# Patient Record
Sex: MALE | Race: WHITE | Employment: OTHER | URBAN - NONMETROPOLITAN AREA
[De-identification: names, ages, dates, MRNs, and addresses within clinical notes are randomized per-mention and may not be internally consistent; named-entity substitution may affect disease eponyms.]

---

## 2018-08-13 ENCOUNTER — HOSPITAL ENCOUNTER (EMERGENCY)
Age: 63
Discharge: HOME | End: 2018-08-13
Payer: COMMERCIAL

## 2018-08-13 ENCOUNTER — APPOINTMENT (OUTPATIENT)
Dept: GENERAL RADIOLOGY | Age: 63
End: 2018-08-13
Payer: COMMERCIAL

## 2018-08-13 VITALS
HEART RATE: 93 BPM | DIASTOLIC BLOOD PRESSURE: 101 MMHG | BODY MASS INDEX: 24.33 KG/M2 | HEIGHT: 67 IN | SYSTOLIC BLOOD PRESSURE: 156 MMHG | TEMPERATURE: 97.7 F | OXYGEN SATURATION: 96 % | WEIGHT: 155 LBS | RESPIRATION RATE: 18 BRPM
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
## 2018-08-13 DIAGNOSIS — S46.912A STRAIN OF LEFT SHOULDER, INITIAL ENCOUNTER: Primary | ICD-10-CM

## 2018-08-13 PROCEDURE — 6360000002 HC RX W HCPCS: Performed by: PHYSICIAN ASSISTANT

## 2018-08-13 PROCEDURE — 73030 X-RAY EXAM OF SHOULDER: CPT

## 2018-08-13 PROCEDURE — 99283 EMERGENCY DEPT VISIT LOW MDM: CPT

## 2018-08-13 PROCEDURE — 96372 THER/PROPH/DIAG INJ SC/IM: CPT

## 2018-08-13 PROCEDURE — 99283 EMERGENCY DEPT VISIT LOW MDM: CPT | Performed by: PHYSICIAN ASSISTANT

## 2018-08-13 RX ORDER — KETOROLAC TROMETHAMINE 30 MG/ML
30 INJECTION, SOLUTION INTRAMUSCULAR; INTRAVENOUS ONCE
Status: COMPLETED | OUTPATIENT
Start: 2018-08-13 | End: 2018-08-13

## 2018-08-13 RX ORDER — NAPROXEN 500 MG/1
500 TABLET ORAL 2 TIMES DAILY
Qty: 20 TABLET | Refills: 0 | Status: SHIPPED | OUTPATIENT
Start: 2018-08-13 | End: 2020-03-03 | Stop reason: ALTCHOICE

## 2018-08-13 RX ORDER — CYCLOBENZAPRINE HCL 10 MG
10 TABLET ORAL 3 TIMES DAILY PRN
Qty: 15 TABLET | Refills: 0 | Status: SHIPPED | OUTPATIENT
Start: 2018-08-13 | End: 2018-08-23

## 2018-08-13 RX ADMIN — KETOROLAC TROMETHAMINE 30 MG: 30 INJECTION, SOLUTION INTRAMUSCULAR; INTRAVENOUS at 11:01

## 2018-08-13 ASSESSMENT — ENCOUNTER SYMPTOMS
COUGH: 0
STRIDOR: 0
CONSTIPATION: 0
SHORTNESS OF BREATH: 0
BACK PAIN: 0
ABDOMINAL DISTENTION: 0
VOMITING: 0
COLOR CHANGE: 0
SORE THROAT: 0
ABDOMINAL PAIN: 0
CHEST TIGHTNESS: 0
RHINORRHEA: 0
WHEEZING: 0
NAUSEA: 0

## 2018-08-13 ASSESSMENT — PAIN SCALES - GENERAL: PAINLEVEL_OUTOF10: 5

## 2018-08-13 NOTE — ED PROVIDER NOTES
CURRENT MEDICATIONS       Discharge Medication List as of 8/13/2018 10:40 AM      CONTINUE these medications which have NOT CHANGED    Details   Sertraline HCl (ZOLOFT PO) Take by mouth daily      Zolpidem Tartrate (AMBIEN PO) Take by mouth nightly             ALLERGIES     Patient has no known allergies. FAMILY HISTORY     History reviewed. No pertinent family history. SOCIAL HISTORY       Social History     Social History    Marital status:      Spouse name: N/A    Number of children: N/A    Years of education: N/A     Social History Main Topics    Smoking status: Never Smoker    Smokeless tobacco: None    Alcohol use No    Drug use: No    Sexual activity: Not Asked     Other Topics Concern    None     Social History Narrative    None       SCREENINGS           PHYSICAL EXAM    (up to 7 for level 4, 8 or more for level 5)     ED Triage Vitals [08/13/18 1009]   BP Temp Temp Source Pulse Resp SpO2 Height Weight   (!) 156/101 97.7 °F (36.5 °C) Oral 93 18 96 % 5' 7\" (1.702 m) 155 lb (70.3 kg)       Physical Exam   Constitutional: He is oriented to person, place, and time. He appears well-developed and well-nourished. No distress. HENT:   Head: Normocephalic and atraumatic. Neck: Normal range of motion. Cardiovascular: Normal rate, regular rhythm and normal heart sounds. Exam reveals no gallop and no friction rub. No murmur heard. Pulmonary/Chest: Effort normal and breath sounds normal. No respiratory distress. He has no wheezes. He has no rales. He exhibits no tenderness. Abdominal: Soft. Bowel sounds are normal. He exhibits no distension. There is no tenderness. There is no rebound and no guarding. Musculoskeletal:        Arms:  Lymphadenopathy:     He has no cervical adenopathy. Neurological: He is alert and oriented to person, place, and time. Skin: Skin is warm and dry. He is not diaphoretic. Psychiatric: He has a normal mood and affect.    Nursing note and START taking these medications    Details   naproxen (NAPROSYN) 500 MG tablet Take 1 tablet by mouth 2 times daily, Disp-20 tablet, R-0Print      cyclobenzaprine (FLEXERIL) 10 MG tablet Take 1 tablet by mouth 3 times daily as needed for Muscle spasms, Disp-15 tablet, R-0Print             (Please note that portions of this note were completed with a voice recognition program.  Efforts were made to edit the dictations but occasionally words are mis-transcribed.)    CRYS [place]  08/13/18 1538

## 2020-03-03 RX ORDER — PRAZOSIN HYDROCHLORIDE 1 MG/1
1 CAPSULE ORAL NIGHTLY PRN
Status: ON HOLD | COMMUNITY
End: 2023-01-01 | Stop reason: HOSPADM

## 2020-03-03 RX ORDER — TOPIRAMATE 100 MG/1
100 TABLET, FILM COATED ORAL DAILY
Status: ON HOLD | COMMUNITY
End: 2022-02-22

## 2020-03-03 RX ORDER — BUPROPION HYDROCHLORIDE 100 MG/1
100 TABLET ORAL EVERY MORNING
Status: ON HOLD | COMMUNITY
End: 2022-02-22

## 2020-03-04 ENCOUNTER — OFFICE VISIT (OUTPATIENT)
Dept: GASTROENTEROLOGY | Age: 65
End: 2020-03-04
Payer: OTHER GOVERNMENT

## 2020-03-04 VITALS
WEIGHT: 153 LBS | HEIGHT: 67 IN | OXYGEN SATURATION: 98 % | BODY MASS INDEX: 24.01 KG/M2 | SYSTOLIC BLOOD PRESSURE: 117 MMHG | HEART RATE: 86 BPM | DIASTOLIC BLOOD PRESSURE: 60 MMHG

## 2020-03-04 PROCEDURE — 99204 OFFICE O/P NEW MOD 45 MIN: CPT | Performed by: NURSE PRACTITIONER

## 2020-03-04 RX ORDER — FERROUS SULFATE 325(65) MG
325 TABLET ORAL
Status: ON HOLD | COMMUNITY
End: 2020-04-04 | Stop reason: HOSPADM

## 2020-03-04 RX ORDER — ACETAMINOPHEN 160 MG
TABLET,DISINTEGRATING ORAL DAILY
Status: ON HOLD | COMMUNITY
End: 2022-03-11 | Stop reason: HOSPADM

## 2020-03-04 RX ORDER — FOLIC ACID 1 MG/1
1 TABLET ORAL DAILY
Status: ON HOLD | COMMUNITY
End: 2022-02-22

## 2020-03-04 RX ORDER — ONDANSETRON 4 MG/1
TABLET, FILM COATED ORAL
Qty: 15 TABLET | Refills: 0 | Status: SHIPPED | OUTPATIENT
Start: 2020-03-04 | End: 2020-08-11 | Stop reason: ALTCHOICE

## 2020-03-04 RX ORDER — OMEPRAZOLE 20 MG/1
20 CAPSULE, DELAYED RELEASE ORAL DAILY
COMMUNITY

## 2020-03-04 ASSESSMENT — ENCOUNTER SYMPTOMS
DIARRHEA: 1
ABDOMINAL PAIN: 1
CHEST TIGHTNESS: 0
VOMITING: 0
CONSTIPATION: 0
RECTAL PAIN: 0
SORE THROAT: 0
BLOOD IN STOOL: 1
COUGH: 0
SHORTNESS OF BREATH: 0
BACK PAIN: 0
VOICE CHANGE: 0
NAUSEA: 1
ABDOMINAL DISTENTION: 0

## 2020-03-04 NOTE — PATIENT INSTRUCTIONS
Schedule colonoscopy and endoscopy. Do not eat or drink after midnight the day of the procedure. Allowed medications can be taken with a small sip of water. Please review your prep instructions for allowed medications. You will not be able to drive for 24 hours after the procedure due to sedation. Bring a  with you the day of the procedure. If you are on blood thinners, clearance from the prescribing physician will be obtained before your procedure is scheduled. If it is determined it is not safe to hold these medications for a short time an alternative procedure for evaluation may be recommended. No aspirin, ibuprofen, naproxen, fish oil or vitamin E for 5 days before procedure. Risks of colonoscopy and endoscopy include, but are not limited to, perforation, bleeding, and infection, Risk of perforation and bleeding are increased if there is a polyp removed or dilation completed. Anesthesia risks will be reviewed with you before the procedure by a member of the anesthesia department. Your physician may also schedule a follow up appointment with the nurse practitioner to discuss pathology, symptoms or to check if you have had any problems related to your procedure. If you prefer not to return to the office after your procedure please discuss this with your physician on the day of your colonoscopy. The physician will talk with you and/or your family after the procedure is completed. Final recommendations are based on the pathologist report if biopsies or specimens are taken. For Colonoscopy: You will be given specific directions regarding restrictions to diet and bowel prep instructions including laxatives. Please read these instructions one week prior to your scheduled procedure to ensure that you are prepared.  If you have any questions regarding these instructions please call our office Mon through Fri from 8:00 am to 4:00 pm.     Follow prep instructions provided for bowel prep. Take all of the bowel prep as directed. If you are having problems with nausea, stop your prep for 30-45 min to allow the nausea to subside before resuming your prep. It is important to drink plenty of fluids throughout the day before taking your laxatives. This will help to protect your kidneys, prevent dehydration and maximize the effect of the bowel prep. If polyps are removed during the procedure they will be sent to a pathologist for analysis. Unless you have a follow up appointment scheduled, you will be notified by mail of the pathology results within 4 weeks. If you have not received results after 4 weeks you may call the office to obtain this information. Your diet before a colonoscopy bowel preparation is very important to ensure a successful colon exam. It is recommended to consider certain changes to your diet three to four days prior to the procedure. Remember that your bowels need to be empty for the exam.    What foods are good to eat? Cut down on heavy solid foods three to four days before the procedure and start introducing lighter meals to your diet. The following food suggestions are a good part of your diet before a colonoscopy bowel preparation. Light meat that is easily digestible such as chicken (without the skin)   Potatoes without skin   Cheese   Eggs   A light meal of steamed white fish   Light clear soups    Foods and drinks to avoid  Avoid foods that contain too much fiber. Stay clear of dark colored beverages. They can stick to the walls of the digestive tract and make it difficult to differentiate from blood. Some of these foods are:  Red meat, rice, nuts and vegetables   Milk, other milk based fluids and cream   Most fruit and puddings   Whole grain pasta   Cereals, bran and seeds   Colored beverages, especially those that are red or purple in color   Red colored Jell-O   On the day before the colonoscopy, continue to drink plenty of clear fluids.  It is important to keep yourself hydrated before the exam.     Please follow all instructions as provided for cleansing the bowel. Failure to have an adequately prepped colon may cause you to have incomplete exam with further testing required.      [email or web address]

## 2020-03-04 NOTE — PROGRESS NOTES
Subjective:      Patient ID: Cristal Jacob is a 63 y.o. male  MD Ky Wilson, FNP    HPI  Chief Complaint   Patient presents with    New Patient    Anemia       New patient referred for anemia. Labs from referring provider reviewed. Wbc 6.3, hgb 7.5, hct 26.8, plt 579  Folate 6.0  Vit D 11.5  Iron 12, tibc 464  Other labs including vit B12, tsh, cmp unremarkable. Patient says he has not had any fatigue, sob, weakness. Has noted a little harder to run    Patient has had some rectal bleeding. Occurs with diarrhea stool. Darker red or pinkish noted mixed in stool. Cannot tell occurrence or frequency due color blindness. He has chronic diarrhea. Intermittent occurrence in past but is more persistent, daily now. Has bm 3x per day with loose to watery stools. This is a noted change in bowel habit. He states he started having some diarrhea when he returned from [place] but not as much as he has had recently. He has both lower and upper abdominal pain. Both are intermittent. Upper pain has resolved after starting daily PPI. Pain is mild to moderate. Located across upper abdomen or LLQ. He has had chronic intermittent heartburn for years. \"chew a lot of tums\". Prescribed omeprazole he thinks this improved the pain and     He denies melena, vomiting, dysphagia. Colonoscopy and endoscopy done years ago. Mother had colon cancer twice. Assessment:      1. Anemia, unspecified type    2. Rectal bleeding    3. Change in bowel habit    4. Diarrhea, unspecified type    5. Lower abdominal pain    6. Chronic heartburn    7. Upper abdominal pain    8. Nausea    9. Family history of colon cancer            Plan:      Schedule colonoscopy and endoscopy     zofran prep for nausea r/t bowel prep provided. Orders Placed This Encounter   Medications    ondansetron (ZOFRAN) 4 MG tablet     Sig: Take one tablet 1 hour prior to starting bowel prep.  If nausea occurs take a second tablet. Take 1-2 tablets every 6 hours for nausea. Dispense:  15 tablet     Refill:  0     Pt advised to call if any problems r/t medication. Discussed medication including administration and side effects       Instruct on bowel prep. Nothing to eat or drink after midnight the day of the exam.  Unable to drive for 24 hours after the procedure. No aspirin or nonsteroidal anti-inflammatories for 5 days before procedure. I have discussed the benefits, alternatives, and risks (including bleeding, perforation and death)  for pursuing Endoscopy (EGD/Colonscopy/EUS/ERCP) with the patient and they are willing to continue. We also discussed the need for anesthesia, IV access, proper dietary changes, medication changes if necessary, and need for bowel prep (if ordered) prior to their Endoscopic procedure. They are aware they must have someone accompany them to their scheduled procedure to drive them home - they agree to the above and are willing to continue. Plan for anesthesia: MAC  no reported complications                  Family History   Problem Relation Age of Onset    Colon Cancer Mother     Liver Cancer Mother     Colon Polyps Neg Hx        Past Medical History:   Diagnosis Date    PTSD (post-traumatic stress disorder)        Past Surgical History:   Procedure Laterality Date    APPENDECTOMY      CHOLECYSTECTOMY      COLONOSCOPY      when pt was in the 20's       Current Outpatient Medications   Medication Sig Dispense Refill    omeprazole (PRILOSEC) 20 MG delayed release capsule Take 20 mg by mouth daily      Cholecalciferol (VITAMIN D3) 50 MCG (2000 UT) CAPS Take by mouth daily      folic acid (FOLVITE) 1 MG tablet Take 1 mg by mouth daily      ferrous sulfate 325 (65 Fe) MG tablet Take 325 mg by mouth 3 times daily (with meals)      ondansetron (ZOFRAN) 4 MG tablet Take one tablet 1 hour prior to starting bowel prep. If nausea occurs take a second tablet.  Take 1-2 tablets every 6

## 2020-03-11 ENCOUNTER — HOSPITAL ENCOUNTER (OUTPATIENT)
Age: 65
Setting detail: OUTPATIENT SURGERY
Discharge: HOME | End: 2020-03-11
Attending: INTERNAL MEDICINE | Admitting: INTERNAL MEDICINE
Payer: OTHER GOVERNMENT

## 2020-03-11 ENCOUNTER — ANESTHESIA (OUTPATIENT)
Dept: OPERATING ROOM | Age: 65
End: 2020-03-11

## 2020-03-11 ENCOUNTER — TELEPHONE (OUTPATIENT)
Dept: GASTROENTEROLOGY | Age: 65
End: 2020-03-11

## 2020-03-11 ENCOUNTER — APPOINTMENT (OUTPATIENT)
Dept: OPERATING ROOM | Age: 65
End: 2020-03-11

## 2020-03-11 ENCOUNTER — HOSPITAL ENCOUNTER (OUTPATIENT)
Age: 65
Setting detail: SPECIMEN
Discharge: HOME | End: 2020-03-11
Payer: OTHER GOVERNMENT

## 2020-03-11 ENCOUNTER — ANESTHESIA EVENT (OUTPATIENT)
Dept: OPERATING ROOM | Age: 65
End: 2020-03-11

## 2020-03-11 VITALS — OXYGEN SATURATION: 98 % | SYSTOLIC BLOOD PRESSURE: 113 MMHG | DIASTOLIC BLOOD PRESSURE: 70 MMHG

## 2020-03-11 VITALS
OXYGEN SATURATION: 100 % | TEMPERATURE: 97.4 F | DIASTOLIC BLOOD PRESSURE: 73 MMHG | SYSTOLIC BLOOD PRESSURE: 120 MMHG | BODY MASS INDEX: 24.48 KG/M2 | HEIGHT: 67 IN | HEART RATE: 51 BPM | RESPIRATION RATE: 18 BRPM | WEIGHT: 156 LBS

## 2020-03-11 DIAGNOSIS — K63.89 MASS OF CECUM: ICD-10-CM

## 2020-03-11 DIAGNOSIS — D64.9 ANEMIA, UNSPECIFIED TYPE: ICD-10-CM

## 2020-03-11 LAB
ALBUMIN SERPL-MCNC: 3.9 G/DL (ref 3.5–5.2)
ALP BLD-CCNC: 103 U/L (ref 40–130)
ALT SERPL-CCNC: 13 U/L (ref 5–41)
ANION GAP SERPL CALCULATED.3IONS-SCNC: 10 MMOL/L (ref 7–19)
ANISOCYTOSIS: ABNORMAL
AST SERPL-CCNC: 20 U/L (ref 5–40)
BASOPHILS ABSOLUTE: 0.1 K/UL (ref 0–0.2)
BASOPHILS RELATIVE PERCENT: 0.8 % (ref 0–1)
BILIRUB SERPL-MCNC: <0.2 MG/DL (ref 0.2–1.2)
BUN BLDV-MCNC: 10 MG/DL (ref 8–23)
CALCIUM SERPL-MCNC: 8.9 MG/DL (ref 8.8–10.2)
CEA: 5.5 NG/ML (ref 0–4.7)
CHLORIDE BLD-SCNC: 108 MMOL/L (ref 98–111)
CO2: 21 MMOL/L (ref 22–29)
CREAT SERPL-MCNC: 1.3 MG/DL (ref 0.5–1.2)
EOSINOPHILS ABSOLUTE: 0.3 K/UL (ref 0–0.6)
EOSINOPHILS RELATIVE PERCENT: 4.2 % (ref 0–5)
GFR NON-AFRICAN AMERICAN: 56
GLUCOSE BLD-MCNC: 100 MG/DL (ref 74–109)
HCT VFR BLD CALC: 27.3 % (ref 42–52)
HEMOGLOBIN: 7.4 G/DL (ref 14–18)
HYPOCHROMIA: ABNORMAL
IMMATURE GRANULOCYTES #: 0 K/UL
LYMPHOCYTES ABSOLUTE: 0.6 K/UL (ref 1.1–4.5)
LYMPHOCYTES RELATIVE PERCENT: 7.6 % (ref 20–40)
MCH RBC QN AUTO: 20.4 PG (ref 27–31)
MCHC RBC AUTO-ENTMCNC: 27.1 G/DL (ref 33–37)
MCV RBC AUTO: 75.2 FL (ref 80–94)
MICROCYTES: ABNORMAL
MONOCYTES ABSOLUTE: 0.7 K/UL (ref 0–0.9)
MONOCYTES RELATIVE PERCENT: 10.3 % (ref 0–10)
NEUTROPHILS ABSOLUTE: 5.5 K/UL (ref 1.5–7.5)
NEUTROPHILS RELATIVE PERCENT: 76.8 % (ref 50–65)
OVALOCYTES: ABNORMAL
PDW BLD-RTO: 23.1 % (ref 11.5–14.5)
PLATELET # BLD: 478 K/UL (ref 130–400)
PLATELET SLIDE REVIEW: ADEQUATE
PMV BLD AUTO: 11.2 FL (ref 9.4–12.4)
POIKILOCYTES: ABNORMAL
POTASSIUM SERPL-SCNC: 5 MMOL/L (ref 3.5–5)
RBC # BLD: 3.63 M/UL (ref 4.7–6.1)
SODIUM BLD-SCNC: 139 MMOL/L (ref 136–145)
TARGET CELLS: ABNORMAL
TOTAL PROTEIN: 6.9 G/DL (ref 6.6–8.7)
WBC # BLD: 7.2 K/UL (ref 4.8–10.8)

## 2020-03-11 PROCEDURE — 43239 EGD BIOPSY SINGLE/MULTIPLE: CPT | Performed by: INTERNAL MEDICINE

## 2020-03-11 PROCEDURE — 81479 UNLISTED MOLECULAR PATHOLOGY: CPT

## 2020-03-11 PROCEDURE — 43239 EGD BIOPSY SINGLE/MULTIPLE: CPT

## 2020-03-11 PROCEDURE — 45380 COLONOSCOPY AND BIOPSY: CPT | Performed by: INTERNAL MEDICINE

## 2020-03-11 PROCEDURE — 85025 COMPLETE CBC W/AUTO DIFF WBC: CPT

## 2020-03-11 PROCEDURE — 45385 COLONOSCOPY W/LESION REMOVAL: CPT | Performed by: INTERNAL MEDICINE

## 2020-03-11 PROCEDURE — 36415 COLL VENOUS BLD VENIPUNCTURE: CPT

## 2020-03-11 PROCEDURE — 45385 COLONOSCOPY W/LESION REMOVAL: CPT

## 2020-03-11 PROCEDURE — 80053 COMPREHEN METABOLIC PANEL: CPT

## 2020-03-11 PROCEDURE — 88342 IMHCHEM/IMCYTCHM 1ST ANTB: CPT

## 2020-03-11 PROCEDURE — 82378 CARCINOEMBRYONIC ANTIGEN: CPT

## 2020-03-11 PROCEDURE — 45382 COLONOSCOPY W/CONTROL BLEED: CPT

## 2020-03-11 PROCEDURE — 88305 TISSUE EXAM BY PATHOLOGIST: CPT

## 2020-03-11 PROCEDURE — 45380 COLONOSCOPY AND BIOPSY: CPT

## 2020-03-11 RX ORDER — ONDANSETRON 2 MG/ML
4 INJECTION INTRAMUSCULAR; INTRAVENOUS
Status: DISCONTINUED | OUTPATIENT
Start: 2020-03-11 | End: 2020-03-11 | Stop reason: HOSPADM

## 2020-03-11 RX ORDER — FENTANYL CITRATE 50 UG/ML
INJECTION, SOLUTION INTRAMUSCULAR; INTRAVENOUS PRN
Status: DISCONTINUED | OUTPATIENT
Start: 2020-03-11 | End: 2020-03-11 | Stop reason: SDUPTHER

## 2020-03-11 RX ORDER — PROPOFOL 10 MG/ML
INJECTION, EMULSION INTRAVENOUS PRN
Status: DISCONTINUED | OUTPATIENT
Start: 2020-03-11 | End: 2020-03-11 | Stop reason: SDUPTHER

## 2020-03-11 RX ORDER — DIPHENHYDRAMINE HYDROCHLORIDE 50 MG/ML
12.5 INJECTION INTRAMUSCULAR; INTRAVENOUS
Status: DISCONTINUED | OUTPATIENT
Start: 2020-03-11 | End: 2020-03-11 | Stop reason: HOSPADM

## 2020-03-11 RX ORDER — PROMETHAZINE HYDROCHLORIDE 25 MG/ML
6.25 INJECTION, SOLUTION INTRAMUSCULAR; INTRAVENOUS
Status: DISCONTINUED | OUTPATIENT
Start: 2020-03-11 | End: 2020-03-11 | Stop reason: HOSPADM

## 2020-03-11 RX ORDER — SODIUM CHLORIDE 9 MG/ML
INJECTION, SOLUTION INTRAVENOUS CONTINUOUS
Status: DISCONTINUED | OUTPATIENT
Start: 2020-03-11 | End: 2020-03-11 | Stop reason: HOSPADM

## 2020-03-11 RX ORDER — LIDOCAINE HYDROCHLORIDE 10 MG/ML
INJECTION, SOLUTION INFILTRATION; PERINEURAL PRN
Status: DISCONTINUED | OUTPATIENT
Start: 2020-03-11 | End: 2020-03-11 | Stop reason: SDUPTHER

## 2020-03-11 RX ADMIN — FENTANYL CITRATE 100 MCG: 50 INJECTION, SOLUTION INTRAMUSCULAR; INTRAVENOUS at 09:53

## 2020-03-11 RX ADMIN — LIDOCAINE HYDROCHLORIDE 50 MG: 10 INJECTION, SOLUTION INFILTRATION; PERINEURAL at 10:00

## 2020-03-11 RX ADMIN — PROPOFOL 1300 MG: 10 INJECTION, EMULSION INTRAVENOUS at 10:00

## 2020-03-11 RX ADMIN — SODIUM CHLORIDE: 9 INJECTION, SOLUTION INTRAVENOUS at 09:50

## 2020-03-11 RX ADMIN — SODIUM CHLORIDE: 9 INJECTION, SOLUTION INTRAVENOUS at 11:08

## 2020-03-11 NOTE — H&P
Patient Name: Collette Primer  : 1955  MRN: [id number]  DATE: 20    Allergies: No Known Allergies     ENDOSCOPY  History and Physical    Procedure:    [x] Diagnostic Colonoscopy       [] Screening Colonoscopy  [x] EGD      [] ERCP      [] EUS       [] Other    [x] Previous office notes/History and Physical reviewed from the patients chart. Please see EMR for further details of HPI. I have examined the patient's status immediately prior to the procedure and:      Indications/HPI:     1. Anemia, unspecified type    2. Rectal bleeding    3. Change in bowel habit    4. Diarrhea, unspecified type    5. Lower abdominal pain    6. Chronic heartburn    7. Upper abdominal pain    8. Nausea    9. Family history of colon cancer        []Abdominal Pain   []Cancer- GI/Lung     []Fhx of colon CA/polyps  []History of Polyps  []Barretts            []Melena  []Abnormal Imaging              []Dysphagia              []Persistent Pneumonia   []Anemia                            []Food Impaction        []History of Polyps  [] GI Bleed             []Pulmonary nodule/Mass   []Change in bowel habits []Heartburn/Reflux  []Rectal Bleed (BRBPR)  []Chest Pain - Non Cardiac []Heme (+) Stool []Ulcers  []Constipation  []Hemoptysis  []Varices  []Diarrhea  []Hypoxemia    []Nausea/Vomiting   []Screening   []Crohns/Colitis  []Other:     Anesthesia:   [x] MAC [] Moderate Sedation   [] General   [] None     ROS: 12 pt Review of Symptoms was negative unless mentioned above    Medications:   Prior to Admission medications    Medication Sig Start Date End Date Taking?  Authorizing Provider   omeprazole (PRILOSEC) 20 MG delayed release capsule Take 20 mg by mouth daily   Yes Historical Provider, MD   Cholecalciferol (VITAMIN D3) 50 MCG (2000) CAPS Take by mouth daily   Yes Historical Provider, MD   folic acid (FOLVITE) 1 MG tablet Take 1 mg by mouth daily   Yes Historical Provider, MD   ferrous sulfate 325 (65 Fe) MG tablet Take 325 mg by mouth 3 times daily (with meals)   Yes Historical Provider, MD   ondansetron (ZOFRAN) 4 MG tablet Take one tablet 1 hour prior to starting bowel prep. If nausea occurs take a second tablet. Take 1-2 tablets every 6 hours for nausea. 3/4/20  Yes KALPESH Resendiz   prazosin (MINIPRESS) 1 MG capsule Take 1 mg by mouth nightly For nightmares   Yes Historical Provider, MD   buPROPion (WELLBUTRIN) 100 MG tablet Take 100 mg by mouth every morning For mood   Yes Historical Provider, MD   topiramate (TOPAMAX) 100 MG tablet Take 100 mg by mouth daily For Seizures or Migraines   Yes Historical Provider, MD   Sertraline HCl (ZOLOFT PO) Take by mouth daily   Yes Historical Provider, MD   Zolpidem Tartrate (AMBIEN PO) Take by mouth nightly   Yes Historical Provider, MD       Past Medical History:  Past Medical History:   Diagnosis Date    Acid reflux     PTSD (post-traumatic stress disorder)        Past Surgical History:  Past Surgical History:   Procedure Laterality Date    APPENDECTOMY      CHOLECYSTECTOMY      COLONOSCOPY      when pt was in the 20's       Social History:  Social History     Tobacco Use    Smoking status: Never Smoker    Smokeless tobacco: Never Used   Substance Use Topics    Alcohol use: Not Currently     Comment: stopped 2/5/2020    Drug use: No       Vital Signs:   Vitals:    03/11/20 0933   BP: 125/76   Pulse: 65   Resp: 18   Temp: 97.4 °F (36.3 °C)   SpO2: 100%        Physical Exam:  Cardiac:  [x]WNL  []Comments:  Pulmonary:  [x]WNL   []Comments:  Neuro/Mental Status:  [x]WNL  []Comments:  Abdominal:  [x]WNL    []Comments:  Other:   []WNL  []Comments:    Informed Consent:  The risks and benefits of the procedure have been discussed with either the patient or if they cannot consent, their representative. Assessment:  Patient examined and appropriate for planned sedation and procedure. Plan:  Proceed with planned sedation and procedure as above.          Kiesha Gamble MD

## 2020-03-11 NOTE — TELEPHONE ENCOUNTER
This [facility] called and spoke to the patients wife. He has been scheduled with Oncology on 03/23/2020 and General Surgery on 03/24/2020. His CT appointment is on 03/18/2020 and his follow up with GI is in August with Labs 1 week before. Patients wife stated understanding.
check for metastatic lesions and lymphadenopathy  - Resume previous meds and diet  - GI clinic f/u in 6 months; monitor CBC and iron studies periodically. - Keep scheduled f/u appts with other MDs      - NO ASA/NSAIDs x 2 weeks     Findings and recommendations were discussed w/ the patient and his family.  A copy of the images was provided.     Homar Her MD  3/11/2020  9:55 AM

## 2020-03-11 NOTE — OP NOTE
Patient: Laron Leigh : 1955  Med Rec#: [id number] Acc#: [id number]   Primary Care Provider Santa Alvarado    Date of Procedure:  3/11/2020    Endoscopist: Bucky Snider MD    Referring Provider: Santa Alvarado,     Operation Performed: Colonoscopy up to the Cecum with     (1) Cold biopsies of a large, irregular, fungating and friable malignant appearing mass lesion in the Cecum  (2) Piecemeal resection of a large sessile, multilobulated polypoid lesion in the hepatic flexure and (3) application of metallic clips x 2 to control post-polypectomy bleeding at the site    (3) piecemeal resection of a large sessile, multilobulaed polypoid lesion in the proximal descending colon and (4) application of two metallic clips at the polypectomy site to prevent bleeding and minimize risk fo perforation and     (5) hot snare resection of a small rectal polyp    (6) retrieval of multiple resected polyp fragments using a [employer] retrieval net    (7) technically challenging and prolonged procedure lasting >60 mins     Indications: for both EGD and colonoscopy exams done today:  1. Anemia, unspecified type    2. Rectal bleeding    3. Change in bowel habit    4. Diarrhea, unspecified type    5. Lower abdominal pain    6. Chronic heartburn    7. Upper abdominal pain    8. Nausea    9. Family history of colon cancer      Anesthesia:  Sedation was administered by anesthesia who monitored the patient during the procedure. I met with Laron Leigh prior to procedure. We discussed the procedure itself, and I have discussed the risks of endoscopy (including-- but not limited to-- pain, discomfort, bleeding potentially requiring second endoscopic procedure and/or blood transfusion, organ perforation requiring operative repair, damage to organs near the colon, infection, aspiration, cardiopulmonary/allergic reaction), benefits, indications to endoscopy. Additionally, we discussed options other than colonoscopy.  The

## 2020-03-11 NOTE — ANESTHESIA PRE PROCEDURE
Department of Anesthesiology  Preprocedure Note       Name:  Siria Storm   Age:  63 y.o.  :  1955                                          MRN:  [id number]         Date:  3/11/2020      Surgeon: Tamika Gallo):  Lennox Show, MD    Procedure: EGD BIOPSY (N/A Esophagus)  COLONOSCOPY DIAGNOSTIC (N/A Abdomen)    Medications prior to admission:   Prior to Admission medications    Medication Sig Start Date End Date Taking? Authorizing Provider   omeprazole (PRILOSEC) 20 MG delayed release capsule Take 20 mg by mouth daily    Historical Provider, MD   Cholecalciferol (VITAMIN D3) 50 MCG (2000) CAPS Take by mouth daily    Historical Provider, MD   folic acid (FOLVITE) 1 MG tablet Take 1 mg by mouth daily    Historical Provider, MD   ferrous sulfate 325 (65 Fe) MG tablet Take 325 mg by mouth 3 times daily (with meals)    Historical Provider, MD   ondansetron (ZOFRAN) 4 MG tablet Take one tablet 1 hour prior to starting bowel prep. If nausea occurs take a second tablet. Take 1-2 tablets every 6 hours for nausea. 3/4/20   KALPESH Meléndez   prazosin (MINIPRESS) 1 MG capsule Take 1 mg by mouth nightly For nightmares    Historical Provider, MD   buPROPion (WELLBUTRIN) 100 MG tablet Take 100 mg by mouth every morning For mood    Historical Provider, MD   topiramate (TOPAMAX) 100 MG tablet Take 100 mg by mouth daily For Seizures or Migraines    Historical Provider, MD   Sertraline HCl (ZOLOFT PO) Take by mouth daily    Historical Provider, MD   Zolpidem Tartrate (AMBIEN PO) Take by mouth nightly    Historical Provider, MD       Current medications:    Current Facility-Administered Medications   Medication Dose Route Frequency Provider Last Rate Last Dose    0.9 % sodium chloride infusion   Intravenous Continuous Lennox Show, MD           Allergies:  No Known Allergies    Problem List:  There is no problem list on file for this patient.       Past Medical History:        Diagnosis Date    Acid reflux     PTSD (post-traumatic stress disorder)        Past Surgical History:        Procedure Laterality Date    APPENDECTOMY      CHOLECYSTECTOMY      COLONOSCOPY      when pt was in the 20's       Social History:    Social History     Tobacco Use    Smoking status: Never Smoker    Smokeless tobacco: Never Used   Substance Use Topics    Alcohol use: Not Currently     Comment: stopped 2/5/2020                                Counseling given: Not Answered      Vital Signs (Current): There were no vitals filed for this visit. BP Readings from Last 3 Encounters:   03/04/20 117/60   08/13/18 (!) 156/101   09/23/16 145/89       NPO Status:                                                                                 BMI:   Wt Readings from Last 3 Encounters:   03/04/20 153 lb (69.4 kg)   08/13/18 155 lb (70.3 kg)   09/23/16 157 lb (71.2 kg)     There is no height or weight on file to calculate BMI.    CBC: No results found for: WBC, RBC, HGB, HCT, MCV, RDW, PLT    CMP: No results found for: NA, K, CL, CO2, BUN, CREATININE, GFRAA, AGRATIO, LABGLOM, GLUCOSE, PROT, CALCIUM, BILITOT, ALKPHOS, AST, ALT    POC Tests: No results for input(s): POCGLU, POCNA, POCK, POCCL, POCBUN, POCHEMO, POCHCT in the last 72 hours.     Coags: No results found for: PROTIME, INR, APTT    HCG (If Applicable): No results found for: PREGTESTUR, PREGSERUM, HCG, HCGQUANT     ABGs: No results found for: PHART, PO2ART, GRO8VEY, CXT8OMS, BEART, [id number]     Type & Screen (If Applicable):  No results found for: LABABO, [address]    Anesthesia Evaluation  Patient summary reviewed no history of anesthetic complications:   Airway: Mallampati: I  TM distance: >3 FB   Neck ROM: full  Mouth opening: > = 3 FB Dental: normal exam         Pulmonary:Negative Pulmonary ROS breath sounds clear to auscultation                             Cardiovascular:Negative CV ROS                      Neuro/Psych:   Negative Neuro/Psych ROS  (+) psychiatric history:            GI/Hepatic/Renal:   (+) GERD:, bowel prep,           Endo/Other: Negative Endo/Other ROS                    Abdominal:           Vascular: negative vascular ROS. Anesthesia Plan      general     ASA 2       Induction: intravenous. Anesthetic plan and risks discussed with patient.                       KALPESH Rossi - CRNA   3/11/2020

## 2020-03-11 NOTE — ANESTHESIA POSTPROCEDURE EVALUATION
Department of Anesthesiology  Postprocedure Note    Patient: Sabas Cook  MRN: [id number]  YOB: 1955  Date of evaluation: 3/11/2020  Time:  11:10 AM     Procedure Summary     Date:  03/11/20 Room / Location:  [facility] ENDO 02 / [address] [place] [address]    Anesthesia Start:  5018 Anesthesia Stop:  1110    Procedures:       EGD BIOPSY (N/A Esophagus)      COLONOSCOPY POLYPECTOMY SNARE/COLD BIOPSY (N/A Abdomen) Diagnosis:  (ANEMIA-CHG BH - DIARRHEA - RB - LOW AND UP ABD PAIN - NAUSEA)    Surgeon:  Peña Kelley MD Responsible Provider:  KALPESH Menchaca CRNA    Anesthesia Type:  general ASA Status:  2          Anesthesia Type: general    Kris Phase I:      Kris Phase II:      Last vitals: Reviewed and per EMR flowsheets. Anesthesia Post Evaluation    Patient location during evaluation: PACU  Patient participation: complete - patient participated  Level of consciousness: awake and alert  Pain score: 0  Airway patency: patent  Nausea & Vomiting: no nausea and no vomiting  Complications: no  Cardiovascular status: hemodynamically stable and blood pressure returned to baseline  Respiratory status: acceptable  Hydration status: stable  Comments: Vital Signs Stable. Exchanging well. PACU RN received care. Skin Substitute Units (Will Override Primary Defect Units If Greater Than 0): 0

## 2020-03-11 NOTE — OP NOTE
Endoscopic Procedure Note    Patient: Jesus Ga : 1955  Med Rec#: [id number] Acc#: [id number]     Primary Care Provider Aravind Concepcion    Endoscopist: Cathie Sharif MD    Date of Procedure:  3/11/2020    Procedure:   1. EGD with cold biopsies    Indications:   for both EGD and colonoscopy exams done today:  1. Anemia, unspecified type    2. Rectal bleeding    3. Change in bowel habit    4. Diarrhea, unspecified type    5. Lower abdominal pain    6. Chronic heartburn    7. Upper abdominal pain    8. Nausea    9. Family history of colon cancer      Anesthesia:  Sedation was administered by anesthesia who monitored the patient during the procedure. Estimated Blood Loss: minimal    Procedure:   After reviewing the patient's chart and obtaining informed consent, the patient was placed in the left lateral decubitus position. A forward-viewing Olympus endoscope was lubricated and inserted through the mouth into the oropharynx. Under direct visualization, the upper esophagus was intubated. The scope was advanced to the level of the third portion of duodenum. Scope was slowly withdrawn with careful inspection of the mucosal surfaces. The scope was retroflexed for inspection of the gastric fundus and incisura. Findings and maneuvers are listed in impression below. The patient tolerated the procedure well. The scope was removed. There were no immediate complications. Findings/IMPRESSION:  Esophagus: Normal upper two thirds; short segment Adam's Esophagus like changes with small erosions in the lower third with 1-1.5 cm segments of tongue like projections of gastric appearing mucosa into the lower third of the esophagus away from the EG junction at 40 cm. Cold biopsies were taken to check for intestinal metaplasia. NO ulcers or nodules or strictures or webs or rings or mass lesions or extrinsic compression or diverticula noted. There is a small 2-3 cm hiatal hernia present. Stomach:  abnormal:  mucosal changes with patchy erythema dn small erosions in the antrum suggestive of mild antral gastritis noted -  Gastric biopsies were taken from the antrum and body to rule out Helicobacter pylori infection. Otherwise, NO ulcers or masses or gastric outlet obstruction or retained food or fluid. Rugae were normal and lumen distended well with insufflation. Retroflexed views otherwise revealed a normal GE junction, fundus and cardia as well. Duodenum: abnormal: the mucosa appeared somewhat atrophic with mild scalloping of folds in the second portion. Random biopsies were taken to check for Celiac disease. RECOMMENDATIONS:    1. Await path results, the patient will be contacted in 7-10 days with biopsy results. If biopsies confirm SSBE, next EGD with biopsies for surveillance of Adam's should be in 3 years; if biopsies reveal findings suggestive of Celiac disease, will need Celiac disease antibody panel checked; if gastric biopsies reveal H.pylori, he will need treatment with a course of PrevPac or Helidac x 2 weeks. 2.  Avoid NSAIDs  3. Continue PPI/H2RA PO once or twice daily with anti-GERD measures. The results were discussed with the patient and family. A copy of the images obtained were given to the patient.      Geo Quiroz MD  3/11/2020  9:56 AM

## 2020-03-12 ENCOUNTER — TELEPHONE (OUTPATIENT)
Dept: GASTROENTEROLOGY | Age: 65
End: 2020-03-12

## 2020-03-12 NOTE — TELEPHONE ENCOUNTER
----- Message from Bucky Snider MD sent at 3/12/2020 11:40 AM CDT -----  Patient still has severe microcytic (likely due to iron deficiency due to chronic GI blood loss with his Cecal mass) anemia; need to continue his Iron supplementation    CEA is elevated consistent with the Cecal mass lesion which is likely malignant

## 2020-03-18 ENCOUNTER — HOSPITAL ENCOUNTER (OUTPATIENT)
Dept: CT IMAGING | Age: 65
Discharge: HOME | End: 2020-03-18
Payer: OTHER GOVERNMENT

## 2020-03-18 ENCOUNTER — HOSPITAL ENCOUNTER (OUTPATIENT)
Dept: GENERAL RADIOLOGY | Age: 65
Discharge: HOME | End: 2020-03-18
Payer: OTHER GOVERNMENT

## 2020-03-18 PROCEDURE — 6360000004 HC RX CONTRAST MEDICATION: Performed by: INTERNAL MEDICINE

## 2020-03-18 PROCEDURE — 71046 X-RAY EXAM CHEST 2 VIEWS: CPT

## 2020-03-18 PROCEDURE — 74178 CT ABD&PLV WO CNTR FLWD CNTR: CPT

## 2020-03-18 RX ADMIN — IOPAMIDOL 75 ML: 755 INJECTION, SOLUTION INTRAVENOUS at 10:28

## 2020-03-20 NOTE — PROGRESS NOTES
MEDICAL ONCOLOGY CONSULTATION    Pt Name: Mary Ellen Elizondo  MRN: [id number]  [place]: 1955  Date of evaluation: 3/23/2020    REASON FOR CONSULTATION: Colonic adenocarcinoma  REQUESTING PHYSICIAN: Dr Jeison Garcia    History Obtained From:  patient and old medical records    HISTORY OF PRESENT ILLNESS:      Diagnosis  · Colonic adenocarcinoma  · [id number](liver), stage ROXANA  · IHC MMR- not proficient  · K-maria luisa mutated  · N-MARIA LUISA/BRAF wild-type      Treatment summary  · Favored to proceed with surgery for primary lesion  · Anticipated neoadjuvant versus palliative chemotherapy    Mr. Matt Corcoran was first seen by me on 3/23/2020. He was referred for a new diagnosis of colonic adenocarcinoma involving the cecum. The patient reports that he had a wellbeing consult with his provider at the [place]. He was found to have anemia and then recommended a colonoscopy. Of note, the patient has a family history of colon cancer. His mother is a patient of Dr. Cárdenas Cava he has been diagnosed with colon cancer in 2010. · 3/11/2020- colonoscopy revealed a large malignant appearing fungating mass lesion in the cecum. In addition, several other polyps. Biopsy of the mass consistent with moderate differentiated colonic adenocarcinoma. Polyps consistent with tubulovillous adenoma with no high-grade dysplasia. IHC MMR not proficient. · 3/11/2020-CEA 5.5 (H)  · 3/18/2020-CT abdomen pelvis with contrast  Invasive cecal mass adhering to the right lateral abdominal wall muscles with adjacent lymphadenopathy. Mild partial obstruction of the terminal ileum. 2. Suspicious lesions in the right and left hepatic lobes measure up to 1.3 cm and likely represent metastatic disease. · 3/18/2020-Xr Chest Standard  No radiographic evidence of acute cardiopulmonary process. · 3/23/2020-he was first seen by me. Recommended completion of staging with CT chest.  Also recommended liver MRI for further clarification of liver lesion.   S.  Recommend to proceed with a general surgery consultation tomorrow with Dr. Peña Lenz. Patient was informed that I favor surgical resection if feasible of the primary malignancy.     Past Medical History:    Past Medical History:   Diagnosis Date    Acid reflux     Cancer ([address]     PTSD (post-traumatic stress disorder)        Past Surgical History:    Past Surgical History:   Procedure Laterality Date    APPENDECTOMY      CHOLECYSTECTOMY      COLONOSCOPY      when pt was in the 19's    COLONOSCOPY N/A 3/11/2020    Dr Keon Burdick, w/metallic clip placement x 2-Suboptimal prep, diverticulosis, internal hemorrhoids-Grade 1-Invasive adenocarcinoma, moderately differentiated-cecal, TV x 1, HP x 1, AP x 1, 6-12 month recall    UPPER GASTROINTESTINAL ENDOSCOPY N/A 3/11/2020    Dr Andrew Don without SSBE, non-H.pylori gastritis and mild duodenitis without Celiac disease       Social History:    Social History     Socioeconomic History    Marital status:      Spouse name: Not on file    Number of children: Not on file    Years of education: Not on file    Highest education level: Not on file   Occupational History    Not on file   Social Needs    Financial resource strain: Not on file    Food insecurity     Worry: Not on file     Inability: Not on file    Transportation needs     Medical: Not on file     Non-medical: Not on file   Tobacco Use    Smoking status: Never Smoker    Smokeless tobacco: Never Used   Substance and Sexual Activity    Alcohol use: Not Currently     Comment: stopped 2/5/2020    Drug use: No    Sexual activity: Yes     Partners: Female   Lifestyle    Physical activity     Days per week: Not on file     Minutes per session: Not on file    Stress: Not on file   Relationships    Social connections     Talks on phone: Not on file     Gets together: Not on file     Attends Ca[employer] service: Not on file     Active member of club or organization: Not on file Attends meetings of clubs or organizations: Not on file     Relationship status: Not on file    Intimate partner violence     Fear of current or ex partner: Not on file     Emotionally abused: Not on file     Physically abused: Not on file     Forced sexual activity: Not on file   Other Topics Concern    Not on file   Social History Narrative    Not on file       Family History:   Family History   Problem Relation Age of Onset    Colon Cancer Mother     Liver Cancer Mother     High Blood Pressure Mother     Cancer Father         Lung Cancer    Breast Cancer Sister     Cancer Maternal Grandfather         Lung Cancer    Cancer Paternal Grandfather         Stomach Cancer    Colon Polyps Neg Hx        Current Hospital Medications:    No current facility-administered medications for this visit. Allergies:    Allergies   Allergen Reactions    Oxycodone-Acetaminophen Nausea Only         Subjective   REVIEW OF SYSTEMS:   CONSTITUTIONAL: no fever, no night sweats, no fatigue; mild weight loss  HEENT: no blurring of vision, no double vision, no hearing difficulty, no tinnitus, no ulceration, no dysplasia, no epistaxis;  LUNGS: no cough, no hemoptysis, no wheeze,  no shortness of breath;  CARDIOVASCULAR: no palpitation, no chest pain, no shortness of breath;  GI: no abdominal pain, no nausea, no vomiting, no diarrhea, no constipation;  ANNIE: no dysuria, no hematuria, no frequency or urgency, no nephrolithiasis;  MUSCULOSKELETAL: no joint pain, no swelling, no stiffness;  ENDOCRINE: no polyuria, no polydipsia, no cold or heat intolerance;  HEMATOLOGY: no easy bruising or bleeding, no history of clotting disorder;  DERMATOLOGY: no skin rash, no eczema, no pruritus;  PSYCHIATRY: no depression, no anxiety, no panic attacks, no suicidal ideation, no homicidal ideation;  NEUROLOGY: no syncope, no seizures, no numbness or tingling of hands, no numbness or tingling of feet, no paresis;    Objective   /82   Pulse 94   Ht 5' 7\" (1.702 m)   Wt 155 lb 6.4 oz (70.5 kg)   SpO2 98%   BMI 24.34 kg/m²     PHYSICAL EXAM:  CONSTITUTIONAL: Alert, appropriate, no acute distress  EYES: Non icteric, EOM intact, pupils equal round   ENT: Mucus membranes moist, no oral pharyngeal lesions, external inspection of ears and nose are normal  NECK: Supple, no masses. No palpable thyroid mass  CHEST/LUNGS: CTA bilaterally, normal respiratory effort   CARDIOVASCULAR: RRR, no murmurs. No lower extremity edema  ABDOMEN: soft, tender right lower quadrant, active bowel sounds, no HSM. No palpable masses  EXTREMITIES: warm, full ROM in all 4 extremities, no focal weakness. SKIN: warm, dry with no rashes or lesions  LYMPH: No cervical, clavicular, axillary, or inguinal lymphadenopathy  NEUROLOGIC: follows commands, non focal   PSYCH: mood and affect appropriate. Alert and oriented to time, place, person    LABORATORY RESULTS REVIEWED BY ME:  CBC:3/23/2020  WBC-6.69  HGB-10.1  PLT-467,000  Neut-4.33    3/11/2020:  CEA-5.5    RADIOLOGY STUDIES REVIEWED BY ME:  Ct Abdomen Pelvis W Wo Contrast     Result Date: 3/18/2020  1. Invasive cecal mass adhering to the right lateral abdominal wall muscles with adjacent lymphadenopathy. Mild partial obstruction of the terminal ileum. 2. Suspicious lesions in the right and left hepatic lobes measure up to 1.3 cm and likely represent metastatic disease. Signed by Dr Olive Talbot on 3/18/2020 10:58 AM    Xr Chest Standard (2 Vw)    Result Date: 3/18/2020  1. No radiographic evidence of acute cardiopulmonary process. Signed by Dr Olive Talbot on 3/18/2020 10:11 AM      My interpretation: Cecal mass and liver metastasis    ASSESSMENT:  #Cecal mass- colonic adenocarcinoma well-differentiated, G2 (liver metastasis) IHC MMR not proficient  The patient was counseled today about diagnosis, staging, prognosis, diagnostic tests, medications, side effects and disease management.  The method of counseling included verbal explanation. The patient verbalized understanding. Essentially, local advanced cecal mass with mild partial obstruction of the terminal ileum. In addition, suspicious liver metastasis. I recommend the patient to proceed with surgical intervention if feasible to his primary malignancy. He is at high risk for progression and obstruction. Also recommended further staging with liver MRI and CT chest.  Depending on the extension of his liver metastasis we  will discuss this with the hepatobiliary service at [facility] regarding possibility of resection/ metastasectomy. Iron deficiency anemia-  hemoglobin 10.5/MCV78. Iron profile today. Continue iron sulfate 375 mg p.o. 3 times daily. Ferritin 12.9, iron saturation 15, TIBC 458, iron 72    PLAN:  MRI abdomen-liver protocol  CT chest  Iron profile today  RTC 1 week  IHC MMR request  Mutation profile    I have seen, examined and reviewed this patient medication list, appropriate labs and imaging studies. I reviewed relevant medical records and others physicians notes. I discussed the plans of care with the patient. I answered all the questions to the patients satisfaction. I have also reviewed the chief complaint (CC) and part of the history (History of Present Illness (HPI), Past Family Social History [facility]), or Review of Systems (ROS) and made changes when appropriated. (Please note that portions of this note were completed with a voice recognition program. Efforts were made to edit the dictations but occasionally words are mis-transcribed.)  I, Dr Felipa Mott, personally performed the services described in this documentation as scribed by Alverto Floyd MA in my presence and is both accurate and complete. Over 50% of the total visit time of 60 minutes in face to face encounter with the patient, out of which more than 50% of the time was spent in counseling patient or family and coordination of care.  Counseling included but was not limited to

## 2020-03-23 ENCOUNTER — OFFICE VISIT (OUTPATIENT)
Dept: HEMATOLOGY | Age: 65
End: 2020-03-23
Payer: OTHER GOVERNMENT

## 2020-03-23 ENCOUNTER — HOSPITAL ENCOUNTER (OUTPATIENT)
Dept: INFUSION THERAPY | Age: 65
Discharge: HOME | End: 2020-03-23
Payer: OTHER GOVERNMENT

## 2020-03-23 VITALS
DIASTOLIC BLOOD PRESSURE: 82 MMHG | HEIGHT: 67 IN | WEIGHT: 155.4 LBS | SYSTOLIC BLOOD PRESSURE: 136 MMHG | OXYGEN SATURATION: 98 % | BODY MASS INDEX: 24.39 KG/M2 | HEART RATE: 94 BPM

## 2020-03-23 DIAGNOSIS — C78.7 LIVER METASTASES (HCC): ICD-10-CM

## 2020-03-23 DIAGNOSIS — D64.9 ANEMIA, UNSPECIFIED TYPE: ICD-10-CM

## 2020-03-23 DIAGNOSIS — C18.9 MALIGNANT NEOPLASM OF COLON, UNSPECIFIED PART OF COLON (HCC): ICD-10-CM

## 2020-03-23 PROCEDURE — 36415 COLL VENOUS BLD VENIPUNCTURE: CPT

## 2020-03-23 PROCEDURE — 85025 COMPLETE CBC W/AUTO DIFF WBC: CPT

## 2020-03-23 PROCEDURE — 82728 ASSAY OF FERRITIN: CPT

## 2020-03-23 PROCEDURE — 99205 OFFICE O/P NEW HI 60 MIN: CPT | Performed by: INTERNAL MEDICINE

## 2020-03-23 PROCEDURE — 99202 OFFICE O/P NEW SF 15 MIN: CPT

## 2020-03-23 PROCEDURE — 83540 ASSAY OF IRON: CPT

## 2020-03-23 PROCEDURE — 83550 IRON BINDING TEST: CPT

## 2020-03-24 ENCOUNTER — OFFICE VISIT (OUTPATIENT)
Dept: SURGERY | Age: 65
End: 2020-03-24
Payer: OTHER GOVERNMENT

## 2020-03-24 ENCOUNTER — HOSPITAL ENCOUNTER (OUTPATIENT)
Dept: PREADMISSION TESTING | Age: 65
Discharge: HOME | End: 2020-03-28
Payer: OTHER GOVERNMENT

## 2020-03-24 VITALS
BODY MASS INDEX: 24.48 KG/M2 | WEIGHT: 156 LBS | TEMPERATURE: 97.4 F | DIASTOLIC BLOOD PRESSURE: 68 MMHG | HEIGHT: 67 IN | SYSTOLIC BLOOD PRESSURE: 114 MMHG

## 2020-03-24 VITALS — WEIGHT: 156 LBS | HEIGHT: 67 IN | BODY MASS INDEX: 24.48 KG/M2 | TEMPERATURE: 98.3 F

## 2020-03-24 LAB
ABO/RH: NORMAL
ANTIBODY SCREEN: NORMAL
EKG P AXIS: 51 DEGREES
EKG P-R INTERVAL: 202 MS
EKG Q-T INTERVAL: 412 MS
EKG QRS DURATION: 78 MS
EKG QTC CALCULATION (BAZETT): 419 MS
EKG T AXIS: 44 DEGREES

## 2020-03-24 PROCEDURE — 93005 ELECTROCARDIOGRAM TRACING: CPT | Performed by: ANESTHESIOLOGY

## 2020-03-24 PROCEDURE — 99203 OFFICE O/P NEW LOW 30 MIN: CPT | Performed by: SURGERY

## 2020-03-24 PROCEDURE — 86901 BLOOD TYPING SEROLOGIC RH(D): CPT

## 2020-03-24 PROCEDURE — 93010 ELECTROCARDIOGRAM REPORT: CPT | Performed by: INTERNAL MEDICINE

## 2020-03-24 PROCEDURE — 86850 RBC ANTIBODY SCREEN: CPT

## 2020-03-24 PROCEDURE — 86900 BLOOD TYPING SEROLOGIC ABO: CPT

## 2020-03-24 NOTE — LETTER
Scheduling Instructions:     Patient: Yan Bello  : 1955    Hospital: [facility]    Admitting Physician:  Dr. Socorro Matta    Diagnosis: Carcinoma of the cecum    Procedure: Laparoscopic-assisted right hemicolectomy    Time: 2 hours    Anesthesia: General    Admission: Inpatient     Date: Monday, 3/30/2020    Post op visit: 2 weeks postop      Electronically signed by Caitlin Maxwell MD   On 3/24/2020 @ 10:14 AM

## 2020-03-25 ENCOUNTER — PREP FOR PROCEDURE (OUTPATIENT)
Dept: SURGERY | Age: 65
End: 2020-03-25

## 2020-03-25 RX ORDER — SODIUM CHLORIDE 0.9 % (FLUSH) 0.9 %
10 SYRINGE (ML) INJECTION PRN
Status: CANCELLED | OUTPATIENT
Start: 2020-03-25

## 2020-03-25 RX ORDER — SODIUM CHLORIDE, SODIUM LACTATE, POTASSIUM CHLORIDE, CALCIUM CHLORIDE 600; 310; 30; 20 MG/100ML; MG/100ML; MG/100ML; MG/100ML
INJECTION, SOLUTION INTRAVENOUS CONTINUOUS
Status: CANCELLED | OUTPATIENT
Start: 2020-03-25

## 2020-03-25 RX ORDER — SODIUM CHLORIDE 0.9 % (FLUSH) 0.9 %
10 SYRINGE (ML) INJECTION EVERY 12 HOURS SCHEDULED
Status: CANCELLED | OUTPATIENT
Start: 2020-03-25

## 2020-03-25 ASSESSMENT — ENCOUNTER SYMPTOMS
BLOOD IN STOOL: 1
TROUBLE SWALLOWING: 0
CONSTIPATION: 0
BACK PAIN: 0
DIARRHEA: 0
VOMITING: 0
WHEEZING: 0
COLOR CHANGE: 0
ABDOMINAL DISTENTION: 0
CHEST TIGHTNESS: 0
NAUSEA: 0
ABDOMINAL PAIN: 1
SHORTNESS OF BREATH: 0
SINUS PRESSURE: 0
COUGH: 0
SORE THROAT: 0

## 2020-03-25 NOTE — H&P
Surgical History and Physical    Date 3/24/2020    Patient ID: Vonnie Angel is a 63 y.o. male.     HPI   Mr. Dorothy Clemons is a very pleasant 31-year-old gentleman referred for evaluation of a newly identified carcinoma of the cecum. Mr. Dorothy Clemons recently started passing blood with his bowel movements. He had a previously scheduled appointment for routine care with his primary care provider at the [facility]. Laboratories were obtained at that time and confirmed that he was anemic. He was thus referred to Dr. Lindy Arriola and underwent colonoscopy. That study showed a mass in the cecum which was worrisome for a primary colon cancer. Biopsies were obtained and confirm that diagnosis. In addition a large villous adenoma of the hepatic flexure was removed as well as several additional benign polyps.     Mr. Dorothy Clemons has undergone abdominal CT scan. This confirms a mass in the cecum but also shows 2 worrisome lesions in the liver. Follow-up MRI scan is pending later this week.     In general he reports that he feels well. He denies recent change in weight, fatigue, change in appetite or activity. He notes minimal abdominal pain in the right lower quadrant and no other symptoms. Of note he has a strong family history of colon cancer.   His mom underwent colon resection on 2 occasions.     Past Medical History        Past Medical History:   Diagnosis Date    Acid reflux      Cancer ([address]      PTSD (post-traumatic stress disorder)           Past Surgical History         Past Surgical History:   Procedure Laterality Date    APPENDECTOMY   2003    CHOLECYSTECTOMY   2013    COLONOSCOPY         when pt was in the 19's    COLONOSCOPY N/A 3/11/2020     COLONOSCOPY POLYPECTOMY SNARE/COLD BIOPSY performed by Lewis Lawrence MD at [address] [place] [address] N/A 3/11/2020     EGD BIOPSY performed by Lewis Lawrence MD at [facility]                Current Outpatient Medications on File

## 2020-03-25 NOTE — PROGRESS NOTES
Subjective:      Patient ID: Wm Riggs is a 63 y.o. male. HPI   Mr. Patrick Sahni is a very pleasant 79-year-old gentleman referred for evaluation of a newly identified carcinoma of the cecum. Mr. Patrick Sahni recently started passing blood with his bowel movements. He had a previously scheduled appointment for routine care with his primary care provider at the [facility]. Laboratories were obtained at that time and confirmed that he was anemic. He was thus referred to Dr. Radha Madrigal and underwent colonoscopy. That study showed a mass in the cecum which was worrisome for a primary colon cancer. Biopsies were obtained and confirm that diagnosis. In addition a large villous adenoma of the hepatic flexure was removed as well as several additional benign polyps. Mr. Patrick Sahni has undergone abdominal CT scan. This confirms a mass in the cecum but also shows 2 worrisome lesions in the liver. Follow-up MRI scan is pending later this week. In general he reports that he feels well. He denies recent change in weight, fatigue, change in appetite or activity. He notes minimal abdominal pain in the right lower quadrant and no other symptoms. Of note he has a strong family history of colon cancer. His mom underwent colon resection on 2 occasions.     Past Medical History:   Diagnosis Date    Acid reflux     [address] [address]     PTSD (post-traumatic stress disorder)      Past Surgical History:   Procedure Laterality Date    APPENDECTOMY  2003    CHOLECYSTECTOMY  2013    COLONOSCOPY      when pt was in the 19's    COLONOSCOPY N/A 3/11/2020    COLONOSCOPY POLYPECTOMY SNARE/COLD BIOPSY performed by Du Lazo MD at [address] [place] [address] N/A 3/11/2020    EGD BIOPSY performed by Du Lazo MD at [facility]     Current Outpatient Medications on File Prior to Visit   Medication Sig Dispense Refill    omeprazole (PRILOSEC) 20 MG delayed release capsule Take 20 mg by mouth daily      Cholecalciferol (VITAMIN D3) 50 MCG (2000 UT) CAPS Take by mouth daily      folic acid (FOLVITE) 1 MG tablet Take 1 mg by mouth daily      ferrous sulfate 325 (65 Fe) MG tablet Take 325 mg by mouth 3 times daily (with meals)      ondansetron (ZOFRAN) 4 MG tablet Take one tablet 1 hour prior to starting bowel prep. If nausea occurs take a second tablet. Take 1-2 tablets every 6 hours for nausea. 15 tablet 0    prazosin (MINIPRESS) 1 MG capsule Take 1 mg by mouth nightly For nightmares      buPROPion (WELLBUTRIN) 100 MG tablet Take 100 mg by mouth every morning For mood      topiramate (TOPAMAX) 100 MG tablet Take 100 mg by mouth daily For Seizures or Migraines      Sertraline HCl (ZOLOFT PO) Take by mouth daily      Zolpidem Tartrate (AMBIEN PO) Take by mouth nightly       No current facility-administered medications on file prior to visit. Allergies: Oxycodone-acetaminophen    Family History   Problem Relation Age of Onset    Liver Cancer Mother     High Blood Pressure Mother     Colon Cancer Mother         x2    Cancer Father         Lung Cancer    Breast Cancer Sister     Cancer Maternal Grandfather         Lung Cancer    Cancer Paternal Grandfather         Stomach Cancer    Colon Polyps Neg Hx        Social History     Tobacco Use    Smoking status: Never Smoker    Smokeless tobacco: Never Used   Substance Use Topics    Alcohol use: Not Currently     Comment: stopped 2/5/2020         Review of Systems   Constitutional: Negative for activity change, appetite change, chills, fatigue, fever and unexpected weight change. HENT: Negative for congestion, sinus pressure, sore throat and trouble swallowing. Respiratory: Negative for cough, chest tightness, shortness of breath and wheezing. Cardiovascular: Negative for chest pain, palpitations and leg swelling. Gastrointestinal: Positive for abdominal pain and blood in stool.  Negative for abdominal distention, constipation, diarrhea, nausea and vomiting. Genitourinary: Negative for difficulty urinating, dysuria, frequency and urgency. Musculoskeletal: Negative for arthralgias, back pain and myalgias. Skin: Negative for color change. Neurological: Negative for dizziness, seizures, syncope, light-headedness and headaches. Hematological: Negative for adenopathy. Psychiatric/Behavioral: Negative for dysphoric mood and sleep disturbance. The patient is not nervous/anxious. Objective:   Physical Exam  Vitals signs reviewed. Constitutional:       General: He is not in acute distress. Appearance: He is well-developed. HENT:      Head: Normocephalic and atraumatic. Eyes:      General: No scleral icterus. Conjunctiva/sclera: Conjunctivae normal.      Pupils: Pupils are equal, round, and reactive to light. Neck:      Musculoskeletal: Normal range of motion and neck supple. Thyroid: No thyromegaly. Trachea: No tracheal deviation. Cardiovascular:      Rate and Rhythm: Normal rate and regular rhythm. Heart sounds: Normal heart sounds. No murmur. Pulmonary:      Effort: Pulmonary effort is normal.      Breath sounds: Normal breath sounds. No wheezing or rales. Abdominal:      General: Bowel sounds are normal. There is no distension. Palpations: Abdomen is soft. There is no mass. Tenderness: There is no abdominal tenderness. Musculoskeletal: Normal range of motion. Skin:     General: Skin is warm and dry. Neurological:      Mental Status: He is alert and oriented to person, place, and time. Psychiatric:         Behavior: Behavior normal.         Thought Content: Thought content normal.         Judgment: Judgment normal.         Assessment:      1. Carcinoma of the cecum with probable local spread to regional nodes as well as metastatic disease to the liver based on CT. The radiologist reports that the tumor seems tethered to the abdominal sidewall.   I suspect that he has tethering from a previous appendectomy rather than the tumor itself. 2.  Otherwise good health      Plan:      1. Proceed with laparoscopic assisted right hemicolectomy. The rationale for the procedure was explained. Risks, benefits, alternatives and expected recovery were reviewed. Occasional need to convert to an open procedure was also discussed. Questions were encouraged and answered to the patient's satisfaction. Following this he wishes to proceed to surgery and will work with the office to schedule accordingly.           Echo Murray MD

## 2020-03-25 NOTE — H&P (VIEW-ONLY)
Surgical History and Physical    Date 3/24/2020    Patient ID: Cindy Leigh is a 63 y.o. male.     HPI   Mr. Kathy Kenny is a very pleasant 62-year-old gentleman referred for evaluation of a newly identified carcinoma of the cecum. Mr. Kathy Kenny recently started passing blood with his bowel movements. He had a previously scheduled appointment for routine care with his primary care provider at the [facility]. Laboratories were obtained at that time and confirmed that he was anemic. He was thus referred to Dr. Domingo Szymanski and underwent colonoscopy. That study showed a mass in the cecum which was worrisome for a primary colon cancer. Biopsies were obtained and confirm that diagnosis. In addition a large villous adenoma of the hepatic flexure was removed as well as several additional benign polyps.     Mr. Kathy Kenny has undergone abdominal CT scan. This confirms a mass in the cecum but also shows 2 worrisome lesions in the liver. Follow-up MRI scan is pending later this week.     In general he reports that he feels well. He denies recent change in weight, fatigue, change in appetite or activity. He notes minimal abdominal pain in the right lower quadrant and no other symptoms. Of note he has a strong family history of colon cancer.   His mom underwent colon resection on 2 occasions.     Past Medical History        Past Medical History:   Diagnosis Date    Acid reflux      Cancer ([address]      PTSD (post-traumatic stress disorder)           Past Surgical History         Past Surgical History:   Procedure Laterality Date    APPENDECTOMY   2003    CHOLECYSTECTOMY   2013    COLONOSCOPY         when pt was in the 19's    COLONOSCOPY N/A 3/11/2020     COLONOSCOPY POLYPECTOMY SNARE/COLD BIOPSY performed by Nithin Mcgregor MD at [address] [place] [address] N/A 3/11/2020     EGD BIOPSY performed by Nithin Mcgregor MD at [facility]                Current Outpatient Medications on File

## 2020-03-28 ENCOUNTER — ANESTHESIA EVENT (OUTPATIENT)
Dept: OPERATING ROOM | Age: 65
DRG: 330 | End: 2020-03-28
Payer: OTHER GOVERNMENT

## 2020-03-30 ENCOUNTER — HOSPITAL ENCOUNTER (INPATIENT)
Age: 65
LOS: 5 days | Discharge: HOME HEALTH | DRG: 330 | End: 2020-04-04
Attending: SURGERY | Admitting: SURGERY
Payer: OTHER GOVERNMENT

## 2020-03-30 ENCOUNTER — ANESTHESIA (OUTPATIENT)
Dept: OPERATING ROOM | Age: 65
DRG: 330 | End: 2020-03-30
Payer: OTHER GOVERNMENT

## 2020-03-30 VITALS
DIASTOLIC BLOOD PRESSURE: 70 MMHG | TEMPERATURE: 97.3 F | RESPIRATION RATE: 1 BRPM | SYSTOLIC BLOOD PRESSURE: 114 MMHG | OXYGEN SATURATION: 96 %

## 2020-03-30 PROBLEM — C18.2 MALIGNANT NEOPLASM OF ASCENDING COLON (HCC): Status: ACTIVE | Noted: 2020-03-30

## 2020-03-30 LAB
ABO/RH: NORMAL
ANTIBODY SCREEN: NORMAL

## 2020-03-30 PROCEDURE — 3700000000 HC ANESTHESIA ATTENDED CARE: Performed by: SURGERY

## 2020-03-30 PROCEDURE — 2580000003 HC RX 258: Performed by: SURGERY

## 2020-03-30 PROCEDURE — 6370000000 HC RX 637 (ALT 250 FOR IP): Performed by: SURGERY

## 2020-03-30 PROCEDURE — 6360000002 HC RX W HCPCS: Performed by: NURSE ANESTHETIST, CERTIFIED REGISTERED

## 2020-03-30 PROCEDURE — 86900 BLOOD TYPING SEROLOGIC ABO: CPT

## 2020-03-30 PROCEDURE — 2500000003 HC RX 250 WO HCPCS: Performed by: SURGERY

## 2020-03-30 PROCEDURE — 44205 LAP COLECTOMY PART W/ILEUM: CPT | Performed by: PHYSICIAN ASSISTANT

## 2020-03-30 PROCEDURE — 44205 LAP COLECTOMY PART W/ILEUM: CPT | Performed by: SURGERY

## 2020-03-30 PROCEDURE — 7100000000 HC PACU RECOVERY - FIRST 15 MIN: Performed by: SURGERY

## 2020-03-30 PROCEDURE — 6360000002 HC RX W HCPCS: Performed by: SURGERY

## 2020-03-30 PROCEDURE — 3700000001 HC ADD 15 MINUTES (ANESTHESIA): Performed by: SURGERY

## 2020-03-30 PROCEDURE — 2709999900 HC NON-CHARGEABLE SUPPLY: Performed by: SURGERY

## 2020-03-30 PROCEDURE — 6370000000 HC RX 637 (ALT 250 FOR IP): Performed by: ANESTHESIOLOGY

## 2020-03-30 PROCEDURE — 0DTF0ZZ RESECTION OF RIGHT LARGE INTESTINE, OPEN APPROACH: ICD-10-PCS | Performed by: SURGERY

## 2020-03-30 PROCEDURE — 36415 COLL VENOUS BLD VENIPUNCTURE: CPT

## 2020-03-30 PROCEDURE — C9113 INJ PANTOPRAZOLE SODIUM, VIA: HCPCS | Performed by: SURGERY

## 2020-03-30 PROCEDURE — 1210000000 HC MED SURG R&B

## 2020-03-30 PROCEDURE — 81311 NRAS GENE VARIANTS EXON 2&3: CPT

## 2020-03-30 PROCEDURE — 3600000004 HC SURGERY LEVEL 4 BASE: Performed by: SURGERY

## 2020-03-30 PROCEDURE — 6360000002 HC RX W HCPCS: Performed by: ANESTHESIOLOGY

## 2020-03-30 PROCEDURE — 86901 BLOOD TYPING SEROLOGIC RH(D): CPT

## 2020-03-30 PROCEDURE — 86850 RBC ANTIBODY SCREEN: CPT

## 2020-03-30 PROCEDURE — 7100000001 HC PACU RECOVERY - ADDTL 15 MIN: Performed by: SURGERY

## 2020-03-30 PROCEDURE — 3600000014 HC SURGERY LEVEL 4 ADDTL 15MIN: Performed by: SURGERY

## 2020-03-30 PROCEDURE — 81210 BRAF GENE: CPT

## 2020-03-30 PROCEDURE — 2500000003 HC RX 250 WO HCPCS: Performed by: NURSE ANESTHETIST, CERTIFIED REGISTERED

## 2020-03-30 PROCEDURE — 88309 TISSUE EXAM BY PATHOLOGIST: CPT

## 2020-03-30 RX ORDER — SODIUM CHLORIDE, SODIUM LACTATE, POTASSIUM CHLORIDE, CALCIUM CHLORIDE 600; 310; 30; 20 MG/100ML; MG/100ML; MG/100ML; MG/100ML
INJECTION, SOLUTION INTRAVENOUS CONTINUOUS
Status: DISCONTINUED | OUTPATIENT
Start: 2020-03-30 | End: 2020-03-30

## 2020-03-30 RX ORDER — LIDOCAINE HYDROCHLORIDE 10 MG/ML
1 INJECTION, SOLUTION EPIDURAL; INFILTRATION; INTRACAUDAL; PERINEURAL
Status: DISCONTINUED | OUTPATIENT
Start: 2020-03-30 | End: 2020-03-30 | Stop reason: HOSPADM

## 2020-03-30 RX ORDER — MEPERIDINE HYDROCHLORIDE 50 MG/ML
12.5 INJECTION INTRAMUSCULAR; INTRAVENOUS; SUBCUTANEOUS EVERY 5 MIN PRN
Status: DISCONTINUED | OUTPATIENT
Start: 2020-03-30 | End: 2020-03-30 | Stop reason: HOSPADM

## 2020-03-30 RX ORDER — PRAZOSIN HYDROCHLORIDE 1 MG/1
1 CAPSULE ORAL NIGHTLY
Status: DISCONTINUED | OUTPATIENT
Start: 2020-03-30 | End: 2020-04-04 | Stop reason: HOSPADM

## 2020-03-30 RX ORDER — FENTANYL CITRATE 50 UG/ML
25 INJECTION, SOLUTION INTRAMUSCULAR; INTRAVENOUS
Status: DISCONTINUED | OUTPATIENT
Start: 2020-03-30 | End: 2020-03-30 | Stop reason: HOSPADM

## 2020-03-30 RX ORDER — PROMETHAZINE HYDROCHLORIDE 25 MG/ML
6.25 INJECTION, SOLUTION INTRAMUSCULAR; INTRAVENOUS
Status: DISCONTINUED | OUTPATIENT
Start: 2020-03-30 | End: 2020-03-30 | Stop reason: HOSPADM

## 2020-03-30 RX ORDER — SODIUM CHLORIDE 9 MG/ML
INJECTION, SOLUTION INTRAVENOUS CONTINUOUS
Status: DISCONTINUED | OUTPATIENT
Start: 2020-03-30 | End: 2020-03-30

## 2020-03-30 RX ORDER — ROCURONIUM BROMIDE 10 MG/ML
INJECTION, SOLUTION INTRAVENOUS PRN
Status: DISCONTINUED | OUTPATIENT
Start: 2020-03-30 | End: 2020-03-30 | Stop reason: SDUPTHER

## 2020-03-30 RX ORDER — ONDANSETRON 2 MG/ML
INJECTION INTRAMUSCULAR; INTRAVENOUS PRN
Status: DISCONTINUED | OUTPATIENT
Start: 2020-03-30 | End: 2020-03-30 | Stop reason: SDUPTHER

## 2020-03-30 RX ORDER — SODIUM CHLORIDE, SODIUM LACTATE, POTASSIUM CHLORIDE, CALCIUM CHLORIDE 600; 310; 30; 20 MG/100ML; MG/100ML; MG/100ML; MG/100ML
INJECTION, SOLUTION INTRAVENOUS CONTINUOUS
Status: DISCONTINUED | OUTPATIENT
Start: 2020-03-30 | End: 2020-04-04 | Stop reason: HOSPADM

## 2020-03-30 RX ORDER — HYDRALAZINE HYDROCHLORIDE 20 MG/ML
5 INJECTION INTRAMUSCULAR; INTRAVENOUS EVERY 10 MIN PRN
Status: DISCONTINUED | OUTPATIENT
Start: 2020-03-30 | End: 2020-03-30 | Stop reason: HOSPADM

## 2020-03-30 RX ORDER — APREPITANT 40 MG/1
40 CAPSULE ORAL ONCE
Status: COMPLETED | OUTPATIENT
Start: 2020-03-30 | End: 2020-03-30

## 2020-03-30 RX ORDER — SCOLOPAMINE TRANSDERMAL SYSTEM 1 MG/1
1 PATCH, EXTENDED RELEASE TRANSDERMAL
Status: DISCONTINUED | OUTPATIENT
Start: 2020-03-30 | End: 2020-04-02

## 2020-03-30 RX ORDER — PANTOPRAZOLE SODIUM 40 MG/10ML
40 INJECTION, POWDER, LYOPHILIZED, FOR SOLUTION INTRAVENOUS DAILY
Status: DISCONTINUED | OUTPATIENT
Start: 2020-03-30 | End: 2020-04-04 | Stop reason: HOSPADM

## 2020-03-30 RX ORDER — DEXAMETHASONE SODIUM PHOSPHATE 10 MG/ML
INJECTION, SOLUTION INTRAMUSCULAR; INTRAVENOUS PRN
Status: DISCONTINUED | OUTPATIENT
Start: 2020-03-30 | End: 2020-03-30 | Stop reason: SDUPTHER

## 2020-03-30 RX ORDER — MIDAZOLAM HYDROCHLORIDE 1 MG/ML
2 INJECTION INTRAMUSCULAR; INTRAVENOUS
Status: DISCONTINUED | OUTPATIENT
Start: 2020-03-30 | End: 2020-03-30 | Stop reason: HOSPADM

## 2020-03-30 RX ORDER — LABETALOL 20 MG/4 ML (5 MG/ML) INTRAVENOUS SYRINGE
5 EVERY 10 MIN PRN
Status: DISCONTINUED | OUTPATIENT
Start: 2020-03-30 | End: 2020-03-30 | Stop reason: HOSPADM

## 2020-03-30 RX ORDER — SODIUM CHLORIDE 0.9 % (FLUSH) 0.9 %
10 SYRINGE (ML) INJECTION EVERY 12 HOURS SCHEDULED
Status: DISCONTINUED | OUTPATIENT
Start: 2020-03-30 | End: 2020-03-30 | Stop reason: HOSPADM

## 2020-03-30 RX ORDER — SODIUM CHLORIDE 0.9 % (FLUSH) 0.9 %
10 SYRINGE (ML) INJECTION PRN
Status: DISCONTINUED | OUTPATIENT
Start: 2020-03-30 | End: 2020-03-30 | Stop reason: HOSPADM

## 2020-03-30 RX ORDER — ACETAMINOPHEN 325 MG/1
325 TABLET ORAL EVERY 4 HOURS PRN
Status: DISCONTINUED | OUTPATIENT
Start: 2020-03-30 | End: 2020-04-04 | Stop reason: HOSPADM

## 2020-03-30 RX ORDER — PROPOFOL 10 MG/ML
INJECTION, EMULSION INTRAVENOUS PRN
Status: DISCONTINUED | OUTPATIENT
Start: 2020-03-30 | End: 2020-03-30 | Stop reason: SDUPTHER

## 2020-03-30 RX ORDER — MORPHINE SULFATE/0.9% NACL/PF 1 MG/ML
SYRINGE (ML) INJECTION CONTINUOUS
Status: DISCONTINUED | OUTPATIENT
Start: 2020-03-30 | End: 2020-04-02

## 2020-03-30 RX ORDER — FENTANYL CITRATE 50 UG/ML
50 INJECTION, SOLUTION INTRAMUSCULAR; INTRAVENOUS
Status: DISCONTINUED | OUTPATIENT
Start: 2020-03-30 | End: 2020-03-30 | Stop reason: HOSPADM

## 2020-03-30 RX ORDER — SODIUM CHLORIDE 0.9 % (FLUSH) 0.9 %
10 SYRINGE (ML) INJECTION EVERY 12 HOURS SCHEDULED
Status: DISCONTINUED | OUTPATIENT
Start: 2020-03-30 | End: 2020-04-04 | Stop reason: HOSPADM

## 2020-03-30 RX ORDER — NALOXONE HYDROCHLORIDE 0.4 MG/ML
0.4 INJECTION, SOLUTION INTRAMUSCULAR; INTRAVENOUS; SUBCUTANEOUS PRN
Status: DISCONTINUED | OUTPATIENT
Start: 2020-03-30 | End: 2020-04-02

## 2020-03-30 RX ORDER — MORPHINE SULFATE 4 MG/ML
4 INJECTION, SOLUTION INTRAMUSCULAR; INTRAVENOUS EVERY 5 MIN PRN
Status: DISCONTINUED | OUTPATIENT
Start: 2020-03-30 | End: 2020-03-30 | Stop reason: HOSPADM

## 2020-03-30 RX ORDER — DIPHENHYDRAMINE HYDROCHLORIDE 50 MG/ML
12.5 INJECTION INTRAMUSCULAR; INTRAVENOUS
Status: DISCONTINUED | OUTPATIENT
Start: 2020-03-30 | End: 2020-03-30 | Stop reason: HOSPADM

## 2020-03-30 RX ORDER — SUCCINYLCHOLINE CHLORIDE 20 MG/ML
INJECTION INTRAMUSCULAR; INTRAVENOUS PRN
Status: DISCONTINUED | OUTPATIENT
Start: 2020-03-30 | End: 2020-03-30 | Stop reason: SDUPTHER

## 2020-03-30 RX ORDER — BUPROPION HYDROCHLORIDE 100 MG/1
100 TABLET ORAL EVERY MORNING
Status: DISCONTINUED | OUTPATIENT
Start: 2020-03-30 | End: 2020-04-04 | Stop reason: HOSPADM

## 2020-03-30 RX ORDER — MORPHINE SULFATE 4 MG/ML
2 INJECTION, SOLUTION INTRAMUSCULAR; INTRAVENOUS EVERY 30 MIN PRN
Status: DISCONTINUED | OUTPATIENT
Start: 2020-03-30 | End: 2020-04-04 | Stop reason: HOSPADM

## 2020-03-30 RX ORDER — SODIUM CHLORIDE 9 MG/ML
10 INJECTION INTRAVENOUS DAILY
Status: DISCONTINUED | OUTPATIENT
Start: 2020-03-30 | End: 2020-04-04 | Stop reason: HOSPADM

## 2020-03-30 RX ORDER — ONDANSETRON 2 MG/ML
4 INJECTION INTRAMUSCULAR; INTRAVENOUS EVERY 6 HOURS PRN
Status: DISCONTINUED | OUTPATIENT
Start: 2020-03-30 | End: 2020-04-04 | Stop reason: HOSPADM

## 2020-03-30 RX ORDER — SODIUM CHLORIDE 0.9 % (FLUSH) 0.9 %
10 SYRINGE (ML) INJECTION PRN
Status: DISCONTINUED | OUTPATIENT
Start: 2020-03-30 | End: 2020-04-04 | Stop reason: HOSPADM

## 2020-03-30 RX ORDER — FENTANYL CITRATE 50 UG/ML
INJECTION, SOLUTION INTRAMUSCULAR; INTRAVENOUS PRN
Status: DISCONTINUED | OUTPATIENT
Start: 2020-03-30 | End: 2020-03-30 | Stop reason: SDUPTHER

## 2020-03-30 RX ORDER — ENALAPRILAT 2.5 MG/2ML
1.25 INJECTION INTRAVENOUS
Status: DISCONTINUED | OUTPATIENT
Start: 2020-03-30 | End: 2020-03-30 | Stop reason: HOSPADM

## 2020-03-30 RX ORDER — EPHEDRINE SULFATE 50 MG/ML
INJECTION, SOLUTION INTRAVENOUS PRN
Status: DISCONTINUED | OUTPATIENT
Start: 2020-03-30 | End: 2020-03-30 | Stop reason: SDUPTHER

## 2020-03-30 RX ORDER — SERTRALINE HYDROCHLORIDE 25 MG/1
25 TABLET, FILM COATED ORAL DAILY
Status: DISCONTINUED | OUTPATIENT
Start: 2020-03-30 | End: 2020-04-04 | Stop reason: HOSPADM

## 2020-03-30 RX ORDER — METOCLOPRAMIDE HYDROCHLORIDE 5 MG/ML
10 INJECTION INTRAMUSCULAR; INTRAVENOUS
Status: DISCONTINUED | OUTPATIENT
Start: 2020-03-30 | End: 2020-03-30 | Stop reason: HOSPADM

## 2020-03-30 RX ORDER — ONDANSETRON 4 MG/1
4 TABLET, ORALLY DISINTEGRATING ORAL EVERY 8 HOURS PRN
Status: DISCONTINUED | OUTPATIENT
Start: 2020-03-30 | End: 2020-04-04 | Stop reason: HOSPADM

## 2020-03-30 RX ORDER — BUPIVACAINE HYDROCHLORIDE 2.5 MG/ML
INJECTION, SOLUTION INFILTRATION; PERINEURAL PRN
Status: DISCONTINUED | OUTPATIENT
Start: 2020-03-30 | End: 2020-03-30 | Stop reason: HOSPADM

## 2020-03-30 RX ORDER — MIDAZOLAM HYDROCHLORIDE 1 MG/ML
INJECTION INTRAMUSCULAR; INTRAVENOUS PRN
Status: DISCONTINUED | OUTPATIENT
Start: 2020-03-30 | End: 2020-03-30 | Stop reason: SDUPTHER

## 2020-03-30 RX ORDER — LIDOCAINE HYDROCHLORIDE 10 MG/ML
INJECTION, SOLUTION INFILTRATION; PERINEURAL PRN
Status: DISCONTINUED | OUTPATIENT
Start: 2020-03-30 | End: 2020-03-30 | Stop reason: SDUPTHER

## 2020-03-30 RX ORDER — MORPHINE SULFATE 4 MG/ML
2 INJECTION, SOLUTION INTRAMUSCULAR; INTRAVENOUS EVERY 5 MIN PRN
Status: DISCONTINUED | OUTPATIENT
Start: 2020-03-30 | End: 2020-03-30 | Stop reason: HOSPADM

## 2020-03-30 RX ORDER — TOPIRAMATE 100 MG/1
100 TABLET, FILM COATED ORAL DAILY
Status: DISCONTINUED | OUTPATIENT
Start: 2020-03-30 | End: 2020-04-04 | Stop reason: HOSPADM

## 2020-03-30 RX ADMIN — SUGAMMADEX 140 MG: 100 INJECTION, SOLUTION INTRAVENOUS at 11:23

## 2020-03-30 RX ADMIN — SUCCINYLCHOLINE CHLORIDE 120 MG: 20 INJECTION, SOLUTION INTRAMUSCULAR; INTRAVENOUS at 09:06

## 2020-03-30 RX ADMIN — FENTANYL CITRATE 50 MCG: 50 INJECTION INTRAMUSCULAR; INTRAVENOUS at 09:04

## 2020-03-30 RX ADMIN — FENTANYL CITRATE 25 MCG: 50 INJECTION INTRAMUSCULAR; INTRAVENOUS at 10:21

## 2020-03-30 RX ADMIN — APREPITANT 40 MG: 40 CAPSULE ORAL at 08:38

## 2020-03-30 RX ADMIN — HYDROMORPHONE HYDROCHLORIDE 0.25 MG: 1 INJECTION, SOLUTION INTRAMUSCULAR; INTRAVENOUS; SUBCUTANEOUS at 11:46

## 2020-03-30 RX ADMIN — MORPHINE SULFATE 30 MG: 1 INJECTION INTRAVENOUS at 12:41

## 2020-03-30 RX ADMIN — LIDOCAINE HYDROCHLORIDE 50 ML: 10 INJECTION, SOLUTION INFILTRATION; PERINEURAL at 09:06

## 2020-03-30 RX ADMIN — BUPROPION HYDROCHLORIDE 100 MG: 100 TABLET, FILM COATED ORAL at 13:36

## 2020-03-30 RX ADMIN — ONDANSETRON HYDROCHLORIDE 4 MG: 2 INJECTION, SOLUTION INTRAMUSCULAR; INTRAVENOUS at 10:56

## 2020-03-30 RX ADMIN — FENTANYL CITRATE 25 MCG: 50 INJECTION INTRAMUSCULAR; INTRAVENOUS at 09:45

## 2020-03-30 RX ADMIN — SODIUM CHLORIDE, PRESERVATIVE FREE 10 ML: 5 INJECTION INTRAVENOUS at 13:37

## 2020-03-30 RX ADMIN — SODIUM CHLORIDE, PRESERVATIVE FREE 10 ML: 5 INJECTION INTRAVENOUS at 21:28

## 2020-03-30 RX ADMIN — EPHEDRINE SULFATE 10 MG: 50 INJECTION INTRAMUSCULAR; INTRAVENOUS; SUBCUTANEOUS at 09:26

## 2020-03-30 RX ADMIN — MORPHINE SULFATE 30 MG: 1 INJECTION INTRAVENOUS at 21:26

## 2020-03-30 RX ADMIN — DEXAMETHASONE SODIUM PHOSPHATE 8 MG: 10 INJECTION, SOLUTION INTRAMUSCULAR; INTRAVENOUS at 09:17

## 2020-03-30 RX ADMIN — SERTRALINE HYDROCHLORIDE 25 MG: 25 TABLET ORAL at 13:36

## 2020-03-30 RX ADMIN — HYDROMORPHONE HYDROCHLORIDE 0.5 MG: 1 INJECTION, SOLUTION INTRAMUSCULAR; INTRAVENOUS; SUBCUTANEOUS at 11:15

## 2020-03-30 RX ADMIN — EPHEDRINE SULFATE 10 MG: 50 INJECTION INTRAMUSCULAR; INTRAVENOUS; SUBCUTANEOUS at 10:03

## 2020-03-30 RX ADMIN — CEFOXITIN SODIUM 2 G: 2 POWDER, FOR SOLUTION INTRAVENOUS at 15:43

## 2020-03-30 RX ADMIN — HYDROMORPHONE HYDROCHLORIDE 0.25 MG: 1 INJECTION, SOLUTION INTRAMUSCULAR; INTRAVENOUS; SUBCUTANEOUS at 11:34

## 2020-03-30 RX ADMIN — MIDAZOLAM 2 MG: 1 INJECTION INTRAMUSCULAR; INTRAVENOUS at 08:58

## 2020-03-30 RX ADMIN — ROCURONIUM BROMIDE 50 MG: 10 SOLUTION INTRAVENOUS at 09:20

## 2020-03-30 RX ADMIN — TOPIRAMATE 100 MG: 100 TABLET, FILM COATED ORAL at 13:36

## 2020-03-30 RX ADMIN — HYDROMORPHONE HYDROCHLORIDE 0.25 MG: 1 INJECTION, SOLUTION INTRAMUSCULAR; INTRAVENOUS; SUBCUTANEOUS at 11:56

## 2020-03-30 RX ADMIN — HYDROMORPHONE HYDROCHLORIDE 0.25 MG: 1 INJECTION, SOLUTION INTRAMUSCULAR; INTRAVENOUS; SUBCUTANEOUS at 11:19

## 2020-03-30 RX ADMIN — FENTANYL CITRATE 25 MCG: 50 INJECTION INTRAMUSCULAR; INTRAVENOUS at 09:37

## 2020-03-30 RX ADMIN — PHENYTOIN 1 MG: 125 SUSPENSION ORAL at 21:28

## 2020-03-30 RX ADMIN — SODIUM CHLORIDE, SODIUM LACTATE, POTASSIUM CHLORIDE, AND CALCIUM CHLORIDE: 600; 310; 30; 20 INJECTION, SOLUTION INTRAVENOUS at 09:40

## 2020-03-30 RX ADMIN — SODIUM CHLORIDE, SODIUM LACTATE, POTASSIUM CHLORIDE, AND CALCIUM CHLORIDE: 600; 310; 30; 20 INJECTION, SOLUTION INTRAVENOUS at 08:02

## 2020-03-30 RX ADMIN — FENTANYL CITRATE 25 MCG: 50 INJECTION INTRAMUSCULAR; INTRAVENOUS at 09:52

## 2020-03-30 RX ADMIN — PROPOFOL 180 MG: 10 INJECTION, EMULSION INTRAVENOUS at 09:06

## 2020-03-30 RX ADMIN — ROCURONIUM BROMIDE 20 MG: 10 SOLUTION INTRAVENOUS at 10:08

## 2020-03-30 RX ADMIN — FENTANYL CITRATE 25 MCG: 50 INJECTION INTRAMUSCULAR; INTRAVENOUS at 10:54

## 2020-03-30 RX ADMIN — CEFOXITIN 2 G: 2 INJECTION, POWDER, FOR SOLUTION INTRAVENOUS at 09:12

## 2020-03-30 RX ADMIN — ROCURONIUM BROMIDE 10 MG: 10 SOLUTION INTRAVENOUS at 11:00

## 2020-03-30 RX ADMIN — CEFOXITIN SODIUM 2 G: 2 POWDER, FOR SOLUTION INTRAVENOUS at 21:28

## 2020-03-30 RX ADMIN — PANTOPRAZOLE SODIUM 40 MG: 40 INJECTION, POWDER, FOR SOLUTION INTRAVENOUS at 13:36

## 2020-03-30 ASSESSMENT — PAIN SCALES - GENERAL
PAINLEVEL_OUTOF10: 0
PAINLEVEL_OUTOF10: 7
PAINLEVEL_OUTOF10: 7
PAINLEVEL_OUTOF10: 6
PAINLEVEL_OUTOF10: 7
PAINLEVEL_OUTOF10: 5
PAINLEVEL_OUTOF10: 5
PAINLEVEL_OUTOF10: 0

## 2020-03-30 ASSESSMENT — PAIN SCALES - WONG BAKER: WONGBAKER_NUMERICALRESPONSE: 0

## 2020-03-30 ASSESSMENT — LIFESTYLE VARIABLES: SMOKING_STATUS: 0

## 2020-03-30 ASSESSMENT — PAIN - FUNCTIONAL ASSESSMENT: PAIN_FUNCTIONAL_ASSESSMENT: 0-10

## 2020-03-30 ASSESSMENT — ENCOUNTER SYMPTOMS: SHORTNESS OF BREATH: 0

## 2020-03-30 NOTE — OP NOTE
SURGICAL DEPARTMENT REPORT    SURGEON:   Una Schreiber MD    DATE OF SERVICE: 3/30/2020    PREOPERATIVE DIAGNOSIS  Malignant neoplasm of the ascending colon    POSTOPERATIVE DIAGNOSIS  Malignant neoplasm of the ascending colon with direct extension to the right lateral abdominal sidewall    PROCEDURE  Laparoscopic-assisted right hemicolectomy. ASSISTANT  Anabel Carmen PA-C    Mr. Leim Goodpasture was present for the entirety of the surgery. His assistance was required   for this difficult and complex laparoscopic procedure including release of the tumor   from the lateral abdominal sidewall followed by resection and anastomosis to restore   bowel continuity. INDICATION  Mr. Se Rojas is a very pleasant 63 y.o. male, who recently underwent colonoscopy and   was found to have a near obstructing cancer in the mid ascending colon. Following   review of the options for his care he presents to the operating room for elective resection. DESCRIPTION OF PROCEDURE   Mr. Se Rojas was taken to the main operating room and placed on the operating  table supine. After the induction of adequate general endotracheal  anesthesia, the abdomen was prepped and draped to a sterile field. A timeout  was undertaken. A small incision was made just inferior to the umbilicus and through this a  Veress needle was inserted and a pneumoperitoneum created. The Veress  needle was removed and replaced with a 10 mm port. The laparoscope was  advanced and inspection undertaken. No evidence of trauma from the initial  needle or trocar insertion. The liver was normal in appearance. Two additional   ports were placed, a 5 mm on the upper midline and a second 5 mm in the   right upper quadrant. The camera was then moved to the right upper quadrant port and working  instruments were advanced. I grabbed the cecum and attempted to rotate it  medially.    I found that the tumor, located just superior to the cecum in the   mid ascending colon, had grown into the lateral abdominal sidewall and the   colon could thus not be easily rotated. I then reoriented and began at the midpoint of the transverse colon. I released   the peritoneal attachments of the proximal transverse colon and came around   the hepatic flexure from transverse onto ascending colon without problem. Care   was taken to identify and avoid the duodenum. I then attempted to begin   releasing the tumor from the abdominal sidewall working inferiorly along the   ascending colon. I still could not rotate the tumor medially enough to see what   I was doing and thus abandoned further attempts. I withdrew the laparoscopic instruments and ports. I then made an incision  the width of my hand, incorporating the 10 mm port adjacent to the umbilicus   and the 5 mm port on the upper midline. This was carried through the subcutaneous   tissue to the fascia. The fascia and peritoneum were incised and the abdominal   cavity entered. A ring retractor system was placed and I was able to pull the small   bowel to the patient's left exposing the mid ascending colon. Using cautery I was   able to release the tumor from the abdominal sidewall taking with it some of the   normal muscle and fascia. Once the tumor was released from the sidewall I was   able to elevate it and divided additional attachments along the mesentery to allow   the terminal ileum and the entire right colon to come up through the midline   without problem. I marked the location of the abdominal wall ingrowth with hemoclips   to provide an aiming point for radiation therapy should that become necessary in   the future. I selected a point to transect the terminal ileum several cm proximal to the  ileocecal valve. An opening was made in the mesentery and a OWEN stapler with  a blue load was placed and fired.  In similar fashion, I identified a point  to transect the proximal transverse colon and again made an opening in the  mesentery, placed a OWEN stapler with a blue load, and divided this, as well. The isolated segment was then removed by dividing the mesentery using a  combination of Enseal and clamp and tie technique, securing the larger  vessels with 3-0 Vicryl ties. Once released, the specimen was placed in  formalin and sent to pathology for exam.    Bowel continuity was then restored using a functional end-to-end, but  anatomic side-to-side anastomosis, created with the OWEN stapler. I first  trimmed off the antimesenteric tip of each of the two previous staple lines. I then placed one-fork of the OWEN into each lumen. I brought the bowel  together antimesenteric border to antimesenteric tinea and then closed the  stapler. I fired it to create the anastomosis. I then removed the stapler. Hemostasis was checked along the staple line. The opening through which the  stapler had been placed was then closed with a TA-60 blue load with good  result. I reinforced the OWEN staple line with 3-0 silk seromuscular Lembert  sutures. I elected to leave the mesenteric defect open, as it was fairly  wide. The operative site was again checked and hemostasis assured. We irrigated  with warm sterile saline. The midline fascia was then reapproximated with   running number 1 PDS suture. The subcutaneous tissue was irrigated. The   skin was closed with running 4-0 PDS suture, followed by Dermabond dressing. Sponge, needle and instrument counts were correct on 2 occasions. Estimated intraoperative blood loss was 300 mL. Mr. Rachele Hamman tolerated his surgery well, and he was taken to PACU in satisfactory   condition.       ________________________________  Ayesha Cole MD

## 2020-03-30 NOTE — ANESTHESIA PRE PROCEDURE
Intravenous 2 times per day Oskar Gamboa MD        sodium chloride flush 0.9 % injection 10 mL  10 mL Intravenous PRN Oskar Gamboa MD           Allergies:     Allergies   Allergen Reactions    Oxycodone-Acetaminophen Nausea Only       Problem List:    Patient Active Problem List   Diagnosis Code    Adenocarcinoma of colon ([facility] [address] C18.9       Past Medical History:        Diagnosis Date    Acid reflux     Cancer ([facility] [address]     adenocarcinoma of colon    GERD (gastroesophageal reflux disease)     PTSD (post-traumatic stress disorder)        Past Surgical History:        Procedure Laterality Date    APPENDECTOMY  2003    CHOLECYSTECTOMY  2013    COLONOSCOPY      when pt was in the 19's    COLONOSCOPY N/A 3/11/2020    COLONOSCOPY POLYPECTOMY SNARE/COLD BIOPSY performed by Kiesha Gamble MD at [address] [place] [address] COLONOSCOPY      UPPER GASTROINTESTINAL ENDOSCOPY N/A 3/11/2020    EGD BIOPSY performed by Kiesha Gamble MD at [facility]       Social History:    Social History     Tobacco Use    Smoking status: Never Smoker    Smokeless tobacco: Never Used   Substance Use Topics    Alcohol use: Not Currently     Comment: stopped 2/5/2020                                Counseling given: Not Answered      Vital Signs (Current):   Vitals:    03/30/20 0751   BP: (!) 149/90   Pulse: 94   Resp: 19   Temp: 97.4 °F (36.3 °C)   TempSrc: Temporal   SpO2: 98%   Weight: 155 lb (70.3 kg)   Height: 5' 7\" (1.702 m)                                              BP Readings from Last 3 Encounters:   03/30/20 (!) 149/90   03/24/20 114/68   03/23/20 136/82       NPO Status: Time of last liquid consumption: 2100                        Time of last solid consumption: 1830                        Date of last liquid consumption: 03/29/20                        Date of last solid food consumption: 03/29/20    BMI:   Wt Readings from Last 3 Encounters:   03/30/20 155 lb (70.3 kg)   03/24/20 156 lb (70.8 kg)   03/24/20 156 prep,      (-) liver disease and no renal disease      ROS comment: Lower gi bleed. Endo/Other:    (+) malignancy/cancer (colon cancer). (-) diabetes mellitus, blood dyscrasia        Pt had PAT visit. ROS comment: CT abdomen pelvis:  Impression  1. Invasive cecal mass adhering to the right lateral abdominal wall  muscles with adjacent lymphadenopathy. Mild partial obstruction of the  terminal ileum. Show more    2. Suspicious lesions in the right and left hepatic lobes measure up  to 1.3 cm and likely represent metastatic disease. Abdominal:           Vascular:     - DVT and PE. Anesthesia Plan      general     ASA 2     (Scop and emend in preop.)  Induction: intravenous. BIS  MIPS: Postoperative opioids intended and Prophylactic antiemetics administered. Anesthetic plan and risks discussed with patient.                       Donaldo Lozada,    3/30/2020

## 2020-03-30 NOTE — ANESTHESIA POSTPROCEDURE EVALUATION
Department of Anesthesiology  Postprocedure Note    Patient: Laron Leigh  MRN: [id number]  YOB: 1955  Date of evaluation: 3/30/2020  Time:  11:34 AM     Procedure Summary     Date:  03/30/20 Room / Location:  [address]    Anesthesia Start:  2630 Anesthesia Stop:  6756    Procedure:  LAPAROSCOPIC-ASSISTED RIGHT HEMICOLECTOMY (Right ) Diagnosis:  (CARCINOMA OF THE CECUM)    Surgeon:  Celeste Mak MD Responsible Provider:  KALPESH Wood CRNA    Anesthesia Type:  general ASA Status:  2          Anesthesia Type: general    Kris Phase I: Kris Score: 10    Kris Phase II:      Last vitals: Reviewed and per EMR flowsheets.        Anesthesia Post Evaluation    Patient location during evaluation: PACU  Patient participation: waiting for patient participation  Level of consciousness: awake  Pain score: 0  Airway patency: patent  Nausea & Vomiting: no nausea and no vomiting  Complications: no  Cardiovascular status: blood pressure returned to baseline  Respiratory status: acceptable  Hydration status: euvolemic  Comments: Report to RN

## 2020-03-31 LAB
ANION GAP SERPL CALCULATED.3IONS-SCNC: 10 MMOL/L (ref 7–19)
BUN BLDV-MCNC: 13 MG/DL (ref 8–23)
CALCIUM SERPL-MCNC: 9 MG/DL (ref 8.8–10.2)
CHLORIDE BLD-SCNC: 103 MMOL/L (ref 98–111)
CO2: 22 MMOL/L (ref 22–29)
CREAT SERPL-MCNC: 1.2 MG/DL (ref 0.5–1.2)
GFR NON-AFRICAN AMERICAN: >60
GLUCOSE BLD-MCNC: 119 MG/DL (ref 74–109)
HCT VFR BLD CALC: 32.4 % (ref 42–52)
HEMOGLOBIN: 9.4 G/DL (ref 14–18)
MCH RBC QN AUTO: 23.4 PG (ref 27–31)
MCHC RBC AUTO-ENTMCNC: 29 G/DL (ref 33–37)
MCV RBC AUTO: 80.8 FL (ref 80–94)
PLATELET # BLD: 377 K/UL (ref 130–400)
PMV BLD AUTO: 9.9 FL (ref 9.4–12.4)
POTASSIUM REFLEX MAGNESIUM: 4.4 MMOL/L (ref 3.5–5)
RBC # BLD: 4.01 M/UL (ref 4.7–6.1)
SODIUM BLD-SCNC: 135 MMOL/L (ref 136–145)
WBC # BLD: 7.7 K/UL (ref 4.8–10.8)

## 2020-03-31 PROCEDURE — 80048 BASIC METABOLIC PNL TOTAL CA: CPT

## 2020-03-31 PROCEDURE — 85027 COMPLETE CBC AUTOMATED: CPT

## 2020-03-31 PROCEDURE — 36415 COLL VENOUS BLD VENIPUNCTURE: CPT

## 2020-03-31 PROCEDURE — 6370000000 HC RX 637 (ALT 250 FOR IP): Performed by: SURGERY

## 2020-03-31 PROCEDURE — 99024 POSTOP FOLLOW-UP VISIT: CPT | Performed by: SURGERY

## 2020-03-31 PROCEDURE — 1210000000 HC MED SURG R&B

## 2020-03-31 PROCEDURE — 6360000002 HC RX W HCPCS: Performed by: SURGERY

## 2020-03-31 PROCEDURE — 2580000003 HC RX 258: Performed by: SURGERY

## 2020-03-31 PROCEDURE — C9113 INJ PANTOPRAZOLE SODIUM, VIA: HCPCS | Performed by: SURGERY

## 2020-03-31 RX ADMIN — SERTRALINE HYDROCHLORIDE 25 MG: 25 TABLET ORAL at 08:22

## 2020-03-31 RX ADMIN — ENOXAPARIN SODIUM 40 MG: 40 INJECTION SUBCUTANEOUS at 08:22

## 2020-03-31 RX ADMIN — SODIUM CHLORIDE, SODIUM LACTATE, POTASSIUM CHLORIDE, AND CALCIUM CHLORIDE: 600; 310; 30; 20 INJECTION, SOLUTION INTRAVENOUS at 05:05

## 2020-03-31 RX ADMIN — CEFOXITIN SODIUM 2 G: 2 POWDER, FOR SOLUTION INTRAVENOUS at 03:30

## 2020-03-31 RX ADMIN — SODIUM CHLORIDE, PRESERVATIVE FREE 10 ML: 5 INJECTION INTRAVENOUS at 08:27

## 2020-03-31 RX ADMIN — MORPHINE SULFATE 30 MG: 1 INJECTION INTRAVENOUS at 13:44

## 2020-03-31 RX ADMIN — BUPROPION HYDROCHLORIDE 100 MG: 100 TABLET, FILM COATED ORAL at 08:26

## 2020-03-31 RX ADMIN — PANTOPRAZOLE SODIUM 40 MG: 40 INJECTION, POWDER, FOR SOLUTION INTRAVENOUS at 08:26

## 2020-03-31 RX ADMIN — TOPIRAMATE 100 MG: 100 TABLET, FILM COATED ORAL at 08:22

## 2020-03-31 ASSESSMENT — PAIN SCALES - GENERAL
PAINLEVEL_OUTOF10: 0
PAINLEVEL_OUTOF10: 6

## 2020-03-31 NOTE — PLAN OF CARE
minimized  Description: Postoperative complications will be avoided or minimized  3/31/2020 1046 by Ethan Gomez RN  Outcome: Ongoing  3/31/2020 0417 by Marlene Medina RN  Outcome: Ongoing     Problem: Respiratory:  Goal: Ability to achieve and maintain a regular respiratory rate will improve  Description: Ability to achieve and maintain a regular respiratory rate will improve  3/31/2020 1046 by Ethan Gomez RN  Outcome: Ongoing  3/31/2020 0417 by Marlene Medina RN  Outcome: Ongoing     Problem: Safety:  Goal: Ability to remain free from injury will improve  Description: Ability to remain free from injury will improve  3/31/2020 1046 by Ethan Gomez RN  Outcome: Ongoing  3/31/2020 0417 by Marlene Medina RN  Outcome: Ongoing     Problem: Sensory:  Goal: General experience of comfort will improve  Description: General experience of comfort will improve  3/31/2020 1046 by Ethan Gomez RN  Outcome: Ongoing  3/31/2020 0417 by Marlene Medina RN  Outcome: Ongoing     Problem: Skin Integrity:  Goal: Demonstration of wound healing without infection will improve  Description: Demonstration of wound healing without infection will improve  3/31/2020 1046 by Ethan Gomez RN  Outcome: Ongoing  3/31/2020 0417 by Marlene Medina RN  Outcome: Ongoing

## 2020-03-31 NOTE — PROGRESS NOTES
S: Pt. looks great with no complaints. Has already ambulated and reports of no issues. Asking questions about the surgery. O: /67   Pulse 79   Temp 99.3 °F (37.4 °C) (Temporal)   Resp 20   Ht 5' 7\" (1.702 m)   Wt 155 lb (70.3 kg)   SpO2 91%   BMI 24.28 kg/m²    Lungs: CTA, Heart: RRR, ABd: has occ. BS, soft with wounds stable. Lab Results   Component Value Date    WBC 7.7 03/31/2020    HGB 9.4 (L) 03/31/2020    HCT 32.4 (L) 03/31/2020    MCV 80.8 03/31/2020     03/31/2020     Lab Results   Component Value Date     03/31/2020    K 4.4 03/31/2020     03/31/2020    CO2 22 03/31/2020    BUN 13 03/31/2020    CREATININE 1.2 03/31/2020    GLUCOSE 119 03/31/2020    CALCIUM 9.0 03/31/2020        A: Doing well POD # 1 Lap. assisted right colectomy for ascending colon CA    P: ? start clears. CPT. Electronically signed by Kanika Schmid PA-C on 3/31/20 at 11:28 AM CDT    I have seen Mr. Norah Ceja and examined him. I concur with Mr Hernández's note as recorded above. Doing well at the time of my visit this afternoon. Has been up walking without problem. Reports pain well controlled with PCA pump. Casey catheter remains in place. I reminded nursing to remove it. Laboratory studies reviewed and appropriate. Satisfactory initial recovery. Continue present care. Likely start clear liquids tomorrow.     Rachel Campbell MD

## 2020-04-01 PROCEDURE — 6360000002 HC RX W HCPCS: Performed by: SURGERY

## 2020-04-01 PROCEDURE — 1210000000 HC MED SURG R&B

## 2020-04-01 PROCEDURE — 51701 INSERT BLADDER CATHETER: CPT

## 2020-04-01 PROCEDURE — 99024 POSTOP FOLLOW-UP VISIT: CPT | Performed by: SURGERY

## 2020-04-01 PROCEDURE — C9113 INJ PANTOPRAZOLE SODIUM, VIA: HCPCS | Performed by: SURGERY

## 2020-04-01 PROCEDURE — 2700000000 HC OXYGEN THERAPY PER DAY

## 2020-04-01 PROCEDURE — 6370000000 HC RX 637 (ALT 250 FOR IP): Performed by: SURGERY

## 2020-04-01 PROCEDURE — 51798 US URINE CAPACITY MEASURE: CPT

## 2020-04-01 PROCEDURE — 2580000003 HC RX 258: Performed by: SURGERY

## 2020-04-01 RX ADMIN — SODIUM CHLORIDE, PRESERVATIVE FREE 10 ML: 5 INJECTION INTRAVENOUS at 08:13

## 2020-04-01 RX ADMIN — SODIUM CHLORIDE, PRESERVATIVE FREE 10 ML: 5 INJECTION INTRAVENOUS at 19:17

## 2020-04-01 RX ADMIN — PHENYTOIN 1 MG: 125 SUSPENSION ORAL at 21:52

## 2020-04-01 RX ADMIN — ONDANSETRON 4 MG: 2 INJECTION INTRAMUSCULAR; INTRAVENOUS at 19:16

## 2020-04-01 RX ADMIN — BUPROPION HYDROCHLORIDE 100 MG: 100 TABLET, FILM COATED ORAL at 08:13

## 2020-04-01 RX ADMIN — TOPIRAMATE 100 MG: 100 TABLET, FILM COATED ORAL at 08:12

## 2020-04-01 RX ADMIN — SERTRALINE HYDROCHLORIDE 25 MG: 25 TABLET ORAL at 08:13

## 2020-04-01 RX ADMIN — ENOXAPARIN SODIUM 40 MG: 40 INJECTION SUBCUTANEOUS at 08:13

## 2020-04-01 RX ADMIN — SODIUM CHLORIDE, SODIUM LACTATE, POTASSIUM CHLORIDE, AND CALCIUM CHLORIDE: 600; 310; 30; 20 INJECTION, SOLUTION INTRAVENOUS at 11:14

## 2020-04-01 RX ADMIN — PANTOPRAZOLE SODIUM 40 MG: 40 INJECTION, POWDER, FOR SOLUTION INTRAVENOUS at 08:12

## 2020-04-01 RX ADMIN — MORPHINE SULFATE 30 MG: 1 INJECTION INTRAVENOUS at 08:10

## 2020-04-01 ASSESSMENT — PAIN SCALES - WONG BAKER: WONGBAKER_NUMERICALRESPONSE: 0

## 2020-04-01 ASSESSMENT — PAIN SCALES - GENERAL
PAINLEVEL_OUTOF10: 5
PAINLEVEL_OUTOF10: 0

## 2020-04-01 NOTE — PROGRESS NOTES
[address] General Surgery    Progress Note    POD # 2    S: Reports that he feels well. Incisional pain as expected. Well controlled with the PCA pump. Denies nausea. Has passed some flatus but no bowel movement. Tolerating ice chips and popsicles without problem. Continues to sit in his bedside chair and walk in the hallway. O:   Vitals:    03/31/20 1913 03/31/20 2235 04/01/20 0311 04/01/20 0639   BP: 108/64 111/65 106/65 116/70   Pulse: 84 91 80 77   Resp: 18 18 18 16   Temp: 100.1 °F (37.8 °C) 99.5 °F (37.5 °C) 98.9 °F (37.2 °C) 97.3 °F (36.3 °C)   TempSrc: Oral Temporal Temporal Temporal   SpO2: 91% 92% 95% 99%   Weight:       Height:          I/O last 3 completed shifts: In: 2407 [I.V.:2407]  Out: 355 [Urine:355]      Abdomen is soft. Incision is clean and dry. Minimal tenderness as expected. Bowel sounds present but still slightly hypoactive. AM LABS:   CBC:   Recent Labs     03/31/20  0316   WBC 7.7   RBC 4.01*   HGB 9.4*   HCT 32.4*         BMP:   Recent Labs     03/31/20  0316   *   K 4.4      CO2 22   ANIONGAP 10   GLUCOSE 119*   CREATININE 1.2   LABGLOM >60   CALCIUM 9.0     Pathology report:  Colon, right hemicolectomy:   1.  Invasive moderately differentiated colonic adenocarcinoma.  Osiel Jonas carcinoma measures 7.2 cm in greatest dimension.   3.  Carcinoma directly invades the adjacent abdominal wall tissue.   4.  Focal lymphovascular space invasion identified.   5.  Focal perineural invasion is identified.   6.  Surgical excision margins are negative for evidence of malignancy  and adenomatous change.   7.  6 out of 14 lymph nodes positive for metastatic adenocarcinoma. AJCC STAGE: pT4b, pN2a, pMx    A: 1. Continued satisfactory recovery post laparoscopic assisted right hemicolectomy. 2.  Stage III carcinoma of the ascending colon. CT suggests liver lesions but confirmatory MRI scan and/or biopsy still pending. P: 1.   Begin clear

## 2020-04-02 ENCOUNTER — TELEPHONE (OUTPATIENT)
Dept: SURGERY | Age: 65
End: 2020-04-02

## 2020-04-02 PROCEDURE — 99024 POSTOP FOLLOW-UP VISIT: CPT | Performed by: SURGERY

## 2020-04-02 PROCEDURE — 2700000000 HC OXYGEN THERAPY PER DAY

## 2020-04-02 PROCEDURE — 6370000000 HC RX 637 (ALT 250 FOR IP): Performed by: SURGERY

## 2020-04-02 PROCEDURE — 2580000003 HC RX 258: Performed by: SURGERY

## 2020-04-02 PROCEDURE — C9113 INJ PANTOPRAZOLE SODIUM, VIA: HCPCS | Performed by: SURGERY

## 2020-04-02 PROCEDURE — 1210000000 HC MED SURG R&B

## 2020-04-02 PROCEDURE — 6360000002 HC RX W HCPCS: Performed by: SURGERY

## 2020-04-02 RX ORDER — PROMETHAZINE HYDROCHLORIDE 25 MG/ML
12.5 INJECTION, SOLUTION INTRAMUSCULAR; INTRAVENOUS EVERY 6 HOURS PRN
Status: DISCONTINUED | OUTPATIENT
Start: 2020-04-02 | End: 2020-04-04 | Stop reason: HOSPADM

## 2020-04-02 RX ORDER — BACLOFEN 10 MG/1
10 TABLET ORAL EVERY 8 HOURS PRN
Status: DISCONTINUED | OUTPATIENT
Start: 2020-04-02 | End: 2020-04-04 | Stop reason: HOSPADM

## 2020-04-02 RX ORDER — LORAZEPAM 2 MG/ML
0.5 INJECTION INTRAMUSCULAR EVERY 6 HOURS PRN
Status: DISCONTINUED | OUTPATIENT
Start: 2020-04-02 | End: 2020-04-04 | Stop reason: HOSPADM

## 2020-04-02 RX ORDER — HYDROMORPHONE HYDROCHLORIDE 2 MG/1
2 TABLET ORAL EVERY 4 HOURS PRN
Status: DISCONTINUED | OUTPATIENT
Start: 2020-04-02 | End: 2020-04-04 | Stop reason: HOSPADM

## 2020-04-02 RX ADMIN — TOPIRAMATE 100 MG: 100 TABLET, FILM COATED ORAL at 08:25

## 2020-04-02 RX ADMIN — SERTRALINE HYDROCHLORIDE 25 MG: 25 TABLET ORAL at 08:26

## 2020-04-02 RX ADMIN — ONDANSETRON 4 MG: 2 INJECTION INTRAMUSCULAR; INTRAVENOUS at 13:24

## 2020-04-02 RX ADMIN — ONDANSETRON 4 MG: 4 TABLET, ORALLY DISINTEGRATING ORAL at 23:19

## 2020-04-02 RX ADMIN — ONDANSETRON 4 MG: 4 TABLET, ORALLY DISINTEGRATING ORAL at 08:25

## 2020-04-02 RX ADMIN — PROMETHAZINE HYDROCHLORIDE 12.5 MG: 25 INJECTION INTRAMUSCULAR; INTRAVENOUS at 16:18

## 2020-04-02 RX ADMIN — ENOXAPARIN SODIUM 40 MG: 40 INJECTION SUBCUTANEOUS at 08:25

## 2020-04-02 RX ADMIN — BUPROPION HYDROCHLORIDE 100 MG: 100 TABLET, FILM COATED ORAL at 08:25

## 2020-04-02 RX ADMIN — PANTOPRAZOLE SODIUM 40 MG: 40 INJECTION, POWDER, FOR SOLUTION INTRAVENOUS at 08:25

## 2020-04-02 RX ADMIN — SODIUM CHLORIDE, SODIUM LACTATE, POTASSIUM CHLORIDE, AND CALCIUM CHLORIDE: 600; 310; 30; 20 INJECTION, SOLUTION INTRAVENOUS at 00:31

## 2020-04-02 NOTE — PROGRESS NOTES
Patient continues to be paranoid with visual hallucinations at this time. Patient reports seeing people in his room. Patient states that he is not crazy and that I should not question him as if he is. I was informed that MD is aware of patients behaviors at shift report and that we are just to continue to monitor. Patient has not used any of his morphine for pain control.  Electronically signed by Florecita Burt RN on 4/2/2020 at 12:45 AM

## 2020-04-02 NOTE — PROGRESS NOTES
S: Pt. reports of BM and large amount of flatus last night. Feels better with less abd. pain. Reports of hallucinations with IV narcs. last night. O: BP (!) 153/91   Pulse 87   Temp 99 °F (37.2 °C) (Temporal)   Resp 20   Ht 5' 7\" (1.702 m)   Wt 155 lb (70.3 kg)   SpO2 92%   BMI 24.28 kg/m²    Lungs: CTA, Heart: RRR, ABd: has active BS, soft with wounds stable. A: Resolving postop. ileus     P: Advance to FL and try oral pain meds. due to the hallucinations. Electronically signed by Shiva Stauffer PA-C on 4/2/20 at 2:13 PM CDT      I have seen Mr. Sai Moreno and examined him. I concur with Mr Hernández's note as recorded above. Resting comfortably in bed at the time of my visit. Alert and oriented. Readily acknowledges the hallucinations that he had overnight and asked that we remove the PCA pump. Abdomen is soft. Incision clean and dry. Bowel sounds present. Continues with steady recovery post laparoscopic-assisted right hemicolectomy  Stage III carcinoma of the ascending colon. Advance to full liquid diet. DC the PCA pump and continue with oral pain medication. Possible discharge home tomorrow if he continues to do well.     Elham Patel MD

## 2020-04-02 NOTE — TELEPHONE ENCOUNTER
Patient wife called in and stated that she is returning voicemail to  about some test results for her  the patient.   Please return her call  Ph.[phone]  Thank you

## 2020-04-03 PROCEDURE — 2580000003 HC RX 258: Performed by: SURGERY

## 2020-04-03 PROCEDURE — C9113 INJ PANTOPRAZOLE SODIUM, VIA: HCPCS | Performed by: SURGERY

## 2020-04-03 PROCEDURE — 6370000000 HC RX 637 (ALT 250 FOR IP): Performed by: SURGERY

## 2020-04-03 PROCEDURE — 6370000000 HC RX 637 (ALT 250 FOR IP): Performed by: PHYSICIAN ASSISTANT

## 2020-04-03 PROCEDURE — 99024 POSTOP FOLLOW-UP VISIT: CPT | Performed by: SURGERY

## 2020-04-03 PROCEDURE — 6360000002 HC RX W HCPCS: Performed by: SURGERY

## 2020-04-03 PROCEDURE — 1210000000 HC MED SURG R&B

## 2020-04-03 RX ADMIN — BACLOFEN 10 MG: 10 TABLET ORAL at 20:35

## 2020-04-03 RX ADMIN — ENOXAPARIN SODIUM 40 MG: 40 INJECTION SUBCUTANEOUS at 09:10

## 2020-04-03 RX ADMIN — SERTRALINE HYDROCHLORIDE 25 MG: 25 TABLET ORAL at 09:10

## 2020-04-03 RX ADMIN — PANTOPRAZOLE SODIUM 40 MG: 40 INJECTION, POWDER, FOR SOLUTION INTRAVENOUS at 09:10

## 2020-04-03 RX ADMIN — BUPROPION HYDROCHLORIDE 100 MG: 100 TABLET, FILM COATED ORAL at 09:10

## 2020-04-03 RX ADMIN — SODIUM CHLORIDE, PRESERVATIVE FREE 10 ML: 5 INJECTION INTRAVENOUS at 09:11

## 2020-04-03 RX ADMIN — SODIUM CHLORIDE, PRESERVATIVE FREE 10 ML: 5 INJECTION INTRAVENOUS at 22:16

## 2020-04-03 RX ADMIN — ONDANSETRON 4 MG: 4 TABLET, ORALLY DISINTEGRATING ORAL at 20:35

## 2020-04-03 RX ADMIN — PHENYTOIN 1 MG: 125 SUSPENSION ORAL at 20:35

## 2020-04-03 RX ADMIN — TOPIRAMATE 100 MG: 100 TABLET, FILM COATED ORAL at 09:10

## 2020-04-03 RX ADMIN — PROMETHAZINE HYDROCHLORIDE 12.5 MG: 25 INJECTION INTRAMUSCULAR; INTRAVENOUS at 04:03

## 2020-04-03 NOTE — PROGRESS NOTES
[address] [place] [address] General Surgery    Progress Note    POD # 4    S: Had confusion and hallucinations again overnight. His wife was contacted and came to the hospital and with this he is settled down nicely. He was sleeping soundly at the time of my visit and I did not awaken him. Wife reports that he is had no further nausea and appears comfortable. O:   Vitals:    04/02/20 1817 04/02/20 2335 04/03/20 0654 04/03/20 0954   BP: 136/73 136/72 104/62 116/67   Pulse: 86 83 88 87   Resp: 16 16 20 18   Temp: 99.3 °F (37.4 °C) 97.1 °F (36.2 °C) 99.3 °F (37.4 °C) 99.8 °F (37.7 °C)   TempSrc: Temporal Temporal  Temporal   SpO2: 94% 93% 95% 95%   Weight:       Height:          I/O last 3 completed shifts: In: 1266 [P.O.:600; I.V.:666]  Out: 700 [Urine:700]      Not examined. AM LABS: No new labs    A: 1. Stable post laparoscopic-assisted right hemicolectomy. 2.  Stage III carcinoma of the ascending colon. 3.  Transient hallucinations over the past 3 days. Medications known to trigger these have been held. No past history per family. P: 1. Continue with IV fluids and diet as tolerated. 2.  Possible discharge home tomorrow if he continues to do well.

## 2020-04-04 VITALS
BODY MASS INDEX: 24.33 KG/M2 | HEART RATE: 69 BPM | DIASTOLIC BLOOD PRESSURE: 66 MMHG | WEIGHT: 155 LBS | TEMPERATURE: 98.1 F | SYSTOLIC BLOOD PRESSURE: 114 MMHG | RESPIRATION RATE: 16 BRPM | OXYGEN SATURATION: 97 % | HEIGHT: 67 IN

## 2020-04-04 PROCEDURE — C9113 INJ PANTOPRAZOLE SODIUM, VIA: HCPCS | Performed by: SURGERY

## 2020-04-04 PROCEDURE — 6360000002 HC RX W HCPCS: Performed by: SURGERY

## 2020-04-04 PROCEDURE — 2580000003 HC RX 258: Performed by: SURGERY

## 2020-04-04 PROCEDURE — 6370000000 HC RX 637 (ALT 250 FOR IP): Performed by: SURGERY

## 2020-04-04 PROCEDURE — 6370000000 HC RX 637 (ALT 250 FOR IP): Performed by: PHYSICIAN ASSISTANT

## 2020-04-04 PROCEDURE — 99024 POSTOP FOLLOW-UP VISIT: CPT | Performed by: SURGERY

## 2020-04-04 RX ORDER — HYDROCODONE BITARTRATE AND ACETAMINOPHEN 5; 325 MG/1; MG/1
1 TABLET ORAL EVERY 4 HOURS PRN
Qty: 15 TABLET | Refills: 0 | Status: SHIPPED | OUTPATIENT
Start: 2020-04-04 | End: 2020-04-11

## 2020-04-04 RX ORDER — PROMETHAZINE HYDROCHLORIDE 12.5 MG/1
12.5 TABLET ORAL EVERY 6 HOURS PRN
Qty: 20 TABLET | Refills: 0 | Status: SHIPPED | OUTPATIENT
Start: 2020-04-04 | End: 2020-05-21

## 2020-04-04 RX ADMIN — TOPIRAMATE 100 MG: 100 TABLET, FILM COATED ORAL at 09:24

## 2020-04-04 RX ADMIN — PANTOPRAZOLE SODIUM 40 MG: 40 INJECTION, POWDER, FOR SOLUTION INTRAVENOUS at 09:24

## 2020-04-04 RX ADMIN — SODIUM CHLORIDE, PRESERVATIVE FREE 10 ML: 5 INJECTION INTRAVENOUS at 09:25

## 2020-04-04 RX ADMIN — PROMETHAZINE HYDROCHLORIDE 12.5 MG: 25 INJECTION INTRAMUSCULAR; INTRAVENOUS at 09:30

## 2020-04-04 RX ADMIN — SERTRALINE HYDROCHLORIDE 25 MG: 25 TABLET ORAL at 09:24

## 2020-04-04 RX ADMIN — ENOXAPARIN SODIUM 40 MG: 40 INJECTION SUBCUTANEOUS at 09:25

## 2020-04-04 RX ADMIN — ONDANSETRON 4 MG: 4 TABLET, ORALLY DISINTEGRATING ORAL at 06:37

## 2020-04-04 RX ADMIN — BUPROPION HYDROCHLORIDE 100 MG: 100 TABLET, FILM COATED ORAL at 09:24

## 2020-04-04 RX ADMIN — BACLOFEN 10 MG: 10 TABLET ORAL at 06:37

## 2020-04-07 ENCOUNTER — TELEPHONE (OUTPATIENT)
Dept: GASTROENTEROLOGY | Age: 65
End: 2020-04-07

## 2020-04-07 ENCOUNTER — TELEPHONE (OUTPATIENT)
Dept: SURGERY | Age: 65
End: 2020-04-07

## 2020-04-16 ENCOUNTER — TELEMEDICINE (OUTPATIENT)
Dept: SURGERY | Age: 65
End: 2020-04-16

## 2020-04-16 PROCEDURE — 99024 POSTOP FOLLOW-UP VISIT: CPT | Performed by: PHYSICIAN ASSISTANT

## 2020-04-20 ENCOUNTER — HOSPITAL ENCOUNTER (OUTPATIENT)
Dept: CT IMAGING | Age: 65
Discharge: HOME | End: 2020-04-20
Payer: OTHER GOVERNMENT

## 2020-04-20 ENCOUNTER — HOSPITAL ENCOUNTER (OUTPATIENT)
Dept: MRI IMAGING | Age: 65
Discharge: HOME | End: 2020-04-20
Payer: OTHER GOVERNMENT

## 2020-04-20 PROCEDURE — 6360000004 HC RX CONTRAST MEDICATION: Performed by: INTERNAL MEDICINE

## 2020-04-20 PROCEDURE — 74183 MRI ABD W/O CNTR FLWD CNTR: CPT

## 2020-04-20 PROCEDURE — A9577 INJ MULTIHANCE: HCPCS | Performed by: INTERNAL MEDICINE

## 2020-04-20 PROCEDURE — 71260 CT THORAX DX C+: CPT

## 2020-04-20 RX ADMIN — GADOBENATE DIMEGLUMINE 14 ML: 529 INJECTION, SOLUTION INTRAVENOUS at 08:52

## 2020-04-20 RX ADMIN — IOPAMIDOL 90 ML: 755 INJECTION, SOLUTION INTRAVENOUS at 09:26

## 2020-04-21 NOTE — PROGRESS NOTES
MEDICAL ONCOLOGY PROGRESS NOTE    Pt Name: Sepideh Rider  MRN: [id number]  YOB: 1955  Date of evaluation: 4/22/2020    HISTORY OF PRESENT ILLNESS:      Diagnosis  · Colonic adenocarcinoma, [id number]  · [id number](liver), stage ROXANA  · IHC MMR- proficient  · K-maria luisa mutated  · N-MARIA LUISA/BRAF wild-type    Treatment summary  · 3/30/2020-right hemicolectomy at [facility]  · Anticipated consultation at [address] [place] [address]  · Anticipated neoadjuvant/adjuvant versus palliative chemotherapy    The patient is a very pleasant 63years old male who was found to have a right cecal mass consistent with colonic adenocarcinoma. He underwent a right hemicolectomy consistent with a pT4N2A lesion. Unfortunately, he was found to have suspicious liver lesions concerning for metastatic disease. He is healing well from surgery. His laparotomy surgical scar is healing well. He has been active. He denies any nausea vomiting diarrhea. He is anxious to proceed with his next phase of treatment. Cancer history  Mr. Sulema Soni was first seen by me on 3/23/2020. He was referred for a new diagnosis of colonic adenocarcinoma involving the cecum. The patient reports that he had a wellbeing consult with his provider at the [place]. He was found to have anemia and then recommended a colonoscopy. Of note, the patient has a family history of colon cancer. His mother is a patient of Dr. Haro Don he has been diagnosed with colon cancer in 2010. · 3/11/2020- colonoscopy revealed a large malignant appearing fungating mass lesion in the cecum. In addition, several other polyps. Biopsy of the mass consistent with moderate differentiated colonic adenocarcinoma. Polyps consistent with tubulovillous adenoma with no high-grade dysplasia. IHC MMR not proficient. K-maria luisa mutated, BRAF and NRAS wild type.   MSI proficient  · 3/11/2020-CEA 5.5 (H)  · 3/18/2020-CT abdomen pelvis with contrast  Invasive cecal mass adhering to the right lateral abdominal He will review imaging studies and give further recommendations regarding eligibility for resection of liver lesions.     Past Medical History:    Past Medical History:   Diagnosis Date    Acid reflux     Cancer ([address]     adenocarcinoma of colon    GERD (gastroesophageal reflux disease)     PTSD (post-traumatic stress disorder)        Past Surgical History:    Past Surgical History:   Procedure Laterality Date    APPENDECTOMY  2003    CHOLECYSTECTOMY  2013    COLONOSCOPY      when pt was in the 19's    COLONOSCOPY N/A 3/11/2020    COLONOSCOPY POLYPECTOMY SNARE/COLD BIOPSY performed by Sharen Denver, MD at [address] [place] [address] Right 3/30/2020    LAPAROSCOPIC-ASSISTED RIGHT HEMICOLECTOMY performed by Rachel Campbell MD at [facility] ENDOSCOPY N/A 3/11/2020    EGD BIOPSY performed by Sharen Denver, MD at [facility]       Social History:    Social History     Socioeconomic History    Marital status:      Spouse name: Not on file    Number of children: Not on file    Years of education: Not on file    Highest education level: Not on file   Occupational History    Not on file   Social Needs    Financial resource strain: Not on file    Food insecurity     Worry: Not on file     Inability: Not on file    Transportation needs     Medical: Not on file     Non-medical: Not on file   Tobacco Use    Smoking status: Never Smoker    Smokeless tobacco: Never Used   Substance and Sexual Activity    Alcohol use: Not Currently     Comment: stopped 2/5/2020    Drug use: No    Sexual activity: Yes     Partners: Female   Lifestyle    Physical activity     Days per week: Not on file     Minutes per session: Not on file    Stress: Not on file   Relationships    Social connections     Talks on phone: Not on file     Gets together: Not on file     Attends Rastafarian service: Not on file     Active member of club or organization: Not on file Temp 97.4 °F (36.3 °C)   Ht 5' 7\" (1.702 m)   Wt 148 lb 8 oz (67.4 kg)   SpO2 98%   BMI 23.26 kg/m²     PHYSICAL EXAM:  CONSTITUTIONAL: Alert, appropriate, no acute distress  EYES: Non icteric, EOM intact, pupils equal round   ENT: Mucus membranes moist, no oral pharyngeal lesions, external inspection of ears and nose are normal  NECK: Supple, no masses. No palpable thyroid mass  CHEST/LUNGS: CTA bilaterally, normal respiratory effort   CARDIOVASCULAR: RRR, no murmurs. No lower extremity edema  ABDOMEN: Healing surgical scar,  active bowel sounds, no HSM. No palpable masses  EXTREMITIES: warm, full ROM in all 4 extremities, no focal weakness. SKIN: warm, dry with no rashes or lesions  LYMPH: No cervical, clavicular, axillary, or inguinal lymphadenopathy  NEUROLOGIC: follows commands, non focal   PSYCH: mood and affect appropriate. Alert and oriented to time, place, person    LABORATORY RESULTS REVIEWED BY ME:  XSL:6/34/1938  WBC-7.97  HGB-11.9  PLT-420,000  Neut-4.80      RADIOLOGY STUDIES REVIEWED BY ME:  Ct Chest W Contrast    Result Date: 4/20/2020  1. No convincing intrathoracic metastasis. Nonspecific 4 mm nodule of the inferior lingula and a 2 mm right upper lobe nodule can be followed on subsequent imaging in 6-12 months. 2. Moderate coronary calcifications. 3. Hypodense metastatic liver lesions. 4. Small hiatal hernia. Signed by Dr Zachery Bella on 4/20/2020 9:40 AM    Mri Abdomen W Wo Contrast    Result Date: 4/20/2020  1. There are about 5 liver lesions. The 2 largest appears similar compared to 3/18/2020, the others are too small to further characterize. Appearance is most concerning for metastatic disease. 2. Enhancement of the right lateral peritoneum. This is favored to be postoperative as there is no nodularity, evidence of omental disease or lymphadenopathy. Recommend attention on follow-up. 3. Cholecystectomy.  Signed by Dr Praveen Perez on 4/20/2020 9:20 AM      ASSESSMENT:  #Colonic adenocarcinoma-  [id number] (liver) K-maria luisa mutated, IHC MMR not proficient  The patient was counseled today about diagnosis, staging, prognosis, diagnostic tests, medications, side effects and disease management. The method of counseling included verbal explanation. The patient verbalized understanding. Essentially, status post right hemicolectomy. Pathology consistent with locally advanced disease. Unfortunately, stage Puneet due to suspicious liver metastasis. Discussed with hepatobiliary service (Dr Frantz Johnson) at [facility] regarding possibility of resection/ metastasectomy. They will review and proceed with consultation. #Liver metastasis- await second opinion from [facility]. If not resectable we will proceed with chemotherapy. Iron deficiency anemia-  hemoglobin 11.9/MCV78. Iron profile today. Continue iron sulfate 375 mg p.o. 3 times daily. Ferritin 12.9, iron saturation 15, TIBC 458, iron 72. PLAN:  Continue with oral iron replacement  Consultation at [facility] for second opinion regarding surgical elegibility for liver resection. RTC 4 weeks with me    I have seen, examined and reviewed this patient medication list, appropriate labs and imaging studies. I reviewed relevant medical records and others physicians notes. I discussed the plans of care with the patient. I answered all the questions to the patients satisfaction. I have also reviewed the chief complaint (CC) and part of the history (History of Present Illness (HPI), Past Family Social History [facility]), or Review of Systems (ROS) and made changes when appropriated. (Please note that portions of this note were completed with a voice recognition program. Efforts were made to edit the dictations but occasionally words are mis-transcribed.)  I, Dr Terrance Dewey, personally performed the services described in this documentation as scribed by Enrique Gregg MA in my presence and is both accurate and complete.     Over 50% of the

## 2020-04-22 ENCOUNTER — HOSPITAL ENCOUNTER (OUTPATIENT)
Dept: INFUSION THERAPY | Age: 65
Discharge: HOME | End: 2020-04-22
Payer: OTHER GOVERNMENT

## 2020-04-22 ENCOUNTER — OFFICE VISIT (OUTPATIENT)
Dept: HEMATOLOGY | Age: 65
End: 2020-04-22
Payer: OTHER GOVERNMENT

## 2020-04-22 ENCOUNTER — TELEPHONE (OUTPATIENT)
Dept: SURGERY | Age: 65
End: 2020-04-22

## 2020-04-22 VITALS
HEIGHT: 67 IN | SYSTOLIC BLOOD PRESSURE: 130 MMHG | HEART RATE: 100 BPM | TEMPERATURE: 97.4 F | DIASTOLIC BLOOD PRESSURE: 80 MMHG | BODY MASS INDEX: 23.31 KG/M2 | WEIGHT: 148.5 LBS | OXYGEN SATURATION: 98 %

## 2020-04-22 DIAGNOSIS — C18.9 MALIGNANT NEOPLASM OF COLON, UNSPECIFIED PART OF COLON (HCC): ICD-10-CM

## 2020-04-22 PROCEDURE — 99214 OFFICE O/P EST MOD 30 MIN: CPT | Performed by: INTERNAL MEDICINE

## 2020-04-22 PROCEDURE — 99213 OFFICE O/P EST LOW 20 MIN: CPT

## 2020-04-22 PROCEDURE — 36415 COLL VENOUS BLD VENIPUNCTURE: CPT

## 2020-04-22 PROCEDURE — 85025 COMPLETE CBC W/AUTO DIFF WBC: CPT

## 2020-04-22 PROCEDURE — 82378 CARCINOEMBRYONIC ANTIGEN: CPT

## 2020-04-29 ENCOUNTER — TELEMEDICINE (OUTPATIENT)
Dept: SURGERY | Age: 65
End: 2020-04-29

## 2020-04-29 VITALS — WEIGHT: 148 LBS | BODY MASS INDEX: 22.43 KG/M2 | HEIGHT: 68 IN

## 2020-04-29 PROCEDURE — 99024 POSTOP FOLLOW-UP VISIT: CPT | Performed by: PHYSICIAN ASSISTANT

## 2020-04-29 NOTE — PROGRESS NOTES
persons participating in the telehealth services - Harrison Arita MA for pre-visit screening  Approx. 10 mins was utilized for the visit and consent was obtained by the pt. for the VV.        Electronically signed by Alex Castillo PA-C on 4/29/20 at 2:28 PM [email or web address]

## 2020-05-06 ENCOUNTER — TELEPHONE (OUTPATIENT)
Dept: HEMATOLOGY | Age: 65
End: 2020-05-06

## 2020-05-13 ENCOUNTER — TELEMEDICINE (OUTPATIENT)
Dept: SURGERY | Age: 65
End: 2020-05-13

## 2020-05-13 VITALS — BODY MASS INDEX: 23.54 KG/M2 | WEIGHT: 150 LBS | HEIGHT: 67 IN

## 2020-05-13 PROCEDURE — 99024 POSTOP FOLLOW-UP VISIT: CPT | Performed by: PHYSICIAN ASSISTANT

## 2020-05-20 NOTE — PROGRESS NOTES
with no high-grade dysplasia. IHC MMR not proficient. K-maria luisa mutated, BRAF and NRAS wild type. MSI proficient  · 3/11/2020-CEA 5.5 (H)  · 3/18/2020-CT abdomen pelvis with contrast  Invasive cecal mass adhering to the right lateral abdominal wall muscles with adjacent lymphadenopathy. Mild partial obstruction of the terminal ileum. 2. Suspicious lesions in the right and left hepatic lobes measure up to 1.3 cm and likely represent metastatic disease. · 3/18/2020-Xr Chest Standard  No radiographic evidence of acute cardiopulmonary process. · 3/23/2020-he was first seen by me. Recommended completion of staging with CT chest.  Also recommended liver MRI for further clarification of liver lesion. S.  Recommend to proceed with a general surgery consultation tomorrow with Dr. Mynor Ware. Patient was informed that I favor surgical resection if feasible of the primary malignancy. · 3/30/2020- right hemicolectomy by Dr. Mynor Ware at [facility] consistent with invasive moderately differentiated colonic adenocarcinoma measuring 7.2 cm. Carcinoma directly invading the adjacent abdominal wall tissue. Focal lymphovascular space invasion identified. Focal perineural invasion identified. Surgical margins negative for evidence of malignancy. 6 out of 14 lymph nodes positive for metastatic adenocarcinoma. Final pathology staging [id number](liver, stage ROXANA)  · 4/20/2020-CT chest with contrast showed No convincing intrathoracic metastasis. Nonspecific 4 mm nodule of the inferior lingula and a 2 mm right upper lobe nodule can be followed on subsequent imaging in 6-12 months. Moderate coronary calcifications. Hypodense metastatic liver lesions. Small hiatal hernia. · 4/20/2020-Mri Abdomen W Wo Contrast There are about 5 liver lesions. The 2 largest appears similar compared to 3/18/2020, the others are too small to further characterize. Appearance is most concerning for metastatic disease.  Enhancement of the right

## 2020-05-21 ENCOUNTER — CLINICAL DOCUMENTATION (OUTPATIENT)
Dept: HEMATOLOGY | Age: 65
End: 2020-05-21

## 2020-05-21 ENCOUNTER — OFFICE VISIT (OUTPATIENT)
Dept: HEMATOLOGY | Age: 65
End: 2020-05-21
Payer: OTHER GOVERNMENT

## 2020-05-21 ENCOUNTER — HOSPITAL ENCOUNTER (OUTPATIENT)
Dept: GENERAL RADIOLOGY | Age: 65
Discharge: HOME | End: 2020-05-21
Payer: OTHER GOVERNMENT

## 2020-05-21 ENCOUNTER — HOSPITAL ENCOUNTER (OUTPATIENT)
Dept: INFUSION THERAPY | Age: 65
Discharge: HOME | End: 2020-05-21
Payer: OTHER GOVERNMENT

## 2020-05-21 VITALS
SYSTOLIC BLOOD PRESSURE: 120 MMHG | OXYGEN SATURATION: 98 % | WEIGHT: 155.4 LBS | BODY MASS INDEX: 24.39 KG/M2 | DIASTOLIC BLOOD PRESSURE: 78 MMHG | HEART RATE: 78 BPM | HEIGHT: 67 IN | TEMPERATURE: 98 F

## 2020-05-21 DIAGNOSIS — C18.9 ADENOCARCINOMA OF COLON (HCC): ICD-10-CM

## 2020-05-21 LAB
BASOPHILS ABSOLUTE: 0.05 K/UL (ref 0.01–0.08)
BASOPHILS RELATIVE PERCENT: 0.8 % (ref 0.1–1.2)
EOSINOPHILS ABSOLUTE: 0.61 K/UL (ref 0.04–0.54)
EOSINOPHILS RELATIVE PERCENT: 10 % (ref 0.7–7)
HCT VFR BLD CALC: 35.8 % (ref 40.1–51)
HEMOGLOBIN: 11 G/DL (ref 13.7–17.5)
LYMPHOCYTES ABSOLUTE: 0.88 K/UL (ref 1.18–3.74)
LYMPHOCYTES RELATIVE PERCENT: 14.5 % (ref 19.3–53.1)
MCH RBC QN AUTO: 23.4 PG (ref 25.7–32.2)
MCHC RBC AUTO-ENTMCNC: 30.7 G/DL (ref 32.3–36.5)
MCV RBC AUTO: 76 FL (ref 79–92.2)
MONOCYTES ABSOLUTE: 0.81 K/UL (ref 0.24–0.82)
MONOCYTES RELATIVE PERCENT: 13.3 % (ref 4.7–12.5)
NEUTROPHILS ABSOLUTE: 3.73 K/UL (ref 1.56–6.13)
NEUTROPHILS RELATIVE PERCENT: 61.4 % (ref 34–71.1)
PDW BLD-RTO: 22.1 % (ref 11.6–14.4)
PLATELET # BLD: 366 K/UL (ref 163–337)
PMV BLD AUTO: 9.1 FL (ref 7.4–10.4)
RBC # BLD: 4.71 M/UL (ref 4.63–6.08)
WBC # BLD: 6.08 K/UL (ref 4.23–9.07)

## 2020-05-21 PROCEDURE — 99214 OFFICE O/P EST MOD 30 MIN: CPT | Performed by: INTERNAL MEDICINE

## 2020-05-21 PROCEDURE — 99212 OFFICE O/P EST SF 10 MIN: CPT

## 2020-05-21 PROCEDURE — 73060 X-RAY EXAM OF HUMERUS: CPT

## 2020-05-21 PROCEDURE — 85025 COMPLETE CBC W/AUTO DIFF WBC: CPT

## 2020-05-21 RX ORDER — PROMETHAZINE HYDROCHLORIDE 12.5 MG/1
12.5 TABLET ORAL EVERY 6 HOURS PRN
Qty: 60 TABLET | Refills: 2 | Status: SHIPPED | OUTPATIENT
Start: 2020-05-21 | End: 2020-09-23 | Stop reason: SDUPTHER

## 2020-05-28 ENCOUNTER — OFFICE VISIT (OUTPATIENT)
Dept: SURGERY | Age: 65
End: 2020-05-28

## 2020-05-28 VITALS
HEIGHT: 67 IN | TEMPERATURE: 97.2 F | WEIGHT: 156 LBS | BODY MASS INDEX: 24.48 KG/M2 | SYSTOLIC BLOOD PRESSURE: 120 MMHG | DIASTOLIC BLOOD PRESSURE: 72 MMHG

## 2020-05-28 PROCEDURE — 99999 PR OFFICE/OUTPT VISIT,PROCEDURE ONLY: CPT | Performed by: SURGERY

## 2020-05-28 ASSESSMENT — ENCOUNTER SYMPTOMS
COLOR CHANGE: 0
BACK PAIN: 0
NAUSEA: 0
SHORTNESS OF BREATH: 0
VOMITING: 0
ABDOMINAL DISTENTION: 0
COUGH: 0
DIARRHEA: 0
CONSTIPATION: 0
BLOOD IN STOOL: 0
ABDOMINAL PAIN: 0
WHEEZING: 0
SINUS PRESSURE: 0
TROUBLE SWALLOWING: 0
SORE THROAT: 0
CHEST TIGHTNESS: 0

## 2020-05-28 NOTE — PROGRESS NOTES
buPROPion (WELLBUTRIN) 100 MG tablet Take 100 mg by mouth every morning For mood      topiramate (TOPAMAX) 100 MG tablet Take 100 mg by mouth daily For Seizures or Migraines      Sertraline HCl (ZOLOFT PO) Take by mouth daily      Zolpidem Tartrate (AMBIEN PO) Take by mouth nightly       No current facility-administered medications on file prior to visit. Allergies: Oxycodone-acetaminophen and Morphine    Family History   Problem Relation Age of Onset    Liver Cancer Mother     High Blood Pressure Mother     Colon Cancer Mother         x2    Cancer Father         Lung Cancer    Breast Cancer Sister     Cancer Maternal Grandfather         Lung Cancer    Cancer Paternal Grandfather         Stomach Cancer    Colon Polyps Neg Hx        Social History     Tobacco Use    Smoking status: Never Smoker    Smokeless tobacco: Never Used   Substance Use Topics    Alcohol use: Not Currently     Comment: stopped 2/5/2020         Review of Systems   Constitutional: Negative for activity change, appetite change, chills, fatigue, fever and unexpected weight change. HENT: Negative for congestion, sinus pressure, sore throat and trouble swallowing. Respiratory: Negative for cough, chest tightness, shortness of breath and wheezing. Cardiovascular: Negative for chest pain, palpitations and leg swelling. Gastrointestinal: Negative for abdominal distention, abdominal pain, blood in stool, constipation, diarrhea, nausea and vomiting. Genitourinary: Negative for difficulty urinating, dysuria, frequency and urgency. Musculoskeletal: Negative for arthralgias, back pain and myalgias. Skin: Negative for color change. Neurological: Negative for dizziness, seizures, syncope, light-headedness and headaches. Hematological: Negative for adenopathy. Psychiatric/Behavioral: Negative for dysphoric mood and sleep disturbance. The patient is not nervous/anxious.         Objective:   Physical Exam  Vitals signs reviewed. Constitutional:       General: He is not in acute distress. Appearance: He is well-developed. HENT:      Head: Normocephalic and atraumatic. Eyes:      General: No scleral icterus. Conjunctiva/sclera: Conjunctivae normal.      Pupils: Pupils are equal, round, and reactive to light. Neck:      Musculoskeletal: Normal range of motion and neck supple. Thyroid: No thyromegaly. Trachea: No tracheal deviation. Cardiovascular:      Rate and Rhythm: Normal rate and regular rhythm. Heart sounds: Normal heart sounds. No murmur. Pulmonary:      Effort: Pulmonary effort is normal.      Breath sounds: Normal breath sounds. No wheezing or rales. Abdominal:      General: Bowel sounds are normal. There is no distension. Palpations: Abdomen is soft. There is no mass. Tenderness: There is no abdominal tenderness. Comments: His midline incision from his colon resection is well-healed and without evidence of a hernia. Musculoskeletal: Normal range of motion. Skin:     General: Skin is warm and dry. Neurological:      Mental Status: He is alert and oriented to person, place, and time. Psychiatric:         Behavior: Behavior normal.         Thought Content: Thought content normal.         Judgment: Judgment normal.         Assessment:      1. Stage IV carcinoma of the colon with need for chemotherapy access. 2.  GERD  3. PTSD      Plan:      1) Proceed with insertion of a venous port. The rationale for the procedure was explained. Risks, benefits, alternatives and expected recovery were reviewed. Questions were encouraged and answered to the patient's satisfaction. Following this he wishes to proceed to surgery and will work with the office to schedule accordingly. 2.  Mr. Devan Day was counseled about the risks of gabby Covid-19 during his perioperative period and during recovery from his procedure.   He was made aware that gabby Covid-19 may worsen his

## 2020-05-28 NOTE — LETTER
Scheduling Instructions:     Patient: Juan Reveles  : 1955    Hospital: [facility]    Admitting Physician:  Dr. Trish Mahajan    Diagnosis: Colon cancer with need for chemotherapy access    Procedure: Insertion of venous port    Time: 1 hour    Anesthesia: MAC    Admission: Outpatient     Date:  Wednesday 6/3/2020    Post op visit: 2 weeks postop      Electronically signed by Maulik Medrano MD   On 2020 @ 11:50 AM

## 2020-05-29 ENCOUNTER — PREP FOR PROCEDURE (OUTPATIENT)
Dept: SURGERY | Age: 65
End: 2020-05-29

## 2020-05-29 ENCOUNTER — ANESTHESIA EVENT (OUTPATIENT)
Dept: OPERATING ROOM | Age: 65
End: 2020-05-29

## 2020-05-29 RX ORDER — SODIUM CHLORIDE 0.9 % (FLUSH) 0.9 %
10 SYRINGE (ML) INJECTION PRN
Status: CANCELLED | OUTPATIENT
Start: 2020-05-29

## 2020-05-29 RX ORDER — SODIUM CHLORIDE, SODIUM LACTATE, POTASSIUM CHLORIDE, CALCIUM CHLORIDE 600; 310; 30; 20 MG/100ML; MG/100ML; MG/100ML; MG/100ML
INJECTION, SOLUTION INTRAVENOUS CONTINUOUS
Status: CANCELLED | OUTPATIENT
Start: 2020-05-29

## 2020-05-29 RX ORDER — SODIUM CHLORIDE 0.9 % (FLUSH) 0.9 %
10 SYRINGE (ML) INJECTION EVERY 12 HOURS SCHEDULED
Status: CANCELLED | OUTPATIENT
Start: 2020-05-29

## 2020-05-29 NOTE — H&P
Surgical History and Physical    Date 5/28/2020    Patient ID: Salima Man is a 63 y.o. male.     HPI   Mr. Devan Day returns for preoperative history and physical exam prior to undergoing insertion of a venous port for use in treating his stage III colon cancer. Since his last office visit he reports doing well. No fever or chills noted. His appetite is strong and he reports normal bowel habits. He denies problem with his incision. He has resumed his usual activities without problem. Plans are in place to begin chemotherapy on June 8.     Past Medical History        Past Medical History:   Diagnosis Date    Acid reflux      Cancer ([facility] [address]       adenocarcinoma of colon    GERD (gastroesophageal reflux disease)      PTSD (post-traumatic stress disorder)           Past Surgical History         Past Surgical History:   Procedure Laterality Date    APPENDECTOMY   2003    CHOLECYSTECTOMY   2013    COLONOSCOPY         when pt was in the 19's    COLONOSCOPY N/A 3/11/2020     COLONOSCOPY POLYPECTOMY SNARE/COLD BIOPSY performed by Ovi Gordon MD at [address] 3/30/2020     LAPAROSCOPIC-ASSISTED RIGHT HEMICOLECTOMY performed by Parul Quezada MD at [address] [place] [address] N/A 3/11/2020     EGD BIOPSY performed by Ovi Gordon MD at [facility]                Current Outpatient Medications on File Prior to Visit   Medication Sig Dispense Refill    promethazine (PHENERGAN) 12.5 MG tablet Take 1 tablet by mouth every 6 hours as needed for Nausea 60 tablet 2    omeprazole (PRILOSEC) 20 MG delayed release capsule Take 20 mg by mouth daily        Cholecalciferol (VITAMIN D3) 50 MCG (2000 UT) CAPS Take by mouth daily        folic acid (FOLVITE) 1 MG tablet Take 1 mg by mouth daily        ondansetron (ZOFRAN) 4 MG tablet Take one tablet 1 hour prior to starting bowel prep. If nausea occurs take a second tablet.  Take 1-2

## 2020-05-29 NOTE — H&P (VIEW-ONLY)
Surgical History and Physical    Date 5/28/2020    Patient ID: Harden Homans is a 63 y.o. male.     HPI   Mr. Franchesca Butler returns for preoperative history and physical exam prior to undergoing insertion of a venous port for use in treating his stage III colon cancer. Since his last office visit he reports doing well. No fever or chills noted. His appetite is strong and he reports normal bowel habits. He denies problem with his incision. He has resumed his usual activities without problem. Plans are in place to begin chemotherapy on June 8.     Past Medical History        Past Medical History:   Diagnosis Date    Acid reflux      Cancer [address]       adenocarcinoma of colon    GERD (gastroesophageal reflux disease)      PTSD (post-traumatic stress disorder)           Past Surgical History         Past Surgical History:   Procedure Laterality Date    APPENDECTOMY   2003    CHOLECYSTECTOMY   2013    COLONOSCOPY         when pt was in the 19's    COLONOSCOPY N/A 3/11/2020     COLONOSCOPY POLYPECTOMY SNARE/COLD BIOPSY performed by Jose Hamilton MD at [address]  [facility] 3/30/2020     LAPAROSCOPIC-ASSISTED RIGHT HEMICOLECTOMY performed by Reinier Cortes MD at [facility] N/A 3/11/2020     EGD BIOPSY performed by Jose Hamilton MD at [facility]                Current Outpatient Medications on File Prior to Visit   Medication Sig Dispense Refill    promethazine (PHENERGAN) 12.5 MG tablet Take 1 tablet by mouth every 6 hours as needed for Nausea 60 tablet 2    omeprazole (PRILOSEC) 20 MG delayed release capsule Take 20 mg by mouth daily        Cholecalciferol (VITAMIN D3) 50 MCG (2000 UT) CAPS Take by mouth daily        folic acid (FOLVITE) 1 MG tablet Take 1 mg by mouth daily        ondansetron (ZOFRAN) 4 MG tablet Take one tablet 1 hour prior to starting bowel prep. If nausea occurs take a second tablet.  Take 1-2 tablets every 6 hours for nausea. 15 tablet 0    prazosin (MINIPRESS) 1 MG capsule Take 1 mg by mouth nightly For nightmares        buPROPion (WELLBUTRIN) 100 MG tablet Take 100 mg by mouth every morning For mood        topiramate (TOPAMAX) 100 MG tablet Take 100 mg by mouth daily For Seizures or Migraines        Sertraline HCl (ZOLOFT PO) Take by mouth daily        Zolpidem Tartrate (AMBIEN PO) Take by mouth nightly          No current facility-administered medications on file prior to visit.       Allergies: Oxycodone-acetaminophen and Morphine     Family History         Family History   Problem Relation Age of Onset    Liver Cancer Mother      High Blood Pressure Mother      Colon Cancer Mother           x2    Cancer Father           Lung Cancer    Breast Cancer Sister      Cancer Maternal Grandfather           Lung Cancer    Cancer Paternal Grandfather           Stomach Cancer    Colon Polyps Neg Hx              Social History            Tobacco Use    Smoking status: Never Smoker    Smokeless tobacco: Never Used   Substance Use Topics    Alcohol use: Not Currently       Comment: stopped 2/5/2020            Review of Systems   Constitutional: Negative for activity change, appetite change, chills, fatigue, fever and unexpected weight change. HENT: Negative for congestion, sinus pressure, sore throat and trouble swallowing. Respiratory: Negative for cough, chest tightness, shortness of breath and wheezing. Cardiovascular: Negative for chest pain, palpitations and leg swelling. Gastrointestinal: Negative for abdominal distention, abdominal pain, blood in stool, constipation, diarrhea, nausea and vomiting. Genitourinary: Negative for difficulty urinating, dysuria, frequency and urgency. Musculoskeletal: Negative for arthralgias, back pain and myalgias. Skin: Negative for color change. Neurological: Negative for dizziness, seizures, syncope, light-headedness and headaches. Hematological: Negative for adenopathy. Psychiatric/Behavioral: Negative for dysphoric mood and sleep disturbance. The patient is not nervous/anxious.          Objective:   Physical Exam  Vitals signs reviewed. Constitutional:       General: He is not in acute distress. Appearance: He is well-developed. HENT:      Head: Normocephalic and atraumatic. Eyes:      General: No scleral icterus. Conjunctiva/sclera: Conjunctivae normal.      Pupils: Pupils are equal, round, and reactive to light. Neck:      Musculoskeletal: Normal range of motion and neck supple. Thyroid: No thyromegaly. Trachea: No tracheal deviation. Cardiovascular:      Rate and Rhythm: Normal rate and regular rhythm. Heart sounds: Normal heart sounds. No murmur. Pulmonary:      Effort: Pulmonary effort is normal.      Breath sounds: Normal breath sounds. No wheezing or rales. Abdominal:      General: Bowel sounds are normal. There is no distension. Palpations: Abdomen is soft. There is no mass. Tenderness: There is no abdominal tenderness. Comments: His midline incision from his colon resection is well-healed and without evidence of a hernia. Musculoskeletal: Normal range of motion. Skin:     General: Skin is warm and dry. Neurological:      Mental Status: He is alert and oriented to person, place, and time. Psychiatric:         Behavior: Behavior normal.         Thought Content: Thought content normal.         Judgment: Judgment normal.            Assessment:   1. Stage IV carcinoma of the colon with need for chemotherapy access. 2.  GERD  3. PTSD                Plan:   1) Proceed with insertion of a venous port. The rationale for the procedure was explained. Risks, benefits, alternatives and expected recovery were reviewed. Questions were encouraged and answered to the patient's satisfaction.  Following this he wishes to proceed to surgery and will work with the office to schedule

## 2020-05-30 ENCOUNTER — OFFICE VISIT (OUTPATIENT)
Age: 65
End: 2020-05-30

## 2020-06-01 LAB
REPORT: NORMAL
SARS-COV-2: NOT DETECTED
THIS TEST SENT TO: NORMAL

## 2020-06-03 ENCOUNTER — HOSPITAL ENCOUNTER (OUTPATIENT)
Dept: GENERAL RADIOLOGY | Age: 65
Discharge: HOME | End: 2020-06-03
Payer: COMMERCIAL

## 2020-06-03 ENCOUNTER — ANESTHESIA (OUTPATIENT)
Dept: OPERATING ROOM | Age: 65
End: 2020-06-03

## 2020-06-03 ENCOUNTER — HOSPITAL ENCOUNTER (OUTPATIENT)
Age: 65
Setting detail: OUTPATIENT SURGERY
Discharge: HOME | End: 2020-06-03
Attending: SURGERY | Admitting: SURGERY
Payer: OTHER GOVERNMENT

## 2020-06-03 VITALS
HEART RATE: 55 BPM | HEIGHT: 67 IN | WEIGHT: 155 LBS | TEMPERATURE: 97.7 F | RESPIRATION RATE: 18 BRPM | DIASTOLIC BLOOD PRESSURE: 81 MMHG | BODY MASS INDEX: 24.33 KG/M2 | OXYGEN SATURATION: 99 % | SYSTOLIC BLOOD PRESSURE: 126 MMHG

## 2020-06-03 VITALS — DIASTOLIC BLOOD PRESSURE: 77 MMHG | OXYGEN SATURATION: 90 % | SYSTOLIC BLOOD PRESSURE: 117 MMHG

## 2020-06-03 PROCEDURE — G8916 PT W IV AB GIVEN ON TIME: HCPCS

## 2020-06-03 PROCEDURE — 36561 INSERT TUNNELED CV CATH: CPT | Performed by: SURGERY

## 2020-06-03 PROCEDURE — 3209999900 FLUORO FOR SURGICAL PROCEDURES

## 2020-06-03 PROCEDURE — 36561 INSERT TUNNELED CV CATH: CPT

## 2020-06-03 PROCEDURE — C1788 PORT, INDWELLING, IMP: HCPCS | Performed by: SURGERY

## 2020-06-03 PROCEDURE — G8907 PT DOC NO EVENTS ON DISCHARG: HCPCS

## 2020-06-03 DEVICE — PORT INFUS PLAS SGL LUMN W/ 9.6FR SIL CATH AIRGUARD VLV: Type: IMPLANTABLE DEVICE | Site: CHEST | Status: FUNCTIONAL

## 2020-06-03 RX ORDER — HYDROCODONE BITARTRATE AND ACETAMINOPHEN 5; 325 MG/1; MG/1
2 TABLET ORAL PRN
Status: COMPLETED | OUTPATIENT
Start: 2020-06-03 | End: 2020-06-03

## 2020-06-03 RX ORDER — ONDANSETRON 2 MG/ML
4 INJECTION INTRAMUSCULAR; INTRAVENOUS
Status: DISCONTINUED | OUTPATIENT
Start: 2020-06-03 | End: 2020-06-03 | Stop reason: HOSPADM

## 2020-06-03 RX ORDER — FENTANYL CITRATE 50 UG/ML
INJECTION, SOLUTION INTRAMUSCULAR; INTRAVENOUS PRN
Status: DISCONTINUED | OUTPATIENT
Start: 2020-06-03 | End: 2020-06-03 | Stop reason: SDUPTHER

## 2020-06-03 RX ORDER — HYDROCODONE BITARTRATE AND ACETAMINOPHEN 5; 325 MG/1; MG/1
1 TABLET ORAL PRN
Status: DISCONTINUED | OUTPATIENT
Start: 2020-06-03 | End: 2020-06-03 | Stop reason: HOSPADM

## 2020-06-03 RX ORDER — LIDOCAINE HYDROCHLORIDE 10 MG/ML
INJECTION, SOLUTION INFILTRATION; PERINEURAL PRN
Status: DISCONTINUED | OUTPATIENT
Start: 2020-06-03 | End: 2020-06-03 | Stop reason: ALTCHOICE

## 2020-06-03 RX ORDER — SODIUM CHLORIDE 0.9 % (FLUSH) 0.9 %
10 SYRINGE (ML) INJECTION EVERY 12 HOURS SCHEDULED
Status: DISCONTINUED | OUTPATIENT
Start: 2020-06-03 | End: 2020-06-03 | Stop reason: HOSPADM

## 2020-06-03 RX ORDER — HYDRALAZINE HYDROCHLORIDE 20 MG/ML
5 INJECTION INTRAMUSCULAR; INTRAVENOUS EVERY 10 MIN PRN
Status: DISCONTINUED | OUTPATIENT
Start: 2020-06-03 | End: 2020-06-03 | Stop reason: HOSPADM

## 2020-06-03 RX ORDER — HYDROCODONE BITARTRATE AND ACETAMINOPHEN 5; 325 MG/1; MG/1
1 TABLET ORAL EVERY 4 HOURS PRN
Qty: 15 TABLET | Refills: 0 | Status: SHIPPED | OUTPATIENT
Start: 2020-06-03 | End: 2020-06-06

## 2020-06-03 RX ORDER — HYDROCODONE BITARTRATE AND ACETAMINOPHEN 5; 325 MG/1; MG/1
2 TABLET ORAL PRN
Status: DISCONTINUED | OUTPATIENT
Start: 2020-06-03 | End: 2020-06-03 | Stop reason: HOSPADM

## 2020-06-03 RX ORDER — DIPHENHYDRAMINE HYDROCHLORIDE 50 MG/ML
12.5 INJECTION INTRAMUSCULAR; INTRAVENOUS
Status: DISCONTINUED | OUTPATIENT
Start: 2020-06-03 | End: 2020-06-03 | Stop reason: HOSPADM

## 2020-06-03 RX ORDER — PROMETHAZINE HYDROCHLORIDE 25 MG/ML
12.5 INJECTION, SOLUTION INTRAMUSCULAR; INTRAVENOUS
Status: DISCONTINUED | OUTPATIENT
Start: 2020-06-03 | End: 2020-06-03 | Stop reason: HOSPADM

## 2020-06-03 RX ORDER — PROPOFOL 10 MG/ML
INJECTION, EMULSION INTRAVENOUS PRN
Status: DISCONTINUED | OUTPATIENT
Start: 2020-06-03 | End: 2020-06-03 | Stop reason: SDUPTHER

## 2020-06-03 RX ORDER — SODIUM CHLORIDE, SODIUM LACTATE, POTASSIUM CHLORIDE, CALCIUM CHLORIDE 600; 310; 30; 20 MG/100ML; MG/100ML; MG/100ML; MG/100ML
INJECTION, SOLUTION INTRAVENOUS CONTINUOUS
Status: DISCONTINUED | OUTPATIENT
Start: 2020-06-03 | End: 2020-06-03 | Stop reason: HOSPADM

## 2020-06-03 RX ORDER — SODIUM CHLORIDE 0.9 % (FLUSH) 0.9 %
10 SYRINGE (ML) INJECTION PRN
Status: DISCONTINUED | OUTPATIENT
Start: 2020-06-03 | End: 2020-06-03 | Stop reason: HOSPADM

## 2020-06-03 RX ORDER — LIDOCAINE HYDROCHLORIDE 10 MG/ML
INJECTION, SOLUTION INFILTRATION; PERINEURAL PRN
Status: DISCONTINUED | OUTPATIENT
Start: 2020-06-03 | End: 2020-06-03 | Stop reason: SDUPTHER

## 2020-06-03 RX ORDER — HYDROMORPHONE HCL 110MG/55ML
0.25 PATIENT CONTROLLED ANALGESIA SYRINGE INTRAVENOUS EVERY 5 MIN PRN
Status: DISCONTINUED | OUTPATIENT
Start: 2020-06-03 | End: 2020-06-03 | Stop reason: HOSPADM

## 2020-06-03 RX ORDER — MEPERIDINE HYDROCHLORIDE 25 MG/ML
12.5 INJECTION INTRAMUSCULAR; INTRAVENOUS; SUBCUTANEOUS EVERY 5 MIN PRN
Status: DISCONTINUED | OUTPATIENT
Start: 2020-06-03 | End: 2020-06-03 | Stop reason: HOSPADM

## 2020-06-03 RX ORDER — HYDROCODONE BITARTRATE AND ACETAMINOPHEN 5; 325 MG/1; MG/1
1 TABLET ORAL PRN
Status: COMPLETED | OUTPATIENT
Start: 2020-06-03 | End: 2020-06-03

## 2020-06-03 RX ORDER — FENTANYL CITRATE 50 UG/ML
50 INJECTION, SOLUTION INTRAMUSCULAR; INTRAVENOUS EVERY 5 MIN PRN
Status: DISCONTINUED | OUTPATIENT
Start: 2020-06-03 | End: 2020-06-03 | Stop reason: HOSPADM

## 2020-06-03 RX ORDER — HEPARIN SODIUM (PORCINE) LOCK FLUSH IV SOLN 100 UNIT/ML 100 UNIT/ML
SOLUTION INTRAVENOUS PRN
Status: DISCONTINUED | OUTPATIENT
Start: 2020-06-03 | End: 2020-06-03 | Stop reason: ALTCHOICE

## 2020-06-03 RX ORDER — LABETALOL HYDROCHLORIDE 5 MG/ML
5 INJECTION, SOLUTION INTRAVENOUS EVERY 10 MIN PRN
Status: DISCONTINUED | OUTPATIENT
Start: 2020-06-03 | End: 2020-06-03 | Stop reason: HOSPADM

## 2020-06-03 RX ORDER — MIDAZOLAM HYDROCHLORIDE 1 MG/ML
INJECTION INTRAMUSCULAR; INTRAVENOUS PRN
Status: DISCONTINUED | OUTPATIENT
Start: 2020-06-03 | End: 2020-06-03 | Stop reason: SDUPTHER

## 2020-06-03 RX ADMIN — FENTANYL CITRATE 50 MCG: 50 INJECTION, SOLUTION INTRAMUSCULAR; INTRAVENOUS at 09:25

## 2020-06-03 RX ADMIN — LIDOCAINE HYDROCHLORIDE 30 MG: 10 INJECTION, SOLUTION INFILTRATION; PERINEURAL at 09:29

## 2020-06-03 RX ADMIN — MIDAZOLAM HYDROCHLORIDE 1 MG: 1 INJECTION INTRAMUSCULAR; INTRAVENOUS at 09:25

## 2020-06-03 RX ADMIN — PROPOFOL 250 MG: 10 INJECTION, EMULSION INTRAVENOUS at 09:29

## 2020-06-03 RX ADMIN — HYDROCODONE BITARTRATE AND ACETAMINOPHEN 1 TABLET: 5; 325 TABLET ORAL at 10:32

## 2020-06-03 RX ADMIN — SODIUM CHLORIDE, SODIUM LACTATE, POTASSIUM CHLORIDE, CALCIUM CHLORIDE: 600; 310; 30; 20 INJECTION, SOLUTION INTRAVENOUS at 08:03

## 2020-06-03 ASSESSMENT — LIFESTYLE VARIABLES: SMOKING_STATUS: 0

## 2020-06-03 ASSESSMENT — PAIN SCALES - GENERAL: PAINLEVEL_OUTOF10: 5

## 2020-06-03 NOTE — OP NOTE
Appropriate positioning was confirmed by fluoroscopy. There was good blood  return and easy flush of heparin saline through the port. The small incision at the cannulation site was closed with a single 4-0 Monocryl   subcuticular suture followed by Dermabond dressing. The main incision was closed   with interrupted 3-0 Vicryl subcuticular suture followed by Dermabond as well. Sponge, needle and instrument count correct on 2 occasions. Estimated intraoperative blood loss minimal.    Mr. Sylwia June tolerated his surgery well and he was taken to PACU in   satisfactory condition.       ________________________________  Mynor Ware MD

## 2020-06-03 NOTE — ANESTHESIA PRE PROCEDURE
Date of last liquid consumption: 06/02/20                        Date of last solid food consumption: 06/02/20    BMI:   Wt Readings from Last 3 Encounters:   06/03/20 155 lb (70.3 kg)   05/28/20 156 lb (70.8 kg)   05/21/20 155 lb 6.4 oz (70.5 kg)     Body mass index is 24.28 kg/m². CBC:   Lab Results   Component Value Date    WBC 6.08 05/21/2020    RBC 4.71 05/21/2020    HGB 11.0 05/21/2020    HCT 35.8 05/21/2020    MCV 76.0 05/21/2020    RDW 22.1 05/21/2020     05/21/2020       CMP:   Lab Results   Component Value Date     03/31/2020    K 4.4 03/31/2020     03/31/2020    CO2 22 03/31/2020    BUN 13 03/31/2020    CREATININE 1.2 03/31/2020    LABGLOM >60 03/31/2020    GLUCOSE 119 03/31/2020    PROT 6.9 03/11/2020    CALCIUM 9.0 03/31/2020    BILITOT <0.2 03/11/2020    ALKPHOS 103 03/11/2020    AST 20 03/11/2020    ALT 13 03/11/2020       POC Tests: No results for input(s): POCGLU, POCNA, POCK, POCCL, POCBUN, POCHEMO, POCHCT in the last 72 hours.     Coags: No results found for: PROTIME, INR, APTT    HCG (If Applicable): No results found for: PREGTESTUR, PREGSERUM, HCG, HCGQUANT     ABGs: No results found for: PHART, PO2ART, AVD5GHX, QDQ2HXD, BEART, W3TENOLS     Type & Screen (If Applicable):  No results found for: LABABO, LABRH    Drug/Infectious Status (If Applicable):  No results found for: HIV, HEPCAB    COVID-19 Screening (If Applicable):   Lab Results   Component Value Date    COVID19 Not Detected 05/30/2020         Anesthesia Evaluation  Patient summary reviewed and Nursing notes reviewed no history of anesthetic complications:   Airway: Mallampati: II  TM distance: >3 FB   Neck ROM: full  Mouth opening: < 3 FB Dental: normal exam         Pulmonary:normal exam        (-) not a current smoker                           Cardiovascular:             Beta Blocker:  Not on Beta Blocker         Neuro/Psych:   (+) psychiatric history (PTSD well controlled on meds per pt): GI/Hepatic/Renal:   (+) GERD: well controlled, liver disease (liver mets ):,          ROS comment: S/p colon resection 3/20  . Endo/Other:    (+) malignancy/cancer (colon, liver cancer). Abdominal:           Vascular: negative vascular ROS. Anesthesia Plan      general     ASA 3       Induction: intravenous. Anesthetic plan and risks discussed with patient.                       Gerrianne Fleischer, APRN - CRNA   6/3/2020

## 2020-06-10 ENCOUNTER — HOSPITAL ENCOUNTER (OUTPATIENT)
Dept: INFUSION THERAPY | Age: 65
Discharge: HOME | End: 2020-06-10
Payer: OTHER GOVERNMENT

## 2020-06-10 VITALS
TEMPERATURE: 97.4 F | BODY MASS INDEX: 24.8 KG/M2 | DIASTOLIC BLOOD PRESSURE: 80 MMHG | HEART RATE: 70 BPM | OXYGEN SATURATION: 98 % | HEIGHT: 67 IN | SYSTOLIC BLOOD PRESSURE: 142 MMHG | WEIGHT: 158 LBS

## 2020-06-10 DIAGNOSIS — C18.9 ADENOCARCINOMA OF COLON (HCC): Primary | ICD-10-CM

## 2020-06-10 LAB
ALBUMIN SERPL-MCNC: 4.5 G/DL (ref 3.5–5.2)
ALP BLD-CCNC: 131 U/L (ref 40–130)
ALT SERPL-CCNC: 50 U/L (ref 21–72)
ANION GAP SERPL CALCULATED.3IONS-SCNC: 11 MMOL/L (ref 7–19)
AST SERPL-CCNC: 82 U/L (ref 17–59)
BASOPHILS ABSOLUTE: 0.04 K/UL (ref 0.01–0.08)
BASOPHILS RELATIVE PERCENT: 0.5 % (ref 0.1–1.2)
BILIRUB SERPL-MCNC: 0.4 MG/DL (ref 0.2–1.3)
BUN BLDV-MCNC: 17 MG/DL (ref 9–20)
CALCIUM SERPL-MCNC: 9.6 MG/DL (ref 8.4–10.2)
CHLORIDE BLD-SCNC: 104 MMOL/L (ref 98–111)
CO2: 24 MMOL/L (ref 22–29)
CREAT SERPL-MCNC: 1.2 MG/DL (ref 0.6–1.2)
EOSINOPHILS ABSOLUTE: 0.46 K/UL (ref 0.04–0.54)
EOSINOPHILS RELATIVE PERCENT: 5.7 % (ref 0.7–7)
GFR NON-AFRICAN AMERICAN: >60
GLOBULIN: 3 G/DL
GLUCOSE BLD-MCNC: 96 MG/DL (ref 74–106)
HCT VFR BLD CALC: 34.7 % (ref 40.1–51)
HEMOGLOBIN: 11.1 G/DL (ref 13.7–17.5)
LYMPHOCYTES ABSOLUTE: 0.9 K/UL (ref 1.18–3.74)
LYMPHOCYTES RELATIVE PERCENT: 11.1 % (ref 19.3–53.1)
MCH RBC QN AUTO: 22.7 PG (ref 25.7–32.2)
MCHC RBC AUTO-ENTMCNC: 32 G/DL (ref 32.3–36.5)
MCV RBC AUTO: 71.1 FL (ref 79–92.2)
MONOCYTES ABSOLUTE: 0.96 K/UL (ref 0.24–0.82)
MONOCYTES RELATIVE PERCENT: 11.8 % (ref 4.7–12.5)
NEUTROPHILS ABSOLUTE: 5.75 K/UL (ref 1.56–6.13)
NEUTROPHILS RELATIVE PERCENT: 70.9 % (ref 34–71.1)
PDW BLD-RTO: 19.3 % (ref 11.6–14.4)
PLATELET # BLD: 366 K/UL (ref 163–337)
PMV BLD AUTO: 9.4 FL (ref 7.4–10.4)
POTASSIUM SERPL-SCNC: 4.5 MMOL/L (ref 3.5–5.1)
RBC # BLD: 4.88 M/UL (ref 4.63–6.08)
SODIUM BLD-SCNC: 139 MMOL/L (ref 137–145)
TOTAL PROTEIN: 7.5 G/DL (ref 6.3–8.2)
WBC # BLD: 8.11 K/UL (ref 4.23–9.07)

## 2020-06-10 PROCEDURE — 6360000002 HC RX W HCPCS: Performed by: INTERNAL MEDICINE

## 2020-06-10 PROCEDURE — 96368 THER/DIAG CONCURRENT INF: CPT

## 2020-06-10 PROCEDURE — 96375 TX/PRO/DX INJ NEW DRUG ADDON: CPT

## 2020-06-10 PROCEDURE — G0498 CHEMO EXTEND IV INFUS W/PUMP: HCPCS

## 2020-06-10 PROCEDURE — 96413 CHEMO IV INFUSION 1 HR: CPT

## 2020-06-10 PROCEDURE — 80053 COMPREHEN METABOLIC PANEL: CPT

## 2020-06-10 PROCEDURE — 96411 CHEMO IV PUSH ADDL DRUG: CPT

## 2020-06-10 PROCEDURE — 2580000003 HC RX 258: Performed by: INTERNAL MEDICINE

## 2020-06-10 PROCEDURE — 85025 COMPLETE CBC W/AUTO DIFF WBC: CPT

## 2020-06-10 PROCEDURE — 96415 CHEMO IV INFUSION ADDL HR: CPT

## 2020-06-10 PROCEDURE — 96367 TX/PROPH/DG ADDL SEQ IV INF: CPT

## 2020-06-10 RX ORDER — EPINEPHRINE 1 MG/ML
0.3 INJECTION, SOLUTION, CONCENTRATE INTRAVENOUS PRN
Status: CANCELLED | OUTPATIENT
Start: 2020-06-10

## 2020-06-10 RX ORDER — METHYLPREDNISOLONE SODIUM SUCCINATE 125 MG/2ML
125 INJECTION, POWDER, LYOPHILIZED, FOR SOLUTION INTRAMUSCULAR; INTRAVENOUS ONCE
Status: CANCELLED | OUTPATIENT
Start: 2020-06-10

## 2020-06-10 RX ORDER — PALONOSETRON 0.05 MG/ML
0.25 INJECTION, SOLUTION INTRAVENOUS ONCE
Status: COMPLETED | OUTPATIENT
Start: 2020-06-10 | End: 2020-06-10

## 2020-06-10 RX ORDER — SODIUM CHLORIDE 9 MG/ML
INJECTION, SOLUTION INTRAVENOUS ONCE
Status: CANCELLED | OUTPATIENT
Start: 2020-06-10

## 2020-06-10 RX ORDER — DEXAMETHASONE SODIUM PHOSPHATE 10 MG/ML
10 INJECTION, SOLUTION INTRAMUSCULAR; INTRAVENOUS ONCE
Status: COMPLETED | OUTPATIENT
Start: 2020-06-10 | End: 2020-06-10

## 2020-06-10 RX ORDER — FLUOROURACIL 50 MG/ML
400 INJECTION, SOLUTION INTRAVENOUS ONCE
Status: COMPLETED | OUTPATIENT
Start: 2020-06-10 | End: 2020-06-10

## 2020-06-10 RX ORDER — SODIUM CHLORIDE 9 MG/ML
INJECTION, SOLUTION INTRAVENOUS CONTINUOUS
Status: CANCELLED | OUTPATIENT
Start: 2020-06-10

## 2020-06-10 RX ORDER — FLUOROURACIL 50 MG/ML
400 INJECTION, SOLUTION INTRAVENOUS ONCE
Status: CANCELLED | OUTPATIENT
Start: 2020-06-10

## 2020-06-10 RX ORDER — SODIUM CHLORIDE 0.9 % (FLUSH) 0.9 %
5 SYRINGE (ML) INJECTION PRN
Status: CANCELLED | OUTPATIENT
Start: 2020-06-10

## 2020-06-10 RX ORDER — DIPHENHYDRAMINE HYDROCHLORIDE 50 MG/ML
50 INJECTION INTRAMUSCULAR; INTRAVENOUS ONCE
Status: CANCELLED | OUTPATIENT
Start: 2020-06-10

## 2020-06-10 RX ORDER — DEXTROSE MONOHYDRATE 50 MG/ML
INJECTION, SOLUTION INTRAVENOUS ONCE
Status: CANCELLED | OUTPATIENT
Start: 2020-06-10

## 2020-06-10 RX ORDER — PALONOSETRON 0.05 MG/ML
0.25 INJECTION, SOLUTION INTRAVENOUS ONCE
Status: CANCELLED | OUTPATIENT
Start: 2020-06-10

## 2020-06-10 RX ORDER — SODIUM CHLORIDE 0.9 % (FLUSH) 0.9 %
10 SYRINGE (ML) INJECTION PRN
Status: CANCELLED | OUTPATIENT
Start: 2020-06-10

## 2020-06-10 RX ORDER — HEPARIN SODIUM (PORCINE) LOCK FLUSH IV SOLN 100 UNIT/ML 100 UNIT/ML
500 SOLUTION INTRAVENOUS PRN
Status: CANCELLED | OUTPATIENT
Start: 2020-06-10

## 2020-06-10 RX ADMIN — FLUOROURACIL 730 MG: 50 INJECTION, SOLUTION INTRAVENOUS at 13:15

## 2020-06-10 RX ADMIN — LEUCOVORIN CALCIUM 366 MG: 350 INJECTION, POWDER, LYOPHILIZED, FOR SUSPENSION INTRAMUSCULAR; INTRAVENOUS at 10:56

## 2020-06-10 RX ADMIN — FLUOROURACIL 4390 MG: 50 INJECTION, SOLUTION INTRAVENOUS at 13:15

## 2020-06-10 RX ADMIN — PALONOSETRON HYDROCHLORIDE 0.25 MG: 0.25 INJECTION, SOLUTION INTRAVENOUS at 09:51

## 2020-06-10 RX ADMIN — OXALIPLATIN 150 MG: 5 INJECTION, SOLUTION INTRAVENOUS at 10:21

## 2020-06-10 RX ADMIN — DEXAMETHASONE SODIUM PHOSPHATE 10 MG: 10 INJECTION, SOLUTION INTRAMUSCULAR; INTRAVENOUS at 09:51

## 2020-06-10 ASSESSMENT — PAIN SCALES - GENERAL: PAINLEVEL_OUTOF10: 0

## 2020-06-12 ENCOUNTER — HOSPITAL ENCOUNTER (OUTPATIENT)
Dept: INFUSION THERAPY | Age: 65
Discharge: HOME | End: 2020-06-12
Payer: OTHER GOVERNMENT

## 2020-06-12 DIAGNOSIS — C18.2 MALIGNANT NEOPLASM OF ASCENDING COLON (HCC): Primary | ICD-10-CM

## 2020-06-12 PROCEDURE — 96523 IRRIG DRUG DELIVERY DEVICE: CPT

## 2020-06-12 PROCEDURE — 6360000002 HC RX W HCPCS: Performed by: INTERNAL MEDICINE

## 2020-06-12 RX ORDER — SODIUM CHLORIDE 0.9 % (FLUSH) 0.9 %
20 SYRINGE (ML) INJECTION PRN
Status: CANCELLED | OUTPATIENT
Start: 2020-06-12

## 2020-06-12 RX ORDER — SODIUM CHLORIDE 0.9 % (FLUSH) 0.9 %
10 SYRINGE (ML) INJECTION PRN
Status: DISCONTINUED | OUTPATIENT
Start: 2020-06-12 | End: 2020-06-13 | Stop reason: HOSPADM

## 2020-06-12 RX ORDER — SODIUM CHLORIDE 0.9 % (FLUSH) 0.9 %
10 SYRINGE (ML) INJECTION PRN
Status: CANCELLED | OUTPATIENT
Start: 2020-06-12

## 2020-06-12 RX ORDER — HEPARIN SODIUM (PORCINE) LOCK FLUSH IV SOLN 100 UNIT/ML 100 UNIT/ML
500 SOLUTION INTRAVENOUS PRN
Status: CANCELLED | OUTPATIENT
Start: 2020-06-12

## 2020-06-12 RX ORDER — HEPARIN SODIUM (PORCINE) LOCK FLUSH IV SOLN 100 UNIT/ML 100 UNIT/ML
500 SOLUTION INTRAVENOUS PRN
Status: DISCONTINUED | OUTPATIENT
Start: 2020-06-12 | End: 2020-06-13 | Stop reason: HOSPADM

## 2020-06-12 RX ADMIN — HEPARIN 500 UNITS: 100 SYRINGE at 10:55

## 2020-06-19 ENCOUNTER — OFFICE VISIT (OUTPATIENT)
Dept: SURGERY | Age: 65
End: 2020-06-19

## 2020-06-19 VITALS
BODY MASS INDEX: 24.48 KG/M2 | OXYGEN SATURATION: 99 % | TEMPERATURE: 98 F | WEIGHT: 156 LBS | HEART RATE: 86 BPM | HEIGHT: 67 IN

## 2020-06-19 PROCEDURE — 99024 POSTOP FOLLOW-UP VISIT: CPT | Performed by: SURGERY

## 2020-06-23 NOTE — PROGRESS NOTES
MEDICAL ONCOLOGY PROGRESS NOTE    Pt Name: Marcia Biggs  MRN: [id number]  YOB: 1955  Date of evaluation: 6/24/2020    HISTORY OF PRESENT ILLNESS:      Diagnosis  · Colonic adenocarcinoma, [id number]  · [id number](liver), stage ROXANA  · IHC MMR- proficient  · K-maria luisa mutated  · N-MARIA LUISA/BRAF wild-type    Treatment summary  · 3/30/2020-right hemicolectomy at [facility]  · Anticipated Liver ablation to be followed by neoadjuvant/adjuvant versus palliative chemotherapy  · 06/10/2020-FOLFOX +/-Avastin    The patient is a pleasant 63years old male was found to have a right cecal mass consistent with colonic adenocarcinoma. Unfortunately he had local advanced disease with several lymph nodes involved. In addition the patient was also found to have suspicious liver lesions concerning for metastatic disease. He was seen by [facility] and offered a multimodality approach with liver ablation followed by neoadjuvant chemotherapy and tentatively hepatectomy. He underwent microwave ablation of the left lobe liver lesion on 6/8/2020. He is currently receiving palliative chemotherapy FOLFOX. Will add Avastin 7/20/2020. He tolerated cycle #1 well with complaints of mild sensory neuropathy that was transient. He denies any nausea vomiting diarrhea. Cancer history  Mr. Gomez Beckham was first seen by me on 3/23/2020. He was referred for a new diagnosis of colonic adenocarcinoma involving the cecum. The patient reports that he had a wellbeing consult with his provider at the [address] [place] [address]. He was found to have anemia and then recommended a colonoscopy. Of note, the patient has a family history of colon cancer. His mother is a patient of Dr. Steffen Xiong he has been diagnosed with colon cancer in 2010. · 3/11/2020- colonoscopy revealed a large malignant appearing fungating mass lesion in the cecum. In addition, several other polyps. Biopsy of the mass consistent with moderate differentiated colonic adenocarcinoma.   Polyps on file     Gets together: Not on file     Attends Islam service: Not on file     Active member of club or organization: Not on file     Attends meetings of clubs or organizations: Not on file     Relationship status: Not on file    Intimate partner violence     Fear of current or ex partner: Not on file     Emotionally abused: Not on file     Physically abused: Not on file     Forced sexual activity: Not on file   Other Topics Concern    Not on file   Social History Narrative    Not on file       Family History:   Family History   Problem Relation Age of Onset    Liver Cancer Mother     High Blood Pressure Mother     Colon Cancer Mother         x2    Cancer Father         Lung Cancer    Breast Cancer Sister     Cancer Maternal Grandfather         Lung Cancer    Cancer Paternal Grandfather         Stomach Cancer    Colon Polyps Neg Hx        Current Hospital Medications:    No current facility-administered medications for this visit. Allergies:    Allergies   Allergen Reactions    Oxycodone-Acetaminophen Nausea Only    Morphine Nausea And Vomiting     Hallucinations/Vomiting         Subjective   REVIEW OF SYSTEMS:   CONSTITUTIONAL: no fever, no night sweats, no fatigue; mild weight loss  HEENT: no blurring of vision, no double vision, no hearing difficulty, no tinnitus, no ulceration, no dysplasia, no epistaxis;  LUNGS: no cough, no hemoptysis, no wheeze,  no shortness of breath;  CARDIOVASCULAR: no palpitation, no chest pain, no shortness of breath;  GI: no abdominal pain, no nausea, no vomiting,  no constipation;  ANNIE: no dysuria, no hematuria, no frequency or urgency, no nephrolithiasis;  MUSCULOSKELETAL: no joint pain, no swelling, no stiffness;  ENDOCRINE: no polyuria, no polydipsia, no cold or heat intolerance;  HEMATOLOGY: no easy bruising or bleeding, no history of clotting disorder;  DERMATOLOGY: no skin rash, no eczema, no pruritus;  PSYCHIATRY: no depression, no anxiety, no panic attacks, no suicidal ideation, no homicidal ideation;  NEUROLOGY: no syncope, no seizures, no numbness or tingling of hands, no numbness or tingling of feet, no paresis;    Objective   /76   Pulse 94   Temp 97.8 °F (36.6 °C)   Resp 18   Wt 155 lb 8 oz (70.5 kg)   SpO2 97%   BMI 24.35 kg/m²     PHYSICAL EXAM:  CONSTITUTIONAL: Alert, appropriate, no acute distress  EYES: Non icteric, EOM intact, pupils equal round   ENT: Mucus membranes moist, no oral pharyngeal lesions, external inspection of ears and nose are normal  NECK: Supple, no masses. No palpable thyroid mass  CHEST/LUNGS: CTA bilaterally, normal respiratory effort   CARDIOVASCULAR: RRR, no murmurs. No lower extremity edema  ABDOMEN: Healing surgical scar,  active bowel sounds, no HSM. No palpable masses  EXTREMITIES: warm, full ROM in all 4 extremities, no focal weakness. SKIN: warm, dry with no rashes or lesions  LYMPH: No cervical, clavicular, axillary, or inguinal lymphadenopathy  NEUROLOGIC: follows commands, non focal   PSYCH: mood and affect appropriate. Alert and oriented to time, place, person    LABORATORY RESULTS REVIEWED BY ME:  OMR:4/28/8566  WBC-5.53  HGB-11.2  PLT-367,000  Neut-3.34    6/10/2020  BUN-17  Creatinine-1.2  Alk Phos-131  ALT-50  AST-82    DPD- Negative    RADIOLOGY STUDIES REVIEWED BY ME:          ASSESSMENT:  #Colonic adenocarcinoma-  [id number] (liver) K-maria luisa mutated, IHC MMR not proficient  The patient was counseled today about diagnosis, staging, prognosis, diagnostic tests, medications, side effects and disease management. The method of counseling included verbal explanation. The patient verbalized understanding. Essentially, status post right hemicolectomy. Pathology consistent with locally advanced disease. Unfortunately, stage Puneet due to suspicious liver metastasis. Discussed with hepatobiliary service (Dr Jose L Copeland) at [address] [place] [address] regarding possibility of resection/ metastasectomy.    The patient was

## 2020-06-24 ENCOUNTER — OFFICE VISIT (OUTPATIENT)
Dept: HEMATOLOGY | Age: 65
End: 2020-06-24
Payer: OTHER GOVERNMENT

## 2020-06-24 ENCOUNTER — HOSPITAL ENCOUNTER (OUTPATIENT)
Dept: INFUSION THERAPY | Age: 65
Discharge: HOME | End: 2020-06-24
Payer: OTHER GOVERNMENT

## 2020-06-24 VITALS
BODY MASS INDEX: 24.35 KG/M2 | DIASTOLIC BLOOD PRESSURE: 76 MMHG | OXYGEN SATURATION: 97 % | RESPIRATION RATE: 18 BRPM | SYSTOLIC BLOOD PRESSURE: 118 MMHG | WEIGHT: 155.5 LBS | HEART RATE: 94 BPM | TEMPERATURE: 97.8 F

## 2020-06-24 DIAGNOSIS — C18.9 ADENOCARCINOMA OF COLON (HCC): Primary | ICD-10-CM

## 2020-06-24 LAB
ALBUMIN SERPL-MCNC: 4.5 G/DL (ref 3.5–5.2)
ALP BLD-CCNC: 122 U/L (ref 40–130)
ALT SERPL-CCNC: 16 U/L (ref 21–72)
ANION GAP SERPL CALCULATED.3IONS-SCNC: 10 MMOL/L (ref 7–19)
AST SERPL-CCNC: 26 U/L (ref 17–59)
BASOPHILS ABSOLUTE: 0.1 K/UL (ref 0.01–0.08)
BASOPHILS RELATIVE PERCENT: 1.8 % (ref 0.1–1.2)
BILIRUB SERPL-MCNC: 0.4 MG/DL (ref 0.2–1.3)
BUN BLDV-MCNC: 17 MG/DL (ref 9–20)
CALCIUM SERPL-MCNC: 9.2 MG/DL (ref 8.4–10.2)
CHLORIDE BLD-SCNC: 107 MMOL/L (ref 98–111)
CO2: 24 MMOL/L (ref 22–29)
CREAT SERPL-MCNC: 1.3 MG/DL (ref 0.6–1.2)
EOSINOPHILS ABSOLUTE: 0.37 K/UL (ref 0.04–0.54)
EOSINOPHILS RELATIVE PERCENT: 6.7 % (ref 0.7–7)
GFR NON-AFRICAN AMERICAN: 56
GLOBULIN: 3.6 G/DL
GLUCOSE BLD-MCNC: 114 MG/DL (ref 74–106)
HCT VFR BLD CALC: 34.8 % (ref 40.1–51)
HEMOGLOBIN: 11.2 G/DL (ref 13.7–17.5)
LYMPHOCYTES ABSOLUTE: 0.98 K/UL (ref 1.18–3.74)
LYMPHOCYTES RELATIVE PERCENT: 17.7 % (ref 19.3–53.1)
MCH RBC QN AUTO: 23 PG (ref 25.7–32.2)
MCHC RBC AUTO-ENTMCNC: 32.2 G/DL (ref 32.3–36.5)
MCV RBC AUTO: 71.3 FL (ref 79–92.2)
MONOCYTES ABSOLUTE: 0.74 K/UL (ref 0.24–0.82)
MONOCYTES RELATIVE PERCENT: 13.4 % (ref 4.7–12.5)
NEUTROPHILS ABSOLUTE: 3.34 K/UL (ref 1.56–6.13)
NEUTROPHILS RELATIVE PERCENT: 60.4 % (ref 34–71.1)
PDW BLD-RTO: 19.5 % (ref 11.6–14.4)
PLATELET # BLD: 367 K/UL (ref 163–337)
PMV BLD AUTO: 9.1 FL (ref 7.4–10.4)
POTASSIUM SERPL-SCNC: 4.3 MMOL/L (ref 3.5–5.1)
RBC # BLD: 4.88 M/UL (ref 4.63–6.08)
SODIUM BLD-SCNC: 141 MMOL/L (ref 137–145)
TOTAL PROTEIN: 8.1 G/DL (ref 6.3–8.2)
WBC # BLD: 5.53 K/UL (ref 4.23–9.07)

## 2020-06-24 PROCEDURE — 80053 COMPREHEN METABOLIC PANEL: CPT

## 2020-06-24 PROCEDURE — G0498 CHEMO EXTEND IV INFUS W/PUMP: HCPCS

## 2020-06-24 PROCEDURE — 96375 TX/PRO/DX INJ NEW DRUG ADDON: CPT

## 2020-06-24 PROCEDURE — 96411 CHEMO IV PUSH ADDL DRUG: CPT

## 2020-06-24 PROCEDURE — 99214 OFFICE O/P EST MOD 30 MIN: CPT | Performed by: INTERNAL MEDICINE

## 2020-06-24 PROCEDURE — 96415 CHEMO IV INFUSION ADDL HR: CPT

## 2020-06-24 PROCEDURE — 96374 THER/PROPH/DIAG INJ IV PUSH: CPT

## 2020-06-24 PROCEDURE — 96409 CHEMO IV PUSH SNGL DRUG: CPT

## 2020-06-24 PROCEDURE — 96367 TX/PROPH/DG ADDL SEQ IV INF: CPT

## 2020-06-24 PROCEDURE — 96413 CHEMO IV INFUSION 1 HR: CPT

## 2020-06-24 PROCEDURE — 6360000002 HC RX W HCPCS: Performed by: INTERNAL MEDICINE

## 2020-06-24 PROCEDURE — 2580000003 HC RX 258: Performed by: INTERNAL MEDICINE

## 2020-06-24 PROCEDURE — 85025 COMPLETE CBC W/AUTO DIFF WBC: CPT

## 2020-06-24 PROCEDURE — 96368 THER/DIAG CONCURRENT INF: CPT

## 2020-06-24 RX ORDER — PALONOSETRON 0.05 MG/ML
0.25 INJECTION, SOLUTION INTRAVENOUS ONCE
Status: CANCELLED | OUTPATIENT
Start: 2020-06-24

## 2020-06-24 RX ORDER — SODIUM CHLORIDE 0.9 % (FLUSH) 0.9 %
10 SYRINGE (ML) INJECTION PRN
Status: CANCELLED | OUTPATIENT
Start: 2020-06-24

## 2020-06-24 RX ORDER — DEXAMETHASONE SODIUM PHOSPHATE 10 MG/ML
10 INJECTION, SOLUTION INTRAMUSCULAR; INTRAVENOUS ONCE
Status: COMPLETED | OUTPATIENT
Start: 2020-06-24 | End: 2020-06-24

## 2020-06-24 RX ORDER — PALONOSETRON 0.05 MG/ML
0.25 INJECTION, SOLUTION INTRAVENOUS ONCE
Status: COMPLETED | OUTPATIENT
Start: 2020-06-24 | End: 2020-06-24

## 2020-06-24 RX ORDER — HEPARIN SODIUM (PORCINE) LOCK FLUSH IV SOLN 100 UNIT/ML 100 UNIT/ML
500 SOLUTION INTRAVENOUS PRN
Status: CANCELLED | OUTPATIENT
Start: 2020-06-24

## 2020-06-24 RX ORDER — METHYLPREDNISOLONE SODIUM SUCCINATE 125 MG/2ML
125 INJECTION, POWDER, LYOPHILIZED, FOR SOLUTION INTRAMUSCULAR; INTRAVENOUS ONCE
Status: CANCELLED | OUTPATIENT
Start: 2020-06-24

## 2020-06-24 RX ORDER — FLUOROURACIL 50 MG/ML
400 INJECTION, SOLUTION INTRAVENOUS ONCE
Status: COMPLETED | OUTPATIENT
Start: 2020-06-24 | End: 2020-06-24

## 2020-06-24 RX ORDER — DEXTROSE MONOHYDRATE 50 MG/ML
INJECTION, SOLUTION INTRAVENOUS ONCE
Status: CANCELLED | OUTPATIENT
Start: 2020-06-24

## 2020-06-24 RX ORDER — FLUOROURACIL 50 MG/ML
400 INJECTION, SOLUTION INTRAVENOUS ONCE
Status: CANCELLED | OUTPATIENT
Start: 2020-06-24

## 2020-06-24 RX ORDER — EPINEPHRINE 1 MG/ML
0.3 INJECTION, SOLUTION, CONCENTRATE INTRAVENOUS PRN
Status: CANCELLED | OUTPATIENT
Start: 2020-06-24

## 2020-06-24 RX ORDER — SODIUM CHLORIDE 9 MG/ML
INJECTION, SOLUTION INTRAVENOUS CONTINUOUS
Status: CANCELLED | OUTPATIENT
Start: 2020-06-24

## 2020-06-24 RX ORDER — SODIUM CHLORIDE 9 MG/ML
INJECTION, SOLUTION INTRAVENOUS ONCE
Status: COMPLETED | OUTPATIENT
Start: 2020-06-24 | End: 2020-06-25

## 2020-06-24 RX ORDER — DIPHENHYDRAMINE HYDROCHLORIDE 50 MG/ML
50 INJECTION INTRAMUSCULAR; INTRAVENOUS ONCE
Status: CANCELLED | OUTPATIENT
Start: 2020-06-24

## 2020-06-24 RX ORDER — SODIUM CHLORIDE 9 MG/ML
INJECTION, SOLUTION INTRAVENOUS ONCE
Status: CANCELLED | OUTPATIENT
Start: 2020-06-24

## 2020-06-24 RX ORDER — SODIUM CHLORIDE 0.9 % (FLUSH) 0.9 %
5 SYRINGE (ML) INJECTION PRN
Status: CANCELLED | OUTPATIENT
Start: 2020-06-24

## 2020-06-24 RX ADMIN — FLUOROURACIL 4390 MG: 50 INJECTION, SOLUTION INTRAVENOUS at 12:51

## 2020-06-24 RX ADMIN — DEXAMETHASONE SODIUM PHOSPHATE 10 MG: 10 INJECTION, SOLUTION INTRAMUSCULAR; INTRAVENOUS at 09:54

## 2020-06-24 RX ADMIN — OXALIPLATIN 150 MG: 5 INJECTION, SOLUTION INTRAVENOUS at 10:39

## 2020-06-24 RX ADMIN — FLUOROURACIL 730 MG: 50 INJECTION, SOLUTION INTRAVENOUS at 12:53

## 2020-06-24 RX ADMIN — PALONOSETRON 0.25 MG: 0.05 INJECTION, SOLUTION INTRAVENOUS at 09:54

## 2020-06-24 RX ADMIN — LEUCOVORIN CALCIUM 366 MG: 350 INJECTION, POWDER, LYOPHILIZED, FOR SUSPENSION INTRAMUSCULAR; INTRAVENOUS at 10:39

## 2020-06-24 RX ADMIN — SODIUM CHLORIDE: 9 INJECTION, SOLUTION INTRAVENOUS at 09:54

## 2020-06-26 ENCOUNTER — HOSPITAL ENCOUNTER (OUTPATIENT)
Dept: INFUSION THERAPY | Age: 65
Discharge: HOME | End: 2020-06-26
Payer: OTHER GOVERNMENT

## 2020-06-26 DIAGNOSIS — C18.2 MALIGNANT NEOPLASM OF ASCENDING COLON (HCC): Primary | ICD-10-CM

## 2020-06-26 PROCEDURE — 96523 IRRIG DRUG DELIVERY DEVICE: CPT

## 2020-06-26 RX ORDER — HEPARIN SODIUM (PORCINE) LOCK FLUSH IV SOLN 100 UNIT/ML 100 UNIT/ML
500 SOLUTION INTRAVENOUS PRN
Status: CANCELLED | OUTPATIENT
Start: 2020-06-26

## 2020-06-26 RX ORDER — HEPARIN SODIUM (PORCINE) LOCK FLUSH IV SOLN 100 UNIT/ML 100 UNIT/ML
500 SOLUTION INTRAVENOUS PRN
Status: DISCONTINUED | OUTPATIENT
Start: 2020-06-26 | End: 2020-06-27 | Stop reason: HOSPADM

## 2020-06-26 RX ORDER — SODIUM CHLORIDE 0.9 % (FLUSH) 0.9 %
20 SYRINGE (ML) INJECTION PRN
Status: CANCELLED | OUTPATIENT
Start: 2020-06-26

## 2020-06-26 RX ORDER — SODIUM CHLORIDE 0.9 % (FLUSH) 0.9 %
10 SYRINGE (ML) INJECTION PRN
Status: CANCELLED | OUTPATIENT
Start: 2020-06-26

## 2020-06-26 RX ORDER — SODIUM CHLORIDE 0.9 % (FLUSH) 0.9 %
10 SYRINGE (ML) INJECTION PRN
Status: DISCONTINUED | OUTPATIENT
Start: 2020-06-26 | End: 2020-06-27 | Stop reason: HOSPADM

## 2020-07-08 ENCOUNTER — HOSPITAL ENCOUNTER (OUTPATIENT)
Dept: INFUSION THERAPY | Age: 65
Discharge: HOME | End: 2020-07-08
Payer: OTHER GOVERNMENT

## 2020-07-08 VITALS
BODY MASS INDEX: 24.68 KG/M2 | HEART RATE: 75 BPM | OXYGEN SATURATION: 98 % | TEMPERATURE: 98 F | DIASTOLIC BLOOD PRESSURE: 70 MMHG | WEIGHT: 157.6 LBS | SYSTOLIC BLOOD PRESSURE: 130 MMHG | RESPIRATION RATE: 16 BRPM

## 2020-07-08 DIAGNOSIS — C18.9 ADENOCARCINOMA OF COLON (HCC): Primary | ICD-10-CM

## 2020-07-08 LAB
ALBUMIN SERPL-MCNC: 4.4 G/DL (ref 3.5–5.2)
ALP BLD-CCNC: 125 U/L (ref 40–130)
ALT SERPL-CCNC: 23 U/L (ref 21–72)
ANION GAP SERPL CALCULATED.3IONS-SCNC: 8 MMOL/L (ref 7–19)
AST SERPL-CCNC: 43 U/L (ref 17–59)
BASOPHILS ABSOLUTE: 0.06 K/UL (ref 0.01–0.08)
BASOPHILS RELATIVE PERCENT: 1.1 % (ref 0.1–1.2)
BILIRUB SERPL-MCNC: 0.4 MG/DL (ref 0.2–1.3)
BUN BLDV-MCNC: 18 MG/DL (ref 9–20)
CALCIUM SERPL-MCNC: 9.7 MG/DL (ref 8.4–10.2)
CHLORIDE BLD-SCNC: 108 MMOL/L (ref 98–111)
CO2: 23 MMOL/L (ref 22–29)
CREAT SERPL-MCNC: 1.4 MG/DL (ref 0.6–1.2)
EOSINOPHILS ABSOLUTE: 0.35 K/UL (ref 0.04–0.54)
EOSINOPHILS RELATIVE PERCENT: 6.6 % (ref 0.7–7)
GFR NON-AFRICAN AMERICAN: 51
GLOBULIN: 3.4 G/DL
GLUCOSE BLD-MCNC: 96 MG/DL (ref 74–106)
HCT VFR BLD CALC: 34.8 % (ref 40.1–51)
HEMOGLOBIN: 11.2 G/DL (ref 13.7–17.5)
LYMPHOCYTES ABSOLUTE: 1.1 K/UL (ref 1.18–3.74)
LYMPHOCYTES RELATIVE PERCENT: 20.8 % (ref 19.3–53.1)
MCH RBC QN AUTO: 22.9 PG (ref 25.7–32.2)
MCHC RBC AUTO-ENTMCNC: 32.2 G/DL (ref 32.3–36.5)
MCV RBC AUTO: 71 FL (ref 79–92.2)
MONOCYTES ABSOLUTE: 0.88 K/UL (ref 0.24–0.82)
MONOCYTES RELATIVE PERCENT: 16.6 % (ref 4.7–12.5)
NEUTROPHILS ABSOLUTE: 2.91 K/UL (ref 1.56–6.13)
NEUTROPHILS RELATIVE PERCENT: 54.9 % (ref 34–71.1)
PDW BLD-RTO: 20.5 % (ref 11.6–14.4)
PLATELET # BLD: 192 K/UL (ref 163–337)
PMV BLD AUTO: 9.3 FL (ref 7.4–10.4)
POTASSIUM SERPL-SCNC: 4.4 MMOL/L (ref 3.5–5.1)
RBC # BLD: 4.9 M/UL (ref 4.63–6.08)
SODIUM BLD-SCNC: 139 MMOL/L (ref 137–145)
TOTAL PROTEIN: 7.7 G/DL (ref 6.3–8.2)
WBC # BLD: 5.3 K/UL (ref 4.23–9.07)

## 2020-07-08 PROCEDURE — 96368 THER/DIAG CONCURRENT INF: CPT

## 2020-07-08 PROCEDURE — 36415 COLL VENOUS BLD VENIPUNCTURE: CPT

## 2020-07-08 PROCEDURE — 96415 CHEMO IV INFUSION ADDL HR: CPT

## 2020-07-08 PROCEDURE — 2580000003 HC RX 258: Performed by: INTERNAL MEDICINE

## 2020-07-08 PROCEDURE — 96417 CHEMO IV INFUS EACH ADDL SEQ: CPT

## 2020-07-08 PROCEDURE — G0498 CHEMO EXTEND IV INFUS W/PUMP: HCPCS

## 2020-07-08 PROCEDURE — 96413 CHEMO IV INFUSION 1 HR: CPT

## 2020-07-08 PROCEDURE — 96411 CHEMO IV PUSH ADDL DRUG: CPT

## 2020-07-08 PROCEDURE — 6360000002 HC RX W HCPCS: Performed by: INTERNAL MEDICINE

## 2020-07-08 PROCEDURE — 85025 COMPLETE CBC W/AUTO DIFF WBC: CPT

## 2020-07-08 PROCEDURE — 80053 COMPREHEN METABOLIC PANEL: CPT

## 2020-07-08 PROCEDURE — 96375 TX/PRO/DX INJ NEW DRUG ADDON: CPT

## 2020-07-08 PROCEDURE — 96367 TX/PROPH/DG ADDL SEQ IV INF: CPT

## 2020-07-08 RX ORDER — HEPARIN SODIUM (PORCINE) LOCK FLUSH IV SOLN 100 UNIT/ML 100 UNIT/ML
500 SOLUTION INTRAVENOUS PRN
Status: DISCONTINUED | OUTPATIENT
Start: 2020-07-08 | End: 2020-07-09 | Stop reason: HOSPADM

## 2020-07-08 RX ORDER — SODIUM CHLORIDE 9 MG/ML
INJECTION, SOLUTION INTRAVENOUS ONCE
Status: CANCELLED | OUTPATIENT
Start: 2020-07-08

## 2020-07-08 RX ORDER — METHYLPREDNISOLONE SODIUM SUCCINATE 125 MG/2ML
125 INJECTION, POWDER, LYOPHILIZED, FOR SOLUTION INTRAMUSCULAR; INTRAVENOUS ONCE
Status: CANCELLED | OUTPATIENT
Start: 2020-07-08

## 2020-07-08 RX ORDER — SODIUM CHLORIDE 0.9 % (FLUSH) 0.9 %
10 SYRINGE (ML) INJECTION PRN
Status: DISCONTINUED | OUTPATIENT
Start: 2020-07-08 | End: 2020-07-09 | Stop reason: HOSPADM

## 2020-07-08 RX ORDER — SODIUM CHLORIDE 9 MG/ML
INJECTION, SOLUTION INTRAVENOUS CONTINUOUS
Status: CANCELLED | OUTPATIENT
Start: 2020-07-08

## 2020-07-08 RX ORDER — DEXAMETHASONE SODIUM PHOSPHATE 10 MG/ML
10 INJECTION, SOLUTION INTRAMUSCULAR; INTRAVENOUS ONCE
Status: COMPLETED | OUTPATIENT
Start: 2020-07-08 | End: 2020-07-08

## 2020-07-08 RX ORDER — PALONOSETRON 0.05 MG/ML
0.25 INJECTION, SOLUTION INTRAVENOUS ONCE
Status: COMPLETED | OUTPATIENT
Start: 2020-07-08 | End: 2020-07-08

## 2020-07-08 RX ORDER — DEXAMETHASONE SODIUM PHOSPHATE 10 MG/ML
10 INJECTION, SOLUTION INTRAMUSCULAR; INTRAVENOUS ONCE
Status: CANCELLED | OUTPATIENT
Start: 2020-07-08

## 2020-07-08 RX ORDER — EPINEPHRINE 1 MG/ML
0.3 INJECTION, SOLUTION, CONCENTRATE INTRAVENOUS PRN
Status: CANCELLED | OUTPATIENT
Start: 2020-07-08

## 2020-07-08 RX ORDER — FLUOROURACIL 50 MG/ML
400 INJECTION, SOLUTION INTRAVENOUS ONCE
Status: CANCELLED | OUTPATIENT
Start: 2020-07-08

## 2020-07-08 RX ORDER — PALONOSETRON 0.05 MG/ML
0.25 INJECTION, SOLUTION INTRAVENOUS ONCE
Status: CANCELLED | OUTPATIENT
Start: 2020-07-08

## 2020-07-08 RX ORDER — SODIUM CHLORIDE 9 MG/ML
INJECTION, SOLUTION INTRAVENOUS ONCE
Status: COMPLETED | OUTPATIENT
Start: 2020-07-08 | End: 2020-07-09

## 2020-07-08 RX ORDER — DIPHENHYDRAMINE HYDROCHLORIDE 50 MG/ML
50 INJECTION INTRAMUSCULAR; INTRAVENOUS ONCE
Status: CANCELLED | OUTPATIENT
Start: 2020-07-08

## 2020-07-08 RX ORDER — DEXTROSE MONOHYDRATE 50 MG/ML
INJECTION, SOLUTION INTRAVENOUS ONCE
Status: CANCELLED | OUTPATIENT
Start: 2020-07-08

## 2020-07-08 RX ORDER — HEPARIN SODIUM (PORCINE) LOCK FLUSH IV SOLN 100 UNIT/ML 100 UNIT/ML
500 SOLUTION INTRAVENOUS PRN
Status: CANCELLED | OUTPATIENT
Start: 2020-07-08

## 2020-07-08 RX ORDER — SODIUM CHLORIDE 0.9 % (FLUSH) 0.9 %
10 SYRINGE (ML) INJECTION PRN
Status: CANCELLED | OUTPATIENT
Start: 2020-07-08

## 2020-07-08 RX ORDER — SODIUM CHLORIDE 0.9 % (FLUSH) 0.9 %
5 SYRINGE (ML) INJECTION PRN
Status: CANCELLED | OUTPATIENT
Start: 2020-07-08

## 2020-07-08 RX ORDER — FLUOROURACIL 50 MG/ML
400 INJECTION, SOLUTION INTRAVENOUS ONCE
Status: COMPLETED | OUTPATIENT
Start: 2020-07-08 | End: 2020-07-08

## 2020-07-08 RX ADMIN — FLUOROURACIL 4400 MG: 50 INJECTION, SOLUTION INTRAVENOUS at 11:41

## 2020-07-08 RX ADMIN — LEUCOVORIN CALCIUM 366 MG: 350 INJECTION, POWDER, LYOPHILIZED, FOR SOLUTION INTRAMUSCULAR; INTRAVENOUS at 09:34

## 2020-07-08 RX ADMIN — DEXAMETHASONE SODIUM PHOSPHATE 10 MG: 10 INJECTION, SOLUTION INTRAMUSCULAR; INTRAVENOUS at 09:07

## 2020-07-08 RX ADMIN — FLUOROURACIL 725 MG: 50 INJECTION, SOLUTION INTRAVENOUS at 11:41

## 2020-07-08 RX ADMIN — PALONOSETRON 0.25 MG: 0.05 INJECTION, SOLUTION INTRAVENOUS at 09:07

## 2020-07-08 RX ADMIN — SODIUM CHLORIDE: 9 INJECTION, SOLUTION INTRAVENOUS at 09:07

## 2020-07-08 RX ADMIN — OXALIPLATIN 150 MG: 5 INJECTION, SOLUTION INTRAVENOUS at 09:34

## 2020-07-08 ASSESSMENT — PAIN SCALES - GENERAL: PAINLEVEL_OUTOF10: 0

## 2020-07-10 ENCOUNTER — HOSPITAL ENCOUNTER (OUTPATIENT)
Dept: INFUSION THERAPY | Age: 65
Discharge: HOME | End: 2020-07-10
Payer: OTHER GOVERNMENT

## 2020-07-10 DIAGNOSIS — C18.2 MALIGNANT NEOPLASM OF ASCENDING COLON (HCC): Primary | ICD-10-CM

## 2020-07-10 PROCEDURE — 2580000003 HC RX 258: Performed by: INTERNAL MEDICINE

## 2020-07-10 PROCEDURE — 6360000002 HC RX W HCPCS: Performed by: INTERNAL MEDICINE

## 2020-07-10 PROCEDURE — 96523 IRRIG DRUG DELIVERY DEVICE: CPT

## 2020-07-10 RX ORDER — HEPARIN SODIUM (PORCINE) LOCK FLUSH IV SOLN 100 UNIT/ML 100 UNIT/ML
500 SOLUTION INTRAVENOUS PRN
Status: CANCELLED | OUTPATIENT
Start: 2020-07-10

## 2020-07-10 RX ORDER — SODIUM CHLORIDE 0.9 % (FLUSH) 0.9 %
10 SYRINGE (ML) INJECTION PRN
Status: CANCELLED | OUTPATIENT
Start: 2020-07-10

## 2020-07-10 RX ORDER — SODIUM CHLORIDE 0.9 % (FLUSH) 0.9 %
20 SYRINGE (ML) INJECTION PRN
Status: CANCELLED | OUTPATIENT
Start: 2020-07-10

## 2020-07-10 RX ORDER — HEPARIN SODIUM (PORCINE) LOCK FLUSH IV SOLN 100 UNIT/ML 100 UNIT/ML
500 SOLUTION INTRAVENOUS PRN
Status: DISCONTINUED | OUTPATIENT
Start: 2020-07-10 | End: 2020-07-11 | Stop reason: HOSPADM

## 2020-07-10 RX ORDER — SODIUM CHLORIDE 0.9 % (FLUSH) 0.9 %
20 SYRINGE (ML) INJECTION PRN
Status: DISCONTINUED | OUTPATIENT
Start: 2020-07-10 | End: 2020-07-11 | Stop reason: HOSPADM

## 2020-07-10 RX ADMIN — HEPARIN 500 UNITS: 100 SYRINGE at 11:46

## 2020-07-10 RX ADMIN — SODIUM CHLORIDE, PRESERVATIVE FREE 20 ML: 5 INJECTION INTRAVENOUS at 11:46

## 2020-07-20 NOTE — PROGRESS NOTES
MEDICAL ONCOLOGY PROGRESS NOTE    Pt Name: Juanita Painting  MRN: [id number]  YOB: 1955  Date of evaluation: 7/22/2020    HISTORY OF PRESENT ILLNESS:      Diagnosis  · Colonic adenocarcinoma, [id number]  · [id number](liver), stage ROXANA  · IHC MMR- proficient  · K-maria luisa mutated  · N-MARIA LUISA/BRAF wild-type    Treatment summary  · 3/30/2020-right hemicolectomy at [facility]  · Anticipated Liver ablation to be followed by neoadjuvant/adjuvant versus palliative chemotherapy  · 06/10/2020-FOLFOX +/-Avastin    The patient is a pleasant 63years old male was found to have a right cecal mass consistent with colonic adenocarcinoma. Unfortunately he had local advanced disease with several lymph nodes involved. In addition, the patient was also found to have suspicious liver lesions concerning for metastatic disease. He was seen by [facility] and offered a multimodality approach with liver ablation followed by neoadjuvant chemotherapy and tentatively hepatectomy. He underwent microwave ablation of the left lobe liver lesion on 6/8/2020. He is currently receiving palliative chemotherapy FOLFOX. Will add Avastin 7/20/2020. He tolerated cycle #3 well with complaints of mild sensory neuropathy that was transient. He denies any nausea vomiting diarrhea. Cancer history  Mr. Adwoa Zurita was first seen by me on 3/23/2020. He was referred for a new diagnosis of colonic adenocarcinoma involving the cecum. The patient reports that he had a wellbeing consult with his provider at the [place]. He was found to have anemia and then recommended a colonoscopy. Of note, the patient has a family history of colon cancer. His mother is a patient of Dr. Philomena Ceja he has been diagnosed with colon cancer in 2010. · 3/11/2020- colonoscopy revealed a large malignant appearing fungating mass lesion in the cecum. In addition, several other polyps. Biopsy of the mass consistent with moderate differentiated colonic adenocarcinoma.   Polyps consistent with tubulovillous adenoma with no high-grade dysplasia. IHC MMR not proficient. K-maria luisa mutated, BRAF and NRAS wild type. MSI proficient  · 3/11/2020-CEA 5.5 (H)  · 3/18/2020-CT abdomen pelvis with contrast  Invasive cecal mass adhering to the right lateral abdominal wall muscles with adjacent lymphadenopathy. Mild partial obstruction of the terminal ileum. 2. Suspicious lesions in the right and left hepatic lobes measure up to 1.3 cm and likely represent metastatic disease. · 3/18/2020-Xr Chest Standard  No radiographic evidence of acute cardiopulmonary process. · 3/23/2020-he was first seen by me. Recommended completion of staging with CT chest.  Also recommended liver MRI for further clarification of liver lesion. S.  Recommend to proceed with a general surgery consultation tomorrow with Dr. Jose D Milligan. Patient was informed that I favor surgical resection if feasible of the primary malignancy. · 3/30/2020- right hemicolectomy by Dr. Jose D Milligan at [facility] consistent with invasive moderately differentiated colonic adenocarcinoma measuring 7.2 cm. Carcinoma directly invading the adjacent abdominal wall tissue. Focal lymphovascular space invasion identified. Focal perineural invasion identified. Surgical margins negative for evidence of malignancy. 6 out of 14 lymph nodes positive for metastatic adenocarcinoma. Final pathology staging [id number](liver, stage ROXANA)  · 4/20/2020-CT chest with contrast showed No convincing intrathoracic metastasis. Nonspecific 4 mm nodule of the inferior lingula and a 2 mm right upper lobe nodule can be followed on subsequent imaging in 6-12 months. Moderate coronary calcifications. Hypodense metastatic liver lesions. Small hiatal hernia. · 4/20/2020-Mri Abdomen W Wo Contrast There are about 5 liver lesions. The 2 largest appears similar compared to 3/18/2020, the others are too small to further characterize.  Appearance is most concerning for metastatic on file     Gets together: Not on file     Attends Alevism service: Not on file     Active member of club or organization: Not on file     Attends meetings of clubs or organizations: Not on file     Relationship status: Not on file    Intimate partner violence     Fear of current or ex partner: Not on file     Emotionally abused: Not on file     Physically abused: Not on file     Forced sexual activity: Not on file   Other Topics Concern    Not on file   Social History Narrative    Not on file       Family History:   Family History   Problem Relation Age of Onset    Liver Cancer Mother     High Blood Pressure Mother     Colon Cancer Mother         x2    Cancer Father         Lung Cancer    Breast Cancer Sister     Cancer Maternal Grandfather         Lung Cancer    Cancer Paternal Grandfather         Stomach Cancer    Colon Polyps Neg Hx        Current Hospital Medications:    No current facility-administered medications for this visit. Facility-Administered Medications Ordered in Other Visits: leucovorin calcium (WELLCOVORIN) 366 mg in dextrose 5 % 250 mL IVPB, 366 mg, Intravenous, Once  fluorouracil (ADRUCIL) 1 GM/20ML chemo injection 730 mg, 400 mg/m2 (Treatment Plan Recorded), Intravenous, Once  fluorouracil (ADRUCIL) 4,390 mg in sodium chloride 0.9 % 250 mL chemo infusion, 2,400 mg/m2 (Treatment Plan Recorded), Intravenous, Over 46 hours  sodium chloride flush 0.9 % injection 5 mL, 5 mL, Intravenous, PRN  sodium chloride flush 0.9 % injection 10 mL, 10 mL, Intravenous, PRN  heparin flush 100 UNIT/ML injection 500 Units, 500 Units, Intracatheter, PRN  oxaliplatin (ELOXATIN) 150 mg in dextrose 5 % 250 mL chemo IVPB, 150 mg, Intravenous, Once    Allergies:    Allergies   Allergen Reactions    Oxycodone-Acetaminophen Nausea Only    Morphine Nausea And Vomiting     Hallucinations/Vomiting         Subjective   REVIEW OF SYSTEMS:   CONSTITUTIONAL: no fever, no night sweats, no fatigue; mild weight loss  HEENT: no blurring of vision, no double vision, no hearing difficulty, no tinnitus, no ulceration, no dysplasia, no epistaxis;  LUNGS: no cough, no hemoptysis, no wheeze,  no shortness of breath;  CARDIOVASCULAR: no palpitation, no chest pain, no shortness of breath;  GI: no abdominal pain, no nausea, no vomiting,  no constipation;  ANNIE: no dysuria, no hematuria, no frequency or urgency, no nephrolithiasis;  MUSCULOSKELETAL: no joint pain, no swelling, no stiffness;  ENDOCRINE: no polyuria, no polydipsia, no cold or heat intolerance;  HEMATOLOGY: no easy bruising or bleeding, no history of clotting disorder;  DERMATOLOGY: no skin rash, no eczema, no pruritus;  PSYCHIATRY: no depression, no anxiety, no panic attacks, no suicidal ideation, no homicidal ideation;  NEUROLOGY: no syncope, no seizures, no numbness or tingling of hands, no numbness or tingling of feet, no paresis;    Objective   /76   Pulse 66   Temp 98.1 °F (36.7 °C)   Resp 16   Wt 158 lb 6.4 oz (71.8 kg)   SpO2 96%   BMI 24.81 kg/m²     PHYSICAL EXAM:  CONSTITUTIONAL: Alert, appropriate, no acute distress  EYES: Non icteric, EOM intact, pupils equal round   ENT: Mucus membranes moist, no oral pharyngeal lesions, external inspection of ears and nose are normal  NECK: Supple, no masses. No palpable thyroid mass  CHEST/LUNGS: CTA bilaterally, normal respiratory effort   CARDIOVASCULAR: RRR, no murmurs. No lower extremity edema  ABDOMEN: Healing surgical scar,  active bowel sounds, no HSM. No palpable masses  EXTREMITIES: warm, full ROM in all 4 extremities, no focal weakness. SKIN: warm, dry with no rashes or lesions  LYMPH: No cervical, clavicular, axillary, or inguinal lymphadenopathy  NEUROLOGIC: follows commands, non focal   PSYCH: mood and affect appropriate.   Alert and oriented to time, place, person    LABORATORY RESULTS REVIEWED BY ME:  CBC: 7/22/2020  WBC- 5.50  HGB- 11.4  PLT- 142,000  Neut- 3.06    7/8/2020  Creatinine-1. 4(H)  GFR-51      RADIOLOGY STUDIES REVIEWED BY ME:  No results found. ASSESSMENT:  #Colonic adenocarcinoma-  [id number] (liver) K-maria luisa mutated, IHC MMR not proficient  The patient was counseled today about diagnosis, staging, prognosis, diagnostic tests, medications, side effects and disease management. The method of counseling included verbal explanation. The patient verbalized understanding. Essentially, status post right hemicolectomy. Pathology consistent with locally advanced disease. Unfortunately, stage Puneet due to suspicious liver metastasis. Discussed with hepatobiliary service (Dr Kayla Chang) at [facility] regarding possibility of resection/ metastasectomy. The patient was seen 5/19/2020 with plans for ablation of the left liver lesion followed by neoadjuvant chemotherapy and possibly right hepatectomy. Recommended neoadjuvant chemotherapy. Discussed about the side effects risks and benefits. Neoadjuvant regimen regimen started 6/10/2020:  Oxaliplatin 85 mg/m2  Leucovorin 200 mg/m2   5-FU 2400 mgm2 over 46 hours  X12 biweekly cycles. Will add bevacizumab after 6 weeks post ablation around 7/22/2020. Will stop bevacizumab 6 weeks prior to major surgery. #Liver metastasis- plan as above. Status post ablation left liver lobe lesion at [facility] on 6/8/2020    Iron deficiency anemia-  hemoglobin 11.9/MCV78. Iron profile today. Continue iron sulfate 375 mg p.o. 3 times daily. Ferritin 12.9, iron saturation 15, TIBC 458, iron 72. PLAN:    · Proceed cycle #4/12 FOLFOX  · Add bevacizumab 7/22/2020  · Ordered CMP and CEA today  · RTC 4 weeks with MD    I have seen, examined and reviewed this patient medication list, appropriate labs and imaging studies. I reviewed relevant medical records and others physicians notes. I discussed the plans of care with the patient. I answered all the questions to the patients satisfaction.  I have also reviewed the chief complaint (CC) and part of the history (History of Present Illness (HPI), Past Family Social History [facility]), or Review of Systems (ROS) and made changes when appropriated. (Please note that portions of this note were completed with a voice recognition program. Efforts were made to edit the dictations but occasionally words are mis-transcribed.)  I, Dr Britney Coleman, personally performed the services described in this documentation as scribed by Columba Balderas MA in my presence and is both accurate and complete. Over 50% of the total visit time of 25 minutes in face to face encounter with the patient, out of which more than 50% of the time was spent in counseling patient or family and coordination of care. Counseling included but was not limited to time spent reviewing labs, imaging studies/ treatment plan and answering questions.        Michelle Muse MD    07/22/20  10:55 AM

## 2020-07-22 ENCOUNTER — OFFICE VISIT (OUTPATIENT)
Dept: HEMATOLOGY | Age: 65
End: 2020-07-22
Payer: OTHER GOVERNMENT

## 2020-07-22 ENCOUNTER — HOSPITAL ENCOUNTER (OUTPATIENT)
Dept: INFUSION THERAPY | Age: 65
Discharge: HOME | End: 2020-07-22
Payer: OTHER GOVERNMENT

## 2020-07-22 VITALS
BODY MASS INDEX: 24.81 KG/M2 | HEART RATE: 66 BPM | TEMPERATURE: 98.1 F | WEIGHT: 158.4 LBS | DIASTOLIC BLOOD PRESSURE: 76 MMHG | RESPIRATION RATE: 16 BRPM | SYSTOLIC BLOOD PRESSURE: 120 MMHG | OXYGEN SATURATION: 96 %

## 2020-07-22 DIAGNOSIS — C18.9 ADENOCARCINOMA OF COLON (HCC): Primary | ICD-10-CM

## 2020-07-22 LAB
ALBUMIN SERPL-MCNC: 4.2 G/DL (ref 3.5–5.2)
ALP BLD-CCNC: 124 U/L (ref 40–130)
ALT SERPL-CCNC: 17 U/L (ref 21–72)
ANION GAP SERPL CALCULATED.3IONS-SCNC: 7 MMOL/L (ref 7–19)
AST SERPL-CCNC: 31 U/L (ref 17–59)
BASOPHILS ABSOLUTE: 0.04 K/UL (ref 0.01–0.08)
BASOPHILS RELATIVE PERCENT: 0.7 % (ref 0.1–1.2)
BILIRUB SERPL-MCNC: 0.5 MG/DL (ref 0.2–1.3)
BUN BLDV-MCNC: 16 MG/DL (ref 9–20)
CALCIUM SERPL-MCNC: 9.8 MG/DL (ref 8.4–10.2)
CHLORIDE BLD-SCNC: 108 MMOL/L (ref 98–111)
CO2: 24 MMOL/L (ref 22–29)
CREAT SERPL-MCNC: 1.4 MG/DL (ref 0.6–1.2)
EOSINOPHILS ABSOLUTE: 0.47 K/UL (ref 0.04–0.54)
EOSINOPHILS RELATIVE PERCENT: 8.5 % (ref 0.7–7)
GFR NON-AFRICAN AMERICAN: 51
GLOBULIN: 3.3 G/DL
GLUCOSE BLD-MCNC: 100 MG/DL (ref 74–106)
HCT VFR BLD CALC: 35.1 % (ref 40.1–51)
HEMOGLOBIN: 11.4 G/DL (ref 13.7–17.5)
LYMPHOCYTES ABSOLUTE: 1.01 K/UL (ref 1.18–3.74)
LYMPHOCYTES RELATIVE PERCENT: 18.4 % (ref 19.3–53.1)
MCH RBC QN AUTO: 23.4 PG (ref 25.7–32.2)
MCHC RBC AUTO-ENTMCNC: 32.5 G/DL (ref 32.3–36.5)
MCV RBC AUTO: 71.9 FL (ref 79–92.2)
MONOCYTES ABSOLUTE: 0.92 K/UL (ref 0.24–0.82)
MONOCYTES RELATIVE PERCENT: 16.7 % (ref 4.7–12.5)
NEUTROPHILS ABSOLUTE: 3.06 K/UL (ref 1.56–6.13)
NEUTROPHILS RELATIVE PERCENT: 55.7 % (ref 34–71.1)
PDW BLD-RTO: 22.6 % (ref 11.6–14.4)
PLATELET # BLD: 142 K/UL (ref 163–337)
PMV BLD AUTO: ABNORMAL FL (ref 7.4–10.4)
POTASSIUM SERPL-SCNC: 4.3 MMOL/L (ref 3.5–5.1)
RBC # BLD: 4.88 M/UL (ref 4.63–6.08)
SODIUM BLD-SCNC: 139 MMOL/L (ref 137–145)
TOTAL PROTEIN: 7.5 G/DL (ref 6.3–8.2)
WBC # BLD: 5.5 K/UL (ref 4.23–9.07)

## 2020-07-22 PROCEDURE — 96411 CHEMO IV PUSH ADDL DRUG: CPT

## 2020-07-22 PROCEDURE — 80053 COMPREHEN METABOLIC PANEL: CPT

## 2020-07-22 PROCEDURE — 6360000002 HC RX W HCPCS: Performed by: INTERNAL MEDICINE

## 2020-07-22 PROCEDURE — G0498 CHEMO EXTEND IV INFUS W/PUMP: HCPCS

## 2020-07-22 PROCEDURE — 96368 THER/DIAG CONCURRENT INF: CPT

## 2020-07-22 PROCEDURE — 96417 CHEMO IV INFUS EACH ADDL SEQ: CPT

## 2020-07-22 PROCEDURE — 81002 URINALYSIS NONAUTO W/O SCOPE: CPT | Performed by: INTERNAL MEDICINE

## 2020-07-22 PROCEDURE — 96367 TX/PROPH/DG ADDL SEQ IV INF: CPT

## 2020-07-22 PROCEDURE — 2580000003 HC RX 258: Performed by: INTERNAL MEDICINE

## 2020-07-22 PROCEDURE — 96413 CHEMO IV INFUSION 1 HR: CPT

## 2020-07-22 PROCEDURE — 85025 COMPLETE CBC W/AUTO DIFF WBC: CPT

## 2020-07-22 PROCEDURE — 96375 TX/PRO/DX INJ NEW DRUG ADDON: CPT

## 2020-07-22 PROCEDURE — 96415 CHEMO IV INFUSION ADDL HR: CPT

## 2020-07-22 PROCEDURE — 99214 OFFICE O/P EST MOD 30 MIN: CPT | Performed by: INTERNAL MEDICINE

## 2020-07-22 RX ORDER — METHYLPREDNISOLONE SODIUM SUCCINATE 125 MG/2ML
125 INJECTION, POWDER, LYOPHILIZED, FOR SOLUTION INTRAMUSCULAR; INTRAVENOUS ONCE
Status: CANCELLED | OUTPATIENT
Start: 2020-07-22

## 2020-07-22 RX ORDER — HEPARIN SODIUM (PORCINE) LOCK FLUSH IV SOLN 100 UNIT/ML 100 UNIT/ML
500 SOLUTION INTRAVENOUS PRN
Status: DISCONTINUED | OUTPATIENT
Start: 2020-07-22 | End: 2020-07-23 | Stop reason: HOSPADM

## 2020-07-22 RX ORDER — PALONOSETRON 0.05 MG/ML
0.25 INJECTION, SOLUTION INTRAVENOUS ONCE
Status: COMPLETED | OUTPATIENT
Start: 2020-07-22 | End: 2020-07-22

## 2020-07-22 RX ORDER — SODIUM CHLORIDE 0.9 % (FLUSH) 0.9 %
10 SYRINGE (ML) INJECTION PRN
Status: CANCELLED | OUTPATIENT
Start: 2020-07-22

## 2020-07-22 RX ORDER — SODIUM CHLORIDE 9 MG/ML
INJECTION, SOLUTION INTRAVENOUS ONCE
Status: CANCELLED | OUTPATIENT
Start: 2020-07-22

## 2020-07-22 RX ORDER — SODIUM CHLORIDE 9 MG/ML
INJECTION, SOLUTION INTRAVENOUS CONTINUOUS
Status: CANCELLED | OUTPATIENT
Start: 2020-07-22

## 2020-07-22 RX ORDER — SODIUM CHLORIDE 0.9 % (FLUSH) 0.9 %
5 SYRINGE (ML) INJECTION PRN
Status: DISCONTINUED | OUTPATIENT
Start: 2020-07-22 | End: 2020-07-23 | Stop reason: HOSPADM

## 2020-07-22 RX ORDER — FLUOROURACIL 50 MG/ML
400 INJECTION, SOLUTION INTRAVENOUS ONCE
Status: CANCELLED | OUTPATIENT
Start: 2020-07-22

## 2020-07-22 RX ORDER — SODIUM CHLORIDE 0.9 % (FLUSH) 0.9 %
10 SYRINGE (ML) INJECTION PRN
Status: DISCONTINUED | OUTPATIENT
Start: 2020-07-22 | End: 2020-07-23 | Stop reason: HOSPADM

## 2020-07-22 RX ORDER — DIPHENHYDRAMINE HYDROCHLORIDE 50 MG/ML
50 INJECTION INTRAMUSCULAR; INTRAVENOUS ONCE
Status: CANCELLED | OUTPATIENT
Start: 2020-07-22

## 2020-07-22 RX ORDER — SODIUM CHLORIDE 0.9 % (FLUSH) 0.9 %
5 SYRINGE (ML) INJECTION PRN
Status: CANCELLED | OUTPATIENT
Start: 2020-07-22

## 2020-07-22 RX ORDER — DEXAMETHASONE SODIUM PHOSPHATE 10 MG/ML
10 INJECTION, SOLUTION INTRAMUSCULAR; INTRAVENOUS ONCE
Status: COMPLETED | OUTPATIENT
Start: 2020-07-22 | End: 2020-07-22

## 2020-07-22 RX ORDER — HEPARIN SODIUM (PORCINE) LOCK FLUSH IV SOLN 100 UNIT/ML 100 UNIT/ML
500 SOLUTION INTRAVENOUS PRN
Status: CANCELLED | OUTPATIENT
Start: 2020-07-22

## 2020-07-22 RX ORDER — DEXTROSE MONOHYDRATE 50 MG/ML
INJECTION, SOLUTION INTRAVENOUS ONCE
Status: CANCELLED | OUTPATIENT
Start: 2020-07-22

## 2020-07-22 RX ORDER — PALONOSETRON 0.05 MG/ML
0.25 INJECTION, SOLUTION INTRAVENOUS ONCE
Status: CANCELLED | OUTPATIENT
Start: 2020-07-22

## 2020-07-22 RX ORDER — FLUOROURACIL 50 MG/ML
400 INJECTION, SOLUTION INTRAVENOUS ONCE
Status: COMPLETED | OUTPATIENT
Start: 2020-07-22 | End: 2020-07-22

## 2020-07-22 RX ORDER — EPINEPHRINE 1 MG/ML
0.3 INJECTION, SOLUTION, CONCENTRATE INTRAVENOUS PRN
Status: CANCELLED | OUTPATIENT
Start: 2020-07-22

## 2020-07-22 RX ADMIN — DEXAMETHASONE SODIUM PHOSPHATE 10 MG: 10 INJECTION, SOLUTION INTRAMUSCULAR; INTRAVENOUS at 09:33

## 2020-07-22 RX ADMIN — BEVACIZUMAB 352.5 MG: 400 INJECTION, SOLUTION INTRAVENOUS at 12:23

## 2020-07-22 RX ADMIN — FLUOROURACIL 730 MG: 50 INJECTION, SOLUTION INTRAVENOUS at 13:49

## 2020-07-22 RX ADMIN — OXALIPLATIN 150 MG: 5 INJECTION, SOLUTION INTRAVENOUS at 10:13

## 2020-07-22 RX ADMIN — FLUOROURACIL 4390 MG: 50 INJECTION, SOLUTION INTRAVENOUS at 13:49

## 2020-07-22 RX ADMIN — LEUCOVORIN CALCIUM 366 MG: 100 INJECTION, POWDER, LYOPHILIZED, FOR SUSPENSION INTRAMUSCULAR; INTRAVENOUS at 10:13

## 2020-07-22 RX ADMIN — PALONOSETRON 0.25 MG: 0.05 INJECTION, SOLUTION INTRAVENOUS at 09:33

## 2020-07-22 ASSESSMENT — PAIN SCALES - GENERAL: PAINLEVEL_OUTOF10: 0

## 2020-07-22 NOTE — PROGRESS NOTES
Patient presents to clinic for C4 Folfox/Avastin as scheduled. He is without complaint. Denies having to treat for any side effects from tx. Reports one day of \"feeling bad\" and stayed in bed most of the day (day 4 after tx) but no other \"bad days\". Patient will see Dr Richard Sheriff in treatment room today.

## 2020-07-24 ENCOUNTER — HOSPITAL ENCOUNTER (OUTPATIENT)
Dept: INFUSION THERAPY | Age: 65
Discharge: HOME | End: 2020-07-24
Payer: OTHER GOVERNMENT

## 2020-07-24 DIAGNOSIS — C18.2 MALIGNANT NEOPLASM OF ASCENDING COLON (HCC): Primary | ICD-10-CM

## 2020-07-24 PROCEDURE — 6360000002 HC RX W HCPCS: Performed by: INTERNAL MEDICINE

## 2020-07-24 PROCEDURE — 2580000003 HC RX 258: Performed by: INTERNAL MEDICINE

## 2020-07-24 PROCEDURE — 96523 IRRIG DRUG DELIVERY DEVICE: CPT

## 2020-07-24 RX ORDER — SODIUM CHLORIDE 0.9 % (FLUSH) 0.9 %
20 SYRINGE (ML) INJECTION PRN
Status: DISCONTINUED | OUTPATIENT
Start: 2020-07-24 | End: 2020-07-25 | Stop reason: HOSPADM

## 2020-07-24 RX ORDER — HEPARIN SODIUM (PORCINE) LOCK FLUSH IV SOLN 100 UNIT/ML 100 UNIT/ML
500 SOLUTION INTRAVENOUS PRN
Status: DISCONTINUED | OUTPATIENT
Start: 2020-07-24 | End: 2020-07-25 | Stop reason: HOSPADM

## 2020-07-24 RX ORDER — SODIUM CHLORIDE 0.9 % (FLUSH) 0.9 %
20 SYRINGE (ML) INJECTION PRN
Status: CANCELLED | OUTPATIENT
Start: 2020-07-24

## 2020-07-24 RX ORDER — SODIUM CHLORIDE 0.9 % (FLUSH) 0.9 %
10 SYRINGE (ML) INJECTION PRN
Status: CANCELLED | OUTPATIENT
Start: 2020-07-24

## 2020-07-24 RX ORDER — HEPARIN SODIUM (PORCINE) LOCK FLUSH IV SOLN 100 UNIT/ML 100 UNIT/ML
500 SOLUTION INTRAVENOUS PRN
Status: CANCELLED | OUTPATIENT
Start: 2020-07-24

## 2020-07-24 RX ADMIN — HEPARIN 500 UNITS: 100 SYRINGE at 10:16

## 2020-07-24 RX ADMIN — SODIUM CHLORIDE, PRESERVATIVE FREE 20 ML: 5 INJECTION INTRAVENOUS at 10:16

## 2020-08-05 ENCOUNTER — HOSPITAL ENCOUNTER (OUTPATIENT)
Dept: INFUSION THERAPY | Age: 65
Discharge: HOME | End: 2020-08-05
Payer: OTHER GOVERNMENT

## 2020-08-05 VITALS
OXYGEN SATURATION: 98 % | DIASTOLIC BLOOD PRESSURE: 82 MMHG | WEIGHT: 159 LBS | BODY MASS INDEX: 24.96 KG/M2 | TEMPERATURE: 97.1 F | HEART RATE: 61 BPM | HEIGHT: 67 IN | SYSTOLIC BLOOD PRESSURE: 124 MMHG

## 2020-08-05 DIAGNOSIS — C18.9 ADENOCARCINOMA OF COLON (HCC): Primary | ICD-10-CM

## 2020-08-05 LAB
ALBUMIN SERPL-MCNC: 4.4 G/DL (ref 3.5–5.2)
ALP BLD-CCNC: 142 U/L (ref 40–130)
ALT SERPL-CCNC: 19 U/L (ref 21–72)
ANION GAP SERPL CALCULATED.3IONS-SCNC: 7 MMOL/L (ref 7–19)
AST SERPL-CCNC: 32 U/L (ref 17–59)
BASOPHILS ABSOLUTE: 0.05 K/UL (ref 0.01–0.08)
BASOPHILS RELATIVE PERCENT: 1.1 % (ref 0.1–1.2)
BILIRUB SERPL-MCNC: 0.4 MG/DL (ref 0.2–1.3)
BUN BLDV-MCNC: 16 MG/DL (ref 9–20)
CALCIUM SERPL-MCNC: 9.1 MG/DL (ref 8.4–10.2)
CHLORIDE BLD-SCNC: 107 MMOL/L (ref 98–111)
CO2: 23 MMOL/L (ref 22–29)
CREAT SERPL-MCNC: 1.3 MG/DL (ref 0.6–1.2)
EOSINOPHILS ABSOLUTE: 0.31 K/UL (ref 0.04–0.54)
EOSINOPHILS RELATIVE PERCENT: 6.7 % (ref 0.7–7)
GFR NON-AFRICAN AMERICAN: 56
GLOBULIN: 3.4 G/DL
GLUCOSE BLD-MCNC: 100 MG/DL (ref 74–106)
HCT VFR BLD CALC: 34.5 % (ref 40.1–51)
HEMOGLOBIN: 11.1 G/DL (ref 13.7–17.5)
LYMPHOCYTES ABSOLUTE: 0.83 K/UL (ref 1.18–3.74)
LYMPHOCYTES RELATIVE PERCENT: 18 % (ref 19.3–53.1)
MCH RBC QN AUTO: 23.5 PG (ref 25.7–32.2)
MCHC RBC AUTO-ENTMCNC: 32.2 G/DL (ref 32.3–36.5)
MCV RBC AUTO: 73.1 FL (ref 79–92.2)
MONOCYTES ABSOLUTE: 0.69 K/UL (ref 0.24–0.82)
MONOCYTES RELATIVE PERCENT: 15 % (ref 4.7–12.5)
NEUTROPHILS ABSOLUTE: 2.72 K/UL (ref 1.56–6.13)
NEUTROPHILS RELATIVE PERCENT: 59.2 % (ref 34–71.1)
PDW BLD-RTO: 25.3 % (ref 11.6–14.4)
PLATELET # BLD: 182 K/UL (ref 163–337)
PMV BLD AUTO: 9 FL (ref 7.4–10.4)
POTASSIUM SERPL-SCNC: 4.2 MMOL/L (ref 3.5–5.1)
PROTEIN UA: NEGATIVE
RBC # BLD: 4.72 M/UL (ref 4.63–6.08)
SODIUM BLD-SCNC: 137 MMOL/L (ref 137–145)
TOTAL PROTEIN: 7.7 G/DL (ref 6.3–8.2)
WBC # BLD: 4.6 K/UL (ref 4.23–9.07)

## 2020-08-05 PROCEDURE — 96411 CHEMO IV PUSH ADDL DRUG: CPT

## 2020-08-05 PROCEDURE — 81002 URINALYSIS NONAUTO W/O SCOPE: CPT | Performed by: INTERNAL MEDICINE

## 2020-08-05 PROCEDURE — 96417 CHEMO IV INFUS EACH ADDL SEQ: CPT

## 2020-08-05 PROCEDURE — 96415 CHEMO IV INFUSION ADDL HR: CPT

## 2020-08-05 PROCEDURE — G0498 CHEMO EXTEND IV INFUS W/PUMP: HCPCS

## 2020-08-05 PROCEDURE — 96375 TX/PRO/DX INJ NEW DRUG ADDON: CPT

## 2020-08-05 PROCEDURE — 80053 COMPREHEN METABOLIC PANEL: CPT

## 2020-08-05 PROCEDURE — 96367 TX/PROPH/DG ADDL SEQ IV INF: CPT

## 2020-08-05 PROCEDURE — 96413 CHEMO IV INFUSION 1 HR: CPT

## 2020-08-05 PROCEDURE — 96368 THER/DIAG CONCURRENT INF: CPT

## 2020-08-05 PROCEDURE — 85025 COMPLETE CBC W/AUTO DIFF WBC: CPT

## 2020-08-05 PROCEDURE — 6370000000 HC RX 637 (ALT 250 FOR IP): Performed by: INTERNAL MEDICINE

## 2020-08-05 PROCEDURE — 6360000002 HC RX W HCPCS: Performed by: INTERNAL MEDICINE

## 2020-08-05 PROCEDURE — 2580000003 HC RX 258: Performed by: INTERNAL MEDICINE

## 2020-08-05 RX ORDER — SODIUM CHLORIDE 9 MG/ML
INJECTION, SOLUTION INTRAVENOUS CONTINUOUS
Status: CANCELLED | OUTPATIENT
Start: 2020-08-05

## 2020-08-05 RX ORDER — ACETAMINOPHEN 500 MG
1000 TABLET ORAL ONCE
Status: COMPLETED | OUTPATIENT
Start: 2020-08-05 | End: 2020-08-05

## 2020-08-05 RX ORDER — SODIUM CHLORIDE 0.9 % (FLUSH) 0.9 %
10 SYRINGE (ML) INJECTION PRN
Status: CANCELLED | OUTPATIENT
Start: 2020-08-05

## 2020-08-05 RX ORDER — FLUOROURACIL 50 MG/ML
400 INJECTION, SOLUTION INTRAVENOUS ONCE
Status: CANCELLED | OUTPATIENT
Start: 2020-08-05

## 2020-08-05 RX ORDER — EPINEPHRINE 1 MG/ML
0.3 INJECTION, SOLUTION, CONCENTRATE INTRAVENOUS PRN
Status: CANCELLED | OUTPATIENT
Start: 2020-08-05

## 2020-08-05 RX ORDER — DEXAMETHASONE SODIUM PHOSPHATE 10 MG/ML
10 INJECTION, SOLUTION INTRAMUSCULAR; INTRAVENOUS ONCE
Status: COMPLETED | OUTPATIENT
Start: 2020-08-05 | End: 2020-08-05

## 2020-08-05 RX ORDER — PALONOSETRON 0.05 MG/ML
0.25 INJECTION, SOLUTION INTRAVENOUS ONCE
Status: CANCELLED | OUTPATIENT
Start: 2020-08-05

## 2020-08-05 RX ORDER — SODIUM CHLORIDE 9 MG/ML
INJECTION, SOLUTION INTRAVENOUS ONCE
Status: CANCELLED | OUTPATIENT
Start: 2020-08-05

## 2020-08-05 RX ORDER — FLUOROURACIL 50 MG/ML
400 INJECTION, SOLUTION INTRAVENOUS ONCE
Status: COMPLETED | OUTPATIENT
Start: 2020-08-05 | End: 2020-08-05

## 2020-08-05 RX ORDER — HEPARIN SODIUM (PORCINE) LOCK FLUSH IV SOLN 100 UNIT/ML 100 UNIT/ML
500 SOLUTION INTRAVENOUS PRN
Status: CANCELLED | OUTPATIENT
Start: 2020-08-05

## 2020-08-05 RX ORDER — METHYLPREDNISOLONE SODIUM SUCCINATE 125 MG/2ML
125 INJECTION, POWDER, LYOPHILIZED, FOR SOLUTION INTRAMUSCULAR; INTRAVENOUS ONCE
Status: CANCELLED | OUTPATIENT
Start: 2020-08-05

## 2020-08-05 RX ORDER — PALONOSETRON 0.05 MG/ML
0.25 INJECTION, SOLUTION INTRAVENOUS ONCE
Status: COMPLETED | OUTPATIENT
Start: 2020-08-05 | End: 2020-08-05

## 2020-08-05 RX ORDER — SODIUM CHLORIDE 0.9 % (FLUSH) 0.9 %
5 SYRINGE (ML) INJECTION PRN
Status: CANCELLED | OUTPATIENT
Start: 2020-08-05

## 2020-08-05 RX ORDER — DIPHENHYDRAMINE HYDROCHLORIDE 50 MG/ML
25 INJECTION INTRAMUSCULAR; INTRAVENOUS ONCE
Status: COMPLETED | OUTPATIENT
Start: 2020-08-05 | End: 2020-08-05

## 2020-08-05 RX ORDER — DEXTROSE MONOHYDRATE 50 MG/ML
INJECTION, SOLUTION INTRAVENOUS ONCE
Status: CANCELLED | OUTPATIENT
Start: 2020-08-05

## 2020-08-05 RX ORDER — DIPHENHYDRAMINE HYDROCHLORIDE 50 MG/ML
50 INJECTION INTRAMUSCULAR; INTRAVENOUS ONCE
Status: CANCELLED | OUTPATIENT
Start: 2020-08-05

## 2020-08-05 RX ADMIN — BEVACIZUMAB 352.5 MG: 400 INJECTION, SOLUTION INTRAVENOUS at 10:17

## 2020-08-05 RX ADMIN — ACETAMINOPHEN 1000 MG: 500 TABLET, FILM COATED ORAL at 10:17

## 2020-08-05 RX ADMIN — FLUOROURACIL 4390 MG: 50 INJECTION, SOLUTION INTRAVENOUS at 13:45

## 2020-08-05 RX ADMIN — PALONOSETRON 0.25 MG: 0.05 INJECTION, SOLUTION INTRAVENOUS at 09:44

## 2020-08-05 RX ADMIN — LEUCOVORIN CALCIUM 366 MG: 100 INJECTION, POWDER, LYOPHILIZED, FOR SUSPENSION INTRAMUSCULAR; INTRAVENOUS at 11:35

## 2020-08-05 RX ADMIN — FLUOROURACIL 730 MG: 50 INJECTION, SOLUTION INTRAVENOUS at 13:44

## 2020-08-05 RX ADMIN — DIPHENHYDRAMINE HYDROCHLORIDE 25 MG: 50 INJECTION, SOLUTION INTRAMUSCULAR; INTRAVENOUS at 10:16

## 2020-08-05 RX ADMIN — OXALIPLATIN 150 MG: 5 INJECTION, SOLUTION INTRAVENOUS at 11:35

## 2020-08-05 RX ADMIN — DEXAMETHASONE SODIUM PHOSPHATE 10 MG: 10 INJECTION, SOLUTION INTRAMUSCULAR; INTRAVENOUS at 09:44

## 2020-08-05 ASSESSMENT — PAIN SCALES - GENERAL: PAINLEVEL_OUTOF10: 0

## 2020-08-07 ENCOUNTER — HOSPITAL ENCOUNTER (OUTPATIENT)
Dept: INFUSION THERAPY | Age: 65
Discharge: HOME | End: 2020-08-07
Payer: OTHER GOVERNMENT

## 2020-08-07 DIAGNOSIS — C18.2 MALIGNANT NEOPLASM OF ASCENDING COLON (HCC): Primary | ICD-10-CM

## 2020-08-07 PROCEDURE — 96523 IRRIG DRUG DELIVERY DEVICE: CPT

## 2020-08-07 PROCEDURE — 6360000002 HC RX W HCPCS: Performed by: INTERNAL MEDICINE

## 2020-08-07 PROCEDURE — 2580000003 HC RX 258: Performed by: INTERNAL MEDICINE

## 2020-08-07 RX ORDER — HEPARIN SODIUM (PORCINE) LOCK FLUSH IV SOLN 100 UNIT/ML 100 UNIT/ML
500 SOLUTION INTRAVENOUS PRN
Status: CANCELLED | OUTPATIENT
Start: 2020-08-07

## 2020-08-07 RX ORDER — SODIUM CHLORIDE 0.9 % (FLUSH) 0.9 %
20 SYRINGE (ML) INJECTION PRN
Status: CANCELLED | OUTPATIENT
Start: 2020-08-07

## 2020-08-07 RX ORDER — SODIUM CHLORIDE 0.9 % (FLUSH) 0.9 %
20 SYRINGE (ML) INJECTION PRN
Status: DISCONTINUED | OUTPATIENT
Start: 2020-08-07 | End: 2020-08-08 | Stop reason: HOSPADM

## 2020-08-07 RX ORDER — HEPARIN SODIUM (PORCINE) LOCK FLUSH IV SOLN 100 UNIT/ML 100 UNIT/ML
500 SOLUTION INTRAVENOUS PRN
Status: DISCONTINUED | OUTPATIENT
Start: 2020-08-07 | End: 2020-08-08 | Stop reason: HOSPADM

## 2020-08-07 RX ORDER — SODIUM CHLORIDE 0.9 % (FLUSH) 0.9 %
10 SYRINGE (ML) INJECTION PRN
Status: CANCELLED | OUTPATIENT
Start: 2020-08-07

## 2020-08-07 RX ADMIN — SODIUM CHLORIDE, PRESERVATIVE FREE 20 ML: 5 INJECTION INTRAVENOUS at 10:52

## 2020-08-07 RX ADMIN — HEPARIN 500 UNITS: 100 SYRINGE at 10:52

## 2020-08-11 RX ORDER — ONDANSETRON HYDROCHLORIDE 8 MG/1
8 TABLET, FILM COATED ORAL EVERY 8 HOURS PRN
Qty: 30 TABLET | Refills: 5 | Status: ON HOLD | OUTPATIENT
Start: 2020-08-11 | End: 2022-02-22

## 2020-08-18 NOTE — PROGRESS NOTES
adenoma with no high-grade dysplasia. IHC MMR not proficient. K-maria luisa mutated, BRAF and NRAS wild type. MSI proficient  · 3/11/2020-CEA 5.5 (H)  · 3/18/2020-CT abdomen pelvis with contrast  Invasive cecal mass adhering to the right lateral abdominal wall muscles with adjacent lymphadenopathy. Mild partial obstruction of the terminal ileum. 2. Suspicious lesions in the right and left hepatic lobes measure up to 1.3 cm and likely represent metastatic disease. · 3/18/2020-Xr Chest Standard  No radiographic evidence of acute cardiopulmonary process. · 3/23/2020-he was first seen by me. Recommended completion of staging with CT chest.  Also recommended liver MRI for further clarification of liver lesion. S.  Recommend to proceed with a general surgery consultation tomorrow with Dr. Jose Galarza. Patient was informed that I favor surgical resection if feasible of the primary malignancy. · 3/30/2020- right hemicolectomy by Dr. Jose Galarza at [facility] consistent with invasive moderately differentiated colonic adenocarcinoma measuring 7.2 cm. Carcinoma directly invading the adjacent abdominal wall tissue. Focal lymphovascular space invasion identified. Focal perineural invasion identified. Surgical margins negative for evidence of malignancy. 6 out of 14 lymph nodes positive for metastatic adenocarcinoma. Final pathology staging [id number](liver, stage ROXANA)  · 4/20/2020-CT chest with contrast showed No convincing intrathoracic metastasis. Nonspecific 4 mm nodule of the inferior lingula and a 2 mm right upper lobe nodule can be followed on subsequent imaging in 6-12 months. Moderate coronary calcifications. Hypodense metastatic liver lesions. Small hiatal hernia. · 4/20/2020-Mri Abdomen W Wo Contrast There are about 5 liver lesions. The 2 largest appears similar compared to 3/18/2020, the others are too small to further characterize. Appearance is most concerning for metastatic disease.  Enhancement of the right lateral peritoneum. This is favored to be postoperative as there is no nodularity, evidence of omental disease or lymphadenopathy. Recommend attention on follow-up. Cholecystectomy. · 4/22/2020-discussed with Dr. Janice Aragon at [facility]. He will review imaging studies and give further recommendations regarding eligibility for resection of liver lesions. · 5/8/2020 CT Abdomen The two largest suspicious lesions measuring 1.2 and 1.3 cm in the  right and left hepatic lobes respectively are similar compared to the  3/18/2020 CT. Additionally, there are at least five subcentimeter lesions with similar signal characteristics, which are also highly suspicious for metastases. If complete characterization of the number and distribution of lesions is necessary, an MRI with Eovist could be acquired. · 5/19/2020- he was seen by the hepatobiliary service at [facility] with Dr. Janice Aragon:  patient adequate risk candidate for a multimodal approach, directed toward curative hepatectomy eventually. Endorsed by Hepatobiliary Conference, I recommended perc ablation of the L hemiliver to clear it, followed by systemic therapy in a neoadjuvant strategy. Restaging imaging to confirm clearance of disease on the left and lack of progression to unresectability of the R hemiliver disease would then be followed by R hepatectomy. Limitations to this approach may be accessibility of the segment 4A/8 disease high in the hepatic dome and the possibility of heat sink-related recurrence s/p abation of the left-sided disease. · 5/21/2020- referral for Mediport placement and start FOLFOX in 2 weeks. Will add bevacizumab after 6 weeks of liver ablation. We will plan for 12 biweekly dose of FOLFOX. Bevacizumab will also be stopped 6 weeks prior to major procedure.   · 6/8/2020- Microwave ablation of the left liver lesion was then performed for 5 minutes at [address] to achieve a 3.4 x 3.9 cm approximately spherical zone of ablation. · 6/10/2020- initiation of FOLFOX. · 7/20/2020-added Avastin.       Past Medical History:    Past Medical History:   Diagnosis Date    Acid reflux     Cancer ([address]     adenocarcinoma of colon    GERD (gastroesophageal reflux disease)     PTSD (post-traumatic stress disorder)        Past Surgical History:    Past Surgical History:   Procedure Laterality Date    ABLATION OF DYSRHYTHMIC FOCUS      ablation of liver at [facility] APPENDECTOMY  2003    CHOLECYSTECTOMY  2013    COLONOSCOPY      when pt was in the 19's    COLONOSCOPY N/A 3/11/2020    COLONOSCOPY POLYPECTOMY SNARE/COLD BIOPSY performed by Lizzy Gutierrez MD at [address] Right 3/30/2020    LAPAROSCOPIC-ASSISTED RIGHT [facility]Y [facility] [address] N/A 6/3/2020    INSERTION OF VENOUS PORT with flouro performed by Rodrigo Kelly MD at [place] [address] ENDOSCOPY N/A 3/11/2020    EGD BIOPSY performed by Lizzy Gutierrez MD at [facility]       Social History:    Social History     Socioeconomic History    Marital status:      Spouse name: Not on file    Number of children: Not on file    Years of education: Not on file    Highest education level: Not on file   Occupational History    Not on file   Social Needs    Financial resource strain: Not on file    Food insecurity     Worry: Not on file     Inability: Not on file    Transportation needs     Medical: Not on file     Non-medical: Not on file   Tobacco Use    Smoking status: Never Smoker    Smokeless tobacco: Never Used   Substance and Sexual Activity    Alcohol use: Yes     Comment: a glass of wine     Drug use: No    Sexual activity: Yes     Partners: Female   Lifestyle    Physical activity     Days per week: Not on file     Minutes per session: Not on file    Stress: Not on file   Relationships    Social connections     Talks on phone: Not on file Gets together: Not on file     Attends Scientologist service: Not on file     Active member of club or organization: Not on file     Attends meetings of clubs or organizations: Not on file     Relationship status: Not on file    Intimate partner violence     Fear of current or ex partner: Not on file     Emotionally abused: Not on file     Physically abused: Not on file     Forced sexual activity: Not on file   Other Topics Concern    Not on file   Social History Narrative    Not on file       Family History:   Family History   Problem Relation Age of Onset    Liver Cancer Mother     High Blood Pressure Mother     Colon Cancer Mother         x2    Cancer Father         Lung Cancer    Breast Cancer Sister     Cancer Maternal Grandfather         Lung Cancer    Cancer Paternal Grandfather         Stomach Cancer    Colon Polyps Neg Hx        Current Hospital Medications:    No current facility-administered medications for this visit. Facility-Administered Medications Ordered in Other Visits: leucovorin calcium (WELLCOVORIN) 366 mg in dextrose 5 % 250 mL IVPB, 366 mg, Intravenous, Once  fluorouracil (ADRUCIL) 1 GM/20ML chemo injection 730 mg, 400 mg/m2 (Treatment Plan Recorded), Intravenous, Once  oxaliplatin (ELOXATIN) 150 mg in dextrose 5 % 250 mL chemo IVPB, 150 mg, Intravenous, Once  bevacizumab (AVASTIN) 352.5 mg in sodium chloride 0.9 % 85.9 mL Chemo IVPB, 5 mg/kg (Treatment Plan Recorded), Intravenous, Once    Allergies:    Allergies   Allergen Reactions    Oxycodone-Acetaminophen Nausea Only    Morphine Nausea And Vomiting     Hallucinations/Vomiting         Subjective   REVIEW OF SYSTEMS:   CONSTITUTIONAL: no fever, no night sweats, no fatigue; mild weight loss  HEENT: no blurring of vision, no double vision, no hearing difficulty, no tinnitus, no ulceration, no dysplasia, no epistaxis;  LUNGS: no cough, no hemoptysis, no wheeze,  no shortness of breath;  CARDIOVASCULAR: no palpitation, no chest pain, no shortness of breath;  GI: no abdominal pain, no nausea, no vomiting,  no constipation;  ANNIE: no dysuria, no hematuria, no frequency or urgency, no nephrolithiasis;  MUSCULOSKELETAL: no joint pain, no swelling, no stiffness;  ENDOCRINE: no polyuria, no polydipsia, no cold or heat intolerance;  HEMATOLOGY: no easy bruising or bleeding, no history of clotting disorder;  DERMATOLOGY: no skin rash, no eczema, no pruritus;  PSYCHIATRY: no depression, no anxiety, no panic attacks, no suicidal ideation, no homicidal ideation;  NEUROLOGY: no syncope, no seizures, no numbness or tingling of hands, no numbness or tingling of feet, no paresis;    Objective   /80   Pulse 64   Temp 97.1 °F (36.2 °C) (Temporal)   Ht 5' 7\" (1.702 m)   Wt 161 lb 12.8 oz (73.4 kg)   SpO2 100%   BMI 25.34 kg/m²     PHYSICAL EXAM:  CONSTITUTIONAL: Alert, appropriate, no acute distress  EYES: Non icteric, EOM intact, pupils equal round   ENT: Mucus membranes moist, no oral pharyngeal lesions, external inspection of ears and nose are normal  NECK: Supple, no masses. No palpable thyroid mass  CHEST/LUNGS: CTA bilaterally, normal respiratory effort   CARDIOVASCULAR: RRR, no murmurs. No lower extremity edema  ABDOMEN: Healing surgical scar,  active bowel sounds, no HSM. No palpable masses  EXTREMITIES: warm, full ROM in all 4 extremities, no focal weakness. SKIN: warm, dry with no rashes or lesions  LYMPH: No cervical, clavicular, axillary, or inguinal lymphadenopathy  NEUROLOGIC: follows commands, non focal   PSYCH: mood and affect appropriate.   Alert and oriented to time, place, person    LABORATORY RESULTS REVIEWED BY ME:  Lab Results   Component Value Date    WBC 4.16 (L) 08/19/2020    HGB 11.5 (L) 08/19/2020    HCT 35.3 (L) 08/19/2020    MCV 75.8 (L) 08/19/2020     08/19/2020     Lab Results   Component Value Date    NEUTROABS 2.38 08/19/2020     Lab Results   Component Value Date     08/19/2020    K 4.6 08/19/2020 CEA = 2.4    #Chronic kidney disease stage III- creatinine 1.6/. Referred to nephrology. #Liver metastasis- plan as above. Status post ablation left liver lobe [facility] at [facility] on 6/8/2020    Iron deficiency anemia-  hemoglobin 11.9/MCV78. Iron profile today. Continue iron sulfate 375 mg p.o. 3 times daily. Ferritin 12.9, iron saturation 15, TIBC 458, iron 72. PLAN:    · Proceed cycle #6/12 FOLFOX/Avastin  · Ordered CMP, CEA, urine protein today  · RTC 4 weeks with cycle 8   · Refer to nephrology for declining kidney function  · FU [place] September 11 for fu scans    I have seen, examined and reviewed this patient medication list, appropriate labs and imaging studies. I reviewed relevant medical records and others physicians notes. I discussed the plans of care with the patient. I answered all the questions to the patients satisfaction. I have also reviewed the chief complaint (CC) and part of the history (History of Present Illness (HPI), Past Family Social History [facility]), or Review of Systems (ROS) and made changes when appropriated. (Please note that portions of this note were completed with a voice recognition program. Efforts were made to edit the dictations but occasionally words are mis-transcribed.)  I, Dr Oscar Gomez, personally performed the services described in this documentation as scribed by You Abel MA in my presence and is both accurate and complete. Over 50% of the total visit time of 25 minutes in face to face encounter with the patient, out of which more than 50% of the time was spent in counseling patient or family and coordination of care. Counseling included but was not limited to time spent reviewing labs, imaging studies/ treatment plan and answering questions.        Jabari Duarte MD    08/19/20  10:15 AM

## 2020-08-19 ENCOUNTER — HOSPITAL ENCOUNTER (OUTPATIENT)
Dept: INFUSION THERAPY | Age: 65
Discharge: HOME | End: 2020-08-19
Payer: OTHER GOVERNMENT

## 2020-08-19 ENCOUNTER — OFFICE VISIT (OUTPATIENT)
Dept: HEMATOLOGY | Age: 65
End: 2020-08-19
Payer: OTHER GOVERNMENT

## 2020-08-19 VITALS
OXYGEN SATURATION: 100 % | BODY MASS INDEX: 25.39 KG/M2 | TEMPERATURE: 97.1 F | SYSTOLIC BLOOD PRESSURE: 122 MMHG | HEIGHT: 67 IN | DIASTOLIC BLOOD PRESSURE: 80 MMHG | HEART RATE: 64 BPM | WEIGHT: 161.8 LBS

## 2020-08-19 DIAGNOSIS — C18.2 MALIGNANT NEOPLASM OF ASCENDING COLON (HCC): ICD-10-CM

## 2020-08-19 DIAGNOSIS — C18.9 ADENOCARCINOMA OF COLON (HCC): Primary | ICD-10-CM

## 2020-08-19 LAB
ALBUMIN SERPL-MCNC: 4.4 G/DL (ref 3.5–5.2)
ALP BLD-CCNC: 154 U/L (ref 40–130)
ALT SERPL-CCNC: 20 U/L (ref 21–72)
ANION GAP SERPL CALCULATED.3IONS-SCNC: 8 MMOL/L (ref 7–19)
AST SERPL-CCNC: 42 U/L (ref 17–59)
BASOPHILS ABSOLUTE: 0.03 K/UL (ref 0.01–0.08)
BASOPHILS RELATIVE PERCENT: 0.7 % (ref 0.1–1.2)
BILIRUB SERPL-MCNC: 0.6 MG/DL (ref 0.2–1.3)
BUN BLDV-MCNC: 17 MG/DL (ref 9–20)
CALCIUM SERPL-MCNC: 9.5 MG/DL (ref 8.4–10.2)
CEA: 2.4 NG/ML (ref 0–3)
CHLORIDE BLD-SCNC: 108 MMOL/L (ref 98–111)
CO2: 23 MMOL/L (ref 22–29)
CREAT SERPL-MCNC: 1.6 MG/DL (ref 0.6–1.2)
EOSINOPHILS ABSOLUTE: 0.22 K/UL (ref 0.04–0.54)
EOSINOPHILS RELATIVE PERCENT: 5.3 % (ref 0.7–7)
GFR NON-AFRICAN AMERICAN: 44
GLOBULIN: 3.5 G/DL
GLUCOSE BLD-MCNC: 106 MG/DL (ref 74–106)
HCT VFR BLD CALC: 35.3 % (ref 40.1–51)
HEMOGLOBIN: 11.5 G/DL (ref 13.7–17.5)
LYMPHOCYTES ABSOLUTE: 0.81 K/UL (ref 1.18–3.74)
LYMPHOCYTES RELATIVE PERCENT: 19.5 % (ref 19.3–53.1)
MCH RBC QN AUTO: 24.7 PG (ref 25.7–32.2)
MCHC RBC AUTO-ENTMCNC: 32.6 G/DL (ref 32.3–36.5)
MCV RBC AUTO: 75.8 FL (ref 79–92.2)
MONOCYTES ABSOLUTE: 0.72 K/UL (ref 0.24–0.82)
MONOCYTES RELATIVE PERCENT: 17.3 % (ref 4.7–12.5)
NEUTROPHILS ABSOLUTE: 2.38 K/UL (ref 1.56–6.13)
NEUTROPHILS RELATIVE PERCENT: 57.2 % (ref 34–71.1)
PDW BLD-RTO: 25.5 % (ref 11.6–14.4)
PLATELET # BLD: 202 K/UL (ref 163–337)
PMV BLD AUTO: 9.7 FL (ref 7.4–10.4)
POTASSIUM SERPL-SCNC: 4.6 MMOL/L (ref 3.5–5.1)
PROTEIN UA: NEGATIVE
RBC # BLD: 4.66 M/UL (ref 4.63–6.08)
SODIUM BLD-SCNC: 139 MMOL/L (ref 137–145)
TOTAL PROTEIN: 7.9 G/DL (ref 6.3–8.2)
WBC # BLD: 4.16 K/UL (ref 4.23–9.07)

## 2020-08-19 PROCEDURE — 96368 THER/DIAG CONCURRENT INF: CPT

## 2020-08-19 PROCEDURE — 80053 COMPREHEN METABOLIC PANEL: CPT

## 2020-08-19 PROCEDURE — G0498 CHEMO EXTEND IV INFUS W/PUMP: HCPCS

## 2020-08-19 PROCEDURE — 96411 CHEMO IV PUSH ADDL DRUG: CPT

## 2020-08-19 PROCEDURE — 96413 CHEMO IV INFUSION 1 HR: CPT

## 2020-08-19 PROCEDURE — 96409 CHEMO IV PUSH SNGL DRUG: CPT

## 2020-08-19 PROCEDURE — 85025 COMPLETE CBC W/AUTO DIFF WBC: CPT

## 2020-08-19 PROCEDURE — 96367 TX/PROPH/DG ADDL SEQ IV INF: CPT

## 2020-08-19 PROCEDURE — 96415 CHEMO IV INFUSION ADDL HR: CPT

## 2020-08-19 PROCEDURE — 96417 CHEMO IV INFUS EACH ADDL SEQ: CPT

## 2020-08-19 PROCEDURE — 81002 URINALYSIS NONAUTO W/O SCOPE: CPT | Performed by: INTERNAL MEDICINE

## 2020-08-19 PROCEDURE — 96375 TX/PRO/DX INJ NEW DRUG ADDON: CPT

## 2020-08-19 PROCEDURE — 6360000002 HC RX W HCPCS: Performed by: INTERNAL MEDICINE

## 2020-08-19 PROCEDURE — 99214 OFFICE O/P EST MOD 30 MIN: CPT | Performed by: INTERNAL MEDICINE

## 2020-08-19 PROCEDURE — 6370000000 HC RX 637 (ALT 250 FOR IP): Performed by: INTERNAL MEDICINE

## 2020-08-19 PROCEDURE — 2580000003 HC RX 258: Performed by: INTERNAL MEDICINE

## 2020-08-19 PROCEDURE — 82378 CARCINOEMBRYONIC ANTIGEN: CPT

## 2020-08-19 RX ORDER — PALONOSETRON 0.05 MG/ML
0.25 INJECTION, SOLUTION INTRAVENOUS ONCE
Status: COMPLETED | OUTPATIENT
Start: 2020-08-19 | End: 2020-08-19

## 2020-08-19 RX ORDER — DIPHENHYDRAMINE HYDROCHLORIDE 50 MG/ML
50 INJECTION INTRAMUSCULAR; INTRAVENOUS ONCE
Status: CANCELLED | OUTPATIENT
Start: 2020-08-19

## 2020-08-19 RX ORDER — HEPARIN SODIUM (PORCINE) LOCK FLUSH IV SOLN 100 UNIT/ML 100 UNIT/ML
500 SOLUTION INTRAVENOUS PRN
Status: CANCELLED | OUTPATIENT
Start: 2020-08-19

## 2020-08-19 RX ORDER — SODIUM CHLORIDE 9 MG/ML
INJECTION, SOLUTION INTRAVENOUS ONCE
Status: CANCELLED | OUTPATIENT
Start: 2020-08-19

## 2020-08-19 RX ORDER — SODIUM CHLORIDE 0.9 % (FLUSH) 0.9 %
10 SYRINGE (ML) INJECTION PRN
Status: CANCELLED | OUTPATIENT
Start: 2020-08-19

## 2020-08-19 RX ORDER — DIPHENHYDRAMINE HYDROCHLORIDE 50 MG/ML
25 INJECTION INTRAMUSCULAR; INTRAVENOUS ONCE
Status: COMPLETED | OUTPATIENT
Start: 2020-08-19 | End: 2020-08-19

## 2020-08-19 RX ORDER — SODIUM CHLORIDE 0.9 % (FLUSH) 0.9 %
5 SYRINGE (ML) INJECTION PRN
Status: CANCELLED | OUTPATIENT
Start: 2020-08-19

## 2020-08-19 RX ORDER — METHYLPREDNISOLONE SODIUM SUCCINATE 125 MG/2ML
125 INJECTION, POWDER, LYOPHILIZED, FOR SOLUTION INTRAMUSCULAR; INTRAVENOUS ONCE
Status: CANCELLED | OUTPATIENT
Start: 2020-08-19

## 2020-08-19 RX ORDER — ACETAMINOPHEN 500 MG
1000 TABLET ORAL ONCE
Status: COMPLETED | OUTPATIENT
Start: 2020-08-19 | End: 2020-08-19

## 2020-08-19 RX ORDER — DEXAMETHASONE SODIUM PHOSPHATE 10 MG/ML
10 INJECTION, SOLUTION INTRAMUSCULAR; INTRAVENOUS ONCE
Status: COMPLETED | OUTPATIENT
Start: 2020-08-19 | End: 2020-08-19

## 2020-08-19 RX ORDER — EPINEPHRINE 1 MG/ML
0.3 INJECTION, SOLUTION, CONCENTRATE INTRAVENOUS PRN
Status: CANCELLED | OUTPATIENT
Start: 2020-08-19

## 2020-08-19 RX ORDER — DEXTROSE MONOHYDRATE 50 MG/ML
INJECTION, SOLUTION INTRAVENOUS ONCE
Status: CANCELLED | OUTPATIENT
Start: 2020-08-19

## 2020-08-19 RX ORDER — FLUOROURACIL 50 MG/ML
400 INJECTION, SOLUTION INTRAVENOUS ONCE
Status: CANCELLED | OUTPATIENT
Start: 2020-08-19

## 2020-08-19 RX ORDER — FLUOROURACIL 50 MG/ML
400 INJECTION, SOLUTION INTRAVENOUS ONCE
Status: COMPLETED | OUTPATIENT
Start: 2020-08-19 | End: 2020-08-19

## 2020-08-19 RX ORDER — SODIUM CHLORIDE 9 MG/ML
INJECTION, SOLUTION INTRAVENOUS CONTINUOUS
Status: CANCELLED | OUTPATIENT
Start: 2020-08-19

## 2020-08-19 RX ADMIN — DIPHENHYDRAMINE HYDROCHLORIDE 25 MG: 50 INJECTION, SOLUTION INTRAMUSCULAR; INTRAVENOUS at 09:10

## 2020-08-19 RX ADMIN — ACETAMINOPHEN 1000 MG: 500 TABLET, FILM COATED ORAL at 09:10

## 2020-08-19 RX ADMIN — PALONOSETRON 0.25 MG: 0.05 INJECTION, SOLUTION INTRAVENOUS at 09:10

## 2020-08-19 RX ADMIN — OXALIPLATIN 150 MG: 5 INJECTION, SOLUTION INTRAVENOUS at 10:19

## 2020-08-19 RX ADMIN — FLUOROURACIL 730 MG: 50 INJECTION, SOLUTION INTRAVENOUS at 12:57

## 2020-08-19 RX ADMIN — FLUOROURACIL 4400 MG: 50 INJECTION, SOLUTION INTRAVENOUS at 12:57

## 2020-08-19 RX ADMIN — BEVACIZUMAB 352.5 MG: 400 INJECTION, SOLUTION INTRAVENOUS at 12:23

## 2020-08-19 RX ADMIN — LEUCOVORIN CALCIUM 366 MG: 100 INJECTION, POWDER, LYOPHILIZED, FOR SUSPENSION INTRAMUSCULAR; INTRAVENOUS at 10:19

## 2020-08-19 RX ADMIN — DEXAMETHASONE SODIUM PHOSPHATE 10 MG: 10 INJECTION INTRAMUSCULAR; INTRAVENOUS at 09:10

## 2020-08-19 ASSESSMENT — PAIN SCALES - GENERAL: PAINLEVEL_OUTOF10: 0

## 2020-08-21 ENCOUNTER — HOSPITAL ENCOUNTER (OUTPATIENT)
Dept: INFUSION THERAPY | Age: 65
Discharge: HOME | End: 2020-08-21
Payer: OTHER GOVERNMENT

## 2020-08-21 DIAGNOSIS — C18.2 MALIGNANT NEOPLASM OF ASCENDING COLON (HCC): Primary | ICD-10-CM

## 2020-08-21 PROCEDURE — 2580000003 HC RX 258: Performed by: INTERNAL MEDICINE

## 2020-08-21 PROCEDURE — 96523 IRRIG DRUG DELIVERY DEVICE: CPT

## 2020-08-21 PROCEDURE — 6360000002 HC RX W HCPCS: Performed by: INTERNAL MEDICINE

## 2020-08-21 RX ORDER — SODIUM CHLORIDE 0.9 % (FLUSH) 0.9 %
20 SYRINGE (ML) INJECTION PRN
Status: DISCONTINUED | OUTPATIENT
Start: 2020-08-21 | End: 2020-08-22 | Stop reason: HOSPADM

## 2020-08-21 RX ORDER — HEPARIN SODIUM (PORCINE) LOCK FLUSH IV SOLN 100 UNIT/ML 100 UNIT/ML
500 SOLUTION INTRAVENOUS PRN
Status: DISCONTINUED | OUTPATIENT
Start: 2020-08-21 | End: 2020-08-22 | Stop reason: HOSPADM

## 2020-08-21 RX ORDER — HEPARIN SODIUM (PORCINE) LOCK FLUSH IV SOLN 100 UNIT/ML 100 UNIT/ML
500 SOLUTION INTRAVENOUS PRN
Status: CANCELLED | OUTPATIENT
Start: 2020-08-21

## 2020-08-21 RX ORDER — SODIUM CHLORIDE 0.9 % (FLUSH) 0.9 %
10 SYRINGE (ML) INJECTION PRN
Status: DISCONTINUED | OUTPATIENT
Start: 2020-08-21 | End: 2020-08-22 | Stop reason: HOSPADM

## 2020-08-21 RX ORDER — SODIUM CHLORIDE 0.9 % (FLUSH) 0.9 %
20 SYRINGE (ML) INJECTION PRN
Status: CANCELLED | OUTPATIENT
Start: 2020-08-21

## 2020-08-21 RX ORDER — SODIUM CHLORIDE 0.9 % (FLUSH) 0.9 %
10 SYRINGE (ML) INJECTION PRN
Status: CANCELLED | OUTPATIENT
Start: 2020-08-21

## 2020-08-21 RX ADMIN — SODIUM CHLORIDE, PRESERVATIVE FREE 10 ML: 5 INJECTION INTRAVENOUS at 11:06

## 2020-08-21 RX ADMIN — HEPARIN 500 UNITS: 100 SYRINGE at 11:06

## 2020-08-27 ENCOUNTER — OFFICE VISIT (OUTPATIENT)
Dept: GASTROENTEROLOGY | Age: 65
End: 2020-08-27
Payer: OTHER GOVERNMENT

## 2020-08-27 VITALS
HEART RATE: 104 BPM | DIASTOLIC BLOOD PRESSURE: 70 MMHG | HEIGHT: 67 IN | OXYGEN SATURATION: 99 % | BODY MASS INDEX: 25.43 KG/M2 | SYSTOLIC BLOOD PRESSURE: 110 MMHG | WEIGHT: 162 LBS

## 2020-08-27 PROCEDURE — 99213 OFFICE O/P EST LOW 20 MIN: CPT | Performed by: NURSE PRACTITIONER

## 2020-08-27 ASSESSMENT — ENCOUNTER SYMPTOMS
CHOKING: 0
NAUSEA: 0
BLOOD IN STOOL: 0
ABDOMINAL PAIN: 0
VOMITING: 0
CONSTIPATION: 0
SHORTNESS OF BREATH: 0
COUGH: 0
DIARRHEA: 1
ABDOMINAL DISTENTION: 0
RECTAL PAIN: 0
VOICE CHANGE: 0
TROUBLE SWALLOWING: 0

## 2020-08-27 NOTE — PROGRESS NOTES
Subjective:      Patient ID: Jasen Correa is a 63 y.o. male  Leo Mccord  No ref. provider found    HPI  Chief Complaint   Patient presents with    Colon Cancer       F/u colonoscopy 3/2020 finding cecal mass. Patient diagnosed with stage 4 colon cancer. He has been referred to Dr. Marky Mccain and Dr. Vanesa Canada. Right hemicolectomy completed in March. He is currently undergoing chemotherapy. 6-12 month repeat colonoscopy was recommended. He says he is doing well. He had chronic diarrhea prior to surgery and has not seen any worsening of this. He is half-way through chemotherapy treatments. Weight is stable or increasing. Has returned to normal daily routines. No rectal bleeding or abdominal pain. Assessment:      1. History of colon cancer, stage IV    2. Family history of colon cancer            Plan:      No colonoscopy at this time. Will wait until chemo is completed. Recall one year. Will see again in 6 months to schedule. Patient advised we can do this anytime if he begins having any symptoms or if his oncologist requests.      rto in 6 months or prn              Family History   Problem Relation Age of Onset    Liver Cancer Mother     High Blood Pressure Mother     Colon Cancer Mother         x2    Cancer Father         Lung Cancer    Breast Cancer Sister     Cancer Maternal Grandfather         Lung Cancer    Cancer Paternal Grandfather         Stomach Cancer    Colon Polyps Neg Hx        Past Medical History:   Diagnosis Date    Acid reflux     Cancer ([address]     adenocarcinoma of colon    GERD (gastroesophageal reflux disease)     PTSD (post-traumatic stress disorder)        Past Surgical History:   Procedure Laterality Date    ABLATION OF DYSRHYTHMIC FOCUS      abl[facility] liver [facility] APPENDECTOMY  2003    CHOLECYSTECTOMY  2013    COLONOSCOPY      when pt was in the 19's    COLONOSCOPY N/A 3/11/2020    COLONOSCOPY POLYPECTOMY SNARE/COLD BIOPSY:  Serafin--Repeat colonoscopy: 6-12 months due to findings at colonoscopy today with Cecal mass lesion and large polyps.  COLONOSCOPY      HEMICOLECTOMY Right 3/30/2020    LAPAROSCOPIC-ASSISTED RIGHT HEMICOLECTOMY performed by Annamarie Weber MD at [address] N/A 6/3/2020    INSERTION OF VENOUS PORT with flouro [facility] N/A 3/11/2020    Dr. Swan Brandon:   [facility]       Current Outpatient Medications   Medication Sig Dispense Refill    ondansetron (ZOFRAN) 8 MG tablet Take 1 tablet by mouth every 8 hours as needed for Nausea or Vomiting 30 tablet 5    promethazine (PHENERGAN) 12.5 MG tablet Take 1 tablet by mouth every 6 hours as needed for Nausea 60 tablet 2    omeprazole (PRILOSEC) 20 MG delayed release capsule Take 20 mg by mouth daily      Cholecalciferol (VITAMIN D3) 50 MCG (2000 UT) CAPS Take by mouth daily      folic acid (FOLVITE) 1 MG tablet Take 1 mg by mouth daily      prazosin (MINIPRESS) 1 MG capsule Take 1 mg by mouth nightly For nightmares      buPROPion (WELLBUTRIN) 100 MG tablet Take 100 mg by mouth every morning For mood      topiramate (TOPAMAX) 100 MG tablet Take 100 mg by mouth daily For Seizures or Migraines      Sertraline HCl (ZOLOFT PO) Take by mouth daily      Zolpidem Tartrate (AMBIEN PO) Take by mouth nightly       No current facility-administered medications for this visit. Allergies   Allergen Reactions    Oxycodone-Acetaminophen Nausea Only    Morphine Nausea And Vomiting     Hallucinations/Vomiting        reports that he has never smoked. He has never used smokeless tobacco. He reports current alcohol use. He reports that he does not use drugs. Review of Systems   Constitutional: Negative for appetite change, fever and unexpected weight change. HENT: Negative for trouble swallowing and voice change. Respiratory: Negative for cough, choking and shortness of breath. Cardiovascular: Negative for chest pain and palpitations. Gastrointestinal: Positive for diarrhea. Negative for abdominal distention, abdominal pain, blood in stool, constipation, nausea, rectal pain and vomiting. Musculoskeletal: Negative for arthralgias and joint swelling. Skin: Negative for pallor, rash and wound. Neurological: Negative for weakness and light-headedness. Hematological: Negative for adenopathy. Does not bruise/bleed easily. Objective:   Physical Exam  Constitutional:       General: He is not in acute distress. Appearance: Normal appearance. He is well-developed. Comments: /70   Pulse 104   Ht 5' 7\" (1.702 m)   Wt 162 lb (73.5 kg)   SpO2 99%   BMI 25.37 kg/m²    Cardiovascular:      Rate and Rhythm: Normal rate and regular rhythm. Heart sounds: No murmur. Pulmonary:      Effort: Pulmonary effort is normal. No respiratory distress. Breath sounds: Normal breath sounds. Abdominal:      General: Bowel sounds are normal. There is no distension. Palpations: Abdomen is soft. There is no mass. Tenderness: There is no abdominal tenderness. There is no guarding or rebound. Skin:     General: Skin is warm and dry. Coloration: Skin is not pale. Neurological:      Mental Status: He is alert and oriented to person, place, and time.

## 2020-09-02 ENCOUNTER — HOSPITAL ENCOUNTER (OUTPATIENT)
Dept: INFUSION THERAPY | Age: 65
Discharge: HOME | End: 2020-09-02
Payer: OTHER GOVERNMENT

## 2020-09-02 VITALS
OXYGEN SATURATION: 98 % | HEART RATE: 78 BPM | HEIGHT: 67 IN | SYSTOLIC BLOOD PRESSURE: 122 MMHG | TEMPERATURE: 97.1 F | BODY MASS INDEX: 25.58 KG/M2 | WEIGHT: 163 LBS | DIASTOLIC BLOOD PRESSURE: 78 MMHG

## 2020-09-02 DIAGNOSIS — C18.9 ADENOCARCINOMA OF COLON (HCC): Primary | ICD-10-CM

## 2020-09-02 LAB
ALBUMIN SERPL-MCNC: 4.5 G/DL (ref 3.5–5.2)
ALP BLD-CCNC: 136 U/L (ref 40–130)
ALT SERPL-CCNC: 19 U/L (ref 21–72)
ANION GAP SERPL CALCULATED.3IONS-SCNC: 12 MMOL/L (ref 7–19)
AST SERPL-CCNC: 35 U/L (ref 17–59)
BASOPHILS ABSOLUTE: 0.05 K/UL (ref 0.01–0.08)
BASOPHILS RELATIVE PERCENT: 1.1 % (ref 0.1–1.2)
BILIRUB SERPL-MCNC: 0.5 MG/DL (ref 0.2–1.3)
BUN BLDV-MCNC: 18 MG/DL (ref 9–20)
CALCIUM SERPL-MCNC: 9.4 MG/DL (ref 8.4–10.2)
CEA: 2.7 NG/ML (ref 0–3)
CHLORIDE BLD-SCNC: 106 MMOL/L (ref 98–111)
CO2: 21 MMOL/L (ref 22–29)
CREAT SERPL-MCNC: 1.3 MG/DL (ref 0.6–1.2)
EOSINOPHILS ABSOLUTE: 0.29 K/UL (ref 0.04–0.54)
EOSINOPHILS RELATIVE PERCENT: 6.3 % (ref 0.7–7)
GFR NON-AFRICAN AMERICAN: 56
GLOBULIN: 3.6 G/DL
GLUCOSE BLD-MCNC: 118 MG/DL (ref 74–106)
HCT VFR BLD CALC: 35.5 % (ref 40.1–51)
HEMOGLOBIN: 11.7 G/DL (ref 13.7–17.5)
LYMPHOCYTES ABSOLUTE: 0.84 K/UL (ref 1.18–3.74)
LYMPHOCYTES RELATIVE PERCENT: 18.3 % (ref 19.3–53.1)
MCH RBC QN AUTO: 25.5 PG (ref 25.7–32.2)
MCHC RBC AUTO-ENTMCNC: 33 G/DL (ref 32.3–36.5)
MCV RBC AUTO: 77.5 FL (ref 79–92.2)
MONOCYTES ABSOLUTE: 0.92 K/UL (ref 0.24–0.82)
MONOCYTES RELATIVE PERCENT: 20.1 % (ref 4.7–12.5)
NEUTROPHILS ABSOLUTE: 2.48 K/UL (ref 1.56–6.13)
NEUTROPHILS RELATIVE PERCENT: 54.2 % (ref 34–71.1)
PDW BLD-RTO: 25.1 % (ref 11.6–14.4)
PLATELET # BLD: 158 K/UL (ref 163–337)
PMV BLD AUTO: 9.7 FL (ref 7.4–10.4)
POTASSIUM SERPL-SCNC: 4.2 MMOL/L (ref 3.5–5.1)
PROTEIN UA: NEGATIVE
RBC # BLD: 4.58 M/UL (ref 4.63–6.08)
SODIUM BLD-SCNC: 139 MMOL/L (ref 137–145)
TOTAL PROTEIN: 8.1 G/DL (ref 6.3–8.2)
WBC # BLD: 4.58 K/UL (ref 4.23–9.07)

## 2020-09-02 PROCEDURE — 96417 CHEMO IV INFUS EACH ADDL SEQ: CPT

## 2020-09-02 PROCEDURE — 81002 URINALYSIS NONAUTO W/O SCOPE: CPT | Performed by: INTERNAL MEDICINE

## 2020-09-02 PROCEDURE — 2580000003 HC RX 258: Performed by: INTERNAL MEDICINE

## 2020-09-02 PROCEDURE — 96415 CHEMO IV INFUSION ADDL HR: CPT

## 2020-09-02 PROCEDURE — 6370000000 HC RX 637 (ALT 250 FOR IP): Performed by: INTERNAL MEDICINE

## 2020-09-02 PROCEDURE — 96413 CHEMO IV INFUSION 1 HR: CPT

## 2020-09-02 PROCEDURE — 96375 TX/PRO/DX INJ NEW DRUG ADDON: CPT

## 2020-09-02 PROCEDURE — 82378 CARCINOEMBRYONIC ANTIGEN: CPT

## 2020-09-02 PROCEDURE — 80053 COMPREHEN METABOLIC PANEL: CPT

## 2020-09-02 PROCEDURE — 6360000002 HC RX W HCPCS: Performed by: INTERNAL MEDICINE

## 2020-09-02 PROCEDURE — 85025 COMPLETE CBC W/AUTO DIFF WBC: CPT

## 2020-09-02 PROCEDURE — G0498 CHEMO EXTEND IV INFUS W/PUMP: HCPCS

## 2020-09-02 PROCEDURE — 96367 TX/PROPH/DG ADDL SEQ IV INF: CPT

## 2020-09-02 PROCEDURE — 96368 THER/DIAG CONCURRENT INF: CPT

## 2020-09-02 PROCEDURE — 96411 CHEMO IV PUSH ADDL DRUG: CPT

## 2020-09-02 RX ORDER — DIPHENHYDRAMINE HYDROCHLORIDE 50 MG/ML
50 INJECTION INTRAMUSCULAR; INTRAVENOUS ONCE
Status: CANCELLED | OUTPATIENT
Start: 2020-09-02

## 2020-09-02 RX ORDER — DIPHENHYDRAMINE HYDROCHLORIDE 50 MG/ML
25 INJECTION INTRAMUSCULAR; INTRAVENOUS ONCE
Status: COMPLETED | OUTPATIENT
Start: 2020-09-02 | End: 2020-09-02

## 2020-09-02 RX ORDER — HEPARIN SODIUM (PORCINE) LOCK FLUSH IV SOLN 100 UNIT/ML 100 UNIT/ML
500 SOLUTION INTRAVENOUS PRN
Status: CANCELLED | OUTPATIENT
Start: 2020-09-02

## 2020-09-02 RX ORDER — SODIUM CHLORIDE 0.9 % (FLUSH) 0.9 %
5 SYRINGE (ML) INJECTION PRN
Status: CANCELLED | OUTPATIENT
Start: 2020-09-02

## 2020-09-02 RX ORDER — DEXTROSE MONOHYDRATE 50 MG/ML
INJECTION, SOLUTION INTRAVENOUS ONCE
Status: CANCELLED | OUTPATIENT
Start: 2020-09-02

## 2020-09-02 RX ORDER — FLUOROURACIL 50 MG/ML
400 INJECTION, SOLUTION INTRAVENOUS ONCE
Status: COMPLETED | OUTPATIENT
Start: 2020-09-02 | End: 2020-09-02

## 2020-09-02 RX ORDER — EPINEPHRINE 1 MG/ML
0.3 INJECTION, SOLUTION, CONCENTRATE INTRAVENOUS PRN
Status: CANCELLED | OUTPATIENT
Start: 2020-09-02

## 2020-09-02 RX ORDER — ACETAMINOPHEN 500 MG
1000 TABLET ORAL ONCE
Status: COMPLETED | OUTPATIENT
Start: 2020-09-02 | End: 2020-09-02

## 2020-09-02 RX ORDER — ACETAMINOPHEN 325 MG/1
1000 TABLET ORAL ONCE
Status: CANCELLED
Start: 2020-09-02

## 2020-09-02 RX ORDER — DEXAMETHASONE SODIUM PHOSPHATE 10 MG/ML
10 INJECTION, SOLUTION INTRAMUSCULAR; INTRAVENOUS ONCE
Status: COMPLETED | OUTPATIENT
Start: 2020-09-02 | End: 2020-09-02

## 2020-09-02 RX ORDER — FLUOROURACIL 50 MG/ML
400 INJECTION, SOLUTION INTRAVENOUS ONCE
Status: CANCELLED | OUTPATIENT
Start: 2020-09-02

## 2020-09-02 RX ORDER — SODIUM CHLORIDE 9 MG/ML
INJECTION, SOLUTION INTRAVENOUS ONCE
Status: CANCELLED | OUTPATIENT
Start: 2020-09-02

## 2020-09-02 RX ORDER — METHYLPREDNISOLONE SODIUM SUCCINATE 125 MG/2ML
125 INJECTION, POWDER, LYOPHILIZED, FOR SOLUTION INTRAMUSCULAR; INTRAVENOUS ONCE
Status: CANCELLED | OUTPATIENT
Start: 2020-09-02

## 2020-09-02 RX ORDER — SODIUM CHLORIDE 9 MG/ML
INJECTION, SOLUTION INTRAVENOUS CONTINUOUS
Status: CANCELLED | OUTPATIENT
Start: 2020-09-02

## 2020-09-02 RX ORDER — SODIUM CHLORIDE 0.9 % (FLUSH) 0.9 %
10 SYRINGE (ML) INJECTION PRN
Status: CANCELLED | OUTPATIENT
Start: 2020-09-02

## 2020-09-02 RX ORDER — DIPHENHYDRAMINE HYDROCHLORIDE 50 MG/ML
25 INJECTION INTRAMUSCULAR; INTRAVENOUS ONCE
Status: CANCELLED
Start: 2020-09-02

## 2020-09-02 RX ORDER — PALONOSETRON 0.05 MG/ML
0.25 INJECTION, SOLUTION INTRAVENOUS ONCE
Status: COMPLETED | OUTPATIENT
Start: 2020-09-02 | End: 2020-09-02

## 2020-09-02 RX ORDER — PALONOSETRON 0.05 MG/ML
0.25 INJECTION, SOLUTION INTRAVENOUS ONCE
Status: CANCELLED | OUTPATIENT
Start: 2020-09-02

## 2020-09-02 RX ADMIN — PALONOSETRON 0.25 MG: 0.05 INJECTION, SOLUTION INTRAVENOUS at 09:24

## 2020-09-02 RX ADMIN — FLUOROURACIL 4400 MG: 50 INJECTION, SOLUTION INTRAVENOUS at 12:41

## 2020-09-02 RX ADMIN — DEXAMETHASONE SODIUM PHOSPHATE 10 MG: 10 INJECTION, SOLUTION INTRAMUSCULAR; INTRAVENOUS at 09:24

## 2020-09-02 RX ADMIN — FLUOROURACIL 730 MG: 50 INJECTION, SOLUTION INTRAVENOUS at 12:39

## 2020-09-02 RX ADMIN — LEUCOVORIN CALCIUM 366 MG: 100 INJECTION, POWDER, LYOPHILIZED, FOR SUSPENSION INTRAMUSCULAR; INTRAVENOUS at 09:46

## 2020-09-02 RX ADMIN — ACETAMINOPHEN 1000 MG: 500 TABLET, FILM COATED ORAL at 09:24

## 2020-09-02 RX ADMIN — DIPHENHYDRAMINE HYDROCHLORIDE 25 MG: 50 INJECTION, SOLUTION INTRAMUSCULAR; INTRAVENOUS at 09:24

## 2020-09-02 RX ADMIN — BEVACIZUMAB 352.5 MG: 400 INJECTION, SOLUTION INTRAVENOUS at 12:03

## 2020-09-02 ASSESSMENT — PAIN SCALES - GENERAL
PAINLEVEL_OUTOF10: 0
PAINLEVEL_OUTOF10: 0

## 2020-09-04 ENCOUNTER — HOSPITAL ENCOUNTER (OUTPATIENT)
Dept: INFUSION THERAPY | Age: 65
Discharge: HOME | End: 2020-09-04
Payer: OTHER GOVERNMENT

## 2020-09-04 DIAGNOSIS — C18.2 MALIGNANT NEOPLASM OF ASCENDING COLON (HCC): Primary | ICD-10-CM

## 2020-09-04 PROCEDURE — 96523 IRRIG DRUG DELIVERY DEVICE: CPT

## 2020-09-04 PROCEDURE — 6360000002 HC RX W HCPCS: Performed by: INTERNAL MEDICINE

## 2020-09-04 PROCEDURE — 2580000003 HC RX 258: Performed by: INTERNAL MEDICINE

## 2020-09-04 RX ORDER — SODIUM CHLORIDE 0.9 % (FLUSH) 0.9 %
10 SYRINGE (ML) INJECTION PRN
Status: CANCELLED | OUTPATIENT
Start: 2020-09-04

## 2020-09-04 RX ORDER — HEPARIN SODIUM (PORCINE) LOCK FLUSH IV SOLN 100 UNIT/ML 100 UNIT/ML
500 SOLUTION INTRAVENOUS PRN
Status: CANCELLED | OUTPATIENT
Start: 2020-09-04

## 2020-09-04 RX ORDER — SODIUM CHLORIDE 0.9 % (FLUSH) 0.9 %
20 SYRINGE (ML) INJECTION PRN
Status: DISCONTINUED | OUTPATIENT
Start: 2020-09-04 | End: 2020-09-05 | Stop reason: HOSPADM

## 2020-09-04 RX ORDER — HEPARIN SODIUM (PORCINE) LOCK FLUSH IV SOLN 100 UNIT/ML 100 UNIT/ML
500 SOLUTION INTRAVENOUS PRN
Status: DISCONTINUED | OUTPATIENT
Start: 2020-09-04 | End: 2020-09-05 | Stop reason: HOSPADM

## 2020-09-04 RX ORDER — SODIUM CHLORIDE 0.9 % (FLUSH) 0.9 %
20 SYRINGE (ML) INJECTION PRN
Status: CANCELLED | OUTPATIENT
Start: 2020-09-04

## 2020-09-04 RX ADMIN — HEPARIN 500 UNITS: 100 SYRINGE at 11:37

## 2020-09-04 RX ADMIN — SODIUM CHLORIDE, PRESERVATIVE FREE 20 ML: 5 INJECTION INTRAVENOUS at 11:37

## 2020-09-15 NOTE — PROGRESS NOTES
moderate differentiated colonic adenocarcinoma. Polyps consistent with tubulovillous adenoma with no high-grade dysplasia. IHC MMR not proficient. K-maria luisa mutated, BRAF and NRAS wild type. MSI proficient  · 3/11/2020-CEA 5.5 (H)  · 3/18/2020-CT abdomen pelvis with contrast  Invasive cecal mass adhering to the right lateral abdominal wall muscles with adjacent lymphadenopathy. Mild partial obstruction of the terminal ileum. 2. Suspicious lesions in the right and left hepatic lobes measure up to 1.3 cm and likely represent metastatic disease. · 3/18/2020-Xr Chest Standard  No radiographic evidence of acute cardiopulmonary process. · 3/23/2020-he was first seen by me. Recommended completion of staging with CT chest.  Also recommended liver MRI for further clarification of liver lesion. S.  Recommend to proceed with a general surgery consultation tomorrow with Dr. Abdullahi Shook. Patient was informed that I favor surgical resection if feasible of the primary malignancy. · 3/30/2020- right hemicolectomy by Dr. Abdullahi Shook at [facility] consistent with invasive moderately differentiated colonic adenocarcinoma measuring 7.2 cm. Carcinoma directly invading the adjacent abdominal wall tissue. Focal lymphovascular space invasion identified. Focal perineural invasion identified. Surgical margins negative for evidence of malignancy. 6 out of 14 lymph nodes positive for metastatic adenocarcinoma. Final pathology staging [id number](liver, stage ROXANA)  · 4/20/2020-CT chest with contrast showed No convincing intrathoracic metastasis. Nonspecific 4 mm nodule of the inferior lingula and a 2 mm right upper lobe nodule can be followed on subsequent imaging in 6-12 months. Moderate coronary calcifications. Hypodense metastatic liver lesions. Small hiatal hernia. · 4/20/2020-Mri Abdomen W Wo Contrast There are about 5 liver lesions.  The 2 largest appears similar compared to 3/18/2020, the others are too small to further characterize. Appearance is most concerning for metastatic disease. Enhancement of the right lateral peritoneum. This is favored to be postoperative as there is no nodularity, evidence of omental disease or lymphadenopathy. Recommend attention on follow-up. Cholecystectomy. · 4/22/2020-discussed with Dr. Joseph Billings at [facility]. He will review imaging studies and give further recommendations regarding eligibility for resection of liver lesions. · 5/8/2020 CT Abdomen The two largest suspicious lesions measuring 1.2 and 1.3 cm in the  right and left hepatic lobes respectively are similar compared to the  3/18/2020 CT. Additionally, there are at least five subcentimeter lesions with similar signal characteristics, which are also highly suspicious for metastases. If complete characterization of the number and distribution of lesions is necessary, an MRI with Eovist could be acquired. · 5/19/2020- he was seen by the hepatobiliary service at [facility] with Dr. Joseph Billings:  patient adequate risk candidate for a multimodal approach, directed toward curative hepatectomy eventually. Endorsed by Hepatobiliary Conference, I recommended perc ablation of the L hemiliver to clear it, followed by systemic therapy in a neoadjuvant strategy. Restaging imaging to confirm clearance of disease on the left and lack of progression to unresectability of the R hemiliver disease would then be followed by R hepatectomy. Limitations to this approach may be accessibility of the segment 4A/8 disease high in the hepatic dome and the possibility of heat sink-related recurrence s/p abation of the left-sided disease. · 5/21/2020- referral for Mediport placement and start FOLFOX in 2 weeks. Will add bevacizumab after 6 weeks of liver ablation. We will plan for 12 biweekly dose of FOLFOX. Bevacizumab will also be stopped 6 weeks prior to major procedure.   · 6/8/2020- Microwave ablation of the left liver lesion was then file    Years of education: Not on file    Highest education level: Not on file   Occupational History    Not on file   Social Needs    Financial resource strain: Not on file    Food insecurity     Worry: Not on file     Inability: Not on file    Transportation needs     Medical: Not on file     Non-medical: Not on file   Tobacco Use    Smoking status: Never Smoker    Smokeless tobacco: Never Used   Substance and Sexual Activity    Alcohol use: Yes     Comment: a glass of wine     Drug use: No    Sexual activity: Yes     Partners: Female   Lifestyle    Physical activity     Days per week: Not on file     Minutes per session: Not on file    Stress: Not on file   Relationships    Social connections     Talks on phone: Not on file     Gets together: Not on file     Attends [employer] service: Not on file     Active member of club or organization: Not on file     Attends meetings of clubs or organizations: Not on file     Relationship status: Not on file    Intimate partner violence     Fear of current or ex partner: Not on file     Emotionally abused: Not on file     Physically abused: Not on file     Forced sexual activity: Not on file   Other Topics Concern    Not on file   Social History Narrative    Not on file       Family History:   Family History   Problem Relation Age of Onset    Liver Cancer Mother     High Blood Pressure Mother     Colon Cancer Mother         x2    Cancer Father         Lung Cancer    Breast Cancer Sister     Cancer Maternal Grandfather         Lung Cancer    Cancer Paternal Grandfather         Stomach Cancer    Colon Polyps Neg Hx        Current Hospital Medications:    No current facility-administered medications for this visit. Facility-Administered Medications Ordered in Other Visits: fluorouracil (ADRUCIL) 4,400 mg in sodium chloride 0.9 % 250 mL chemo infusion, 2,400 mg/m2 (Treatment Plan Recorded), Intravenous, Over 46 hours    Allergies:    Allergies Allergen Reactions    Oxycodone-Acetaminophen Nausea Only    Morphine Nausea And Vomiting     Hallucinations/Vomiting         Subjective   REVIEW OF SYSTEMS:   CONSTITUTIONAL: no fever, no night sweats, no fatigue; mild weight loss  HEENT: no blurring of vision, no double vision, no hearing difficulty, no tinnitus, no ulceration, no dysplasia, no epistaxis;  LUNGS: no cough, no hemoptysis, no wheeze,  no shortness of breath;  CARDIOVASCULAR: no palpitation, no chest pain, no shortness of breath;  GI: no abdominal pain, no nausea, no vomiting,  no constipation;  ANNIE: no dysuria, no hematuria, no frequency or urgency, no nephrolithiasis;  MUSCULOSKELETAL: no joint pain, no swelling, no stiffness;  ENDOCRINE: no polyuria, no polydipsia, no cold or heat intolerance;  HEMATOLOGY: no easy bruising or bleeding, no history of clotting disorder;  DERMATOLOGY: no skin rash, no eczema, no pruritus;  PSYCHIATRY: no depression, no anxiety, no panic attacks, no suicidal ideation, no homicidal ideation;  NEUROLOGY: no syncope, no seizures, no numbness or tingling of hands, no numbness or tingling of feet, no paresis;    Objective   Pulse 72   Temp 96.9 °F (36.1 °C) (Temporal)   Ht 5' 7\" (1.702 m)   Wt 163 lb 12.8 oz (74.3 kg)   SpO2 98%   BMI 25.65 kg/m²     PHYSICAL EXAM:  CONSTITUTIONAL: Alert, appropriate, no acute distress  EYES: Non icteric, EOM intact, pupils equal round   ENT: Mucus membranes moist, no oral pharyngeal lesions, external inspection of ears and nose are normal  NECK: Supple, no masses. No palpable thyroid mass  CHEST/LUNGS: CTA bilaterally, normal respiratory effort   CARDIOVASCULAR: RRR, no murmurs. No lower extremity edema  ABDOMEN: Healing surgical scar,  active bowel sounds, no HSM. No palpable masses  EXTREMITIES: warm, full ROM in all 4 extremities, no focal weakness.   SKIN: warm, dry with no rashes or lesions  LYMPH: No cervical, clavicular, axillary, or inguinal lymphadenopathy  NEUROLOGIC: follows commands, non focal   PSYCH: mood and affect appropriate. Alert and oriented to time, place, person    LABORATORY RESULTS REVIEWED BY ME:    9/2/20 CEA: 2.7    RADIOLOGY STUDIES REVIEWED   9/10/20 MRI abdomen: Left hepatic lobe segment II lesion demonstrates small T1   hyperintense blood products, status post microwave ablation on 6/8/2020. No definitive enhancement within the lesion. Focal internal thickening or scar present. Recommend attention on follow-up. Additional scattered subcentimeter foci throughout the liver decreased in size compared to MRI dated 4/20/2020 consistent with improving metastatic disease. Additional chronic findings as above. ASSESSMENT:  #Colonic adenocarcinoma-  [id number] (liver) K-maria luisa mutated, IHC MMR not proficient  The patient was counseled today about diagnosis, staging, prognosis, diagnostic tests, medications, side effects and disease management. The method of counseling included verbal explanation. The patient verbalized understanding. Essentially, status post right hemicolectomy. Pathology consistent with locally advanced disease. Unfortunately, stage Puneet due to suspicious liver metastasis. Discussed with hepatobiliary service (Dr Polly Velasco) at [place] regarding possibility of resection/ metastasectomy. He is a status post liver ablation at [place]. He is currently receiving neoadjuvant chemotherapy with FOLFOX/Avastin. Plans for a possible right hepatectomy. Recommended neoadjuvant chemotherapy. Discussed about the side effects risks and benefits. Neoadjuvant regimen regimen started 6/10/2020:  Oxaliplatin 85 mg/m2  Leucovorin 200 mg/m2   5-FU 2400 mgm2 over 46 hours  X12 biweekly cycles. bevacizumab after 6 weeks post ablation around 7/22/2020. Will stop bevacizumab 6 weeks prior to major surgery. Last CEA = 2.4    Discussed with [place] today. MRI showed interval improvement of liver disease.   Continue treatment through 12 cycles with Avastin. #Chronic kidney disease stage III- creatinine 1.4/. Referred to nephrology. #Liver metastasis- plan as above. Status post ablation left liver lobe lesion at [facility] on 6/8/2020    Iron deficiency anemia-  hemoglobin 11.7/MCV78. Iron profile today. Continue iron sulfate 375 mg p.o. 3 times daily. Ferritin 12.9, iron saturation 15, TIBC 458, iron 72. PLAN:    · Proceed cycle #8/12 FOLFOX/Avastin  · Ordered CMP, CEA, urine protein today  · RTC MD 4 weeks  · RTC for chemo every 2 weeks  · F/U [place] September 11 for f/u scans    I have seen, examined and reviewed this patient medication list, appropriate labs and imaging studies. I reviewed relevant medical records and others physicians notes. I discussed the plans of care with the patient. I answered all the questions to the patients satisfaction. I have also reviewed the chief complaint (CC) and part of the history (History of Present Illness (HPI), Past Family Social History [facility]), or Review of Systems (ROS) and made changes when appropriated. (Please note that portions of this note were completed with a voice recognition program. Efforts were made to edit the dictations but occasionally words are mis-transcribed.)  I, Dr Charis Dinero, personally performed the services described in this documentation as scribed by Donya Monet MA in my presence and is both accurate and complete. Over 50% of the total visit time of 25 minutes in face to face encounter with the patient, out of which more than 50% of the time was spent in counseling patient or family and coordination of care. Counseling included but was not limited to time spent reviewing labs, imaging studies/ treatment plan and answering questions.        Annette Burnham MD    09/16/20  2:49 PM

## 2020-09-16 ENCOUNTER — HOSPITAL ENCOUNTER (OUTPATIENT)
Dept: INFUSION THERAPY | Age: 65
Discharge: HOME | End: 2020-09-16
Payer: OTHER GOVERNMENT

## 2020-09-16 ENCOUNTER — OFFICE VISIT (OUTPATIENT)
Dept: HEMATOLOGY | Age: 65
End: 2020-09-16
Payer: OTHER GOVERNMENT

## 2020-09-16 VITALS
WEIGHT: 163.8 LBS | OXYGEN SATURATION: 98 % | HEART RATE: 72 BPM | TEMPERATURE: 96.9 F | HEIGHT: 67 IN | BODY MASS INDEX: 25.71 KG/M2

## 2020-09-16 VITALS — BODY MASS INDEX: 26.42 KG/M2 | HEIGHT: 67 IN | WEIGHT: 168.3 LBS

## 2020-09-16 DIAGNOSIS — C18.9 ADENOCARCINOMA OF COLON (HCC): Primary | ICD-10-CM

## 2020-09-16 DIAGNOSIS — C18.2 MALIGNANT NEOPLASM OF ASCENDING COLON (HCC): ICD-10-CM

## 2020-09-16 LAB
ALBUMIN SERPL-MCNC: 4.1 G/DL (ref 3.5–5.2)
ALP BLD-CCNC: 142 U/L (ref 40–130)
ALT SERPL-CCNC: 31 U/L (ref 21–72)
ANION GAP SERPL CALCULATED.3IONS-SCNC: 9 MMOL/L (ref 7–19)
AST SERPL-CCNC: 52 U/L (ref 17–59)
BASOPHILS ABSOLUTE: 0.04 K/UL (ref 0.01–0.08)
BASOPHILS RELATIVE PERCENT: 0.9 % (ref 0.1–1.2)
BILIRUB SERPL-MCNC: 0.5 MG/DL (ref 0.2–1.3)
BUN BLDV-MCNC: 15 MG/DL (ref 9–20)
CALCIUM SERPL-MCNC: 9.4 MG/DL (ref 8.4–10.2)
CEA: 2.7 NG/ML (ref 0–3)
CHLORIDE BLD-SCNC: 108 MMOL/L (ref 98–111)
CO2: 23 MMOL/L (ref 22–29)
CREAT SERPL-MCNC: 1.4 MG/DL (ref 0.6–1.2)
EOSINOPHILS ABSOLUTE: 0.34 K/UL (ref 0.04–0.54)
EOSINOPHILS RELATIVE PERCENT: 7.5 % (ref 0.7–7)
GFR NON-AFRICAN AMERICAN: 51
GLOBULIN: 3.4 G/DL
GLUCOSE BLD-MCNC: 100 MG/DL (ref 74–106)
HCT VFR BLD CALC: 36.3 % (ref 40.1–51)
HEMOGLOBIN: 11.7 G/DL (ref 13.7–17.5)
LYMPHOCYTES ABSOLUTE: 0.91 K/UL (ref 1.18–3.74)
LYMPHOCYTES RELATIVE PERCENT: 20.2 % (ref 19.3–53.1)
MCH RBC QN AUTO: 25.7 PG (ref 25.7–32.2)
MCHC RBC AUTO-ENTMCNC: 32.2 G/DL (ref 32.3–36.5)
MCV RBC AUTO: 79.6 FL (ref 79–92.2)
MONOCYTES ABSOLUTE: 1 K/UL (ref 0.24–0.82)
MONOCYTES RELATIVE PERCENT: 22.2 % (ref 4.7–12.5)
NEUTROPHILS ABSOLUTE: 2.22 K/UL (ref 1.56–6.13)
NEUTROPHILS RELATIVE PERCENT: 49.2 % (ref 34–71.1)
PDW BLD-RTO: 22.8 % (ref 11.6–14.4)
PLATELET # BLD: 122 K/UL (ref 163–337)
PMV BLD AUTO: 9.6 FL (ref 7.4–10.4)
POTASSIUM SERPL-SCNC: 4.5 MMOL/L (ref 3.5–5.1)
PROTEIN UA: NEGATIVE
RBC # BLD: 4.56 M/UL (ref 4.63–6.08)
SODIUM BLD-SCNC: 140 MMOL/L (ref 137–145)
TOTAL PROTEIN: 7.5 G/DL (ref 6.3–8.2)
WBC # BLD: 4.51 K/UL (ref 4.23–9.07)

## 2020-09-16 PROCEDURE — 2580000003 HC RX 258: Performed by: INTERNAL MEDICINE

## 2020-09-16 PROCEDURE — 96413 CHEMO IV INFUSION 1 HR: CPT

## 2020-09-16 PROCEDURE — 96417 CHEMO IV INFUS EACH ADDL SEQ: CPT

## 2020-09-16 PROCEDURE — 96367 TX/PROPH/DG ADDL SEQ IV INF: CPT

## 2020-09-16 PROCEDURE — 6370000000 HC RX 637 (ALT 250 FOR IP): Performed by: INTERNAL MEDICINE

## 2020-09-16 PROCEDURE — 85025 COMPLETE CBC W/AUTO DIFF WBC: CPT

## 2020-09-16 PROCEDURE — 6360000002 HC RX W HCPCS: Performed by: INTERNAL MEDICINE

## 2020-09-16 PROCEDURE — 82378 CARCINOEMBRYONIC ANTIGEN: CPT

## 2020-09-16 PROCEDURE — 80053 COMPREHEN METABOLIC PANEL: CPT

## 2020-09-16 PROCEDURE — 96375 TX/PRO/DX INJ NEW DRUG ADDON: CPT

## 2020-09-16 PROCEDURE — 96368 THER/DIAG CONCURRENT INF: CPT

## 2020-09-16 PROCEDURE — 96415 CHEMO IV INFUSION ADDL HR: CPT

## 2020-09-16 PROCEDURE — 96409 CHEMO IV PUSH SNGL DRUG: CPT

## 2020-09-16 PROCEDURE — G0498 CHEMO EXTEND IV INFUS W/PUMP: HCPCS

## 2020-09-16 PROCEDURE — 99214 OFFICE O/P EST MOD 30 MIN: CPT | Performed by: INTERNAL MEDICINE

## 2020-09-16 RX ORDER — FLUOROURACIL 50 MG/ML
400 INJECTION, SOLUTION INTRAVENOUS ONCE
Status: COMPLETED | OUTPATIENT
Start: 2020-09-16 | End: 2020-09-16

## 2020-09-16 RX ORDER — DIPHENHYDRAMINE HYDROCHLORIDE 50 MG/ML
50 INJECTION INTRAMUSCULAR; INTRAVENOUS ONCE
Status: CANCELLED | OUTPATIENT
Start: 2020-09-16

## 2020-09-16 RX ORDER — SODIUM CHLORIDE 9 MG/ML
INJECTION, SOLUTION INTRAVENOUS ONCE
Status: CANCELLED | OUTPATIENT
Start: 2020-09-16

## 2020-09-16 RX ORDER — METHYLPREDNISOLONE SODIUM SUCCINATE 125 MG/2ML
125 INJECTION, POWDER, LYOPHILIZED, FOR SOLUTION INTRAMUSCULAR; INTRAVENOUS ONCE
Status: CANCELLED | OUTPATIENT
Start: 2020-09-16

## 2020-09-16 RX ORDER — DIPHENHYDRAMINE HYDROCHLORIDE 50 MG/ML
25 INJECTION INTRAMUSCULAR; INTRAVENOUS ONCE
Status: COMPLETED | OUTPATIENT
Start: 2020-09-16 | End: 2020-09-16

## 2020-09-16 RX ORDER — SODIUM CHLORIDE 0.9 % (FLUSH) 0.9 %
5 SYRINGE (ML) INJECTION PRN
Status: CANCELLED | OUTPATIENT
Start: 2020-09-16

## 2020-09-16 RX ORDER — PALONOSETRON 0.05 MG/ML
0.25 INJECTION, SOLUTION INTRAVENOUS ONCE
Status: COMPLETED | OUTPATIENT
Start: 2020-09-16 | End: 2020-09-16

## 2020-09-16 RX ORDER — FLUOROURACIL 50 MG/ML
400 INJECTION, SOLUTION INTRAVENOUS ONCE
Status: CANCELLED | OUTPATIENT
Start: 2020-09-16

## 2020-09-16 RX ORDER — SODIUM CHLORIDE 9 MG/ML
INJECTION, SOLUTION INTRAVENOUS CONTINUOUS
Status: CANCELLED | OUTPATIENT
Start: 2020-09-16

## 2020-09-16 RX ORDER — HEPARIN SODIUM (PORCINE) LOCK FLUSH IV SOLN 100 UNIT/ML 100 UNIT/ML
500 SOLUTION INTRAVENOUS PRN
Status: CANCELLED | OUTPATIENT
Start: 2020-09-16

## 2020-09-16 RX ORDER — SODIUM CHLORIDE 0.9 % (FLUSH) 0.9 %
10 SYRINGE (ML) INJECTION PRN
Status: CANCELLED | OUTPATIENT
Start: 2020-09-16

## 2020-09-16 RX ORDER — ACETAMINOPHEN 500 MG
1000 TABLET ORAL ONCE
Status: COMPLETED | OUTPATIENT
Start: 2020-09-16 | End: 2020-09-16

## 2020-09-16 RX ORDER — DEXTROSE MONOHYDRATE 50 MG/ML
INJECTION, SOLUTION INTRAVENOUS ONCE
Status: CANCELLED | OUTPATIENT
Start: 2020-09-16

## 2020-09-16 RX ORDER — EPINEPHRINE 1 MG/ML
0.3 INJECTION, SOLUTION, CONCENTRATE INTRAVENOUS PRN
Status: CANCELLED | OUTPATIENT
Start: 2020-09-16

## 2020-09-16 RX ORDER — DEXAMETHASONE SODIUM PHOSPHATE 10 MG/ML
10 INJECTION, SOLUTION INTRAMUSCULAR; INTRAVENOUS ONCE
Status: COMPLETED | OUTPATIENT
Start: 2020-09-16 | End: 2020-09-16

## 2020-09-16 RX ADMIN — PALONOSETRON 0.25 MG: 0.05 INJECTION, SOLUTION INTRAVENOUS at 09:43

## 2020-09-16 RX ADMIN — ACETAMINOPHEN 1000 MG: 500 TABLET, FILM COATED ORAL at 09:43

## 2020-09-16 RX ADMIN — DEXAMETHASONE SODIUM PHOSPHATE 10 MG: 10 INJECTION, SOLUTION INTRAMUSCULAR; INTRAVENOUS at 09:44

## 2020-09-16 RX ADMIN — LEUCOVORIN CALCIUM 366 MG: 100 INJECTION, POWDER, LYOPHILIZED, FOR SUSPENSION INTRAMUSCULAR; INTRAVENOUS at 11:21

## 2020-09-16 RX ADMIN — DIPHENHYDRAMINE HYDROCHLORIDE 25 MG: 50 INJECTION, SOLUTION INTRAMUSCULAR; INTRAVENOUS at 09:44

## 2020-09-16 RX ADMIN — OXALIPLATIN 155.5 MG: 5 INJECTION, SOLUTION INTRAVENOUS at 11:21

## 2020-09-16 RX ADMIN — FLUOROURACIL 730 MG: 50 INJECTION, SOLUTION INTRAVENOUS at 13:32

## 2020-09-16 RX ADMIN — BEVACIZUMAB 352.5 MG: 400 INJECTION, SOLUTION INTRAVENOUS at 10:35

## 2020-09-16 ASSESSMENT — PAIN SCALES - GENERAL
PAINLEVEL_OUTOF10: 0
PAINLEVEL_OUTOF10: 0

## 2020-09-18 ENCOUNTER — HOSPITAL ENCOUNTER (OUTPATIENT)
Dept: INFUSION THERAPY | Age: 65
Discharge: HOME | End: 2020-09-18
Payer: OTHER GOVERNMENT

## 2020-09-18 DIAGNOSIS — C18.2 MALIGNANT NEOPLASM OF ASCENDING COLON (HCC): Primary | ICD-10-CM

## 2020-09-18 PROCEDURE — 2580000003 HC RX 258: Performed by: INTERNAL MEDICINE

## 2020-09-18 PROCEDURE — 6360000002 HC RX W HCPCS: Performed by: INTERNAL MEDICINE

## 2020-09-18 PROCEDURE — 96523 IRRIG DRUG DELIVERY DEVICE: CPT

## 2020-09-18 RX ORDER — HEPARIN SODIUM (PORCINE) LOCK FLUSH IV SOLN 100 UNIT/ML 100 UNIT/ML
500 SOLUTION INTRAVENOUS PRN
Status: DISCONTINUED | OUTPATIENT
Start: 2020-09-18 | End: 2020-09-19 | Stop reason: HOSPADM

## 2020-09-18 RX ORDER — SODIUM CHLORIDE 0.9 % (FLUSH) 0.9 %
10 SYRINGE (ML) INJECTION PRN
Status: CANCELLED | OUTPATIENT
Start: 2020-09-18

## 2020-09-18 RX ORDER — SODIUM CHLORIDE 0.9 % (FLUSH) 0.9 %
20 SYRINGE (ML) INJECTION PRN
Status: CANCELLED | OUTPATIENT
Start: 2020-09-18

## 2020-09-18 RX ORDER — HEPARIN SODIUM (PORCINE) LOCK FLUSH IV SOLN 100 UNIT/ML 100 UNIT/ML
500 SOLUTION INTRAVENOUS PRN
Status: CANCELLED | OUTPATIENT
Start: 2020-09-18

## 2020-09-18 RX ORDER — SODIUM CHLORIDE 0.9 % (FLUSH) 0.9 %
10 SYRINGE (ML) INJECTION PRN
Status: DISCONTINUED | OUTPATIENT
Start: 2020-09-18 | End: 2020-09-19 | Stop reason: HOSPADM

## 2020-09-18 RX ADMIN — HEPARIN 500 UNITS: 100 SYRINGE at 11:34

## 2020-09-18 RX ADMIN — SODIUM CHLORIDE, PRESERVATIVE FREE 10 ML: 5 INJECTION INTRAVENOUS at 11:34

## 2020-09-23 RX ORDER — PROMETHAZINE HYDROCHLORIDE 12.5 MG/1
12.5 TABLET ORAL EVERY 6 HOURS PRN
Qty: 60 TABLET | Refills: 5 | Status: SHIPPED | OUTPATIENT
Start: 2020-09-23 | End: 2021-11-09 | Stop reason: SDUPTHER

## 2020-09-30 ENCOUNTER — HOSPITAL ENCOUNTER (OUTPATIENT)
Dept: INFUSION THERAPY | Age: 65
Discharge: HOME | End: 2020-09-30
Payer: OTHER GOVERNMENT

## 2020-09-30 VITALS
WEIGHT: 164 LBS | DIASTOLIC BLOOD PRESSURE: 80 MMHG | SYSTOLIC BLOOD PRESSURE: 122 MMHG | HEIGHT: 67 IN | BODY MASS INDEX: 25.74 KG/M2 | OXYGEN SATURATION: 98 % | HEART RATE: 71 BPM | TEMPERATURE: 97.1 F

## 2020-09-30 DIAGNOSIS — C18.2 MALIGNANT NEOPLASM OF ASCENDING COLON (HCC): ICD-10-CM

## 2020-09-30 DIAGNOSIS — C18.9 ADENOCARCINOMA OF COLON (HCC): Primary | ICD-10-CM

## 2020-09-30 LAB
ALBUMIN SERPL-MCNC: 4.5 G/DL (ref 3.5–5.2)
ALP BLD-CCNC: 147 U/L (ref 40–130)
ALT SERPL-CCNC: 26 U/L (ref 21–72)
ANION GAP SERPL CALCULATED.3IONS-SCNC: 12 MMOL/L (ref 7–19)
AST SERPL-CCNC: 38 U/L (ref 17–59)
BASOPHILS ABSOLUTE: 0.03 K/UL (ref 0.01–0.08)
BASOPHILS RELATIVE PERCENT: 0.8 % (ref 0.1–1.2)
BILIRUB SERPL-MCNC: 0.6 MG/DL (ref 0.2–1.3)
BUN BLDV-MCNC: 12 MG/DL (ref 9–20)
CALCIUM SERPL-MCNC: 9.4 MG/DL (ref 8.4–10.2)
CEA: 2.7 NG/ML (ref 0–3)
CHLORIDE BLD-SCNC: 107 MMOL/L (ref 98–111)
CO2: 21 MMOL/L (ref 22–29)
CREAT SERPL-MCNC: 1.5 MG/DL (ref 0.6–1.2)
EOSINOPHILS ABSOLUTE: 0.23 K/UL (ref 0.04–0.54)
EOSINOPHILS RELATIVE PERCENT: 5.8 % (ref 0.7–7)
GFR NON-AFRICAN AMERICAN: 47
GLUCOSE BLD-MCNC: 102 MG/DL (ref 74–106)
HCT VFR BLD CALC: 36.9 % (ref 40.1–51)
HEMOGLOBIN: 12.1 G/DL (ref 13.7–17.5)
LYMPHOCYTES ABSOLUTE: 0.82 K/UL (ref 1.18–3.74)
LYMPHOCYTES RELATIVE PERCENT: 20.5 % (ref 19.3–53.1)
MCH RBC QN AUTO: 26.5 PG (ref 25.7–32.2)
MCHC RBC AUTO-ENTMCNC: 32.8 G/DL (ref 32.3–36.5)
MCV RBC AUTO: 80.7 FL (ref 79–92.2)
MONOCYTES ABSOLUTE: 0.87 K/UL (ref 0.24–0.82)
MONOCYTES RELATIVE PERCENT: 21.8 % (ref 4.7–12.5)
NEUTROPHILS ABSOLUTE: 2.05 K/UL (ref 1.56–6.13)
NEUTROPHILS RELATIVE PERCENT: 51.1 % (ref 34–71.1)
PDW BLD-RTO: 21.1 % (ref 11.6–14.4)
PLATELET # BLD: 88 K/UL (ref 163–337)
PMV BLD AUTO: ABNORMAL FL (ref 7.4–10.4)
POTASSIUM SERPL-SCNC: 4.4 MMOL/L (ref 3.5–5.1)
RBC # BLD: 4.57 M/UL (ref 4.63–6.08)
SODIUM BLD-SCNC: 140 MMOL/L (ref 137–145)
TOTAL PROTEIN: 7.9 G/DL (ref 6.3–8.2)
WBC # BLD: 4 K/UL (ref 4.23–9.07)

## 2020-09-30 PROCEDURE — 81002 URINALYSIS NONAUTO W/O SCOPE: CPT | Performed by: INTERNAL MEDICINE

## 2020-09-30 PROCEDURE — 2580000003 HC RX 258: Performed by: INTERNAL MEDICINE

## 2020-09-30 PROCEDURE — 82378 CARCINOEMBRYONIC ANTIGEN: CPT

## 2020-09-30 PROCEDURE — 85025 COMPLETE CBC W/AUTO DIFF WBC: CPT

## 2020-09-30 PROCEDURE — 96523 IRRIG DRUG DELIVERY DEVICE: CPT

## 2020-09-30 PROCEDURE — 6360000002 HC RX W HCPCS: Performed by: INTERNAL MEDICINE

## 2020-09-30 PROCEDURE — 80053 COMPREHEN METABOLIC PANEL: CPT

## 2020-09-30 RX ORDER — HEPARIN SODIUM (PORCINE) LOCK FLUSH IV SOLN 100 UNIT/ML 100 UNIT/ML
500 SOLUTION INTRAVENOUS PRN
Status: CANCELLED | OUTPATIENT
Start: 2020-09-30

## 2020-09-30 RX ORDER — HEPARIN SODIUM (PORCINE) LOCK FLUSH IV SOLN 100 UNIT/ML 100 UNIT/ML
500 SOLUTION INTRAVENOUS PRN
Status: DISCONTINUED | OUTPATIENT
Start: 2020-09-30 | End: 2020-10-01 | Stop reason: HOSPADM

## 2020-09-30 RX ORDER — SODIUM CHLORIDE 0.9 % (FLUSH) 0.9 %
10 SYRINGE (ML) INJECTION PRN
Status: DISCONTINUED | OUTPATIENT
Start: 2020-09-30 | End: 2020-10-01 | Stop reason: HOSPADM

## 2020-09-30 RX ORDER — SODIUM CHLORIDE 0.9 % (FLUSH) 0.9 %
20 SYRINGE (ML) INJECTION PRN
Status: CANCELLED | OUTPATIENT
Start: 2020-09-30

## 2020-09-30 RX ORDER — SODIUM CHLORIDE 0.9 % (FLUSH) 0.9 %
10 SYRINGE (ML) INJECTION PRN
Status: CANCELLED | OUTPATIENT
Start: 2020-09-30

## 2020-09-30 RX ADMIN — SODIUM CHLORIDE, PRESERVATIVE FREE 10 ML: 5 INJECTION INTRAVENOUS at 09:44

## 2020-09-30 RX ADMIN — HEPARIN 500 UNITS: 100 SYRINGE at 09:44

## 2020-09-30 ASSESSMENT — PAIN SCALES - GENERAL: PAINLEVEL_OUTOF10: 0

## 2020-10-02 ENCOUNTER — APPOINTMENT (OUTPATIENT)
Dept: INFUSION THERAPY | Age: 65
End: 2020-10-02
Payer: OTHER GOVERNMENT

## 2020-10-05 ENCOUNTER — HOSPITAL ENCOUNTER (OUTPATIENT)
Dept: INFUSION THERAPY | Age: 65
Discharge: HOME | End: 2020-10-05
Payer: OTHER GOVERNMENT

## 2020-10-05 VITALS
BODY MASS INDEX: 26.13 KG/M2 | HEIGHT: 67 IN | SYSTOLIC BLOOD PRESSURE: 130 MMHG | WEIGHT: 166.5 LBS | TEMPERATURE: 96.9 F | DIASTOLIC BLOOD PRESSURE: 78 MMHG | HEART RATE: 96 BPM | OXYGEN SATURATION: 96 %

## 2020-10-05 DIAGNOSIS — C18.9 ADENOCARCINOMA OF COLON (HCC): Primary | ICD-10-CM

## 2020-10-05 LAB
BASOPHILS ABSOLUTE: 0.03 K/UL (ref 0.01–0.08)
BASOPHILS RELATIVE PERCENT: 0.9 % (ref 0.1–1.2)
EOSINOPHILS ABSOLUTE: 0.23 K/UL (ref 0.04–0.54)
EOSINOPHILS RELATIVE PERCENT: 6.5 % (ref 0.7–7)
HCT VFR BLD CALC: 40.7 % (ref 40.1–51)
HEMOGLOBIN: 12.7 G/DL (ref 13.7–17.5)
LYMPHOCYTES ABSOLUTE: 0.77 K/UL (ref 1.18–3.74)
LYMPHOCYTES RELATIVE PERCENT: 21.9 % (ref 19.3–53.1)
MCH RBC QN AUTO: 26.6 PG (ref 25.7–32.2)
MCHC RBC AUTO-ENTMCNC: 31.2 G/DL (ref 32.3–36.5)
MCV RBC AUTO: 85.3 FL (ref 79–92.2)
MONOCYTES ABSOLUTE: 0.72 K/UL (ref 0.24–0.82)
MONOCYTES RELATIVE PERCENT: 20.5 % (ref 4.7–12.5)
NEUTROPHILS ABSOLUTE: 1.77 K/UL (ref 1.56–6.13)
NEUTROPHILS RELATIVE PERCENT: 50.2 % (ref 34–71.1)
PDW BLD-RTO: 21.2 % (ref 11.6–14.4)
PLATELET # BLD: 131 K/UL (ref 163–337)
PMV BLD AUTO: 9.7 FL (ref 7.4–10.4)
PROTEIN UA: POSITIVE
RBC # BLD: 4.77 M/UL (ref 4.63–6.08)
WBC # BLD: 3.52 K/UL (ref 4.23–9.07)

## 2020-10-05 PROCEDURE — 96367 TX/PROPH/DG ADDL SEQ IV INF: CPT

## 2020-10-05 PROCEDURE — 96368 THER/DIAG CONCURRENT INF: CPT

## 2020-10-05 PROCEDURE — 85025 COMPLETE CBC W/AUTO DIFF WBC: CPT

## 2020-10-05 PROCEDURE — 2580000003 HC RX 258: Performed by: INTERNAL MEDICINE

## 2020-10-05 PROCEDURE — 6370000000 HC RX 637 (ALT 250 FOR IP): Performed by: INTERNAL MEDICINE

## 2020-10-05 PROCEDURE — 96375 TX/PRO/DX INJ NEW DRUG ADDON: CPT

## 2020-10-05 PROCEDURE — 96413 CHEMO IV INFUSION 1 HR: CPT

## 2020-10-05 PROCEDURE — 96415 CHEMO IV INFUSION ADDL HR: CPT

## 2020-10-05 PROCEDURE — G0498 CHEMO EXTEND IV INFUS W/PUMP: HCPCS

## 2020-10-05 PROCEDURE — 96411 CHEMO IV PUSH ADDL DRUG: CPT

## 2020-10-05 PROCEDURE — 96417 CHEMO IV INFUS EACH ADDL SEQ: CPT

## 2020-10-05 PROCEDURE — 6360000002 HC RX W HCPCS: Performed by: INTERNAL MEDICINE

## 2020-10-05 RX ORDER — SODIUM CHLORIDE 0.9 % (FLUSH) 0.9 %
10 SYRINGE (ML) INJECTION PRN
Status: CANCELLED | OUTPATIENT
Start: 2020-10-05

## 2020-10-05 RX ORDER — FLUOROURACIL 50 MG/ML
400 INJECTION, SOLUTION INTRAVENOUS ONCE
Status: CANCELLED | OUTPATIENT
Start: 2020-10-05

## 2020-10-05 RX ORDER — METHYLPREDNISOLONE SODIUM SUCCINATE 125 MG/2ML
125 INJECTION, POWDER, LYOPHILIZED, FOR SOLUTION INTRAMUSCULAR; INTRAVENOUS ONCE
Status: CANCELLED | OUTPATIENT
Start: 2020-10-05

## 2020-10-05 RX ORDER — ACETAMINOPHEN 325 MG/1
1000 TABLET ORAL ONCE
Status: CANCELLED
Start: 2020-10-05

## 2020-10-05 RX ORDER — HEPARIN SODIUM (PORCINE) LOCK FLUSH IV SOLN 100 UNIT/ML 100 UNIT/ML
500 SOLUTION INTRAVENOUS PRN
Status: CANCELLED | OUTPATIENT
Start: 2020-10-05

## 2020-10-05 RX ORDER — PALONOSETRON 0.05 MG/ML
0.25 INJECTION, SOLUTION INTRAVENOUS ONCE
Status: CANCELLED | OUTPATIENT
Start: 2020-10-05

## 2020-10-05 RX ORDER — DEXAMETHASONE SODIUM PHOSPHATE 10 MG/ML
10 INJECTION, SOLUTION INTRAMUSCULAR; INTRAVENOUS ONCE
Status: COMPLETED | OUTPATIENT
Start: 2020-10-05 | End: 2020-10-05

## 2020-10-05 RX ORDER — ACETAMINOPHEN 500 MG
1000 TABLET ORAL ONCE
Status: COMPLETED | OUTPATIENT
Start: 2020-10-05 | End: 2020-10-05

## 2020-10-05 RX ORDER — SODIUM CHLORIDE 0.9 % (FLUSH) 0.9 %
5 SYRINGE (ML) INJECTION PRN
Status: CANCELLED | OUTPATIENT
Start: 2020-10-05

## 2020-10-05 RX ORDER — DEXTROSE MONOHYDRATE 50 MG/ML
INJECTION, SOLUTION INTRAVENOUS ONCE
Status: CANCELLED | OUTPATIENT
Start: 2020-10-05

## 2020-10-05 RX ORDER — DIPHENHYDRAMINE HYDROCHLORIDE 50 MG/ML
25 INJECTION INTRAMUSCULAR; INTRAVENOUS ONCE
Status: CANCELLED
Start: 2020-10-05

## 2020-10-05 RX ORDER — DIPHENHYDRAMINE HYDROCHLORIDE 50 MG/ML
25 INJECTION INTRAMUSCULAR; INTRAVENOUS ONCE
Status: COMPLETED | OUTPATIENT
Start: 2020-10-05 | End: 2020-10-05

## 2020-10-05 RX ORDER — EPINEPHRINE 1 MG/ML
0.3 INJECTION, SOLUTION, CONCENTRATE INTRAVENOUS PRN
Status: CANCELLED | OUTPATIENT
Start: 2020-10-05

## 2020-10-05 RX ORDER — FLUOROURACIL 50 MG/ML
400 INJECTION, SOLUTION INTRAVENOUS ONCE
Status: COMPLETED | OUTPATIENT
Start: 2020-10-05 | End: 2020-10-05

## 2020-10-05 RX ORDER — PALONOSETRON 0.05 MG/ML
0.25 INJECTION, SOLUTION INTRAVENOUS ONCE
Status: COMPLETED | OUTPATIENT
Start: 2020-10-05 | End: 2020-10-05

## 2020-10-05 RX ORDER — DIPHENHYDRAMINE HYDROCHLORIDE 50 MG/ML
50 INJECTION INTRAMUSCULAR; INTRAVENOUS ONCE
Status: CANCELLED | OUTPATIENT
Start: 2020-10-05

## 2020-10-05 RX ORDER — SODIUM CHLORIDE 9 MG/ML
INJECTION, SOLUTION INTRAVENOUS ONCE
Status: CANCELLED | OUTPATIENT
Start: 2020-10-05

## 2020-10-05 RX ORDER — SODIUM CHLORIDE 9 MG/ML
INJECTION, SOLUTION INTRAVENOUS CONTINUOUS
Status: CANCELLED | OUTPATIENT
Start: 2020-10-05

## 2020-10-05 RX ADMIN — DIPHENHYDRAMINE HYDROCHLORIDE 25 MG: 50 INJECTION, SOLUTION INTRAMUSCULAR; INTRAVENOUS at 12:28

## 2020-10-05 RX ADMIN — FLUOROURACIL 725 MG: 50 INJECTION, SOLUTION INTRAVENOUS at 16:03

## 2020-10-05 RX ADMIN — PALONOSETRON 0.25 MG: 0.05 INJECTION, SOLUTION INTRAVENOUS at 12:27

## 2020-10-05 RX ADMIN — FLUOROURACIL 4400 MG: 50 INJECTION, SOLUTION INTRAVENOUS at 16:07

## 2020-10-05 RX ADMIN — DEXAMETHASONE SODIUM PHOSPHATE 10 MG: 10 INJECTION, SOLUTION INTRAMUSCULAR; INTRAVENOUS at 12:27

## 2020-10-05 RX ADMIN — ACETAMINOPHEN 1000 MG: 500 TABLET, FILM COATED ORAL at 12:28

## 2020-10-05 RX ADMIN — OXALIPLATIN 150 MG: 5 INJECTION, SOLUTION INTRAVENOUS at 13:20

## 2020-10-05 RX ADMIN — BEVACIZUMAB 352.5 MG: 400 INJECTION, SOLUTION INTRAVENOUS at 15:25

## 2020-10-05 RX ADMIN — LEUCOVORIN CALCIUM 366 MG: 350 INJECTION, POWDER, LYOPHILIZED, FOR SUSPENSION INTRAMUSCULAR; INTRAVENOUS at 13:21

## 2020-10-05 ASSESSMENT — PAIN SCALES - GENERAL: PAINLEVEL_OUTOF10: 0

## 2020-10-07 ENCOUNTER — HOSPITAL ENCOUNTER (OUTPATIENT)
Dept: INFUSION THERAPY | Age: 65
Discharge: HOME | End: 2020-10-07
Payer: OTHER GOVERNMENT

## 2020-10-07 DIAGNOSIS — C18.2 MALIGNANT NEOPLASM OF ASCENDING COLON (HCC): Primary | ICD-10-CM

## 2020-10-07 PROCEDURE — 2580000003 HC RX 258: Performed by: INTERNAL MEDICINE

## 2020-10-07 PROCEDURE — 96523 IRRIG DRUG DELIVERY DEVICE: CPT

## 2020-10-07 PROCEDURE — 6360000002 HC RX W HCPCS: Performed by: INTERNAL MEDICINE

## 2020-10-07 RX ORDER — SODIUM CHLORIDE 0.9 % (FLUSH) 0.9 %
20 SYRINGE (ML) INJECTION PRN
Status: CANCELLED | OUTPATIENT
Start: 2020-10-07

## 2020-10-07 RX ORDER — HEPARIN SODIUM (PORCINE) LOCK FLUSH IV SOLN 100 UNIT/ML 100 UNIT/ML
500 SOLUTION INTRAVENOUS PRN
Status: CANCELLED | OUTPATIENT
Start: 2020-10-07

## 2020-10-07 RX ORDER — HEPARIN SODIUM (PORCINE) LOCK FLUSH IV SOLN 100 UNIT/ML 100 UNIT/ML
500 SOLUTION INTRAVENOUS PRN
Status: DISCONTINUED | OUTPATIENT
Start: 2020-10-07 | End: 2020-10-08 | Stop reason: HOSPADM

## 2020-10-07 RX ORDER — SODIUM CHLORIDE 0.9 % (FLUSH) 0.9 %
10 SYRINGE (ML) INJECTION PRN
Status: DISCONTINUED | OUTPATIENT
Start: 2020-10-07 | End: 2020-10-08 | Stop reason: HOSPADM

## 2020-10-07 RX ORDER — SODIUM CHLORIDE 0.9 % (FLUSH) 0.9 %
10 SYRINGE (ML) INJECTION PRN
Status: CANCELLED | OUTPATIENT
Start: 2020-10-07

## 2020-10-07 RX ADMIN — HEPARIN 500 UNITS: 100 SYRINGE at 11:30

## 2020-10-07 RX ADMIN — SODIUM CHLORIDE, PRESERVATIVE FREE 10 ML: 5 INJECTION INTRAVENOUS at 11:30

## 2020-10-16 NOTE — PROGRESS NOTES
MEDICAL ONCOLOGY PROGRESS NOTE    Pt Name: Anuradha Noriega  MRN: [id number]  YOB: 1955  Date of evaluation: 10/19/2020    HISTORY OF PRESENT ILLNESS:      Diagnosis  · Colonic adenocarcinoma, [id number]  · [id number](liver), stage ROXANA  · IHC MMR- proficient  · K-maria luisa mutated  · N-MARIA LUISA/BRAF wild-type    Treatment summary  · 3/30/2020-right hemicolectomy at [facility]  · Anticipated Liver ablation to be followed by neoadjuvant/adjuvant versus palliative chemotherapy  · 06/10/2020-FOLFOX + Avastin    The patient is a pleasant 63years old male was found to have a right cecal mass consistent with colonic adenocarcinoma. He had local advanced disease with several lymph nodes involved. In addition, the patient was also found to have suspicious liver lesions concerning for metastatic disease. He was seen by [facility] and offered a multimodality approach with liver ablation followed by neoadjuvant chemotherapy and tentatively hepatectomy. He underwent microwave ablation of the left lobe liver lesion on 6/8/2020. He is currently receiving palliative chemotherapy FOLFOX and Avastin added on 7/20/2020. He presents today for cycle #10. Has mild sensory transient cold neuropathy. He denies any nausea vomiting. He has had mild diarrhea. He had MRI performed at [facility] that showed excellent response to treatment. Will complete 12 cycles and rescan. Cancer history  Mr. Lata Odom was first seen by me on 3/23/2020. He was referred for a new diagnosis of colonic adenocarcinoma involving the cecum. The patient reports that he had a wellbeing consult with his provider at the [facility]. He was found to have anemia and then recommended a colonoscopy. Of note, the patient has a family history of colon cancer. His mother is a patient of Dr. Nereyda Crocker he has been diagnosed with colon cancer in 2010. · 3/11/2020- colonoscopy revealed a large malignant appearing fungating mass lesion in the cecum.   In addition, several other polyps. Biopsy of the mass consistent with moderate differentiated colonic adenocarcinoma. Polyps consistent with tubulovillous adenoma with no high-grade dysplasia. IHC MMR not proficient. K-maria luisa mutated, BRAF and NRAS wild type. MSI proficient  · 3/11/2020-CEA 5.5 (H)  · 3/18/2020-CT abdomen pelvis with contrast  Invasive cecal mass adhering to the right lateral abdominal wall muscles with adjacent lymphadenopathy. Mild partial obstruction of the terminal ileum. 2. Suspicious lesions in the right and left hepatic lobes measure up to 1.3 cm and likely represent metastatic disease. · 3/18/2020-Xr Chest Standard  No radiographic evidence of acute cardiopulmonary process. · 3/23/2020-he was first seen by me. Recommended completion of staging with CT chest.  Also recommended liver MRI for further clarification of liver lesion. S.  Recommend to proceed with a general surgery consultation tomorrow with Dr. Maurice Duarte. Patient was informed that I favor surgical resection if feasible of the primary malignancy. · 3/30/2020- right hemicolectomy by Dr. Maurice Duarte at [facility] consistent with invasive moderately differentiated colonic adenocarcinoma measuring 7.2 cm. Carcinoma directly invading the adjacent abdominal wall tissue. Focal lymphovascular space invasion identified. Focal perineural invasion identified. Surgical margins negative for evidence of malignancy. 6 out of 14 lymph nodes positive for metastatic adenocarcinoma. Final pathology staging [id number](liver, stage ROXANA)  · 4/20/2020-CT chest with contrast showed No convincing intrathoracic metastasis. Nonspecific 4 mm nodule of the inferior lingula and a 2 mm right upper lobe nodule can be followed on subsequent imaging in 6-12 months. Moderate coronary calcifications. Hypodense metastatic liver lesions. Small hiatal hernia. · 4/20/2020-Mri Abdomen W Wo Contrast There are about 5 liver lesions.  The 2 largest appears similar compared to 3/18/2020, the others are too small to further characterize. Appearance is most concerning for metastatic disease. Enhancement of the right lateral peritoneum. This is favored to be postoperative as there is no nodularity, evidence of omental disease or lymphadenopathy. Recommend attention on follow-up. Cholecystectomy. · 4/22/2020-discussed with Dr. Regis Campbell at [facility]. He will review imaging studies and give further recommendations regarding eligibility for resection of liver lesions. · 5/8/2020 CT Abdomen The two largest suspicious lesions measuring 1.2 and 1.3 cm in the  right and left hepatic lobes respectively are similar compared to the  3/18/2020 CT. Additionally, there are at least five subcentimeter lesions with similar signal characteristics, which are also highly suspicious for metastases. If complete characterization of the number and distribution of lesions is necessary, an MRI with Eovist could be acquired. · 5/19/2020- he was seen by the hepatobiliary service at [facility] with Dr. Regis Campbell:  patient adequate risk candidate for a multimodal approach, directed toward curative hepatectomy eventually. Endorsed by Hepatobiliary Conference, I recommended perc ablation of the L hemiliver to clear it, followed by systemic therapy in a neoadjuvant strategy. Restaging imaging to confirm clearance of disease on the left and lack of progression to unresectability of the R hemiliver disease would then be followed by R hepatectomy. Limitations to this approach may be accessibility of the segment 4A/8 disease high in the hepatic dome and the possibility of heat sink-related recurrence s/p abation of the left-sided disease. · 5/21/2020- referral for Mediport placement and start FOLFOX in 2 weeks. Will add bevacizumab after 6 weeks of liver ablation. We will plan for 12 biweekly dose of FOLFOX. Bevacizumab will also be stopped 6 weeks prior to major procedure.   · 6/8/2020- name: Not on file    Number of children: Not on file    Years of education: Not on file    Highest education level: Not on file   Occupational History    Not on file   Social Needs    Financial resource strain: Not on file    Food insecurity     Worry: Not on file     Inability: Not on file    Transportation needs     Medical: Not on file     Non-medical: Not on file   Tobacco Use    Smoking status: Never Smoker    Smokeless tobacco: Never Used   Substance and Sexual Activity    Alcohol use: Yes     Comment: a glass of wine     Drug use: No    Sexual activity: Yes     Partners: Female   Lifestyle    Physical activity     Days per week: Not on file     Minutes per session: Not on file    Stress: Not on file   Relationships    Social connections     Talks on phone: Not on file     Gets together: Not on file     Attends [employer] service: Not on file     Active member of club or organization: Not on file     Attends meetings of clubs or organizations: Not on file     Relationship status: Not on file    Intimate partner violence     Fear of current or ex partner: Not on file     Emotionally abused: Not on file     Physically abused: Not on file     Forced sexual activity: Not on file   Other Topics Concern    Not on file   Social History Narrative    Not on file       Family History:   Family History   Problem Relation Age of Onset    Liver Cancer Mother     High Blood Pressure Mother     Colon Cancer Mother         x2    Cancer Father         Lung Cancer    Breast Cancer Sister     Cancer Maternal Grandfather         Lung Cancer    Cancer Paternal Grandfather         Stomach Cancer    Colon Polyps Neg Hx        Current Hospital Medications:    No current facility-administered medications for this visit. Allergies:    Allergies   Allergen Reactions    Oxycodone-Acetaminophen Nausea Only    Morphine Nausea And Vomiting     Hallucinations/Vomiting         Subjective   REVIEW OF SYSTEMS: CONSTITUTIONAL: no fever, no night sweats, no fatigue; mild weight loss  HEENT: no blurring of vision, no double vision, no hearing difficulty, no tinnitus, no ulceration, no dysplasia, no epistaxis;  LUNGS: no cough, no hemoptysis, no wheeze,  no shortness of breath;  CARDIOVASCULAR: no palpitation, no chest pain, no shortness of breath;  GI: no abdominal pain, no nausea, no vomiting,  no constipation;  ANNIE: no dysuria, no hematuria, no frequency or urgency, no nephrolithiasis;  MUSCULOSKELETAL: no joint pain, no swelling, no stiffness;  ENDOCRINE: no polyuria, no polydipsia, no cold or heat intolerance;  HEMATOLOGY: no easy bruising or bleeding, no history of clotting disorder;  DERMATOLOGY: no skin rash, no eczema, no pruritus;  PSYCHIATRY: no depression, no anxiety, no panic attacks, no suicidal ideation, no homicidal ideation;  NEUROLOGY: no syncope, no seizures, no numbness or tingling of hands, no numbness or tingling of feet, no paresis;    Objective   /76 (Site: Right Upper Arm)   Pulse 76   Temp 97 °F (36.1 °C) (Infrared)   Resp 18   Wt 165 lb (74.8 kg)   SpO2 97%   BMI 25.84 kg/m²     PHYSICAL EXAM:  CONSTITUTIONAL: Alert, appropriate, no acute distress  EYES: Non icteric, EOM intact, pupils equal round   ENT: Mucus membranes moist, no oral pharyngeal lesions, external inspection of ears and nose are normal  NECK: Supple, no masses. No palpable thyroid mass  CHEST/LUNGS: CTA bilaterally, normal respiratory effort   CARDIOVASCULAR: RRR, no murmurs. No lower extremity edema  ABDOMEN: Healing surgical scar,  active bowel sounds, no HSM. No palpable masses  EXTREMITIES: warm, full ROM in all 4 extremities, no focal weakness. SKIN: warm, dry with no rashes or lesions  LYMPH: No cervical, clavicular, axillary, or inguinal lymphadenopathy  NEUROLOGIC: follows commands, non focal   PSYCH: mood and affect appropriate.   Alert and oriented to time, place, person    LABORATORY RESULTS REVIEWED BY ME:  9/30/20 CEA: 2.7    RADIOLOGY STUDIES REVIEWED       ASSESSMENT:  #Colonic adenocarcinoma-  [id number] (liver) K-maria luisa mutated, IHC MMR not proficient  Essentially, status post right hemicolectomy. Pathology consistent with locally advanced disease. Unfortunately, stage Puneet due to suspicious liver metastasis. Discussed with hepatobiliary service (Dr Andres Willson) at [facility] regarding possibility of resection/ metastasectomy. He is a status post liver ablation at [facility]. He is currently receiving neoadjuvant chemotherapy with FOLFOX/Avastin. Plans for a possible right hepatectomy after completion of chemotherapy. Proceed with cycle #10. Last CEA = 2.4->1.5    Continue treatment through 12 cycles with Avastin. #Chronic kidney disease stage III- creatinine 1.5/. Referred to nephrology. #Liver metastasis- plan as above. Status post ablation left liver lobe lesion at [facility] on 6/8/2020    Iron deficiency anemia-  hemoglobin 11.8/MCV78. Continue iron sulfate 375 mg p.o. 3 times daily. Ferritin 12.9, iron saturation 15, TIBC 458, iron 72. PLAN:    · Proceed cycle #10/12 FOLFOX/Avastin  · Ordered CMP, CEA, urine protein today  · RTC MD 4 weeks  · RTC for chemo every 2 weeks    I have seen, examined and reviewed this patient medication list, appropriate labs and imaging studies. I reviewed relevant medical records and others physicians notes. I discussed the plans of care with the patient. I answered all the questions to the patients satisfaction. I have also reviewed the chief complaint (CC) and part of the history (History of Present Illness (HPI), Past Family Social History [facility]), or Review of Systems (ROS) and made changes when appropriated.        (Please note that portions of this note were completed with a voice recognition program. Efforts were made to edit the dictations but occasionally words are mis-transcribed.)  I, Dr Ciara Ramirez, personally performed the services described in this documentation as scribed by Mina Vang MA in my presence and is both accurate and complete. Over 50% of the total visit time of 25 minutes in face to face encounter with the patient, out of which more than 50% of the time was spent in counseling patient or family and coordination of care. Counseling included but was not limited to time spent reviewing labs, imaging studies/ treatment plan and answering questions.        Caty Ruiz MD    10/19/20  9:06 AM

## 2020-10-19 ENCOUNTER — OFFICE VISIT (OUTPATIENT)
Dept: HEMATOLOGY | Age: 65
End: 2020-10-19
Payer: OTHER GOVERNMENT

## 2020-10-19 ENCOUNTER — HOSPITAL ENCOUNTER (OUTPATIENT)
Dept: INFUSION THERAPY | Age: 65
Discharge: HOME | End: 2020-10-19
Payer: OTHER GOVERNMENT

## 2020-10-19 VITALS
BODY MASS INDEX: 25.84 KG/M2 | HEART RATE: 76 BPM | WEIGHT: 165 LBS | OXYGEN SATURATION: 97 % | SYSTOLIC BLOOD PRESSURE: 138 MMHG | RESPIRATION RATE: 18 BRPM | DIASTOLIC BLOOD PRESSURE: 76 MMHG | TEMPERATURE: 97 F

## 2020-10-19 DIAGNOSIS — C18.2 MALIGNANT NEOPLASM OF ASCENDING COLON (HCC): ICD-10-CM

## 2020-10-19 DIAGNOSIS — C18.9 ADENOCARCINOMA OF COLON (HCC): Primary | ICD-10-CM

## 2020-10-19 LAB
ALBUMIN SERPL-MCNC: 4.2 G/DL (ref 3.5–5.2)
ALP BLD-CCNC: 144 U/L (ref 40–130)
ALT SERPL-CCNC: 23 U/L (ref 21–72)
ANION GAP SERPL CALCULATED.3IONS-SCNC: 8 MMOL/L (ref 7–19)
AST SERPL-CCNC: 45 U/L (ref 17–59)
BASOPHILS ABSOLUTE: 0.02 K/UL (ref 0.01–0.08)
BASOPHILS RELATIVE PERCENT: 0.6 % (ref 0.1–1.2)
BILIRUB SERPL-MCNC: <0.2 MG/DL (ref 0.2–1.3)
BUN BLDV-MCNC: 14 MG/DL (ref 9–20)
CALCIUM SERPL-MCNC: 9.4 MG/DL (ref 8.4–10.2)
CEA: 2.3 NG/ML (ref 0–3)
CHLORIDE BLD-SCNC: 107 MMOL/L (ref 98–111)
CO2: 24 MMOL/L (ref 22–29)
CREAT SERPL-MCNC: 1.5 MG/DL (ref 0.6–1.2)
EOSINOPHILS ABSOLUTE: 0.2 K/UL (ref 0.04–0.54)
EOSINOPHILS RELATIVE PERCENT: 5.8 % (ref 0.7–7)
GFR NON-AFRICAN AMERICAN: 47
GLOBULIN: 3.6 G/DL
GLUCOSE BLD-MCNC: 109 MG/DL (ref 74–106)
HCT VFR BLD CALC: 35.9 % (ref 40.1–51)
HEMOGLOBIN: 11.8 G/DL (ref 13.7–17.5)
LYMPHOCYTES ABSOLUTE: 0.77 K/UL (ref 1.18–3.74)
LYMPHOCYTES RELATIVE PERCENT: 22.3 % (ref 19.3–53.1)
MCH RBC QN AUTO: 27.2 PG (ref 25.7–32.2)
MCHC RBC AUTO-ENTMCNC: 32.9 G/DL (ref 32.3–36.5)
MCV RBC AUTO: 82.7 FL (ref 79–92.2)
MONOCYTES ABSOLUTE: 0.6 K/UL (ref 0.24–0.82)
MONOCYTES RELATIVE PERCENT: 17.4 % (ref 4.7–12.5)
NEUTROPHILS ABSOLUTE: 1.86 K/UL (ref 1.56–6.13)
NEUTROPHILS RELATIVE PERCENT: 53.9 % (ref 34–71.1)
PDW BLD-RTO: 18.9 % (ref 11.6–14.4)
PLATELET # BLD: 130 K/UL (ref 163–337)
PMV BLD AUTO: 9.5 FL (ref 7.4–10.4)
POTASSIUM SERPL-SCNC: 4.3 MMOL/L (ref 3.5–5.1)
PROTEIN UA: POSITIVE
RBC # BLD: 4.34 M/UL (ref 4.63–6.08)
SODIUM BLD-SCNC: 139 MMOL/L (ref 137–145)
TOTAL PROTEIN: 7.8 G/DL (ref 6.3–8.2)
WBC # BLD: 3.45 K/UL (ref 4.23–9.07)

## 2020-10-19 PROCEDURE — 96367 TX/PROPH/DG ADDL SEQ IV INF: CPT

## 2020-10-19 PROCEDURE — G0498 CHEMO EXTEND IV INFUS W/PUMP: HCPCS

## 2020-10-19 PROCEDURE — 6360000002 HC RX W HCPCS: Performed by: INTERNAL MEDICINE

## 2020-10-19 PROCEDURE — 85025 COMPLETE CBC W/AUTO DIFF WBC: CPT

## 2020-10-19 PROCEDURE — 81002 URINALYSIS NONAUTO W/O SCOPE: CPT | Performed by: INTERNAL MEDICINE

## 2020-10-19 PROCEDURE — 96413 CHEMO IV INFUSION 1 HR: CPT

## 2020-10-19 PROCEDURE — 82378 CARCINOEMBRYONIC ANTIGEN: CPT

## 2020-10-19 PROCEDURE — 6370000000 HC RX 637 (ALT 250 FOR IP): Performed by: INTERNAL MEDICINE

## 2020-10-19 PROCEDURE — 80053 COMPREHEN METABOLIC PANEL: CPT

## 2020-10-19 PROCEDURE — 99214 OFFICE O/P EST MOD 30 MIN: CPT | Performed by: INTERNAL MEDICINE

## 2020-10-19 PROCEDURE — 96375 TX/PRO/DX INJ NEW DRUG ADDON: CPT

## 2020-10-19 PROCEDURE — 2580000003 HC RX 258: Performed by: INTERNAL MEDICINE

## 2020-10-19 PROCEDURE — 96368 THER/DIAG CONCURRENT INF: CPT

## 2020-10-19 PROCEDURE — 96415 CHEMO IV INFUSION ADDL HR: CPT

## 2020-10-19 PROCEDURE — 96411 CHEMO IV PUSH ADDL DRUG: CPT

## 2020-10-19 PROCEDURE — 96417 CHEMO IV INFUS EACH ADDL SEQ: CPT

## 2020-10-19 RX ORDER — HEPARIN SODIUM (PORCINE) LOCK FLUSH IV SOLN 100 UNIT/ML 100 UNIT/ML
500 SOLUTION INTRAVENOUS PRN
Status: CANCELLED | OUTPATIENT
Start: 2020-10-19

## 2020-10-19 RX ORDER — DIPHENHYDRAMINE HYDROCHLORIDE 50 MG/ML
25 INJECTION INTRAMUSCULAR; INTRAVENOUS ONCE
Status: COMPLETED | OUTPATIENT
Start: 2020-10-19 | End: 2020-10-19

## 2020-10-19 RX ORDER — EPINEPHRINE 1 MG/ML
0.3 INJECTION, SOLUTION, CONCENTRATE INTRAVENOUS PRN
Status: CANCELLED | OUTPATIENT
Start: 2020-10-19

## 2020-10-19 RX ORDER — PALONOSETRON 0.05 MG/ML
0.25 INJECTION, SOLUTION INTRAVENOUS ONCE
Status: COMPLETED | OUTPATIENT
Start: 2020-10-19 | End: 2020-10-19

## 2020-10-19 RX ORDER — DEXTROSE MONOHYDRATE 50 MG/ML
INJECTION, SOLUTION INTRAVENOUS ONCE
Status: CANCELLED | OUTPATIENT
Start: 2020-10-19

## 2020-10-19 RX ORDER — FLUOROURACIL 50 MG/ML
400 INJECTION, SOLUTION INTRAVENOUS ONCE
Status: COMPLETED | OUTPATIENT
Start: 2020-10-19 | End: 2020-10-19

## 2020-10-19 RX ORDER — DEXAMETHASONE SODIUM PHOSPHATE 10 MG/ML
10 INJECTION, SOLUTION INTRAMUSCULAR; INTRAVENOUS ONCE
Status: COMPLETED | OUTPATIENT
Start: 2020-10-19 | End: 2020-10-19

## 2020-10-19 RX ORDER — METHYLPREDNISOLONE SODIUM SUCCINATE 125 MG/2ML
125 INJECTION, POWDER, LYOPHILIZED, FOR SOLUTION INTRAMUSCULAR; INTRAVENOUS ONCE
Status: CANCELLED | OUTPATIENT
Start: 2020-10-19

## 2020-10-19 RX ORDER — FLUOROURACIL 50 MG/ML
400 INJECTION, SOLUTION INTRAVENOUS ONCE
Status: CANCELLED | OUTPATIENT
Start: 2020-10-19

## 2020-10-19 RX ORDER — ACETAMINOPHEN 500 MG
1000 TABLET ORAL ONCE
Status: COMPLETED | OUTPATIENT
Start: 2020-10-19 | End: 2020-10-19

## 2020-10-19 RX ORDER — SODIUM CHLORIDE 0.9 % (FLUSH) 0.9 %
10 SYRINGE (ML) INJECTION PRN
Status: CANCELLED | OUTPATIENT
Start: 2020-10-19

## 2020-10-19 RX ORDER — SODIUM CHLORIDE 9 MG/ML
INJECTION, SOLUTION INTRAVENOUS ONCE
Status: CANCELLED | OUTPATIENT
Start: 2020-10-19

## 2020-10-19 RX ORDER — DIPHENHYDRAMINE HYDROCHLORIDE 50 MG/ML
50 INJECTION INTRAMUSCULAR; INTRAVENOUS ONCE
Status: CANCELLED | OUTPATIENT
Start: 2020-10-19

## 2020-10-19 RX ORDER — SODIUM CHLORIDE 9 MG/ML
INJECTION, SOLUTION INTRAVENOUS CONTINUOUS
Status: CANCELLED | OUTPATIENT
Start: 2020-10-19

## 2020-10-19 RX ORDER — SODIUM CHLORIDE 0.9 % (FLUSH) 0.9 %
5 SYRINGE (ML) INJECTION PRN
Status: CANCELLED | OUTPATIENT
Start: 2020-10-19

## 2020-10-19 RX ADMIN — FLUOROURACIL 725 MG: 50 INJECTION, SOLUTION INTRAVENOUS at 13:33

## 2020-10-19 RX ADMIN — FLUOROURACIL 4400 MG: 50 INJECTION, SOLUTION INTRAVENOUS at 13:34

## 2020-10-19 RX ADMIN — ACETAMINOPHEN 1000 MG: 500 TABLET ORAL at 10:02

## 2020-10-19 RX ADMIN — DIPHENHYDRAMINE HYDROCHLORIDE 25 MG: 50 INJECTION, SOLUTION INTRAMUSCULAR; INTRAVENOUS at 10:02

## 2020-10-19 RX ADMIN — DEXAMETHASONE SODIUM PHOSPHATE 10 MG: 10 INJECTION, SOLUTION INTRAMUSCULAR; INTRAVENOUS at 10:02

## 2020-10-19 RX ADMIN — PALONOSETRON 0.25 MG: 0.05 INJECTION, SOLUTION INTRAVENOUS at 10:02

## 2020-10-19 RX ADMIN — LEUCOVORIN CALCIUM 366 MG: 350 INJECTION, POWDER, LYOPHILIZED, FOR SUSPENSION INTRAMUSCULAR; INTRAVENOUS at 10:56

## 2020-10-19 RX ADMIN — OXALIPLATIN 150 MG: 5 INJECTION, SOLUTION INTRAVENOUS at 10:56

## 2020-10-19 RX ADMIN — BEVACIZUMAB 352.5 MG: 400 INJECTION, SOLUTION INTRAVENOUS at 13:00

## 2020-10-19 ASSESSMENT — PAIN SCALES - GENERAL: PAINLEVEL_OUTOF10: 0

## 2020-10-21 ENCOUNTER — HOSPITAL ENCOUNTER (OUTPATIENT)
Dept: INFUSION THERAPY | Age: 65
Discharge: HOME | End: 2020-10-21
Payer: OTHER GOVERNMENT

## 2020-10-21 DIAGNOSIS — C18.2 MALIGNANT NEOPLASM OF ASCENDING COLON (HCC): Primary | ICD-10-CM

## 2020-10-21 PROCEDURE — 2580000003 HC RX 258: Performed by: INTERNAL MEDICINE

## 2020-10-21 PROCEDURE — 6360000002 HC RX W HCPCS: Performed by: INTERNAL MEDICINE

## 2020-10-21 PROCEDURE — 96523 IRRIG DRUG DELIVERY DEVICE: CPT

## 2020-10-21 RX ORDER — SODIUM CHLORIDE 0.9 % (FLUSH) 0.9 %
10 SYRINGE (ML) INJECTION PRN
Status: CANCELLED | OUTPATIENT
Start: 2020-10-21

## 2020-10-21 RX ORDER — SODIUM CHLORIDE 0.9 % (FLUSH) 0.9 %
20 SYRINGE (ML) INJECTION PRN
Status: CANCELLED | OUTPATIENT
Start: 2020-10-21

## 2020-10-21 RX ORDER — SODIUM CHLORIDE 0.9 % (FLUSH) 0.9 %
20 SYRINGE (ML) INJECTION PRN
Status: DISCONTINUED | OUTPATIENT
Start: 2020-10-21 | End: 2020-10-22 | Stop reason: HOSPADM

## 2020-10-21 RX ORDER — HEPARIN SODIUM (PORCINE) LOCK FLUSH IV SOLN 100 UNIT/ML 100 UNIT/ML
500 SOLUTION INTRAVENOUS PRN
Status: DISCONTINUED | OUTPATIENT
Start: 2020-10-21 | End: 2020-10-22 | Stop reason: HOSPADM

## 2020-10-21 RX ORDER — HEPARIN SODIUM (PORCINE) LOCK FLUSH IV SOLN 100 UNIT/ML 100 UNIT/ML
500 SOLUTION INTRAVENOUS PRN
Status: CANCELLED | OUTPATIENT
Start: 2020-10-21

## 2020-10-21 RX ADMIN — HEPARIN 500 UNITS: 100 SYRINGE at 12:24

## 2020-10-21 RX ADMIN — SODIUM CHLORIDE, PRESERVATIVE FREE 20 ML: 5 INJECTION INTRAVENOUS at 12:24

## 2020-12-07 ENCOUNTER — OFFICE VISIT (OUTPATIENT)
Age: 65
End: 2020-12-07

## 2020-12-07 VITALS — HEART RATE: 72 BPM | OXYGEN SATURATION: 97 %

## 2020-12-09 LAB — SARS-COV-2, NAA: NOT DETECTED

## 2020-12-14 ENCOUNTER — HOSPITAL ENCOUNTER (EMERGENCY)
Age: 65
Discharge: ANOTHER HOSPITAL | End: 2020-12-14
Attending: EMERGENCY MEDICINE
Payer: OTHER GOVERNMENT

## 2020-12-14 ENCOUNTER — APPOINTMENT (OUTPATIENT)
Dept: CT IMAGING | Age: 65
End: 2020-12-14
Payer: OTHER GOVERNMENT

## 2020-12-14 VITALS
SYSTOLIC BLOOD PRESSURE: 144 MMHG | WEIGHT: 162 LBS | OXYGEN SATURATION: 94 % | HEART RATE: 110 BPM | HEIGHT: 67 IN | BODY MASS INDEX: 25.43 KG/M2 | TEMPERATURE: 97.8 F | RESPIRATION RATE: 27 BRPM | DIASTOLIC BLOOD PRESSURE: 87 MMHG

## 2020-12-14 LAB
ALBUMIN SERPL-MCNC: 3.5 G/DL (ref 3.5–5.2)
ALP BLD-CCNC: 123 U/L (ref 40–130)
ALT SERPL-CCNC: 223 U/L (ref 5–41)
ANION GAP SERPL CALCULATED.3IONS-SCNC: 14 MMOL/L (ref 7–19)
ANISOCYTOSIS: ABNORMAL
AST SERPL-CCNC: 119 U/L (ref 5–40)
BACTERIA: NEGATIVE /HPF
BASOPHILS ABSOLUTE: 0 K/UL (ref 0–0.2)
BASOPHILS RELATIVE PERCENT: 0 % (ref 0–1)
BILIRUB SERPL-MCNC: 1 MG/DL (ref 0.2–1.2)
BILIRUBIN URINE: NEGATIVE
BLOOD, URINE: NEGATIVE
BUN BLDV-MCNC: 38 MG/DL (ref 8–23)
CALCIUM SERPL-MCNC: 9.9 MG/DL (ref 8.8–10.2)
CHLORIDE BLD-SCNC: 93 MMOL/L (ref 98–111)
CLARITY: CLEAR
CO2: 26 MMOL/L (ref 22–29)
COLOR: ABNORMAL
CREAT SERPL-MCNC: 1.8 MG/DL (ref 0.5–1.2)
CRYSTALS, UA: ABNORMAL /HPF
EOSINOPHILS ABSOLUTE: 0 K/UL (ref 0–0.6)
EOSINOPHILS RELATIVE PERCENT: 0 % (ref 0–5)
EPITHELIAL CELLS, UA: 0 /HPF (ref 0–5)
GFR AFRICAN AMERICAN: 46
GFR NON-AFRICAN AMERICAN: 38
GLUCOSE BLD-MCNC: 152 MG/DL (ref 74–109)
GLUCOSE URINE: NEGATIVE MG/DL
HCT VFR BLD CALC: 31 % (ref 42–52)
HEMOGLOBIN: 9.5 G/DL (ref 14–18)
HYALINE CASTS: 3 /HPF (ref 0–8)
HYPOCHROMIA: ABNORMAL
IMMATURE GRANULOCYTES #: 0 K/UL
KETONES, URINE: ABNORMAL MG/DL
LEUKOCYTE ESTERASE, URINE: ABNORMAL
LIPASE: 12 U/L (ref 13–60)
LYMPHOCYTES ABSOLUTE: 0.6 K/UL (ref 1.1–4.5)
LYMPHOCYTES RELATIVE PERCENT: 5 % (ref 20–40)
MCH RBC QN AUTO: 27.3 PG (ref 27–31)
MCHC RBC AUTO-ENTMCNC: 30.6 G/DL (ref 33–37)
MCV RBC AUTO: 89.1 FL (ref 80–94)
MONOCYTES ABSOLUTE: 1.6 K/UL (ref 0–0.9)
MONOCYTES RELATIVE PERCENT: 13 % (ref 0–10)
NEUTROPHILS ABSOLUTE: 10 K/UL (ref 1.5–7.5)
NEUTROPHILS RELATIVE PERCENT: 82 % (ref 50–65)
NITRITE, URINE: NEGATIVE
PDW BLD-RTO: 18.7 % (ref 11.5–14.5)
PH UA: 6 (ref 5–8)
PLATELET # BLD: 269 K/UL (ref 130–400)
PLATELET SLIDE REVIEW: ADEQUATE
PMV BLD AUTO: 11.6 FL (ref 9.4–12.4)
POLYCHROMASIA: ABNORMAL
POTASSIUM REFLEX MAGNESIUM: 3.6 MMOL/L (ref 3.5–5)
PROTEIN UA: NEGATIVE MG/DL
RBC # BLD: 3.48 M/UL (ref 4.7–6.1)
RBC UA: 2 /HPF (ref 0–4)
SODIUM BLD-SCNC: 133 MMOL/L (ref 136–145)
SPECIFIC GRAVITY UA: 1.03 (ref 1–1.03)
TOTAL PROTEIN: 6.7 G/DL (ref 6.6–8.7)
UROBILINOGEN, URINE: 1 E.U./DL
WBC # BLD: 12.2 K/UL (ref 4.8–10.8)
WBC UA: 1 /HPF (ref 0–5)

## 2020-12-14 PROCEDURE — 96374 THER/PROPH/DIAG INJ IV PUSH: CPT

## 2020-12-14 PROCEDURE — 93005 ELECTROCARDIOGRAM TRACING: CPT | Performed by: EMERGENCY MEDICINE

## 2020-12-14 PROCEDURE — 6360000004 HC RX CONTRAST MEDICATION: Performed by: EMERGENCY MEDICINE

## 2020-12-14 PROCEDURE — 99285 EMERGENCY DEPT VISIT HI MDM: CPT

## 2020-12-14 PROCEDURE — 6360000002 HC RX W HCPCS: Performed by: EMERGENCY MEDICINE

## 2020-12-14 PROCEDURE — 99999 PR OFFICE/OUTPT VISIT,PROCEDURE ONLY: CPT | Performed by: EMERGENCY MEDICINE

## 2020-12-14 PROCEDURE — 74177 CT ABD & PELVIS W/CONTRAST: CPT

## 2020-12-14 PROCEDURE — 83690 ASSAY OF LIPASE: CPT

## 2020-12-14 PROCEDURE — 80053 COMPREHEN METABOLIC PANEL: CPT

## 2020-12-14 PROCEDURE — 81001 URINALYSIS AUTO W/SCOPE: CPT

## 2020-12-14 PROCEDURE — 96376 TX/PRO/DX INJ SAME DRUG ADON: CPT

## 2020-12-14 PROCEDURE — 96361 HYDRATE IV INFUSION ADD-ON: CPT

## 2020-12-14 PROCEDURE — 96375 TX/PRO/DX INJ NEW DRUG ADDON: CPT

## 2020-12-14 PROCEDURE — 2580000003 HC RX 258: Performed by: EMERGENCY MEDICINE

## 2020-12-14 PROCEDURE — 36415 COLL VENOUS BLD VENIPUNCTURE: CPT

## 2020-12-14 PROCEDURE — 85025 COMPLETE CBC W/AUTO DIFF WBC: CPT

## 2020-12-14 RX ORDER — DIPHENHYDRAMINE HYDROCHLORIDE 50 MG/ML
25 INJECTION INTRAMUSCULAR; INTRAVENOUS ONCE
Status: COMPLETED | OUTPATIENT
Start: 2020-12-14 | End: 2020-12-14

## 2020-12-14 RX ORDER — ONDANSETRON 2 MG/ML
4 INJECTION INTRAMUSCULAR; INTRAVENOUS ONCE
Status: COMPLETED | OUTPATIENT
Start: 2020-12-14 | End: 2020-12-14

## 2020-12-14 RX ORDER — HALOPERIDOL 5 MG/ML
5 INJECTION INTRAMUSCULAR ONCE
Status: COMPLETED | OUTPATIENT
Start: 2020-12-14 | End: 2020-12-14

## 2020-12-14 RX ORDER — SODIUM CHLORIDE, SODIUM LACTATE, POTASSIUM CHLORIDE, CALCIUM CHLORIDE 600; 310; 30; 20 MG/100ML; MG/100ML; MG/100ML; MG/100ML
1000 INJECTION, SOLUTION INTRAVENOUS ONCE
Status: COMPLETED | OUTPATIENT
Start: 2020-12-14 | End: 2020-12-14

## 2020-12-14 RX ORDER — FENTANYL CITRATE 50 UG/ML
25 INJECTION, SOLUTION INTRAMUSCULAR; INTRAVENOUS ONCE
Status: COMPLETED | OUTPATIENT
Start: 2020-12-14 | End: 2020-12-14

## 2020-12-14 RX ORDER — SODIUM CHLORIDE, SODIUM LACTATE, POTASSIUM CHLORIDE, CALCIUM CHLORIDE 600; 310; 30; 20 MG/100ML; MG/100ML; MG/100ML; MG/100ML
1000 INJECTION, SOLUTION INTRAVENOUS ONCE
Status: DISCONTINUED | OUTPATIENT
Start: 2020-12-14 | End: 2020-12-15 | Stop reason: HOSPADM

## 2020-12-14 RX ADMIN — DIPHENHYDRAMINE HYDROCHLORIDE 25 MG: 50 INJECTION, SOLUTION INTRAMUSCULAR; INTRAVENOUS at 13:23

## 2020-12-14 RX ADMIN — HALOPERIDOL LACTATE 5 MG: 5 INJECTION, SOLUTION INTRAMUSCULAR at 15:30

## 2020-12-14 RX ADMIN — ONDANSETRON HYDROCHLORIDE 4 MG: 2 SOLUTION INTRAMUSCULAR; INTRAVENOUS at 11:29

## 2020-12-14 RX ADMIN — SODIUM CHLORIDE, POTASSIUM CHLORIDE, SODIUM LACTATE AND CALCIUM CHLORIDE 1000 ML: 600; 310; 30; 20 INJECTION, SOLUTION INTRAVENOUS at 13:23

## 2020-12-14 RX ADMIN — FENTANYL CITRATE 25 MCG: 50 INJECTION, SOLUTION INTRAMUSCULAR; INTRAVENOUS at 15:50

## 2020-12-14 RX ADMIN — HALOPERIDOL LACTATE 5 MG: 5 INJECTION, SOLUTION INTRAMUSCULAR at 13:23

## 2020-12-14 RX ADMIN — IOPAMIDOL 90 ML: 755 INJECTION, SOLUTION INTRAVENOUS at 15:15

## 2020-12-14 ASSESSMENT — ENCOUNTER SYMPTOMS
ABDOMINAL PAIN: 0
SHORTNESS OF BREATH: 0
EYE REDNESS: 0
COUGH: 0
RHINORRHEA: 0
DIARRHEA: 0
NAUSEA: 1
EYE PAIN: 0
VOICE CHANGE: 0
VOMITING: 1

## 2020-12-14 ASSESSMENT — PAIN SCALES - GENERAL
PAINLEVEL_OUTOF10: 0
PAINLEVEL_OUTOF10: 5

## 2020-12-14 NOTE — ED NOTES
[facility] @ [phone] @ [phone] for transfer of patient.      Judy Roman  12/14/20 [id number]

## 2020-12-14 NOTE — DISCHARGE INSTR - COC
Continuity of Care Form    Patient Name: Chester Hobson   :  1955  MRN:  [id number]    Admit date:  2020  Discharge date:  ***    Code Status Order: Prior   Advance Directives:     Admitting Physician:  No admitting provider for patient encounter. PCP: Teri Huston    Discharging Nurse: [facility] Care Unit/Room#: A  Discharging Unit Phone Number: ***    Emergency Contact:   Extended Emergency Contact Information  Primary Emergency Contact: Idania Morfin  Address: [address] [place] [address], [address] Phone: [phone]  Mobile Phone: [phone]  Relation: Spouse    Past Surgical History:  Past Surgical History:   Procedure Laterality Date    ABLATION OF DYSRHYTHMIC FOCUS      ablation of liver at [facility]      CHOLECYSTECTOMY  2013    COLONOSCOPY      when pt was in the 19's    COLONOSCOPY N/A 3/11/2020    COLONOSCOPY POLYPECTOMY SNARE/COLD BIOPSY: Dr. Prasad Crestan colonoscopy: 6-12 months due to findings at colonoscopy today with Cecal mass lesion and large polyps.     COLONOSCOPY      HEMICOLECTOMY Right 3/30/2020    LAPAROSCOPIC-ASSISTED RIGHT HEMICOLECTOMY performed by Tres Collins MD at [address] [place] [address] N/A 6/3/2020    INSERTION OF VENOUS PORT with flouro performed by Tres Collins MD at [place] [address] ENDOSCOPY N/A 3/11/2020    Dr. Guerrero Net:   Nasim Page       Immunization History:   Immunization History   Administered Date(s) Administered    Adenovirus Vaccine, Type 4, Live, Oral 1983    Anthrax (BioThrax) 10/21/2004, 2010    Hepatitis A Adult (Havrix, Vaqta) 10/21/2004, 2006    Hepatitis B Adult (Recombivax HB) 10/14/2009, 2009, 2010    Influenza A (H4P2-17) Vaccine IM 2010    Influenza Virus Vaccine 1991, 2004, 2007, 2008, 2009, 12/15/2010, 2013, 2013, 2015, 10/01/2015, 2016, 10/11/2017, 10/30/2018    Measles/Rubella 10/21/2004    Meningococcal MCV4P (Menactra) 07/20/2010    Meningococcal MPSV4 (Menomune) 03/24/1983, 10/21/2004    Pneumococcal Polysaccharide ([id number]) 02/03/2017    Polio IPV (IPOL) 03/28/1983    Td vaccine (adult) 10/01/2004, 02/02/2006    Tdap (Boostrix, Adacel) 04/08/1983, 10/21/2004, 07/20/2010, 11/01/2015    Typhoid Vi capsular polysaccharide (Typhim VI) 10/21/2004, 07/20/2010    Vaccinia - Smallpox ([id number]) 04/08/1983, 10/21/2004       Active Problems:  Patient Active Problem List   Diagnosis Code    Adenocarcinoma of colon ([facility] [address] C18.9    Malignant neoplasm of ascending colon ([facility] [address] C18.2       Isolation/Infection:   Isolation          No Isolation        Patient Infection Status     None to display          Nurse Assessment:  Last Vital Signs: BP (!) 121/92   Pulse 120   Temp 97.8 °F (36.6 °C) (Oral)   Resp 18   Ht 5' 7\" (1.702 m)   Wt 162 lb (73.5 kg)   SpO2 97%   BMI 25.37 kg/m²     Last documented pain score (0-10 scale):    Last Weight:   Wt Readings from Last 1 Encounters:   12/14/20 162 lb (73.5 kg)     Mental Status:  {IP PT MENTAL STATUS:43157}    IV Access:  { NANYC IV ACCESS:111473804}    Nursing Mobility/ADLs:  Walking   {P DME CTVO:255364603}  Transfer  {P DME QQHR:751134414}  Bathing  {P DME OPEM:971328116}  Dressing  {P DME SEST:251181498}  Toileting  {P DME YMRI:778224136}  Feeding  {P DME AMKF:917602480}  Med Admin  {P DME HPYC:596861958}  Med Delivery   { NANCY MED Delivery:690947967}    Wound Care Documentation and Therapy:        Elimination:  Continence:   · Bowel: {YES / AD:70411}  · Bladder: {YES / SH:55445}  Urinary Catheter: {Urinary Catheter:442432349}   Colostomy/Ileostomy/Ileal Conduit: {YES / HV:31996}       Date of Last BM: ***  No intake or output data in the 24 hours ending 12/14/20 1310  No intake/output data recorded.     Safety Concerns:     Elieser Lei [facility] Safety Concerns:113746036}    Impairments/Disabilities:      [address] NANCY Impairments/Disabilities:219887580}    Nutrition Therapy:  Current Nutrition Therapy:   [address] NANCY Diet List:804653822}    Routes of Feeding: {CHP DME Other Feedings:040952039}  Liquids: {Slp liquid thickness:41463}  Daily Fluid Restriction: {CHP DME Yes amt example:688711504}  Last Modified Barium Swallow with Video (Video Swallowing Test): {Done Not Done XVTQ:655324484}    Treatments at the Time of Hospital Discharge:   Respiratory Treatments: ***  Oxygen Therapy:  {Therapy; copd oxygen:77683}  Ventilator:    {[facility] Vent QEJL:170123110}    Rehab Therapies: {THERAPEUTIC INTERVENTION:0098503257}  Weight Bearing Status/Restrictions: {[facility] Weight Bearin}  Other Medical Equipment (for information only, NOT a DME order):  {EQUIPMENT:151665560}  Other Treatments: ***    Patient's personal belongings (please select all that are sent with patient):  {[facility] DME Belongings:909451157}    RN SIGNATURE:  {Esignature:580416061}    CASE MANAGEMENT/SOCIAL WORK SECTION    Inpatient Status Date: ***    Readmission Risk Assessment Score:  Readmission Risk              Risk of Unplanned Readmission:        0           Discharging to Facility/ Agency   · Name:   · Address:  · Phone:  · Fax:    Dialysis Facility (if applicable)   · Name:  · Address:  · Dialysis Schedule:  · Phone:  · Fax:    / signature: {Esignature:327356280}    PHYSICIAN SECTION    Prognosis: {Prognosis:7924135111}    Condition at Discharge: [address] Patient Condition:359510633}    Rehab Potential (if transferring to Rehab): {Prognosis:1785587836}    Recommended Labs or Other Treatments After Discharge: ***    Physician Certification: I certify the above information and transfer of Sheryl Almeida  is necessary for the continuing treatment of the diagnosis listed and that he requires {Admit to Appropriate Level of Care:28315} for {GREATER/LESS:414971919} 30 days.      Update Admission H&P: {[facility] DME Changes in TOQQ:487728763}    PHYSICIAN SIGNATURE:  {Esignature:929479810}

## 2020-12-14 NOTE — ED PROVIDER NOTES
[facility] EMERGENCY DEPT  EMERGENCY DEPARTMENT ENCOUNTER      Pt Name: Myesha Sosa  MRN: [id number]  [place] 1955  Date of evaluation: 12/14/2020  Provider: Sharita De Oliveira MD    [address] [place] [address]       Chief Complaint   Patient presents with    Nausea     N/V x 1 day    Hiccups         HISTORY OF PRESENT ILLNESS   (Location/Symptom, Timing/Onset,Context/Setting, Quality, Duration, Modifying Factors, Severity)  Note limiting factors. Myesha Sosa is a 63 y.o. male who presents to the emergency department with complaint of hiccups with subsequent nausea and vomiting. Patient had removal of his right hepatic lobe at [facility] 3 days ago for hepatic lesions related to colon cancer. Initially was doing well postoperatively and was discharged home but has had gradual worsening of the symptoms since then. Denies any associated fevers. States abdominal pain is minimal.  States he had similar issues after surgery on his colon in the past with nausea and vomiting that he felt were secondary to the hiccups. Denies any shortness of breath, chest pain, or other symptoms. Emesis is thick and black. HPI    NursingNotes were reviewed. REVIEW OF SYSTEMS    (2-9 systems for level 4, 10 or more for level 5)     Review of Systems   Constitutional: Negative for fatigue and fever. HENT: Negative for congestion, rhinorrhea and voice change. Eyes: Negative for pain and redness. Respiratory: Negative for cough and shortness of breath. Cardiovascular: Negative for chest pain. Gastrointestinal: Positive for nausea and vomiting. Negative for abdominal pain and diarrhea. Endocrine: Negative. Genitourinary: Negative. Musculoskeletal: Negative for arthralgias and gait problem. Skin: Negative for rash and wound. Neurological: Negative for weakness and headaches. Hematological: Negative. Psychiatric/Behavioral: Negative. All other systems reviewed and are negative.       A complete review of systems was performed and is negative except as noted above in the HPI. PAST MEDICAL HISTORY     Past Medical History:   Diagnosis Date    Acid reflux     Cancer ([facility] [address]     adenocarcinoma of colon    GERD (gastroesophageal reflux disease)     PTSD (post-traumatic stress disorder)          SURGICAL HISTORY       Past Surgical History:   Procedure Laterality Date    ABLATION OF DYSRHYTHMIC FOCUS      ablation of liver at [address]  2003    CHOLECYSTECTOMY  2013    COLONOSCOPY      when pt was in the 19's    COLONOSCOPY N/A 3/11/2020    COLONOSCOPY POLYPECTOMY SNARE/COLD BIOPSY: Dr. Getachew Paiz colonoscopy: 6-12 months due to findings at colonoscopy today with Cecal mass lesion and large polyps.     COLONOSCOPY      HEMICOLECTOMY Right 3/30/2020    LAPAROSCOPIC-ASSISTED RIGHT HEMICOLECTOMY performed by Sabi Miller MD at [address] N/A 6/3/2020    INSERTION OF VENOUS PORT with flouro performed by Sabi Miller MD at [place] [address] ENDOSCOPY N/A 3/11/2020    Dr. Moore [place]:   [facility]         CURRENT MEDICATIONS       Previous Medications    BUPROPION (WELLBUTRIN) 100 MG TABLET    Take 100 mg by mouth every morning For mood    CHOLECALCIFEROL (VITAMIN D3) 50 MCG (2000 UT) CAPS    Take by mouth daily    FOLIC ACID (FOLVITE) 1 MG TABLET    Take 1 mg by mouth daily    OMEPRAZOLE (PRILOSEC) 20 MG DELAYED RELEASE CAPSULE    Take 20 mg by mouth daily    ONDANSETRON (ZOFRAN) 8 MG TABLET    Take 1 tablet by mouth every 8 hours as needed for Nausea or Vomiting    PRAZOSIN (MINIPRESS) 1 MG CAPSULE    Take 1 mg by mouth nightly For nightmares    PROMETHAZINE (PHENERGAN) 12.5 MG TABLET    Take 1 tablet by mouth every 6 hours as needed for Nausea    SERTRALINE HCL (ZOLOFT PO)    Take by mouth daily    TOPIRAMATE (TOPAMAX) 100 MG TABLET    Take 100 mg by mouth daily For Seizures or Migraines    ZOLPIDEM TARTRATE (AMBIEN PO)    Take by mouth nightly       ALLERGIES Oxycodone-acetaminophen and Morphine    FAMILY HISTORY       Family History   Problem Relation Age of Onset    Liver Cancer Mother     High Blood Pressure Mother     Colon Cancer Mother         x2    Cancer Father         Lung Cancer    Breast Cancer Sister     Cancer Maternal Grandfather         Lung Cancer    Cancer Paternal Grandfather         Stomach Cancer    Colon Polyps Neg Hx           SOCIAL HISTORY       Social History     Socioeconomic History    Marital status:      Spouse name: None    Number of children: None    Years of education: None    Highest education level: None   Occupational History    None   Social Needs    Financial resource strain: None    Food insecurity     Worry: None     Inability: None    Transportation needs     Medical: None     Non-medical: None   Tobacco Use    Smoking status: Never Smoker    Smokeless tobacco: Never Used   Substance and Sexual Activity    Alcohol use: Yes     Comment: a glass of wine     Drug use: No    Sexual activity: Yes     Partners: Female   Lifestyle    Physical activity     Days per week: None     Minutes per session: None    Stress: None   Relationships    Social connections     Talks on phone: None     Gets together: None     Attends Tenriism service: None     Active member of club or organization: None     Attends meetings of clubs or organizations: None     Relationship status: None    Intimate partner violence     Fear of current or ex partner: None     Emotionally abused: None     Physically abused: None     Forced sexual activity: None   Other Topics Concern    None   Social History Narrative    None       SCREENINGS    [place] Coma Scale  Eye Opening: Spontaneous  Best Verbal Response: Oriented  Best Motor Response: Obeys commands  [place] Coma Scale Score: 15        PHYSICAL EXAM    (up to 7 for level 4, 8 or more for level 5)     ED Triage Vitals [12/14/20 1104]   BP Temp Temp Source Pulse Resp SpO2 Height Weight   (!) 121/92 97.8 °F (36.6 °C) Oral 120 18 97 % 5' 7\" (1.702 m) 162 lb (73.5 kg)       Physical Exam  Vitals signs and nursing note reviewed. Constitutional:       General: He is not in acute distress. Appearance: Normal appearance. He is well-developed. He is not diaphoretic. HENT:      Head: Normocephalic and atraumatic. Mouth/Throat:      Pharynx: No oropharyngeal exudate. Eyes:      General: No scleral icterus. Pupils: Pupils are equal, round, and reactive to light. Neck:      Musculoskeletal: Normal range of motion. Trachea: No tracheal deviation. Cardiovascular:      Rate and Rhythm: Normal rate. Pulses: Normal pulses. Heart sounds: Normal heart sounds. Pulmonary:      Effort: Pulmonary effort is normal.      Breath sounds: Normal breath sounds. No stridor. No wheezing or rhonchi. Abdominal:      General: There is no distension. Palpations: Abdomen is soft. Abdomen is not rigid. Tenderness: There is no abdominal tenderness. There is no guarding. Hernia: No hernia is present. Comments: Right upper quadrant abdominal incision is clean, dry, intact. No active drainage. There is appropriate expected abdominal tenderness for postoperative state   Musculoskeletal:         General: No deformity. Skin:     General: Skin is warm and dry. Findings: No rash. Neurological:      Mental Status: He is alert and oriented to person, place, and time. Cranial Nerves: No cranial nerve deficit.       Coordination: Coordination normal.   Psychiatric:         Behavior: Behavior normal.         DIAGNOSTIC RESULTS     EKG: All EKG's are interpreted by the Emergency Department Physician who either signs or Co-signs this chart in the absence of a cardiologist.        RADIOLOGY:   Non-plain film images such as CT, Ultrasound and MRI are read by the radiologist. Plainradiographic images are visualized and preliminarily interpreted by the emergency physician with the below findings:      Interpretation per the Radiologist below, if available at the time of this note:    CT ABDOMEN PELVIS W IV CONTRAST Additional Contrast? None   Final Result   1. Small bowel obstruction with transition point at the distal small   bowel, just proximal to RIGHT upper quadrant ileocolonic anastomosis. 2.  Postoperative change of RIGHT hepatectomy with small amount of   expected free fluid and intraperitoneal gas. 3.  Redemonstrated LEFT liver lesion. 4.  Small bilateral pleural effusions.    Signed by Dr John Limon on 12/14/2020 3:41 PM            ED BEDSIDE ULTRASOUND:   Performed by ED Physician - none    LABS:  Labs Reviewed   CBC WITH AUTO DIFFERENTIAL - Abnormal; Notable for the following components:       Result Value    WBC 12.2 (*)     RBC 3.48 (*)     Hemoglobin 9.5 (*)     Hematocrit 31.0 (*)     MCHC 30.6 (*)     RDW 18.7 (*)     Neutrophils % 82.0 (*)     Lymphocytes % 5.0 (*)     Monocytes % 13.0 (*)     Neutrophils Absolute 10.0 (*)     Lymphocytes Absolute 0.6 (*)     Monocytes Absolute 1.60 (*)     Anisocytosis 1+ (*)     Polychromasia 1+ (*)     Hypochromia 1+ (*)     All other components within normal limits   COMPREHENSIVE METABOLIC PANEL W/ REFLEX TO MG FOR LOW K - Abnormal; Notable for the following components:    Sodium 133 (*)     Chloride 93 (*)     Glucose 152 (*)     BUN 38 (*)     CREATININE 1.8 (*)     GFR Non- 38 (*)     GFR  46 (*)      (*)      (*)     All other components within normal limits   LIPASE - Abnormal; Notable for the following components:    Lipase 12 (*)     All other components within normal limits   URINE RT REFLEX TO CULTURE - Abnormal; Notable for the following components:    Color, UA DARK YELLOW (*)     Ketones, Urine TRACE (*)     Leukocyte Esterase, Urine TRACE (*)     All other components within normal limits   MICROSCOPIC URINALYSIS - Abnormal; Notable for the following not been transferred yet. Updated wife at this time there is no clear indication for transfer. [FARHAT]   8485 Discussed with Dr. Satinder Knowles with transplant surgery at [facility] who performed patient surgery he was agreeable to accept him in transfer to their service for small bowel obstruction. Does not appear to be directly related to his liver resection and there is not appear to be any sign of superimposed infection at this point. [FARHAT]      ED Course User Index  [FARHAT] Polo Fatima MD       CONSULTS:  None    PROCEDURES:  Unless otherwise notedbelow, none     Procedures      FINAL IMPRESSION     1. Non-intractable vomiting with nausea, unspecified vomiting type    2. Hiccups    3. H/O resection of liver    4. Bilious vomiting with nausea    5. Small bowel obstruction [facility])          DISPOSITION/PLAN   DISPOSITION Decision To Transfer 12/14/2020 04:01:09 PM      PATIENT REFERRED TO:  No follow-up provider specified.     DISCHARGE MEDICATIONS:  New Prescriptions    No medications on file          (Please note that portions of this note were completed with a voice recognition program.  Efforts were made to edit the dictations butoccasionally words are mis-transcribed.)    Polo Jo MD (electronically signed)  AttendingEmergency Physician          Polo Fatima MD  12/14/20 9520

## 2020-12-15 NOTE — ED NOTES
[facility]  EMS arrive to transport patient to [facility] [place], [facility]  12/15/20 5094

## 2020-12-16 LAB
EKG P AXIS: 48 DEGREES
EKG P-R INTERVAL: 154 MS
EKG Q-T INTERVAL: 374 MS
EKG QRS DURATION: 70 MS
EKG QTC CALCULATION (BAZETT): 454 MS
EKG T AXIS: 82 DEGREES

## 2020-12-22 ENCOUNTER — HOSPITAL ENCOUNTER (OUTPATIENT)
Dept: INFUSION THERAPY | Age: 65
Discharge: HOME | End: 2020-12-22
Payer: OTHER GOVERNMENT

## 2020-12-29 NOTE — PROGRESS NOTES
MEDICAL ONCOLOGY PROGRESS NOTE    Pt Name: Susana Rodriguez  MRN: [id number]  YOB: 1955  Date of evaluation: 1/4/2021    HISTORY OF PRESENT ILLNESS:      Diagnosis  · Colonic adenocarcinoma, [id number]  · [id number](liver), stage ROXANA  · IHC MMR- proficient  · K-maria luisa mutated  · N-MARIA LUISA/BRAF wild-type  · Iron deficiency anemia    Treatment summary  · 3/30/2020-right hemicolectomy at [facility]  · Anticipated Liver ablation to be followed by neoadjuvant/adjuvant versus palliative chemotherapy  · 06/10/2020-FOLFOX + Avastin  · 12/11/20- Right hepatectomy-Dr. Jessica Obrien  · Anticipated Injectafer-poor oral iron tolerance    The patient is a pleasant 63years old male was found to have a right cecal mass consistent with colonic adenocarcinoma. He had local advanced disease with several lymph nodes involved. In addition, the patient was also found to have suspicious liver lesions concerning for metastatic disease. He was seen by [facility] and offered a multimodality approach with liver ablation of the left liver lesion followed by neoadjuvant chemotherapy and partial right hepatectomy. He underwent microwave ablation of the left lobe liver lesion on 6/8/2020. He is status post 11 biweekly cycles of FOLFOX/Avastin. He tolerated treatment with complaints of mild transient cold neuropathy. He had a right hepatectomy on 12/11/2020 that showed no residual disease. He was recently seen in the emergency room with complaints of nausea vomiting and a CT of the abdomen was concerning for small bowel obstruction. He was transferred to [facility] and this resolved spontaneously. He denies any nausea vomiting today. He denies abdominal pain. Feels well otherwise. He has also been found to have iron deficiency anemia he had very poor oral iron tolerance. He continues to complains of fatigue    Cancer history  Mr. Vinny Morfin was first seen by me on 3/23/2020.   He was referred for a new diagnosis of colonic adenocarcinoma involving the cecum. The patient reports that he had a wellbeing consult with his provider at the [place]. He was found to have anemia and then recommended a colonoscopy. Of note, the patient has a family history of colon cancer. His mother is a patient of Dr. Reba Schilder he has been diagnosed with colon cancer in 2010. · 3/11/2020- colonoscopy revealed a large malignant appearing fungating mass lesion in the cecum. In addition, several other polyps. Biopsy of the mass consistent with moderate differentiated colonic adenocarcinoma. Polyps consistent with tubulovillous adenoma with no high-grade dysplasia. IHC MMR not proficient. K-maria luisa mutated, BRAF and NRAS wild type. MSI proficient  · 3/11/2020-CEA 5.5 (H)  · 3/18/2020-CT abdomen pelvis with contrast  Invasive cecal mass adhering to the right lateral abdominal wall muscles with adjacent lymphadenopathy. Mild partial obstruction of the terminal ileum. 2. Suspicious lesions in the right and left hepatic lobes measure up to 1.3 cm and likely represent metastatic disease. · 3/18/2020-Xr Chest Standard  No radiographic evidence of acute cardiopulmonary process. · 3/23/2020-he was first seen by me. Recommended completion of staging with CT chest.  Also recommended liver MRI for further clarification of liver lesion. S.  Recommend to proceed with a general surgery consultation tomorrow with Dr. Farheen Shi. Patient was informed that I favor surgical resection if feasible of the primary malignancy. · 3/30/2020- right hemicolectomy by Dr. Farheen Shi at [address] consistent with invasive moderately differentiated colonic adenocarcinoma measuring 7.2 cm. Carcinoma directly invading the adjacent abdominal wall tissue. Focal lymphovascular space invasion identified. Focal perineural invasion identified. Surgical margins negative for evidence of malignancy. 6 out of 14 lymph nodes positive for metastatic adenocarcinoma.   Final pathology staging [id number](liver, stage ROXANA)  · 4/20/2020-CT chest with contrast showed No convincing intrathoracic metastasis. Nonspecific 4 mm nodule of the inferior lingula and a 2 mm right upper lobe nodule can be followed on subsequent imaging in 6-12 months. Moderate coronary calcifications. Hypodense metastatic liver lesions. Small hiatal hernia. · 4/20/2020-Mri Abdomen W Wo Contrast There are about 5 liver lesions. The 2 largest appears similar compared to 3/18/2020, the others are too small to further characterize. Appearance is most concerning for metastatic disease. Enhancement of the right lateral peritoneum. This is favored to be postoperative as there is no nodularity, evidence of omental disease or lymphadenopathy. Recommend attention on follow-up. Cholecystectomy. · 4/22/2020-discussed with Dr. Ivonne Gómez at [place]. He will review imaging studies and give further recommendations regarding eligibility for resection of liver lesions. · 5/8/2020 CT Abdomen The two largest suspicious lesions measuring 1.2 and 1.3 cm in the  right and left hepatic lobes respectively are similar compared to the  3/18/2020 CT. Additionally, there are at least five subcentimeter lesions with similar signal characteristics, which are also highly suspicious for metastases. If complete characterization of the number and distribution of lesions is necessary, an MRI with Eovist could be acquired. · 5/19/2020- he was seen by the hepatobiliary service at [facility] with Dr. Ivonne Gómez:  patient adequate risk candidate for a multimodal approach, directed toward curative hepatectomy eventually. Endorsed by Hepatobiliary Conference, I recommended perc ablation of the L hemiliver to clear it, followed by systemic therapy in a neoadjuvant strategy. Restaging imaging to confirm clearance of disease on the left and lack of progression to unresectability of the R hemiliver disease would then be followed by R hepatectomy. Limitations to this approach may be accessibility of the segment 4A/8 disease high in the hepatic dome and the possibility of heat sink-related recurrence s/p abation of the left-sided disease. · 5/21/2020- referral for Mediport placement and start FOLFOX in 2 weeks. Will add bevacizumab after 6 weeks of liver ablation. We will plan for 12 biweekly dose of FOLFOX. Bevacizumab will also be stopped 6 weeks prior to major procedure. · 6/8/2020- Microwave ablation of the left liver lesion was then performed for 5 minutes at [address] to achieve a 3.4 x 3.9 cm approximately spherical zone of ablation. · 6/10/2020- initiation of FOLFOX. · 7/20/2020-added Avastin. · 9/10/20 MRI abdomen: Left hepatic lobe segment II lesion demonstrates small T1 hyperintense blood products, status post microwave ablation on 6/8/2020. No definitive enhancement within the lesion. Focal internal thickening or scar present. Recommend attention on follow-up. Additional scattered subcentimeter foci throughout the liver decreased in size compared to MRI dated 4/20/2020 consistent with improving metastatic disease. Additional chronic findings as above. · 9/16/2020-discussed with plan with the patient and [place]. Interval response to treatment. Plan to continue chemotherapy through 12 cycles with Avastin. · 12/11/20 Right hepatectomy-Dr. Cash Shown  · 12/11/20 Right hepatectomy pathology: Liver, right, resection: Focus of Fibrosis with calcifications and chronic inflammation (0.3 cm), see comment. Background hepatic parenchyma with minimal periportal fibrosis (trichrome stain), minimal lobular and portal inflammation, and no significant macrovesicular steatosis (<5%) Comments: The patient's history of colorectal cancer status adjuvant therapy is noted. The focus of fibrosis may reflect treatment effect. There is no evidence of viable tumor in the sampled specimen.    · 12/14/20 Ct Abdomen Pelvis W Iv Contrast A Small bowel obstruction with transition point at the distal small bowel, just proximal to RIGHT upper quadrant ileocolonic anastomosis. Postoperative change of RIGHT hepatectomy with small amount of expected free fluid and intraperitoneal gas. Redemonstrated LEFT liver lesion. Small bilateral pleural effusions. · 12/16/20 [facility]: Study is limited due to retained contrast in the small bowel from prior attempt at small bowel follow-through on the floor. Small bowel remains dilated, measuring approximately 5.2 cm, which is consistent with partial or resolving small bowel obstruction. Final radiographs show contrast within the colon. · 1/4/2020-resolution of small bowel obstruction. Past Medical History:    Past Medical History:   Diagnosis Date    Acid reflux     Cancer ([facility] [address]     adenocarcinoma of colon    GERD (gastroesophageal reflux disease)     PTSD (post-traumatic stress disorder)    Liver metastasis    Past Surgical History:    Past Surgical History:   Procedure Laterality Date    ABLATION OF DYSRHYTHMIC FOCUS      ablation of liver at [facility] [address]  2003    CHOLECYSTECTOMY  2013    COLONOSCOPY      when pt was in the 19's    COLONOSCOPY N/A 3/11/2020    COLONOSCOPY POLYPECTOMY SNARE/COLD BIOPSY: Dr. Alvaro Leigh colonoscopy: 6-12 months due to findings at colonoscopy today with Cecal mass lesion and large polyps.     COLONOSCOPY      HEMICOLECTOMY Right 3/30/2020    LAPAROSCOPIC-ASSISTED RIGHT HEMICOLECTOMY performed by Skyler Fragoso MD at [address] [place] [address] N/A 6/3/2020    INSERTION OF VENOUS PORT with flouro performed by Skyler Fragoso MD at [place] [address] ENDOSCOPY N/A 3/11/2020    Dr. Tracy Miranda:   [facility]       Social History:    Social History     Socioeconomic History    Marital status:      Spouse name: Not on file    Number of children: Not on file    Years of education: Not on file    Highest education level: Not on file   Occupational History  Not on file   Social Needs    Financial resource strain: Not on file    Food insecurity     Worry: Not on file     Inability: Not on file    Transportation needs     Medical: Not on file     Non-medical: Not on file   Tobacco Use    Smoking status: Never Smoker    Smokeless tobacco: Never Used   Substance and Sexual Activity    Alcohol use: Yes     Comment: a glass of wine     Drug use: No    Sexual activity: Yes     Partners: Female   Lifestyle    Physical activity     Days per week: Not on file     Minutes per session: Not on file    Stress: Not on file   Relationships    Social connections     Talks on phone: Not on file     Gets together: Not on file     Attends Scientologist service: Not on file     Active member of club or organization: Not on file     Attends meetings of clubs or organizations: Not on file     Relationship status: Not on file    Intimate partner violence     Fear of current or ex partner: Not on file     Emotionally abused: Not on file     Physically abused: Not on file     Forced sexual activity: Not on file   Other Topics Concern    Not on file   Social History Narrative    Not on file       Family History:   Family History   Problem Relation Age of Onset    Liver Cancer Mother     High Blood Pressure Mother     Colon Cancer Mother         x2    Cancer Father         Lung Cancer    Breast Cancer Sister     Cancer Maternal Grandfather         Lung Cancer    Cancer Paternal Grandfather         Stomach Cancer    Colon Polyps Neg Hx        Current Hospital Medications:    No current facility-administered medications for this visit.    Current Outpatient Medications   Medication Sig Dispense Refill    promethazine (PHENERGAN) 12.5 MG tablet Take 1 tablet by mouth every 6 hours as needed for Nausea 60 tablet 5    ondansetron (ZOFRAN) 8 MG tablet Take 1 tablet by mouth every 8 hours as needed for Nausea or Vomiting 30 tablet 5    omeprazole (PRILOSEC) 20 MG delayed release capsule Take 20 mg by mouth daily      Cholecalciferol (VITAMIN D3) 50 MCG (2000 UT) CAPS Take by mouth daily      folic acid (FOLVITE) 1 MG tablet Take 1 mg by mouth daily      prazosin (MINIPRESS) 1 MG capsule Take 1 mg by mouth nightly For nightmares      buPROPion (WELLBUTRIN) 100 MG tablet Take 100 mg by mouth every morning For mood      topiramate (TOPAMAX) 100 MG tablet Take 100 mg by mouth daily For Seizures or Migraines      Sertraline HCl (ZOLOFT PO) Take by mouth daily      Zolpidem Tartrate (AMBIEN PO) Take by mouth nightly       No current facility-administered medications for this visit. Facility-Administered Medications Ordered in Other Visits   Medication Dose Route Frequency Provider Last Rate Last Admin    sodium chloride flush 0.9 % injection 20 mL  20 mL Intravenous PRN Odilia Caballero MD   20 mL at 01/04/21 1333    heparin flush 100 UNIT/ML injection 500 Units  500 Units Intracatheter PRN Odilia Caballero MD   500 Units at 01/04/21 1333       Allergies:    Allergies   Allergen Reactions    Oxycodone-Acetaminophen Nausea Only    Morphine Nausea And Vomiting     Hallucinations/Vomiting         Subjective   REVIEW OF SYSTEMS:   CONSTITUTIONAL: no fever, no night sweats, fatigue;   HEENT: no blurring of vision, no double vision, no hearing difficulty, no tinnitus, no ulceration, no dysplasia, no epistaxis;  LUNGS: no cough, no hemoptysis, no wheeze,  no shortness of breath;  CARDIOVASCULAR: no palpitation, no chest pain, no shortness of breath;  GI: no abdominal pain, no nausea, no vomiting,  no constipation;  ANNIE: no dysuria, no hematuria, no frequency or urgency, no nephrolithiasis;  MUSCULOSKELETAL: no joint pain, no swelling, no stiffness;  ENDOCRINE: no polyuria, no polydipsia, no cold or heat intolerance;  HEMATOLOGY: no easy bruising or bleeding, no history of clotting disorder;  DERMATOLOGY: no skin rash, no eczema, no pruritus;  PSYCHIATRY: no depression, no anxiety, no panic attacks, no suicidal ideation, no homicidal ideation;  NEUROLOGY: no syncope, no seizures, numbness or tingling of hands and feet, no paresis;    Objective   /80   Pulse 104   Temp 97.3 °F (36.3 °C)   Ht 5' 7\" (1.702 m)   Wt 153 lb 12.8 oz (69.8 kg)   SpO2 99%   BMI 24.09 kg/m²     PHYSICAL EXAM:  CONSTITUTIONAL: Alert, appropriate, no acute distress  EYES: Non icteric, EOM intact, pupils equal round   ENT: Mucus membranes moist, no oral pharyngeal lesions, external inspection of ears and nose are normal  NECK: Supple, no masses. No palpable thyroid mass  CHEST/LUNGS: CTA bilaterally, normal respiratory effort   CARDIOVASCULAR: RRR, no murmurs. No lower extremity edema  ABDOMEN: Healing surgical scar,  active bowel sounds, no HSM. No palpable masses  EXTREMITIES: warm, full ROM in all 4 extremities, no focal weakness. SKIN: warm, dry with no rashes or lesions  LYMPH: No cervical, clavicular, axillary, or inguinal lymphadenopathy  NEUROLOGIC: follows commands, non focal   PSYCH: mood and affect appropriate. Alert and oriented to time, place, person    LABORATORY RESULTS REVIEWED BY ME:  11/19/20 CEA: 1.9    12/11/20 Right hepatectomy pathology: Liver, right, resection: Focus of Fibrosis with calcifications and chronic inflammation (0.3 cm), see comment. Background hepatic parenchyma with minimal periportal fibrosis (trichrome stain), minimal lobular and portal inflammation, and no significant macrovesicular steatosis (<5%) Comments: The patient's history of colorectal cancer status adjuvant therapy is noted. The focus of fibrosis may reflect treatment effect. There is no evidence of viable tumor in the sampled specimen. RADIOLOGY STUDIES REVIEWED   Ct Abdomen Pelvis W Iv Contrast Additional Contrast? None    Result Date: 12/14/2020  1. Small bowel obstruction with transition point at the distal small bowel, just proximal to RIGHT upper quadrant ileocolonic anastomosis.  2. Postoperative change of RIGHT hepatectomy with small amount of expected free fluid and intraperitoneal gas. 3.  Redemonstrated LEFT liver lesion. 4.  Small bilateral pleural effusions. Signed by Dr Linus Sun on 12/14/2020 3:41 PM    12/16/20 [facility]: Study is limited due to retained contrast in the small bowel from prior attempt at small bowel follow-through on the floor. Small bowel remains dilated, measuring approximately 5.2 cm, which is consistent with partial or resolving small bowel obstruction. Final radiographs show contrast within the colon. ASSESSMENT:  #Colonic adenocarcinoma-  [id number] (liver) K-maria luisa mutated, IHC MMR not proficient  Status post microwave ablation left hepatic lesion  Status post neoadjuvant FOLFOX/bevacizumab x11 biweekly cycles  Status post right hemicolectomy. Pathology consistent complete pathologic response. Recommended surveillance as per NCCN guidelines  Discussed at length results of pathology with the patient. #Chronic kidney disease stage III- creatinine 1.5/. Referred to nephrology. #Liver metastasis- plan as above. Status post ablation left liver lobe [facility] at [facility] on 6/8/2020. Status post neoadjuvant FOLFOX/bevacizumab x11 biweekly cyclesStatus post right hemicolectomy. Pathology consistent complete pathologic response. Iron deficiency anemia-  hemoglobin 8.5/MCV 89. Ferritin 12.9, iron saturation 15%, TIBC 458, iron 72. Poor p.o. iron tolerance. Recommended IV Injectafer    PLAN:    · Follow-up Dr. Danny Stephen for post-op care  · Build Injectafer  · CT chest without contrast  · CBC    I, Lizandro Gamboa, am scribing for Rebecca Cole MD. Electronically signed by Lizandro Gamboa RN on 1/4/2021 at 12:16 PM CST. I, Dr Vonda Waldron, personally performed the services described in this documentation as scribed by Lizandro Gamboa RN in my presence and is both accurate and complete.     I have seen, examined and reviewed this patient medication list, appropriate labs and imaging studies. I reviewed relevant medical records and others physicians notes. I discussed the plans of care with the patient. I answered all the questions to the patients satisfaction. I have also reviewed the chief complaint (CC) and part of the history (History of Present Illness (HPI), Past Family Social History [facility]), or Review of Systems (ROS) and made changes when appropriated.        (Please note that portions of this note were completed with a voice recognition program. Efforts were made to edit the dictations but occasionally words are mis-transcribed.)      Pamella Angelo MD    01/04/21  12:15 PM

## 2021-01-04 ENCOUNTER — OFFICE VISIT (OUTPATIENT)
Dept: HEMATOLOGY | Age: 66
End: 2021-01-04
Payer: OTHER GOVERNMENT

## 2021-01-04 ENCOUNTER — CLINICAL DOCUMENTATION (OUTPATIENT)
Dept: HEMATOLOGY | Age: 66
End: 2021-01-04

## 2021-01-04 ENCOUNTER — HOSPITAL ENCOUNTER (OUTPATIENT)
Dept: INFUSION THERAPY | Age: 66
Discharge: HOME | End: 2021-01-04
Payer: OTHER GOVERNMENT

## 2021-01-04 VITALS
DIASTOLIC BLOOD PRESSURE: 80 MMHG | TEMPERATURE: 97.3 F | WEIGHT: 153.8 LBS | HEART RATE: 104 BPM | HEIGHT: 67 IN | SYSTOLIC BLOOD PRESSURE: 130 MMHG | BODY MASS INDEX: 24.14 KG/M2 | OXYGEN SATURATION: 99 %

## 2021-01-04 DIAGNOSIS — C18.2 MALIGNANT NEOPLASM OF ASCENDING COLON (HCC): ICD-10-CM

## 2021-01-04 DIAGNOSIS — C18.9 ADENOCARCINOMA OF COLON (HCC): Primary | ICD-10-CM

## 2021-01-04 DIAGNOSIS — Z71.89 CARE PLAN DISCUSSED WITH PATIENT: ICD-10-CM

## 2021-01-04 DIAGNOSIS — C78.7 LIVER METASTASES (HCC): ICD-10-CM

## 2021-01-04 LAB
BASOPHILS ABSOLUTE: 0.02 K/UL (ref 0.01–0.08)
BASOPHILS RELATIVE PERCENT: 0.4 % (ref 0.1–1.2)
EOSINOPHILS ABSOLUTE: 0.27 K/UL (ref 0.04–0.54)
EOSINOPHILS RELATIVE PERCENT: 5.8 % (ref 0.7–7)
HCT VFR BLD CALC: 28.5 % (ref 40.1–51)
HEMOGLOBIN: 8.5 G/DL (ref 13.7–17.5)
LYMPHOCYTES ABSOLUTE: 0.89 K/UL (ref 1.18–3.74)
LYMPHOCYTES RELATIVE PERCENT: 19 % (ref 19.3–53.1)
MCH RBC QN AUTO: 26.6 PG (ref 25.7–32.2)
MCHC RBC AUTO-ENTMCNC: 29.8 G/DL (ref 32.3–36.5)
MCV RBC AUTO: 89.3 FL (ref 79–92.2)
MONOCYTES ABSOLUTE: 0.78 K/UL (ref 0.24–0.82)
MONOCYTES RELATIVE PERCENT: 16.7 % (ref 4.7–12.5)
NEUTROPHILS ABSOLUTE: 2.72 K/UL (ref 1.56–6.13)
NEUTROPHILS RELATIVE PERCENT: 58.1 % (ref 34–71.1)
PDW BLD-RTO: 17.7 % (ref 11.6–14.4)
PLATELET # BLD: 256 K/UL (ref 163–337)
PMV BLD AUTO: 10.2 FL (ref 7.4–10.4)
RBC # BLD: 3.19 M/UL (ref 4.63–6.08)
WBC # BLD: 4.68 K/UL (ref 4.23–9.07)

## 2021-01-04 PROCEDURE — 6360000002 HC RX W HCPCS: Performed by: INTERNAL MEDICINE

## 2021-01-04 PROCEDURE — 2580000003 HC RX 258: Performed by: INTERNAL MEDICINE

## 2021-01-04 PROCEDURE — 36415 COLL VENOUS BLD VENIPUNCTURE: CPT | Performed by: INTERNAL MEDICINE

## 2021-01-04 PROCEDURE — 99213 OFFICE O/P EST LOW 20 MIN: CPT | Performed by: INTERNAL MEDICINE

## 2021-01-04 PROCEDURE — 96523 IRRIG DRUG DELIVERY DEVICE: CPT

## 2021-01-04 PROCEDURE — 85025 COMPLETE CBC W/AUTO DIFF WBC: CPT

## 2021-01-04 RX ORDER — SODIUM CHLORIDE 0.9 % (FLUSH) 0.9 %
20 SYRINGE (ML) INJECTION PRN
Status: CANCELLED | OUTPATIENT
Start: 2021-01-04

## 2021-01-04 RX ORDER — SODIUM CHLORIDE 0.9 % (FLUSH) 0.9 %
10 SYRINGE (ML) INJECTION PRN
Status: CANCELLED | OUTPATIENT
Start: 2021-01-04

## 2021-01-04 RX ORDER — HEPARIN SODIUM (PORCINE) LOCK FLUSH IV SOLN 100 UNIT/ML 100 UNIT/ML
500 SOLUTION INTRAVENOUS PRN
Status: DISCONTINUED | OUTPATIENT
Start: 2021-01-04 | End: 2021-01-05 | Stop reason: HOSPADM

## 2021-01-04 RX ORDER — HEPARIN SODIUM (PORCINE) LOCK FLUSH IV SOLN 100 UNIT/ML 100 UNIT/ML
500 SOLUTION INTRAVENOUS PRN
Status: CANCELLED | OUTPATIENT
Start: 2021-01-04

## 2021-01-04 RX ORDER — SODIUM CHLORIDE 0.9 % (FLUSH) 0.9 %
20 SYRINGE (ML) INJECTION PRN
Status: DISCONTINUED | OUTPATIENT
Start: 2021-01-04 | End: 2021-01-05 | Stop reason: HOSPADM

## 2021-01-04 RX ADMIN — SODIUM CHLORIDE, PRESERVATIVE FREE 20 ML: 5 INJECTION INTRAVENOUS at 13:33

## 2021-01-04 RX ADMIN — HEPARIN 500 UNITS: 100 SYRINGE at 13:33

## 2021-01-05 DIAGNOSIS — D50.8 OTHER IRON DEFICIENCY ANEMIA: ICD-10-CM

## 2021-01-05 PROBLEM — D50.9 IRON DEFICIENCY ANEMIA: Status: ACTIVE | Noted: 2021-01-05

## 2021-01-05 RX ORDER — DIPHENHYDRAMINE HYDROCHLORIDE 50 MG/ML
50 INJECTION INTRAMUSCULAR; INTRAVENOUS ONCE
Status: CANCELLED | OUTPATIENT
Start: 2021-01-18 | End: 2021-01-18

## 2021-01-05 RX ORDER — SODIUM CHLORIDE 0.9 % (FLUSH) 0.9 %
10 SYRINGE (ML) INJECTION PRN
Status: CANCELLED | OUTPATIENT
Start: 2021-01-18

## 2021-01-05 RX ORDER — SODIUM CHLORIDE 9 MG/ML
INJECTION, SOLUTION INTRAVENOUS CONTINUOUS
Status: CANCELLED | OUTPATIENT
Start: 2021-01-18

## 2021-01-05 RX ORDER — HEPARIN SODIUM (PORCINE) LOCK FLUSH IV SOLN 100 UNIT/ML 100 UNIT/ML
500 SOLUTION INTRAVENOUS PRN
Status: CANCELLED | OUTPATIENT
Start: 2021-01-18

## 2021-01-05 RX ORDER — EPINEPHRINE 1 MG/ML
0.3 INJECTION, SOLUTION, CONCENTRATE INTRAVENOUS PRN
Status: CANCELLED | OUTPATIENT
Start: 2021-01-18

## 2021-01-05 RX ORDER — METHYLPREDNISOLONE SODIUM SUCCINATE 125 MG/2ML
125 INJECTION, POWDER, LYOPHILIZED, FOR SOLUTION INTRAMUSCULAR; INTRAVENOUS ONCE
Status: CANCELLED | OUTPATIENT
Start: 2021-01-18 | End: 2021-01-18

## 2021-01-05 RX ORDER — SODIUM CHLORIDE 0.9 % (FLUSH) 0.9 %
5 SYRINGE (ML) INJECTION PRN
Status: CANCELLED | OUTPATIENT
Start: 2021-01-18

## 2021-01-07 ENCOUNTER — HOSPITAL ENCOUNTER (OUTPATIENT)
Dept: CT IMAGING | Age: 66
Discharge: HOME | End: 2021-01-07
Payer: OTHER GOVERNMENT

## 2021-01-07 DIAGNOSIS — C18.9 ADENOCARCINOMA OF COLON (HCC): ICD-10-CM

## 2021-01-07 PROCEDURE — 71250 CT THORAX DX C-: CPT

## 2021-01-18 ENCOUNTER — HOSPITAL ENCOUNTER (OUTPATIENT)
Dept: INFUSION THERAPY | Age: 66
Discharge: HOME | End: 2021-01-18
Payer: OTHER GOVERNMENT

## 2021-01-18 VITALS
DIASTOLIC BLOOD PRESSURE: 69 MMHG | WEIGHT: 153 LBS | BODY MASS INDEX: 23.96 KG/M2 | OXYGEN SATURATION: 98 % | TEMPERATURE: 96.8 F | HEART RATE: 68 BPM | SYSTOLIC BLOOD PRESSURE: 106 MMHG

## 2021-01-18 DIAGNOSIS — D50.8 OTHER IRON DEFICIENCY ANEMIA: ICD-10-CM

## 2021-01-18 DIAGNOSIS — C18.9 ADENOCARCINOMA OF COLON (HCC): Primary | ICD-10-CM

## 2021-01-18 LAB
BASOPHILS ABSOLUTE: 0.02 K/UL (ref 0.01–0.08)
BASOPHILS RELATIVE PERCENT: 0.3 % (ref 0.1–1.2)
EOSINOPHILS ABSOLUTE: 0.58 K/UL (ref 0.04–0.54)
EOSINOPHILS RELATIVE PERCENT: 10 % (ref 0.7–7)
HCT VFR BLD CALC: 26.6 % (ref 40.1–51)
HEMOGLOBIN: 8.4 G/DL (ref 13.7–17.5)
LYMPHOCYTES ABSOLUTE: 0.99 K/UL (ref 1.18–3.74)
LYMPHOCYTES RELATIVE PERCENT: 17.1 % (ref 19.3–53.1)
MCH RBC QN AUTO: 24.9 PG (ref 25.7–32.2)
MCHC RBC AUTO-ENTMCNC: 31.6 G/DL (ref 32.3–36.5)
MCV RBC AUTO: 78.7 FL (ref 79–92.2)
MONOCYTES ABSOLUTE: 0.82 K/UL (ref 0.24–0.82)
MONOCYTES RELATIVE PERCENT: 14.2 % (ref 4.7–12.5)
NEUTROPHILS ABSOLUTE: 3.38 K/UL (ref 1.56–6.13)
NEUTROPHILS RELATIVE PERCENT: 58.4 % (ref 34–71.1)
PDW BLD-RTO: 18.5 % (ref 11.6–14.4)
PLATELET # BLD: 226 K/UL (ref 163–337)
PMV BLD AUTO: 9.9 FL (ref 7.4–10.4)
RBC # BLD: 3.38 M/UL (ref 4.63–6.08)
WBC # BLD: 5.79 K/UL (ref 4.23–9.07)

## 2021-01-18 PROCEDURE — 96365 THER/PROPH/DIAG IV INF INIT: CPT

## 2021-01-18 PROCEDURE — 2580000003 HC RX 258: Performed by: INTERNAL MEDICINE

## 2021-01-18 PROCEDURE — 6360000002 HC RX W HCPCS: Performed by: INTERNAL MEDICINE

## 2021-01-18 PROCEDURE — 85025 COMPLETE CBC W/AUTO DIFF WBC: CPT

## 2021-01-18 PROCEDURE — 96367 TX/PROPH/DG ADDL SEQ IV INF: CPT

## 2021-01-18 RX ORDER — EPINEPHRINE 1 MG/ML
0.3 INJECTION, SOLUTION, CONCENTRATE INTRAVENOUS PRN
Status: CANCELLED | OUTPATIENT
Start: 2021-01-25

## 2021-01-18 RX ORDER — HEPARIN SODIUM (PORCINE) LOCK FLUSH IV SOLN 100 UNIT/ML 100 UNIT/ML
500 SOLUTION INTRAVENOUS PRN
Status: DISCONTINUED | OUTPATIENT
Start: 2021-01-18 | End: 2021-01-19 | Stop reason: HOSPADM

## 2021-01-18 RX ORDER — SODIUM CHLORIDE 0.9 % (FLUSH) 0.9 %
10 SYRINGE (ML) INJECTION PRN
Status: CANCELLED | OUTPATIENT
Start: 2021-01-25

## 2021-01-18 RX ORDER — METHYLPREDNISOLONE SODIUM SUCCINATE 125 MG/2ML
125 INJECTION, POWDER, LYOPHILIZED, FOR SOLUTION INTRAMUSCULAR; INTRAVENOUS ONCE
Status: CANCELLED | OUTPATIENT
Start: 2021-01-25 | End: 2021-01-25

## 2021-01-18 RX ORDER — DIPHENHYDRAMINE HYDROCHLORIDE 50 MG/ML
50 INJECTION INTRAMUSCULAR; INTRAVENOUS ONCE
Status: CANCELLED | OUTPATIENT
Start: 2021-01-25 | End: 2021-01-25

## 2021-01-18 RX ORDER — SODIUM CHLORIDE 9 MG/ML
INJECTION, SOLUTION INTRAVENOUS CONTINUOUS
Status: CANCELLED | OUTPATIENT
Start: 2021-01-25

## 2021-01-18 RX ORDER — SODIUM CHLORIDE 0.9 % (FLUSH) 0.9 %
10 SYRINGE (ML) INJECTION PRN
Status: DISCONTINUED | OUTPATIENT
Start: 2021-01-18 | End: 2021-01-19 | Stop reason: HOSPADM

## 2021-01-18 RX ORDER — SODIUM CHLORIDE 0.9 % (FLUSH) 0.9 %
5 SYRINGE (ML) INJECTION PRN
Status: CANCELLED | OUTPATIENT
Start: 2021-01-25

## 2021-01-18 RX ORDER — HEPARIN SODIUM (PORCINE) LOCK FLUSH IV SOLN 100 UNIT/ML 100 UNIT/ML
500 SOLUTION INTRAVENOUS PRN
Status: CANCELLED | OUTPATIENT
Start: 2021-01-25

## 2021-01-18 RX ADMIN — HEPARIN 500 UNITS: 100 SYRINGE at 11:10

## 2021-01-18 RX ADMIN — SODIUM CHLORIDE, PRESERVATIVE FREE 10 ML: 5 INJECTION INTRAVENOUS at 11:10

## 2021-01-18 RX ADMIN — FERRIC CARBOXYMALTOSE INJECTION 750 MG: 50 INJECTION, SOLUTION INTRAVENOUS at 10:52

## 2021-01-25 ENCOUNTER — HOSPITAL ENCOUNTER (OUTPATIENT)
Dept: INFUSION THERAPY | Age: 66
Discharge: HOME | End: 2021-01-25
Payer: OTHER GOVERNMENT

## 2021-01-25 VITALS
DIASTOLIC BLOOD PRESSURE: 64 MMHG | HEIGHT: 67 IN | WEIGHT: 156 LBS | HEART RATE: 95 BPM | OXYGEN SATURATION: 98 % | BODY MASS INDEX: 24.48 KG/M2 | SYSTOLIC BLOOD PRESSURE: 122 MMHG | TEMPERATURE: 96.8 F

## 2021-01-25 DIAGNOSIS — C18.9 ADENOCARCINOMA OF COLON (HCC): ICD-10-CM

## 2021-01-25 DIAGNOSIS — D50.8 OTHER IRON DEFICIENCY ANEMIA: Primary | ICD-10-CM

## 2021-01-25 LAB
BASOPHILS ABSOLUTE: 0.03 K/UL (ref 0.01–0.08)
BASOPHILS RELATIVE PERCENT: 0.6 % (ref 0.1–1.2)
EOSINOPHILS ABSOLUTE: 0.34 K/UL (ref 0.04–0.54)
EOSINOPHILS RELATIVE PERCENT: 6.3 % (ref 0.7–7)
HCT VFR BLD CALC: 31 % (ref 40.1–51)
HEMOGLOBIN: 9.6 G/DL (ref 13.7–17.5)
LYMPHOCYTES ABSOLUTE: 0.96 K/UL (ref 1.18–3.74)
LYMPHOCYTES RELATIVE PERCENT: 17.8 % (ref 19.3–53.1)
MCH RBC QN AUTO: 25.5 PG (ref 25.7–32.2)
MCHC RBC AUTO-ENTMCNC: 31 G/DL (ref 32.3–36.5)
MCV RBC AUTO: 82.4 FL (ref 79–92.2)
MONOCYTES ABSOLUTE: 0.69 K/UL (ref 0.24–0.82)
MONOCYTES RELATIVE PERCENT: 12.8 % (ref 4.7–12.5)
NEUTROPHILS ABSOLUTE: 3.37 K/UL (ref 1.56–6.13)
NEUTROPHILS RELATIVE PERCENT: 62.5 % (ref 34–71.1)
PDW BLD-RTO: 24.4 % (ref 11.6–14.4)
PLATELET # BLD: 207 K/UL (ref 163–337)
PMV BLD AUTO: 10.8 FL (ref 7.4–10.4)
RBC # BLD: 3.76 M/UL (ref 4.63–6.08)
WBC # BLD: 5.39 K/UL (ref 4.23–9.07)

## 2021-01-25 PROCEDURE — 96365 THER/PROPH/DIAG IV INF INIT: CPT

## 2021-01-25 PROCEDURE — 85025 COMPLETE CBC W/AUTO DIFF WBC: CPT

## 2021-01-25 PROCEDURE — 6360000002 HC RX W HCPCS: Performed by: INTERNAL MEDICINE

## 2021-01-25 PROCEDURE — 2580000003 HC RX 258: Performed by: INTERNAL MEDICINE

## 2021-01-25 RX ORDER — SODIUM CHLORIDE 9 MG/ML
INJECTION, SOLUTION INTRAVENOUS CONTINUOUS
Status: CANCELLED | OUTPATIENT
Start: 2021-02-01

## 2021-01-25 RX ORDER — SODIUM CHLORIDE 0.9 % (FLUSH) 0.9 %
5 SYRINGE (ML) INJECTION PRN
Status: CANCELLED | OUTPATIENT
Start: 2021-02-01

## 2021-01-25 RX ORDER — METHYLPREDNISOLONE SODIUM SUCCINATE 125 MG/2ML
125 INJECTION, POWDER, LYOPHILIZED, FOR SOLUTION INTRAMUSCULAR; INTRAVENOUS ONCE
Status: CANCELLED | OUTPATIENT
Start: 2021-02-01 | End: 2021-02-01

## 2021-01-25 RX ORDER — HEPARIN SODIUM (PORCINE) LOCK FLUSH IV SOLN 100 UNIT/ML 100 UNIT/ML
500 SOLUTION INTRAVENOUS PRN
Status: CANCELLED | OUTPATIENT
Start: 2021-02-01

## 2021-01-25 RX ORDER — HEPARIN SODIUM (PORCINE) LOCK FLUSH IV SOLN 100 UNIT/ML 100 UNIT/ML
500 SOLUTION INTRAVENOUS PRN
Status: DISCONTINUED | OUTPATIENT
Start: 2021-01-25 | End: 2021-01-27 | Stop reason: HOSPADM

## 2021-01-25 RX ORDER — EPINEPHRINE 1 MG/ML
0.3 INJECTION, SOLUTION, CONCENTRATE INTRAVENOUS PRN
Status: CANCELLED | OUTPATIENT
Start: 2021-02-01

## 2021-01-25 RX ORDER — DIPHENHYDRAMINE HYDROCHLORIDE 50 MG/ML
50 INJECTION INTRAMUSCULAR; INTRAVENOUS ONCE
Status: CANCELLED | OUTPATIENT
Start: 2021-02-01 | End: 2021-02-01

## 2021-01-25 RX ORDER — SODIUM CHLORIDE 0.9 % (FLUSH) 0.9 %
10 SYRINGE (ML) INJECTION PRN
Status: CANCELLED | OUTPATIENT
Start: 2021-02-01

## 2021-01-25 RX ADMIN — FERRIC CARBOXYMALTOSE INJECTION 750 MG: 50 INJECTION, SOLUTION INTRAVENOUS at 10:43

## 2021-01-25 RX ADMIN — HEPARIN 500 UNITS: 100 SYRINGE at 11:24

## 2021-03-08 ENCOUNTER — OFFICE VISIT (OUTPATIENT)
Dept: GASTROENTEROLOGY | Age: 66
End: 2021-03-08
Payer: OTHER GOVERNMENT

## 2021-03-08 VITALS
HEART RATE: 76 BPM | SYSTOLIC BLOOD PRESSURE: 138 MMHG | HEIGHT: 67 IN | DIASTOLIC BLOOD PRESSURE: 82 MMHG | BODY MASS INDEX: 25.36 KG/M2 | WEIGHT: 161.6 LBS

## 2021-03-08 DIAGNOSIS — D64.9 NORMOCYTIC ANEMIA: ICD-10-CM

## 2021-03-08 DIAGNOSIS — Z85.038 HISTORY OF COLON CANCER, STAGE IV: Primary | ICD-10-CM

## 2021-03-08 DIAGNOSIS — Z80.0 FAMILY HISTORY OF COLON CANCER: ICD-10-CM

## 2021-03-08 DIAGNOSIS — Z86.010 HX OF ADENOMATOUS COLONIC POLYPS: ICD-10-CM

## 2021-03-08 PROCEDURE — 99213 OFFICE O/P EST LOW 20 MIN: CPT | Performed by: INTERNAL MEDICINE

## 2021-03-08 ASSESSMENT — ENCOUNTER SYMPTOMS
EYE ITCHING: 0
GASTROINTESTINAL NEGATIVE: 1
EYE REDNESS: 0
EYES NEGATIVE: 1
SINUS PRESSURE: 0
CHEST TIGHTNESS: 0
BLOOD IN STOOL: 0
DIARRHEA: 0
ALLERGIC/IMMUNOLOGIC NEGATIVE: 1
RECTAL PAIN: 0
TROUBLE SWALLOWING: 0
EYE PAIN: 0
RHINORRHEA: 0
VOMITING: 0
COUGH: 0
SORE THROAT: 0
WHEEZING: 0
SHORTNESS OF BREATH: 0
VOICE CHANGE: 0
CONSTIPATION: 0
ABDOMINAL PAIN: 0
RESPIRATORY NEGATIVE: 1
NAUSEA: 0
ABDOMINAL DISTENTION: 0

## 2021-03-08 NOTE — PROGRESS NOTES
Denise Morfin  Primary Care Provider KALPESH Chino NP  Referral Source No ref. provider found    Viji Matos is a 61 y.o. male  ChiefComplaint:  Chief Complaint   Patient presents with    Colonoscopy     1 year recall        Assessment / Plan:   Diagnosis Orders   1. History of colon cancer, stage IV     2. Hx of adenomatous colonic polyps     3. Family history of colon cancer     4. Normocytic anemia       #1 we will schedule the patient for an outpatient colonoscopy exam for colorectal cancer surveillance. I have discussed the benefits, alternatives, and risks (including bleeding, perforation and death)  for pursuing Endoscopy (EGD/Colonscopy/EUS/ERCP) with the patient and they are willing to continue. We also discussed the need for anesthesia, IV access, proper dietary changes, medication changes if necessary, and need for bowel prep (if ordered) prior to their Endoscopic procedure. They are aware they must have someone accompany them to their scheduled procedure to drive them home - they agree to the above and are willing to continue. Plan for anesthesia: MAC    #2. He will keep his scheduled follow-up appointments with his Hematologist-Oncologist Dr. Karely Rouse and at [facility] with his surgical oncologist Dr. Mani Montalvo. Have his hemoglobin and hematocrit monitored periodically until his anemia has resolved. I have seen, examined and reviewed this patient medication list, appropriate labs and imaging studies. I reviewed relevant medical records and others physicians notes. I discussed the plans of care with the patient. I answered all the questions to the patients satisfaction.     Also, all of the information input into this note by the MA today including chief complaint, past medical history, past surgical history, updated medication list, allergies, social and family history and a ROS (review of systems) has been reviewed by me during the patient's office visit and updated as Cancer Mother     High Blood Pressure Mother     Colon Cancer Mother         x2    Cancer Father         Lung Cancer    Breast Cancer Sister     Cancer Maternal Grandfather         Lung Cancer    Cancer Paternal Grandfather         Stomach Cancer    Colon Polyps Neg Hx     Cystic Fibrosis Neg Hx     Liver Disease Neg Hx     Rectal Cancer Neg Hx       Current Outpatient Medications   Medication Sig Dispense Refill    promethazine (PHENERGAN) 12.5 MG tablet Take 1 tablet by mouth every 6 hours as needed for Nausea 60 tablet 5    ondansetron (ZOFRAN) 8 MG tablet Take 1 tablet by mouth every 8 hours as needed for Nausea or Vomiting 30 tablet 5    omeprazole (PRILOSEC) 20 MG delayed release capsule Take 20 mg by mouth daily      Cholecalciferol (VITAMIN D3) 50 MCG (2000 UT) CAPS Take by mouth daily      folic acid (FOLVITE) 1 MG tablet Take 1 mg by mouth daily      prazosin (MINIPRESS) 1 MG capsule Take 1 mg by mouth nightly For nightmares      buPROPion (WELLBUTRIN) 100 MG tablet Take 100 mg by mouth every morning For mood      topiramate (TOPAMAX) 100 MG tablet Take 100 mg by mouth daily For Seizures or Migraines      Sertraline HCl (ZOLOFT PO) Take by mouth daily      Zolpidem Tartrate (AMBIEN PO) Take by mouth nightly       No current facility-administered medications for this visit. Allergies   Allergen Reactions    Oxycodone-Acetaminophen Nausea Only    Morphine Nausea And Vomiting     Hallucinations/Vomiting        SOCIAL HISTORY:   reports that he has never smoked. He has never used smokeless tobacco. He reports current alcohol use. He reports that he does not use drugs. Review of Systems   Constitutional: Negative. Negative for activity change, appetite change, chills, fatigue, fever and unexpected weight change. HENT: Negative. Negative for ear pain, mouth sores, nosebleeds, rhinorrhea, sinus pressure, sneezing, sore throat, trouble swallowing and voice change.     Eyes: and neck supple. No neck rigidity or muscular tenderness. Vascular: No carotid bruit. Cardiovascular:      Rate and Rhythm: Normal rate and regular rhythm. Pulses: Normal pulses. Heart sounds: Normal heart sounds. No murmur. No friction rub. Pulmonary:      Effort: Pulmonary effort is normal. No respiratory distress. Breath sounds: Normal breath sounds. No wheezing or rhonchi. Abdominal:      General: Abdomen is flat. A surgical scar is present. Bowel sounds are normal. There is no distension. Palpations: Abdomen is soft. There is no hepatomegaly, splenomegaly or mass. Tenderness: There is no abdominal tenderness. There is no right CVA tenderness, left CVA tenderness or guarding. Hernia: No hernia is present. Musculoskeletal: Normal range of motion. General: No swelling or tenderness. Right lower leg: No edema. Left lower leg: No edema. Lymphadenopathy:      Cervical: No cervical adenopathy. Skin:     General: Skin is warm. Capillary Refill: Capillary refill takes less than 2 seconds. Coloration: Skin is not jaundiced. Findings: No bruising or rash. Neurological:      General: No focal deficit present. Mental Status: He is alert and oriented to person, place, and time. Coordination: Coordination normal.      Gait: Gait normal.   Psychiatric:         Mood and Affect: Mood normal.         Behavior: Behavior normal.         Thought Content:  Thought content normal.         Judgment: Judgment normal.         Labs:  Lab Results   Component Value Date    WBC 5.39 01/25/2021    HGB 9.6 (L) 01/25/2021    HCT 31.0 (L) 01/25/2021    MCV 82.4 01/25/2021     01/25/2021      Lab Results   Component Value Date     01/04/2021    K 4.7 01/04/2021     01/04/2021    CO2 20 01/04/2021    BUN 10 01/04/2021    CREATININE 1.06 01/04/2021    GLUCOSE 85 01/04/2021    CALCIUM 9.0 01/04/2021    PROT 6.9 01/04/2021    LABALBU 3.2 (L) 01/04/2021    BILITOT 0.4 01/04/2021    ALKPHOS 302 (H) 01/04/2021    AST 30 01/04/2021    ALT 33 01/04/2021    LABGLOM 74 01/04/2021    GFRAA 86 01/04/2021    AGRATIO 0.9 (L) 01/04/2021    GLOB 3.7 01/04/2021      No results found for: APTT  No results found for: LABA1C  No results found for: EAG   No results found for: HAV, HEPAIGM, HEPBIGM, HEPBCAB, HBEAG, HEPCAB  No results found for: AFPAMN   Lab Results   Component Value Date    CEA 2.2 01/04/2021

## 2021-03-09 NOTE — PATIENT INSTRUCTIONS
Patient Education        Learning About Colonoscopy  What is a colonoscopy? A colonoscopy is a test (also called a procedure) that lets a doctor look inside your large intestine. The doctor uses a thin, lighted tube called a colonoscope. The doctor uses it to look for small growths called polyps, colon or rectal cancer (colorectal cancer), or other problems like bleeding. During the procedure, the doctor can take samples of tissue. The samples can then be checked for cancer or other conditions. The doctor can also take out polyps. How is a colonoscopy done? This procedure is done in a doctor's office or a clinic or hospital. You will get medicine to help you relax and not feel pain. Some people find that they don't remember having the test because of the medicine. The doctor gently moves the colonoscope, or scope, through the colon. The scope is also a small video camera. It lets the doctor see the colon and take pictures. How do you prepare for the procedure? You need to clean out your colon before the procedure so the doctor can see all of your colon. This process may start a day or two before the test. This depends on which \"colon prep\" your doctor recommends. To clean your colon, you stop eating solid foods and drink only clear liquids. You can have water, tea, coffee, clear juices, clear broths, flavored ice pops, and gelatin (such as Jell-O). Do not drink anything red or purple. The day or night before the procedure, you drink a large amount of a special liquid. This causes loose, frequent stools. You will go to the bathroom a lot. It's very important to drink all of the liquid. If you have problems drinking it, call your doctor. Some people don't go to work or do their usual activities on the day of the prep. Arrange to have someone take you home after the test.  What can you expect after a colonoscopy? Your doctor will tell you when you can eat and do your usual activities.   Drink a lot of fluid after the test to replace the fluids you may have lost during the colon prep. But don't drink alcohol. Your doctor will talk to you about when you'll need your next colonoscopy. The results of your test and your risk for colorectal cancer will help your doctor decide how often you need to be checked. After the test, you may be bloated or have gas pains. You may need to pass gas. If a biopsy was done or a polyp was removed, you may have streaks of blood in your stool (feces) for a few days. If polyps were taken out, your doctor may tell you to avoid taking aspirin and nonsteroidal anti-inflammatory drugs (NSAIDs) for 7 to 14 days. Problems such as heavy rectal bleeding may not occur until several weeks after the test. This isn't common. But it can happen after polyps are removed. Follow-up care is a key part of your treatment and safety. Be sure to make and go to all appointments, and call your doctor if you are having problems. It's also a good idea to know your test results and keep a list of the medicines you take. Where can you learn more? Go to https://Lanzaloya.com.LUBB-TEX. org and sign in to your [employer] account. Enter H200 in the [employer] box to learn more about \"Learning About Colonoscopy. \"     If you do not have an account, please click on the \"Sign Up Now\" link. Current as of: April 29, 2020               Content Version: 12.6  © 6174-4343 [employer]. Care instructions adapted under license by [facility] ([facility]). If you have questions about a medical condition or this instruction, always ask your healthcare professional. [place] [address] any warranty or liability for your use of this information. Patient Education        Colon Cancer: Care Instructions  Your Care Instructions     Colon cancer occurs when abnormal cells grow out of control in the lower part of your intestine (your colon).   If the tumor was small and had not spread, your doctor may have removed it during the colonoscopy. But you may need surgery to remove the cancer if the tumor was too big or had spread too far to be removed during a colonoscopy. If cancer has spread to another part of your body, such as the liver, you may need more far-reaching surgery. Treatment for colon cancer may also include radiation therapy and medicines that destroy cancer cells (chemotherapy). Being treated for cancer can weaken your body, and you may feel very tired. Get the rest your body needs so that you can feel better. When you find out that you have cancer, you may feel many emotions and may need some help coping. Seek out family, friends, and counselors for support. You also can do things at home to make yourself feel better while you go through treatment. Call the Network [employer] (8-596.645.9282) or visit its website at [employer] for more information. Follow-up care is a key part of your treatment and safety. Be sure to make and go to all appointments, and call your doctor if you are having problems. It's also a good idea to know your test results and keep a list of the medicines you take. How can you care for yourself at home? · Take your medicines exactly as prescribed. Call your doctor if you think you are having a problem with your medicine. You may get medicine for nausea and vomiting if you have these side effects. · Follow your doctor's instructions to relieve pain. Pain from cancer and surgery can almost always be controlled. Use pain medicine when you first notice pain, before it becomes severe. · Eat healthy food. If you do not feel like eating, try to eat food that has protein and extra calories to keep up your strength and prevent weight loss. Drink liquid meal replacements for extra calories and protein. Try to eat your main meal early. · Get some physical activity every day, but do not get too tired.  Keep doing the hobbies you enjoy as your energy allows. · Take steps to control your stress and workload. Learn relaxation techniques. ? Share your feelings. Stress and tension affect our emotions. By expressing your feelings to others, you may be able to understand and cope with them. ? Consider joining a support group. Talking about a problem with your spouse, a good friend, or other people with similar problems is a good way to reduce tension and stress. ? Express yourself through art. Try writing, dance, art, or crafts to relieve stress. Some dance, writing, or art groups may be available just for people who have cancer. ? Be kind to your body and mind. Getting enough sleep, eating a healthy diet, and taking time to do things you enjoy can contribute to an overall feeling of balance in your life and help reduce stress. ? Get help if you need it. Discuss your concerns with your doctor or counselor. · If you are vomiting or have diarrhea:  ? Drink plenty of fluids (enough so that your urine is light yellow or clear like water) to prevent dehydration. Choose water and other caffeine-free clear liquids. If you have kidney, heart, or liver disease and have to limit fluids, talk with your doctor before you increase the amount of fluids you drink. ? When you are able to eat, try clear soups, mild foods, and liquids until all symptoms are gone for 12 to 48 hours. Other good choices include dry toast, crackers, cooked cereal, and gelatin dessert, such as Jell-O.  · If you have not already done so, prepare a list of advance directives. Advance directives are instructions to your doctor and family members about what kind of care you want if you become unable to speak or express yourself. When should you call for help? Call 911 anytime you think you may need emergency care. For example, call if:    · You pass maroon or very bloody stools.     · You passed out (lost consciousness). Call your doctor now or seek immediate medical care if:    · You have a fever.   · You are vomiting.     · You have new or worse belly pain.     · You cannot pass stools or gas.     · You have new or more blood in your stool. Watch closely for changes in your health, and be sure to contact your doctor if:    · You are losing weight.     · You have new or worse symptoms.     · You do not get better as expected. Where can you learn more? Go to https://SonomapePharmacopeia.[employer]. org and sign in to your [email or web address] account. Enter V378 in the [employer] box to learn more about \"Colon Cancer: Care Instructions. \"     If you do not have an account, please click on the \"Sign Up Now\" link. Current as of: April 29, 2020               Content Version: 12.6  © 2874-3159 "[employer]. Care instructions adapted under license by [facility] ([facility]). If you have questions about a medical condition or this instruction, always ask your healthcare professional. [place] [address] any warranty or liability for your use of this information.

## 2021-03-12 ENCOUNTER — OFFICE VISIT (OUTPATIENT)
Age: 66
End: 2021-03-12

## 2021-03-12 VITALS — HEART RATE: 114 BPM | OXYGEN SATURATION: 98 %

## 2021-03-12 DIAGNOSIS — Z11.59 SCREENING FOR VIRAL DISEASE: Primary | ICD-10-CM

## 2021-03-12 LAB — SARS-COV-2, PCR: NOT DETECTED

## 2021-03-12 PROCEDURE — 99999 PR OFFICE/OUTPT VISIT,PROCEDURE ONLY: CPT | Performed by: NURSE PRACTITIONER

## 2021-03-17 ENCOUNTER — ANESTHESIA (OUTPATIENT)
Dept: OPERATING ROOM | Age: 66
End: 2021-03-17

## 2021-03-17 ENCOUNTER — APPOINTMENT (OUTPATIENT)
Dept: OPERATING ROOM | Age: 66
End: 2021-03-17

## 2021-03-17 ENCOUNTER — HOSPITAL ENCOUNTER (OUTPATIENT)
Age: 66
Setting detail: OUTPATIENT SURGERY
Discharge: HOME | End: 2021-03-17
Attending: INTERNAL MEDICINE | Admitting: INTERNAL MEDICINE
Payer: OTHER GOVERNMENT

## 2021-03-17 ENCOUNTER — ANESTHESIA EVENT (OUTPATIENT)
Dept: OPERATING ROOM | Age: 66
End: 2021-03-17

## 2021-03-17 VITALS — DIASTOLIC BLOOD PRESSURE: 79 MMHG | OXYGEN SATURATION: 98 % | SYSTOLIC BLOOD PRESSURE: 117 MMHG

## 2021-03-17 VITALS
BODY MASS INDEX: 25.27 KG/M2 | SYSTOLIC BLOOD PRESSURE: 136 MMHG | TEMPERATURE: 97.5 F | WEIGHT: 161 LBS | DIASTOLIC BLOOD PRESSURE: 83 MMHG | RESPIRATION RATE: 14 BRPM | OXYGEN SATURATION: 98 % | HEART RATE: 61 BPM | HEIGHT: 67 IN

## 2021-03-17 PROCEDURE — G0105 COLORECTAL SCRN; HI RISK IND: HCPCS

## 2021-03-17 PROCEDURE — 45378 DIAGNOSTIC COLONOSCOPY: CPT | Performed by: INTERNAL MEDICINE

## 2021-03-17 RX ORDER — SODIUM CHLORIDE 9 MG/ML
INJECTION, SOLUTION INTRAVENOUS CONTINUOUS
Status: DISCONTINUED | OUTPATIENT
Start: 2021-03-17 | End: 2021-03-17 | Stop reason: HOSPADM

## 2021-03-17 RX ORDER — LIDOCAINE HYDROCHLORIDE 10 MG/ML
INJECTION, SOLUTION INFILTRATION; PERINEURAL PRN
Status: DISCONTINUED | OUTPATIENT
Start: 2021-03-17 | End: 2021-03-17 | Stop reason: SDUPTHER

## 2021-03-17 RX ORDER — PROPOFOL 10 MG/ML
INJECTION, EMULSION INTRAVENOUS PRN
Status: DISCONTINUED | OUTPATIENT
Start: 2021-03-17 | End: 2021-03-17 | Stop reason: SDUPTHER

## 2021-03-17 RX ADMIN — SODIUM CHLORIDE: 9 INJECTION, SOLUTION INTRAVENOUS at 09:09

## 2021-03-17 RX ADMIN — LIDOCAINE HYDROCHLORIDE 30 MG: 10 INJECTION, SOLUTION INFILTRATION; PERINEURAL at 09:51

## 2021-03-17 RX ADMIN — SODIUM CHLORIDE: 9 INJECTION, SOLUTION INTRAVENOUS at 09:50

## 2021-03-17 RX ADMIN — PROPOFOL 300 MG: 10 INJECTION, EMULSION INTRAVENOUS at 09:51

## 2021-03-17 NOTE — OP NOTE
then slowly withdrawn with careful inspection of the mucosa in a linear and circumferential fashion. The scope was retroflexed in the rectum. Suction was utilized during the procedure to remove as much air as possible from the bowel. The colonoscope was removed from the patient, and the procedure was terminated. Findings are listed below. Findings: The mucosa appeared normal throughout the entire examined colon. Post-operative changes with an IC anastomosis and a single visible staple. NO evidence of tumor recurrence. NO large polyps or masses or strictures or colitis or ileitis. Mild Diverticulosis in the left colon  Internal hemorrhoids-Grade 1    Retroflexion in the rectum was otherwise normal and revealed no further abnormalities     Recommendations:  1. Repeat colonoscopy: in 3 years; sooner if necessary. 2. - Resume previous meds and diet  - GI clinic f/u PRN   - Keep scheduled f/u appts with other MDs     Findings and recommendations were discussed w/ the patient. A copy of the images was provided.     Isaiah Copeland MD  3/17/2021  9:54 AM

## 2021-03-17 NOTE — ANESTHESIA PRE PROCEDURE
Department of Anesthesiology  Preprocedure Note       Name:  Sherita Galeano   Age:  61 y.o.  :  1955                                          MRN:  [id number]         Date:  3/17/2021      Surgeon: Estevan Ferguson):  Sneha Bautista MD    Procedure: Procedure(s):  COLORECTAL CANCER SCREENING, HIGH RISK    Medications prior to admission:   Prior to Admission medications    Medication Sig Start Date End Date Taking? Authorizing Provider   promethazine (PHENERGAN) 12.5 MG tablet Take 1 tablet by mouth every 6 hours as needed for Nausea 20   Nando Toro MD   ondansetron (ZOFRAN) 8 MG tablet Take 1 tablet by mouth every 8 hours as needed for Nausea or Vomiting 20   Nando Toro MD   omeprazole (PRILOSEC) 20 MG delayed release capsule Take 20 mg by mouth daily    Historical Provider, MD   Cholecalciferol (VITAMIN D3) 50 MCG (2000) CAPS Take by mouth daily    Historical Provider, MD   folic acid (FOLVITE) 1 MG tablet Take 1 mg by mouth daily    Historical Provider, MD   prazosin (MINIPRESS) 1 MG capsule Take 1 mg by mouth nightly For nightmares    Historical Provider, MD   buPROPion (WELLBUTRIN) 100 MG tablet Take 100 mg by mouth every morning For mood    Historical Provider, MD   topiramate (TOPAMAX) 100 MG tablet Take 100 mg by mouth daily For Seizures or Migraines    Historical Provider, MD   Sertraline HCl (ZOLOFT PO) Take by mouth daily    Historical Provider, MD   Zolpidem Tartrate (AMBIEN PO) Take by mouth nightly    Historical Provider, MD       Current medications:    Current Facility-Administered Medications   Medication Dose Route Frequency Provider Last Rate Last Admin    0.9 % sodium chloride infusion   Intravenous Continuous Sneha Bautista MD           Allergies:     Allergies   Allergen Reactions    Oxycodone-Acetaminophen Nausea Only    Morphine Nausea And Vomiting     Hallucinations/Vomiting       Problem List:    Patient Active Problem List   Diagnosis Code  Adenocarcinoma of colon ([facility] [address] C18.9    Malignant neoplasm of ascending colon (HCC) C18.2    Iron deficiency anemia D50.9       Past Medical History:        Diagnosis Date    Acid reflux     Cancer ([facility] [address]     adenocarcinoma of colon    GERD (gastroesophageal reflux disease)     PTSD (post-traumatic stress disorder)        Past Surgical History:        Procedure Laterality Date    ABLATION OF DYSRHYTHMIC FOCUS      ablation of liver at [address]  2003    CHOLECYSTECTOMY  2013    COLONOSCOPY      when pt was in the 19's    COLONOSCOPY N/A 3/11/2020    COLONOSCOPY POLYPECTOMY SNARE/COLD BIOPSY: Dr. Lianna Vazquez colonoscopy: 6-12 months due to findings at colonoscopy today with Cecal mass lesion and large polyps.  COLONOSCOPY      HEMICOLECTOMY Right 3/30/2020    LAPAROSCOPIC-ASSISTED RIGHT HEMICOLECTOMY performed by Norma Mayberry MD at [address] [place] [address] N/A 6/3/2020    INSERTION OF VENOUS PORT with flouro performed by Norma Mayberry MD at [place] [address] ENDOSCOPY N/A 3/11/2020    Dr. Lupillo Garcia:   [facility]       Social History:    Social History     Tobacco Use    Smoking status: Never Smoker    Smokeless tobacco: Never Used   Substance Use Topics    Alcohol use: Yes     Comment: rare                                 Counseling given: Not Answered      Vital Signs (Current): There were no vitals filed for this visit.                                            BP Readings from Last 3 Encounters:   03/08/21 138/82   01/25/21 122/64   01/18/21 106/69       NPO Status: Time of last liquid consumption: 0300                        Time of last solid consumption: 1800                        Date of last liquid consumption: 03/17/21                        Date of last solid food consumption: 03/15/21    BMI:   Wt Readings from Last 3 Encounters:   03/08/21 161 lb 9.6 oz (73.3 kg)   01/25/21 156 lb (70.8 kg)   01/18/21 153 lb (69.4 kg)     There is no height or weight on file to calculate BMI.    CBC:   Lab Results   Component Value Date    WBC 5.39 01/25/2021    RBC 3.76 01/25/2021    HGB 9.6 01/25/2021    HCT 31.0 01/25/2021    MCV 82.4 01/25/2021    RDW 24.4 01/25/2021     01/25/2021       CMP:   Lab Results   Component Value Date     01/04/2021    K 4.7 01/04/2021    K 3.6 12/14/2020     01/04/2021    CO2 20 01/04/2021    BUN 10 01/04/2021    CREATININE 1.06 01/04/2021    GFRAA 86 01/04/2021    AGRATIO 0.9 01/04/2021    LABGLOM 74 01/04/2021    GLUCOSE 85 01/04/2021    PROT 6.9 01/04/2021    CALCIUM 9.0 01/04/2021    BILITOT 0.4 01/04/2021    ALKPHOS 302 01/04/2021    AST 30 01/04/2021    ALT 33 01/04/2021       POC Tests: No results for input(s): POCGLU, POCNA, POCK, POCCL, POCBUN, POCHEMO, POCHCT in the last 72 hours. Coags: No results found for: PROTIME, INR, APTT    HCG (If Applicable): No results found for: PREGTESTUR, PREGSERUM, HCG, HCGQUANT     ABGs: No results found for: PHART, PO2ART, AHK1BJC, ADJ3BRT, BEART, X9FQALMU     Type & Screen (If Applicable):  No results found for: LABABO, LABRH    Drug/Infectious Status (If Applicable):  No results found for: HIV, HEPCAB    COVID-19 Screening (If Applicable):   Lab Results   Component Value Date    COVID19 Not Detected 03/12/2021           Anesthesia Evaluation  Patient summary reviewed and Nursing notes reviewed  Airway: Mallampati: II  TM distance: >3 FB   Neck ROM: full  Mouth opening: > = 3 FB Dental: normal exam         Pulmonary:Negative Pulmonary ROS and normal exam                               Cardiovascular:Negative CV ROS                      Neuro/Psych:   (+) psychiatric history:            GI/Hepatic/Renal:   (+) GERD:,           Endo/Other: Negative Endo/Other ROS                    Abdominal:           Vascular: negative vascular ROS. Anesthesia Plan      general and TIVA     ASA 2       Induction: intravenous.       Anesthetic plan and risks discussed with patient.                       KALPESH Sanchez - CRNA   3/17/2021

## 2021-03-17 NOTE — H&P
Patient Name: Arlyn Swanson  : 1955  MRN: [id number]  DATE: 21    Allergies: Allergies   Allergen Reactions    Oxycodone-Acetaminophen Nausea Only    Morphine Nausea And Vomiting     Hallucinations/Vomiting        ENDOSCOPY  History and Physical    Procedure:    [] Diagnostic Colonoscopy       [x] Screening Colonoscopy  [] EGD      [] ERCP      [] EUS       [] Other    [x] Previous office notes/History and Physical reviewed from the patients chart. Please see EMR for further details of HPI. I have examined the patient's status immediately prior to the procedure and:      Indications/HPI:    1. History of colon cancer, stage IV      2. Hx of adenomatous colonic polyps      3. Family history of colon cancer      4. Normocytic anemia       []Abdominal Pain   []Cancer- GI/Lung     []Fhx of colon CA/polyps  []History of Polyps  []Barretts            []Melena  []Abnormal Imaging              []Dysphagia              []Persistent Pneumonia   []Anemia                            []Food Impaction        []History of Polyps  [] GI Bleed             []Pulmonary nodule/Mass   []Change in bowel habits []Heartburn/Reflux  []Rectal Bleed (BRBPR)  []Chest Pain - Non Cardiac []Heme (+) Stool []Ulcers  []Constipation  []Hemoptysis  []Varices  []Diarrhea  []Hypoxemia    []Nausea/Vomiting   []Screening   []Crohns/Colitis  []Other:     Anesthesia:   [x] MAC [] Moderate Sedation   [] General   [] None     ROS: 12 pt Review of Symptoms was negative unless mentioned above    Medications:   Prior to Admission medications    Medication Sig Start Date End Date Taking?  Authorizing Provider   promethazine (PHENERGAN) 12.5 MG tablet Take 1 tablet by mouth every 6 hours as needed for Nausea 20   Lizbeth Alonso MD   ondansetron (ZOFRAN) 8 MG tablet Take 1 tablet by mouth every 8 hours as needed for Nausea or Vomiting 20   Lizbeth Alonso MD   omeprazole (PRILOSEC) 20 MG delayed release capsule Take 20 mg by mouth daily    Historical Provider, MD   Cholecalciferol (VITAMIN D3) 50 MCG (2000 UT) CAPS Take by mouth daily    Historical Provider, MD   folic acid (FOLVITE) 1 MG tablet Take 1 mg by mouth daily    Historical Provider, MD   prazosin (MINIPRESS) 1 MG capsule Take 1 mg by mouth nightly For nightmares    Historical Provider, MD   buPROPion (WELLBUTRIN) 100 MG tablet Take 100 mg by mouth every morning For mood    Historical Provider, MD   topiramate (TOPAMAX) 100 MG tablet Take 100 mg by mouth daily For Seizures or Migraines    Historical Provider, MD   Sertraline HCl (ZOLOFT PO) Take by mouth daily    Historical Provider, MD   Zolpidem Tartrate (AMBIEN PO) Take by mouth nightly    Historical Provider, MD       Past Medical History:  Past Medical History:   Diagnosis Date    Acid reflux     Cancer ([facility] [address]     adenocarcinoma of colon    GERD (gastroesophageal reflux disease)     PTSD (post-traumatic stress disorder)        Past Surgical History:  Past Surgical History:   Procedure Laterality Date    ABLATION OF DYSRHYTHMIC FOCUS      abl[facility] of liver [facility] APPENDECTOMY  2003   [facility]  2013    COLONOSCOPY      when pt was in the 19's    COLONOSCOPY N/A 3/11/2020    COLONOSCOPY POLYPECTOMY SNARE/COLD BIOPSY: Dr. Andrew Farias colonoscopy: 6-12 months due to findings at colonoscopy today with Cecal mass lesion and large polyps.     COLONOSCOPY      HEMICOLECTOMY Right 3/30/2020    LAPAROSCOPIC-ASSISTED RIGHT HEMICOLECTOMY performed by Tyler Smith MD at [facility] [address] N/A 6/3/2020    INSERTION OF VENOUS PORT with flouro performed by Tyler Smith MD at [place] [address] ENDOSCOPY N/A 3/11/2020    Dr. Josh Odonnell:   [facility]       Social History:  Social History     Tobacco Use    Smoking status: Never Smoker    Smokeless tobacco: Never Used   Substance Use Topics    Alcohol use: Yes     Comment: rare     Drug use: No       Vital Signs:   Vitals:    03/17/21 0905   BP: (!) 142/85   Pulse: 83   Resp: 20   Temp: 97.5 °F (36.4 °C)   SpO2: 96%        Physical Exam:  Cardiac:  [x]WNL  []Comments:  Pulmonary:  [x]WNL   []Comments:  Neuro/Mental Status:  [x]WNL  []Comments:  Abdominal:  [x]WNL    []Comments:  Other:   []WNL  []Comments:    Informed Consent:  The risks and benefits of the procedure have been discussed with either the patient or if they cannot consent, their representative. Assessment:  Patient examined and appropriate for planned sedation and procedure. Plan:  Proceed with planned sedation and procedure as above.          Dewayne Mckenna MD

## 2021-03-17 NOTE — ANESTHESIA POSTPROCEDURE EVALUATION
Department of Anesthesiology  Postprocedure Note    Patient: Alex Linares  MRN: [id number]  YOB: 1955  Date of evaluation: 3/17/2021  Time:  9:57 AM     Procedure Summary     Date: 03/17/21 Room / Location: [facility] ENDO 02 / [address] [place] [address]    Anesthesia Start: 5387 Anesthesia Stop:     Procedure: COLORECTAL CANCER SCREENING, HIGH RISK (N/A Abdomen) Diagnosis: (STAGE IV CLN CA, FH CLN CANCER)    Surgeons: Denver Oaks, MD Responsible Provider: KALPESH Castle CRNA    Anesthesia Type: general, TIVA ASA Status: 2          Anesthesia Type: No value filed. Kris Phase I:      Kris Phase II:      Last vitals: Reviewed and per EMR flowsheets.        Anesthesia Post Evaluation    Patient location during evaluation: bedside  Patient participation: complete - patient participated  Level of consciousness: sleepy but conscious  Airway patency: patent  Nausea & Vomiting: no nausea and no vomiting  Complications: no  Cardiovascular status: blood pressure returned to baseline  Respiratory status: acceptable, room air and spontaneous ventilation  Hydration status: euvolemic

## 2021-03-19 ENCOUNTER — TELEPHONE (OUTPATIENT)
Dept: GASTROENTEROLOGY | Age: 66
End: 2021-03-19

## 2021-03-19 NOTE — TELEPHONE ENCOUNTER
I RECEIVED THE MESSAGE BELOW FROM [facility] AT [facility] ON 3/17/21 AND I CALLED AND GOT THE FORM. I FILLED IT OUT AND FAXED IT BACK THE SAME DAY. THEY REQUESTED MORE INFO. I FILLED IT OUT AND FAXED IT BACK. THEN WE GOT THE DENIAL TODAY. I HAVE SCANNED IT TO PT CHART AND ROUTED IT TO YOU. HIS COLONOSCOPY WAS DONE THE MORNING OF 3/17/21.              ---- Message -----  From: Jayson Mcleod  Sent: 3/17/2021   1:38 PM CDT  To: Dionisio Farias  Subject: FW: 3/17                                               ----- Message -----  From: Melissa Pineda  Sent: 3/17/2021  12:17 PM CDT  To: Jayson Mcleod  Subject: RE: 3/17                                         Val Forman this pt has va and  listed. .. I checked  the other day and No PC req. .. however Luz with [place] said they will not pay because there is not an updated referral on file for pt to see GI. She said a new consult will need to be added. The office will need to send a request for services form add dates of service when he was in the office and when procedure is scheduled and fax it to [phone]. Then she said they wont pay because for the services before today because they don't retro.  If you all have the form you may want to go ahead and send it in, [place] they will pay for today.     ----- Message -----  From: Jayson Mcleod  Sent: 3/8/2021   2:37 PM CDT  To: Melissa Pineda  Subject: 3/17

## 2021-05-03 ENCOUNTER — CLINICAL DOCUMENTATION (OUTPATIENT)
Dept: HEMATOLOGY | Age: 66
End: 2021-05-03

## 2021-05-03 ENCOUNTER — HOSPITAL ENCOUNTER (OUTPATIENT)
Dept: INFUSION THERAPY | Age: 66
Discharge: HOME | End: 2021-05-03
Payer: OTHER GOVERNMENT

## 2021-05-03 ENCOUNTER — OFFICE VISIT (OUTPATIENT)
Dept: HEMATOLOGY | Age: 66
End: 2021-05-03
Payer: OTHER GOVERNMENT

## 2021-05-03 VITALS
TEMPERATURE: 96.9 F | WEIGHT: 160.6 LBS | HEART RATE: 85 BPM | HEIGHT: 67 IN | DIASTOLIC BLOOD PRESSURE: 72 MMHG | OXYGEN SATURATION: 93 % | SYSTOLIC BLOOD PRESSURE: 140 MMHG | BODY MASS INDEX: 25.21 KG/M2

## 2021-05-03 DIAGNOSIS — Z71.82 EXERCISE COUNSELING: ICD-10-CM

## 2021-05-03 DIAGNOSIS — Z00.00 HEALTHCARE MAINTENANCE: ICD-10-CM

## 2021-05-03 DIAGNOSIS — C78.7 LIVER METASTASES (HCC): ICD-10-CM

## 2021-05-03 DIAGNOSIS — C18.9 ADENOCARCINOMA OF COLON (HCC): Primary | ICD-10-CM

## 2021-05-03 DIAGNOSIS — Z71.3 DIETARY COUNSELING AND SURVEILLANCE: ICD-10-CM

## 2021-05-03 DIAGNOSIS — C18.2 MALIGNANT NEOPLASM OF ASCENDING COLON (HCC): ICD-10-CM

## 2021-05-03 DIAGNOSIS — Z71.89 CARE PLAN DISCUSSED WITH PATIENT: ICD-10-CM

## 2021-05-03 LAB
ALBUMIN SERPL-MCNC: 4.3 G/DL (ref 3.5–5.2)
ALP BLD-CCNC: 138 U/L (ref 40–130)
ALT SERPL-CCNC: 23 U/L (ref 21–72)
ANION GAP SERPL CALCULATED.3IONS-SCNC: 12 MMOL/L (ref 7–19)
AST SERPL-CCNC: 37 U/L (ref 17–59)
BASOPHILS ABSOLUTE: 0.04 K/UL (ref 0.01–0.08)
BASOPHILS RELATIVE PERCENT: 0.6 % (ref 0.1–1.2)
BILIRUB SERPL-MCNC: 0.7 MG/DL (ref 0.2–1.3)
BUN BLDV-MCNC: 14 MG/DL (ref 9–20)
CALCIUM SERPL-MCNC: 9.7 MG/DL (ref 8.4–10.2)
CEA: 6.2 NG/ML (ref 0–3)
CHLORIDE BLD-SCNC: 102 MMOL/L (ref 98–111)
CO2: 27 MMOL/L (ref 22–29)
CREAT SERPL-MCNC: 1.1 MG/DL (ref 0.6–1.2)
EOSINOPHILS ABSOLUTE: 0.46 K/UL (ref 0.04–0.54)
EOSINOPHILS RELATIVE PERCENT: 7.4 % (ref 0.7–7)
GFR NON-AFRICAN AMERICAN: >60
GLOBULIN: 3.2 G/DL
GLUCOSE BLD-MCNC: 96 MG/DL (ref 74–106)
HCT VFR BLD CALC: 48.8 % (ref 40.1–51)
HEMOGLOBIN: 15.6 G/DL (ref 13.7–17.5)
LYMPHOCYTES ABSOLUTE: 0.67 K/UL (ref 1.18–3.74)
LYMPHOCYTES RELATIVE PERCENT: 10.8 % (ref 19.3–53.1)
MCH RBC QN AUTO: 28.3 PG (ref 25.7–32.2)
MCHC RBC AUTO-ENTMCNC: 32 G/DL (ref 32.3–36.5)
MCV RBC AUTO: 88.4 FL (ref 79–92.2)
MONOCYTES ABSOLUTE: 0.7 K/UL (ref 0.24–0.82)
MONOCYTES RELATIVE PERCENT: 11.3 % (ref 4.7–12.5)
NEUTROPHILS ABSOLUTE: 4.34 K/UL (ref 1.56–6.13)
NEUTROPHILS RELATIVE PERCENT: 69.9 % (ref 34–71.1)
PDW BLD-RTO: 15.5 % (ref 11.6–14.4)
PLATELET # BLD: 148 K/UL (ref 163–337)
PMV BLD AUTO: 9.9 FL (ref 7.4–10.4)
POTASSIUM SERPL-SCNC: 4.7 MMOL/L (ref 3.5–5.1)
RBC # BLD: 5.52 M/UL (ref 4.63–6.08)
SODIUM BLD-SCNC: 141 MMOL/L (ref 137–145)
TOTAL PROTEIN: 7.5 G/DL (ref 6.3–8.2)
WBC # BLD: 6.21 K/UL (ref 4.23–9.07)

## 2021-05-03 PROCEDURE — 85025 COMPLETE CBC W/AUTO DIFF WBC: CPT

## 2021-05-03 PROCEDURE — 99214 OFFICE O/P EST MOD 30 MIN: CPT | Performed by: INTERNAL MEDICINE

## 2021-05-03 PROCEDURE — 6360000002 HC RX W HCPCS: Performed by: INTERNAL MEDICINE

## 2021-05-03 PROCEDURE — 80053 COMPREHEN METABOLIC PANEL: CPT

## 2021-05-03 PROCEDURE — 96523 IRRIG DRUG DELIVERY DEVICE: CPT

## 2021-05-03 PROCEDURE — 82378 CARCINOEMBRYONIC ANTIGEN: CPT

## 2021-05-03 PROCEDURE — 99212 OFFICE O/P EST SF 10 MIN: CPT

## 2021-05-03 PROCEDURE — 2580000003 HC RX 258: Performed by: INTERNAL MEDICINE

## 2021-05-03 RX ORDER — SODIUM CHLORIDE 0.9 % (FLUSH) 0.9 %
10 SYRINGE (ML) INJECTION PRN
Status: CANCELLED | OUTPATIENT
Start: 2021-05-03

## 2021-05-03 RX ORDER — SODIUM CHLORIDE 0.9 % (FLUSH) 0.9 %
20 SYRINGE (ML) INJECTION PRN
Status: CANCELLED | OUTPATIENT
Start: 2021-05-03

## 2021-05-03 RX ORDER — HEPARIN SODIUM (PORCINE) LOCK FLUSH IV SOLN 100 UNIT/ML 100 UNIT/ML
500 SOLUTION INTRAVENOUS PRN
Status: CANCELLED | OUTPATIENT
Start: 2021-05-03

## 2021-05-03 RX ORDER — SODIUM CHLORIDE 0.9 % (FLUSH) 0.9 %
20 SYRINGE (ML) INJECTION PRN
Status: DISCONTINUED | OUTPATIENT
Start: 2021-05-03 | End: 2021-05-04 | Stop reason: HOSPADM

## 2021-05-03 RX ORDER — HEPARIN SODIUM (PORCINE) LOCK FLUSH IV SOLN 100 UNIT/ML 100 UNIT/ML
500 SOLUTION INTRAVENOUS PRN
Status: DISCONTINUED | OUTPATIENT
Start: 2021-05-03 | End: 2021-05-04 | Stop reason: HOSPADM

## 2021-05-03 RX ORDER — HEPARIN SODIUM (PORCINE) LOCK FLUSH IV SOLN 100 UNIT/ML 100 UNIT/ML
SOLUTION INTRAVENOUS
Status: DISPENSED
Start: 2021-05-03 | End: 2021-05-03

## 2021-05-03 RX ADMIN — HEPARIN 500 UNITS: 100 SYRINGE at 08:25

## 2021-05-03 RX ADMIN — SODIUM CHLORIDE, PRESERVATIVE FREE 20 ML: 5 INJECTION INTRAVENOUS at 08:25

## 2021-05-04 ENCOUNTER — TELEPHONE (OUTPATIENT)
Dept: HEMATOLOGY | Age: 66
End: 2021-05-04

## 2021-05-04 NOTE — PROGRESS NOTES
CEA was elevated 05/03/21. See me after CT chest 06/15/2021 instead of September.   Repeat CEA, CMP same day of scan

## 2021-05-04 NOTE — TELEPHONE ENCOUNTER
Monika Horn MD at 5/3/2021  7:41 PM    Status: Signed      CEA was elevated 05/03/21. See me after CT chest 06/15/2021 instead of September. Repeat CEA, CMP same day of scan              Called and left message for patient to call back and change follow up appointment and add labs same day of Ct scan in June.

## 2021-06-14 DIAGNOSIS — C18.9 ADENOCARCINOMA OF COLON (HCC): Primary | ICD-10-CM

## 2021-06-15 ENCOUNTER — HOSPITAL ENCOUNTER (OUTPATIENT)
Dept: CT IMAGING | Age: 66
Discharge: HOME | End: 2021-06-15
Payer: OTHER GOVERNMENT

## 2021-06-15 ENCOUNTER — HOSPITAL ENCOUNTER (OUTPATIENT)
Dept: INFUSION THERAPY | Age: 66
Discharge: HOME | End: 2021-06-15
Payer: COMMERCIAL

## 2021-06-15 DIAGNOSIS — C18.9 ADENOCARCINOMA OF COLON (HCC): ICD-10-CM

## 2021-06-15 DIAGNOSIS — C78.7 LIVER METASTASES (HCC): ICD-10-CM

## 2021-06-15 PROCEDURE — 71250 CT THORAX DX C-: CPT

## 2021-06-16 DIAGNOSIS — C18.9 ADENOCARCINOMA OF COLON (HCC): Primary | ICD-10-CM

## 2021-06-16 NOTE — PROGRESS NOTES
MEDICAL ONCOLOGY PROGRESS NOTE    Pt Name: Russell Tucker  MRN: [id number]  YOB: 1955  Date of evaluation: 4/29/2021    HISTORY OF PRESENT ILLNESS:      Diagnosis  · Colonic adenocarcinoma, [id number]  · [id number](liver), stage ROXANA  · IHC MMR- proficient  · K-maria luisa mutated  · N-MARIA LUISA/BRAF wild-type  · Iron deficiency anemia    Treatment summary  · 3/30/2020-right hemicolectomy at [facility]  · Anticipated Liver ablation to be followed by neoadjuvant/adjuvant versus palliative chemotherapy  · 06/10/2020-November 2020-FOLFOX + Avastin  · 12/11/20- Right hepatectomy-Dr. Vinny Tee  · S/p Injectafer-poor oral iron tolerance    The patient is a pleasant 61years old male was found to have a right cecal mass consistent with colonic adenocarcinoma. He had locally advanced disease with several lymph nodes involved. In addition, the patient was also found to have suspicious liver lesions concerning for metastatic disease. He was seen by [address] [place] [address] and offered a multimodality approach with liver ablation of the left liver lesion followed by neoadjuvant chemotherapy and partial right hepatectomy. He underwent microwave ablation of the left lobe liver lesion on 6/8/2020. He is status post completion of 11 biweekly cycles of FOLFOX/Avasti completed November 2020 n. He tolerated treatment with complaints of mild transient cold neuropathy. He had a right hepatectomy on 12/11/2020 that showed no residual disease. He is now on clinical/radiologic surveillance. His last CEA was normal in January 2021. He had MRI of the abdomen at [address] [place] [address] in March 2021 that showed no evidence of recurrent disease in the liver or in the abdomen. He also had a surveillance colonoscopy in March 2021 that showed no polyps. CEA was elevated in May 2021. Repeat CEA June 2021 showed persistent elevation. MRI abdomen at [address] [place] [address] showed no evidence of liver recurrence but a suspicious nodule in the right lower quadrant.   I have discussed the findings with hepatobiliary service at [facility]. A biopsy will be arranged for next Friday at [facility]. The patient has mild transient complaints of pain in the right lower quadrant. Cancer history  Mr. Waylon Alicea was first seen by me on 3/23/2020. He was referred for a new diagnosis of colonic adenocarcinoma involving the cecum. The patient reports that he had a wellbeing consult with his provider at the formerly [facility]. He was found to have anemia and then recommended a colonoscopy. Of note, the patient has a family history of colon cancer. His mother is a patient of Dr. Keenan Baker he has been diagnosed with colon cancer in 2010. · 3/11/2020- colonoscopy revealed a large malignant appearing fungating mass lesion in the cecum. In addition, several other polyps. Biopsy of the mass consistent with moderate differentiated colonic adenocarcinoma. Polyps consistent with tubulovillous adenoma with no high-grade dysplasia. IHC MMR not proficient. K-maria luisa mutated, BRAF and NRAS wild type. MSI proficient  · 3/11/2020-CEA 5.5 (H)  · 3/18/2020-CT abdomen pelvis with contrast  Invasive cecal mass adhering to the right lateral abdominal wall muscles with adjacent lymphadenopathy. Mild partial obstruction of the terminal ileum. 2. Suspicious lesions in the right and left hepatic lobes measure up to 1.3 cm and likely represent metastatic disease. · 3/18/2020-Xr Chest Standard  No radiographic evidence of acute cardiopulmonary process. · 3/23/2020-he was first seen by me. Recommended completion of staging with CT chest.  Also recommended liver MRI for further clarification of liver lesion. S.  Recommend to proceed with a general surgery consultation tomorrow with Dr. Presley Tay. Patient was informed that I favor surgical resection if feasible of the primary malignancy.   · 3/30/2020- right hemicolectomy by Dr. Presley Tay at [facility] consistent with invasive moderately differentiated colonic adenocarcinoma measuring 7.2 cm. Carcinoma directly invading the adjacent abdominal wall tissue. Focal lymphovascular space invasion identified. Focal perineural invasion identified. Surgical margins negative for evidence of malignancy. 6 out of 14 lymph nodes positive for metastatic adenocarcinoma. Final pathology staging [id number](liver, stage ROXANA)  · 4/20/2020-CT chest with contrast showed No convincing intrathoracic metastasis. Nonspecific 4 mm nodule of the inferior lingula and a 2 mm right upper lobe nodule can be followed on subsequent imaging in 6-12 months. Moderate coronary calcifications. Hypodense metastatic liver lesions. Small hiatal hernia. · 4/20/2020-Mri Abdomen W Wo Contrast There are about 5 liver lesions. The 2 largest appears similar compared to 3/18/2020, the others are too small to further characterize. Appearance is most concerning for metastatic disease. Enhancement of the right lateral peritoneum. This is favored to be postoperative as there is no nodularity, evidence of omental disease or lymphadenopathy. Recommend attention on follow-up. Cholecystectomy. · 4/22/2020-discussed with Dr. Inocencio Anderson at [place]. He will review imaging studies and give further recommendations regarding eligibility for resection of liver lesions. · 5/8/2020 CT Abdomen The two largest suspicious lesions measuring 1.2 and 1.3 cm in the  right and left hepatic lobes respectively are similar compared to the  3/18/2020 CT. Additionally, there are at least five subcentimeter lesions with similar signal characteristics, which are also highly suspicious for metastases. If complete characterization of the number and distribution of lesions is necessary, an MRI with Eovist could be acquired. · 5/19/2020- he was seen by the hepatobiliary service at [facility] with Dr. Inocencio Anderson:  patient adequate risk candidate for a multimodal approach, directed toward curative hepatectomy eventually. inflammation, and no significant macrovesicular steatosis (<5%) Comments: The patient's history of colorectal cancer status adjuvant therapy is noted. The focus of fibrosis may reflect treatment effect. There is no evidence of viable tumor in the sampled specimen. · 12/14/20 Ct Abdomen Pelvis W Iv Contrast A Small bowel obstruction with transition point at the distal small bowel, just proximal to RIGHT upper quadrant ileocolonic anastomosis. Postoperative change of RIGHT hepatectomy with small amount of expected free fluid and intraperitoneal gas. Redemonstrated LEFT liver lesion. Small bilateral pleural effusions. · 12/16/20 SBFT-[facility]: Study is limited due to retained contrast in the small bowel from prior attempt at small bowel follow-through on the floor. Small bowel remains dilated, measuring approximately 5.2 cm, which is consistent with partial or resolving small bowel obstruction. Final radiographs show contrast within the colon. · 1/4/2020-resolution of small bowel obstruction. · 1/7/21 CT chest: No finding to suggest intrathoracic neoplastic process or metastatic disease. The benign-appearing tiny nodule in the right upper lobe probably represent a noncalcified granuloma. A nodule in the lingular segment of the left upper lobe is not visualized in this study. Postsurgical changes of the liver. No evidence of focal complication. A trace right basal pleural effusion. This may be reactive to the previous abdominal surgery. · 3/4/21 MRI abd: Status post right hepatectomy and microwave ablation of a left liver lesion. No findings to suggest residual/recurrent disease. No new liver lesion is identified. Postsurgical changes related to right hemicolectomy. Microwave ablation zone in segment II of the left hepatic lobe measures approximately 21 mm in diameter, unchanged. No appreciable postcontrast enhancement or other findings to suggest local disease recurrence. No new liver lesion is identified. Spleen is mildly enlarged, measuring 13.8 cm in length. · 3/17/2021-1 year colonoscopy showed no evidence of polyps. · 5/3/21 CEA 6.2  · 6/8/21 MRI abdomen ([place]): Status post right hepatectomy and MWA of a left liver lesion.  No evidence of residual or recurrent metastatic disease in the liver. Status post prior right hemicolectomy for colon carcinoma. New peripherally enhancing foci in the right iliopsoas/along the right posterior retroperitoneal/extraperitoneal scarring and along the posterior abdominal wall. This area is not completely included in the field of view and is highly worrisome for tumor seeding. Suggest correlation with contrast enhanced CT exam for complete evaluation. Consider biopsy of this lesion. · 6/15/21 CT CHEST WO CONTRAST No metastatic disease in the chest.  No change in tiny 2 mm RIGHT upper lobe pulmonary nodule. Mild ectasia of the ascending aorta measuring 4 cm. · 6/18/2021-I reviewed results of MRI abdomen at [facility]. CT chest without contrast reviewed by me and showed no evidence of metastatic disease. Stable 2 mm right upper lobe nodule. Discussed with hepatobiliary service at [facility]. Biopsy intra-abdominal nodule next week at [facility]. Past Medical History:    Past Medical History:   Diagnosis Date    Acid reflux     Cancer ([address]     adenocarcinoma of colon    GERD (gastroesophageal reflux disease)     PTSD (post-traumatic stress disorder)    Liver metastasis    Past Surgical History:    Past Surgical History:   Procedure Laterality Date    ABLATION OF DYSRHYTHMIC FOCUS      ablation of liver at [address]  2003    CHOLECYSTECTOMY  2013    COLONOSCOPY      when pt was in the 19's    COLONOSCOPY N/A 03/11/2020    COLONOSCOPY POLYPECTOMY SNARE/COLD BIOPSY: Dr. Mitchell Ada colonoscopy: 6-12 months due to findings at colonoscopy today with Cecal mass lesion and large polyps.     COLONOSCOPY      COLONOSCOPY N/A 03/17/2021     High Blood Pressure Mother     Colon Cancer Mother         x2    Cancer Father         Lung Cancer    Breast Cancer Sister     Cancer Maternal Grandfather         Lung Cancer    Cancer Paternal Grandfather         Stomach Cancer    Colon Polyps Neg Hx     Cystic Fibrosis Neg Hx     Liver Disease Neg Hx     Rectal Cancer Neg Hx        Current Hospital Medications:    No current facility-administered medications for this visit. Current Outpatient Medications   Medication Sig Dispense Refill    promethazine (PHENERGAN) 12.5 MG tablet Take 1 tablet by mouth every 6 hours as needed for Nausea 60 tablet 5    ondansetron (ZOFRAN) 8 MG tablet Take 1 tablet by mouth every 8 hours as needed for Nausea or Vomiting 30 tablet 5    omeprazole (PRILOSEC) 20 MG delayed release capsule Take 20 mg by mouth daily      Cholecalciferol (VITAMIN D3) 50 MCG (2000 UT) CAPS Take by mouth daily      folic acid (FOLVITE) 1 MG tablet Take 1 mg by mouth daily      prazosin (MINIPRESS) 1 MG capsule Take 1 mg by mouth nightly For nightmares      buPROPion (WELLBUTRIN) 100 MG tablet Take 100 mg by mouth every morning For mood      topiramate (TOPAMAX) 100 MG tablet Take 100 mg by mouth daily For Seizures or Migraines      Sertraline HCl (ZOLOFT PO) Take by mouth daily      Zolpidem Tartrate (AMBIEN PO) Take by mouth nightly       No current facility-administered medications for this visit. Allergies:    Allergies   Allergen Reactions    Oxycodone-Acetaminophen Nausea Only    Morphine Nausea And Vomiting     Hallucinations/Vomiting     Subjective   REVIEW OF SYSTEMS:   CONSTITUTIONAL: no fever, no night sweats, fatigue;   HEENT: no blurring of vision, no double vision, no hearing difficulty, no tinnitus, no ulceration, no dysplasia, no epistaxis;  LUNGS: no cough, no hemoptysis, no wheeze,  no shortness of breath;  CARDIOVASCULAR: no palpitation, no chest pain, no shortness of breath;  GI: no abdominal pain, no nausea, no vomiting,  no constipation;  ANNIE: no dysuria, no hematuria, no frequency or urgency, no nephrolithiasis;  MUSCULOSKELETAL: no joint pain, no swelling, no stiffness;  ENDOCRINE: no polyuria, no polydipsia, no cold or heat intolerance;  HEMATOLOGY: no easy bruising or bleeding, no history of clotting disorder;  DERMATOLOGY: no skin rash, no eczema, no pruritus;  PSYCHIATRY: no depression, no anxiety, no panic attacks, no suicidal ideation, no homicidal ideation;  NEUROLOGY: no syncope, no seizures, numbness or tingling of hands and feet, no paresis;    Objective   BP (!) 140/76   Pulse 78   Ht 5' 7\" (1.702 m)   Wt 158 lb (71.7 kg)   SpO2 98%   BMI 24.75 kg/m²       PHYSICAL EXAM:  CONSTITUTIONAL: Alert, appropriate, no acute distress  EYES: Non icteric, EOM intact, pupils equal round   ENT: Mucus membranes moist, no oral pharyngeal lesions, external inspection of ears and nose are normal  NECK: Supple, no masses. No palpable thyroid mass  CHEST/LUNGS: CTA bilaterally, normal respiratory effort   CARDIOVASCULAR: RRR, no murmurs. No lower extremity edema  ABDOMEN: Healing surgical scar,  active bowel sounds, no HSM. No palpable masses  EXTREMITIES: warm, full ROM in all 4 extremities, no focal weakness. SKIN: warm, dry with no rashes or lesions  LYMPH: No cervical, clavicular, axillary, or inguinal lymphadenopathy  NEUROLOGIC: follows commands, non focal   PSYCH: mood and affect appropriate.   Alert and oriented to time, place, person    LABORATORY RESULTS REVIEWED BY ME:  5/3/21 CEA 6.2   6/15/21 CEA  8.3  Lab Results   Component Value Date     06/15/2021    K 4.7 06/15/2021     06/15/2021    CO2 22 06/15/2021    BUN 14 06/15/2021    CREATININE 1.28 (H) 06/15/2021    GLUCOSE 85 06/15/2021    CALCIUM 9.6 06/15/2021    PROT 7.4 06/15/2021    LABALBU 4.4 06/15/2021    BILITOT 0.8 06/15/2021    ALKPHOS 133 (H) 06/15/2021    AST 30 06/15/2021    ALT 19 06/15/2021    LABGLOM 59 (L) 06/15/2021    GFRAA 68 06/15/2021    AGRATIO 1.5 06/15/2021    GLOB 3.0 06/15/2021       RADIOLOGY STUDIES REVIEWED   6/8/21 MRI abdomen ([place]): Status post right hepatectomy and MWA of a left liver lesion.  No evidence of residual or recurrent metastatic disease in the liver. Status post prior right hemicolectomy for colon carcinoma. New peripherally enhancing foci in the right iliopsoas/along the right posterior retroperitoneal/extraperitoneal scarring and along the posterior abdominal wall. This area is not completely included in the field of view and is highly worrisome for tumor seeding. Suggest correlation with contrast enhanced CT exam for complete evaluation. Consider biopsy of this lesion. CT CHEST WO CONTRAST    Result Date: 6/15/2021  1. No metastatic disease in the chest. 2.  No change in tiny 2 mm RIGHT upper lobe pulmonary nodule. 3.  Mild ectasia of the ascending aorta measuring 4 cm. Signed by Dr Marcelo Overall:  #Colonic adenocarcinoma-  [id number] (liver) K-maria luisa mutated, IHC MMR not proficient  Status post microwave ablation left hepatic lesion  Status post neoadjuvant FOLFOX/bevacizumab x11 biweekly cycles  Status post right hemicolectomy. Pathology consistent complete pathologic response. Recommended surveillance as per NCCN guidelines  Discussed at length results of pathology with the patient. 3/17/21- 1 year colonoscopy showed no large polyps or masses. Repeat in 3 years. June 2021-CT chest clear. MRI abdomen showed suspicious lesion in the right lower quadrant. Biopsy arranged [place]. Discussed with hepatobiliary service at [address] [place] [address]. #Chronic kidney disease stage III- creatinine 1.2.      #Liver metastasis- plan as above. Status post ablation left liver lobe lesion at [address] [place] [address] on 6/8/2020. Status post neoadjuvant FOLFOX/bevacizumab x11 biweekly cyclesStatus post right hemicolectomy. Pathology consistent complete pathologic response.    3/4/2021-MRI abdomen was unremarkable  6/8/2021-MRI abdomen showed Postsurgical changes of right hepatectomy. Redemonstration of an ablation zone in segment II, measuring up to 1.7 cm, previously 1.8 cm. No evidence of postcontrast enhancement.  No new lesion identified. Intra-abdominal abnormality MRI concerning for recurrent malignancy  Mildly T2 hyperintense nodular focus with postcontrast rim enhancement and diffusion restriction. This measures approximately 2.7 x 2 x   2 cm. More delayed postcontrast images show irregular area of enhancement measuring 2.4 x 7.7 cm axially involving the right iliopsoas muscle and the right lateral abdominal wall. #Iron deficiency anemia-  hemoglobin 8.5/MCV 89. Ferritin 12.9, iron saturation 15%, TIBC 458, iron 72. Status post IV iron therapy. Hemoglobin 15.4/MCV 88    #Chemotherapy-induced neuropathy-stable    PLAN:    · Proceed with biopsy intra-abdominal nodule at [address] [place] [address]. · RTC with me 2 weeks    IYao Cha, am scribing for Yolie Silvestre MD. Electronically signed by Yao Vaca RN on 6/18/2021 at 8:28 PM CDT. I, Dr Trinidad Michelle, personally performed the services described in this documentation as scribed by Yao Vaca RN in my presence and is both accurate and complete. I have seen, examined and reviewed this patient medication list, appropriate labs and imaging studies. I reviewed relevant medical records and others physicians notes. I discussed the plans of care with the patient. I answered all the questions to the patients satisfaction. I have also reviewed the chief complaint (CC) and part of the history (History of Present Illness (HPI), Past Family Social History [facility]), or Review of Systems (ROS) and made changes when appropriated.        (Please note that portions of this note were completed with a voice recognition program. Efforts were made to edit the dictations but occasionally words are mis-transcribed.)      Yao Vaca ADITYA    04/29/21  11:08 AM

## 2021-06-18 ENCOUNTER — HOSPITAL ENCOUNTER (OUTPATIENT)
Dept: INFUSION THERAPY | Age: 66
Discharge: HOME | End: 2021-06-18
Payer: OTHER GOVERNMENT

## 2021-06-18 ENCOUNTER — OFFICE VISIT (OUTPATIENT)
Dept: HEMATOLOGY | Age: 66
End: 2021-06-18
Payer: OTHER GOVERNMENT

## 2021-06-18 VITALS
BODY MASS INDEX: 24.8 KG/M2 | SYSTOLIC BLOOD PRESSURE: 140 MMHG | OXYGEN SATURATION: 98 % | DIASTOLIC BLOOD PRESSURE: 76 MMHG | WEIGHT: 158 LBS | HEIGHT: 67 IN | HEART RATE: 78 BPM

## 2021-06-18 DIAGNOSIS — G62.0 CHEMOTHERAPY-INDUCED NEUROPATHY (HCC): ICD-10-CM

## 2021-06-18 DIAGNOSIS — Z01.812 PRE-OPERATIVE LABORATORY EXAMINATION: ICD-10-CM

## 2021-06-18 DIAGNOSIS — Z01.812 PRE-OPERATIVE LABORATORY EXAMINATION: Primary | ICD-10-CM

## 2021-06-18 DIAGNOSIS — Z71.89 CARE PLAN DISCUSSED WITH PATIENT: ICD-10-CM

## 2021-06-18 DIAGNOSIS — C18.9 ADENOCARCINOMA OF COLON (HCC): ICD-10-CM

## 2021-06-18 DIAGNOSIS — C78.7 LIVER METASTASES (HCC): ICD-10-CM

## 2021-06-18 DIAGNOSIS — Z71.89 COORDINATION OF COMPLEX CARE: ICD-10-CM

## 2021-06-18 DIAGNOSIS — T45.1X5A CHEMOTHERAPY-INDUCED NEUROPATHY (HCC): ICD-10-CM

## 2021-06-18 LAB
BASOPHILS ABSOLUTE: 0.03 K/UL (ref 0.01–0.08)
BASOPHILS RELATIVE PERCENT: 0.5 % (ref 0.1–1.2)
EOSINOPHILS ABSOLUTE: 0.44 K/UL (ref 0.04–0.54)
EOSINOPHILS RELATIVE PERCENT: 6.9 % (ref 0.7–7)
HCT VFR BLD CALC: 47.6 % (ref 40.1–51)
HEMOGLOBIN: 15.4 G/DL (ref 13.7–17.5)
INR BLD: 1
LYMPHOCYTES ABSOLUTE: 0.79 K/UL (ref 1.18–3.74)
LYMPHOCYTES RELATIVE PERCENT: 12.5 % (ref 19.3–53.1)
MCH RBC QN AUTO: 30.3 PG (ref 25.7–32.2)
MCHC RBC AUTO-ENTMCNC: 32.4 G/DL (ref 32.3–36.5)
MCV RBC AUTO: 93.5 FL (ref 79–92.2)
MONOCYTES ABSOLUTE: 0.73 K/UL (ref 0.24–0.82)
MONOCYTES RELATIVE PERCENT: 11.5 % (ref 4.7–12.5)
NEUTROPHILS ABSOLUTE: 4.35 K/UL (ref 1.56–6.13)
NEUTROPHILS RELATIVE PERCENT: 68.6 % (ref 34–71.1)
PDW BLD-RTO: 16.2 % (ref 11.6–14.4)
PLATELET # BLD: 153 K/UL (ref 163–337)
PMV BLD AUTO: 10.2 FL (ref 7.4–10.4)
PROTIME: 12.1 SECONDS
RBC # BLD: 5.09 M/UL (ref 4.63–6.08)
WBC # BLD: 6.34 K/UL (ref 4.23–9.07)

## 2021-06-18 PROCEDURE — 36415 COLL VENOUS BLD VENIPUNCTURE: CPT

## 2021-06-18 PROCEDURE — 85025 COMPLETE CBC W/AUTO DIFF WBC: CPT

## 2021-06-18 PROCEDURE — 99212 OFFICE O/P EST SF 10 MIN: CPT

## 2021-06-18 PROCEDURE — 85610 PROTHROMBIN TIME: CPT

## 2021-06-18 PROCEDURE — 99214 OFFICE O/P EST MOD 30 MIN: CPT | Performed by: INTERNAL MEDICINE

## 2021-06-29 NOTE — PROGRESS NOTES
MEDICAL ONCOLOGY PROGRESS NOTE    Pt Name: Micheline Kovacs  MRN: [id number]  [place]: 1955  Date of evaluation: 4/29/2021    HISTORY OF PRESENT ILLNESS:      Diagnosis  · Colonic adenocarcinoma, March 2020  · [id number](liver), stage ROXANA  · IHC MMR- proficient  · K-maria luisa mutated  · N-MARIA LUISA/BRAF wild-type  · Iron deficiency anemia  · Soft tissue mass, June 2021  · Adenocarcinoma-consistent with colon primary, right psoas muscle, June 2021    Treatment summary  · 3/30/2020-right hemicolectomy at [facility]  · Anticipated Liver ablation to be followed by neoadjuvant/adjuvant versus palliative chemotherapy  · 06/10/2020-November 2020-FOLFOX + Avastin  · 12/11/20- Right hepatectomy-Dr. Mickle Severance  · S/p Injectafer-poor oral iron tolerance  · 7/2/21- Recommend FOLFIRI + Avastin every 2 weeks    The patient is a pleasant 61years old male was found to have a right cecal mass consistent with colonic adenocarcinoma. He had locally advanced disease with several lymph nodes involved. In addition, the patient was also found to have suspicious liver lesions concerning for metastatic disease. He was seen by [facility] and offered a multimodality approach with liver ablation of the left liver lesion followed by neoadjuvant chemotherapy and partial right hepatectomy. He underwent microwave ablation of the left lobe liver lesion on 6/8/2020. He is status post completion of 11 biweekly cycles of FOLFOX/Avasti completed November 2020 n. He tolerated treatment with complaints of mild transient cold neuropathy. He had a right hepatectomy on 12/11/2020 that showed no residual disease. He is now on clinical/radiologic surveillance. His last CEA was normal in January 2021. He had MRI of the abdomen at [facility] in March 2021 that showed no evidence of recurrent disease in the liver or in the abdomen. He also had a surveillance colonoscopy in March 2021 that showed no polyps. CEA was elevated in May 2021.   Repeat CEA June 2021 showed persistent elevation. MRI abdomen at [facility] showed no evidence of liver recurrence but a suspicious nodule in the right lower quadrant. Biopsy was performed at [facility] and consistent with recurrent adenocarcinoma. I discussed the findings with hepatobiliary service and also colorectal surgery. They will review images and call back regarding possibility of HIPEC. In any case, they recommended systemic therapy. Patient denies any abdominal pain. No weight loss. Feels well otherwise. Cancer history  Mr. Lamont Mcgowan was first seen by me on 3/23/2020. He was referred for a new diagnosis of colonic adenocarcinoma involving the cecum. The patient reports that he had a wellbeing consult with his provider at the [place]. He was found to have anemia and then recommended a colonoscopy. Of note, the patient has a family history of colon cancer. His mother is a patient of Dr. Gilles Gutiérrez he has been diagnosed with colon cancer in 2010. · 3/11/2020- colonoscopy revealed a large malignant appearing fungating mass lesion in the cecum. In addition, several other polyps. Biopsy of the mass consistent with moderate differentiated colonic adenocarcinoma. Polyps consistent with tubulovillous adenoma with no high-grade dysplasia. IHC MMR not proficient. K-maria luisa mutated, BRAF and NRAS wild type. MSI proficient  · 3/11/2020-CEA 5.5 (H)  · 3/18/2020-CT abdomen pelvis with contrast  Invasive cecal mass adhering to the right lateral abdominal wall muscles with adjacent lymphadenopathy. Mild partial obstruction of the terminal ileum. 2. Suspicious lesions in the right and left hepatic lobes measure up to 1.3 cm and likely represent metastatic disease. · 3/18/2020-Xr Chest Standard  No radiographic evidence of acute cardiopulmonary process. · 3/23/2020-he was first seen by me.   Recommended completion of staging with CT chest.  Also recommended liver MRI for further clarification of liver lesion. S.  Recommend to proceed with a general surgery consultation tomorrow with Dr. La Beverly. Patient was informed that I favor surgical resection if feasible of the primary malignancy. · 3/30/2020- right hemicolectomy by Dr. La Beverly at [facility] consistent with invasive moderately differentiated colonic adenocarcinoma measuring 7.2 cm. Carcinoma directly invading the adjacent abdominal wall tissue. Focal lymphovascular space invasion identified. Focal perineural invasion identified. Surgical margins negative for evidence of malignancy. 6 out of 14 lymph nodes positive for metastatic adenocarcinoma. Final pathology staging [id number](liver, stage ROXANA)  · 4/20/2020-CT chest with contrast showed No convincing intrathoracic metastasis. Nonspecific 4 mm nodule of the inferior lingula and a 2 mm right upper lobe nodule can be followed on subsequent imaging in 6-12 months. Moderate coronary calcifications. Hypodense metastatic liver lesions. Small hiatal hernia. · 4/20/2020-Mri Abdomen W Wo Contrast There are about 5 liver lesions. The 2 largest appears similar compared to 3/18/2020, the others are too small to further characterize. Appearance is most concerning for metastatic disease. Enhancement of the right lateral peritoneum. This is favored to be postoperative as there is no nodularity, evidence of omental disease or lymphadenopathy. Recommend attention on follow-up. Cholecystectomy. · 4/22/2020-discussed with Dr. Sheeba Serrato at [place]. He will review imaging studies and give further recommendations regarding eligibility for resection of liver lesions. · 5/8/2020 CT Abdomen The two largest suspicious lesions measuring 1.2 and 1.3 cm in the  right and left hepatic lobes respectively are similar compared to the  3/18/2020 CT. Additionally, there are at least five subcentimeter lesions with similar signal characteristics, which are also highly suspicious for metastases.  If complete characterization of the number and distribution of lesions is necessary, an MRI with Eovist could be acquired. · 5/19/2020- he was seen by the hepatobiliary service at [facility] with Dr. Iveth Khan:  patient adequate risk candidate for a multimodal approach, directed toward curative hepatectomy eventually. Endorsed by Hepatobiliary Conference, I recommended perc ablation of the L hemiliver to clear it, followed by systemic therapy in a neoadjuvant strategy. Restaging imaging to confirm clearance of disease on the left and lack of progression to unresectability of the R hemiliver disease would then be followed by R hepatectomy. Limitations to this approach may be accessibility of the segment 4A/8 disease high in the hepatic dome and the possibility of heat sink-related recurrence s/p abation of the left-sided disease. · 5/21/2020- referral for Mediport placement and start FOLFOX in 2 weeks. Will add bevacizumab after 6 weeks of liver ablation. We will plan for 12 biweekly dose of FOLFOX. Bevacizumab will also be stopped 6 weeks prior to major procedure. · 6/8/2020- Microwave ablation of the left liver lesion was then performed for 5 minutes at [address] to achieve a 3.4 x 3.9 cm approximately spherical zone of ablation. · 6/10/2020- initiation of FOLFOX. · 7/20/2020-added Avastin. · 9/10/20 MRI abdomen: Left hepatic lobe segment II lesion demonstrates small T1 hyperintense blood products, status post microwave ablation on 6/8/2020. No definitive enhancement within the lesion. Focal internal thickening or scar present. Recommend attention on follow-up. Additional scattered subcentimeter foci throughout the liver decreased in size compared to MRI dated 4/20/2020 consistent with improving metastatic disease. Additional chronic findings as above. · 9/16/2020-discussed with plan with the patient and [facility]. Interval response to treatment.   Plan to continue chemotherapy through 12 cycles with Avastin. · 12/11/20 Right hepatectomy-Dr. Frankey Meeter  · 12/11/20 Right hepatectomy pathology: Liver, right, resection: Focus of Fibrosis with calcifications and chronic inflammation (0.3 cm), see comment. Background hepatic parenchyma with minimal periportal fibrosis (trichrome stain), minimal lobular and portal inflammation, and no significant macrovesicular steatosis (<5%) Comments: The patient's history of colorectal cancer status adjuvant therapy is noted. The focus of fibrosis may reflect treatment effect. There is no evidence of viable tumor in the sampled specimen. · 12/14/20 Ct Abdomen Pelvis W Iv Contrast A Small bowel obstruction with transition point at the distal small bowel, just proximal to RIGHT upper quadrant ileocolonic anastomosis. Postoperative change of RIGHT hepatectomy with small amount of expected free fluid and intraperitoneal gas. Redemonstrated LEFT liver lesion. Small bilateral pleural effusions. · 12/16/20 SBFT-[place]: Study is limited due to retained contrast in the small bowel from prior attempt at small bowel follow-through on the floor. Small bowel remains dilated, measuring approximately 5.2 cm, which is consistent with partial or resolving small bowel obstruction. Final radiographs show contrast within the colon. · 1/4/2020-resolution of small bowel obstruction. · 1/7/21 CT chest: No finding to suggest intrathoracic neoplastic process or metastatic disease. The benign-appearing tiny nodule in the right upper lobe probably represent a noncalcified granuloma. A nodule in the lingular segment of the left upper lobe is not visualized in this study. Postsurgical changes of the liver. No evidence of focal complication. A trace right basal pleural effusion. This may be reactive to the previous abdominal surgery. · 3/4/21 MRI abd: Status post right hepatectomy and microwave ablation of a left liver lesion. No findings to suggest residual/recurrent disease.  No new liver lesion is identified. Postsurgical changes related to right hemicolectomy. Microwave ablation zone in segment II of the left hepatic lobe measures approximately 21 mm in diameter, unchanged. No appreciable postcontrast enhancement or other findings to suggest local disease recurrence. No new liver lesion is identified. Spleen is mildly enlarged, measuring 13.8 cm in length. · 3/17/2021-1 year colonoscopy showed no evidence of polyps. · 5/3/21 CEA 6.2  · 6/8/21 MRI abdomen ([place]): Status post right hepatectomy and MWA of a left liver lesion.  No evidence of residual or recurrent metastatic disease in the liver. Status post prior right hemicolectomy for colon carcinoma. Within the posterior right iliopsoas muscle there is a mildly T2 hyperintense nodular focus with postcontrast rim enhancement and diffusion restriction. This measures approximately 2.7 x 2 x 2 cm. More delayed postcontrast images show irregular area of enhancement measuring 2.4 x 7.7 cm axially involving the right iliopsoas muscle and the right lateral abdominal wall. Suggest correlation with contrast enhanced CT exam for complete evaluation. Consider biopsy of this lesion. · 6/15/21 CT CHEST WO CONTRAST No metastatic disease in the chest.  No change in tiny 2 mm RIGHT upper lobe pulmonary nodule. Mild ectasia of the ascending aorta measuring 4 cm. · 6/18/2021-I reviewed results of MRI abdomen at [facility]. CT chest without contrast reviewed by me and showed no evidence of metastatic disease. Stable 2 mm right upper lobe nodule. Discussed with hepatobiliary service at [facility]. Biopsy intra-abdominal nodule next week at [facility]. · 6/25/[id number] biopsy soft tissue mass right psoas muscle at [facility] consistent with recurrent colonic adenocarcinoma. · 7/2/2021-discussed with hepatobiliary service at [facility] and also surgical oncology.   Surgical oncology will call us back regarding consultation for consideration of daily For Seizures or Migraines      Sertraline HCl (ZOLOFT PO) Take by mouth daily      Zolpidem Tartrate (AMBIEN PO) Take by mouth nightly       No current facility-administered medications for this visit. Allergies: Allergies   Allergen Reactions    Oxycodone-Acetaminophen Nausea Only    Morphine Nausea And Vomiting     Hallucinations/Vomiting     Subjective   REVIEW OF SYSTEMS:   CONSTITUTIONAL: no fever, no night sweats, fatigue;   HEENT: no blurring of vision, no double vision, no hearing difficulty, no tinnitus, no ulceration, no dysplasia, no epistaxis;  LUNGS: no cough, no hemoptysis, no wheeze,  no shortness of breath;  CARDIOVASCULAR: no palpitation, no chest pain, no shortness of breath;  GI: no abdominal pain, no nausea, no vomiting,  no constipation;  ANNIE: no dysuria, no hematuria, no frequency or urgency, no nephrolithiasis;  MUSCULOSKELETAL: no joint pain, no swelling, no stiffness;  ENDOCRINE: no polyuria, no polydipsia, no cold or heat intolerance;  HEMATOLOGY: no easy bruising or bleeding, no history of clotting disorder;  DERMATOLOGY: no skin rash, no eczema, no pruritus;  PSYCHIATRY: no depression, no anxiety, no panic attacks, no suicidal ideation, no homicidal ideation;  NEUROLOGY: no syncope, no seizures, numbness or tingling of hands and feet, no paresis;    Objective   /70   Pulse 66   Ht 5' 7\" (1.702 m)   Wt 161 lb 11.2 oz (73.3 kg)   SpO2 97%   BMI 25.33 kg/m²       PHYSICAL EXAM:  CONSTITUTIONAL: Alert, appropriate, no acute distress  EYES: Non icteric, EOM intact, pupils equal round   ENT: Mucus membranes moist, no oral pharyngeal lesions, external inspection of ears and nose are normal  NECK: Supple, no masses. No palpable thyroid mass  CHEST/LUNGS: CTA bilaterally, normal respiratory effort   CARDIOVASCULAR: RRR, no murmurs. No lower extremity edema  ABDOMEN: Healing surgical scar,  active bowel sounds, no HSM.   No palpable masses  EXTREMITIES: warm, full ROM in all 4 extremities, no focal weakness. SKIN: warm, dry with no rashes or lesions  LYMPH: No cervical, clavicular, axillary, or inguinal lymphadenopathy  NEUROLOGIC: follows commands, non focal   PSYCH: mood and affect appropriate. Alert and oriented to time, place, person    LABORATORY RESULTS REVIEWED BY ME:  6/25/21 Soft tissue mass, right psoas muscle, core biopsy: Adenocarcinoma, morphologically consistent with colorectal primary. Lab Results   Component Value Date    WBC 6.65 07/02/2021    HGB 15.0 07/02/2021    HCT 46.7 07/02/2021    MCV 94.9 (H) 07/02/2021     07/02/2021     Lab Results   Component Value Date    NEUTROABS 4.35 07/02/2021     RADIOLOGY STUDIES REVIEWED         ASSESSMENT:  #Colonic adenocarcinoma-  [id number] (liver) K-maria luisa mutated, IHC MMR not proficient  Status post microwave ablation left hepatic lesion  Status post neoadjuvant FOLFOX/bevacizumab x11 biweekly cycles  Status post right hemicolectomy. Pathology consistent complete pathologic response. Recommended surveillance as per NCCN guidelines  Discussed at length results of pathology with the patient. 3/17/21- 1 year colonoscopy showed no large polyps or masses. Repeat in 3 years. June 2021-CT chest clear. MRI abdomen showed suspicious lesion in the right lower quadrant. Biopsy arranged [place]. Discussed with hepatobiliary service at [facility]. 6/25/[id number] biopsy consistent with recurrent primary colorectal adenocarcinoma. 7/2/2021-discussed with hepatobiliary service/surgical oncology at [facility]. They will review images and call the patient back regarding consultation for consideration ofHIPEC  7/2/2021-they recommend systemic therapy. 7/2/2021-recommended FOLFIRI/Avastin      Local regional recurrence colonic adenocarcinoma, June 2021  MRI abdomen at [facility] showed mildly T2 hyperintense nodular focus with postcontrast rim enhancement and diffusion restriction.  This measures approximately 2.7 labs and imaging studies. I reviewed relevant medical records and others physicians notes. I discussed the plans of care with the patient. I answered all the questions to the patients satisfaction. I have also reviewed the chief complaint (CC) and part of the history (History of Present Illness (HPI), Past Family Social History [facility]), or Review of Systems (ROS) and made changes when appropriated.        (Please note that portions of this note were completed with a voice recognition program. Efforts were made to edit the dictations but occasionally words are mis-transcribed.)      Lorena Manzano RN    04/29/21  11:08 AM

## 2021-07-02 ENCOUNTER — OFFICE VISIT (OUTPATIENT)
Dept: HEMATOLOGY | Age: 66
End: 2021-07-02
Payer: OTHER GOVERNMENT

## 2021-07-02 ENCOUNTER — HOSPITAL ENCOUNTER (OUTPATIENT)
Dept: INFUSION THERAPY | Age: 66
Discharge: HOME | End: 2021-07-02
Payer: OTHER GOVERNMENT

## 2021-07-02 VITALS
WEIGHT: 161.7 LBS | HEIGHT: 67 IN | SYSTOLIC BLOOD PRESSURE: 136 MMHG | DIASTOLIC BLOOD PRESSURE: 70 MMHG | HEART RATE: 66 BPM | OXYGEN SATURATION: 97 % | BODY MASS INDEX: 25.38 KG/M2

## 2021-07-02 DIAGNOSIS — C18.9 ADENOCARCINOMA OF COLON (HCC): Primary | ICD-10-CM

## 2021-07-02 DIAGNOSIS — Z71.89 CARE PLAN DISCUSSED WITH PATIENT: ICD-10-CM

## 2021-07-02 DIAGNOSIS — C78.7 LIVER METASTASES (HCC): ICD-10-CM

## 2021-07-02 DIAGNOSIS — C18.9 ADENOCARCINOMA OF COLON (HCC): ICD-10-CM

## 2021-07-02 LAB
BASOPHILS ABSOLUTE: 0.03 K/UL (ref 0.01–0.08)
BASOPHILS RELATIVE PERCENT: 0.5 % (ref 0.1–1.2)
EOSINOPHILS ABSOLUTE: 0.6 K/UL (ref 0.04–0.54)
EOSINOPHILS RELATIVE PERCENT: 9 % (ref 0.7–7)
HCT VFR BLD CALC: 46.7 % (ref 40.1–51)
HEMOGLOBIN: 15 G/DL (ref 13.7–17.5)
LYMPHOCYTES ABSOLUTE: 0.94 K/UL (ref 1.18–3.74)
LYMPHOCYTES RELATIVE PERCENT: 14.1 % (ref 19.3–53.1)
MCH RBC QN AUTO: 30.5 PG (ref 25.7–32.2)
MCHC RBC AUTO-ENTMCNC: 32.1 G/DL (ref 32.3–36.5)
MCV RBC AUTO: 94.9 FL (ref 79–92.2)
MONOCYTES ABSOLUTE: 0.73 K/UL (ref 0.24–0.82)
MONOCYTES RELATIVE PERCENT: 11 % (ref 4.7–12.5)
NEUTROPHILS ABSOLUTE: 4.35 K/UL (ref 1.56–6.13)
NEUTROPHILS RELATIVE PERCENT: 65.4 % (ref 34–71.1)
PDW BLD-RTO: 16 % (ref 11.6–14.4)
PLATELET # BLD: 169 K/UL (ref 163–337)
PMV BLD AUTO: 11 FL (ref 7.4–10.4)
RBC # BLD: 4.92 M/UL (ref 4.63–6.08)
WBC # BLD: 6.65 K/UL (ref 4.23–9.07)

## 2021-07-02 PROCEDURE — 99214 OFFICE O/P EST MOD 30 MIN: CPT | Performed by: INTERNAL MEDICINE

## 2021-07-02 PROCEDURE — 99211 OFF/OP EST MAY X REQ PHY/QHP: CPT

## 2021-07-02 PROCEDURE — 85025 COMPLETE CBC W/AUTO DIFF WBC: CPT

## 2021-07-02 PROCEDURE — 36415 COLL VENOUS BLD VENIPUNCTURE: CPT

## 2021-07-06 NOTE — TELEPHONE ENCOUNTER
Received a voicemail from patient's spouse, Samantha Daily, that patient was experiencing a lot of pain. Attempted to call spouse with no answer.

## 2021-07-08 DIAGNOSIS — C18.9 ADENOCARCINOMA OF COLON (HCC): Primary | ICD-10-CM

## 2021-07-08 DIAGNOSIS — G89.3 CANCER ASSOCIATED PAIN: ICD-10-CM

## 2021-07-08 DIAGNOSIS — C78.7 LIVER METASTASES (HCC): ICD-10-CM

## 2021-07-08 RX ORDER — HYDROCODONE BITARTRATE AND ACETAMINOPHEN 7.5; 325 MG/1; MG/1
1 TABLET ORAL EVERY 6 HOURS PRN
Qty: 120 TABLET | Refills: 0 | Status: SHIPPED | OUTPATIENT
Start: 2021-07-08 | End: 2021-08-07

## 2021-07-08 NOTE — TELEPHONE ENCOUNTER
Wife/Idania called stating pt is having a lot of pain that has changed since pt was seen in office by Dr. Celia Paget. Discussed with Dr. Celia Paget who recommended trying Norco 7.5mg every 6 hrs as needed. Return call to wife with above orders. Wife voiced understanding.

## 2021-07-13 ENCOUNTER — HOSPITAL ENCOUNTER (OUTPATIENT)
Dept: INFUSION THERAPY | Age: 66
Discharge: HOME | End: 2021-07-13
Payer: OTHER GOVERNMENT

## 2021-07-13 ENCOUNTER — HOSPITAL ENCOUNTER (OUTPATIENT)
Dept: INFUSION THERAPY | Age: 66
Setting detail: INFUSION SERIES
Discharge: HOME | End: 2021-07-13
Payer: OTHER GOVERNMENT

## 2021-07-13 VITALS
OXYGEN SATURATION: 98 % | SYSTOLIC BLOOD PRESSURE: 158 MMHG | HEART RATE: 60 BPM | DIASTOLIC BLOOD PRESSURE: 91 MMHG | RESPIRATION RATE: 17 BRPM | TEMPERATURE: 97.3 F

## 2021-07-13 DIAGNOSIS — C18.2 MALIGNANT NEOPLASM OF ASCENDING COLON (HCC): ICD-10-CM

## 2021-07-13 DIAGNOSIS — C18.9 ADENOCARCINOMA OF COLON (HCC): ICD-10-CM

## 2021-07-13 DIAGNOSIS — C18.9 ADENOCARCINOMA OF COLON (HCC): Primary | ICD-10-CM

## 2021-07-13 DIAGNOSIS — C78.7 LIVER METASTASES (HCC): Primary | ICD-10-CM

## 2021-07-13 LAB
ALBUMIN SERPL-MCNC: 4.7 G/DL (ref 3.5–5.2)
ALP BLD-CCNC: 109 U/L (ref 40–130)
ALT SERPL-CCNC: 17 U/L (ref 21–72)
ANION GAP SERPL CALCULATED.3IONS-SCNC: 9 MMOL/L (ref 7–19)
AST SERPL-CCNC: 34 U/L (ref 17–59)
BASOPHILS ABSOLUTE: 0.04 K/UL (ref 0.01–0.08)
BASOPHILS RELATIVE PERCENT: 0.7 % (ref 0.1–1.2)
BILIRUB SERPL-MCNC: 0.7 MG/DL (ref 0.2–1.3)
BUN BLDV-MCNC: 11 MG/DL (ref 9–20)
CALCIUM SERPL-MCNC: 10 MG/DL (ref 8.4–10.2)
CEA: 10.7 NG/ML (ref 0–3)
CHLORIDE BLD-SCNC: 99 MMOL/L (ref 98–111)
CO2: 29 MMOL/L (ref 22–29)
CREAT SERPL-MCNC: 1.2 MG/DL (ref 0.6–1.2)
EOSINOPHILS ABSOLUTE: 0.63 K/UL (ref 0.04–0.54)
EOSINOPHILS RELATIVE PERCENT: 10.5 % (ref 0.7–7)
GFR NON-AFRICAN AMERICAN: >60
GLUCOSE BLD-MCNC: 94 MG/DL (ref 74–106)
HCT VFR BLD CALC: 41.9 % (ref 40.1–51)
HEMOGLOBIN: 14.2 G/DL (ref 13.7–17.5)
LYMPHOCYTES ABSOLUTE: 0.86 K/UL (ref 1.18–3.74)
LYMPHOCYTES RELATIVE PERCENT: 14.3 % (ref 19.3–53.1)
MCH RBC QN AUTO: 30.7 PG (ref 25.7–32.2)
MCHC RBC AUTO-ENTMCNC: 33.9 G/DL (ref 32.3–36.5)
MCV RBC AUTO: 90.5 FL (ref 79–92.2)
MONOCYTES ABSOLUTE: 0.84 K/UL (ref 0.24–0.82)
MONOCYTES RELATIVE PERCENT: 14 % (ref 4.7–12.5)
NEUTROPHILS ABSOLUTE: 3.65 K/UL (ref 1.56–6.13)
NEUTROPHILS RELATIVE PERCENT: 60.5 % (ref 34–71.1)
PDW BLD-RTO: 14.8 % (ref 11.6–14.4)
PLATELET # BLD: 222 K/UL (ref 163–337)
PMV BLD AUTO: 9.7 FL (ref 7.4–10.4)
POTASSIUM SERPL-SCNC: 4.4 MMOL/L (ref 3.5–5.1)
RBC # BLD: 4.63 M/UL (ref 4.63–6.08)
SODIUM BLD-SCNC: 137 MMOL/L (ref 137–145)
TOTAL PROTEIN: 7.9 G/DL (ref 6.3–8.2)
WBC # BLD: 6.02 K/UL (ref 4.23–9.07)

## 2021-07-13 PROCEDURE — 96417 CHEMO IV INFUS EACH ADDL SEQ: CPT

## 2021-07-13 PROCEDURE — 85025 COMPLETE CBC W/AUTO DIFF WBC: CPT

## 2021-07-13 PROCEDURE — 80053 COMPREHEN METABOLIC PANEL: CPT

## 2021-07-13 PROCEDURE — 6360000002 HC RX W HCPCS: Performed by: INTERNAL MEDICINE

## 2021-07-13 PROCEDURE — 96365 THER/PROPH/DIAG IV INF INIT: CPT

## 2021-07-13 PROCEDURE — 96375 TX/PRO/DX INJ NEW DRUG ADDON: CPT

## 2021-07-13 PROCEDURE — 96416 CHEMO PROLONG INFUSE W/PUMP: CPT

## 2021-07-13 PROCEDURE — 96415 CHEMO IV INFUSION ADDL HR: CPT

## 2021-07-13 PROCEDURE — 96413 CHEMO IV INFUSION 1 HR: CPT

## 2021-07-13 PROCEDURE — 96366 THER/PROPH/DIAG IV INF ADDON: CPT

## 2021-07-13 PROCEDURE — 82378 CARCINOEMBRYONIC ANTIGEN: CPT

## 2021-07-13 PROCEDURE — 2580000003 HC RX 258: Performed by: INTERNAL MEDICINE

## 2021-07-13 PROCEDURE — 96374 THER/PROPH/DIAG INJ IV PUSH: CPT

## 2021-07-13 PROCEDURE — 36415 COLL VENOUS BLD VENIPUNCTURE: CPT

## 2021-07-13 RX ORDER — DIPHENHYDRAMINE HYDROCHLORIDE 50 MG/ML
50 INJECTION INTRAMUSCULAR; INTRAVENOUS ONCE
Status: CANCELLED | OUTPATIENT
Start: 2021-07-13 | End: 2021-07-13

## 2021-07-13 RX ORDER — DEXAMETHASONE SODIUM PHOSPHATE 10 MG/ML
10 INJECTION, SOLUTION INTRAMUSCULAR; INTRAVENOUS ONCE
Status: COMPLETED | OUTPATIENT
Start: 2021-07-13 | End: 2021-07-13

## 2021-07-13 RX ORDER — MEPERIDINE HYDROCHLORIDE 50 MG/ML
12.5 INJECTION INTRAMUSCULAR; INTRAVENOUS; SUBCUTANEOUS ONCE
Status: CANCELLED | OUTPATIENT
Start: 2021-07-13 | End: 2021-07-13

## 2021-07-13 RX ORDER — METHYLPREDNISOLONE SODIUM SUCCINATE 125 MG/2ML
125 INJECTION, POWDER, LYOPHILIZED, FOR SOLUTION INTRAMUSCULAR; INTRAVENOUS ONCE
Status: CANCELLED | OUTPATIENT
Start: 2021-07-13 | End: 2021-07-13

## 2021-07-13 RX ORDER — SODIUM CHLORIDE 9 MG/ML
INJECTION, SOLUTION INTRAVENOUS CONTINUOUS
Status: CANCELLED | OUTPATIENT
Start: 2021-07-13

## 2021-07-13 RX ORDER — DEXTROSE MONOHYDRATE 50 MG/ML
INJECTION, SOLUTION INTRAVENOUS ONCE
Status: COMPLETED | OUTPATIENT
Start: 2021-07-13 | End: 2021-07-13

## 2021-07-13 RX ORDER — PALONOSETRON 0.05 MG/ML
0.25 INJECTION, SOLUTION INTRAVENOUS ONCE
Status: CANCELLED | OUTPATIENT
Start: 2021-07-13

## 2021-07-13 RX ORDER — PALONOSETRON 0.05 MG/ML
0.25 INJECTION, SOLUTION INTRAVENOUS ONCE
Status: COMPLETED | OUTPATIENT
Start: 2021-07-13 | End: 2021-07-13

## 2021-07-13 RX ORDER — ATROPINE SULFATE 0.4 MG/ML
0.4 AMPUL (ML) INJECTION
Status: COMPLETED | OUTPATIENT
Start: 2021-07-13 | End: 2021-07-13

## 2021-07-13 RX ORDER — SODIUM CHLORIDE 9 MG/ML
25 INJECTION, SOLUTION INTRAVENOUS PRN
Status: CANCELLED | OUTPATIENT
Start: 2021-07-13

## 2021-07-13 RX ORDER — ATROPINE SULFATE 0.4 MG/ML
0.4 AMPUL (ML) INJECTION
Status: CANCELLED | OUTPATIENT
Start: 2021-07-13

## 2021-07-13 RX ORDER — SODIUM CHLORIDE 9 MG/ML
INJECTION, SOLUTION INTRAVENOUS ONCE
Status: CANCELLED | OUTPATIENT
Start: 2021-07-13 | End: 2021-07-13

## 2021-07-13 RX ORDER — HEPARIN SODIUM (PORCINE) LOCK FLUSH IV SOLN 100 UNIT/ML 100 UNIT/ML
500 SOLUTION INTRAVENOUS PRN
Status: CANCELLED | OUTPATIENT
Start: 2021-07-13

## 2021-07-13 RX ORDER — SODIUM CHLORIDE 0.9 % (FLUSH) 0.9 %
5-40 SYRINGE (ML) INJECTION PRN
Status: CANCELLED | OUTPATIENT
Start: 2021-07-13

## 2021-07-13 RX ORDER — EPINEPHRINE 1 MG/ML
0.3 INJECTION, SOLUTION, CONCENTRATE INTRAVENOUS PRN
Status: CANCELLED | OUTPATIENT
Start: 2021-07-13

## 2021-07-13 RX ORDER — SODIUM CHLORIDE 9 MG/ML
25 INJECTION, SOLUTION INTRAVENOUS PRN
Status: DISCONTINUED | OUTPATIENT
Start: 2021-07-13 | End: 2021-07-14 | Stop reason: HOSPADM

## 2021-07-13 RX ORDER — DEXTROSE MONOHYDRATE 50 MG/ML
INJECTION, SOLUTION INTRAVENOUS ONCE
Status: CANCELLED | OUTPATIENT
Start: 2021-07-13 | End: 2021-07-13

## 2021-07-13 RX ORDER — SODIUM CHLORIDE 9 MG/ML
INJECTION, SOLUTION INTRAVENOUS ONCE
Status: COMPLETED | OUTPATIENT
Start: 2021-07-13 | End: 2021-07-13

## 2021-07-13 RX ORDER — HEPARIN SODIUM (PORCINE) LOCK FLUSH IV SOLN 100 UNIT/ML 100 UNIT/ML
500 SOLUTION INTRAVENOUS PRN
Status: DISCONTINUED | OUTPATIENT
Start: 2021-07-13 | End: 2021-07-14 | Stop reason: HOSPADM

## 2021-07-13 RX ADMIN — ATROPINE SULFATE 0.4 MG: 0.4 INJECTION, SOLUTION INTRAMUSCULAR; INTRAVENOUS; SUBCUTANEOUS at 12:41

## 2021-07-13 RX ADMIN — SODIUM CHLORIDE 375 MG: 9 INJECTION, SOLUTION INTRAVENOUS at 16:16

## 2021-07-13 RX ADMIN — PALONOSETRON HYDROCHLORIDE 0.25 MG: 0.25 INJECTION, SOLUTION INTRAVENOUS at 12:24

## 2021-07-13 RX ADMIN — FLUOROURACIL 4475 MG: 50 INJECTION, SOLUTION INTRAVENOUS at 16:39

## 2021-07-13 RX ADMIN — DEXAMETHASONE SODIUM PHOSPHATE 10 MG: 10 INJECTION, SOLUTION INTRAMUSCULAR; INTRAVENOUS at 12:24

## 2021-07-13 RX ADMIN — DEXTROSE MONOHYDRATE: 50 INJECTION, SOLUTION INTRAVENOUS at 13:24

## 2021-07-13 RX ADMIN — LEUCOVORIN CALCIUM 750 MG: 350 INJECTION, POWDER, LYOPHILIZED, FOR SUSPENSION INTRAMUSCULAR; INTRAVENOUS at 13:24

## 2021-07-13 RX ADMIN — SODIUM CHLORIDE: 9 INJECTION, SOLUTION INTRAVENOUS at 12:10

## 2021-07-13 RX ADMIN — IRINOTECAN HYDROCHLORIDE 340 MG: 20 INJECTION, SOLUTION INTRAVENOUS at 15:01

## 2021-07-15 ENCOUNTER — HOSPITAL ENCOUNTER (OUTPATIENT)
Dept: INFUSION THERAPY | Age: 66
Setting detail: INFUSION SERIES
Discharge: HOME | End: 2021-07-15
Payer: OTHER GOVERNMENT

## 2021-07-15 DIAGNOSIS — C18.2 MALIGNANT NEOPLASM OF ASCENDING COLON (HCC): Primary | ICD-10-CM

## 2021-07-15 PROCEDURE — 96523 IRRIG DRUG DELIVERY DEVICE: CPT

## 2021-07-15 PROCEDURE — 6360000002 HC RX W HCPCS: Performed by: INTERNAL MEDICINE

## 2021-07-15 PROCEDURE — 2580000003 HC RX 258: Performed by: INTERNAL MEDICINE

## 2021-07-15 RX ORDER — HEPARIN SODIUM (PORCINE) LOCK FLUSH IV SOLN 100 UNIT/ML 100 UNIT/ML
500 SOLUTION INTRAVENOUS PRN
Status: DISCONTINUED | OUTPATIENT
Start: 2021-07-15 | End: 2021-07-16 | Stop reason: HOSPADM

## 2021-07-15 RX ORDER — SODIUM CHLORIDE 0.9 % (FLUSH) 0.9 %
20 SYRINGE (ML) INJECTION PRN
Status: DISCONTINUED | OUTPATIENT
Start: 2021-07-15 | End: 2021-07-16 | Stop reason: HOSPADM

## 2021-07-15 RX ADMIN — SODIUM CHLORIDE, PRESERVATIVE FREE 20 ML: 5 INJECTION INTRAVENOUS at 14:13

## 2021-07-15 RX ADMIN — HEPARIN 500 UNITS: 100 SYRINGE at 14:13

## 2021-07-20 NOTE — PROGRESS NOTES
MEDICAL ONCOLOGY PROGRESS NOTE                                                          Denise Seo Baldwin   1955 7/27/2021     Chief Complaint   Patient presents with    Colon Cancer        INTERVAL HISTORY/HISTORY OF PRESENT ILLNESS:  Diagnosis  · Colonic adenocarcinoma, March 2020  · [id number](liver), stage ROXANA  · IHC MMR- proficient  · K-maria luisa mutated  · N-MRAIA LUISA/BRAF wild-type  · Iron deficiency anemia  · Soft tissue mass, June 2021  · Adenocarcinoma-consistent with colon primary, right psoas muscle, June 2021     Treatment summary  · 3/30/2020-right hemicolectomy at [facility]  · Anticipated Liver ablation to be followed by neoadjuvant/adjuvant versus palliative chemotherapy  · 06/10/2020-November 2020-FOLFOX + Avastin  · 12/11/20- Right hepatectomy-Dr. Lawyer Verma  · S/p Injectafer-poor oral iron tolerance  · 7/13/21- Initiate FOLFIRI + Avastin every 2 weeks     The patient is a pleasant 61years old male was found to have a right cecal mass consistent with colonic adenocarcinoma. He had locally advanced disease with several lymph nodes involved. In addition, the patient was also found to have suspicious liver lesions concerning for metastatic disease. He was seen by [facility] and offered a multimodality approach with liver ablation of the left liver lesion followed by neoadjuvant chemotherapy and partial right hepatectomy. He underwent microwave ablation of the left lobe liver lesion on 6/8/2020. He is status post completion of 11 biweekly cycles of FOLFOX/Avasti completed November 2020. He tolerated treatment with complaints of mild transient cold neuropathy. He had a right hepatectomy on 12/11/2020 that showed no residual disease. His last CEA was normal in January 2021. He had MRI of the abdomen at [facility] in March 2021 that showed no evidence of recurrent disease in the liver or in the abdomen. He also had a surveillance colonoscopy in March 2021 that showed no polyps.   CEA was elevated in May 2021. Repeat CEA June 2021 showed persistent elevation. MRI abdomen at [facility] showed no evidence of liver recurrence but a suspicious nodule in the right lower quadrant. Biopsy was performed at [facility] and consistent with recurrent adenocarcinoma. I discussed the findings with hepatobiliary service and also colorectal surgery. They will review images and call back regarding possibility of HIPEC. In any case, they recommended systemic therapy. He was started on FOLFIRI/Avastin with very good tolerance. He had complaints of pain in the right lower quadrant that has improved with opioid. He has an appointment to be seen by Dr. Sirena Padilla later this week at [facility]. No weight loss. Feels well otherwise.     Cancer history  Mr. Lio Hayes was first seen by me on 3/23/2020. He was referred for a new diagnosis of colonic adenocarcinoma involving the cecum. The patient reports that he had a wellbeing consult with his provider at the [place]. He was found to have anemia and then recommended a colonoscopy. Of note, the patient has a family history of colon cancer. His mother is a patient of Dr. Harshad Conner he has been diagnosed with colon cancer in 2010. · 3/11/2020- colonoscopy revealed a large malignant appearing fungating mass lesion in the cecum. In addition, several other polyps. Biopsy of the mass consistent with moderate differentiated colonic adenocarcinoma. Polyps consistent with tubulovillous adenoma with no high-grade dysplasia. IHC MMR not proficient. K-maria luisa mutated, BRAF and NRAS wild type. MSI proficient  · 3/11/2020-CEA 5.5 (H)  · 3/18/2020-CT abdomen pelvis with contrast  Invasive cecal mass adhering to the right lateral abdominal wall muscles with adjacent lymphadenopathy. Mild partial obstruction of the terminal ileum. 2. Suspicious lesions in the right and left hepatic lobes measure up to 1.3 cm and likely represent metastatic disease. right and left hepatic lobes respectively are similar compared to the  3/18/2020 CT. Additionally, there are at least five subcentimeter lesions with similar signal characteristics, which are also highly suspicious for metastases. If complete characterization of the number and distribution of lesions is necessary, an MRI with Eovist could be acquired. · 5/19/2020- he was seen by the hepatobiliary service at [facility] with Dr. Joelle Jaimes:  patient adequate risk candidate for a multimodal approach, directed toward curative hepatectomy eventually. Endorsed by Hepatobiliary Conference, I recommended perc ablation of the L hemiliver to clear it, followed by systemic therapy in a neoadjuvant strategy. Restaging imaging to confirm clearance of disease on the left and lack of progression to unresectability of the R hemiliver disease would then be followed by R hepatectomy. Limitations to this approach may be accessibility of the segment 4A/8 disease high in the hepatic dome and the possibility of heat sink-related recurrence s/p abation of the left-sided disease. · 5/21/2020- referral for Mediport placement and start FOLFOX in 2 weeks. Will add bevacizumab after 6 weeks of liver ablation. We will plan for 12 biweekly dose of FOLFOX. Bevacizumab will also be stopped 6 weeks prior to major procedure. · 6/8/2020- Microwave ablation of the left liver lesion was then performed for 5 minutes at [address] to achieve a 3.4 x 3.9 cm approximately spherical zone of ablation. · 6/10/2020- initiation of FOLFOX. · 7/20/2020-added Avastin. · 9/10/20 MRI abdomen: Left hepatic lobe segment II lesion demonstrates small T1 hyperintense blood products, status post microwave ablation on 6/8/2020. No definitive enhancement within the lesion. Focal internal thickening or scar present. Recommend attention on follow-up.  Additional scattered subcentimeter foci throughout the liver decreased in size compared to MRI dated 4/20/2020 consistent with improving metastatic disease. Additional chronic findings as above. · 9/16/2020-discussed with plan with the patient and [facility]. Interval response to treatment. Plan to continue chemotherapy through 12 cycles with Avastin. · 12/11/20 Right hepatectomy-Dr. Yen Andrade  · 12/11/20 Right hepatectomy pathology: Liver, right, resection: Focus of Fibrosis with calcifications and chronic inflammation (0.3 cm), see comment. Background hepatic parenchyma with minimal periportal fibrosis (trichrome stain), minimal lobular and portal inflammation, and no significant macrovesicular steatosis (<5%) Comments: The patient's history of colorectal cancer status adjuvant therapy is noted. The focus of fibrosis may reflect treatment effect. There is no evidence of viable tumor in the sampled specimen. · 12/14/20 Ct Abdomen Pelvis W Iv Contrast A Small bowel obstruction with transition point at the distal small bowel, just proximal to RIGHT upper quadrant ileocolonic anastomosis. Postoperative change of RIGHT hepatectomy with small amount of expected free fluid and intraperitoneal gas. Redemonstrated LEFT liver lesion. Small bilateral pleural effusions. · 12/16/20 SBFT-Adrian: Study is limited due to retained contrast in the small bowel from prior attempt at small bowel follow-through on the floor. Small bowel remains dilated, measuring approximately 5.2 cm, which is consistent with partial or resolving small bowel obstruction. Final radiographs show contrast within the colon. · 1/4/2020-resolution of small bowel obstruction. · 1/7/21 CT chest: No finding to suggest intrathoracic neoplastic process or metastatic disease. The benign-appearing tiny nodule in the right upper lobe probably represent a noncalcified granuloma. A nodule in the lingular segment of the left upper lobe is not visualized in this study. Postsurgical changes of the liver. No evidence of focal complication.  A trace right basal pleural effusion. This may be reactive to the previous abdominal surgery. · 3/4/21 MRI abd: Status post right hepatectomy and microwave ablation of a left liver lesion. No findings to suggest residual/recurrent disease. No new liver lesion is identified. Postsurgical changes related to right hemicolectomy. Microwave ablation zone in segment II of the left hepatic lobe measures approximately 21 mm in diameter, unchanged. No appreciable postcontrast enhancement or other findings to suggest local disease recurrence. No new liver lesion is identified. Spleen is mildly enlarged, measuring 13.8 cm in length. · 3/17/2021-1 year colonoscopy showed no evidence of polyps. · 5/3/21 CEA 6.2  · 6/8/21 MRI abdomen ([facility]): Status post right hepatectomy and MWA of a left liver lesion.  No evidence of residual or recurrent metastatic disease in the liver. Status post prior right hemicolectomy for colon carcinoma. Within the posterior right iliopsoas muscle there is a mildly T2 hyperintense nodular focus with postcontrast rim enhancement and diffusion restriction. This measures approximately 2.7 x 2 x 2 cm. More delayed postcontrast images show irregular area of enhancement measuring 2.4 x 7.7 cm axially involving the right iliopsoas muscle and the right lateral abdominal wall. Suggest correlation with contrast enhanced CT exam for complete evaluation. Consider biopsy of this lesion. · 6/15/21 CT CHEST WO CONTRAST No metastatic disease in the chest.  No change in tiny 2 mm RIGHT upper lobe pulmonary nodule. Mild ectasia of the ascending aorta measuring 4 cm. · 6/18/2021-I reviewed results of MRI abdomen at [facility]. CT chest without contrast reviewed by me and showed no evidence of metastatic disease. Stable 2 mm right upper lobe nodule. Discussed with hepatobiliary service at [facility]. Biopsy intra-abdominal nodule next week at [facility].    · 6/25/20210797-OA-yefhih biopsy soft tissue mass right psoas muscle at [place] consistent with recurrent colonic adenocarcinoma. · 7/2/2021-discussed with hepatobiliary service at [facility] and also surgical oncology. Surgical oncology will call us back regarding consultation for consideration of HIPEC if he is a candidate  · 7/13/21-initiation of chemotherapy with FOLFIRI + Avastin every 2 weeks  · 7/13/21 CEA 10.7      CEA dynamics:  3/11/20 CEA 5.5  8/19/20 CEA 2.4  9/2/20 CEA 2.7  9/16/20 CEA 2.7  9/30/20 CEA 2.7  10/19/20 CEA 2.3  1/4/21 CEA 2.2  5/3/21 CEA 6.2  6/15/21 CEA 8.3  7/13/21 CEA 10.7      PAST MEDICAL HISTORY:   Past Medical History:   Diagnosis Date    Acid reflux     Cancer ([address]     adenocarcinoma of colon    GERD (gastroesophageal reflux disease)     PTSD (post-traumatic stress disorder)           PAST SURGICAL HISTORY:  Past Surgical History:   Procedure Laterality Date    ABLATION OF DYSRHYTHMIC FOCUS      ablation of liver at [address] [place] [address] Drive  2003    CHOLECYSTECTOMY  2013    COLONOSCOPY      when pt was in the 19's    COLONOSCOPY N/A 03/11/2020    COLONOSCOPY POLYPECTOMY SNARE/COLD BIOPSY: Dr. [place] colonoscopy: 6-12 months due to findings at colonoscopy today with Cecal mass lesion and large polyps.     COLONOSCOPY      COLONOSCOPY N/A 03/17/2021    Dr Bryan Low, Post-operative changes w IC anastomosis & single visible staple, Mild Diverticuosis, Int Hemorrhoids Grade 1, 3 year recall    HEMICOLECTOMY Right 03/30/2020    LAPAROSCOPIC-ASSISTED RIGHT HEMICOLECTOMY performed by Genesis Healy MD at [address] [place] [address] N/A 06/03/2020    INSERTION OF VENOUS PORT with flouro performed by Genesis Healy MD at [place] [address] ENDOSCOPY N/A 03/11/2020    Dr. Mikki Monique:   [facility]        SOCIAL HISTORY:  Social History     Socioeconomic History    Marital status:      Spouse name: Not on file    Number of children: Not on file    Years of education: Not on file    Highest education level: Not on file   Occupational History    Not on file   Tobacco Use    Smoking status: Never Smoker    Smokeless tobacco: Never Used   Vaping Use    Vaping Use: Never used   Substance and Sexual Activity    Alcohol use: Yes     Comment: rare     Drug use: No    Sexual activity: Yes     Partners: Female   Other Topics Concern    Not on file   Social History Narrative    Not on file     Social Determinants of Health     Financial Resource Strain:     Difficulty of Paying Living Expenses:    Food Insecurity:     Worried About Running Out of Food in the Last Year:     [address] in the Last Year:    Transportation Needs:     Lack of Transportation (Medical):  Lack of Transportation (Non-Medical):    Physical Activity:     Days of Exercise per Week:     Minutes of Exercise per Session:    Stress:     Feeling of Stress :    Social Connections:     Frequency of Communication with Friends and Family:     Frequency of Social Gatherings with Friends and Family:     Attends Gnosticist Services:     Active Member of Clubs or Organizations:     Attends Club or Organization Meetings:     Marital Status:    Intimate Partner Violence:     Fear of Current or Ex-Partner:     Emotionally Abused:     Physically Abused:     Sexually Abused:        FAMILY HISTORY:  Family History   Problem Relation Age of Onset    Liver Cancer Mother     High Blood Pressure Mother     Colon Cancer Mother         x2    Cancer Father         Lung Cancer    Breast Cancer Sister     Cancer Maternal Grandfather         Lung Cancer    Cancer Paternal Grandfather         Stomach Cancer    Colon Polyps Neg Hx     Cystic Fibrosis Neg Hx     Liver Disease Neg Hx     Rectal Cancer Neg Hx         Current Outpatient Medications   Medication Sig Dispense Refill    HYDROcodone-acetaminophen ([address]) 7.5-325 MG per tablet Take 1 tablet by mouth every 6 hours as needed for Pain for up to 30 days.  Intended supply: 30 days 120 tablet 0    promethazine (PHENERGAN) 12.5 MG tablet Take 1 tablet by mouth every 6 hours as needed for Nausea 60 tablet 5    ondansetron (ZOFRAN) 8 MG tablet Take 1 tablet by mouth every 8 hours as needed for Nausea or Vomiting 30 tablet 5    omeprazole (PRILOSEC) 20 MG delayed release capsule Take 20 mg by mouth daily      Cholecalciferol (VITAMIN D3) 50 MCG (2000 UT) CAPS Take by mouth daily      folic acid (FOLVITE) 1 MG tablet Take 1 mg by mouth daily      prazosin (MINIPRESS) 1 MG capsule Take 1 mg by mouth nightly For nightmares      buPROPion (WELLBUTRIN) 100 MG tablet Take 100 mg by mouth every morning For mood      topiramate (TOPAMAX) 100 MG tablet Take 100 mg by mouth daily For Seizures or Migraines      Sertraline HCl (ZOLOFT PO) Take by mouth daily      Zolpidem Tartrate (AMBIEN PO) Take by mouth nightly       No current facility-administered medications for this visit. REVIEW OF SYSTEMS:    Constitutional: no fever, no night sweats, no fatigue;   HEENT: no blurring of vision, no double vision, no hearing difficulty, no tinnitus,no ulceration, no dysphagia  Lungs: no cough, no shortness of breath, no wheeze;   CVS: no palpitation, no chest pain, no shortness of breath;  GI: no abdominal pain, no nausea , no vomiting, no constipation;   ANNIE: no dysuria, frequency and urgency, no hematuria, no kidney stones;   Musculoskeletal: no joint pain, swelling , stiffness;   Endocrine: no polyuria, polydypsia, no cold or heat intolerence; Hematology/lymphatic: no easy brusing or bleeding, no hx of clotting disorder; no peripheral adenopathy. Dermatology: no skin rash, no eczema, no pruritis;   Psychiatry: no depression, no anxiety,no panic attacks, no suicide ideation;    Neurology: no syncope, no seizures, no numbness or tingling of hands, no numbness or tingling of feet, no paresis;     PHYSICAL EXAM:    Vitals signs:  BP (!) 153/87 (Site: Left Upper Arm, Position: Sitting)   Pulse 62   Temp 98.2 °F (36.8 °C)   Resp 17   Wt 161 lb 4.8 oz (73.2 kg)   BMI 25.26 kg/m²    Pain scale:  Pain Score:   0 - No pain     CONSTITUTIONAL: Alert, appropriate, no acute distress,   EYES: Non icteric, EOM intact, pupils equal round and reactive to light and accommodation. ENT: Oral mucus membranes moist, no oral pharyngeal lesions. External inspection of ears and nose are normal.   NECK: Supple, no masses. No palpable thyroid mass    CHEST/LUNGS: CTA bilaterally, normal respiratory effort   CARDIOVASCULAR: RRR, no murmurs. No lower extremity edema   ABDOMEN: soft non-tender, active bowel sounds, no hepatosplenomegaly. No palpable masses. EXTREMITIES: warm, Full ROM of all fours extremities. No focal weakness. SKIN: warm, dry with no rashes or lesions  LYMPH: No cervical, clavicular, axillary, or inguinal lymphadenopathy  NEUROLOGIC: follows commands, non focal.   PSYCH: mood and affect appropriate. Alert and oriented to time and place and person. Relevant Lab findings/reviewed by me:  7/13/21 CEA 10.7  Lab Results   Component Value Date    WBC 5.97 07/27/2021    HGB 13.6 (L) 07/27/2021    HCT 40.2 07/27/2021    MCV 89.9 07/27/2021     07/27/2021     Lab Results   Component Value Date    NEUTROABS 3.51 07/27/2021     Relevant Imaging studies/reviewed by me:  No results found. ASSESSMENT    No orders of the defined types were placed in this encounter. Rocci was seen today for colon cancer.     Diagnoses and all orders for this visit:    Adenocarcinoma of colon [facility])    Care plan discussed with patient    Encounter for antineoplastic immunotherapy    Chemotherapy management, encounter for    Adverse effect of chemotherapy, subsequent encounter    Liver metastases ([address]    Cancer associated pain         #Colonic adenocarcinoma-  [id number] (liver) K-maria luisa mutated, IHC MMR not proficient  Status post microwave ablation left hepatic lesion  Status post neoadjuvant FOLFOX/bevacizumab x11 biweekly cycles  Status post FOLFOX/bevacizumab x11 biweekly cyclesStatus post right hemicolectomy. Pathology consistent complete pathologic response. 3/4/2021-MRI abdomen was unremarkable  6/8/2021-MRI abdomen showed Postsurgical changes of right hepatectomy. Redemonstration of an ablation zone in segment II, measuring up to 1.7 cm, previously 1.8 cm. No evidence of postcontrast enhancement.  No new lesion identified.      #Iron deficiency anemia-  hemoglobin 8.5/MCV 89. Ferritin 12.9, iron saturation 15%, TIBC 458, iron 72. Status post IV iron therapy. Hemoglobin 15/MCV 94     #Chemotherapy-induced neuropathy-stable     PLAN:  · Continue follow-up with [facility]/Dr. Asim Rodriguez  · Continue follow-up with Dr. Brandon Mcmanus  · Continue C#2 FOLFIRI + Avastin today and every 2 weeks   · RTC with me 2 weeks in treatment room        Follow Up:     Return in about 2 weeks (around 8/10/2021) for Treatment & see Cali Brown in [address] [place] [address] & FOLFIRI Avastin. FOLFIRI Avastin every 2 weeks     IAysha am scribing for Basilio Rivers MD. Electronically signed by Luz Maria Turner on 7/27/2021 at 11:52 AM CDT. I, Dr Cole Reno, personally performed the services described in this documentation as scribed by Luz Maria Turner RN in my presence and is both accurate and complete. I have seen, examined and reviewed this patient medication list, appropriate labs and imaging studies. I reviewed relevant medical records and others physicians notes. I discussed the plans of care with the patient. I answered all the questions to the patients satisfaction. I have also reviewed the chief complaint (CC) and part of the history (History of Present Illness (HPI), Past Family Social History [facility]), or Review of Systems (ROS) and made changes when appropriated.        (Please note that portions of this note were completed with a voice recognition program. Efforts were made to edit the dictations but occasionally words are mis-transcribed.)

## 2021-07-27 ENCOUNTER — OFFICE VISIT (OUTPATIENT)
Dept: HEMATOLOGY | Age: 66
End: 2021-07-27
Payer: OTHER GOVERNMENT

## 2021-07-27 ENCOUNTER — HOSPITAL ENCOUNTER (OUTPATIENT)
Dept: INFUSION THERAPY | Age: 66
Discharge: HOME | End: 2021-07-27
Payer: OTHER GOVERNMENT

## 2021-07-27 VITALS
TEMPERATURE: 98.2 F | RESPIRATION RATE: 17 BRPM | BODY MASS INDEX: 25.26 KG/M2 | WEIGHT: 161.3 LBS | SYSTOLIC BLOOD PRESSURE: 153 MMHG | DIASTOLIC BLOOD PRESSURE: 87 MMHG | HEART RATE: 62 BPM

## 2021-07-27 DIAGNOSIS — C78.7 LIVER METASTASES (HCC): ICD-10-CM

## 2021-07-27 DIAGNOSIS — C18.9 ADENOCARCINOMA OF COLON (HCC): Primary | ICD-10-CM

## 2021-07-27 DIAGNOSIS — Z51.11 CHEMOTHERAPY MANAGEMENT, ENCOUNTER FOR: ICD-10-CM

## 2021-07-27 DIAGNOSIS — G89.3 CANCER ASSOCIATED PAIN: ICD-10-CM

## 2021-07-27 DIAGNOSIS — Z51.12 ENCOUNTER FOR ANTINEOPLASTIC IMMUNOTHERAPY: ICD-10-CM

## 2021-07-27 DIAGNOSIS — T45.1X5D ADVERSE EFFECT OF CHEMOTHERAPY, SUBSEQUENT ENCOUNTER: ICD-10-CM

## 2021-07-27 DIAGNOSIS — Z71.89 CARE PLAN DISCUSSED WITH PATIENT: ICD-10-CM

## 2021-07-27 LAB
BASOPHILS ABSOLUTE: 0.04 K/UL (ref 0.01–0.08)
BASOPHILS RELATIVE PERCENT: 0.7 % (ref 0.1–1.2)
CEA: 9.6 NG/ML (ref 0–3)
EOSINOPHILS ABSOLUTE: 0.62 K/UL (ref 0.04–0.54)
EOSINOPHILS RELATIVE PERCENT: 10.4 % (ref 0.7–7)
HCT VFR BLD CALC: 40.2 % (ref 40.1–51)
HEMOGLOBIN: 13.6 G/DL (ref 13.7–17.5)
LYMPHOCYTES ABSOLUTE: 1.1 K/UL (ref 1.18–3.74)
LYMPHOCYTES RELATIVE PERCENT: 18.4 % (ref 19.3–53.1)
MCH RBC QN AUTO: 30.4 PG (ref 25.7–32.2)
MCHC RBC AUTO-ENTMCNC: 33.8 G/DL (ref 32.3–36.5)
MCV RBC AUTO: 89.9 FL (ref 79–92.2)
MONOCYTES ABSOLUTE: 0.7 K/UL (ref 0.24–0.82)
MONOCYTES RELATIVE PERCENT: 11.7 % (ref 4.7–12.5)
NEUTROPHILS ABSOLUTE: 3.51 K/UL (ref 1.56–6.13)
NEUTROPHILS RELATIVE PERCENT: 58.8 % (ref 34–71.1)
PDW BLD-RTO: 14.3 % (ref 11.6–14.4)
PLATELET # BLD: 193 K/UL (ref 163–337)
PMV BLD AUTO: 9.5 FL (ref 7.4–10.4)
PROTEIN UA: NEGATIVE
RBC # BLD: 4.47 M/UL (ref 4.63–6.08)
WBC # BLD: 5.97 K/UL (ref 4.23–9.07)

## 2021-07-27 PROCEDURE — 96367 TX/PROPH/DG ADDL SEQ IV INF: CPT

## 2021-07-27 PROCEDURE — 85025 COMPLETE CBC W/AUTO DIFF WBC: CPT

## 2021-07-27 PROCEDURE — 99214 OFFICE O/P EST MOD 30 MIN: CPT | Performed by: INTERNAL MEDICINE

## 2021-07-27 PROCEDURE — 96413 CHEMO IV INFUSION 1 HR: CPT

## 2021-07-27 PROCEDURE — 2580000003 HC RX 258: Performed by: INTERNAL MEDICINE

## 2021-07-27 PROCEDURE — 6360000002 HC RX W HCPCS: Performed by: INTERNAL MEDICINE

## 2021-07-27 PROCEDURE — G0498 CHEMO EXTEND IV INFUS W/PUMP: HCPCS

## 2021-07-27 PROCEDURE — 96415 CHEMO IV INFUSION ADDL HR: CPT

## 2021-07-27 PROCEDURE — 96366 THER/PROPH/DIAG IV INF ADDON: CPT

## 2021-07-27 PROCEDURE — 82378 CARCINOEMBRYONIC ANTIGEN: CPT

## 2021-07-27 PROCEDURE — 96417 CHEMO IV INFUS EACH ADDL SEQ: CPT

## 2021-07-27 PROCEDURE — 81002 URINALYSIS NONAUTO W/O SCOPE: CPT | Performed by: INTERNAL MEDICINE

## 2021-07-27 PROCEDURE — 96375 TX/PRO/DX INJ NEW DRUG ADDON: CPT

## 2021-07-27 PROCEDURE — 36415 COLL VENOUS BLD VENIPUNCTURE: CPT

## 2021-07-27 RX ORDER — PALONOSETRON 0.05 MG/ML
0.25 INJECTION, SOLUTION INTRAVENOUS ONCE
Status: CANCELLED | OUTPATIENT
Start: 2021-07-27

## 2021-07-27 RX ORDER — MEPERIDINE HYDROCHLORIDE 50 MG/ML
12.5 INJECTION INTRAMUSCULAR; INTRAVENOUS; SUBCUTANEOUS ONCE
Status: CANCELLED | OUTPATIENT
Start: 2021-07-27 | End: 2021-07-27

## 2021-07-27 RX ORDER — DEXTROSE MONOHYDRATE 50 MG/ML
INJECTION, SOLUTION INTRAVENOUS ONCE
Status: COMPLETED | OUTPATIENT
Start: 2021-07-27 | End: 2021-07-28

## 2021-07-27 RX ORDER — DEXAMETHASONE SODIUM PHOSPHATE 10 MG/ML
10 INJECTION, SOLUTION INTRAMUSCULAR; INTRAVENOUS ONCE
Status: COMPLETED | OUTPATIENT
Start: 2021-07-27 | End: 2021-07-27

## 2021-07-27 RX ORDER — DEXTROSE MONOHYDRATE 50 MG/ML
INJECTION, SOLUTION INTRAVENOUS ONCE
Status: CANCELLED | OUTPATIENT
Start: 2021-07-27 | End: 2021-07-27

## 2021-07-27 RX ORDER — EPINEPHRINE 1 MG/ML
0.3 INJECTION, SOLUTION, CONCENTRATE INTRAVENOUS PRN
Status: CANCELLED | OUTPATIENT
Start: 2021-07-27

## 2021-07-27 RX ORDER — ATROPINE SULFATE 1 MG/ML
0.5 INJECTION, SOLUTION INTRAMUSCULAR; INTRAVENOUS; SUBCUTANEOUS
Status: COMPLETED | OUTPATIENT
Start: 2021-07-27 | End: 2021-07-27

## 2021-07-27 RX ORDER — HEPARIN SODIUM (PORCINE) LOCK FLUSH IV SOLN 100 UNIT/ML 100 UNIT/ML
500 SOLUTION INTRAVENOUS PRN
Status: CANCELLED | OUTPATIENT
Start: 2021-07-27

## 2021-07-27 RX ORDER — SODIUM CHLORIDE 0.9 % (FLUSH) 0.9 %
5-40 SYRINGE (ML) INJECTION PRN
Status: CANCELLED | OUTPATIENT
Start: 2021-07-27

## 2021-07-27 RX ORDER — SODIUM CHLORIDE 9 MG/ML
INJECTION, SOLUTION INTRAVENOUS ONCE
Status: COMPLETED | OUTPATIENT
Start: 2021-07-27 | End: 2021-07-27

## 2021-07-27 RX ORDER — HEPARIN SODIUM (PORCINE) LOCK FLUSH IV SOLN 100 UNIT/ML 100 UNIT/ML
500 SOLUTION INTRAVENOUS PRN
Status: DISCONTINUED | OUTPATIENT
Start: 2021-07-27 | End: 2021-07-28 | Stop reason: HOSPADM

## 2021-07-27 RX ORDER — ATROPINE SULFATE 0.4 MG/ML
0.4 AMPUL (ML) INJECTION
Status: DISCONTINUED | OUTPATIENT
Start: 2021-07-27 | End: 2021-07-27

## 2021-07-27 RX ORDER — DIPHENHYDRAMINE HYDROCHLORIDE 50 MG/ML
50 INJECTION INTRAMUSCULAR; INTRAVENOUS ONCE
Status: CANCELLED | OUTPATIENT
Start: 2021-07-27 | End: 2021-07-27

## 2021-07-27 RX ORDER — METHYLPREDNISOLONE SODIUM SUCCINATE 125 MG/2ML
125 INJECTION, POWDER, LYOPHILIZED, FOR SOLUTION INTRAMUSCULAR; INTRAVENOUS ONCE
Status: CANCELLED | OUTPATIENT
Start: 2021-07-27 | End: 2021-07-27

## 2021-07-27 RX ORDER — PALONOSETRON 0.05 MG/ML
0.25 INJECTION, SOLUTION INTRAVENOUS ONCE
Status: COMPLETED | OUTPATIENT
Start: 2021-07-27 | End: 2021-07-27

## 2021-07-27 RX ORDER — SODIUM CHLORIDE 9 MG/ML
25 INJECTION, SOLUTION INTRAVENOUS PRN
Status: CANCELLED | OUTPATIENT
Start: 2021-07-27

## 2021-07-27 RX ORDER — SODIUM CHLORIDE 0.9 % (FLUSH) 0.9 %
5-40 SYRINGE (ML) INJECTION PRN
Status: DISCONTINUED | OUTPATIENT
Start: 2021-07-27 | End: 2021-07-28 | Stop reason: HOSPADM

## 2021-07-27 RX ORDER — SODIUM CHLORIDE 9 MG/ML
INJECTION, SOLUTION INTRAVENOUS ONCE
Status: CANCELLED | OUTPATIENT
Start: 2021-07-27 | End: 2021-07-27

## 2021-07-27 RX ORDER — ATROPINE SULFATE 0.4 MG/ML
0.4 AMPUL (ML) INJECTION
Status: CANCELLED | OUTPATIENT
Start: 2021-07-27

## 2021-07-27 RX ORDER — SODIUM CHLORIDE 9 MG/ML
INJECTION, SOLUTION INTRAVENOUS CONTINUOUS
Status: CANCELLED | OUTPATIENT
Start: 2021-07-27

## 2021-07-27 RX ADMIN — DEXAMETHASONE SODIUM PHOSPHATE 10 MG: 10 INJECTION, SOLUTION INTRAMUSCULAR; INTRAVENOUS at 12:50

## 2021-07-27 RX ADMIN — SODIUM CHLORIDE 375 MG: 9 INJECTION, SOLUTION INTRAVENOUS at 13:10

## 2021-07-27 RX ADMIN — DEXTROSE MONOHYDRATE: 50 INJECTION, SOLUTION INTRAVENOUS at 13:47

## 2021-07-27 RX ADMIN — FLUOROURACIL 4475 MG: 50 INJECTION, SOLUTION INTRAVENOUS at 15:35

## 2021-07-27 RX ADMIN — IRINOTECAN HYDROCHLORIDE 340 MG: 20 INJECTION, SOLUTION INTRAVENOUS at 13:54

## 2021-07-27 RX ADMIN — SODIUM CHLORIDE: 9 INJECTION, SOLUTION INTRAVENOUS at 12:50

## 2021-07-27 RX ADMIN — PALONOSETRON HYDROCHLORIDE 0.25 MG: 0.25 INJECTION, SOLUTION INTRAVENOUS at 12:50

## 2021-07-27 RX ADMIN — ATROPINE SULFATE 0.5 MG: 1 INJECTION, SOLUTION INTRAMUSCULAR; INTRAVENOUS; SUBCUTANEOUS at 13:51

## 2021-07-27 RX ADMIN — LEUCOVORIN CALCIUM 750 MG: 350 INJECTION, POWDER, LYOPHILIZED, FOR SOLUTION INTRAMUSCULAR; INTRAVENOUS at 13:54

## 2021-07-29 ENCOUNTER — HOSPITAL ENCOUNTER (OUTPATIENT)
Dept: INFUSION THERAPY | Age: 66
Discharge: HOME | End: 2021-07-29
Payer: OTHER GOVERNMENT

## 2021-07-29 DIAGNOSIS — C18.2 MALIGNANT NEOPLASM OF ASCENDING COLON (HCC): Primary | ICD-10-CM

## 2021-07-29 PROCEDURE — 6360000002 HC RX W HCPCS: Performed by: INTERNAL MEDICINE

## 2021-07-29 PROCEDURE — 2580000003 HC RX 258: Performed by: INTERNAL MEDICINE

## 2021-07-29 PROCEDURE — 96523 IRRIG DRUG DELIVERY DEVICE: CPT

## 2021-07-29 RX ORDER — SODIUM CHLORIDE 0.9 % (FLUSH) 0.9 %
10 SYRINGE (ML) INJECTION PRN
Status: DISCONTINUED | OUTPATIENT
Start: 2021-07-29 | End: 2021-07-30 | Stop reason: HOSPADM

## 2021-07-29 RX ORDER — HEPARIN SODIUM (PORCINE) LOCK FLUSH IV SOLN 100 UNIT/ML 100 UNIT/ML
500 SOLUTION INTRAVENOUS PRN
Status: DISCONTINUED | OUTPATIENT
Start: 2021-07-29 | End: 2021-07-30 | Stop reason: HOSPADM

## 2021-07-29 RX ADMIN — SODIUM CHLORIDE, PRESERVATIVE FREE 10 ML: 5 INJECTION INTRAVENOUS at 12:39

## 2021-07-29 RX ADMIN — HEPARIN 500 UNITS: 100 SYRINGE at 12:39

## 2021-08-07 NOTE — PROGRESS NOTES
MEDICAL ONCOLOGY PROGRESS NOTE                                                          Denise Lancaster   1955  8/10/2021     Chief Complaint   Patient presents with    Colon Cancer      INTERVAL HISTORY/HISTORY OF PRESENT ILLNESS:  Diagnosis  · Colonic adenocarcinoma, March 2020  · [id number](liver), stage ROXANA  · IHC MMR- proficient  · K-maria luisa mutated  · N-MARIA LUISA/BRAF wild-type  · Iron deficiency anemia  · Soft tissue mass, June 2021  · Adenocarcinoma-consistent with colon primary, right psoas muscle, June 2021     Treatment summary  · 3/30/2020-right hemicolectomy at [facility]  · Anticipated Liver ablation to be followed by neoadjuvant/adjuvant versus palliative chemotherapy  · 06/10/2020-November 2020-FOLFOX + Avastin  · 12/11/20- Right hepatectomy-Dr. Hardy Bright  · S/p Injectafer-poor oral iron tolerance  · 7/13/21- Initiate FOLFIRI + Avastin every 2 weeks     The patient is a pleasant 61years old male was found to have a right cecal mass consistent with colonic adenocarcinoma. He had locally advanced disease with several lymph nodes involved. In addition, the patient was also found to have suspicious liver lesions concerning for metastatic disease. He was seen by [address] [place] [address] and offered a multimodality approach with liver ablation of the left liver lesion followed by neoadjuvant chemotherapy and partial right hepatectomy. He underwent microwave ablation of the left lobe liver lesion on 6/8/2020. He is status post completion of 11 biweekly cycles of FOLFOX/Avasti completed November 2020. He tolerated treatment with complaints of mild transient cold neuropathy. He had a right hepatectomy on 12/11/2020 that showed no residual disease. His last CEA was normal in January 2021. He had MRI of the abdomen at [address] [place] [address] in March 2021 that showed no evidence of recurrent disease in the liver or in the abdomen. He also had a surveillance colonoscopy in March 2021 that showed no polyps.   CEA was elevated in May 2021. Repeat CEA June 2021 showed persistent elevation. MRI abdomen at [facility] showed no evidence of liver recurrence but a suspicious nodule in the right lower quadrant. Biopsy was performed at [facility] and consistent with recurrent adenocarcinoma. I discussed the findings with hepatobiliary service and also colorectal surgery. He was started on FOLFIRI/Avastin with very good tolerance. He had complaints of pain in the right lower quadrant that has improved with opioid. He  Was seen by Dr. Delia Corrales. No weight loss. Feels well otherwise. Tolerating chemo well. He is here today for cycle #3.     Cancer history  Mr. Lata Odom was first seen by me on 3/23/2020. He was referred for a new diagnosis of colonic adenocarcinoma involving the cecum. The patient reports that he had a wellbeing consult with his provider at the [place]. He was found to have anemia and then recommended a colonoscopy. Of note, the patient has a family history of colon cancer. His mother is a patient of Dr. Nereyda Crocker he has been diagnosed with colon cancer in 2010. · 3/11/2020- colonoscopy revealed a large malignant appearing fungating mass lesion in the cecum. In addition, several other polyps. Biopsy of the mass consistent with moderate differentiated colonic adenocarcinoma. Polyps consistent with tubulovillous adenoma with no high-grade dysplasia. IHC MMR not proficient. K-maria luisa mutated, BRAF and NRAS wild type. MSI proficient  · 3/11/2020-CEA 5.5 (H)  · 3/18/2020-CT abdomen pelvis with contrast  Invasive cecal mass adhering to the right lateral abdominal wall muscles with adjacent lymphadenopathy. Mild partial obstruction of the terminal ileum. 2. Suspicious lesions in the right and left hepatic lobes measure up to 1.3 cm and likely represent metastatic disease. · 3/18/2020-Xr Chest Standard  No radiographic evidence of acute cardiopulmonary process. · 3/23/2020-he was first seen by me. Recommended completion of staging with CT chest.  Also recommended liver MRI for further clarification of liver lesion. S.  Recommend to proceed with a general surgery consultation tomorrow with Dr. Porter Millan. Patient was informed that I favor surgical resection if feasible of the primary malignancy. · 3/30/2020- right hemicolectomy by Dr. Porter Millan at [facility] consistent with invasive moderately differentiated colonic adenocarcinoma measuring 7.2 cm. Carcinoma directly invading the adjacent abdominal wall tissue. Focal lymphovascular space invasion identified. Focal perineural invasion identified. Surgical margins negative for evidence of malignancy. 6 out of 14 lymph nodes positive for metastatic adenocarcinoma. Final pathology staging [id number](liver, stage ROXANA)  · 4/20/2020-CT chest with contrast showed No convincing intrathoracic metastasis. Nonspecific 4 mm nodule of the inferior lingula and a 2 mm right upper lobe nodule can be followed on subsequent imaging in 6-12 months. Moderate coronary calcifications. Hypodense metastatic liver lesions. Small hiatal hernia. · 4/20/2020-Mri Abdomen W Wo Contrast There are about 5 liver lesions. The 2 largest appears similar compared to 3/18/2020, the others are too small to further characterize. Appearance is most concerning for metastatic disease. Enhancement of the right lateral peritoneum. This is favored to be postoperative as there is no nodularity, evidence of omental disease or lymphadenopathy. Recommend attention on follow-up. Cholecystectomy. · 4/22/2020-discussed with Dr. Ayah Bender at [facility]. He will review imaging studies and give further recommendations regarding eligibility for resection of liver lesions. · 5/8/2020 CT Abdomen The two largest suspicious lesions measuring 1.2 and 1.3 cm in the  right and left hepatic lobes respectively are similar compared to the  3/18/2020 CT.  Additionally, there are at least five subcentimeter lesions with similar signal characteristics, which are also highly suspicious for metastases. If complete characterization of the number and distribution of lesions is necessary, an MRI with Eovist could be acquired. · 5/19/2020- he was seen by the hepatobiliary service at [facility] with Dr. Mandy Woodward:  patient adequate risk candidate for a multimodal approach, directed toward curative hepatectomy eventually. Endorsed by Hepatobiliary Conference, I recommended perc ablation of the L hemiliver to clear it, followed by systemic therapy in a neoadjuvant strategy. Restaging imaging to confirm clearance of disease on the left and lack of progression to unresectability of the R hemiliver disease would then be followed by R hepatectomy. Limitations to this approach may be accessibility of the segment 4A/8 disease high in the hepatic dome and the possibility of heat sink-related recurrence s/p abation of the left-sided disease. · 5/21/2020- referral for Mediport placement and start FOLFOX in 2 weeks. Will add bevacizumab after 6 weeks of liver ablation. We will plan for 12 biweekly dose of FOLFOX. Bevacizumab will also be stopped 6 weeks prior to major procedure. · 6/8/2020- Microwave ablation of the left liver lesion was then performed for 5 minutes at [address] to achieve a 3.4 x 3.9 cm approximately spherical zone of ablation. · 6/10/2020- initiation of FOLFOX. · 7/20/2020-added Avastin. · 9/10/20 MRI abdomen: Left hepatic lobe segment II lesion demonstrates small T1 hyperintense blood products, status post microwave ablation on 6/8/2020. No definitive enhancement within the lesion. Focal internal thickening or scar present. Recommend attention on follow-up. Additional scattered subcentimeter foci throughout the liver decreased in size compared to MRI dated 4/20/2020 consistent with improving metastatic disease. Additional chronic findings as above.   · 9/16/2020-discussed with plan with the patient and [place]. Interval response to treatment. Plan to continue chemotherapy through 12 cycles with Avastin. · 12/11/20 Right hepatectomy-Dr. Cynthia Wilburn  · 12/11/20 Right hepatectomy pathology: Liver, right, resection: Focus of Fibrosis with calcifications and chronic inflammation (0.3 cm), see comment. Background hepatic parenchyma with minimal periportal fibrosis (trichrome stain), minimal lobular and portal inflammation, and no significant macrovesicular steatosis (<5%) Comments: The patient's history of colorectal cancer status adjuvant therapy is noted. The focus of fibrosis may reflect treatment effect. There is no evidence of viable tumor in the sampled specimen. · 12/14/20 Ct Abdomen Pelvis W Iv Contrast A Small bowel obstruction with transition point at the distal small bowel, just proximal to RIGHT upper quadrant ileocolonic anastomosis. Postoperative change of RIGHT hepatectomy with small amount of expected free fluid and intraperitoneal gas. Redemonstrated LEFT liver lesion. Small bilateral pleural effusions. · 12/16/20 SBFT-[place]: Study is limited due to retained contrast in the small bowel from prior attempt at small bowel follow-through on the floor. Small bowel remains dilated, measuring approximately 5.2 cm, which is consistent with partial or resolving small bowel obstruction. Final radiographs show contrast within the colon. · 1/4/2020-resolution of small bowel obstruction. · 1/7/21 CT chest: No finding to suggest intrathoracic neoplastic process or metastatic disease. The benign-appearing tiny nodule in the right upper lobe probably represent a noncalcified granuloma. A nodule in the lingular segment of the left upper lobe is not visualized in this study. Postsurgical changes of the liver. No evidence of focal complication. A trace right basal pleural effusion. This may be reactive to the previous abdominal surgery.   · 3/4/21 MRI abd: Status post right hepatectomy and microwave ablation of a left liver lesion. No findings to suggest residual/recurrent disease. No new liver lesion is identified. Postsurgical changes related to right hemicolectomy. Microwave ablation zone in segment II of the left hepatic lobe measures approximately 21 mm in diameter, unchanged. No appreciable postcontrast enhancement or other findings to suggest local disease recurrence. No new liver lesion is identified. Spleen is mildly enlarged, measuring 13.8 cm in length. · 3/17/2021-1 year colonoscopy showed no evidence of polyps. · 5/3/21 CEA 6.2  · 6/8/21 MRI abdomen ([facility]): Status post right hepatectomy and MWA of a left liver lesion.  No evidence of residual or recurrent metastatic disease in the liver. Status post prior right hemicolectomy for colon carcinoma. Within the posterior right iliopsoas muscle there is a mildly T2 hyperintense nodular focus with postcontrast rim enhancement and diffusion restriction. This measures approximately 2.7 x 2 x 2 cm. More delayed postcontrast images show irregular area of enhancement measuring 2.4 x 7.7 cm axially involving the right iliopsoas muscle and the right lateral abdominal wall. Suggest correlation with contrast enhanced CT exam for complete evaluation. Consider biopsy of this lesion. · 6/15/21 CT CHEST WO CONTRAST No metastatic disease in the chest.  No change in tiny 2 mm RIGHT upper lobe pulmonary nodule. Mild ectasia of the ascending aorta measuring 4 cm. · 6/18/2021-I reviewed results of MRI abdomen at [facility]. CT chest without contrast reviewed by me and showed no evidence of metastatic disease. Stable 2 mm right upper lobe nodule. Discussed with hepatobiliary service at [facility]. Biopsy intra-abdominal nodule next week at [facility]. · 6/25/[id number] biopsy soft tissue mass right psoas muscle at [facility] consistent with recurrent colonic adenocarcinoma.     · 7/2/2021-discussed with hepatobiliary service at [facility] and also surgical oncology. Surgical oncology will call us back regarding consultation for consideration of HIPEC if he is a candidate  · 7/13/21-initiation of chemotherapy with FOLFIRI + Avastin every 2 weeks  · 7/13/21 CEA 10.7  · 7/27/2021-CEA 9.6  · 7/30/2021-she was seen by the surgical oncology group at [facility] by Dr Gabriela Thibodeaux. They recommended to complete 6 cycles of chemotherapy and repeat CT chest abdomen pelvis and liver MRI to assess disease response. They discussed the role of CRS/HIPEC in his situation. He was told that it really depends on the status of his liver lesions as well. He will complete 6 cycles of chemotherapy and will return to see me with a CTAP as well as MRI of the liver to assess disease burden and determine if he can be a candidate for debulking. CEA dynamics:  3/11/20 CEA 5.5  8/19/20 CEA 2.4  9/2/20 CEA 2.7  9/16/20 CEA 2.7  9/30/20 CEA 2.7  10/19/20 CEA 2.3  1/4/21 CEA 2.2  5/3/21 CEA 6.2  6/15/21 CEA 8.3  7/13/21 CEA 10.7  7/27/21 CEA 9.6    PAST MEDICAL HISTORY:   Past Medical History:   Diagnosis Date    Acid reflux     Cancer ([address]     adenocarcinoma of colon    GERD (gastroesophageal reflux disease)     PTSD (post-traumatic stress disorder)           PAST SURGICAL HISTORY:  Past Surgical History:   Procedure Laterality Date    ABLATION OF DYSRHYTHMIC FOCUS      ablation of liver at [facility]  2003    CHOLECYSTECTOMY  2013    COLONOSCOPY      when pt was in the 19's    COLONOSCOPY N/A 03/11/2020    COLONOSCOPY POLYPECTOMY SNARE/COLD BIOPSY: Dr. Marisela Suero colonoscopy: 6-12 months due to findings at colonoscopy today with Cecal mass lesion and large polyps.     COLONOSCOPY      COLONOSCOPY N/A 03/17/2021    Dr Lobo Arreola, Post-operative changes w IC anastomosis & single visible staple, Mild Diverticuosis, Int Hemorrhoids Grade 1, 3 year recall    HEMICOLECTOMY Right 03/30/2020    LAPAROSCOPIC-ASSISTED RIGHT HEMICOLECTOMY performed by Genesis Healy MD at [address] N/A 06/03/2020    INSERTION OF VENOUS PORT with flouro performed by Genesis Healy MD at [facility] ENDOSCOPY N/A 03/11/2020    Dr. Mikki Monique:   [facility]        SOCIAL HISTORY:  Social History     Socioeconomic History    Marital status:      Spouse name: None    Number of children: None    Years of education: None    Highest education level: None   Occupational History    None   Tobacco Use    Smoking status: Never Smoker    Smokeless tobacco: Never Used   Vaping Use    Vaping Use: Never used   Substance and Sexual Activity    Alcohol use: Yes     Comment: rare     Drug use: No    Sexual activity: Yes     Partners: Female   Other Topics Concern    None   Social History Narrative    None     Social Determinants of Health     Financial Resource Strain:     Difficulty of Paying Living Expenses:    Food Insecurity:     Worried About Running Out of Food in the Last Year:     Ran Out of Food in the Last Year:    Transportation Needs:     Lack of Transportation (Medical):      Lack of Transportation (Non-Medical):    Physical Activity:     Days of Exercise per Week:     Minutes of Exercise per Session:    Stress:     Feeling of Stress :    Social Connections:     Frequency of Communication with Friends and Family:     Frequency of Social Gatherings with Friends and Family:     Attends Gnosticism Services:     Active Member of Clubs or Organizations:     Attends Club or Organization Meetings:     Marital Status:    Intimate Partner Violence:     Fear of Current or Ex-Partner:     Emotionally Abused:     Physically Abused:     Sexually Abused:        FAMILY HISTORY:  Family History   Problem Relation Age of Onset    Liver Cancer Mother     High Blood Pressure Mother     Colon Cancer Mother         x2    Cancer Father         Lung Cancer    Breast Cancer Sister     Cancer Maternal Grandfather         Lung Cancer  Cancer Paternal Grandfather         Stomach Cancer    Colon Polyps Neg Hx     Cystic Fibrosis Neg Hx     Liver Disease Neg Hx     Rectal Cancer Neg Hx         Current Outpatient Medications   Medication Sig Dispense Refill    promethazine (PHENERGAN) 12.5 MG tablet Take 1 tablet by mouth every 6 hours as needed for Nausea 60 tablet 5    ondansetron (ZOFRAN) 8 MG tablet Take 1 tablet by mouth every 8 hours as needed for Nausea or Vomiting 30 tablet 5    omeprazole (PRILOSEC) 20 MG delayed release capsule Take 20 mg by mouth daily      Cholecalciferol (VITAMIN D3) 50 MCG (2000 UT) CAPS Take by mouth daily      folic acid (FOLVITE) 1 MG tablet Take 1 mg by mouth daily      prazosin (MINIPRESS) 1 MG capsule Take 1 mg by mouth nightly For nightmares      buPROPion (WELLBUTRIN) 100 MG tablet Take 100 mg by mouth every morning For mood      topiramate (TOPAMAX) 100 MG tablet Take 100 mg by mouth daily For Seizures or Migraines      Sertraline HCl (ZOLOFT PO) Take by mouth daily      Zolpidem Tartrate (AMBIEN PO) Take by mouth nightly       No current facility-administered medications for this visit.      Facility-Administered Medications Ordered in Other Visits   Medication Dose Route Frequency Provider Last Rate Last Admin    0.9 % sodium chloride infusion   Intravenous Once Robel Seen, MD 20 mL/hr at 08/10/21 1254 New Bag at 08/10/21 1254    dextrose 5 % solution   Intravenous Once Robel Seen, MD        bevacizumab-bvzr (ZIRABEV) 375 mg in sodium chloride 0.9 % 100 mL chemo IVPB  5 mg/kg (Treatment Plan Recorded) Intravenous Once Robel Seen,  mL/hr at 08/10/21 1310 375 mg at 08/10/21 1310    irinotecan (CAMPTOSAR) 340 mg in dextrose 5 % 250 mL chemo IVPB  180 mg/m2 (Treatment Plan Recorded) Intravenous Once Robel Seen, MD        leucovorin calcium (WELLCOVORIN) 750 mg in dextrose 5 % 250 mL IVPB  400 mg/m2 (Treatment Plan Recorded) Intravenous Once [employer] Harrison Shepherd MD        fluorouracil (ADRUCIL) 4,475 mg in sodium chloride 0.9 % 250 mL chemo infusion  2,400 mg/m2 (Treatment Plan Recorded) Intravenous Over 46 hours Ian Moncada MD        atropine injection 0.5 mg  0.5 mg Intravenous Once PRN Ian Moncada MD            REVIEW OF SYSTEMS:    Constitutional: no fever, no night sweats, no fatigue;   HEENT: no blurring of vision, no double vision, no hearing difficulty, no tinnitus,no ulceration, no dysphagia  Lungs: no cough, no shortness of breath, no wheeze;   CVS: no palpitation, no chest pain, no shortness of breath;  GI: no abdominal pain, no nausea , no vomiting, no constipation;   ANNIE: no dysuria, frequency and urgency, no hematuria, no kidney stones;   Musculoskeletal: no joint pain, swelling , stiffness;   Endocrine: no polyuria, polydypsia, no cold or heat intolerence; Hematology/lymphatic: no easy brusing or bleeding, no hx of clotting disorder; no peripheral adenopathy. Dermatology: no skin rash, no eczema, no pruritis;   Psychiatry: no depression, no anxiety,no panic attacks, no suicide ideation; Neurology: no syncope, no seizures, no numbness or tingling of hands, no numbness or tingling of feet, no paresis;     PHYSICAL EXAM:    Vitals signs:  BP (!) 160/84   Pulse 60   Temp 97.7 °F (36.5 °C)   Resp 18   Ht 5' 7\" (1.702 m)   Wt 164 lb (74.4 kg)   BMI 25.69 kg/m²    Pain scale:  Pain Score:   0 - No pain     CONSTITUTIONAL: Alert, appropriate, no acute distress,   EYES: Non icteric, EOM intact, pupils equal round and reactive to light and accommodation. ENT: Oral mucus membranes moist, no oral pharyngeal lesions. External inspection of ears and nose are normal.   NECK: Supple, no masses. No palpable thyroid mass    CHEST/LUNGS: CTA bilaterally, normal respiratory effort   CARDIOVASCULAR: RRR, no murmurs. No lower extremity edema   ABDOMEN: soft non-tender, active bowel sounds, no hepatosplenomegaly. No palpable masses. Redemonstration of an ablation zone in segment II, measuring up to 1.7 cm, previously 1.8 cm. No evidence of postcontrast enhancement.  No new lesion identified.      #Iron deficiency anemia-  hemoglobin 8.5/MCV 89. Ferritin 12.9, iron saturation 15%, TIBC 458, iron 72. Status post IV iron therapy. Hemoglobin 13.5/MCV 90     #Chemotherapy-induced neuropathy-stable     PLAN:  · Continue follow-up with [facility]/Dr. Cynthia Wilburn  · Continue follow-up with [facility]/Dr. Quinn Mcneal  · Continue C#3 FOLFIRI + Avastin today and every 2 weeks  · Continue Norco 7.5mg every 6 hrs  · RTC with MD 2 weeks in treatment room        Follow Up:     Return in about 2 weeks (around 8/24/2021) for Treatment & see  in [facility] FOLFIRI Avastin. FOLFIRI Avastin every 2 weeks     IChelsea, am scribing for Martita Hennessy MD. Electronically signed by Chelsea Giron RN on 8/10/2021 at 1:05 PM CDT. I, Dr Char Griffiths, personally performed the services described in this documentation as scribed by Chelsea Giron RN in my presence and is both accurate and complete. I have seen, examined and reviewed this patient medication list, appropriate labs and imaging studies. I reviewed relevant medical records and others physicians notes. I discussed the plans of care with the patient. I answered all the questions to the patients satisfaction. I have also reviewed the chief complaint (CC) and part of the history (History of Present Illness (HPI), Past Family Social History [facility]), or Review of Systems (ROS) and made changes when appropriated.        (Please note that portions of this note were completed with a voice recognition program. Efforts were made to edit the dictations but occasionally words are mis-transcribed.)

## 2021-08-10 ENCOUNTER — OFFICE VISIT (OUTPATIENT)
Dept: HEMATOLOGY | Age: 66
End: 2021-08-10
Payer: OTHER GOVERNMENT

## 2021-08-10 ENCOUNTER — HOSPITAL ENCOUNTER (OUTPATIENT)
Dept: INFUSION THERAPY | Age: 66
Discharge: HOME | End: 2021-08-10
Payer: OTHER GOVERNMENT

## 2021-08-10 VITALS
SYSTOLIC BLOOD PRESSURE: 160 MMHG | HEART RATE: 60 BPM | BODY MASS INDEX: 25.74 KG/M2 | HEIGHT: 67 IN | WEIGHT: 164 LBS | DIASTOLIC BLOOD PRESSURE: 84 MMHG | RESPIRATION RATE: 18 BRPM | TEMPERATURE: 97.7 F

## 2021-08-10 DIAGNOSIS — Z51.11 CHEMOTHERAPY MANAGEMENT, ENCOUNTER FOR: ICD-10-CM

## 2021-08-10 DIAGNOSIS — Z51.12 ENCOUNTER FOR ANTINEOPLASTIC IMMUNOTHERAPY: ICD-10-CM

## 2021-08-10 DIAGNOSIS — Z71.89 CARE PLAN DISCUSSED WITH PATIENT: ICD-10-CM

## 2021-08-10 DIAGNOSIS — C18.9 ADENOCARCINOMA OF COLON (HCC): Primary | ICD-10-CM

## 2021-08-10 DIAGNOSIS — G89.3 CANCER ASSOCIATED PAIN: ICD-10-CM

## 2021-08-10 DIAGNOSIS — C78.7 LIVER METASTASES (HCC): ICD-10-CM

## 2021-08-10 DIAGNOSIS — T45.1X5D ADVERSE EFFECT OF CHEMOTHERAPY, SUBSEQUENT ENCOUNTER: ICD-10-CM

## 2021-08-10 LAB
ALBUMIN SERPL-MCNC: 4.4 G/DL (ref 3.5–5.2)
ALP BLD-CCNC: 115 U/L (ref 40–130)
ALT SERPL-CCNC: 23 U/L (ref 21–72)
ANION GAP SERPL CALCULATED.3IONS-SCNC: 9 MMOL/L (ref 7–19)
AST SERPL-CCNC: 49 U/L (ref 17–59)
BASOPHILS ABSOLUTE: 0.04 K/UL (ref 0.01–0.08)
BASOPHILS RELATIVE PERCENT: 0.7 % (ref 0.1–1.2)
BILIRUB SERPL-MCNC: 0.8 MG/DL (ref 0.2–1.3)
BUN BLDV-MCNC: 12 MG/DL (ref 9–20)
CALCIUM SERPL-MCNC: 9.8 MG/DL (ref 8.4–10.2)
CHLORIDE BLD-SCNC: 101 MMOL/L (ref 98–111)
CO2: 29 MMOL/L (ref 22–29)
CREAT SERPL-MCNC: 1.2 MG/DL (ref 0.6–1.2)
EOSINOPHILS ABSOLUTE: 0.46 K/UL (ref 0.04–0.54)
EOSINOPHILS RELATIVE PERCENT: 8.3 % (ref 0.7–7)
GFR NON-AFRICAN AMERICAN: >60
GLOBULIN: 3.4 G/DL
GLUCOSE BLD-MCNC: 99 MG/DL (ref 74–106)
HCT VFR BLD CALC: 39.7 % (ref 40.1–51)
HEMOGLOBIN: 13.5 G/DL (ref 13.7–17.5)
LYMPHOCYTES ABSOLUTE: 0.81 K/UL (ref 1.18–3.74)
LYMPHOCYTES RELATIVE PERCENT: 14.7 % (ref 19.3–53.1)
MCH RBC QN AUTO: 30.9 PG (ref 25.7–32.2)
MCHC RBC AUTO-ENTMCNC: 34 G/DL (ref 32.3–36.5)
MCV RBC AUTO: 90.8 FL (ref 79–92.2)
MONOCYTES ABSOLUTE: 0.65 K/UL (ref 0.24–0.82)
MONOCYTES RELATIVE PERCENT: 11.8 % (ref 4.7–12.5)
NEUTROPHILS ABSOLUTE: 3.56 K/UL (ref 1.56–6.13)
NEUTROPHILS RELATIVE PERCENT: 64.5 % (ref 34–71.1)
PDW BLD-RTO: 14.5 % (ref 11.6–14.4)
PLATELET # BLD: 153 K/UL (ref 163–337)
PMV BLD AUTO: 9.5 FL (ref 7.4–10.4)
POTASSIUM SERPL-SCNC: 4.7 MMOL/L (ref 3.5–5.1)
PROTEIN UA: NEGATIVE
RBC # BLD: 4.37 M/UL (ref 4.63–6.08)
SODIUM BLD-SCNC: 139 MMOL/L (ref 137–145)
TOTAL PROTEIN: 7.8 G/DL (ref 6.3–8.2)
WBC # BLD: 5.52 K/UL (ref 4.23–9.07)

## 2021-08-10 PROCEDURE — 96417 CHEMO IV INFUS EACH ADDL SEQ: CPT

## 2021-08-10 PROCEDURE — 96415 CHEMO IV INFUSION ADDL HR: CPT

## 2021-08-10 PROCEDURE — 2580000003 HC RX 258: Performed by: INTERNAL MEDICINE

## 2021-08-10 PROCEDURE — 96366 THER/PROPH/DIAG IV INF ADDON: CPT

## 2021-08-10 PROCEDURE — 80053 COMPREHEN METABOLIC PANEL: CPT

## 2021-08-10 PROCEDURE — 81002 URINALYSIS NONAUTO W/O SCOPE: CPT | Performed by: INTERNAL MEDICINE

## 2021-08-10 PROCEDURE — G0498 CHEMO EXTEND IV INFUS W/PUMP: HCPCS

## 2021-08-10 PROCEDURE — 96367 TX/PROPH/DG ADDL SEQ IV INF: CPT

## 2021-08-10 PROCEDURE — 99214 OFFICE O/P EST MOD 30 MIN: CPT | Performed by: INTERNAL MEDICINE

## 2021-08-10 PROCEDURE — 96375 TX/PRO/DX INJ NEW DRUG ADDON: CPT

## 2021-08-10 PROCEDURE — 96413 CHEMO IV INFUSION 1 HR: CPT

## 2021-08-10 PROCEDURE — 6360000002 HC RX W HCPCS: Performed by: INTERNAL MEDICINE

## 2021-08-10 PROCEDURE — 85025 COMPLETE CBC W/AUTO DIFF WBC: CPT

## 2021-08-10 RX ORDER — SODIUM CHLORIDE 9 MG/ML
INJECTION, SOLUTION INTRAVENOUS ONCE
Status: CANCELLED | OUTPATIENT
Start: 2021-08-10 | End: 2021-08-10

## 2021-08-10 RX ORDER — MEPERIDINE HYDROCHLORIDE 50 MG/ML
12.5 INJECTION INTRAMUSCULAR; INTRAVENOUS; SUBCUTANEOUS ONCE
Status: CANCELLED | OUTPATIENT
Start: 2021-08-10 | End: 2021-08-10

## 2021-08-10 RX ORDER — ATROPINE SULFATE 1 MG/ML
0.5 INJECTION, SOLUTION INTRAMUSCULAR; INTRAVENOUS; SUBCUTANEOUS
Status: COMPLETED | OUTPATIENT
Start: 2021-08-10 | End: 2021-08-10

## 2021-08-10 RX ORDER — PALONOSETRON 0.05 MG/ML
0.25 INJECTION, SOLUTION INTRAVENOUS ONCE
Status: CANCELLED | OUTPATIENT
Start: 2021-08-10

## 2021-08-10 RX ORDER — ATROPINE SULFATE 0.4 MG/ML
0.4 AMPUL (ML) INJECTION
Status: CANCELLED | OUTPATIENT
Start: 2021-08-10

## 2021-08-10 RX ORDER — HEPARIN SODIUM (PORCINE) LOCK FLUSH IV SOLN 100 UNIT/ML 100 UNIT/ML
500 SOLUTION INTRAVENOUS PRN
Status: CANCELLED | OUTPATIENT
Start: 2021-08-10

## 2021-08-10 RX ORDER — DEXTROSE MONOHYDRATE 50 MG/ML
INJECTION, SOLUTION INTRAVENOUS ONCE
Status: CANCELLED | OUTPATIENT
Start: 2021-08-10 | End: 2021-08-10

## 2021-08-10 RX ORDER — DIPHENHYDRAMINE HYDROCHLORIDE 50 MG/ML
50 INJECTION INTRAMUSCULAR; INTRAVENOUS ONCE
Status: CANCELLED | OUTPATIENT
Start: 2021-08-10 | End: 2021-08-10

## 2021-08-10 RX ORDER — DEXAMETHASONE SODIUM PHOSPHATE 10 MG/ML
10 INJECTION, SOLUTION INTRAMUSCULAR; INTRAVENOUS ONCE
Status: COMPLETED | OUTPATIENT
Start: 2021-08-10 | End: 2021-08-10

## 2021-08-10 RX ORDER — SODIUM CHLORIDE 9 MG/ML
25 INJECTION, SOLUTION INTRAVENOUS PRN
Status: CANCELLED | OUTPATIENT
Start: 2021-08-10

## 2021-08-10 RX ORDER — EPINEPHRINE 1 MG/ML
0.3 INJECTION, SOLUTION, CONCENTRATE INTRAVENOUS PRN
Status: CANCELLED | OUTPATIENT
Start: 2021-08-10

## 2021-08-10 RX ORDER — SODIUM CHLORIDE 9 MG/ML
INJECTION, SOLUTION INTRAVENOUS ONCE
Status: COMPLETED | OUTPATIENT
Start: 2021-08-10 | End: 2021-08-11

## 2021-08-10 RX ORDER — SODIUM CHLORIDE 9 MG/ML
INJECTION, SOLUTION INTRAVENOUS CONTINUOUS
Status: CANCELLED | OUTPATIENT
Start: 2021-08-10

## 2021-08-10 RX ORDER — PALONOSETRON 0.05 MG/ML
0.25 INJECTION, SOLUTION INTRAVENOUS ONCE
Status: COMPLETED | OUTPATIENT
Start: 2021-08-10 | End: 2021-08-10

## 2021-08-10 RX ORDER — SODIUM CHLORIDE 0.9 % (FLUSH) 0.9 %
5-40 SYRINGE (ML) INJECTION PRN
Status: CANCELLED | OUTPATIENT
Start: 2021-08-10

## 2021-08-10 RX ORDER — ATROPINE SULFATE 0.4 MG/ML
0.4 AMPUL (ML) INJECTION
Status: DISCONTINUED | OUTPATIENT
Start: 2021-08-10 | End: 2021-08-10

## 2021-08-10 RX ORDER — DEXTROSE MONOHYDRATE 50 MG/ML
INJECTION, SOLUTION INTRAVENOUS ONCE
Status: DISCONTINUED | OUTPATIENT
Start: 2021-08-10 | End: 2021-08-12 | Stop reason: HOSPADM

## 2021-08-10 RX ORDER — METHYLPREDNISOLONE SODIUM SUCCINATE 125 MG/2ML
125 INJECTION, POWDER, LYOPHILIZED, FOR SOLUTION INTRAMUSCULAR; INTRAVENOUS ONCE
Status: CANCELLED | OUTPATIENT
Start: 2021-08-10 | End: 2021-08-10

## 2021-08-10 RX ADMIN — DEXAMETHASONE SODIUM PHOSPHATE 10 MG: 10 INJECTION, SOLUTION INTRAMUSCULAR; INTRAVENOUS at 12:55

## 2021-08-10 RX ADMIN — SODIUM CHLORIDE: 9 INJECTION, SOLUTION INTRAVENOUS at 12:54

## 2021-08-10 RX ADMIN — SODIUM CHLORIDE 375 MG: 9 INJECTION, SOLUTION INTRAVENOUS at 13:10

## 2021-08-10 RX ADMIN — FLUOROURACIL 4475 MG: 50 INJECTION, SOLUTION INTRAVENOUS at 15:52

## 2021-08-10 RX ADMIN — IRINOTECAN HYDROCHLORIDE 340 MG: 20 INJECTION, SOLUTION INTRAVENOUS at 13:43

## 2021-08-10 RX ADMIN — ATROPINE SULFATE 0.5 MG: 1 INJECTION, SOLUTION INTRAMUSCULAR; INTRAVENOUS; SUBCUTANEOUS at 13:44

## 2021-08-10 RX ADMIN — LEUCOVORIN CALCIUM 750 MG: 350 INJECTION, POWDER, LYOPHILIZED, FOR SOLUTION INTRAMUSCULAR; INTRAVENOUS at 13:43

## 2021-08-10 RX ADMIN — PALONOSETRON 0.25 MG: 0.05 INJECTION, SOLUTION INTRAVENOUS at 12:55

## 2021-08-12 ENCOUNTER — HOSPITAL ENCOUNTER (OUTPATIENT)
Dept: INFUSION THERAPY | Age: 66
Discharge: HOME | End: 2021-08-12
Payer: OTHER GOVERNMENT

## 2021-08-12 DIAGNOSIS — C18.2 MALIGNANT NEOPLASM OF ASCENDING COLON (HCC): Primary | ICD-10-CM

## 2021-08-12 DIAGNOSIS — C18.9 ADENOCARCINOMA OF COLON (HCC): ICD-10-CM

## 2021-08-12 LAB
ALBUMIN SERPL-MCNC: 4.3 G/DL (ref 3.5–5.2)
ALP BLD-CCNC: 90 U/L (ref 40–130)
ALT SERPL-CCNC: 31 U/L (ref 21–72)
ANION GAP SERPL CALCULATED.3IONS-SCNC: 8 MMOL/L (ref 7–19)
AST SERPL-CCNC: 59 U/L (ref 17–59)
BILIRUB SERPL-MCNC: 1.2 MG/DL (ref 0.2–1.3)
BUN BLDV-MCNC: 18 MG/DL (ref 9–20)
CALCIUM SERPL-MCNC: 9.6 MG/DL (ref 8.4–10.2)
CEA: 8.2 NG/ML (ref 0–3)
CHLORIDE BLD-SCNC: 101 MMOL/L (ref 98–111)
CO2: 30 MMOL/L (ref 22–29)
CREAT SERPL-MCNC: 1.4 MG/DL (ref 0.6–1.2)
GFR NON-AFRICAN AMERICAN: 51
GLUCOSE BLD-MCNC: 85 MG/DL (ref 74–106)
POTASSIUM SERPL-SCNC: 4.3 MMOL/L (ref 3.5–5.1)
SODIUM BLD-SCNC: 139 MMOL/L (ref 137–145)
TOTAL PROTEIN: 7.5 G/DL (ref 6.3–8.2)

## 2021-08-12 PROCEDURE — 80053 COMPREHEN METABOLIC PANEL: CPT

## 2021-08-12 PROCEDURE — 6360000002 HC RX W HCPCS: Performed by: INTERNAL MEDICINE

## 2021-08-12 PROCEDURE — 2580000003 HC RX 258: Performed by: INTERNAL MEDICINE

## 2021-08-12 PROCEDURE — 96523 IRRIG DRUG DELIVERY DEVICE: CPT

## 2021-08-12 PROCEDURE — 82378 CARCINOEMBRYONIC ANTIGEN: CPT

## 2021-08-12 RX ORDER — SODIUM CHLORIDE 0.9 % (FLUSH) 0.9 %
20 SYRINGE (ML) INJECTION PRN
Status: DISCONTINUED | OUTPATIENT
Start: 2021-08-12 | End: 2021-08-13 | Stop reason: HOSPADM

## 2021-08-12 RX ORDER — HEPARIN SODIUM (PORCINE) LOCK FLUSH IV SOLN 100 UNIT/ML 100 UNIT/ML
500 SOLUTION INTRAVENOUS PRN
Status: DISCONTINUED | OUTPATIENT
Start: 2021-08-12 | End: 2021-08-13 | Stop reason: HOSPADM

## 2021-08-12 RX ADMIN — SODIUM CHLORIDE, PRESERVATIVE FREE 20 ML: 5 INJECTION INTRAVENOUS at 12:08

## 2021-08-12 RX ADMIN — HEPARIN 500 UNITS: 100 SYRINGE at 12:08

## 2021-08-21 NOTE — PROGRESS NOTES
MEDICAL ONCOLOGY PROGRESS NOTE                                                          Denise Romero Toni   1955 8/25/2021     Chief Complaint   Patient presents with    Cancer      INTERVAL HISTORY/HISTORY OF PRESENT ILLNESS:  Diagnosis  · Colonic adenocarcinoma, March 2020  · [id number](liver), stage ROXANA  · IHC MMR- proficient  · K-maria luisa mutated  · N-MARIA LUISA/BRAF wild-type  · Iron deficiency anemia  · Soft tissue mass, June 2021  · Adenocarcinoma-consistent with colon primary, right psoas muscle, June 2021     Treatment summary  · 3/30/2020-right hemicolectomy at [facility]  · Anticipated Liver ablation to be followed by neoadjuvant/adjuvant versus palliative chemotherapy  · 06/10/2020-November 2020-FOLFOX + Avastin  · 12/11/20- Right hepatectomy-Dr. Bar Banks  · S/p Injectafer-poor oral iron tolerance  · 7/13/21- Initiate FOLFIRI + Avastin every 2 weeks     The patient is a pleasant 61years old male was found to have a right cecal mass consistent with colonic adenocarcinoma. He had locally advanced disease with several lymph nodes involved. In addition, the patient was also found to have suspicious liver lesions concerning for metastatic disease. He was seen by [facility] and offered a multimodality approach with liver ablation of the left liver lesion followed by neoadjuvant chemotherapy and partial right hepatectomy. He underwent microwave ablation of the left lobe liver lesion on 6/8/2020. He is status post completion of 11 biweekly cycles of FOLFOX/Avasti completed November 2020. He tolerated treatment with complaints of mild transient cold neuropathy. He had a right hepatectomy on 12/11/2020 that showed no residual disease. His last CEA was normal in January 2021. He had MRI of the abdomen at [facility] in March 2021 that showed no evidence of recurrent disease in the liver or in the abdomen. He also had a surveillance colonoscopy in March 2021 that showed no polyps.   CEA was elevated in May 2021.  Repeat CEA June 2021 showed persistent elevation. MRI abdomen at [facility] showed no evidence of liver recurrence but a suspicious nodule in the right lower quadrant. Biopsy was performed at [facility] and consistent with recurrent adenocarcinoma. I discussed the findings with hepatobiliary service and also colorectal surgery. He was started on FOLFIRI/Avastin with very good tolerance. He was seen by Dr. Sherly Victoria. No weight loss. Feels well otherwise. Tolerating chemo well. He is here today for cycle #4. Continues to complain of right lower quadrant pain. He needs a refill on his Percocet today.     Cancer history  Mr. Mike Amaro was first seen by me on 3/23/2020. He was referred for a new diagnosis of colonic adenocarcinoma involving the cecum. The patient reports that he had a wellbeing consult with his provider at the [facility]. He was found to have anemia and then recommended a colonoscopy. Of note, the patient has a family history of colon cancer. His mother is a patient of Dr. Moreno Median he has been diagnosed with colon cancer in 2010. · 3/11/2020- colonoscopy revealed a large malignant appearing fungating mass lesion in the cecum. In addition, several other polyps. Biopsy of the mass consistent with moderate differentiated colonic adenocarcinoma. Polyps consistent with tubulovillous adenoma with no high-grade dysplasia. IHC MMR not proficient. K-maria luisa mutated, BRAF and NRAS wild type. MSI proficient  · 3/11/2020-CEA 5.5 (H)  · 3/18/2020-CT abdomen pelvis with contrast  Invasive cecal mass adhering to the right lateral abdominal wall muscles with adjacent lymphadenopathy. Mild partial obstruction of the terminal ileum. 2. Suspicious lesions in the right and left hepatic lobes measure up to 1.3 cm and likely represent metastatic disease. · 3/18/2020-Xr Chest Standard  No radiographic evidence of acute cardiopulmonary process. · 3/23/2020-he was first seen by me. Recommended completion of staging with CT chest.  Also recommended liver MRI for further clarification of liver lesion. S.  Recommend to proceed with a general surgery consultation tomorrow with Dr. Bayron Aj. Patient was informed that I favor surgical resection if feasible of the primary malignancy. · 3/30/2020- right hemicolectomy by Dr. Bayron Aj at [facility] consistent with invasive moderately differentiated colonic adenocarcinoma measuring 7.2 cm. Carcinoma directly invading the adjacent abdominal wall tissue. Focal lymphovascular space invasion identified. Focal perineural invasion identified. Surgical margins negative for evidence of malignancy. 6 out of 14 lymph nodes positive for metastatic adenocarcinoma. Final pathology staging [id number](liver, stage ROXANA)  · 4/20/2020-CT chest with contrast showed No convincing intrathoracic metastasis. Nonspecific 4 mm nodule of the inferior lingula and a 2 mm right upper lobe nodule can be followed on subsequent imaging in 6-12 months. Moderate coronary calcifications. Hypodense metastatic liver lesions. Small hiatal hernia. · 4/20/2020-Mri Abdomen W Wo Contrast There are about 5 liver lesions. The 2 largest appears similar compared to 3/18/2020, the others are too small to further characterize. Appearance is most concerning for metastatic disease. Enhancement of the right lateral peritoneum. This is favored to be postoperative as there is no nodularity, evidence of omental disease or lymphadenopathy. Recommend attention on follow-up. Cholecystectomy. · 4/22/2020-discussed with Dr. Edu Ortega at [place]. He will review imaging studies and give further recommendations regarding eligibility for resection of liver lesions. · 5/8/2020 CT Abdomen The two largest suspicious lesions measuring 1.2 and 1.3 cm in the  right and left hepatic lobes respectively are similar compared to the  3/18/2020 CT.  Additionally, there are at least five subcentimeter lesions with similar signal characteristics, which are also highly suspicious for metastases. If complete characterization of the number and distribution of lesions is necessary, an MRI with Eovist could be acquired. · 5/19/2020- he was seen by the hepatobiliary service at [facility] with Dr. Augustina Pearson:  patient adequate risk candidate for a multimodal approach, directed toward curative hepatectomy eventually. Endorsed by Hepatobiliary Conference, I recommended perc ablation of the L hemiliver to clear it, followed by systemic therapy in a neoadjuvant strategy. Restaging imaging to confirm clearance of disease on the left and lack of progression to unresectability of the R hemiliver disease would then be followed by R hepatectomy. Limitations to this approach may be accessibility of the segment 4A/8 disease high in the hepatic dome and the possibility of heat sink-related recurrence s/p abation of the left-sided disease. · 5/21/2020- referral for Mediport placement and start FOLFOX in 2 weeks. Will add bevacizumab after 6 weeks of liver ablation. We will plan for 12 biweekly dose of FOLFOX. Bevacizumab will also be stopped 6 weeks prior to major procedure. · 6/8/2020- Microwave ablation of the left liver lesion was then performed for 5 minutes at [address] to achieve a 3.4 x 3.9 cm approximately spherical zone of ablation. · 6/10/2020- initiation of FOLFOX. · 7/20/2020-added Avastin. · 9/10/20 MRI abdomen: Left hepatic lobe segment II lesion demonstrates small T1 hyperintense blood products, status post microwave ablation on 6/8/2020. No definitive enhancement within the lesion. Focal internal thickening or scar present. Recommend attention on follow-up. Additional scattered subcentimeter foci throughout the liver decreased in size compared to MRI dated 4/20/2020 consistent with improving metastatic disease. Additional chronic findings as above.   · 9/16/2020-discussed with plan with the patient and [facility]. Interval response to treatment. Plan to continue chemotherapy through 12 cycles with Avastin. · 12/11/20 Right hepatectomy-Dr. Toby Ko  · 12/11/20 Right hepatectomy pathology: Liver, right, resection: Focus of Fibrosis with calcifications and chronic inflammation (0.3 cm), see comment. Background hepatic parenchyma with minimal periportal fibrosis (trichrome stain), minimal lobular and portal inflammation, and no significant macrovesicular steatosis (<5%) Comments: The patient's history of colorectal cancer status adjuvant therapy is noted. The focus of fibrosis may reflect treatment effect. There is no evidence of viable tumor in the sampled specimen. · 12/14/20 Ct Abdomen Pelvis W Iv Contrast A Small bowel obstruction with transition point at the distal small bowel, just proximal to RIGHT upper quadrant ileocolonic anastomosis. Postoperative change of RIGHT hepatectomy with small amount of expected free fluid and intraperitoneal gas. Redemonstrated LEFT liver lesion. Small bilateral pleural effusions. · 12/16/20 SBFT-[place]: Study is limited due to retained contrast in the small bowel from prior attempt at small bowel follow-through on the floor. Small bowel remains dilated, measuring approximately 5.2 cm, which is consistent with partial or resolving small bowel obstruction. Final radiographs show contrast within the colon. · 1/4/2020-resolution of small bowel obstruction. · 1/7/21 CT chest: No finding to suggest intrathoracic neoplastic process or metastatic disease. The benign-appearing tiny nodule in the right upper lobe probably represent a noncalcified granuloma. A nodule in the lingular segment of the left upper lobe is not visualized in this study. Postsurgical changes of the liver. No evidence of focal complication. A trace right basal pleural effusion. This may be reactive to the previous abdominal surgery.   · 3/4/21 MRI abd: Status post right hepatectomy and microwave ablation of a left liver lesion. No findings to suggest residual/recurrent disease. No new liver lesion is identified. Postsurgical changes related to right hemicolectomy. Microwave ablation zone in segment II of the left hepatic lobe measures approximately 21 mm in diameter, unchanged. No appreciable postcontrast enhancement or other findings to suggest local disease recurrence. No new liver lesion is identified. Spleen is mildly enlarged, measuring 13.8 cm in length. · 3/17/2021-1 year colonoscopy showed no evidence of polyps. · 5/3/21 CEA 6.2  · 6/8/21 MRI abdomen ([facility]): Status post right hepatectomy and MWA of a left liver lesion.  No evidence of residual or recurrent metastatic disease in the liver. Status post prior right hemicolectomy for colon carcinoma. Within the posterior right iliopsoas muscle there is a mildly T2 hyperintense nodular focus with postcontrast rim enhancement and diffusion restriction. This measures approximately 2.7 x 2 x 2 cm. More delayed postcontrast images show irregular area of enhancement measuring 2.4 x 7.7 cm axially involving the right iliopsoas muscle and the right lateral abdominal wall. Suggest correlation with contrast enhanced CT exam for complete evaluation. Consider biopsy of this lesion. · 6/15/21 CT CHEST WO CONTRAST No metastatic disease in the chest.  No change in tiny 2 mm RIGHT upper lobe pulmonary nodule. Mild ectasia of the ascending aorta measuring 4 cm. · 6/18/2021-I reviewed results of MRI abdomen at [facility]. CT chest without contrast reviewed by me and showed no evidence of metastatic disease. Stable 2 mm right upper lobe nodule. Discussed with hepatobiliary service at [facility]. Biopsy intra-abdominal nodule next week at [facility]. · 6/25/[id number] biopsy soft tissue mass right psoas muscle at [facility] consistent with recurrent colonic adenocarcinoma.     · 7/2/2021-discussed with hepatobiliary service at [facility] 03/30/2020    LAPAROSCOPIC-ASSISTED RIGHT HEMICOLECTOMY performed by Asuncion Clark MD at [address] N/A 06/03/2020    INSERTION OF VENOUS PORT with flouro performed by Asuncion Clark MD at [place] [address] ENDOSCOPY N/A 03/11/2020    Dr. Hamilton Holman:   [facility]        SOCIAL HISTORY:  Social History     Socioeconomic History    Marital status:      Spouse name: Not on file    Number of children: Not on file    Years of education: Not on file    Highest education level: Not on file   Occupational History    Not on file   Tobacco Use    Smoking status: Never Smoker    Smokeless tobacco: Never Used   Vaping Use    Vaping Use: Never used   Substance and Sexual Activity    Alcohol use: Yes     Comment: rare     Drug use: No    Sexual activity: Yes     Partners: Female   Other Topics Concern    Not on file   Social History Narrative    Not on file     Social Determinants of Health     Financial Resource Strain:     Difficulty of Paying Living Expenses:    Food Insecurity:     Worried About Running Out of Food in the Last Year:     [address] in the Last Year:    Transportation Needs:     Lack of Transportation (Medical):      Lack of Transportation (Non-Medical):    Physical Activity:     Days of Exercise per Week:     Minutes of Exercise per Session:    Stress:     Feeling of Stress :    Social Connections:     Frequency of Communication with Friends and Family:     Frequency of Social Gatherings with Friends and Family:     Attends [employer] Services:     Active Member of Clubs or Organizations:     Attends Club or Organization Meetings:     Marital Status:    Intimate Partner Violence:     Fear of Current or Ex-Partner:     Emotionally Abused:     Physically Abused:     Sexually Abused:        FAMILY HISTORY:  Family History   Problem Relation Age of Onset    Liver Cancer Mother     High Blood Pressure Mother     Colon Cancer Mother         x2  Cancer Father         Lung Cancer    Breast Cancer Sister     Cancer Maternal Grandfather         Lung Cancer    Cancer Paternal Grandfather         Stomach Cancer    Colon Polyps Neg Hx     Cystic Fibrosis Neg Hx     Liver Disease Neg Hx     Rectal Cancer Neg Hx         Current Outpatient Medications   Medication Sig Dispense Refill    HYDROcodone-acetaminophen (NORCO) 7.5-325 MG per tablet Take 1 tablet by mouth every 6 hours as needed for Pain for up to 30 days. Intended supply: 30 days 120 tablet 0    promethazine (PHENERGAN) 12.5 MG tablet Take 1 tablet by mouth every 6 hours as needed for Nausea 60 tablet 5    ondansetron (ZOFRAN) 8 MG tablet Take 1 tablet by mouth every 8 hours as needed for Nausea or Vomiting 30 tablet 5    omeprazole (PRILOSEC) 20 MG delayed release capsule Take 20 mg by mouth daily      Cholecalciferol (VITAMIN D3) 50 MCG (2000 UT) CAPS Take by mouth daily      folic acid (FOLVITE) 1 MG tablet Take 1 mg by mouth daily      prazosin (MINIPRESS) 1 MG capsule Take 1 mg by mouth nightly For nightmares      buPROPion (WELLBUTRIN) 100 MG tablet Take 100 mg by mouth every morning For mood      topiramate (TOPAMAX) 100 MG tablet Take 100 mg by mouth daily For Seizures or Migraines      Sertraline HCl (ZOLOFT PO) Take by mouth daily      Zolpidem Tartrate (AMBIEN PO) Take by mouth nightly       No current facility-administered medications for this visit.      Facility-Administered Medications Ordered in Other Visits   Medication Dose Route Frequency Provider Last Rate Last Admin    dextrose 5 % solution   IntraVENous Once Glenn Glasgow MD        fluorouracil (ADRUCIL) 4,475 mg in sodium chloride 0.9 % 250 mL chemo infusion  2,400 mg/m2 (Treatment Plan Recorded) IntraVENous Over 46 hours Glenn Glasgow MD 5.4 mL/hr at 08/25/21 1410 4,475 mg at 08/25/21 1410        REVIEW OF SYSTEMS:    Constitutional: no fever, no night sweats, fatigue;   HEENT: no blurring of vision, no double vision, no hearing difficulty, no tinnitus,no ulceration, no dysphagia  Lungs: no cough, no shortness of breath, no wheeze;   CVS: no palpitation, no chest pain, no shortness of breath;  GI: Right lower quadrant pain, no nausea , no vomiting, no constipation;   ANNIE: no dysuria, frequency and urgency, no hematuria, no kidney stones;   Musculoskeletal: no joint pain, swelling , stiffness;   Endocrine: no polyuria, polydypsia, no cold or heat intolerence; Hematology/lymphatic: no easy brusing or bleeding, no hx of clotting disorder; no peripheral adenopathy. Dermatology: no skin rash, no eczema, no pruritis;   Psychiatry: no depression, no anxiety,no panic attacks, no suicide ideation; Neurology: no syncope, no seizures, no numbness or tingling of hands, no numbness or tingling of feet, no paresis;     PHYSICAL EXAM:    Vitals signs:  /88   Pulse 81   Temp 98.3 °F (36.8 °C) (Oral)   Resp 16   Ht 5' 7\" (1.702 m)   Wt 160 lb (72.6 kg)   SpO2 98%   BMI 25.06 kg/m²    Pain scale:        CONSTITUTIONAL: Alert, appropriate, no acute distress,   EYES: Non icteric, EOM intact, pupils equal round and reactive to light and accommodation. ENT: Oral mucus membranes moist, no oral pharyngeal lesions. External inspection of ears and nose are normal.   NECK: Supple, no masses. No palpable thyroid mass    CHEST/LUNGS: CTA bilaterally, normal respiratory effort   CARDIOVASCULAR: RRR, no murmurs. No lower extremity edema   ABDOMEN: soft non-tender, active bowel sounds, no hepatosplenomegaly. No palpable masses. EXTREMITIES: warm, Full ROM of all fours extremities. No focal weakness. SKIN: warm, dry with no rashes or lesions  LYMPH: No cervical, clavicular, axillary, or inguinal lymphadenopathy  NEUROLOGIC: follows commands, non focal.   PSYCH: mood and affect appropriate. Alert and oriented to time and place and person.     Relevant Lab findings/reviewed by me:  8/12/21/21 CEA 8.2  Lab Results colonoscopy showed no large polyps or masses. Repeat in 3 years. June 2021-CT chest clear. MRI abdomen showed suspicious lesion in the right lower quadrant. Biopsy arranged [place]. Discussed with hepatobiliary service at [facility]. 6/25/[id number] biopsy consistent with recurrent primary colorectal adenocarcinoma. 7/2/2021-discussed with hepatobiliary service/surgical oncology at [facility]. They will review images and call the patient back regarding consultation for consideration of HIPEC  7/2/2021-they recommend systemic therapy. 7/2/2021-recommended FOLFIRI/Avastin  7/13/21- Initiated FOLFIRI + Avastin every 2 weeks  07/30/21-Seen at [facility] by GI surgeon oncology. They discussed the role of CRS/HIPEC in his situation. He was told hat it really depends on the status of his liver lesions as well. He will complete 6 cycles of chemotherapy and will return to see me with a CTAP as well as MRI of the liver to assess disease burden and determine if he can be a candidate for debulking.    8/25/2021-proceed with FOLFIRI/Avastin cycle #4. Local regional recurrence colonic adenocarcinoma, June 2021  MRI abdomen at [facility] showed mildly T2 hyperintense nodular focus with postcontrast rim enhancement and diffusion restriction. This measures approximately 2.7 x 2 x   2 cm. More delayed postcontrast images show irregular area of enhancement measuring 2.4 x 7.7 cm axially involving the right iliopsoas muscle and the right lateral abdominal wall. 6/25/[id number] biopsy consistent with recurrent primary colorectal adenocarcinoma. 7/2/2021-discussed with hepatobiliary service/surgical oncology at [facility]. They will review images and call the patient back regarding consultation for consideration ofHIPEC  7/2/2021-they recommend systemic therapy.   7/2/2021-recommended FOLFIRI/Avastin  7/12/2021-FOLFIRI/Avastin cycle #1  7/13/2021-CEA 10.7  8/12/21/21 CEA 8.2  8/12/2021-CEA 8.9    #Chronic kidney disease stage III- creatinine 1.2 ->1. 5     #Liver metastasis- plan as above. Status post ablation left liver lobe lesion at [facility] on 6/8/2020. Status post neoadjuvant FOLFOX/bevacizumab x11 biweekly cyclesStatus post right hemicolectomy. Pathology consistent complete pathologic response. 3/4/2021-MRI abdomen was unremarkable  6/8/2021-MRI abdomen showed Postsurgical changes of right hepatectomy. Redemonstration of an ablation zone in segment II, measuring up to 1.7 cm, previously 1.8 cm. No evidence of postcontrast enhancement.  No new lesion identified.      #Iron deficiency anemia-  hemoglobin 8.5/MCV 89. Ferritin 12.9, iron saturation 15%, TIBC 458, iron 72. Status post IV iron therapy. Hemoglobin 14.6     #Chemotherapy-induced neuropathy-stable    #Cancer related pain-refill Percocet today     PLAN:  · Continue follow-up with [facility]/Dr. Essie Owens  · Continue follow-up with [facility]/Dr. Nika Rosas for scans 9/24/21  · Continue C#4 FOLFIRI + Avastin today and every 2 weeks  · RTC with MD 2 weeks in treatment room  · Refill Percocet today 7.5 mg every 6 hours as needed        Follow Up:     No follow-ups on file. Data Marta Apo, am scribing for Benito Morton MD. Electronically signed by Herbert Guillen RN on 8/25/2021 at 10:54 AM CDT. I, Dr Katy Gipson, personally performed the services described in this documentation as scribed by Herbert Guillen RN in my presence and is both accurate and complete. I have seen, examined and reviewed this patient medication list, appropriate labs and imaging studies. I reviewed relevant medical records and others physicians notes. I discussed the plans of care with the patient. I answered all the questions to the patients satisfaction.  I have also reviewed the chief complaint (CC) and part of the history (History of Present Illness (HPI), Past Family Social History ([facility] [address]), or Review of Systems (ROS) and made changes when appropriated.        (Please note that portions of this note were completed with a voice recognition program. Efforts were made to edit the dictations but occasionally words are mis-transcribed.)

## 2021-08-25 ENCOUNTER — HOSPITAL ENCOUNTER (OUTPATIENT)
Dept: INFUSION THERAPY | Age: 66
Discharge: HOME | End: 2021-08-25
Payer: OTHER GOVERNMENT

## 2021-08-25 ENCOUNTER — OFFICE VISIT (OUTPATIENT)
Dept: HEMATOLOGY | Age: 66
End: 2021-08-25
Payer: OTHER GOVERNMENT

## 2021-08-25 VITALS
RESPIRATION RATE: 16 BRPM | WEIGHT: 160 LBS | BODY MASS INDEX: 25.11 KG/M2 | SYSTOLIC BLOOD PRESSURE: 128 MMHG | DIASTOLIC BLOOD PRESSURE: 88 MMHG | TEMPERATURE: 98.3 F | HEIGHT: 67 IN | OXYGEN SATURATION: 98 % | HEART RATE: 81 BPM

## 2021-08-25 DIAGNOSIS — Z71.89 CARE PLAN DISCUSSED WITH PATIENT: ICD-10-CM

## 2021-08-25 DIAGNOSIS — C18.9 ADENOCARCINOMA OF COLON (HCC): ICD-10-CM

## 2021-08-25 DIAGNOSIS — T45.1X5D ADVERSE EFFECT OF CHEMOTHERAPY, SUBSEQUENT ENCOUNTER: ICD-10-CM

## 2021-08-25 DIAGNOSIS — Z51.12 ENCOUNTER FOR ANTINEOPLASTIC IMMUNOTHERAPY: ICD-10-CM

## 2021-08-25 DIAGNOSIS — Z51.11 CHEMOTHERAPY MANAGEMENT, ENCOUNTER FOR: ICD-10-CM

## 2021-08-25 DIAGNOSIS — C78.7 LIVER METASTASES (HCC): ICD-10-CM

## 2021-08-25 DIAGNOSIS — C18.2 MALIGNANT NEOPLASM OF ASCENDING COLON (HCC): Primary | ICD-10-CM

## 2021-08-25 DIAGNOSIS — N28.9 RENAL IMPAIRMENT: ICD-10-CM

## 2021-08-25 DIAGNOSIS — G89.3 CANCER ASSOCIATED PAIN: ICD-10-CM

## 2021-08-25 DIAGNOSIS — C18.9 ADENOCARCINOMA OF COLON (HCC): Primary | ICD-10-CM

## 2021-08-25 LAB
ALBUMIN SERPL-MCNC: 4.8 G/DL (ref 3.5–5.2)
ALP BLD-CCNC: 125 U/L (ref 40–130)
ALT SERPL-CCNC: 24 U/L (ref 21–72)
ANION GAP SERPL CALCULATED.3IONS-SCNC: 9 MMOL/L (ref 7–19)
AST SERPL-CCNC: 41 U/L (ref 17–59)
BASOPHILS ABSOLUTE: 0.04 K/UL (ref 0.01–0.08)
BASOPHILS RELATIVE PERCENT: 0.9 % (ref 0.1–1.2)
BILIRUB SERPL-MCNC: 0.9 MG/DL (ref 0.2–1.3)
BUN BLDV-MCNC: 16 MG/DL (ref 9–20)
CALCIUM SERPL-MCNC: 10.1 MG/DL (ref 8.4–10.2)
CEA: 8.9 NG/ML (ref 0–3)
CHLORIDE BLD-SCNC: 102 MMOL/L (ref 98–111)
CO2: 30 MMOL/L (ref 22–29)
CREAT SERPL-MCNC: 1.5 MG/DL (ref 0.6–1.2)
EOSINOPHILS ABSOLUTE: 0.41 K/UL (ref 0.04–0.54)
EOSINOPHILS RELATIVE PERCENT: 8.8 % (ref 0.7–7)
GFR NON-AFRICAN AMERICAN: 47
GLOBULIN: 3.2 G/DL
GLUCOSE BLD-MCNC: 109 MG/DL (ref 74–106)
HCT VFR BLD CALC: 43.1 % (ref 40.1–51)
HEMOGLOBIN: 14.6 G/DL (ref 13.7–17.5)
LYMPHOCYTES ABSOLUTE: 0.79 K/UL (ref 1.18–3.74)
LYMPHOCYTES RELATIVE PERCENT: 17 % (ref 19.3–53.1)
MCH RBC QN AUTO: 31.2 PG (ref 25.7–32.2)
MCHC RBC AUTO-ENTMCNC: 33.9 G/DL (ref 32.3–36.5)
MCV RBC AUTO: 92.1 FL (ref 79–92.2)
MONOCYTES ABSOLUTE: 0.7 K/UL (ref 0.24–0.82)
MONOCYTES RELATIVE PERCENT: 15 % (ref 4.7–12.5)
NEUTROPHILS ABSOLUTE: 2.72 K/UL (ref 1.56–6.13)
NEUTROPHILS RELATIVE PERCENT: 58.3 % (ref 34–71.1)
PDW BLD-RTO: 15 % (ref 11.6–14.4)
PLATELET # BLD: 209 K/UL (ref 163–337)
PMV BLD AUTO: 9.9 FL (ref 7.4–10.4)
POTASSIUM SERPL-SCNC: 4.7 MMOL/L (ref 3.5–5.1)
PROTEIN UA: NEGATIVE
RBC # BLD: 4.68 M/UL (ref 4.63–6.08)
SODIUM BLD-SCNC: 141 MMOL/L (ref 137–145)
TOTAL PROTEIN: 7.9 G/DL (ref 6.3–8.2)
WBC # BLD: 4.66 K/UL (ref 4.23–9.07)

## 2021-08-25 PROCEDURE — 82378 CARCINOEMBRYONIC ANTIGEN: CPT

## 2021-08-25 PROCEDURE — 6360000002 HC RX W HCPCS: Performed by: INTERNAL MEDICINE

## 2021-08-25 PROCEDURE — G0498 CHEMO EXTEND IV INFUS W/PUMP: HCPCS

## 2021-08-25 PROCEDURE — 80053 COMPREHEN METABOLIC PANEL: CPT

## 2021-08-25 PROCEDURE — 96375 TX/PRO/DX INJ NEW DRUG ADDON: CPT

## 2021-08-25 PROCEDURE — 96413 CHEMO IV INFUSION 1 HR: CPT

## 2021-08-25 PROCEDURE — 81002 URINALYSIS NONAUTO W/O SCOPE: CPT | Performed by: INTERNAL MEDICINE

## 2021-08-25 PROCEDURE — 85025 COMPLETE CBC W/AUTO DIFF WBC: CPT

## 2021-08-25 PROCEDURE — 96417 CHEMO IV INFUS EACH ADDL SEQ: CPT

## 2021-08-25 PROCEDURE — 96415 CHEMO IV INFUSION ADDL HR: CPT

## 2021-08-25 PROCEDURE — 2580000003 HC RX 258: Performed by: INTERNAL MEDICINE

## 2021-08-25 PROCEDURE — 99214 OFFICE O/P EST MOD 30 MIN: CPT | Performed by: INTERNAL MEDICINE

## 2021-08-25 PROCEDURE — 36415 COLL VENOUS BLD VENIPUNCTURE: CPT

## 2021-08-25 RX ORDER — ATROPINE SULFATE 1 MG/ML
0.5 INJECTION, SOLUTION INTRAMUSCULAR; INTRAVENOUS; SUBCUTANEOUS ONCE
Status: COMPLETED | OUTPATIENT
Start: 2021-08-25 | End: 2021-08-25

## 2021-08-25 RX ORDER — DEXAMETHASONE SODIUM PHOSPHATE 10 MG/ML
10 INJECTION, SOLUTION INTRAMUSCULAR; INTRAVENOUS ONCE
Status: COMPLETED | OUTPATIENT
Start: 2021-08-25 | End: 2021-08-25

## 2021-08-25 RX ORDER — ATROPINE SULFATE 0.4 MG/ML
0.4 AMPUL (ML) INJECTION
Status: DISCONTINUED | OUTPATIENT
Start: 2021-08-25 | End: 2021-08-25 | Stop reason: SDUPTHER

## 2021-08-25 RX ORDER — HYDROCODONE BITARTRATE AND ACETAMINOPHEN 7.5; 325 MG/1; MG/1
1 TABLET ORAL EVERY 6 HOURS PRN
Qty: 120 TABLET | Refills: 0 | Status: SHIPPED | OUTPATIENT
Start: 2021-08-25 | End: 2021-09-24

## 2021-08-25 RX ORDER — DEXTROSE MONOHYDRATE 50 MG/ML
INJECTION, SOLUTION INTRAVENOUS ONCE
Status: DISCONTINUED | OUTPATIENT
Start: 2021-08-25 | End: 2021-08-27 | Stop reason: HOSPADM

## 2021-08-25 RX ORDER — SODIUM CHLORIDE 9 MG/ML
INJECTION, SOLUTION INTRAVENOUS CONTINUOUS
Status: CANCELLED | OUTPATIENT
Start: 2021-08-25

## 2021-08-25 RX ORDER — SODIUM CHLORIDE 9 MG/ML
25 INJECTION, SOLUTION INTRAVENOUS PRN
Status: CANCELLED | OUTPATIENT
Start: 2021-08-25

## 2021-08-25 RX ORDER — SODIUM CHLORIDE 9 MG/ML
INJECTION, SOLUTION INTRAVENOUS ONCE
Status: CANCELLED | OUTPATIENT
Start: 2021-08-25 | End: 2021-08-25

## 2021-08-25 RX ORDER — MEPERIDINE HYDROCHLORIDE 50 MG/ML
12.5 INJECTION INTRAMUSCULAR; INTRAVENOUS; SUBCUTANEOUS ONCE
Status: CANCELLED | OUTPATIENT
Start: 2021-08-25 | End: 2021-08-25

## 2021-08-25 RX ORDER — DIPHENHYDRAMINE HYDROCHLORIDE 50 MG/ML
50 INJECTION INTRAMUSCULAR; INTRAVENOUS ONCE
Status: CANCELLED | OUTPATIENT
Start: 2021-08-25 | End: 2021-08-25

## 2021-08-25 RX ORDER — PALONOSETRON 0.05 MG/ML
0.25 INJECTION, SOLUTION INTRAVENOUS ONCE
Status: CANCELLED | OUTPATIENT
Start: 2021-08-25

## 2021-08-25 RX ORDER — METHYLPREDNISOLONE SODIUM SUCCINATE 125 MG/2ML
125 INJECTION, POWDER, LYOPHILIZED, FOR SOLUTION INTRAMUSCULAR; INTRAVENOUS ONCE
Status: CANCELLED | OUTPATIENT
Start: 2021-08-25 | End: 2021-08-25

## 2021-08-25 RX ORDER — DEXTROSE MONOHYDRATE 50 MG/ML
INJECTION, SOLUTION INTRAVENOUS ONCE
Status: CANCELLED | OUTPATIENT
Start: 2021-08-25 | End: 2021-08-25

## 2021-08-25 RX ORDER — HEPARIN SODIUM (PORCINE) LOCK FLUSH IV SOLN 100 UNIT/ML 100 UNIT/ML
500 SOLUTION INTRAVENOUS PRN
Status: CANCELLED | OUTPATIENT
Start: 2021-08-25

## 2021-08-25 RX ORDER — PALONOSETRON 0.05 MG/ML
0.25 INJECTION, SOLUTION INTRAVENOUS ONCE
Status: COMPLETED | OUTPATIENT
Start: 2021-08-25 | End: 2021-08-25

## 2021-08-25 RX ORDER — SODIUM CHLORIDE 0.9 % (FLUSH) 0.9 %
5-40 SYRINGE (ML) INJECTION PRN
Status: CANCELLED | OUTPATIENT
Start: 2021-08-25

## 2021-08-25 RX ORDER — ATROPINE SULFATE 0.4 MG/ML
0.4 AMPUL (ML) INJECTION
Status: CANCELLED | OUTPATIENT
Start: 2021-08-25

## 2021-08-25 RX ORDER — EPINEPHRINE 1 MG/ML
0.3 INJECTION, SOLUTION, CONCENTRATE INTRAVENOUS PRN
Status: CANCELLED | OUTPATIENT
Start: 2021-08-25

## 2021-08-25 RX ADMIN — ATROPINE SULFATE 0.5 MG: 1 INJECTION, SOLUTION INTRAMUSCULAR; INTRAVENOUS; SUBCUTANEOUS at 12:32

## 2021-08-25 RX ADMIN — LEUCOVORIN CALCIUM 750 MG: 350 INJECTION, POWDER, LYOPHILIZED, FOR SOLUTION INTRAMUSCULAR; INTRAVENOUS at 12:35

## 2021-08-25 RX ADMIN — DEXAMETHASONE SODIUM PHOSPHATE 10 MG: 10 INJECTION, SOLUTION INTRAMUSCULAR; INTRAVENOUS at 11:38

## 2021-08-25 RX ADMIN — PALONOSETRON 0.25 MG: 0.05 INJECTION, SOLUTION INTRAVENOUS at 11:38

## 2021-08-25 RX ADMIN — SODIUM CHLORIDE 375 MG: 9 INJECTION, SOLUTION INTRAVENOUS at 11:53

## 2021-08-25 RX ADMIN — FLUOROURACIL 4475 MG: 50 INJECTION, SOLUTION INTRAVENOUS at 14:10

## 2021-08-25 RX ADMIN — IRINOTECAN HYDROCHLORIDE 340 MG: 20 INJECTION, SOLUTION INTRAVENOUS at 12:35

## 2021-08-27 ENCOUNTER — HOSPITAL ENCOUNTER (OUTPATIENT)
Dept: INFUSION THERAPY | Age: 66
Discharge: HOME | End: 2021-08-27
Payer: OTHER GOVERNMENT

## 2021-08-27 DIAGNOSIS — C18.2 MALIGNANT NEOPLASM OF ASCENDING COLON (HCC): Primary | ICD-10-CM

## 2021-08-27 PROCEDURE — 6360000002 HC RX W HCPCS: Performed by: INTERNAL MEDICINE

## 2021-08-27 PROCEDURE — 96523 IRRIG DRUG DELIVERY DEVICE: CPT

## 2021-08-27 RX ORDER — SODIUM CHLORIDE 0.9 % (FLUSH) 0.9 %
10 SYRINGE (ML) INJECTION PRN
Status: DISCONTINUED | OUTPATIENT
Start: 2021-08-27 | End: 2021-08-28 | Stop reason: HOSPADM

## 2021-08-27 RX ORDER — HEPARIN SODIUM (PORCINE) LOCK FLUSH IV SOLN 100 UNIT/ML 100 UNIT/ML
500 SOLUTION INTRAVENOUS PRN
Status: DISCONTINUED | OUTPATIENT
Start: 2021-08-27 | End: 2021-08-28 | Stop reason: HOSPADM

## 2021-08-27 RX ADMIN — HEPARIN 500 UNITS: 100 SYRINGE at 12:49

## 2021-09-08 ENCOUNTER — HOSPITAL ENCOUNTER (OUTPATIENT)
Dept: INFUSION THERAPY | Age: 66
Discharge: HOME | End: 2021-09-08
Payer: OTHER GOVERNMENT

## 2021-09-08 ENCOUNTER — OFFICE VISIT (OUTPATIENT)
Dept: HEMATOLOGY | Age: 66
End: 2021-09-08
Payer: OTHER GOVERNMENT

## 2021-09-08 VITALS
RESPIRATION RATE: 16 BRPM | BODY MASS INDEX: 25.66 KG/M2 | WEIGHT: 163.5 LBS | OXYGEN SATURATION: 96 % | DIASTOLIC BLOOD PRESSURE: 88 MMHG | HEART RATE: 83 BPM | TEMPERATURE: 98.3 F | SYSTOLIC BLOOD PRESSURE: 135 MMHG | HEIGHT: 67 IN

## 2021-09-08 DIAGNOSIS — Z71.89 CARE PLAN DISCUSSED WITH PATIENT: ICD-10-CM

## 2021-09-08 DIAGNOSIS — C78.7 LIVER METASTASES (HCC): ICD-10-CM

## 2021-09-08 DIAGNOSIS — Z51.11 CHEMOTHERAPY MANAGEMENT, ENCOUNTER FOR: ICD-10-CM

## 2021-09-08 DIAGNOSIS — C18.9 ADENOCARCINOMA OF COLON (HCC): Primary | ICD-10-CM

## 2021-09-08 DIAGNOSIS — T45.1X5D ADVERSE EFFECT OF CHEMOTHERAPY, SUBSEQUENT ENCOUNTER: ICD-10-CM

## 2021-09-08 LAB
ALBUMIN SERPL-MCNC: 4.9 G/DL (ref 3.5–5.2)
ALP BLD-CCNC: 122 U/L (ref 40–130)
ALT SERPL-CCNC: 25 U/L (ref 21–72)
ANION GAP SERPL CALCULATED.3IONS-SCNC: 10 MMOL/L (ref 7–19)
AST SERPL-CCNC: 37 U/L (ref 17–59)
BASOPHILS ABSOLUTE: 0.04 K/UL (ref 0.01–0.08)
BASOPHILS RELATIVE PERCENT: 0.7 % (ref 0.1–1.2)
BILIRUB SERPL-MCNC: 1 MG/DL (ref 0.2–1.3)
BUN BLDV-MCNC: 15 MG/DL (ref 9–20)
CALCIUM SERPL-MCNC: 10 MG/DL (ref 8.4–10.2)
CHLORIDE BLD-SCNC: 101 MMOL/L (ref 98–111)
CO2: 29 MMOL/L (ref 22–29)
CREAT SERPL-MCNC: 1.5 MG/DL (ref 0.6–1.2)
EOSINOPHILS ABSOLUTE: 0.42 K/UL (ref 0.04–0.54)
EOSINOPHILS RELATIVE PERCENT: 7 % (ref 0.7–7)
GFR NON-AFRICAN AMERICAN: 47
GLOBULIN: 3.2 G/DL
GLUCOSE BLD-MCNC: 106 MG/DL (ref 74–106)
HCT VFR BLD CALC: 41.7 % (ref 40.1–51)
HEMOGLOBIN: 14.5 G/DL (ref 13.7–17.5)
LYMPHOCYTES ABSOLUTE: 0.85 K/UL (ref 1.18–3.74)
LYMPHOCYTES RELATIVE PERCENT: 14.2 % (ref 19.3–53.1)
MCH RBC QN AUTO: 32.3 PG (ref 25.7–32.2)
MCHC RBC AUTO-ENTMCNC: 34.8 G/DL (ref 32.3–36.5)
MCV RBC AUTO: 92.9 FL (ref 79–92.2)
MONOCYTES ABSOLUTE: 0.66 K/UL (ref 0.24–0.82)
MONOCYTES RELATIVE PERCENT: 11 % (ref 4.7–12.5)
NEUTROPHILS ABSOLUTE: 4.03 K/UL (ref 1.56–6.13)
NEUTROPHILS RELATIVE PERCENT: 67.1 % (ref 34–71.1)
PDW BLD-RTO: 15.5 % (ref 11.6–14.4)
PLATELET # BLD: 194 K/UL (ref 163–337)
PMV BLD AUTO: 9.9 FL (ref 7.4–10.4)
POTASSIUM SERPL-SCNC: 5.1 MMOL/L (ref 3.5–5.1)
RBC # BLD: 4.49 M/UL (ref 4.63–6.08)
SODIUM BLD-SCNC: 140 MMOL/L (ref 137–145)
TOTAL PROTEIN: 8.1 G/DL (ref 6.3–8.2)
WBC # BLD: 6 K/UL (ref 4.23–9.07)

## 2021-09-08 PROCEDURE — 96367 TX/PROPH/DG ADDL SEQ IV INF: CPT

## 2021-09-08 PROCEDURE — 96366 THER/PROPH/DIAG IV INF ADDON: CPT

## 2021-09-08 PROCEDURE — 99999 PR OFFICE/OUTPT VISIT,PROCEDURE ONLY: CPT | Performed by: INTERNAL MEDICINE

## 2021-09-08 PROCEDURE — 80053 COMPREHEN METABOLIC PANEL: CPT

## 2021-09-08 PROCEDURE — 96415 CHEMO IV INFUSION ADDL HR: CPT

## 2021-09-08 PROCEDURE — 96375 TX/PRO/DX INJ NEW DRUG ADDON: CPT

## 2021-09-08 PROCEDURE — 96417 CHEMO IV INFUS EACH ADDL SEQ: CPT

## 2021-09-08 PROCEDURE — 96413 CHEMO IV INFUSION 1 HR: CPT

## 2021-09-08 PROCEDURE — 6360000002 HC RX W HCPCS: Performed by: INTERNAL MEDICINE

## 2021-09-08 PROCEDURE — 85025 COMPLETE CBC W/AUTO DIFF WBC: CPT

## 2021-09-08 PROCEDURE — 2580000003 HC RX 258: Performed by: INTERNAL MEDICINE

## 2021-09-08 PROCEDURE — G0498 CHEMO EXTEND IV INFUS W/PUMP: HCPCS

## 2021-09-08 PROCEDURE — 36415 COLL VENOUS BLD VENIPUNCTURE: CPT

## 2021-09-08 RX ORDER — DEXTROSE MONOHYDRATE 50 MG/ML
INJECTION, SOLUTION INTRAVENOUS ONCE
Status: CANCELLED | OUTPATIENT
Start: 2021-09-08 | End: 2021-09-08

## 2021-09-08 RX ORDER — SODIUM CHLORIDE 0.9 % (FLUSH) 0.9 %
5-40 SYRINGE (ML) INJECTION PRN
Status: CANCELLED | OUTPATIENT
Start: 2021-09-08

## 2021-09-08 RX ORDER — DEXTROSE MONOHYDRATE 50 MG/ML
INJECTION, SOLUTION INTRAVENOUS ONCE
Status: DISCONTINUED | OUTPATIENT
Start: 2021-09-08 | End: 2021-09-10 | Stop reason: HOSPADM

## 2021-09-08 RX ORDER — ATROPINE SULFATE 1 MG/ML
0.5 INJECTION, SOLUTION INTRAMUSCULAR; INTRAVENOUS; SUBCUTANEOUS ONCE
Status: COMPLETED | OUTPATIENT
Start: 2021-09-08 | End: 2021-09-08

## 2021-09-08 RX ORDER — PALONOSETRON 0.05 MG/ML
0.25 INJECTION, SOLUTION INTRAVENOUS ONCE
Status: COMPLETED | OUTPATIENT
Start: 2021-09-08 | End: 2021-09-08

## 2021-09-08 RX ORDER — PALONOSETRON 0.05 MG/ML
0.25 INJECTION, SOLUTION INTRAVENOUS ONCE
Status: DISCONTINUED | OUTPATIENT
Start: 2021-09-08 | End: 2021-09-08

## 2021-09-08 RX ORDER — SODIUM CHLORIDE 9 MG/ML
INJECTION, SOLUTION INTRAVENOUS ONCE
Status: COMPLETED | OUTPATIENT
Start: 2021-09-08 | End: 2021-09-09

## 2021-09-08 RX ORDER — HEPARIN SODIUM (PORCINE) LOCK FLUSH IV SOLN 100 UNIT/ML 100 UNIT/ML
500 SOLUTION INTRAVENOUS PRN
Status: CANCELLED | OUTPATIENT
Start: 2021-09-08

## 2021-09-08 RX ORDER — DIPHENHYDRAMINE HYDROCHLORIDE 50 MG/ML
50 INJECTION INTRAMUSCULAR; INTRAVENOUS ONCE
Status: CANCELLED | OUTPATIENT
Start: 2021-09-08 | End: 2021-09-08

## 2021-09-08 RX ORDER — SODIUM CHLORIDE 9 MG/ML
25 INJECTION, SOLUTION INTRAVENOUS PRN
Status: CANCELLED | OUTPATIENT
Start: 2021-09-08

## 2021-09-08 RX ORDER — ATROPINE SULFATE 0.4 MG/ML
0.4 AMPUL (ML) INJECTION
Status: CANCELLED | OUTPATIENT
Start: 2021-09-08

## 2021-09-08 RX ORDER — MEPERIDINE HYDROCHLORIDE 50 MG/ML
12.5 INJECTION INTRAMUSCULAR; INTRAVENOUS; SUBCUTANEOUS ONCE
Status: CANCELLED | OUTPATIENT
Start: 2021-09-08 | End: 2021-09-08

## 2021-09-08 RX ORDER — SODIUM CHLORIDE 0.9 % (FLUSH) 0.9 %
5-40 SYRINGE (ML) INJECTION PRN
Status: DISCONTINUED | OUTPATIENT
Start: 2021-09-08 | End: 2021-09-09 | Stop reason: HOSPADM

## 2021-09-08 RX ORDER — SODIUM CHLORIDE 9 MG/ML
INJECTION, SOLUTION INTRAVENOUS CONTINUOUS
Status: CANCELLED | OUTPATIENT
Start: 2021-09-08

## 2021-09-08 RX ORDER — ATROPINE SULFATE 0.4 MG/ML
0.4 AMPUL (ML) INJECTION
Status: DISCONTINUED | OUTPATIENT
Start: 2021-09-08 | End: 2021-09-08

## 2021-09-08 RX ORDER — EPINEPHRINE 1 MG/ML
0.3 INJECTION, SOLUTION, CONCENTRATE INTRAVENOUS PRN
Status: CANCELLED | OUTPATIENT
Start: 2021-09-08

## 2021-09-08 RX ORDER — PALONOSETRON 0.05 MG/ML
0.25 INJECTION, SOLUTION INTRAVENOUS ONCE
Status: CANCELLED | OUTPATIENT
Start: 2021-09-08

## 2021-09-08 RX ORDER — SODIUM CHLORIDE 9 MG/ML
INJECTION, SOLUTION INTRAVENOUS ONCE
Status: CANCELLED | OUTPATIENT
Start: 2021-09-08 | End: 2021-09-08

## 2021-09-08 RX ORDER — DEXAMETHASONE SODIUM PHOSPHATE 10 MG/ML
10 INJECTION, SOLUTION INTRAMUSCULAR; INTRAVENOUS ONCE
Status: COMPLETED | OUTPATIENT
Start: 2021-09-08 | End: 2021-09-08

## 2021-09-08 RX ORDER — METHYLPREDNISOLONE SODIUM SUCCINATE 125 MG/2ML
125 INJECTION, POWDER, LYOPHILIZED, FOR SOLUTION INTRAMUSCULAR; INTRAVENOUS ONCE
Status: CANCELLED | OUTPATIENT
Start: 2021-09-08 | End: 2021-09-08

## 2021-09-08 RX ADMIN — LEUCOVORIN CALCIUM 750 MG: 350 INJECTION, POWDER, LYOPHILIZED, FOR SOLUTION INTRAMUSCULAR; INTRAVENOUS at 12:19

## 2021-09-08 RX ADMIN — IRINOTECAN HYDROCHLORIDE 340 MG: 20 INJECTION, SOLUTION INTRAVENOUS at 12:19

## 2021-09-08 RX ADMIN — DEXAMETHASONE SODIUM PHOSPHATE 10 MG: 10 INJECTION, SOLUTION INTRAMUSCULAR; INTRAVENOUS at 12:03

## 2021-09-08 RX ADMIN — SODIUM CHLORIDE: 9 INJECTION, SOLUTION INTRAVENOUS at 12:03

## 2021-09-08 RX ADMIN — PALONOSETRON 0.25 MG: 0.05 INJECTION, SOLUTION INTRAVENOUS at 12:03

## 2021-09-08 RX ADMIN — FLUOROURACIL 4475 MG: 50 INJECTION, SOLUTION INTRAVENOUS at 14:00

## 2021-09-08 RX ADMIN — ATROPINE SULFATE 0.5 MG: 1 INJECTION, SOLUTION INTRAMUSCULAR; INTRAVENOUS; SUBCUTANEOUS at 12:19

## 2021-09-10 ENCOUNTER — HOSPITAL ENCOUNTER (OUTPATIENT)
Dept: INFUSION THERAPY | Age: 66
Discharge: HOME | End: 2021-09-10
Payer: OTHER GOVERNMENT

## 2021-09-10 DIAGNOSIS — C18.2 MALIGNANT NEOPLASM OF ASCENDING COLON (HCC): Primary | ICD-10-CM

## 2021-09-10 PROCEDURE — 6360000002 HC RX W HCPCS: Performed by: INTERNAL MEDICINE

## 2021-09-10 PROCEDURE — 96523 IRRIG DRUG DELIVERY DEVICE: CPT

## 2021-09-10 PROCEDURE — 2580000003 HC RX 258: Performed by: INTERNAL MEDICINE

## 2021-09-10 RX ORDER — HEPARIN SODIUM (PORCINE) LOCK FLUSH IV SOLN 100 UNIT/ML 100 UNIT/ML
500 SOLUTION INTRAVENOUS PRN
Status: DISCONTINUED | OUTPATIENT
Start: 2021-09-10 | End: 2021-09-11 | Stop reason: HOSPADM

## 2021-09-10 RX ORDER — SODIUM CHLORIDE 0.9 % (FLUSH) 0.9 %
10 SYRINGE (ML) INJECTION PRN
Status: DISCONTINUED | OUTPATIENT
Start: 2021-09-10 | End: 2021-09-11 | Stop reason: HOSPADM

## 2021-09-10 RX ADMIN — Medication 10 ML: at 11:54

## 2021-09-10 RX ADMIN — HEPARIN 500 UNITS: 100 SYRINGE at 11:54

## 2021-09-22 ENCOUNTER — HOSPITAL ENCOUNTER (OUTPATIENT)
Dept: INFUSION THERAPY | Age: 66
Discharge: HOME | End: 2021-09-22
Payer: OTHER GOVERNMENT

## 2021-09-22 VITALS
DIASTOLIC BLOOD PRESSURE: 86 MMHG | TEMPERATURE: 97.7 F | HEIGHT: 67 IN | OXYGEN SATURATION: 95 % | WEIGHT: 164.4 LBS | HEART RATE: 80 BPM | SYSTOLIC BLOOD PRESSURE: 137 MMHG | BODY MASS INDEX: 25.8 KG/M2

## 2021-09-22 DIAGNOSIS — C18.9 ADENOCARCINOMA OF COLON (HCC): Primary | ICD-10-CM

## 2021-09-22 DIAGNOSIS — C78.7 LIVER METASTASES (HCC): ICD-10-CM

## 2021-09-22 LAB
ALBUMIN SERPL-MCNC: 4.6 G/DL (ref 3.5–5.2)
ALP BLD-CCNC: 123 U/L (ref 40–130)
ALT SERPL-CCNC: 22 U/L (ref 21–72)
ANION GAP SERPL CALCULATED.3IONS-SCNC: 10 MMOL/L (ref 7–19)
AST SERPL-CCNC: 30 U/L (ref 17–59)
BASOPHILS ABSOLUTE: 0.03 K/UL (ref 0.01–0.08)
BASOPHILS RELATIVE PERCENT: 0.6 % (ref 0.1–1.2)
BILIRUB SERPL-MCNC: 0.7 MG/DL (ref 0.2–1.3)
BUN BLDV-MCNC: 15 MG/DL (ref 9–20)
CALCIUM SERPL-MCNC: 9.4 MG/DL (ref 8.4–10.2)
CEA: 8.1 NG/ML (ref 0–3)
CHLORIDE BLD-SCNC: 103 MMOL/L (ref 98–111)
CO2: 28 MMOL/L (ref 22–29)
CREAT SERPL-MCNC: 1.3 MG/DL (ref 0.6–1.2)
EOSINOPHILS ABSOLUTE: 0.45 K/UL (ref 0.04–0.54)
EOSINOPHILS RELATIVE PERCENT: 8.5 % (ref 0.7–7)
GFR NON-AFRICAN AMERICAN: 55
GLOBULIN: 3.1 G/DL
GLUCOSE BLD-MCNC: 95 MG/DL (ref 74–106)
HCT VFR BLD CALC: 40.9 % (ref 40.1–51)
HEMOGLOBIN: 14 G/DL (ref 13.7–17.5)
LYMPHOCYTES ABSOLUTE: 0.89 K/UL (ref 1.18–3.74)
LYMPHOCYTES RELATIVE PERCENT: 16.8 % (ref 19.3–53.1)
MCH RBC QN AUTO: 32.6 PG (ref 25.7–32.2)
MCHC RBC AUTO-ENTMCNC: 34.2 G/DL (ref 32.3–36.5)
MCV RBC AUTO: 95.1 FL (ref 79–92.2)
MONOCYTES ABSOLUTE: 0.69 K/UL (ref 0.24–0.82)
MONOCYTES RELATIVE PERCENT: 13 % (ref 4.7–12.5)
NEUTROPHILS ABSOLUTE: 3.25 K/UL (ref 1.56–6.13)
NEUTROPHILS RELATIVE PERCENT: 61.1 % (ref 34–71.1)
PDW BLD-RTO: 15.5 % (ref 11.6–14.4)
PLATELET # BLD: 211 K/UL (ref 163–337)
PMV BLD AUTO: 10.4 FL (ref 7.4–10.4)
POTASSIUM SERPL-SCNC: 4.5 MMOL/L (ref 3.5–5.1)
RBC # BLD: 4.3 M/UL (ref 4.63–6.08)
SODIUM BLD-SCNC: 141 MMOL/L (ref 137–145)
TOTAL PROTEIN: 7.7 G/DL (ref 6.3–8.2)
WBC # BLD: 5.31 K/UL (ref 4.23–9.07)

## 2021-09-22 PROCEDURE — 96413 CHEMO IV INFUSION 1 HR: CPT

## 2021-09-22 PROCEDURE — 36415 COLL VENOUS BLD VENIPUNCTURE: CPT

## 2021-09-22 PROCEDURE — G0498 CHEMO EXTEND IV INFUS W/PUMP: HCPCS

## 2021-09-22 PROCEDURE — 82378 CARCINOEMBRYONIC ANTIGEN: CPT

## 2021-09-22 PROCEDURE — 85025 COMPLETE CBC W/AUTO DIFF WBC: CPT

## 2021-09-22 PROCEDURE — 6360000002 HC RX W HCPCS: Performed by: INTERNAL MEDICINE

## 2021-09-22 PROCEDURE — 96415 CHEMO IV INFUSION ADDL HR: CPT

## 2021-09-22 PROCEDURE — 96367 TX/PROPH/DG ADDL SEQ IV INF: CPT

## 2021-09-22 PROCEDURE — 2580000003 HC RX 258: Performed by: INTERNAL MEDICINE

## 2021-09-22 PROCEDURE — 96375 TX/PRO/DX INJ NEW DRUG ADDON: CPT

## 2021-09-22 PROCEDURE — 96366 THER/PROPH/DIAG IV INF ADDON: CPT

## 2021-09-22 PROCEDURE — 80053 COMPREHEN METABOLIC PANEL: CPT

## 2021-09-22 RX ORDER — ATROPINE SULFATE 0.4 MG/ML
0.4 AMPUL (ML) INJECTION
Status: DISCONTINUED | OUTPATIENT
Start: 2021-09-22 | End: 2021-09-22

## 2021-09-22 RX ORDER — DEXAMETHASONE SODIUM PHOSPHATE 10 MG/ML
10 INJECTION, SOLUTION INTRAMUSCULAR; INTRAVENOUS ONCE
Status: COMPLETED | OUTPATIENT
Start: 2021-09-22 | End: 2021-09-22

## 2021-09-22 RX ORDER — HEPARIN SODIUM (PORCINE) LOCK FLUSH IV SOLN 100 UNIT/ML 100 UNIT/ML
500 SOLUTION INTRAVENOUS PRN
Status: DISCONTINUED | OUTPATIENT
Start: 2021-09-22 | End: 2021-09-23 | Stop reason: HOSPADM

## 2021-09-22 RX ORDER — SODIUM CHLORIDE 9 MG/ML
INJECTION, SOLUTION INTRAVENOUS ONCE
Status: DISCONTINUED | OUTPATIENT
Start: 2021-09-22 | End: 2021-09-24 | Stop reason: HOSPADM

## 2021-09-22 RX ORDER — DEXTROSE MONOHYDRATE 50 MG/ML
INJECTION, SOLUTION INTRAVENOUS ONCE
Status: DISCONTINUED | OUTPATIENT
Start: 2021-09-22 | End: 2021-09-24 | Stop reason: HOSPADM

## 2021-09-22 RX ORDER — ATROPINE SULFATE 1 MG/ML
0.5 INJECTION, SOLUTION INTRAMUSCULAR; INTRAVENOUS; SUBCUTANEOUS
Status: COMPLETED | OUTPATIENT
Start: 2021-09-22 | End: 2021-09-22

## 2021-09-22 RX ORDER — PALONOSETRON 0.05 MG/ML
0.25 INJECTION, SOLUTION INTRAVENOUS ONCE
Status: COMPLETED | OUTPATIENT
Start: 2021-09-22 | End: 2021-09-22

## 2021-09-22 RX ADMIN — IRINOTECAN HYDROCHLORIDE 340 MG: 20 INJECTION, SOLUTION INTRAVENOUS at 11:41

## 2021-09-22 RX ADMIN — ATROPINE SULFATE 0.5 MG: 1 INJECTION, SOLUTION INTRAMUSCULAR; INTRAVENOUS; SUBCUTANEOUS at 11:42

## 2021-09-22 RX ADMIN — LEUCOVORIN CALCIUM 750 MG: 350 INJECTION, POWDER, LYOPHILIZED, FOR SOLUTION INTRAMUSCULAR; INTRAVENOUS at 11:41

## 2021-09-22 RX ADMIN — PALONOSETRON 0.25 MG: 0.05 INJECTION, SOLUTION INTRAVENOUS at 11:16

## 2021-09-22 RX ADMIN — FLUOROURACIL 4475 MG: 50 INJECTION, SOLUTION INTRAVENOUS at 13:26

## 2021-09-22 RX ADMIN — DEXAMETHASONE SODIUM PHOSPHATE 10 MG: 10 INJECTION, SOLUTION INTRAMUSCULAR; INTRAVENOUS at 11:16

## 2021-09-24 ENCOUNTER — HOSPITAL ENCOUNTER (OUTPATIENT)
Dept: INFUSION THERAPY | Age: 66
End: 2021-09-24
Payer: OTHER GOVERNMENT

## 2021-10-05 NOTE — PROGRESS NOTES
MEDICAL ONCOLOGY PROGRESS NOTE                                                          Denise Watkins   1955  10/6/2021     Chief Complaint   Patient presents with    Cancer      INTERVAL HISTORY/HISTORY OF PRESENT ILLNESS:  Diagnosis  · Colonic adenocarcinoma, March 2020  · [id number](liver), stage ROXANA  · IHC MMR- proficient  · K-maria luisa mutated  · N-MARIA LUISA/BRAF wild-type  · Iron deficiency anemia  · Soft tissue mass, June 2021  · Adenocarcinoma-consistent with colon primary, right psoas muscle, June 2021     Treatment summary  · 3/30/2020-right hemicolectomy at [facility]  · Anticipated Liver ablation to be followed by neoadjuvant/adjuvant versus palliative chemotherapy  · 06/10/2020-November 2020-FOLFOX + Avastin  · 12/11/20- Right hepatectomy-Dr. Amanda Sung  · S/p Injectafer-poor oral iron tolerance  · 7/13/21- Initiate FOLFIRI + Avastin every 2 weeks     The patient is a pleasant 61years old male was found to have a right cecal mass consistent with colonic adenocarcinoma. He had locally advanced disease with several lymph nodes involved. In addition, the patient was also found to have suspicious liver lesions concerning for metastatic disease. He was seen by [facility] and offered a multimodality approach with liver ablation of the left liver lesion followed by neoadjuvant chemotherapy and partial right hepatectomy. He underwent microwave ablation of the left lobe liver lesion on 6/8/2020. He is status post completion of 11 biweekly cycles of FOLFOX/Avasti completed November 2020. He tolerated treatment with complaints of mild transient cold neuropathy. He had a right hepatectomy on 12/11/2020 that showed no residual disease. His last CEA was normal in January 2021. He had MRI of the abdomen at [facility] in March 2021 that showed no evidence of recurrent disease in the liver or in the abdomen. He also had a surveillance colonoscopy in March 2021 that showed no polyps.   CEA was elevated in May 2021.  Repeat CEA June 2021 showed persistent elevation. MRI abdomen at [facility] showed no evidence of liver recurrence but a suspicious nodule in the right lower quadrant. Biopsy was performed at [facility] and consistent with recurrent adenocarcinoma. I discussed the findings with hepatobiliary service and also colorectal surgery. He was started on FOLFIRI/Avastin with very good tolerance. He was seen by Dr. Venita Meeks again on 9/24/2021. He had repeat CT abdomen pelvis and also MRI. It showed persistent disease involving the psoas muscle. In addition, an additional small place that may represent further peritoneal metastatic disease. The plan was to continue chemotherapy at this time and reassess in December. The patient has been tolerated treatment with complaints of occasional fatigue and mild diarrhea and grade 1 neuropathy.       Cancer history  Mr. Debbie Alejo was first seen by me on 3/23/2020. He was referred for a new diagnosis of colonic adenocarcinoma involving the cecum. The patient reports that he had a wellbeing consult with his provider at the [facility]. He was found to have anemia and then recommended a colonoscopy. Of note, the patient has a family history of colon cancer. His mother is a patient of Dr. Елена Bernal he has been diagnosed with colon cancer in 2010. · 3/11/2020- colonoscopy revealed a large malignant appearing fungating mass lesion in the cecum. In addition, several other polyps. Biopsy of the mass consistent with moderate differentiated colonic adenocarcinoma. Polyps consistent with tubulovillous adenoma with no high-grade dysplasia. IHC MMR not proficient. K-maria luisa mutated, BRAF and NRAS wild type. MSI proficient  · 3/11/2020-CEA 5.5 (H)  · 3/18/2020-CT abdomen pelvis with contrast  Invasive cecal mass adhering to the right lateral abdominal wall muscles with adjacent lymphadenopathy. Mild partial obstruction of the terminal ileum.  2. Suspicious lesions in the right and left hepatic lobes measure up to 1.3 cm and likely represent metastatic disease. · 3/18/2020-Xr Chest Standard  No radiographic evidence of acute cardiopulmonary process. · 3/23/2020-he was first seen by me. Recommended completion of staging with CT chest.  Also recommended liver MRI for further clarification of liver lesion. S.  Recommend to proceed with a general surgery consultation tomorrow with Dr. Deedee Hernandez. Patient was informed that I favor surgical resection if feasible of the primary malignancy. · 3/30/2020- right hemicolectomy by Dr. Deedee Hernandez at [facility] consistent with invasive moderately differentiated colonic adenocarcinoma measuring 7.2 cm. Carcinoma directly invading the adjacent abdominal wall tissue. Focal lymphovascular space invasion identified. Focal perineural invasion identified. Surgical margins negative for evidence of malignancy. 6 out of 14 lymph nodes positive for metastatic adenocarcinoma. Final pathology staging [id number](liver, stage ROXANA)  · 4/20/2020-CT chest with contrast showed No convincing intrathoracic metastasis. Nonspecific 4 mm nodule of the inferior lingula and a 2 mm right upper lobe nodule can be followed on subsequent imaging in 6-12 months. Moderate coronary calcifications. Hypodense metastatic liver lesions. Small hiatal hernia. · 4/20/2020-Mri Abdomen W Wo Contrast There are about 5 liver lesions. The 2 largest appears similar compared to 3/18/2020, the others are too small to further characterize. Appearance is most concerning for metastatic disease. Enhancement of the right lateral peritoneum. This is favored to be postoperative as there is no nodularity, evidence of omental disease or lymphadenopathy. Recommend attention on follow-up. Cholecystectomy. · 4/22/2020-discussed with Dr. Flako Zhu at [place].   He will review imaging studies and give further recommendations regarding eligibility for resection of liver lesions. · 5/8/2020 CT Abdomen The two largest suspicious lesions measuring 1.2 and 1.3 cm in the  right and left hepatic lobes respectively are similar compared to the  3/18/2020 CT. Additionally, there are at least five subcentimeter lesions with similar signal characteristics, which are also highly suspicious for metastases. If complete characterization of the number and distribution of lesions is necessary, an MRI with Eovist could be acquired. · 5/19/2020- he was seen by the hepatobiliary service at [facility] with Dr. Melissa Mcnulty:  patient adequate risk candidate for a multimodal approach, directed toward curative hepatectomy eventually. Endorsed by Hepatobiliary Conference, I recommended perc ablation of the L hemiliver to clear it, followed by systemic therapy in a neoadjuvant strategy. Restaging imaging to confirm clearance of disease on the left and lack of progression to unresectability of the R hemiliver disease would then be followed by R hepatectomy. Limitations to this approach may be accessibility of the segment 4A/8 disease high in the hepatic dome and the possibility of heat sink-related recurrence s/p abation of the left-sided disease. · 5/21/2020- referral for Mediport placement and start FOLFOX in 2 weeks. Will add bevacizumab after 6 weeks of liver ablation. We will plan for 12 biweekly dose of FOLFOX. Bevacizumab will also be stopped 6 weeks prior to major procedure. · 6/8/2020- Microwave ablation of the left liver lesion was then performed for 5 minutes at [address] to achieve a 3.4 x 3.9 cm approximately spherical zone of ablation. · 6/10/2020- initiation of FOLFOX. · 7/20/2020-added Avastin. · 9/10/20 MRI abdomen: Left hepatic lobe segment II lesion demonstrates small T1 hyperintense blood products, status post microwave ablation on 6/8/2020. No definitive enhancement within the lesion. Focal internal thickening or scar present. Recommend attention on follow-up.  Additional scattered subcentimeter foci throughout the liver decreased in size compared to MRI dated 4/20/2020 consistent with improving metastatic disease. Additional chronic findings as above. · 9/16/2020-discussed with plan with the patient and [facility]. Interval response to treatment. Plan to continue chemotherapy through 12 cycles with Avastin. · 12/11/20 Right hepatectomy-Dr. Lisa Bernard  · 12/11/20 Right hepatectomy pathology: Liver, right, resection: Focus of Fibrosis with calcifications and chronic inflammation (0.3 cm), see comment. Background hepatic parenchyma with minimal periportal fibrosis (trichrome stain), minimal lobular and portal inflammation, and no significant macrovesicular steatosis (<5%) Comments: The patient's history of colorectal cancer status adjuvant therapy is noted. The focus of fibrosis may reflect treatment effect. There is no evidence of viable tumor in the sampled specimen. · 12/14/20 Ct Abdomen Pelvis W Iv Contrast A Small bowel obstruction with transition point at the distal small bowel, just proximal to RIGHT upper quadrant ileocolonic anastomosis. Postoperative change of RIGHT hepatectomy with small amount of expected free fluid and intraperitoneal gas. Redemonstrated LEFT liver lesion. Small bilateral pleural effusions. · 12/16/20 SBFT-[place]: Study is limited due to retained contrast in the small bowel from prior attempt at small bowel follow-through on the floor. Small bowel remains dilated, measuring approximately 5.2 cm, which is consistent with partial or resolving small bowel obstruction. Final radiographs show contrast within the colon. · 1/4/2020-resolution of small bowel obstruction. · 1/7/21 CT chest: No finding to suggest intrathoracic neoplastic process or metastatic disease. The benign-appearing tiny nodule in the right upper lobe probably represent a noncalcified granuloma.  A nodule in the lingular segment of the left upper lobe is not visualized in this study. Postsurgical changes of the liver. No evidence of focal complication. A trace right basal pleural effusion. This may be reactive to the previous abdominal surgery. · 3/4/21 MRI abd: Status post right hepatectomy and microwave ablation of a left liver lesion. No findings to suggest residual/recurrent disease. No new liver lesion is identified. Postsurgical changes related to right hemicolectomy. Microwave ablation zone in segment II of the left hepatic lobe measures approximately 21 mm in diameter, unchanged. No appreciable postcontrast enhancement or other findings to suggest local disease recurrence. No new liver lesion is identified. Spleen is mildly enlarged, measuring 13.8 cm in length. · 3/17/2021-1 year colonoscopy showed no evidence of polyps. · 5/3/21 CEA 6.2  · 6/8/21 MRI abdomen ([place]): Status post right hepatectomy and MWA of a left liver lesion.  No evidence of residual or recurrent metastatic disease in the liver. Status post prior right hemicolectomy for colon carcinoma. Within the posterior right iliopsoas muscle there is a mildly T2 hyperintense nodular focus with postcontrast rim enhancement and diffusion restriction. This measures approximately 2.7 x 2 x 2 cm. More delayed postcontrast images show irregular area of enhancement measuring 2.4 x 7.7 cm axially involving the right iliopsoas muscle and the right lateral abdominal wall. Suggest correlation with contrast enhanced CT exam for complete evaluation. Consider biopsy of this lesion. · 6/15/21 CT CHEST WO CONTRAST No metastatic disease in the chest.  No change in tiny 2 mm RIGHT upper lobe pulmonary nodule. Mild ectasia of the ascending aorta measuring 4 cm. · 6/18/2021-I reviewed results of MRI abdomen at [facility]. CT chest without contrast reviewed by me and showed no evidence of metastatic disease. Stable 2 mm right upper lobe nodule. Discussed with hepatobiliary service at [facility].  Biopsy intra-abdominal nodule next week at [facility]. · 6/25/20211204-[id number] biopsy soft tissue mass right psoas muscle at [facility] consistent with recurrent colonic adenocarcinoma. · 7/2/2021-discussed with hepatobiliary service at [facility] and also surgical oncology. Surgical oncology will call us back regarding consultation for consideration of HIPEC if he is a candidate  · 7/13/21-initiation of chemotherapy with FOLFIRI + Avastin every 2 weeks  · 7/13/21 CEA 10.7  · 7/27/2021-CEA 9.6  · 7/30/2021-she was seen by the surgical oncology group at [facility] by Dr Lacy Bonilla. They recommended to complete 6 cycles of chemotherapy and repeat CT chest abdomen pelvis and liver MRI to assess disease response. They discussed the role of CRS/HIPEC in his situation. He was told that it really depends on the status of his liver lesions as well. He will complete 6 cycles of chemotherapy and will return to see me with a CTAP as well as MRI of the liver to assess disease burden and determine if he can be a candidate for debulking. · 8/25/2021-proceed cycle #4. · 9/22/2021 CEA- 8.1  · 9/24/2021 MRI with and w/o Contrast ([place])  Tiny left retroperitoneal nodule involving the left lateral conal fascial new compared to 3/4/2021 though unchanged from most recent prior, possibly an additional site of metastasis. No other new metastatic disease within the abdomen. Similar partially visualized right posterior body wall/psoas infiltrative lesion not definitely changed from MRI abdomen 6/8/2021 but better evaluated in its entirety on concurrent CT, reported separately.   Status post right hemicolectomy, right hepatectomy, and segment III microwave ablation. Similar mild splenomegaly.  Additional chronic and incidental findings as described in the body the report. Liver: Status post right hepatectomy.  Ablation zone in segment II measures 1.7 x 1.1 cm, slightly decreased in size from 1.8 x 1.4 cm previously (series 1301 image 56) without appreciable enhancement. Similar tiny subcentimeter cyst in the left hepatic lobe. No new focal hepatic lesion identified. No visualized free fluid. Similar 0.7 cm enhancing nodule along the left lateral conal fascia laterally new from 3/4/2021, grossly unchanged from MRI dated 6/8/2021. (series 801 image 22). No other appreciable peritoneal/retroperitoneal or  omental nodularity. Ill-defined T2 hyperintense signal and hyperenhancement in the right posteromedial body wall involving the lateral aspect of the psoas muscle and posterolateral abdominal wall musculature, partially visualized measuring at least 8.7 x 3.1   cm, grossly similar to the prior exam, better evaluated in its entirety on concurrent CT reported separately. 9/24/2021 CT C/A/P w/ Contrast([place]) Status post right hemicolectomy, right hepatectomy and left hepatic microwave ablation without new suspicious hepatic lesion.  Left lateral perirenal fascial nodule, which is stable compared to 6/8/2021 MRI, but new compared to 3/4/2021 MRI is concerning for an additional site of metastatic disease.  No sites of new or enlarging metastatic disease in the chest.  Similar appearance of right lateral psoas and posterior abdominal wall infiltrative lesion, not significantly changed compared to 6/8/2021 MRI.  Mild splenomegaly.  Additional findings as outlined in the body the report. Biopsy-proven adenocarcinoma involving the posterior lateral aspect of the right iliopsoas muscle and right lateral abdominal wall. Right iliopsoas component is difficult to measure but appears to be approximately 2.5 x 2.4 cm (series 2, image 153), similar to prior MRI. Nodular thickening noted along the right posterior abdominal wall and possibly involving the the transversus abdominis measures approximately 8.5 x 1.8 cm (series 2 image 153) overall similar compared to prior MRI.   · 10/6/2021-discussed the results of recent CT abdomen/pelvis and MRI abdomen/pelvis at [place]. Persistent disease involving the psoas muscle. Discussed with colorectal surgery at [address] [place] [address]. They recommend to continue current therapy for another 2 months and reassess with CT scans. CEA dynamics:  3/11/20 CEA 5.5  8/19/20 CEA 2.4  9/2/20 CEA 2.7  9/16/20 CEA 2.7  9/30/20 CEA 2.7  10/19/20 CEA 2.3  1/4/21 CEA 2.2  5/3/21 CEA 6.2  6/15/21 CEA 8.3  7/13/21 CEA 10.7  7/27/21 CEA 9.6  8/12/21/21 CEA 8.2  8/25/2021-CEA 8.9  9/22/2021 CEA 8.1    PAST MEDICAL HISTORY:   Past Medical History:   Diagnosis Date    Acid reflux     Cancer ([address]     adenocarcinoma of colon    GERD (gastroesophageal reflux disease)     PTSD (post-traumatic stress disorder)           PAST SURGICAL HISTORY:  Past Surgical History:   Procedure Laterality Date    ABLATION OF DYSRHYTHMIC FOCUS      ablation of liver at [facility]  2003    CHOLECYSTECTOMY  2013    COLONOSCOPY      when pt was in the 19's    COLONOSCOPY N/A 03/11/2020    COLONOSCOPY POLYPECTOMY SNARE/COLD BIOPSY: Dr. Shakir Alford colonoscopy: 6-12 months due to findings at colonoscopy today with Cecal mass lesion and large polyps.     COLONOSCOPY      COLONOSCOPY N/A 03/17/2021    Dr Sanchez December, Post-operative changes w IC anastomosis & single visible staple, Mild Diverticuosis, Int Hemorrhoids Grade 1, 3 year recall    HEMICOLECTOMY Right 03/30/2020    LAPAROSCOPIC-ASSISTED RIGHT HEMICOLECTOMY performed by Paige Mosqueda MD at [address] [place] [address] N/A 06/03/2020    INSERTION OF VENOUS PORT with flouro performed by Paige Mosqueda MD at [place] [address] ENDOSCOPY N/A 03/11/2020    Dr. Jason Ortiz:   [facility]        SOCIAL HISTORY:  Social History     Socioeconomic History    Marital status:      Spouse name: None    Number of children: None    Years of education: None    Highest education level: None   Occupational History    None   Tobacco Use    Smoking status: Never Smoker    Smokeless tobacco: Never Used   Vaping Use    Vaping Use: Never used   Substance and Sexual Activity    Alcohol use: Yes     Comment: rare     Drug use: No    Sexual activity: Yes     Partners: Female   Other Topics Concern    None   Social History Narrative    None     Social Determinants of Health     Financial Resource Strain:     Difficulty of Paying Living Expenses:    Food Insecurity:     Worried About Running Out of Food in the Last Year:     Ran Out of Food in the Last Year:    Transportation Needs:     Lack of Transportation (Medical):  Lack of Transportation (Non-Medical):    Physical Activity:     Days of Exercise per Week:     Minutes of Exercise per Session:    Stress:     Feeling of Stress :    Social Connections:     Frequency of Communication with Friends and Family:     Frequency of Social Gatherings with Friends and Family:     Attends Alevism Services:     Active Member of Clubs or Organizations:     Attends Club or Organization Meetings:     Marital Status:    Intimate Partner Violence:     Fear of Current or Ex-Partner:     Emotionally Abused:     Physically Abused:     Sexually Abused:        FAMILY HISTORY:  Family History   Problem Relation Age of Onset    Liver Cancer Mother     High Blood Pressure Mother     Colon Cancer Mother         x2    Cancer Father         Lung Cancer    Breast Cancer Sister     Cancer Maternal Grandfather         Lung Cancer    Cancer Paternal Grandfather         Stomach Cancer    Colon Polyps Neg Hx     Cystic Fibrosis Neg Hx     Liver Disease Neg Hx     Rectal Cancer Neg Hx         Current Outpatient Medications   Medication Sig Dispense Refill    oxyCODONE-acetaminophen (PERCOCET) 7.5-325 MG per tablet Take 1 tablet by mouth every 6 hours as needed for Pain for up to 30 days.  Intended supply: 30 days 120 tablet 0    promethazine (PHENERGAN) 12.5 MG tablet Take 1 tablet by mouth every 6 hours as needed for Nausea 60 tablet 5    ondansetron (ZOFRAN) 8 MG tablet Take 1 tablet by mouth every 8 hours as needed for Nausea or Vomiting 30 tablet 5    omeprazole (PRILOSEC) 20 MG delayed release capsule Take 20 mg by mouth daily      Cholecalciferol (VITAMIN D3) 50 MCG (2000 UT) CAPS Take by mouth daily      folic acid (FOLVITE) 1 MG tablet Take 1 mg by mouth daily      prazosin (MINIPRESS) 1 MG capsule Take 1 mg by mouth nightly For nightmares      buPROPion (WELLBUTRIN) 100 MG tablet Take 100 mg by mouth every morning For mood      topiramate (TOPAMAX) 100 MG tablet Take 100 mg by mouth daily For Seizures or Migraines      Sertraline HCl (ZOLOFT PO) Take by mouth daily      Zolpidem Tartrate (AMBIEN PO) Take by mouth nightly       No current facility-administered medications for this visit.      Facility-Administered Medications Ordered in Other Visits   Medication Dose Route Frequency Provider Last Rate Last Admin    0.9 % sodium chloride infusion   IntraVENous Once Estefany Quan MD        dextrose 5 % solution   IntraVENous Once Estefany Quan MD        fluorouracil (ADRUCIL) 4,475 mg in sodium chloride 0.9 % 250 mL chemo infusion  2,400 mg/m2 (Treatment Plan Recorded) IntraVENous Over 46 hours Estefany Quan MD 5.4 mL/hr at 10/06/21 1454 4,475 mg at 10/06/21 1454        REVIEW OF SYSTEMS:   CONSTITUTIONAL: no fever, no night sweats, fatigue;  HEENT: no blurring of vision, no double vision, no hearing difficulty, no tinnitus, no ulceration, no dysplasia, no epistaxis;   LUNGS: no cough, no hemoptysis, no wheeze,  no shortness of breath;  CARDIOVASCULAR: no palpitation, no chest pain, no shortness of breath;  GI: abdominal pain, no nausea, no vomiting,  diarrhea, no constipation;  ANNIE: no dysuria, no hematuria, no frequency or urgency, no nephrolithiasis;  MUSCULOSKELETAL: no joint pain, no swelling, no stiffness;  ENDOCRINE: no polyuria, no polydipsia, no cold or heat intolerance;  HEMATOLOGY: no easy bruising or bleeding, no history of clotting disorder;  DERMATOLOGY: no skin rash, no eczema, no pruritus;  PSYCHIATRY: no depression, no anxiety, no panic attacks, no suicidal ideation, no homicidal ideation;  NEUROLOGY: no syncope, no seizures, no numbness or tingling of hands, mild numbness or tingling of feet, no paresis;      Vitals signs:  BP (!) 130/94   Pulse 85   Temp 98.3 °F (36.8 °C)   Resp 18   Ht 5' 7\" (1.702 m)   Wt 164 lb 3.2 oz (74.5 kg)   SpO2 98%   BMI 25.72 kg/m²    Pain scale:  Pain Score:   0 - No pain   PHYSICAL EXAM:  CONSTITUTIONAL: Alert, appropriate, no acute distress,   EYES: Non icteric, EOM intact, pupils equal round and reactive to light and accommodation. ENT: Oral mucus membranes moist, no oral pharyngeal lesions. External inspection of ears and nose are normal.   NECK: Supple, no masses. No palpable thyroid mass    CHEST/LUNGS: CTA bilaterally, normal respiratory effort   CARDIOVASCULAR: RRR, no murmurs. No lower extremity edema   ABDOMEN: soft non-tender, active bowel sounds, no hepatosplenomegaly. No palpable masses. EXTREMITIES: warm, Full ROM of all fours extremities. No focal weakness. SKIN: warm, dry with no rashes or lesions  LYMPH: No cervical, clavicular, axillary, or inguinal lymphadenopathy  NEUROLOGIC: follows commands, non focal.   PSYCH: mood and affect appropriate. Alert and oriented to time and place and person.     Relevant Lab findings/reviewed by me:  9/22/2021- CEA 8.1    10/6/21 CBC   WBC 5.85  HGB 14.9  HCT 43.8    Neut 4.10  Lab Results   Component Value Date     10/06/2021    K 5.2 (H) 10/06/2021     10/06/2021    CO2 27 10/06/2021    BUN 12 10/06/2021    CREATININE 1.4 (H) 10/06/2021    GLUCOSE 100 10/06/2021    CALCIUM 9.7 10/06/2021    PROT 8.4 (H) 10/06/2021    LABALBU 4.9 10/06/2021    BILITOT 1.0 10/06/2021    ALKPHOS 129 10/06/2021    AST 40 10/06/2021    ALT 24 10/06/2021    LABGLOM 51 (A) 10/06/2021    GFRAA 68 06/15/2021    AGRATIO 1.5 oxyCODONE-acetaminophen (PERCOCET) 7.5-325 MG per tablet; Take 1 tablet by mouth every 6 hours as needed for Pain for up to 30 days. Intended supply: 30 days    Adverse effect of chemotherapy, subsequent encounter    Chemotherapy management, encounter for    Care plan discussed with patient    Liver metastases [address] [address]    Encounter for antineoplastic immunotherapy    Cancer associated pain  -     oxyCODONE-acetaminophen (PERCOCET) 7.5-325 MG per tablet; Take 1 tablet by mouth every 6 hours as needed for Pain for up to 30 days. Intended supply: 30 days    Mass of psoas muscle    Renal impairment       #Colonic adenocarcinoma-  [id number] (liver) K-maria luisa mutated, IHC MMR not proficient  Status post microwave ablation left hepatic lesion  Status post neoadjuvant FOLFOX/bevacizumab x11 biweekly cycles  Status post right hemicolectomy. Pathology consistent complete pathologic response. Recommended surveillance as per NCCN guidelines  Discussed at length results of pathology with the patient. 3/17/21- 1 year colonoscopy showed no large polyps or masses. Repeat in 3 years. June 2021-CT chest clear. MRI abdomen showed suspicious lesion in the right lower quadrant. Biopsy arranged [place]. Discussed with hepatobiliary service at [facility]. 6/25/[id number] biopsy consistent with recurrent primary colorectal adenocarcinoma. 7/2/2021-discussed with hepatobiliary service/surgical oncology at [facility]. They will review images and call the patient back regarding consultation for consideration of HIPEC  7/2/2021-they recommend systemic therapy. 7/2/2021-recommended FOLFIRI/Avastin  7/13/21- Initiated FOLFIRI + Avastin every 2 weeks  07/30/21-Seen at [facility] by GI surgeon oncology. They discussed the role of CRS/HIPEC in his situation. He was told hat it really depends on the status of his liver lesions as well.  He will complete 6 cycles of chemotherapy and will return to see me with a CTAP as well as MRI of the liver to assess disease burden and determine if he can be a candidate for debulking.    8/25/2021-proceed with FOLFIRI/Avastin   9/24/2021-repeat MRI abdomen/CT abdomen showed persistent disease. Recommendations were to continue chemotherapy for additional 3-4 cycles and reassess at [facility] in December. I personally discussed with colorectal surgery the recommendations. 10/6/2021-proceed cycle #7. Resume bevacizumab    Local regional recurrence colonic adenocarcinoma, June 2021  MRI abdomen at [facility] showed mildly T2 hyperintense nodular focus with postcontrast rim enhancement and diffusion restriction. This measures approximately 2.7 x 2 x   2 cm. More delayed postcontrast images show irregular area of enhancement measuring 2.4 x 7.7 cm axially involving the right iliopsoas muscle and the right lateral abdominal wall. 6/25/[id number] biopsy consistent with recurrent primary colorectal adenocarcinoma. 7/2/2021-discussed with hepatobiliary service/surgical oncology at [facility]. They will review images and call the patient back regarding consultation for consideration ofHIPEC  7/2/2021-they recommend systemic therapy. 7/2/2021-recommended FOLFIRI/Avastin  7/12/2021-FOLFIRI/Avastin cycle #1  7/13/2021-CEA 10.7  8/12/21/21 CEA 8.2  8/12/2021-CEA 8.9  9/22/2021-CEA 8.1    #Chronic kidney disease stage III- creatinine 1.2 ->1.5-> 1.4     #Liver metastasis- plan as above. Status post ablation left liver lobe lesion at [facility] on 6/8/2020. Status post neoadjuvant FOLFOX/bevacizumab x11 biweekly cyclesStatus post right hemicolectomy. Pathology consistent complete pathologic response. 3/4/2021-MRI abdomen was unremarkable  6/8/2021-MRI abdomen showed Postsurgical changes of right hepatectomy. Redemonstration of an ablation zone in segment II, measuring up to 1.7 cm, previously 1.8 cm. No evidence of postcontrast enhancement.  No new lesion identified.    9/24/2021-MRI abdomen [place] showed Liver: Status post right hepatectomy. Ablation zone in segment II measures 1.7 x 1.1 cm, slightly decreased in size from 1.8 x 1.4 cm previously (series 1301 image 56) without apreciable enhancement. Similar tiny subcentimeter cyst in the left hepatic lobe. No new focal hepatic lesion identified.       #Iron deficiency anemia-  hemoglobin 8.5/MCV 89. Ferritin 12.9, iron saturation 15%, TIBC 458, iron 72. Status post IV iron therapy. Hemoglobin 14.9     #Chemotherapy-induced neuropathy-stable, mild    #Cancer related pain-continue Percocet-refilled today     #Treatment related side effect-fatigue, mild neuropathy     PLAN:  · Continue follow-up with [facility]/Dr. Justine Taylor  · Continue follow-up with [facility]/Dr. Diana Smith in December  · Continue C#7 FOLFIRI + Avastin today and every 2 weeks  · RTC with MD 2 weeks in treatment room  · Continue Percocet today 7.5 mg every 6 hours as needed-refill sent      Follow Up:     Return in about 2 weeks (around 10/20/2021) for Treatment & see  in [address] [place] [address] 153 RM FOLFIRI + Avastin. FOLFIRI + Avastin every 2 weeks     ISean, tobias scribing for Fina Moncada MD. Electronically signed by Sean Carpenter RN on 10/6/2021 at 11:41 AM CDT. I, Dr Bailey Cooney, personally performed the services described in this documentation as scribed by Sean Carpenter RN in my presence and is both accurate and complete. I have seen, examined and reviewed this patient medication list, appropriate labs and imaging studies. I reviewed relevant medical records and others physicians notes. I discussed the plans of care with the patient. I answered all the questions to the patients satisfaction. I have also reviewed the chief complaint (CC) and part of the history (History of Present Illness (HPI), Past Family Social History [facility]), or Review of Systems (ROS) and made changes when appropriated.        (Please note that portions of this note were completed with a voice recognition program. Efforts were made to edit the dictations but occasionally words are mis-transcribed.)

## 2021-10-06 ENCOUNTER — HOSPITAL ENCOUNTER (OUTPATIENT)
Dept: INFUSION THERAPY | Age: 66
Discharge: HOME | End: 2021-10-06
Payer: OTHER GOVERNMENT

## 2021-10-06 ENCOUNTER — OFFICE VISIT (OUTPATIENT)
Dept: HEMATOLOGY | Age: 66
End: 2021-10-06
Payer: OTHER GOVERNMENT

## 2021-10-06 VITALS
DIASTOLIC BLOOD PRESSURE: 94 MMHG | HEART RATE: 85 BPM | SYSTOLIC BLOOD PRESSURE: 130 MMHG | WEIGHT: 164.2 LBS | HEIGHT: 67 IN | OXYGEN SATURATION: 98 % | TEMPERATURE: 98.3 F | RESPIRATION RATE: 18 BRPM | BODY MASS INDEX: 25.77 KG/M2

## 2021-10-06 DIAGNOSIS — G89.3 CANCER ASSOCIATED PAIN: ICD-10-CM

## 2021-10-06 DIAGNOSIS — C78.7 LIVER METASTASES (HCC): ICD-10-CM

## 2021-10-06 DIAGNOSIS — T45.1X5D ADVERSE EFFECT OF CHEMOTHERAPY, SUBSEQUENT ENCOUNTER: ICD-10-CM

## 2021-10-06 DIAGNOSIS — Z51.11 CHEMOTHERAPY MANAGEMENT, ENCOUNTER FOR: ICD-10-CM

## 2021-10-06 DIAGNOSIS — Z71.89 CARE PLAN DISCUSSED WITH PATIENT: ICD-10-CM

## 2021-10-06 DIAGNOSIS — N28.9 RENAL IMPAIRMENT: ICD-10-CM

## 2021-10-06 DIAGNOSIS — C18.9 ADENOCARCINOMA OF COLON (HCC): Primary | ICD-10-CM

## 2021-10-06 DIAGNOSIS — Z51.12 ENCOUNTER FOR ANTINEOPLASTIC IMMUNOTHERAPY: ICD-10-CM

## 2021-10-06 DIAGNOSIS — M62.89 MASS OF PSOAS MUSCLE: ICD-10-CM

## 2021-10-06 LAB
ALBUMIN SERPL-MCNC: 4.9 G/DL (ref 3.5–5.2)
ALP BLD-CCNC: 129 U/L (ref 40–130)
ALT SERPL-CCNC: 24 U/L (ref 21–72)
ANION GAP SERPL CALCULATED.3IONS-SCNC: 12 MMOL/L (ref 7–19)
AST SERPL-CCNC: 40 U/L (ref 17–59)
BASOPHILS ABSOLUTE: 0.03 K/UL (ref 0.01–0.08)
BASOPHILS RELATIVE PERCENT: 0.5 % (ref 0.1–1.2)
BILIRUB SERPL-MCNC: 1 MG/DL (ref 0.2–1.3)
BUN BLDV-MCNC: 12 MG/DL (ref 9–20)
CALCIUM SERPL-MCNC: 9.7 MG/DL (ref 8.4–10.2)
CHLORIDE BLD-SCNC: 103 MMOL/L (ref 98–111)
CO2: 27 MMOL/L (ref 22–29)
CREAT SERPL-MCNC: 1.4 MG/DL (ref 0.6–1.2)
EOSINOPHILS ABSOLUTE: 0.34 K/UL (ref 0.04–0.54)
EOSINOPHILS RELATIVE PERCENT: 5.8 % (ref 0.7–7)
GFR NON-AFRICAN AMERICAN: 51
GLOBULIN: 3.5 G/DL
GLUCOSE BLD-MCNC: 100 MG/DL (ref 74–106)
HCT VFR BLD CALC: 43.8 % (ref 40.1–51)
HEMOGLOBIN: 14.9 G/DL (ref 13.7–17.5)
LYMPHOCYTES ABSOLUTE: 0.77 K/UL (ref 1.18–3.74)
LYMPHOCYTES RELATIVE PERCENT: 13.2 % (ref 19.3–53.1)
MCH RBC QN AUTO: 32.6 PG (ref 25.7–32.2)
MCHC RBC AUTO-ENTMCNC: 34 G/DL (ref 32.3–36.5)
MCV RBC AUTO: 95.8 FL (ref 79–92.2)
MONOCYTES ABSOLUTE: 0.61 K/UL (ref 0.24–0.82)
MONOCYTES RELATIVE PERCENT: 10.4 % (ref 4.7–12.5)
NEUTROPHILS ABSOLUTE: 4.1 K/UL (ref 1.56–6.13)
NEUTROPHILS RELATIVE PERCENT: 70.1 % (ref 34–71.1)
PDW BLD-RTO: 16.5 % (ref 11.6–14.4)
PLATELET # BLD: 210 K/UL (ref 163–337)
PMV BLD AUTO: 10.1 FL (ref 7.4–10.4)
POTASSIUM SERPL-SCNC: 5.2 MMOL/L (ref 3.5–5.1)
PROTEIN UA: NEGATIVE
RBC # BLD: 4.57 M/UL (ref 4.63–6.08)
SODIUM BLD-SCNC: 142 MMOL/L (ref 137–145)
TOTAL PROTEIN: 8.4 G/DL (ref 6.3–8.2)
WBC # BLD: 5.85 K/UL (ref 4.23–9.07)

## 2021-10-06 PROCEDURE — 96366 THER/PROPH/DIAG IV INF ADDON: CPT

## 2021-10-06 PROCEDURE — 96375 TX/PRO/DX INJ NEW DRUG ADDON: CPT

## 2021-10-06 PROCEDURE — G0498 CHEMO EXTEND IV INFUS W/PUMP: HCPCS

## 2021-10-06 PROCEDURE — 96415 CHEMO IV INFUSION ADDL HR: CPT

## 2021-10-06 PROCEDURE — 85025 COMPLETE CBC W/AUTO DIFF WBC: CPT

## 2021-10-06 PROCEDURE — 80053 COMPREHEN METABOLIC PANEL: CPT

## 2021-10-06 PROCEDURE — 81002 URINALYSIS NONAUTO W/O SCOPE: CPT | Performed by: INTERNAL MEDICINE

## 2021-10-06 PROCEDURE — 96367 TX/PROPH/DG ADDL SEQ IV INF: CPT

## 2021-10-06 PROCEDURE — 96413 CHEMO IV INFUSION 1 HR: CPT

## 2021-10-06 PROCEDURE — 6360000002 HC RX W HCPCS: Performed by: INTERNAL MEDICINE

## 2021-10-06 PROCEDURE — 99215 OFFICE O/P EST HI 40 MIN: CPT | Performed by: INTERNAL MEDICINE

## 2021-10-06 PROCEDURE — 2580000003 HC RX 258: Performed by: INTERNAL MEDICINE

## 2021-10-06 PROCEDURE — 96417 CHEMO IV INFUS EACH ADDL SEQ: CPT

## 2021-10-06 RX ORDER — OXYCODONE AND ACETAMINOPHEN 7.5; 325 MG/1; MG/1
1 TABLET ORAL EVERY 6 HOURS PRN
Qty: 120 TABLET | Refills: 0 | Status: SHIPPED | OUTPATIENT
Start: 2021-10-06 | End: 2021-11-05

## 2021-10-06 RX ORDER — DEXTROSE MONOHYDRATE 50 MG/ML
INJECTION, SOLUTION INTRAVENOUS ONCE
Status: DISCONTINUED | OUTPATIENT
Start: 2021-10-06 | End: 2021-10-08 | Stop reason: HOSPADM

## 2021-10-06 RX ORDER — MEPERIDINE HYDROCHLORIDE 50 MG/ML
12.5 INJECTION INTRAMUSCULAR; INTRAVENOUS; SUBCUTANEOUS ONCE
Status: CANCELLED | OUTPATIENT
Start: 2021-10-06 | End: 2021-10-06

## 2021-10-06 RX ORDER — METHYLPREDNISOLONE SODIUM SUCCINATE 125 MG/2ML
125 INJECTION, POWDER, LYOPHILIZED, FOR SOLUTION INTRAMUSCULAR; INTRAVENOUS ONCE
Status: CANCELLED | OUTPATIENT
Start: 2021-10-06 | End: 2021-10-06

## 2021-10-06 RX ORDER — SODIUM CHLORIDE 0.9 % (FLUSH) 0.9 %
5-40 SYRINGE (ML) INJECTION PRN
Status: CANCELLED | OUTPATIENT
Start: 2021-10-06

## 2021-10-06 RX ORDER — DIPHENHYDRAMINE HYDROCHLORIDE 50 MG/ML
50 INJECTION INTRAMUSCULAR; INTRAVENOUS ONCE
Status: CANCELLED | OUTPATIENT
Start: 2021-10-06 | End: 2021-10-06

## 2021-10-06 RX ORDER — EPINEPHRINE 1 MG/ML
0.3 INJECTION, SOLUTION, CONCENTRATE INTRAVENOUS PRN
Status: CANCELLED | OUTPATIENT
Start: 2021-10-06

## 2021-10-06 RX ORDER — SODIUM CHLORIDE 9 MG/ML
INJECTION, SOLUTION INTRAVENOUS ONCE
Status: DISCONTINUED | OUTPATIENT
Start: 2021-10-06 | End: 2021-10-08 | Stop reason: HOSPADM

## 2021-10-06 RX ORDER — ATROPINE SULFATE 0.4 MG/ML
0.4 AMPUL (ML) INJECTION
Status: DISCONTINUED | OUTPATIENT
Start: 2021-10-06 | End: 2021-10-06

## 2021-10-06 RX ORDER — PALONOSETRON 0.05 MG/ML
0.25 INJECTION, SOLUTION INTRAVENOUS ONCE
Status: COMPLETED | OUTPATIENT
Start: 2021-10-06 | End: 2021-10-06

## 2021-10-06 RX ORDER — ATROPINE SULFATE 1 MG/ML
0.5 INJECTION, SOLUTION INTRAMUSCULAR; INTRAVENOUS; SUBCUTANEOUS
Status: COMPLETED | OUTPATIENT
Start: 2021-10-06 | End: 2021-10-06

## 2021-10-06 RX ORDER — ATROPINE SULFATE 0.4 MG/ML
0.4 AMPUL (ML) INJECTION
Status: CANCELLED | OUTPATIENT
Start: 2021-10-06

## 2021-10-06 RX ORDER — PALONOSETRON 0.05 MG/ML
0.25 INJECTION, SOLUTION INTRAVENOUS ONCE
Status: CANCELLED | OUTPATIENT
Start: 2021-10-06

## 2021-10-06 RX ORDER — DEXTROSE MONOHYDRATE 50 MG/ML
INJECTION, SOLUTION INTRAVENOUS ONCE
Status: CANCELLED | OUTPATIENT
Start: 2021-10-06 | End: 2021-10-06

## 2021-10-06 RX ORDER — SODIUM CHLORIDE 9 MG/ML
INJECTION, SOLUTION INTRAVENOUS ONCE
Status: CANCELLED | OUTPATIENT
Start: 2021-10-06 | End: 2021-10-06

## 2021-10-06 RX ORDER — SODIUM CHLORIDE 9 MG/ML
INJECTION, SOLUTION INTRAVENOUS CONTINUOUS
Status: CANCELLED | OUTPATIENT
Start: 2021-10-06

## 2021-10-06 RX ORDER — HEPARIN SODIUM (PORCINE) LOCK FLUSH IV SOLN 100 UNIT/ML 100 UNIT/ML
500 SOLUTION INTRAVENOUS PRN
Status: CANCELLED | OUTPATIENT
Start: 2021-10-06

## 2021-10-06 RX ORDER — SODIUM CHLORIDE 9 MG/ML
25 INJECTION, SOLUTION INTRAVENOUS PRN
Status: CANCELLED | OUTPATIENT
Start: 2021-10-06

## 2021-10-06 RX ORDER — DEXAMETHASONE SODIUM PHOSPHATE 10 MG/ML
10 INJECTION, SOLUTION INTRAMUSCULAR; INTRAVENOUS ONCE
Status: COMPLETED | OUTPATIENT
Start: 2021-10-06 | End: 2021-10-06

## 2021-10-06 RX ADMIN — DEXAMETHASONE SODIUM PHOSPHATE 10 MG: 10 INJECTION, SOLUTION INTRAMUSCULAR; INTRAVENOUS at 12:15

## 2021-10-06 RX ADMIN — PALONOSETRON HYDROCHLORIDE 0.25 MG: 0.25 INJECTION, SOLUTION INTRAVENOUS at 12:15

## 2021-10-06 RX ADMIN — DEXTROSE MONOHYDRATE 340 MG: 50 INJECTION, SOLUTION INTRAVENOUS at 13:07

## 2021-10-06 RX ADMIN — FLUOROURACIL 4475 MG: 50 INJECTION, SOLUTION INTRAVENOUS at 14:54

## 2021-10-06 RX ADMIN — LEUCOVORIN CALCIUM 750 MG: 350 INJECTION, POWDER, LYOPHILIZED, FOR SOLUTION INTRAMUSCULAR; INTRAVENOUS at 13:07

## 2021-10-06 RX ADMIN — ATROPINE SULFATE 0.5 MG: 1 INJECTION, SOLUTION INTRAMUSCULAR; INTRAVENOUS; SUBCUTANEOUS at 13:05

## 2021-10-06 RX ADMIN — SODIUM CHLORIDE 375 MG: 9 INJECTION, SOLUTION INTRAVENOUS at 12:29

## 2021-10-08 ENCOUNTER — HOSPITAL ENCOUNTER (OUTPATIENT)
Dept: INFUSION THERAPY | Age: 66
Discharge: HOME | End: 2021-10-08
Payer: OTHER GOVERNMENT

## 2021-10-08 DIAGNOSIS — C18.2 MALIGNANT NEOPLASM OF ASCENDING COLON (HCC): Primary | ICD-10-CM

## 2021-10-08 PROCEDURE — 96523 IRRIG DRUG DELIVERY DEVICE: CPT

## 2021-10-08 PROCEDURE — 2580000003 HC RX 258: Performed by: INTERNAL MEDICINE

## 2021-10-08 PROCEDURE — 6360000002 HC RX W HCPCS: Performed by: INTERNAL MEDICINE

## 2021-10-08 RX ORDER — HEPARIN SODIUM (PORCINE) LOCK FLUSH IV SOLN 100 UNIT/ML 100 UNIT/ML
500 SOLUTION INTRAVENOUS PRN
Status: DISCONTINUED | OUTPATIENT
Start: 2021-10-08 | End: 2021-10-09 | Stop reason: HOSPADM

## 2021-10-08 RX ORDER — SODIUM CHLORIDE 0.9 % (FLUSH) 0.9 %
10 SYRINGE (ML) INJECTION PRN
Status: DISCONTINUED | OUTPATIENT
Start: 2021-10-08 | End: 2021-10-09 | Stop reason: HOSPADM

## 2021-10-08 RX ADMIN — SODIUM CHLORIDE, PRESERVATIVE FREE 10 ML: 5 INJECTION INTRAVENOUS at 11:55

## 2021-10-08 RX ADMIN — HEPARIN 500 UNITS: 100 SYRINGE at 11:55

## 2021-10-15 NOTE — PROGRESS NOTES
MEDICAL ONCOLOGY PROGRESS NOTE                                                          Denise Galeana   1955  10/20/2021     Chief Complaint   Patient presents with    Cancer      INTERVAL HISTORY/HISTORY OF PRESENT ILLNESS:  Diagnosis  · Colonic adenocarcinoma, March 2020  · [id number](liver), stage ROXANA  · IHC MMR- proficient  · K-maria luisa mutated  · N-MARIA LUISA/BRAF wild-type  · Iron deficiency anemia  · Soft tissue mass, June 2021  · Adenocarcinoma-consistent with colon primary, right psoas muscle, June 2021     Treatment summary  · 3/30/2020-right hemicolectomy at [facility]  · Anticipated Liver ablation to be followed by neoadjuvant/adjuvant versus palliative chemotherapy  · 06/10/2020-November 2020-FOLFOX + Avastin  · 12/11/20- Right hepatectomy-Dr. Adin Singleton  · S/p Injectafer-poor oral iron tolerance  · 7/13/21- Initiate FOLFIRI + Avastin every 2 weeks     The patient is a pleasant 61years old male was found to have a right cecal mass consistent with colonic adenocarcinoma. He had locally advanced disease with several lymph nodes involved. In addition, the patient was also found to have suspicious liver lesions concerning for metastatic disease. He was seen by [address] [place] [address] and offered a multimodality approach with liver ablation of the left liver lesion followed by neoadjuvant chemotherapy and partial right hepatectomy. He underwent microwave ablation of the left lobe liver lesion on 6/8/2020. He is status post completion of 11 biweekly cycles of FOLFOX/Avasti completed November 2020. He tolerated treatment with complaints of mild transient cold neuropathy. He had a right hepatectomy on 12/11/2020 that showed no residual disease. His last CEA was normal in January 2021. He had MRI of the abdomen at [address] [place] [address] in March 2021 that showed no evidence of recurrent disease in the liver or in the abdomen. He also had a surveillance colonoscopy in March 2021 that showed no polyps.   CEA was elevated in May 2021.  Repeat CEA June 2021 showed persistent elevation. MRI abdomen at [facility] showed no evidence of liver recurrence but a suspicious nodule in the right lower quadrant. Biopsy was performed at [facility] and consistent with recurrent adenocarcinoma. I discussed the findings with hepatobiliary service and also colorectal surgery. He was started on FOLFIRI/Avastin. Darwin Mckeon He was seen by Dr. Henry Johnson again on 9/24/2021. He had repeat CT abdomen pelvis and also MRI at [facility] that showed persistent disease involving the psoas muscle. In addition, an additional small place that may represent further peritoneal metastatic disease. The plan was to continue chemotherapy at this time and reassess in December. The patient has been tolerated treatment with complaints of occasional fatigue and mild diarrhea and grade 1 neuropathy. He is here today for continuation of therapy with FOLFIRI/Avastin. He has been tolerating treatments complains of some fatigue. Denies any nausea vomiting diarrhea. Right lower quadrant pain is well controlled.     Cancer history  Mr. Jennifer Woodson was first seen by me on 3/23/2020. He was referred for a new diagnosis of colonic adenocarcinoma involving the cecum. The patient reports that he had a wellbeing consult with his provider at the [facility]. He was found to have anemia and then recommended a colonoscopy. Of note, the patient has a family history of colon cancer. His mother is a patient of Dr. Kevin Luna he has been diagnosed with colon cancer in 2010. · 3/11/2020- colonoscopy revealed a large malignant appearing fungating mass lesion in the cecum. In addition, several other polyps. Biopsy of the mass consistent with moderate differentiated colonic adenocarcinoma. Polyps consistent with tubulovillous adenoma with no high-grade dysplasia. IHC MMR not proficient. K-maria luisa mutated, BRAF and NRAS wild type.   MSI proficient  · 3/11/2020-CEA 5.5 (H)  · 3/18/2020-CT abdomen pelvis with contrast  Invasive cecal mass adhering to the right lateral abdominal wall muscles with adjacent lymphadenopathy. Mild partial obstruction of the terminal ileum. 2. Suspicious lesions in the right and left hepatic lobes measure up to 1.3 cm and likely represent metastatic disease. · 3/18/2020-Xr Chest Standard  No radiographic evidence of acute cardiopulmonary process. · 3/23/2020-he was first seen by me. Recommended completion of staging with CT chest.  Also recommended liver MRI for further clarification of liver lesion. S.  Recommend to proceed with a general surgery consultation tomorrow with Dr. Jason De Luna. Patient was informed that I favor surgical resection if feasible of the primary malignancy. · 3/30/2020- right hemicolectomy by Dr. Jason De Luna at [facility] consistent with invasive moderately differentiated colonic adenocarcinoma measuring 7.2 cm. Carcinoma directly invading the adjacent abdominal wall tissue. Focal lymphovascular space invasion identified. Focal perineural invasion identified. Surgical margins negative for evidence of malignancy. 6 out of 14 lymph nodes positive for metastatic adenocarcinoma. Final pathology staging [id number](liver, stage ROXANA)  · 4/20/2020-CT chest with contrast showed No convincing intrathoracic metastasis. Nonspecific 4 mm nodule of the inferior lingula and a 2 mm right upper lobe nodule can be followed on subsequent imaging in 6-12 months. Moderate coronary calcifications. Hypodense metastatic liver lesions. Small hiatal hernia. · 4/20/2020-Mri Abdomen W Wo Contrast There are about 5 liver lesions. The 2 largest appears similar compared to 3/18/2020, the others are too small to further characterize. Appearance is most concerning for metastatic disease. Enhancement of the right lateral peritoneum. This is favored to be postoperative as there is no nodularity, evidence of omental disease or lymphadenopathy.  Recommend attention on follow-up. Cholecystectomy. · 4/22/2020-discussed with Dr. Vladimir Arreola at [place]. He will review imaging studies and give further recommendations regarding eligibility for resection of liver lesions. · 5/8/2020 CT Abdomen The two largest suspicious lesions measuring 1.2 and 1.3 cm in the  right and left hepatic lobes respectively are similar compared to the  3/18/2020 CT. Additionally, there are at least five subcentimeter lesions with similar signal characteristics, which are also highly suspicious for metastases. If complete characterization of the number and distribution of lesions is necessary, an MRI with Eovist could be acquired. · 5/19/2020- he was seen by the hepatobiliary service at [facility] with Dr. Vladimir Arreola:  patient adequate risk candidate for a multimodal approach, directed toward curative hepatectomy eventually. Endorsed by Hepatobiliary Conference, I recommended perc ablation of the L hemiliver to clear it, followed by systemic therapy in a neoadjuvant strategy. Restaging imaging to confirm clearance of disease on the left and lack of progression to unresectability of the R hemiliver disease would then be followed by R hepatectomy. Limitations to this approach may be accessibility of the segment 4A/8 disease high in the hepatic dome and the possibility of heat sink-related recurrence s/p abation of the left-sided disease. · 5/21/2020- referral for Mediport placement and start FOLFOX in 2 weeks. Will add bevacizumab after 6 weeks of liver ablation. We will plan for 12 biweekly dose of FOLFOX. Bevacizumab will also be stopped 6 weeks prior to major procedure. · 6/8/2020- Microwave ablation of the left liver lesion was then performed for 5 minutes at [address] to achieve a 3.4 x 3.9 cm approximately spherical zone of ablation. · 6/10/2020- initiation of FOLFOX. · 7/20/2020-added Avastin.    · 9/10/20 MRI abdomen: Left hepatic lobe segment II lesion demonstrates small T1 hyperintense blood products, status post microwave ablation on 6/8/2020. No definitive enhancement within the lesion. Focal internal thickening or scar present. Recommend attention on follow-up. Additional scattered subcentimeter foci throughout the liver decreased in size compared to MRI dated 4/20/2020 consistent with improving metastatic disease. Additional chronic findings as above. · 9/16/2020-discussed with plan with the patient and [address] [place] [address]. Interval response to treatment. Plan to continue chemotherapy through 12 cycles with Avastin. · 12/11/20 Right hepatectomy-Dr. Scott Drilling  · 12/11/20 Right hepatectomy pathology: Liver, right, resection: Focus of Fibrosis with calcifications and chronic inflammation (0.3 cm), see comment. Background hepatic parenchyma with minimal periportal fibrosis (trichrome stain), minimal lobular and portal inflammation, and no significant macrovesicular steatosis (<5%) Comments: The patient's history of colorectal cancer status adjuvant therapy is noted. The focus of fibrosis may reflect treatment effect. There is no evidence of viable tumor in the sampled specimen. · 12/14/20 Ct Abdomen Pelvis W Iv Contrast A Small bowel obstruction with transition point at the distal small bowel, just proximal to RIGHT upper quadrant ileocolonic anastomosis. Postoperative change of RIGHT hepatectomy with small amount of expected free fluid and intraperitoneal gas. Redemonstrated LEFT liver lesion. Small bilateral pleural effusions. · 12/16/20 SBFT-[place]: Study is limited due to retained contrast in the small bowel from prior attempt at small bowel follow-through on the floor. Small bowel remains dilated, measuring approximately 5.2 cm, which is consistent with partial or resolving small bowel obstruction. Final radiographs show contrast within the colon. · 1/4/2020-resolution of small bowel obstruction.   · 1/7/21 CT chest: No finding to suggest intrathoracic neoplastic process or metastatic disease. The benign-appearing tiny nodule in the right upper lobe probably represent a noncalcified granuloma. A nodule in the lingular segment of the left upper lobe is not visualized in this study. Postsurgical changes of the liver. No evidence of focal complication. A trace right basal pleural effusion. This may be reactive to the previous abdominal surgery. · 3/4/21 MRI abd: Status post right hepatectomy and microwave ablation of a left liver lesion. No findings to suggest residual/recurrent disease. No new liver lesion is identified. Postsurgical changes related to right hemicolectomy. Microwave ablation zone in segment II of the left hepatic lobe measures approximately 21 mm in diameter, unchanged. No appreciable postcontrast enhancement or other findings to suggest local disease recurrence. No new liver lesion is identified. Spleen is mildly enlarged, measuring 13.8 cm in length. · 3/17/2021-1 year colonoscopy showed no evidence of polyps. · 5/3/21 CEA 6.2  · 6/8/21 MRI abdomen ([place]): Status post right hepatectomy and MWA of a left liver lesion.  No evidence of residual or recurrent metastatic disease in the liver. Status post prior right hemicolectomy for colon carcinoma. Within the posterior right iliopsoas muscle there is a mildly T2 hyperintense nodular focus with postcontrast rim enhancement and diffusion restriction. This measures approximately 2.7 x 2 x 2 cm. More delayed postcontrast images show irregular area of enhancement measuring 2.4 x 7.7 cm axially involving the right iliopsoas muscle and the right lateral abdominal wall. Suggest correlation with contrast enhanced CT exam for complete evaluation. Consider biopsy of this lesion. · 6/15/21 CT CHEST WO CONTRAST No metastatic disease in the chest.  No change in tiny 2 mm RIGHT upper lobe pulmonary nodule. Mild ectasia of the ascending aorta measuring 4 cm. · 6/18/2021-I reviewed results of MRI abdomen at [facility].   CT chest without contrast reviewed by me and showed no evidence of metastatic disease. Stable 2 mm right upper lobe nodule. Discussed with hepatobiliary service at [facility]. Biopsy intra-abdominal nodule next week at [facility]. · 6/25/20215421-KG-aingjg biopsy soft tissue mass right psoas muscle at [facility] consistent with recurrent colonic adenocarcinoma. · 7/2/2021-discussed with hepatobiliary service at [facility] and also surgical oncology. Surgical oncology will call us back regarding consultation for consideration of HIPEC if he is a candidate  · 7/13/21-initiation of chemotherapy with FOLFIRI + Avastin every 2 weeks  · 7/13/21 CEA 10.7  · 7/27/2021-CEA 9.6  · 7/30/2021-she was seen by the surgical oncology group at [facility] by Dr Johana Loya. They recommended to complete 6 cycles of chemotherapy and repeat CT chest abdomen pelvis and liver MRI to assess disease response. They discussed the role of CRS/HIPEC in his situation. He was told that it really depends on the status of his liver lesions as well. He will complete 6 cycles of chemotherapy and will return to see me with a CTAP as well as MRI of the liver to assess disease burden and determine if he can be a candidate for debulking. · 8/25/2021-proceed cycle #4. · 9/22/2021 CEA- 8.1  · 9/24/2021 MRI with and w/o Contrast ([place])  Tiny left retroperitoneal nodule involving the left lateral conal fascial new compared to 3/4/2021 though unchanged from most recent prior, possibly an additional site of metastasis. No other new metastatic disease within the abdomen. Similar partially visualized right posterior body wall/psoas infiltrative lesion not definitely changed from MRI abdomen 6/8/2021 but better evaluated in its entirety on concurrent CT, reported separately.   Status post right hemicolectomy, right hepatectomy, and segment III microwave ablation.  Similar mild splenomegaly.  Additional chronic and incidental findings as described in the body the report. Liver: Status post right hepatectomy. Ablation zone in segment II measures 1.7 x 1.1 cm, slightly decreased in size from 1.8 x 1.4 cm previously (series 1301 image 56) without appreciable enhancement. Similar tiny subcentimeter cyst in the left hepatic lobe. No new focal hepatic lesion identified. No visualized free fluid. Similar 0.7 cm enhancing nodule along the left lateral conal fascia laterally new from 3/4/2021, grossly unchanged from MRI dated 6/8/2021. (series 801 image 22). No other appreciable peritoneal/retroperitoneal or  omental nodularity. Ill-defined T2 hyperintense signal and hyperenhancement in the right posteromedial body wall involving the lateral aspect of the psoas muscle and posterolateral abdominal wall musculature, partially visualized measuring at least 8.7 x 3.1   cm, grossly similar to the prior exam, better evaluated in its entirety on concurrent CT reported separately. · 9/24/2021 CT C/A/P w/ Contrast([place]) Status post right hemicolectomy, right hepatectomy and left hepatic microwave ablation without new suspicious hepatic lesion.  Left lateral perirenal fascial nodule, which is stable compared to 6/8/2021 MRI, but new compared to 3/4/2021 MRI is concerning for an additional site of metastatic disease.  No sites of new or enlarging metastatic disease in the chest.  Similar appearance of right lateral psoas and posterior abdominal wall infiltrative lesion, not significantly changed compared to 6/8/2021 MRI.  Mild splenomegaly.  Additional findings as outlined in the body the report. Biopsy-proven adenocarcinoma involving the posterior lateral aspect of the right iliopsoas muscle and right lateral abdominal wall. Right iliopsoas component is difficult to measure but appears to be approximately 2.5 x 2.4 cm (series 2, image 153), similar to prior MRI.  Nodular thickening noted along the right posterior abdominal wall and possibly involving the the transversus abdominis measures approximately 8.5 x 1.8 cm (series 2 image 153) overall similar compared to prior MRI. · 10/6/2021-discussed the results of recent CT abdomen/pelvis and MRI abdomen/pelvis at [facility]. Persistent disease involving the psoas muscle. Discussed with colorectal surgery at [facility]. They recommend to continue current therapy for another 2 months and reassess with CT scans. CEA dynamics:  3/11/20 CEA 5.5  8/19/20 CEA 2.4  9/2/20 CEA 2.7  9/16/20 CEA 2.7  9/30/20 CEA 2.7  10/19/20 CEA 2.3  1/4/21 CEA 2.2  5/3/21 CEA 6.2  6/15/21 CEA 8.3  7/13/21 CEA 10.7  7/27/21 CEA 9.6  8/12/21/21 CEA 8.2  8/25/2021-CEA 8.9  9/22/2021 CEA 8.1    PAST MEDICAL HISTORY:   Past Medical History:   Diagnosis Date    Acid reflux     Cancer ([address]     adenocarcinoma of colon    GERD (gastroesophageal reflux disease)     PTSD (post-traumatic stress disorder)           PAST SURGICAL HISTORY:  Past Surgical History:   Procedure Laterality Date    ABLATION OF DYSRHYTHMIC FOCUS      ablation of liver at [facility]  2003    CHOLECYSTECTOMY  2013    COLONOSCOPY      when pt was in the 19's    COLONOSCOPY N/A 03/11/2020    COLONOSCOPY POLYPECTOMY SNARE/COLD BIOPSY: Dr. Alexander Navarro colonoscopy: 6-12 months due to findings at colonoscopy today with Cecal mass lesion and large polyps.     COLONOSCOPY      COLONOSCOPY N/A 03/17/2021    Dr Tyson Rutherford, Post-operative changes w IC anastomosis & single visible staple, Mild Diverticuosis, Int Hemorrhoids Grade 1, 3 year recall    HEMICOLECTOMY Right 03/30/2020    LAPAROSCOPIC-ASSISTED RIGHT HEMICOLECTOMY performed by Jose Gamble MD at [address] N/A 06/03/2020    INSERTION OF VENOUS PORT with flouro performed by Jose Gamble MD at [address] ENDOSCOPY N/A 03/11/2020    Dr. Mike Victor:   [facility]        SOCIAL HISTORY:  Social History     Socioeconomic History    Marital status:      Spouse name: None    Number of children: None    Years of education: None    Highest education level: None   Occupational History    None   Tobacco Use    Smoking status: Never Smoker    Smokeless tobacco: Never Used   Vaping Use    Vaping Use: Never used   Substance and Sexual Activity    Alcohol use: Yes     Comment: rare     Drug use: No    Sexual activity: Yes     Partners: Female   Other Topics Concern    None   Social History Narrative    None     Social Determinants of Health     Financial Resource Strain:     Difficulty of Paying Living Expenses:    Food Insecurity:     Worried About Running Out of Food in the Last Year:     Ran Out of Food in the Last Year:    Transportation Needs:     Lack of Transportation (Medical):      Lack of Transportation (Non-Medical):    Physical Activity:     Days of Exercise per Week:     Minutes of Exercise per Session:    Stress:     Feeling of Stress :    Social Connections:     Frequency of Communication with Friends and Family:     Frequency of Social Gatherings with Friends and Family:     Attends [employer] Services:     Active Member of Clubs or Organizations:     Attends Club or Organization Meetings:     Marital Status:    Intimate Partner Violence:     Fear of Current or Ex-Partner:     Emotionally Abused:     Physically Abused:     Sexually Abused:        FAMILY HISTORY:  Family History   Problem Relation Age of Onset    Liver Cancer Mother     High Blood Pressure Mother     Colon Cancer Mother         x2    Cancer Father         Lung Cancer    Breast Cancer Sister     Cancer Maternal Grandfather         Lung Cancer    Cancer Paternal Grandfather         Stomach Cancer    Colon Polyps Neg Hx     Cystic Fibrosis Neg Hx     Liver Disease Neg Hx     Rectal Cancer Neg Hx         Current Outpatient Medications   Medication Sig Dispense Refill    oxyCODONE-acetaminophen (PERCOCET) 7.5-325 MG per tablet Take 1 tablet by mouth every 6 hours as needed for Pain for up to 30 days. Intended supply: 30 days 120 tablet 0    promethazine (PHENERGAN) 12.5 MG tablet Take 1 tablet by mouth every 6 hours as needed for Nausea 60 tablet 5    ondansetron (ZOFRAN) 8 MG tablet Take 1 tablet by mouth every 8 hours as needed for Nausea or Vomiting 30 tablet 5    omeprazole (PRILOSEC) 20 MG delayed release capsule Take 20 mg by mouth daily      Cholecalciferol (VITAMIN D3) 50 MCG (2000 UT) CAPS Take by mouth daily      folic acid (FOLVITE) 1 MG tablet Take 1 mg by mouth daily      prazosin (MINIPRESS) 1 MG capsule Take 1 mg by mouth nightly For nightmares      buPROPion (WELLBUTRIN) 100 MG tablet Take 100 mg by mouth every morning For mood      topiramate (TOPAMAX) 100 MG tablet Take 100 mg by mouth daily For Seizures or Migraines      Sertraline HCl (ZOLOFT PO) Take by mouth daily      Zolpidem Tartrate (AMBIEN PO) Take by mouth nightly       No current facility-administered medications for this visit.      Facility-Administered Medications Ordered in Other Visits   Medication Dose Route Frequency Provider Last Rate Last Admin    irinotecan (CAMPTOSAR) 340 mg in dextrose 5 % 250 mL chemo IVPB  180 mg/m2 (Treatment Plan Recorded) IntraVENous Once Anita Rivera MD        leucovorin calcium (WELLCOVORIN) 750 mg in dextrose 5 % 250 mL IVPB  400 mg/m2 (Treatment Plan Recorded) IntraVENous Once Anita Rivera MD        fluorouracil (ADRUCIL) 4,475 mg in sodium chloride 0.9 % 250 mL chemo infusion  2,400 mg/m2 (Treatment Plan Recorded) IntraVENous Over 46 hours Anita Rivera MD        sodium chloride flush 0.9 % injection 5-40 mL  5-40 mL IntraVENous PRN Anita Rivera MD        atropine injection 0.5 mg  0.5 mg IntraVENous Once Anita Rivera MD        bevacizumab-bvzr (ZIRABEV) 375 mg in sodium chloride 0.9 % 100 mL chemo IVPB  5 mg/kg (Treatment Plan Recorded) IntraVENous Once Anita Rivera  mL/hr at 10/20/21 1158 375 mg at 10/20/21 1158        REVIEW OF SYSTEMS:   CONSTITUTIONAL: no fever, no night sweats,  fatigue;  HEENT: no blurring of vision, no double vision, no hearing difficulty, no tinnitus, no ulceration, no dysplasia, no epistaxis;   LUNGS: no cough, no hemoptysis, no wheeze,  no shortness of breath;  CARDIOVASCULAR: no palpitation, no chest pain, no shortness of breath;  GI: Right lower quadrant abdominal pain, no nausea, no vomiting, no diarrhea, no constipation;  ANNIE: no dysuria, no hematuria, no frequency or urgency, no nephrolithiasis;  MUSCULOSKELETAL: no joint pain, no swelling, no stiffness;  ENDOCRINE: no polyuria, no polydipsia, no cold or heat intolerance;  HEMATOLOGY: no easy bruising or bleeding, no history of clotting disorder;  DERMATOLOGY: no skin rash, no eczema, no pruritus;  PSYCHIATRY: no depression, no anxiety, no panic attacks, no suicidal ideation, no homicidal ideation;  NEUROLOGY: no syncope, no seizures, no numbness or tingling of hands, no numbness or tingling of feet, no paresis;      Vitals signs:  BP (!) 138/90   Pulse 80   Temp 98.1 °F (36.7 °C) (Oral)   Resp 16   Ht 5' 7\" (1.702 m)   Wt 169 lb 4.8 oz (76.8 kg)   SpO2 98%   BMI 26.52 kg/m²    Pain scale:      PHYSICAL EXAM:  CONSTITUTIONAL: Alert, appropriate, no acute distress,   EYES: Non icteric, EOM intact, pupils equal round and reactive to light and accommodation. ENT: Oral mucus membranes moist, no oral pharyngeal lesions. External inspection of ears and nose are normal.   NECK: Supple, no masses. No palpable thyroid mass    CHEST/LUNGS: CTA bilaterally, normal respiratory effort   CARDIOVASCULAR: RRR, no murmurs. No lower extremity edema   ABDOMEN: soft non-tender, active bowel sounds, no hepatosplenomegaly. No palpable masses. EXTREMITIES: warm, Full ROM of all fours extremities. No focal weakness.   SKIN: warm, dry with no rashes or lesions  LYMPH: No cervical, clavicular, axillary, or inguinal lymphadenopathy  NEUROLOGIC: follows commands, non focal.   PSYCH: mood and affect appropriate. Alert and oriented to time and place and person. Relevant Lab findings/reviewed by me:  Lab Results   Component Value Date    WBC 5.87 10/20/2021    HGB 13.4 (L) 10/20/2021    HCT 39.9 (L) 10/20/2021    MCV 98.5 (H) 10/20/2021     10/20/2021     Lab Results   Component Value Date    NEUTROABS 3.70 10/20/2021     Lab Results   Component Value Date     10/20/2021    K 4.5 10/20/2021     10/20/2021    CO2 28 10/20/2021    BUN 19 10/20/2021    CREATININE 1.6 (H) 10/20/2021    GLUCOSE 104 10/20/2021    CALCIUM 9.2 10/20/2021    PROT 7.3 10/20/2021    LABALBU 4.6 10/20/2021    BILITOT 0.5 10/20/2021    ALKPHOS 118 10/20/2021    AST 51 10/20/2021    ALT 20 (L) 10/20/2021    LABGLOM 44 (A) 10/20/2021    GFRAA 68 06/15/2021    AGRATIO 1.5 06/15/2021    GLOB 2.7 10/20/2021         Relevant Imaging studies/reviewed by me:  None    ASSESSMENT:    No orders of the defined types were placed in this encounter. Denise was seen today for cancer. Diagnoses and all orders for this visit:    Adenocarcinoma of colon ([facility] [address]    Adverse effect of chemotherapy, subsequent encounter    Chemotherapy management, encounter for    Liver metastases [facility])    Care plan discussed with patient    Encounter for antineoplastic immunotherapy    Cancer associated pain    Mass of psoas muscle    Renal impairment       #Colonic adenocarcinoma-  [id number] (liver) K-maria luisa mutated, IHC MMR not proficient  Status post microwave ablation left hepatic lesion  Status post neoadjuvant FOLFOX/bevacizumab x11 biweekly cycles  Status post right hemicolectomy. Pathology consistent complete pathologic response. Recommended surveillance as per NCCN guidelines  Discussed at length results of pathology with the patient. 3/17/21- 1 year colonoscopy showed no large polyps or masses. Repeat in 3 years. June 2021-CT chest clear.   MRI abdomen showed suspicious lesion in the right lower quadrant. Biopsy arranged [place]. Discussed with hepatobiliary service at [facility]. 6/25/[id number] biopsy consistent with recurrent primary colorectal adenocarcinoma. 7/2/2021-discussed with hepatobiliary service/surgical oncology at [facility]. They will review images and call the patient back regarding consultation for consideration of HIPEC  7/2/2021-they recommend systemic therapy. 7/2/2021-recommended FOLFIRI/Avastin  7/13/21- Initiated FOLFIRI + Avastin every 2 weeks  07/30/21-Seen at [facility] by GI surgeon oncology. They discussed the role of CRS/HIPEC in his situation. He was told hat it really depends on the status of his liver lesions as well. He will complete 6 cycles of chemotherapy and will return to see me with a CTAP as well as MRI of the liver to assess disease burden and determine if he can be a candidate for debulking.    8/25/2021-proceed with FOLFIRI/Avastin   9/24/2021-repeat MRI abdomen/CT abdomen showed persistent disease. Recommendations were to continue chemotherapy for additional 3-4 cycles and reassess at [facility] in December. I personally discussed with colorectal surgery the recommendations. 10/6/2021-proceed cycle #7. Resume bevacizumab    Local regional recurrence colonic adenocarcinoma, June 2021  MRI abdomen at [facility] showed mildly T2 hyperintense nodular focus with postcontrast rim enhancement and diffusion restriction. This measures approximately 2.7 x 2 x   2 cm. More delayed postcontrast images show irregular area of enhancement measuring 2.4 x 7.7 cm axially involving the right iliopsoas muscle and the right lateral abdominal wall. 6/25/[id number] biopsy consistent with recurrent primary colorectal adenocarcinoma. 7/2/2021-discussed with hepatobiliary service/surgical oncology at [facility].   They will review images and call the patient back regarding consultation for consideration ofHIPEC  7/2/2021-they recommend systemic therapy. 7/2/2021-recommended FOLFIRI/Avastin  7/12/2021-FOLFIRI/Avastin cycle #1  7/13/2021-CEA 10.7  8/12/21/21 CEA 8.2  8/12/2021-CEA 8.9  9/22/2021-CEA 8.1  10/20/2021-CEA 9.1    #Chronic kidney disease stage III- creatinine 1.2 ->1.5-> 1.4->1.6  Encouraged to increase PO fluid intake     #Liver metastasis- plan as above. Status post ablation left liver lobe lesion at [facility] on 6/8/2020. Status post neoadjuvant FOLFOX/bevacizumab x11 biweekly cyclesStatus post right hemicolectomy. Pathology consistent complete pathologic response. 3/4/2021-MRI abdomen was unremarkable  6/8/2021-MRI abdomen showed Postsurgical changes of right hepatectomy. Redemonstration of an ablation zone in segment II, measuring up to 1.7 cm, previously 1.8 cm. No evidence of postcontrast enhancement.  No new lesion identified. 9/24/2021-MRI abdomen [facility] showed Liver: Status post right hepatectomy. Ablation zone in segment II measures 1.7 x 1.1 cm, slightly decreased in size from 1.8 x 1.4 cm previously (series 1301 image 56) without apreciable enhancement. Similar tiny subcentimeter cyst in the left hepatic lobe. No new focal hepatic lesion identified.     #Iron deficiency anemia-  hemoglobin 8.5/MCV 89. Ferritin 12.9, iron saturation 15%, TIBC 458, iron 72. Status post IV iron therapy. Hemoglobin 13.4     #Chemotherapy-induced neuropathy-stable, mild    #Cancer related pain-continue Percocet-refilled today     #Treatment related side effect-fatigue, mild neuropathy     PLAN:  · Continue follow-up with [facility]/Dr. Syble Essex  · Continue follow-up with [facility]/Dr. Josey Thakur in December  · Continue C#8 FOLFIRI + Avastin today and every 2 weeks  · RTC with MD 4 weeks in treatment room  · CBC CMP CEA today  · Continue Percocet today 7.5 mg every 6 hours as needed  · Increase p.o. fluid intake      Follow Up:     Return in about 4 weeks (around 11/17/2021) for Treatment & see  in [address] [place] [address] 153 RM FOLFIRI + Avastin. FOLFIRI + Avastin every 2 weeks       I have seen, examined and reviewed this patient medication list, appropriate labs and imaging studies. I reviewed relevant medical records and others physicians notes. I discussed the plans of care with the patient. I answered all the questions to the patients satisfaction. I have also reviewed the chief complaint (CC) and part of the history (History of Present Illness (HPI), Past Family Social History [facility]), or Review of Systems (ROS) and made changes when appropriated.        (Please note that portions of this note were completed with a voice recognition program. Efforts were made to edit the dictations but occasionally words are mis-transcribed.)

## 2021-10-20 ENCOUNTER — OFFICE VISIT (OUTPATIENT)
Dept: HEMATOLOGY | Age: 66
End: 2021-10-20
Payer: OTHER GOVERNMENT

## 2021-10-20 ENCOUNTER — HOSPITAL ENCOUNTER (OUTPATIENT)
Dept: INFUSION THERAPY | Age: 66
Discharge: HOME | End: 2021-10-20
Payer: OTHER GOVERNMENT

## 2021-10-20 VITALS
BODY MASS INDEX: 26.57 KG/M2 | RESPIRATION RATE: 16 BRPM | HEIGHT: 67 IN | SYSTOLIC BLOOD PRESSURE: 138 MMHG | WEIGHT: 169.3 LBS | TEMPERATURE: 98.1 F | HEART RATE: 80 BPM | OXYGEN SATURATION: 98 % | DIASTOLIC BLOOD PRESSURE: 90 MMHG

## 2021-10-20 DIAGNOSIS — C18.9 ADENOCARCINOMA OF COLON (HCC): ICD-10-CM

## 2021-10-20 DIAGNOSIS — C78.7 LIVER METASTASES (HCC): ICD-10-CM

## 2021-10-20 DIAGNOSIS — T45.1X5D ADVERSE EFFECT OF CHEMOTHERAPY, SUBSEQUENT ENCOUNTER: ICD-10-CM

## 2021-10-20 DIAGNOSIS — Z71.89 CARE PLAN DISCUSSED WITH PATIENT: ICD-10-CM

## 2021-10-20 DIAGNOSIS — Z51.12 ENCOUNTER FOR ANTINEOPLASTIC IMMUNOTHERAPY: ICD-10-CM

## 2021-10-20 DIAGNOSIS — N28.9 RENAL IMPAIRMENT: ICD-10-CM

## 2021-10-20 DIAGNOSIS — M62.89 MASS OF PSOAS MUSCLE: ICD-10-CM

## 2021-10-20 DIAGNOSIS — G89.3 CANCER ASSOCIATED PAIN: ICD-10-CM

## 2021-10-20 DIAGNOSIS — C18.9 ADENOCARCINOMA OF COLON (HCC): Primary | ICD-10-CM

## 2021-10-20 DIAGNOSIS — C18.2 MALIGNANT NEOPLASM OF ASCENDING COLON (HCC): Primary | ICD-10-CM

## 2021-10-20 DIAGNOSIS — Z51.11 CHEMOTHERAPY MANAGEMENT, ENCOUNTER FOR: ICD-10-CM

## 2021-10-20 LAB
ALBUMIN SERPL-MCNC: 4.6 G/DL (ref 3.5–5.2)
ALP BLD-CCNC: 118 U/L (ref 40–130)
ALT SERPL-CCNC: 20 U/L (ref 21–72)
ANION GAP SERPL CALCULATED.3IONS-SCNC: 7 MMOL/L (ref 7–19)
AST SERPL-CCNC: 51 U/L (ref 17–59)
BASOPHILS ABSOLUTE: 0.03 K/UL (ref 0.01–0.08)
BASOPHILS RELATIVE PERCENT: 0.5 % (ref 0.1–1.2)
BILIRUB SERPL-MCNC: 0.5 MG/DL (ref 0.2–1.3)
BUN BLDV-MCNC: 19 MG/DL (ref 9–20)
CALCIUM SERPL-MCNC: 9.2 MG/DL (ref 8.4–10.2)
CEA: 9.1 NG/ML (ref 0–3)
CHLORIDE BLD-SCNC: 103 MMOL/L (ref 98–111)
CO2: 28 MMOL/L (ref 22–29)
CREAT SERPL-MCNC: 1.6 MG/DL (ref 0.6–1.2)
EOSINOPHILS ABSOLUTE: 0.52 K/UL (ref 0.04–0.54)
EOSINOPHILS RELATIVE PERCENT: 8.9 % (ref 0.7–7)
GFR NON-AFRICAN AMERICAN: 44
GLOBULIN: 2.7 G/DL
GLUCOSE BLD-MCNC: 104 MG/DL (ref 74–106)
HCT VFR BLD CALC: 39.9 % (ref 40.1–51)
HEMOGLOBIN: 13.4 G/DL (ref 13.7–17.5)
LYMPHOCYTES ABSOLUTE: 0.89 K/UL (ref 1.18–3.74)
LYMPHOCYTES RELATIVE PERCENT: 15.2 % (ref 19.3–53.1)
MCH RBC QN AUTO: 33.1 PG (ref 25.7–32.2)
MCHC RBC AUTO-ENTMCNC: 33.6 G/DL (ref 32.3–36.5)
MCV RBC AUTO: 98.5 FL (ref 79–92.2)
MONOCYTES ABSOLUTE: 0.73 K/UL (ref 0.24–0.82)
MONOCYTES RELATIVE PERCENT: 12.4 % (ref 4.7–12.5)
NEUTROPHILS ABSOLUTE: 3.7 K/UL (ref 1.56–6.13)
NEUTROPHILS RELATIVE PERCENT: 63 % (ref 34–71.1)
PDW BLD-RTO: 16.3 % (ref 11.6–14.4)
PLATELET # BLD: 185 K/UL (ref 163–337)
PMV BLD AUTO: 10.5 FL (ref 7.4–10.4)
POTASSIUM SERPL-SCNC: 4.5 MMOL/L (ref 3.5–5.1)
PROTEIN UA: NEGATIVE
RBC # BLD: 4.05 M/UL (ref 4.63–6.08)
SODIUM BLD-SCNC: 138 MMOL/L (ref 137–145)
TOTAL PROTEIN: 7.3 G/DL (ref 6.3–8.2)
WBC # BLD: 5.87 K/UL (ref 4.23–9.07)

## 2021-10-20 PROCEDURE — G0498 CHEMO EXTEND IV INFUS W/PUMP: HCPCS

## 2021-10-20 PROCEDURE — 82378 CARCINOEMBRYONIC ANTIGEN: CPT

## 2021-10-20 PROCEDURE — 81002 URINALYSIS NONAUTO W/O SCOPE: CPT | Performed by: INTERNAL MEDICINE

## 2021-10-20 PROCEDURE — 85025 COMPLETE CBC W/AUTO DIFF WBC: CPT

## 2021-10-20 PROCEDURE — 96417 CHEMO IV INFUS EACH ADDL SEQ: CPT

## 2021-10-20 PROCEDURE — 2580000003 HC RX 258: Performed by: INTERNAL MEDICINE

## 2021-10-20 PROCEDURE — 6360000002 HC RX W HCPCS: Performed by: INTERNAL MEDICINE

## 2021-10-20 PROCEDURE — 96366 THER/PROPH/DIAG IV INF ADDON: CPT

## 2021-10-20 PROCEDURE — 80053 COMPREHEN METABOLIC PANEL: CPT

## 2021-10-20 PROCEDURE — 96375 TX/PRO/DX INJ NEW DRUG ADDON: CPT

## 2021-10-20 PROCEDURE — 96415 CHEMO IV INFUSION ADDL HR: CPT

## 2021-10-20 PROCEDURE — 96413 CHEMO IV INFUSION 1 HR: CPT

## 2021-10-20 PROCEDURE — 99214 OFFICE O/P EST MOD 30 MIN: CPT | Performed by: INTERNAL MEDICINE

## 2021-10-20 RX ORDER — SODIUM CHLORIDE 0.9 % (FLUSH) 0.9 %
5-40 SYRINGE (ML) INJECTION PRN
Status: DISCONTINUED | OUTPATIENT
Start: 2021-10-20 | End: 2021-10-21 | Stop reason: HOSPADM

## 2021-10-20 RX ORDER — ATROPINE SULFATE 1 MG/ML
0.5 INJECTION, SOLUTION INTRAMUSCULAR; INTRAVENOUS; SUBCUTANEOUS ONCE
Status: COMPLETED | OUTPATIENT
Start: 2021-10-20 | End: 2021-10-20

## 2021-10-20 RX ORDER — EPINEPHRINE 1 MG/ML
0.3 INJECTION, SOLUTION, CONCENTRATE INTRAVENOUS PRN
Status: CANCELLED | OUTPATIENT
Start: 2021-10-20

## 2021-10-20 RX ORDER — SODIUM CHLORIDE 9 MG/ML
25 INJECTION, SOLUTION INTRAVENOUS PRN
Status: CANCELLED | OUTPATIENT
Start: 2021-10-20

## 2021-10-20 RX ORDER — DIPHENHYDRAMINE HYDROCHLORIDE 50 MG/ML
50 INJECTION INTRAMUSCULAR; INTRAVENOUS ONCE
Status: CANCELLED | OUTPATIENT
Start: 2021-10-20 | End: 2021-10-20

## 2021-10-20 RX ORDER — MEPERIDINE HYDROCHLORIDE 50 MG/ML
12.5 INJECTION INTRAMUSCULAR; INTRAVENOUS; SUBCUTANEOUS ONCE
Status: CANCELLED | OUTPATIENT
Start: 2021-10-20 | End: 2021-10-20

## 2021-10-20 RX ORDER — DEXTROSE MONOHYDRATE 50 MG/ML
INJECTION, SOLUTION INTRAVENOUS ONCE
Status: CANCELLED | OUTPATIENT
Start: 2021-10-20 | End: 2021-10-20

## 2021-10-20 RX ORDER — SODIUM CHLORIDE 0.9 % (FLUSH) 0.9 %
5-40 SYRINGE (ML) INJECTION PRN
Status: CANCELLED | OUTPATIENT
Start: 2021-10-20

## 2021-10-20 RX ORDER — PALONOSETRON 0.05 MG/ML
0.25 INJECTION, SOLUTION INTRAVENOUS ONCE
Status: COMPLETED | OUTPATIENT
Start: 2021-10-20 | End: 2021-10-20

## 2021-10-20 RX ORDER — PALONOSETRON 0.05 MG/ML
0.25 INJECTION, SOLUTION INTRAVENOUS ONCE
Status: CANCELLED | OUTPATIENT
Start: 2021-10-20

## 2021-10-20 RX ORDER — SODIUM CHLORIDE 9 MG/ML
INJECTION, SOLUTION INTRAVENOUS CONTINUOUS
Status: CANCELLED | OUTPATIENT
Start: 2021-10-20

## 2021-10-20 RX ORDER — SODIUM CHLORIDE 9 MG/ML
INJECTION, SOLUTION INTRAVENOUS ONCE
Status: CANCELLED | OUTPATIENT
Start: 2021-10-20 | End: 2021-10-20

## 2021-10-20 RX ORDER — DEXAMETHASONE SODIUM PHOSPHATE 10 MG/ML
10 INJECTION, SOLUTION INTRAMUSCULAR; INTRAVENOUS ONCE
Status: COMPLETED | OUTPATIENT
Start: 2021-10-20 | End: 2021-10-20

## 2021-10-20 RX ORDER — ATROPINE SULFATE 0.4 MG/ML
0.4 AMPUL (ML) INJECTION
Status: CANCELLED | OUTPATIENT
Start: 2021-10-20

## 2021-10-20 RX ORDER — HEPARIN SODIUM (PORCINE) LOCK FLUSH IV SOLN 100 UNIT/ML 100 UNIT/ML
500 SOLUTION INTRAVENOUS PRN
Status: CANCELLED | OUTPATIENT
Start: 2021-10-20

## 2021-10-20 RX ORDER — METHYLPREDNISOLONE SODIUM SUCCINATE 125 MG/2ML
125 INJECTION, POWDER, LYOPHILIZED, FOR SOLUTION INTRAMUSCULAR; INTRAVENOUS ONCE
Status: CANCELLED | OUTPATIENT
Start: 2021-10-20 | End: 2021-10-20

## 2021-10-20 RX ORDER — ATROPINE SULFATE 0.4 MG/ML
0.4 AMPUL (ML) INJECTION
Status: DISCONTINUED | OUTPATIENT
Start: 2021-10-20 | End: 2021-10-20

## 2021-10-20 RX ADMIN — SODIUM CHLORIDE 375 MG: 9 INJECTION, SOLUTION INTRAVENOUS at 11:58

## 2021-10-20 RX ADMIN — DEXAMETHASONE SODIUM PHOSPHATE 10 MG: 10 INJECTION, SOLUTION INTRAMUSCULAR; INTRAVENOUS at 11:52

## 2021-10-20 RX ADMIN — FLUOROURACIL 4475 MG: 50 INJECTION, SOLUTION INTRAVENOUS at 14:24

## 2021-10-20 RX ADMIN — PALONOSETRON 0.25 MG: 0.05 INJECTION, SOLUTION INTRAVENOUS at 11:52

## 2021-10-20 RX ADMIN — IRINOTECAN HYDROCHLORIDE 340 MG: 20 INJECTION, SOLUTION INTRAVENOUS at 12:32

## 2021-10-20 RX ADMIN — ATROPINE SULFATE 0.5 MG: 1 INJECTION, SOLUTION INTRAMUSCULAR; INTRAVENOUS; SUBCUTANEOUS at 12:33

## 2021-10-20 RX ADMIN — LEUCOVORIN CALCIUM 750 MG: 350 INJECTION, POWDER, LYOPHILIZED, FOR SOLUTION INTRAMUSCULAR; INTRAVENOUS at 12:33

## 2021-10-22 ENCOUNTER — HOSPITAL ENCOUNTER (OUTPATIENT)
Dept: INFUSION THERAPY | Age: 66
Discharge: HOME | End: 2021-10-22
Payer: OTHER GOVERNMENT

## 2021-10-22 DIAGNOSIS — C18.2 MALIGNANT NEOPLASM OF ASCENDING COLON (HCC): Primary | ICD-10-CM

## 2021-10-22 PROCEDURE — 96523 IRRIG DRUG DELIVERY DEVICE: CPT

## 2021-10-22 PROCEDURE — 6360000002 HC RX W HCPCS: Performed by: INTERNAL MEDICINE

## 2021-10-22 PROCEDURE — 2580000003 HC RX 258: Performed by: INTERNAL MEDICINE

## 2021-10-22 RX ORDER — HEPARIN SODIUM (PORCINE) LOCK FLUSH IV SOLN 100 UNIT/ML 100 UNIT/ML
500 SOLUTION INTRAVENOUS PRN
Status: DISCONTINUED | OUTPATIENT
Start: 2021-10-22 | End: 2021-10-23 | Stop reason: HOSPADM

## 2021-10-22 RX ORDER — SODIUM CHLORIDE 0.9 % (FLUSH) 0.9 %
10 SYRINGE (ML) INJECTION PRN
Status: DISCONTINUED | OUTPATIENT
Start: 2021-10-22 | End: 2021-10-23 | Stop reason: HOSPADM

## 2021-10-22 RX ADMIN — SODIUM CHLORIDE, PRESERVATIVE FREE 10 ML: 5 INJECTION INTRAVENOUS at 12:56

## 2021-10-22 RX ADMIN — HEPARIN 500 UNITS: 100 SYRINGE at 12:56

## 2021-11-03 ENCOUNTER — HOSPITAL ENCOUNTER (OUTPATIENT)
Dept: INFUSION THERAPY | Age: 66
Discharge: HOME | End: 2021-11-03
Payer: OTHER GOVERNMENT

## 2021-11-03 VITALS
OXYGEN SATURATION: 98 % | RESPIRATION RATE: 16 BRPM | SYSTOLIC BLOOD PRESSURE: 128 MMHG | TEMPERATURE: 98.3 F | BODY MASS INDEX: 25.9 KG/M2 | WEIGHT: 165 LBS | HEART RATE: 84 BPM | HEIGHT: 67 IN | DIASTOLIC BLOOD PRESSURE: 79 MMHG

## 2021-11-03 DIAGNOSIS — C18.9 ADENOCARCINOMA OF COLON (HCC): ICD-10-CM

## 2021-11-03 DIAGNOSIS — C18.2 MALIGNANT NEOPLASM OF ASCENDING COLON (HCC): Primary | ICD-10-CM

## 2021-11-03 DIAGNOSIS — C78.7 LIVER METASTASES (HCC): ICD-10-CM

## 2021-11-03 LAB
ALBUMIN SERPL-MCNC: 4.7 G/DL (ref 3.5–5.2)
ALP BLD-CCNC: 142 U/L (ref 40–130)
ALT SERPL-CCNC: 18 U/L (ref 21–72)
ANION GAP SERPL CALCULATED.3IONS-SCNC: 9 MMOL/L (ref 7–19)
AST SERPL-CCNC: 55 U/L (ref 17–59)
BILIRUB SERPL-MCNC: 1.2 MG/DL (ref 0.2–1.3)
BUN BLDV-MCNC: 15 MG/DL (ref 9–20)
CALCIUM SERPL-MCNC: 10 MG/DL (ref 8.4–10.2)
CHLORIDE BLD-SCNC: 103 MMOL/L (ref 98–111)
CO2: 28 MMOL/L (ref 22–29)
CREAT SERPL-MCNC: 1.4 MG/DL (ref 0.6–1.2)
GFR NON-AFRICAN AMERICAN: 51
GLOBULIN: 3.6 G/DL
GLUCOSE BLD-MCNC: 117 MG/DL (ref 74–106)
HCT VFR BLD CALC: 41.9 % (ref 40.1–51)
HEMOGLOBIN: 13.8 G/DL (ref 13.7–17.5)
MCH RBC QN AUTO: 32.3 PG (ref 25.7–32.2)
MCHC RBC AUTO-ENTMCNC: 32.9 G/DL (ref 32.3–36.5)
MCV RBC AUTO: 98.1 FL (ref 79–92.2)
PDW BLD-RTO: 16.3 % (ref 11.6–14.4)
PLATELET # BLD: 215 K/UL (ref 163–337)
PMV BLD AUTO: 9.8 FL (ref 7.4–10.4)
POTASSIUM SERPL-SCNC: 4.5 MMOL/L (ref 3.5–5.1)
PROTEIN UA: NEGATIVE
RBC # BLD: 4.27 M/UL (ref 4.63–6.08)
SODIUM BLD-SCNC: 140 MMOL/L (ref 137–145)
TOTAL PROTEIN: 8.2 G/DL (ref 6.3–8.2)
WBC # BLD: 6.9 K/UL (ref 4.23–9.07)

## 2021-11-03 PROCEDURE — 96413 CHEMO IV INFUSION 1 HR: CPT

## 2021-11-03 PROCEDURE — 96368 THER/DIAG CONCURRENT INF: CPT

## 2021-11-03 PROCEDURE — 81002 URINALYSIS NONAUTO W/O SCOPE: CPT | Performed by: INTERNAL MEDICINE

## 2021-11-03 PROCEDURE — 96366 THER/PROPH/DIAG IV INF ADDON: CPT

## 2021-11-03 PROCEDURE — G0498 CHEMO EXTEND IV INFUS W/PUMP: HCPCS

## 2021-11-03 PROCEDURE — 96415 CHEMO IV INFUSION ADDL HR: CPT

## 2021-11-03 PROCEDURE — 96365 THER/PROPH/DIAG IV INF INIT: CPT

## 2021-11-03 PROCEDURE — 85027 COMPLETE CBC AUTOMATED: CPT

## 2021-11-03 PROCEDURE — 2580000003 HC RX 258: Performed by: INTERNAL MEDICINE

## 2021-11-03 PROCEDURE — 96375 TX/PRO/DX INJ NEW DRUG ADDON: CPT

## 2021-11-03 PROCEDURE — 80053 COMPREHEN METABOLIC PANEL: CPT

## 2021-11-03 PROCEDURE — 96417 CHEMO IV INFUS EACH ADDL SEQ: CPT

## 2021-11-03 PROCEDURE — 96367 TX/PROPH/DG ADDL SEQ IV INF: CPT

## 2021-11-03 PROCEDURE — 6360000002 HC RX W HCPCS: Performed by: INTERNAL MEDICINE

## 2021-11-03 RX ORDER — SODIUM CHLORIDE 9 MG/ML
25 INJECTION, SOLUTION INTRAVENOUS PRN
Status: CANCELLED | OUTPATIENT
Start: 2021-11-03

## 2021-11-03 RX ORDER — SODIUM CHLORIDE 9 MG/ML
INJECTION, SOLUTION INTRAVENOUS ONCE
Status: CANCELLED | OUTPATIENT
Start: 2021-11-03 | End: 2021-11-03

## 2021-11-03 RX ORDER — HEPARIN SODIUM (PORCINE) LOCK FLUSH IV SOLN 100 UNIT/ML 100 UNIT/ML
500 SOLUTION INTRAVENOUS PRN
Status: DISCONTINUED | OUTPATIENT
Start: 2021-11-03 | End: 2021-11-04 | Stop reason: HOSPADM

## 2021-11-03 RX ORDER — ATROPINE SULFATE 1 MG/ML
0.5 INJECTION, SOLUTION INTRAMUSCULAR; INTRAVENOUS; SUBCUTANEOUS
Status: COMPLETED | OUTPATIENT
Start: 2021-11-03 | End: 2021-11-03

## 2021-11-03 RX ORDER — SODIUM CHLORIDE 0.9 % (FLUSH) 0.9 %
5-40 SYRINGE (ML) INJECTION PRN
Status: DISCONTINUED | OUTPATIENT
Start: 2021-11-03 | End: 2021-11-04 | Stop reason: HOSPADM

## 2021-11-03 RX ORDER — SODIUM CHLORIDE 9 MG/ML
INJECTION, SOLUTION INTRAVENOUS CONTINUOUS
Status: CANCELLED | OUTPATIENT
Start: 2021-11-03

## 2021-11-03 RX ORDER — METHYLPREDNISOLONE SODIUM SUCCINATE 125 MG/2ML
125 INJECTION, POWDER, LYOPHILIZED, FOR SOLUTION INTRAMUSCULAR; INTRAVENOUS ONCE
Status: CANCELLED | OUTPATIENT
Start: 2021-11-03 | End: 2021-11-03

## 2021-11-03 RX ORDER — SODIUM CHLORIDE 9 MG/ML
INJECTION, SOLUTION INTRAVENOUS ONCE
Status: COMPLETED | OUTPATIENT
Start: 2021-11-03 | End: 2021-11-04

## 2021-11-03 RX ORDER — PALONOSETRON 0.05 MG/ML
0.25 INJECTION, SOLUTION INTRAVENOUS ONCE
Status: COMPLETED | OUTPATIENT
Start: 2021-11-03 | End: 2021-11-03

## 2021-11-03 RX ORDER — ATROPINE SULFATE 0.4 MG/ML
0.4 AMPUL (ML) INJECTION
Status: DISCONTINUED | OUTPATIENT
Start: 2021-11-03 | End: 2021-11-03

## 2021-11-03 RX ORDER — MEPERIDINE HYDROCHLORIDE 50 MG/ML
12.5 INJECTION INTRAMUSCULAR; INTRAVENOUS; SUBCUTANEOUS ONCE
Status: CANCELLED | OUTPATIENT
Start: 2021-11-03 | End: 2021-11-03

## 2021-11-03 RX ORDER — DIPHENHYDRAMINE HYDROCHLORIDE 50 MG/ML
50 INJECTION INTRAMUSCULAR; INTRAVENOUS ONCE
Status: CANCELLED | OUTPATIENT
Start: 2021-11-03 | End: 2021-11-03

## 2021-11-03 RX ORDER — EPINEPHRINE 1 MG/ML
0.3 INJECTION, SOLUTION, CONCENTRATE INTRAVENOUS PRN
Status: CANCELLED | OUTPATIENT
Start: 2021-11-03

## 2021-11-03 RX ORDER — DEXAMETHASONE SODIUM PHOSPHATE 10 MG/ML
10 INJECTION, SOLUTION INTRAMUSCULAR; INTRAVENOUS ONCE
Status: COMPLETED | OUTPATIENT
Start: 2021-11-03 | End: 2021-11-03

## 2021-11-03 RX ORDER — HEPARIN SODIUM (PORCINE) LOCK FLUSH IV SOLN 100 UNIT/ML 100 UNIT/ML
500 SOLUTION INTRAVENOUS PRN
Status: CANCELLED | OUTPATIENT
Start: 2021-11-03

## 2021-11-03 RX ORDER — ATROPINE SULFATE 0.4 MG/ML
0.4 AMPUL (ML) INJECTION
Status: CANCELLED | OUTPATIENT
Start: 2021-11-03

## 2021-11-03 RX ORDER — DEXTROSE MONOHYDRATE 50 MG/ML
INJECTION, SOLUTION INTRAVENOUS ONCE
Status: COMPLETED | OUTPATIENT
Start: 2021-11-03 | End: 2021-11-03

## 2021-11-03 RX ORDER — PALONOSETRON 0.05 MG/ML
0.25 INJECTION, SOLUTION INTRAVENOUS ONCE
Status: CANCELLED | OUTPATIENT
Start: 2021-11-03

## 2021-11-03 RX ORDER — SODIUM CHLORIDE 0.9 % (FLUSH) 0.9 %
5-40 SYRINGE (ML) INJECTION PRN
Status: CANCELLED | OUTPATIENT
Start: 2021-11-03

## 2021-11-03 RX ORDER — DEXTROSE MONOHYDRATE 50 MG/ML
INJECTION, SOLUTION INTRAVENOUS ONCE
Status: CANCELLED | OUTPATIENT
Start: 2021-11-03 | End: 2021-11-03

## 2021-11-03 RX ADMIN — SODIUM CHLORIDE 375 MG: 9 INJECTION, SOLUTION INTRAVENOUS at 12:25

## 2021-11-03 RX ADMIN — SODIUM CHLORIDE: 9 INJECTION, SOLUTION INTRAVENOUS at 12:24

## 2021-11-03 RX ADMIN — DEXAMETHASONE SODIUM PHOSPHATE 10 MG: 10 INJECTION INTRAMUSCULAR; INTRAVENOUS at 12:20

## 2021-11-03 RX ADMIN — ATROPINE SULFATE 0.5 MG: 1 INJECTION, SOLUTION INTRAMUSCULAR; INTRAVENOUS; SUBCUTANEOUS at 13:05

## 2021-11-03 RX ADMIN — DEXTROSE MONOHYDRATE: 50 INJECTION, SOLUTION INTRAVENOUS at 13:05

## 2021-11-03 RX ADMIN — PALONOSETRON 0.25 MG: 0.05 INJECTION, SOLUTION INTRAVENOUS at 12:20

## 2021-11-03 RX ADMIN — LEUCOVORIN CALCIUM 750 MG: 350 INJECTION, POWDER, LYOPHILIZED, FOR SOLUTION INTRAMUSCULAR; INTRAVENOUS at 13:05

## 2021-11-03 RX ADMIN — FLUOROURACIL 4475 MG: 50 INJECTION, SOLUTION INTRAVENOUS at 14:42

## 2021-11-03 RX ADMIN — IRINOTECAN HYDROCHLORIDE 340 MG: 20 INJECTION, SOLUTION INTRAVENOUS at 13:06

## 2021-11-05 ENCOUNTER — HOSPITAL ENCOUNTER (OUTPATIENT)
Dept: INFUSION THERAPY | Age: 66
Discharge: HOME | End: 2021-11-05
Payer: OTHER GOVERNMENT

## 2021-11-05 DIAGNOSIS — C18.2 MALIGNANT NEOPLASM OF ASCENDING COLON (HCC): Primary | ICD-10-CM

## 2021-11-05 PROCEDURE — 96523 IRRIG DRUG DELIVERY DEVICE: CPT

## 2021-11-05 PROCEDURE — 6360000002 HC RX W HCPCS: Performed by: INTERNAL MEDICINE

## 2021-11-05 PROCEDURE — 2580000003 HC RX 258: Performed by: INTERNAL MEDICINE

## 2021-11-05 RX ORDER — SODIUM CHLORIDE 0.9 % (FLUSH) 0.9 %
10 SYRINGE (ML) INJECTION PRN
Status: DISCONTINUED | OUTPATIENT
Start: 2021-11-05 | End: 2021-11-06 | Stop reason: HOSPADM

## 2021-11-05 RX ORDER — HEPARIN SODIUM (PORCINE) LOCK FLUSH IV SOLN 100 UNIT/ML 100 UNIT/ML
500 SOLUTION INTRAVENOUS PRN
Status: DISCONTINUED | OUTPATIENT
Start: 2021-11-05 | End: 2021-11-06 | Stop reason: HOSPADM

## 2021-11-09 DIAGNOSIS — R11.0 NAUSEA: ICD-10-CM

## 2021-11-09 RX ORDER — PROMETHAZINE HYDROCHLORIDE 12.5 MG/1
12.5 TABLET ORAL EVERY 6 HOURS PRN
Qty: 60 TABLET | Refills: 5 | Status: SHIPPED | OUTPATIENT
Start: 2021-11-09 | End: 2022-04-04 | Stop reason: SDUPTHER

## 2021-11-15 NOTE — PROGRESS NOTES
MEDICAL ONCOLOGY PROGRESS NOTE                                                          Denise Mccartney Pong   1955 11/17/2021     Chief Complaint   Patient presents with    Cancer      INTERVAL HISTORY/HISTORY OF PRESENT ILLNESS:  Diagnosis  · Colonic adenocarcinoma, March 2020  · [id number](liver), stage ROXANA  · IHC MMR- proficient  · K-maria luisa mutated  · N-MARIA LUISA/BRAF wild-type  · Iron deficiency anemia  · Soft tissue mass, June 2021  · Adenocarcinoma-consistent with colon primary, right psoas muscle, June 2021     Treatment summary  · 3/30/2020-right hemicolectomy at [facility]  · Anticipated Liver ablation to be followed by neoadjuvant/adjuvant versus palliative chemotherapy  · 06/10/2020-November 2020-FOLFOX + Avastin  · 12/11/20- Right hepatectomy-Dr. Polly Tim  · S/p Injectafer-poor oral iron tolerance  · 7/13/21- Initiate FOLFIRI + Avastin every 2 weeks     The patient is a 61years old male who has a diagnosis of colonic adenocarcinoma. The patient had malignant disease with several lymph nodes involved. In addition, the patient was also found to have suspicious liver lesions concerning for metastatic disease. He was seen by [address] [place] [address] and offered a multimodality approach with liver ablation of the left liver lesion followed by neoadjuvant chemotherapy and partial right hepatectomy. He underwent microwave ablation of the left lobe liver lesion on 6/8/2020. He is status post completion of 11 biweekly cycles of FOLFOX/Avasti completed November 2020. He tolerated treatment with complaints of mild transient cold neuropathy. He had a right hepatectomy on 12/11/2020 that showed no residual disease. His last CEA was normal in January 2021. He had MRI of the abdomen at [address] [place] [address] in March 2021 that showed no evidence of recurrent disease in the liver or in the abdomen. He also had a surveillance colonoscopy in March 2021 that showed no polyps. CEA was elevated in May 2021.   Repeat CEA June 2021 showed persistent elevation. MRI abdomen at [facility] showed no evidence of liver recurrence but a suspicious nodule in the right lower quadrant. Biopsy was performed at [facility] and consistent with recurrent adenocarcinoma. I discussed the findings with hepatobiliary service and also colorectal surgery. He was started on FOLFIRI/Avastin. He was seen by Dr. Arturo Hinton again on 9/24/2021. He had repeat CT abdomen pelvis and also MRI at [facility] that showed persistent disease involving the psoas muscle. In addition, an additional small place that may represent further peritoneal metastatic disease. The plan was to continue chemotherapy at this time and reassess in December. The patient has been tolerated treatment with complaints of occasional fatigue and mild diarrhea and grade 1 neuropathy. He is here today for continuation of therapy with FOLFIRI/Avastin. He has been tolerating treatment with complains of some fatigue. Denies any nausea vomiting diarrhea. He has an appointment with [facility] next month with CT scans     Cancer history  Mr. Karely Lazaro was first seen by me on 3/23/2020. He was referred for a new diagnosis of colonic adenocarcinoma involving the cecum. The patient reports that he had a wellbeing consult with his provider at the [place]. He was found to have anemia and then recommended a colonoscopy. Of note, the patient has a family history of colon cancer. His mother is a patient of Dr. Arias Nest he has been diagnosed with colon cancer in 2010. · 3/11/2020- colonoscopy revealed a large malignant appearing fungating mass lesion in the cecum. In addition, several other polyps. Biopsy of the mass consistent with moderate differentiated colonic adenocarcinoma. Polyps consistent with tubulovillous adenoma with no high-grade dysplasia. IHC MMR not proficient. K-maria luisa mutated, BRAF and NRAS wild type.   MSI proficient  · 3/11/2020-CEA 5.5 (H)  · 3/18/2020-CT abdomen pelvis with contrast  Invasive cecal mass adhering to the right lateral abdominal wall muscles with adjacent lymphadenopathy. Mild partial obstruction of the terminal ileum. 2. Suspicious lesions in the right and left hepatic lobes measure up to 1.3 cm and likely represent metastatic disease. · 3/18/2020-Xr Chest Standard  No radiographic evidence of acute cardiopulmonary process. · 3/23/2020-he was first seen by me. Recommended completion of staging with CT chest.  Also recommended liver MRI for further clarification of liver lesion. S.  Recommend to proceed with a general surgery consultation tomorrow with Dr. Trever uKo. Patient was informed that I favor surgical resection if feasible of the primary malignancy. · 3/30/2020- right hemicolectomy by Dr. Trever Kuo at [address] consistent with invasive moderately differentiated colonic adenocarcinoma measuring 7.2 cm. Carcinoma directly invading the adjacent abdominal wall tissue. Focal lymphovascular space invasion identified. Focal perineural invasion identified. Surgical margins negative for evidence of malignancy. 6 out of 14 lymph nodes positive for metastatic adenocarcinoma. Final pathology staging [id number](liver, stage ROXANA)  · 4/20/2020-CT chest with contrast showed No convincing intrathoracic metastasis. Nonspecific 4 mm nodule of the inferior lingula and a 2 mm right upper lobe nodule can be followed on subsequent imaging in 6-12 months. Moderate coronary calcifications. Hypodense metastatic liver lesions. Small hiatal hernia. · 4/20/2020-Mri Abdomen W Wo Contrast There are about 5 liver lesions. The 2 largest appears similar compared to 3/18/2020, the others are too small to further characterize. Appearance is most concerning for metastatic disease. Enhancement of the right lateral peritoneum. This is favored to be postoperative as there is no nodularity, evidence of omental disease or lymphadenopathy. Recommend attention on follow-up. Cholecystectomy. · 4/22/2020-discussed with Dr. Trudi Macias at [place]. He will review imaging studies and give further recommendations regarding eligibility for resection of liver lesions. · 5/8/2020 CT Abdomen The two largest suspicious lesions measuring 1.2 and 1.3 cm in the  right and left hepatic lobes respectively are similar compared to the  3/18/2020 CT. Additionally, there are at least five subcentimeter lesions with similar signal characteristics, which are also highly suspicious for metastases. If complete characterization of the number and distribution of lesions is necessary, an MRI with Eovist could be acquired. · 5/19/2020- he was seen by the hepatobiliary service at [facility] with Dr. Trudi Macias:  patient adequate risk candidate for a multimodal approach, directed toward curative hepatectomy eventually. Endorsed by Hepatobiliary Conference, I recommended perc ablation of the L hemiliver to clear it, followed by systemic therapy in a neoadjuvant strategy. Restaging imaging to confirm clearance of disease on the left and lack of progression to unresectability of the R hemiliver disease would then be followed by R hepatectomy. Limitations to this approach may be accessibility of the segment 4A/8 disease high in the hepatic dome and the possibility of heat sink-related recurrence s/p abation of the left-sided disease. · 5/21/2020- referral for Mediport placement and start FOLFOX in 2 weeks. Will add bevacizumab after 6 weeks of liver ablation. We will plan for 12 biweekly dose of FOLFOX. Bevacizumab will also be stopped 6 weeks prior to major procedure. · 6/8/2020- Microwave ablation of the left liver lesion was then performed for 5 minutes at [address] to achieve a 3.4 x 3.9 cm approximately spherical zone of ablation. · 6/10/2020- initiation of FOLFOX. · 7/20/2020-added Avastin.    · 9/10/20 MRI abdomen: Left hepatic lobe segment II lesion demonstrates small T1 hyperintense blood products, status post microwave ablation on 6/8/2020. No definitive enhancement within the lesion. Focal internal thickening or scar present. Recommend attention on follow-up. Additional scattered subcentimeter foci throughout the liver decreased in size compared to MRI dated 4/20/2020 consistent with improving metastatic disease. Additional chronic findings as above. · 9/16/2020-discussed with plan with the patient and [address] [place] [address]. Interval response to treatment. Plan to continue chemotherapy through 12 cycles with Avastin. · 12/11/20 Right hepatectomy-Dr. Polly Tim  · 12/11/20 Right hepatectomy pathology: Liver, right, resection: Focus of Fibrosis with calcifications and chronic inflammation (0.3 cm), see comment. Background hepatic parenchyma with minimal periportal fibrosis (trichrome stain), minimal lobular and portal inflammation, and no significant macrovesicular steatosis (<5%) Comments: The patient's history of colorectal cancer status adjuvant therapy is noted. The focus of fibrosis may reflect treatment effect. There is no evidence of viable tumor in the sampled specimen. · 12/14/20 Ct Abdomen Pelvis W Iv Contrast A Small bowel obstruction with transition point at the distal small bowel, just proximal to RIGHT upper quadrant ileocolonic anastomosis. Postoperative change of RIGHT hepatectomy with small amount of expected free fluid and intraperitoneal gas. Redemonstrated LEFT liver lesion. Small bilateral pleural effusions. · 12/16/20 SBFT-[place]: Study is limited due to retained contrast in the small bowel from prior attempt at small bowel follow-through on the floor. Small bowel remains dilated, measuring approximately 5.2 cm, which is consistent with partial or resolving small bowel obstruction. Final radiographs show contrast within the colon. · 1/4/2020-resolution of small bowel obstruction.   · 1/7/21 CT chest: No finding to suggest intrathoracic neoplastic process or metastatic disease. The benign-appearing tiny nodule in the right upper lobe probably represent a noncalcified granuloma. A nodule in the lingular segment of the left upper lobe is not visualized in this study. Postsurgical changes of the liver. No evidence of focal complication. A trace right basal pleural effusion. This may be reactive to the previous abdominal surgery. · 3/4/21 MRI abd: Status post right hepatectomy and microwave ablation of a left liver lesion. No findings to suggest residual/recurrent disease. No new liver lesion is identified. Postsurgical changes related to right hemicolectomy. Microwave ablation zone in segment II of the left hepatic lobe measures approximately 21 mm in diameter, unchanged. No appreciable postcontrast enhancement or other findings to suggest local disease recurrence. No new liver lesion is identified. Spleen is mildly enlarged, measuring 13.8 cm in length. · 3/17/2021-1 year colonoscopy showed no evidence of polyps. · 5/3/21 CEA 6.2  · 6/8/21 MRI abdomen ([place]): Status post right hepatectomy and MWA of a left liver lesion.  No evidence of residual or recurrent metastatic disease in the liver. Status post prior right hemicolectomy for colon carcinoma. Within the posterior right iliopsoas muscle there is a mildly T2 hyperintense nodular focus with postcontrast rim enhancement and diffusion restriction. This measures approximately 2.7 x 2 x 2 cm. More delayed postcontrast images show irregular area of enhancement measuring 2.4 x 7.7 cm axially involving the right iliopsoas muscle and the right lateral abdominal wall. Suggest correlation with contrast enhanced CT exam for complete evaluation. Consider biopsy of this lesion. · 6/15/21 CT CHEST WO CONTRAST No metastatic disease in the chest.  No change in tiny 2 mm RIGHT upper lobe pulmonary nodule. Mild ectasia of the ascending aorta measuring 4 cm. · 6/18/2021-I reviewed results of MRI abdomen at [facility].   CT chest without contrast reviewed by me and showed no evidence of metastatic disease. Stable 2 mm right upper lobe nodule. Discussed with hepatobiliary service at [address] [place] [address]. Biopsy intra-abdominal nodule next week at [address] [place] [address]. · 6/25/20212114-WT-vvelfh biopsy soft tissue mass right psoas muscle at [address] [place] [address] consistent with recurrent colonic adenocarcinoma. · 7/2/2021-discussed with hepatobiliary service at [address] [place] [address] and also surgical oncology. Surgical oncology will call us back regarding consultation for consideration of HIPEC if he is a candidate  · 7/13/21-initiation of chemotherapy with FOLFIRI + Avastin every 2 weeks  · 7/13/21 CEA 10.7  · 7/27/2021-CEA 9.6  · 7/30/2021-she was seen by the surgical oncology group at [address] [place] [address] by Dr Gill Paz. They recommended to complete 6 cycles of chemotherapy and repeat CT chest abdomen pelvis and liver MRI to assess disease response. They discussed the role of CRS/HIPEC in his situation. He was told that it really depends on the status of his liver lesions as well. He will complete 6 cycles of chemotherapy and will return to see me with a CTAP as well as MRI of the liver to assess disease burden and determine if he can be a candidate for debulking. · 8/25/2021-proceed cycle #4. · 9/22/2021 CEA- 8.1  · 9/24/2021 MRI with and w/o Contrast ([place])  Tiny left retroperitoneal nodule involving the left lateral conal fascial new compared to 3/4/2021 though unchanged from most recent prior, possibly an additional site of metastasis. No other new metastatic disease within the abdomen. Similar partially visualized right posterior body wall/psoas infiltrative lesion not definitely changed from MRI abdomen 6/8/2021 but better evaluated in its entirety on concurrent CT, reported separately.   Status post right hemicolectomy, right hepatectomy, and segment III microwave ablation.  Similar mild splenomegaly.  Additional chronic and incidental findings as described in the body the report. Liver: Status post right hepatectomy. Ablation zone in segment II measures 1.7 x 1.1 cm, slightly decreased in size from 1.8 x 1.4 cm previously (series 1301 image 56) without appreciable enhancement. Similar tiny subcentimeter cyst in the left hepatic lobe. No new focal hepatic lesion identified. No visualized free fluid. Similar 0.7 cm enhancing nodule along the left lateral conal fascia laterally new from 3/4/2021, grossly unchanged from MRI dated 6/8/2021. (series 801 image 22). No other appreciable peritoneal/retroperitoneal or  omental nodularity. Ill-defined T2 hyperintense signal and hyperenhancement in the right posteromedial body wall involving the lateral aspect of the psoas muscle and posterolateral abdominal wall musculature, partially visualized measuring at least 8.7 x 3.1   cm, grossly similar to the prior exam, better evaluated in its entirety on concurrent CT reported separately. · 9/24/2021 CT C/A/P w/ Contrast([place]) Status post right hemicolectomy, right hepatectomy and left hepatic microwave ablation without new suspicious hepatic lesion.  Left lateral perirenal fascial nodule, which is stable compared to 6/8/2021 MRI, but new compared to 3/4/2021 MRI is concerning for an additional site of metastatic disease.  No sites of new or enlarging metastatic disease in the chest.  Similar appearance of right lateral psoas and posterior abdominal wall infiltrative lesion, not significantly changed compared to 6/8/2021 MRI.  Mild splenomegaly.  Additional findings as outlined in the body the report. Biopsy-proven adenocarcinoma involving the posterior lateral aspect of the right iliopsoas muscle and right lateral abdominal wall. Right iliopsoas component is difficult to measure but appears to be approximately 2.5 x 2.4 cm (series 2, image 153), similar to prior MRI.  Nodular thickening noted along the right posterior abdominal wall and possibly involving the the transversus abdominis measures approximately 8.5 x 1.8 cm (series 2 image 153) overall similar compared to prior MRI. · 10/6/2021-discussed the results of recent CT abdomen/pelvis and MRI abdomen/pelvis at [facility]. Persistent disease involving the psoas muscle. Discussed with colorectal surgery at [facility]. They recommend to continue current therapy for another 2 months and reassess with CT scans. CEA dynamics:  3/11/20 CEA 5.5  8/19/20 CEA 2.4  9/2/20 CEA 2.7  9/16/20 CEA 2.7  9/30/20 CEA 2.7  10/19/20 CEA 2.3  1/4/21 CEA 2.2  5/3/21 CEA 6.2  6/15/21 CEA 8.3  7/13/21 CEA 10.7  7/27/21 CEA 9.6  8/12/21/21 CEA 8.2  8/25/2021-CEA 8.9  9/22/2021 CEA 8.1    PAST MEDICAL HISTORY:   Past Medical History:   Diagnosis Date    Acid reflux     Cancer ([address]     adenocarcinoma of colon    GERD (gastroesophageal reflux disease)     PTSD (post-traumatic stress disorder)           PAST SURGICAL HISTORY:  Past Surgical History:   Procedure Laterality Date    ABLATION OF DYSRHYTHMIC FOCUS      ablation of liver at [facility]  2003    CHOLECYSTECTOMY  2013    COLONOSCOPY      when pt was in the 19's    COLONOSCOPY N/A 03/11/2020    COLONOSCOPY POLYPECTOMY SNARE/COLD BIOPSY: Dr. Jose Hoskins colonoscopy: 6-12 months due to findings at colonoscopy today with Cecal mass lesion and large polyps.     COLONOSCOPY      COLONOSCOPY N/A 03/17/2021    Dr Gilson Che, Post-operative changes w IC anastomosis & single visible staple, Mild Diverticuosis, Int Hemorrhoids Grade 1, 3 year recall    HEMICOLECTOMY Right 03/30/2020    LAPAROSCOPIC-ASSISTED RIGHT HEMICOLECTOMY performed by Lucius Nino MD at [facility] [address] N/A 06/03/2020    INSERTION OF VENOUS PORT with flouro performed by Lucius Nino MD at [place] [address] ENDOSCOPY N/A 03/11/2020    Dr. Guillermo Pradhan:   [facility]        SOCIAL HISTORY:  Social History     Socioeconomic History    Marital status:      Spouse name: Not on file    Number of children: Not on file    Years of education: Not on file    Highest education level: Not on file   Occupational History    Not on file   Tobacco Use    Smoking status: Never Smoker    Smokeless tobacco: Never Used   Vaping Use    Vaping Use: Never used   Substance and Sexual Activity    Alcohol use: Yes     Comment: rare     Drug use: No    Sexual activity: Yes     Partners: Female   Other Topics Concern    Not on file   Social History Narrative    Not on file     Social Determinants of Health     Financial Resource Strain:     Difficulty of Paying Living Expenses: Not on file   Food Insecurity:     Worried About Running Out of Food in the Last Year: Not on file    [place] of Food in the Last Year: Not on file   Transportation Needs:     Lack of Transportation (Medical): Not on file    Lack of Transportation (Non-Medical):  Not on file   Physical Activity:     Days of Exercise per Week: Not on file    Minutes of Exercise per Session: Not on file   Stress:     Feeling of Stress : Not on file   Social Connections:     Frequency of Communication with Friends and Family: Not on file    Frequency of Social Gatherings with Friends and Family: Not on file    Attends Yazidism Services: Not on file    Active Member of [address] [place] [address] [employer] or Organizations: Not on file    Attends Club or Organization Meetings: Not on file    Marital Status: Not on file   Intimate Partner Violence:     Fear of Current or Ex-Partner: Not on file    Emotionally Abused: Not on file    Physically Abused: Not on file    Sexually Abused: Not on file   Housing Stability:     Unable to Pay for Housing in the Last Year: Not on file    Number of [place] in the Last Year: Not on file    Unstable Housing in the Last Year: Not on file       FAMILY HISTORY:  Family History   Problem Relation Age of Onset    Liver Cancer Mother     High Blood Pressure Mother     Colon Cancer Mother         x2    Cancer Father Nyasia Najera MD        irinotecan (CAMPTOSAR) 340 mg in dextrose 5 % 250 mL chemo IVPB  180 mg/m2 (Treatment Plan Recorded) IntraVENous Once Nyasia Najera MD        leucovorin calcium (WELLCOVORIN) 750 mg in dextrose 5 % 250 mL IVPB  400 mg/m2 (Treatment Plan Recorded) IntraVENous Once Nyasia Najera MD        fluorouracil (ADRUCIL) 4,475 mg in sodium chloride 0.9 % 250 mL chemo infusion  2,400 mg/m2 (Treatment Plan Recorded) IntraVENous Over 46 hours Nyasia Najera MD        sodium chloride flush 0.9 % injection 5-40 mL  5-40 mL IntraVENous PRN Nyasia Najera MD        heparin flush 100 UNIT/ML injection 500 Units  500 Units IntraCATHeter PRN Nyasia Najera MD        bevacizumab-bvzr (ZIRABEV) 375 mg in sodium chloride 0.9 % 100 mL chemo IVPB  5 mg/kg (Treatment Plan Recorded) IntraVENous Once Nyasia Najera MD            REVIEW OF SYSTEMS:   CONSTITUTIONAL: no fever, no night sweats, no fatigue;  HEENT: no blurring of vision, no double vision, no hearing difficulty, no tinnitus, no ulceration, no dysplasia, no epistaxis;   LUNGS: no cough, no hemoptysis, no wheeze,  no shortness of breath;  CARDIOVASCULAR: no palpitation, no chest pain, no shortness of breath;  GI: no abdominal pain, no nausea, no vomiting, no diarrhea, no constipation;  ANNIE: no dysuria, no hematuria, no frequency or urgency, no nephrolithiasis;  MUSCULOSKELETAL: no joint pain, no swelling, no stiffness;  ENDOCRINE: no polyuria, no polydipsia, no cold or heat intolerance;  HEMATOLOGY: no easy bruising or bleeding, no history of clotting disorder;  DERMATOLOGY: no skin rash, no eczema, no pruritus;  PSYCHIATRY: no depression, no anxiety, no panic attacks, no suicidal ideation, no homicidal ideation;  NEUROLOGY: no syncope, no seizures, no numbness or tingling of hands, no numbness or tingling of feet, no paresis;         Vitals signs: There were no vitals taken for this visit.    Pain scale: PHYSICAL EXAM:  CONSTITUTIONAL: Alert, appropriate, no acute distress,   EYES: Non icteric, EOM intact, pupils equal round and reactive to light and accommodation. ENT: Oral mucus membranes moist, no oral pharyngeal lesions. External inspection of ears and nose are normal.   NECK: Supple, no masses. No palpable thyroid mass    CHEST/LUNGS: CTA bilaterally, normal respiratory effort   CARDIOVASCULAR: RRR, no murmurs. No lower extremity edema   ABDOMEN: soft non-tender, active bowel sounds, no hepatosplenomegaly. No palpable masses. EXTREMITIES: warm, Full ROM of all fours extremities. No focal weakness. SKIN: warm, dry with no rashes or lesions  LYMPH: No cervical, clavicular, axillary, or inguinal lymphadenopathy  NEUROLOGIC: follows commands, non focal.   PSYCH: mood and affect appropriate. Alert and oriented to time and place and person.     Relevant Lab findings/reviewed by me:  Lab Results   Component Value Date    WBC 4.98 11/17/2021    HGB 13.6 (L) 11/17/2021    HCT 40.4 11/17/2021    .0 (H) 11/17/2021     11/17/2021     Lab Results   Component Value Date    NEUTROABS 3.25 11/17/2021     Lab Results   Component Value Date     11/17/2021    K 4.4 11/17/2021     11/17/2021    CO2 27 11/17/2021    BUN 10 11/17/2021    CREATININE 1.2 11/17/2021    GLUCOSE 110 (H) 11/17/2021    CALCIUM 9.6 11/17/2021    PROT 7.5 11/17/2021    LABALBU 4.5 11/17/2021    BILITOT 0.8 11/17/2021    ALKPHOS 116 11/17/2021    AST 33 11/17/2021    ALT 21 11/17/2021    LABGLOM >60 11/17/2021    GFRAA 68 06/15/2021    AGRATIO 1.5 06/15/2021    GLOB 3.0 11/17/2021         Relevant Imaging studies/reviewed by me:  None    ASSESSMENT:    Orders Placed This Encounter   Procedures    CBC Auto Differential     Standing Status:   Future     Number of Occurrences:   1     Standing Expiration Date:   11/17/2022    Comprehensive Metabolic Panel     Standing Status:   Future     Number of Occurrences:   1     Standing Expiration Date:   11/17/2022    CEA     Standing Status:   Future     Standing Expiration Date:   11/17/2022      Denise was seen today for cancer. Diagnoses and all orders for this visit:    Adenocarcinoma of colon ([address]  -     CBC Auto Differential; Future  -     Comprehensive Metabolic Panel; Future  -     CEA; Future  -     oxyCODONE-acetaminophen (PERCOCET) 7.5-325 MG per tablet; Take 1 tablet by mouth every 6 hours as needed for Pain for up to 28 days. Intended supply: 28 days    Chemotherapy management, encounter for    Adverse effect of chemotherapy, subsequent encounter    Encounter for antineoplastic immunotherapy    Care plan discussed with patient    Cancer associated pain  -     oxyCODONE-acetaminophen (PERCOCET) 7.5-325 MG per tablet; Take 1 tablet by mouth every 6 hours as needed for Pain for up to 28 days. Intended supply: 28 days    Liver metastases (HCC)    Mass of psoas muscle       #Colonic adenocarcinoma-  [id number] (liver) K-maria luisa mutated, IHC MMR not proficient  Status post microwave ablation left hepatic lesion  Status post neoadjuvant FOLFOX/bevacizumab x11 biweekly cycles  Status post right hemicolectomy. Pathology consistent complete pathologic response. Recommended surveillance as per NCCN guidelines  Discussed at length results of pathology with the patient. 3/17/21- 1 year colonoscopy showed no large polyps or masses. Repeat in 3 years. June 2021-CT chest clear. MRI abdomen showed suspicious lesion in the right lower quadrant. Biopsy arranged [place]. Discussed with hepatobiliary service at [facility]. 6/25/[id number] biopsy consistent with recurrent primary colorectal adenocarcinoma. 7/2/2021-discussed with hepatobiliary service/surgical oncology at [facility]. They will review images and call the patient back regarding consultation for consideration of HIPEC  7/2/2021-they recommend systemic therapy.   7/2/2021-recommended FOLFIRI/Avastin  7/13/21- Initiated FOLFIRI + Avastin every 2 weeks  07/30/21-Seen at [facility] by GI surgeon oncology. They discussed the role of CRS/HIPEC in his situation. He was told hat it really depends on the status of his liver lesions as well. He will complete 6 cycles of chemotherapy and will return to see me with a CTAP as well as MRI of the liver to assess disease burden and determine if he can be a candidate for debulking.    8/25/2021-proceed with FOLFIRI/Avastin   9/24/2021-repeat MRI abdomen/CT abdomen showed persistent disease. Recommendations were to continue chemotherapy for additional 3-4 cycles and reassess at [facility] in December. I personally discussed with colorectal surgery the recommendations. 10/6/2021-proceed cycle #10. Resume bevacizumab    Local regional recurrence colonic adenocarcinoma, June 2021  MRI abdomen at [facility] showed mildly T2 hyperintense nodular focus with postcontrast rim enhancement and diffusion restriction. This measures approximately 2.7 x 2 x   2 cm. More delayed postcontrast images show irregular area of enhancement measuring 2.4 x 7.7 cm axially involving the right iliopsoas muscle and the right lateral abdominal wall. 6/25/[id number] biopsy consistent with recurrent primary colorectal adenocarcinoma. 7/2/2021-discussed with hepatobiliary service/surgical oncology at [facility]. They will review images and call the patient back regarding consultation for consideration ofHIPEC  7/2/2021-they recommend systemic therapy. 7/2/2021-recommended FOLFIRI/Avastin  7/12/2021-FOLFIRI/Avastin cycle #1  7/13/2021-CEA 10.7  8/12/21/21 CEA 8.2  8/12/2021-CEA 8.9  9/22/2021-CEA 8.1  10/20/2021-CEA 9.1    #Chronic kidney disease stage III- creatinine 1.2 ->1.5-> 1.4->1.6  Encouraged to increase PO fluid intake     #Liver metastasis- plan as above. Status post ablation left liver lobe lesion at [facility] on 6/8/2020. Status post neoadjuvant FOLFOX/bevacizumab x11 biweekly cyclesStatus post right hemicolectomy. Pathology consistent complete pathologic response. 3/4/2021-MRI abdomen was unremarkable  6/8/2021-MRI abdomen showed Postsurgical changes of right hepatectomy. Redemonstration of an ablation zone in segment II, measuring up to 1.7 cm, previously 1.8 cm. No evidence of postcontrast enhancement.  No new lesion identified. 9/24/2021-MRI abdomen [place] showed Liver: Status post right hepatectomy. Ablation zone in segment II measures 1.7 x 1.1 cm, slightly decreased in size from 1.8 x 1.4 cm previously (series 1301 image 56) without apreciable enhancement. Similar tiny subcentimeter cyst in the left hepatic lobe. No new focal hepatic lesion identified.     #Iron deficiency anemia-  hemoglobin 8.5/MCV 89. Ferritin 12.9, iron saturation 15%, TIBC 458, iron 72. Status post IV iron therapy. Hemoglobin 13.4     #Chemotherapy-induced neuropathy-stable, mild    #Cancer related pain-continue Percocet-refilled today     #Treatment related side effect-fatigue, mild neuropathy     PLAN:  · Continue follow-up with [facility]/Dr. Deann Piper in December  · Continue C#8 FOLFIRI + Avastin today and every 2 weeks  · HOLD Avastin 12/1 treatment  · RTC with MD 4 weeks in treatment room  · CBC CMP CEA today  · Continue Percocet today 7.5 mg every 6 hours as needed-script sent  · Increase p.o. fluid intake  · Refill Percocet today. Follow Up:     Return in about 4 weeks (around 12/15/2021) for Treatment & see Tyson Held in [address] [place] [address] 153 RM FOLFIRI. FOLFIRI only 2 weeks     I, Mary Workman, am scribing for Maykel Shay MD. Electronically signed by Mary Workman RN on 11/17/2021 at 11:40 AM CST. I, Dr Barb Castillo, personally performed the services described in this documentation as scribed by Mary Workman RN in my presence and is both accurate and complete. I have seen, examined and reviewed this patient medication list, appropriate labs and imaging studies.  I reviewed relevant medical records and others physicians notes. I discussed the plans of care with the patient. I answered all the questions to the patients satisfaction. I have also reviewed the chief complaint (CC) and part of the history (History of Present Illness (HPI), Past Family Social History [facility]), or Review of Systems (ROS) and made changes when appropriated.        (Please note that portions of this note were completed with a voice recognition program. Efforts were made to edit the dictations but occasionally words are mis-transcribed.)

## 2021-11-15 NOTE — PROGRESS NOTES
Patient presents for C3 FOLFOX as scheduled. He is without complaint. Denies treating any side effects from therapy. Verbalized understanding of s/s to report.
Vaccine status unknown

## 2021-11-17 ENCOUNTER — OFFICE VISIT (OUTPATIENT)
Dept: HEMATOLOGY | Age: 66
End: 2021-11-17
Payer: OTHER GOVERNMENT

## 2021-11-17 ENCOUNTER — HOSPITAL ENCOUNTER (OUTPATIENT)
Dept: INFUSION THERAPY | Age: 66
Discharge: HOME | End: 2021-11-17
Payer: OTHER GOVERNMENT

## 2021-11-17 VITALS — BODY MASS INDEX: 26.37 KG/M2 | WEIGHT: 168 LBS | HEIGHT: 67 IN

## 2021-11-17 DIAGNOSIS — M62.89 MASS OF PSOAS MUSCLE: ICD-10-CM

## 2021-11-17 DIAGNOSIS — Z71.89 CARE PLAN DISCUSSED WITH PATIENT: ICD-10-CM

## 2021-11-17 DIAGNOSIS — Z51.12 ENCOUNTER FOR ANTINEOPLASTIC IMMUNOTHERAPY: ICD-10-CM

## 2021-11-17 DIAGNOSIS — C78.7 LIVER METASTASES (HCC): ICD-10-CM

## 2021-11-17 DIAGNOSIS — Z51.11 CHEMOTHERAPY MANAGEMENT, ENCOUNTER FOR: ICD-10-CM

## 2021-11-17 DIAGNOSIS — G89.3 CANCER ASSOCIATED PAIN: ICD-10-CM

## 2021-11-17 DIAGNOSIS — C18.9 ADENOCARCINOMA OF COLON (HCC): Primary | ICD-10-CM

## 2021-11-17 DIAGNOSIS — T45.1X5D ADVERSE EFFECT OF CHEMOTHERAPY, SUBSEQUENT ENCOUNTER: ICD-10-CM

## 2021-11-17 LAB
ALBUMIN SERPL-MCNC: 4.5 G/DL (ref 3.5–5.2)
ALP BLD-CCNC: 116 U/L (ref 40–130)
ALT SERPL-CCNC: 21 U/L (ref 21–72)
ANION GAP SERPL CALCULATED.3IONS-SCNC: 8 MMOL/L (ref 7–19)
AST SERPL-CCNC: 33 U/L (ref 17–59)
BASOPHILS ABSOLUTE: 0.05 K/UL (ref 0.01–0.08)
BASOPHILS RELATIVE PERCENT: 1 % (ref 0.1–1.2)
BILIRUB SERPL-MCNC: 0.8 MG/DL (ref 0.2–1.3)
BUN BLDV-MCNC: 10 MG/DL (ref 9–20)
CALCIUM SERPL-MCNC: 9.6 MG/DL (ref 8.4–10.2)
CEA: 7 NG/ML (ref 0–3)
CHLORIDE BLD-SCNC: 104 MMOL/L (ref 98–111)
CO2: 27 MMOL/L (ref 22–29)
CREAT SERPL-MCNC: 1.2 MG/DL (ref 0.6–1.2)
EOSINOPHILS ABSOLUTE: 0.37 K/UL (ref 0.04–0.54)
EOSINOPHILS RELATIVE PERCENT: 7.4 % (ref 0.7–7)
GFR NON-AFRICAN AMERICAN: >60
GLOBULIN: 3 G/DL
GLUCOSE BLD-MCNC: 110 MG/DL (ref 74–106)
HCT VFR BLD CALC: 40.4 % (ref 40.1–51)
HEMOGLOBIN: 13.6 G/DL (ref 13.7–17.5)
LYMPHOCYTES ABSOLUTE: 0.73 K/UL (ref 1.18–3.74)
LYMPHOCYTES RELATIVE PERCENT: 14.7 % (ref 19.3–53.1)
MCH RBC QN AUTO: 33.7 PG (ref 25.7–32.2)
MCHC RBC AUTO-ENTMCNC: 33.7 G/DL (ref 32.3–36.5)
MCV RBC AUTO: 100 FL (ref 79–92.2)
MONOCYTES ABSOLUTE: 0.58 K/UL (ref 0.24–0.82)
MONOCYTES RELATIVE PERCENT: 11.6 % (ref 4.7–12.5)
NEUTROPHILS ABSOLUTE: 3.25 K/UL (ref 1.56–6.13)
NEUTROPHILS RELATIVE PERCENT: 65.3 % (ref 34–71.1)
PDW BLD-RTO: 14.5 % (ref 11.6–14.4)
PLATELET # BLD: 199 K/UL (ref 163–337)
PMV BLD AUTO: 9.7 FL (ref 7.4–10.4)
POTASSIUM SERPL-SCNC: 4.4 MMOL/L (ref 3.5–5.1)
PROTEIN UA: NEGATIVE
RBC # BLD: 4.04 M/UL (ref 4.63–6.08)
SODIUM BLD-SCNC: 139 MMOL/L (ref 137–145)
TOTAL PROTEIN: 7.5 G/DL (ref 6.3–8.2)
WBC # BLD: 4.98 K/UL (ref 4.23–9.07)

## 2021-11-17 PROCEDURE — 81002 URINALYSIS NONAUTO W/O SCOPE: CPT | Performed by: INTERNAL MEDICINE

## 2021-11-17 PROCEDURE — 6360000002 HC RX W HCPCS: Performed by: INTERNAL MEDICINE

## 2021-11-17 PROCEDURE — 99214 OFFICE O/P EST MOD 30 MIN: CPT | Performed by: INTERNAL MEDICINE

## 2021-11-17 PROCEDURE — 96417 CHEMO IV INFUS EACH ADDL SEQ: CPT

## 2021-11-17 PROCEDURE — 96375 TX/PRO/DX INJ NEW DRUG ADDON: CPT

## 2021-11-17 PROCEDURE — G0498 CHEMO EXTEND IV INFUS W/PUMP: HCPCS

## 2021-11-17 PROCEDURE — 2580000003 HC RX 258: Performed by: INTERNAL MEDICINE

## 2021-11-17 PROCEDURE — 96413 CHEMO IV INFUSION 1 HR: CPT

## 2021-11-17 PROCEDURE — 80053 COMPREHEN METABOLIC PANEL: CPT

## 2021-11-17 PROCEDURE — 85025 COMPLETE CBC W/AUTO DIFF WBC: CPT

## 2021-11-17 PROCEDURE — 82378 CARCINOEMBRYONIC ANTIGEN: CPT

## 2021-11-17 PROCEDURE — 96415 CHEMO IV INFUSION ADDL HR: CPT

## 2021-11-17 PROCEDURE — 96367 TX/PROPH/DG ADDL SEQ IV INF: CPT

## 2021-11-17 RX ORDER — DEXTROSE MONOHYDRATE 50 MG/ML
INJECTION, SOLUTION INTRAVENOUS ONCE
Status: DISCONTINUED | OUTPATIENT
Start: 2021-11-17 | End: 2021-11-19 | Stop reason: HOSPADM

## 2021-11-17 RX ORDER — DIPHENHYDRAMINE HYDROCHLORIDE 50 MG/ML
50 INJECTION INTRAMUSCULAR; INTRAVENOUS ONCE
Status: CANCELLED | OUTPATIENT
Start: 2021-11-17 | End: 2021-11-17

## 2021-11-17 RX ORDER — PALONOSETRON 0.05 MG/ML
0.25 INJECTION, SOLUTION INTRAVENOUS ONCE
Status: CANCELLED | OUTPATIENT
Start: 2021-11-17

## 2021-11-17 RX ORDER — ATROPINE SULFATE 1 MG/ML
0.5 INJECTION, SOLUTION INTRAMUSCULAR; INTRAVENOUS; SUBCUTANEOUS
Status: CANCELLED
Start: 2021-11-17

## 2021-11-17 RX ORDER — SODIUM CHLORIDE 9 MG/ML
INJECTION, SOLUTION INTRAVENOUS CONTINUOUS
Status: CANCELLED | OUTPATIENT
Start: 2021-11-17

## 2021-11-17 RX ORDER — HEPARIN SODIUM (PORCINE) LOCK FLUSH IV SOLN 100 UNIT/ML 100 UNIT/ML
500 SOLUTION INTRAVENOUS PRN
Status: DISCONTINUED | OUTPATIENT
Start: 2021-11-17 | End: 2021-11-18 | Stop reason: HOSPADM

## 2021-11-17 RX ORDER — HEPARIN SODIUM (PORCINE) LOCK FLUSH IV SOLN 100 UNIT/ML 100 UNIT/ML
500 SOLUTION INTRAVENOUS PRN
Status: CANCELLED | OUTPATIENT
Start: 2021-11-17

## 2021-11-17 RX ORDER — EPINEPHRINE 1 MG/ML
0.3 INJECTION, SOLUTION, CONCENTRATE INTRAVENOUS PRN
Status: CANCELLED | OUTPATIENT
Start: 2021-11-17

## 2021-11-17 RX ORDER — SODIUM CHLORIDE 0.9 % (FLUSH) 0.9 %
5-40 SYRINGE (ML) INJECTION PRN
Status: CANCELLED | OUTPATIENT
Start: 2021-11-17

## 2021-11-17 RX ORDER — ATROPINE SULFATE 1 MG/ML
0.5 INJECTION, SOLUTION INTRAMUSCULAR; INTRAVENOUS; SUBCUTANEOUS
Status: COMPLETED | OUTPATIENT
Start: 2021-11-17 | End: 2021-11-17

## 2021-11-17 RX ORDER — MEPERIDINE HYDROCHLORIDE 50 MG/ML
12.5 INJECTION INTRAMUSCULAR; INTRAVENOUS; SUBCUTANEOUS ONCE
Status: CANCELLED | OUTPATIENT
Start: 2021-11-17 | End: 2021-11-17

## 2021-11-17 RX ORDER — SODIUM CHLORIDE 9 MG/ML
INJECTION, SOLUTION INTRAVENOUS ONCE
Status: CANCELLED | OUTPATIENT
Start: 2021-11-17 | End: 2021-11-17

## 2021-11-17 RX ORDER — OXYCODONE AND ACETAMINOPHEN 7.5; 325 MG/1; MG/1
1 TABLET ORAL EVERY 6 HOURS PRN
Qty: 112 TABLET | Refills: 0 | Status: SHIPPED | OUTPATIENT
Start: 2021-11-17 | End: 2021-12-15

## 2021-11-17 RX ORDER — SODIUM CHLORIDE 9 MG/ML
INJECTION, SOLUTION INTRAVENOUS ONCE
Status: DISCONTINUED | OUTPATIENT
Start: 2021-11-17 | End: 2021-11-19 | Stop reason: HOSPADM

## 2021-11-17 RX ORDER — METHYLPREDNISOLONE SODIUM SUCCINATE 125 MG/2ML
125 INJECTION, POWDER, LYOPHILIZED, FOR SOLUTION INTRAMUSCULAR; INTRAVENOUS ONCE
Status: CANCELLED | OUTPATIENT
Start: 2021-11-17 | End: 2021-11-17

## 2021-11-17 RX ORDER — SODIUM CHLORIDE 0.9 % (FLUSH) 0.9 %
5-40 SYRINGE (ML) INJECTION PRN
Status: DISCONTINUED | OUTPATIENT
Start: 2021-11-17 | End: 2021-11-18 | Stop reason: HOSPADM

## 2021-11-17 RX ORDER — DEXTROSE MONOHYDRATE 50 MG/ML
INJECTION, SOLUTION INTRAVENOUS ONCE
Status: CANCELLED | OUTPATIENT
Start: 2021-11-17 | End: 2021-11-17

## 2021-11-17 RX ORDER — PALONOSETRON 0.05 MG/ML
0.25 INJECTION, SOLUTION INTRAVENOUS ONCE
Status: COMPLETED | OUTPATIENT
Start: 2021-11-17 | End: 2021-11-17

## 2021-11-17 RX ORDER — SODIUM CHLORIDE 9 MG/ML
25 INJECTION, SOLUTION INTRAVENOUS PRN
Status: CANCELLED | OUTPATIENT
Start: 2021-11-17

## 2021-11-17 RX ADMIN — FLUOROURACIL 4475 MG: 50 INJECTION, SOLUTION INTRAVENOUS at 14:23

## 2021-11-17 RX ADMIN — PALONOSETRON 0.25 MG: 0.05 INJECTION, SOLUTION INTRAVENOUS at 11:55

## 2021-11-17 RX ADMIN — LEUCOVORIN CALCIUM 750 MG: 350 INJECTION, POWDER, LYOPHILIZED, FOR SOLUTION INTRAMUSCULAR; INTRAVENOUS at 12:44

## 2021-11-17 RX ADMIN — DEXTROSE MONOHYDRATE 340 MG: 50 INJECTION, SOLUTION INTRAVENOUS at 12:44

## 2021-11-17 RX ADMIN — DEXAMETHASONE SODIUM PHOSPHATE: 10 INJECTION, SOLUTION INTRAMUSCULAR; INTRAVENOUS at 11:55

## 2021-11-17 RX ADMIN — SODIUM CHLORIDE 375 MG: 9 INJECTION, SOLUTION INTRAVENOUS at 12:11

## 2021-11-17 RX ADMIN — ATROPINE SULFATE 0.5 MG: 1 INJECTION, SOLUTION INTRAMUSCULAR; INTRAVENOUS; SUBCUTANEOUS at 12:42

## 2021-11-19 ENCOUNTER — HOSPITAL ENCOUNTER (OUTPATIENT)
Dept: INFUSION THERAPY | Age: 66
Discharge: HOME | End: 2021-11-19
Payer: OTHER GOVERNMENT

## 2021-11-19 DIAGNOSIS — C18.2 MALIGNANT NEOPLASM OF ASCENDING COLON (HCC): Primary | ICD-10-CM

## 2021-11-19 PROCEDURE — 2580000003 HC RX 258: Performed by: INTERNAL MEDICINE

## 2021-11-19 PROCEDURE — 96523 IRRIG DRUG DELIVERY DEVICE: CPT

## 2021-11-19 PROCEDURE — 6360000002 HC RX W HCPCS: Performed by: INTERNAL MEDICINE

## 2021-11-19 RX ORDER — HEPARIN SODIUM (PORCINE) LOCK FLUSH IV SOLN 100 UNIT/ML 100 UNIT/ML
500 SOLUTION INTRAVENOUS PRN
Status: DISCONTINUED | OUTPATIENT
Start: 2021-11-19 | End: 2021-11-20 | Stop reason: HOSPADM

## 2021-11-19 RX ORDER — SODIUM CHLORIDE 0.9 % (FLUSH) 0.9 %
10 SYRINGE (ML) INJECTION PRN
Status: DISCONTINUED | OUTPATIENT
Start: 2021-11-19 | End: 2021-11-20 | Stop reason: HOSPADM

## 2021-11-19 RX ADMIN — HEPARIN 500 UNITS: 100 SYRINGE at 12:42

## 2021-11-19 RX ADMIN — Medication 10 ML: at 12:42

## 2021-12-03 ENCOUNTER — TELEPHONE (OUTPATIENT)
Dept: HEMATOLOGY | Age: 66
End: 2021-12-03

## 2021-12-03 NOTE — TELEPHONE ENCOUNTER
Per Dr Myranda Bañuelos, hold chemo for now due to pt having surgery at [place] around the first of the year and RTC with him 2 weeks after surgery. Call to pt with VM left with above info. Call to wife/Idania with above info. Rajesh Sharathjess stated Otf Garner was with her. They both voiced understanding and agreed to call for f/u appt with Dr. Myranda Bañuelos 2 weeks after surgery.

## 2021-12-08 ENCOUNTER — HOSPITAL ENCOUNTER (OUTPATIENT)
Dept: INFUSION THERAPY | Age: 66
End: 2021-12-08

## 2021-12-21 DIAGNOSIS — G89.3 CANCER ASSOCIATED PAIN: ICD-10-CM

## 2021-12-21 DIAGNOSIS — C18.9 ADENOCARCINOMA OF COLON (HCC): Primary | ICD-10-CM

## 2021-12-21 DIAGNOSIS — M62.89 MASS OF PSOAS MUSCLE: ICD-10-CM

## 2021-12-21 DIAGNOSIS — C78.7 LIVER METASTASES (HCC): ICD-10-CM

## 2021-12-21 RX ORDER — OXYCODONE AND ACETAMINOPHEN 10; 325 MG/1; MG/1
1 TABLET ORAL EVERY 4 HOURS PRN
Qty: 180 TABLET | Refills: 0 | Status: SHIPPED | OUTPATIENT
Start: 2021-12-21 | End: 2022-01-20

## 2021-12-21 RX ORDER — FENTANYL 25 UG/H
1 PATCH TRANSDERMAL
Qty: 10 PATCH | Refills: 0 | Status: SHIPPED | OUTPATIENT
Start: 2021-12-21 | End: 2022-01-20

## 2021-12-21 NOTE — TELEPHONE ENCOUNTER
Wife/Idania called stating pt is having a lot of increase in pain. He has been taking Percocet 7.5 every 6, but it isn't helping. Pt is sched for surgery at [place] 1/13 and gets scans there. Discussed with Dr. Judy Macario who recommended Fentanyl 25mg every 72 hrs and increase Percocet to 10mg every 4 hrs. Wife notified and voiced understanding. Scripts sent.

## 2022-02-07 ENCOUNTER — HOSPITAL ENCOUNTER (OUTPATIENT)
Dept: INFUSION THERAPY | Age: 67
Discharge: HOME | End: 2022-02-07
Payer: OTHER GOVERNMENT

## 2022-02-07 ENCOUNTER — OFFICE VISIT (OUTPATIENT)
Dept: HEMATOLOGY | Age: 67
End: 2022-02-07
Payer: OTHER GOVERNMENT

## 2022-02-07 VITALS
BODY MASS INDEX: 24.33 KG/M2 | HEIGHT: 67 IN | RESPIRATION RATE: 20 BRPM | DIASTOLIC BLOOD PRESSURE: 86 MMHG | HEART RATE: 93 BPM | SYSTOLIC BLOOD PRESSURE: 131 MMHG | WEIGHT: 155 LBS | OXYGEN SATURATION: 98 %

## 2022-02-07 VITALS
HEART RATE: 93 BPM | BODY MASS INDEX: 24.33 KG/M2 | HEIGHT: 67 IN | SYSTOLIC BLOOD PRESSURE: 131 MMHG | DIASTOLIC BLOOD PRESSURE: 86 MMHG | OXYGEN SATURATION: 98 % | WEIGHT: 155 LBS | RESPIRATION RATE: 20 BRPM

## 2022-02-07 DIAGNOSIS — R06.6 HICCUPS: ICD-10-CM

## 2022-02-07 DIAGNOSIS — Z71.89 COORDINATION OF COMPLEX CARE: ICD-10-CM

## 2022-02-07 DIAGNOSIS — N17.0 ACUTE KIDNEY INJURY (AKI) WITH ACUTE TUBULAR NECROSIS (ATN) (HCC): ICD-10-CM

## 2022-02-07 DIAGNOSIS — Z71.89 CARE PLAN DISCUSSED WITH PATIENT: ICD-10-CM

## 2022-02-07 DIAGNOSIS — C78.7 LIVER METASTASES (HCC): ICD-10-CM

## 2022-02-07 DIAGNOSIS — M62.89 MASS OF PSOAS MUSCLE: ICD-10-CM

## 2022-02-07 DIAGNOSIS — R62.7 FAILURE TO THRIVE IN ADULT: Primary | ICD-10-CM

## 2022-02-07 DIAGNOSIS — E86.0 DEHYDRATION: ICD-10-CM

## 2022-02-07 DIAGNOSIS — R53.81 PHYSICAL DECONDITIONING: ICD-10-CM

## 2022-02-07 DIAGNOSIS — D72.9 NEUTROPHILIC LEUKOCYTOSIS: ICD-10-CM

## 2022-02-07 DIAGNOSIS — C18.9 ADENOCARCINOMA OF COLON (HCC): ICD-10-CM

## 2022-02-07 DIAGNOSIS — C18.2 MALIGNANT NEOPLASM OF ASCENDING COLON (HCC): Primary | ICD-10-CM

## 2022-02-07 DIAGNOSIS — R63.0 DECREASED APPETITE: ICD-10-CM

## 2022-02-07 DIAGNOSIS — C18.2 MALIGNANT NEOPLASM OF ASCENDING COLON (HCC): ICD-10-CM

## 2022-02-07 LAB
ALBUMIN SERPL-MCNC: 4.2 G/DL (ref 3.5–5.2)
ALP BLD-CCNC: 227 U/L (ref 40–130)
ALT SERPL-CCNC: 80 U/L (ref 21–72)
ANION GAP SERPL CALCULATED.3IONS-SCNC: 15 MMOL/L (ref 7–19)
AST SERPL-CCNC: 54 U/L (ref 17–59)
BASOPHILS ABSOLUTE: 0.01 K/UL (ref 0.01–0.08)
BASOPHILS RELATIVE PERCENT: 0.1 % (ref 0.1–1.2)
BILIRUB SERPL-MCNC: 0.7 MG/DL (ref 0.2–1.3)
BUN BLDV-MCNC: 71 MG/DL (ref 9–20)
CALCIUM SERPL-MCNC: 9.6 MG/DL (ref 8.4–10.2)
CEA: 2.3 NG/ML (ref 0–4.7)
CHLORIDE BLD-SCNC: 87 MMOL/L (ref 98–111)
CO2: 25 MMOL/L (ref 22–29)
CREAT SERPL-MCNC: 2 MG/DL (ref 0.6–1.2)
EOSINOPHILS ABSOLUTE: 0.01 K/UL (ref 0.04–0.54)
EOSINOPHILS RELATIVE PERCENT: 0.1 % (ref 0.7–7)
GFR NON-AFRICAN AMERICAN: 34
GLOBULIN: 3.8 G/DL
GLUCOSE BLD-MCNC: 141 MG/DL (ref 74–106)
HCT VFR BLD CALC: 37.7 % (ref 40.1–51)
HEMOGLOBIN: 13.4 G/DL (ref 13.7–17.5)
LYMPHOCYTES ABSOLUTE: 0.94 K/UL (ref 1.18–3.74)
LYMPHOCYTES RELATIVE PERCENT: 7.2 % (ref 19.3–53.1)
MAGNESIUM: 2.6 MG/DL (ref 1.6–2.3)
MCH RBC QN AUTO: 31.3 PG (ref 25.7–32.2)
MCHC RBC AUTO-ENTMCNC: 35.5 G/DL (ref 32.3–36.5)
MCV RBC AUTO: 88.1 FL (ref 79–92.2)
MONOCYTES ABSOLUTE: 0.99 K/UL (ref 0.24–0.82)
MONOCYTES RELATIVE PERCENT: 7.6 % (ref 4.7–12.5)
NEUTROPHILS ABSOLUTE: 11.15 K/UL (ref 1.56–6.13)
NEUTROPHILS RELATIVE PERCENT: 85 % (ref 34–71.1)
PDW BLD-RTO: 13.1 % (ref 11.6–14.4)
PHOSPHORUS: 5.1 MG/DL (ref 2.5–4.5)
PLATELET # BLD: 260 K/UL (ref 163–337)
PMV BLD AUTO: 10 FL (ref 7.4–10.4)
POTASSIUM SERPL-SCNC: 2.6 MMOL/L (ref 3.5–5.1)
RBC # BLD: 4.28 M/UL (ref 4.63–6.08)
SODIUM BLD-SCNC: 127 MMOL/L (ref 137–145)
TOTAL PROTEIN: 8 G/DL (ref 6.3–8.2)
WBC # BLD: 13.1 K/UL (ref 4.23–9.07)

## 2022-02-07 PROCEDURE — 2580000003 HC RX 258: Performed by: INTERNAL MEDICINE

## 2022-02-07 PROCEDURE — 6360000002 HC RX W HCPCS: Performed by: INTERNAL MEDICINE

## 2022-02-07 PROCEDURE — 83735 ASSAY OF MAGNESIUM: CPT

## 2022-02-07 PROCEDURE — 85025 COMPLETE CBC W/AUTO DIFF WBC: CPT

## 2022-02-07 PROCEDURE — 84100 ASSAY OF PHOSPHORUS: CPT

## 2022-02-07 PROCEDURE — 96365 THER/PROPH/DIAG IV INF INIT: CPT

## 2022-02-07 PROCEDURE — 96366 THER/PROPH/DIAG IV INF ADDON: CPT

## 2022-02-07 PROCEDURE — 96360 HYDRATION IV INFUSION INIT: CPT

## 2022-02-07 PROCEDURE — 80053 COMPREHEN METABOLIC PANEL: CPT

## 2022-02-07 PROCEDURE — 96361 HYDRATE IV INFUSION ADD-ON: CPT

## 2022-02-07 PROCEDURE — 99215 OFFICE O/P EST HI 40 MIN: CPT | Performed by: INTERNAL MEDICINE

## 2022-02-07 PROCEDURE — 36415 COLL VENOUS BLD VENIPUNCTURE: CPT

## 2022-02-07 RX ORDER — DRONABINOL 2.5 MG/1
2.5 CAPSULE ORAL
Qty: 60 CAPSULE | Refills: 0 | Status: ON HOLD | OUTPATIENT
Start: 2022-02-07 | End: 2022-02-22

## 2022-02-07 RX ORDER — CHLORPROMAZINE HYDROCHLORIDE 25 MG/1
25 TABLET, FILM COATED ORAL 3 TIMES DAILY
Qty: 90 TABLET | Refills: 3 | Status: ON HOLD | OUTPATIENT
Start: 2022-02-07 | End: 2022-02-22

## 2022-02-07 RX ORDER — POTASSIUM CHLORIDE 20 MEQ/1
20 TABLET, EXTENDED RELEASE ORAL 3 TIMES DAILY
Qty: 90 TABLET | Refills: 1 | Status: ON HOLD | OUTPATIENT
Start: 2022-02-07 | End: 2022-02-22

## 2022-02-07 RX ORDER — POTASSIUM CHLORIDE 29.8 MG/ML
40 INJECTION INTRAVENOUS ONCE
Status: COMPLETED | OUTPATIENT
Start: 2022-02-07 | End: 2022-02-07

## 2022-02-07 RX ORDER — 0.9 % SODIUM CHLORIDE 0.9 %
1000 INTRAVENOUS SOLUTION INTRAVENOUS ONCE
Status: CANCELLED | OUTPATIENT
Start: 2022-02-07 | End: 2022-02-07

## 2022-02-07 RX ORDER — 0.9 % SODIUM CHLORIDE 0.9 %
1000 INTRAVENOUS SOLUTION INTRAVENOUS ONCE
Status: COMPLETED | OUTPATIENT
Start: 2022-02-07 | End: 2022-02-07

## 2022-02-07 RX ORDER — HEPARIN SODIUM (PORCINE) LOCK FLUSH IV SOLN 100 UNIT/ML 100 UNIT/ML
500 SOLUTION INTRAVENOUS PRN
Status: CANCELLED | OUTPATIENT
Start: 2022-02-07

## 2022-02-07 RX ORDER — SODIUM CHLORIDE 9 MG/ML
25 INJECTION, SOLUTION INTRAVENOUS PRN
OUTPATIENT
Start: 2022-02-07

## 2022-02-07 RX ORDER — SODIUM CHLORIDE 0.9 % (FLUSH) 0.9 %
5-40 SYRINGE (ML) INJECTION PRN
Status: CANCELLED | OUTPATIENT
Start: 2022-02-07

## 2022-02-07 RX ORDER — HEPARIN SODIUM (PORCINE) LOCK FLUSH IV SOLN 100 UNIT/ML 100 UNIT/ML
500 SOLUTION INTRAVENOUS PRN
Status: DISCONTINUED | OUTPATIENT
Start: 2022-02-07 | End: 2022-02-08 | Stop reason: HOSPADM

## 2022-02-07 RX ORDER — SODIUM CHLORIDE 0.9 % (FLUSH) 0.9 %
5-40 SYRINGE (ML) INJECTION PRN
Status: DISCONTINUED | OUTPATIENT
Start: 2022-02-07 | End: 2022-02-08 | Stop reason: HOSPADM

## 2022-02-07 RX ADMIN — SODIUM CHLORIDE 1000 ML: 9 INJECTION, SOLUTION INTRAVENOUS at 15:12

## 2022-02-07 RX ADMIN — POTASSIUM CHLORIDE 40 MEQ: 29.8 INJECTION INTRAVENOUS at 15:13

## 2022-02-07 RX ADMIN — HEPARIN 500 UNITS: 100 SYRINGE at 16:55

## 2022-02-07 RX ADMIN — SODIUM CHLORIDE, PRESERVATIVE FREE 10 ML: 5 INJECTION INTRAVENOUS at 16:55

## 2022-02-07 NOTE — PROGRESS NOTES
Dr Yvette Joseph spoke to Dr Norris/[place] this AM. Per Dr Yvette Joseph, pt to come in for nurse to assess, labs, IVFs and poss see Dr. Yvette Joseph.

## 2022-02-07 NOTE — PROGRESS NOTES
disease in the liver or in the abdomen. He also had a surveillance colonoscopy in March 2021 that showed no polyps. CEA was elevated in May 2021. Repeat CEA June 2021 showed persistent elevation. MRI abdomen at [facility] showed no evidence of liver recurrence but a suspicious nodule in the right lower quadrant. Biopsy was performed at [facility] and consistent with recurrent adenocarcinoma. I discussed the findings with hepatobiliary service and also colorectal surgery. He was started on FOLFIRI/Avastin. He was seen by Dr. Jose L Johnson again on 9/24/2021. He had repeat CT abdomen pelvis and also MRI at [facility] that showed persistent disease involving the psoas muscle. In addition, an additional small place that may represent further peritoneal metastatic disease. He underwent surgery at [facility]. He underwent exploratory laparotomy with resection of psoas mass at [facility] on 1/13/2022. He also underwent HIPEC treatment.      Cancer history  Mr. Asya Adams was first seen by me on 3/23/2020. He was referred for a new diagnosis of colonic adenocarcinoma involving the cecum. The patient reports that he had a wellbeing consult with his provider at the [address] [place] [address]. He was found to have anemia and then recommended a colonoscopy. Of note, the patient has a family history of colon cancer. His mother is a patient of Dr. Dave Elder he has been diagnosed with colon cancer in 2010. · 3/11/2020- colonoscopy revealed a large malignant appearing fungating mass lesion in the cecum. In addition, several other polyps. Biopsy of the mass consistent with moderate differentiated colonic adenocarcinoma. Polyps consistent with tubulovillous adenoma with no high-grade dysplasia. IHC MMR not proficient. K-maria luisa mutated, BRAF and NRAS wild type.   MSI proficient  · 3/11/2020-CEA 5.5 (H)  · 3/18/2020-CT abdomen pelvis with contrast  Invasive cecal mass adhering to the right lateral abdominal wall muscles with adjacent lymphadenopathy. Mild partial obstruction of the terminal ileum. 2. Suspicious lesions in the right and left hepatic lobes measure up to 1.3 cm and likely represent metastatic disease. · 3/18/2020-Xr Chest Standard  No radiographic evidence of acute cardiopulmonary process. · 3/23/2020-he was first seen by me. Recommended completion of staging with CT chest.  Also recommended liver MRI for further clarification of liver lesion. S.  Recommend to proceed with a general surgery consultation tomorrow with Dr. Yani Mohamud. Patient was informed that I favor surgical resection if feasible of the primary malignancy. · 3/30/2020- right hemicolectomy by Dr. Yani Mohamud at [facility] consistent with invasive moderately differentiated colonic adenocarcinoma measuring 7.2 cm. Carcinoma directly invading the adjacent abdominal wall tissue. Focal lymphovascular space invasion identified. Focal perineural invasion identified. Surgical margins negative for evidence of malignancy. 6 out of 14 lymph nodes positive for metastatic adenocarcinoma. Final pathology staging [id number](liver, stage ROXANA)  · 4/20/2020-CT chest with contrast showed No convincing intrathoracic metastasis. Nonspecific 4 mm nodule of the inferior lingula and a 2 mm right upper lobe nodule can be followed on subsequent imaging in 6-12 months. Moderate coronary calcifications. Hypodense metastatic liver lesions. Small hiatal hernia. · 4/20/2020-Mri Abdomen W Wo Contrast There are about 5 liver lesions. The 2 largest appears similar compared to 3/18/2020, the others are too small to further characterize. Appearance is most concerning for metastatic disease. Enhancement of the right lateral peritoneum. This is favored to be postoperative as there is no nodularity, evidence of omental disease or lymphadenopathy. Recommend attention on follow-up. Cholecystectomy. · 4/22/2020-discussed with Dr. Curry Buchanan at [place].   He will review imaging studies and give further recommendations regarding eligibility for resection of liver lesions. · 5/8/2020 CT Abdomen The two largest suspicious lesions measuring 1.2 and 1.3 cm in the  right and left hepatic lobes respectively are similar compared to the  3/18/2020 CT. Additionally, there are at least five subcentimeter lesions with similar signal characteristics, which are also highly suspicious for metastases. If complete characterization of the number and distribution of lesions is necessary, an MRI with Eovist could be acquired. · 5/19/2020- he was seen by the hepatobiliary service at [address] [place] [address] with Dr. Lara Goltz:  patient adequate risk candidate for a multimodal approach, directed toward curative hepatectomy eventually. Endorsed by Hepatobiliary Conference, I recommended perc ablation of the L hemiliver to clear it, followed by systemic therapy in a neoadjuvant strategy. Restaging imaging to confirm clearance of disease on the left and lack of progression to unresectability of the R hemiliver disease would then be followed by R hepatectomy. Limitations to this approach may be accessibility of the segment 4A/8 disease high in the hepatic dome and the possibility of heat sink-related recurrence s/p abation of the left-sided disease. · 5/21/2020- referral for Mediport placement and start FOLFOX in 2 weeks. Will add bevacizumab after 6 weeks of liver ablation. We will plan for 12 biweekly dose of FOLFOX. Bevacizumab will also be stopped 6 weeks prior to major procedure. · 6/8/2020- Microwave ablation of the left liver lesion was then performed for 5 minutes at [address] to achieve a 3.4 x 3.9 cm approximately spherical zone of ablation. · 6/10/2020- initiation of FOLFOX. · 7/20/2020-added Avastin. · 9/10/20 MRI abdomen: Left hepatic lobe segment II lesion demonstrates small T1 hyperintense blood products, status post microwave ablation on 6/8/2020.  No definitive enhancement within the lesion. Focal internal thickening or scar present. Recommend attention on follow-up. Additional scattered subcentimeter foci throughout the liver decreased in size compared to MRI dated 4/20/2020 consistent with improving metastatic disease. Additional chronic findings as above. · 9/16/2020-discussed with plan with the patient and [facility]. Interval response to treatment. Plan to continue chemotherapy through 12 cycles with Avastin. · 12/11/20 Right hepatectomy-Dr. Jesneia Waller  · 12/11/20 Right hepatectomy pathology: Liver, right, resection: Focus of Fibrosis with calcifications and chronic inflammation (0.3 cm), see comment. Background hepatic parenchyma with minimal periportal fibrosis (trichrome stain), minimal lobular and portal inflammation, and no significant macrovesicular steatosis (<5%) Comments: The patient's history of colorectal cancer status adjuvant therapy is noted. The focus of fibrosis may reflect treatment effect. There is no evidence of viable tumor in the sampled specimen. · 12/14/20 Ct Abdomen Pelvis W Iv Contrast A Small bowel obstruction with transition point at the distal small bowel, just proximal to RIGHT upper quadrant ileocolonic anastomosis. Postoperative change of RIGHT hepatectomy with small amount of expected free fluid and intraperitoneal gas. Redemonstrated LEFT liver lesion. Small bilateral pleural effusions. · 12/16/20 SBFT-[place]: Study is limited due to retained contrast in the small bowel from prior attempt at small bowel follow-through on the floor. Small bowel remains dilated, measuring approximately 5.2 cm, which is consistent with partial or resolving small bowel obstruction. Final radiographs show contrast within the colon. · 1/4/2020-resolution of small bowel obstruction. · 1/7/21 CT chest: No finding to suggest intrathoracic neoplastic process or metastatic disease.  The benign-appearing tiny nodule in the right upper lobe probably represent a noncalcified granuloma. A nodule in the lingular segment of the left upper lobe is not visualized in this study. Postsurgical changes of the liver. No evidence of focal complication. A trace right basal pleural effusion. This may be reactive to the previous abdominal surgery. · 3/4/21 MRI abd: Status post right hepatectomy and microwave ablation of a left liver lesion. No findings to suggest residual/recurrent disease. No new liver lesion is identified. Postsurgical changes related to right hemicolectomy. Microwave ablation zone in segment II of the left hepatic lobe measures approximately 21 mm in diameter, unchanged. No appreciable postcontrast enhancement or other findings to suggest local disease recurrence. No new liver lesion is identified. Spleen is mildly enlarged, measuring 13.8 cm in length. · 3/17/2021-1 year colonoscopy showed no evidence of polyps. · 5/3/21 CEA 6.2  · 6/8/21 MRI abdomen ([place]): Status post right hepatectomy and MWA of a left liver lesion.  No evidence of residual or recurrent metastatic disease in the liver. Status post prior right hemicolectomy for colon carcinoma. Within the posterior right iliopsoas muscle there is a mildly T2 hyperintense nodular focus with postcontrast rim enhancement and diffusion restriction. This measures approximately 2.7 x 2 x 2 cm. More delayed postcontrast images show irregular area of enhancement measuring 2.4 x 7.7 cm axially involving the right iliopsoas muscle and the right lateral abdominal wall. Suggest correlation with contrast enhanced CT exam for complete evaluation. Consider biopsy of this lesion. · 6/15/21 CT CHEST WO CONTRAST No metastatic disease in the chest.  No change in tiny 2 mm RIGHT upper lobe pulmonary nodule. Mild ectasia of the ascending aorta measuring 4 cm. · 6/18/2021-I reviewed results of MRI abdomen at [facility]. CT chest without contrast reviewed by me and showed no evidence of metastatic disease.   Stable 2 mm right upper lobe nodule. Discussed with hepatobiliary service at [facility]. Biopsy intra-abdominal nodule next week at [facility]. · 6/25/[id number]-QJ-pzajle biopsy soft tissue mass right psoas muscle at [facility] consistent with recurrent colonic adenocarcinoma. · 7/2/2021-discussed with hepatobiliary service at [facility] and also surgical oncology. Surgical oncology will call us back regarding consultation for consideration of HIPEC if he is a candidate  · 7/13/21-initiation of chemotherapy with FOLFIRI + Avastin every 2 weeks  · 7/13/21 CEA 10.7  · 7/27/2021-CEA 9.6  · 7/30/2021-she was seen by the surgical oncology group at [facility] by Dr Glenna Cadena. They recommended to complete 6 cycles of chemotherapy and repeat CT chest abdomen pelvis and liver MRI to assess disease response. They discussed the role of CRS/HIPEC in his situation. He was told that it really depends on the status of his liver lesions as well. He will complete 6 cycles of chemotherapy and will return to see me with a CTAP as well as MRI of the liver to assess disease burden and determine if he can be a candidate for debulking. · 8/25/2021-proceed cycle #4. · 9/22/2021 CEA- 8.1  · 9/24/2021 MRI with and w/o Contrast ([place])  Tiny left retroperitoneal nodule involving the left lateral conal fascial new compared to 3/4/2021 though unchanged from most recent prior, possibly an additional site of metastasis. No other new metastatic disease within the abdomen. Similar partially visualized right posterior body wall/psoas infiltrative lesion not definitely changed from MRI abdomen 6/8/2021 but better evaluated in its entirety on concurrent CT, reported separately.   Status post right hemicolectomy, right hepatectomy, and segment III microwave ablation. Similar mild splenomegaly.  Additional chronic and incidental findings as described in the body the report. Liver: Status post right hepatectomy.  Ablation zone in segment II measures 1.7 x 1.1 cm, slightly decreased in size from 1.8 x 1.4 cm previously (series 1301 image 56) without appreciable enhancement. Similar tiny subcentimeter cyst in the left hepatic lobe. No new focal hepatic lesion identified. No visualized free fluid. Similar 0.7 cm enhancing nodule along the left lateral conal fascia laterally new from 3/4/2021, grossly unchanged from MRI dated 6/8/2021. (series 801 image 22). No other appreciable peritoneal/retroperitoneal or  omental nodularity. Ill-defined T2 hyperintense signal and hyperenhancement in the right posteromedial body wall involving the lateral aspect of the psoas muscle and posterolateral abdominal wall musculature, partially visualized measuring at least 8.7 x 3.1   cm, grossly similar to the prior exam, better evaluated in its entirety on concurrent CT reported separately. · 9/24/2021 CT C/A/P w/ Contrast([place]) Status post right hemicolectomy, right hepatectomy and left hepatic microwave ablation without new suspicious hepatic lesion.  Left lateral perirenal fascial nodule, which is stable compared to 6/8/2021 MRI, but new compared to 3/4/2021 MRI is concerning for an additional site of metastatic disease.  No sites of new or enlarging metastatic disease in the chest.  Similar appearance of right lateral psoas and posterior abdominal wall infiltrative lesion, not significantly changed compared to 6/8/2021 MRI.  Mild splenomegaly.  Additional findings as outlined in the body the report. Biopsy-proven adenocarcinoma involving the posterior lateral aspect of the right iliopsoas muscle and right lateral abdominal wall. Right iliopsoas component is difficult to measure but appears to be approximately 2.5 x 2.4 cm (series 2, image 153), similar to prior MRI.  Nodular thickening noted along the right posterior abdominal wall and possibly involving the the transversus abdominis measures approximately 8.5 x 1.8 cm (series 2 image 153) overall similar compared to prior MRI.  · 10/6/2021-discussed the results of recent CT abdomen/pelvis and MRI abdomen/pelvis at [address] [place] [address]. Persistent disease involving the psoas muscle. Discussed with colorectal surgery at [address] [place] [address]. They recommend to continue current therapy for another 2 months and reassess with CT scans. · 12/3/21 CT chest/abd/pelvis [facility]): Status post prior right hemicolectomy, right hepatectomy and left hepatic lesions microwave ablation. No new liver lesion. Soft tissue thickening of the right lower posterior abdominal wall involving the iliopsoas muscle is grossly unchanged consistent with improving metastatic disease. Small right omental nodule and left lateral conal fascia nodule unchanged compared to  recent exam.   · 1/13/22 Exploratory lap with resections of psoas muscle mass and HIPEC by Dr. Glenna Cadena at [facility]:  Metastatic colorectal adenocarcinoma involving fibroadipose tissue. IHC MMR proficient. · 1/24/22 CT chest/abd/pelvis [facility]): Extensive postsurgical changes in the abdomen including partial right colectomy, resection of right retroperitoneal mass, omentectomy and mesh repair of right lateral abdominal wall defect. There appears to be a defect in the mesh containing herniated loops of small bowel. Extensive diffuse dilated small bowel loops with air-fluid levels are seen throughout the abdomen. There are segmental areas of decompressed small bowel in the left upper quadrant as well as adjacent to the herniated small  bowel loops in the right retroperitoneum. Air-fluid levels are also seen throughout the colon. While pattern could likely represent postoperative ileus given the diffuse small bowel dilatation and distal colonic air and fluid, developing or partial small bowel obstruction secondary to the right lateral abdominal wall hernia cannot entirely be excluded. Small ascites. 8 mm nodule along the left lateral conal fascia is unchanged.  Slight increase in size of cardiophrenic lymph nodes measuring up to 7 mm anterior to the right hemidiaphragm. Stable hypodensity in the left hepatic lobe. Diffuse gastric wall thickening/edema could be secondary to gastritis in the appropriate clinical setting. CEA dynamics:  3/11/20 CEA 5.5  8/19/20 CEA 2.4  9/2/20 CEA 2.7  9/16/20 CEA 2.7  9/30/20 CEA 2.7  10/19/20 CEA 2.3  1/4/21 CEA 2.2  5/3/21 CEA 6.2  6/15/21 CEA 8.3  7/13/21 CEA 10.7  7/27/21 CEA 9.6  8/12/21/21 CEA 8.2  8/25/2021-CEA 8.9  9/22/2021 CEA 8.1    PAST MEDICAL HISTORY:   Past Medical History:   Diagnosis Date    Acid reflux     Cancer ([facility] [address]     adenocarcinoma of colon    GERD (gastroesophageal reflux disease)     PTSD (post-traumatic stress disorder)       PAST SURGICAL HISTORY:  Past Surgical History:   Procedure Laterality Date    ABLATION OF DYSRHYTHMIC FOCUS      ablation of liver at [address] [place] [address] Drive  2003    CHOLECYSTECTOMY  2013    COLONOSCOPY      when pt was in the 19's    COLONOSCOPY N/A 03/11/2020    COLONOSCOPY POLYPECTOMY SNARE/COLD BIOPSY: Dr. Manriquez [place] colonoscopy: 6-12 months due to findings at colonoscopy today with Cecal mass lesion and large polyps.     COLONOSCOPY      COLONOSCOPY N/A 03/17/2021    Dr Yvrose Hurley, Post-operative changes w IC anastomosis & single visible staple, Mild Diverticuosis, Int Hemorrhoids Grade 1, 3 year recall    HEMICOLECTOMY Right 03/30/2020    LAPAROSCOPIC-ASSISTED RIGHT HEMICOLECTOMY performed by Pooja Rodriguez MD at [address] [place] [address] N/A 06/03/2020    INSERTION OF VENOUS PORT with flouro performed by Pooja Rodriguez MD at [place] [address] ENDOSCOPY N/A 03/11/2020    Dr. Lars De Anda:   [facility]      SOCIAL HISTORY:  Social History     Socioeconomic History    Marital status:      Spouse name: Not on file    Number of children: Not on file    Years of education: Not on file    Highest education level: Not on file   Occupational History    Not on file   Tobacco Use    Smoking status: Never Smoker    Smokeless tobacco: Never Used   Vaping Use    Vaping Use: Never used   Substance and Sexual Activity    Alcohol use: Yes     Comment: rare     Drug use: No    Sexual activity: Yes     Partners: Female   Other Topics Concern    Not on file   Social History Narrative    Not on file     Social Determinants of Health     Financial Resource Strain:     Difficulty of Paying Living Expenses: Not on file   Food Insecurity:     Worried About Running Out of Food in the Last Year: Not on file    [place] of Food in the Last Year: Not on file   Transportation Needs:     Lack of Transportation (Medical): Not on file    Lack of Transportation (Non-Medical):  Not on file   Physical Activity:     Days of Exercise per Week: Not on file    Minutes of Exercise per Session: Not on file   Stress:     Feeling of Stress : Not on file   Social Connections:     Frequency of Communication with Friends and Family: Not on file    Frequency of Social Gatherings with Friends and Family: Not on file    Attends [employer] Services: Not on file    Active Member of [address] [place] [address] or Organizations: Not on file    Attends Club or Organization Meetings: Not on file    Marital Status: Not on file   Intimate Partner Violence:     Fear of Current or Ex-Partner: Not on file    Emotionally Abused: Not on file    Physically Abused: Not on file    Sexually Abused: Not on file   Housing Stability:     Unable to Pay for Housing in the Last Year: Not on file    Number of [place] in the Last Year: Not on file    Unstable Housing in the Last Year: Not on file     FAMILY HISTORY:  Family History   Problem Relation Age of Onset    Liver Cancer Mother     High Blood Pressure Mother     Colon Cancer Mother         x2    Cancer Father         Lung Cancer    Breast Cancer Sister     Cancer Maternal Grandfather         Lung Cancer    Cancer Paternal Grandfather         Stomach Cancer    Colon Polyps Neg Hx     Cystic Fibrosis Neg Hx     Liver Disease Neg Hx     Rectal Cancer Neg Hx         Current Outpatient Medications   Medication Sig Dispense Refill    chlorproMAZINE (THORAZINE) 25 MG tablet Take 1 tablet by mouth 3 times daily 90 tablet 3    dronabinol (MARINOL) 2.5 MG capsule Take 1 capsule by mouth 2 times daily (before meals) for 30 days. 60 capsule 0    potassium chloride (KLOR-CON M) 20 MEQ extended release tablet Take 1 tablet by mouth 3 times daily 90 tablet 1    promethazine (PHENERGAN) 12.5 MG tablet Take 1 tablet by mouth every 6 hours as needed for Nausea 60 tablet 5    ondansetron (ZOFRAN) 8 MG tablet Take 1 tablet by mouth every 8 hours as needed for Nausea or Vomiting 30 tablet 5    omeprazole (PRILOSEC) 20 MG delayed release capsule Take 20 mg by mouth daily      Cholecalciferol (VITAMIN D3) 50 MCG (2000 UT) CAPS Take by mouth daily      folic acid (FOLVITE) 1 MG tablet Take 1 mg by mouth daily      prazosin (MINIPRESS) 1 MG capsule Take 1 mg by mouth nightly For nightmares      buPROPion (WELLBUTRIN) 100 MG tablet Take 100 mg by mouth every morning For mood      topiramate (TOPAMAX) 100 MG tablet Take 100 mg by mouth daily For Seizures or Migraines      Sertraline HCl (ZOLOFT PO) Take by mouth daily      Zolpidem Tartrate (AMBIEN PO) Take by mouth nightly       No current facility-administered medications for this visit.      Facility-Administered Medications Ordered in Other Visits   Medication Dose Route Frequency Provider Last Rate Last Admin    sodium chloride flush 0.9 % injection 5-40 mL  5-40 mL IntraVENous PRZOFIA Kearney MD   10 mL at 02/07/22 1655    heparin flush 100 UNIT/ML injection 500 Units  500 Units IntraCATHeter PRZOFIA Kearney MD   500 Units at 02/07/22 1655        REVIEW OF SYSTEMS:   CONSTITUTIONAL: no fever, no night sweats, fatigue;  HEENT: no blurring of vision, no double vision, no hearing difficulty, no tinnitus, no ulceration, no dysplasia, no epistaxis;   LUNGS: no cough, no hemoptysis, no wheeze,  no shortness of breath;  CARDIOVASCULAR: no palpitation, no chest pain, no shortness of breath;  GI: no abdominal pain, no nausea, no vomiting, no diarrhea, no constipation;  ANNIE: no dysuria, no hematuria, no frequency or urgency, no nephrolithiasis;  MUSCULOSKELETAL: no joint pain, no swelling, no stiffness;  ENDOCRINE: no polyuria, no polydipsia, no cold or heat intolerance;  HEMATOLOGY: no easy bruising or bleeding, no history of clotting disorder;  DERMATOLOGY: no skin rash, no eczema, no pruritus;  PSYCHIATRY: no depression, no anxiety, no panic attacks, no suicidal ideation, no homicidal ideation;  NEUROLOGY: no syncope, no seizures, no numbness or tingling of hands, no numbness or tingling of feet, no paresis;    Vitals signs:  /86 (Site: Left Upper Arm, Position: Supine)   Pulse 93   Resp 20   Ht 5' 7\" (1.702 m)   Wt 155 lb (70.3 kg)   SpO2 98%   BMI 24.28 kg/m²           PHYSICAL EXAM:  CONSTITUTIONAL: Alert, appropriate, ill-appearing  EYES: Non icteric, EOM intact, pupils equal round and reactive to light and accommodation. ENT: Oral mucus membranes moist, no oral pharyngeal lesions. External inspection of ears and nose are normal.   NECK: Supple, no masses. No palpable thyroid mass    CHEST/LUNGS: CTA bilaterally, normal respiratory effort   CARDIOVASCULAR: RRR, no murmurs. No lower extremity edema   ABDOMEN: Soft, tender, distended, decreased bowel sounds,  no hepatosplenomegaly. No palpable masses. EXTREMITIES: warm, Full ROM of all fours extremities. No focal weakness. SKIN: warm, dry with no rashes or lesions  LYMPH: No cervical, clavicular, axillary, or inguinal lymphadenopathy    Relevant Lab findings/reviewed by me:  1/13/22 Exploratory lap by Dr. Glenna Cadena:  Metastatic colorectal adenocarcinoma involving fibroadipose tissue.    Immunohistochemistry Testing (IHC) for Mismatch Repair Proteins Performed on Part 4   MLH1 - Intact nuclear expression   MSH2 - Intact nuclear expression   MSH6 - Intact nuclear expression   PMS2 - Intact nuclear expression   Background nonneoplastic tissue/internal control with intact nuclear expression   IHC Interpretation   No loss of nuclear expression of MMR proteins: low probability of MSI-H     Relevant Imaging studies/reviewed by me:  12/3/21 CT chest/abd/pelvis [facility]): Status post prior right hemicolectomy, right hepatectomy and left hepatic lesions microwave ablation. No new liver lesion. Soft tissue thickening of the right lower posterior abdominal wall involving the iliopsoas muscle is grossly unchanged consistent with improving metastatic disease. Small right omental nodule and left lateral conal fascia nodule unchanged compared to  recent exam.     1/24/22 CT chest/abd/pelvis [facility]): Extensive postsurgical changes in the abdomen including partial right colectomy, resection of right retroperitoneal mass, omentectomy and mesh repair of right lateral abdominal wall defect. There appears to be a defect in the mesh containing herniated loops of small bowel. Extensive diffuse dilated small bowel loops with air-fluid levels are seen throughout the abdomen. There are segmental areas of decompressed small bowel in the left upper quadrant as well as adjacent to the herniated small  bowel loops in the right retroperitoneum. Air-fluid levels are also seen throughout the colon. While pattern could likely represent postoperative ileus given the diffuse small bowel dilatation and distal colonic air and fluid, developing or partial small bowel obstruction secondary to the right lateral abdominal wall hernia cannot entirely be excluded. A critical alert was sent at 7:15 PM. Small ascites. 8 mm nodule along the left lateral conal fascia is unchanged. Slight increase in size of cardiophrenic lymph nodes measuring up to 7 mm anterior to the right hemidiaphragm. Stable hypodensity in the left hepatic lobe.  Diffuse gastric wall thickening/edema could be secondary to gastritis in the appropriate clinical setting. ASSESSMENT:    Orders Placed This Encounter   Procedures    CEA    CBC Auto Differential     Standing Status:   Standing     Number of Occurrences:   12     Standing Expiration Date:   2/7/2023    Comprehensive Metabolic Panel     Standing Status:   Standing     Number of Occurrences:   12     Standing Expiration Date:   2/7/2023    Magnesium     Standing Status:   Standing     Number of Occurrences:   12     Standing Expiration Date:   2/7/2023    Phosphorus     Standing Status:   Standing     Number of Occurrences:   12     Standing Expiration Date:   2/7/2023    External Referral To Home Health     Referral Priority:   Urgent     Referral Type:   Eval and Treat     Referral Reason:   Specialty Services Required     Requested Specialty:   [address]     Number of Visits Requested:   1      Denise was seen today for cancer.     Diagnoses and all orders for this visit:    Failure to thrive in adult  -     External Referral To Home Health  -     CBC Auto Differential; Standing  -     Comprehensive Metabolic Panel; Standing  -     Magnesium; Standing  -     Phosphorus; Standing    Dehydration  -     External Referral To Home Health  -     CBC Auto Differential; Standing  -     Comprehensive Metabolic Panel; Standing  -     Magnesium; Standing  -     Phosphorus; Standing    Care plan discussed with patient    Adenocarcinoma of colon (HCC)  -     CBC Auto Differential; Standing  -     Comprehensive Metabolic Panel; Standing  -     Magnesium; Standing  -     Phosphorus; Standing    Liver metastases (HCC)  -     CBC Auto Differential; Standing  -     Comprehensive Metabolic Panel; Standing  -     Magnesium; Standing  -     Phosphorus; Standing    Mass of psoas muscle    Neutrophilic leukocytosis    Acute kidney injury (MARIO) with acute tubular necrosis (ATN) (HCC)  -     CBC Auto Differential; Standing  - Comprehensive Metabolic Panel; Standing  -     Magnesium; Standing  -     Phosphorus; Standing    Physical deconditioning  -     External Referral To Home Health  -     CBC Auto Differential; Standing  -     Comprehensive Metabolic Panel; Standing  -     Magnesium; Standing  -     Phosphorus; Standing    Hiccups  -     chlorproMAZINE (THORAZINE) 25 MG tablet; Take 1 tablet by mouth 3 times daily    Decreased appetite  -     dronabinol (MARINOL) 2.5 MG capsule; Take 1 capsule by mouth 2 times daily (before meals) for 30 days. Coordination of complex care  Comments:  Discussed with Dr. Dionisio Corbin regarding coordination of care. Other orders  -     CEA       #Colonic adenocarcinoma-  [id number] (liver) K-maria luisa mutated, IHC MMR not proficient  Status post microwave ablation left hepatic lesion  Status post neoadjuvant FOLFOX/bevacizumab x11 biweekly cycles  Status post right hemicolectomy. Pathology consistent complete pathologic response. Recommended surveillance as per NCCN guidelines  Discussed at length results of pathology with the patient. 3/17/21- 1 year colonoscopy showed no large polyps or masses. Repeat in 3 years. June 2021-CT chest clear. MRI abdomen showed suspicious lesion in the right lower quadrant. Biopsy arranged [place]. Discussed with hepatobiliary service at [facility]. 6/25/[id number] biopsy consistent with recurrent primary colorectal adenocarcinoma. 7/2/2021-discussed with hepatobiliary service/surgical oncology at [facility]. They will review images and call the patient back regarding consultation for consideration of HIPEC  7/2/2021-they recommend systemic therapy. 7/2/2021-recommended FOLFIRI/Avastin  7/13/21- Initiated FOLFIRI + Avastin every 2 weeks  07/30/21-Seen at [facility] by GI surgeon oncology. They discussed the role of CRS/HIPEC in his situation. He was told hat it really depends on the status of his liver lesions as well.  He will complete 6 cycles of chemotherapy and will return to see me with a CTAP as well as MRI of the liver to assess disease burden and determine if he can be a candidate for debulking.    8/25/2021-proceed with FOLFIRI/Avastin   9/24/2021-repeat MRI abdomen/CT abdomen showed persistent disease. -Exploratory laparotomy/resection of psoas mass/HIPEC at [facility]. 2/7/2022-postoperative dehydration and electrolyte abnormalities. Home health arrangements IV fluids twice a week. Local regional recurrence colonic adenocarcinoma, June 2021  MRI abdomen at [facility] showed mildly T2 hyperintense nodular focus with postcontrast rim enhancement and diffusion restriction. This measures approximately 2.7 x 2 x   2 cm. More delayed postcontrast images show irregular area of enhancement measuring 2.4 x 7.7 cm axially involving the right iliopsoas muscle and the right lateral abdominal wall. 6/25/[id number] biopsy consistent with recurrent primary colorectal adenocarcinoma. 7/2/2021-discussed with hepatobiliary service/surgical oncology at [facility]. They will review images and call the patient back regarding consultation for consideration ofHIPEC  7/2/2021-they recommend systemic therapy. 7/2/2021-recommended FOLFIRI/Avastin  7/12/2021-FOLFIRI/Avastin cycle #1  7/13/2021-CEA 10.7  8/12/21/21 CEA 8.2  8/12/2021-CEA 8.9  9/22/2021-CEA 8.1  10/20/2021-CEA 9.1    #Chronic kidney disease stage III- creatinine 1.2 ->1.5-> 1.4->1.6->2  Encouraged to increase PO fluid intake     #Liver metastasis- plan as above. Status post ablation left liver lobe lesion at [facility] on 6/8/2020. Status post neoadjuvant FOLFOX/bevacizumab x11 biweekly cyclesStatus post right hemicolectomy. Pathology consistent complete pathologic response. 3/4/2021-MRI abdomen was unremarkable  6/8/2021-MRI abdomen showed Postsurgical changes of right hepatectomy. Redemonstration of an ablation zone in segment II, measuring up to 1.7 cm, previously 1.8 cm.  No evidence of postcontrast enhancement.  No new lesion identified. 9/24/2021-MRI abdomen [facility] showed Liver: Status post right hepatectomy. Ablation zone in segment II measures 1.7 x 1.1 cm, slightly decreased in size from 1.8 x 1.4 cm previously (series 1301 image 56) without apreciable enhancement. Similar tiny subcentimeter cyst in the left hepatic lobe. No new focal hepatic lesion identified.     #Iron deficiency anemia-  hemoglobin 8.5/MCV 89. Ferritin 12.9, iron saturation 15%, TIBC 458, iron 72. Status post IV iron therapy. Hemoglobin 13.4     #Chemotherapy-induced neuropathy-stable, mild    #Cancer related pain-continue Percocet-refilled today     #Treatment related side effect-fatigue, mild neuropathy    #Cancer related pain-continue Percocet. #Acute kidney injury-secondary to dehydration nausea/vomiting. IV fluids  -IV fluids normal saline 1500 cc    #Hypokalemia/hyponatremia  -IV fluids normal saline.  -Home health with IV fluids twice a week  -40 mEq IV potassium replacement  -Potassium chloride 20 mEq 3 times daily    Protein calorie malnutrition  -13 pound weight loss since November 2021.  -May consider TPN in a week or 2 if no recovery. Wt Readings from Last 3 Encounters:   02/07/22 155 lb (70.3 kg)   02/07/22 155 lb (70.3 kg)   11/17/21 168 lb (76.2 kg)     Nausea/vomiting  -Improved in the last 24 hours  -Like postoperative ileus     PLAN:  · Continue follow-up with [facility]/Dr. Marie Da Silva in December  · Continue follow-up with Dr. Arron Salgado/[facility]  · RTC with MD 4 weeks in treatment room  · CBC CMP CEA today  · Continue Percocet today 7.5 mg every 6 hours as needed-script sent  · Increase p.o. fluid intake  · Refill Percocet today. · Discussed with Dr. Jose L Johnson today to coordinate care      Follow Up:     No follow-ups on file.    Data Prasanth Patel am pre-charting as a registered nurse for Crala Kearney MD. Electronically signed by Vannessa Yung RN on 2/7/2022 at 2:46 PM CST.    I, Kristina Robert, am scribing for Sallie Gooden MD. Electronically signed by Kristina Robert RN on 2/7/2022 at 3:35 PM CST. I have seen, examined and reviewed this patient medication list, appropriate labs and imaging studies. I reviewed relevant medical records and others physicians notes. I discussed the plans of care with the patient. I answered all the questions to the patients satisfaction. I have also reviewed the chief complaint (CC) and part of the history (History of Present Illness (HPI), Past Family Social History [facility]), or Review of Systems (ROS) and made changes when appropriated. (Please note that portions of this note were completed with a voice recognition program. Efforts were made to edit the dictations but occasionally words are mis-transcribed.)  (Please note that portions of this note were completed with a voice recognition program. Efforts were made to edit the dictations but occasionally words are mis-transcribed. )Electronically signed by Sallie Gooden MD on 2/7/2022 at 5:15 PM

## 2022-02-08 ENCOUNTER — APPOINTMENT (OUTPATIENT)
Dept: CT IMAGING | Age: 67
End: 2022-02-08
Payer: OTHER GOVERNMENT

## 2022-02-08 ENCOUNTER — APPOINTMENT (OUTPATIENT)
Dept: GENERAL RADIOLOGY | Age: 67
End: 2022-02-08
Payer: OTHER GOVERNMENT

## 2022-02-08 ENCOUNTER — HOSPITAL ENCOUNTER (EMERGENCY)
Age: 67
Discharge: ANOTHER HOSPITAL | End: 2022-02-09
Attending: EMERGENCY MEDICINE
Payer: OTHER GOVERNMENT

## 2022-02-08 DIAGNOSIS — K56.609 SMALL BOWEL OBSTRUCTION (HCC): Primary | ICD-10-CM

## 2022-02-08 DIAGNOSIS — J18.9 PNEUMONIA OF RIGHT LOWER LOBE DUE TO INFECTIOUS ORGANISM: ICD-10-CM

## 2022-02-08 DIAGNOSIS — E87.1 HYPONATREMIA: ICD-10-CM

## 2022-02-08 DIAGNOSIS — N17.9 AKI (ACUTE KIDNEY INJURY) (HCC): ICD-10-CM

## 2022-02-08 DIAGNOSIS — K66.8 FREE INTRAPERITONEAL AIR: ICD-10-CM

## 2022-02-08 LAB
ALBUMIN SERPL-MCNC: 2.7 G/DL (ref 3.5–5.2)
ALP BLD-CCNC: 221 U/L (ref 40–130)
ALT SERPL-CCNC: 56 U/L (ref 5–41)
ANION GAP SERPL CALCULATED.3IONS-SCNC: 22 MMOL/L (ref 7–19)
AST SERPL-CCNC: 40 U/L (ref 5–40)
BANDED NEUTROPHILS RELATIVE PERCENT: 22 % (ref 0–5)
BASOPHILS ABSOLUTE: 0 K/UL (ref 0–0.2)
BASOPHILS RELATIVE PERCENT: 0 % (ref 0–1)
BILIRUB SERPL-MCNC: 0.8 MG/DL (ref 0.2–1.2)
BUN BLDV-MCNC: 76 MG/DL (ref 8–23)
CALCIUM SERPL-MCNC: 9.2 MG/DL (ref 8.8–10.2)
CHLORIDE BLD-SCNC: 86 MMOL/L (ref 98–111)
CO2: 17 MMOL/L (ref 22–29)
CREAT SERPL-MCNC: 2.2 MG/DL (ref 0.5–1.2)
EOSINOPHILS ABSOLUTE: 0 K/UL (ref 0–0.6)
EOSINOPHILS RELATIVE PERCENT: 0 % (ref 0–5)
GFR AFRICAN AMERICAN: 37
GFR NON-AFRICAN AMERICAN: 30
GLUCOSE BLD-MCNC: 135 MG/DL (ref 74–109)
HCT VFR BLD CALC: 39.3 % (ref 42–52)
HEMOGLOBIN: 13.1 G/DL (ref 14–18)
IMMATURE GRANULOCYTES #: 0 K/UL
LACTIC ACID: 7.1 MMOL/L (ref 0.5–1.9)
LIPASE: 16 U/L (ref 13–60)
LYMPHOCYTES ABSOLUTE: 0.7 K/UL (ref 1.1–4.5)
LYMPHOCYTES RELATIVE PERCENT: 9 % (ref 20–40)
MCH RBC QN AUTO: 30.2 PG (ref 27–31)
MCHC RBC AUTO-ENTMCNC: 33.3 G/DL (ref 33–37)
MCV RBC AUTO: 90.6 FL (ref 80–94)
METAMYELOCYTES RELATIVE PERCENT: 1 %
MONOCYTES ABSOLUTE: 0.1 K/UL (ref 0–0.9)
MONOCYTES RELATIVE PERCENT: 1 % (ref 0–10)
MYELOCYTE PERCENT: 3 %
NEUTROPHILS ABSOLUTE: 6.8 K/UL (ref 1.5–7.5)
NEUTROPHILS RELATIVE PERCENT: 64 % (ref 50–65)
PDW BLD-RTO: 13.4 % (ref 11.5–14.5)
PLATELET # BLD: 260 K/UL (ref 130–400)
PLATELET SLIDE REVIEW: ADEQUATE
PMV BLD AUTO: 11.5 FL (ref 9.4–12.4)
POTASSIUM REFLEX MAGNESIUM: 3.6 MMOL/L (ref 3.5–5)
RBC # BLD: 4.34 M/UL (ref 4.7–6.1)
RBC # BLD: NORMAL 10*6/UL
SARS-COV-2, NAAT: NOT DETECTED
SODIUM BLD-SCNC: 125 MMOL/L (ref 136–145)
TOTAL PROTEIN: 6.1 G/DL (ref 6.6–8.7)
WBC # BLD: 7.5 K/UL (ref 4.8–10.8)

## 2022-02-08 PROCEDURE — 71045 X-RAY EXAM CHEST 1 VIEW: CPT

## 2022-02-08 PROCEDURE — 2580000003 HC RX 258: Performed by: EMERGENCY MEDICINE

## 2022-02-08 PROCEDURE — 96361 HYDRATE IV INFUSION ADD-ON: CPT

## 2022-02-08 PROCEDURE — 96375 TX/PRO/DX INJ NEW DRUG ADDON: CPT

## 2022-02-08 PROCEDURE — 99285 EMERGENCY DEPT VISIT HI MDM: CPT

## 2022-02-08 PROCEDURE — 83605 ASSAY OF LACTIC ACID: CPT

## 2022-02-08 PROCEDURE — 87040 BLOOD CULTURE FOR BACTERIA: CPT

## 2022-02-08 PROCEDURE — 80053 COMPREHEN METABOLIC PANEL: CPT

## 2022-02-08 PROCEDURE — 36415 COLL VENOUS BLD VENIPUNCTURE: CPT

## 2022-02-08 PROCEDURE — 74176 CT ABD & PELVIS W/O CONTRAST: CPT

## 2022-02-08 PROCEDURE — 6360000002 HC RX W HCPCS: Performed by: EMERGENCY MEDICINE

## 2022-02-08 PROCEDURE — 85025 COMPLETE CBC W/AUTO DIFF WBC: CPT

## 2022-02-08 PROCEDURE — 83690 ASSAY OF LIPASE: CPT

## 2022-02-08 PROCEDURE — 87635 SARS-COV-2 COVID-19 AMP PRB: CPT

## 2022-02-08 RX ORDER — CHLORPROMAZINE HYDROCHLORIDE 25 MG/ML
25 INJECTION INTRAMUSCULAR ONCE
Status: COMPLETED | OUTPATIENT
Start: 2022-02-08 | End: 2022-02-08

## 2022-02-08 RX ORDER — SODIUM CHLORIDE, SODIUM LACTATE, POTASSIUM CHLORIDE, AND CALCIUM CHLORIDE .6; .31; .03; .02 G/100ML; G/100ML; G/100ML; G/100ML
1000 INJECTION, SOLUTION INTRAVENOUS ONCE
Status: COMPLETED | OUTPATIENT
Start: 2022-02-08 | End: 2022-02-09

## 2022-02-08 RX ORDER — PROMETHAZINE HYDROCHLORIDE 25 MG/ML
25 INJECTION, SOLUTION INTRAMUSCULAR; INTRAVENOUS ONCE
Status: COMPLETED | OUTPATIENT
Start: 2022-02-08 | End: 2022-02-08

## 2022-02-08 RX ORDER — SODIUM CHLORIDE, SODIUM LACTATE, POTASSIUM CHLORIDE, CALCIUM CHLORIDE 600; 310; 30; 20 MG/100ML; MG/100ML; MG/100ML; MG/100ML
INJECTION, SOLUTION INTRAVENOUS CONTINUOUS
Status: DISCONTINUED | OUTPATIENT
Start: 2022-02-08 | End: 2022-02-09 | Stop reason: HOSPADM

## 2022-02-08 RX ORDER — HYDROMORPHONE HYDROCHLORIDE 1 MG/ML
1 INJECTION, SOLUTION INTRAMUSCULAR; INTRAVENOUS; SUBCUTANEOUS ONCE
Status: COMPLETED | OUTPATIENT
Start: 2022-02-08 | End: 2022-02-08

## 2022-02-08 RX ADMIN — SODIUM CHLORIDE, POTASSIUM CHLORIDE, SODIUM LACTATE AND CALCIUM CHLORIDE 1000 ML: 600; 310; 30; 20 INJECTION, SOLUTION INTRAVENOUS at 21:11

## 2022-02-08 RX ADMIN — PROMETHAZINE HYDROCHLORIDE 25 MG: 25 INJECTION INTRAMUSCULAR; INTRAVENOUS at 21:11

## 2022-02-08 RX ADMIN — CHLORPROMAZINE HYDROCHLORIDE 25 MG: 25 INJECTION INTRAMUSCULAR at 22:03

## 2022-02-08 RX ADMIN — SODIUM CHLORIDE, SODIUM LACTATE, POTASSIUM CHLORIDE, AND CALCIUM CHLORIDE 1000 ML: 600; 310; 30; 20 INJECTION, SOLUTION INTRAVENOUS at 23:41

## 2022-02-08 RX ADMIN — SODIUM CHLORIDE, POTASSIUM CHLORIDE, SODIUM LACTATE AND CALCIUM CHLORIDE: 600; 310; 30; 20 INJECTION, SOLUTION INTRAVENOUS at 23:36

## 2022-02-08 RX ADMIN — HYDROMORPHONE HYDROCHLORIDE 1 MG: 1 INJECTION, SOLUTION INTRAMUSCULAR; INTRAVENOUS; SUBCUTANEOUS at 23:03

## 2022-02-08 ASSESSMENT — ENCOUNTER SYMPTOMS
ABDOMINAL DISTENTION: 1
SHORTNESS OF BREATH: 0
COUGH: 1
BACK PAIN: 0
VOMITING: 1
DIARRHEA: 1
ABDOMINAL PAIN: 1
SORE THROAT: 0
NAUSEA: 1
RHINORRHEA: 0

## 2022-02-08 ASSESSMENT — PAIN SCALES - GENERAL
PAINLEVEL_OUTOF10: 7
PAINLEVEL_OUTOF10: 9

## 2022-02-09 ENCOUNTER — HOSPITAL ENCOUNTER (OUTPATIENT)
Dept: INFUSION THERAPY | Age: 67
End: 2022-02-09

## 2022-02-09 VITALS
BODY MASS INDEX: 24.28 KG/M2 | DIASTOLIC BLOOD PRESSURE: 81 MMHG | OXYGEN SATURATION: 94 % | WEIGHT: 155 LBS | SYSTOLIC BLOOD PRESSURE: 100 MMHG | RESPIRATION RATE: 18 BRPM | TEMPERATURE: 98.4 F | HEART RATE: 115 BPM

## 2022-02-09 LAB
EKG P AXIS: 57 DEGREES
EKG P-R INTERVAL: 204 MS
EKG Q-T INTERVAL: 346 MS
EKG QRS DURATION: 82 MS
EKG QTC CALCULATION (BAZETT): 449 MS
EKG T AXIS: 61 DEGREES

## 2022-02-09 PROCEDURE — 93005 ELECTROCARDIOGRAM TRACING: CPT | Performed by: EMERGENCY MEDICINE

## 2022-02-09 PROCEDURE — 96365 THER/PROPH/DIAG IV INF INIT: CPT

## 2022-02-09 PROCEDURE — 96376 TX/PRO/DX INJ SAME DRUG ADON: CPT

## 2022-02-09 PROCEDURE — 2580000003 HC RX 258: Performed by: EMERGENCY MEDICINE

## 2022-02-09 PROCEDURE — 96361 HYDRATE IV INFUSION ADD-ON: CPT

## 2022-02-09 PROCEDURE — 6360000002 HC RX W HCPCS: Performed by: EMERGENCY MEDICINE

## 2022-02-09 RX ORDER — LEVOFLOXACIN 5 MG/ML
750 INJECTION, SOLUTION INTRAVENOUS ONCE
Status: COMPLETED | OUTPATIENT
Start: 2022-02-09 | End: 2022-02-09

## 2022-02-09 RX ORDER — HYDROMORPHONE HYDROCHLORIDE 1 MG/ML
1 INJECTION, SOLUTION INTRAMUSCULAR; INTRAVENOUS; SUBCUTANEOUS ONCE
Status: COMPLETED | OUTPATIENT
Start: 2022-02-09 | End: 2022-02-09

## 2022-02-09 RX ADMIN — LEVOFLOXACIN 750 MG: 5 INJECTION, SOLUTION INTRAVENOUS at 01:22

## 2022-02-09 RX ADMIN — VANCOMYCIN HYDROCHLORIDE 1250 MG: 500 INJECTION, POWDER, LYOPHILIZED, FOR SOLUTION INTRAVENOUS at 01:31

## 2022-02-09 RX ADMIN — HYDROMORPHONE HYDROCHLORIDE 1 MG: 1 INJECTION, SOLUTION INTRAMUSCULAR; INTRAVENOUS; SUBCUTANEOUS at 02:03

## 2022-02-09 ASSESSMENT — PAIN SCALES - GENERAL: PAINLEVEL_OUTOF10: 9

## 2022-02-09 NOTE — ED PROVIDER NOTES
[facility]  eMERGENCY dEPARTMENT eNCOUnter      Pt Name: Autumn Garces  MRN: [id number]  Hemanttrongfbyron 1955  Date of evaluation: 2/8/2022  Provider: Zachery Beck MD    [address]       Chief Complaint   Patient presents with    Emesis    Fatigue         HISTORY OF PRESENT ILLNESS   (Location/Symptom, Timing/Onset,Context/Setting, Quality, Duration, Modifying Factors, Severity)  Note limiting factors. Autumn Garces is a 61 y.o. male who presents to the emergency department nausea vomiting abdominal pain and weakness. Patient has a history of stage IV colon cancer. He has been home from [facility] for about a week. He had surgery and HIPEC. They wanted him to go back yesterday for TPN however he was too weak to make the drive to [place]. He was seen in Dr. Maria Teresa Fuentes office. They gave him some fluids and replaced his potassium. Today the patient had vomiting and some severe lower abdominal pain. He has had vomiting and diarrhea. He has not had a fever. He has had some cough and congestion. Constant hiccups. They called and spoke with his physician at [facility], Dr. Lamine Mckeon, and she wanted him to go back to [facility] for TPN. HPI    NursingNotes were reviewed. REVIEW OF SYSTEMS    (2-9 systems for level 4, 10 or more for level 5)     Review of Systems   Constitutional: Positive for activity change, appetite change and fatigue. Negative for chills and fever. HENT: Positive for congestion. Negative for rhinorrhea and sore throat. Respiratory: Positive for cough. Negative for shortness of breath. Cardiovascular: Negative for chest pain and leg swelling. Gastrointestinal: Positive for abdominal distention, abdominal pain, diarrhea, nausea and vomiting. Genitourinary: Negative for difficulty urinating. Musculoskeletal: Negative for back pain and neck pain. Skin: Negative for rash. Neurological: Positive for weakness. Negative for headaches.    Psychiatric/Behavioral: or Vomiting    POTASSIUM CHLORIDE (KLOR-CON M) 20 MEQ EXTENDED RELEASE TABLET    Take 1 tablet by mouth 3 times daily    PRAZOSIN (MINIPRESS) 1 MG CAPSULE    Take 1 mg by mouth nightly For nightmares    PROMETHAZINE (PHENERGAN) 12.5 MG TABLET    Take 1 tablet by mouth every 6 hours as needed for Nausea    SERTRALINE HCL (ZOLOFT PO)    Take by mouth daily    TOPIRAMATE (TOPAMAX) 100 MG TABLET    Take 100 mg by mouth daily For Seizures or Migraines    ZOLPIDEM TARTRATE (AMBIEN PO)    Take by mouth nightly       ALLERGIES     Oxycodone-acetaminophen and Morphine    FAMILY HISTORY       Family History   Problem Relation Age of Onset    Liver Cancer Mother     High Blood Pressure Mother     Colon Cancer Mother         x2    Cancer Father         Lung Cancer    Breast Cancer Sister     Cancer Maternal Grandfather         Lung Cancer    Cancer Paternal Grandfather         Stomach Cancer    Colon Polyps Neg Hx     Cystic Fibrosis Neg Hx     Liver Disease Neg Hx     Rectal Cancer Neg Hx           SOCIAL HISTORY       Social History     Socioeconomic History    Marital status:      Spouse name: None    Number of children: None    Years of education: None    Highest education level: None   Occupational History    None   Tobacco Use    Smoking status: Never Smoker    Smokeless tobacco: Never Used   Vaping Use    Vaping Use: Never used   Substance and Sexual Activity    Alcohol use: Yes     Comment: rare     Drug use: No    Sexual activity: Yes     Partners: Female   Other Topics Concern    None   Social History Narrative    None     Social Determinants of Health     Financial Resource Strain:     Difficulty of Paying Living Expenses: Not on file   Food Insecurity:     Worried About Running Out of Food in the Last Year: Not on file    [place] of Food in the Last Year: Not on file   Transportation Needs:     Lack of Transportation (Medical):  Not on file    Lack of Transportation (Non-Medical): Not on file   Physical Activity:     Days of Exercise per Week: Not on file    Minutes of Exercise per Session: Not on file   Stress:     Feeling of Stress : Not on file   Social Connections:     Frequency of Communication with Friends and Family: Not on file    Frequency of Social Gatherings with Friends and Family: Not on file    Attends Denominational Services: Not on file    Active Member of [address] [place] [address] or Organizations: Not on file    Attends Club or Organization Meetings: Not on file    Marital Status: Not on file   Intimate Partner Violence:     Fear of Current or Ex-Partner: Not on file    Emotionally Abused: Not on file    Physically Abused: Not on file    Sexually Abused: Not on file   Housing Stability:     Unable to Pay for Housing in the Last Year: Not on file    Number of [place] in the Last Year: Not on file    Unstable Housing in the Last Year: Not on file       SCREENINGS    Kristel Coma Scale  Eye Opening: Spontaneous  Best Verbal Response: Oriented  Best Motor Response: Obeys commands  [place] Coma Scale Score: 15        PHYSICAL EXAM    (up to 7 for level 4, 8 or more for level 5)     ED Triage Vitals [02/08/22 2031]   BP Temp Temp Source Pulse Resp SpO2 Height Weight   102/68 98.4 °F (36.9 °C) Oral 115 20 94 % -- 155 lb (70.3 kg)       Physical Exam  Vitals and nursing note reviewed. Constitutional:       General: He is not in acute distress. Appearance: He is well-developed. He is ill-appearing. He is not diaphoretic. HENT:      Head: Normocephalic and atraumatic. Eyes:      Pupils: Pupils are equal, round, and reactive to light. Cardiovascular:      Rate and Rhythm: Regular rhythm. Tachycardia present. Heart sounds: Normal heart sounds. Pulmonary:      Effort: Pulmonary effort is normal. No respiratory distress. Breath sounds: Normal breath sounds. Abdominal:      General: Bowel sounds are normal. There is distension. Palpations: Abdomen is soft. Tenderness: There is abdominal tenderness. Comments: Midline abdominal surgical site is clean, dry, intact with no surrounding erythema or warmth. He has a drain in his left abdomen. He has some mild tenderness in his upper and lower abdomen. Musculoskeletal:         General: Normal range of motion. Cervical back: Normal range of motion and neck supple. Skin:     General: Skin is warm and dry. Coloration: Skin is pale. Findings: No rash. Neurological:      Mental Status: He is alert and oriented to person, place, and time. Cranial Nerves: No cranial nerve deficit. Motor: No abnormal muscle tone. Coordination: Coordination normal.   Psychiatric:         Behavior: Behavior normal.         DIAGNOSTIC RESULTS     EKG: All EKG's are interpreted by the Emergency Department Physician who either signs or Co-signs this chart in the absence of a cardiologist.    Artifact present. Sinus tachycardia rate 199. n oacute st abnormalit. qtc 476    RADIOLOGY:   Non-plain film images such as CT, Ultrasound and MRI are read by the radiologist. Celeste Mort images are visualized and preliminarily interpreted by the emergency physician with the below findings:        Interpretation per the Radiologist below, if available at the time of this note:    XR CHEST PORTABLE   Final Result   1. No radiographic evidence of acute cardiopulmonary process. Signed by Dr Brian Pena Additional Contrast? None    (Results Pending)     ADDENDUM - Added by Frantz Amaro M.D. on 2/9/2022 12:57 AM (-08:00)  d/w MD: colon cancer with liver mets. RLL pneumonia, minimally of the LLL, possible aspiration, as there continues to be a wall second fluid distended esophagus. There is also a percutaneous drain in the left abdomen that could be contributing to some of the free air. The right retroperitoneal hernia was present previously.  The previously seen left lobe liver mass not clearly identified, may relate to noncontrast technique. Extensive postsurgical change in the right lobe liver is stable. CT ABDOMEN & PELVIS Without Contrast:    Compared with CT a/p 12/14/2020. Severe small bowel obstruction with moderate to large free intraperitoneal air. Medial displacement of a right lateral abdominal wall mesh that could reflect recurrent right retroperitoneal hernia causing the obstruction and perforation. ED BEDSIDE ULTRASOUND:   Performed by ED Physician - none    LABS:  Labs Reviewed   CBC WITH AUTO DIFFERENTIAL - Abnormal; Notable for the following components:       Result Value    RBC 4.34 (*)     Hemoglobin 13.1 (*)     Hematocrit 39.3 (*)     Lymphocytes % 9.0 (*)     Lymphocytes Absolute 0.7 (*)     Bands Relative 22 (*)     Metamyelocytes Relative 1 (*)     Myelocyte Percent 3 (*)     All other components within normal limits   COMPREHENSIVE METABOLIC PANEL W/ REFLEX TO MG FOR LOW K - Abnormal; Notable for the following components:    Sodium 125 (*)     Chloride 86 (*)     CO2 17 (*)     Anion Gap 22 (*)     Glucose 135 (*)     BUN 76 (*)     CREATININE 2.2 (*)     GFR Non- 30 (*)     GFR  37 (*)     Total Protein 6.1 (*)     Albumin 2.7 (*)     Alkaline Phosphatase 221 (*)     ALT 56 (*)     All other components within normal limits   LACTIC ACID, PLASMA - Abnormal; Notable for the following components:    Lactic Acid 7.1 (*)     All other components within normal limits    Narrative:     CALL  Guo  KLED tel. ,  Chemistry results called to and read back by Carlos Enrique Alvarez in ED, 02/08/2022  22:50, by MARY BELLOID-[id number], RAPID   CULTURE, BLOOD 1   CULTURE, BLOOD 2   LIPASE   URINE RT REFLEX TO CULTURE   LACTIC ACID, PLASMA       All other labs were within normal range or not returned as of this dictation.     EMERGENCY DEPARTMENT COURSE and DIFFERENTIALDIAGNOSIS/MDM:   Vitals:    Vitals:    02/08/22 2230 02/08/22 2245 02/08/22 2303 02/09/22 5020 BP: 99/81 96/79 109/76 100/81   Pulse: 110 115 114 115   Resp: 18 18 18 18   Temp:       TempSrc:       SpO2: 93% 94% 97% 94%   Weight:           MDM   Pt with hiccups and pt wants thorazine as it has helped in the past.  Bp down to upper 70s, improved with IVF  Pt accepted by Dr Darcy Rodriguez to [facility]. Stat rad called back. Unclear if perforated bowel or related to post op or drain in place. Looks like has recurrent hernia that has perforated. Has what looks like aspiration pna. Pt refusing NGT at this time. Wife will d/w him and try to get him to accept the NGT. CONSULTS:  PHARMACY TO DOSE VANCOMYCIN    PROCEDURES:  Unless otherwise notedbelow, none     Procedures    FINAL IMPRESSION     1. Small bowel obstruction ([address]    2. Free intraperitoneal air    3. Pneumonia of right lower lobe due to infectious organism    4. Hyponatremia    5. MARIO (acute kidney injury) ([address]          DISPOSITION/PLAN   DISPOSITION        PATIENT REFERRED TO:  @FUP@    DISCHARGE MEDICATIONS:  New Prescriptions    No medications on file         CRITICAL CARE TIME   Total Critical Care time was 45 minutes, excluding separately reportable procedures. Time includes direct patient care, reassessing patient, documenting care, and coordinating care for the patient. Time also includes data interpretation of any lab tests or imaging that were performed. There was a high probability of clinically significant/life threatening deterioration in the patient's condition which required my urgent intervention.       (Please note that portions of this note were completed with a voice recognition program.  Efforts were made to edit the dictations butoccasionally words are mis-transcribed.)    Анна Hodge MD (electronically signed)  AttendingEmergency Physician         Анна Hodge MD  02/09/22 0139       Анна Hodge MD  02/09/22 0151

## 2022-02-09 NOTE — ED NOTES
Report given to Kennedi Shay RN at [facility].  Electronically signed by hKai Vega RN on 2/9/2022 at [address] [place] [address], RN  02/09/22 5922

## 2022-02-09 NOTE — ED NOTES
Bed: 18  Expected date:   Expected time:   Means of arrival:   Comments:  Jose Raul Disla RN  02/08/22 2028

## 2022-02-14 LAB
BLOOD CULTURE, ROUTINE: NORMAL
CULTURE, BLOOD 2: NORMAL

## 2022-02-21 ENCOUNTER — APPOINTMENT (OUTPATIENT)
Dept: CT IMAGING | Age: 67
DRG: 683 | End: 2022-02-21
Payer: OTHER GOVERNMENT

## 2022-02-21 ENCOUNTER — LAB REQUISITION (OUTPATIENT)
Dept: LAB | Facility: HOSPITAL | Age: 67
End: 2022-02-21

## 2022-02-21 ENCOUNTER — HOSPITAL ENCOUNTER (INPATIENT)
Age: 67
LOS: 8 days | Discharge: HOME | DRG: 683 | End: 2022-03-01
Attending: EMERGENCY MEDICINE | Admitting: INTERNAL MEDICINE
Payer: OTHER GOVERNMENT

## 2022-02-21 ENCOUNTER — TELEPHONE (OUTPATIENT)
Dept: INFUSION THERAPY | Age: 67
End: 2022-02-21

## 2022-02-21 ENCOUNTER — APPOINTMENT (OUTPATIENT)
Dept: GENERAL RADIOLOGY | Age: 67
DRG: 683 | End: 2022-02-21
Payer: OTHER GOVERNMENT

## 2022-02-21 DIAGNOSIS — R63.0 DECREASED APPETITE: ICD-10-CM

## 2022-02-21 DIAGNOSIS — E86.1 HYPOTENSION DUE TO HYPOVOLEMIA: ICD-10-CM

## 2022-02-21 DIAGNOSIS — E87.1 HYPONATREMIA: ICD-10-CM

## 2022-02-21 DIAGNOSIS — Z51.5 PALLIATIVE CARE PATIENT: ICD-10-CM

## 2022-02-21 DIAGNOSIS — N17.9 AKI (ACUTE KIDNEY INJURY) (HCC): Primary | ICD-10-CM

## 2022-02-21 DIAGNOSIS — C18.9 ADENOCARCINOMA OF COLON (HCC): ICD-10-CM

## 2022-02-21 DIAGNOSIS — I95.89 HYPOTENSION DUE TO HYPOVOLEMIA: ICD-10-CM

## 2022-02-21 DIAGNOSIS — Z00.00 ENCOUNTER FOR GENERAL ADULT MEDICAL EXAMINATION WITHOUT ABNORMAL FINDINGS: ICD-10-CM

## 2022-02-21 PROBLEM — N18.9 ACUTE KIDNEY INJURY SUPERIMPOSED ON CKD (HCC): Status: ACTIVE | Noted: 2022-02-21

## 2022-02-21 LAB
ALBUMIN SERPL-MCNC: 3.4 G/DL (ref 3.5–5.2)
ALP BLD-CCNC: 296 U/L (ref 40–130)
ALT SERPL-CCNC: 26 U/L (ref 5–41)
ANION GAP SERPL CALCULATED.3IONS-SCNC: 20 MMOL/L (ref 5–15)
ANION GAP SERPL CALCULATED.3IONS-SCNC: 23 MMOL/L (ref 7–19)
AST SERPL-CCNC: 26 U/L (ref 5–40)
BASOPHILS ABSOLUTE: 0 K/UL (ref 0–0.2)
BASOPHILS RELATIVE PERCENT: 0.5 % (ref 0–1)
BILIRUB SERPL-MCNC: 0.5 MG/DL (ref 0.2–1.2)
BILIRUBIN URINE: ABNORMAL
BLOOD, URINE: NEGATIVE
BUN BLDV-MCNC: 97 MG/DL (ref 8–23)
BUN SERPL-MCNC: 96 MG/DL (ref 8–23)
BUN/CREAT SERPL: 20.9 (ref 7–25)
CALCIUM SERPL-MCNC: 8.8 MG/DL (ref 8.8–10.2)
CALCIUM SPEC-SCNC: 8.6 MG/DL (ref 8.6–10.5)
CHLORIDE BLD-SCNC: 75 MMOL/L (ref 98–111)
CHLORIDE SERPL-SCNC: 75 MMOL/L (ref 98–107)
CLARITY: ABNORMAL
CO2 SERPL-SCNC: 30 MMOL/L (ref 22–29)
CO2: 27 MMOL/L (ref 22–29)
COLOR: ABNORMAL
CREAT SERPL-MCNC: 4.6 MG/DL (ref 0.5–1.2)
CREAT SERPL-MCNC: 4.6 MG/DL (ref 0.76–1.27)
CREATININE URINE: 225.5 MG/DL (ref 4.2–622)
EOSINOPHIL,URINE: NORMAL
EOSINOPHILS ABSOLUTE: 0.1 K/UL (ref 0–0.6)
EOSINOPHILS RELATIVE PERCENT: 2.5 % (ref 0–5)
GFR AFRICAN AMERICAN: 16
GFR NON-AFRICAN AMERICAN: 13
GFR SERPL CREATININE-BSD FRML MDRD: 13 ML/MIN/1.73
GFR SERPL CREATININE-BSD FRML MDRD: 16 ML/MIN/1.73
GLUCOSE BLD-MCNC: 123 MG/DL (ref 74–109)
GLUCOSE SERPL-MCNC: 121 MG/DL (ref 65–99)
GLUCOSE URINE: NEGATIVE MG/DL
HCT VFR BLD CALC: 36 % (ref 42–52)
HEMOGLOBIN: 11.7 G/DL (ref 14–18)
IMMATURE GRANULOCYTES #: 0 K/UL
KETONES, URINE: NEGATIVE MG/DL
LACTIC ACID: 2 MMOL/L (ref 0.5–1.9)
LEUKOCYTE ESTERASE, URINE: NEGATIVE
LIPASE: 91 U/L (ref 13–60)
LYMPHOCYTES ABSOLUTE: 0.9 K/UL (ref 1.1–4.5)
LYMPHOCYTES RELATIVE PERCENT: 20.3 % (ref 20–40)
MAGNESIUM SERPL-MCNC: 2.9 MG/DL (ref 1.6–2.4)
MAGNESIUM: 3 MG/DL (ref 1.6–2.4)
MCH RBC QN AUTO: 29.3 PG (ref 27–31)
MCHC RBC AUTO-ENTMCNC: 32.5 G/DL (ref 33–37)
MCV RBC AUTO: 90 FL (ref 80–94)
MONOCYTES ABSOLUTE: 0.5 K/UL (ref 0–0.9)
MONOCYTES RELATIVE PERCENT: 11.4 % (ref 0–10)
NEUTROPHILS ABSOLUTE: 2.9 K/UL (ref 1.5–7.5)
NEUTROPHILS RELATIVE PERCENT: 64.8 % (ref 50–65)
NITRITE, URINE: NEGATIVE
OSMOLALITY URINE: 336 MOSM/KG (ref 250–1200)
PDW BLD-RTO: 15 % (ref 11.5–14.5)
PH UA: 5 (ref 5–8)
PHOSPHATE SERPL-MCNC: 11.5 MG/DL (ref 2.5–4.5)
PHOSPHORUS: 11.1 MG/DL (ref 2.5–4.5)
PLATELET # BLD: 450 K/UL (ref 130–400)
PMV BLD AUTO: 10.1 FL (ref 9.4–12.4)
POTASSIUM REFLEX MAGNESIUM: 4.7 MMOL/L (ref 3.5–5)
POTASSIUM SERPL-SCNC: 4.4 MMOL/L (ref 3.5–5.2)
PROTEIN PROTEIN: 23 MG/DL (ref 15–45)
PROTEIN UA: NEGATIVE MG/DL
RBC # BLD: 4 M/UL (ref 4.7–6.1)
SARS-COV-2, NAAT: NOT DETECTED
SODIUM BLD-SCNC: 125 MMOL/L (ref 136–145)
SODIUM SERPL-SCNC: 125 MMOL/L (ref 136–145)
SODIUM URINE: <20 MMOL/L
SPECIFIC GRAVITY UA: 1.02 (ref 1–1.03)
TOTAL PROTEIN: 8.3 G/DL (ref 6.6–8.7)
TROPONIN: 0.06 NG/ML (ref 0–0.03)
TROPONIN: 0.08 NG/ML (ref 0–0.03)
UROBILINOGEN, URINE: 0.2 E.U./DL
WBC # BLD: 4.4 K/UL (ref 4.8–10.8)

## 2022-02-21 PROCEDURE — 84156 ASSAY OF PROTEIN URINE: CPT

## 2022-02-21 PROCEDURE — 87205 SMEAR GRAM STAIN: CPT

## 2022-02-21 PROCEDURE — 2580000003 HC RX 258: Performed by: EMERGENCY MEDICINE

## 2022-02-21 PROCEDURE — 81003 URINALYSIS AUTO W/O SCOPE: CPT

## 2022-02-21 PROCEDURE — 85025 COMPLETE CBC W/AUTO DIFF WBC: CPT

## 2022-02-21 PROCEDURE — 84100 ASSAY OF PHOSPHORUS: CPT

## 2022-02-21 PROCEDURE — 83735 ASSAY OF MAGNESIUM: CPT

## 2022-02-21 PROCEDURE — 96360 HYDRATION IV INFUSION INIT: CPT

## 2022-02-21 PROCEDURE — 96361 HYDRATE IV INFUSION ADD-ON: CPT

## 2022-02-21 PROCEDURE — 82570 ASSAY OF URINE CREATININE: CPT

## 2022-02-21 PROCEDURE — 71045 X-RAY EXAM CHEST 1 VIEW: CPT

## 2022-02-21 PROCEDURE — 84484 ASSAY OF TROPONIN QUANT: CPT

## 2022-02-21 PROCEDURE — 36415 COLL VENOUS BLD VENIPUNCTURE: CPT

## 2022-02-21 PROCEDURE — 51798 US URINE CAPACITY MEASURE: CPT

## 2022-02-21 PROCEDURE — 93005 ELECTROCARDIOGRAM TRACING: CPT | Performed by: EMERGENCY MEDICINE

## 2022-02-21 PROCEDURE — 87635 SARS-COV-2 COVID-19 AMP PRB: CPT

## 2022-02-21 PROCEDURE — 83735 ASSAY OF MAGNESIUM: CPT | Performed by: INTERNAL MEDICINE

## 2022-02-21 PROCEDURE — 84100 ASSAY OF PHOSPHORUS: CPT | Performed by: INTERNAL MEDICINE

## 2022-02-21 PROCEDURE — 74176 CT ABD & PELVIS W/O CONTRAST: CPT

## 2022-02-21 PROCEDURE — 99285 EMERGENCY DEPT VISIT HI MDM: CPT

## 2022-02-21 PROCEDURE — 87040 BLOOD CULTURE FOR BACTERIA: CPT

## 2022-02-21 PROCEDURE — 80053 COMPREHEN METABOLIC PANEL: CPT

## 2022-02-21 PROCEDURE — 84300 ASSAY OF URINE SODIUM: CPT

## 2022-02-21 PROCEDURE — 80048 BASIC METABOLIC PNL TOTAL CA: CPT | Performed by: INTERNAL MEDICINE

## 2022-02-21 PROCEDURE — 83690 ASSAY OF LIPASE: CPT

## 2022-02-21 PROCEDURE — 83935 ASSAY OF URINE OSMOLALITY: CPT

## 2022-02-21 PROCEDURE — 83605 ASSAY OF LACTIC ACID: CPT

## 2022-02-21 PROCEDURE — 1210000000 HC MED SURG R&B

## 2022-02-21 RX ORDER — FOLIC ACID 1 MG/1
1 TABLET ORAL DAILY
Status: DISCONTINUED | OUTPATIENT
Start: 2022-02-21 | End: 2022-02-22

## 2022-02-21 RX ORDER — HYDROMORPHONE HYDROCHLORIDE 4 MG/1
2 TABLET ORAL EVERY 4 HOURS PRN
COMMUNITY
End: 2022-04-22 | Stop reason: ALTCHOICE

## 2022-02-21 RX ORDER — VITAMIN B COMPLEX
2000 TABLET ORAL DAILY
Status: DISCONTINUED | OUTPATIENT
Start: 2022-02-22 | End: 2022-03-01 | Stop reason: HOSPADM

## 2022-02-21 RX ORDER — HEPARIN SODIUM 5000 [USP'U]/ML
5000 INJECTION, SOLUTION INTRAVENOUS; SUBCUTANEOUS EVERY 8 HOURS SCHEDULED
Status: DISCONTINUED | OUTPATIENT
Start: 2022-02-21 | End: 2022-02-23

## 2022-02-21 RX ORDER — ACETAMINOPHEN 325 MG/1
650 TABLET ORAL EVERY 6 HOURS PRN
Status: DISCONTINUED | OUTPATIENT
Start: 2022-02-21 | End: 2022-03-01 | Stop reason: HOSPADM

## 2022-02-21 RX ORDER — HYDROMORPHONE HYDROCHLORIDE 2 MG/1
2 TABLET ORAL EVERY 4 HOURS PRN
Status: DISCONTINUED | OUTPATIENT
Start: 2022-02-21 | End: 2022-02-22

## 2022-02-21 RX ORDER — ONDANSETRON 4 MG/1
4 TABLET, ORALLY DISINTEGRATING ORAL EVERY 8 HOURS PRN
Status: DISCONTINUED | OUTPATIENT
Start: 2022-02-21 | End: 2022-03-01 | Stop reason: HOSPADM

## 2022-02-21 RX ORDER — SODIUM CHLORIDE 9 MG/ML
25 INJECTION, SOLUTION INTRAVENOUS PRN
Status: DISCONTINUED | OUTPATIENT
Start: 2022-02-21 | End: 2022-03-01 | Stop reason: HOSPADM

## 2022-02-21 RX ORDER — TOPIRAMATE 100 MG/1
100 TABLET, FILM COATED ORAL DAILY
Status: DISCONTINUED | OUTPATIENT
Start: 2022-02-22 | End: 2022-02-22

## 2022-02-21 RX ORDER — METHOCARBAMOL 500 MG/1
500 TABLET, FILM COATED ORAL 3 TIMES DAILY
Status: DISCONTINUED | OUTPATIENT
Start: 2022-02-21 | End: 2022-02-22

## 2022-02-21 RX ORDER — BUPROPION HYDROCHLORIDE 100 MG/1
100 TABLET ORAL EVERY MORNING
Status: DISCONTINUED | OUTPATIENT
Start: 2022-02-22 | End: 2022-02-22

## 2022-02-21 RX ORDER — SODIUM CHLORIDE 0.9 % (FLUSH) 0.9 %
5-40 SYRINGE (ML) INJECTION EVERY 12 HOURS SCHEDULED
Status: DISCONTINUED | OUTPATIENT
Start: 2022-02-21 | End: 2022-03-01 | Stop reason: HOSPADM

## 2022-02-21 RX ORDER — ACETAMINOPHEN 650 MG/1
650 SUPPOSITORY RECTAL EVERY 6 HOURS PRN
Status: DISCONTINUED | OUTPATIENT
Start: 2022-02-21 | End: 2022-03-01 | Stop reason: HOSPADM

## 2022-02-21 RX ORDER — CHLORPROMAZINE HYDROCHLORIDE 25 MG/1
25 TABLET, FILM COATED ORAL 3 TIMES DAILY
Status: DISCONTINUED | OUTPATIENT
Start: 2022-02-21 | End: 2022-02-22

## 2022-02-21 RX ORDER — SODIUM CHLORIDE, SODIUM LACTATE, POTASSIUM CHLORIDE, AND CALCIUM CHLORIDE .6; .31; .03; .02 G/100ML; G/100ML; G/100ML; G/100ML
1000 INJECTION, SOLUTION INTRAVENOUS ONCE
Status: COMPLETED | OUTPATIENT
Start: 2022-02-21 | End: 2022-02-21

## 2022-02-21 RX ORDER — ONDANSETRON 2 MG/ML
4 INJECTION INTRAMUSCULAR; INTRAVENOUS EVERY 6 HOURS PRN
Status: DISCONTINUED | OUTPATIENT
Start: 2022-02-21 | End: 2022-03-01 | Stop reason: HOSPADM

## 2022-02-21 RX ORDER — PANTOPRAZOLE SODIUM 40 MG/1
40 TABLET, DELAYED RELEASE ORAL
Status: DISCONTINUED | OUTPATIENT
Start: 2022-02-22 | End: 2022-03-01 | Stop reason: HOSPADM

## 2022-02-21 RX ORDER — METHOCARBAMOL 500 MG/1
500 TABLET, FILM COATED ORAL 3 TIMES DAILY PRN
COMMUNITY

## 2022-02-21 RX ORDER — LOPERAMIDE HYDROCHLORIDE 2 MG/1
2 CAPSULE ORAL 2 TIMES DAILY PRN
Status: DISCONTINUED | OUTPATIENT
Start: 2022-02-21 | End: 2022-02-25

## 2022-02-21 RX ORDER — 0.9 % SODIUM CHLORIDE 0.9 %
500 INTRAVENOUS SOLUTION INTRAVENOUS 2 TIMES DAILY
Status: ON HOLD | COMMUNITY
End: 2022-03-07

## 2022-02-21 RX ORDER — SODIUM CHLORIDE 0.9 % (FLUSH) 0.9 %
5-40 SYRINGE (ML) INJECTION PRN
Status: DISCONTINUED | OUTPATIENT
Start: 2022-02-21 | End: 2022-03-01 | Stop reason: HOSPADM

## 2022-02-21 RX ORDER — DRONABINOL 2.5 MG/1
2.5 CAPSULE ORAL
Status: DISCONTINUED | OUTPATIENT
Start: 2022-02-22 | End: 2022-02-22

## 2022-02-21 RX ORDER — SODIUM CHLORIDE, SODIUM LACTATE, POTASSIUM CHLORIDE, CALCIUM CHLORIDE 600; 310; 30; 20 MG/100ML; MG/100ML; MG/100ML; MG/100ML
INJECTION, SOLUTION INTRAVENOUS CONTINUOUS
Status: DISCONTINUED | OUTPATIENT
Start: 2022-02-21 | End: 2022-02-24

## 2022-02-21 RX ORDER — PRAZOSIN HYDROCHLORIDE 1 MG/1
1 CAPSULE ORAL NIGHTLY
Status: DISCONTINUED | OUTPATIENT
Start: 2022-02-21 | End: 2022-03-01 | Stop reason: HOSPADM

## 2022-02-21 RX ORDER — POTASSIUM CHLORIDE 750 MG/1
10 TABLET, EXTENDED RELEASE ORAL 2 TIMES DAILY WITH MEALS
Status: DISCONTINUED | OUTPATIENT
Start: 2022-02-21 | End: 2022-02-22

## 2022-02-21 RX ORDER — LOPERAMIDE HYDROCHLORIDE 2 MG/1
2 CAPSULE ORAL 2 TIMES DAILY PRN
Status: ON HOLD | COMMUNITY
End: 2022-03-11 | Stop reason: HOSPADM

## 2022-02-21 RX ADMIN — SODIUM CHLORIDE, SODIUM LACTATE, POTASSIUM CHLORIDE, AND CALCIUM CHLORIDE 1000 ML: 600; 310; 30; 20 INJECTION, SOLUTION INTRAVENOUS at 19:55

## 2022-02-21 RX ADMIN — SODIUM CHLORIDE, SODIUM LACTATE, POTASSIUM CHLORIDE, AND CALCIUM CHLORIDE 1000 ML: 600; 310; 30; 20 INJECTION, SOLUTION INTRAVENOUS at 17:44

## 2022-02-21 RX ADMIN — SODIUM CHLORIDE, POTASSIUM CHLORIDE, SODIUM LACTATE AND CALCIUM CHLORIDE: 600; 310; 30; 20 INJECTION, SOLUTION INTRAVENOUS at 21:27

## 2022-02-21 ASSESSMENT — ENCOUNTER SYMPTOMS
SORE THROAT: 0
WHEEZING: 0
RHINORRHEA: 0
BACK PAIN: 0
VOMITING: 0
NAUSEA: 0
ABDOMINAL DISTENTION: 0
CONSTIPATION: 0
COLOR CHANGE: 0
DIARRHEA: 0
CHEST TIGHTNESS: 0
COUGH: 0
SHORTNESS OF BREATH: 0
ABDOMINAL PAIN: 0

## 2022-02-21 NOTE — TELEPHONE ENCOUNTER
Patients home health nurse called and alerted us to labs on patient. Patients labs discussed with MD and MD orders given to send patient to the ER. I spoke with wife and discussed patients labs and MD orders. Patients wife unable to transport patient, I advised her to call 911 and take patient to [facility].  Electronically signed by Ludmila Alfaro RN on 2/21/2022 at 4:37 PM

## 2022-02-21 NOTE — ED NOTES
Bed: 01-A  Expected date:   Expected time:   Means of arrival:   Comments:     Alexa Garcia RN  02/21/22 2979

## 2022-02-21 NOTE — ED NOTES
Bed: xH01  Expected date:   Expected time:   Means of arrival:   Comments:  EMS     Yessi Mckeon RN  02/21/22 6650

## 2022-02-21 NOTE — ED PROVIDER NOTES
[facility]  eMERGENCY dEPARTMENT eNCOUnter      Pt Name: Matt Cantu  MRN: [id number]  [place] 1955  Date of evaluation: 2/21/2022  Provider: Uriel Munoz MD    [address] [place] [address]       Chief Complaint   Patient presents with    Abnormal Lab     unknown, primary care called         HISTORY OF PRESENT ILLNESS   (Location/Symptom, Timing/Onset,Context/Setting, Quality, Duration, Modifying Factors, Severity)  Note limiting factors. Matt Cantu is a 61 y.o. male who presents to the emergency department with abnormal lab work. The patient has a history of metastatic colon cancer. He had a small bowel obstruction and surgery at [facility] earlier this month. He has been home for about a week. He states he has been eating and drinking normally. No nausea vomiting or diarrhea. No fever. He states his abdominal pain is under control and he has been requiring any pain medication. Decreased UOP    HPI    NursingNotes were reviewed. REVIEW OF SYSTEMS    (2-9 systems for level 4, 10 or more for level 5)     Review of Systems   Constitutional: Positive for fatigue. Negative for chills and fever. HENT: Negative for rhinorrhea and sore throat. Respiratory: Negative for shortness of breath. Cardiovascular: Negative for chest pain and leg swelling. Gastrointestinal: Negative for abdominal pain, diarrhea, nausea and vomiting. Genitourinary: Negative for difficulty urinating. Musculoskeletal: Negative for back pain and neck pain. Skin: Negative for rash. Neurological: Positive for weakness. Negative for headaches. Psychiatric/Behavioral: Negative for confusion. A complete review of systems was performed and is negative except as noted above in the HPI.        PAST MEDICAL HISTORY     Past Medical History:   Diagnosis Date    Acid reflux     Cancer ([facility] [address]     adenocarcinoma of colon    GERD (gastroesophageal reflux disease)     PTSD (post-traumatic stress disorder) SURGICAL HISTORY       Past Surgical History:   Procedure Laterality Date    ABLATION OF DYSRHYTHMIC FOCUS      ablation of liver at [address]  2003    CHOLECYSTECTOMY  2013    COLONOSCOPY      when pt was in the 19's    COLONOSCOPY N/A 03/11/2020    COLONOSCOPY POLYPECTOMY SNARE/COLD BIOPSY: Dr. Andrés Park colonoscopy: 6-12 months due to findings at colonoscopy today with Cecal mass lesion and large polyps.  COLONOSCOPY      COLONOSCOPY N/A 03/17/2021    Dr Anshul Juares, Post-operative changes w IC anastomosis & single visible staple, Mild Diverticuosis, Int Hemorrhoids Grade 1, 3 year recall    HEMICOLECTOMY Right 03/30/2020    LAPAROSCOPIC-ASSISTED RIGHT HEMICOLECTOMY performed by Sarah Bray MD at [address] [place] [address] N/A 06/03/2020    INSERTION OF VENOUS PORT with flouro performed by Sarah Bray MD at [place] [address] N/A 03/11/2020    Dr. Joao Simon:   [facility]         CURRENT MEDICATIONS       Previous Medications    BUPROPION (WELLBUTRIN) 100 MG TABLET    Take 100 mg by mouth every morning For mood    CHLORPROMAZINE (THORAZINE) 25 MG TABLET    Take 1 tablet by mouth 3 times daily    CHOLECALCIFEROL (VITAMIN D3) 50 MCG (2000 UT) CAPS    Take by mouth daily    DRONABINOL (MARINOL) 2.5 MG CAPSULE    Take 1 capsule by mouth 2 times daily (before meals) for 30 days.     FOLIC ACID (FOLVITE) 1 MG TABLET    Take 1 mg by mouth daily    OMEPRAZOLE (PRILOSEC) 20 MG DELAYED RELEASE CAPSULE    Take 20 mg by mouth daily    ONDANSETRON (ZOFRAN) 8 MG TABLET    Take 1 tablet by mouth every 8 hours as needed for Nausea or Vomiting    POTASSIUM CHLORIDE (KLOR-CON M) 20 MEQ EXTENDED RELEASE TABLET    Take 1 tablet by mouth 3 times daily    PRAZOSIN (MINIPRESS) 1 MG CAPSULE    Take 1 mg by mouth nightly For nightmares    PROMETHAZINE (PHENERGAN) 12.5 MG TABLET    Take 1 tablet by mouth every 6 hours as needed for Nausea    SERTRALINE HCL (ZOLOFT PO)    Take by mouth daily    TOPIRAMATE (TOPAMAX) 100 MG TABLET    Take 100 mg by mouth daily For Seizures or Migraines    ZOLPIDEM TARTRATE (AMBIEN PO)    Take by mouth nightly       ALLERGIES     Oxycodone-acetaminophen and Morphine    FAMILY HISTORY       Family History   Problem Relation Age of Onset    Liver Cancer Mother     High Blood Pressure Mother     Colon Cancer Mother         x2    Cancer Father         Lung Cancer    Breast Cancer Sister     Cancer Maternal Grandfather         Lung Cancer    Cancer Paternal Grandfather         Stomach Cancer    Colon Polyps Neg Hx     Cystic Fibrosis Neg Hx     Liver Disease Neg Hx     Rectal Cancer Neg Hx           SOCIAL HISTORY       Social History     Socioeconomic History    Marital status:      Spouse name: None    Number of children: None    Years of education: None    Highest education level: None   Occupational History    None   Tobacco Use    Smoking status: Never Smoker    Smokeless tobacco: Never Used   Vaping Use    Vaping Use: Never used   Substance and Sexual Activity    Alcohol use: Yes     Comment: rare     Drug use: No    Sexual activity: Yes     Partners: Female   Other Topics Concern    None   Social History Narrative    None     Social Determinants of Health     Financial Resource Strain:     Difficulty of Paying Living Expenses: Not on file   Food Insecurity:     Worried About Running Out of Food in the Last Year: Not on file    [place] of Food in the Last Year: Not on file   Transportation Needs:     Lack of Transportation (Medical): Not on file    Lack of Transportation (Non-Medical):  Not on file   Physical Activity:     Days of Exercise per Week: Not on file    Minutes of Exercise per Session: Not on file   Stress:     Feeling of Stress : Not on file   Social Connections:     Frequency of Communication with Friends and Family: Not on file    Frequency of Social Gatherings with Friends and Family: Not on file   Keron Attends Sikhism Services: Not on file    Active Member of Clubs or Organizations: Not on file    Attends Club or Organization Meetings: Not on file    Marital Status: Not on file   Intimate Partner Violence:     Fear of Current or Ex-Partner: Not on file    Emotionally Abused: Not on file    Physically Abused: Not on file    Sexually Abused: Not on file   Housing Stability:     Unable to Pay for Housing in the Last Year: Not on file    Number of [place] in the Last Year: Not on file    Unstable Housing in the Last Year: Not on file       SCREENINGS    [place] Coma Scale  Eye Opening: Spontaneous  Best Verbal Response: Oriented  Best Motor Response: Obeys commands  [place] Coma Scale Score: 15        PHYSICAL EXAM    (up to 7 for level 4, 8 or more for level 5)     ED Triage Vitals [02/21/22 1710]   BP Temp Temp Source Pulse Resp SpO2 Height Weight   83/64 97.9 °F (36.6 °C) Oral 100 16 94 % 5' 7\" (1.702 m) 155 lb (70.3 kg)       Physical Exam  Vitals and nursing note reviewed. Constitutional:       General: He is not in acute distress. Appearance: He is well-developed. He is not diaphoretic. HENT:      Head: Normocephalic and atraumatic. Eyes:      Pupils: Pupils are equal, round, and reactive to light. Cardiovascular:      Rate and Rhythm: Normal rate and regular rhythm. Heart sounds: Normal heart sounds. Pulmonary:      Effort: Pulmonary effort is normal. No respiratory distress. Breath sounds: Normal breath sounds. Abdominal:      General: Bowel sounds are normal. There is no distension. Palpations: Abdomen is soft. Tenderness: There is abdominal tenderness. Comments: No abdominal distention. Surgical site is clean, dry, intact. Ostomy with stool present. Drain present. Musculoskeletal:         General: Normal range of motion. Cervical back: Normal range of motion and neck supple. Skin:     General: Skin is warm and dry. Findings: No rash. Neurological:      Mental Status: He is alert and oriented to person, place, and time. Cranial Nerves: No cranial nerve deficit. Motor: No abnormal muscle tone. Coordination: Coordination normal.   Psychiatric:         Behavior: Behavior normal.         DIAGNOSTIC RESULTS     EKG: All EKG's are interpreted by the Emergency Department Physician who either signs or Co-signs this chart in the absence of a cardiologist.    Sinus rhythm rate 99 nonspecific ST wave     RADIOLOGY:   Non-plain film images such as CT, Ultrasound and MRI are read by the radiologist. Marsha Salgado images are visualized and preliminarily interpreted by the emergency physician with the below findings:        Interpretation per the Radiologist below, if available at the time of this note:    CT ABDOMEN PELVIS WO CONTRAST Additional Contrast? None   Final Result   1. Postoperative changes with no sign of bowel obstruction, abscess,   or hematoma.    Signed by Dr Maris Aguilera    (Results Pending)         ED BEDSIDE ULTRASOUND:   Performed by ED Physician - none    LABS:  Labs Reviewed   CBC WITH AUTO DIFFERENTIAL - Abnormal; Notable for the following components:       Result Value    WBC 4.4 (*)     RBC 4.00 (*)     Hemoglobin 11.7 (*)     Hematocrit 36.0 (*)     MCHC 32.5 (*)     RDW 15.0 (*)     Platelets 377 (*)     Monocytes % 11.4 (*)     Lymphocytes Absolute 0.9 (*)     All other components within normal limits   COMPREHENSIVE METABOLIC PANEL W/ REFLEX TO MG FOR LOW K - Abnormal; Notable for the following components:    Sodium 125 (*)     Chloride 75 (*)     Anion Gap 23 (*)     Glucose 123 (*)     BUN 97 (*)     CREATININE 4.6 (*)     GFR Non- 13 (*)     GFR  16 (*)     Albumin 3.4 (*)     Alkaline Phosphatase 296 (*)     All other components within normal limits   LACTIC ACID - Abnormal; Notable for the following components:    Lactic Acid 2.0 (*) All other components within normal limits    Narrative:     Mars Gay tel. ,  Chemistry results called to and read back by anderson khan, 02/21/2022 18:00,  by DERRICK   LIPASE - Abnormal; Notable for the following components:    Lipase 91 (*)     All other components within normal limits   MAGNESIUM - Abnormal; Notable for the following components:    Magnesium 3.0 (*)     All other components within normal limits   PHOSPHORUS - Abnormal; Notable for the following components:    Phosphorus 11.1 (*)     All other components within normal limits   TROPONIN - Abnormal; Notable for the following components:    Troponin 0.08 (*)     All other components within normal limits   CULTURE, BLOOD 1   CULTURE, BLOOD 2   URINALYSIS WITH REFLEX TO CULTURE   BASIC METABOLIC PANEL W/ REFLEX TO MG FOR LOW K   LACTIC ACID   CBC   OSMOLALITY, URINE   EOSINOPHIL SMEAR, URINE   SODIUM, URINE, RANDOM   CREATININE, RANDOM URINE   PROTEIN, URINE, RANDOM       All other labs were within normal range or not returned as of this dictation. EMERGENCY DEPARTMENT COURSE and DIFFERENTIALDIAGNOSIS/MDM:   Vitals:    Vitals:    02/21/22 1710 02/21/22 1912   BP: 83/64 84/67   Pulse: 100 99   Resp: 16 18   Temp: 97.9 °F (36.6 °C)    TempSrc: Oral    SpO2: 94% 93%   Weight: 155 lb (70.3 kg)    Height: 5' 7\" (1.702 m)        MDM  Ct abd/pelvis with no post op complications. Pt does not want to go back to [place]. D/w hospitalist for admission    Pt has had 1L of IVF, cathed for urine, but no UOP    CONSULTS:  IP CONSULT TO ONCOLOGY    PROCEDURES:  Unless otherwise notedbelow, none     Procedures    FINAL IMPRESSION     1. MARIO (acute kidney injury) ([address]    2. Hypotension due to hypovolemia    3.  Hyponatremia          DISPOSITION/PLAN   DISPOSITION        PATIENT REFERRED TO:  @FUP@    DISCHARGE MEDICATIONS:  New Prescriptions    No medications on file         CRITICAL CARE TIME   Total Critical Care time was 45 minutes, excluding separately reportable procedures. Time includes direct patient care, reassessing patient, documenting care, and coordinating care for the patient. Time also includes data interpretation of any lab tests or imaging that were performed. There was a high probability of clinically significant/life threatening deterioration in the patient's condition which required my urgent intervention.       (Please note that portions of this note were completed with a voice recognition program.  Efforts were made to edit the dictations butoccasionally words are mis-transcribed.)    Jared Amin MD (electronically signed)  AttendingEmerArkansas Methodist Medical Centercy Physician         Jared Amin MD  02/21/22 5994

## 2022-02-22 ENCOUNTER — APPOINTMENT (OUTPATIENT)
Dept: ULTRASOUND IMAGING | Age: 67
DRG: 683 | End: 2022-02-22
Payer: OTHER GOVERNMENT

## 2022-02-22 PROBLEM — E44.0 MODERATE MALNUTRITION (HCC): Status: ACTIVE | Noted: 2022-02-22

## 2022-02-22 PROBLEM — R51.9 CHRONIC HEADACHE DISORDER: Status: ACTIVE | Noted: 2022-02-22

## 2022-02-22 PROBLEM — H52.10 MYOPIA: Status: ACTIVE | Noted: 2022-02-22

## 2022-02-22 PROBLEM — K56.609 SBO (SMALL BOWEL OBSTRUCTION) (HCC): Status: ACTIVE | Noted: 2020-12-14

## 2022-02-22 PROBLEM — F10.21 CHRONIC ALCOHOLISM IN REMISSION (HCC): Status: ACTIVE | Noted: 2022-02-22

## 2022-02-22 PROBLEM — E78.5 HYPERLIPIDEMIA: Status: ACTIVE | Noted: 2022-02-22

## 2022-02-22 PROBLEM — Z51.5 PALLIATIVE CARE PATIENT: Status: ACTIVE | Noted: 2022-02-22

## 2022-02-22 PROBLEM — F32.A DEPRESSIVE DISORDER: Status: ACTIVE | Noted: 2022-02-22

## 2022-02-22 PROBLEM — K21.9 GASTROESOPHAGEAL REFLUX DISEASE: Status: ACTIVE | Noted: 2022-02-22

## 2022-02-22 PROBLEM — F43.10 POSTTRAUMATIC STRESS DISORDER: Status: ACTIVE | Noted: 2022-02-22

## 2022-02-22 PROBLEM — G89.29 CHRONIC HEADACHE DISORDER: Status: ACTIVE | Noted: 2022-02-22

## 2022-02-22 PROBLEM — K86.89 PANCREATIC FLUID LEAK: Status: ACTIVE | Noted: 2022-01-27

## 2022-02-22 LAB
ANION GAP SERPL CALCULATED.3IONS-SCNC: 18 MMOL/L (ref 7–19)
BUN BLDV-MCNC: 91 MG/DL (ref 8–23)
CALCIUM SERPL-MCNC: 7.9 MG/DL (ref 8.8–10.2)
CHLORIDE BLD-SCNC: 82 MMOL/L (ref 98–111)
CO2: 28 MMOL/L (ref 22–29)
CREAT SERPL-MCNC: 4.6 MG/DL (ref 0.5–1.2)
EKG P AXIS: 69 DEGREES
EKG P-R INTERVAL: 164 MS
EKG Q-T INTERVAL: 354 MS
EKG QRS DURATION: 76 MS
EKG QTC CALCULATION (BAZETT): 422 MS
EKG T AXIS: 90 DEGREES
GFR AFRICAN AMERICAN: 16
GFR NON-AFRICAN AMERICAN: 13
GLUCOSE BLD-MCNC: 97 MG/DL (ref 74–109)
HCT VFR BLD CALC: 30 % (ref 42–52)
HEMOGLOBIN: 9.6 G/DL (ref 14–18)
LACTIC ACID: 1.8 MMOL/L (ref 0.5–1.9)
MCH RBC QN AUTO: 29.2 PG (ref 27–31)
MCHC RBC AUTO-ENTMCNC: 32 G/DL (ref 33–37)
MCV RBC AUTO: 91.2 FL (ref 80–94)
PDW BLD-RTO: 14.9 % (ref 11.5–14.5)
PHOSPHORUS: 8.8 MG/DL (ref 2.5–4.5)
PLATELET # BLD: 308 K/UL (ref 130–400)
PMV BLD AUTO: 9.6 FL (ref 9.4–12.4)
POTASSIUM REFLEX MAGNESIUM: 4.7 MMOL/L (ref 3.5–5)
RBC # BLD: 3.29 M/UL (ref 4.7–6.1)
SODIUM BLD-SCNC: 128 MMOL/L (ref 136–145)
TROPONIN: 0.07 NG/ML (ref 0–0.03)
WBC # BLD: 3.3 K/UL (ref 4.8–10.8)

## 2022-02-22 PROCEDURE — 1210000000 HC MED SURG R&B

## 2022-02-22 PROCEDURE — 99223 1ST HOSP IP/OBS HIGH 75: CPT | Performed by: INTERNAL MEDICINE

## 2022-02-22 PROCEDURE — 80048 BASIC METABOLIC PNL TOTAL CA: CPT

## 2022-02-22 PROCEDURE — 99221 1ST HOSP IP/OBS SF/LOW 40: CPT | Performed by: SURGERY

## 2022-02-22 PROCEDURE — 6370000000 HC RX 637 (ALT 250 FOR IP): Performed by: INTERNAL MEDICINE

## 2022-02-22 PROCEDURE — 6370000000 HC RX 637 (ALT 250 FOR IP)

## 2022-02-22 PROCEDURE — 85027 COMPLETE CBC AUTOMATED: CPT

## 2022-02-22 PROCEDURE — 2580000003 HC RX 258: Performed by: INTERNAL MEDICINE

## 2022-02-22 PROCEDURE — 76770 US EXAM ABDO BACK WALL COMP: CPT

## 2022-02-22 PROCEDURE — 83605 ASSAY OF LACTIC ACID: CPT

## 2022-02-22 PROCEDURE — 93010 ELECTROCARDIOGRAM REPORT: CPT | Performed by: INTERNAL MEDICINE

## 2022-02-22 PROCEDURE — 84100 ASSAY OF PHOSPHORUS: CPT

## 2022-02-22 PROCEDURE — 84484 ASSAY OF TROPONIN QUANT: CPT

## 2022-02-22 PROCEDURE — 36415 COLL VENOUS BLD VENIPUNCTURE: CPT

## 2022-02-22 RX ORDER — 0.9 % SODIUM CHLORIDE 0.9 %
1000 INTRAVENOUS SOLUTION INTRAVENOUS ONCE
Status: COMPLETED | OUTPATIENT
Start: 2022-02-22 | End: 2022-02-22

## 2022-02-22 RX ORDER — HYDROMORPHONE HYDROCHLORIDE 2 MG/1
2 TABLET ORAL EVERY 6 HOURS PRN
Status: DISCONTINUED | OUTPATIENT
Start: 2022-02-22 | End: 2022-03-01 | Stop reason: HOSPADM

## 2022-02-22 RX ORDER — OXYCODONE HYDROCHLORIDE AND ACETAMINOPHEN 5; 325 MG/1; MG/1
1 TABLET ORAL EVERY 6 HOURS PRN
Status: DISCONTINUED | OUTPATIENT
Start: 2022-02-22 | End: 2022-02-22

## 2022-02-22 RX ORDER — OXYCODONE HYDROCHLORIDE 10 MG/1
10 TABLET ORAL EVERY 4 HOURS PRN
COMMUNITY
End: 2022-04-22 | Stop reason: SDUPTHER

## 2022-02-22 RX ORDER — METHOCARBAMOL 500 MG/1
500 TABLET, FILM COATED ORAL 3 TIMES DAILY PRN
Status: DISCONTINUED | OUTPATIENT
Start: 2022-02-22 | End: 2022-03-01 | Stop reason: HOSPADM

## 2022-02-22 RX ADMIN — SODIUM CHLORIDE 1000 ML: 9 INJECTION, SOLUTION INTRAVENOUS at 10:41

## 2022-02-22 RX ADMIN — SODIUM CHLORIDE, POTASSIUM CHLORIDE, SODIUM LACTATE AND CALCIUM CHLORIDE: 600; 310; 30; 20 INJECTION, SOLUTION INTRAVENOUS at 18:31

## 2022-02-22 RX ADMIN — HYDROMORPHONE HYDROCHLORIDE 2 MG: 2 TABLET ORAL at 18:35

## 2022-02-22 RX ADMIN — METHOCARBAMOL TABLETS 500 MG: 500 TABLET, COATED ORAL at 08:55

## 2022-02-22 RX ADMIN — SERTRALINE HYDROCHLORIDE 25 MG: 50 TABLET ORAL at 08:55

## 2022-02-22 RX ADMIN — METHOCARBAMOL TABLETS 500 MG: 500 TABLET, COATED ORAL at 00:04

## 2022-02-22 RX ADMIN — FOLIC ACID 1 MG: 1 TABLET ORAL at 08:54

## 2022-02-22 RX ADMIN — BUPROPION HYDROCHLORIDE 100 MG: 100 TABLET, FILM COATED ORAL at 08:55

## 2022-02-22 RX ADMIN — Medication 2000 UNITS: at 08:55

## 2022-02-22 RX ADMIN — HYDROMORPHONE HYDROCHLORIDE 2 MG: 2 TABLET ORAL at 08:55

## 2022-02-22 RX ADMIN — CHLORPROMAZINE HYDROCHLORIDE 25 MG: 25 TABLET, SUGAR COATED ORAL at 08:54

## 2022-02-22 RX ADMIN — PANTOPRAZOLE SODIUM 40 MG: 40 TABLET, DELAYED RELEASE ORAL at 05:53

## 2022-02-22 ASSESSMENT — ENCOUNTER SYMPTOMS
EYE PAIN: 0
EYE DISCHARGE: 0
DIARRHEA: 0
ABDOMINAL PAIN: 1
NAUSEA: 1
WHEEZING: 0
COUGH: 0
CHEST TIGHTNESS: 0
EYE REDNESS: 0
FACIAL SWELLING: 0
CONSTIPATION: 0
SHORTNESS OF BREATH: 0

## 2022-02-22 ASSESSMENT — PAIN DESCRIPTION - ONSET: ONSET: ON-GOING

## 2022-02-22 ASSESSMENT — PAIN SCALES - GENERAL
PAINLEVEL_OUTOF10: 5
PAINLEVEL_OUTOF10: 5
PAINLEVEL_OUTOF10: 6
PAINLEVEL_OUTOF10: 0

## 2022-02-22 ASSESSMENT — PAIN DESCRIPTION - PAIN TYPE: TYPE: ACUTE PAIN

## 2022-02-22 ASSESSMENT — PAIN DESCRIPTION - FREQUENCY: FREQUENCY: CONTINUOUS

## 2022-02-22 ASSESSMENT — PAIN DESCRIPTION - LOCATION: LOCATION: ABDOMEN

## 2022-02-22 ASSESSMENT — PAIN - FUNCTIONAL ASSESSMENT: PAIN_FUNCTIONAL_ASSESSMENT: PREVENTS OR INTERFERES SOME ACTIVE ACTIVITIES AND ADLS

## 2022-02-22 ASSESSMENT — PAIN DESCRIPTION - PROGRESSION: CLINICAL_PROGRESSION: NOT CHANGED

## 2022-02-22 ASSESSMENT — PAIN DESCRIPTION - DESCRIPTORS: DESCRIPTORS: DISCOMFORT;CONSTANT;ACHING

## 2022-02-22 NOTE — CONSULTS
[facility] Surgery[facility]    Patient ID: Odessa Hargrove  61 y.o.  male  YOB: 1955    Admitting Diagnosis: Hyponatremia [E87.1]  MARIO (acute kidney injury) ([facility] [address] [N17.9]  Acute kidney injury superimposed on CKD ([facility] [address] [N17.9, N18.9]  Hypotension due to hypovolemia [I95.89, E86.1]    Chief Complaint:  Chief Complaint   Patient presents with    Abnormal Lab     unknown, primary care called       Subjective:    Mr. Nery Tucker is a 61 y.o. male who presents today with dehydration, weakness and acute kidney injury. Patient with significant surgical history secondary to his metastatic adenocarcinoma. Has undergone multiple colon surgeries and liver surgeries noted. Most recently, on 1/13 underwent Hypaque, tumor debulking, omentectomy, small bowel resection, ureteral stent placement, abdominal wall reconstruction with mesh and right colon resection. Was initially discharged on 1/28. Did present back to [address] [place] [address] on 2/8 and underwent surgery on 2/9. 2/9 he underwent ex lap, removal of mesh, repair of perforation at his ileocolic anastomosis and FARHAT drain placement. At that time he also underwent diverting loop ileostomy. He has since then been discharged from [address] [place] [address]. Has noted significant high ostomy output. Has been feeling weak. Presented to emergency room and noted to have acute kidney injury. Patient still with staples, FARHAT and ostomy bolster in place.       Past Medical History:   Diagnosis Date    Acid reflux     Cancer ([facility] [address]     adenocarcinoma of colon    GERD (gastroesophageal reflux disease)     PTSD (post-traumatic stress disorder)     Stage 3a chronic kidney disease (HCC)      Past Surgical History:   Procedure Laterality Date    ABLATION OF DYSRHYTHMIC FOCUS      ablation of liver at [facility] [address]  2003    CHOLECYSTECTOMY  2013    COLONOSCOPY      when pt was in the 19's    COLONOSCOPY N/A 03/11/2020    COLONOSCOPY POLYPECTOMY SNARE/COLD BIOPSY: Dr. Diaz Sis colonoscopy: 6-12 months due to findings at colonoscopy today with Cecal mass lesion and large polyps.     COLONOSCOPY      COLONOSCOPY N/A 03/17/2021    Dr Jeff Michael, Post-operative changes w IC anastomosis & single visible staple, Mild Diverticuosis, Int Hemorrhoids Grade 1, 3 year recall    HEMICOLECTOMY Right 03/30/2020    LAPAROSCOPIC-ASSISTED RIGHT HEMICOLECTOMY performed by Karsten Machado MD at [address] N/A 06/03/2020    INSERTION [facility] VENOUS [facility]ro [facility] N/A 03/11/2020    Dr. Boland Neat:   Elizabeth Tyler     Current Facility-Administered Medications   Medication Dose Route Frequency Provider Last Rate Last Admin    0.9 % sodium chloride bolus  1,000 mL IntraVENous Once Rosa Rubio  mL/hr at 02/22/22 1041 1,000 mL at 02/22/22 1041    lactated ringers infusion   IntraVENous Continuous Rosa Rubio  mL/hr at 02/21/22 2127 New Bag at 02/21/22 2127    sodium chloride flush 0.9 % injection 5-40 mL  5-40 mL IntraVENous 2 times per day Jyl Wendy, APRN - CNP        sodium chloride flush 0.9 % injection 5-40 mL  5-40 mL IntraVENous PRN Jyl Wendy, APRN - CNP        0.9 % sodium chloride infusion  25 mL IntraVENous PRN Jyl Wendy, APRN - CNP        ondansetron (ZOFRAN-ODT) disintegrating tablet 4 mg  4 mg Oral Q8H PRN Jyl Wendy, APRN - CNP        Or    ondansetron (ZOFRAN) injection 4 mg  4 mg IntraVENous Q6H PRN Jyl Wendy, APRN - CNP        acetaminophen (TYLENOL) tablet 650 mg  650 mg Oral Q6H PRN Jyl Wendy, APRN - CNP        Or    acetaminophen (TYLENOL) suppository 650 mg  650 mg Rectal Q6H PRN Jyl Wendy, APRN - CNP        heparin (porcine) injection 5,000 Units  5,000 Units SubCUTAneous 3 times per day Jyl Wendy, APRN - CNP        buPROPion [facility]) tablet 100 mg  100 mg Oral QAM Jyl Wendy, APRN - CNP   100 mg at 02/22/22 9013    Vitamin D (CHOLECALCIFEROL) tablet 2,000 Units  2,000 Units Oral Daily KALPESH Salvaodr - CNP   2,000 Units at 02/22/22 8974    dronabinol (MARINOL) capsule 2.5 mg  2.5 mg Oral BID AC KALPESH Salvador CNP        folic acid (FOLVITE) tablet 1 mg  1 mg Oral Daily KALPESH Salvador - CNP   1 mg at 02/22/22 2827    pantoprazole (PROTONIX) tablet 40 mg  40 mg Oral QAM AC KALPESH Salvador - CNP   40 mg at 02/22/22 0473    prazosin (MINIPRESS) capsule 1 mg  1 mg Oral Nightly KALPESH Salvador CNP        topiramate (TOPAMAX) tablet 100 mg  100 mg Oral Daily KALPESH Salvador CNP        sertraline (ZOLOFT) tablet 25 mg  25 mg Oral Daily KALPESH Salvador - CNP   25 mg at 02/22/22 9291    potassium chloride (KLOR-CON M) extended release tablet 10 mEq  10 mEq Oral BID WC KALPESH Salvador - CNP        chlorproMAZINE (THORAZINE) tablet 25 mg  25 mg Oral TID KALPESH Salvador - CNP   25 mg at 02/22/22 9256    loperamide (IMODIUM) capsule 2 mg  2 mg Oral BID PRN KALPESH Salvador CNP        methocarbamol (ROBAXIN) tablet 500 mg  500 mg Oral TID KALPESH Salvador - CNP   500 mg at 02/22/22 9223    HYDROmorphone (DILAUDID) tablet 2 mg  2 mg Oral Q4H PRN KALPESH Salvador - CNP   2 mg at 02/22/22 5441     Allergies: Morphine and Oxycodone-acetaminophen    Family History   Problem Relation Age of Onset    Liver Cancer Mother     High Blood Pressure Mother     Colon Cancer Mother         x2    Cancer Father         Lung Cancer    Breast Cancer Sister     Cancer Maternal Grandfather         Lung Cancer    Cancer Paternal Grandfather         Stomach Cancer    Colon Polyps Neg Hx     Cystic Fibrosis Neg Hx     Liver Disease Neg Hx     Rectal Cancer Neg Hx        Social History     Tobacco Use    Smoking status: Never Smoker    Smokeless tobacco: Never Used   Substance Use Topics    Alcohol use: Yes     Comment: rare        Review of Systems   Constitutional: Positive for fatigue. Negative for chills, diaphoresis and fever. HENT: Negative for ear discharge, ear pain, facial swelling and nosebleeds. Eyes: Negative for pain, discharge and redness. Respiratory: Negative for cough, chest tightness, shortness of breath and wheezing. Cardiovascular: Negative for chest pain and palpitations. Gastrointestinal: Positive for abdominal pain and nausea. Negative for constipation and diarrhea. Genitourinary: Negative for difficulty urinating, flank pain and hematuria. Musculoskeletal: Negative for neck pain and neck stiffness. Neurological: Positive for weakness. Negative for dizziness, numbness and headaches. Psychiatric/Behavioral: Negative for agitation, behavioral problems and self-injury. Objective:   /66   Pulse 84   Temp 98.6 °F (37 °C) (Temporal)   Resp 16   Ht 5' 7\" (1.702 m)   Wt 136 lb 11.2 oz (62 kg)   SpO2 100%   BMI 21.41 kg/m²       Intake/Output Summary (Last 24 hours) at 2/22/2022 1148  Last data filed at 2/22/2022 1007  Gross per 24 hour   Intake 120 ml   Output 680 ml   Net -560 ml     Physical Exam  Vitals reviewed. Constitutional:       Appearance: Normal appearance. HENT:      Head: Normocephalic and atraumatic. Right Ear: External ear normal.      Left Ear: External ear normal.      Nose: Nose normal.   Eyes:      Extraocular Movements: Extraocular movements intact. Pulmonary:      Effort: Pulmonary effort is normal. No respiratory distress. Abdominal:      Palpations: Abdomen is soft. Tenderness: There is abdominal tenderness. There is no guarding or rebound. Musculoskeletal:         General: Normal range of motion. Cervical back: Normal range of motion. Skin:     General: Skin is warm and dry. Neurological:      General: No focal deficit present. Mental Status: He is alert and oriented to person, place, and time.    Psychiatric:         Mood and Affect: Mood normal.         Behavior: Behavior normal.         Thought Content: Thought content normal.         Data:  CBC:   Recent Labs     02/21/22 1719 02/22/22  0431   WBC 4.4* 3.3*   RBC 4.00* 3.29*   HGB 11.7* 9.6*   HCT 36.0* 30.0*   MCV 90.0 91.2   RDW 15.0* 14.9*   * 308     BMP:   Recent Labs     02/21/22 1719 02/22/22 0431   * 128*   K 4.7 4.7   CL 75* 82*   CO2 27 28   ANIONGAP 23* 18   GLUCOSE 123* 97   CREATININE 4.6* 4.6*   LABGLOM 13* 13*   CALCIUM 8.8 7.9*     LFT:   Recent Labs     02/21/22 1719   PROT 8.3   LABALBU 3.4*   BILITOT 0.5   ALKPHOS 296*   ALT 26   AST 26     PT/INR:No results for input(s): PROTIME, INR in the last 72 hours. Assessment:     Principal Problem:    Acute kidney injury superimposed on CKD ([facility] [address]  Active Problems:    Adenocarcinoma of colon ([facility] [address]    Hypovolemia    Hyponatremia  Resolved Problems:    * No resolved hospital problems.  *      Plan:     Aggressive medical management and fluid resuscitation  Will monitor ostomy output and drain output   Would not recommend removing FARHAT at this time  If ostomy output seems to be high output then we will start antidiarrheal agents  Staples to stay in place for least 2 weeks postop  Diet as tolerated      Thank you for your consult    Electronically signed by Jasvir Fernandez DO on 2/22/2022 at 11:48 AM

## 2022-02-22 NOTE — PROGRESS NOTES
4 Eyes Skin Assessment    Denise Winn is being assessed upon: Admission    I agree that I, Adry Cordova RN, along with Nishant Hansen RN, (either 2 RN's or 1 LPN and 1 RN) have performed a thorough Head to Toe Skin Assessment on the patient. ALL assessment sites listed below have been assessed. Areas assessed by both nurses:     [x]   Head, Face, and Ears   [x]   Shoulders, Back, and Chest  [x]   Arms, Elbows, and Hands   [x]   Coccyx, Sacrum, and Ischium  [x]   Legs, Feet, and Heels    Does the Patient have Skin Breakdown?  No    Charles Prevention initiated: No  Wound Care Orders initiated: No    WOC nurse consulted for Pressure Injury (Stage 3,4, Unstageable, DTI, NWPT, and Complex wounds) and New or Established Ostomies: No        Primary Nurse eSignature: Adry Cordova RN on 2/22/2022 at 1:43 AM      Co-Signer eSignature: Electronically signed by Nishant Hansen RN on 2/22/22 at 5:06 AM CST

## 2022-02-22 NOTE — PROGRESS NOTES
Alok Butler arrived to room # 314. Presented with: MARIO on CKD  Mental Status: Patient is oriented and alert. Vitals:    02/21/22 2229   BP: 81/65   Pulse: 93   Resp: 16   Temp: 97.2 °F (36.2 °C)   SpO2: 96%     Patient safety contract and falls prevention contract reviewed with patient Yes. Oriented Patient to room. Call light within reach. Yes.   Needs, issues or concerns expressed at this time: no.      Electronically signed by Stevie Reynolds RN on 2/21/2022 at 10:53 PM

## 2022-02-22 NOTE — CONSULTS
Nephrology ([facility]) Consult Note      Patient:  Bonnie Cooney  YOB: 1955  Date of Service: 2/22/2022  MRN: [id number]   Acct: [de-identified]   Primary Care Physician: KALPESH Malloy NP  Advance Directive: Full Code  Admit Date: 2/21/2022       Hospital Day: 1  Referring Provider: Colette Jarquin MD    Patient independently seen and examined, Chart, Consults, Notes, Operative notes, Labs, Cardiology, and Radiology studies reviewed as available. Chief complaint: Profound weakness    Subjective:  Bonnie Cooney is a 61 y.o. male for whom we were consulted for evaluation and treatment of acute kidney injury. Patient denies any history of chronic kidney disease. Patient has history of stage IV colon cancer with metastatic disease to liver. Patient was recently hospitalized at [facility] with bowel obstruction, needing emergent laparotomy and ileostomy. Previously underwent partial colectomy and partial hepatectomy for the treatment of metastatic colon cancer. Patient denies any nausea and vomiting. He has noticed high output from ileostomy and has to change the bag frequently. Patient was supposed to get total parenteral nutrition but was not approved by [employer]. Patient has been trying to eat more but unable to eat due to recent abdominal surgery. His fluid intake was also very low. In the emergency room his serum creatinine was 4.6 mg, phosphorus was 11.1 mg. Patient also has hyponatremia. He is now admitted for acute kidney injury and other severe electrolyte abnormalities.   He is currently getting IV fluid and feeling little better    Allergies:  Morphine and Oxycodone-acetaminophen    Medicines:  Current Facility-Administered Medications   Medication Dose Route Frequency Provider Last Rate Last Admin    lactated ringers infusion   IntraVENous Continuous Jay Ward  mL/hr at 02/21/22 2127 New Bag at 02/21/22 2127    sodium PRN Collemarielosa Ruthven, APRN - CNP        methocarbamol (ROBAXIN) tablet 500 mg  500 mg Oral TID Collemarielosa Ruthven, APRN - CNP   500 mg at 02/22/22 2998    HYDROmorphone (DILAUDID) tablet 2 mg  2 mg Oral Q4H PRN Colletta Ruthven, APRN - CNP   2 mg at 02/22/22 3078       Past Medical History:  Past Medical History:   Diagnosis Date    Acid reflux     Cancer ([facility] [address]     adenocarcinoma of colon    GERD (gastroesophageal reflux disease)     PTSD (post-traumatic stress disorder)     Stage 3a chronic kidney disease ([facility] [address]        Past Surgical History:  Past Surgical History:   Procedure Laterality Date    ABLATION OF DYSRHYTHMIC FOCUS      ablation of liver at [facility] APPENDECTOMY  2003    CHOLECYSTECTOMY  2013    COLONOSCOPY      when pt was in the 19's    COLONOSCOPY N/A 03/11/2020    COLONOSCOPY POLYPECTOMY SNARE/COLD BIOPSY: Dr. Crystal Metcalf colonoscopy: 6-12 months due to findings at colonoscopy today with Cecal mass lesion and large polyps.     COLONOSCOPY      COLONOSCOPY N/A 03/17/2021    Dr Ubaldo Villeda, Post-operative changes w IC anastomosis & single visible staple, Mild Diverticuosis, Int Hemorrhoids Grade 1, 3 year recall    HEMICOLECTOMY Right 03/30/2020    LAPAROSCOPIC-ASSISTED RIGHT HEMICOLECTOMY performed by Deo Johnson MD at [address] [place] [address] N/A 06/03/2020    INSERTION OF VENOUS PORT with flouro performed by Deo Johnson MD at [place] [address] ENDOSCOPY N/A 03/11/2020    Dr. Chon Turner:   [facility]       Family History  Family History   Problem Relation Age of Onset    Liver Cancer Mother     High Blood Pressure Mother     Colon Cancer Mother         x2    Cancer Father         Lung Cancer    Breast Cancer Sister     Cancer Maternal Grandfather         Lung Cancer    Cancer Paternal Grandfather         Stomach Cancer    Colon Polyps Neg Hx     Cystic Fibrosis Neg Hx     Liver Disease Neg Hx     Rectal Cancer Neg Hx        Social History  Social History Socioeconomic History    Marital status:      Spouse name: Not on file    Number of children: Not on file    Years of education: Not on file    Highest education level: Not on file   Occupational History    Not on file   Tobacco Use    Smoking status: Never Smoker    Smokeless tobacco: Never Used   Vaping Use    Vaping Use: Never used   Substance and Sexual Activity    Alcohol use: Yes     Comment: rare     Drug use: No    Sexual activity: Yes     Partners: Female   Other Topics Concern    Not on file   Social History Narrative    Not on file     Social Determinants of Health     Financial Resource Strain:     Difficulty of Paying Living Expenses: Not on file   Food Insecurity:     Worried About Running Out of Food in the Last Year: Not on file    [place] of Food in the Last Year: Not on file   Transportation Needs:     Lack of Transportation (Medical): Not on file    Lack of Transportation (Non-Medical):  Not on file   Physical Activity:     Days of Exercise per Week: Not on file    Minutes of Exercise per Session: Not on file   Stress:     Feeling of Stress : Not on file   Social Connections:     Frequency of Communication with Friends and Family: Not on file    Frequency of Social Gatherings with Friends and Family: Not on file    Attends Church Services: Not on file    Active Member of [address] [place] [address] [employer] or Organizations: Not on file    Attends Club or Organization Meetings: Not on file    Marital Status: Not on file   Intimate Partner Violence:     Fear of Current or Ex-Partner: Not on file    Emotionally Abused: Not on file    Physically Abused: Not on file    Sexually Abused: Not on file   Housing Stability:     Unable to Pay for Housing in the Last Year: Not on file    Number of [place] in the Last Year: Not on file    Unstable Housing in the Last Year: Not on file         Review of Systems:  History obtained from chart review and the patient  General ROS: No fever or chills  Respiratory ROS: No cough, shortness of breath, wheezing  Cardiovascular ROS: No chest pain or palpitations  Gastrointestinal ROS: positive for - diarrhea/large output from last  Genito-Urinary ROS: No dysuria or hematuria  Musculoskeletal ROS: No joint pain or swelling   14 point ROS reviewed with the patient and negative except as noted above and in the HPI unless unable to obtain. Objective:  Patient Vitals for the past 24 hrs:   BP Temp Temp src Pulse Resp SpO2 Height Weight   02/22/22 0815 111/66 98.6 °F (37 °C) Temporal 84 16 100 % -- --   02/22/22 0659 -- -- -- -- -- -- -- 136 lb 11.2 oz (62 kg)   02/22/22 0610 (!) 90/56 98.1 °F (36.7 °C) Temporal 86 20 98 % -- --   02/22/22 0059 (!) 90/58 -- -- -- -- -- -- --   02/22/22 0058 (!) 89/59 98.6 °F (37 °C) Temporal 86 20 97 % -- --   02/21/22 2229 81/65 97.2 °F (36.2 °C) Temporal 93 16 96 % -- --   02/21/22 2028 96/66 -- -- 90 18 95 % -- --   02/21/22 1912 84/67 -- -- 99 18 93 % -- --   02/21/22 1710 83/64 97.9 °F (36.6 °C) Oral 100 16 94 % 5' 7\" (1.702 m) 155 lb (70.3 kg)       Intake/Output Summary (Last 24 hours) at 2/22/2022 0954  Last data filed at 2/22/2022 0501  Gross per 24 hour   Intake --   Output 680 ml   Net -680 ml     General: awake/alert   HEENT: Normocephalic atraumatic head  Neck: Supple with no JVD or carotid bruits. Chest:  clear to auscultation bilaterally  CVS: regular rate and rhythm  Abdominal: Midline abdominal wound, looks fresh with staples/right lower quadrant ileostomy.   Extremities: no cyanosis or edema  Skin: warm and dry without rash      Labs:  BMP:   Recent Labs     02/21/22 1719 02/22/22  0431   * 128*   K 4.7 4.7   CL 75* 82*   CO2 27 28   PHOS 11.1* 8.8*   BUN 97* 91*   CREATININE 4.6* 4.6*   CALCIUM 8.8 7.9*     CBC:   Recent Labs     02/21/22 1719 02/22/22 0431   WBC 4.4* 3.3*   HGB 11.7* 9.6*   HCT 36.0* 30.0*   MCV 90.0 91.2   * 308     LIVER PROFILE:   Recent Labs     02/21/22 1719   AST 26 ALT 26   LIPASE 91*   BILITOT 0.5   ALKPHOS 296*     PT/INR: No results for input(s): PROTIME, INR in the last 72 hours. APTT: No results for input(s): APTT in the last 72 hours. BNP:  No results for input(s): BNP in the last 72 hours. Ionized Calcium:No results for input(s): IONCA in the last 72 hours. Magnesium:  Recent Labs     02/21/22 1719   MG 3.0*     Phosphorus:  Recent Labs     02/21/22 1719 02/22/22  0431   PHOS 11.1* 8.8*     HgbA1C: No results for input(s): LABA1C in the last 72 hours. Hepatic:   Recent Labs     02/21/22 1719   ALKPHOS 296*   ALT 26   AST 26   PROT 8.3   BILITOT 0.5   LABALBU 3.4*     Lactic Acid:   Recent Labs     02/22/22  0431   LACTA 1.8     Troponin: No results for input(s): CKTOTAL, CKMB, TROPONINT in the last 72 hours. ABGs: No results for input(s): PH, PCO2, PO2, HCO3, O2SAT in the last 72 hours. CRP:  No results for input(s): CRP in the last 72 hours. Sed Rate:  No results for input(s): SEDRATE in the last 72 hours. Cultures:   No results for input(s): CULTURE in the last 72 hours. No results for input(s): BC, Kirstin Budd in the last 72 hours. No results for input(s): CXSURG in the last 72 hours. Radiology reports as per the Radiologist  Radiology: CT ABDOMEN PELVIS WO CONTRAST Additional Contrast? None    Result Date: 2/21/2022  CT ABDOMEN PELVIS WO CONTRAST 2/21/2022 7:53 PM History: Postop pain. Hypotension. Noncontrast abdomen/pelvis CT. Comparison is made with February 8, 2022. In order to have a CT radiation dose as low as reasonably achievable Automated Exposure Control was utilized for adjustment of the mA and/or KV according to patient size. DLP in mGycm= 1115. Interval abdominal surgery. Normal heart size. Patchy atelectasis at the lung bases. Normal noncontrast appearance of the liver, pancreas, and spleen. Normal and symmetric kidneys. No hydronephrosis. No bowel dilation. No abscess or hematoma.     1. Postoperative changes with no sign of bowel obstruction, abscess, or hematoma. Signed by Dr Markel Evans    XR CHEST PORTABLE    Result Date: 2/21/2022  Exam:   XR CHEST PORTABLE  Date:  2/21/2022 History:  Male, age  61 years; hypotension COMPARISON:  Chest x-ray dated February 8, 2022 Findings : Chest portable place. A right-sided PICC, new, terminating in the low SVC. The heart and mediastinum are normal in size. Lungs are without focal infiltrate, mass or effusions. The bones show no acute pathology. Catheter in the left upper quadrant. Impression: 1. New right-sided PICC. 2.  Otherwise, no interval changes. Signed by Dr Staci Lauren   1. Acute kidney injury/stage III. 2.  Intravascular volume depletion. 3.  Severe hyperphosphatemia  4. Hypercalcemia. 5.  Hyponatremia improving. 6.  History of metastatic colon cancer. 7.  Severe immunosuppression due to recent chemo. Plan:  1. Agree with IV fluid resuscitation  2. Urinary electrolytes. 3.  Baseline renal ultrasound. 4.  I will continue to monitor renal function close      Thank you for the consult, we appreciate the opportunity to provide care to your patients. Feel free to contact me if I can be of any further assistance.       Avi Mojica MD  02/22/22  9:54 AM

## 2022-02-22 NOTE — PLAN OF CARE
Nutrition Problem #1: Inadequate oral intake,Altered GI function,Unintended weight loss  Intervention: Food and/or Nutrient Delivery: Continue Current Diet,Start Oral Nutrition Supplement  Nutritional Goals: po intake 50% or greater.   Weight stable or increase 1-5# per week

## 2022-02-22 NOTE — PROGRESS NOTES
This nurse went over the medication list with patient and his wife. Patient and wife state that he has not been taking his scheduled medications since he returned home from [facility]. Medication list updated with the 3 medications he has been taking since.  Electronically signed by Isac Downing RN on 2/21/2022 at 9:54 PM

## 2022-02-22 NOTE — PLAN OF CARE
Problem: Skin Integrity:  Goal: Will show no infection signs and symptoms  Description: Will show no infection signs and symptoms  2/22/2022 1404 by Lamine Armstrong RN  Outcome: Ongoing  2/22/2022 1404 by Lamine Armstrong RN  Outcome: Ongoing  2/22/2022 0057 by Aron Nuñez RN  Outcome: Ongoing  Goal: Absence of new skin breakdown  Description: Absence of new skin breakdown  2/22/2022 1404 by Lamine Armstrong RN  Outcome: Ongoing  2/22/2022 1404 by Lamine Armstrong RN  Outcome: Ongoing  2/22/2022 0057 by Aron Nuñez RN  Outcome: Ongoing     Problem: Falls - Risk of:  Goal: Will remain free from falls  Description: Will remain free from falls  2/22/2022 1404 by Lamine Armstrong RN  Outcome: Ongoing  2/22/2022 1404 by Lamine Armstrong RN  Outcome: Ongoing  2/22/2022 0057 by Aron Nuñez RN  Outcome: Ongoing  Goal: Absence of physical injury  Description: Absence of physical injury  2/22/2022 1404 by Lamine Armstrong RN  Outcome: Ongoing  2/22/2022 1404 by Lamine Armstrong RN  Outcome: Ongoing  2/22/2022 0057 by Aron Nuñez RN  Outcome: Ongoing     Problem: Pain:  Goal: Pain level will decrease  Description: Pain level will decrease  2/22/2022 1404 by Lamine Armstrong RN  Outcome: Ongoing  2/22/2022 1404 by Lamine Armstrong RN  Outcome: Ongoing  2/22/2022 0057 by Aron Nuñez RN  Outcome: Ongoing  Goal: Control of acute pain  Description: Control of acute pain  2/22/2022 1404 by Lamine Armstrong RN  Outcome: Ongoing  2/22/2022 1404 by Lamine Armstrong RN  Outcome: Ongoing  2/22/2022 0057 by Aron Nuñez RN  Outcome: Ongoing  Goal: Control of chronic pain  Description: Control of chronic pain  2/22/2022 1404 by Lamine Armstrong RN  Outcome: Ongoing  2/22/2022 1404 by Lamine Armstrong RN  Outcome: Ongoing  2/22/2022 0057 by Aron Nuñez RN  Outcome: Ongoing     Problem: Nutrition  Goal: Optimal nutrition therapy  2/22/2022 1404 by Lamine Armstrong RN  Outcome: Ongoing  2/22/2022 1404 by Kye Alonso Sasha Zhang, RN  Outcome: Ongoing     Problem: Nutrition Deficit:  Goal: Ability to achieve adequate nutritional intake will improve  Description: Ability to achieve adequate nutritional intake will improve  Outcome: Ongoing

## 2022-02-22 NOTE — ED NOTES
Bladder scanned patient resulting in a reading of 172. Nurse notified.      Norbert Grant  02/21/22 1947

## 2022-02-22 NOTE — CONSULTS
Palliative Care Consult Note    2/23/2022 12:37 PM  Subjective:  Admit Date: 2/21/2022  PCP: KALPESH Whitaker NP    Date of Service: 2/23/2022    Reason for Consultation:  Goals of Care, Code Status, Family Support     History Obtained From: EMR/Patient and their Family    History Of Present Illness: The patient is a 61 y.o. male with PMH adenocarcinoma of colon with liver metastasis, GERD, CKD III, PTSD who presented to [facility] ED on 02/21/2022 for abnormal lab work and decreased urine output. He underwent debulking surgery, omentectomy, small bowel resection, ureteral stent placement, abdominal wall reconstruction w/ mesh and right colon resection as well as hyperthermic intraperitoneal chemotherapy (HIPEC) 01/2022. Post-operative course was complicated by an ileus. He then was found to have perforated viscus and underwent exploratory laparotomy, removal of mesh, repair of perforation at ileocolic anastomosis and diverting loop ileostomy w/ placement of FARHAT drain adjacent to anastomosis and right retroperitoneum and a right ureteral stent on  02/9/2022. Further hospital course complicated by dehydration and MARIO. Plans were to discharge him on TPN but insurance did not approve. He has had decreased oral intake since discharge from [facility]. CT abdomen pelvis performed on intake and was unremarkable. Na 125, Crt 4.6, lactic acid 2.0. Troponin noted to be elevated though decreasing. Renal US and CXR unremarkable. Patient was admitted to Hospitalist service with consult placed to Oncology, Nephrology and General surgery. Suspect MARIO 2/2 high ileostomy output. Patient has been continued on IVF resuscitation. Palliative care was consulted for goals of care, symptom management.      Past Medical History:        Diagnosis Date    Acid reflux     Cancer ([facility] [address]     adenocarcinoma of colon    GERD (gastroesophageal reflux disease)     PTSD (post-traumatic stress disorder)     Stage 3a chronic kidney disease (HCC)      Past Surgical History:        Procedure Laterality Date    ABLATION OF DYSRHYTHMIC FOCUS      ablation of liver at [address]  2003    CHOLECYSTECTOMY  2013    COLONOSCOPY      when pt was in the 19's    COLONOSCOPY N/A 03/11/2020    COLONOSCOPY POLYPECTOMY SNARE/COLD BIOPSY: Dr. Whitney Trent colonoscopy: 6-12 months due to findings at colonoscopy today with Cecal mass lesion and large polyps.  COLONOSCOPY      COLONOSCOPY N/A 03/17/2021    Dr Timothy Patiño, Post-operative changes w IC anastomosis & single visible staple, Mild Diverticuosis, Int Hemorrhoids Grade 1, 3 year recall    HEMICOLECTOMY Right 03/30/2020    LAPAROSCOPIC-ASSISTED RIGHT HEMICOLECTOMY performed by Mabel Johnson MD at [facility] [address] N/A 06/03/2020    INSERTION OF VENOUS PORT with flouro performed by Mabel Johnson MD at [place] [address] ENDOSCOPY N/A 03/11/2020    Dr. Raven Bourgeois:   [facility]       Home Medications:  Prior to Admission medications    Medication Sig Start Date End Date Taking? Authorizing Provider   oxyCODONE HCl (OXY-IR) 10 MG immediate release tablet Take 10 mg by mouth every 4 hours as needed for Pain. Yes Historical Provider, MD   HYDROmorphone (DILAUDID) 4 MG tablet Take 4 mg by mouth every 4 hours as needed for Pain.    Yes Historical Provider, MD   methocarbamol (ROBAXIN) 500 MG tablet Take 500 mg by mouth 3 times daily as needed    Yes Historical Provider, MD   loperamide (IMODIUM) 2 MG capsule Take 2 mg by mouth 2 times daily as needed for Diarrhea   Yes Historical Provider, MD   sodium chloride 0.9 % bolus Infuse 500 mLs intravenously 2 times daily   Yes Historical Provider, MD   omeprazole (PRILOSEC) 20 MG delayed release capsule Take 20 mg by mouth daily   Yes Historical Provider, MD   Cholecalciferol (VITAMIN D3) 50 MCG (2000 UT) CAPS Take by mouth daily   Yes Historical Provider, MD   promethazine (PHENERGAN) 12.5 MG tablet Take 1 tablet by mouth every 6 hours as needed for Nausea 11/9/21   Catherine Dumont MD   prazosin (MINIPRESS) 1 MG capsule Take 1 mg by mouth nightly For nightmares    Historical Provider, MD   Sertraline HCl (ZOLOFT PO) Take by mouth daily    Historical Provider, MD       Allergies:    Morphine and Oxycodone-acetaminophen    Social History:    The patient currently lives at home. Tobacco:   reports that he has never smoked. He has never used smokeless tobacco.  Alcohol:   reports current alcohol use.   Illicit Drugs: None    Family History:      Problem Relation Age of Onset    Liver Cancer Mother     High Blood Pressure Mother     Colon Cancer Mother         x2    Cancer Father         Lung Cancer    Breast Cancer Sister     Cancer Maternal Grandfather         Lung Cancer    Cancer Paternal Grandfather         Stomach Cancer    Colon Polyps Neg Hx     Cystic Fibrosis Neg Hx     Liver Disease Neg Hx     Rectal Cancer Neg Hx        Review of Systems:   Constitutional / general:  Denies fever / chills / sweats +fatigue  Head:  Denies headache / neck stiffness / trauma / visual change  Eyes:  Denies blurry vision / acute visual change or loss / itching / redness  ENT: Denies sore throat / hoarseness / nasal drainage / ear pain  CV:  Denies chest pain / palpitations/ orthopnea   Respiratory:  Denies cough / shortness of breath / sputum / hemoptysis  GI: Denies nausea / vomiting / +abdominal pain / +high output ileostomy  :  Denies dysuria / hesitancy / urgency / hematuria   Neuro: Denies paralysis / syncope / seizure / dysphagia / headache / paresthesias  Musculoskeletal:  Denies muscle weakness /joint stiffness / +pain  Vascular: Denies edema / claudication / varicosities  Heme / endocrine: Denies easy bruising / bleeding / excessive sweating / heat or cold intolerance  Psychiatric:  Denies depression / anxiety / insomnia / mood changes  Skin:  Denies new rashes / lesions / skin hair or nail changes    14 point review of systems is negative except as specifically addressed above. Physical Examination:  /61   Pulse 80   Temp 98.5 °F (36.9 °C) (Temporal)   Resp 16   Ht 5' 7\" (1.702 m)   Wt 140 lb (63.5 kg)   SpO2 96%   BMI 21.93 kg/m²   General appearance: alert, appears stated age, cooperative and no distress  Head: Normocephalic, without obvious abnormality, atraumatic  Eyes: conjunctivae/corneas clear. PERRL, EOM's intact.    Ears: normal external ears and nose, throat without exudate  Neck:  supple, symmetrical, trachea midline   Lungs: clear to auscultation bilaterally,no rales or wheezes   Heart: regular rate and rhythm, S1, S2 normal, no murmur  Abdomen:soft, mild TTP as expected, Ileostomy present w/ liquid stool, FARHAT  Extremities:No lower extremity edema,  No erythema, no tenderness to palpation  Skin: Skin color, texture, turgor normal. No rashes or lesions  Lymphatic: No palpable lymph node enlargment  Neurologic: Alert and oriented X 3, generalized weakness, normal tone, no focal deficits, speech fluent, follows commands without difficulty   Psychiatric: Alert and oriented, thought content appropriate, normal insight, mood appropriate    Diagnostic Data:  CBC:  Recent Labs     02/21/22 1719 02/22/22 0431 02/23/22  0500   WBC 4.4* 3.3* 3.4*   HGB 11.7* 9.6* 8.4*   HCT 36.0* 30.0* 26.3*   * 308 293     BMP:  Recent Labs     02/21/22 1719 02/22/22  0431 02/23/22  0500   * 128* 126*   K 4.7 4.7 3.4*   CL 75* 82* 84*   CO2 27 28 26   BUN 97* 91* 78*   CREATININE 4.6* 4.6* 3.9*   CALCIUM 8.8 7.9* 7.7*   PHOS 11.1* 8.8* 5.6*     Recent Labs     02/21/22 1719   AST 26   ALT 26   BILITOT 0.5   ALKPHOS 296*     Cardiac Enzymes:   Recent Labs     02/21/22 1719 02/21/22  2315 02/22/22  0431   TROPONINI 0.08* 0.06* 0.07*     Urinalysis:  Lab Results   Component Value Date    NITRU Negative 02/21/2022    WBCUA 1 12/14/2020    BACTERIA NEGATIVE 12/14/2020    RBCUA 2 12/14/2020    BLOODU Negative 02/21/2022    SPECGRAV 1.021 02/21/2022    GLUCOSEU Negative 02/21/2022     CT ABDOMEN PELVIS WO CONTRAST Additional Contrast? None  1. Postoperative changes with no sign of bowel obstruction, abscess, or hematoma. Signed by Dr Dilma Correa RENAL COMPLETE  1. No abnormality is seen. Signed by Dr Freddy Funes    XR CHEST PORTABLE  Impression: 1. New right-sided PICC. 2.  Otherwise, no interval changes. Signed by Dr Peter Floyd    Palliative Performance Scale:  50-60%    Palliative Review of Advance Directives:     Surrogate Decision Maker:Yes-Idania Morfin    Durable Power of :Yes-Idania Morfin     Advanced Directives/Living Peña: no    Out of hospital medical orders in place to reflect resuscitation status (MOLST/POLST): no    Information Sharing:  Patient's awareness of illness:  [] Terminal [] Life-Threatening [x] Serious [] Non life-threatening [] Not serious   [] Not discussed    Family awareness of illness:   [] Terminal [] Life-Threatening [x] Serious [] Nonlife-threatening [] Not serious   [] Not discussed    Assessment/Plan:  Principal Problem:    Acute kidney injury superimposed on CKD ([address]  Active Problems:    Adenocarcinoma of colon ([address]    Liver metastases ([address]    Hypovolemia    Hyponatremia    Moderate malnutrition ([address]    Palliative care patient    Posttraumatic stress disorder  Resolved Problems:    * No resolved hospital problems. *     Visit Summary:  Chart reviewed, patient discussed with consulting service and nursing staff. Reviewed health issues, work up and treatment plan as well as factors that lead to hospitalization. I saw Mr. Stephanie Juarez at bedside with his spouse present. I introduced myself, role of Palliative care and reason for consultation. Rodger Muniz is patient's spouse and provides [de-identified] of history. She states insurance would not cover TPN. They did have IVF's ordered at home, however, the prescription wasn't adjusted when they found out TPN wasn't covered.  He continued to have high output from his ileostomy and decreasing oral intake. She states they have since had further discussion with plan for 1000 cc IVF's twice weekly with home health which has been set up through [address] [place] [address] when he left [facility]. He does have abdominal pain but states it is well controlled today. Fatigue is mildly improved. Their goals are to discharge home once medically stable with hopes of getting ileostomy reversed after 2 months. His diet is restricted for 6 weeks post op. He cannot eat fresh fruits/veges or high fiber diet. He enjoys little at present but will eat sugar free vanilla pudding cups. Dietary happened to be in hallway and these were requested to be present on all his trays. Denise names his wife Sanjuana Gutierrez as his decision maker and states she is POA as well. We discussed meaning of \"full code\", \"limited code\" and \"do not resuscitate\". Initially Mr. Efraín Lozada stated he would only allow ventilator management. Sanjuana Gutierrez states she feels he should have all resuscitative measures based on his age and his was very active and healthy prior to his diagnosis. Mr. Efraín Lozada agrees but did hesitate. I encouraged them to have frequent, open, honest discussions regarding benefits/burdens of treatment, expectations, goals so they can support each other and be on the same page with all of their decisions. Outpatient supportive care was introduced and both patient and spouse were receptive to this. Referral placed. Opportunity for questions/emotional support provided. Will continue to follow. Recommendations:     1. Palliative Care-GOC DC home w/ home health/outpatient supportive care once renal function/oral intake improved. Code status: Full code. SCOP referral placed  2. Colon cancer-s/p HIPEC/debulking at [facility] 01/2022, Oncology following  3. MARIO w/ hyponatremia-Nephrology managing, IVF's continued.  Feel continuation of IVF's at home will be beneficial, patient's spouse states they have this arranged already with [facility] home health/Oncology. 4. DVT/SVT LUE-therapeutic Lovenox ordered     Thank you for consulting palliative care and allowing us to participate in the care of the patient.       CounselingTopics: Goals of care, Code Status, Disease process education, pt/family support    Time Spent Counseling > 50%:  YES                                   Total Time Spent with patient/family counseling, workup/treatment review, counseling and placement of orders/preparation of this note: 79 minutes    Electronically signed by Efraín Real PA-C on 2/23/2022 at 12:37 PM    (Please note that portions of this note were completed with a voice recognition program.  Efforts were made to edit the dictations but occasionally words are mis-transcribed.)

## 2022-02-22 NOTE — CONSULTS
Comprehensive Nutrition Assessment    Type and Reason for Visit:  Initial,Positive Nutrition Screen,Consult    Nutrition Recommendations/Plan: start ONS    Nutrition Assessment:  Pt appears thin. PO intake remains decreased. Aware just recently discharged from [facility]. Had recommended to use PN at home--insurance refused it. Pt meets criteria for moderate malnutrition d/t weight loss, fat depletion, high out put ostomy    Malnutrition Assessment:  Malnutrition Status: Moderate malnutrition    Context:  Acute Illness     Findings of the 6 clinical characteristics of malnutrition:  Energy Intake:  7 - 50% or less of estimated energy requirements for 5 or more days  Weight Loss:  7 - Greater than 2% over 1 week     Body Fat Loss:  1 - Mild body fat loss Orbital,Buccal region   Muscle Mass Loss:  1 - Mild muscle mass loss Hand (interosseous),Clavicles (pectoralis & deltoids)  Fluid Accumulation:  No significant fluid accumulation Extremities   Strength:       Estimated Daily Nutrient Needs:  Energy (kcal):  0141-9892 kcals (25-30 kcals/kg); Weight Used for Energy Requirements:  Current     Protein (g):  70-140g; Weight Used for Protein Requirements:  Current        Fluid (ml/day):  9334-2749+; Method Used for Fluid Requirements:  1 ml/kcal      Nutrition Related Findings:  colostomy      Wounds:  Surgical Incision       Current Nutrition Therapies:    ADULT DIET; Regular  ADULT ORAL NUTRITION SUPPLEMENT; Lunch, Dinner; Clear Liquid Oral Supplement    Anthropometric Measures:  · Height: 5' 7\" (170.2 cm)  · Current Body Weight: 136 lb 11.2 oz (62 kg)   · Admission Body Weight: 155 lb (70.3 kg) (stated)    · Usual Body Weight: 154 lb 8.7 oz (70.1 kg) (2/9/2022)     · Ideal Body Weight: 148 lbs; % Ideal Body Weight 92.4 %   · BMI: 21.4  · Adjusted Body Weight:  ; No Adjustment   · BMI Categories: Normal Weight (BMI 18.5-24. 9)       Nutrition Diagnosis:   · Inadequate oral intake,Altered GI function,Unintended weight loss related to acute injury/trauma,catabolic illness,increase demand for energy/nutrients as evidenced by intake 0-25%,wounds,weight loss greater than or equal to 2% in 1 week,moderate loss of subcutaneous fat      Nutrition Interventions:   Food and/or Nutrient Delivery:  Continue Current Diet,Start Oral Nutrition Supplement  Nutrition Education/Counseling:  No recommendation at this time   Coordination of Nutrition Care:  Continue to monitor while inpatient    Goals:  po intake 50% or greater. Weight stable or increase 1-5# per week       Nutrition Monitoring and Evaluation:   Behavioral-Environmental Outcomes:  None Identified   Food/Nutrient Intake Outcomes:  Food and Nutrient Intake,Supplement Intake  Physical Signs/Symptoms Outcomes:  Biochemical Data,Fluid Status or Edema,Nutrition Focused Physical Findings,Skin,Weight     Discharge Planning:     Too soon to determine     Electronically signed by Elaina Barreto MS, RD, LD on 2/22/22 at 12:39 PM CST    Contact: [phone]

## 2022-02-22 NOTE — PROGRESS NOTES
[facility] Progress Note    Patient:  Matt Cantu  YOB: 1955  Date of Service: 2/22/2022  MRN: [id number]   Acct: [de-identified]   Primary Care Physician: KALPESH Hoffmann NP  Advance Directive: Full Code  Admit Date: 2/21/2022       Hospital Day: 1      CHIEF COMPLAINT:   Generalized weakness and fatigue. Chief Complaint   Patient presents with    Abnormal Lab     unknown, primary care called       2/22/2022 7:28 AM  Subjective / Interval History:   02/22/2022  Patient seen and examined this AM.  Doing well. No new complaints. No acute changes or acute overnight event reported. Laying comfortably in bed in no acute distress. Family at bedside. Review of Systems:   Review of Systems  ROS: 14 point review of systems is negative except as specifically addressed above. ADULT DIET;  Regular    Intake/Output Summary (Last 24 hours) at 2/22/2022 7942  Last data filed at 2/22/2022 0501  Gross per 24 hour   Intake --   Output 680 ml   Net -680 ml       Medications:   lactated ringers 100 mL/hr at 02/21/22 2127    sodium chloride       Current Facility-Administered Medications   Medication Dose Route Frequency Provider Last Rate Last Admin    lactated ringers infusion   IntraVENous Continuous Uriel Munoz  mL/hr at 02/21/22 2127 New Bag at 02/21/22 2127    sodium chloride flush 0.9 % injection 5-40 mL  5-40 mL IntraVENous 2 times per day [facility], APRN - CNP        sodium chloride flush 0.9 % injection 5-40 mL  5-40 mL IntraVENous PRN [facility], APRN - CNP        0.9 % sodium chloride infusion  25 mL IntraVENous PRN [facility], APRN - CNP        ondansetron (ZOFRAN-ODT) disintegrating tablet 4 mg  4 mg Oral Q8H PRN [facility], APRN - CNP        Or    ondansetron [facility]) injection 4 mg  4 mg IntraVENous Q6H PRN [facility], APRN - CNP        acetaminophen (TYLENOL) tablet 650 mg  650 mg Oral Q6H PRN [facility], APRN - CNP        Or   Cabrales acetaminophen (TYLENOL) suppository 650 mg  650 mg Rectal Q6H PRN Valharrison Anand, APRN - CNP        heparin (porcine) injection 5,000 Units  5,000 Units SubCUTAneous 3 times per day Valeda Kika, APRN - CNP        buPROPion [facility]) tablet 100 mg  100 mg Oral QAM Valeda Kika, APRN - CNP        Vitamin D (CHOLECALCIFEROL) tablet 2,000 Units  2,000 Units Oral Daily Valeda Kika, APRN - CNP        dronabinol (MARINOL) capsule 2.5 mg  2.5 mg Oral BID AC Valeda Kika, APRN - CNP        folic acid (FOLVITE) tablet 1 mg  1 mg Oral Daily Valeda Kika, APRN - CNP        pantoprazole (PROTONIX) tablet 40 mg  40 mg Oral QAM AC Valeda Kika, APRN - CNP   40 mg at 02/22/22 3436    prazosin (MINIPRESS) capsule 1 mg  1 mg Oral Nightly Fatoumataeda Kika, APRN - CNP        topiramate (TOPAMAX) tablet 100 mg  100 mg Oral Daily Valeda Kika, APRN - CNP        sertraline (ZOLOFT) tablet 25 mg  25 mg Oral Daily Valeda Kika, APRN - CNP        potassium chloride (KLOR-CON M) extended release tablet 10 mEq  10 mEq Oral BID WC Meseret Anand, APRN - CNP        chlorproMAZINE (THORAZINE) tablet 25 mg  25 mg Oral TID Valeda Kika, APRN - CNP        loperamide (IMODIUM) capsule 2 mg  2 mg Oral BID PRN Valeda Kika, APRN - CNP        methocarbamol (ROBAXIN) tablet 500 mg  500 mg Oral TID Valeda Kika, APRN - CNP   500 mg at 02/22/22 0004    HYDROmorphone (DILAUDID) tablet 2 mg  2 mg Oral Q4H PRN Valharrison Anand, APRN - CNP             lactated ringers 100 mL/hr at 02/21/22 2127    sodium chloride        sodium chloride flush  5-40 mL IntraVENous 2 times per day    heparin (porcine)  5,000 Units SubCUTAneous 3 times per day    buPROPion  100 mg Oral QAM    Vitamin D  2,000 Units Oral Daily    dronabinol  2.5 mg Oral BID AC    folic acid  1 mg Oral Daily    pantoprazole  40 mg Oral QAM AC    prazosin  1 mg Oral Nightly    topiramate  100 mg Oral Daily    sertraline  25 mg Oral Daily    potassium chloride  10 mEq Oral BID WC    chlorproMAZINE  25 mg Oral TID    methocarbamol  500 mg Oral TID     sodium chloride flush, sodium chloride, ondansetron **OR** ondansetron, acetaminophen **OR** acetaminophen, loperamide, HYDROmorphone  ADULT DIET; Regular       Labs:   CBC with DIFF:  Recent Labs     02/21/22 1719 02/22/22  0431   WBC 4.4* 3.3*   RBC 4.00* 3.29*   HGB 11.7* 9.6*   HCT 36.0* 30.0*   MCV 90.0 91.2   MCH 29.3 29.2   MCHC 32.5* 32.0*   RDW 15.0* 14.9*   * 308   MPV 10.1 9.6   NEUTOPHILPCT 64.8  --    LYMPHOPCT 20.3  --    MONOPCT 11.4*  --    EOSRELPCT 2.5  --    BASOPCT 0.5  --    NEUTROABS 2.9  --    LYMPHSABS 0.9*  --    MONOSABS 0.50  --    EOSABS 0.10  --    BASOSABS 0.00  --        CMP/BMP:  Recent Labs     02/21/22 1719 02/22/22  0431   * 128*   K 4.7 4.7   CL 75* 82*   CO2 27 28   ANIONGAP 23* 18   GLUCOSE 123* 97   BUN 97* 91*   CREATININE 4.6* 4.6*   LABGLOM 13* 13*   CALCIUM 8.8 7.9*   PROT 8.3  --    LABALBU 3.4*  --    BILITOT 0.5  --    ALKPHOS 296*  --    ALT 26  --    AST 26  --          CRP:  No results for input(s): CRP in the last 72 hours. Sed Rate:  No results for input(s): SEDRATE in the last 72 hours. HgBA1c:  No components found for: HGBA1C  FLP:  No results found for: TRIG, HDL, LDLCALC, LDLDIRECT, LABVLDL  TSH:  No results found for: TSH  Troponin T:   Recent Labs     02/21/22 1719 02/21/22  2315 02/22/22  0431   TROPONINI 0.08* 0.06* 0.07*     Pro-BNP: No results for input(s): BNP in the last 72 hours. INR: No results for input(s): INR in the last 72 hours. ABGs: No results found for: PHART, PO2ART, LVE1PWN  UA:  Recent Labs     02/21/22 2032   COLORU DARK YELLOW*   PHUR 5.0   CLARITYU CLOUDY*   SPECGRAV 1.021   LEUKOCYTESUR Negative   UROBILINOGEN 0.2   BILIRUBINUR SMALL*   BLOODU Negative   GLUCOSEU Negative         Culture Results:    No results for input(s): CXSURG in the last 720 hours.     Blood Culture Recent:   Recent Labs     02/08/22 2220 BC No growth after 5 days of incubation. No results for input(s): BC, BLOODCULT2, ORG in the last 72 hours. Cultures:   No results for input(s): CULTURE in the last 72 hours. No results for input(s): BC, Demetrius Terrellor in the last 72 hours. No results for input(s): CXSURG in the last 72 hours. Recent Labs     02/21/22 1719 02/22/22  0431   MG 3.0*  --    PHOS 11.1* 8.8*     Recent Labs     02/21/22 1719   AST 26   ALT 26   BILITOT 0.5   ALKPHOS 296*         RAD:   CT ABDOMEN PELVIS WO CONTRAST Additional Contrast? None    Result Date: 2/21/2022  CT ABDOMEN PELVIS WO CONTRAST 2/21/2022 7:53 PM History: Postop pain. Hypotension. Noncontrast abdomen/pelvis CT. Comparison is made with February 8, 2022. In order to have a CT radiation dose as low as reasonably achievable Automated Exposure Control was utilized for adjustment of the mA and/or KV according to patient size. DLP in mGycm= 1115. Interval abdominal surgery. Normal heart size. Patchy atelectasis at the lung bases. Normal noncontrast appearance of the liver, pancreas, and spleen. Normal and symmetric kidneys. No hydronephrosis. No bowel dilation. No abscess or hematoma. 1. Postoperative changes with no sign of bowel obstruction, abscess, or hematoma. Signed by Dr Loraine Koenig Additional Contrast? None    Result Date: 2/9/2022  EXAM: CT ABDOMEN PELVIS WO CONTRAST INDICATION: Vomiting, recent surgery, history of cancer COMPARISON: 12/14/2020 DLP: 2066 mGy cm. In order to have a CT radiation dose as low as reasonably achievable, Automated Exposure Control was utilized for adjustment of the mA and/or KV according to patient size. FINDINGS: Patchy consolidation in the RIGHT and LEFT lung bases is noted. Moderate to large volume pneumoperitoneum. A surgical drain terminates in the LEFT upper abdomen. Postoperative change of RIGHT hemicolectomy with RIGHT upper quadrant anastomosis.  Marked diffuse small bowel distention extending to the ileocolic anastomosis, likely representing small bowel obstruction. Small bowel loops measure up to 5 cm diameter. No definite area of pneumatosis. No evidence of portal venous gas. Trace free pelvic fluid. A dense linear device on the RIGHT peritoneal wall is likely represents a mesh device and may be related to recent surgical intervention (correlate with surgical history). Unchanged RIGHT posterolateral lumbar hernia. Postoperative change of RIGHT hepatectomy. The previously demonstrated LEFT liver lesion is not well visualized given lack of IV contrast. Gallbladder surgically absent. No biliary ductal dilatation. Pancreas, spleen, and adrenal glands are unremarkable. No urolithiasis or hydronephrosis. Normal caliber abdominal aorta. No enlarged retroperitoneal lymph nodes. Multiple borderline prominent mesenteric lymph nodes are similar to prior studies. No definite pelvic or inguinal lymphadenopathy. No acute abdominal wall soft tissue normality. No aggressive osseous lesion. 1.  Moderate to large volume pneumoperitoneum. This may be related to recent surgery although bowel perforation is also within the differential. There is a surgical drain in the LEFT upper abdomen which also may be contributing to pneumoperitoneum. 2.  Small bowel obstruction with transition at RIGHT upper quadrant ileocolonic anastomosis. Small bowel loops measure up to 5 cm diameter. 3.  Prior RIGHT hepatectomy. Previously demonstrated LEFT liver lesion is not well visualized given lack of IV contrast. 4.  Patchy consolidation in both lung bases, with differential including aspiration and pneumonia. Findings in agreement with the emergent findings from the initial [facility] preliminary report.  Signed by Dr Barker Oar    Result Date: 2/21/2022  Exam:   XR CHEST PORTABLE  Date:  2/21/2022 History:  Male, age  61 years; hypotension COMPARISON:  Chest x-ray dated February 8, 2022 Findings : Chest portable place. A right-sided PICC, new, terminating in the low SVC. The heart and mediastinum are normal in size. Lungs are without focal infiltrate, mass or effusions. The bones show no acute pathology. Catheter in the left upper quadrant. Impression: 1. New right-sided PICC. 2.  Otherwise, no interval changes. Signed by Dr Rui Teague    XR CHEST PORTABLE    Result Date: 2/8/2022  EXAMINATION: XR CHEST PORTABLE 2/8/2022 10:39 PM HISTORY: XR CHEST PORTABLE 2/8/2022 9:00 PM HISTORY: Cough COMPARISON: March 18, 2020. FINDINGS: The lungs are clear. Cardiac silhouette is normal. Left subclavian Port-A-Cath is present. Old healed fracture of the left clavicle is present. The osseous structures and surrounding soft tissues demonstrate no acute abnormality. 1. No radiographic evidence of acute cardiopulmonary process. Signed by Dr Sheila Sharma      Objective:   Vitals:   BP (!) 90/56   Pulse 86   Temp 98.1 °F (36.7 °C) (Temporal)   Resp 20   Ht 5' 7\" (1.702 m)   Wt 136 lb 11.2 oz (62 kg)   SpO2 98%   BMI 21.41 kg/m²       Patient Vitals for the past 24 hrs:   BP Temp Temp src Pulse Resp SpO2 Height Weight   02/22/22 0659 -- -- -- -- -- -- -- 136 lb 11.2 oz (62 kg)   02/22/22 0610 (!) 90/56 98.1 °F (36.7 °C) Temporal 86 20 98 % -- --   02/22/22 0059 (!) 90/58 -- -- -- -- -- -- --   02/22/22 0058 (!) 89/59 98.6 °F (37 °C) Temporal 86 20 97 % -- --   02/21/22 2229 81/65 97.2 °F (36.2 °C) Temporal 93 16 96 % -- --   02/21/22 2028 96/66 -- -- 90 18 95 % -- --   02/21/22 1912 84/67 -- -- 99 18 93 % -- --   02/21/22 1710 83/64 97.9 °F (36.6 °C) Oral 100 16 94 % 5' 7\" (1.702 m) 155 lb (70.3 kg)       24HR INTAKE/OUTPUT:      Intake/Output Summary (Last 24 hours) at 2/22/2022 0879  Last data filed at 2/22/2022 0501  Gross per 24 hour   Intake --   Output 680 ml   Net -680 ml       Physical Exam  Vitals and nursing note reviewed. Constitutional:       General: He is not in acute distress. Appearance: Normal appearance. He is not ill-appearing, toxic-appearing or diaphoretic. HENT:      Head: Normocephalic and atraumatic. Right Ear: External ear normal.      Left Ear: External ear normal.      Nose: Nose normal. No congestion or rhinorrhea. Mouth/Throat:      Mouth: Mucous membranes are moist.      Pharynx: Oropharynx is clear. No oropharyngeal exudate or posterior oropharyngeal erythema. Eyes:      General: No scleral icterus. Right eye: No discharge. Left eye: No discharge. Extraocular Movements: Extraocular movements intact. Conjunctiva/sclera: Conjunctivae normal.      Pupils: Pupils are equal, round, and reactive to light. Cardiovascular:      Rate and Rhythm: Normal rate and regular rhythm. Pulses: Normal pulses. Heart sounds: Normal heart sounds. No murmur heard. No friction rub. No gallop. Pulmonary:      Effort: Pulmonary effort is normal. No respiratory distress. Breath sounds: Normal breath sounds. No stridor. No wheezing, rhonchi or rales. Chest:      Chest wall: No tenderness. Abdominal:      General: Bowel sounds are normal. There is no distension. Palpations: Abdomen is soft. Tenderness: There is no guarding or rebound. Comments: FARHAT drain in place. Right-sided ileostomy in place. Midline incision site with no evidence of bleeding or infection noted. Musculoskeletal:         General: No swelling, tenderness, deformity or signs of injury. Normal range of motion. Cervical back: Normal range of motion and neck supple. No rigidity. No muscular tenderness. Right lower leg: No edema. Left lower leg: No edema. Skin:     General: Skin is warm and dry. Capillary Refill: Capillary refill takes less than 2 seconds. Coloration: Skin is not jaundiced or pale. Findings: No bruising, erythema, lesion or rash. Neurological:      General: No focal deficit present.       Mental Status: He is alert and oriented to person, place, and time. Cranial Nerves: No cranial nerve deficit. Sensory: No sensory deficit. Motor: No weakness. Coordination: Coordination normal.   Psychiatric:         Mood and Affect: Mood normal.         Behavior: Behavior normal.         Thought Content: Thought content normal.         Judgment: Judgment normal.           Assessment/plan:     Hospital Problems           Last Modified POA    * (Principal) Acute kidney injury superimposed on CKD ([address] 2/21/2022 Yes    Adenocarcinoma of colon ([address] 2/21/2022 Yes    Hypovolemia 2/21/2022 Yes    Hyponatremia 2/21/2022 Yes          Principal Problem:    Acute kidney injury superimposed on CKD ([address]  Active Problems:    Adenocarcinoma of colon ([address]    Hypovolemia    Hyponatremia  Resolved Problems:    * No resolved hospital problems. *      Brief Summary  Mr Chuyita Merrill, a 53-year-old male, history of metastatic adenocarcinoma of the colon, presenting to [address] [place] [address] ED (02/21/2022), no account of abnormal lab as well as generalized weakness and fatigue. Patient admitted to [facility] (02/21/2022), further work-up and management of acute on chronic renal failure. MARIO on CKD  III  Hyponatremia  Hyperphosphatemia  Hypercalcemia   Creatinine level on presentation: 4.6: 02/21/2022   IV hydration -   Avoid hypotension & Nephrotoxins    Nephrology Consulted on admission   Further management as per nephrology      Metastatic adenocarcinoma of colon  · Status post recent ex lap at OSH (02/09/2022), with FARHAT drain placement and diverting loop ileostomy. · General surgery and Oncology consulted on admission        Continue management of other chronic medical conditions - see above and orders. Advance Directive: Full Code    ADULT DIET;  Regular         Consults Made:   IP CONSULT TO ONCOLOGY  IP CONSULT TO DIETITIAN  IP CONSULT TO NEPHROLOGY  IP CONSULT TO GENERAL SURGERY    DVT prophylaxis: Heparin      Discharge planning:  Continue management as above, including IV fluids  Monitor renal function    Time Spent is 35 mins in the examination, evaluation, counseling and review of medications, assessment and plan.      Electronically signed by   Fatuma Goyal MD, MPH, MD,   Internal Medicine Hospitalist   2/22/2022 7:28 AM

## 2022-02-22 NOTE — H&P
recall    HEMICOLECTOMY Right 03/30/2020    LAPAROSCOPIC-ASSISTED RIGHT HEMICOLECTOMY performed by Kellee Magdaleno MD at [address] N/A 06/03/2020    INSERTION OF VENOUS PORT with flouro performed by Kellee Magdaleno MD at [address] ENDOSCOPY N/A 03/11/2020    Dr. Ayala Race:   [facility]       Home Medications:  Prior to Admission medications    Medication Sig Start Date End Date Taking? Authorizing Provider   potassium chloride (KLOR-CON M) 20 MEQ extended release tablet Take 1 tablet by mouth 3 times daily 2/7/22   Andrei Gonzales MD   chlorproMAZINE (THORAZINE) 25 MG tablet Take 1 tablet by mouth 3 times daily 2/7/22   Andrei Gonzales MD   dronabinol (MARINOL) 2.5 MG capsule Take 1 capsule by mouth 2 times daily (before meals) for 30 days.  2/7/22 3/9/22  Andrei Gonzales MD   promethazine (PHENERGAN) 12.5 MG tablet Take 1 tablet by mouth every 6 hours as needed for Nausea 11/9/21   Andrei Gonzales MD   ondansetron (ZOFRAN) 8 MG tablet Take 1 tablet by mouth every 8 hours as needed for Nausea or Vomiting 8/11/20   Andrei Gonzales MD   omeprazole (PRILOSEC) 20 MG delayed release capsule Take 20 mg by mouth daily    Historical Provider, MD   Cholecalciferol (VITAMIN D3) 50 MCG (2000 UT) CAPS Take by mouth daily    Historical Provider, MD   folic acid (FOLVITE) 1 MG tablet Take 1 mg by mouth daily    Historical Provider, MD   prazosin (MINIPRESS) 1 MG capsule Take 1 mg by mouth nightly For nightmares    Historical Provider, MD   buPROPion (WELLBUTRIN) 100 MG tablet Take 100 mg by mouth every morning For mood    Historical Provider, MD   topiramate (TOPAMAX) 100 MG tablet Take 100 mg by mouth daily For Seizures or Migraines    Historical Provider, MD   Sertraline HCl (ZOLOFT PO) Take by mouth daily    Historical Provider, MD   Zolpidem Tartrate (AMBIEN PO) Take by mouth nightly    Historical Provider, MD       Allergies:    Oxycodone-acetaminophen and Morphine    Social History:    The patient currently lives home with spouse  Tobacco:   reports that he has never smoked. He has never used smokeless tobacco.  Alcohol:   reports current alcohol use. Illicit Drugs: denies    Family History:      Problem Relation Age of Onset    Liver Cancer Mother     High Blood Pressure Mother     Colon Cancer Mother         x2    Cancer Father         Lung Cancer    Breast Cancer Sister     Cancer Maternal Grandfather         Lung Cancer    Cancer Paternal Grandfather         Stomach Cancer    Colon Polyps Neg Hx     Cystic Fibrosis Neg Hx     Liver Disease Neg Hx     Rectal Cancer Neg Hx        Review of Systems:   Review of Systems   Constitutional: Positive for activity change, appetite change and fatigue. Negative for chills, diaphoresis and fever. Respiratory: Negative for cough, chest tightness, shortness of breath and wheezing. Cardiovascular: Negative for chest pain and palpitations. Gastrointestinal: Negative for abdominal distention, abdominal pain, constipation, nausea and vomiting. Skin: Positive for wound. Negative for color change, pallor and rash. Neurological: Positive for weakness. Negative for tremors, syncope, light-headedness, numbness and headaches. Psychiatric/Behavioral: Negative for agitation, behavioral problems and confusion. 14 point review of systems is negative except as specifically addressed above. Physical Examination:  BP 84/67   Pulse 99   Temp 97.9 °F (36.6 °C) (Oral)   Resp 18   Ht 5' 7\" (1.702 m)   Wt 155 lb (70.3 kg)   SpO2 93%   BMI 24.28 kg/m²   Physical Exam  Vitals and nursing note reviewed. Constitutional:       Appearance: Normal appearance. HENT:      Mouth/Throat:      Mouth: Mucous membranes are moist.      Pharynx: Oropharynx is clear. Eyes:      Extraocular Movements: Extraocular movements intact.       Conjunctiva/sclera: Conjunctivae normal.      Pupils: Pupils are equal, round, and reactive to light. Cardiovascular:      Rate and Rhythm: Normal rate and regular rhythm. Pulses: Normal pulses. Heart sounds: Normal heart sounds. No murmur heard. Pulmonary:      Effort: Pulmonary effort is normal. No respiratory distress. Breath sounds: Normal breath sounds. Abdominal:      General: Bowel sounds are normal. There is no distension. Palpations: Abdomen is soft. Tenderness: There is no abdominal tenderness. There is no guarding or rebound. Comments:  Surgical site is clean, dry, intact. Staples intact no drainage noted. Ostomy with stool present. Drain present   Musculoskeletal:         General: No swelling. Normal range of motion. Cervical back: Normal range of motion and neck supple. No rigidity or tenderness. Right lower leg: No edema. Left lower leg: No edema. Skin:     General: Skin is warm and dry. Capillary Refill: Capillary refill takes less than 2 seconds. Neurological:      General: No focal deficit present. Mental Status: He is alert and oriented to person, place, and time. Cranial Nerves: No cranial nerve deficit. Motor: Weakness present.    Psychiatric:         Mood and Affect: Mood normal.         Behavior: Behavior normal.        Diagnostic Data:  CBC:  Recent Labs     02/21/22  1719   WBC 4.4*   HGB 11.7*   HCT 36.0*   *     BMP:  Recent Labs     02/21/22 1719   *   K 4.7   CL 75*   CO2 27   BUN 97*   CREATININE 4.6*   CALCIUM 8.8   PHOS 11.1*     Recent Labs     02/21/22  1719   AST 26   ALT 26   BILITOT 0.5   ALKPHOS 296*     Cardiac Enzymes:   Recent Labs     02/21/22 1719   TROPONINI 0.08*     Urinalysis:  Lab Results   Component Value Date    NITRU Negative 12/14/2020    WBCUA 1 12/14/2020    BACTERIA NEGATIVE 12/14/2020    RBCUA 2 12/14/2020    BLOODU Negative 12/14/2020    SPECGRAV 1.026 12/14/2020    GLUCOSEU Negative 12/14/2020       CT ABDOMEN PELVIS WO CONTRAST Additional Contrast? None    Result Date: 2/21/2022  CT ABDOMEN PELVIS WO CONTRAST 2/21/2022 7:53 PM History: Postop pain. Hypotension. Noncontrast abdomen/pelvis CT. Comparison is made with February 8, 2022. In order to have a CT radiation dose as low as reasonably achievable Automated Exposure Control was utilized for adjustment of the mA and/or KV according to patient size. DLP in mGycm= 1115. Interval abdominal surgery. Normal heart size. Patchy atelectasis at the lung bases. Normal noncontrast appearance of the liver, pancreas, and spleen. Normal and symmetric kidneys. No hydronephrosis. No bowel dilation. No abscess or hematoma. 1. Postoperative changes with no sign of bowel obstruction, abscess, or hematoma.  Signed by Dr Karyle Hug    Assessment/Plan:    Acute kidney injury superimposed on CKD    -Bladder scan               - IVFs    -Urinalysis & Culture    -Urine studies    -Lactic acid in AM               - Monitor I's and O's closely              - Monitor labs closely              - Avoid hypotension              - Avoid nephrotoxic agents    Adenocarcinoma of colon    -Consultation to oncology    -Consultation to dietician due to poor intake      DVT prophylactic: Heparin subcutaneously     Signed:  KALPESH Alberts - CNP, 2/21/2022 7:36 PM

## 2022-02-22 NOTE — CONSULTS
MEDICAL ONCOLOGY CONSULTATION    Pt Name: Mary Anne Bond  MRN: [id number]  YOB: 1955  Date of evaluation: 2/22/2022    REASON FOR CONSULTATION: Colon cancer, continuity of care  REQUESTING PHYSICIAN: Hospitalist    History Obtained From:    patient, electronic medical record    HISTORY OF PRESENT ILLNESS:  The patient is a very pleasant 61years old male who has a history of recurrent colon cancer. He recently underwent debulking surgery and HIPEC at [facility] in January 2022. Postoperative course complicated by internal hernia. He was again taken to the OR on 2/10/2022 at [facility]. He has had a complicated postoperative course with severe dehydration and acute kidney injury. He was receiving IV fluids at home according to his ileostomy output. His wife called the clinic yesterday stating that the patient was feeling quite dizzy and had generalized weakness. Laboratory studies reviewed and showed creatinine elevated at 4.6. The patient was directed to emergency. He received IV fluids in the emergency. He was then admitted for further care. I was consulted for his concurrent history of colon cancer.     INTERVAL HISTORY/HISTORY OF PRESENT ILLNESS:  Diagnosis  · Colonic adenocarcinoma, March 2020  · [id number](liver), stage ROXANA  · IHC MMR- proficient  · K-maria luisa mutated  · N-MARIA LUISA/BRAF wild-type  · Iron deficiency anemia  · Soft tissue mass, June 2021  · Adenocarcinoma-consistent with colon primary, right psoas muscle, June 2021  · Metastatic adenocarcinoma, Jan 2022  · MLH1, MSH2, MH6, PMS2-intact  · MMR- no loss of expression     Treatment summary  · 3/30/2020-right hemicolectomy at [facility]  · Anticipated Liver ablation to be followed by neoadjuvant/adjuvant versus palliative chemotherapy  · 06/10/2020-November 2020-FOLFOX + Avastin  · 12/11/20- Right hepatectomy-Dr. Dorene Cruz  · S/p Injectafer-poor oral iron tolerance  · 7/13/21- Initiate FOLFIRI + Avastin every 2 weeks  · 1/13/22-psoas muscle mass resection/HIPEC by Dr. Nolan Shea at [facility]  · 2/10/2022-back to OR for correction of internal hernia        The patient is a 61years old male who has a diagnosis of colonic adenocarcinoma. The patient had malignant disease with several lymph nodes involved. In addition, the patient was also found to have suspicious liver lesions concerning for metastatic disease. He was seen by [facility] and offered a multimodality approach with liver ablation of the left liver lesion followed by neoadjuvant chemotherapy and partial right hepatectomy. He underwent microwave ablation of the left lobe liver lesion on 6/8/2020. He is status post completion of 11 biweekly cycles of FOLFOX/Avasti completed November 2020. He tolerated treatment with complaints of mild transient cold neuropathy. He had a right hepatectomy on 12/11/2020 that showed no residual disease. His last CEA was normal in January 2021. He had MRI of the abdomen at [facility] in March 2021 that showed no evidence of recurrent disease in the liver or in the abdomen. He also had a surveillance colonoscopy in March 2021 that showed no polyps. CEA was elevated in May 2021. Repeat CEA June 2021 showed persistent elevation. MRI abdomen at [facility] showed no evidence of liver recurrence but a suspicious nodule in the right lower quadrant. Biopsy was performed at [facility] and consistent with recurrent adenocarcinoma.  I discussed the findings with hepatobiliary service and also colorectal surgery. He was started on FOLFIRI/Avastin. He was seen by Dr. Nolan Shea again on 9/24/2021. He had repeat CT abdomen pelvis and also MRI at [facility] that showed persistent disease involving the psoas muscle. In addition, an additional small place that may represent further peritoneal metastatic disease. He underwent surgery at [facility]. He underwent exploratory laparotomy with resection of psoas mass at [facility] on 1/13/2022.   He also underwent HIPEC treatment. The patient was taken to the OR on 2/10/2022 for correction of internal hernia.     Cancer history  Mr. Mary Anne Bond was first seen by me on 3/23/2020. [facility] was referred for a new diagnosis of colonic adenocarcinoma involving the cecum.  The patient reports that he had a wellbeing consult with his provider at the [address] [place] [address] was found to have anemia and then recommended a colonoscopy.  Of note, the patient has a family history of colon cancer.  His mother is a patient of Dr. Hernandez Poag he has been diagnosed with colon cancer in 2010. · 3/11/2020- colonoscopy revealed a large malignant appearing fungating mass lesion in the cecum.  In addition, several other polyps.  Biopsy of the mass consistent with moderate differentiated colonic adenocarcinoma.  Polyps consistent with tubulovillous adenoma with no high-grade dysplasia. [facility] MMR not proficient.  K-maria luisa mutated, BRAF and NRAS wild type.  MSI proficient  · 3/11/2020-CEA 5.5 (H)  · 3/18/2020-CT abdomen pelvis with contrast  Invasive cecal mass adhering to the right lateral abdominal wall muscles with adjacent lymphadenopathy. Mild partial obstruction of the terminal ileum. 2. Suspicious lesions in the right and left hepatic lobes measure up to 1.3 cm and likely represent metastatic disease. · 3/18/2020-Xr Chest Standard  No radiographic evidence of acute cardiopulmonary process. · 3/23/2020-he was first seen by me.  Recommended completion of staging with CT chest.  Also recommended liver MRI for further clarification of liver lesion.  S.  Recommend to proceed with a general surgery consultation tomorrow with Dr. Basilia Rosas was informed that I favor surgical resection if feasible of the primary malignancy.   · 3/30/2020- right hemicolectomy by Dr. Maureen Suarez at [facility] consistent with invasive moderately differentiated colonic adenocarcinoma measuring 7.2 cm.  Carcinoma directly invading the adjacent abdominal wall tissue.  Focal by systemic therapy in a neoadjuvant strategy. Restaging imaging to confirm clearance of disease on the left and lack of progression to unresectability of the R hemiliver disease would then be followed by R hepatectomy. Limitations to this approach may be accessibility of the segment 4A/8 disease high in the hepatic dome and the possibility of heat sink-related recurrence s/p abation of the left-sided disease. · 5/21/2020- referral for Mediport placement and start FOLFOX in 2 weeks.  Will add bevacizumab after 6 weeks of liver ablation.  We will plan for 12 biweekly dose of FOLFOX.  Bevacizumab will also be stopped 6 weeks prior to major procedure. · 6/8/2020- Microwave ablation of the left liver lesion was then performed for 5 minutes at [address] to achieve a 3.4 x 3.9 cm approximately spherical zone of ablation.    · 6/10/2020- initiation of FOLFOX. · 7/20/2020-added Avastin. · 9/10/20 MRI abdomen: Left hepatic lobe segment II lesion demonstrates small T1 hyperintense blood products, status post microwave ablation on 6/8/2020. No definitive enhancement within the lesion. Focal internal thickening or scar present. Recommend attention on follow-up. Additional scattered subcentimeter foci throughout the liver decreased in size compared to MRI dated 4/20/2020 consistent with improving metastatic disease. Additional chronic findings as above. · 9/16/2020-discussed with plan with the patient and [place].  Interval response to treatment.  Plan to continue chemotherapy through 12 cycles with Avastin. · 12/11/20 Right hepatectomy-Dr. Neo Claire  · 12/11/20 Right hepatectomy pathology: Liver, right, resection: Focus of Fibrosis with calcifications and chronic inflammation (0.3 cm), see comment. Background hepatic parenchyma with minimal periportal fibrosis (trichrome stain), minimal lobular and portal inflammation, and no significant macrovesicular steatosis (<5%) Comments:  The patient's history of colorectal cancer status adjuvant therapy is noted. The focus of fibrosis may reflect treatment effect. There is no evidence of viable tumor in the sampled specimen. · 12/14/20 Ct Abdomen Pelvis W Iv Contrast A Small bowel obstruction with transition point at the distal small bowel, just proximal to RIGHT upper quadrant ileocolonic anastomosis.  Postoperative change of RIGHT hepatectomy with small amount of expected free fluid and intraperitoneal gas.  Redemonstrated LEFT liver lesion.  Small bilateral pleural effusions. · 12/16/20 SBFT-[place]: Study is limited due to retained contrast in the small bowel from prior attempt at small bowel follow-through on the floor. Small bowel remains dilated, measuring approximately 5.2 cm, which is consistent with partial or resolving small bowel obstruction. Final radiographs show contrast within the colon. · 1/4/2020-resolution of small bowel obstruction. · 1/7/21 CT chest: No finding to suggest intrathoracic neoplastic process or metastatic disease. The benign-appearing tiny nodule in the right upper lobe probably represent a noncalcified granuloma. A nodule in the lingular segment of the left upper lobe is not visualized in this study. Postsurgical changes of the liver. No evidence of focal complication. A trace right basal pleural effusion. This may be reactive to the previous abdominal surgery. · 3/4/21 MRI abd: Status post right hepatectomy and microwave ablation of a left liver lesion. No findings to suggest residual/recurrent disease. No new liver lesion is identified. Postsurgical changes related to right hemicolectomy. Microwave ablation zone in segment II of the left hepatic lobe measures approximately 21 mm in diameter, unchanged. No appreciable postcontrast enhancement or other findings to suggest local disease recurrence. No new liver lesion is identified. Spleen is mildly enlarged, measuring 13.8 cm in length.   · 3/17/2021-1 year colonoscopy showed no evidence of polyps. · 5/3/21 CEA 6.2  · 6/8/21 MRI abdomen ([place]): Status post right hepatectomy and MWA of a left liver lesion.  No evidence of residual or recurrent metastatic disease in the liver. Status post prior right hemicolectomy for colon carcinoma. Within the posterior right iliopsoas muscle there is a mildly T2 hyperintense nodular focus with postcontrast rim enhancement and diffusion restriction. This measures approximately 2.7 x 2 x 2 cm. More delayed postcontrast images show irregular area of enhancement measuring 2.4 x 7.7 cm axially involving the right iliopsoas muscle and the right lateral abdominal wall. Suggest correlation with contrast enhanced CT exam for complete evaluation. Consider biopsy of this lesion. · 6/15/21 CT CHEST WO CONTRAST No metastatic disease in the chest.  No change in tiny 2 mm RIGHT upper lobe pulmonary nodule.  Mild ectasia of the ascending aorta measuring 4 cm. · 6/18/2021-I reviewed results of MRI abdomen at [place].  CT chest without contrast reviewed by me and showed no evidence of metastatic disease.  Stable 2 mm right upper lobe nodule.  Discussed with hepatobiliary service at [address] [place] [address]. Biopsy intra-abdominal nodule next week at [address] [place] [address]. · 6/25/[id number]-AC-abiphf biopsy soft tissue mass right psoas muscle at [address] [place] [address] consistent with recurrent colonic adenocarcinoma.    · 7/2/2021-discussed with hepatobiliary service at [address] [place] [address] and also surgical oncology.  Surgical oncology will call us back regarding consultation for consideration of HIPEC if he is a candidate  · 7/13/21-initiation of chemotherapy with FOLFIRI + Avastin every 2 weeks  · 7/13/21 CEA 10.7  · 7/27/2021-CEA 9.6  · 7/30/2021-she was seen by the surgical oncology group at [address] [place] [address] by Dr Yoly Robles. They recommended to complete 6 cycles of chemotherapy and repeat CT chest abdomen pelvis and liver MRI to assess disease response. They discussed the role of CRS/HIPEC in his situation.  He was told that it really depends on the status of his liver lesions as well. He will complete 6 cycles of chemotherapy and will return to see me with a CTAP as well as MRI of the liver to assess disease burden and determine if he can be a candidate for debulking. · 8/25/2021-proceed cycle #4. · 9/22/2021 CEA- 8.1  · 9/24/2021 MRI with and w/o Contrast ([place])  Tiny left retroperitoneal nodule involving the left lateral conal fascial new compared to 3/4/2021 though unchanged from most recent prior, possibly an additional site of metastasis. No other new metastatic disease within the abdomen. Similar partially visualized right posterior body wall/psoas infiltrative lesion not definitely changed from MRI abdomen 6/8/2021 but better evaluated in its entirety on concurrent CT, reported separately.   Status post right hemicolectomy, right hepatectomy, and segment III microwave ablation. Similar mild splenomegaly.  Additional chronic and incidental findings as described in the body the report.  Liver: Status post right hepatectomy. Ablation zone in segment II measures 1.7 x 1.1 cm, slightly decreased in size from 1.8 x 1.4 cm previously (series 1301 image 56) without appreciable enhancement. Similar tiny subcentimeter cyst in the left hepatic lobe. No new focal hepatic lesion identified. No visualized free fluid. Similar 0.7 cm enhancing nodule along the left lateral conal fascia laterally new from 3/4/2021, grossly unchanged from MRI dated 6/8/2021. (series 801 image 22). No other appreciable peritoneal/retroperitoneal or  omental nodularity. Ill-defined T2 hyperintense signal and hyperenhancement in the right posteromedial body wall involving the lateral aspect of the psoas muscle and posterolateral abdominal wall musculature, partially visualized measuring at least 8.7 x 3.1   cm, grossly similar to the prior exam, better evaluated in its entirety on concurrent CT reported separately.    · 9/24/2021 CT C/A/P w/ Contrast([place]) Status post right hemicolectomy, right hepatectomy and left hepatic microwave ablation without new suspicious hepatic lesion.  Left lateral perirenal fascial nodule, which is stable compared to 6/8/2021 MRI, but new compared to 3/4/2021 MRI is concerning for an additional site of metastatic disease.  No sites of new or enlarging metastatic disease in the chest.  Similar appearance of right lateral psoas and posterior abdominal wall infiltrative lesion, not significantly changed compared to 6/8/2021 MRI.  Mild splenomegaly.  Additional findings as outlined in the body the report. Biopsy-proven adenocarcinoma involving the posterior lateral aspect of the right iliopsoas muscle and right lateral abdominal wall. Right iliopsoas component is difficult to measure but appears to be approximately 2.5 x 2.4 cm (series 2, image 153), similar to prior MRI. Nodular thickening noted along the right posterior abdominal wall and possibly involving the the transversus abdominis measures approximately 8.5 x 1.8 cm (series 2 image 153) overall similar compared to prior MRI. · 10/6/2021-discussed the results of recent CT abdomen/pelvis and MRI abdomen/pelvis at [facility]. Persistent disease involving the psoas muscle. Discussed with colorectal surgery at [facility]. They recommend to continue current therapy for another 2 months and reassess with CT scans. · 12/3/21 CT chest/abd/pelvis [facility]): Status post prior right hemicolectomy, right hepatectomy and left hepatic lesions microwave ablation. No new liver lesion. Soft tissue thickening of the right lower posterior abdominal wall involving the iliopsoas muscle is grossly unchanged consistent with improving metastatic disease.  Small right omental nodule and left lateral conal fascia nodule unchanged compared to  recent exam.   · 1/13/22 Exploratory lap with resections of psoas muscle mass and HIPEC by Dr. Knutson Don[place] at [facility]:  Metastatic colorectal adenocarcinoma involving fibroadipose tissue. IHC MMR proficient. · 1/24/22 CT chest/abd/pelvis [facility]): Extensive postsurgical changes in the abdomen including partial right colectomy, resection of right retroperitoneal mass, omentectomy and mesh repair of right lateral abdominal wall defect. There appears to be a defect in the mesh containing herniated loops of small bowel. Extensive diffuse dilated small bowel loops with air-fluid levels are seen throughout the abdomen. There are segmental areas of decompressed small bowel in the left upper quadrant as well as adjacent to the herniated small  bowel loops in the right retroperitoneum. Air-fluid levels are also seen throughout the colon. While pattern could likely represent postoperative ileus given the diffuse small bowel dilatation and distal colonic air and fluid, developing or partial small bowel obstruction secondary to the right lateral abdominal wall hernia cannot entirely be excluded. Small ascites. 8 mm nodule along the left lateral conal fascia is unchanged. Slight increase in size of cardiophrenic lymph nodes measuring up to 7 mm anterior to the right hemidiaphragm. Stable hypodensity in the left hepatic lobe.  Diffuse gastric wall thickening/edema could be secondary to gastritis in the appropriate clinical setting.      CEA dynamics:  3/11/20 CEA 5.5  8/19/20 CEA 2.4  9/2/20 CEA 2.7  9/16/20 CEA 2.7  9/30/20 CEA 2.7  10/19/20 CEA 2.3  1/4/21 CEA 2.2  5/3/21 CEA 6.2  6/15/21 CEA 8.3  7/13/21 CEA 10.7  7/27/21 CEA 9.6  8/12/21/21 CEA 8.2  8/25/2021-CEA 8.9  9/22/2021 CEA 8.1    Past Medical History:    Past Medical History:   Diagnosis Date    Acid reflux     Cancer ([facility] [address]     adenocarcinoma of colon    GERD (gastroesophageal reflux disease)     PTSD (post-traumatic stress disorder)     Stage 3a chronic kidney disease ([address]        Past Surgical History:    Past Surgical History:   Procedure Laterality Date    ABLATION OF DYSRHYTHMIC FOCUS ablation of liver at [address]  2003    CHOLECYSTECTOMY  2013    COLONOSCOPY      when pt was in the 19's    COLONOSCOPY N/A 03/11/2020    COLONOSCOPY POLYPECTOMY SNARE/COLD BIOPSY: Dr. Alves Born colonoscopy: 6-12 months due to findings at colonoscopy today with Cecal mass lesion and large polyps.     COLONOSCOPY      COLONOSCOPY N/A 03/17/2021    Dr Bernal Rather, Post-operative changes w IC anastomosis & single visible staple, Mild Diverticuosis, Int Hemorrhoids Grade 1, 3 year recall    HEMICOLECTOMY Right 03/30/2020    LAPAROSCOPIC-ASSISTED RIGHT HEMICOLECTOMY performed by John Miranda MD at [address] N/A 06/03/2020    INSERTION OF VENOUS PORT with flouro [facility] N/A 03/11/2020    Dr. Yohannes Silver:   [facility]       Social History:    Marital status:   Smoking status: No  ETOH status: No  Resides: [place], [place]    Family History:   Family History   Problem Relation Age of Onset    Liver Cancer Mother     High Blood Pressure Mother     Colon Cancer Mother         x2    Cancer Father         Lung Cancer    Breast Cancer Sister     Cancer Maternal Grandfather         Lung Cancer    Cancer Paternal Grandfather         Stomach Cancer    Colon Polyps Neg Hx     Cystic Fibrosis Neg Hx     Liver Disease Neg Hx     Rectal Cancer Neg Hx        Current Hospital Medications:    Current Facility-Administered Medications   Medication Dose Route Frequency Provider Last Rate Last Admin    lactated ringers infusion   IntraVENous Continuous Ana María Coto  mL/hr at 02/21/22 2127 New Bag at 02/21/22 2127    sodium chloride flush 0.9 % injection 5-40 mL  5-40 mL IntraVENous 2 times per day KALPESH Maciel CNP        sodium chloride flush 0.9 % injection 5-40 mL  5-40 mL IntraVENous PRN KALPESH Maciel CNP        0.9 % sodium chloride infusion  25 mL IntraVENous PRN Mirza Barajas KALPESH Hitchcock - CNP        ondansetron (ZOFRAN-ODT) disintegrating tablet 4 mg  4 mg Oral Q8H PRN Evorn Vivienne, APRN - CNP        Or    ondansetron [facility]) injection 4 mg  4 mg IntraVENous Q6H PRN Evorn Vivienne, APRN - CNP        acetaminophen (TYLENOL) tablet 650 mg  650 mg Oral Q6H PRN Evorn Vivienne, APRN - CNP        Or    acetaminophen (TYLENOL) suppository 650 mg  650 mg Rectal Q6H PRN Evorn Vivienne, APRN - CNP        heparin (porcine) injection 5,000 Units  5,000 Units SubCUTAneous 3 times per day Evorn Vivienne, APRN - CNP        buPROPion [facility]) tablet 100 mg  100 mg Oral QAM Evorn Vivienne, APRN - CNP        Vitamin D (CHOLECALCIFEROL) tablet 2,000 Units  2,000 Units Oral Daily Evorn Vivienne, APRN - CNP        dronabinol (MARINOL) capsule 2.5 mg  2.5 mg Oral BID AC Evorn Vivienne, APRN - CNP        folic acid (FOLVITE) tablet 1 mg  1 mg Oral Daily Evorn Vivienne, APRN - CNP        pantoprazole (PROTONIX) tablet 40 mg  40 mg Oral QAM AC Evorn Vivienne, APRN - CNP   40 mg at 02/22/22 0553    prazosin (MINIPRESS) capsule 1 mg  1 mg Oral Nightly Evorn Vivienne, APRN - CNP        topiramate (TOPAMAX) tablet 100 mg  100 mg Oral Daily Evorn Vivienne, APRN - CNP        sertraline (ZOLOFT) tablet 25 mg  25 mg Oral Daily Evorn Vivienne, APRN - CNP        potassium chloride (KLOR-CON M) extended release tablet 10 mEq  10 mEq Oral BID WC Evorn Vivienne, APRN - CNP        chlorproMAZINE (THORAZINE) tablet 25 mg  25 mg Oral TID Evorn Vivienne, APRN - CNP        loperamide (IMODIUM) capsule 2 mg  2 mg Oral BID PRN Evorn Vivienne, APRN - CNP        methocarbamol (ROBAXIN) tablet 500 mg  500 mg Oral TID Evorn Vivienne, APRN - CNP   500 mg at 02/22/22 0004    HYDROmorphone (DILAUDID) tablet 2 mg  2 mg Oral Q4H PRN KALPESH Vaughan - CNP           Allergies:    Allergies   Allergen Reactions    Morphine Nausea And Vomiting     Hallucinations/Vomiting    Oxycodone-Acetaminophen Nausea Only Subjective   REVIEW OF SYSTEMS:   CONSTITUTIONAL: no fever, no night sweats, fatigue, generalized weakness  HEENT: no blurring of vision, no double vision, no hearing difficulty, no tinnitus, no ulceration, no epistaxis;  LUNGS: no cough, no hemoptysis, no wheeze,  no shortness of breath;  CARDIOVASCULAR: no palpitation, no chest pain, no shortness of breath;  GI: Decreased p.o. intake, no abdominal pain, no nausea, no vomiting, no diarrhea, no constipation;  ANNIE: no dysuria, no hematuria, no frequency or urgency, no nephrolithiasis;  MUSCULOSKELETAL: no joint pain, no swelling, no stiffness;  ENDOCRINE: no polyuria, no polydipsia, no cold or heat intolerance;  HEMATOLOGY: no easy bruising or bleeding, no history of clotting disorder;  DERMATOLOGY: no skin rash, no eczema, no pruritus;  PSYCHIATRY: no depression, no anxiety      Objective   BP (!) 90/56   Pulse 86   Temp 98.1 °F (36.7 °C) (Temporal)   Resp 20   Ht 5' 7\" (1.702 m)   Wt 155 lb (70.3 kg)   SpO2 98%   BMI 24.28 kg/m²     PHYSICAL EXAM:  CONSTITUTIONAL: Alert, appropriate,ill-appearing  EYES: Non icteric, EOM intact, pupils equal round   ENT: Mucus membranes moist,external inspection of ears and nose are normal  NECK: Supple, no masses. No palpable thyroid mass  CHEST/LUNGS: CTA bilaterally, normal respiratory effort   CARDIOVASCULAR: RRR, no murmurs. No lower extremity edema  ABDOMEN: soft non-tender, active bowel sounds, no HSM. No palpable masses  EXTREMITIES: warm, full ROM in all 4 extremities, no focal weakness. SKIN: warm, dry with no rashes or lesions  LYMPH: No cervical, clavicular, axillary, or inguinal lymphadenopathy  NEUROLOGIC: follows commands, non focal   PSYCH: mood and affect appropriate.  Alert and oriented to time, place, person        LABORATORY RESULTS REVIEWED/ANALYZED BY ME:  Recent Labs     02/22/22  0431 02/21/22  1719 02/08/22 2031   WBC 3.3* 4.4* 7.5   HGB 9.6* 11.7* 13.1*   HCT 30.0* 36.0* 39.3*   MCV 91.2 90.0 90.6    450* 260       Lab Results   Component Value Date     (L) 02/22/2022    K 4.7 02/22/2022    CL 82 (L) 02/22/2022    CO2 28 02/22/2022    BUN 91 (H) 02/22/2022    CREATININE 4.6 (H) 02/22/2022    GLUCOSE 97 02/22/2022    CALCIUM 7.9 (L) 02/22/2022    PROT 8.3 02/21/2022    LABALBU 3.4 (L) 02/21/2022    BILITOT 0.5 02/21/2022    ALKPHOS 296 (H) 02/21/2022    AST 26 02/21/2022    ALT 26 02/21/2022    LABGLOM 13 (A) 02/22/2022    GFRAA 16 (L) 02/22/2022    AGRATIO 1.5 06/15/2021    GLOB 3.8 02/07/2022       Lab Results   Component Value Date    INR 1.0 06/18/2021    PROTIME 12.1 06/18/2021       RADIOLOGY STUDIES REPORT/REVIEWED AND INTERPRETED BY ME:  CT ABDOMEN PELVIS WO CONTRAST Additional Contrast? None    Result Date: 2/21/2022  CT ABDOMEN PELVIS WO CONTRAST 2/21/2022 7:53 PM History: Postop pain. Hypotension. Noncontrast abdomen/pelvis CT. Comparison is made with February 8, 2022. In order to have a CT radiation dose as low as reasonably achievable Automated Exposure Control was utilized for adjustment of the mA and/or KV according to patient size. DLP in mGycm= 1115. Interval abdominal surgery. Normal heart size. Patchy atelectasis at the lung bases. Normal noncontrast appearance of the liver, pancreas, and spleen. Normal and symmetric kidneys. No hydronephrosis. No bowel dilation. No abscess or hematoma. 1. Postoperative changes with no sign of bowel obstruction, abscess, or hematoma. Signed by Dr Jeremias Espinosa Additional Contrast? None    Result Date: 2/9/2022  EXAM: CT ABDOMEN PELVIS WO CONTRAST INDICATION: Vomiting, recent surgery, history of cancer COMPARISON: 12/14/2020 DLP: 2066 mGy cm. In order to have a CT radiation dose as low as reasonably achievable, Automated Exposure Control was utilized for adjustment of the mA and/or KV according to patient size. FINDINGS: Patchy consolidation in the RIGHT and LEFT lung bases is noted.  Moderate to large volume pneumoperitoneum. A surgical drain terminates in the LEFT upper abdomen. Postoperative change of RIGHT hemicolectomy with RIGHT upper quadrant anastomosis. Marked diffuse small bowel distention extending to the ileocolic anastomosis, likely representing small bowel obstruction. Small bowel loops measure up to 5 cm diameter. No definite area of pneumatosis. No evidence of portal venous gas. Trace free pelvic fluid. A dense linear device on the RIGHT peritoneal wall is likely represents a mesh device and may be related to recent surgical intervention (correlate with surgical history). Unchanged RIGHT posterolateral lumbar hernia. Postoperative change of RIGHT hepatectomy. The previously demonstrated LEFT liver lesion is not well visualized given lack of IV contrast. Gallbladder surgically absent. No biliary ductal dilatation. Pancreas, spleen, and adrenal glands are unremarkable. No urolithiasis or hydronephrosis. Normal caliber abdominal aorta. No enlarged retroperitoneal lymph nodes. Multiple borderline prominent mesenteric lymph nodes are similar to prior studies. No definite pelvic or inguinal lymphadenopathy. No acute abdominal wall soft tissue normality. No aggressive osseous lesion. 1.  Moderate to large volume pneumoperitoneum. This may be related to recent surgery although bowel perforation is also within the differential. There is a surgical drain in the LEFT upper abdomen which also may be contributing to pneumoperitoneum. 2.  Small bowel obstruction with transition at RIGHT upper quadrant ileocolonic anastomosis. Small bowel loops measure up to 5 cm diameter. 3.  Prior RIGHT hepatectomy. Previously demonstrated LEFT liver lesion is not well visualized given lack of IV contrast. 4.  Patchy consolidation in both lung bases, with differential including aspiration and pneumonia. Findings in agreement with the emergent findings from the initial [facility] preliminary report.  Signed by Dr Mandie Mao Reji    XR CHEST PORTABLE    Result Date: 2/21/2022  Exam:   XR CHEST PORTABLE  Date:  2/21/2022 History:  Male, age  61 years; hypotension COMPARISON:  Chest x-ray dated February 8, 2022 Findings : Chest portable place. A right-sided PICC, new, terminating in the low SVC. The heart and mediastinum are normal in size. Lungs are without focal infiltrate, mass or effusions. The bones show no acute pathology. Catheter in the left upper quadrant. Impression: 1. New right-sided PICC. 2.  Otherwise, no interval changes. Signed by Dr Mason Cm:  #Colon cancer  -Status post neoadjuvant chemo followed by debulking surgery/HIPEC) at [facility] January 2022  -Status post exploratory laparotomy 2/10/2022 for correction of internal hernia at [facility] [phone]  -Patient has ileostomy and abdominal drain  -Patient is currently not on chemotherapy. Last chemotherapy November 2021  -CT A/P Wo contrast: Pneumoperitoneum, postoperative changes  -Surgical consultation    #Acute kidney injury-likely prerenal secondary to high ileostomy output  -Patient was receiving IV fluids at home.  -He received IV fluid resuscitation in the emergency  -CT A/P without contrast: No hydronephrosis  -Creatinine admission 4.7-> 4.7 (baseline creatinine 1.2-1.4)  -Nephrology consultation    #DVT prophylaxis-continue subcutaneous heparin UFH 5000 units every 8 hours    PLAN:  Continue aggressive IV fluid resuscitation  Nephrology consult  Continue to monitor electrolytes and creatinine  Discussed with surgery at [place]/general surgery here      I have seen, examined and reviewed this patient medication list, appropriate labs and imaging studies. I reviewed relevant medical records and others physicians notes. I discussed the plans of care with the patient. I answered all the questions to the patients satisfaction.  I have also reviewed the chief complaint (CC) and part of the history (History of Present Illness (HPI), Past Family Social History [facility]), or Review of Systems (ROS) and made changes when appropriated.        (Please note that portions of this note were completed with a voice recognition program. Efforts were made to edit the dictations but occasionally words are mis-transcribed.)        Haris Peña MD    02/22/22  6:19 AM

## 2022-02-23 LAB
ANION GAP SERPL CALCULATED.3IONS-SCNC: 16 MMOL/L (ref 7–19)
BASOPHILS ABSOLUTE: 0 K/UL (ref 0–0.2)
BASOPHILS RELATIVE PERCENT: 0.3 % (ref 0–1)
BUN BLDV-MCNC: 78 MG/DL (ref 8–23)
CALCIUM SERPL-MCNC: 7.7 MG/DL (ref 8.8–10.2)
CHLORIDE BLD-SCNC: 84 MMOL/L (ref 98–111)
CO2: 26 MMOL/L (ref 22–29)
CREAT SERPL-MCNC: 3.9 MG/DL (ref 0.5–1.2)
EOSINOPHILS ABSOLUTE: 0.2 K/UL (ref 0–0.6)
EOSINOPHILS RELATIVE PERCENT: 4.5 % (ref 0–5)
FOLATE: 17.7 NG/ML (ref 4.5–32.2)
GFR AFRICAN AMERICAN: 19
GFR NON-AFRICAN AMERICAN: 16
GLUCOSE BLD-MCNC: 100 MG/DL (ref 74–109)
HAPTOGLOBIN: 410 MG/DL (ref 30–200)
HCT VFR BLD CALC: 26.2 % (ref 42–52)
HCT VFR BLD CALC: 26.3 % (ref 42–52)
HEMOGLOBIN: 8.4 G/DL (ref 14–18)
IMMATURE GRANULOCYTES #: 0 K/UL
LACTATE DEHYDROGENASE: 187 U/L (ref 91–215)
LYMPHOCYTES ABSOLUTE: 0.5 K/UL (ref 1.1–4.5)
LYMPHOCYTES RELATIVE PERCENT: 14.8 % (ref 20–40)
MAGNESIUM: 2.1 MG/DL (ref 1.6–2.4)
MCH RBC QN AUTO: 29.1 PG (ref 27–31)
MCHC RBC AUTO-ENTMCNC: 31.9 G/DL (ref 33–37)
MCV RBC AUTO: 91 FL (ref 80–94)
MONOCYTES ABSOLUTE: 0.4 K/UL (ref 0–0.9)
MONOCYTES RELATIVE PERCENT: 11 % (ref 0–10)
NEUTROPHILS ABSOLUTE: 2.3 K/UL (ref 1.5–7.5)
NEUTROPHILS RELATIVE PERCENT: 68.8 % (ref 50–65)
PDW BLD-RTO: 14.6 % (ref 11.5–14.5)
PHOSPHORUS: 5.6 MG/DL (ref 2.5–4.5)
PLATELET # BLD: 293 K/UL (ref 130–400)
PMV BLD AUTO: 10.1 FL (ref 9.4–12.4)
POTASSIUM REFLEX MAGNESIUM: 3.4 MMOL/L (ref 3.5–5)
RBC # BLD: 2.89 M/UL (ref 4.7–6.1)
RETICULOCYTE ABSOLUTE COUNT: 0.09 M/UL (ref 0.03–0.12)
RETICULOCYTE COUNT PCT: 3.23 % (ref 0.5–1.5)
SODIUM BLD-SCNC: 126 MMOL/L (ref 136–145)
VITAMIN B-12: 1150 PG/ML (ref 211–946)
WBC # BLD: 3.4 K/UL (ref 4.8–10.8)

## 2022-02-23 PROCEDURE — 2580000003 HC RX 258: Performed by: INTERNAL MEDICINE

## 2022-02-23 PROCEDURE — 6370000000 HC RX 637 (ALT 250 FOR IP)

## 2022-02-23 PROCEDURE — 6370000000 HC RX 637 (ALT 250 FOR IP): Performed by: INTERNAL MEDICINE

## 2022-02-23 PROCEDURE — 82746 ASSAY OF FOLIC ACID SERUM: CPT

## 2022-02-23 PROCEDURE — 99223 1ST HOSP IP/OBS HIGH 75: CPT | Performed by: PHYSICIAN ASSISTANT

## 2022-02-23 PROCEDURE — 93971 EXTREMITY STUDY: CPT

## 2022-02-23 PROCEDURE — 80048 BASIC METABOLIC PNL TOTAL CA: CPT

## 2022-02-23 PROCEDURE — 85025 COMPLETE CBC W/AUTO DIFF WBC: CPT

## 2022-02-23 PROCEDURE — 83010 ASSAY OF HAPTOGLOBIN QUANT: CPT

## 2022-02-23 PROCEDURE — 82607 VITAMIN B-12: CPT

## 2022-02-23 PROCEDURE — 1210000000 HC MED SURG R&B

## 2022-02-23 PROCEDURE — 83615 LACTATE (LD) (LDH) ENZYME: CPT

## 2022-02-23 PROCEDURE — 6360000002 HC RX W HCPCS: Performed by: INTERNAL MEDICINE

## 2022-02-23 PROCEDURE — 83735 ASSAY OF MAGNESIUM: CPT

## 2022-02-23 PROCEDURE — 85045 AUTOMATED RETICULOCYTE COUNT: CPT

## 2022-02-23 PROCEDURE — 84100 ASSAY OF PHOSPHORUS: CPT

## 2022-02-23 PROCEDURE — 2580000003 HC RX 258

## 2022-02-23 RX ORDER — POTASSIUM CHLORIDE 750 MG/1
40 TABLET, EXTENDED RELEASE ORAL PRN
Status: DISCONTINUED | OUTPATIENT
Start: 2022-02-23 | End: 2022-03-01 | Stop reason: HOSPADM

## 2022-02-23 RX ORDER — POTASSIUM CHLORIDE 7.45 MG/ML
10 INJECTION INTRAVENOUS PRN
Status: DISCONTINUED | OUTPATIENT
Start: 2022-02-23 | End: 2022-03-01 | Stop reason: HOSPADM

## 2022-02-23 RX ADMIN — HYDROMORPHONE HYDROCHLORIDE 2 MG: 2 TABLET ORAL at 15:37

## 2022-02-23 RX ADMIN — ENOXAPARIN SODIUM 60 MG: 100 INJECTION SUBCUTANEOUS at 12:24

## 2022-02-23 RX ADMIN — SODIUM CHLORIDE, PRESERVATIVE FREE 10 ML: 5 INJECTION INTRAVENOUS at 21:04

## 2022-02-23 RX ADMIN — SERTRALINE HYDROCHLORIDE 25 MG: 50 TABLET ORAL at 08:49

## 2022-02-23 RX ADMIN — HYDROMORPHONE HYDROCHLORIDE 2 MG: 2 TABLET ORAL at 21:18

## 2022-02-23 RX ADMIN — Medication 2000 UNITS: at 08:49

## 2022-02-23 RX ADMIN — HYDROMORPHONE HYDROCHLORIDE 2 MG: 2 TABLET ORAL at 01:37

## 2022-02-23 RX ADMIN — SODIUM CHLORIDE, POTASSIUM CHLORIDE, SODIUM LACTATE AND CALCIUM CHLORIDE: 600; 310; 30; 20 INJECTION, SOLUTION INTRAVENOUS at 01:33

## 2022-02-23 RX ADMIN — ACETAMINOPHEN 650 MG: 325 TABLET ORAL at 04:52

## 2022-02-23 RX ADMIN — PANTOPRAZOLE SODIUM 40 MG: 40 TABLET, DELAYED RELEASE ORAL at 05:16

## 2022-02-23 ASSESSMENT — PAIN DESCRIPTION - PAIN TYPE: TYPE: ACUTE PAIN

## 2022-02-23 ASSESSMENT — PAIN SCALES - GENERAL
PAINLEVEL_OUTOF10: 5
PAINLEVEL_OUTOF10: 3
PAINLEVEL_OUTOF10: 8
PAINLEVEL_OUTOF10: 0
PAINLEVEL_OUTOF10: 0
PAINLEVEL_OUTOF10: 6

## 2022-02-23 ASSESSMENT — PAIN DESCRIPTION - DESCRIPTORS: DESCRIPTORS: ACHING;SORE

## 2022-02-23 ASSESSMENT — PAIN DESCRIPTION - LOCATION: LOCATION: ABDOMEN;HEAD

## 2022-02-23 NOTE — PROGRESS NOTES
Vascular lab preliminary. Left arm venous duplex scan completed.  + DVT in left jugular, subclavian, axillary, and brachial veins.  + SVT in left proximal cephalic vein, median cubital vein. Final report pending.

## 2022-02-23 NOTE — PROGRESS NOTES
[facility] Surgery Progress Note    Chief Complaint:   Chief Complaint   Patient presents with    Abnormal Lab     unknown, primary care called       SUBJECTIVE:  Mr. Dejuan Calabrese is a 61 y.o. male is seen and examined at bedside. Doing well. Feeling much better today. OBJECTIVE:  /61   Pulse 80   Temp 98.5 °F (36.9 °C) (Temporal)   Resp 16   Ht 5' 7\" (1.702 m)   Wt 140 lb (63.5 kg)   SpO2 96%   BMI 21.93 kg/m²   CONSTITUTIONAL: Alert, appropriate, no acute distress  SKIN: warm, dry with no rashes or lesions  HEENT: NCAT, Non icteric, PERR. Trachea Midline. CHEST/LUNGS: Normal respiratory effort. CARDIOVASCULAR:  No edema. ABDOMEN: soft, ND, FARHAT w seropurulent output  Incisions: Clean, dry, and intact with no erythema or induration, staples in place  NEUROLOGIC: CN II-XI grossly intact, no motor or sensory deficits   MUSCULOSKELETAL: No clubbing or cyanosis. PSYCHIATRIC:  Normal Mood and Affect. Alert and Oriented. Labs:  CBC:   Recent Labs     02/21/22  1719 02/22/22  0431 02/23/22  0500   WBC 4.4* 3.3* 3.4*   HGB 11.7* 9.6* 8.4*   * 308 293     BMP:    Recent Labs     02/21/22  1719 02/22/22  0431 02/23/22  0500   * 128* 126*   K 4.7 4.7 3.4*   CL 75* 82* 84*   CO2 27 28 26   BUN 97* 91* 78*   CREATININE 4.6* 4.6* 3.9*   GLUCOSE 123* 97 100       ASSESSMENT:  Principal Problem:    Acute kidney injury superimposed on CKD (HCC)  Active Problems:    Adenocarcinoma of colon (HCC)    Liver metastases (HCC)    Hypovolemia    Hyponatremia    Moderate malnutrition (HCC)    Palliative care patient    Posttraumatic stress disorder  Resolved Problems:    * No resolved hospital problems.  *       PLAN:  Aggressive medical management and fluid resuscitation  Will monitor ostomy output and drain output              Would not recommend removing FARHAT at this time  If ostomy output seems to be high output then we will start antidiarrheal agents  Staples to stay in place for least 2 weeks postop  Diet as tolerated      Hannah Beckman DO   Electronically Signed on 2/23/2022 at 3:15 PM

## 2022-02-23 NOTE — PROGRESS NOTES
Pt refused night time dose of heparin. Pt stated heparin has \"never done any good\" for him. Explained importance as there is a concern for a DVT in left arm. Pt understood and still refused.      Electronically signed by Jose Alejandro Wu RN on 2/22/2022 at 10:35 PM

## 2022-02-23 NOTE — PROGRESS NOTES
Nephrology ([facility]) Progress Note      Patient:  Tabitha Feliciano  YOB: 1955  Date of Service: 2/23/2022  MRN: [id number]   Acct: [de-identified]   Primary Care Physician: KALPESH Curtis NP  Advance Directive: Full Code  Admit Date: 2/21/2022       Hospital Day: 2  Referring Provider: Angel Gamboa MD    Patient independently seen and examined, Chart, Consults, Notes, Operative notes, Labs, Cardiology, and Radiology studies reviewed as available. Chief complaint: Profound weakness    Subjective:  Tabitha Feliciano is a 61 y.o. male for whom we were consulted for evaluation and treatment of acute kidney injury. Patient denies any history of chronic kidney disease. Patient has history of stage IV colon cancer with metastatic disease to liver. Patient was recently hospitalized at [facility] with bowel obstruction, needing emergent laparotomy and ileostomy. Previously underwent partial colectomy and partial hepatectomy for the treatment of metastatic colon cancer. Patient denies any nausea and vomiting. He has noticed high output from ileostomy and has to change the bag frequently. Patient was supposed to get total parenteral nutrition but was not approved by [employer]. Patient has been trying to eat more but unable to eat due to recent abdominal surgery. His fluid intake was also very low. In the emergency room his serum creatinine was 4.6 mg, phosphorus was 11.1 mg. Patient also has hyponatremia. He is now admitted for acute kidney injury and other severe electrolyte abnormalities. He is currently getting IV fluid and feeling little better. This morning patient feels well. His renal function is very slowly improving. Patient has very high output/liquid stool from ileostomy.     Allergies:  Morphine and Oxycodone-acetaminophen    Medicines:  Current Facility-Administered Medications   Medication Dose Route Frequency Provider Last Rate Last Admin    methocarbamol (ROBAXIN) tablet 500 mg  500 mg Oral TID PRN Sangita Brito MD        HYDROmorphone (DILAUDID) tablet 2 mg  2 mg Oral Q6H PRN Sangita Brito MD   2 mg at 02/23/22 0091    lactated ringers infusion   IntraVENous Sourav Escobar  mL/hr at 02/23/22 0133 New Bag at 02/23/22 0133    sodium chloride flush 0.9 % injection 5-40 mL  5-40 mL IntraVENous 2 times per day Beena Shaper, APRN - CNP        sodium chloride flush 0.9 % injection 5-40 mL  5-40 mL IntraVENous PRN Beena Shaper, APRN - CNP        0.9 % sodium chloride infusion  25 mL IntraVENous PRN Beena Shaper, APRN - CNP        ondansetron (ZOFRAN-ODT) disintegrating tablet 4 mg  4 mg Oral Q8H PRN Beena Shaper, APRN - CNP        Or    ondansetron [facility]) injection 4 mg  4 mg IntraVENous Q6H PRN Beena Shaper, APRN - CNP        acetaminophen (TYLENOL) tablet 650 mg  650 mg Oral Q6H PRN Beena Shaper, APRN - CNP   650 mg at 02/23/22 6683    Or    acetaminophen (TYLENOL) suppository 650 mg  650 mg Rectal Q6H PRN Beena Shaper, APRN - CNP        heparin (porcine) injection 5,000 Units  5,000 Units SubCUTAneous 3 times per day Beena Shaper, APRN - CNP        Vitamin D (CHOLECALCIFEROL) tablet 2,000 Units  2,000 Units Oral Daily Beena Shaper, APRN - CNP   2,000 Units at 02/23/22 0849    pantoprazole (PROTONIX) tablet 40 mg  40 mg Oral QAM AC Beena Shaper, APRN - CNP   40 mg at 02/23/22 0516    [Held by provider] prazosin (MINIPRESS) capsule 1 mg  1 mg Oral Nightly Beena Shaper, APRN - CNP        sertraline (ZOLOFT) tablet 25 mg  25 mg Oral Daily Beena Shaper, APRN - CNP   25 mg at 02/23/22 5173    loperamide (IMODIUM) capsule 2 mg  2 mg Oral BID PRN Beena Shaper, APRN - CNP           Past Medical History:  Past Medical History:   Diagnosis Date    Acid reflux     Cancer ([facility] [address]     adenocarcinoma of colon    GERD (gastroesophageal reflux disease)     PTSD (post-traumatic stress disorder)     Stage 3a chronic kidney disease ([facility] [address]        Past Surgical History:  Past Surgical History:   Procedure Laterality Date    ABLATION OF DYSRHYTHMIC FOCUS      ablation of liver at [address]  2003    CHOLECYSTECTOMY  2013    COLONOSCOPY      when pt was in the 19's    COLONOSCOPY N/A 03/11/2020    COLONOSCOPY POLYPECTOMY SNARE/COLD BIOPSY: Dr. Frankie Prado colonoscopy: 6-12 months due to findings at colonoscopy today with Cecal mass lesion and large polyps.     COLONOSCOPY      COLONOSCOPY N/A 03/17/2021    Dr Jack Tse, Post-operative changes w IC anastomosis & single visible staple, Mild Diverticuosis, Int Hemorrhoids Grade 1, 3 year recall    HEMICOLECTOMY Right 03/30/2020    LAPAROSCOPIC-ASSISTED RIGHT HEMICOLECTOMY performed by Kash Bearden MD at [address] [place] [address] Street N/A 06/03/2020    INSERTION OF VENOUS PORT with flouro performed by Kash Bearden MD at [place] [address] ENDOSCOPY N/A 03/11/2020    Dr. Andersen Bookbinder:   [facility]       Family History  Family History   Problem Relation Age of Onset    Liver Cancer Mother     High Blood Pressure Mother     Colon Cancer Mother         x2    Cancer Father         Lung Cancer    Breast Cancer Sister     Cancer Maternal Grandfather         Lung Cancer    Cancer Paternal Grandfather         Stomach Cancer    Colon Polyps Neg Hx     Cystic Fibrosis Neg Hx     Liver Disease Neg Hx     Rectal Cancer Neg Hx        Social History  Social History     Socioeconomic History    Marital status:      Spouse name: Not on file    Number of children: Not on file    Years of education: Not on file    Highest education level: Not on file   Occupational History    Not on file   Tobacco Use    Smoking status: Never Smoker    Smokeless tobacco: Never Used   Vaping Use    Vaping Use: Never used   Substance and Sexual Activity    Alcohol use: Yes     Comment: rare     Drug use: No    Sexual activity: Yes Partners: Female   Other Topics Concern    Not on file   Social History Narrative    Not on file     Social Determinants of Health     Financial Resource Strain:     Difficulty of Paying Living Expenses: Not on file   Food Insecurity:     Worried About Running Out of Food in the Last Year: Not on file    [place] of Food in the Last Year: Not on file   Transportation Needs:     Lack of Transportation (Medical): Not on file    Lack of Transportation (Non-Medical): Not on file   Physical Activity:     Days of Exercise per Week: Not on file    Minutes of Exercise per Session: Not on file   Stress:     Feeling of Stress : Not on file   Social Connections:     Frequency of Communication with Friends and Family: Not on file    Frequency of Social Gatherings with Friends and Family: Not on file    Attends Congregational Services: Not on file    Active Member of [address] [place] [address] [employer] or Organizations: Not on file    Attends Club or Organization Meetings: Not on file    Marital Status: Not on file   Intimate Partner Violence:     Fear of Current or Ex-Partner: Not on file    Emotionally Abused: Not on file    Physically Abused: Not on file    Sexually Abused: Not on file   Housing Stability:     Unable to Pay for Housing in the Last Year: Not on file    Number of [place] in the Last Year: Not on file    Unstable Housing in the Last Year: Not on file         Review of Systems:  History obtained from chart review and the patient  General ROS: No fever or chills  Respiratory ROS: No cough, shortness of breath, wheezing  Cardiovascular ROS: No chest pain or palpitations  Gastrointestinal ROS: positive for - diarrhea/large output from last  Genito-Urinary ROS: No dysuria or hematuria  Musculoskeletal ROS: No joint pain or swelling   14 point ROS reviewed with the patient and negative except as noted above and in the HPI unless unable to obtain.     Objective:  Patient Vitals for the past 24 hrs:   BP Temp Temp src Pulse Resp SpO2 Height Weight   02/23/22 0520 99/60 96.8 °F (36 °C) -- 82 16 96 % -- --   02/23/22 0518 -- -- -- -- -- -- -- 140 lb (63.5 kg)   02/23/22 0109 100/60 96.4 °F (35.8 °C) -- 87 16 96 % -- --   02/22/22 1720 (!) 92/53 98.2 °F (36.8 °C) Temporal 93 16 97 % -- --   02/22/22 1250 95/64 -- -- 91 14 99 % -- --   02/22/22 1231 -- -- -- -- -- -- 5' 7\" (1.702 m) --   02/22/22 1027 85/60 97.2 °F (36.2 °C) Temporal 59 12 98 % -- --       Intake/Output Summary (Last 24 hours) at 2/23/2022 0947  Last data filed at 2/23/2022 0200  Gross per 24 hour   Intake 1170 ml   Output 1175 ml   Net -5 ml     General: awake/alert   HEENT: Normocephalic atraumatic head  Neck: Supple with no JVD or carotid bruits. Chest:  clear to auscultation bilaterally  CVS: regular rate and rhythm  Abdominal: Midline abdominal wound, looks fresh with staples/right lower quadrant ileostomy. Extremities: no cyanosis or edema  Skin: warm and dry without rash      Labs:  BMP:   Recent Labs     02/21/22 1719 02/22/22  0431 02/23/22  0500   * 128* 126*   K 4.7 4.7 3.4*   CL 75* 82* 84*   CO2 27 28 26   PHOS 11.1* 8.8* 5.6*   BUN 97* 91* 78*   CREATININE 4.6* 4.6* 3.9*   CALCIUM 8.8 7.9* 7.7*     CBC:   Recent Labs     02/21/22 1719 02/22/22  0431 02/23/22  0500   WBC 4.4* 3.3* 3.4*   HGB 11.7* 9.6* 8.4*   HCT 36.0* 30.0* 26.3*   MCV 90.0 91.2 91.0   * 308 293     LIVER PROFILE:   Recent Labs     02/21/22 1719   AST 26   ALT 26   LIPASE 91*   BILITOT 0.5   ALKPHOS 296*     PT/INR: No results for input(s): PROTIME, INR in the last 72 hours. APTT: No results for input(s): APTT in the last 72 hours. BNP:  No results for input(s): BNP in the last 72 hours. Ionized Calcium:No results for input(s): IONCA in the last 72 hours.   Magnesium:  Recent Labs     02/21/22  1719 02/23/22  0500   MG 3.0* 2.1     Phosphorus:  Recent Labs     02/21/22  1719 02/22/22  0431 02/23/22  0500   PHOS 11.1* 8.8* 5.6*     HgbA1C: No results for input(s): LABA1C in the last 72 hours. Hepatic:   Recent Labs     02/21/22  1719   ALKPHOS 296*   ALT 26   AST 26   PROT 8.3   BILITOT 0.5   LABALBU 3.4*     Lactic Acid:   Recent Labs     02/22/22  0431   LACTA 1.8     Troponin: No results for input(s): CKTOTAL, CKMB, TROPONINT in the last 72 hours. ABGs: No results for input(s): PH, PCO2, PO2, HCO3, O2SAT in the last 72 hours. CRP:  No results for input(s): CRP in the last 72 hours. Sed Rate:  No results for input(s): SEDRATE in the last 72 hours. Cultures:   No results for input(s): CULTURE in the last 72 hours. Recent Labs     02/21/22  1739   BC No Growth to date. Any change in status will be called. BLOODCULT2 No Growth to date. Any change in status will be called. No results for input(s): CXSURG in the last 72 hours. Radiology reports as per the Radiologist  Radiology: CT ABDOMEN PELVIS WO CONTRAST Additional Contrast? None    Result Date: 2/21/2022  CT ABDOMEN PELVIS WO CONTRAST 2/21/2022 7:53 PM History: Postop pain. Hypotension. Noncontrast abdomen/pelvis CT. Comparison is made with February 8, 2022. In order to have a CT radiation dose as low as reasonably achievable Automated Exposure Control was utilized for adjustment of the mA and/or KV according to patient size. DLP in mGycm= 1115. Interval abdominal surgery. Normal heart size. Patchy atelectasis at the lung bases. Normal noncontrast appearance of the liver, pancreas, and spleen. Normal and symmetric kidneys. No hydronephrosis. No bowel dilation. No abscess or hematoma. 1. Postoperative changes with no sign of bowel obstruction, abscess, or hematoma. Signed by Dr Trang Torres    XR CHEST PORTABLE    Result Date: 2/21/2022  Exam:   XR CHEST PORTABLE  Date:  2/21/2022 History:  Male, age  61 years; hypotension COMPARISON:  Chest x-ray dated February 8, 2022 Findings : Chest portable place. A right-sided PICC, new, terminating in the low SVC. The heart and mediastinum are normal in size. Lungs are without focal infiltrate, mass or effusions. The bones show no acute pathology. Catheter in the left upper quadrant. Impression: 1. New right-sided PICC. 2.  Otherwise, no interval changes. Signed by Dr Lino Alicea   1. Acute kidney injury/stage III/improving. .  2.  Intravascular volume depletion. 3.  Severe hyperphosphatemia  4. Hypercalcemia. 5.  Hyponatremia improving. 6.  History of metastatic colon cancer. 7.  Severe immunosuppression due to recent chemo. Plan:  1. Continue current IV fluid  2. Renal ultrasound consistent with normal study. 3.  Plan was discussed with the patient and his wife at the bedside.       Abdulaziz Ibrahim MD  02/23/22  9:47 AM

## 2022-02-23 NOTE — PROGRESS NOTES
Pharmacy Renal Adjustment    Asya Adams is a 61 y.o. male. Pharmacy has renally adjusted medications per protocol. Recent Labs     02/22/22  0431 02/23/22  0500   BUN 91* 78*       Recent Labs     02/22/22  0431 02/23/22  0500   CREATININE 4.6* 3.9*       Estimated Creatinine Clearance: 17 mL/min (A) (based on SCr of 3.9 mg/dL (H)).     Height:   Ht Readings from Last 1 Encounters:   02/22/22 5' 7\" (1.702 m)     Weight:  Wt Readings from Last 1 Encounters:   02/23/22 140 lb (63.5 kg)       Plan: Adjust the following medications based on renal function:           Lovenox 1 mg/kg SC every 12 hours to 1 mg/kg SC every 24 hours    Electronically signed by MELISSA Enrique [facility] on 2/23/2022 at 11:18 AM

## 2022-02-23 NOTE — PROGRESS NOTES
[facility] Progress Note    Patient:  Twin Machuca  YOB: 1955  Date of Service: 2/23/2022  MRN: [id number]   Acct: [de-identified]   Primary Care Physician: KALPESH Tsai NP  Advance Directive: Full Code  Admit Date: 2/21/2022       Hospital Day: 2      CHIEF COMPLAINT:   Generalized weakness and fatigue. Chief Complaint   Patient presents with    Abnormal Lab     unknown, primary care called       2/23/2022 11:10 AM  Subjective / Interval History:   02/23/2022  No acute changes or acute overnight event reported. Patient endorses marked improvement in his energy level. Laying comfortably in bed no acute distress. Denies any acute complaints or distress at this time. Left arm venous duplex ultrasound (02/02/2022): Positive DVT in left jugular, subclavian, axillary, and brachial veins. Positive SVT in left proximal cephalic vein, media and cubital veins      02/22/2022  Patient seen and examined this AM.  Doing well. No new complaints. No acute changes or acute overnight event reported. Laying comfortably in bed in no acute distress. Family at bedside. Review of Systems:   Review of Systems  ROS: 14 point review of systems is negative except as specifically addressed above. ADULT DIET;  Regular  ADULT ORAL NUTRITION SUPPLEMENT; Lunch, Dinner; Clear Liquid Oral Supplement    Intake/Output Summary (Last 24 hours) at 2/23/2022 1110  Last data filed at 2/23/2022 0200  Gross per 24 hour   Intake 1050 ml   Output 1175 ml   Net -125 ml       Medications:   lactated ringers 150 mL/hr at 02/23/22 0133    sodium chloride       Current Facility-Administered Medications   Medication Dose Route Frequency Provider Last Rate Last Admin    potassium chloride (KLOR-CON M) extended release tablet 40 mEq  40 mEq Oral PRN Radha Damon MD        Or    potassium bicarb-citric acid (EFFER-K) effervescent tablet 40 mEq  40 mEq Oral PRN Radha Damon MD        Or    potassium chloride 10 mEq/100 mL IVPB (Peripheral Line)  10 mEq IntraVENous PRN Armaan Ybarra MD        enoxaparin (LOVENOX) injection 60 mg  1 mg/kg SubCUTAneous BID Armaan Ybarra MD        methocarbamol (ROBAXIN) tablet 500 mg  500 mg Oral TID PRN Armaan Ybarra MD        HYDROmorphone (DILAUDID) tablet 2 mg  2 mg Oral Q6H PRN Armaan Ybarra MD   2 mg at 02/23/22 3131    lactated ringers infusion   IntraVENous Continuous Garrett Young  mL/hr at 02/23/22 0133 New Bag at 02/23/22 0133    sodium chloride flush 0.9 % injection 5-40 mL  5-40 mL IntraVENous 2 times per day Marvine Perch, APRN - CNP        sodium chloride flush 0.9 % injection 5-40 mL  5-40 mL IntraVENous PRN Marvine Perch, APRN - CNP        0.9 % sodium chloride infusion  25 mL IntraVENous PRN Marvine Perch, APRN - CNP        ondansetron (ZOFRAN-ODT) disintegrating tablet 4 mg  4 mg Oral Q8H PRN Marvine Perch, APRN - CNP        Or    ondansetron [facility]) injection 4 mg  4 mg IntraVENous Q6H PRN Marvine Perch, APRN - CNP        acetaminophen (TYLENOL) tablet 650 mg  650 mg Oral Q6H PRN Marvine Perch, APRN - CNP   650 mg at 02/23/22 5479    Or    acetaminophen (TYLENOL) suppository 650 mg  650 mg Rectal Q6H PRN Marvine Perch, APRN - CNP        Vitamin D (CHOLECALCIFEROL) tablet 2,000 Units  2,000 Units Oral Daily Marvine Perch, APRN - CNP   2,000 Units at 02/23/22 0849    pantoprazole (PROTONIX) tablet 40 mg  40 mg Oral QAM AC Marvine Perch, APRN - CNP   40 mg at 02/23/22 0516    [Held by provider] prazosin (MINIPRESS) capsule 1 mg  1 mg Oral Nightly Marvine Perch, APRN - CNP        sertraline (ZOLOFT) tablet 25 mg  25 mg Oral Daily Marvine Perch, APRN - CNP   25 mg at 02/23/22 4642    loperamide (IMODIUM) capsule 2 mg  2 mg Oral BID PRN Marvine Perch, KALPESH - ERIK             lactated ringers 150 mL/hr at 02/23/22 0133    sodium chloride        enoxaparin  1 mg/kg SubCUTAneous BID    sodium chloride flush 5-40 mL IntraVENous 2 times per day    Vitamin D  2,000 Units Oral Daily    pantoprazole  40 mg Oral QAM AC    [Held by provider] prazosin  1 mg Oral Nightly    sertraline  25 mg Oral Daily     potassium chloride **OR** potassium alternative oral replacement **OR** potassium chloride, methocarbamol, HYDROmorphone, sodium chloride flush, sodium chloride, ondansetron **OR** ondansetron, acetaminophen **OR** acetaminophen, loperamide  ADULT DIET; Regular  ADULT ORAL NUTRITION SUPPLEMENT; Lunch, Dinner; Clear Liquid Oral Supplement       Labs:   CBC with DIFF:  Recent Labs     02/21/22 1719 02/22/22  0431 02/23/22  0500   WBC 4.4* 3.3* 3.4*   RBC 4.00* 3.29* 2.89*   HGB 11.7* 9.6* 8.4*   HCT 36.0* 30.0* 26.3*   MCV 90.0 91.2 91.0   MCH 29.3 29.2 29.1   MCHC 32.5* 32.0* 31.9*   RDW 15.0* 14.9* 14.6*   * 308 293   MPV 10.1 9.6 10.1   NEUTOPHILPCT 64.8  --  68.8*   LYMPHOPCT 20.3  --  14.8*   MONOPCT 11.4*  --  11.0*   EOSRELPCT 2.5  --  4.5   BASOPCT 0.5  --  0.3   NEUTROABS 2.9  --  2.3   LYMPHSABS 0.9*  --  0.5*   MONOSABS 0.50  --  0.40   EOSABS 0.10  --  0.20   BASOSABS 0.00  --  0.00       CMP/BMP:  Recent Labs     02/21/22 1719 02/22/22  0431 02/23/22  0500   * 128* 126*   K 4.7 4.7 3.4*   CL 75* 82* 84*   CO2 27 28 26   ANIONGAP 23* 18 16   GLUCOSE 123* 97 100   BUN 97* 91* 78*   CREATININE 4.6* 4.6* 3.9*   LABGLOM 13* 13* 16*   CALCIUM 8.8 7.9* 7.7*   PROT 8.3  --   --    LABALBU 3.4*  --   --    BILITOT 0.5  --   --    ALKPHOS 296*  --   --    ALT 26  --   --    AST 26  --   --          CRP:  No results for input(s): CRP in the last 72 hours. Sed Rate:  No results for input(s): SEDRATE in the last 72 hours.       HgBA1c:  No components found for: HGBA1C  FLP:  No results found for: TRIG, HDL, LDLCALC, LDLDIRECT, LABVLDL  TSH:  No results found for: TSH  Troponin T:   Recent Labs     02/21/22  1719 02/21/22  2315 02/22/22  0431   TROPONINI 0.08* 0.06* 0.07*     Pro-BNP: No results for input(s): BNP in the last 72 hours. INR: No results for input(s): INR in the last 72 hours. ABGs: No results found for: PHART, PO2ART, ZSA3TGN  UA:  Recent Labs     02/21/22 2032   COLORU DARK YELLOW*   PHUR 5.0   CLARITYU CLOUDY*   SPECGRAV 1.021   LEUKOCYTESUR Negative   UROBILINOGEN 0.2   BILIRUBINUR SMALL*   BLOODU Negative   GLUCOSEU Negative         Culture Results:    No results for input(s): CXSURG in the last 720 hours. Blood Culture Recent:   Recent Labs     02/21/22  1739 02/08/22 2220   BC No Growth to date. Any change in status will be called. No growth after 5 days of incubation. Recent Labs     02/21/22 1739   BC No Growth to date. Any change in status will be called. BLOODCULT2 No Growth to date. Any change in status will be called. Cultures:   No results for input(s): CULTURE in the last 72 hours. Recent Labs     02/21/22 1739   BC No Growth to date. Any change in status will be called. BLOODCULT2 No Growth to date. Any change in status will be called. No results for input(s): CXSURG in the last 72 hours. Recent Labs     02/21/22  1719 02/22/22  0431 02/23/22  0500   MG 3.0*  --  2.1   PHOS 11.1* 8.8* 5.6*     Recent Labs     02/21/22 1719   AST 26   ALT 26   BILITOT 0.5   ALKPHOS 296*         RAD:   CT ABDOMEN PELVIS WO CONTRAST Additional Contrast? None    Result Date: 2/21/2022  CT ABDOMEN PELVIS WO CONTRAST 2/21/2022 7:53 PM History: Postop pain. Hypotension. Noncontrast abdomen/pelvis CT. Comparison is made with February 8, 2022. In order to have a CT radiation dose as low as reasonably achievable Automated Exposure Control was utilized for adjustment of the mA and/or KV according to patient size. DLP in mGycm= 1115. Interval abdominal surgery. Normal heart size. Patchy atelectasis at the lung bases. Normal noncontrast appearance of the liver, pancreas, and spleen. Normal and symmetric kidneys. No hydronephrosis. No bowel dilation.  No abscess or hematoma. 1. Postoperative changes with no sign of bowel obstruction, abscess, or hematoma. Signed by Dr Devaughn Avila Additional Contrast? None    Result Date: 2/9/2022  EXAM: CT ABDOMEN PELVIS WO CONTRAST INDICATION: Vomiting, recent surgery, history of cancer COMPARISON: 12/14/2020 DLP: 2066 mGy cm. In order to have a CT radiation dose as low as reasonably achievable, Automated Exposure Control was utilized for adjustment of the mA and/or KV according to patient size. FINDINGS: Patchy consolidation in the RIGHT and LEFT lung bases is noted. Moderate to large volume pneumoperitoneum. A surgical drain terminates in the LEFT upper abdomen. Postoperative change of RIGHT hemicolectomy with RIGHT upper quadrant anastomosis. Marked diffuse small bowel distention extending to the ileocolic anastomosis, likely representing small bowel obstruction. Small bowel loops measure up to 5 cm diameter. No definite area of pneumatosis. No evidence of portal venous gas. Trace free pelvic fluid. A dense linear device on the RIGHT peritoneal wall is likely represents a mesh device and may be related to recent surgical intervention (correlate with surgical history). Unchanged RIGHT posterolateral lumbar hernia. Postoperative change of RIGHT hepatectomy. The previously demonstrated LEFT liver lesion is not well visualized given lack of IV contrast. Gallbladder surgically absent. No biliary ductal dilatation. Pancreas, spleen, and adrenal glands are unremarkable. No urolithiasis or hydronephrosis. Normal caliber abdominal aorta. No enlarged retroperitoneal lymph nodes. Multiple borderline prominent mesenteric lymph nodes are similar to prior studies. No definite pelvic or inguinal lymphadenopathy. No acute abdominal wall soft tissue normality. No aggressive osseous lesion. 1.  Moderate to large volume pneumoperitoneum.  This may be related to recent surgery although bowel perforation is also within the differential. There is a surgical drain in the LEFT upper abdomen which also may be contributing to pneumoperitoneum. 2.  Small bowel obstruction with transition at RIGHT upper quadrant ileocolonic anastomosis. Small bowel loops measure up to 5 cm diameter. 3.  Prior RIGHT hepatectomy. Previously demonstrated LEFT liver lesion is not well visualized given lack of IV contrast. 4.  Patchy consolidation in both lung bases, with differential including aspiration and pneumonia. Findings in agreement with the emergent findings from the initial [facility] preliminary report. Signed by Dr Terry Mckenna    Result Date: 2/21/2022  Exam:   XR CHEST PORTABLE  Date:  2/21/2022 History:  Male, age  61 years; hypotension COMPARISON:  Chest x-ray dated February 8, 2022 Findings : Chest portable place. A right-sided PICC, new, terminating in the low SVC. The heart and mediastinum are normal in size. Lungs are without focal infiltrate, mass or effusions. The bones show no acute pathology. Catheter in the left upper quadrant. Impression: 1. New right-sided PICC. 2.  Otherwise, no interval changes. Signed by Dr Eliezer Cagle    XR CHEST PORTABLE    Result Date: 2/8/2022  EXAMINATION: XR CHEST PORTABLE 2/8/2022 10:39 PM HISTORY: XR CHEST PORTABLE 2/8/2022 9:00 PM HISTORY: Cough COMPARISON: March 18, 2020. FINDINGS: The lungs are clear. Cardiac silhouette is normal. Left subclavian Port-A-Cath is present. Old healed fracture of the left clavicle is present. The osseous structures and surrounding soft tissues demonstrate no acute abnormality. 1. No radiographic evidence of acute cardiopulmonary process.  Signed by Dr Marcello Campuzano      Objective:   Vitals:   BP 99/60   Pulse 82   Temp 96.8 °F (36 °C)   Resp 16   Ht 5' 7\" (1.702 m)   Wt 140 lb (63.5 kg)   SpO2 96%   BMI 21.93 kg/m²       Patient Vitals for the past 24 hrs:   BP Temp Temp src Pulse Resp SpO2 Height Weight   02/23/22 0520 99/60 96.8 °F (36 °C) -- 82 16 96 % -- --   02/23/22 0518 -- -- -- -- -- -- -- 140 lb (63.5 kg)   02/23/22 0109 100/60 96.4 °F (35.8 °C) -- 87 16 96 % -- --   02/22/22 1720 (!) 92/53 98.2 °F (36.8 °C) Temporal 93 16 97 % -- --   02/22/22 1250 95/64 -- -- 91 14 99 % -- --   02/22/22 1231 -- -- -- -- -- -- 5' 7\" (1.702 m) --       24HR INTAKE/OUTPUT:      Intake/Output Summary (Last 24 hours) at 2/23/2022 1110  Last data filed at 2/23/2022 0200  Gross per 24 hour   Intake 1050 ml   Output 1175 ml   Net -125 ml       Physical Exam  Vitals and nursing note reviewed. Constitutional:       General: He is not in acute distress. Appearance: Normal appearance. He is not ill-appearing, toxic-appearing or diaphoretic. HENT:      Head: Normocephalic and atraumatic. Right Ear: External ear normal.      Left Ear: External ear normal.      Nose: Nose normal. No congestion or rhinorrhea. Mouth/Throat:      Mouth: Mucous membranes are moist.      Pharynx: Oropharynx is clear. No oropharyngeal exudate or posterior oropharyngeal erythema. Eyes:      General: No scleral icterus. Right eye: No discharge. Left eye: No discharge. Extraocular Movements: Extraocular movements intact. Conjunctiva/sclera: Conjunctivae normal.      Pupils: Pupils are equal, round, and reactive to light. Cardiovascular:      Rate and Rhythm: Normal rate and regular rhythm. Pulses: Normal pulses. Heart sounds: Normal heart sounds. No murmur heard. No friction rub. No gallop. Pulmonary:      Effort: Pulmonary effort is normal. No respiratory distress. Breath sounds: Normal breath sounds. No stridor. No wheezing, rhonchi or rales. Chest:      Chest wall: No tenderness. Abdominal:      General: Bowel sounds are normal. There is no distension. Palpations: Abdomen is soft. Tenderness: There is no guarding or rebound. Comments: FARHAT drain in place. Right-sided ileostomy in place.   Midline incision site with no evidence of bleeding or infection noted. Musculoskeletal:         General: No swelling, tenderness, deformity or signs of injury. Normal range of motion. Cervical back: Normal range of motion and neck supple. No rigidity. No muscular tenderness. Right lower leg: No edema. Left lower leg: No edema. Skin:     General: Skin is warm and dry. Capillary Refill: Capillary refill takes less than 2 seconds. Coloration: Skin is not jaundiced or pale. Findings: No bruising, erythema, lesion or rash. Neurological:      General: No focal deficit present. Mental Status: He is alert and oriented to person, place, and time. Cranial Nerves: No cranial nerve deficit. Sensory: No sensory deficit. Motor: No weakness. Coordination: Coordination normal.   Psychiatric:         Mood and Affect: Mood normal.         Behavior: Behavior normal.         Thought Content: Thought content normal.         Judgment: Judgment normal.           Assessment/plan:     Hospital Problems           Last Modified POA    * (Principal) Acute kidney injury superimposed on CKD ([address] 2/21/2022 Yes    Adenocarcinoma of colon ([address] 2/21/2022 Yes    Liver metastases ([address] 2/22/2022 Yes    Hypovolemia 2/21/2022 Yes    Hyponatremia 2/21/2022 Yes    Moderate malnutrition ([address] 2/22/2022 Yes    Palliative care patient 2/22/2022 Yes    Posttraumatic stress disorder 2/22/2022 Yes          Principal Problem:    Acute kidney injury superimposed on CKD [facility])  Active Problems:    Adenocarcinoma of colon ([address]    Liver metastases ([address]    Hypovolemia    Hyponatremia    Moderate malnutrition ([address]    Palliative care patient    Posttraumatic stress disorder  Resolved Problems:    * No resolved hospital problems.  *      Brief Summary  Mr Radha Bradley, a 72-year-old male, history of metastatic adenocarcinoma of the colon, presenting to [facility] ED (02/21/2022), no account of abnormal lab as well as generalized weakness and fatigue. Patient admitted to [facility] (02/21/2022), further work-up and management of acute on chronic renal failure. Further hospital course, as per problem is below; MARIO on CKD  III  Hyponatremia  Hyperphosphatemia  Hypercalcemia   Creatinine level on presentation: 4.6: 02/21/2022   IV hydration -   Avoid hypotension & Nephrotoxins    Nephrology on board-appreciate recommendations   Further management as per nephrology      Metastatic adenocarcinoma of colon  · Status post recent ex lap at OSH (02/09/2022), with FARHAT drain placement and diverting loop ileostomy. · General surgery and Oncology consulted on admission    LUE DVT  · Left arm venous duplex ultrasound (02/02/2022): Positive DVT in left jugular, subclavian, axillary, and brachial veins. Positive SVT in left proximal cephalic vein, media and cubital veins  · Therapeutic enoxaparin 1 mg/kg subcu twice daily (dose to be evaluated justified pharmacy)  · Vascular surgery consult      Hypokalemia  · Replace as needed  · Monitor BMP    Continue management of other chronic medical conditions - see above and orders. Advance Directive: Full Code    ADULT DIET; Regular  ADULT ORAL NUTRITION SUPPLEMENT; Lunch, Dinner; Clear Liquid Oral Supplement         Consults Made:   IP CONSULT TO ONCOLOGY  IP CONSULT TO DIETITIAN  IP CONSULT TO NEPHROLOGY  IP CONSULT TO GENERAL SURGERY  IP CONSULT TO PALLIATIVE CARE  IP CONSULT TO VASCULAR SURGERY    DVT prophylaxis: Heparin      Discharge planning:  Continue management as above, including IV fluids  Monitor renal function    Time Spent is 35 mins in the examination, evaluation, counseling and review of medications, assessment and plan.      Electronically signed by   Angel Gamboa MD, MPH, MD,   Internal Medicine Hospitalist   2/23/2022 11:10 AM

## 2022-02-23 NOTE — PROGRESS NOTES
hernia        The patient is a 61years old male who has a diagnosis of colonic adenocarcinoma. The patient had malignant disease with several lymph nodes involved. In addition, the patient was also found to have suspicious liver lesions concerning for metastatic disease. He was seen by [facility] and offered a multimodality approach with liver ablation of the left liver lesion followed by neoadjuvant chemotherapy and partial right hepatectomy. He underwent microwave ablation of the left lobe liver lesion on 6/8/2020. He is status post completion of 11 biweekly cycles of FOLFOX/Avasti completed November 2020. He tolerated treatment with complaints of mild transient cold neuropathy. He had a right hepatectomy on 12/11/2020 that showed no residual disease. His last CEA was normal in January 2021. He had MRI of the abdomen at [facility] in March 2021 that showed no evidence of recurrent disease in the liver or in the abdomen. He also had a surveillance colonoscopy in March 2021 that showed no polyps. CEA was elevated in May 2021. Repeat CEA June 2021 showed persistent elevation. MRI abdomen at [facility] showed no evidence of liver recurrence but a suspicious nodule in the right lower quadrant. Biopsy was performed at [facility] and consistent with recurrent adenocarcinoma.  I discussed the findings with hepatobiliary service and also colorectal surgery. He was started on FOLFIRI/Avastin. He was seen by Dr. Jamee Habermann again on 9/24/2021. He had repeat CT abdomen pelvis and also MRI at [facility] that showed persistent disease involving the psoas muscle.  In addition, an additional small place that may represent further peritoneal metastatic disease. He underwent surgery at [facility].   He underwent exploratory laparotomy with resection of psoas mass at [facility] on 1/13/2022. eLo Vazquez also underwent HIPEC treatment.   The patient was taken to the OR on 2/10/2022 for correction of internal hernia.     Cancer history  Mr. Autumn Garces was first seen by me on 3/23/2020. [facility] was referred for a new diagnosis of colonic adenocarcinoma involving the cecum.  The patient reports that he had a wellbeing consult with his provider at the [address] [place] [address] was found to have anemia and then recommended a colonoscopy.  Of note, the patient has a family history of colon cancer.  His mother is a patient of Dr. Александр Duenas he has been diagnosed with colon cancer in 2010. · 3/11/2020- colonoscopy revealed a large malignant appearing fungating mass lesion in the cecum.  In addition, several other polyps.  Biopsy of the mass consistent with moderate differentiated colonic adenocarcinoma.  Polyps consistent with tubulovillous adenoma with no high-grade dysplasia. [facility] MMR not proficient.  K-maria luisa mutated, BRAF and NRAS wild type.  MSI proficient  · 3/11/2020-CEA 5.5 (H)  · 3/18/2020-CT abdomen pelvis with contrast  Invasive cecal mass adhering to the right lateral abdominal wall muscles with adjacent lymphadenopathy. Mild partial obstruction of the terminal ileum. 2. Suspicious lesions in the right and left hepatic lobes measure up to 1.3 cm and likely represent metastatic disease. · 3/18/2020-Xr Chest Standard  No radiographic evidence of acute cardiopulmonary process. · 3/23/2020-he was first seen by me.  Recommended completion of staging with CT chest.  Also recommended liver MRI for further clarification of liver lesion.  S.  Recommend to proceed with a general surgery consultation tomorrow with Dr. Bill Spears was informed that I favor surgical resection if feasible of the primary malignancy.   · 3/30/2020- right hemicolectomy by Dr. Kayleigh Meza at [facility] consistent with invasive moderately differentiated colonic adenocarcinoma measuring 7.2 cm.  Carcinoma directly invading the adjacent abdominal wall tissue.  Focal lymphovascular space invasion identified.  Focal perineural invasion identified.  Surgical margins negative for evidence of malignancy.  6 out of 14 lymph nodes positive for metastatic adenocarcinoma.  Final pathology staging [id number](liver, stage ROXANA)  · 4/20/2020-CT chest with contrast showed No convincing intrathoracic metastasis. Nonspecific 4 mm nodule of the inferior lingula and a 2 mm right upper lobe nodule can be followed on subsequent imaging in 6-12 months.  Moderate coronary calcifications. Hypodense metastatic liver lesions.  Small hiatal hernia. · 4/20/2020-Mri Abdomen W Wo Contrast There are about 5 liver lesions. The 2 largest appears similar compared to 3/18/2020, the others are too small to further characterize. Appearance is most concerning for metastatic disease. Enhancement of the right lateral peritoneum. This is favored to be postoperative as there is no nodularity, evidence of omental disease or lymphadenopathy. Recommend attention on follow-up. Cholecystectomy. · 4/22/2020-discussed with Dr. Leoncio Rendon at [facility] will review imaging studies and give further recommendations regarding eligibility for resection of liver lesions. · 5/8/2020 CT Abdomen The two largest suspicious lesions measuring 1.2 and 1.3 cm in the  right and left hepatic lobes respectively are similar compared to the  3/18/2020 CT. Additionally, there are at least five subcentimeter lesions with similar signal characteristics, which are also highly suspicious for metastases. If complete characterization of the number and distribution of lesions is necessary, an MRI with Eovist could be acquired. · 5/19/2020- he was seen by the hepatobiliary service at [address] [place] [address] with Dr. Leoncio Rendon:  patient adequate risk candidate for a multimodal approach, directed toward curative hepatectomy eventually. Endorsed by Hepatobiliary Conference, I recommended perc ablation of the L hemiliver to clear it, followed by systemic therapy in a neoadjuvant strategy.  Restaging imaging to confirm clearance of disease on evidence of viable tumor in the sampled specimen. · 12/14/20 Ct Abdomen Pelvis W Iv Contrast A Small bowel obstruction with transition point at the distal small bowel, just proximal to RIGHT upper quadrant ileocolonic anastomosis.  Postoperative change of RIGHT hepatectomy with small amount of expected free fluid and intraperitoneal gas.  Redemonstrated LEFT liver lesion.  Small bilateral pleural effusions. · 12/16/20 SBFT-[place]: Study is limited due to retained contrast in the small bowel from prior attempt at small bowel follow-through on the floor. Small bowel remains dilated, measuring approximately 5.2 cm, which is consistent with partial or resolving small bowel obstruction. Final radiographs show contrast within the colon. · 1/4/2020-resolution of small bowel obstruction. · 1/7/21 CT chest: No finding to suggest intrathoracic neoplastic process or metastatic disease. The benign-appearing tiny nodule in the right upper lobe probably represent a noncalcified granuloma. A nodule in the lingular segment of the left upper lobe is not visualized in this study. Postsurgical changes of the liver. No evidence of focal complication. A trace right basal pleural effusion. This may be reactive to the previous abdominal surgery. · 3/4/21 MRI abd: Status post right hepatectomy and microwave ablation of a left liver lesion. No findings to suggest residual/recurrent disease. No new liver lesion is identified. Postsurgical changes related to right hemicolectomy. Microwave ablation zone in segment II of the left hepatic lobe measures approximately 21 mm in diameter, unchanged. No appreciable postcontrast enhancement or other findings to suggest local disease recurrence. No new liver lesion is identified. Spleen is mildly enlarged, measuring 13.8 cm in length. · 3/17/2021-1 year colonoscopy showed no evidence of polyps.   · 5/3/21 CEA 6.2  · 6/8/21 MRI abdomen ([place]): Status post right hepatectomy and MWA of a left liver lesion.  No evidence of residual or recurrent metastatic disease in the liver. Status post prior right hemicolectomy for colon carcinoma. Within the posterior right iliopsoas muscle there is a mildly T2 hyperintense nodular focus with postcontrast rim enhancement and diffusion restriction. This measures approximately 2.7 x 2 x 2 cm. More delayed postcontrast images show irregular area of enhancement measuring 2.4 x 7.7 cm axially involving the right iliopsoas muscle and the right lateral abdominal wall. Suggest correlation with contrast enhanced CT exam for complete evaluation. Consider biopsy of this lesion. · 6/15/21 CT CHEST WO CONTRAST No metastatic disease in the chest.  No change in tiny 2 mm RIGHT upper lobe pulmonary nodule.  Mild ectasia of the ascending aorta measuring 4 cm. · 6/18/2021-I reviewed results of MRI abdomen at [place].  CT chest without contrast reviewed by me and showed no evidence of metastatic disease.  Stable 2 mm right upper lobe nodule.  Discussed with hepatobiliary service at [facility]. Biopsy intra-abdominal nodule next week at [facility]. · 6/25/20210365-DM-jdveya biopsy soft tissue mass right psoas muscle at [facility] consistent with recurrent colonic adenocarcinoma.    · 7/2/2021-discussed with hepatobiliary service at [facility] and also surgical oncology.  Surgical oncology will call us back regarding consultation for consideration of HIPEC if he is a candidate  · 7/13/21-initiation of chemotherapy with FOLFIRI + Avastin every 2 weeks  · 7/13/21 CEA 10.7  · 7/27/2021-CEA 9.6  · 7/30/2021-she was seen by the surgical oncology group at [facility] by Dr Gustavo Danielle recommended to complete 6 cycles of chemotherapy and repeat CT chest abdomen pelvis and liver MRI to assess disease response.  They discussed the role of CRS/HIPEC in his situation. He was told that it really depends on the status of his liver lesions as well.  He will complete 6 cycles of chemotherapy and will return to see me with a CTAP as well as MRI of the liver to assess disease burden and determine if he can be a candidate for debulking. · 8/25/2021-proceed cycle #4. · 9/22/2021 CEA- 8.1  · 9/24/2021 MRI with and w/o Contrast ([place])  Tiny left retroperitoneal nodule involving the left lateral conal fascial new compared to 3/4/2021 though unchanged from most recent prior, possibly an additional site of metastasis. No other new metastatic disease within the abdomen. Similar partially visualized right posterior body wall/psoas infiltrative lesion not definitely changed from MRI abdomen 6/8/2021 but better evaluated in its entirety on concurrent CT, reported separately.   Status post right hemicolectomy, right hepatectomy, and segment III microwave ablation. Similar mild splenomegaly.  Additional chronic and incidental findings as described in the body the report.  Liver: Status post right hepatectomy. Ablation zone in segment II measures 1.7 x 1.1 cm, slightly decreased in size from 1.8 x 1.4 cm previously (series 1301 image 56) without appreciable enhancement. Similar tiny subcentimeter cyst in the left hepatic lobe. No new focal hepatic lesion identified. No visualized free fluid. Similar 0.7 cm enhancing nodule along the left lateral conal fascia laterally new from 3/4/2021, grossly unchanged from MRI dated 6/8/2021. (series 801 image 22). No other appreciable peritoneal/retroperitoneal or  omental nodularity. Ill-defined T2 hyperintense signal and hyperenhancement in the right posteromedial body wall involving the lateral aspect of the psoas muscle and posterolateral abdominal wall musculature, partially visualized measuring at least 8.7 x 3.1   cm, grossly similar to the prior exam, better evaluated in its entirety on concurrent CT reported separately.    · 9/24/2021 CT C/A/P w/ Contrast([place]) Status post right hemicolectomy, right hepatectomy and left hepatic microwave ablation without new suspicious hepatic lesion.  Left lateral perirenal fascial nodule, which is stable compared to 6/8/2021 MRI, but new compared to 3/4/2021 MRI is concerning for an additional site of metastatic disease.  No sites of new or enlarging metastatic disease in the chest.  Similar appearance of right lateral psoas and posterior abdominal wall infiltrative lesion, not significantly changed compared to 6/8/2021 MRI.  Mild splenomegaly.  Additional findings as outlined in the body the report. Biopsy-proven adenocarcinoma involving the posterior lateral aspect of the right iliopsoas muscle and right lateral abdominal wall. Right iliopsoas component is difficult to measure but appears to be approximately 2.5 x 2.4 cm (series 2, image 153), similar to prior MRI. Nodular thickening noted along the right posterior abdominal wall and possibly involving the the transversus abdominis measures approximately 8.5 x 1.8 cm (series 2 image 153) overall similar compared to prior MRI. · 10/6/2021-discussed the results of recent CT abdomen/pelvis and MRI abdomen/pelvis at [facility].  Persistent disease involving the psoas muscle.  Discussed with colorectal surgery at [facility] recommend to continue current therapy for another 2 months and reassess with CT scans. · 12/3/21 CT chest/abd/pelvis ([facility]): Status post prior right hemicolectomy, right hepatectomy and left hepatic lesions microwave ablation. No new liver lesion. Soft tissue thickening of the right lower posterior abdominal wall involving the iliopsoas muscle is grossly unchanged consistent with improving metastatic disease. Small right omental nodule and left lateral conal fascia nodule unchanged compared to  recent exam.   · 1/13/22 Exploratory lap with resections of psoas muscle mass and HIPEC by Dr. Ethel Briones:  Metastatic colorectal adenocarcinoma involving fibroadipose tissue. [facility] MMR proficient.   · 1/24/22 CT chest/abd/pelvis ([facility]): Extensive postsurgical changes in the abdomen including partial right colectomy, resection of right retroperitoneal mass, omentectomy and mesh repair of right lateral abdominal wall defect. There appears to be a defect in the mesh containing herniated loops of small bowel. Extensive diffuse dilated small bowel loops with air-fluid levels are seen throughout the abdomen. There are segmental areas of decompressed small bowel in the left upper quadrant as well as adjacent to the herniated small  bowel loops in the right retroperitoneum. Air-fluid levels are also seen throughout the colon. While pattern could likely represent postoperative ileus given the diffuse small bowel dilatation and distal colonic air and fluid, developing or partial small bowel obstruction secondary to the right lateral abdominal wall hernia cannot entirely be excluded.  Small ascites. 8 mm nodule along the left lateral conal fascia is unchanged. Slight increase in size of cardiophrenic lymph nodes measuring up to 7 mm anterior to the right hemidiaphragm. Stable hypodensity in the left hepatic lobe.  Diffuse gastric wall thickening/edema could be secondary to gastritis in the appropriate clinical setting.      CEA dynamics:  3/11/20 CEA 5.5  8/19/20 CEA 2.4  9/2/20 CEA 2.7  9/16/20 CEA 2.7  9/30/20 CEA 2.7  10/19/20 CEA 2.3  1/4/21 CEA 2.2  5/3/21 CEA 6.2  6/15/21 CEA 8.3  7/13/21 CEA 10.7  7/27/21 CEA 9.6  8/12/21/21 CEA 8.2  8/25/2021-CEA 8.9  9/22/2021 CEA 8.1    Objective   BP 99/60   Pulse 82   Temp 96.8 °F (36 °C)   Resp 16   Ht 5' 7\" (1.702 m)   Wt 140 lb (63.5 kg)   SpO2 96%   BMI 21.93 kg/m²     PHYSICAL EXAM:  CONSTITUTIONAL: Alert, appropriate,ill-appearing, looks stronger today  EYES: Non icteric, EOM intact, pupils equal round   ENT: Mucus membranes moist,external inspection of ears and nose are normal  NECK: Supple, no masses.  No palpable thyroid mass  CHEST/LUNGS: CTA bilaterally, normal respiratory effort CARDIOVASCULAR: RRR, no murmurs.  No lower extremity edema  ABDOMEN: mild tender - no guarding or rebound, post op FARHAT drain, ileostomy w-ith stool  EXTREMITIES: warm, full ROM in all 4 extremities, no focal weakness. SKIN: warm, dry with no rashes or lesions  LYMPH: No cervical, clavicular, axillary, or inguinal lymphadenopathy  NEUROLOGIC: follows commands, non focal   PSYCH: mood and affect appropriate. Alert and oriented to time, place, person    Recent Labs     02/23/22  0500 02/22/22  0431 02/21/22  1719   WBC 3.4* 3.3* 4.4*   HGB 8.4* 9.6* 11.7*   HCT 26.3* 30.0* 36.0*   MCV 91.0 91.2 90.0    308 450*       Lab Results   Component Value Date     (L) 02/23/2022    K 3.4 (L) 02/23/2022    CL 84 (L) 02/23/2022    CO2 26 02/23/2022    BUN 78 (H) 02/23/2022    CREATININE 3.9 (H) 02/23/2022    GLUCOSE 100 02/23/2022    CALCIUM 7.7 (L) 02/23/2022    PROT 8.3 02/21/2022    LABALBU 3.4 (L) 02/21/2022    BILITOT 0.5 02/21/2022    ALKPHOS 296 (H) 02/21/2022    AST 26 02/21/2022    ALT 26 02/21/2022    LABGLOM 16 (A) 02/23/2022    GFRAA 19 (L) 02/23/2022    AGRATIO 1.5 06/15/2021    GLOB 3.8 02/07/2022       Lab Results   Component Value Date    INR 1.0 06/18/2021    PROTIME 12.1 06/18/2021       30 Day lookback of cultures:    Blood Culture Recent:   Recent Labs     02/21/22  1739   BC No Growth to date. Any change in status will be called. Gram Stain Recent: No results for input(s): LABGRAM in the last 720 hours. Resp Culture Recent: No results for input(s): CULTRESP in the last 720 hours. Body Fluid Recent : No results for input(s): BFCX in the last 720 hours. MRSA Recent : No results for input(s): [facility] in the last 720 hours. Urine Culture Recent : No results for input(s): LABURIN in the last 720 hours. Organism Recent : No results for input(s): ORG in the last 720 hours. Narrative   CT ABDOMEN PELVIS WO CONTRAST    2/21/2022 7:53 PM   History: Postop pain. Hypotension.    Noncontrast abdomen/pelvis CT. Comparison is made with February 8, 2022. In order to have a CT radiation dose as low as reasonably achievable   Automated Exposure Control was utilized for adjustment of the mA   and/or KV according to patient size. DLP in mGycm= 1115. Interval abdominal surgery. Normal heart size. Patchy atelectasis at the lung bases. Normal noncontrast appearance of the liver, pancreas, and spleen. Normal and symmetric kidneys. No hydronephrosis. No bowel dilation. No abscess or hematoma.       Impression   1. Postoperative changes with no sign of bowel obstruction, abscess,   or hematoma. Signed by Dr Daniel Mike:  #Colon cancer  -Status post neoadjuvant chemo followed by debulking surgery/HIPEC) at [facility] January 2022  -Status post exploratory laparotomy 2/10/2022 for correction of internal hernia at [facility] [phone]  -Patient has ileostomy and abdominal drain  -Patient is currently not on chemotherapy.   Last chemotherapy November 2021  -CT A/P Wo contrast 2/21/2022: Pneumoperitoneum, postoperative changes    Dr. Zoltan Maza following     #Acute kidney injury-likely prerenal secondary to high ileostomy output  -Patient was receiving IV fluids at home.  -He received IV fluid resuscitation in the emergency  -CT A/P without contrast: No hydronephrosis     (baseline creatinine 1.2-1.4)    Dr. Melissa Nelson following     cc/hr    #Normocytic hypochromic anemia         Component hemodilution    Serology requested  Iron panel  Ferritin  B12  Folate  LDH  Retic  Hapto    #DVT prophylaxis-continue subcutaneous heparin UFH 5000 units every 8 hours     PLAN:  Continue aggressive IV fluid resuscitation  Continue to monitor electrolytes and creatinine  Monitor CBC  Anemia Serology  Dr. Shantell Mcgrath discussed with surgery at [place]/general surgery here        Cecilio Linder PA-C    02/23/22  6:54 AM

## 2022-02-24 LAB
ALBUMIN SERPL-MCNC: 2.8 G/DL (ref 3.5–5.2)
ALP BLD-CCNC: 218 U/L (ref 40–130)
ALT SERPL-CCNC: 24 U/L (ref 5–41)
ANION GAP SERPL CALCULATED.3IONS-SCNC: 17 MMOL/L (ref 7–19)
APTT: 125 SEC (ref 26–36.2)
APTT: 29.2 SEC (ref 26–36.2)
AST SERPL-CCNC: 32 U/L (ref 5–40)
BASOPHILS ABSOLUTE: 0 K/UL (ref 0–0.2)
BASOPHILS RELATIVE PERCENT: 0.2 % (ref 0–1)
BILIRUB SERPL-MCNC: 0.5 MG/DL (ref 0.2–1.2)
BUN BLDV-MCNC: 65 MG/DL (ref 8–23)
CALCIUM SERPL-MCNC: 8.5 MG/DL (ref 8.8–10.2)
CHLORIDE BLD-SCNC: 88 MMOL/L (ref 98–111)
CO2: 28 MMOL/L (ref 22–29)
CREAT SERPL-MCNC: 3.5 MG/DL (ref 0.5–1.2)
EOSINOPHILS ABSOLUTE: 0.1 K/UL (ref 0–0.6)
EOSINOPHILS RELATIVE PERCENT: 1.9 % (ref 0–5)
FERRITIN: 536.1 NG/ML (ref 30–400)
GFR AFRICAN AMERICAN: 21
GFR NON-AFRICAN AMERICAN: 18
GLUCOSE BLD-MCNC: 109 MG/DL (ref 74–109)
HCT VFR BLD CALC: 29.5 % (ref 42–52)
HEMOGLOBIN: 9.3 G/DL (ref 14–18)
IMMATURE GRANULOCYTES #: 0 K/UL
IRON SATURATION: 7 % (ref 14–50)
IRON: 16 UG/DL (ref 59–158)
LYMPHOCYTES ABSOLUTE: 0.5 K/UL (ref 1.1–4.5)
LYMPHOCYTES RELATIVE PERCENT: 12.6 % (ref 20–40)
MAGNESIUM: 2 MG/DL (ref 1.6–2.4)
MCH RBC QN AUTO: 28.7 PG (ref 27–31)
MCHC RBC AUTO-ENTMCNC: 31.5 G/DL (ref 33–37)
MCV RBC AUTO: 91 FL (ref 80–94)
MONOCYTES ABSOLUTE: 0.5 K/UL (ref 0–0.9)
MONOCYTES RELATIVE PERCENT: 10.7 % (ref 0–10)
NEUTROPHILS ABSOLUTE: 3.1 K/UL (ref 1.5–7.5)
NEUTROPHILS RELATIVE PERCENT: 74.4 % (ref 50–65)
PARATHYROID HORMONE INTACT: 303 PG/ML (ref 15–65)
PDW BLD-RTO: 14.6 % (ref 11.5–14.5)
PHOSPHORUS: 5.1 MG/DL (ref 2.5–4.5)
PLATELET # BLD: 414 K/UL (ref 130–400)
PMV BLD AUTO: 10 FL (ref 9.4–12.4)
POTASSIUM REFLEX MAGNESIUM: 3.4 MMOL/L (ref 3.5–5)
RBC # BLD: 3.24 M/UL (ref 4.7–6.1)
SODIUM BLD-SCNC: 133 MMOL/L (ref 136–145)
TOTAL IRON BINDING CAPACITY: 218 UG/DL (ref 250–400)
TOTAL PROTEIN: 6.6 G/DL (ref 6.6–8.7)
VITAMIN D 25-HYDROXY: 21 NG/ML
WBC # BLD: 4.2 K/UL (ref 4.8–10.8)

## 2022-02-24 PROCEDURE — 6370000000 HC RX 637 (ALT 250 FOR IP): Performed by: INTERNAL MEDICINE

## 2022-02-24 PROCEDURE — 6360000002 HC RX W HCPCS: Performed by: INTERNAL MEDICINE

## 2022-02-24 PROCEDURE — 83970 ASSAY OF PARATHORMONE: CPT

## 2022-02-24 PROCEDURE — 99232 SBSQ HOSP IP/OBS MODERATE 35: CPT | Performed by: PHYSICIAN ASSISTANT

## 2022-02-24 PROCEDURE — 80053 COMPREHEN METABOLIC PANEL: CPT

## 2022-02-24 PROCEDURE — 83550 IRON BINDING TEST: CPT

## 2022-02-24 PROCEDURE — 2580000003 HC RX 258: Performed by: INTERNAL MEDICINE

## 2022-02-24 PROCEDURE — 82728 ASSAY OF FERRITIN: CPT

## 2022-02-24 PROCEDURE — 85730 THROMBOPLASTIN TIME PARTIAL: CPT

## 2022-02-24 PROCEDURE — 99222 1ST HOSP IP/OBS MODERATE 55: CPT | Performed by: NURSE PRACTITIONER

## 2022-02-24 PROCEDURE — 6360000002 HC RX W HCPCS: Performed by: PHYSICIAN ASSISTANT

## 2022-02-24 PROCEDURE — 2580000003 HC RX 258

## 2022-02-24 PROCEDURE — 83540 ASSAY OF IRON: CPT

## 2022-02-24 PROCEDURE — 82306 VITAMIN D 25 HYDROXY: CPT

## 2022-02-24 PROCEDURE — 84100 ASSAY OF PHOSPHORUS: CPT

## 2022-02-24 PROCEDURE — 99232 SBSQ HOSP IP/OBS MODERATE 35: CPT | Performed by: SURGERY

## 2022-02-24 PROCEDURE — 99233 SBSQ HOSP IP/OBS HIGH 50: CPT | Performed by: INTERNAL MEDICINE

## 2022-02-24 PROCEDURE — 85025 COMPLETE CBC W/AUTO DIFF WBC: CPT

## 2022-02-24 PROCEDURE — 6370000000 HC RX 637 (ALT 250 FOR IP)

## 2022-02-24 PROCEDURE — 6360000002 HC RX W HCPCS

## 2022-02-24 PROCEDURE — 1210000000 HC MED SURG R&B

## 2022-02-24 PROCEDURE — 83735 ASSAY OF MAGNESIUM: CPT

## 2022-02-24 RX ORDER — HEPARIN SODIUM 10000 [USP'U]/100ML
5-30 INJECTION, SOLUTION INTRAVENOUS CONTINUOUS
Status: DISCONTINUED | OUTPATIENT
Start: 2022-02-24 | End: 2022-02-28

## 2022-02-24 RX ORDER — HEPARIN SODIUM 1000 [USP'U]/ML
40 INJECTION, SOLUTION INTRAVENOUS; SUBCUTANEOUS PRN
Status: DISCONTINUED | OUTPATIENT
Start: 2022-02-24 | End: 2022-02-28 | Stop reason: ALTCHOICE

## 2022-02-24 RX ORDER — HEPARIN SODIUM 1000 [USP'U]/ML
80 INJECTION, SOLUTION INTRAVENOUS; SUBCUTANEOUS PRN
Status: DISCONTINUED | OUTPATIENT
Start: 2022-02-24 | End: 2022-02-28 | Stop reason: ALTCHOICE

## 2022-02-24 RX ORDER — DRONABINOL 2.5 MG/1
2.5 CAPSULE ORAL 2 TIMES DAILY
Status: DISCONTINUED | OUTPATIENT
Start: 2022-02-24 | End: 2022-03-01 | Stop reason: HOSPADM

## 2022-02-24 RX ADMIN — POTASSIUM CHLORIDE 40 MEQ: 750 TABLET, EXTENDED RELEASE ORAL at 05:11

## 2022-02-24 RX ADMIN — DRONABINOL 2.5 MG: 2.5 CAPSULE ORAL at 08:16

## 2022-02-24 RX ADMIN — POTASSIUM CHLORIDE: 2 INJECTION, SOLUTION, CONCENTRATE INTRAVENOUS at 21:11

## 2022-02-24 RX ADMIN — ONDANSETRON 4 MG: 2 INJECTION INTRAMUSCULAR; INTRAVENOUS at 08:09

## 2022-02-24 RX ADMIN — DRONABINOL 2.5 MG: 2.5 CAPSULE ORAL at 20:25

## 2022-02-24 RX ADMIN — HYDROMORPHONE HYDROCHLORIDE 2 MG: 2 TABLET ORAL at 17:29

## 2022-02-24 RX ADMIN — HEPARIN SODIUM AND DEXTROSE 18 UNITS/KG/HR: 10000; 5 INJECTION INTRAVENOUS at 12:53

## 2022-02-24 RX ADMIN — PANTOPRAZOLE SODIUM 40 MG: 40 TABLET, DELAYED RELEASE ORAL at 05:11

## 2022-02-24 RX ADMIN — HEPARIN SODIUM 5170 UNITS: 1000 INJECTION INTRAVENOUS; SUBCUTANEOUS at 12:48

## 2022-02-24 RX ADMIN — SERTRALINE HYDROCHLORIDE 25 MG: 50 TABLET ORAL at 08:09

## 2022-02-24 RX ADMIN — POTASSIUM CHLORIDE: 2 INJECTION, SOLUTION, CONCENTRATE INTRAVENOUS at 12:32

## 2022-02-24 RX ADMIN — Medication 2000 UNITS: at 08:09

## 2022-02-24 RX ADMIN — SODIUM CHLORIDE, PRESERVATIVE FREE 10 ML: 5 INJECTION INTRAVENOUS at 08:10

## 2022-02-24 ASSESSMENT — PAIN SCALES - GENERAL: PAINLEVEL_OUTOF10: 8

## 2022-02-24 NOTE — PROGRESS NOTES
Comprehensive Nutrition Assessment    Type and Reason for Visit:  Reassess    Nutrition Recommendations/Plan: continue current POC    Nutrition Assessment:  PO intake remains decreased for most meals. Did very well for breakfast this morning. Stopped Ensure per pt request.  Started Magic cup and pt does enjoy. Weight has increased    Malnutrition Assessment:  Malnutrition Status: Moderate malnutrition    Context:  Acute Illness     Findings of the 6 clinical characteristics of malnutrition:  Energy Intake:  7 - 50% or less of estimated energy requirements for 5 or more days  Weight Loss:  7 - Greater than 2% over 1 week     Body Fat Loss:  1 - Mild body fat loss Orbital,Buccal region   Muscle Mass Loss:  1 - Mild muscle mass loss Hand (interosseous),Clavicles (pectoralis & deltoids)  Fluid Accumulation:  No significant fluid accumulation Extremities   Strength:   not assessed    Estimated Daily Nutrient Needs:  Energy (kcal):  5825-3457 kcals (25-30 kcals/kg); Weight Used for Energy Requirements:  Current     Protein (g):  70-140g; Weight Used for Protein Requirements:  Current        Fluid (ml/day):  5551-9879+; Method Used for Fluid Requirements:  1 ml/kcal      Nutrition Related Findings:  ileostomy      Wounds:  Surgical Incision       Current Nutrition Therapies:    ADULT ORAL NUTRITION SUPPLEMENT; Lunch, Dinner; Frozen Oral Supplement  ADULT DIET; Regular; LIKES SUGAR FREE VANILLA PUDDING CUPS    Anthropometric Measures:  · Height: 5' 7\" (170.2 cm)  · Current Body Weight: 142 lb 8 oz (64.6 kg)   · Admission Body Weight: 155 lb (70.3 kg) (stated)    · Usual Body Weight: 154 lb 8.7 oz (70.1 kg) (2/9/2022)     · Ideal Body Weight: 148 lbs; % Ideal Body Weight 96.3 %   · BMI: 22.3  · Adjusted Body Weight:  ; No Adjustment     · BMI Categories: Normal Weight (BMI 18.5-24. 9)       Nutrition Diagnosis:   · Inadequate oral intake,Altered GI function,Unintended weight loss related to acute injury/trauma,increase demand for energy/nutrients,catabolic illness as evidenced by wounds,weight loss greater than or equal to 2% in 1 week,moderate loss of subcutaneous fat      Nutrition Interventions:   Food and/or Nutrient Delivery:  Continue Current Diet,Continue Oral Nutrition Supplement  Nutrition Education/Counseling:  No recommendation at this time   Coordination of Nutrition Care:  Continue to monitor while inpatient    Goals:  po intake 50% or greater. Weight stable or increase 1-5# per week       Nutrition Monitoring and Evaluation:   Behavioral-Environmental Outcomes:  None Identified   Food/Nutrient Intake Outcomes:  Food and Nutrient Intake,Supplement Intake  Physical Signs/Symptoms Outcomes:  Biochemical Data,Weight,Skin,Nutrition Focused Physical Findings,Fluid Status or Edema     Discharge Planning:     Too soon to determine     Electronically signed by Eric Valadez, MS, RD, LD on 2/24/22 at 2:59 PM CST    Contact: [phone]

## 2022-02-24 NOTE — CONSULTS
[facility]    CONSULT    Patient:  Sol Rojas  YOB: 1955  Date of Service: 2/24/2022  MRN: [id number]   Acct: [de-identified]   Primary Care Physician: KALPESH Stewart NP    Reason for consult: LUE DVT with SVT    Requesting Physician: Dr. Travis Hernandez    History Obtained From: Patient, Spouse    HISTORY OF PRESENT ILLNESS:  Mr. Sol Rojas is a 61 y.o. male with history of recurrent colon cancer, recently undergoing debulking surgery and HIPEC at [facility] in January 2022. Required reoperation for repair of internal hernia on 02/10/2022. Hospital course complicated with dehydration and MARIO. He was discharged home with IV fluids according to ileostomy output. Routine lab studies revealed worsening renal function of 4.6, therefore, he was directed to go to the emergency room for further evaluation and management. Patient was admitted to the hospitalist service with nephrology and oncology consultation. On 02/22/2022, patient and spouse noted swelling of LUE. Venous duplex demonstrated DVT in left jugular, subclavian, axillary, and brachial veins and  + SVT in left proximal cephalic vein, median cubital vein. Vascular surgery consulted for recommendations. Patient transitioned to heparin drip per oncology 2' to MARIO. Past Medical History:       Diagnosis Date    Acid reflux     Cancer ([address]     adenocarcinoma of colon    GERD (gastroesophageal reflux disease)     PTSD (post-traumatic stress disorder)     Stage 3a chronic kidney disease (HCC)        Past Surgical History:        Procedure Laterality Date    ABLATION OF DYSRHYTHMIC FOCUS      ablation of liver at [facility] [address]  2003    CHOLECYSTECTOMY  2013    COLONOSCOPY      when pt was in the 19's    COLONOSCOPY N/A 03/11/2020    COLONOSCOPY POLYPECTOMY SNARE/COLD BIOPSY: Dr. Jacob Langley colonoscopy: 6-12 months due to findings at colonoscopy today with Cecal mass lesion and large polyps.     COLONOSCOPY      COLONOSCOPY N/A 03/17/2021    Dr Torres Spence, Post-operative changes w IC anastomosis & single visible staple, Mild Diverticuosis, Int Hemorrhoids Grade 1, 3 year recall    HEMICOLECTOMY Right 03/30/2020    LAPAROSCOPIC-ASSISTED RIGHT HEMICOLECTOMY performed by Fany Morales MD at [address] N/A 06/03/2020    INSERTION OF VENOUS PORT with flouro performed by Fany Morales MD at [address] ENDOSCOPY N/A 03/11/2020    Dr. Jo Horn:   Krista Brandon       Allergies:  Morphine and Oxycodone-acetaminophen    Medications Current:   Current Facility-Administered Medications   Medication Dose Route Frequency Provider Last Rate Last Admin    heparin 25,000 units in dextrose 5% 250 mL (premix) infusion  5-30 Units/kg/hr IntraVENous Continuous Peter Champagne PA-C        dronabinol (MARINOL) capsule 2.5 mg  2.5 mg Oral BID Nadine Hammer MD   2.5 mg at 02/24/22 0816    heparin (porcine) injection 5,170 Units  80 Units/kg IntraVENous PRN Peter Champagne PA-C        heparin (porcine) injection 2,580 Units  40 Units/kg IntraVENous PRN Peter Champagne PA-C        potassium chloride (KLOR-CON M) extended release tablet 40 mEq  40 mEq Oral PRN Lisy Valle MD   40 mEq at 02/24/22 0860    Or    potassium bicarb-citric acid (EFFER-K) effervescent tablet 40 mEq  40 mEq Oral PRN Lisy Valle MD        Or    potassium chloride 10 mEq/100 mL IVPB (Peripheral Line)  10 mEq IntraVENous PRN Lisy Valle MD        methocarbamol (ROBAXIN) tablet 500 mg  500 mg Oral TID PRN Lisy Valle MD        HYDROmorphone (DILAUDID) tablet 2 mg  2 mg Oral Q6H PRN Lisy Valle MD   2 mg at 02/23/22 2118    lactated ringers infusion   IntraVENous Continuous Kalee Crews  mL/hr at 02/23/22 0133 New Bag at 02/23/22 0133    sodium chloride flush 0.9 % injection 5-40 mL  5-40 mL IntraVENous 2 times per day KALPESH Daley - CNP   10 mL at 02/24/22 0810    sodium chloride flush 0.9 % injection 5-40 mL  5-40 mL IntraVENous PRN Kaia Tye, APRN - CNP        0.9 % sodium chloride infusion  25 mL IntraVENous PRN Kaia Tye, APRN - CNP        ondansetron (ZOFRAN-ODT) disintegrating tablet 4 mg  4 mg Oral Q8H PRN Prosper Tye, APRN - CNP        Or    [facility]etron [facility] injection 4 mg  4 mg IntraVENous Q6H PRN Kaia Tye, APRN - CNP   4 mg at 02/24/22 0809    acetaminophen (TYLENOL) tablet 650 mg  650 mg Oral Q6H PRN Kaia Tye, APRN - CNP   650 mg at 02/23/22 4640    Or    acetaminophen (TYLENOL) suppository 650 mg  650 mg Rectal Q6H PRN Prosper Tye, APRN - CNP        Vitamin D (CHOLECALCIFEROL) tablet 2,000 Units  2,000 Units Oral Daily Kaia Tye, APRN - CNP   2,000 Units at 02/24/22 0809    pantoprazole (PROTONIX) tablet 40 mg  40 mg Oral QAM AC Kaia Tye, APRN - CNP   40 mg at 02/24/22 0511    [Held by provider] prazosin (MINIPRESS) capsule 1 mg  1 mg Oral Nightly Kaia Tye, APRN - CNP        sertraline (ZOLOFT) tablet 25 mg  25 mg Oral Daily Kaia Tye, APRN - CNP   25 mg at 02/24/22 4565    loperamide (IMODIUM) capsule 2 mg  2 mg Oral BID PRN Kaia Tye, APRN - CNP           Social History:  Reports that he has never smoked. He has never used smokeless tobacco. He reports current alcohol use. He reports that he does not use drugs. Family History:      Problem Relation Age of Onset    Liver Cancer Mother     High Blood Pressure Mother     Colon Cancer Mother         x2    Cancer Father         Lung Cancer    Breast Cancer Sister     Cancer Maternal Grandfather         Lung Cancer    Cancer Paternal Grandfather         Stomach Cancer    Colon Polyps Neg Hx     Cystic Fibrosis Neg Hx     Liver Disease Neg Hx     Rectal Cancer Neg Hx        REVIEW OF SYSTEMS:  General: Denies any fever or chills. Denies any unexplained weight loss or gain. Denies any change in activity or endurance. Weakness. HEENT: Denies any headaches or visual changes. Respiratory: Denies any cough or hoarseness. Denies shortness of breath. Cardiac: Denies any chest pain or pressure. Denies any palpitations. Denies any presyncope or syncope. Denies any orthopnea or PND. Denies any lower extremity edema. GI: Recent debulking surgery and HIPEC at [facility] in January 2022. Has right ileostomy. : Denies any hematuria, frequency, hesitancy, or dysuria. Musculoskeletal: Denies any pain or swelling in his joints. LUE bulking. Neurological: Denies any paraesthesias. Denies any history of seizure or stroke symptoms. Psychological: Denies any problems with anxiety or depression. All other systems are negative except where stated above. PHYSICAL EXAM:  /61   Pulse 85   Temp 97 °F (36.1 °C) (Temporal)   Resp 16   Ht 5' 7\" (1.702 m)   Wt 142 lb 8 oz (64.6 kg)   SpO2 94%   BMI 22.32 kg/m²   General appearance: Demonstrates an ill-appearing male who is alert and oriented in no acute distress. HEENT: Normocephalic. Atraumatic. JJ. NECK: Supple. NO JVD. No carotids bruits auscultated. Chest: Clear to auscultation bilaterally without wheezes or rhonchi. Left subclavian port. Cardiac: Normal heart tones with regular rate and rhythm, S1, S2 normal. No murmurs, gallops, or rubs auscultated. Abdomen: Soft, mild tenderness; non-distended. Right ileostomy with stool. Left side FARHAT drain. Abdominal incision with staples intact. Extremities: No clubbing or cyanosis. No lower extremity peripheral edema. Peripheral pulses palpable. LUE with 2+ edema. Palpable radial pulse. Skin: Skin color, texture, turgor normal. No rashes or lesions. Neurologic: Grossly intact. LABS AND DIAGNOSTICS:    VL Venous Duplex, LUE:  Vascular lab preliminary.   Left arm venous duplex scan completed.  + DVT in left jugular, subclavian, axillary, and brachial veins.  + SVT in left proximal cephalic vein, median cubital vein.  Final report pending. CBC with Differential:   Lab Results   Component Value Date    WBC 4.2 02/24/2022    RBC 3.24 02/24/2022    HGB 9.3 02/24/2022    HCT 29.5 02/24/2022     02/24/2022    MCV 91.0 02/24/2022    LYMPHOPCT 12.6 02/24/2022     BMP:   Lab Results   Component Value Date     02/24/2022    K 3.4 02/24/2022    CL 88 02/24/2022    CO2 28 02/24/2022    BUN 65 02/24/2022    CREATININE 3.5 02/24/2022    CALCIUM 8.5 02/24/2022    GFRAA 21 02/24/2022    LABGLOM 18 02/24/2022    GLUCOSE 109 02/24/2022       ASSESSMENT:    1. MARIO 2' to Dehydration - Improving with IVF. Nephrology Following  2. Colon Cancer - S/P Neoadjuvant Chemo Followed by Debulking with HIPEC in January 2022  3. DVT with SVTTAWANA   4. GERD      PLAN:    1. Agree with Anticoagulation   2. Recommend Accessing Left Subclavian Port - Check for Blood Return. If not functioning will proceed with port study.        KALPESH Alejandro

## 2022-02-24 NOTE — PROGRESS NOTES
Nephrology ([address]) Progress Note      Patient:  Twin Machuca  YOB: 1955  Date of Service: 2/24/2022  MRN: [id number]   Acct: [de-identified]   Primary Care Physician: KALPESH Tsai NP  Advance Directive: Full Code  Admit Date: 2/21/2022       Hospital Day: 3  Referring Provider: Radha Damon MD    Patient independently seen and examined, Chart, Consults, Notes, Operative notes, Labs, Cardiology, and Radiology studies reviewed as available. Chief complaint: Profound weakness    Subjective:  Twin Machuca is a 61 y.o. male for whom we were consulted for evaluation and treatment of acute kidney injury. Patient denies any history of chronic kidney disease. Patient has history of stage IV colon cancer with metastatic disease to liver. Patient was recently hospitalized at [facility] with bowel obstruction, needing emergent laparotomy and ileostomy. Previously underwent partial colectomy and partial hepatectomy for the treatment of metastatic colon cancer. Patient denies any nausea and vomiting. He has noticed high output from ileostomy and has to change the bag frequently. Patient was supposed to get total parenteral nutrition but was not approved by [facility]. Patient has been trying to eat more but unable to eat due to recent abdominal surgery. His fluid intake was also very low. In the emergency room his serum creatinine was 4.6 mg, phosphorus was 11.1 mg. Patient also has hyponatremia. He is now admitted for acute kidney injury and other severe electrolyte abnormalities. He is currently getting IV fluid and feeling little better. This morning patient feels well. He has slow improvement of renal function. Last night no IV fluid was given to the patient.   Patient and his wife is very upset about lack of continuation of fluid which is desperately needed to improve his renal function    Allergies:  Morphine and Oxycodone-acetaminophen    Medicines:  Current Facility-Administered Medications   Medication Dose Route Frequency Provider Last Rate Last Admin    heparin 25,000 units in dextrose 5% 250 mL (premix) infusion  5-30 Units/kg/hr IntraVENous Continuous Dev Moraes PA-C        dronabinol (MARINOL) capsule 2.5 mg  2.5 mg Oral BID Toya Painting MD   2.5 mg at 02/24/22 0816    heparin (porcine) injection 5,170 Units  80 Units/kg IntraVENous PRN Dev Moraes PA-C        heparin (porcine) injection 2,580 Units  40 Units/kg IntraVENous PRN Dev Moraes PA-C        potassium chloride (KLOR-CON M) extended release tablet 40 mEq  40 mEq Oral PRN Daniel Fox MD   40 mEq at 02/24/22 8252    Or    potassium bicarb-citric acid (EFFER-K) effervescent tablet 40 mEq  40 mEq Oral PRN Daniel Fox MD        Or   Vallie Roller potassium chloride 10 mEq/100 mL IVPB (Peripheral Line)  10 mEq IntraVENous PRN Daniel Fox MD        methocarbamol (ROBAXIN) tablet 500 mg  500 mg Oral TID PRN Daniel Fox MD        HYDROmorphone (DILAUDID) tablet 2 mg  2 mg Oral Q6H PRN Daniel Fox MD   2 mg at 02/23/22 2118    lactated ringers infusion   IntraVENous Continuous Mayco Saravia  mL/hr at 02/23/22 0133 New Bag at 02/23/22 0133    sodium chloride flush 0.9 % injection 5-40 mL  5-40 mL IntraVENous 2 times per day Tereasa Early, APRN - CNP   10 mL at 02/24/22 0810    sodium chloride flush 0.9 % injection 5-40 mL  5-40 mL IntraVENous PRN Tereasa Early, APRN - CNP        0.9 % sodium chloride infusion  25 mL IntraVENous PRN Tereasa Early, APRN - CNP        ondansetron (ZOFRAN-ODT) disintegrating tablet 4 mg  4 mg Oral Q8H PRN Tereasa Early, APRN - CNP        Or    ondansetron [facility]) injection 4 mg  4 mg IntraVENous Q6H PRN Tereasa Early, APRN - CNP   4 mg at 02/24/22 0809    acetaminophen (TYLENOL) tablet 650 mg  650 mg Oral Q6H PRN Tereasa Early, APRN - CNP   650 mg at 02/23/22 1702 Or    acetaminophen (TYLENOL) suppository 650 mg  650 mg Rectal Q6H PRN Ramon Espana APRN - CNP        Vitamin D (CHOLECALCIFEROL) tablet 2,000 Units  2,000 Units Oral Daily Ramonwayne Espana, APRN - CNP   2,000 Units at 02/24/22 0809    pantoprazole (PROTONIX) tablet 40 mg  40 mg Oral QAM AC Ramon Espana APRN - CNP   40 mg at 02/24/22 0511    [Held by provider] prazosin (MINIPRESS) capsule 1 mg  1 mg Oral Nightly Ramon Espana APRN - CNP        sertraline (ZOLOFT) tablet 25 mg  25 mg Oral Daily Ramon Jovi, APRN - CNP   25 mg at 02/24/22 7500    loperamide (IMODIUM) capsule 2 mg  2 mg Oral BID PRN Ramon Espana APRN - CNP           Past Medical History:  Past Medical History:   Diagnosis Date    Acid reflux     [address] [address]     adenocarcinoma of colon    GERD (gastroesophageal reflux disease)     PTSD (post-traumatic stress disorder)     Stage 3a chronic kidney disease ([facility] [address]        Past Surgical History:  Past Surgical History:   Procedure Laterality Date    ABLATION OF DYSRHYTHMIC FOCUS      ablation of liver at [facility] APPENDECTOMY  2003    CHOLECYSTECTOMY  2013    COLONOSCOPY      when pt was in the 19's    COLONOSCOPY N/A 03/11/2020    COLONOSCOPY POLYPECTOMY SNARE/COLD BIOPSY: Dr. Alves Born colonoscopy: 6-12 months due to findings at colonoscopy today with Cecal mass lesion and large polyps.     COLONOSCOPY      COLONOSCOPY N/A 03/17/2021    Dr Dinh Burnham, Post-operative changes w IC anastomosis & single visible staple, Mild Diverticuosis, Int Hemorrhoids Grade 1, 3 year recall    HEMICOLECTOMY Right 03/30/2020    LAPAROSCOPIC-ASSISTED RIGHT HEMICOLECTOMY performed by John Miranda MD at [address] N/A 06/03/2020    INSERTION OF VENOUS PORT with flouro performed by John Miranda MD at [place] [address] ENDOSCOPY N/A 03/11/2020    Dr. Yohannes Silver:   [facility]       Family History  Family History   Problem Relation Age of Onset    Liver Cancer Mother     High Blood Pressure Mother     Colon Cancer Mother         x2    Cancer Father         Lung Cancer    Breast Cancer Sister     Cancer Maternal Grandfather         Lung Cancer    Cancer Paternal Grandfather         Stomach Cancer    Colon Polyps Neg Hx     Cystic Fibrosis Neg Hx     Liver Disease Neg Hx     Rectal Cancer Neg Hx        Social History  Social History     Socioeconomic History    Marital status:      Spouse name: Not on file    Number of children: Not on file    Years of education: Not on file    Highest education level: Not on file   Occupational History    Not on file   Tobacco Use    Smoking status: Never Smoker    Smokeless tobacco: Never Used   Vaping Use    Vaping Use: Never used   Substance and Sexual Activity    Alcohol use: Yes     Comment: rare     Drug use: No    Sexual activity: Yes     Partners: Female   Other Topics Concern    Not on file   Social History Narrative    Not on file     Social Determinants of Health     Financial Resource Strain:     Difficulty of Paying Living Expenses: Not on file   Food Insecurity:     Worried About Running Out of Food in the Last Year: Not on file    [place] of Food in the Last Year: Not on file   Transportation Needs:     Lack of Transportation (Medical): Not on file    Lack of Transportation (Non-Medical):  Not on file   Physical Activity:     Days of Exercise per Week: Not on file    Minutes of Exercise per Session: Not on file   Stress:     Feeling of Stress : Not on file   Social Connections:     Frequency of Communication with Friends and Family: Not on file    Frequency of Social Gatherings with Friends and Family: Not on file    Attends Mosque Services: Not on file    Active Member of Clubs or Organizations: Not on file    Attends Club or Organization Meetings: Not on file    Marital Status: Not on file   Intimate Partner Violence:     Fear of Current or Ex-Partner: Not on file   Arely Naranjo Emotionally Abused: Not on file    Physically Abused: Not on file    Sexually Abused: Not on file   Housing Stability:     Unable to Pay for Housing in the Last Year: Not on file    Number of Places Lived in the Last Year: Not on file    Unstable Housing in the Last Year: Not on file         Review of Systems:  History obtained from chart review and the patient  General ROS: No fever or chills  Respiratory ROS: No cough, shortness of breath, wheezing  Cardiovascular ROS: No chest pain or palpitations  Gastrointestinal ROS: positive for - diarrhea/large output from last  Genito-Urinary ROS: No dysuria or hematuria  Musculoskeletal ROS: No joint pain or swelling   14 point ROS reviewed with the patient and negative except as noted above and in the HPI unless unable to obtain. Objective:  Patient Vitals for the past 24 hrs:   BP Temp Temp src Pulse Resp SpO2 Weight   02/24/22 0515 -- -- -- -- -- -- 142 lb 8 oz (64.6 kg)   02/24/22 0514 100/61 97 °F (36.1 °C) Temporal 85 16 94 % --   02/23/22 2359 106/67 97.5 °F (36.4 °C) Temporal 92 16 95 % --   02/23/22 1739 109/61 98.5 °F (36.9 °C) Temporal 82 16 95 % --   02/23/22 1119 103/61 98.5 °F (36.9 °C) Temporal 80 16 96 % --       Intake/Output Summary (Last 24 hours) at 2/24/2022 1056  Last data filed at 2/24/2022 0825  Gross per 24 hour   Intake 7139 ml   Output 2930 ml   Net 4209 ml     General: awake/alert   HEENT: Normocephalic atraumatic head  Neck: Supple with no JVD or carotid bruits. Chest:  clear to auscultation bilaterally  CVS: regular rate and rhythm  Abdominal: Midline abdominal wound, looks fresh with staples/right lower quadrant ileostomy.   Extremities: no cyanosis or edema  Skin: warm and dry without rash      Labs:  BMP:   Recent Labs     02/22/22  0431 02/23/22  0500 02/24/22  0401   * 126* 133*   K 4.7 3.4* 3.4*   CL 82* 84* 88*   CO2 28 26 28   PHOS 8.8* 5.6* 5.1*   BUN 91* 78* 65*   CREATININE 4.6* 3.9* 3.5*   CALCIUM 7.9* 7.7* 8.5* CBC:   Recent Labs     02/22/22  0431 02/22/22  0431 02/23/22  0500 02/23/22  1638 02/24/22  0401   WBC 3.3*  --  3.4*  --  4.2*   HGB 9.6*  --  8.4*  --  9.3*   HCT 30.0*   < > 26.3* 26.2* 29.5*   MCV 91.2  --  91.0  --  91.0     --  293  --  414*    < > = values in this interval not displayed. LIVER PROFILE:   Recent Labs     02/21/22  1719 02/24/22  0401   AST 26 32   ALT 26 24   LIPASE 91*  --    BILITOT 0.5 0.5   ALKPHOS 296* 218*     PT/INR: No results for input(s): PROTIME, INR in the last 72 hours. APTT:   Recent Labs     02/24/22  0922   APTT 29.2     BNP:  No results for input(s): BNP in the last 72 hours. Ionized Calcium:No results for input(s): IONCA in the last 72 hours. Magnesium:  Recent Labs     02/21/22 1719 02/23/22  0500 02/24/22  0401   MG 3.0* 2.1 2.0     Phosphorus:  Recent Labs     02/22/22  0431 02/23/22  0500 02/24/22  0401   PHOS 8.8* 5.6* 5.1*     HgbA1C: No results for input(s): LABA1C in the last 72 hours. Hepatic:   Recent Labs     02/21/22 1719 02/24/22  0401   ALKPHOS 296* 218*   ALT 26 24   AST 26 32   PROT 8.3 6.6   BILITOT 0.5 0.5   LABALBU 3.4* 2.8*     Lactic Acid:   Recent Labs     02/22/22  0431   LACTA 1.8     Troponin: No results for input(s): CKTOTAL, CKMB, TROPONINT in the last 72 hours. ABGs: No results for input(s): PH, PCO2, PO2, HCO3, O2SAT in the last 72 hours. CRP:  No results for input(s): CRP in the last 72 hours. Sed Rate:  No results for input(s): SEDRATE in the last 72 hours. Cultures:   No results for input(s): CULTURE in the last 72 hours. Recent Labs     02/21/22  1739   BC No Growth to date. Any change in status will be called. BLOODCULT2 No Growth to date. Any change in status will be called. No results for input(s): CXSURG in the last 72 hours.     Radiology reports as per the Radiologist  Radiology: CT ABDOMEN PELVIS WO CONTRAST Additional Contrast? None    Result Date: 2/21/2022  CT ABDOMEN PELVIS WO CONTRAST 2/21/2022 7:53 PM History: Postop pain. Hypotension. Noncontrast abdomen/pelvis CT. Comparison is made with February 8, 2022. In order to have a CT radiation dose as low as reasonably achievable Automated Exposure Control was utilized for adjustment of the mA and/or KV according to patient size. DLP in mGycm= 1115. Interval abdominal surgery. Normal heart size. Patchy atelectasis at the lung bases. Normal noncontrast appearance of the liver, pancreas, and spleen. Normal and symmetric kidneys. No hydronephrosis. No bowel dilation. No abscess or hematoma. 1. Postoperative changes with no sign of bowel obstruction, abscess, or hematoma. Signed by Dr Karyle Hug    XR CHEST PORTABLE    Result Date: 2/21/2022  Exam:   XR CHEST PORTABLE  Date:  2/21/2022 History:  Male, age  61 years; hypotension COMPARISON:  Chest x-ray dated February 8, 2022 Findings : Chest portable place. A right-sided PICC, new, terminating in the low SVC. The heart and mediastinum are normal in size. Lungs are without focal infiltrate, mass or effusions. The bones show no acute pathology. Catheter in the left upper quadrant. Impression: 1. New right-sided PICC. 2.  Otherwise, no interval changes. Signed by Dr Callum Hernandez   1. Acute kidney injury/stage III/improving. .  2.  Intravascular volume depletion. 3.  Severe hyperphosphatemia  4. Hypercalcemia. 5.  Hyponatremia improving. 6.  History of metastatic colon cancer. 7.  Severe immunosuppression due to recent chemo. 8.  Hypokalemia. Plan:  1. Change IV fluid composition. 2.  Plan was discussed with the patient and his wife. 3.  Continue to monitor renal function closely.       Rand Xiong MD  02/24/22  10:56 AM

## 2022-02-24 NOTE — PLAN OF CARE
Problem: Skin Integrity:  Goal: Will show no infection signs and symptoms  Description: Will show no infection signs and symptoms  Outcome: Ongoing  Goal: Absence of new skin breakdown  Description: Absence of new skin breakdown  Outcome: Ongoing     Problem: Falls - Risk of:  Goal: Will remain free from falls  Description: Will remain free from falls  Outcome: Ongoing  Goal: Absence of physical injury  Description: Absence of physical injury  Outcome: Ongoing     Problem: Pain:  Goal: Pain level will decrease  Description: Pain level will decrease  Outcome: Ongoing  Goal: Control of acute pain  Description: Control of acute pain  Outcome: Ongoing  Goal: Control of chronic pain  Description: Control of chronic pain  Outcome: Ongoing     Problem: Nutrition  Goal: Optimal nutrition therapy  Outcome: Ongoing     Problem: Nutrition Deficit:  Goal: Ability to achieve adequate nutritional intake will improve  Description: Ability to achieve adequate nutritional intake will improve  Outcome: Ongoing

## 2022-02-24 NOTE — PLAN OF CARE
Problem: Skin Integrity:  Goal: Will show no infection signs and symptoms  Description: Will show no infection signs and symptoms  2/24/2022 0035 by Ailyn Serrato RN  Outcome: Ongoing  2/23/2022 1816 by Ember Pritchard RN  Outcome: Ongoing  Goal: Absence of new skin breakdown  Description: Absence of new skin breakdown  2/24/2022 0035 by Ailyn Serrato RN  Outcome: Ongoing  2/23/2022 1816 by Ember Pritchard RN  Outcome: Ongoing     Problem: Falls - Risk of:  Goal: Will remain free from falls  Description: Will remain free from falls  2/24/2022 0035 by Ailyn Serrato RN  Outcome: Ongoing  2/23/2022 1816 by Ember Pritchard RN  Outcome: Ongoing  Goal: Absence of physical injury  Description: Absence of physical injury  2/24/2022 0035 by Ailyn Serrato RN  Outcome: Ongoing  2/23/2022 1816 by Ember Pritchard RN  Outcome: Ongoing     Problem: Pain:  Goal: Pain level will decrease  Description: Pain level will decrease  2/24/2022 0035 by Ailyn Serrato RN  Outcome: Ongoing  2/23/2022 1816 by Ember Pritchard RN  Outcome: Ongoing  Goal: Control of acute pain  Description: Control of acute pain  2/24/2022 0035 by Ailyn Serrato RN  Outcome: Ongoing  2/23/2022 1816 by Ember Pritchard RN  Outcome: Ongoing  Goal: Control of chronic pain  Description: Control of chronic pain  2/24/2022 0035 by Ailyn Serrato RN  Outcome: Ongoing  2/23/2022 1816 by Ember Pritchard RN  Outcome: Ongoing     Problem: Nutrition  Goal: Optimal nutrition therapy  2/24/2022 0035 by Ailyn Serrato RN  Outcome: Ongoing  2/23/2022 1816 by Ember Pritchard RN  Outcome: Ongoing     Problem: Nutrition Deficit:  Goal: Ability to achieve adequate nutritional intake will improve  Description: Ability to achieve adequate nutritional intake will improve  2/24/2022 0035 by Ailyn Serrato RN  Outcome: Ongoing  2/23/2022 1816 by Ember Pritchard RN  Outcome: Ongoing

## 2022-02-24 NOTE — CARE COORDINATION
Date / Time of Evaluation:   2/24/2022    2:27 PM  Assessment Completed by:   ISRAEL Mckeon      Patient Name:   Asya Adams  MRN:   [id number]  [place]:   1955    Patient Admission Status:       Patient Contact Information:    [phone] (home)   Telephone Information:   Mobile [phone]   Mobile [phone]     Above information verified? [x]   Yes  []   No    (Best Practice:   Have patient/caregiver verify above address and phone number by stating out loud their current address and reachable phone number. Initial Assessment Completed at bedside with:      [x]   Patient  []   Family/Caregiver/Guardian   []   Other:      Current PCP:    KALPESH Piña NP    PCP verified? [x]   Yes  []   No    Emergency Contacts:    Extended Emergency Contact Information  Primary Emergency Contact: Idania Morfin  Address: [address] [place] [address], [address] [place] [address]. [address] Phone: [phone]  Mobile Phone: [phone]  Relation: Spouse  Secondary Emergency Contact: [address] [place] [address] Phone: [phone]  Relation: Child    Advance Directives:    Does Mr. Asia Day have an advance directive in his electronic medical record? []   Yes  [x]   No    Code Status:   Full Code      Have you been vaccinated for COVID-19 (SARS-CoV-2)? [x]   Yes  []   No                   If so, when?     Which :         []   Pfizer-BioNTOxiCool  []   Moderna  [x]   [employer]  []   Other:       Do you have any of the following unmet social needs that would keep you from returning home safely:    []   Yes  [x]   No                    Unmet Social Needs:           []   Living Situation/Housing  []   Food  []   Stroke Education   []   Utilities  []   Personal Safety  []   Financial Strain  []   Employment  []   Mental Health  []   Substance Abuse  []   Transportation Barriers    Additional Unmet Social Needs Notes:           Financial:    Payor: Luiz Jimenezer / Plan[place] [employer]UM / Product Type: *No Product type* /     Pre-Cert required for SNF:     [x]   Yes  []   No    Have Long Term Care Insurance:      []   Yes  [x]   No      Pharmacy:    [address] , [address] [place] [address] [phone] Risa Kline [phone]  [address]  [address]  Phone: [phone] Fax: [phone] [facility] [address] [place] [address], [address] [place] [address]  [address] [address]  Phone: [phone] Fax: [phone]    Potential assistance purchasing medications? []   Yes  [x]   No      ADLS:       Support System:   Spouse/Significant Other,Children      Current Home Environment:   Home with spouse and HH    Steps:       [x]   Yes  []   No    If yes, how many?  A few    Plans to RETURN to current housing:     [x]   Yes  []   No    Barriers to RETURNING to current housing:        Currently ACTIVE with Home Health CARE:      [x]   Yes  []   No    Home Health Care Agency:  [address] [place] [address] Provider:  none       Had [facility] prior to admission:      []   Yes  [x]   No        Active with HD/PD prior to admission:             []   Yes  [x]   No    Nephrologist:     HD Center:         Transition Plan:  normally home with spouse; SW following re: renal function; current with [facility] which also manages his IVF's at home    Transportation PLAN for Discharge:   Private vehicle    Factors facilitating achievement of predicted outcomes:     Barriers to discharge:        Patient Deficits:    []   Yes   [x]   No    If yes:    []   Confusion/Memory  []   Visual  []   Motor/Sensory         []   Right arm         []   Right leg         []   Left arm         []   Left leg  []   Language/Speech         []   Aphasia         []   Dysarthria         []   Swallow         [place] Coma Scale  Eye Opening: Spontaneous  Best Verbal Response: Oriented  Best Motor Response: Obeys commands  Kristel Coma Scale Score: 15    Patient Deficit Notes: Additional CM/SW Notes: SW visited with Pt today; he plans to return home with his wife and [facility] services; he stated he doesn't use any DME at home; no additional needs voiced.           Medardo Woodard, [address]  Phone:   [phone]          Fax:   [phone]  Electronically signed by ISRAEL Young on 2/24/2022 at 4:17 PM

## 2022-02-24 NOTE — PROGRESS NOTES
[facility] Surgery Progress Note    Chief Complaint:   Chief Complaint   Patient presents with    Abnormal Lab     unknown, primary care called       SUBJECTIVE:  Mr. Adry Partida is a 61 y.o. male is seen and examined at bedside. Continues to feel better daily. Tolerating diet. OBJECTIVE:  /61   Pulse 85   Temp 97 °F (36.1 °C) (Temporal)   Resp 16   Ht 5' 7\" (1.702 m)   Wt 142 lb 8 oz (64.6 kg)   SpO2 94%   BMI 22.32 kg/m²   CONSTITUTIONAL: Alert, appropriate, no acute distress  SKIN: warm, dry with no rashes or lesions  HEENT: NCAT, Non icteric, PERR. Trachea Midline. CHEST/LUNGS: Normal respiratory effort. CARDIOVASCULAR:  No edema. ABDOMEN: soft, ND, FARHAT w seropurulent output  Incisions: Clean, dry, and intact with no erythema or induration, staples in place  NEUROLOGIC: CN II-XI grossly intact, no motor or sensory deficits   MUSCULOSKELETAL: No clubbing or cyanosis. PSYCHIATRIC:  Normal Mood and Affect. Alert and Oriented. Labs:  CBC:   Recent Labs     02/22/22  0431 02/23/22  0500 02/24/22  0401   WBC 3.3* 3.4* 4.2*   HGB 9.6* 8.4* 9.3*    293 414*     BMP:    Recent Labs     02/22/22  0431 02/23/22  0500 02/24/22  0401   * 126* 133*   K 4.7 3.4* 3.4*   CL 82* 84* 88*   CO2 28 26 28   BUN 91* 78* 65*   CREATININE 4.6* 3.9* 3.5*   GLUCOSE 97 100 109       ASSESSMENT:  Principal Problem:    Acute kidney injury superimposed on CKD (HCC)  Active Problems:    Adenocarcinoma of colon (HCC)    Liver metastases (HCC)    Hypovolemia    Hyponatremia    Moderate malnutrition (HCC)    Palliative care patient    Posttraumatic stress disorder    Deep vein thrombosis (DVT) of left upper extremity (HCC)  Resolved Problems:    * No resolved hospital problems.  *       PLAN:  Aggressive medical management and fluid resuscitation  Leave FARHAT until seen at [facility]  If ostomy output seems to be high output then we will start antidiarrheal agents  Staples to stay in place for least 2 weeks postop  Diet as tolerated      Mor Pages, DO   Electronically Signed on 2/24/2022 at 1:52 PM

## 2022-02-24 NOTE — PROGRESS NOTES
Palliative Care Progress Note  2/24/2022 4:48 PM    Patient:  Gisella Crockett  YOB: 1955  Primary Care Physician: KALPESH Rocha NP  Advance Directive: Full Code  Admit Date: 2/21/2022       Hospital Day: 3  Portions of this note have been copied forward, however, changed to reflect the most current clinical status of this patient. CHIEF COMPLAINT/REASON FOR CONSULTATION Goals of care    SUBJECTIVE:  Mr. Jordin Medellin states he feels better this afternoon. Planning on taking a bath when his spouse returns to the hospital. States pain/nausea well controlled. Feels output from ileostomy has been increased today. Review of Systems:   14 point review of systems is negative except as specifically addressed above. Objective:   VITALS:  BP (!) 100/52   Pulse 87   Temp 98.6 °F (37 °C) (Temporal)   Resp 16   Ht 5' 7\" (1.702 m)   Wt 142 lb 8 oz (64.6 kg)   SpO2 96%   BMI 22.32 kg/m²   24HR INTAKE/OUTPUT:    Intake/Output Summary (Last 24 hours) at 2/24/2022 1648  Last data filed at 2/24/2022 0825  Gross per 24 hour   Intake 7139 ml   Output 2080 ml   Net 5059 ml       General appearance: alert, appears stated age, cooperative and no distress  Head: Normocephalic, without obvious abnormality, atraumatic  Eyes: conjunctivae/corneas clear. PERRL, EOM's intact. Ears: normal external ears and nose, throat without exudate  Neck: no adenopathy, no carotid bruit, no JVD, supple, symmetrical, trachea midline   Lungs: clear to auscultation bilaterally,no rales or wheezes   Heart: regular rate and rhythm, S1, S2 normal, no murmur  Abdomen:soft, mild TTP, ileostomy present w/ liquid stool, FARHAT noted  Extremities:No lower extremity edema,  No erythema, no tenderness to palpation  Skin: Skin color, texture, turgor normal. No rashes or lesions  Lymphatic: No palpable lymph node enlargment  Neurologic: Alert and oriented X 3, generalized weakness, normal tone.  No focal deficits  Psychiatric: Alert and oriented, thought content appropriate, normal insight, mood appropriate    Medications:      heparin (PORCINE) Infusion 18 Units/kg/hr (02/24/22 1253)    IV infusion builder 150 mL/hr at 02/24/22 1232    sodium chloride        dronabinol  2.5 mg Oral BID    sodium chloride flush  5-40 mL IntraVENous 2 times per day    Vitamin D  2,000 Units Oral Daily    pantoprazole  40 mg Oral QAM AC    [Held by provider] prazosin  1 mg Oral Nightly    sertraline  25 mg Oral Daily     heparin (porcine), heparin (porcine), potassium chloride **OR** potassium alternative oral replacement **OR** potassium chloride, methocarbamol, HYDROmorphone, sodium chloride flush, sodium chloride, ondansetron **OR** ondansetron, acetaminophen **OR** acetaminophen, loperamide  ADULT ORAL NUTRITION SUPPLEMENT; Lunch, Dinner; Frozen Oral Supplement  ADULT DIET;  Regular; LIKES SUGAR FREE VANILLA PUDDING CUPS     Lab and other Data:     Recent Labs     02/22/22  0431 02/23/22  0500 02/24/22  0401   WBC 3.3* 3.4* 4.2*   HGB 9.6* 8.4* 9.3*    293 414*     Recent Labs     02/22/22  0431 02/23/22  0500 02/24/22  0401   * 126* 133*   K 4.7 3.4* 3.4*   CL 82* 84* 88*   CO2 28 26 28   BUN 91* 78* 65*   CREATININE 4.6* 3.9* 3.5*   GLUCOSE 97 100 109     Recent Labs     02/21/22  1719 02/24/22  0401   AST 26 32   ALT 26 24   BILITOT 0.5 0.5   ALKPHOS 296* 218*     Troponin T:   Recent Labs     02/21/22  1719 02/21/22  2315 02/22/22  0431   TROPONINI 0.08* 0.06* 0.07*   UA:  Recent Labs     02/21/22 2032   COLORU DARK YELLOW*   PHUR 5.0   CLARITYU CLOUDY*   SPECGRAV 1.021   LEUKOCYTESUR Negative   UROBILINOGEN 0.2   BILIRUBINUR SMALL*   BLOODU Negative   GLUCOSEU Negative       Assessment/Plan   Principal Problem:    Acute kidney injury superimposed on CKD ([facility]  Active Problems:    Adenocarcinoma of colon ([address]    Liver metastases (HCC)    Hypovolemia    Hyponatremia    Moderate malnutrition ([facility]    Palliative care patient Posttraumatic stress disorder    Deep vein thrombosis (DVT) of left upper extremity (HCC)  Resolved Problems:    * No resolved hospital problems. *    Visit Summary:  Chart reviewed, patient discussed with consulting service and nursing staff. Reviewed health issues, work up and treatment plan as well as factors that lead to hospitalization. Patient overall states he feels improved today though output increased. Has been continued on antidiarrheals with restricted post-op diet. Pain well controlled at present. Goal remains to discharge home w/ home health services once medically stable. Recommendations:   1. Palliative care: [address] home w/ home health once medically stable. SCOP referral placed. Hopeful for decrease in ileostomy output, increase in oral intake with plans for ileostomy reversal when cleared by his surgeon at [facility]. Code status: Full code    2. Colon cancer-s/p HIPEC/debulking at [facility] 01/2022, Oncology following    3. MARIO w/ hyponatremia-mgmt per Nephrology, plans to continue IVF's once home    4. DVT/SVT LUE-heparin gtt     Thank you for consulting Palliative Care and allowing us to participate in the care of this patient.    Time Spent Counseling > 50%:  YES                                   Total Time Spent with patient/family counseling, workup/treatment review, counseling and placement of orders/preparation of this note: 25 minutes    Electronically signed by Urbano Kaur PA-C on 2/24/2022 at 4:48 PM    (Please note that portions of this note were completed with a voice recognition program.  May Babin made to edit the dictations but occasionally words are mis-transcribed.)

## 2022-02-24 NOTE — PROGRESS NOTES
[facility] Progress Note    Patient:  Kaiser hCambers  YOB: 1955  Date of Service: 2/24/2022  MRN: [id number]   Acct: [de-identified]   Primary Care Physician: KALPESH Romero NP  Advance Directive: Full Code  Admit Date: 2/21/2022       Hospital Day: 3      CHIEF COMPLAINT:   Generalized weakness and fatigue. Chief Complaint   Patient presents with    Abnormal Lab     unknown, primary care called       2/24/2022 7:34 AM  Subjective / Interval History:   02/24/2022  Patient seen and examined this AM.  Doing well. No new complaints. Laying comfortably in bed in no acute distress. Endorses overall improvement in his energy level. No acute changes or acute overnight events reported. 02/23/2022  No acute changes or acute overnight event reported. Patient endorses marked improvement in his energy level. Laying comfortably in bed no acute distress. Denies any acute complaints or distress at this time. Left arm venous duplex ultrasound (02/02/2022): Positive DVT in left jugular, subclavian, axillary, and brachial veins. Positive SVT in left proximal cephalic vein, media and cubital veins      02/22/2022  Patient seen and examined this AM.  Doing well. No new complaints. No acute changes or acute overnight event reported. Laying comfortably in bed in no acute distress. Family at bedside. Review of Systems:   Review of Systems  ROS: 14 point review of systems is negative except as specifically addressed above. ADULT ORAL NUTRITION SUPPLEMENT; Lunch, Dinner; Frozen Oral Supplement  ADULT DIET;  Regular; LIKES SUGAR FREE VANILLA PUDDING CUPS    Intake/Output Summary (Last 24 hours) at 2/24/2022 0734  Last data filed at 2/24/2022 8492  Gross per 24 hour   Intake 6929 ml   Output 3155 ml   Net 3774 ml       Medications:   lactated ringers 150 mL/hr at 02/23/22 0133    sodium chloride       Current Facility-Administered Medications   Medication Dose Route Frequency Provider Last Rate Last Admin    potassium chloride (KLOR-CON M) extended release tablet 40 mEq  40 mEq Oral PRN Alisha Rodrigues MD   40 mEq at 02/24/22 0515    Or    potassium bicarb-citric acid (EFFER-K) effervescent tablet 40 mEq  40 mEq Oral PRN Alisha Rodrigues MD        Or   Keily Conroy potassium chloride 10 mEq/100 mL IVPB (Peripheral Line)  10 mEq IntraVENous PRN Alisha Rodrigues MD        enoxaparin (LOVENOX) injection 60 mg  1 mg/kg SubCUTAneous Q24H Alisha Rodrigues MD   60 mg at 02/23/22 1224    methocarbamol (ROBAXIN) tablet 500 mg  500 mg Oral TID PRN Alisha Rodrigues MD        HYDROmorphone (DILAUDID) tablet 2 mg  2 mg Oral Q6H PRN Alisha Rodrigues MD   2 mg at 02/23/22 2118    lactated ringers infusion   IntraVENous Continuous Lyric He  mL/hr at 02/23/22 0133 New Bag at 02/23/22 0133    sodium chloride flush 0.9 % injection 5-40 mL  5-40 mL IntraVENous 2 times per day Yamilet Monique, APRN - CNP   10 mL at 02/23/22 2104    sodium chloride flush 0.9 % injection 5-40 mL  5-40 mL IntraVENous PRN Yamilet Monique, APRN - CNP        0.9 % sodium chloride infusion  25 mL IntraVENous PRN Yamilet Monique, APRN - CNP        ondansetron (ZOFRAN-ODT) disintegrating tablet 4 mg  4 mg Oral Q8H PRN Kunalis Eneida, APRN - CNP        Or    ondansetron [facility]) injection 4 mg  4 mg IntraVENous Q6H PRN Kunalis Eneida, APRN - CNP        acetaminophen (TYLENOL) tablet 650 mg  650 mg Oral Q6H PRN Kunalis Fent, APRN - CNP   650 mg at 02/23/22 3821    Or    acetaminophen (TYLENOL) suppository 650 mg  650 mg Rectal Q6H PRN Kunalis Eneida, APRN - CNP        Vitamin D (CHOLECALCIFEROL) tablet 2,000 Units  2,000 Units Oral Daily Kunalis Eneida, APRN - CNP   2,000 Units at 02/23/22 0849    pantoprazole (PROTONIX) tablet 40 mg  40 mg Oral QAM AC Yamilet Monique, APRN - CNP   40 mg at 02/24/22 0511    [Held by provider] prazosin (MINIPRESS) capsule 1 mg  1 mg Oral Nightly KALPESH Escalona - ERIK Conroy sertraline (ZOLOFT) tablet 25 mg  25 mg Oral Daily Clista Combe, APRN - CNP   25 mg at 02/23/22 2361    loperamide (IMODIUM) capsule 2 mg  2 mg Oral BID PRN Clista Combe, APRN - CNP             lactated ringers 150 mL/hr at 02/23/22 0133    sodium chloride        enoxaparin  1 mg/kg SubCUTAneous Q24H    sodium chloride flush  5-40 mL IntraVENous 2 times per day    Vitamin D  2,000 Units Oral Daily    pantoprazole  40 mg Oral QAM AC    [Held by provider] prazosin  1 mg Oral Nightly    sertraline  25 mg Oral Daily     potassium chloride **OR** potassium alternative oral replacement **OR** potassium chloride, methocarbamol, HYDROmorphone, sodium chloride flush, sodium chloride, ondansetron **OR** ondansetron, acetaminophen **OR** acetaminophen, loperamide  ADULT ORAL NUTRITION SUPPLEMENT; Lunch, Dinner; Frozen Oral Supplement  ADULT DIET;  Regular; LIKES SUGAR FREE VANILLA PUDDING CUPS       Labs:   CBC with DIFF:  Recent Labs     02/21/22  1719 02/21/22  1719 02/22/22  0431 02/22/22  0431 02/23/22  0500 02/23/22  1638 02/24/22  0401   WBC 4.4*   < > 3.3*  --  3.4*  --  4.2*   RBC 4.00*   < > 3.29*  --  2.89*  --  3.24*   HGB 11.7*   < > 9.6*  --  8.4*  --  9.3*   HCT 36.0*   < > 30.0*   < > 26.3* 26.2* 29.5*   MCV 90.0   < > 91.2  --  91.0  --  91.0   MCH 29.3   < > 29.2  --  29.1  --  28.7   MCHC 32.5*   < > 32.0*  --  31.9*  --  31.5*   RDW 15.0*   < > 14.9*  --  14.6*  --  14.6*   *   < > 308  --  293  --  414*   MPV 10.1   < > 9.6  --  10.1  --  10.0   NEUTOPHILPCT 64.8  --   --   --  68.8*  --  74.4*   LYMPHOPCT 20.3  --   --   --  14.8*  --  12.6*   MONOPCT 11.4*  --   --   --  11.0*  --  10.7*   EOSRELPCT 2.5  --   --   --  4.5  --  1.9   BASOPCT 0.5  --   --   --  0.3  --  0.2   NEUTROABS 2.9  --   --   --  2.3  --  3.1   LYMPHSABS 0.9*  --   --   --  0.5*  --  0.5*   MONOSABS 0.50  --   --   --  0.40  --  0.50   EOSABS 0.10  --   --   --  0.20  --  0.10   BASOSABS 0.00  --   --   --  0.00 --  0.00    < > = values in this interval not displayed. CMP/BMP:  Recent Labs     02/21/22  1719 02/21/22  1719 02/22/22  0431 02/23/22  0500 02/24/22  0401   *   < > 128* 126* 133*   K 4.7   < > 4.7 3.4* 3.4*   CL 75*   < > 82* 84* 88*   CO2 27   < > 28 26 28   ANIONGAP 23*   < > 18 16 17   GLUCOSE 123*   < > 97 100 109   BUN 97*   < > 91* 78* 65*   CREATININE 4.6*   < > 4.6* 3.9* 3.5*   LABGLOM 13*   < > 13* 16* 18*   CALCIUM 8.8   < > 7.9* 7.7* 8.5*   PROT 8.3  --   --   --  6.6   LABALBU 3.4*  --   --   --  2.8*   BILITOT 0.5  --   --   --  0.5   ALKPHOS 296*  --   --   --  218*   ALT 26  --   --   --  24   AST 26  --   --   --  32    < > = values in this interval not displayed. CRP:  No results for input(s): CRP in the last 72 hours. Sed Rate:  No results for input(s): SEDRATE in the last 72 hours. HgBA1c:  No components found for: HGBA1C  FLP:  No results found for: TRIG, HDL, LDLCALC, LDLDIRECT, LABVLDL  TSH:  No results found for: TSH  Troponin T:   Recent Labs     02/21/22 1719 02/21/22  2315 02/22/22  0431   TROPONINI 0.08* 0.06* 0.07*     Pro-BNP: No results for input(s): BNP in the last 72 hours. INR: No results for input(s): INR in the last 72 hours. ABGs: No results found for: PHART, PO2ART, AKH9XQV  UA:  Recent Labs     02/21/22 2032   COLORU DARK YELLOW*   PHUR 5.0   CLARITYU CLOUDY*   SPECGRAV 1.021   LEUKOCYTESUR Negative   UROBILINOGEN 0.2   BILIRUBINUR SMALL*   BLOODU Negative   GLUCOSEU Negative         Culture Results:    No results for input(s): CXSURG in the last 720 hours. Blood Culture Recent:   Recent Labs     02/21/22  1739 02/08/22  2220   BC No Growth to date. Any change in status will be called. No growth after 5 days of incubation. Recent Labs     02/21/22  1739   BC No Growth to date. Any change in status will be called. BLOODCULT2 No Growth to date. Any change in status will be called.              Cultures:   No results for input(s): CULTURE in the last 72 hours. Recent Labs     02/21/22  1739   BC No Growth to date. Any change in status will be called. BLOODCULT2 No Growth to date. Any change in status will be called. No results for input(s): CXSURG in the last 72 hours. Recent Labs     02/21/22  1719 02/21/22  1719 02/22/22  0431 02/23/22  0500 02/24/22  0401   MG 3.0*  --   --  2.1 2.0   PHOS 11.1*   < > 8.8* 5.6* 5.1*    < > = values in this interval not displayed. Recent Labs     02/21/22 1719 02/24/22  0401   AST 26 32   ALT 26 24   BILITOT 0.5 0.5   ALKPHOS 296* 218*         RAD:   CT ABDOMEN PELVIS WO CONTRAST Additional Contrast? None    Result Date: 2/21/2022  CT ABDOMEN PELVIS WO CONTRAST 2/21/2022 7:53 PM History: Postop pain. Hypotension. Noncontrast abdomen/pelvis CT. Comparison is made with February 8, 2022. In order to have a CT radiation dose as low as reasonably achievable Automated Exposure Control was utilized for adjustment of the mA and/or KV according to patient size. DLP in mGycm= 1115. Interval abdominal surgery. Normal heart size. Patchy atelectasis at the lung bases. Normal noncontrast appearance of the liver, pancreas, and spleen. Normal and symmetric kidneys. No hydronephrosis. No bowel dilation. No abscess or hematoma. 1. Postoperative changes with no sign of bowel obstruction, abscess, or hematoma. Signed by Dr Brad Thomson Additional Contrast? None    Result Date: 2/9/2022  EXAM: CT ABDOMEN PELVIS WO CONTRAST INDICATION: Vomiting, recent surgery, history of cancer COMPARISON: 12/14/2020 DLP: 2066 mGy cm. In order to have a CT radiation dose as low as reasonably achievable, Automated Exposure Control was utilized for adjustment of the mA and/or KV according to patient size. FINDINGS: Patchy consolidation in the RIGHT and LEFT lung bases is noted. Moderate to large volume pneumoperitoneum. A surgical drain terminates in the LEFT upper abdomen. Postoperative change of RIGHT hemicolectomy with RIGHT upper quadrant anastomosis. Marked diffuse small bowel distention extending to the ileocolic anastomosis, likely representing small bowel obstruction. Small bowel loops measure up to 5 cm diameter. No definite area of pneumatosis. No evidence of portal venous gas. Trace free pelvic fluid. A dense linear device on the RIGHT peritoneal wall is likely represents a mesh device and may be related to recent surgical intervention (correlate with surgical history). Unchanged RIGHT posterolateral lumbar hernia. Postoperative change of RIGHT hepatectomy. The previously demonstrated LEFT liver lesion is not well visualized given lack of IV contrast. Gallbladder surgically absent. No biliary ductal dilatation. Pancreas, spleen, and adrenal glands are unremarkable. No urolithiasis or hydronephrosis. Normal caliber abdominal aorta. No enlarged retroperitoneal lymph nodes. Multiple borderline prominent mesenteric lymph nodes are similar to prior studies. No definite pelvic or inguinal lymphadenopathy. No acute abdominal wall soft tissue normality. No aggressive osseous lesion. 1.  Moderate to large volume pneumoperitoneum. This may be related to recent surgery although bowel perforation is also within the differential. There is a surgical drain in the LEFT upper abdomen which also may be contributing to pneumoperitoneum. 2.  Small bowel obstruction with transition at RIGHT upper quadrant ileocolonic anastomosis. Small bowel loops measure up to 5 cm diameter. 3.  Prior RIGHT hepatectomy. Previously demonstrated LEFT liver lesion is not well visualized given lack of IV contrast. 4.  Patchy consolidation in both lung bases, with differential including aspiration and pneumonia. Findings in agreement with the emergent findings from the initial [facility] preliminary report.  Signed by Dr Nan Hein    Result Date: 2/21/2022  Exam:   XR CHEST PORTABLE  Date: 2/21/2022 History:  Male, age  61 years; hypotension COMPARISON:  Chest x-ray dated February 8, 2022 Findings : Chest portable place. A right-sided PICC, new, terminating in the low SVC. The heart and mediastinum are normal in size. Lungs are without focal infiltrate, mass or effusions. The bones show no acute pathology. Catheter in the left upper quadrant. Impression: 1. New right-sided PICC. 2.  Otherwise, no interval changes. Signed by Dr Peter Floyd    XR CHEST PORTABLE    Result Date: 2/8/2022  EXAMINATION: XR CHEST PORTABLE 2/8/2022 10:39 PM HISTORY: XR CHEST PORTABLE 2/8/2022 9:00 PM HISTORY: Cough COMPARISON: March 18, 2020. FINDINGS: The lungs are clear. Cardiac silhouette is normal. Left subclavian Port-A-Cath is present. Old healed fracture of the left clavicle is present. The osseous structures and surrounding soft tissues demonstrate no acute abnormality. 1. No radiographic evidence of acute cardiopulmonary process. Signed by Dr Yasir Wilson      Objective:   Vitals:   /61   Pulse 85   Temp 97 °F (36.1 °C) (Temporal)   Resp 16   Ht 5' 7\" (1.702 m)   Wt 142 lb 8 oz (64.6 kg)   SpO2 94%   BMI 22.32 kg/m²       Patient Vitals for the past 24 hrs:   BP Temp Temp src Pulse Resp SpO2 Weight   02/24/22 0515 -- -- -- -- -- -- 142 lb 8 oz (64.6 kg)   02/24/22 0514 100/61 97 °F (36.1 °C) Temporal 85 16 94 % --   02/23/22 2359 106/67 97.5 °F (36.4 °C) Temporal 92 16 95 % --   02/23/22 1739 109/61 98.5 °F (36.9 °C) Temporal 82 16 95 % --   02/23/22 1119 103/61 98.5 °F (36.9 °C) Temporal 80 16 96 % --       24HR INTAKE/OUTPUT:      Intake/Output Summary (Last 24 hours) at 2/24/2022 0734  Last data filed at 2/24/2022 8692  Gross per 24 hour   Intake 6929 ml   Output 3155 ml   Net 3774 ml       Physical Exam  Vitals and nursing note reviewed. Constitutional:       General: He is not in acute distress. Appearance: Normal appearance.  He is not ill-appearing, toxic-appearing or diaphoretic. HENT:      Head: Normocephalic and atraumatic. Right Ear: External ear normal.      Left Ear: External ear normal.      Nose: Nose normal. No congestion or rhinorrhea. Mouth/Throat:      Mouth: Mucous membranes are moist.      Pharynx: Oropharynx is clear. No oropharyngeal exudate or posterior oropharyngeal erythema. Eyes:      General: No scleral icterus. Right eye: No discharge. Left eye: No discharge. Extraocular Movements: Extraocular movements intact. Conjunctiva/sclera: Conjunctivae normal.      Pupils: Pupils are equal, round, and reactive to light. Cardiovascular:      Rate and Rhythm: Normal rate and regular rhythm. Pulses: Normal pulses. Heart sounds: Normal heart sounds. No murmur heard. No friction rub. No gallop. Pulmonary:      Effort: Pulmonary effort is normal. No respiratory distress. Breath sounds: Normal breath sounds. No stridor. No wheezing, rhonchi or rales. Chest:      Chest wall: No tenderness. Abdominal:      General: Bowel sounds are normal. There is no distension. Palpations: Abdomen is soft. Tenderness: There is no guarding or rebound. Comments: FARHAT drain in place. Right-sided ileostomy in place. Midline incision site with no evidence of bleeding or infection noted. Musculoskeletal:         General: No swelling, tenderness, deformity or signs of injury. Normal range of motion. Cervical back: Normal range of motion and neck supple. No rigidity. No muscular tenderness. Right lower leg: No edema. Left lower leg: No edema. Skin:     General: Skin is warm and dry. Capillary Refill: Capillary refill takes less than 2 seconds. Coloration: Skin is not jaundiced or pale. Findings: No bruising, erythema, lesion or rash. Neurological:      General: No focal deficit present. Mental Status: He is alert and oriented to person, place, and time. Cranial Nerves:  No cranial nerve deficit. Sensory: No sensory deficit. Motor: No weakness. Coordination: Coordination normal.   Psychiatric:         Mood and Affect: Mood normal.         Behavior: Behavior normal.         Thought Content: Thought content normal.         Judgment: Judgment normal.           Assessment/plan:     Hospital Problems           Last Modified POA    * (Principal) Acute kidney injury superimposed on CKD ([address] 2/21/2022 Yes    Adenocarcinoma of colon ([address] 2/21/2022 Yes    Liver metastases ([address] 2/22/2022 Yes    Hypovolemia 2/21/2022 Yes    Hyponatremia 2/21/2022 Yes    Moderate malnutrition ([address] 2/22/2022 Yes    Palliative care patient 2/22/2022 Yes    Posttraumatic stress disorder 2/22/2022 Yes          Principal Problem:    Acute kidney injury superimposed on CKD [facility])  Active Problems:    Adenocarcinoma of colon ([address]    Liver metastases ([address]    Hypovolemia    Hyponatremia    Moderate malnutrition ([address]    Palliative care patient    Posttraumatic stress disorder  Resolved Problems:    * No resolved hospital problems. *      Brief Summary  Mr Nathaniel Tariq, a 72-year-old male, history of metastatic adenocarcinoma of the colon, presenting to [address] [place] [address] ED (02/21/2022), no account of abnormal lab as well as generalized weakness and fatigue. Patient admitted to Health system (02/21/2022), further work-up and management of acute on chronic renal failure. Further hospital course, as per problem is below; MARIO on CKD  III  Hyponatremia  Hyperphosphatemia  Hypercalcemia   Creatinine level on presentation: 4.6: 02/21/2022   IV hydration -   Avoid hypotension & Nephrotoxins    Nephrology on board-appreciate recommendations   Further management as per nephrology      Metastatic adenocarcinoma of colon  · Status post recent ex lap at OSH (02/09/2022), with FARHAT drain placement and diverting loop ileostomy.   · General surgery and Oncology consulted on admission    LUE DVT  · Left arm venous duplex ultrasound (02/02/2022): Positive DVT in left jugular, subclavian, axillary, and brachial veins. Positive SVT in left proximal cephalic vein, media and cubital veins  · Therapeutic enoxaparin 1 mg/kg subcu twice daily initially started, however transition to heparin ggt on account of renal function. · Vascular Surgery consult      Hypokalemia  · Replace as needed  · Monitor BMP      Continue management of other chronic medical conditions - see above and orders. Advance Directive: Full Code    ADULT ORAL NUTRITION SUPPLEMENT; Lunch, Dinner; Frozen Oral Supplement  ADULT DIET; Regular; LIKES SUGAR FREE VANILLA PUDDING CUPS         Consults Made:   IP CONSULT TO ONCOLOGY  IP CONSULT TO DIETITIAN  IP CONSULT TO NEPHROLOGY  IP CONSULT TO GENERAL SURGERY  IP CONSULT TO PALLIATIVE CARE  IP CONSULT TO VASCULAR SURGERY    DVT prophylaxis: Heparin      Discharge planning:  Continue management as above, including IV fluids  Monitor renal function    Time Spent is 25 mins in the examination, evaluation, counseling and review of medications, assessment and plan.      Electronically signed by   Melodie Olea MD, MPH, MD,   Internal Medicine Hospitalist   2/24/2022 7:34 AM

## 2022-02-24 NOTE — PROGRESS NOTES
PROGRESS NOTE    Patient name: Yue Montenegro  Patient : 1955  Room: 314      SUBJECTIVE: IVFs ran out last night - were not changed. He feels better. He does not have any appetite-not eating very well whatsoever. He developed edema of the left arm over the past day and a half. Noninvasive venous study performed yesterday apparently revealed DVT. He received 1 dose of Lovenox 60 mg subcu last night. INTERVAL HISTORY  The patient is a very pleasant 61years old male who has a history of recurrent colon cancer. He recently underwent debulking surgery and HIPEC at [facility] in 2022. Postoperative course complicated by internal hernia. He was again taken to the OR on 2/10/2022 at [facility]. He has had a complicated postoperative course with severe dehydration and acute kidney injury. He was receiving IV fluids at home according to his ileostomy output. His wife called the clinic yesterday stating that the patient was feeling quite dizzy and had generalized weakness. Laboratory studies reviewed and showed creatinine elevated at 4.6. The patient was directed to emergency. He received IV fluids in the emergency. He was then admitted for further care.   I was consulted for his concurrent history of colon cancer.     INTERVAL HISTORY/HISTORY OF PRESENT ILLNESS:  Diagnosis  · Colonic adenocarcinoma, 2020  · [id number](liver), stage ROXANA  · IHC MMR- proficient  · K-charity mutated  · N-CHARITY/BRAF wild-type  · Iron deficiency anemia  · Soft tissue mass, 2021  · Adenocarcinoma-consistent with colon primary, right psoas muscle, 2021  · Metastatic adenocarcinoma, 2022  · MLH1, MSH2, MH6, PMS2-intact  · MMR- no loss of expression     Treatment summary  · 3/30/2020-right hemicolectomy at [facility]  · Anticipated Liver ablation to be followed by neoadjuvant/adjuvant versus palliative chemotherapy  · 06/10/2020-2020-FOLFOX + Avastin  · 20- Right hepatectomy- [facility]  · S/p Injectafer-poor oral iron tolerance  · 7/13/21- Initiate FOLFIRI + Avastin every 2 weeks  · 1/13/22-psoas muscle mass resection/HIPEC by Dr. Kishor Arevalo at [facility]  · 2/10/2022-back to OR for correction of internal hernia        The patient is a 61years old male who has a diagnosis of colonic adenocarcinoma. The patient had malignant disease with several lymph nodes involved. In addition, the patient was also found to have suspicious liver lesions concerning for metastatic disease. He was seen by [facility] and offered a multimodality approach with liver ablation of the left liver lesion followed by neoadjuvant chemotherapy and partial right hepatectomy. He underwent microwave ablation of the left lobe liver lesion on 6/8/2020. He is status post completion of 11 biweekly cycles of FOLFOX/Avasti completed November 2020. He tolerated treatment with complaints of mild transient cold neuropathy. He had a right hepatectomy on 12/11/2020 that showed no residual disease. His last CEA was normal in January 2021. He had MRI of the abdomen at [facility] in March 2021 that showed no evidence of recurrent disease in the liver or in the abdomen. He also had a surveillance colonoscopy in March 2021 that showed no polyps. CEA was elevated in May 2021. Repeat CEA June 2021 showed persistent elevation. MRI abdomen at [facility] showed no evidence of liver recurrence but a suspicious nodule in the right lower quadrant. Biopsy was performed at [facility] and consistent with recurrent adenocarcinoma.  I discussed the findings with hepatobiliary service and also colorectal surgery. He was started on FOLFIRI/Avastin. He was seen by Dr. Kishor Arevalo again on 9/24/2021. He had repeat CT abdomen pelvis and also MRI at [facility] that showed persistent disease involving the psoas muscle.  In addition, an additional small place that may represent further peritoneal metastatic disease.  He underwent surgery at [facility]. He underwent exploratory laparotomy with resection of psoas mass at [facility] on 1/13/2022. Sergio Alaniz also underwent HIPEC treatment.   The patient was taken to the OR on 2/10/2022 for correction of internal hernia.     Cancer history  Mr. Autumn Garces was first seen by me on 3/23/2020. Sergio Alaniz was referred for a new diagnosis of colonic adenocarcinoma involving the cecum.  The patient reports that he had a wellbeing consult with his provider at the [address] [place] [address] was found to have anemia and then recommended a colonoscopy.  Of note, the patient has a family history of colon cancer.  His mother is a patient of Dr. Александр Duenas he has been diagnosed with colon cancer in 2010. · 3/11/2020- colonoscopy revealed a large malignant appearing fungating mass lesion in the cecum.  In addition, several other polyps.  Biopsy of the mass consistent with moderate differentiated colonic adenocarcinoma.  Polyps consistent with tubulovillous adenoma with no high-grade dysplasia. [facility] MMR not proficient.  K-maria luisa mutated, BRAF and NRAS wild type.  MSI proficient  · 3/11/2020-CEA 5.5 (H)  · 3/18/2020-CT abdomen pelvis with contrast  Invasive cecal mass adhering to the right lateral abdominal wall muscles with adjacent lymphadenopathy. Mild partial obstruction of the terminal ileum. 2. Suspicious lesions in the right and left hepatic lobes measure up to 1.3 cm and likely represent metastatic disease. · 3/18/2020-Xr Chest Standard  No radiographic evidence of acute cardiopulmonary process. · 3/23/2020-he was first seen by me.  Recommended completion of staging with CT chest.  Also recommended liver MRI for further clarification of liver lesion.  S.  Recommend to proceed with a general surgery consultation tomorrow with Dr. Bill Spears was informed that I favor surgical resection if feasible of the primary malignancy.   · 3/30/2020- right hemicolectomy by Dr. Kayleigh Meza at [facility] consistent with invasive moderately differentiated colonic adenocarcinoma measuring 7.2 cm.  Carcinoma directly invading the adjacent abdominal wall tissue.  Focal lymphovascular space invasion identified.  Focal perineural invasion identified.  Surgical margins negative for evidence of malignancy.  6 out of 14 lymph nodes positive for metastatic adenocarcinoma.  Final pathology staging [id number](liver, stage ROXANA)  · 4/20/2020-CT chest with contrast showed No convincing intrathoracic metastasis. Nonspecific 4 mm nodule of the inferior lingula and a 2 mm right upper lobe nodule can be followed on subsequent imaging in 6-12 months.  Moderate coronary calcifications. Hypodense metastatic liver lesions.  Small hiatal hernia. · 4/20/2020-Mri Abdomen W Wo Contrast There are about 5 liver lesions. The 2 largest appears similar compared to 3/18/2020, the others are too small to further characterize. Appearance is most concerning for metastatic disease. Enhancement of the right lateral peritoneum. This is favored to be postoperative as there is no nodularity, evidence of omental disease or lymphadenopathy. Recommend attention on follow-up. Cholecystectomy. · 4/22/2020-discussed with Dr. Surinder Clay at [facility] will review imaging studies and give further recommendations regarding eligibility for resection of liver lesions. · 5/8/2020 CT Abdomen The two largest suspicious lesions measuring 1.2 and 1.3 cm in the  right and left hepatic lobes respectively are similar compared to the  3/18/2020 CT. Additionally, there are at least five subcentimeter lesions with similar signal characteristics, which are also highly suspicious for metastases. If complete characterization of the number and distribution of lesions is necessary, an MRI with Eovist could be acquired.   · 5/19/2020- he was seen by the hepatobiliary service at [facility] with Dr. Surinder Clay:  patient adequate risk candidate for a multimodal approach, directed toward curative hepatectomy eventually. Endorsed by Hepatobiliary Conference, I recommended perc ablation of the L hemiliver to clear it, followed by systemic therapy in a neoadjuvant strategy. Restaging imaging to confirm clearance of disease on the left and lack of progression to unresectability of the R hemiliver disease would then be followed by R hepatectomy. Limitations to this approach may be accessibility of the segment 4A/8 disease high in the hepatic dome and the possibility of heat sink-related recurrence s/p abation of the left-sided disease. · 5/21/2020- referral for Mediport placement and start FOLFOX in 2 weeks.  Will add bevacizumab after 6 weeks of liver ablation.  We will plan for 12 biweekly dose of FOLFOX.  Bevacizumab will also be stopped 6 weeks prior to major procedure. · 6/8/2020- Microwave ablation of the left liver lesion was then performed for 5 minutes at [address] to achieve a 3.4 x 3.9 cm approximately spherical zone of ablation.    · 6/10/2020- initiation of FOLFOX. · 7/20/2020-added Avastin. · 9/10/20 MRI abdomen: Left hepatic lobe segment II lesion demonstrates small T1 hyperintense blood products, status post microwave ablation on 6/8/2020. No definitive enhancement within the lesion. Focal internal thickening or scar present. Recommend attention on follow-up. Additional scattered subcentimeter foci throughout the liver decreased in size compared to MRI dated 4/20/2020 consistent with improving metastatic disease. Additional chronic findings as above. · 9/16/2020-discussed with plan with the patient and [place].  Interval response to treatment.  Plan to continue chemotherapy through 12 cycles with Avastin. · 12/11/20 Right hepatectomy-Dr. Cuellar Cure  · 12/11/20 Right hepatectomy pathology: Liver, right, resection: Focus of Fibrosis with calcifications and chronic inflammation (0.3 cm), see comment.  Background hepatic parenchyma with minimal periportal fibrosis (trichrome stain), minimal lobular and portal inflammation, and no significant macrovesicular steatosis (<5%) Comments: The patient's history of colorectal cancer status adjuvant therapy is noted. The focus of fibrosis may reflect treatment effect. There is no evidence of viable tumor in the sampled specimen. · 12/14/20 Ct Abdomen Pelvis W Iv Contrast A Small bowel obstruction with transition point at the distal small bowel, just proximal to RIGHT upper quadrant ileocolonic anastomosis.  Postoperative change of RIGHT hepatectomy with small amount of expected free fluid and intraperitoneal gas.  Redemonstrated LEFT liver lesion.  Small bilateral pleural effusions. · 12/16/20 SBFT-[place]: Study is limited due to retained contrast in the small bowel from prior attempt at small bowel follow-through on the floor. Small bowel remains dilated, measuring approximately 5.2 cm, which is consistent with partial or resolving small bowel obstruction. Final radiographs show contrast within the colon. · 1/4/2020-resolution of small bowel obstruction. · 1/7/21 CT chest: No finding to suggest intrathoracic neoplastic process or metastatic disease. The benign-appearing tiny nodule in the right upper lobe probably represent a noncalcified granuloma. A nodule in the lingular segment of the left upper lobe is not visualized in this study. Postsurgical changes of the liver. No evidence of focal complication. A trace right basal pleural effusion. This may be reactive to the previous abdominal surgery. · 3/4/21 MRI abd: Status post right hepatectomy and microwave ablation of a left liver lesion. No findings to suggest residual/recurrent disease. No new liver lesion is identified. Postsurgical changes related to right hemicolectomy. Microwave ablation zone in segment II of the left hepatic lobe measures approximately 21 mm in diameter, unchanged. No appreciable postcontrast enhancement or other findings to suggest local disease recurrence.  No new liver lesion is identified. Spleen is mildly enlarged, measuring 13.8 cm in length. · 3/17/2021-1 year colonoscopy showed no evidence of polyps. · 5/3/21 CEA 6.2  · 6/8/21 MRI abdomen ([place]): Status post right hepatectomy and MWA of a left liver lesion.  No evidence of residual or recurrent metastatic disease in the liver. Status post prior right hemicolectomy for colon carcinoma. Within the posterior right iliopsoas muscle there is a mildly T2 hyperintense nodular focus with postcontrast rim enhancement and diffusion restriction. This measures approximately 2.7 x 2 x 2 cm. More delayed postcontrast images show irregular area of enhancement measuring 2.4 x 7.7 cm axially involving the right iliopsoas muscle and the right lateral abdominal wall. Suggest correlation with contrast enhanced CT exam for complete evaluation. Consider biopsy of this lesion. · 6/15/21 CT CHEST WO CONTRAST No metastatic disease in the chest.  No change in tiny 2 mm RIGHT upper lobe pulmonary nodule.  Mild ectasia of the ascending aorta measuring 4 cm. · 6/18/2021-I reviewed results of MRI abdomen at [place].  CT chest without contrast reviewed by me and showed no evidence of metastatic disease.  Stable 2 mm right upper lobe nodule.  Discussed with hepatobiliary service at [facility]. Biopsy intra-abdominal nodule next week at [facility].    · 6/25/20217161-YE-muuzom biopsy soft tissue mass right psoas muscle at [facility] consistent with recurrent colonic adenocarcinoma.    · 7/2/2021-discussed with hepatobiliary service at [facility] and also surgical oncology.  Surgical oncology will call us back regarding consultation for consideration of HIPEC if he is a candidate  · 7/13/21-initiation of chemotherapy with FOLFIRI + Avastin every 2 weeks  · 7/13/21 CEA 10.7  · 7/27/2021-CEA 9.6  · 7/30/2021-she was seen by the surgical oncology group at [facility] by Dr Georgia Carson recommended to complete 6 cycles of chemotherapy and repeat CT chest abdomen pelvis and liver MRI to assess disease response.  They discussed the role of CRS/HIPEC in his situation. He was told that it really depends on the status of his liver lesions as well. He will complete 6 cycles of chemotherapy and will return to see me with a CTAP as well as MRI of the liver to assess disease burden and determine if he can be a candidate for debulking. · 8/25/2021-proceed cycle #4. · 9/22/2021 CEA- 8.1  · 9/24/2021 MRI with and w/o Contrast ([place])  Tiny left retroperitoneal nodule involving the left lateral conal fascial new compared to 3/4/2021 though unchanged from most recent prior, possibly an additional site of metastasis. No other new metastatic disease within the abdomen. Similar partially visualized right posterior body wall/psoas infiltrative lesion not definitely changed from MRI abdomen 6/8/2021 but better evaluated in its entirety on concurrent CT, reported separately.   Status post right hemicolectomy, right hepatectomy, and segment III microwave ablation. Similar mild splenomegaly.  Additional chronic and incidental findings as described in the body the report.  Liver: Status post right hepatectomy. Ablation zone in segment II measures 1.7 x 1.1 cm, slightly decreased in size from 1.8 x 1.4 cm previously (series 1301 image 56) without appreciable enhancement. Similar tiny subcentimeter cyst in the left hepatic lobe. No new focal hepatic lesion identified. No visualized free fluid. Similar 0.7 cm enhancing nodule along the left lateral conal fascia laterally new from 3/4/2021, grossly unchanged from MRI dated 6/8/2021. (series 801 image 22). No other appreciable peritoneal/retroperitoneal or  omental nodularity.  Ill-defined T2 hyperintense signal and hyperenhancement in the right posteromedial body wall involving the lateral aspect of the psoas muscle and posterolateral abdominal wall musculature, partially visualized measuring at least 8.7 x 3.1   cm, grossly similar to the prior exam, better evaluated in its entirety on concurrent CT reported separately. · 9/24/2021 CT C/A/P w/ Contrast([place]) Status post right hemicolectomy, right hepatectomy and left hepatic microwave ablation without new suspicious hepatic lesion.  Left lateral perirenal fascial nodule, which is stable compared to 6/8/2021 MRI, but new compared to 3/4/2021 MRI is concerning for an additional site of metastatic disease.  No sites of new or enlarging metastatic disease in the chest.  Similar appearance of right lateral psoas and posterior abdominal wall infiltrative lesion, not significantly changed compared to 6/8/2021 MRI.  Mild splenomegaly.  Additional findings as outlined in the body the report. Biopsy-proven adenocarcinoma involving the posterior lateral aspect of the right iliopsoas muscle and right lateral abdominal wall. Right iliopsoas component is difficult to measure but appears to be approximately 2.5 x 2.4 cm (series 2, image 153), similar to prior MRI. Nodular thickening noted along the right posterior abdominal wall and possibly involving the the transversus abdominis measures approximately 8.5 x 1.8 cm (series 2 image 153) overall similar compared to prior MRI. · 10/6/2021-discussed the results of recent CT abdomen/pelvis and MRI abdomen/pelvis at [facility].  Persistent disease involving the psoas muscle.  Discussed with colorectal surgery at [facility] recommend to continue current therapy for another 2 months and reassess with CT scans. · 12/3/21 CT chest/abd/pelvis ([facility]): Status post prior right hemicolectomy, right hepatectomy and left hepatic lesions microwave ablation. No new liver lesion. Soft tissue thickening of the right lower posterior abdominal wall involving the iliopsoas muscle is grossly unchanged consistent with improving metastatic disease.  Small right omental nodule and left lateral conal fascia nodule unchanged compared to  recent exam.   · 1/13/22 Exploratory lap with resections of psoas muscle mass and HIPEC by Dr. Breanne Cerna:  Metastatic colorectal adenocarcinoma involving fibroadipose tissue. [facility] MMR proficient. · 1/24/22 CT chest/abd/pelvis ([facility]): Extensive postsurgical changes in the abdomen including partial right colectomy, resection of right retroperitoneal mass, omentectomy and mesh repair of right lateral abdominal wall defect. There appears to be a defect in the mesh containing herniated loops of small bowel. Extensive diffuse dilated small bowel loops with air-fluid levels are seen throughout the abdomen. There are segmental areas of decompressed small bowel in the left upper quadrant as well as adjacent to the herniated small  bowel loops in the right retroperitoneum. Air-fluid levels are also seen throughout the colon. While pattern could likely represent postoperative ileus given the diffuse small bowel dilatation and distal colonic air and fluid, developing or partial small bowel obstruction secondary to the right lateral abdominal wall hernia cannot entirely be excluded.  Small ascites. 8 mm nodule along the left lateral conal fascia is unchanged. Slight increase in size of cardiophrenic lymph nodes measuring up to 7 mm anterior to the right hemidiaphragm. Stable hypodensity in the left hepatic lobe.  Diffuse gastric wall thickening/edema could be secondary to gastritis in the appropriate clinical setting.      CEA dynamics:  3/11/20 CEA 5.5  8/19/20 CEA 2.4  9/2/20 CEA 2.7  9/16/20 CEA 2.7  9/30/20 CEA 2.7  10/19/20 CEA 2.3  1/4/21 CEA 2.2  5/3/21 CEA 6.2  6/15/21 CEA 8.3  7/13/21 CEA 10.7  7/27/21 CEA 9.6  8/12/21/21 CEA 8.2  8/25/2021-CEA 8.9  9/22/2021 CEA 8.1    Objective   /61   Pulse 85   Temp 97 °F (36.1 °C) (Temporal)   Resp 16   Ht 5' 7\" (1.702 m)   Wt 142 lb 8 oz (64.6 kg)   SpO2 94%   BMI 22.32 kg/m²     PHYSICAL EXAM:  CONSTITUTIONAL: Alert, appropriate,ill-appearing, feel stronger  EYES: Non icteric, EOM intact, pupils equal round   ENT: Mucus membranes moist,external inspection of ears and nose are normal  NECK: Supple, no masses.  No palpable thyroid mass  CHEST/LUNGS: CTA bilaterally, normal respiratory effort   CARDIOVASCULAR: RRR, no murmurs.  No lower extremity edema  ABDOMEN: mild tender - no guarding or rebound, post op FARHAT drain, ileostomy w-ith stool  EXTREMITIES: 2+ pitting edema LUE, port left chest wall. SKIN: warm, dry with no rashes or lesions  LYMPH: No cervical, clavicular, axillary, or inguinal lymphadenopathy  NEUROLOGIC: follows commands, non focal   PSYCH: mood and affect appropriate. Alert and oriented to time, place, person    Recent Labs     02/24/22  0401 02/23/22  1638 02/23/22  0500 02/22/22  0431 02/22/22 0431   WBC 4.2*  --  3.4*  --  3.3*   HGB 9.3*  --  8.4*  --  9.6*   HCT 29.5* 26.2* 26.3*   < > 30.0*   MCV 91.0  --  91.0  --  91.2   *  --  293  --  308    < > = values in this interval not displayed. Lab Results   Component Value Date     (L) 02/24/2022    K 3.4 (L) 02/24/2022    CL 88 (L) 02/24/2022    CO2 28 02/24/2022    BUN 65 (H) 02/24/2022    CREATININE 3.5 (H) 02/24/2022    GLUCOSE 109 02/24/2022    CALCIUM 8.5 (L) 02/24/2022    PROT 6.6 02/24/2022    LABALBU 2.8 (L) 02/24/2022    BILITOT 0.5 02/24/2022    ALKPHOS 218 (H) 02/24/2022    AST 32 02/24/2022    ALT 24 02/24/2022    LABGLOM 18 (A) 02/24/2022    GFRAA 21 (L) 02/24/2022    AGRATIO 1.5 06/15/2021    GLOB 3.8 02/07/2022       Lab Results   Component Value Date    INR 1.0 06/18/2021    PROTIME 12.1 06/18/2021       30 Day lookback of cultures:    Blood Culture Recent:   Recent Labs     02/21/22  1739   BC No Growth to date. Any change in status will be called. Gram Stain Recent: No results for input(s): LABGRAM in the last 720 hours. Resp Culture Recent: No results for input(s): CULTRESP in the last 720 hours.   Body Fluid Recent : No results for input(s): BFCX in the last 720 hours. MRSA Recent : No results for input(s): [facility] in the last 720 hours. Urine Culture Recent : No results for input(s): LABURIN in the last 720 hours. Organism Recent : No results for input(s): ORG in the last 720 hours. Narrative   CT ABDOMEN PELVIS WO CONTRAST    2/21/2022 7:53 PM   History: Postop pain. Hypotension. Noncontrast abdomen/pelvis CT. Comparison is made with February 8, 2022. In order to have a CT radiation dose as low as reasonably achievable   Automated Exposure Control was utilized for adjustment of the mA   and/or KV according to patient size. DLP in mGycm= 1115. Interval abdominal surgery. Normal heart size. Patchy atelectasis at the lung bases. Normal noncontrast appearance of the liver, pancreas, and spleen. Normal and symmetric kidneys. No hydronephrosis. No bowel dilation. No abscess or hematoma.       Impression   1. Postoperative changes with no sign of bowel obstruction, abscess,   or hematoma. Signed by Dr Uriel Guevara:  #Colon cancer  -Status post neoadjuvant chemo followed by debulking surgery/HIPEC) at [address] [place] [address] January 2022  -Status post exploratory laparotomy 2/10/2022 for correction of internal hernia at [facility] [phone])  -Patient has ileostomy and abdominal drain  -Patient is currently not on chemotherapy.   Last chemotherapy November 2021  -CT A/P Wo contrast 2/21/2022: Pneumoperitoneum, postoperative changes    Dr. Margaret Jett following     #Acute kidney injury-likely prerenal secondary to high ileostomy output  -Patient was receiving IV fluids at home.  -He received IV fluid resuscitation in the emergency  -CT A/P without contrast: No hydronephrosis     (baseline creatinine 1.2-1.4)    Dr. Dimitri Canada following     cc/hr resumed this am ~ 7:30 am -  Fluids were off all night - ran out and new bag wasn't hung    #Normocytic hypochromic anemia         Component hemodilution    Serology 2/24/2022  Iron panel  Ferritin  B12 - 1150  Folate - 17.7  LDH - 187  Retic - 3.23% with absolute 0.0930  Hapto - 410    #DVT left upper extremity-most likely port associated -   -Lovenox 60 mg subcu x1 administered last p.m.  -we will discontinue due to renal function  - Start heparin drip, dosing per pharmacy, until renal function improves  -Left upper extremity ultrasound 2/23/2022  · Acute appearing deep vein thrombosis (DVT) is seen in the internal jugular  vein subclavian axillary brachial, left upper extremity. · Acute appearing superficial thrombophlebitis (SVT) is seen in the basilic and cephalic veins, right upper extremity. #Anorexia    - Marinol 2.5 p.o. twice daily initiated  - Dietary consult    #Weakness, deconditioning    Physical therapy consultation    PLAN:  Continue aggressive IV fluid resuscitation  Get noninvasive venous study report left upper extremity  DC Lovenox  Initiate heparin drip  Physical therapy consult  Dietary consult  Start Marinol 2.5 twice daily  Monitor CBC    Richard Pisano PA-C    02/24/22  7:06 AM  Physician's attestation/substantial contribution:  I, Dr Tawanda Chaney, independently performed an evaluation on Denise Morfin. I have reviewed relevant medical information/data to include but not limited to medication list, relevant appropriate labs and imaging when applicable. I reviewed other physician's notes, ancillary services and nurses assessment. I have reviewed the above documentation completed by the Nurse Practitioner or Physician Assistant. Please see my additional contributions to the history of present illness, physical examination, and assessment/medical decision-making that reflect my findings and impressions. I have seen and examined the patient and the key elements of all parts of the encounter have been performed by me. I agree with the assessment and plan as outlined by the ARNP/PA.   Subjective-patient was upset this morning that the IV ran out yesterday and was not changed. He feels overall stronger. Wife is at bedside. She is concerned about his deconditioning. Objective-chronically appearing  Assessment/plan:  Patient IV fluids were resumed this morning.'  Port related DVT  US left upper extremity showed:   Acute appearing deep vein thrombosis (DVT) is seen in the internal jugular  vein subclavian axillary brachial, left upper extremity. Acute appearing superficial thrombophlebitis (SVT) is seen in the basilic   and cephalic veins, right upper extremity.  -Recommended to switch from Lovenox to IV heparin due to poor renal function. Poor nutrition-dietitian consultation  Deconditioning-PT/OT  Colon cancer-status post HIPEC/debulking surgery at [facility]. Postop care with general surgery assisting. Appreciate recommendations  Discussed recommendation with hospitalist service.   Shorty Mary MD

## 2022-02-25 ENCOUNTER — APPOINTMENT (OUTPATIENT)
Dept: GENERAL RADIOLOGY | Age: 67
DRG: 683 | End: 2022-02-25
Payer: OTHER GOVERNMENT

## 2022-02-25 LAB
ALBUMIN SERPL-MCNC: 2.6 G/DL (ref 3.5–5.2)
ALP BLD-CCNC: 183 U/L (ref 40–130)
ALT SERPL-CCNC: 18 U/L (ref 5–41)
ANION GAP SERPL CALCULATED.3IONS-SCNC: 10 MMOL/L (ref 7–19)
APTT: 43.5 SEC (ref 26–36.2)
APTT: 53.8 SEC (ref 26–36.2)
APTT: 62.8 SEC (ref 26–36.2)
APTT: 63.1 SEC (ref 26–36.2)
AST SERPL-CCNC: 22 U/L (ref 5–40)
BASOPHILS ABSOLUTE: 0 K/UL (ref 0–0.2)
BASOPHILS RELATIVE PERCENT: 0.3 % (ref 0–1)
BILIRUB SERPL-MCNC: 0.3 MG/DL (ref 0.2–1.2)
BUN BLDV-MCNC: 51 MG/DL (ref 8–23)
CALCIUM SERPL-MCNC: 8 MG/DL (ref 8.8–10.2)
CHLORIDE BLD-SCNC: 94 MMOL/L (ref 98–111)
CO2: 28 MMOL/L (ref 22–29)
CREAT SERPL-MCNC: 3.2 MG/DL (ref 0.5–1.2)
EOSINOPHILS ABSOLUTE: 0.1 K/UL (ref 0–0.6)
EOSINOPHILS RELATIVE PERCENT: 1.5 % (ref 0–5)
GFR AFRICAN AMERICAN: 24
GFR NON-AFRICAN AMERICAN: 20
GLUCOSE BLD-MCNC: 116 MG/DL (ref 74–109)
HCT VFR BLD CALC: 27.8 % (ref 42–52)
HEMOGLOBIN: 8.6 G/DL (ref 14–18)
IMMATURE GRANULOCYTES #: 0 K/UL
LYMPHOCYTES ABSOLUTE: 0.5 K/UL (ref 1.1–4.5)
LYMPHOCYTES RELATIVE PERCENT: 12.8 % (ref 20–40)
MCH RBC QN AUTO: 28.9 PG (ref 27–31)
MCHC RBC AUTO-ENTMCNC: 30.9 G/DL (ref 33–37)
MCV RBC AUTO: 93.3 FL (ref 80–94)
MONOCYTES ABSOLUTE: 0.4 K/UL (ref 0–0.9)
MONOCYTES RELATIVE PERCENT: 11.3 % (ref 0–10)
NEUTROPHILS ABSOLUTE: 2.9 K/UL (ref 1.5–7.5)
NEUTROPHILS RELATIVE PERCENT: 73.6 % (ref 50–65)
PDW BLD-RTO: 14.7 % (ref 11.5–14.5)
PHOSPHORUS: 3.7 MG/DL (ref 2.5–4.5)
PLATELET # BLD: 402 K/UL (ref 130–400)
PMV BLD AUTO: 10.1 FL (ref 9.4–12.4)
POTASSIUM REFLEX MAGNESIUM: 4.1 MMOL/L (ref 3.5–5)
RBC # BLD: 2.98 M/UL (ref 4.7–6.1)
SODIUM BLD-SCNC: 132 MMOL/L (ref 136–145)
TOTAL PROTEIN: 5.7 G/DL (ref 6.6–8.7)
WBC # BLD: 3.9 K/UL (ref 4.8–10.8)

## 2022-02-25 PROCEDURE — 6360000002 HC RX W HCPCS

## 2022-02-25 PROCEDURE — 6360000002 HC RX W HCPCS: Performed by: PHYSICIAN ASSISTANT

## 2022-02-25 PROCEDURE — 6360000002 HC RX W HCPCS: Performed by: INTERNAL MEDICINE

## 2022-02-25 PROCEDURE — 99232 SBSQ HOSP IP/OBS MODERATE 35: CPT | Performed by: INTERNAL MEDICINE

## 2022-02-25 PROCEDURE — 85730 THROMBOPLASTIN TIME PARTIAL: CPT

## 2022-02-25 PROCEDURE — 1210000000 HC MED SURG R&B

## 2022-02-25 PROCEDURE — 84100 ASSAY OF PHOSPHORUS: CPT

## 2022-02-25 PROCEDURE — 6370000000 HC RX 637 (ALT 250 FOR IP)

## 2022-02-25 PROCEDURE — 6370000000 HC RX 637 (ALT 250 FOR IP): Performed by: INTERNAL MEDICINE

## 2022-02-25 PROCEDURE — 85025 COMPLETE CBC W/AUTO DIFF WBC: CPT

## 2022-02-25 PROCEDURE — 74018 RADEX ABDOMEN 1 VIEW: CPT

## 2022-02-25 PROCEDURE — 2580000003 HC RX 258: Performed by: INTERNAL MEDICINE

## 2022-02-25 PROCEDURE — 80053 COMPREHEN METABOLIC PANEL: CPT

## 2022-02-25 PROCEDURE — 2580000003 HC RX 258

## 2022-02-25 PROCEDURE — 99232 SBSQ HOSP IP/OBS MODERATE 35: CPT | Performed by: SURGERY

## 2022-02-25 RX ORDER — LOPERAMIDE HYDROCHLORIDE 2 MG/1
4 CAPSULE ORAL 2 TIMES DAILY PRN
Status: DISCONTINUED | OUTPATIENT
Start: 2022-02-25 | End: 2022-02-28 | Stop reason: DRUGHIGH

## 2022-02-25 RX ADMIN — HYDROMORPHONE HYDROCHLORIDE 2 MG: 2 TABLET ORAL at 08:44

## 2022-02-25 RX ADMIN — HEPARIN SODIUM 2580 UNITS: 1000 INJECTION INTRAVENOUS; SUBCUTANEOUS at 23:42

## 2022-02-25 RX ADMIN — Medication 2000 UNITS: at 08:44

## 2022-02-25 RX ADMIN — HEPARIN SODIUM AND DEXTROSE 16 UNITS/KG/HR: 10000; 5 INJECTION INTRAVENOUS at 08:33

## 2022-02-25 RX ADMIN — POTASSIUM CHLORIDE: 2 INJECTION, SOLUTION, CONCENTRATE INTRAVENOUS at 13:55

## 2022-02-25 RX ADMIN — PANTOPRAZOLE SODIUM 40 MG: 40 TABLET, DELAYED RELEASE ORAL at 05:00

## 2022-02-25 RX ADMIN — SERTRALINE HYDROCHLORIDE 25 MG: 50 TABLET ORAL at 08:44

## 2022-02-25 RX ADMIN — HEPARIN SODIUM AND DEXTROSE 17 UNITS/KG/HR: 10000; 5 INJECTION INTRAVENOUS at 23:44

## 2022-02-25 RX ADMIN — SODIUM CHLORIDE, PRESERVATIVE FREE 10 ML: 5 INJECTION INTRAVENOUS at 21:30

## 2022-02-25 RX ADMIN — POTASSIUM CHLORIDE: 2 INJECTION, SOLUTION, CONCENTRATE INTRAVENOUS at 21:20

## 2022-02-25 RX ADMIN — DRONABINOL 2.5 MG: 2.5 CAPSULE ORAL at 20:38

## 2022-02-25 RX ADMIN — POTASSIUM CHLORIDE: 2 INJECTION, SOLUTION, CONCENTRATE INTRAVENOUS at 04:53

## 2022-02-25 RX ADMIN — DRONABINOL 2.5 MG: 2.5 CAPSULE ORAL at 08:45

## 2022-02-25 RX ADMIN — HYDROMORPHONE HYDROCHLORIDE 2 MG: 2 TABLET ORAL at 15:21

## 2022-02-25 RX ADMIN — ONDANSETRON 4 MG: 2 INJECTION INTRAMUSCULAR; INTRAVENOUS at 18:07

## 2022-02-25 ASSESSMENT — PAIN SCALES - GENERAL
PAINLEVEL_OUTOF10: 8
PAINLEVEL_OUTOF10: 8

## 2022-02-25 NOTE — CONSULTS
Comprehensive Nutrition Assessment    Type and Reason for Visit:  Consult    Nutrition Recommendations/Plan: continue current nutritional interventions    Nutrition Assessment:  Received consult for low po intake. This has been addressed since admission. Have tried Ensure--did not like. Changed to Magic cup with L & D--is taking that and did well with breakfast yesterday--ate all. New wt NA    Malnutrition Assessment:  Malnutrition Status: Moderate malnutrition    Context:  Acute Illness     Findings of the 6 clinical characteristics of malnutrition:  Energy Intake:  7 - 50% or less of estimated energy requirements for 5 or more days  Weight Loss:  7 - Greater than 2% over 1 week     Body Fat Loss:  7 - Moderate body fat loss Orbital,Buccal region   Muscle Mass Loss:  1 - Mild muscle mass loss Hand (interosseous),Clavicles (pectoralis & deltoids)  Fluid Accumulation:  1 - Mild Extremities   Strength:  Not Performed    Estimated Daily Nutrient Needs:  Energy (kcal):  9559-5717 kcals (25-30 kcals/kg); Weight Used for Energy Requirements:  Current     Protein (g):  70-140g; Weight Used for Protein Requirements:  Current        Fluid (ml/day):  3600-1945+; Method Used for Fluid Requirements:  1 ml/kcal      Nutrition Related Findings:  ileostomy      Wounds:  Surgical Incision       Current Nutrition Therapies:    ADULT ORAL NUTRITION SUPPLEMENT; Lunch, Dinner; Frozen Oral Supplement  ADULT DIET;  Regular; LIKES SUGAR FREE VANILLA PUDDING CUPS    Anthropometric Measures:  · Height: 5' 7\" (170.2 cm)  · Current Body Weight: 142 lb 8 oz (64.6 kg)   · Admission Body Weight: 155 lb (70.3 kg) (stated)    · Usual Body Weight: 154 lb 8.7 oz (70.1 kg) (2/9/2022)     · Ideal Body Weight: 148 lbs; % Ideal Body Weight 96.3 %   · BMI: 22.3  · Adjusted Body Weight:  ; No Adjustment   · BMI Categories: Normal Weight (BMI 22.0 to 24.9) age over 72       Nutrition Diagnosis:   · Inadequate oral intake,Unintended weight loss,Altered GI function related to acute injury/trauma,increase demand for energy/nutrients,catabolic illness as evidenced by wounds,weight loss greater than or equal to 2% in 1 week,moderate loss of subcutaneous fat      Nutrition Interventions:   Food and/or Nutrient Delivery:  Continue Current Diet,Continue Oral Nutrition Supplement  Nutrition Education/Counseling:  No recommendation at this time   Coordination of Nutrition Care:  Continue to monitor while inpatient    Goals:  po intake 50% or greater.   Weight stable or increase 1-5# per week       Nutrition Monitoring and Evaluation:   Behavioral-Environmental Outcomes:  None Identified   Food/Nutrient Intake Outcomes:  Food and Nutrient Intake,Supplement Intake  Physical Signs/Symptoms Outcomes:  Biochemical Data,Weight,Skin,Nutrition Focused Physical Findings,Fluid Status or Edema     Discharge Planning:    Continue current diet,Continue Oral Nutrition Supplement     Electronically signed by Balbina Coleman MS, RD, LD on 2/25/22 at 7:22 AM CST    Contact: [phone]

## 2022-02-25 NOTE — PROGRESS NOTES
Patient complaining of pain and pressure above left FARHAT drain. Physician notified. Called general surgery and they were unavailable. Will try to call back shortly.

## 2022-02-25 NOTE — PLAN OF CARE
Nutrition Problem #1: Inadequate oral intake,Unintended weight loss,Altered GI function  Intervention: Food and/or Nutrient Delivery: Continue Current Diet,Continue Oral Nutrition Supplement  Nutritional Goals: po intake 50% or greater.   Weight stable or increase 1-5# per week

## 2022-02-25 NOTE — PROGRESS NOTES
[facility] Surgery Progress Note    Chief Complaint:   Chief Complaint   Patient presents with    Abnormal Lab     unknown, primary care called       SUBJECTIVE:  Mr. Jordana Webb is a 61 y.o. male is seen and examined at bedside. Continues to feel better daily. Tolerating diet. OBJECTIVE:  BP 92/62   Pulse 80   Temp 96.8 °F (36 °C)   Resp 18   Ht 5' 7\" (1.702 m)   Wt 142 lb 8 oz (64.6 kg)   SpO2 97%   BMI 22.32 kg/m²   CONSTITUTIONAL: Alert, appropriate, no acute distress  SKIN: warm, dry with no rashes or lesions  HEENT: NCAT, Non icteric, PERR. Trachea Midline. CHEST/LUNGS: Normal respiratory effort. CARDIOVASCULAR:  No edema. ABDOMEN: soft, ND, FARHAT w seropurulent output  Incisions: Clean, dry, and intact with no erythema or induration, staples in place  NEUROLOGIC: CN II-XI grossly intact, no motor or sensory deficits   MUSCULOSKELETAL: No clubbing or cyanosis. PSYCHIATRIC:  Normal Mood and Affect. Alert and Oriented. Labs:  CBC:   Recent Labs     02/23/22  0500 02/24/22  0401 02/25/22  0250   WBC 3.4* 4.2* 3.9*   HGB 8.4* 9.3* 8.6*    414* 402*     BMP:    Recent Labs     02/23/22  0500 02/24/22  0401 02/25/22  0250   * 133* 132*   K 3.4* 3.4* 4.1   CL 84* 88* 94*   CO2 26 28 28   BUN 78* 65* 51*   CREATININE 3.9* 3.5* 3.2*   GLUCOSE 100 109 116*       ASSESSMENT:  Principal Problem:    Acute kidney injury superimposed on CKD (HCC)  Active Problems:    Adenocarcinoma of colon (HCC)    Liver metastases (HCC)    Hypovolemia    Hyponatremia    Moderate malnutrition (HCC)    Palliative care patient    Posttraumatic stress disorder    Deep vein thrombosis (DVT) of left upper extremity (HCC)  Resolved Problems:    * No resolved hospital problems.  *       PLAN:  Aggressive medical management and fluid resuscitation  Leave FARHAT until seen at [facility] to stay in place for least 2 weeks postop  Diet as tolerated    Dr. Karen Ruiz, [address], will be covering over the weekend.       Hannah Beckman DO   Electronically Signed on 2/25/2022 at 7:57 AM

## 2022-02-25 NOTE — PROGRESS NOTES
Physical Therapy  Attempted PT eval  In a.m. and p.m. but Pt declined in a.m. due to pain and in p.m. due to being fatigued from going for testing.   Electronically signed by Marco A Will PT on 2/25/2022 at 3:24 PM

## 2022-02-25 NOTE — PROGRESS NOTES
[facility]    Patient:  Alexandra Velasco  YOB: 1955  Date of Service: 2/25/2022  MRN: [id number]   Acct: [de-identified]   Primary Care Physician: KALPESH Ronquillo NP  Advance Directive: Full Code  Admit Date: 2/21/2022       Hospital Day: 4      CHIEF COMPLAINT:   Generalized weakness and fatigue. Chief Complaint   Patient presents with    Abnormal Lab     unknown, primary care called       2/25/2022 8:49 AM  Subjective / Interval History:   02/25/2022  Patient seen and examined this AM.  Doing well. Patient reports left-sided - lower quadrant abdominal discomfort at all of the incision sites. Laying comfortably in bed however no apparent acute distress. Family at bedside. Denies any acute complaints or distress at this time. 02/24/2022  Patient seen and examined this AM.  Doing well. No new complaints. Laying comfortably in bed in no acute distress. Endorses overall improvement in his energy level. No acute changes or acute overnight events reported. 02/23/2022  No acute changes or acute overnight event reported. Patient endorses marked improvement in his energy level. Laying comfortably in bed no acute distress. Denies any acute complaints or distress at this time. Left arm venous duplex ultrasound (02/02/2022): Positive DVT in left jugular, subclavian, axillary, and brachial veins. Positive SVT in left proximal cephalic vein, media and cubital veins      02/22/2022  Patient seen and examined this AM.  Doing well. No new complaints. No acute changes or acute overnight event reported. Laying comfortably in bed in no acute distress. Family at bedside. Review of Systems:   Review of Systems  ROS: 14 point review of systems is negative except as specifically addressed above. ADULT ORAL NUTRITION SUPPLEMENT; Lunch, Dinner; Frozen Oral Supplement  ADULT DIET;  Regular; LIKES SUGAR FREE VANILLA PUDDING CUPS    Intake/Output Summary (Last 24 hours) at 2/25/2022 0849  Last data filed at 2/25/2022 3258  Gross per 24 hour   Intake 1540.71 ml   Output 1255 ml   Net 285.71 ml       Medications:   heparin (PORCINE) Infusion 16 Units/kg/hr (02/25/22 7394)    IV infusion builder 150 mL/hr at 02/25/22 0453    sodium chloride       Current Facility-Administered Medications   Medication Dose Route Frequency Provider Last Rate Last Admin    loperamide (IMODIUM) capsule 4 mg  4 mg Oral BID PRN Nery Joseph PA-C        heparin 25,000 units in dextrose 5% 250 mL (premix) infusion  5-30 Units/kg/hr IntraVENous Continuous Tae Slater PA-C 10.3 mL/hr at 02/25/22 0833 16 Units/kg/hr at 02/25/22 7188    dronabinol (MARINOL) capsule 2.5 mg  2.5 mg Oral BID Brooke Andrade MD   2.5 mg at 02/25/22 0845    heparin (porcine) injection 5,170 Units  80 Units/kg IntraVENous PRN Tae Slater PA-C   5,170 Units at 02/24/22 1248    heparin (porcine) injection 2,580 Units  40 Units/kg IntraVENous PRN Tae Slater PA-C        potassium chloride 30 mEq in sodium chloride 0.9 % 1,000 mL infusion   IntraVENous Continuous Amauri Funk  mL/hr at 02/25/22 0453 New Bag at 02/25/22 0453    potassium chloride (KLOR-CON M) extended release tablet 40 mEq  40 mEq Oral PRN Dwaine Gomez MD   40 mEq at 02/24/22 3158    Or    potassium bicarb-citric acid (EFFER-K) effervescent tablet 40 mEq  40 mEq Oral PRN Dwaine Gomez MD        Or    potassium chloride 10 mEq/100 mL IVPB (Peripheral Line)  10 mEq IntraVENous PRN Dwaine Gomez MD        methocarbamol (ROBAXIN) tablet 500 mg  500 mg Oral TID PRN Dwaine Gomez MD        HYDROmorphone (DILAUDID) tablet 2 mg  2 mg Oral Q6H PRN Dwaine Gomez MD   2 mg at 02/25/22 0844    sodium chloride flush 0.9 % injection 5-40 mL  5-40 mL IntraVENous 2 times per day Evorn Vivienne, APRN - CNP   10 mL at 02/24/22 0810    sodium chloride flush 0.9 % injection 5-40 mL  5-40 mL IntraVENous PRN Jeannie Peralta Kecia Aden, APRN - CNP        0.9 % sodium chloride infusion  25 mL IntraVENous PRN Evorn Vivienne, APRN - CNP        ondansetron (ZOFRAN-ODT) disintegrating tablet 4 mg  4 mg Oral Q8H PRN Evorn Vivienne, APRN - CNP        Or    ondansetron [facility]) injection 4 mg  4 mg IntraVENous Q6H PRN Evorn Vivienne, APRN - CNP   4 mg at 02/24/22 0809    acetaminophen (TYLENOL) tablet 650 mg  650 mg Oral Q6H PRN Evorn Vivienne, APRN - CNP   650 mg at 02/23/22 6548    Or    acetaminophen (TYLENOL) suppository 650 mg  650 mg Rectal Q6H PRN Evorn Vivienne, APRN - CNP        Vitamin D (CHOLECALCIFEROL) tablet 2,000 Units  2,000 Units Oral Daily Evorn Vivienne, APRN - CNP   2,000 Units at 02/25/22 0844    pantoprazole (PROTONIX) tablet 40 mg  40 mg Oral QAM AC Evorn Vivienne, APRN - CNP   40 mg at 02/25/22 0500    [Held by provider] prazosin (MINIPRESS) capsule 1 mg  1 mg Oral Nightly Evorn Vivienne, APRN - CNP        sertraline (ZOLOFT) tablet 25 mg  25 mg Oral Daily Evorn Vivienne, APRN - CNP   25 mg at 02/25/22 0844         heparin (PORCINE) Infusion 16 Units/kg/hr (02/25/22 2436)    IV infusion builder 150 mL/hr at 02/25/22 0453    sodium chloride        dronabinol  2.5 mg Oral BID    sodium chloride flush  5-40 mL IntraVENous 2 times per day    Vitamin D  2,000 Units Oral Daily    pantoprazole  40 mg Oral QAM AC    [Held by provider] prazosin  1 mg Oral Nightly    sertraline  25 mg Oral Daily     loperamide, heparin (porcine), heparin (porcine), potassium chloride **OR** potassium alternative oral replacement **OR** potassium chloride, methocarbamol, HYDROmorphone, sodium chloride flush, sodium chloride, ondansetron **OR** ondansetron, acetaminophen **OR** acetaminophen  ADULT ORAL NUTRITION SUPPLEMENT; Lunch, Dinner; Frozen Oral Supplement  ADULT DIET;  Regular; LIKES SUGAR FREE VANILLA PUDDING CUPS       Labs:   CBC with DIFF:  Recent Labs     02/23/22  0500 02/23/22  0500 02/23/22  1638 02/24/22  0401 02/25/22  0250   WBC 3.4*  --   --  4.2* 3.9*   RBC 2.89*  --   --  3.24* 2.98*   HGB 8.4*  --   --  9.3* 8.6*   HCT 26.3*   < > 26.2* 29.5* 27.8*   MCV 91.0  --   --  91.0 93.3   MCH 29.1  --   --  28.7 28.9   MCHC 31.9*  --   --  31.5* 30.9*   RDW 14.6*  --   --  14.6* 14.7*     --   --  414* 402*   MPV 10.1  --   --  10.0 10.1   NEUTOPHILPCT 68.8*  --   --  74.4* 73.6*   LYMPHOPCT 14.8*  --   --  12.6* 12.8*   MONOPCT 11.0*  --   --  10.7* 11.3*   EOSRELPCT 4.5  --   --  1.9 1.5   BASOPCT 0.3  --   --  0.2 0.3   NEUTROABS 2.3  --   --  3.1 2.9   LYMPHSABS 0.5*  --   --  0.5* 0.5*   MONOSABS 0.40  --   --  0.50 0.40   EOSABS 0.20  --   --  0.10 0.10   BASOSABS 0.00  --   --  0.00 0.00    < > = values in this interval not displayed. CMP/BMP:  Recent Labs     02/23/22  0500 02/24/22  0401 02/25/22  0250   * 133* 132*   K 3.4* 3.4* 4.1   CL 84* 88* 94*   CO2 26 28 28   ANIONGAP 16 17 10   GLUCOSE 100 109 116*   BUN 78* 65* 51*   CREATININE 3.9* 3.5* 3.2*   LABGLOM 16* 18* 20*   CALCIUM 7.7* 8.5* 8.0*   PROT  --  6.6 5.7*   LABALBU  --  2.8* 2.6*   BILITOT  --  0.5 0.3   ALKPHOS  --  218* 183*   ALT  --  24 18   AST  --  32 22         CRP:  No results for input(s): CRP in the last 72 hours. Sed Rate:  No results for input(s): SEDRATE in the last 72 hours. HgBA1c:  No components found for: HGBA1C  FLP:  No results found for: TRIG, HDL, LDLCALC, LDLDIRECT, LABVLDL  TSH:  No results found for: TSH  Troponin T:   No results for input(s): TROPONINI in the last 72 hours. Pro-BNP: No results for input(s): BNP in the last 72 hours. INR: No results for input(s): INR in the last 72 hours. ABGs: No results found for: PHART, PO2ART, LAO9IVQ  UA:  No results for input(s): NITRITE, COLORU, PHUR, LABCAST, WBCUA, RBCUA, MUCUS, TRICHOMONAS, YEAST, BACTERIA, CLARITYU, SPECGRAV, LEUKOCYTESUR, UROBILINOGEN, BILIRUBINUR, BLOODU, GLUCOSEU, AMORPHOUS in the last 72 hours.     Invalid input(s): Ayo Deon      Culture Results:    No results for input(s): CXSURG in the last 720 hours. Blood Culture Recent:   Recent Labs     02/21/22  1739 02/08/22  2220   BC No Growth to date. Any change in status will be called. No growth after 5 days of incubation. No results for input(s): BC, BLOODCULT2, ORG in the last 72 hours. Cultures:   No results for input(s): CULTURE in the last 72 hours. No results for input(s): BC, Shankar Keen in the last 72 hours. No results for input(s): CXSURG in the last 72 hours. Recent Labs     02/23/22  0500 02/24/22  0401 02/25/22  0250   MG 2.1 2.0  --    PHOS 5.6* 5.1* 3.7     Recent Labs     02/24/22  0401 02/25/22  0250   AST 32 22   ALT 24 18   BILITOT 0.5 0.3   ALKPHOS 218* 183*         RAD:   CT ABDOMEN PELVIS WO CONTRAST Additional Contrast? None    Result Date: 2/21/2022  CT ABDOMEN PELVIS WO CONTRAST 2/21/2022 7:53 PM History: Postop pain. Hypotension. Noncontrast abdomen/pelvis CT. Comparison is made with February 8, 2022. In order to have a CT radiation dose as low as reasonably achievable Automated Exposure Control was utilized for adjustment of the mA and/or KV according to patient size. DLP in mGycm= 1115. Interval abdominal surgery. Normal heart size. Patchy atelectasis at the lung bases. Normal noncontrast appearance of the liver, pancreas, and spleen. Normal and symmetric kidneys. No hydronephrosis. No bowel dilation. No abscess or hematoma. 1. Postoperative changes with no sign of bowel obstruction, abscess, or hematoma. Signed by Dr Valentín Rahman Additional Contrast? None    Result Date: 2/9/2022  EXAM: CT ABDOMEN PELVIS WO CONTRAST INDICATION: Vomiting, recent surgery, history of cancer COMPARISON: 12/14/2020 DLP: 2066 mGy cm. In order to have a CT radiation dose as low as reasonably achievable, Automated Exposure Control was utilized for adjustment of the mA and/or KV according to patient size. FINDINGS: Patchy consolidation in the RIGHT and LEFT lung bases is noted. Moderate to large volume pneumoperitoneum. A surgical drain terminates in the LEFT upper abdomen. Postoperative change of RIGHT hemicolectomy with RIGHT upper quadrant anastomosis. Marked diffuse small bowel distention extending to the ileocolic anastomosis, likely representing small bowel obstruction. Small bowel loops measure up to 5 cm diameter. No definite area of pneumatosis. No evidence of portal venous gas. Trace free pelvic fluid. A dense linear device on the RIGHT peritoneal wall is likely represents a mesh device and may be related to recent surgical intervention (correlate with surgical history). Unchanged RIGHT posterolateral lumbar hernia. Postoperative change of RIGHT hepatectomy. The previously demonstrated LEFT liver lesion is not well visualized given lack of IV contrast. Gallbladder surgically absent. No biliary ductal dilatation. Pancreas, spleen, and adrenal glands are unremarkable. No urolithiasis or hydronephrosis. Normal caliber abdominal aorta. No enlarged retroperitoneal lymph nodes. Multiple borderline prominent mesenteric lymph nodes are similar to prior studies. No definite pelvic or inguinal lymphadenopathy. No acute abdominal wall soft tissue normality. No aggressive osseous lesion. 1.  Moderate to large volume pneumoperitoneum. This may be related to recent surgery although bowel perforation is also within the differential. There is a surgical drain in the LEFT upper abdomen which also may be contributing to pneumoperitoneum. 2.  Small bowel obstruction with transition at RIGHT upper quadrant ileocolonic anastomosis. Small bowel loops measure up to 5 cm diameter. 3.  Prior RIGHT hepatectomy. Previously demonstrated LEFT liver lesion is not well visualized given lack of IV contrast. 4.  Patchy consolidation in both lung bases, with differential including aspiration and pneumonia.  Findings in agreement with the emergent findings from the initial [facility] preliminary report. Signed by Dr Cal Berg    Result Date: 2/21/2022  Exam:   XR CHEST PORTABLE  Date:  2/21/2022 History:  Male, age  61 years; hypotension COMPARISON:  Chest x-ray dated February 8, 2022 Findings : Chest portable place. A right-sided PICC, new, terminating in the low SVC. The heart and mediastinum are normal in size. Lungs are without focal infiltrate, mass or effusions. The bones show no acute pathology. Catheter in the left upper quadrant. Impression: 1. New right-sided PICC. 2.  Otherwise, no interval changes. Signed by Dr Wicho Zhu    XR CHEST PORTABLE    Result Date: 2/8/2022  EXAMINATION: XR CHEST PORTABLE 2/8/2022 10:39 PM HISTORY: XR CHEST PORTABLE 2/8/2022 9:00 PM HISTORY: Cough COMPARISON: March 18, 2020. FINDINGS: The lungs are clear. Cardiac silhouette is normal. Left subclavian Port-A-Cath is present. Old healed fracture of the left clavicle is present. The osseous structures and surrounding soft tissues demonstrate no acute abnormality. 1. No radiographic evidence of acute cardiopulmonary process.  Signed by Dr Daniel Zepeda      Objective:   Vitals:   BP 92/62   Pulse 80   Temp 96.8 °F (36 °C)   Resp 18   Ht 5' 7\" (1.702 m)   Wt 142 lb 8 oz (64.6 kg)   SpO2 97%   BMI 22.32 kg/m²       Patient Vitals for the past 24 hrs:   BP Temp Temp src Pulse Resp SpO2 Height   02/25/22 0535 92/62 96.8 °F (36 °C) -- 80 18 97 % --   02/25/22 0007 (!) 96/55 98.1 °F (36.7 °C) -- 89 18 94 % --   02/24/22 1814 96/66 98.3 °F (36.8 °C) -- 81 16 97 % --   02/24/22 1450 -- -- -- -- -- -- 5' 7\" (1.702 m)   02/24/22 1400 (!) 100/52 -- -- -- -- -- --   02/24/22 1354 (!) 90/51 98.6 °F (37 °C) Temporal 87 16 96 % --       24HR INTAKE/OUTPUT:      Intake/Output Summary (Last 24 hours) at 2/25/2022 0849  Last data filed at 2/25/2022 0833  Gross per 24 hour   Intake 1540.71 ml   Output 1255 ml   Net 285.71 ml       Physical Exam  Vitals and nursing note reviewed. Constitutional:       General: He is not in acute distress. Appearance: Normal appearance. He is not ill-appearing, toxic-appearing or diaphoretic. HENT:      Head: Normocephalic and atraumatic. Right Ear: External ear normal.      Left Ear: External ear normal.      Nose: Nose normal. No congestion or rhinorrhea. Mouth/Throat:      Mouth: Mucous membranes are moist.      Pharynx: Oropharynx is clear. No oropharyngeal exudate or posterior oropharyngeal erythema. Eyes:      General: No scleral icterus. Right eye: No discharge. Left eye: No discharge. Extraocular Movements: Extraocular movements intact. Conjunctiva/sclera: Conjunctivae normal.      Pupils: Pupils are equal, round, and reactive to light. Cardiovascular:      Rate and Rhythm: Normal rate and regular rhythm. Pulses: Normal pulses. Heart sounds: Normal heart sounds. No murmur heard. No friction rub. No gallop. Pulmonary:      Effort: Pulmonary effort is normal. No respiratory distress. Breath sounds: Normal breath sounds. No stridor. No wheezing, rhonchi or rales. Chest:      Chest wall: No tenderness. Abdominal:      General: Bowel sounds are normal. There is no distension. Palpations: Abdomen is soft. Tenderness: There is no guarding or rebound. Comments: FARHAT drain in place. Right-sided ileostomy in place. Midline incision site with no evidence of bleeding or infection noted. Musculoskeletal:         General: No swelling, tenderness, deformity or signs of injury. Normal range of motion. Cervical back: Normal range of motion and neck supple. No rigidity. No muscular tenderness. Right lower leg: No edema. Left lower leg: No edema. Skin:     General: Skin is warm and dry. Capillary Refill: Capillary refill takes less than 2 seconds. Coloration: Skin is not jaundiced or pale.       Findings: No bruising, erythema, lesion or rash. Neurological:      General: No focal deficit present. Mental Status: He is alert and oriented to person, place, and time. Cranial Nerves: No cranial nerve deficit. Sensory: No sensory deficit. Motor: No weakness. Coordination: Coordination normal.   Psychiatric:         Mood and Affect: Mood normal.         Behavior: Behavior normal.         Thought Content: Thought content normal.         Judgment: Judgment normal.           Assessment/plan:     Hospital Problems           Last Modified POA    * (Principal) Acute kidney injury superimposed on CKD ([address] 2/21/2022 Yes    Adenocarcinoma of colon ([address] 2/21/2022 Yes    Liver metastases ([address] 2/22/2022 Yes    Hypovolemia 2/21/2022 Yes    Hyponatremia 2/21/2022 Yes    Moderate malnutrition ([address] 2/22/2022 Yes    Palliative care patient 2/22/2022 Yes    Posttraumatic stress disorder 2/22/2022 Yes    Deep vein thrombosis (DVT) of left upper extremity ([address] 2/24/2022 Yes          Principal Problem:    Acute kidney injury superimposed on CKD ([address]  Active Problems:    Adenocarcinoma of colon ([address]    Liver metastases (HCC)    Hypovolemia    Hyponatremia    Moderate malnutrition (HCC)    Palliative care patient    Posttraumatic stress disorder    Deep vein thrombosis (DVT) of left upper extremity ([address]  Resolved Problems:    * No resolved hospital problems. *      Brief Summary  Mr Ashlyn Velazco, a 53-year-old male, history of metastatic adenocarcinoma of the colon, presenting to [facility] ED (02/21/2022), no account of abnormal lab as well as generalized weakness and fatigue. Patient admitted to [facility] (02/21/2022), further work-up and management of acute on chronic renal failure. Further hospital course, as per problem is below;     MARIO on CKD  III  Hyponatremia  Hyperphosphatemia  Hypercalcemia   Creatinine level on presentation: 4.6: 02/21/2022   IV hydration -   Avoid hypotension & Nephrotoxins    Nephrology on board-appreciate recommendations   Further management as per nephrology      Left lower quadrant abdominal pain  Metastatic adenocarcinoma of colon  · Status post recent ex lap at OSH (02/09/2022), with FARHAT drain placement and diverting loop ileostomy. · CT Abdomen / Pelvis WO Con (02/01/2022): Impression: Postoperative changes with no sign of bowel obstruction, abscess, or hematoma  · KUB (02/25/2022): No acute abdominal disease is seen  · General surgery and Oncology on board    LUE DVT  · Left arm venous duplex ultrasound (02/02/2022): Positive DVT in left jugular, subclavian, axillary, and brachial veins. Positive SVT in left proximal cephalic vein, media and cubital veins  · Heparin gtt   · Vascular Surgery on board      Hypokalemia  · Replace as needed  · Monitor BMP      Continue management of other chronic medical conditions - see above and orders. Advance Directive: Full Code    ADULT ORAL NUTRITION SUPPLEMENT; Lunch, Dinner; Frozen Oral Supplement  ADULT DIET; Regular; LIKES SUGAR FREE VANILLA PUDDING CUPS         Consults Made:   IP CONSULT TO ONCOLOGY  IP CONSULT TO DIETITIAN  IP CONSULT TO NEPHROLOGY  IP CONSULT TO GENERAL SURGERY  IP CONSULT TO PALLIATIVE CARE  IP CONSULT TO VASCULAR SURGERY  IP CONSULT TO DIETITIAN    DVT prophylaxis: Heparin      Discharge planning:  Continue management as above, including IV fluids  Monitor renal function    Time Spent is 25 mins in the examination, evaluation, counseling and review of medications, assessment and plan.      Electronically signed by   Colette Jarquin MD, MPH, MD,   Internal Medicine Hospitalist   2/25/2022 8:49 AM

## 2022-02-25 NOTE — PROGRESS NOTES
PROGRESS NOTE    Patient name: Corinne Curry  Patient : 1955  Room: 314      SUBJECTIVE: He reports he ate a little bit better yesterday. Resting okay. Left arm less swollen with elevation and initiation of heparin drip. INTERVAL HISTORY  The patient is a very pleasant 61years old male who has a history of recurrent colon cancer. He recently underwent debulking surgery and HIPEC at [facility] in 2022. Postoperative course complicated by internal hernia. He was again taken to the OR on 2/10/2022 at [facility]. He has had a complicated postoperative course with severe dehydration and acute kidney injury. He was receiving IV fluids at home according to his ileostomy output. His wife called the clinic yesterday stating that the patient was feeling quite dizzy and had generalized weakness. Laboratory studies reviewed and showed creatinine elevated at 4.6. The patient was directed to emergency. He received IV fluids in the emergency. He was then admitted for further care.   I was consulted for his concurrent history of colon cancer.     INTERVAL HISTORY/HISTORY OF PRESENT ILLNESS:  Diagnosis  · Colonic adenocarcinoma, 2020  · [id number](liver), stage ROXANA  · IHC MMR- proficient  · K-maria luisa mutated  · N-MARIA LUISA/BRAF wild-type  · Iron deficiency anemia  · Soft tissue mass, 2021  · Adenocarcinoma-consistent with colon primary, right psoas muscle, 2021  · Metastatic adenocarcinoma, 2022  · MLH1, MSH2, MH6, PMS2-intact  · MMR- no loss of expression     Treatment summary  · 3/30/2020-right hemicolectomy at [facility]  · Anticipated Liver ablation to be followed by neoadjuvant/adjuvant versus palliative chemotherapy  · 06/10/2020-2020-FOLFOX + Avastin  · 20- Right hepatectomy-Dr. Eyad Garcia  · S/p Injectafer-poor oral iron tolerance  · 21- Initiate FOLFIRI + Avastin every 2 weeks  · 22-psoas muscle mass resection/HIPEC by Dr. Catia Ward at [place]  · 2/10/2022-back to OR for correction of internal hernia        The patient is a 61years old male who has a diagnosis of colonic adenocarcinoma. The patient had malignant disease with several lymph nodes involved. In addition, the patient was also found to have suspicious liver lesions concerning for metastatic disease. He was seen by [address] [place] [address] and offered a multimodality approach with liver ablation of the left liver lesion followed by neoadjuvant chemotherapy and partial right hepatectomy. He underwent microwave ablation of the left lobe liver lesion on 6/8/2020. He is status post completion of 11 biweekly cycles of FOLFOX/Avasti completed November 2020. He tolerated treatment with complaints of mild transient cold neuropathy. He had a right hepatectomy on 12/11/2020 that showed no residual disease. His last CEA was normal in January 2021. He had MRI of the abdomen at [address] [place] [address] in March 2021 that showed no evidence of recurrent disease in the liver or in the abdomen. He also had a surveillance colonoscopy in March 2021 that showed no polyps. CEA was elevated in May 2021. Repeat CEA June 2021 showed persistent elevation. MRI abdomen at [address] [place] [address] showed no evidence of liver recurrence but a suspicious nodule in the right lower quadrant. Biopsy was performed at [facility] and consistent with recurrent adenocarcinoma.  I discussed the findings with hepatobiliary service and also colorectal surgery. He was started on FOLFIRI/Avastin. He was seen by Dr. Kevin Naidu again on 9/24/2021. He had repeat CT abdomen pelvis and also MRI at [address] [place] [address] that showed persistent disease involving the psoas muscle.  In addition, an additional small place that may represent further peritoneal metastatic disease. He underwent surgery at [address] [place] [address].   He underwent exploratory laparotomy with resection of psoas mass at [address] [place] [address] on 1/13/2022. [facility] also underwent HIPEC treatment.   The patient was taken to perineural invasion identified.  Surgical margins negative for evidence of malignancy.  6 out of 14 lymph nodes positive for metastatic adenocarcinoma.  Final pathology staging [id number](liver, stage ROXANA)  · 4/20/2020-CT chest with contrast showed No convincing intrathoracic metastasis. Nonspecific 4 mm nodule of the inferior lingula and a 2 mm right upper lobe nodule can be followed on subsequent imaging in 6-12 months.  Moderate coronary calcifications. Hypodense metastatic liver lesions.  Small hiatal hernia. · 4/20/2020-Mri Abdomen W Wo Contrast There are about 5 liver lesions. The 2 largest appears similar compared to 3/18/2020, the others are too small to further characterize. Appearance is most concerning for metastatic disease. Enhancement of the right lateral peritoneum. This is favored to be postoperative as there is no nodularity, evidence of omental disease or lymphadenopathy. Recommend attention on follow-up. Cholecystectomy. · 4/22/2020-discussed with Dr. Elan Carmen at [facility] will review imaging studies and give further recommendations regarding eligibility for resection of liver lesions. · 5/8/2020 CT Abdomen The two largest suspicious lesions measuring 1.2 and 1.3 cm in the  right and left hepatic lobes respectively are similar compared to the  3/18/2020 CT. Additionally, there are at least five subcentimeter lesions with similar signal characteristics, which are also highly suspicious for metastases. If complete characterization of the number and distribution of lesions is necessary, an MRI with Eovist could be acquired. · 5/19/2020- he was seen by the hepatobiliary service at [address] [place] [address] with Dr. Elan Carmen:  patient adequate risk candidate for a multimodal approach, directed toward curative hepatectomy eventually. Endorsed by Hepatobiliary Conference, I recommended perc ablation of the L hemiliver to clear it, followed by systemic therapy in a neoadjuvant strategy. Restaging imaging to confirm clearance of disease on the left and lack of progression to unresectability of the R hemiliver disease would then be followed by R hepatectomy. Limitations to this approach may be accessibility of the segment 4A/8 disease high in the hepatic dome and the possibility of heat sink-related recurrence s/p abation of the left-sided disease. · 5/21/2020- referral for Mediport placement and start FOLFOX in 2 weeks.  Will add bevacizumab after 6 weeks of liver ablation.  We will plan for 12 biweekly dose of FOLFOX.  Bevacizumab will also be stopped 6 weeks prior to major procedure. · 6/8/2020- Microwave ablation of the left liver lesion was then performed for 5 minutes at [address] to achieve a 3.4 x 3.9 cm approximately spherical zone of ablation.    · 6/10/2020- initiation of FOLFOX. · 7/20/2020-added Avastin. · 9/10/20 MRI abdomen: Left hepatic lobe segment II lesion demonstrates small T1 hyperintense blood products, status post microwave ablation on 6/8/2020. No definitive enhancement within the lesion. Focal internal thickening or scar present. Recommend attention on follow-up. Additional scattered subcentimeter foci throughout the liver decreased in size compared to MRI dated 4/20/2020 consistent with improving metastatic disease. Additional chronic findings as above. · 9/16/2020-discussed with plan with the patient and [place].  Interval response to treatment.  Plan to continue chemotherapy through 12 cycles with Avastin. · 12/11/20 Right hepatectomy-Dr. Eyad Garcia  · 12/11/20 Right hepatectomy pathology: Liver, right, resection: Focus of Fibrosis with calcifications and chronic inflammation (0.3 cm), see comment. Background hepatic parenchyma with minimal periportal fibrosis (trichrome stain), minimal lobular and portal inflammation, and no significant macrovesicular steatosis (<5%) Comments: The patient's history of colorectal cancer status adjuvant therapy is noted.  The focus of fibrosis may reflect treatment effect. There is no evidence of viable tumor in the sampled specimen. · 12/14/20 Ct Abdomen Pelvis W Iv Contrast A Small bowel obstruction with transition point at the distal small bowel, just proximal to RIGHT upper quadrant ileocolonic anastomosis.  Postoperative change of RIGHT hepatectomy with small amount of expected free fluid and intraperitoneal gas.  Redemonstrated LEFT liver lesion.  Small bilateral pleural effusions. · 12/16/20 SBFT-[facility]: Study is limited due to retained contrast in the small bowel from prior attempt at small bowel follow-through on the floor. Small bowel remains dilated, measuring approximately 5.2 cm, which is consistent with partial or resolving small bowel obstruction. Final radiographs show contrast within the colon. · 1/4/2020-resolution of small bowel obstruction. · 1/7/21 CT chest: No finding to suggest intrathoracic neoplastic process or metastatic disease. The benign-appearing tiny nodule in the right upper lobe probably represent a noncalcified granuloma. A nodule in the lingular segment of the left upper lobe is not visualized in this study. Postsurgical changes of the liver. No evidence of focal complication. A trace right basal pleural effusion. This may be reactive to the previous abdominal surgery. · 3/4/21 MRI abd: Status post right hepatectomy and microwave ablation of a left liver lesion. No findings to suggest residual/recurrent disease. No new liver lesion is identified. Postsurgical changes related to right hemicolectomy. Microwave ablation zone in segment II of the left hepatic lobe measures approximately 21 mm in diameter, unchanged. No appreciable postcontrast enhancement or other findings to suggest local disease recurrence. No new liver lesion is identified. Spleen is mildly enlarged, measuring 13.8 cm in length. · 3/17/2021-1 year colonoscopy showed no evidence of polyps.   · 5/3/21 CEA 6.2  · 6/8/21 MRI abdomen ([facility]): Status post right hepatectomy and MWA of a left liver lesion.  No evidence of residual or recurrent metastatic disease in the liver. Status post prior right hemicolectomy for colon carcinoma. Within the posterior right iliopsoas muscle there is a mildly T2 hyperintense nodular focus with postcontrast rim enhancement and diffusion restriction. This measures approximately 2.7 x 2 x 2 cm. More delayed postcontrast images show irregular area of enhancement measuring 2.4 x 7.7 cm axially involving the right iliopsoas muscle and the right lateral abdominal wall. Suggest correlation with contrast enhanced CT exam for complete evaluation. Consider biopsy of this lesion. · 6/15/21 CT CHEST WO CONTRAST No metastatic disease in the chest.  No change in tiny 2 mm RIGHT upper lobe pulmonary nodule.  Mild ectasia of the ascending aorta measuring 4 cm. · 6/18/2021-I reviewed results of MRI abdomen at [facility].  CT chest without contrast reviewed by me and showed no evidence of metastatic disease.  Stable 2 mm right upper lobe nodule.  Discussed with hepatobiliary service at [place]. Biopsy intra-abdominal nodule next week at [place]. · 6/25/[id number]-KU-gtezte biopsy soft tissue mass right psoas muscle at [place] consistent with recurrent colonic adenocarcinoma.    · 7/2/2021-discussed with hepatobiliary service at [place] and also surgical oncology.  Surgical oncology will call us back regarding consultation for consideration of HIPEC if he is a candidate  · 7/13/21-initiation of chemotherapy with FOLFIRI + Avastin every 2 weeks  · 7/13/21 CEA 10.7  · 7/27/2021-CEA 9.6  · 7/30/2021-she was seen by the surgical oncology group at [place] by Dr Johny Borrego recommended to complete 6 cycles of chemotherapy and repeat CT chest abdomen pelvis and liver MRI to assess disease response.  They discussed the role of CRS/HIPEC in his situation.  He was told that it really depends on the status of his liver lesions as well. He will complete 6 cycles of chemotherapy and will return to see me with a CTAP as well as MRI of the liver to assess disease burden and determine if he can be a candidate for debulking. · 8/25/2021-proceed cycle #4. · 9/22/2021 CEA- 8.1  · 9/24/2021 MRI with and w/o Contrast ([facility])  Tiny left retroperitoneal nodule involving the left lateral conal fascial new compared to 3/4/2021 though unchanged from most recent prior, possibly an additional site of metastasis. No other new metastatic disease within the abdomen. Similar partially visualized right posterior body wall/psoas infiltrative lesion not definitely changed from MRI abdomen 6/8/2021 but better evaluated in its entirety on concurrent CT, reported separately.   Status post right hemicolectomy, right hepatectomy, and segment III microwave ablation. Similar mild splenomegaly.  Additional chronic and incidental findings as described in the body the report.  Liver: Status post right hepatectomy. Ablation zone in segment II measures 1.7 x 1.1 cm, slightly decreased in size from 1.8 x 1.4 cm previously (series 1301 image 56) without appreciable enhancement. Similar tiny subcentimeter cyst in the left hepatic lobe. No new focal hepatic lesion identified. No visualized free fluid. Similar 0.7 cm enhancing nodule along the left lateral conal fascia laterally new from 3/4/2021, grossly unchanged from MRI dated 6/8/2021. (series 801 image 22). No other appreciable peritoneal/retroperitoneal or  omental nodularity. Ill-defined T2 hyperintense signal and hyperenhancement in the right posteromedial body wall involving the lateral aspect of the psoas muscle and posterolateral abdominal wall musculature, partially visualized measuring at least 8.7 x 3.1   cm, grossly similar to the prior exam, better evaluated in its entirety on concurrent CT reported separately.    · 9/24/2021 CT C/A/P w/ Contrast([facility]) Status post right hemicolectomy, right hepatectomy and left hepatic microwave ablation without new suspicious hepatic lesion.  Left lateral perirenal fascial nodule, which is stable compared to 6/8/2021 MRI, but new compared to 3/4/2021 MRI is concerning for an additional site of metastatic disease.  No sites of new or enlarging metastatic disease in the chest.  Similar appearance of right lateral psoas and posterior abdominal wall infiltrative lesion, not significantly changed compared to 6/8/2021 MRI.  Mild splenomegaly.  Additional findings as outlined in the body the report. Biopsy-proven adenocarcinoma involving the posterior lateral aspect of the right iliopsoas muscle and right lateral abdominal wall. Right iliopsoas component is difficult to measure but appears to be approximately 2.5 x 2.4 cm (series 2, image 153), similar to prior MRI. Nodular thickening noted along the right posterior abdominal wall and possibly involving the the transversus abdominis measures approximately 8.5 x 1.8 cm (series 2 image 153) overall similar compared to prior MRI. · 10/6/2021-discussed the results of recent CT abdomen/pelvis and MRI abdomen/pelvis at [facility].  Persistent disease involving the psoas muscle.  Discussed with colorectal surgery at [facility] recommend to continue current therapy for another 2 months and reassess with CT scans. · 12/3/21 CT chest/abd/pelvis ([facility]): Status post prior right hemicolectomy, right hepatectomy and left hepatic lesions microwave ablation. No new liver lesion. Soft tissue thickening of the right lower posterior abdominal wall involving the iliopsoas muscle is grossly unchanged consistent with improving metastatic disease. Small right omental nodule and left lateral conal fascia nodule unchanged compared to  recent exam.   · 1/13/22 Exploratory lap with resections of psoas muscle mass and HIPEC by Dr. Schofield Covert:  Metastatic colorectal adenocarcinoma involving fibroadipose tissue. [facility] MMR proficient. · 1/24/22 CT chest/abd/pelvis ([facility]): Extensive postsurgical changes in the abdomen including partial right colectomy, resection of right retroperitoneal mass, omentectomy and mesh repair of right lateral abdominal wall defect. There appears to be a defect in the mesh containing herniated loops of small bowel. Extensive diffuse dilated small bowel loops with air-fluid levels are seen throughout the abdomen. There are segmental areas of decompressed small bowel in the left upper quadrant as well as adjacent to the herniated small  bowel loops in the right retroperitoneum. Air-fluid levels are also seen throughout the colon. While pattern could likely represent postoperative ileus given the diffuse small bowel dilatation and distal colonic air and fluid, developing or partial small bowel obstruction secondary to the right lateral abdominal wall hernia cannot entirely be excluded.  Small ascites. 8 mm nodule along the left lateral conal fascia is unchanged. Slight increase in size of cardiophrenic lymph nodes measuring up to 7 mm anterior to the right hemidiaphragm. Stable hypodensity in the left hepatic lobe.  Diffuse gastric wall thickening/edema could be secondary to gastritis in the appropriate clinical setting.      CEA dynamics:  3/11/20 CEA 5.5  8/19/20 CEA 2.4  9/2/20 CEA 2.7  9/16/20 CEA 2.7  9/30/20 CEA 2.7  10/19/20 CEA 2.3  1/4/21 CEA 2.2  5/3/21 CEA 6.2  6/15/21 CEA 8.3  7/13/21 CEA 10.7  7/27/21 CEA 9.6  8/12/21/21 CEA 8.2  8/25/2021-CEA 8.9  9/22/2021 CEA 8.1    Objective   BP 92/62   Pulse 80   Temp 96.8 °F (36 °C)   Resp 18   Ht 5' 7\" (1.702 m)   Wt 142 lb 8 oz (64.6 kg)   SpO2 97%   BMI 22.32 kg/m²     PHYSICAL EXAM:  CONSTITUTIONAL: Alert, appropriate,ill-appearing, feel stronger  EYES: Non icteric, EOM intact, pupils equal round   ENT: Mucus membranes moist,external inspection of ears and nose are normal  NECK: Supple, no masses.  No palpable thyroid mass  CHEST/LUNGS: CTA bilaterally, normal respiratory effort   CARDIOVASCULAR: RRR, no murmurs.  No lower extremity edema  ABDOMEN: mild tender - no guarding or rebound, post op FARHAT drain, ileostomy w-ith stool  EXTREMITIES: 2+ pitting edema LUE, port left chest wall. SKIN: warm, dry with no rashes or lesions  LYMPH: No cervical, clavicular, axillary, or inguinal lymphadenopathy  NEUROLOGIC: follows commands, non focal   PSYCH: mood and affect appropriate. Alert and oriented to time, place, person    Recent Labs     02/25/22  0250 02/24/22  0401 02/23/22  1638 02/23/22  0500 02/23/22  0500   WBC 3.9* 4.2*  --   --  3.4*   HGB 8.6* 9.3*  --   --  8.4*   HCT 27.8* 29.5* 26.2*   < > 26.3*   MCV 93.3 91.0  --   --  91.0   * 414*  --   --  293    < > = values in this interval not displayed. Lab Results   Component Value Date     (L) 02/25/2022    K 4.1 02/25/2022    CL 94 (L) 02/25/2022    CO2 28 02/25/2022    BUN 51 (H) 02/25/2022    CREATININE 3.2 (H) 02/25/2022    GLUCOSE 116 (H) 02/25/2022    CALCIUM 8.0 (L) 02/25/2022    PROT 5.7 (L) 02/25/2022    LABALBU 2.6 (L) 02/25/2022    BILITOT 0.3 02/25/2022    ALKPHOS 183 (H) 02/25/2022    AST 22 02/25/2022    ALT 18 02/25/2022    LABGLOM 20 (A) 02/25/2022    GFRAA 24 (L) 02/25/2022    AGRATIO 1.5 06/15/2021    GLOB 3.8 02/07/2022       Lab Results   Component Value Date    INR 1.0 06/18/2021    PROTIME 12.1 06/18/2021       30 Day lookback of cultures:    Blood Culture Recent:   Recent Labs     02/21/22  1739   BC No Growth to date. Any change in status will be called. Gram Stain Recent: No results for input(s): LABGRAM in the last 720 hours. Resp Culture Recent: No results for input(s): CULTRESP in the last 720 hours. Body Fluid Recent : No results for input(s): BFCX in the last 720 hours. MRSA Recent : No results for input(s): [address] in the last 720 hours. Urine Culture Recent : No results for input(s): LABURIN in the last 720 hours.   Organism Recent : No results for input(s): ORG in the last 720 hours. Narrative   CT ABDOMEN PELVIS WO CONTRAST    2/21/2022 7:53 PM   History: Postop pain. Hypotension. Noncontrast abdomen/pelvis CT. Comparison is made with February 8, 2022. In order to have a CT radiation dose as low as reasonably achievable   Automated Exposure Control was utilized for adjustment of the mA   and/or KV according to patient size. DLP in mGycm= 1115. Interval abdominal surgery. Normal heart size. Patchy atelectasis at the lung bases. Normal noncontrast appearance of the liver, pancreas, and spleen. Normal and symmetric kidneys. No hydronephrosis. No bowel dilation. No abscess or hematoma.       Impression   1. Postoperative changes with no sign of bowel obstruction, abscess,   or hematoma. Signed by Dr Angel Emery:  #Colon cancer  -Status post neoadjuvant chemo followed by debulking surgery/HIPEC) at [address] [place] [address] January 2022  -Status post exploratory laparotomy 2/10/2022 for correction of internal hernia at [facility] [phone]  -Patient has ileostomy and abdominal drain  -Patient is currently not on chemotherapy.   Last chemotherapy November 2021  -CT A/P Wo contrast 2/21/2022: Pneumoperitoneum, postoperative changes    Dr. Lisa Lawton following     #Acute kidney injury-likely prerenal secondary to high ileostomy output  -Patient was receiving IV fluids at home.  -He received IV fluid resuscitation in the emergency  -CT A/P without contrast: No hydronephrosis     (baseline creatinine 1.2-1.4)    Dr. Serina Celis following    NS with 30 mEq KCl at 150 cc/hr     Crt slowly improving      #Normocytic hypochromic anemia         Component hemodilution    Serology 2/24/2022  Iron panel  Ferritin  B12 - 1150  Folate - 17.7  LDH - 187  Retic - 3.23% with absolute 0.0930  Hapto - 410    HGB stable - CBC will be followed    #DVT left upper extremity-most likely port associated -    -Left upper extremity ultrasound 2/23/2022  · Acute appearing deep vein thrombosis (DVT) is seen in the internal jugular  vein subclavian axillary brachial, left upper extremity. · Acute appearing superficial thrombophlebitis (SVT) is seen in the basilic and cephalic veins, right upper extremity. - heparin drip, dosing per pharmacy, until renal function improves    #Anorexia    - Marinol 2.5 p.o. twice daily initiated  - Dietary consult    #Weakness, deconditioning    Physical therapy consultation    PLAN:  Continue aggressive IV fluid resuscitation  Heparin drip  Physical therapy   Dietary   Marinol 2.5 twice daily      Kylee Vanegas PA-C    02/25/22  6:21 AM  Physician's attestation/substantial contribution:  I, Dr Dana Fry, independently performed an evaluation on Denise Morfin. I have reviewed relevant medical information/data to include but not limited to medication list, relevant appropriate labs and imaging when applicable. I reviewed other physician's notes, ancillary services and nurses assessment. I have reviewed the above documentation completed by the Nurse Practitioner or Physician Assistant. Please see my additional contributions to the history of present illness, physical examination, and assessment/medical decision-making that reflect my findings and impressions. I have seen and examined the patient and the key elements of all parts of the encounter have been performed by me. I agree with the assessment and plan as outlined by the ARNP/PA. Subjective-\"I feel better today \". \"I need to get out of this hospital\". Patient moved outside the bed yesterday a little bit. He feels that Marinol is helping. Objective-as above  Assessment/plan:  DVT right upper extremity-currently on IV heparin until creatinine clearance above 30. Then can be switched to Eliquis on Xarelto. Colon cancer, recurrence-status post debulking/HIPEC at [facility]. Last chemotherapy November 2021.   Postop care-by general surgery

## 2022-02-25 NOTE — PROGRESS NOTES
Nephrology ([id number]) Progress Note      Patient:  Corinne Curry  YOB: 1955  Date of Service: 2/25/2022  MRN: [id number]   Acct: [de-identified]   Primary Care Physician: KALPESH Sheridan NP  Advance Directive: Full Code  Admit Date: 2/21/2022       Hospital Day: 4  Referring Provider: Giles Hurd MD    Patient independently seen and examined, Chart, Consults, Notes, Operative notes, Labs, Cardiology, and Radiology studies reviewed as available. Chief complaint: Profound weakness    Subjective:  Corinne Curry is a 61 y.o. male for whom we were consulted for evaluation and treatment of acute kidney injury. Patient denies any history of chronic kidney disease. Patient has history of stage IV colon cancer with metastatic disease to liver. Patient was recently hospitalized at [facility] with bowel obstruction, needing emergent laparotomy and ileostomy. Previously underwent partial colectomy and partial hepatectomy for the treatment of metastatic colon cancer. Patient denies any nausea and vomiting. He has noticed high output from ileostomy and has to change the bag frequently. Patient was supposed to get total parenteral nutrition but was not approved by [employer]. Patient has been trying to eat more but unable to eat due to recent abdominal surgery. His fluid intake was also very low. In the emergency room his serum creatinine was 4.6 mg, phosphorus was 11.1 mg. Patient also has hyponatremia. He is now admitted for acute kidney injury and other severe electrolyte abnormalities. He is currently getting IV fluid and feeling little better. This morning patient is complaining of pain at his right lower quadrant ostomy site. Otherwise he has good urine output and has slow renal recovery.     Allergies:  Morphine and Oxycodone-acetaminophen    Medicines:  Current Facility-Administered Medications   Medication Dose Route Frequency Provider Last Rate Last Admin    loperamide (IMODIUM) capsule 4 mg  4 mg Oral BID PRN Reji Sifuentes PA-C        heparin 25,000 units in dextrose 5% 250 mL (premix) infusion  5-30 Units/kg/hr IntraVENous Continuous Yung Rome PA-C 10.3 mL/hr at 02/25/22 0833 16 Units/kg/hr at 02/25/22 3081    dronabinol (MARINOL) capsule 2.5 mg  2.5 mg Oral BID Deandre Rodriges MD   2.5 mg at 02/25/22 0845    heparin (porcine) injection 5,170 Units  80 Units/kg IntraVENous PRN Yung Rome PA-C   5,170 Units at 02/24/22 1248    heparin (porcine) injection 2,580 Units  40 Units/kg IntraVENous PRN Yung Rome PA-C        potassium chloride 30 mEq in sodium chloride 0.9 % 1,000 mL infusion   IntraVENous Continuous Alejandro Mendez  mL/hr at 02/25/22 0453 New Bag at 02/25/22 0453    potassium chloride (KLOR-CON M) extended release tablet 40 mEq  40 mEq Oral PRN Ascencion Ponce MD   40 mEq at 02/24/22 6454    Or    potassium bicarb-citric acid (EFFER-K) effervescent tablet 40 mEq  40 mEq Oral PRN Ascencion Ponce MD        Or   Wilmar Barrett potassium chloride 10 mEq/100 mL IVPB (Peripheral Line)  10 mEq IntraVENous PRN Ascencion Ponce MD        methocarbamol (ROBAXIN) tablet 500 mg  500 mg Oral TID PRN Ascencion Ponce MD        HYDROmorphone (DILAUDID) tablet 2 mg  2 mg Oral Q6H PRN Ascencion Ponce MD   2 mg at 02/25/22 0844    sodium chloride flush 0.9 % injection 5-40 mL  5-40 mL IntraVENous 2 times per day KALPESH Albright CNP   10 mL at 02/24/22 0810    sodium chloride flush 0.9 % injection 5-40 mL  5-40 mL IntraVENous PRN KALPESH Albright CNP        0.9 % sodium chloride infusion  25 mL IntraVENous PRN KALPESH Albright CNP        ondansetron (ZOFRAN-ODT) disintegrating tablet 4 mg  4 mg Oral Q8H PRN KALPESH Albright CNP        Or    ondansetron [facility]) injection 4 mg  4 mg IntraVENous Q6H PRN KALPESH Albright CNP   4 mg at 02/24/22 0809    acetaminophen (TYLENOL) Cancer Mother     High Blood Pressure Mother     Colon Cancer Mother         x2    Cancer Father         Lung Cancer    Breast Cancer Sister     Cancer Maternal Grandfather         Lung Cancer    Cancer Paternal Grandfather         Stomach Cancer    Colon Polyps Neg Hx     Cystic Fibrosis Neg Hx     Liver Disease Neg Hx     Rectal Cancer Neg Hx        Social History  Social History     Socioeconomic History    Marital status:      Spouse name: Not on file    Number of children: Not on file    Years of education: Not on file    Highest education level: Not on file   Occupational History    Not on file   Tobacco Use    Smoking status: Never Smoker    Smokeless tobacco: Never Used   Vaping Use    Vaping Use: Never used   Substance and Sexual Activity    Alcohol use: Yes     Comment: rare     Drug use: No    Sexual activity: Yes     Partners: Female   Other Topics Concern    Not on file   Social History Narrative    Not on file     Social Determinants of Health     Financial Resource Strain:     Difficulty of Paying Living Expenses: Not on file   Food Insecurity:     Worried About Running Out of Food in the Last Year: Not on file    [place] of Food in the Last Year: Not on file   Transportation Needs:     Lack of Transportation (Medical): Not on file    Lack of Transportation (Non-Medical):  Not on file   Physical Activity:     Days of Exercise per Week: Not on file    Minutes of Exercise per Session: Not on file   Stress:     Feeling of Stress : Not on file   Social Connections:     Frequency of Communication with Friends and Family: Not on file    Frequency of Social Gatherings with Friends and Family: Not on file    Attends Latter-day Services: Not on file    Active Member of Clubs or Organizations: Not on file    Attends Club or Organization Meetings: Not on file    Marital Status: Not on file   Intimate Partner Violence:     Fear of Current or Ex-Partner: Not on file   Priscilla Pinon Emotionally Abused: Not on file    Physically Abused: Not on file    Sexually Abused: Not on file   Housing Stability:     Unable to Pay for Housing in the Last Year: Not on file    Number of Places Lived in the Last Year: Not on file    Unstable Housing in the Last Year: Not on file         Review of Systems:  History obtained from chart review and the patient  General ROS: No fever or chills  Respiratory ROS: No cough, shortness of breath, wheezing  Cardiovascular ROS: No chest pain or palpitations  Gastrointestinal ROS: positive for - diarrhea/large output from last  Genito-Urinary ROS: No dysuria or hematuria  Musculoskeletal ROS: No joint pain or swelling   14 point ROS reviewed with the patient and negative except as noted above and in the HPI unless unable to obtain. Objective:  Patient Vitals for the past 24 hrs:   BP Temp Temp src Pulse Resp SpO2 Height   02/25/22 0535 92/62 96.8 °F (36 °C) -- 80 18 97 % --   02/25/22 0007 (!) 96/55 98.1 °F (36.7 °C) -- 89 18 94 % --   02/24/22 1814 96/66 98.3 °F (36.8 °C) -- 81 16 97 % --   02/24/22 1450 -- -- -- -- -- -- 5' 7\" (1.702 m)   02/24/22 1400 (!) 100/52 -- -- -- -- -- --   02/24/22 1354 (!) 90/51 98.6 °F (37 °C) Temporal 87 16 96 % --       Intake/Output Summary (Last 24 hours) at 2/25/2022 0942  Last data filed at 2/25/2022 1252  Gross per 24 hour   Intake 1540.71 ml   Output 1605 ml   Net -64.29 ml     General: awake/alert   HEENT: Normocephalic atraumatic head  Neck: Supple with no JVD or carotid bruits. Chest:  clear to auscultation bilaterally  CVS: regular rate and rhythm  Abdominal: Midline abdominal wound, looks fresh with staples/right lower quadrant ileostomy.   Extremities: no cyanosis or edema  Skin: warm and dry without rash      Labs:  BMP:   Recent Labs     02/23/22  0500 02/24/22  0401 02/25/22  0250   * 133* 132*   K 3.4* 3.4* 4.1   CL 84* 88* 94*   CO2 26 28 28   PHOS 5.6* 5.1* 3.7   BUN 78* 65* 51*   CREATININE 3.9* 3.5* 3.2* abdomen/pelvis CT. Comparison is made with February 8, 2022. In order to have a CT radiation dose as low as reasonably achievable Automated Exposure Control was utilized for adjustment of the mA and/or KV according to patient size. DLP in mGycm= 1115. Interval abdominal surgery. Normal heart size. Patchy atelectasis at the lung bases. Normal noncontrast appearance of the liver, pancreas, and spleen. Normal and symmetric kidneys. No hydronephrosis. No bowel dilation. No abscess or hematoma. 1. Postoperative changes with no sign of bowel obstruction, abscess, or hematoma. Signed by Dr Sonya Hernandez    XR CHEST PORTABLE    Result Date: 2/21/2022  Exam:   XR CHEST PORTABLE  Date:  2/21/2022 History:  Male, age  61 years; hypotension COMPARISON:  Chest x-ray dated February 8, 2022 Findings : Chest portable place. A right-sided PICC, new, terminating in the low SVC. The heart and mediastinum are normal in size. Lungs are without focal infiltrate, mass or effusions. The bones show no acute pathology. Catheter in the left upper quadrant. Impression: 1. New right-sided PICC. 2.  Otherwise, no interval changes. Signed by Dr Nadine Reardon   1. Acute kidney injury/stage III/improving. .  2.  Intravascular volume depletion. 3.  Severe hyperphosphatemia  4. Hypercalcemia. 5.  Hyponatremia improving. 6.  History of metastatic colon cancer. 7.  Severe immunosuppression due to recent chemo. 8.  Hypokalemia. 9.  Anemia related to multiple factors. Plan:  1. Continue current IV fluid. 2.  Plan was discussed with the patient and his wife. 3.  Continue to monitor renal function closely.       Gil Zarate MD  02/25/22  9:42 AM

## 2022-02-26 LAB
ANION GAP SERPL CALCULATED.3IONS-SCNC: 10 MMOL/L (ref 7–19)
APTT: 67.3 SEC (ref 26–36.2)
BASOPHILS ABSOLUTE: 0 K/UL (ref 0–0.2)
BASOPHILS RELATIVE PERCENT: 0.3 % (ref 0–1)
BLOOD CULTURE, ROUTINE: NORMAL
BUN BLDV-MCNC: 39 MG/DL (ref 8–23)
CALCIUM SERPL-MCNC: 8.6 MG/DL (ref 8.8–10.2)
CHLORIDE BLD-SCNC: 99 MMOL/L (ref 98–111)
CO2: 25 MMOL/L (ref 22–29)
CREAT SERPL-MCNC: 2.9 MG/DL (ref 0.5–1.2)
CULTURE, BLOOD 2: NORMAL
EOSINOPHILS ABSOLUTE: 0.1 K/UL (ref 0–0.6)
EOSINOPHILS RELATIVE PERCENT: 3.1 % (ref 0–5)
GFR AFRICAN AMERICAN: 27
GFR NON-AFRICAN AMERICAN: 22
GLUCOSE BLD-MCNC: 104 MG/DL (ref 74–109)
HCT VFR BLD CALC: 27.8 % (ref 42–52)
HEMOGLOBIN: 8.5 G/DL (ref 14–18)
IMMATURE GRANULOCYTES #: 0 K/UL
LYMPHOCYTES ABSOLUTE: 0.5 K/UL (ref 1.1–4.5)
LYMPHOCYTES RELATIVE PERCENT: 12.3 % (ref 20–40)
MCH RBC QN AUTO: 28.9 PG (ref 27–31)
MCHC RBC AUTO-ENTMCNC: 30.6 G/DL (ref 33–37)
MCV RBC AUTO: 94.6 FL (ref 80–94)
MONOCYTES ABSOLUTE: 0.5 K/UL (ref 0–0.9)
MONOCYTES RELATIVE PERCENT: 12.1 % (ref 0–10)
NEUTROPHILS ABSOLUTE: 2.8 K/UL (ref 1.5–7.5)
NEUTROPHILS RELATIVE PERCENT: 71.7 % (ref 50–65)
PDW BLD-RTO: 14.7 % (ref 11.5–14.5)
PHOSPHORUS: 3.3 MG/DL (ref 2.5–4.5)
PLATELET # BLD: 428 K/UL (ref 130–400)
PMV BLD AUTO: 9.5 FL (ref 9.4–12.4)
POTASSIUM REFLEX MAGNESIUM: 5 MMOL/L (ref 3.5–5)
RBC # BLD: 2.94 M/UL (ref 4.7–6.1)
SODIUM BLD-SCNC: 134 MMOL/L (ref 136–145)
WBC # BLD: 3.9 K/UL (ref 4.8–10.8)

## 2022-02-26 PROCEDURE — 2580000003 HC RX 258

## 2022-02-26 PROCEDURE — 80048 BASIC METABOLIC PNL TOTAL CA: CPT

## 2022-02-26 PROCEDURE — 6370000000 HC RX 637 (ALT 250 FOR IP)

## 2022-02-26 PROCEDURE — 6360000002 HC RX W HCPCS: Performed by: PHYSICIAN ASSISTANT

## 2022-02-26 PROCEDURE — 99232 SBSQ HOSP IP/OBS MODERATE 35: CPT | Performed by: INTERNAL MEDICINE

## 2022-02-26 PROCEDURE — 85025 COMPLETE CBC W/AUTO DIFF WBC: CPT

## 2022-02-26 PROCEDURE — 2580000003 HC RX 258: Performed by: PHYSICIAN ASSISTANT

## 2022-02-26 PROCEDURE — 1210000000 HC MED SURG R&B

## 2022-02-26 PROCEDURE — 6360000002 HC RX W HCPCS: Performed by: INTERNAL MEDICINE

## 2022-02-26 PROCEDURE — 84100 ASSAY OF PHOSPHORUS: CPT

## 2022-02-26 PROCEDURE — 2580000003 HC RX 258: Performed by: INTERNAL MEDICINE

## 2022-02-26 PROCEDURE — 6370000000 HC RX 637 (ALT 250 FOR IP): Performed by: INTERNAL MEDICINE

## 2022-02-26 PROCEDURE — 99232 SBSQ HOSP IP/OBS MODERATE 35: CPT | Performed by: SURGERY

## 2022-02-26 PROCEDURE — 85730 THROMBOPLASTIN TIME PARTIAL: CPT

## 2022-02-26 RX ORDER — SODIUM CHLORIDE 9 MG/ML
INJECTION, SOLUTION INTRAVENOUS CONTINUOUS
Status: DISCONTINUED | OUTPATIENT
Start: 2022-02-26 | End: 2022-02-28

## 2022-02-26 RX ADMIN — HEPARIN SODIUM AND DEXTROSE 17.03 UNITS/KG/HR: 10000; 5 INJECTION INTRAVENOUS at 11:53

## 2022-02-26 RX ADMIN — HYDROMORPHONE HYDROCHLORIDE 2 MG: 2 TABLET ORAL at 13:57

## 2022-02-26 RX ADMIN — SODIUM CHLORIDE: 9 INJECTION, SOLUTION INTRAVENOUS at 11:51

## 2022-02-26 RX ADMIN — DRONABINOL 2.5 MG: 2.5 CAPSULE ORAL at 09:10

## 2022-02-26 RX ADMIN — SODIUM CHLORIDE, PRESERVATIVE FREE 10 ML: 5 INJECTION INTRAVENOUS at 08:50

## 2022-02-26 RX ADMIN — SERTRALINE HYDROCHLORIDE 25 MG: 50 TABLET ORAL at 09:11

## 2022-02-26 RX ADMIN — SODIUM CHLORIDE: 9 INJECTION, SOLUTION INTRAVENOUS at 22:30

## 2022-02-26 RX ADMIN — PANTOPRAZOLE SODIUM 40 MG: 40 TABLET, DELAYED RELEASE ORAL at 06:35

## 2022-02-26 RX ADMIN — Medication 2000 UNITS: at 09:11

## 2022-02-26 RX ADMIN — POTASSIUM CHLORIDE: 2 INJECTION, SOLUTION, CONCENTRATE INTRAVENOUS at 05:20

## 2022-02-26 RX ADMIN — HYDROMORPHONE HYDROCHLORIDE 2 MG: 2 TABLET ORAL at 22:39

## 2022-02-26 RX ADMIN — DRONABINOL 2.5 MG: 2.5 CAPSULE ORAL at 22:40

## 2022-02-26 ASSESSMENT — PAIN SCALES - GENERAL
PAINLEVEL_OUTOF10: 5
PAINLEVEL_OUTOF10: 6

## 2022-02-26 NOTE — PROGRESS NOTES
systems is negative except as specifically addressed above. ADULT ORAL NUTRITION SUPPLEMENT; Lunch, Dinner; Frozen Oral Supplement  ADULT DIET;  Regular; LIKES SUGAR FREE VANILLA PUDDING CUPS    Intake/Output Summary (Last 24 hours) at 2/26/2022 7005  Last data filed at 2/26/2022 0554  Gross per 24 hour   Intake 3732 ml   Output 3950 ml   Net -218 ml       Medications:   heparin (PORCINE) Infusion 17 Units/kg/hr (02/26/22 0629)    IV infusion builder 150 mL/hr at 02/26/22 0520    sodium chloride       Current Facility-Administered Medications   Medication Dose Route Frequency Provider Last Rate Last Admin    loperamide (IMODIUM) capsule 4 mg  4 mg Oral BID PRN Dary Michael PA-C        heparin 25,000 units in dextrose 5% 250 mL (premix) infusion  5-30 Units/kg/hr IntraVENous Continuous Scot Nath PA-C 11 mL/hr at 02/26/22 0629 17 Units/kg/hr at 02/26/22 4152    dronabinol (MARINOL) capsule 2.5 mg  2.5 mg Oral BID Andrei Gonzales MD   2.5 mg at 02/25/22 2038    heparin (porcine) injection 5,170 Units  80 Units/kg IntraVENous PRN Scot Nath PA-C   5,170 Units at 02/24/22 1248    heparin (porcine) injection 2,580 Units  40 Units/kg IntraVENous PRN Scot Nath PA-C   2,580 Units at 02/25/22 2342    potassium chloride 30 mEq in sodium chloride 0.9 % 1,000 mL infusion   IntraVENous Continuous Evelyn Dubose  mL/hr at 02/26/22 0520 New Bag at 02/26/22 0520    potassium chloride (KLOR-CON M) extended release tablet 40 mEq  40 mEq Oral PRN Kirk Dodd MD   40 mEq at 02/24/22 9929    Or    potassium bicarb-citric acid (EFFER-K) effervescent tablet 40 mEq  40 mEq Oral PRN Kirk Dodd MD        Or   Elroy Valdez potassium chloride 10 mEq/100 mL IVPB (Peripheral Line)  10 mEq IntraVENous PRN Kirk Dodd MD        methocarbamol (ROBAXIN) tablet 500 mg  500 mg Oral TID PRN Kirk Dodd MD        HYDROmorphone (DILAUDID) tablet 2 mg  2 mg Oral Q6H PRN Kirk Mode, MD   2 mg at 02/25/22 1521    sodium chloride flush 0.9 % injection 5-40 mL  5-40 mL IntraVENous 2 times per day KALPESH Maciel CNP   10 mL at 02/25/22 2130    sodium chloride flush 0.9 % injection 5-40 mL  5-40 mL IntraVENous PRN KALPESH Maciel - CNP        0.9 % sodium chloride infusion  25 mL IntraVENous PRN KALPESH Maciel CNP        ondansetron (ZOFRAN-ODT) disintegrating tablet 4 mg  4 mg Oral Q8H PRN KALPESH Maciel CNP        Or    ondansetron [facility]) injection 4 mg  4 mg IntraVENous Q6H PRN KALPESH Maciel - CNP   4 mg at 02/25/22 1807    acetaminophen (TYLENOL) tablet 650 mg  650 mg Oral Q6H PRN Ramon Espana APRN - CNP   650 mg at 02/23/22 3775    Or    acetaminophen (TYLENOL) suppository 650 mg  650 mg Rectal Q6H PRN Ramon Espana APRN - CNP        Vitamin D (CHOLECALCIFEROL) tablet 2,000 Units  2,000 Units Oral Daily Ramon Espana APRN - CNP   2,000 Units at 02/25/22 0844    pantoprazole (PROTONIX) tablet 40 mg  40 mg Oral QAM AC KALPESH Maciel - CNP   40 mg at 02/26/22 8017    [Held by provider] prazosin (MINIPRESS) capsule 1 mg  1 mg Oral Nightly KALPESH Maciel CNP        sertraline (ZOLOFT) tablet 25 mg  25 mg Oral Daily Ramon Espana APRN - CNP   25 mg at 02/25/22 6153         heparin (PORCINE) Infusion 17 Units/kg/hr (02/26/22 0629)    IV infusion builder 150 mL/hr at 02/26/22 0520    sodium chloride        dronabinol  2.5 mg Oral BID    sodium chloride flush  5-40 mL IntraVENous 2 times per day    Vitamin D  2,000 Units Oral Daily    pantoprazole  40 mg Oral QAM AC    [Held by provider] prazosin  1 mg Oral Nightly    sertraline  25 mg Oral Daily     loperamide, heparin (porcine), heparin (porcine), potassium chloride **OR** potassium alternative oral replacement **OR** potassium chloride, methocarbamol, HYDROmorphone, sodium chloride flush, sodium chloride, ondansetron **OR** ondansetron, acetaminophen **OR** acetaminophen  ADULT ORAL NUTRITION SUPPLEMENT; Lunch, Dinner; Frozen Oral Supplement  ADULT DIET; Regular; LIKES SUGAR FREE VANILLA PUDDING CUPS       Labs:   CBC with DIFF:  Recent Labs     02/24/22  0401 02/25/22  0250 02/26/22  0520   WBC 4.2* 3.9* 3.9*   RBC 3.24* 2.98* 2.94*   HGB 9.3* 8.6* 8.5*   HCT 29.5* 27.8* 27.8*   MCV 91.0 93.3 94.6*   MCH 28.7 28.9 28.9   MCHC 31.5* 30.9* 30.6*   RDW 14.6* 14.7* 14.7*   * 402* 428*   MPV 10.0 10.1 9.5   NEUTOPHILPCT 74.4* 73.6* 71.7*   LYMPHOPCT 12.6* 12.8* 12.3*   MONOPCT 10.7* 11.3* 12.1*   EOSRELPCT 1.9 1.5 3.1   BASOPCT 0.2 0.3 0.3   NEUTROABS 3.1 2.9 2.8   LYMPHSABS 0.5* 0.5* 0.5*   MONOSABS 0.50 0.40 0.50   EOSABS 0.10 0.10 0.10   BASOSABS 0.00 0.00 0.00       CMP/BMP:  Recent Labs     02/24/22  0401 02/25/22  0250 02/26/22  0520   * 132* 134*   K 3.4* 4.1 5.0   CL 88* 94* 99   CO2 28 28 25   ANIONGAP 17 10 10   GLUCOSE 109 116* 104   BUN 65* 51* 39*   CREATININE 3.5* 3.2* 2.9*   LABGLOM 18* 20* 22*   CALCIUM 8.5* 8.0* 8.6*   PROT 6.6 5.7*  --    LABALBU 2.8* 2.6*  --    BILITOT 0.5 0.3  --    ALKPHOS 218* 183*  --    ALT 24 18  --    AST 32 22  --          CRP:  No results for input(s): CRP in the last 72 hours. Sed Rate:  No results for input(s): SEDRATE in the last 72 hours. HgBA1c:  No components found for: HGBA1C  FLP:  No results found for: TRIG, HDL, LDLCALC, LDLDIRECT, LABVLDL  TSH:  No results found for: TSH  Troponin T:   No results for input(s): TROPONINI in the last 72 hours. Pro-BNP: No results for input(s): BNP in the last 72 hours. INR: No results for input(s): INR in the last 72 hours. ABGs: No results found for: PHART, PO2ART, [id number]  UA:  No results for input(s): NITRITE, COLORU, PHUR, LABCAST, WBCUA, RBCUA, MUCUS, TRICHOMONAS, YEAST, BACTERIA, CLARITYU, SPECGRAV, LEUKOCYTESUR, UROBILINOGEN, BILIRUBINUR, BLOODU, GLUCOSEU, AMORPHOUS in the last 72 hours.     Invalid input(s): Jacob Clark      Culture Results:    No results for input(s): CXSURG in the last 720 hours. Blood Culture Recent:   Recent Labs     02/21/22  1739 02/08/22  2220   BC No Growth to date. Any change in status will be called. No growth after 5 days of incubation. No results for input(s): BC, BLOODCULT2, ORG in the last 72 hours. Cultures:   No results for input(s): CULTURE in the last 72 hours. No results for input(s): BC, Demetrius [place] in the last 72 hours. No results for input(s): CXSURG in the last 72 hours. Recent Labs     02/24/22  0401 02/25/22  0250 02/26/22  0520   MG 2.0  --   --    PHOS 5.1* 3.7 3.3     Recent Labs     02/24/22  0401 02/25/22  0250   AST 32 22   ALT 24 18   BILITOT 0.5 0.3   ALKPHOS 218* 183*         RAD:   CT ABDOMEN PELVIS WO CONTRAST Additional Contrast? None    Result Date: 2/21/2022  CT ABDOMEN PELVIS WO CONTRAST 2/21/2022 7:53 PM History: Postop pain. Hypotension. Noncontrast abdomen/pelvis CT. Comparison is made with February 8, 2022. In order to have a CT radiation dose as low as reasonably achievable Automated Exposure Control was utilized for adjustment of the mA and/or KV according to patient size. DLP in mGycm= 1115. Interval abdominal surgery. Normal heart size. Patchy atelectasis at the lung bases. Normal noncontrast appearance of the liver, pancreas, and spleen. Normal and symmetric kidneys. No hydronephrosis. No bowel dilation. No abscess or hematoma. 1. Postoperative changes with no sign of bowel obstruction, abscess, or hematoma. Signed by Dr Loraine Koenig Additional Contrast? None    Result Date: 2/9/2022  EXAM: CT ABDOMEN PELVIS WO CONTRAST INDICATION: Vomiting, recent surgery, history of cancer COMPARISON: 12/14/2020 DLP: 2066 mGy cm. In order to have a CT radiation dose as low as reasonably achievable, Automated Exposure Control was utilized for adjustment of the mA and/or KV according to patient size.  FINDINGS: Patchy consolidation in the RIGHT and LEFT lung bases is noted. Moderate to large volume pneumoperitoneum. A surgical drain terminates in the LEFT upper abdomen. Postoperative change of RIGHT hemicolectomy with RIGHT upper quadrant anastomosis. Marked diffuse small bowel distention extending to the ileocolic anastomosis, likely representing small bowel obstruction. Small bowel loops measure up to 5 cm diameter. No definite area of pneumatosis. No evidence of portal venous gas. Trace free pelvic fluid. A dense linear device on the RIGHT peritoneal wall is likely represents a mesh device and may be related to recent surgical intervention (correlate with surgical history). Unchanged RIGHT posterolateral lumbar hernia. Postoperative change of RIGHT hepatectomy. The previously demonstrated LEFT liver lesion is not well visualized given lack of IV contrast. Gallbladder surgically absent. No biliary ductal dilatation. Pancreas, spleen, and adrenal glands are unremarkable. No urolithiasis or hydronephrosis. Normal caliber abdominal aorta. No enlarged retroperitoneal lymph nodes. Multiple borderline prominent mesenteric lymph nodes are similar to prior studies. No definite pelvic or inguinal lymphadenopathy. No acute abdominal wall soft tissue normality. No aggressive osseous lesion. 1.  Moderate to large volume pneumoperitoneum. This may be related to recent surgery although bowel perforation is also within the differential. There is a surgical drain in the LEFT upper abdomen which also may be contributing to pneumoperitoneum. 2.  Small bowel obstruction with transition at RIGHT upper quadrant ileocolonic anastomosis. Small bowel loops measure up to 5 cm diameter. 3.  Prior RIGHT hepatectomy. Previously demonstrated LEFT liver lesion is not well visualized given lack of IV contrast. 4.  Patchy consolidation in both lung bases, with differential including aspiration and pneumonia.  Findings in agreement with the emergent findings from the initial [facility] preliminary report. Signed by Dr Jonel Abdi    Result Date: 2/21/2022  Exam:   XR CHEST PORTABLE  Date:  2/21/2022 History:  Male, age  61 years; hypotension COMPARISON:  Chest x-ray dated February 8, 2022 Findings : Chest portable place. A right-sided PICC, new, terminating in the low SVC. The heart and mediastinum are normal in size. Lungs are without focal infiltrate, mass or effusions. The bones show no acute pathology. Catheter in the left upper quadrant. Impression: 1. New right-sided PICC. 2.  Otherwise, no interval changes. Signed by Dr Lashell Arcos    XR CHEST PORTABLE    Result Date: 2/8/2022  EXAMINATION: XR CHEST PORTABLE 2/8/2022 10:39 PM HISTORY: XR CHEST PORTABLE 2/8/2022 9:00 PM HISTORY: Cough COMPARISON: March 18, 2020. FINDINGS: The lungs are clear. Cardiac silhouette is normal. Left subclavian Port-A-Cath is present. Old healed fracture of the left clavicle is present. The osseous structures and surrounding soft tissues demonstrate no acute abnormality. 1. No radiographic evidence of acute cardiopulmonary process. Signed by Dr Mary Urrutia      Objective:   Vitals:   /67   Pulse 79   Temp 98.1 °F (36.7 °C) (Temporal)   Resp 18   Ht 5' 7\" (1.702 m)   Wt 143 lb 9 oz (65.1 kg)   SpO2 98%   BMI 22.49 kg/m²       Patient Vitals for the past 24 hrs:   BP Temp Temp src Pulse Resp SpO2 Weight   02/26/22 0551 100/67 98.1 °F (36.7 °C) Temporal 79 18 98 % 143 lb 9 oz (65.1 kg)   02/26/22 0034 95/62 -- Oral 88 16 97 % --   02/25/22 1752 117/61 97.8 °F (36.6 °C) Temporal 88 16 98 % --   02/25/22 1239 122/65 96.9 °F (36.1 °C) Temporal 84 16 98 % --       24HR INTAKE/OUTPUT:      Intake/Output Summary (Last 24 hours) at 2/26/2022 0722  Last data filed at 2/26/2022 0554  Gross per 24 hour   Intake 3732 ml   Output 3950 ml   Net -218 ml       Physical Exam  Vitals and nursing note reviewed. Constitutional:       General: He is not in acute distress. Appearance: Normal appearance. He is not ill-appearing, toxic-appearing or diaphoretic. HENT:      Head: Normocephalic and atraumatic. Right Ear: External ear normal.      Left Ear: External ear normal.      Nose: Nose normal. No congestion or rhinorrhea. Mouth/Throat:      Mouth: Mucous membranes are moist.      Pharynx: Oropharynx is clear. No oropharyngeal exudate or posterior oropharyngeal erythema. Eyes:      General: No scleral icterus. Right eye: No discharge. Left eye: No discharge. Extraocular Movements: Extraocular movements intact. Conjunctiva/sclera: Conjunctivae normal.      Pupils: Pupils are equal, round, and reactive to light. Cardiovascular:      Rate and Rhythm: Normal rate and regular rhythm. Pulses: Normal pulses. Heart sounds: Normal heart sounds. No murmur heard. No friction rub. No gallop. Pulmonary:      Effort: Pulmonary effort is normal. No respiratory distress. Breath sounds: Normal breath sounds. No stridor. No wheezing, rhonchi or rales. Chest:      Chest wall: No tenderness. Abdominal:      General: Bowel sounds are normal. There is no distension. Palpations: Abdomen is soft. Tenderness: There is no guarding or rebound. Comments: FARHAT drain in place. Right-sided ileostomy in place. Midline incision site with no evidence of bleeding or infection noted. Musculoskeletal:         General: No swelling, tenderness, deformity or signs of injury. Normal range of motion. Cervical back: Normal range of motion and neck supple. No rigidity. No muscular tenderness. Right lower leg: No edema. Left lower leg: No edema. Skin:     General: Skin is warm and dry. Capillary Refill: Capillary refill takes less than 2 seconds. Coloration: Skin is not jaundiced or pale. Findings: No bruising, erythema, lesion or rash. Neurological:      General: No focal deficit present.       Mental Status: He is alert and oriented to person, place, and time. Cranial Nerves: No cranial nerve deficit. Sensory: No sensory deficit. Motor: No weakness. Coordination: Coordination normal.   Psychiatric:         Mood and Affect: Mood normal.         Behavior: Behavior normal.         Thought Content: Thought content normal.         Judgment: Judgment normal.           Assessment/plan:     Hospital Problems           Last Modified POA    * (Principal) Acute kidney injury superimposed on CKD ([address] 2/21/2022 Yes    Adenocarcinoma of colon ([address] 2/21/2022 Yes    Liver metastases ([address] 2/22/2022 Yes    Hypovolemia 2/21/2022 Yes    Hyponatremia 2/21/2022 Yes    Moderate malnutrition ([address] 2/22/2022 Yes    Palliative care patient 2/22/2022 Yes    Posttraumatic stress disorder 2/22/2022 Yes    Deep vein thrombosis (DVT) of left upper extremity ([address] 2/24/2022 Yes          Principal Problem:    Acute kidney injury superimposed on CKD ([address]  Active Problems:    Adenocarcinoma of colon ([address]    Liver metastases (HCC)    Hypovolemia    Hyponatremia    Moderate malnutrition (HCC)    Palliative care patient    Posttraumatic stress disorder    Deep vein thrombosis (DVT) of left upper extremity ([address]  Resolved Problems:    * No resolved hospital problems. *      Brief Summary  Mr Yessica Griffiths, a 57-year-old male, history of metastatic adenocarcinoma of the colon, [facility] (02/21/2022), no account of abnormal lab as well as generalized weakness and fatigue. Patient admitted to [facility] (02/21/2022), further work-up and management of acute on chronic renal failure. Further hospital course, as per problem is below;     MARIO on CKD  III  Hyponatremia  Hyperphosphatemia  Hypercalcemia   Creatinine level on presentation: 4.6: 02/21/2022   IV hydration -   Avoid hypotension & Nephrotoxins    Nephrology on board-appreciate recommendations   Further management as per nephrology      Left lower quadrant abdominal pain  Metastatic adenocarcinoma of colon  · Status post recent ex lap at OSH (02/09/2022), with FARHAT drain placement and diverting loop ileostomy. · CT Abdomen / Pelvis WO Con (02/01/2022): Impression: Postoperative changes with no sign of bowel obstruction, abscess, or hematoma  · KUB (02/25/2022): No acute abdominal disease is seen  · General surgery and Oncology on board    LUE DVT  · Left arm venous duplex ultrasound (02/02/2022): Positive DVT in left jugular, subclavian, axillary, and brachial veins. Positive SVT in left proximal cephalic vein, media and cubital veins  · Heparin gtt   · Vascular Surgery on board      Hypokalemia  · Replace as needed  · Monitor BMP      Continue management of other chronic medical conditions - see above and orders. Advance Directive: Full Code    ADULT ORAL NUTRITION SUPPLEMENT; Lunch, Dinner; Frozen Oral Supplement  ADULT DIET; Regular; LIKES SUGAR FREE VANILLA PUDDING CUPS         Consults Made:   IP CONSULT TO ONCOLOGY  IP CONSULT TO DIETITIAN  IP CONSULT TO NEPHROLOGY  IP CONSULT TO GENERAL SURGERY  IP CONSULT TO PALLIATIVE CARE  IP CONSULT TO VASCULAR SURGERY  IP CONSULT TO DIETITIAN    DVT prophylaxis: Heparin      Discharge planning:  Continue management as above, including IV fluids  Monitor renal function    Time Spent is 25 mins in the examination, evaluation, counseling and review of medications, assessment and plan.      Electronically signed by   Isaiah Elizondo MD, MPH, MD,   Internal Medicine Hospitalist   2/26/2022 7:22 AM

## 2022-02-26 NOTE — PROGRESS NOTES
PROGRESS NOTE    Patient name: Odessa Hargrove  Patient : 1955  Room: 314      SUBJECTIVE: He is feeling much better. Eating better    INTERVAL HISTORY  The patient is a very pleasant 61years old male who has a history of recurrent colon cancer. He recently underwent debulking surgery and HIPEC at [facility] in 2022. Postoperative course complicated by internal hernia. He was again taken to the OR on 2/10/2022 at [facility]. He has had a complicated postoperative course with severe dehydration and acute kidney injury. He was receiving IV fluids at home according to his ileostomy output. His wife called the clinic yesterday stating that the patient was feeling quite dizzy and had generalized weakness. Laboratory studies reviewed and showed creatinine elevated at 4.6. The patient was directed to emergency. He received IV fluids in the emergency. He was then admitted for further care.   I was consulted for his concurrent history of colon cancer.     INTERVAL HISTORY/HISTORY OF PRESENT ILLNESS:  Diagnosis  · Colonic adenocarcinoma, 2020  · [id number](liver), stage ROXANA  · IHC MMR- proficient  · K-maria luisa mutated  · N-MARIA LUISA/BRAF wild-type  · Iron deficiency anemia  · Soft tissue mass, 2021  · Adenocarcinoma-consistent with colon primary, right psoas muscle, 2021  · Metastatic adenocarcinoma, 2022  · MLH1, MSH2, MH6, PMS2-intact  · MMR- no loss of expression     Treatment summary  · 3/30/2020-right hemicolectomy at [facility]  · Anticipated Liver ablation to be followed by neoadjuvant/adjuvant versus palliative chemotherapy  · 06/10/2020-2020-FOLFOX + Avastin  · 20- Right hepatectomy-Dr. Vinny Tee  · S/p Injectafer-poor oral iron tolerance  · 21- Initiate FOLFIRI + Avastin every 2 weeks  · 22-psoas muscle mass resection/HIPEC by Dr. Dionisio Corbin at [facility]  · 2/10/2022-back to OR for correction of internal hernia        The patient is a 61 years old male who has a diagnosis of colonic adenocarcinoma. The patient had malignant disease with several lymph nodes involved. In addition, the patient was also found to have suspicious liver lesions concerning for metastatic disease. He was seen by [facility] and offered a multimodality approach with liver ablation of the left liver lesion followed by neoadjuvant chemotherapy and partial right hepatectomy. He underwent microwave ablation of the left lobe liver lesion on 6/8/2020. He is status post completion of 11 biweekly cycles of FOLFOX/Avasti completed November 2020. He tolerated treatment with complaints of mild transient cold neuropathy. He had a right hepatectomy on 12/11/2020 that showed no residual disease. His last CEA was normal in January 2021. He had MRI of the abdomen at [facility] in March 2021 that showed no evidence of recurrent disease in the liver or in the abdomen. He also had a surveillance colonoscopy in March 2021 that showed no polyps. CEA was elevated in May 2021. Repeat CEA June 2021 showed persistent elevation. MRI abdomen at [facility] showed no evidence of liver recurrence but a suspicious nodule in the right lower quadrant. Biopsy was performed at [facility] and consistent with recurrent adenocarcinoma.  I discussed the findings with hepatobiliary service and also colorectal surgery. He was started on FOLFIRI/Avastin. He was seen by Dr. Micah Gooden again on 9/24/2021. He had repeat CT abdomen pelvis and also MRI at [facility] that showed persistent disease involving the psoas muscle.  In addition, an additional small place that may represent further peritoneal metastatic disease. He underwent surgery at [facility].   He underwent exploratory laparotomy with resection of psoas mass at [facility] on 1/13/2022. [facility] also underwent HIPEC treatment.   The patient was taken to the OR on 2/10/2022 for correction of internal hernia.     Cancer history  Mr. Gisella Crockett was first seen by me on 3/23/2020. Amanda Mg was referred for a new diagnosis of colonic adenocarcinoma involving the cecum.  The patient reports that he had a wellbeing consult with his provider at the [address] [place] [address] was found to have anemia and then recommended a colonoscopy.  Of note, the patient has a family history of colon cancer.  His mother is a patient of Dr. Kraus Early he has been diagnosed with colon cancer in 2010. · 3/11/2020- colonoscopy revealed a large malignant appearing fungating mass lesion in the cecum.  In addition, several other polyps.  Biopsy of the mass consistent with moderate differentiated colonic adenocarcinoma.  Polyps consistent with tubulovillous adenoma with no high-grade dysplasia. [facility] MMR not proficient.  K-maria luisa mutated, BRAF and NRAS wild type.  MSI proficient  · 3/11/2020-CEA 5.5 (H)  · 3/18/2020-CT abdomen pelvis with contrast  Invasive cecal mass adhering to the right lateral abdominal wall muscles with adjacent lymphadenopathy. Mild partial obstruction of the terminal ileum. 2. Suspicious lesions in the right and left hepatic lobes measure up to 1.3 cm and likely represent metastatic disease. · 3/18/2020-Xr Chest Standard  No radiographic evidence of acute cardiopulmonary process. · 3/23/2020-he was first seen by me.  Recommended completion of staging with CT chest.  Also recommended liver MRI for further clarification of liver lesion.  S.  Recommend to proceed with a general surgery consultation tomorrow with Dr. Kavita Blackwood was informed that I favor surgical resection if feasible of the primary malignancy.   · 3/30/2020- right hemicolectomy by Dr. Matthew Rose at [facility] consistent with invasive moderately differentiated colonic adenocarcinoma measuring 7.2 cm.  Carcinoma directly invading the adjacent abdominal wall tissue.  Focal lymphovascular space invasion identified.  Focal perineural invasion identified.  Surgical margins negative for evidence of malignancy.  6 out of 14 lymph nodes positive for metastatic adenocarcinoma.  Final pathology staging [id number](liver, stage ROXANA)  · 4/20/2020-CT chest with contrast showed No convincing intrathoracic metastasis. Nonspecific 4 mm nodule of the inferior lingula and a 2 mm right upper lobe nodule can be followed on subsequent imaging in 6-12 months.  Moderate coronary calcifications. Hypodense metastatic liver lesions.  Small hiatal hernia. · 4/20/2020-Mri Abdomen W Wo Contrast There are about 5 liver lesions. The 2 largest appears similar compared to 3/18/2020, the others are too small to further characterize. Appearance is most concerning for metastatic disease. Enhancement of the right lateral peritoneum. This is favored to be postoperative as there is no nodularity, evidence of omental disease or lymphadenopathy. Recommend attention on follow-up. Cholecystectomy. · 4/22/2020-discussed with Dr. Jackelyn Arreguin at [facility] will review imaging studies and give further recommendations regarding eligibility for resection of liver lesions. · 5/8/2020 CT Abdomen The two largest suspicious lesions measuring 1.2 and 1.3 cm in the  right and left hepatic lobes respectively are similar compared to the  3/18/2020 CT. Additionally, there are at least five subcentimeter lesions with similar signal characteristics, which are also highly suspicious for metastases. If complete characterization of the number and distribution of lesions is necessary, an MRI with Eovist could be acquired. · 5/19/2020- he was seen by the hepatobiliary service at [facility] with Dr. Jackelyn Arreguin:  patient adequate risk candidate for a multimodal approach, directed toward curative hepatectomy eventually. Endorsed by Hepatobiliary Conference, I recommended perc ablation of the L hemiliver to clear it, followed by systemic therapy in a neoadjuvant strategy.  Restaging imaging to confirm clearance of disease on the left and lack of progression to unresectability of the R hemiliver disease would then be followed by R hepatectomy. Limitations to this approach may be accessibility of the segment 4A/8 disease high in the hepatic dome and the possibility of heat sink-related recurrence s/p abation of the left-sided disease. · 5/21/2020- referral for Mediport placement and start FOLFOX in 2 weeks.  Will add bevacizumab after 6 weeks of liver ablation.  We will plan for 12 biweekly dose of FOLFOX.  Bevacizumab will also be stopped 6 weeks prior to major procedure. · 6/8/2020- Microwave ablation of the left liver lesion was then performed for 5 minutes at [address] to achieve a 3.4 x 3.9 cm approximately spherical zone of ablation.    · 6/10/2020- initiation of FOLFOX. · 7/20/2020-added Avastin. · 9/10/20 MRI abdomen: Left hepatic lobe segment II lesion demonstrates small T1 hyperintense blood products, status post microwave ablation on 6/8/2020. No definitive enhancement within the lesion. Focal internal thickening or scar present. Recommend attention on follow-up. Additional scattered subcentimeter foci throughout the liver decreased in size compared to MRI dated 4/20/2020 consistent with improving metastatic disease. Additional chronic findings as above. · 9/16/2020-discussed with plan with the patient and [place].  Interval response to treatment.  Plan to continue chemotherapy through 12 cycles with Avastin. · 12/11/20 Right hepatectomy-Dr. Elaine Agent  · 12/11/20 Right hepatectomy pathology: Liver, right, resection: Focus of Fibrosis with calcifications and chronic inflammation (0.3 cm), see comment. Background hepatic parenchyma with minimal periportal fibrosis (trichrome stain), minimal lobular and portal inflammation, and no significant macrovesicular steatosis (<5%) Comments: The patient's history of colorectal cancer status adjuvant therapy is noted. The focus of fibrosis may reflect treatment effect. There is no evidence of viable tumor in the sampled specimen. recurrent metastatic disease in the liver. Status post prior right hemicolectomy for colon carcinoma. Within the posterior right iliopsoas muscle there is a mildly T2 hyperintense nodular focus with postcontrast rim enhancement and diffusion restriction. This measures approximately 2.7 x 2 x 2 cm. More delayed postcontrast images show irregular area of enhancement measuring 2.4 x 7.7 cm axially involving the right iliopsoas muscle and the right lateral abdominal wall. Suggest correlation with contrast enhanced CT exam for complete evaluation. Consider biopsy of this lesion. · 6/15/21 CT CHEST WO CONTRAST No metastatic disease in the chest.  No change in tiny 2 mm RIGHT upper lobe pulmonary nodule.  Mild ectasia of the ascending aorta measuring 4 cm. · 6/18/2021-I reviewed results of MRI abdomen at [place].  CT chest without contrast reviewed by me and showed no evidence of metastatic disease.  Stable 2 mm right upper lobe nodule.  Discussed with hepatobiliary service at [facility]. Biopsy intra-abdominal nodule next week at [facility]. · 6/25/[id number] biopsy soft tissue mass right psoas muscle at [facility] consistent with recurrent colonic adenocarcinoma.    · 7/2/2021-discussed with hepatobiliary service at [facility] and also surgical oncology.  Surgical oncology will call us back regarding consultation for consideration of HIPEC if he is a candidate  · 7/13/21-initiation of chemotherapy with FOLFIRI + Avastin every 2 weeks  · 7/13/21 CEA 10.7  · 7/27/2021-CEA 9.6  · 7/30/2021-she was seen by the surgical oncology group at [facility] by Dr Minh Villa recommended to complete 6 cycles of chemotherapy and repeat CT chest abdomen pelvis and liver MRI to assess disease response.  They discussed the role of CRS/HIPEC in his situation. He was told that it really depends on the status of his liver lesions as well.  He will complete 6 cycles of chemotherapy and will return to see me with a CTAP as well as MRI of the liver to assess disease burden and determine if he can be a candidate for debulking. · 8/25/2021-proceed cycle #4. · 9/22/2021 CEA- 8.1  · 9/24/2021 MRI with and w/o Contrast ([place])  Tiny left retroperitoneal nodule involving the left lateral conal fascial new compared to 3/4/2021 though unchanged from most recent prior, possibly an additional site of metastasis. No other new metastatic disease within the abdomen. Similar partially visualized right posterior body wall/psoas infiltrative lesion not definitely changed from MRI abdomen 6/8/2021 but better evaluated in its entirety on concurrent CT, reported separately.   Status post right hemicolectomy, right hepatectomy, and segment III microwave ablation. Similar mild splenomegaly.  Additional chronic and incidental findings as described in the body the report.  Liver: Status post right hepatectomy. Ablation zone in segment II measures 1.7 x 1.1 cm, slightly decreased in size from 1.8 x 1.4 cm previously (series 1301 image 56) without appreciable enhancement. Similar tiny subcentimeter cyst in the left hepatic lobe. No new focal hepatic lesion identified. No visualized free fluid. Similar 0.7 cm enhancing nodule along the left lateral conal fascia laterally new from 3/4/2021, grossly unchanged from MRI dated 6/8/2021. (series 801 image 22). No other appreciable peritoneal/retroperitoneal or  omental nodularity. Ill-defined T2 hyperintense signal and hyperenhancement in the right posteromedial body wall involving the lateral aspect of the psoas muscle and posterolateral abdominal wall musculature, partially visualized measuring at least 8.7 x 3.1   cm, grossly similar to the prior exam, better evaluated in its entirety on concurrent CT reported separately.    · 9/24/2021 CT C/A/P w/ Contrast([place]) Status post right hemicolectomy, right hepatectomy and left hepatic microwave ablation without new suspicious hepatic lesion.  Left lateral perirenal fascial nodule, which is stable compared to 6/8/2021 MRI, but new compared to 3/4/2021 MRI is concerning for an additional site of metastatic disease.  No sites of new or enlarging metastatic disease in the chest.  Similar appearance of right lateral psoas and posterior abdominal wall infiltrative lesion, not significantly changed compared to 6/8/2021 MRI.  Mild splenomegaly.  Additional findings as outlined in the body the report. Biopsy-proven adenocarcinoma involving the posterior lateral aspect of the right iliopsoas muscle and right lateral abdominal wall. Right iliopsoas component is difficult to measure but appears to be approximately 2.5 x 2.4 cm (series 2, image 153), similar to prior MRI. Nodular thickening noted along the right posterior abdominal wall and possibly involving the the transversus abdominis measures approximately 8.5 x 1.8 cm (series 2 image 153) overall similar compared to prior MRI. · 10/6/2021-discussed the results of recent CT abdomen/pelvis and MRI abdomen/pelvis at [facility].  Persistent disease involving the psoas muscle.  Discussed with colorectal surgery at [facility] recommend to continue current therapy for another 2 months and reassess with CT scans. · 12/3/21 CT chest/abd/pelvis ([facility]): Status post prior right hemicolectomy, right hepatectomy and left hepatic lesions microwave ablation. No new liver lesion. Soft tissue thickening of the right lower posterior abdominal wall involving the iliopsoas muscle is grossly unchanged consistent with improving metastatic disease. Small right omental nodule and left lateral conal fascia nodule unchanged compared to  recent exam.   · 1/13/22 Exploratory lap with resections of psoas muscle mass and HIPEC by Dr. Agata Swartz:  Metastatic colorectal adenocarcinoma involving fibroadipose tissue. [facility] MMR proficient.   · 1/24/22 CT chest/abd/pelvis ([facility]): Extensive postsurgical changes in the abdomen including partial right colectomy, resection of right retroperitoneal mass, omentectomy and mesh repair of right lateral abdominal wall defect. There appears to be a defect in the mesh containing herniated loops of small bowel. Extensive diffuse dilated small bowel loops with air-fluid levels are seen throughout the abdomen. There are segmental areas of decompressed small bowel in the left upper quadrant as well as adjacent to the herniated small  bowel loops in the right retroperitoneum. Air-fluid levels are also seen throughout the colon. While pattern could likely represent postoperative ileus given the diffuse small bowel dilatation and distal colonic air and fluid, developing or partial small bowel obstruction secondary to the right lateral abdominal wall hernia cannot entirely be excluded.  Small ascites. 8 mm nodule along the left lateral conal fascia is unchanged. Slight increase in size of cardiophrenic lymph nodes measuring up to 7 mm anterior to the right hemidiaphragm. Stable hypodensity in the left hepatic lobe.  Diffuse gastric wall thickening/edema could be secondary to gastritis in the appropriate clinical setting.      CEA dynamics:  3/11/20 CEA 5.5  8/19/20 CEA 2.4  9/2/20 CEA 2.7  9/16/20 CEA 2.7  9/30/20 CEA 2.7  10/19/20 CEA 2.3  1/4/21 CEA 2.2  5/3/21 CEA 6.2  6/15/21 CEA 8.3  7/13/21 CEA 10.7  7/27/21 CEA 9.6  8/12/21/21 CEA 8.2  8/25/2021-CEA 8.9  9/22/2021 CEA 8.1    Objective   /67   Pulse 79   Temp 98.1 °F (36.7 °C) (Temporal)   Resp 18   Ht 5' 7\" (1.702 m)   Wt 143 lb 9 oz (65.1 kg)   SpO2 98%   BMI 22.49 kg/m²     PHYSICAL EXAM:  CONSTITUTIONAL: Alert, appropriate,ill-appearing, feel stronger  EYES: Non icteric, EOM intact, pupils equal round   ENT: Mucus membranes moist,external inspection of ears and nose are normal  NECK: Supple, no masses.  No palpable thyroid mass  CHEST/LUNGS: CTA bilaterally, normal respiratory effort   CARDIOVASCULAR: RRR, no murmurs.  No lower extremity edema  ABDOMEN: mild tender - no guarding or rebound, post op FARHAT drain, ileostomy with stool  EXTREMITIES: 2+ pitting edema LUE, port left chest wall. SKIN: warm, dry with no rashes or lesions  LYMPH: No cervical, clavicular, axillary, or inguinal lymphadenopathy  NEUROLOGIC: follows commands, non focal   PSYCH: mood and affect appropriate. Alert and oriented to time, place, person    Recent Labs     02/26/22  0520 02/25/22  0250 02/24/22  0401   WBC 3.9* 3.9* 4.2*   HGB 8.5* 8.6* 9.3*   HCT 27.8* 27.8* 29.5*   MCV 94.6* 93.3 91.0   * 402* 414*       Lab Results   Component Value Date     (L) 02/26/2022    K 5.0 02/26/2022    CL 99 02/26/2022    CO2 25 02/26/2022    BUN 39 (H) 02/26/2022    CREATININE 2.9 (H) 02/26/2022    GLUCOSE 104 02/26/2022    CALCIUM 8.6 (L) 02/26/2022    PROT 5.7 (L) 02/25/2022    LABALBU 2.6 (L) 02/25/2022    BILITOT 0.3 02/25/2022    ALKPHOS 183 (H) 02/25/2022    AST 22 02/25/2022    ALT 18 02/25/2022    LABGLOM 22 (A) 02/26/2022    GFRAA 27 (L) 02/26/2022    AGRATIO 1.5 06/15/2021    GLOB 3.8 02/07/2022       Lab Results   Component Value Date    INR 1.0 06/18/2021    PROTIME 12.1 06/18/2021       30 Day lookback of cultures:    Blood Culture Recent:   Recent Labs     02/21/22  1739   BC No Growth to date. Any change in status will be called. Gram Stain Recent: No results for input(s): LABGRAM in the last 720 hours. Resp Culture Recent: No results for input(s): CULTRESP in the last 720 hours. Body Fluid Recent : No results for input(s): BFCX in the last 720 hours. MRSA Recent : No results for input(s): [address] in the last 720 hours. Urine Culture Recent : No results for input(s): LABURIN in the last 720 hours. Organism Recent : No results for input(s): ORG in the last 720 hours. Narrative   CT ABDOMEN PELVIS WO CONTRAST    2/21/2022 7:53 PM   History: Postop pain. Hypotension. Noncontrast abdomen/pelvis CT. Comparison is made with February 8, 2022. In order to have a CT radiation dose as low as reasonably achievable   Automated Exposure Control was utilized for adjustment of the mA   and/or KV according to patient size. DLP in mGycm= 1115. Interval abdominal surgery. Normal heart size. Patchy atelectasis at the lung bases. Normal noncontrast appearance of the liver, pancreas, and spleen. Normal and symmetric kidneys. No hydronephrosis. No bowel dilation. No abscess or hematoma.       Impression   1. Postoperative changes with no sign of bowel obstruction, abscess,   or hematoma. Signed by Dr Goran Mcqueen:  #Colon cancer  -Status post neoadjuvant chemo followed by debulking surgery/HIPEC) at [facility] January 2022  -Status post exploratory laparotomy 2/10/2022 for correction of internal hernia at [facility] (Dr. Kandyce Dubin, Cell [phone])  -Patient has ileostomy and abdominal drain  -Patient is currently not on chemotherapy. Last chemotherapy November 2021  -CT A/P Wo contrast 2/21/2022: Pneumoperitoneum, postoperative changes    Dr. Juliet Medrano following     #Acute kidney injury-likely prerenal secondary to high ileostomy output  -Patient was receiving IV fluids at home.  -He received IV fluid resuscitation in the emergency  -CT A/P without contrast: No hydronephrosis     (baseline creatinine 1.2-1.4)    Dr. Dinora Navarro following    Currently on NS with 30 mEq KCl at 150 cc/hr         Change IVF to NS    Crt slowly improving      #Normocytic hypochromic anemia         Component hemodilution    Serology 2/24/2022  Iron panel  Ferritin  B12 - 1150  Folate - 17.7  LDH - 187  Retic - 3.23% with absolute 0.0930  Hapto - 410    HGB stable - CBC will be followed    #DVT left upper extremity-most likely port associated -    -Left upper extremity ultrasound 2/23/2022  · Acute appearing deep vein thrombosis (DVT) is seen in the internal jugular  vein subclavian axillary brachial, left upper extremity.   · Acute appearing superficial thrombophlebitis (SVT) is seen in the basilic and cephalic veins, right upper extremity. - currently on IV heparin until creatinine clearance above 30. Then can be switched to Eliquis or Xarelto. #Anorexia    - Marinol 2.5 p.o. twice daily initiated  - Dietary     #Weakness, deconditioning    Physical therapy     PLAN:  Continue aggressive IV fluid resuscitation - Remove KCL from IVF  Heparin drip  Physical therapy   Dietary   Marinol 2.5 twice daily    Discussed with pt and wife - Marcia Adams the patient to be in the hospital throughout the weekend. Would like to see his creatinine below 2. Gisselle Baer PA-C    02/26/22  9:10 AM     Physician's attestation and contribution:  I, Dr Opal Appiah, personally and independently performed an evaluation on Katerina Burns        I have reviewed relevant medical information/data to include but not limited to the medication list, relevant appropriate lab work and imaging when applicable. I reviewed other physician's notes, ancillary services and nurses assessments. I have reviewed the above documentation completed by Mary Johnson PA-C   Please see my additional addended and/or modified contributions to the history of present illness, physical examination, and assessment/medical decision-making and plan that reflects my findings and impressions. I discussed essential elements of the care plan with Mary Johnson PA-C and the patient. I have encouraged and answered all the questions raised to the patient's understanding and satisfaction. I concur with the above stated. Subjective-awake, alert, he has a smile on his face, his wife is present. He is able to take in very small amounts of food presently. Objective- exam is stable. Midline incision with staples noted, no signs of inflammation.   Ostomy working well    Assessment/plan:  Discussion undertaken regarding continued oral intake and sufficient solids and liquids to prevent dehydration again. He and his wife are aware of this and is working toward that goal.    In the interim, IV hydration will continue with continued improvement in creatinine down to 2.9 today. Potassium elevated and is being discontinued from IV fluids.     Continue supportive care    Electronically signed by Maureen Easton MD on 2/26/22 at 11:04 AM CST

## 2022-02-26 NOTE — PROGRESS NOTES
Nephrology ([address] [facility]) Progress Note      Patient:  Beatrice Hoffman  YOB: 1955  Date of Service: 2/26/2022  MRN: [id number]   Acct: [de-identified]   Primary Care Physician: KALPESH Stewart NP  Advance Directive: Full Code  Admit Date: 2/21/2022       Hospital Day: 5  Referring Provider: Jimi Turner MD    Patient independently seen and examined, Chart, Consults, Notes, Operative notes, Labs, Cardiology, and Radiology studies reviewed as available. Chief complaint: Profound weakness    Subjective:  Beatrice Hoffman is a 74 y.o. male for whom we were consulted for evaluation and treatment of acute kidney injury. Patient denies any history of chronic kidney disease. Patient has history of stage IV colon cancer with metastatic disease to liver. Patient was recently hospitalized at [facility] with bowel obstruction, needing emergent laparotomy and ileostomy. Previously underwent partial colectomy and partial hepatectomy for the treatment of metastatic colon cancer. Patient denies any nausea and vomiting. He has noticed high output from ileostomy and has to change the bag frequently. Patient was supposed to get total parenteral nutrition but was not approved by [employer]. Patient has been trying to eat more but unable to eat due to recent abdominal surgery. His fluid intake was also very low. In the emergency room his serum creatinine was 4.6 mg, phosphorus was 11.1 mg. Patient also has hyponatremia. He is now admitted for acute kidney injury and other severe electrolyte abnormalities. He is currently getting IV fluid and feeling little better. This morning patient is feeling very well. He is responding to IV fluids slowly and has noticed more urine output.   His serum creatinine is slowly improving    Allergies:  Morphine and Oxycodone-acetaminophen    Medicines:  Current Facility-Administered Medications   Medication Dose Route Frequency Provider Last Rate Last Admin    0.9 % sodium chloride infusion   IntraVENous Continuous Juana Elizondo PA-C        loperamide (IMODIUM) capsule 4 mg  4 mg Oral BID PRN Jeyson Meadows PA-C        heparin 25,000 units in dextrose 5% 250 mL (premix) infusion  5-30 Units/kg/hr IntraVENous Continuous Juana Elizondo PA-C 11 mL/hr at 02/26/22 0629 17 Units/kg/hr at 02/26/22 0629    dronabinol (MARINOL) capsule 2.5 mg  2.5 mg Oral BID Keri Aden MD   2.5 mg at 02/26/22 0910    heparin (porcine) injection 5,170 Units  80 Units/kg IntraVENous PRN Juana Elizondo PA-C   5,170 Units at 02/24/22 1248    heparin (porcine) injection 2,580 Units  40 Units/kg IntraVENous PRN Juana Elizondo PA-C   2,580 Units at 02/25/22 2342    potassium chloride (KLOR-CON M) extended release tablet 40 mEq  40 mEq Oral PRN Lidia Biggs MD   40 mEq at 02/24/22 7223    Or    potassium bicarb-citric acid (EFFER-K) effervescent tablet 40 mEq  40 mEq Oral PRN Lidia Biggs MD        Or   Charity Current potassium chloride 10 mEq/100 mL IVPB (Peripheral Line)  10 mEq IntraVENous PRN Lidia Biggs MD        methocarbamol (ROBAXIN) tablet 500 mg  500 mg Oral TID PRN Lidia Biggs MD        HYDROmorphone (DILAUDID) tablet 2 mg  2 mg Oral Q6H PRN Lidia Biggs MD   2 mg at 02/25/22 1521    sodium chloride flush 0.9 % injection 5-40 mL  5-40 mL IntraVENous 2 times per day Dewayne Comp, APRN - CNP   10 mL at 02/26/22 0850    sodium chloride flush 0.9 % injection 5-40 mL  5-40 mL IntraVENous PRN Dewayne Comp, APRN - CNP        0.9 % sodium chloride infusion  25 mL IntraVENous PRN Dewayne Comp, APRN - CNP        ondansetron (ZOFRAN-ODT) disintegrating tablet 4 mg  4 mg Oral Q8H PRN Dewayne Comp, APRN - CNP        Or    ondansetron [facility]) injection 4 mg  4 mg IntraVENous Q6H PRN Dewayne Comp, APRN - CNP   4 mg at 02/25/22 1807    acetaminophen (TYLENOL) tablet 650 mg  650 mg Oral Q6H PRN Dewayne Comp, APRN - CNP   650 mg at 02/23/22 3588    Or    acetaminophen (TYLENOL) suppository 650 mg  650 mg Rectal Q6H PRN Margot An, APRN - CNP        Vitamin D (CHOLECALCIFEROL) tablet 2,000 Units  2,000 Units Oral Daily Margot An, APRN - CNP   2,000 Units at 02/26/22 0911    pantoprazole (PROTONIX) tablet 40 mg  40 mg Oral QAM AC Margot An, APRN - CNP   40 mg at 02/26/22 2218    [Held by provider] prazosin (MINIPRESS) capsule 1 mg  1 mg Oral Nightly Margot An, APRN - CNP        sertraline (ZOLOFT) tablet 25 mg  25 mg Oral Daily Margot An, APRN - CNP   25 mg at 02/26/22 0911       Past Medical History:  Past Medical History:   Diagnosis Date    Acid reflux     Cancer ([facility] [address]     adenocarcinoma of colon    GERD (gastroesophageal reflux disease)     PTSD (post-traumatic stress disorder)     Stage 3a chronic kidney disease ([facility] [address]        Past Surgical History:  Past Surgical History:   Procedure Laterality Date    ABLATION OF DYSRHYTHMIC FOCUS      ablation of liver at [facility] APPENDECTOMY  2003    CHOLECYSTECTOMY  2013    COLONOSCOPY      when pt was in the 19's    COLONOSCOPY N/A 03/11/2020    COLONOSCOPY POLYPECTOMY SNARE/COLD BIOPSY: Dr. Xochitl Reddy colonoscopy: 6-12 months due to findings at colonoscopy today with Cecal mass lesion and large polyps.     COLONOSCOPY      COLONOSCOPY N/A 03/17/2021    Dr Torres Spence, Post-operative changes w IC anastomosis & single visible staple, Mild Diverticuosis, Int Hemorrhoids Grade 1, 3 year recall    HEMICOLECTOMY Right 03/30/2020    LAPAROSCOPIC-ASSISTED RIGHT HEMICOLECTOMY performed by Fany Morales MD at [address] N/A 06/03/2020    INSERTION OF VENOUS PORT with flouro performed by Fany Morales MD at [place] [address] ENDOSCOPY N/A 03/11/2020    Dr. Jo Horn:   [facility]       Family History  Family History   Problem Relation Age of Onset    Liver Cancer Mother     High Blood Pressure Mother    Keron Colon Cancer Mother         x2    Cancer Father         Lung Cancer    Breast Cancer Sister     Cancer Maternal Grandfather         Lung Cancer    Cancer Paternal Grandfather         Stomach Cancer    Colon Polyps Neg Hx     Cystic Fibrosis Neg Hx     Liver Disease Neg Hx     Rectal Cancer Neg Hx        Social History  Social History     Socioeconomic History    Marital status:      Spouse name: Not on file    Number of children: Not on file    Years of education: Not on file    Highest education level: Not on file   Occupational History    Not on file   Tobacco Use    Smoking status: Never Smoker    Smokeless tobacco: Never Used   Vaping Use    Vaping Use: Never used   Substance and Sexual Activity    Alcohol use: Yes     Comment: rare     Drug use: No    Sexual activity: Yes     Partners: Female   Other Topics Concern    Not on file   Social History Narrative    Not on file     Social Determinants of Health     Financial Resource Strain:     Difficulty of Paying Living Expenses: Not on file   Food Insecurity:     Worried About Running Out of Food in the Last Year: Not on file    [place] of Food in the Last Year: Not on file   Transportation Needs:     Lack of Transportation (Medical): Not on file    Lack of Transportation (Non-Medical):  Not on file   Physical Activity:     Days of Exercise per Week: Not on file    Minutes of Exercise per Session: Not on file   Stress:     Feeling of Stress : Not on file   Social Connections:     Frequency of Communication with Friends and Family: Not on file    Frequency of Social Gatherings with Friends and Family: Not on file    Attends [employer] Services: Not on file    Active Member of Clubs or Organizations: Not on file    Attends Club or Organization Meetings: Not on file    Marital Status: Not on file   Intimate Partner Violence:     Fear of Current or Ex-Partner: Not on file    Emotionally Abused: Not on file    Physically Abused: Not on file    Sexually Abused: Not on file   Housing Stability:     Unable to Pay for Housing in the Last Year: Not on file    Number of Places Lived in the Last Year: Not on file    Unstable Housing in the Last Year: Not on file         Review of Systems:  History obtained from chart review and the patient  General ROS: No fever or chills  Respiratory ROS: No cough, shortness of breath, wheezing  Cardiovascular ROS: No chest pain or palpitations  Gastrointestinal ROS: positive for - diarrhea/large output from last  Genito-Urinary ROS: No dysuria or hematuria  Musculoskeletal ROS: No joint pain or swelling   14 point ROS reviewed with the patient and negative except as noted above and in the HPI unless unable to obtain. Objective:  Patient Vitals for the past 24 hrs:   BP Temp Temp src Pulse Resp SpO2 Weight   02/26/22 0551 100/67 98.1 °F (36.7 °C) Temporal 79 18 98 % 143 lb 9 oz (65.1 kg)   02/26/22 0034 95/62 -- Oral 88 16 97 % --   02/25/22 1752 117/61 97.8 °F (36.6 °C) Temporal 88 16 98 % --   02/25/22 1239 122/65 96.9 °F (36.1 °C) Temporal 84 16 98 % --       Intake/Output Summary (Last 24 hours) at 2/26/2022 1040  Last data filed at 2/26/2022 1031  Gross per 24 hour   Intake 3552 ml   Output 3150 ml   Net 402 ml     General: awake/alert   HEENT: Normocephalic atraumatic head  Neck: Supple with no JVD or carotid bruits. Chest:  clear to auscultation bilaterally  CVS: regular rate and rhythm  Abdominal: Midline abdominal wound, looks fresh with staples/right lower quadrant ileostomy.   Extremities: no cyanosis or edema  Skin: warm and dry without rash      Labs:  BMP:   Recent Labs     02/24/22  0401 02/25/22  0250 02/26/22  0520   * 132* 134*   K 3.4* 4.1 5.0   CL 88* 94* 99   CO2 28 28 25   PHOS 5.1* 3.7 3.3   BUN 65* 51* 39*   CREATININE 3.5* 3.2* 2.9*   CALCIUM 8.5* 8.0* 8.6*     CBC:   Recent Labs     02/24/22  0401 02/25/22  0250 02/26/22  0520   WBC 4.2* 3.9* 3.9*   HGB 9.3* 8.6* 8.5*   HCT 29.5* 27.8* 27.8*   MCV 91.0 93.3 94.6*   * 402* 428*     LIVER PROFILE:   Recent Labs     02/24/22  0401 02/25/22  0250   AST 32 22   ALT 24 18   BILITOT 0.5 0.3   ALKPHOS 218* 183*     PT/INR: No results for input(s): PROTIME, INR in the last 72 hours. APTT:   Recent Labs     02/25/22  1530 02/25/22  2250 02/26/22  0520   APTT 62.8* 43.5* 67.3*     BNP:  No results for input(s): BNP in the last 72 hours. Ionized Calcium:No results for input(s): IONCA in the last 72 hours. Magnesium:  Recent Labs     02/24/22  0401   MG 2.0     Phosphorus:  Recent Labs     02/24/22  0401 02/25/22  0250 02/26/22  0520   PHOS 5.1* 3.7 3.3     HgbA1C: No results for input(s): LABA1C in the last 72 hours. Hepatic:   Recent Labs     02/24/22  0401 02/25/22  0250   ALKPHOS 218* 183*   ALT 24 18   AST 32 22   PROT 6.6 5.7*   BILITOT 0.5 0.3   LABALBU 2.8* 2.6*     Lactic Acid:   No results for input(s): LACTA in the last 72 hours. Troponin: No results for input(s): CKTOTAL, CKMB, TROPONINT in the last 72 hours. ABGs: No results for input(s): PH, PCO2, PO2, HCO3, O2SAT in the last 72 hours. CRP:  No results for input(s): CRP in the last 72 hours. Sed Rate:  No results for input(s): SEDRATE in the last 72 hours. Cultures:   No results for input(s): CULTURE in the last 72 hours. No results for input(s): BC, Leah Si in the last 72 hours. No results for input(s): CXSURG in the last 72 hours. Radiology reports as per the Radiologist  Radiology: CT ABDOMEN PELVIS WO CONTRAST Additional Contrast? None    Result Date: 2/21/2022  CT ABDOMEN PELVIS WO CONTRAST 2/21/2022 7:53 PM History: Postop pain. Hypotension. Noncontrast abdomen/pelvis CT. Comparison is made with February 8, 2022. In order to have a CT radiation dose as low as reasonably achievable Automated Exposure Control was utilized for adjustment of the mA and/or KV according to patient size. DLP in mGycm= 1115. Interval abdominal surgery.  Normal heart size. Patchy atelectasis at the lung bases. Normal noncontrast appearance of the liver, pancreas, and spleen. Normal and symmetric kidneys. No hydronephrosis. No bowel dilation. No abscess or hematoma. 1. Postoperative changes with no sign of bowel obstruction, abscess, or hematoma. Signed by Dr Marcelino Irizarry    XR CHEST PORTABLE    Result Date: 2/21/2022  Exam:   XR CHEST PORTABLE  Date:  2/21/2022 History:  Male, age  61 years; hypotension COMPARISON:  Chest x-ray dated February 8, 2022 Findings : Chest portable place. A right-sided PICC, new, terminating in the low SVC. The heart and mediastinum are normal in size. Lungs are without focal infiltrate, mass or effusions. The bones show no acute pathology. Catheter in the left upper quadrant. Impression: 1. New right-sided PICC. 2.  Otherwise, no interval changes. Signed by Dr Melissa Cerna   1. Acute kidney injury/stage III/improving. .  2.  Intravascular volume depletion. 3.  Severe hyperphosphatemia  4. Hypercalcemia. 5.  Hyponatremia improving. 6.  History of metastatic colon cancer. 7.  Severe immunosuppression due to recent chemo. 8.  Hypokalemia. 9.  Anemia related to multiple factors. Plan:  1. Continue current IV fluid. 2.  Monitor renal recovery. 3.  Plan was discussed with the patient.       Tania Stark MD  02/26/22  10:40 AM

## 2022-02-27 LAB
ALBUMIN SERPL-MCNC: 2.5 G/DL (ref 3.5–5.2)
ALP BLD-CCNC: 223 U/L (ref 40–130)
ALT SERPL-CCNC: 18 U/L (ref 5–41)
ANION GAP SERPL CALCULATED.3IONS-SCNC: 8 MMOL/L (ref 7–19)
APTT: 112.2 SEC (ref 26–36.2)
APTT: 42.2 SEC (ref 26–36.2)
APTT: 44.3 SEC (ref 26–36.2)
APTT: 58.7 SEC (ref 26–36.2)
AST SERPL-CCNC: 24 U/L (ref 5–40)
BASOPHILS ABSOLUTE: 0 K/UL (ref 0–0.2)
BASOPHILS RELATIVE PERCENT: 0.2 % (ref 0–1)
BILIRUB SERPL-MCNC: 0.4 MG/DL (ref 0.2–1.2)
BUN BLDV-MCNC: 32 MG/DL (ref 8–23)
CALCIUM SERPL-MCNC: 8.7 MG/DL (ref 8.8–10.2)
CEA: 3.5 NG/ML (ref 0–4.7)
CHLORIDE BLD-SCNC: 99 MMOL/L (ref 98–111)
CO2: 26 MMOL/L (ref 22–29)
CREAT SERPL-MCNC: 2.6 MG/DL (ref 0.5–1.2)
EOSINOPHILS ABSOLUTE: 0.1 K/UL (ref 0–0.6)
EOSINOPHILS RELATIVE PERCENT: 2 % (ref 0–5)
GFR AFRICAN AMERICAN: 30
GFR NON-AFRICAN AMERICAN: 25
GLUCOSE BLD-MCNC: 117 MG/DL (ref 74–109)
HCT VFR BLD CALC: 29 % (ref 42–52)
HEMOGLOBIN: 8.7 G/DL (ref 14–18)
IMMATURE GRANULOCYTES #: 0 K/UL
LYMPHOCYTES ABSOLUTE: 0.6 K/UL (ref 1.1–4.5)
LYMPHOCYTES RELATIVE PERCENT: 11 % (ref 20–40)
MCH RBC QN AUTO: 28.5 PG (ref 27–31)
MCHC RBC AUTO-ENTMCNC: 30 G/DL (ref 33–37)
MCV RBC AUTO: 95.1 FL (ref 80–94)
MONOCYTES ABSOLUTE: 0.6 K/UL (ref 0–0.9)
MONOCYTES RELATIVE PERCENT: 11.4 % (ref 0–10)
NEUTROPHILS ABSOLUTE: 3.8 K/UL (ref 1.5–7.5)
NEUTROPHILS RELATIVE PERCENT: 74.8 % (ref 50–65)
PDW BLD-RTO: 14.7 % (ref 11.5–14.5)
PHOSPHORUS: 3.2 MG/DL (ref 2.5–4.5)
PLATELET # BLD: 446 K/UL (ref 130–400)
PMV BLD AUTO: 8.9 FL (ref 9.4–12.4)
POTASSIUM REFLEX MAGNESIUM: 5 MMOL/L (ref 3.5–5)
RBC # BLD: 3.05 M/UL (ref 4.7–6.1)
SODIUM BLD-SCNC: 133 MMOL/L (ref 136–145)
TOTAL PROTEIN: 6.4 G/DL (ref 6.6–8.7)
WBC # BLD: 5.1 K/UL (ref 4.8–10.8)

## 2022-02-27 PROCEDURE — 6370000000 HC RX 637 (ALT 250 FOR IP)

## 2022-02-27 PROCEDURE — 97530 THERAPEUTIC ACTIVITIES: CPT

## 2022-02-27 PROCEDURE — 97161 PT EVAL LOW COMPLEX 20 MIN: CPT

## 2022-02-27 PROCEDURE — 99232 SBSQ HOSP IP/OBS MODERATE 35: CPT | Performed by: INTERNAL MEDICINE

## 2022-02-27 PROCEDURE — 80053 COMPREHEN METABOLIC PANEL: CPT

## 2022-02-27 PROCEDURE — 2580000003 HC RX 258

## 2022-02-27 PROCEDURE — 82378 CARCINOEMBRYONIC ANTIGEN: CPT

## 2022-02-27 PROCEDURE — 6370000000 HC RX 637 (ALT 250 FOR IP): Performed by: INTERNAL MEDICINE

## 2022-02-27 PROCEDURE — 1210000000 HC MED SURG R&B

## 2022-02-27 PROCEDURE — 85025 COMPLETE CBC W/AUTO DIFF WBC: CPT

## 2022-02-27 PROCEDURE — 84100 ASSAY OF PHOSPHORUS: CPT

## 2022-02-27 PROCEDURE — 6370000000 HC RX 637 (ALT 250 FOR IP): Performed by: PHYSICIAN ASSISTANT

## 2022-02-27 PROCEDURE — 2580000003 HC RX 258: Performed by: PHYSICIAN ASSISTANT

## 2022-02-27 PROCEDURE — 85730 THROMBOPLASTIN TIME PARTIAL: CPT

## 2022-02-27 PROCEDURE — 36415 COLL VENOUS BLD VENIPUNCTURE: CPT

## 2022-02-27 PROCEDURE — 6360000002 HC RX W HCPCS: Performed by: PHYSICIAN ASSISTANT

## 2022-02-27 RX ORDER — MEGESTROL ACETATE 40 MG/ML
800 SUSPENSION ORAL DAILY
Status: DISCONTINUED | OUTPATIENT
Start: 2022-02-27 | End: 2022-03-01 | Stop reason: HOSPADM

## 2022-02-27 RX ADMIN — Medication 2000 UNITS: at 10:37

## 2022-02-27 RX ADMIN — SODIUM CHLORIDE: 9 INJECTION, SOLUTION INTRAVENOUS at 14:41

## 2022-02-27 RX ADMIN — HYDROMORPHONE HYDROCHLORIDE 2 MG: 2 TABLET ORAL at 10:49

## 2022-02-27 RX ADMIN — HEPARIN SODIUM AND DEXTROSE 13.93 UNITS/KG/HR: 10000; 5 INJECTION INTRAVENOUS at 06:26

## 2022-02-27 RX ADMIN — DRONABINOL 2.5 MG: 2.5 CAPSULE ORAL at 10:37

## 2022-02-27 RX ADMIN — MEGESTROL ACETATE 800 MG: 40 SUSPENSION ORAL at 10:49

## 2022-02-27 RX ADMIN — SODIUM CHLORIDE, PRESERVATIVE FREE 10 ML: 5 INJECTION INTRAVENOUS at 21:10

## 2022-02-27 RX ADMIN — LOPERAMIDE HYDROCHLORIDE 4 MG: 2 CAPSULE ORAL at 10:49

## 2022-02-27 RX ADMIN — PANTOPRAZOLE SODIUM 40 MG: 40 TABLET, DELAYED RELEASE ORAL at 06:50

## 2022-02-27 RX ADMIN — ONDANSETRON 4 MG: 4 TABLET, ORALLY DISINTEGRATING ORAL at 10:49

## 2022-02-27 RX ADMIN — HEPARIN SODIUM 2580 UNITS: 1000 INJECTION INTRAVENOUS; SUBCUTANEOUS at 22:29

## 2022-02-27 RX ADMIN — HEPARIN SODIUM AND DEXTROSE 13.93 UNITS/KG/HR: 10000; 5 INJECTION INTRAVENOUS at 12:41

## 2022-02-27 RX ADMIN — SODIUM CHLORIDE: 9 INJECTION, SOLUTION INTRAVENOUS at 06:29

## 2022-02-27 RX ADMIN — SODIUM CHLORIDE: 9 INJECTION, SOLUTION INTRAVENOUS at 21:23

## 2022-02-27 RX ADMIN — DRONABINOL 2.5 MG: 2.5 CAPSULE ORAL at 21:10

## 2022-02-27 RX ADMIN — HYDROMORPHONE HYDROCHLORIDE 2 MG: 2 TABLET ORAL at 17:44

## 2022-02-27 RX ADMIN — SERTRALINE HYDROCHLORIDE 25 MG: 50 TABLET ORAL at 10:37

## 2022-02-27 ASSESSMENT — PAIN SCALES - GENERAL
PAINLEVEL_OUTOF10: 0
PAINLEVEL_OUTOF10: 5

## 2022-02-27 NOTE — PROGRESS NOTES
Nephrology ([address]) Progress Note      Patient:  Autumn Garces  YOB: 1955  Date of Service: 2/27/2022  MRN: [id number]   Acct: [de-identified]   Primary Care Physician: KALPESH Serrano NP  Advance Directive: Full Code  Admit Date: 2/21/2022       Hospital Day: 6  Referring Provider: Lisy Valle MD    Patient independently seen and examined, Chart, Consults, Notes, Operative notes, Labs, Cardiology, and Radiology studies reviewed as available. Chief complaint: Profound weakness    Subjective:  Autumn Garces is a 74 y.o. male for whom we were consulted for evaluation and treatment of acute kidney injury. Patient denies any history of chronic kidney disease. Patient has history of stage IV colon cancer with metastatic disease to liver. Patient was recently hospitalized at [facility] with bowel obstruction, needing emergent laparotomy and ileostomy. Previously underwent partial colectomy and partial hepatectomy for the treatment of metastatic colon cancer. Patient denies any nausea and vomiting. He has noticed high output from ileostomy and has to change the bag frequently. Patient was supposed to get total parenteral nutrition but was not approved by [employer]. Patient has been trying to eat more but unable to eat due to recent abdominal surgery. His fluid intake was also very low. In the emergency room his serum creatinine was 4.6 mg, phosphorus was 11.1 mg. Patient also has hyponatremia. He is now admitted for acute kidney injury and other severe electrolyte abnormalities. He is currently getting IV fluid and feeling little better. This morning patient feels well. He has good urine output and renal function is slowly improving.     Allergies:  Morphine and Oxycodone-acetaminophen    Medicines:  Current Facility-Administered Medications   Medication Dose Route Frequency Provider Last Rate Last Admin    megestrol (MEGACE) 40 MG/ML suspension 800 mg  800 mg Oral Daily Josesito Borrego PA-C        0.9 % sodium chloride infusion   IntraVENous Continuous Josesito Borrego PA-C 150 mL/hr at 02/27/22 8273 New Bag at 02/27/22 4375    loperamide (IMODIUM) capsule 4 mg  4 mg Oral BID PRN Mahi Ruiz PA-C        heparin 25,000 units in dextrose 5% 250 mL (premix) infusion  5-30 Units/kg/hr IntraVENous Continuous Josesito Borrego PA-C 9 mL/hr at 02/27/22 0626 13.932 Units/kg/hr at 02/27/22 8890    dronabinol (MARINOL) capsule 2.5 mg  2.5 mg Oral BID Arabella Porras MD   2.5 mg at 02/27/22 1037    heparin (porcine) injection 5,170 Units  80 Units/kg IntraVENous PRN Josesito Borrego PA-C   5,170 Units at 02/24/22 1248    heparin (porcine) injection 2,580 Units  40 Units/kg IntraVENous PRN Josesito Borrego PA-C   2,580 Units at 02/25/22 2342    potassium chloride (KLOR-CON M) extended release tablet 40 mEq  40 mEq Oral PRN Guero Pascual MD   40 mEq at 02/24/22 6850    Or    potassium bicarb-citric acid (EFFER-K) effervescent tablet 40 mEq  40 mEq Oral PRN Guero Pascual MD        Or   [address] [place] [address] potassium chloride 10 mEq/100 mL IVPB (Peripheral Line)  10 mEq IntraVENous PRN Guero Pascual MD        methocarbamol (ROBAXIN) tablet 500 mg  500 mg Oral TID PRN Guero Pascual MD        HYDROmorphone (DILAUDID) tablet 2 mg  2 mg Oral Q6H PRN Guero Pascual MD   2 mg at 02/26/22 2239    sodium chloride flush 0.9 % injection 5-40 mL  5-40 mL IntraVENous 2 times per day KALPESH Montgomery - CNP   10 mL at 02/26/22 0850    sodium chloride flush 0.9 % injection 5-40 mL  5-40 mL IntraVENous PRN KALPESH Montgomery - CNP        0.9 % sodium chloride infusion  25 mL IntraVENous PRN Jearline Nguyen APRZOFIA Salinas CNP        ondansetron (ZOFRAN-ODT) disintegrating tablet 4 mg  4 mg Oral Q8H PRN Jyl Wendy, APRN - CNP        Or    ondansetron [facility]) injection 4 mg  4 mg IntraVENous Q6H PRN Jyl Wendy, APRN - CNP   4 mg at 02/25/22 1807    acetaminophen (TYLENOL) tablet 650 mg  650 mg Oral Q6H PRN Tereasa Early, APRN - CNP   650 mg at 02/23/22 0146    Or    acetaminophen (TYLENOL) suppository 650 mg  650 mg Rectal Q6H PRN Tereasa Early, APRN - CNP        Vitamin D (CHOLECALCIFEROL) tablet 2,000 Units  2,000 Units Oral Daily Tereasa Early, APRN - CNP   2,000 Units at 02/27/22 1037    pantoprazole (PROTONIX) tablet 40 mg  40 mg Oral QAM AC Tereasa Early, APRN - CNP   40 mg at 02/27/22 0650    [Held by provider] prazosin (MINIPRESS) capsule 1 mg  1 mg Oral Nightly Tereasa Early, APRN - CNP        sertraline (ZOLOFT) tablet 25 mg  25 mg Oral Daily Tereasa Early, APRN - CNP   25 mg at 02/27/22 1037       Past Medical History:  Past Medical History:   Diagnosis Date    Acid reflux     Cancer ([facility]     adenocarcinoma of colon    GERD (gastroesophageal reflux disease)     PTSD (post-traumatic stress disorder)     Stage 3a chronic kidney disease ([facility]        Past Surgical History:  Past Surgical History:   Procedure Laterality Date    ABLATION OF DYSRHYTHMIC FOCUS      ablation of liver at [facility] APPENDECTOMY  2003    CHOLECYSTECTOMY  2013    COLONOSCOPY      when pt was in the 19's    COLONOSCOPY N/A 03/11/2020    COLONOSCOPY POLYPECTOMY SNARE/COLD BIOPSY: Dr. Colon Shone colonoscopy: 6-12 months due to findings at colonoscopy today with Cecal mass lesion and large polyps.     COLONOSCOPY      COLONOSCOPY N/A 03/17/2021    Dr José Miguel Moreira, Post-operative changes w IC anastomosis & single visible staple, Mild Diverticuosis, Int Hemorrhoids Grade 1, 3 year recall    HEMICOLECTOMY Right 03/30/2020    LAPAROSCOPIC-ASSISTED RIGHT [facility] [address] [facility] [address] [address] N/A 06/03/2020    INSERTION OF VENOUS PORT with flouro performed by Allen Ann MD at [place] [address] ENDOSCOPY N/A 03/11/2020    Dr. Marcia Bailon:   [facility]       Family History  Family History   Problem Relation Age of Onset    Liver Cancer Mother     High Blood Pressure Mother     Colon Cancer Mother         x2    Cancer Father         Lung Cancer    Breast Cancer Sister     Cancer Maternal Grandfather         Lung Cancer    Cancer Paternal Grandfather         Stomach Cancer    Colon Polyps Neg Hx     Cystic Fibrosis Neg Hx     Liver Disease Neg Hx     Rectal Cancer Neg Hx        Social History  Social History     Socioeconomic History    Marital status:      Spouse name: Not on file    Number of children: Not on file    Years of education: Not on file    Highest education level: Not on file   Occupational History    Not on file   Tobacco Use    Smoking status: Never Smoker    Smokeless tobacco: Never Used   Vaping Use    Vaping Use: Never used   Substance and Sexual Activity    Alcohol use: Yes     Comment: rare     Drug use: No    Sexual activity: Yes     Partners: Female   Other Topics Concern    Not on file   Social History Narrative    Not on file     Social Determinants of Health     Financial Resource Strain:     Difficulty of Paying Living Expenses: Not on file   Food Insecurity:     Worried About Running Out of Food in the Last Year: Not on file    [place] of Food in the Last Year: Not on file   Transportation Needs:     Lack of Transportation (Medical): Not on file    Lack of Transportation (Non-Medical):  Not on file   Physical Activity:     Days of Exercise per Week: Not on file    Minutes of Exercise per Session: Not on file   Stress:     Feeling of Stress : Not on file   Social Connections:     Frequency of Communication with Friends and Family: Not on file    Frequency of Social Gatherings with Friends and Family: Not on file    Attends Yazidi Services: Not on file    Active Member of Clubs or Organizations: Not on file    Attends Club or Organization Meetings: Not on file    Marital Status: Not on file   Intimate Partner Violence:     Fear of Current or Ex-Partner: Not on file    Emotionally Abused: Not on file    Physically Abused: Not on file    Sexually Abused: Not on file   Housing Stability:     Unable to Pay for Housing in the Last Year: Not on file    Number of Places Lived in the Last Year: Not on file    Unstable Housing in the Last Year: Not on file         Review of Systems:  History obtained from chart review and the patient  General ROS: No fever or chills  Respiratory ROS: No cough, shortness of breath, wheezing  Cardiovascular ROS: No chest pain or palpitations  Gastrointestinal ROS: positive for - diarrhea/large output from last  Genito-Urinary ROS: No dysuria or hematuria  Musculoskeletal ROS: No joint pain or swelling   14 point ROS reviewed with the patient and negative except as noted above and in the HPI unless unable to obtain. Objective:  Patient Vitals for the past 24 hrs:   BP Temp Temp src Pulse Resp SpO2   02/27/22 0623 118/69 96.6 °F (35.9 °C) Temporal 86 18 98 %   02/27/22 0108 106/70 99 °F (37.2 °C) Temporal 90 16 97 %   02/26/22 1738 110/69 97.3 °F (36.3 °C) Temporal 87 16 98 %   02/26/22 1138 112/73 98.4 °F (36.9 °C) Temporal 78 16 99 %       Intake/Output Summary (Last 24 hours) at 2/27/2022 1038  Last data filed at 2/27/2022 6870  Gross per 24 hour   Intake 240 ml   Output 2325 ml   Net -2085 ml     General: awake/alert   HEENT: Normocephalic atraumatic head  Neck: Supple with no JVD or carotid bruits. Chest:  clear to auscultation bilaterally  CVS: regular rate and rhythm  Abdominal: Midline abdominal wound, looks fresh with staples/right lower quadrant ileostomy.   Extremities: no cyanosis or edema  Skin: warm and dry without rash      Labs:  BMP:   Recent Labs     02/25/22  0250 02/26/22  0520 02/27/22  0455   * 134* 133*   K 4.1 5.0 5.0   CL 94* 99 99   CO2 28 25 26   PHOS 3.7 3.3 3.2   BUN 51* 39* 32*   CREATININE 3.2* 2.9* 2.6*   CALCIUM 8.0* 8.6* 8.7*     CBC:   Recent Labs     02/25/22  0250 02/26/22  0520 02/27/22 0455   WBC 3.9* 3.9* 5.1   HGB 8.6* 8.5* 8.7*   HCT 27.8* 27.8* 29.0*   MCV 93.3 94.6* 95.1*   * 428* 446*     LIVER PROFILE:   Recent Labs     02/25/22 0250 02/27/22 0455   AST 22 24   ALT 18 18   BILITOT 0.3 0.4   ALKPHOS 183* 223*     PT/INR: No results for input(s): PROTIME, INR in the last 72 hours. APTT:   Recent Labs     02/25/22 2250 02/26/22 0520 02/27/22 0455   APTT 43.5* 67.3* 112.2*     BNP:  No results for input(s): BNP in the last 72 hours. Ionized Calcium:No results for input(s): IONCA in the last 72 hours. Magnesium:  No results for input(s): MG in the last 72 hours. Phosphorus:  Recent Labs     02/25/22 0250 02/26/22 0520 02/27/22 0455   PHOS 3.7 3.3 3.2     HgbA1C: No results for input(s): LABA1C in the last 72 hours. Hepatic:   Recent Labs     02/25/22 0250 02/27/22 0455   ALKPHOS 183* 223*   ALT 18 18   AST 22 24   PROT 5.7* 6.4*   BILITOT 0.3 0.4   LABALBU 2.6* 2.5*     Lactic Acid:   No results for input(s): LACTA in the last 72 hours. Troponin: No results for input(s): CKTOTAL, CKMB, TROPONINT in the last 72 hours. ABGs: No results for input(s): PH, PCO2, PO2, HCO3, O2SAT in the last 72 hours. CRP:  No results for input(s): CRP in the last 72 hours. Sed Rate:  No results for input(s): SEDRATE in the last 72 hours. Cultures:   No results for input(s): CULTURE in the last 72 hours. No results for input(s): BC, Alfreda Lewis in the last 72 hours. No results for input(s): CXSURG in the last 72 hours. Radiology reports as per the Radiologist  Radiology: CT ABDOMEN PELVIS WO CONTRAST Additional Contrast? None    Result Date: 2/21/2022  CT ABDOMEN PELVIS WO CONTRAST 2/21/2022 7:53 PM History: Postop pain. Hypotension. Noncontrast abdomen/pelvis CT. Comparison is made with February 8, 2022. In order to have a CT radiation dose as low as reasonably achievable Automated Exposure Control was utilized for adjustment of the mA and/or KV according to patient size. DLP in mGycm= 1115. Interval abdominal surgery. Normal heart size. Patchy atelectasis at the lung bases. Normal noncontrast appearance of the liver, pancreas, and spleen. Normal and symmetric kidneys. No hydronephrosis. No bowel dilation. No abscess or hematoma. 1. Postoperative changes with no sign of bowel obstruction, abscess, or hematoma. Signed by Dr Khushi Jett    XR CHEST PORTABLE    Result Date: 2/21/2022  Exam:   XR CHEST PORTABLE  Date:  2/21/2022 History:  Male, age  61 years; hypotension COMPARISON:  Chest x-ray dated February 8, 2022 Findings : Chest portable place. A right-sided PICC, new, terminating in the low SVC. The heart and mediastinum are normal in size. Lungs are without focal infiltrate, mass or effusions. The bones show no acute pathology. Catheter in the left upper quadrant. Impression: 1. New right-sided PICC. 2.  Otherwise, no interval changes. Signed by Dr Yessi Salmon   1. Acute kidney injury/stage III/improving. .  2.  Intravascular volume depletion. 3.  Severe hyperphosphatemia  4. Hypercalcemia. 5.  Hyponatremia improving. 6.  History of metastatic colon cancer. 7.  Severe immunosuppression due to recent chemo. 8.  Hypokalemia. 9.  Anemia related to multiple factors. Plan:  1. Continue current IV fluid. 2.  Monitor renal recovery. 3.  Plan was discussed with the patient/Dr. Alicea/his wife.       Yaa Grant MD  02/27/22  10:38 AM

## 2022-02-27 NOTE — PROGRESS NOTES
injection 4 mg  4 mg IntraVENous Q6H PRN Floridalma Rebecca, APRN - CNP   4 mg at 02/25/22 1807    acetaminophen (TYLENOL) tablet 650 mg  650 mg Oral Q6H PRN Floridalma Rebecca, APRN - CNP   650 mg at 02/23/22 9272    Or    acetaminophen (TYLENOL) suppository 650 mg  650 mg Rectal Q6H PRN Floridalma Rebecca, APRN - CNP        Vitamin D (CHOLECALCIFEROL) tablet 2,000 Units  2,000 Units Oral Daily Floridalma Rebecca, APRN - CNP   2,000 Units at 02/26/22 0911    pantoprazole (PROTONIX) tablet 40 mg  40 mg Oral QAM AC Floridalma Rebecca, APRN - CNP   40 mg at 02/26/22 8277    [Held by provider] prazosin (MINIPRESS) capsule 1 mg  1 mg Oral Nightly Floridalma Bott, APRN - CNP        sertraline (ZOLOFT) tablet 25 mg  25 mg Oral Daily Floridalma Rebecca, APRN - CNP   25 mg at 02/26/22 0911     Allergies   Allergen Reactions    Morphine Nausea And Vomiting     Hallucinations/Vomiting    Oxycodone-Acetaminophen Nausea Only     Principal Problem:    Acute kidney injury superimposed on CKD [facility])  Active Problems:    Adenocarcinoma of colon ([facility] [address]    Liver metastases ([facility] [address]    Hypovolemia    Hyponatremia    Moderate malnutrition [facility]    Palliative care patient    Posttraumatic stress disorder    Deep vein thrombosis (DVT) of left upper extremity ([facility] [address]  Resolved Problems:    * No resolved hospital problems. *    Blood pressure 110/69, pulse 87, temperature 97.3 °F (36.3 °C), temperature source Temporal, resp. rate 16, height 5' 7\" (1.702 m), weight 143 lb 9 oz (65.1 kg), SpO2 98 %. Subjective:  Symptoms:  Stable. Diet:  Poor intake. Activity level: Impaired due to weakness. Pain:  He reports no pain. Objective:  General Appearance: In no acute distress. Vital signs: (most recent): Blood pressure 110/69, pulse 87, temperature 97.3 °F (36.3 °C), temperature source Temporal, resp. rate 16, height 5' 7\" (1.702 m), weight 143 lb 9 oz (65.1 kg), SpO2 98 %. No fever. Output: Producing urine. Stool output assessment: ileostomy. Lungs:  Normal effort. Breath sounds clear to auscultation. Heart: Normal rate. Abdomen: Abdomen is flat. (Midline incision with staples healing, drain left abdomen has yellow thick appearance, good ostomy output). There is no abdominal tenderness. Neurological: Patient is alert and oriented to person, place and time. Skin:  Warm and dry. Assessment:    Condition: In stable condition. Improving. (Creatinine trending down). Plan:   (Fluid/electrolyte management ).        Marcelino Deng MD  2/26/2022

## 2022-02-27 NOTE — PROGRESS NOTES
Discovered labs had not been drawn since 2/26/22 0500 AM draw. Called lab STAT to get orders placed and drawn.

## 2022-02-27 NOTE — PROGRESS NOTES
PROGRESS NOTE    Patient name: Twin Machuca  Patient : 1955  Room: 314      SUBJECTIVE: Appetite marginal.  He has loose stools    INTERVAL HISTORY  The patient is a very pleasant 61years old male who has a history of recurrent colon cancer. He recently underwent debulking surgery and HIPEC at [facility] in 2022. Postoperative course complicated by internal hernia. He was again taken to the OR on 2/10/2022 at [facility]. He has had a complicated postoperative course with severe dehydration and acute kidney injury. He was receiving IV fluids at home according to his ileostomy output. His wife called the clinic yesterday stating that the patient was feeling quite dizzy and had generalized weakness. Laboratory studies reviewed and showed creatinine elevated at 4.6. The patient was directed to emergency. He received IV fluids in the emergency. He was then admitted for further care.   I was consulted for his concurrent history of colon cancer.     INTERVAL HISTORY/HISTORY OF PRESENT ILLNESS:  Diagnosis  · Colonic adenocarcinoma, 2020  · [id number](liver), stage ROXANA  · IHC MMR- proficient  · K-maria luisa mutated  · N-MARIA LUISA/BRAF wild-type  · Iron deficiency anemia  · Soft tissue mass, 2021  · Adenocarcinoma-consistent with colon primary, right psoas muscle, 2021  · Metastatic adenocarcinoma, 2022  · MLH1, MSH2, MH6, PMS2-intact  · MMR- no loss of expression     Treatment summary  · 3/30/2020-right hemicolectomy at [facility]  · Anticipated Liver ablation to be followed by neoadjuvant/adjuvant versus palliative chemotherapy  · 06/10/2020-2020-FOLFOX + Avastin  · 20- Right hepatectomy-Dr. Julian Quiroz  · S/p Injectafer-poor oral iron tolerance  · 21- Initiate FOLFIRI + Avastin every 2 weeks  · 22-psoas muscle mass resection/HIPEC by Dr. Rohith King at [facility]  · 2/10/2022-back to OR for correction of internal hernia        The patient is a 61years old male who has a diagnosis of colonic adenocarcinoma. The patient had malignant disease with several lymph nodes involved. In addition, the patient was also found to have suspicious liver lesions concerning for metastatic disease. He was seen by [employer] and offered a multimodality approach with liver ablation of the left liver lesion followed by neoadjuvant chemotherapy and partial right hepatectomy. He underwent microwave ablation of the left lobe liver lesion on 6/8/2020. He is status post completion of 11 biweekly cycles of FOLFOX/Avasti completed November 2020. He tolerated treatment with complaints of mild transient cold neuropathy. He had a right hepatectomy on 12/11/2020 that showed no residual disease. His last CEA was normal in January 2021. He had MRI of the abdomen at [facility] in March 2021 that showed no evidence of recurrent disease in the liver or in the abdomen. He also had a surveillance colonoscopy in March 2021 that showed no polyps. CEA was elevated in May 2021. Repeat CEA June 2021 showed persistent elevation. MRI abdomen at [facility] showed no evidence of liver recurrence but a suspicious nodule in the right lower quadrant. Biopsy was performed at [facility] and consistent with recurrent adenocarcinoma.  I discussed the findings with hepatobiliary service and also colorectal surgery. He was started on FOLFIRI/Avastin. He was seen by Dr. Jose L Johnson again on 9/24/2021. He had repeat CT abdomen pelvis and also MRI at [facility] that showed persistent disease involving the psoas muscle.  In addition, an additional small place that may represent further peritoneal metastatic disease. He underwent surgery at [facility].   He underwent exploratory laparotomy with resection of psoas mass at [facility] on 1/13/2022. [facility] also underwent HIPEC treatment.   The patient was taken to the OR on 2/10/2022 for correction of internal hernia.     Cancer history  Mr. Asya Adams was first seen by me on 3/23/2020. [facility] was referred for a new diagnosis of colonic adenocarcinoma involving the cecum.  The patient reports that he had a wellbeing consult with his provider at the [address] [place] [address] was found to have anemia and then recommended a colonoscopy.  Of note, the patient has a family history of colon cancer.  His mother is a patient of Dr. Dave Elder he has been diagnosed with colon cancer in 2010. · 3/11/2020- colonoscopy revealed a large malignant appearing fungating mass lesion in the cecum.  In addition, several other polyps.  Biopsy of the mass consistent with moderate differentiated colonic adenocarcinoma.  Polyps consistent with tubulovillous adenoma with no high-grade dysplasia. [facility] MMR not proficient.  K-maria luisa mutated, BRAF and NRAS wild type.  MSI proficient  · 3/11/2020-CEA 5.5 (H)  · 3/18/2020-CT abdomen pelvis with contrast  Invasive cecal mass adhering to the right lateral abdominal wall muscles with adjacent lymphadenopathy. Mild partial obstruction of the terminal ileum. 2. Suspicious lesions in the right and left hepatic lobes measure up to 1.3 cm and likely represent metastatic disease. · 3/18/2020-Xr Chest Standard  No radiographic evidence of acute cardiopulmonary process. · 3/23/2020-he was first seen by me.  Recommended completion of staging with CT chest.  Also recommended liver MRI for further clarification of liver lesion.  S.  Recommend to proceed with a general surgery consultation tomorrow with Dr. Gisele Fox was informed that I favor surgical resection if feasible of the primary malignancy.   · 3/30/2020- right hemicolectomy by Dr. Gin Rob at [facility] consistent with invasive moderately differentiated colonic adenocarcinoma measuring 7.2 cm.  Carcinoma directly invading the adjacent abdominal wall tissue.  Focal lymphovascular space invasion identified.  Focal perineural invasion identified.  Surgical margins negative for evidence of malignancy.  6 out of 14 lymph nodes positive for metastatic adenocarcinoma.  Final pathology staging [id number](liver, stage ROXANA)  · 4/20/2020-CT chest with contrast showed No convincing intrathoracic metastasis. Nonspecific 4 mm nodule of the inferior lingula and a 2 mm right upper lobe nodule can be followed on subsequent imaging in 6-12 months.  Moderate coronary calcifications. Hypodense metastatic liver lesions.  Small hiatal hernia. · 4/20/2020-Mri Abdomen W Wo Contrast There are about 5 liver lesions. The 2 largest appears similar compared to 3/18/2020, the others are too small to further characterize. Appearance is most concerning for metastatic disease. Enhancement of the right lateral peritoneum. This is favored to be postoperative as there is no nodularity, evidence of omental disease or lymphadenopathy. Recommend attention on follow-up. Cholecystectomy. · 4/22/2020-discussed with Dr. James Romero at [facility] will review imaging studies and give further recommendations regarding eligibility for resection of liver lesions. · 5/8/2020 CT Abdomen The two largest suspicious lesions measuring 1.2 and 1.3 cm in the  right and left hepatic lobes respectively are similar compared to the  3/18/2020 CT. Additionally, there are at least five subcentimeter lesions with similar signal characteristics, which are also highly suspicious for metastases. If complete characterization of the number and distribution of lesions is necessary, an MRI with Eovist could be acquired. · 5/19/2020- he was seen by the hepatobiliary service at [facility] with Dr. James Romero:  patient adequate risk candidate for a multimodal approach, directed toward curative hepatectomy eventually. Endorsed by Hepatobiliary Conference, I recommended perc ablation of the L hemiliver to clear it, followed by systemic therapy in a neoadjuvant strategy.  Restaging imaging to confirm clearance of disease on the left and lack of progression to unresectability of the R hemiliver disease would then be followed by R hepatectomy. Limitations to this approach may be accessibility of the segment 4A/8 disease high in the hepatic dome and the possibility of heat sink-related recurrence s/p abation of the left-sided disease. · 5/21/2020- referral for Mediport placement and start FOLFOX in 2 weeks.  Will add bevacizumab after 6 weeks of liver ablation.  We will plan for 12 biweekly dose of FOLFOX.  Bevacizumab will also be stopped 6 weeks prior to major procedure. · 6/8/2020- Microwave ablation of the left liver lesion was then performed for 5 minutes at [address] to achieve a 3.4 x 3.9 cm approximately spherical zone of ablation.    · 6/10/2020- initiation of FOLFOX. · 7/20/2020-added Avastin. · 9/10/20 MRI abdomen: Left hepatic lobe segment II lesion demonstrates small T1 hyperintense blood products, status post microwave ablation on 6/8/2020. No definitive enhancement within the lesion. Focal internal thickening or scar present. Recommend attention on follow-up. Additional scattered subcentimeter foci throughout the liver decreased in size compared to MRI dated 4/20/2020 consistent with improving metastatic disease. Additional chronic findings as above. · 9/16/2020-discussed with plan with the patient and [place].  Interval response to treatment.  Plan to continue chemotherapy through 12 cycles with Avastin. · 12/11/20 Right hepatectomy-Dr. Arjun Martin  · 12/11/20 Right hepatectomy pathology: Liver, right, resection: Focus of Fibrosis with calcifications and chronic inflammation (0.3 cm), see comment. Background hepatic parenchyma with minimal periportal fibrosis (trichrome stain), minimal lobular and portal inflammation, and no significant macrovesicular steatosis (<5%) Comments: The patient's history of colorectal cancer status adjuvant therapy is noted. The focus of fibrosis may reflect treatment effect. There is no evidence of viable tumor in the sampled specimen. · 12/14/20 Ct Abdomen Pelvis W Iv Contrast A Small bowel obstruction with transition point at the distal small bowel, just proximal to RIGHT upper quadrant ileocolonic anastomosis.  Postoperative change of RIGHT hepatectomy with small amount of expected free fluid and intraperitoneal gas.  Redemonstrated LEFT liver lesion.  Small bilateral pleural effusions. · 12/16/20 SBFT-[place]: Study is limited due to retained contrast in the small bowel from prior attempt at small bowel follow-through on the floor. Small bowel remains dilated, measuring approximately 5.2 cm, which is consistent with partial or resolving small bowel obstruction. Final radiographs show contrast within the colon. · 1/4/2020-resolution of small bowel obstruction. · 1/7/21 CT chest: No finding to suggest intrathoracic neoplastic process or metastatic disease. The benign-appearing tiny nodule in the right upper lobe probably represent a noncalcified granuloma. A nodule in the lingular segment of the left upper lobe is not visualized in this study. Postsurgical changes of the liver. No evidence of focal complication. A trace right basal pleural effusion. This may be reactive to the previous abdominal surgery. · 3/4/21 MRI abd: Status post right hepatectomy and microwave ablation of a left liver lesion. No findings to suggest residual/recurrent disease. No new liver lesion is identified. Postsurgical changes related to right hemicolectomy. Microwave ablation zone in segment II of the left hepatic lobe measures approximately 21 mm in diameter, unchanged. No appreciable postcontrast enhancement or other findings to suggest local disease recurrence. No new liver lesion is identified. Spleen is mildly enlarged, measuring 13.8 cm in length. · 3/17/2021-1 year colonoscopy showed no evidence of polyps.   · 5/3/21 CEA 6.2  · 6/8/21 MRI abdomen ([place]): Status post right hepatectomy and MWA of a left liver lesion.  No evidence of residual or recurrent metastatic disease in the liver. Status post prior right hemicolectomy for colon carcinoma. Within the posterior right iliopsoas muscle there is a mildly T2 hyperintense nodular focus with postcontrast rim enhancement and diffusion restriction. This measures approximately 2.7 x 2 x 2 cm. More delayed postcontrast images show irregular area of enhancement measuring 2.4 x 7.7 cm axially involving the right iliopsoas muscle and the right lateral abdominal wall. Suggest correlation with contrast enhanced CT exam for complete evaluation. Consider biopsy of this lesion. · 6/15/21 CT CHEST WO CONTRAST No metastatic disease in the chest.  No change in tiny 2 mm RIGHT upper lobe pulmonary nodule.  Mild ectasia of the ascending aorta measuring 4 cm. · 6/18/2021-I reviewed results of MRI abdomen at [place].  CT chest without contrast reviewed by me and showed no evidence of metastatic disease.  Stable 2 mm right upper lobe nodule.  Discussed with hepatobiliary service at [facility]. Biopsy intra-abdominal nodule next week at [facility]. · 6/25/[id number] biopsy soft tissue mass right psoas muscle at [facility] consistent with recurrent colonic adenocarcinoma.    · 7/2/2021-discussed with hepatobiliary service at [facility] and also surgical oncology.  Surgical oncology will call us back regarding consultation for consideration of HIPEC if he is a candidate  · 7/13/21-initiation of chemotherapy with FOLFIRI + Avastin every 2 weeks  · 7/13/21 CEA 10.7  · 7/27/2021-CEA 9.6  · 7/30/2021-she was seen by the surgical oncology group at [facility] by Dr Adithya Liu recommended to complete 6 cycles of chemotherapy and repeat CT chest abdomen pelvis and liver MRI to assess disease response.  They discussed the role of CRS/HIPEC in his situation. He was told that it really depends on the status of his liver lesions as well.  He will complete 6 cycles of chemotherapy and will return to see me with a CTAP as well as MRI of the liver to assess disease burden and determine if he can be a candidate for debulking. · 8/25/2021-proceed cycle #4. · 9/22/2021 CEA- 8.1  · 9/24/2021 MRI with and w/o Contrast ([place])  Tiny left retroperitoneal nodule involving the left lateral conal fascial new compared to 3/4/2021 though unchanged from most recent prior, possibly an additional site of metastasis. No other new metastatic disease within the abdomen. Similar partially visualized right posterior body wall/psoas infiltrative lesion not definitely changed from MRI abdomen 6/8/2021 but better evaluated in its entirety on concurrent CT, reported separately.   Status post right hemicolectomy, right hepatectomy, and segment III microwave ablation. Similar mild splenomegaly.  Additional chronic and incidental findings as described in the body the report.  Liver: Status post right hepatectomy. Ablation zone in segment II measures 1.7 x 1.1 cm, slightly decreased in size from 1.8 x 1.4 cm previously (series 1301 image 56) without appreciable enhancement. Similar tiny subcentimeter cyst in the left hepatic lobe. No new focal hepatic lesion identified. No visualized free fluid. Similar 0.7 cm enhancing nodule along the left lateral conal fascia laterally new from 3/4/2021, grossly unchanged from MRI dated 6/8/2021. (series 801 image 22). No other appreciable peritoneal/retroperitoneal or  omental nodularity. Ill-defined T2 hyperintense signal and hyperenhancement in the right posteromedial body wall involving the lateral aspect of the psoas muscle and posterolateral abdominal wall musculature, partially visualized measuring at least 8.7 x 3.1   cm, grossly similar to the prior exam, better evaluated in its entirety on concurrent CT reported separately.    · 9/24/2021 CT C/A/P w/ Contrast([place]) Status post right hemicolectomy, right hepatectomy and left hepatic microwave ablation without new suspicious hepatic lesion.  Left lateral perirenal fascial nodule, which is stable compared to 6/8/2021 MRI, but new compared to 3/4/2021 MRI is concerning for an additional site of metastatic disease.  No sites of new or enlarging metastatic disease in the chest.  Similar appearance of right lateral psoas and posterior abdominal wall infiltrative lesion, not significantly changed compared to 6/8/2021 MRI.  Mild splenomegaly.  Additional findings as outlined in the body the report. Biopsy-proven adenocarcinoma involving the posterior lateral aspect of the right iliopsoas muscle and right lateral abdominal wall. Right iliopsoas component is difficult to measure but appears to be approximately 2.5 x 2.4 cm (series 2, image 153), similar to prior MRI. Nodular thickening noted along the right posterior abdominal wall and possibly involving the the transversus abdominis measures approximately 8.5 x 1.8 cm (series 2 image 153) overall similar compared to prior MRI. · 10/6/2021-discussed the results of recent CT abdomen/pelvis and MRI abdomen/pelvis at [facility].  Persistent disease involving the psoas muscle.  Discussed with colorectal surgery at [facility] recommend to continue current therapy for another 2 months and reassess with CT scans. · 12/3/21 CT chest/abd/pelvis ([facility]): Status post prior right hemicolectomy, right hepatectomy and left hepatic lesions microwave ablation. No new liver lesion. Soft tissue thickening of the right lower posterior abdominal wall involving the iliopsoas muscle is grossly unchanged consistent with improving metastatic disease. Small right omental nodule and left lateral conal fascia nodule unchanged compared to  recent exam.   · 1/13/22 Exploratory lap with resections of psoas muscle mass and HIPEC by Dr. Vida Streeter:  Metastatic colorectal adenocarcinoma involving fibroadipose tissue. [facility] MMR proficient.   · 1/24/22 CT chest/abd/pelvis ([facility]): Extensive postsurgical changes in the abdomen including partial right colectomy, resection of right retroperitoneal mass, omentectomy and mesh repair of right lateral abdominal wall defect. There appears to be a defect in the mesh containing herniated loops of small bowel. Extensive diffuse dilated small bowel loops with air-fluid levels are seen throughout the abdomen. There are segmental areas of decompressed small bowel in the left upper quadrant as well as adjacent to the herniated small  bowel loops in the right retroperitoneum. Air-fluid levels are also seen throughout the colon. While pattern could likely represent postoperative ileus given the diffuse small bowel dilatation and distal colonic air and fluid, developing or partial small bowel obstruction secondary to the right lateral abdominal wall hernia cannot entirely be excluded.  Small ascites. 8 mm nodule along the left lateral conal fascia is unchanged. Slight increase in size of cardiophrenic lymph nodes measuring up to 7 mm anterior to the right hemidiaphragm. Stable hypodensity in the left hepatic lobe.  Diffuse gastric wall thickening/edema could be secondary to gastritis in the appropriate clinical setting.      CEA dynamics:  3/11/20 CEA 5.5  8/19/20 CEA 2.4  9/2/20 CEA 2.7  9/16/20 CEA 2.7  9/30/20 CEA 2.7  10/19/20 CEA 2.3  1/4/21 CEA 2.2  5/3/21 CEA 6.2  6/15/21 CEA 8.3  7/13/21 CEA 10.7  7/27/21 CEA 9.6  8/12/21/21 CEA 8.2  8/25/2021-CEA 8.9  9/22/2021 CEA 8.1    Objective   /69   Pulse 86   Temp 96.6 °F (35.9 °C) (Temporal)   Resp 18   Ht 5' 7\" (1.702 m)   Wt 143 lb 9 oz (65.1 kg)   SpO2 98%   BMI 22.49 kg/m²     PHYSICAL EXAM:  CONSTITUTIONAL: Alert, appropriate,ill-appearing, feel stronger  EYES: Non icteric, EOM intact, pupils equal round   ENT: Mucus membranes moist,external inspection of ears and nose are normal  NECK: Supple, no masses.  No palpable thyroid mass  CHEST/LUNGS: CTA bilaterally, normal respiratory effort   CARDIOVASCULAR: RRR, no murmurs.  No lower extremity edema  ABDOMEN: mild tender - no guarding or rebound, post op FARHAT drain, ileostomy with stool  EXTREMITIES: 2+ pitting edema LUE, port left chest wall. SKIN: warm, dry with no rashes or lesions  LYMPH: No cervical, clavicular, axillary, or inguinal lymphadenopathy  NEUROLOGIC: follows commands, non focal   PSYCH: mood and affect appropriate. Alert and oriented to time, place, person    Recent Labs     02/27/22  0455 02/26/22  0520 02/25/22  0250   WBC 5.1 3.9* 3.9*   HGB 8.7* 8.5* 8.6*   HCT 29.0* 27.8* 27.8*   MCV 95.1* 94.6* 93.3   * 428* 402*       Lab Results   Component Value Date     (L) 02/27/2022    K 5.0 02/27/2022    CL 99 02/27/2022    CO2 26 02/27/2022    BUN 32 (H) 02/27/2022    CREATININE 2.6 (H) 02/27/2022    GLUCOSE 117 (H) 02/27/2022    CALCIUM 8.7 (L) 02/27/2022    PROT 6.4 (L) 02/27/2022    LABALBU 2.5 (L) 02/27/2022    BILITOT 0.4 02/27/2022    ALKPHOS 223 (H) 02/27/2022    AST 24 02/27/2022    ALT 18 02/27/2022    LABGLOM 25 (A) 02/27/2022    GFRAA 30 (L) 02/27/2022    AGRATIO 1.5 06/15/2021    GLOB 3.8 02/07/2022       Lab Results   Component Value Date    INR 1.0 06/18/2021    PROTIME 12.1 06/18/2021       30 Day lookback of cultures:    Blood Culture Recent:   Recent Labs     02/21/22  1739   BC No growth after 5 days of incubation. Gram Stain Recent: No results for input(s): LABGRAM in the last 720 hours. Resp Culture Recent: No results for input(s): CULTRESP in the last 720 hours. Body Fluid Recent : No results for input(s): BFCX in the last 720 hours. MRSA Recent : No results for input(s): [address] in the last 720 hours. Urine Culture Recent : No results for input(s): LABURIN in the last 720 hours. Organism Recent : No results for input(s): ORG in the last 720 hours. Narrative   CT ABDOMEN PELVIS WO CONTRAST    2/21/2022 7:53 PM   History: Postop pain. Hypotension. Noncontrast abdomen/pelvis CT. Comparison is made with February 8, 2022.    In order to have a CT radiation dose as low as reasonably achievable   Automated Exposure Control was utilized for adjustment of the mA   and/or KV according to patient size. DLP in mGycm= 1115. Interval abdominal surgery. Normal heart size. Patchy atelectasis at the lung bases. Normal noncontrast appearance of the liver, pancreas, and spleen. Normal and symmetric kidneys. No hydronephrosis. No bowel dilation. No abscess or hematoma.       Impression   1. Postoperative changes with no sign of bowel obstruction, abscess,   or hematoma. Signed by Dr Abimbola Zuluaga:  #Colon cancer  -Status post neoadjuvant chemo followed by debulking surgery/HIPEC) at [address] [place] [address] January 2022  -Status post exploratory laparotomy 2/10/2022 for correction of internal hernia at [address] [place] [address] (Dr. Micah Gooden, Cell [phone])  -Patient has ileostomy and abdominal drain  -Patient is currently not on chemotherapy. Last chemotherapy November 2021  -CT A/P Wo contrast 2/21/2022: Pneumoperitoneum, postoperative changes    Dr. Benson Patijess following     #Acute kidney injury-likely prerenal secondary to high ileostomy output  -Patient was receiving IV fluids at home.  -He received IV fluid resuscitation in the emergency  -CT A/P without contrast: No hydronephrosis     (baseline creatinine 1.2-1.4)    Dr. Caitlin Guerra following    Currently on NS with 30 mEq KCl at 150 cc/hr         Change IVF to NS    Crt slowly improving      #Normocytic hypochromic anemia         Component hemodilution    Serology 2/24/2022  Iron panel  Ferritin  B12 - 1150  Folate - 17.7  LDH - 187  Retic - 3.23% with absolute 0.0930  Hapto - 410    HGB stable - CBC will be followed    #DVT left upper extremity-most likely port associated -    -Left upper extremity ultrasound 2/23/2022  · Acute appearing deep vein thrombosis (DVT) is seen in the internal jugular  vein subclavian axillary brachial, left upper extremity.   · Acute appearing superficial thrombophlebitis (SVT) is seen in the basilic and cephalic veins, right upper extremity. - currently on IV heparin until creatinine clearance above 30. Then can be switched to Eliquis or Xarelto. #Anorexia    - Marinol 2.5 p.o. twice daily initiated  - Megace started  - Dietary     #Weakness, deconditioning    Physical therapy     PLAN:  Continue aggressive IV fluid resuscitation   Heparin drip  Physical therapy   Dietary   Marinol 2.5 twice daily  Megace added        Disposition-expect the patient to be in the hospital throughout the weekend. Would like to see his creatinine below 2. Sharda Rebolledo PA-C    02/27/22  9:10 AM       Physician's attestation and contribution:  I, Dr Juan Blanco, personally and independently performed an evaluation on Hubert Wilson        I have reviewed relevant medical information/data to include but not limited to the medication list, relevant appropriate lab work and imaging when applicable. I reviewed other physician's notes, ancillary services and nurses assessments. I have reviewed the above documentation completed by Kyler Beal PA-C   Please see my additional addended and/or modified contributions to the history of present illness, physical examination, and assessment/medical decision-making and plan that reflects my findings and impressions. I discussed essential elements of the care plan with Kyler Beal PA-C and the patient. I have encouraged and answered all the questions raised to the patient's understanding and satisfaction. I concur with the above stated. Subjective-awake, alert, still without appetite despite being on Marinol 2.5 mg PO BID. He is having significant liquid/diarrheal only output through the ostomy. NO solid or semisolid stool    Objective- exam is stable without acute manifestations. He is having significant liquid diarrheal output from ostomy.     Assessment/plan:  IV fluids are continuing for rehydration with a slight decrease in creatinine from 2.9 down to 2.6 today. Discussed with nephrology, Dr. Coco Ngo. UNFORTUNATELY, he is having a significant large amount of liquid diarrheal stools, NO formed stools. He is not taking in enough in by mouth to keep up with diarrheal outputs through the ostomy. Appetite is suboptimal despite Marinol 2.5 mg PO BID. (Heparin drip ongoing for left upper extremity DVT)  Megace will be added to try to get a handle and improve oral intake. This should be okay as long as he continues anticoagulation which is ongoing and will continue as an outpatient. All of the above discussed at length with Lidia and his wife.      Electronically signed by Roxy Norwood MD on 2/27/22 at 10:01 AM CST

## 2022-02-27 NOTE — PROGRESS NOTES
Physical Therapy    Facility/Department: [facility] 3 ANDREA/VAS/MED  Initial Assessment    NAME: Carlos Carrera  : 1955  MRN: [id number]    Date of Service: 2022    Discharge Recommendations:  Continue to assess pending progress,24 hour supervision or assist,Patient would benefit from continued therapy after discharge        Assessment   Body structures, Functions, Activity limitations: Decreased functional mobility ; Decreased ADL status; Decreased balance;Decreased endurance;Decreased strength  Assessment: Pt ABLE TO STAND EOB AND TAKE SMALL SIDE STEPS BUT FATIGUES QUICKLY. DECLINES UP TO CHAIR AT THIS TIME. WILL PROGRESS AS TOLERATED. PT Education: PT Role;Plan of Care  REQUIRES PT FOLLOW UP: Yes  Activity Tolerance  Activity Tolerance: Patient Tolerated treatment well;Patient limited by fatigue       Patient Diagnosis(es): The primary encounter diagnosis was MARIO (acute kidney injury) ([facility] [address] Diagnoses of Hypotension due to hypovolemia, Hyponatremia, Palliative care patient, and Adenocarcinoma of colon [facility]) were also pertinent to this visit. has a past medical history of Acid reflux, Cancer ([facility] [address] GERD (gastroesophageal reflux disease), PTSD (post-traumatic stress disorder), and Stage 3a chronic kidney disease ([facility] [address] has a past surgical history that includes Colonoscopy; Appendectomy (); Cholecystectomy (); Upper gastrointestinal endoscopy (N/A, 2020); Colonoscopy (N/A, 2020); Colonoscopy; hemicolectomy (Right, 2020);  [address] Surgery (N/A, 2020); ablation of dysrhythmic focus; and Colonoscopy (N/A, 2021).     Restrictions  Restrictions/Precautions  Restrictions/Precautions: Fall Risk  Vision/Hearing  Vision: Within Functional Limits  Hearing: Within functional limits     Subjective  General  Diagnosis: MARIO on CKD, COLON CA w/ METS  Subjective  Subjective: Pt WILLING TO PARTICIPATE  Pain Screening  Patient Currently in Pain: Denies  Vital Signs  Patient Currently in Pain: Denies       Orientation  Orientation  Overall Orientation Status: Within Functional Limits  Social/Functional History  Social/Functional History  Lives With: Spouse  Type of Home: House  Additional Comments: Pt REPORTS HAVING WIFE TO ASSIST WITH MOST MOBILITY AND ADL'S IN LAST COUPLE WEEKS  Cognition   Cognition  Overall Cognitive Status: WFL    Objective          AROM RLE (degrees)  RLE AROM: WFL  AROM LLE (degrees)  LLE AROM : WFL  Strength RLE  Comment: GROSSLY 4/5  Strength LLE  Comment: GROSSLY 4/5        Bed mobility  Supine to Sit: Minimal assistance  Sit to Supine: Minimal assistance  Transfers  Sit to Stand: Minimal Assistance  Stand to sit: Minimal Assistance  Comment: SMALL SIDE STEPS EOB, HHA  Ambulation  Ambulation?: No     Balance  Sitting - Dynamic: Good;-  Standing - Dynamic: Fair        Plan   Plan  Times per week: AT LEAST 5-6  Current Treatment Recommendations: Strengthening,Balance Training,Functional Mobility Training,Gait Training,Transfer Training,Safety Education & Training,Patient/Caregiver Education & Training  Safety Devices  Type of devices: Bed alarm in place,Call light within reach    G-Code       OutComes Score                                                  AM-PAC Score             Goals  Short term goals  Time Frame for Short term goals: 14 DAYS  Short term goal 1: BED MOB MOD IND  Short term goal 2: TRANSFERS MOD IND  Short term goal 3: ' SUPERVISION       Therapy Time   Individual Concurrent Group Co-treatment   Time In           Time Out           Minutes                   Rafaela Lucas, PT

## 2022-02-27 NOTE — PROGRESS NOTES
APTT at 0455 2/27/22 was 112.2. -149   HOLD 60 min Decrease infusion by 2 units/kg/hr. Held per protocol. Will restart in one hour and decrease gtt to 9 ml/hr.

## 2022-02-27 NOTE — PROGRESS NOTES
[facility] Progress Note    Patient:  Katerina Burns  YOB: 1955  Date of Service: 2/27/2022  MRN: [id number]   Acct: [de-identified]   Primary Care Physician: KALPESH Caldera NP  Advance Directive: Full Code  Admit Date: 2/21/2022       Hospital Day: 6      CHIEF COMPLAINT:   Generalized weakness and fatigue. Chief Complaint   Patient presents with    Abnormal Lab     unknown, primary care called       2/27/2022 9:57 AM  Subjective / Interval History:   02/27/2022  Patient endorses overall improvement. Family at bedside. No acute changes or acute overnight event reported. Laying comfortably in bed no acute distress. Denies any acute complaints or distress at this time. 02/26/2022  Patient seen and examined. Doing well. No new complaints. No acute changes or acute overnight event reported. Left lower quadrant abdominal pain improved/resolved. Laying comfortably in bed no acute distress. 02/25/2022  Patient seen and examined this AM.  Doing well. Patient reports left-sided - lower quadrant abdominal discomfort at all of the incision sites. Laying comfortably in bed however no apparent acute distress. Family at bedside. Denies any acute complaints or distress at this time. 02/24/2022  Patient seen and examined this AM.  Doing well. No new complaints. Laying comfortably in bed in no acute distress. Endorses overall improvement in his energy level. No acute changes or acute overnight events reported. 02/23/2022  No acute changes or acute overnight event reported. Patient endorses marked improvement in his energy level. Laying comfortably in bed no acute distress. Denies any acute complaints or distress at this time. Left arm venous duplex ultrasound (02/02/2022): Positive DVT in left jugular, subclavian, axillary, and brachial veins.   Positive SVT in left proximal cephalic vein, media and cubital veins      02/22/2022  Patient seen and examined this AM.  Doing well. No new complaints. No acute changes or acute overnight event reported. Laying comfortably in bed in no acute distress. Family at bedside. Review of Systems:   Review of Systems  ROS: 14 point review of systems is negative except as specifically addressed above. ADULT ORAL NUTRITION SUPPLEMENT; Lunch, Dinner; Frozen Oral Supplement  ADULT DIET;  Regular; LIKES SUGAR FREE VANILLA PUDDING CUPS    Intake/Output Summary (Last 24 hours) at 2/27/2022 0957  Last data filed at 2/27/2022 0954  Gross per 24 hour   Intake 300 ml   Output 2325 ml   Net -2025 ml       Medications:   sodium chloride 150 mL/hr at 02/27/22 9589    heparin (PORCINE) Infusion 13.932 Units/kg/hr (02/27/22 0626)    sodium chloride       Current Facility-Administered Medications   Medication Dose Route Frequency Provider Last Rate Last Admin    megestrol (MEGACE) 40 MG/ML suspension 800 mg  800 mg Oral Daily Bekah Rey PA-C        0.9 % sodium chloride infusion   IntraVENous Continuous Bekah Rey PA-C 150 mL/hr at 02/27/22 0629 New Bag at 02/27/22 4612    loperamide (IMODIUM) capsule 4 mg  4 mg Oral BID PRN Sri Landeros PA-C        heparin 25,000 units in dextrose 5% 250 mL (premix) infusion  5-30 Units/kg/hr IntraVENous Continuous Bekah Rey PA-C 9 mL/hr at 02/27/22 0626 13.932 Units/kg/hr at 02/27/22 3779    dronabinol (MARINOL) capsule 2.5 mg  2.5 mg Oral BID Parker Garcia MD   2.5 mg at 02/26/22 2240    heparin (porcine) injection 5,170 Units  80 Units/kg IntraVENous PRN Bekah Rey PA-C   5,170 Units at 02/24/22 1248    heparin (porcine) injection 2,580 Units  40 Units/kg IntraVENous PRN Bekah Rey PA-C   2,580 Units at 02/25/22 2342    potassium chloride (KLOR-CON M) extended release tablet 40 mEq  40 mEq Oral PRN Kendal Mitchell MD   40 mEq at 02/24/22 5875    Or    potassium bicarb-citric acid (EFFER-K) effervescent tablet 40 mEq  40 mEq Oral PRN Kendal Mitchell MD Or    potassium chloride 10 mEq/100 mL IVPB (Peripheral Line)  10 mEq IntraVENous PRN Selma Freeman MD        methocarbamol (ROBAXIN) tablet 500 mg  500 mg Oral TID PRN Selma Freeman MD        HYDROmorphone (DILAUDID) tablet 2 mg  2 mg Oral Q6H PRN Selma Freeman MD   2 mg at 02/26/22 2239    sodium chloride flush 0.9 % injection 5-40 mL  5-40 mL IntraVENous 2 times per day Brena Crick, APRN - CNP   10 mL at 02/26/22 0850    sodium chloride flush 0.9 % injection 5-40 mL  5-40 mL IntraVENous PRN Brena Crick, APRN - CNP        0.9 % sodium chloride infusion  25 mL IntraVENous PRN Brena Crick, APRN - CNP        ondansetron (ZOFRAN-ODT) disintegrating tablet 4 mg  4 mg Oral Q8H PRN Brena Crick, APRN - CNP        Or    ondansetron [facility]) injection 4 mg  4 mg IntraVENous Q6H PRN Brena Crick, APRN - CNP   4 mg at 02/25/22 1807    acetaminophen (TYLENOL) tablet 650 mg  650 mg Oral Q6H PRN Brena Crick, APRN - CNP   650 mg at 02/23/22 0863    Or    acetaminophen (TYLENOL) suppository 650 mg  650 mg Rectal Q6H PRN Brena Crick, APRN - CNP        Vitamin D (CHOLECALCIFEROL) tablet 2,000 Units  2,000 Units Oral Daily Brena Crick, APRN - CNP   2,000 Units at 02/26/22 0911    pantoprazole (PROTONIX) tablet 40 mg  40 mg Oral QAM AC Brena Crick, APRN - CNP   40 mg at 02/27/22 0650    [Held by provider] prazosin (MINIPRESS) capsule 1 mg  1 mg Oral Nightly Brena Crick, APRN - CNP        sertraline (ZOLOFT) tablet 25 mg  25 mg Oral Daily Brena Crick, APRN - CNP   25 mg at 02/26/22 9544         sodium chloride 150 mL/hr at 02/27/22 6797    heparin (PORCINE) Infusion 13.932 Units/kg/hr (02/27/22 0626)    sodium chloride        megestrol  800 mg Oral Daily    dronabinol  2.5 mg Oral BID    sodium chloride flush  5-40 mL IntraVENous 2 times per day    Vitamin D  2,000 Units Oral Daily    pantoprazole  40 mg Oral QAM AC    [Held by provider] prazosin  1 mg Oral Nightly    sertraline  25 mg Oral Daily     loperamide, heparin (porcine), heparin (porcine), potassium chloride **OR** potassium alternative oral replacement **OR** potassium chloride, methocarbamol, HYDROmorphone, sodium chloride flush, sodium chloride, ondansetron **OR** ondansetron, acetaminophen **OR** acetaminophen  ADULT ORAL NUTRITION SUPPLEMENT; Lunch, Dinner; Frozen Oral Supplement  ADULT DIET; Regular; LIKES SUGAR FREE VANILLA PUDDING CUPS       Labs:   CBC with DIFF:  Recent Labs     02/25/22 0250 02/26/22  0520 02/27/22  0455   WBC 3.9* 3.9* 5.1   RBC 2.98* 2.94* 3.05*   HGB 8.6* 8.5* 8.7*   HCT 27.8* 27.8* 29.0*   MCV 93.3 94.6* 95.1*   MCH 28.9 28.9 28.5   MCHC 30.9* 30.6* 30.0*   RDW 14.7* 14.7* 14.7*   * 428* 446*   MPV 10.1 9.5 8.9*   NEUTOPHILPCT 73.6* 71.7* 74.8*   LYMPHOPCT 12.8* 12.3* 11.0*   MONOPCT 11.3* 12.1* 11.4*   EOSRELPCT 1.5 3.1 2.0   BASOPCT 0.3 0.3 0.2   NEUTROABS 2.9 2.8 3.8   LYMPHSABS 0.5* 0.5* 0.6*   MONOSABS 0.40 0.50 0.60   EOSABS 0.10 0.10 0.10   BASOSABS 0.00 0.00 0.00       CMP/BMP:  Recent Labs     02/25/22  0250 02/26/22 0520 02/27/22  0455   * 134* 133*   K 4.1 5.0 5.0   CL 94* 99 99   CO2 28 25 26   ANIONGAP 10 10 8   GLUCOSE 116* 104 117*   BUN 51* 39* 32*   CREATININE 3.2* 2.9* 2.6*   LABGLOM 20* 22* 25*   CALCIUM 8.0* 8.6* 8.7*   PROT 5.7*  --  6.4*   LABALBU 2.6*  --  2.5*   BILITOT 0.3  --  0.4   ALKPHOS 183*  --  223*   ALT 18  --  18   AST 22  --  24         CRP:  No results for input(s): CRP in the last 72 hours. Sed Rate:  No results for input(s): SEDRATE in the last 72 hours. HgBA1c:  No components found for: HGBA1C  FLP:  No results found for: TRIG, HDL, LDLCALC, LDLDIRECT, LABVLDL  TSH:  No results found for: TSH  Troponin T:   No results for input(s): TROPONINI in the last 72 hours. Pro-BNP: No results for input(s): BNP in the last 72 hours. INR: No results for input(s): INR in the last 72 hours.   ABGs: No results found for: PHART, PO2ART, SQK9VFG  UA:  No results for input(s): NITRITE, COLORU, PHUR, LABCAST, WBCUA, RBCUA, MUCUS, TRICHOMONAS, YEAST, BACTERIA, CLARITYU, SPECGRAV, LEUKOCYTESUR, UROBILINOGEN, BILIRUBINUR, BLOODU, GLUCOSEU, AMORPHOUS in the last 72 hours. Invalid input(s): Belkis Boxer      Culture Results:    No results for input(s): CXSURG in the last 720 hours. Blood Culture Recent:   Recent Labs     02/21/22  1739 02/08/22  2220   BC No growth after 5 days of incubation. No growth after 5 days of incubation. No results for input(s): BC, BLOODCULT2, ORG in the last 72 hours. Cultures:   No results for input(s): CULTURE in the last 72 hours. No results for input(s): BC, Kathryn Bustard in the last 72 hours. No results for input(s): CXSURG in the last 72 hours. Recent Labs     02/25/22  0250 02/26/22  0520 02/27/22  0455   PHOS 3.7 3.3 3.2     Recent Labs     02/25/22  0250 02/27/22  0455   AST 22 24   ALT 18 18   BILITOT 0.3 0.4   ALKPHOS 183* 223*         RAD:   CT ABDOMEN PELVIS WO CONTRAST Additional Contrast? None    Result Date: 2/21/2022  CT ABDOMEN PELVIS WO CONTRAST 2/21/2022 7:53 PM History: Postop pain. Hypotension. Noncontrast abdomen/pelvis CT. Comparison is made with February 8, 2022. In order to have a CT radiation dose as low as reasonably achievable Automated Exposure Control was utilized for adjustment of the mA and/or KV according to patient size. DLP in mGycm= 1115. Interval abdominal surgery. Normal heart size. Patchy atelectasis at the lung bases. Normal noncontrast appearance of the liver, pancreas, and spleen. Normal and symmetric kidneys. No hydronephrosis. No bowel dilation. No abscess or hematoma. 1. Postoperative changes with no sign of bowel obstruction, abscess, or hematoma.  Signed by Dr Jeremias Espinosa Additional Contrast? None    Result Date: 2/9/2022  EXAM: CT ABDOMEN PELVIS WO CONTRAST INDICATION: Vomiting, recent surgery, history of Previously demonstrated LEFT liver lesion is not well visualized given lack of IV contrast. 4.  Patchy consolidation in both lung bases, with differential including aspiration and pneumonia. Findings in agreement with the emergent findings from the initial [facility] preliminary report. Signed by Dr Abdulkadir Zamora    Result Date: 2/21/2022  Exam:   XR CHEST PORTABLE  Date:  2/21/2022 History:  Male, age  61 years; hypotension COMPARISON:  Chest x-ray dated February 8, 2022 Findings : Chest portable place. A right-sided PICC, new, terminating in the low SVC. The heart and mediastinum are normal in size. Lungs are without focal infiltrate, mass or effusions. The bones show no acute pathology. Catheter in the left upper quadrant. Impression: 1. New right-sided PICC. 2.  Otherwise, no interval changes. Signed by Dr Katy Loo    XR CHEST PORTABLE    Result Date: 2/8/2022  EXAMINATION: XR CHEST PORTABLE 2/8/2022 10:39 PM HISTORY: XR CHEST PORTABLE 2/8/2022 9:00 PM HISTORY: Cough COMPARISON: March 18, 2020. FINDINGS: The lungs are clear. Cardiac silhouette is normal. Left subclavian Port-A-Cath is present. Old healed fracture of the left clavicle is present. The osseous structures and surrounding soft tissues demonstrate no acute abnormality. 1. No radiographic evidence of acute cardiopulmonary process.  Signed by Dr Marybeth Horta      Objective:   Vitals:   /69   Pulse 86   Temp 96.6 °F (35.9 °C) (Temporal)   Resp 18   Ht 5' 7\" (1.702 m)   Wt 143 lb 9 oz (65.1 kg)   SpO2 98%   BMI 22.49 kg/m²       Patient Vitals for the past 24 hrs:   BP Temp Temp src Pulse Resp SpO2   02/27/22 0623 118/69 96.6 °F (35.9 °C) Temporal 86 18 98 %   02/27/22 0108 106/70 99 °F (37.2 °C) Temporal 90 16 97 %   02/26/22 1738 110/69 97.3 °F (36.3 °C) Temporal 87 16 98 %   02/26/22 1138 112/73 98.4 °F (36.9 °C) Temporal 78 16 99 %       24HR INTAKE/OUTPUT:      Intake/Output Summary (Last 24 hours) at 2/27/2022 0957  Last data filed at 2/27/2022 0954  Gross per 24 hour   Intake 300 ml   Output 2325 ml   Net -2025 ml       Physical Exam  Vitals and nursing note reviewed. Constitutional:       General: He is not in acute distress. Appearance: Normal appearance. He is not ill-appearing, toxic-appearing or diaphoretic. HENT:      Head: Normocephalic and atraumatic. Right Ear: External ear normal.      Left Ear: External ear normal.      Nose: Nose normal. No congestion or rhinorrhea. Mouth/Throat:      Mouth: Mucous membranes are moist.      Pharynx: Oropharynx is clear. No oropharyngeal exudate or posterior oropharyngeal erythema. Eyes:      General: No scleral icterus. Right eye: No discharge. Left eye: No discharge. Extraocular Movements: Extraocular movements intact. Conjunctiva/sclera: Conjunctivae normal.      Pupils: Pupils are equal, round, and reactive to light. Cardiovascular:      Rate and Rhythm: Normal rate and regular rhythm. Pulses: Normal pulses. Heart sounds: Normal heart sounds. No murmur heard. No friction rub. No gallop. Pulmonary:      Effort: Pulmonary effort is normal. No respiratory distress. Breath sounds: Normal breath sounds. No stridor. No wheezing, rhonchi or rales. Chest:      Chest wall: No tenderness. Abdominal:      General: Bowel sounds are normal. There is no distension. Palpations: Abdomen is soft. Tenderness: There is no guarding or rebound. Comments: FARHAT drain in place. Right-sided ileostomy in place. Midline incision site/staples with no evidence of bleeding or infection noted. Musculoskeletal:         General: No swelling, tenderness, deformity or signs of injury. Normal range of motion. Cervical back: Normal range of motion and neck supple. No rigidity. No muscular tenderness. Right lower leg: No edema. Left lower leg: No edema. Skin:     General: Skin is warm and dry. Capillary Refill: Capillary refill takes less than 2 seconds. Coloration: Skin is not jaundiced or pale. Findings: No bruising, erythema, lesion or rash. Neurological:      General: No focal deficit present. Mental Status: He is alert and oriented to person, place, and time. Cranial Nerves: No cranial nerve deficit. Sensory: No sensory deficit. Motor: No weakness. Coordination: Coordination normal.   Psychiatric:         Mood and Affect: Mood normal.         Behavior: Behavior normal.         Thought Content: Thought content normal.         Judgment: Judgment normal.           Assessment/plan:     Hospital Problems           Last Modified POA    * (Principal) Acute kidney injury superimposed on CKD ([address] 2/21/2022 Yes    Adenocarcinoma of colon ([address] 2/21/2022 Yes    Liver metastases ([address] 2/22/2022 Yes    Hypovolemia 2/21/2022 Yes    Hyponatremia 2/21/2022 Yes    Moderate malnutrition ([address] 2/22/2022 Yes    Palliative care patient 2/22/2022 Yes    Posttraumatic stress disorder 2/22/2022 Yes    Deep vein thrombosis (DVT) of left upper extremity ([address] 2/24/2022 Yes          Principal Problem:    Acute kidney injury superimposed on CKD ([address]  Active Problems:    Adenocarcinoma of colon ([address]    Liver metastases (HCC)    Hypovolemia    Hyponatremia    Moderate malnutrition (HCC)    Palliative care patient    Posttraumatic stress disorder    Deep vein thrombosis (DVT) of left upper extremity ([address]  Resolved Problems:    * No resolved hospital problems. *      Brief Summary  Mr Berta Easley, a 74-year-old male, history of metastatic adenocarcinoma of the colon, presenting to [address] [place] [address] ED (02/21/2022), no account of abnormal lab as well as generalized weakness and fatigue. Patient admitted to [facility] (02/21/2022), further work-up and management of acute on chronic renal failure. Further hospital course, as per problem is below;     MARIO on CKD III  Hyponatremia  Hyperphosphatemia  Hypercalcemia   Creatinine level on presentation: 4.6: 02/21/2022   IV hydration -   Avoid hypotension & Nephrotoxins    Nephrology on board-appreciate recommendations   Further management as per nephrology      Left lower quadrant abdominal pain  Metastatic adenocarcinoma of colon  · Status post recent ex lap at OSH (02/09/2022), with FARHAT drain placement and diverting loop ileostomy. · CT Abdomen / Pelvis WO Con (02/01/2022): Impression: Postoperative changes with no sign of bowel obstruction, abscess, or hematoma  · KUB (02/25/2022): No acute abdominal disease is seen  · General surgery and Oncology on board-appreciate recommendations    LUE DVT  · Left arm venous duplex ultrasound (02/02/2022): Positive DVT in left jugular, subclavian, axillary, and brachial veins. Positive SVT in left proximal cephalic vein, media and cubital veins  · Heparin gtt   · Vascular Surgery on board      Hypokalemia  · Replace as needed  · Monitor BMP      Continue management of other chronic medical conditions - see above and orders. Advance Directive: Full Code    ADULT ORAL NUTRITION SUPPLEMENT; Lunch, Dinner; Frozen Oral Supplement  ADULT DIET; Regular; LIKES SUGAR FREE VANILLA PUDDING CUPS         Consults Made:   IP CONSULT TO ONCOLOGY  IP CONSULT TO DIETITIAN  IP CONSULT TO NEPHROLOGY  IP CONSULT TO GENERAL SURGERY  IP CONSULT TO PALLIATIVE CARE  IP CONSULT TO VASCULAR SURGERY  IP CONSULT TO DIETITIAN    DVT prophylaxis: Heparin      Discharge planning:  Continue management as above, including IV fluids  Monitor renal function    Time Spent is 25 mins in the examination, evaluation, counseling and review of medications, assessment and plan.      Electronically signed by   Alisha Rodrigues MD, MPH, MD,   Internal Medicine Hospitalist   2/27/2022 9:57 AM

## 2022-02-28 LAB
ALBUMIN SERPL-MCNC: 2.4 G/DL (ref 3.5–5.2)
ALP BLD-CCNC: 216 U/L (ref 40–130)
ALT SERPL-CCNC: 18 U/L (ref 5–41)
ANION GAP SERPL CALCULATED.3IONS-SCNC: 10 MMOL/L (ref 7–19)
APTT: 72.4 SEC (ref 26–36.2)
AST SERPL-CCNC: 21 U/L (ref 5–40)
BILIRUB SERPL-MCNC: 0.4 MG/DL (ref 0.2–1.2)
BUN BLDV-MCNC: 24 MG/DL (ref 8–23)
CALCIUM SERPL-MCNC: 8.3 MG/DL (ref 8.8–10.2)
CHLORIDE BLD-SCNC: 103 MMOL/L (ref 98–111)
CO2: 22 MMOL/L (ref 22–29)
CREAT SERPL-MCNC: 2.2 MG/DL (ref 0.5–1.2)
GFR AFRICAN AMERICAN: 37
GFR NON-AFRICAN AMERICAN: 30
GLUCOSE BLD-MCNC: 102 MG/DL (ref 74–109)
HCT VFR BLD CALC: 26.7 % (ref 42–52)
HEMOGLOBIN: 8.1 G/DL (ref 14–18)
MCH RBC QN AUTO: 28.9 PG (ref 27–31)
MCHC RBC AUTO-ENTMCNC: 30.3 G/DL (ref 33–37)
MCV RBC AUTO: 95.4 FL (ref 80–94)
PDW BLD-RTO: 14.8 % (ref 11.5–14.5)
PHOSPHORUS: 2.8 MG/DL (ref 2.5–4.5)
PLATELET # BLD: 450 K/UL (ref 130–400)
PMV BLD AUTO: 9.1 FL (ref 9.4–12.4)
POTASSIUM REFLEX MAGNESIUM: 4.3 MMOL/L (ref 3.5–5)
RBC # BLD: 2.8 M/UL (ref 4.7–6.1)
SODIUM BLD-SCNC: 135 MMOL/L (ref 136–145)
TOTAL PROTEIN: 5.6 G/DL (ref 6.6–8.7)
WBC # BLD: 6.4 K/UL (ref 4.8–10.8)

## 2022-02-28 PROCEDURE — 2580000003 HC RX 258: Performed by: INTERNAL MEDICINE

## 2022-02-28 PROCEDURE — 99232 SBSQ HOSP IP/OBS MODERATE 35: CPT | Performed by: INTERNAL MEDICINE

## 2022-02-28 PROCEDURE — 6370000000 HC RX 637 (ALT 250 FOR IP): Performed by: NURSE PRACTITIONER

## 2022-02-28 PROCEDURE — 6370000000 HC RX 637 (ALT 250 FOR IP)

## 2022-02-28 PROCEDURE — 97110 THERAPEUTIC EXERCISES: CPT

## 2022-02-28 PROCEDURE — 99232 SBSQ HOSP IP/OBS MODERATE 35: CPT | Performed by: PHYSICIAN ASSISTANT

## 2022-02-28 PROCEDURE — 2580000003 HC RX 258

## 2022-02-28 PROCEDURE — 85027 COMPLETE CBC AUTOMATED: CPT

## 2022-02-28 PROCEDURE — 6370000000 HC RX 637 (ALT 250 FOR IP): Performed by: INTERNAL MEDICINE

## 2022-02-28 PROCEDURE — 99232 SBSQ HOSP IP/OBS MODERATE 35: CPT | Performed by: SURGERY

## 2022-02-28 PROCEDURE — 1210000000 HC MED SURG R&B

## 2022-02-28 PROCEDURE — 97116 GAIT TRAINING THERAPY: CPT

## 2022-02-28 PROCEDURE — 84100 ASSAY OF PHOSPHORUS: CPT

## 2022-02-28 PROCEDURE — 6370000000 HC RX 637 (ALT 250 FOR IP): Performed by: SURGERY

## 2022-02-28 PROCEDURE — 6370000000 HC RX 637 (ALT 250 FOR IP): Performed by: PHYSICIAN ASSISTANT

## 2022-02-28 PROCEDURE — 80053 COMPREHEN METABOLIC PANEL: CPT

## 2022-02-28 PROCEDURE — 85730 THROMBOPLASTIN TIME PARTIAL: CPT

## 2022-02-28 PROCEDURE — 6360000002 HC RX W HCPCS

## 2022-02-28 RX ORDER — LOPERAMIDE HYDROCHLORIDE 2 MG/1
4 CAPSULE ORAL 3 TIMES DAILY
Status: DISCONTINUED | OUTPATIENT
Start: 2022-02-28 | End: 2022-03-01 | Stop reason: HOSPADM

## 2022-02-28 RX ORDER — SODIUM CHLORIDE, SODIUM LACTATE, POTASSIUM CHLORIDE, CALCIUM CHLORIDE 600; 310; 30; 20 MG/100ML; MG/100ML; MG/100ML; MG/100ML
INJECTION, SOLUTION INTRAVENOUS CONTINUOUS
Status: DISCONTINUED | OUTPATIENT
Start: 2022-02-28 | End: 2022-03-01 | Stop reason: HOSPADM

## 2022-02-28 RX ORDER — LOPERAMIDE HYDROCHLORIDE 2 MG/1
4 CAPSULE ORAL ONCE
Status: COMPLETED | OUTPATIENT
Start: 2022-02-28 | End: 2022-02-28

## 2022-02-28 RX ORDER — DIPHENOXYLATE HYDROCHLORIDE AND ATROPINE SULFATE 2.5; .025 MG/1; MG/1
1 TABLET ORAL 4 TIMES DAILY
Status: DISCONTINUED | OUTPATIENT
Start: 2022-02-28 | End: 2022-03-01 | Stop reason: HOSPADM

## 2022-02-28 RX ORDER — LOPERAMIDE HYDROCHLORIDE 2 MG/1
2 CAPSULE ORAL 4 TIMES DAILY PRN
Status: DISCONTINUED | OUTPATIENT
Start: 2022-02-28 | End: 2022-03-01 | Stop reason: HOSPADM

## 2022-02-28 RX ADMIN — LOPERAMIDE HYDROCHLORIDE 4 MG: 2 CAPSULE ORAL at 08:33

## 2022-02-28 RX ADMIN — LOPERAMIDE HYDROCHLORIDE 4 MG: 2 CAPSULE ORAL at 13:16

## 2022-02-28 RX ADMIN — SODIUM CHLORIDE, POTASSIUM CHLORIDE, SODIUM LACTATE AND CALCIUM CHLORIDE: 600; 310; 30; 20 INJECTION, SOLUTION INTRAVENOUS at 13:19

## 2022-02-28 RX ADMIN — SODIUM CHLORIDE, PRESERVATIVE FREE 10 ML: 5 INJECTION INTRAVENOUS at 21:43

## 2022-02-28 RX ADMIN — METHOCARBAMOL TABLETS 500 MG: 500 TABLET, COATED ORAL at 21:43

## 2022-02-28 RX ADMIN — DRONABINOL 2.5 MG: 2.5 CAPSULE ORAL at 21:42

## 2022-02-28 RX ADMIN — MEGESTROL ACETATE 800 MG: 40 SUSPENSION ORAL at 10:52

## 2022-02-28 RX ADMIN — ONDANSETRON 4 MG: 2 INJECTION INTRAMUSCULAR; INTRAVENOUS at 18:41

## 2022-02-28 RX ADMIN — LOPERAMIDE HYDROCHLORIDE 4 MG: 2 CAPSULE ORAL at 21:43

## 2022-02-28 RX ADMIN — SERTRALINE HYDROCHLORIDE 25 MG: 50 TABLET ORAL at 08:34

## 2022-02-28 RX ADMIN — DRONABINOL 2.5 MG: 2.5 CAPSULE ORAL at 08:33

## 2022-02-28 RX ADMIN — APIXABAN 5 MG: 5 TABLET, FILM COATED ORAL at 21:42

## 2022-02-28 RX ADMIN — DIPHENOXYLATE HYDROCHLORIDE AND ATROPINE SULFATE 1 TABLET: 2.5; .025 TABLET ORAL at 13:17

## 2022-02-28 RX ADMIN — LOPERAMIDE HYDROCHLORIDE 4 MG: 2 CAPSULE ORAL at 11:41

## 2022-02-28 RX ADMIN — DIPHENOXYLATE HYDROCHLORIDE AND ATROPINE SULFATE 1 TABLET: 2.5; .025 TABLET ORAL at 16:28

## 2022-02-28 RX ADMIN — PANTOPRAZOLE SODIUM 40 MG: 40 TABLET, DELAYED RELEASE ORAL at 05:27

## 2022-02-28 RX ADMIN — PSYLLIUM HUSK 1 PACKET: 3.4 POWDER ORAL at 14:59

## 2022-02-28 RX ADMIN — DIPHENOXYLATE HYDROCHLORIDE AND ATROPINE SULFATE 1 TABLET: 2.5; .025 TABLET ORAL at 21:43

## 2022-02-28 RX ADMIN — PSYLLIUM HUSK 1 PACKET: 3.4 POWDER ORAL at 16:28

## 2022-02-28 RX ADMIN — Medication 2000 UNITS: at 08:33

## 2022-02-28 RX ADMIN — APIXABAN 5 MG: 5 TABLET, FILM COATED ORAL at 08:33

## 2022-02-28 NOTE — PROGRESS NOTES
Nephrology ([address]) Progress Note    Patient:  Beatrice Hoffman  YOB: 1955  Date of Service: 2/28/2022  MRN: [id number]   Acct: [de-identified]   Primary Care Physician: KALPESH Stewart NP  Advance Directive: Full Code  Admit Date: 2/21/2022       Hospital Day: 7  Referring Provider: Jimi Turner MD    Patient independently seen and examined, Chart, Consults, Notes, Operative notes, Labs, Cardiology, and Radiology studies reviewed as available. Subjective:  Beatrice Hoffman is a 74 y.o. male for whom we were consulted for evaluation and treatment of acute kidney injury. Patient denies any history of chronic kidney disease. Patient has history of stage IV colon cancer with metastatic disease to liver. Patient was recently hospitalized at [facility] with bowel obstruction and ultimately needed emergent laparotomy and ileostomy. Previously underwent partial colectomy and partial hepatectomy for the treatment of metastatic colon cancer. Patient denied any nausea and vomiting. He has noticed high output from ileostomy and has to change the bag frequently. Patient was supposed to get total parenteral nutrition but was not approved by [employer]. Patient has been trying to eat more but unable to eat due to recent abdominal surgery and lack of appetite. His fluid intake was also very low. In the emergency room his serum creatinine was 4.6 mg, phosphorus was 11.1. Patient also had hyponatremia. He was admitted for acute kidney injury and other severe electrolyte abnormalities. He was getting IV fluid and feeling little better. Today, no overnight events. Patient seen with family. Events with nursing. Denied current chest pain, shortness of at rest, nausea or vomiting.   Still with poor appetite and decreased oral intake along with significant ostomy output    Allergies:  Morphine and Oxycodone-acetaminophen    Medicines:  Current Facility-Administered Medications   Medication Dose Route Frequency Provider Last Rate Last Admin    apixaban (ELIQUIS) tablet 5 mg  5 mg Oral BID KALPESH Carrington   5 mg at 02/28/22 5253    loperamide (IMODIUM) capsule 2 mg  2 mg Oral 4x Daily PRN Lenna Riedel, APRN        loperamide (IMODIUM) capsule 4 mg  4 mg Oral TID Zenivan Dubose, DO   4 mg at 02/28/22 1141    megestrol (MEGACE) 40 MG/ML suspension 800 mg  800 mg Oral Daily Brad Mena PA-C   800 mg at 02/28/22 1052    0.9 % sodium chloride infusion   IntraVENous Continuous Yung Rome PA-C 150 mL/hr at 02/27/22 2123 New Bag at 02/27/22 2123    dronabinol (MARINOL) capsule 2.5 mg  2.5 mg Oral BID Deandre Rodriges MD   2.5 mg at 02/28/22 0201    potassium chloride (KLOR-CON M) extended release tablet 40 mEq  40 mEq Oral PRN Ascencion Ponce MD   40 mEq at 02/24/22 2126    Or    potassium bicarb-citric acid (EFFER-K) effervescent tablet 40 mEq  40 mEq Oral PRN Ascencion Ponce MD        Or   Dawson Barrett potassium chloride 10 mEq/100 mL IVPB (Peripheral Line)  10 mEq IntraVENous PRN Ascencion Ponce MD        methocarbamol (ROBAXIN) tablet 500 mg  500 mg Oral TID PRN Ascencion Ponce MD        HYDROmorphone (DILAUDID) tablet 2 mg  2 mg Oral Q6H PRN Ascencion Ponce MD   2 mg at 02/27/22 1744    sodium chloride flush 0.9 % injection 5-40 mL  5-40 mL IntraVENous 2 times per day KALPESH Albright - CNP   10 mL at 02/27/22 2110    sodium chloride flush 0.9 % injection 5-40 mL  5-40 mL IntraVENous PRN KALPESH Albright - CNP        0.9 % sodium chloride infusion  25 mL IntraVENous PRN KALPESH Albright - CNP        ondansetron (ZOFRAN-ODT) disintegrating tablet 4 mg  4 mg Oral Q8H PRN KALPESH Albright - CNP   4 mg at 02/27/22 1049    Or    ondansetron (ZOFRAN) injection 4 mg  4 mg IntraVENous Q6H PRN KALPESH Albright CNP   4 mg at 02/25/22 1807    acetaminophen (TYLENOL) tablet 650 mg  650 mg Oral Q6H PRN Vida Ortiz Lennie Ledezma APRN - CNP   650 mg at 02/23/22 7473    Or    acetaminophen (TYLENOL) suppository 650 mg  650 mg Rectal Q6H PRN Floridalma Fernandez APRN - CNP        Vitamin D (CHOLECALCIFEROL) tablet 2,000 Units  2,000 Units Oral Daily Floridalma Fernandez APRN - CNP   2,000 Units at 02/28/22 5774    pantoprazole (PROTONIX) tablet 40 mg  40 mg Oral QAM AC Floridalma Fernandez APRN - CNP   40 mg at 02/28/22 4267    [Held by provider] prazosin (MINIPRESS) capsule 1 mg  1 mg Oral Nightly Floridalma Fernandez APRN - ERIK        sertraline (ZOLOFT) tablet 25 mg  25 mg Oral Daily KALPESH Soto - CNP   25 mg at 02/28/22 2989       Past Medical History:  Past Medical History:   Diagnosis Date    Acid reflux     Cancer ([address]     adenocarcinoma of colon    GERD (gastroesophageal reflux disease)     PTSD (post-traumatic stress disorder)     Stage 3a chronic kidney disease ([address]        Past Surgical History:  Past Surgical History:   Procedure Laterality Date    ABLATION OF DYSRHYTHMIC FOCUS      ablation of liver [facility] APPENDECTOMY  2003    CHOLECYSTECTOMY  2013    COLONOSCOPY      when pt was in the 19's    COLONOSCOPY N/A 03/11/2020    COLONOSCOPY POLYPECTOMY SNARE/COLD BIOPSY: Dr. Bushra Peoples colonoscopy: 6-12 months due to findings at colonoscopy today with Cecal mass lesion and large polyps.     COLONOSCOPY      COLONOSCOPY N/A 03/17/2021    Dr Brianne Luevano, Post-operative changes w IC anastomosis & single visible staple, Mild Diverticuosis, Int Hemorrhoids Grade 1, 3 year recall    HEMICOLECTOMY Right 03/30/2020    LAPAROSCOPIC-ASSISTED RIGHT HEMICOLECTOMY performed by Christofer He MD at [address] [place] [address] [address] N/A 06/03/2020    INSERTION OF VENOUS PORT with flouro performed by Christofer He MD at [place] [address] ENDOSCOPY N/A 03/11/2020    Dr. Teresa Lynn:   [facility]       Family History  Family History   Problem Relation Age of Onset    Liver Cancer Mother     High Blood Pressure Mother  Colon Cancer Mother         x2    Cancer Father         Lung Cancer    Breast Cancer Sister     Cancer Maternal Grandfather         Lung Cancer    Cancer Paternal Grandfather         Stomach Cancer    Colon Polyps Neg Hx     Cystic Fibrosis Neg Hx     Liver Disease Neg Hx     Rectal Cancer Neg Hx        Social History  Social History     Socioeconomic History    Marital status:      Spouse name: Not on file    Number of children: Not on file    Years of education: Not on file    Highest education level: Not on file   Occupational History    Not on file   Tobacco Use    Smoking status: Never Smoker    Smokeless tobacco: Never Used   Vaping Use    Vaping Use: Never used   Substance and Sexual Activity    Alcohol use: Yes     Comment: rare     Drug use: No    Sexual activity: Yes     Partners: Female   Other Topics Concern    Not on file   Social History Narrative    Not on file     Social Determinants of Health     Financial Resource Strain:     Difficulty of Paying Living Expenses: Not on file   Food Insecurity:     Worried About Running Out of Food in the Last Year: Not on file    [place] of Food in the Last Year: Not on file   Transportation Needs:     Lack of Transportation (Medical): Not on file    Lack of Transportation (Non-Medical):  Not on file   Physical Activity:     Days of Exercise per Week: Not on file    Minutes of Exercise per Session: Not on file   Stress:     Feeling of Stress : Not on file   Social Connections:     Frequency of Communication with Friends and Family: Not on file    Frequency of Social Gatherings with Friends and Family: Not on file    Attends Jain Services: Not on file    Active Member of Clubs or Organizations: Not on file    Attends Club or Organization Meetings: Not on file    Marital Status: Not on file   Intimate Partner Violence:     Fear of Current or Ex-Partner: Not on file    Emotionally Abused: Not on file    Physically Abused: Not on file    Sexually Abused: Not on file   Housing Stability:     Unable to Pay for Housing in the Last Year: Not on file    Number of Places Lived in the Last Year: Not on file    Unstable Housing in the Last Year: Not on file         Review of Systems:  History obtained from chart review and the patient  General ROS: No fever or chills  Respiratory ROS: No cough, shortness of breath, wheezing  Cardiovascular ROS: No chest pain or palpitations  Gastrointestinal ROS: No abdominal pain or melena  Genito-Urinary ROS: No dysuria or hematuria  Musculoskeletal ROS: No joint pain or swelling         Objective:  Patient Vitals for the past 24 hrs:   BP Temp Temp src Pulse Resp SpO2 Weight   02/28/22 0542 123/72 98.3 °F (36.8 °C) Temporal 84 20 97 % 150 lb 2 oz (68.1 kg)   02/28/22 0053 118/62 97.5 °F (36.4 °C) -- 93 18 95 % --   02/27/22 2024 122/66 98.1 °F (36.7 °C) Temporal 95 18 96 % --   02/27/22 1255 113/69 97.3 °F (36.3 °C) Temporal 89 16 98 % --       Intake/Output Summary (Last 24 hours) at 2/28/2022 1144  Last data filed at 2/28/2022 1059  Gross per 24 hour   Intake 2190.8 ml   Output 1805 ml   Net 385.8 ml     General: awake/alert   Chest:  clear to auscultation bilaterally  CVS: regular rate and rhythm  Abdominal: soft, nontender, normal bowel sounds  Extremities: no cyanosis or lower extremity edema, some LUE edema  Skin: warm and dry without rash    Labs:  BMP:   Recent Labs     02/26/22  0520 02/27/22  0455 02/28/22  0409   * 133* 135*   K 5.0 5.0 4.3   CL 99 99 103   CO2 25 26 22   PHOS 3.3 3.2 2.8   BUN 39* 32* 24*   CREATININE 2.9* 2.6* 2.2*   CALCIUM 8.6* 8.7* 8.3*     CBC:   Recent Labs     02/26/22  0520 02/27/22  0455 02/28/22  0409   WBC 3.9* 5.1 6.4   HGB 8.5* 8.7* 8.1*   HCT 27.8* 29.0* 26.7*   MCV 94.6* 95.1* 95.4*   * 446* 450*     LIVER PROFILE:   Recent Labs     02/27/22  0455 02/28/22  0409   AST 24 21   ALT 18 18   BILITOT 0.4 0.4   ALKPHOS 223* 216*     PT/INR: No results for input(s): PROTIME, INR in the last 72 hours. APTT:   Recent Labs     02/27/22  1802 02/27/22  2120 02/28/22  0409   APTT 42.2* 44.3* 72.4*     BNP:  No results for input(s): BNP in the last 72 hours. Ionized Calcium:No results for input(s): IONCA in the last 72 hours. Magnesium:No results for input(s): MG in the last 72 hours. Phosphorus:  Recent Labs     02/26/22  0520 02/27/22  0455 02/28/22  0409   PHOS 3.3 3.2 2.8     HgbA1C: No results for input(s): LABA1C in the last 72 hours. Hepatic:   Recent Labs     02/27/22  0455 02/28/22  0409   ALKPHOS 223* 216*   ALT 18 18   AST 24 21   PROT 6.4* 5.6*   BILITOT 0.4 0.4   LABALBU 2.5* 2.4*     Lactic Acid: No results for input(s): LACTA in the last 72 hours. Troponin: No results for input(s): CKTOTAL, CKMB, TROPONINT in the last 72 hours. ABGs: No results found for: PHART, PO2ART, ASI8NST  CRP:  No results for input(s): CRP in the last 72 hours. Sed Rate:  No results for input(s): SEDRATE in the last 72 hours. Culture Results:   Blood Culture Recent:   Recent Labs     02/21/22  1739   BC No growth after 5 days of incubation. Urine Culture Recent : No results for input(s): LABURIN in the last 720 hours. Radiology reports as per the Radiologist  Radiology: CT ABDOMEN PELVIS WO CONTRAST Additional Contrast? None    Result Date: 2/21/2022  CT ABDOMEN PELVIS WO CONTRAST 2/21/2022 7:53 PM History: Postop pain. Hypotension. Noncontrast abdomen/pelvis CT. Comparison is made with February 8, 2022. In order to have a CT radiation dose as low as reasonably achievable Automated Exposure Control was utilized for adjustment of the mA and/or KV according to patient size. DLP in mGycm= 1115. Interval abdominal surgery. Normal heart size. Patchy atelectasis at the lung bases. Normal noncontrast appearance of the liver, pancreas, and spleen. Normal and symmetric kidneys. No hydronephrosis. No bowel dilation. No abscess or hematoma.     1. Postoperative changes with no sign of bowel obstruction, abscess, or hematoma. Signed by Dr Toan Conde Additional Contrast? None    Result Date: 2/9/2022  EXAM: CT ABDOMEN PELVIS WO CONTRAST INDICATION: Vomiting, recent surgery, history of cancer COMPARISON: 12/14/2020 DLP: 2066 mGy cm. In order to have a CT radiation dose as low as reasonably achievable, Automated Exposure Control was utilized for adjustment of the mA and/or KV according to patient size. FINDINGS: Patchy consolidation in the RIGHT and LEFT lung bases is noted. Moderate to large volume pneumoperitoneum. A surgical drain terminates in the LEFT upper abdomen. Postoperative change of RIGHT hemicolectomy with RIGHT upper quadrant anastomosis. Marked diffuse small bowel distention extending to the ileocolic anastomosis, likely representing small bowel obstruction. Small bowel loops measure up to 5 cm diameter. No definite area of pneumatosis. No evidence of portal venous gas. Trace free pelvic fluid. A dense linear device on the RIGHT peritoneal wall is likely represents a mesh device and may be related to recent surgical intervention (correlate with surgical history). Unchanged RIGHT posterolateral lumbar hernia. Postoperative change of RIGHT hepatectomy. The previously demonstrated LEFT liver lesion is not well visualized given lack of IV contrast. Gallbladder surgically absent. No biliary ductal dilatation. Pancreas, spleen, and adrenal glands are unremarkable. No urolithiasis or hydronephrosis. Normal caliber abdominal aorta. No enlarged retroperitoneal lymph nodes. Multiple borderline prominent mesenteric lymph nodes are similar to prior studies. No definite pelvic or inguinal lymphadenopathy. No acute abdominal wall soft tissue normality. No aggressive osseous lesion. 1.  Moderate to large volume pneumoperitoneum.  This may be related to recent surgery although bowel perforation is also within the differential. There is a surgical drain in the LEFT upper abdomen which also may be contributing to pneumoperitoneum. 2.  Small bowel obstruction with transition at RIGHT upper quadrant ileocolonic anastomosis. Small bowel loops measure up to 5 cm diameter. 3.  Prior RIGHT hepatectomy. Previously demonstrated LEFT liver lesion is not well visualized given lack of IV contrast. 4.  Patchy consolidation in both lung bases, with differential including aspiration and pneumonia. Findings in agreement with the emergent findings from the initial [facility] preliminary report. Signed by Dr Kayleen Crigler (KUB) (SINGLE AP VIEW)    Result Date: 2/25/2022  XR ABDOMEN (KUB) (SINGLE AP VIEW) 2/25/2022 1:18 PM History: Abdominal pain/pressure. 2 image KUB. Comparison is made with CT imaging from 4 days ago. Midline incision skin clips are still present. There are multiple surgical clips within the right side of the abdomen. A drainage catheter is present within the left upper quadrant. Nonspecific bowel gas pattern. Air is present within nondilated small bowel loops within the upper and left side abdomen. Right lower quadrant ostomy noted. 1. No acute abnormality is seen. Signed by Dr Belkis Will RENAL COMPLETE    Result Date: 2/22/2022  US RENAL COMPLETE 2/22/2022 1:40 PM History: Acute renal insufficiency. Grayscale and color-flow renal ultrasound. Normal size and position of both kidneys. Normal cortical thickness and normal cortical echogenicity. No hydronephrosis or shadowing stone. The right kidney measures 119 x 56 x 48 mm. The left kidney measures 121 x 47 x 50 mm. 1. No abnormality is seen.  Signed by Dr Lolly Sams    VL Extremity Venous Left    Result Date: 2/24/2022  Vascular Upper Extremities Veins Procedure  Demographics   Patient Name  [facility]      Age                61                [address]   Patient       [id number]       Gender             Male  Number   Visit Number  [id number]    Interpreting Tate Wan MD                             Physician   Date of Birth 1955   Referring          Casimiro Cobb MD                             Physician   Accession     [id number]   [address],  Number                                        [facility]  Procedure Type of Study:   Veins:Upper Extremities Veins, UE VENOUS UNILATERAL/FLU. Indications for Study:Arm pain and Arm swelling. Impression   Acute appearing deep vein thrombosis (DVT) is seen in the internal jugular  vein subclavian axillary brachial, left upper extremity. Acute appearing superficial thrombophlebitis (SVT) is seen in the basilic  and cephalic veins, right upper extremity. Signature   ----------------------------------------------------------------  Electronically signed by Tate Wan MD(Interpreting  physician) on 02/24/2022 02:13 PM  ----------------------------------------------------------------  Velocities are measured in cm/s ; Diameters are measured in mm Right UE Vein Measurements 2D Measurements +---------------+-----------------+----------------------+-----------------+ ! Location       ! Visualized       ! Compressibility       ! Thrombosis       ! +---------------+-----------------+----------------------+-----------------+ ! IJV            ! Yes              ! Yes                   ! None             ! +---------------+-----------------+----------------------+-----------------+ ! SCV            ! Yes              ! Yes                   ! None             ! +---------------+-----------------+----------------------+-----------------+ Left UE Vein Measurements 2D Measurements +---------------+-----------------+----------------------+-----------------+ ! Location       ! Visualized       ! Compressibility       ! Thrombosis       ! +---------------+-----------------+----------------------+-----------------+ ! IJV            ! Yes              ! No                    !                 ! +---------------+-----------------+----------------------+-----------------+ ! SCV            ! Yes              ! No                    !                 ! +---------------+-----------------+----------------------+-----------------+ ! Axillary       ! Yes              ! No                    !                 ! +---------------+-----------------+----------------------+-----------------+ ! Brachial       !Yes              ! No                    !                 ! +---------------+-----------------+----------------------+-----------------+ ! Radial         !Yes              ! Yes                   ! None             ! +---------------+-----------------+----------------------+-----------------+ ! Ulnar          ! Yes              ! Yes                   ! None             ! +---------------+-----------------+----------------------+-----------------+ ! Cephalic       ! Yes              ! No                    !                 ! +---------------+-----------------+----------------------+-----------------+ ! Basilic        ! Yes              ! No                    !                 ! +---------------+-----------------+----------------------+-----------------+    XR CHEST PORTABLE    Result Date: 2/21/2022  Exam:   XR CHEST PORTABLE  Date:  2/21/2022 History:  Male, age  61 years; hypotension COMPARISON:  Chest x-ray dated February 8, 2022 Findings : Chest portable place. A right-sided PICC, new, terminating in the low SVC. The heart and mediastinum are normal in size. Lungs are without focal infiltrate, mass or effusions. The bones show no acute pathology. Catheter in the left upper quadrant. Impression: 1. New right-sided PICC. 2.  Otherwise, no interval changes. Signed by Dr Meli Mg    XR CHEST PORTABLE    Result Date: 2/8/2022  EXAMINATION: XR CHEST PORTABLE 2/8/2022 10:39 PM HISTORY: XR CHEST PORTABLE 2/8/2022 9:00 PM HISTORY: Cough COMPARISON: March 18, 2020. FINDINGS: The lungs are clear.  Cardiac silhouette is normal. Left subclavian Port-A-Cath is present. Old healed fracture of the left clavicle is present. The osseous structures and surrounding soft tissues demonstrate no acute abnormality. 1. No radiographic evidence of acute cardiopulmonary process.  Signed by Dr Anthony Abreu   Acute kidney injury/prerenal  Hyperphosphatemia  Hypercalcemia  Hyponatremia  Hypokalemia  Metastatic colon cancer status post ostomy placement  Anemia      Plan:  Discussed with patient, family, nursing  Work-up reviewed to date  IV fluids adjusted as per orders  Monitor labs  Hematology evaluation reviewed    Lauren Gandhi MD  02/28/22  11:44 AM

## 2022-02-28 NOTE — PROGRESS NOTES
02/28/22 1155   Restrictions/Precautions   Restrictions/Precautions Fall Risk   Position Activity Restriction   Other position/activity restrictions Gait belt  (FARHAT drain and illeostomy)   General   Chart Reviewed Yes   Additional Pertinent Hx FARHAT drain and illeostomy   Family / Caregiver Present Yes   Subjective   Subjective I like to sit in the chair   Pain Screening   Patient Currently in Pain Denies   Vital Signs   Level of Consciousness Alert (0)   Oxygen Therapy   O2 Device None (Room air)   Bed Mobility   Sit to Supine Modified independent   Scooting Stand by assistance   Comment Patient sat EOB SBA   Transfers   Sit to Stand Minimal Assistance   Stand to sit Minimal Assistance   Ambulation   Ambulation?  Yes   Ambulation 1   Device Rolling Walker   Other Apparatus Wheelchair follow  (IV pole)   Assistance Minimal assistance   Quality of Gait Small ANTONETTE slightly unsteady   Gait Deviations Slow Denia;Decreased step length   Distance 10'   Comments Patient walked untill fatigued then sat in chair   Exercises   Heelslides 20   Hip Flexion 20   Knee Long Arc Quad 20   Knee Active Range of Motion yes   Ankle Pumps 20   Activity Tolerance   Activity Tolerance Patient Tolerated treatment well;Patient limited by fatigue;Patient limited by endurance   Safety Devices   Type of devices Left in chair;Call light within reach  (Family present)   Physical Therapy    Electronically signed by Francisco Bailey PTA on 2/28/2022 at 12:06 PM

## 2022-02-28 NOTE — PROGRESS NOTES
[facility] Surgery Progress Note    Chief Complaint:   Chief Complaint   Patient presents with    Abnormal Lab     unknown, primary care called       SUBJECTIVE:  Mr. Adrian Waterman is a 74 y.o. male is seen and examined at bedside. Increased PO intake. OBJECTIVE:  /66   Pulse 89   Temp 97.3 °F (36.3 °C) (Temporal)   Resp 18   Ht 5' 7\" (1.702 m)   Wt 150 lb 2 oz (68.1 kg)   SpO2 98%   BMI 23.51 kg/m²   CONSTITUTIONAL: Alert, appropriate, no acute distress  SKIN: warm, dry with no rashes or lesions  HEENT: NCAT, Non icteric, PERR. Trachea Midline. CHEST/LUNGS: Normal respiratory effort. CARDIOVASCULAR:  No edema. ABDOMEN: soft, ND, FARHAT w seropurulent output  Incisions: Clean, dry, and intact with no erythema or induration, staples in place  NEUROLOGIC: CN II-XI grossly intact, no motor or sensory deficits   MUSCULOSKELETAL: No clubbing or cyanosis. PSYCHIATRIC:  Normal Mood and Affect. Alert and Oriented. Labs:  CBC:   Recent Labs     02/26/22  0520 02/27/22  0455 02/28/22  0409   WBC 3.9* 5.1 6.4   HGB 8.5* 8.7* 8.1*   * 446* 450*     BMP:    Recent Labs     02/26/22  0520 02/27/22  0455 02/28/22  0409   * 133* 135*   K 5.0 5.0 4.3   CL 99 99 103   CO2 25 26 22   BUN 39* 32* 24*   CREATININE 2.9* 2.6* 2.2*   GLUCOSE 104 117* 102       ASSESSMENT:  Principal Problem:    Acute kidney injury superimposed on CKD (HCC)  Active Problems:    Adenocarcinoma of colon (HCC)    Liver metastases (HCC)    Hypovolemia    Hyponatremia    Moderate malnutrition (HCC)    Palliative care patient    Posttraumatic stress disorder    Deep vein thrombosis (DVT) of left upper extremity (HCC)  Resolved Problems:    * No resolved hospital problems.  *       PLAN:    Leave FARHAT until seen at [facility] to stay in place for least 2 weeks postop  Diet as tolerated  Imodium and lomotil to assist in decreasing Ostomy OP          Maximiliano Pena DO   Electronically Signed on 2/28/2022 at 1:06 PM

## 2022-02-28 NOTE — PROGRESS NOTES
PROGRESS NOTE    Patient name: Alok Butler  Patient : 1955  Room: 314      SUBJECTIVE: Continues to have significant output from ileostomy. Verona Schilder to be discharged home    INTERVAL HISTORY  The patient is a very pleasant 61years old male who has a history of recurrent colon cancer. He recently underwent debulking surgery and HIPEC at [facility] in 2022. Postoperative course complicated by internal hernia. He was again taken to the OR on 2/10/2022 at [facility]. He has had a complicated postoperative course with severe dehydration and acute kidney injury. He was receiving IV fluids at home according to his ileostomy output. His wife called the clinic yesterday stating that the patient was feeling quite dizzy and had generalized weakness. Laboratory studies reviewed and showed creatinine elevated at 4.6. The patient was directed to emergency. He received IV fluids in the emergency. He was then admitted for further care. I was consulted for his concurrent history of colon cancer.      INTERVAL HISTORY/HISTORY OF PRESENT ILLNESS:  Diagnosis  Colonic adenocarcinoma, 2020  [id number](liver), stage ROXANA  IHC MMR- proficient  K-maria luisa mutated  N-MARIA LUISA/BRAF wild-type  Iron deficiency anemia  Soft tissue mass, 2021  Adenocarcinoma-consistent with colon primary, right psoas muscle, 2021  Metastatic adenocarcinoma, 2022  MLH1, MSH2, MH6, PMS2-intact  MMR- no loss of expression     Treatment summary  3/30/2020-right hemicolectomy at [facility]  Anticipated Liver ablation to be followed by neoadjuvant/adjuvant versus palliative chemotherapy  06/10/2020-2020-FOLFOX + Avastin  20- Right hepatectomy-Dr. Adrianne Contreras  S/p Injectafer-poor oral iron tolerance  21- Initiate FOLFIRI + Avastin every 2 weeks  22-psoas muscle mass resection/HIPEC by Dr. Zoltan Munoz at [facility]  2/10/2022-back to OR for correction of internal hernia        The patient is a 61 years old male who has a diagnosis of colonic adenocarcinoma. The patient had malignant disease with several lymph nodes involved. In addition, the patient was also found to have suspicious liver lesions concerning for metastatic disease. He was seen by [facility] and offered a multimodality approach with liver ablation of the left liver lesion followed by neoadjuvant chemotherapy and partial right hepatectomy. He underwent microwave ablation of the left lobe liver lesion on 6/8/2020. He is status post completion of 11 biweekly cycles of FOLFOX/Avasti completed November 2020. He tolerated treatment with complaints of mild transient cold neuropathy. He had a right hepatectomy on 12/11/2020 that showed no residual disease. His last CEA was normal in January 2021. He had MRI of the abdomen at [facility] in March 2021 that showed no evidence of recurrent disease in the liver or in the abdomen. He also had a surveillance colonoscopy in March 2021 that showed no polyps. CEA was elevated in May 2021. Repeat CEA June 2021 showed persistent elevation. MRI abdomen at [facility] showed no evidence of liver recurrence but a suspicious nodule in the right lower quadrant. Biopsy was performed at [facility] and consistent with recurrent adenocarcinoma. I discussed the findings with hepatobiliary service and also colorectal surgery. He was started on FOLFIRI/Avastin. He was seen by Dr. Nadine Kurtz again on 9/24/2021. He had repeat CT abdomen pelvis and also MRI at [facility] that showed persistent disease involving the psoas muscle. In addition, an additional small place that may represent further peritoneal metastatic disease. He underwent surgery at [facility]. He underwent exploratory laparotomy with resection of psoas mass at [facility] on 1/13/2022. He also underwent HIPEC treatment. The patient was taken to the OR on 2/10/2022 for correction of internal hernia.      Cancer history  Mr. Corinne Curry was first seen by me on 3/23/2020. He was referred for a new diagnosis of colonic adenocarcinoma involving the cecum. The patient reports that he had a wellbeing consult with his provider at the [place]. He was found to have anemia and then recommended a colonoscopy. Of note, the patient has a family history of colon cancer. His mother is a patient of Dr. Michael Kincaid he has been diagnosed with colon cancer in 2010.  3/11/2020- colonoscopy revealed a large malignant appearing fungating mass lesion in the cecum. In addition, several other polyps. Biopsy of the mass consistent with moderate differentiated colonic adenocarcinoma. Polyps consistent with tubulovillous adenoma with no high-grade dysplasia. IHC MMR not proficient. K-maria luisa mutated, BRAF and NRAS wild type. MSI proficient  3/11/2020-CEA 5.5 (H)  3/18/2020-CT abdomen pelvis with contrast  Invasive cecal mass adhering to the right lateral abdominal wall muscles with adjacent lymphadenopathy. Mild partial obstruction of the terminal ileum. 2. Suspicious lesions in the right and left hepatic lobes measure up to 1.3 cm and likely represent metastatic disease. 3/18/2020-Xr Chest Standard  No radiographic evidence of acute cardiopulmonary process. 3/23/2020-he was first seen by me. Recommended completion of staging with CT chest.  Also recommended liver MRI for further clarification of liver lesion. S.  Recommend to proceed with a general surgery consultation tomorrow with Dr. Rimma Cutler. Patient was informed that I favor surgical resection if feasible of the primary malignancy. 3/30/2020- right hemicolectomy by Dr. Rimma Cutler at [facility] consistent with invasive moderately differentiated colonic adenocarcinoma measuring 7.2 cm. Carcinoma directly invading the adjacent abdominal wall tissue. Focal lymphovascular space invasion identified. Focal perineural invasion identified. Surgical margins negative for evidence of malignancy.   6 out of 14 lymph nodes positive for metastatic adenocarcinoma. Final pathology staging [id number](liver, stage ROXANA)  4/20/2020-CT chest with contrast showed No convincing intrathoracic metastasis. Nonspecific 4 mm nodule of the inferior lingula and a 2 mm right upper lobe nodule can be followed on subsequent imaging in 6-12 months. Moderate coronary calcifications. Hypodense metastatic liver lesions. Small hiatal hernia. 4/20/2020-Mri Abdomen W Wo Contrast There are about 5 liver lesions. The 2 largest appears similar compared to 3/18/2020, the others are too small to further characterize. Appearance is most concerning for metastatic disease. Enhancement of the right lateral peritoneum. This is favored to be postoperative as there is no nodularity, evidence of omental disease or lymphadenopathy. Recommend attention on follow-up. Cholecystectomy. 4/22/2020-discussed with Dr. James Romero at [place]. He will review imaging studies and give further recommendations regarding eligibility for resection of liver lesions. 5/8/2020 CT Abdomen The two largest suspicious lesions measuring 1.2 and 1.3 cm in the  right and left hepatic lobes respectively are similar compared to the  3/18/2020 CT. Additionally, there are at least five subcentimeter lesions with similar signal characteristics, which are also highly suspicious for metastases. If complete characterization of the number and distribution of lesions is necessary, an MRI with Eovist could be acquired. 5/19/2020- he was seen by the hepatobiliary service at [facility] with Dr. James Romero:  patient adequate risk candidate for a multimodal approach, directed toward curative hepatectomy eventually. Endorsed by Hepatobiliary Conference, I recommended perc ablation of the L hemiliver to clear it, followed by systemic therapy in a neoadjuvant strategy.  Restaging imaging to confirm clearance of disease on the left and lack of progression to unresectability of the R hemiliver disease would then be followed by R hepatectomy. Limitations to this approach may be accessibility of the segment 4A/8 disease high in the hepatic dome and the possibility of heat sink-related recurrence s/p abation of the left-sided disease. 5/21/2020- referral for Mediport placement and start FOLFOX in 2 weeks. Will add bevacizumab after 6 weeks of liver ablation. We will plan for 12 biweekly dose of FOLFOX. Bevacizumab will also be stopped 6 weeks prior to major procedure. 6/8/2020- Microwave ablation of the left liver lesion was then performed for 5 minutes at 100 W to achieve a 3.4 x 3.9 cm approximately spherical zone of ablation. 6/10/2020- initiation of FOLFOX.  7/20/2020-added Avastin. 9/10/20 MRI abdomen: Left hepatic lobe segment II lesion demonstrates small T1 hyperintense blood products, status post microwave ablation on 6/8/2020. No definitive enhancement within the lesion. Focal internal thickening or scar present. Recommend attention on follow-up. Additional scattered subcentimeter foci throughout the liver decreased in size compared to MRI dated 4/20/2020 consistent with improving metastatic disease. Additional chronic findings as above. 9/16/2020-discussed with plan with the patient and [facility]. Interval response to treatment. Plan to continue chemotherapy through 12 cycles with Avastin. 12/11/20 Right hepatectomy-Dr. Malu Costello  12/11/20 Right hepatectomy pathology: Liver, right, resection: Focus of Fibrosis with calcifications and chronic inflammation (0.3 cm), see comment. Background hepatic parenchyma with minimal periportal fibrosis (trichrome stain), minimal lobular and portal inflammation, and no significant macrovesicular steatosis (<5%) Comments: The patient's history of colorectal cancer status adjuvant therapy is noted. The focus of fibrosis may reflect treatment effect. There is no evidence of viable tumor in the sampled specimen.    12/14/20 Ct Abdomen Pelvis W Iv Contrast A Small bowel obstruction with transition point at the distal small bowel, just proximal to RIGHT upper quadrant ileocolonic anastomosis. Postoperative change of RIGHT hepatectomy with small amount of expected free fluid and intraperitoneal gas. Redemonstrated LEFT liver lesion. Small bilateral pleural effusions. 12/16/20 SBFT-[place]: Study is limited due to retained contrast in the small bowel from prior attempt at small bowel follow-through on the floor. Small bowel remains dilated, measuring approximately 5.2 cm, which is consistent with partial or resolving small bowel obstruction. Final radiographs show contrast within the colon. 1/4/2020-resolution of small bowel obstruction. 1/7/21 CT chest: No finding to suggest intrathoracic neoplastic process or metastatic disease. The benign-appearing tiny nodule in the right upper lobe probably represent a noncalcified granuloma. A nodule in the lingular segment of the left upper lobe is not visualized in this study. Postsurgical changes of the liver. No evidence of focal complication. A trace right basal pleural effusion. This may be reactive to the previous abdominal surgery. 3/4/21 MRI abd: Status post right hepatectomy and microwave ablation of a left liver lesion. No findings to suggest residual/recurrent disease. No new liver lesion is identified. Postsurgical changes related to right hemicolectomy. Microwave ablation zone in segment II of the left hepatic lobe measures approximately 21 mm in diameter, unchanged. No appreciable postcontrast enhancement or other findings to suggest local disease recurrence. No new liver lesion is identified. Spleen is mildly enlarged, measuring 13.8 cm in length. 3/17/2021-1 year colonoscopy showed no evidence of polyps. 5/3/21 CEA 6.2  6/8/21 MRI abdomen ([place]): Status post right hepatectomy and MWA of a left liver lesion. No evidence of residual or recurrent metastatic disease in the liver.  Status post prior right hemicolectomy for colon carcinoma. Within the posterior right iliopsoas muscle there is a mildly T2 hyperintense nodular focus with postcontrast rim enhancement and diffusion restriction. This measures approximately 2.7 x 2 x 2 cm. More delayed postcontrast images show irregular area of enhancement measuring 2.4 x 7.7 cm axially involving the right iliopsoas muscle and the right lateral abdominal wall. Suggest correlation with contrast enhanced CT exam for complete evaluation. Consider biopsy of this lesion. 6/15/21 CT CHEST WO CONTRAST No metastatic disease in the chest.  No change in tiny 2 mm RIGHT upper lobe pulmonary nodule. Mild ectasia of the ascending aorta measuring 4 cm.   6/18/2021-I reviewed results of MRI abdomen at [facility]. CT chest without contrast reviewed by me and showed no evidence of metastatic disease. Stable 2 mm right upper lobe nodule. Discussed with hepatobiliary service at [facility]. Biopsy intra-abdominal nodule next week at [facility]. 6/25/[id number] biopsy soft tissue mass right psoas muscle at [facility] consistent with recurrent colonic adenocarcinoma. 7/2/2021-discussed with hepatobiliary service at [facility] and also surgical oncology. Surgical oncology will call us back regarding consultation for consideration of HIPEC if he is a candidate  7/13/21-initiation of chemotherapy with FOLFIRI + Avastin every 2 weeks  7/13/21 CEA 10.7  7/27/2021-CEA 9.6  7/30/2021-she was seen by the surgical oncology group at [facility] by Dr Simona Day. They recommended to complete 6 cycles of chemotherapy and repeat CT chest abdomen pelvis and liver MRI to assess disease response. They discussed the role of CRS/HIPEC in his situation. He was told that it really depends on the status of his liver lesions as well.  He will complete 6 cycles of chemotherapy and will return to see me with a CTAP as well as MRI of the liver to assess disease burden and determine if he can be a candidate for debulking.  8/25/2021-proceed cycle #4.  9/22/2021 CEA- 8.1  9/24/2021 MRI with and w/o Contrast ([place])  Tiny left retroperitoneal nodule involving the left lateral conal fascial new compared to 3/4/2021 though unchanged from most recent prior, possibly an additional site of metastasis. No other new metastatic disease within the abdomen. Similar partially visualized right posterior body wall/psoas infiltrative lesion not definitely changed from MRI abdomen 6/8/2021 but better evaluated in its entirety on concurrent CT, reported separately. Status post right hemicolectomy, right hepatectomy, and segment III microwave ablation. Similar mild splenomegaly. Additional chronic and incidental findings as described in the body the report. Liver: Status post right hepatectomy. Ablation zone in segment II measures 1.7 x 1.1 cm, slightly decreased in size from 1.8 x 1.4 cm previously (series 1301 image 56) without appreciable enhancement. Similar tiny subcentimeter cyst in the left hepatic lobe. No new focal hepatic lesion identified. No visualized free fluid. Similar 0.7 cm enhancing nodule along the left lateral conal fascia laterally new from 3/4/2021, grossly unchanged from MRI dated 6/8/2021. (series 801 image 22). No other appreciable peritoneal/retroperitoneal or  omental nodularity. Ill-defined T2 hyperintense signal and hyperenhancement in the right posteromedial body wall involving the lateral aspect of the psoas muscle and posterolateral abdominal wall musculature, partially visualized measuring at least 8.7 x 3.1   cm, grossly similar to the prior exam, better evaluated in its entirety on concurrent CT reported separately. 9/24/2021 CT C/A/P w/ Contrast([place]) Status post right hemicolectomy, right hepatectomy and left hepatic microwave ablation without new suspicious hepatic lesion.   Left lateral perirenal fascial nodule, which is stable compared to 6/8/2021 MRI, but new compared to 3/4/2021 MRI is concerning for an additional site of metastatic disease. No sites of new or enlarging metastatic disease in the chest.  Similar appearance of right lateral psoas and posterior abdominal wall infiltrative lesion, not significantly changed compared to 6/8/2021 MRI. Mild splenomegaly. Additional findings as outlined in the body the report. Biopsy-proven adenocarcinoma involving the posterior lateral aspect of the right iliopsoas muscle and right lateral abdominal wall. Right iliopsoas component is difficult to measure but appears to be approximately 2.5 x 2.4 cm (series 2, image 153), similar to prior MRI. Nodular thickening noted along the right posterior abdominal wall and possibly involving the the transversus abdominis measures approximately 8.5 x 1.8 cm (series 2 image 153) overall similar compared to prior MRI. 10/6/2021-discussed the results of recent CT abdomen/pelvis and MRI abdomen/pelvis at [facility]. Persistent disease involving the psoas muscle. Discussed with colorectal surgery at [facility]. They recommend to continue current therapy for another 2 months and reassess with CT scans. 12/3/21 CT chest/abd/pelvis [facility]): Status post prior right hemicolectomy, right hepatectomy and left hepatic lesions microwave ablation. No new liver lesion. Soft tissue thickening of the right lower posterior abdominal wall involving the iliopsoas muscle is grossly unchanged consistent with improving metastatic disease. Small right omental nodule and left lateral conal fascia nodule unchanged compared to  recent exam.   1/13/22 Exploratory lap with resections of psoas muscle mass and HIPEC by Dr. Belkis Abdul at [facility]:  Metastatic colorectal adenocarcinoma involving fibroadipose tissue. IHC MMR proficient.   1/24/22 CT chest/abd/pelvis [facility]): Extensive postsurgical changes in the abdomen including partial right colectomy, resection of right retroperitoneal mass, omentectomy and mesh repair of right lateral abdominal wall defect. There appears to be a defect in the mesh containing herniated loops of small bowel. Extensive diffuse dilated small bowel loops with air-fluid levels are seen throughout the abdomen. There are segmental areas of decompressed small bowel in the left upper quadrant as well as adjacent to the herniated small  bowel loops in the right retroperitoneum. Air-fluid levels are also seen throughout the colon. While pattern could likely represent postoperative ileus given the diffuse small bowel dilatation and distal colonic air and fluid, developing or partial small bowel obstruction secondary to the right lateral abdominal wall hernia cannot entirely be excluded. Small ascites. 8 mm nodule along the left lateral conal fascia is unchanged. Slight increase in size of cardiophrenic lymph nodes measuring up to 7 mm anterior to the right hemidiaphragm. Stable hypodensity in the left hepatic lobe. Diffuse gastric wall thickening/edema could be secondary to gastritis in the appropriate clinical setting. CEA dynamics:  3/11/20 CEA 5.5  8/19/20 CEA 2.4  9/2/20 CEA 2.7  9/16/20 CEA 2.7  9/30/20 CEA 2.7  10/19/20 CEA 2.3  1/4/21 CEA 2.2  5/3/21 CEA 6.2  6/15/21 CEA 8.3  7/13/21 CEA 10.7  7/27/21 CEA 9.6  8/12/21/21 CEA 8.2  8/25/2021-CEA 8.9  9/22/2021 CEA 8.1    Objective   /72   Pulse 84   Temp 98.3 °F (36.8 °C) (Temporal)   Resp 20   Ht 5' 7\" (1.702 m)   Wt 150 lb 2 oz (68.1 kg)   SpO2 97%   BMI 23.51 kg/m²     PHYSICAL EXAM:  CONSTITUTIONAL: Alert, appropriate,ill-appearing, feel stronger  EYES: Non icteric, EOM intact, pupils equal round   ENT: Mucus membranes moist,external inspection of ears and nose are normal  NECK: Supple, no masses. No palpable thyroid mass  CHEST/LUNGS: CTA bilaterally, normal respiratory effort   CARDIOVASCULAR: RRR, no murmurs.   No lower extremity edema  ABDOMEN: mild tender - no guarding or rebound, post op FARHAT drain, ileostomy with stool  EXTREMITIES: 2+ pitting edema LUE, port left chest wall. SKIN: warm, dry with no rashes or lesions  LYMPH: No cervical, clavicular, axillary, or inguinal lymphadenopathy  NEUROLOGIC: follows commands, non focal   PSYCH: mood and affect appropriate. Alert and oriented to time, place, person    Recent Labs     02/28/22  0409 02/27/22  0455 02/26/22  0520   WBC 6.4 5.1 3.9*   HGB 8.1* 8.7* 8.5*   HCT 26.7* 29.0* 27.8*   MCV 95.4* 95.1* 94.6*   * 446* 428*       Lab Results   Component Value Date     (L) 02/28/2022    K 4.3 02/28/2022     02/28/2022    CO2 22 02/28/2022    BUN 24 (H) 02/28/2022    CREATININE 2.2 (H) 02/28/2022    GLUCOSE 102 02/28/2022    CALCIUM 8.3 (L) 02/28/2022    PROT 5.6 (L) 02/28/2022    LABALBU 2.4 (L) 02/28/2022    BILITOT 0.4 02/28/2022    ALKPHOS 216 (H) 02/28/2022    AST 21 02/28/2022    ALT 18 02/28/2022    LABGLOM 30 (A) 02/28/2022    GFRAA 37 (L) 02/28/2022    AGRATIO 1.5 06/15/2021    GLOB 3.8 02/07/2022       Lab Results   Component Value Date    INR 1.0 06/18/2021    PROTIME 12.1 06/18/2021       30 Day lookback of cultures:    Blood Culture Recent:   Recent Labs     02/21/22  1739   BC No growth after 5 days of incubation. Gram Stain Recent: No results for input(s): LABGRAM in the last 720 hours. Resp Culture Recent: No results for input(s): CULTRESP in the last 720 hours. Body Fluid Recent : No results for input(s): BFCX in the last 720 hours. MRSA Recent : No results for input(s): [address] in the last 720 hours. Urine Culture Recent : No results for input(s): LABURIN in the last 720 hours. Organism Recent : No results for input(s): ORG in the last 720 hours. Narrative   CT ABDOMEN PELVIS WO CONTRAST    2/21/2022 7:53 PM   History: Postop pain. Hypotension. Noncontrast abdomen/pelvis CT. Comparison is made with February 8, 2022.    In order to have a CT radiation dose as low as reasonably achievable   Automated Exposure Control was utilized for adjustment of the mA   and/or KV according to patient size. DLP in mGycm= 1115. Interval abdominal surgery. Normal heart size. Patchy atelectasis at the lung bases. Normal noncontrast appearance of the liver, pancreas, and spleen. Normal and symmetric kidneys. No hydronephrosis. No bowel dilation. No abscess or hematoma. Impression   1. Postoperative changes with no sign of bowel obstruction, abscess,   or hematoma. Signed by Dr Main Tellez:  #Colon cancer  -Status post neoadjuvant chemo followed by debulking surgery/HIPEC) at [facility] January 2022  -Status post exploratory laparotomy 2/10/2022 for correction of internal hernia at [facility] (Dr. Herbie Mao, Cell [phone])  -Patient has ileostomy and abdominal drain  -Patient is currently not on chemotherapy. Last chemotherapy November 2021  -CT A/P Wo contrast 2/21/2022: Pneumoperitoneum, postoperative changes    Dr. Carpenter Forth following     #Acute kidney injury-likely prerenal secondary to high ileostomy output  -Patient was receiving IV fluids at home.  -He received IV fluid resuscitation in the emergency  -CT A/P without contrast: No hydronephrosis     (baseline creatinine 1.2-1.4)    Dr. Rodriguez Skill following    Currently on NS at 150 cc/hr         Change IVF to NS    Crt slowly improving      #Normocytic hypochromic anemia         Component hemodilution    Serology 2/24/2022  Iron panel  Ferritin- 536.1  B12 - 1150  Folate - 17.7  LDH - 187  Retic - 3.23% with absolute 0.0930  Hapto - 410    HGB stable - CBC will be followed    #DVT left upper extremity-most likely port associated -    -Left upper extremity ultrasound 2/23/2022  Acute appearing deep vein thrombosis (DVT) is seen in the internal jugular  vein subclavian axillary brachial, left upper extremity. Acute appearing superficial thrombophlebitis (SVT) is seen in the basilic and cephalic veins, right upper extremity.      - currently on IV heparin until creatinine clearance above 30. Then can be switched to Eliquis or Xarelto. (Creatinine clearance 32.24 today, 2/28/2022)    #Anorexia    - Marinol 2.5 p.o. twice daily initiated  - Megace started  - Dietary     #Weakness, deconditioning    Physical therapy     PLAN:  Continue aggressive IV fluid resuscitation   Discontinue Heparin drip and initiate Eliquis 5 mg PO BID  Physical therapy   Dietary   Continue Megace and Marinol 2.5 twice daily  Add Imodium  Discussed with uYe Hitchcock RN    Consult :  Request insurance approvalto ensure financially appropriate for Eliquis 5 mg p.o. twice daily. Will need IV fluids weekly x2 with labs at discharge with home health: 1.5 L normal saline weekly x2, CBC, CMP, magnesium and phosphorus weekly x2. Disposition- Would like to see his creatinine below 2. Enrique Jonesrey, APRN    02/28/22  6:51 AM     Physician's attestation/substantial contribution:  I, Dr Warren Carter, independently performed an evaluation on Denise Morfin. I have reviewed relevant medical information/data to include but not limited to medication list, relevant appropriate labs and imaging when applicable. I reviewed other physician's notes, ancillary services and nurses assessment. I have reviewed the above documentation completed by the Nurse Practitioner or Physician Assistant. Please see my additional contributions to the history of present illness, physical examination, and assessment/medical decision-making that reflect my findings and impressions. I have seen and examined the patient and the key elements of all parts of the encounter have been performed by me. I agree with the assessment and plan as outlined by the ARNP/PA. Subjective-anxious to go home. Objective-as above  Assessment/plan:  MARIO-continue IV fluids. High output ostomy. Discussed with surgery today. Will start loperamide and fiber before meals.     Warren Carter MD

## 2022-02-28 NOTE — PROGRESS NOTES
Pt is doing pretty well today, having visitors. States no problems with ostomy or drain. Denies pain.   Appt at [place] next Friday for surgical F/U

## 2022-02-28 NOTE — PROGRESS NOTES
[facility] Progress Note    Patient:  Paul Villagran  YOB: 1955  Date of Service: 2/28/2022  MRN: [id number]   Acct: [de-identified]   Primary Care Physician: KALPESH Gray NP  Advance Directive: Full Code  Admit Date: 2/21/2022       Hospital Day: 7      CHIEF COMPLAINT:   Generalized weakness and fatigue. Chief Complaint   Patient presents with    Abnormal Lab     unknown, primary care called       2/28/2022 9:10 AM  Subjective / Interval History:   02/28/2022  Patient seen and examined this AM.  No new complaints. Endorses overall improvement. Laying comfortably in bed no acute distress. Left lower quadrant abdominal pain improved. Denies any acute complaints or distress at this time. 02/27/2022  Patient endorses overall improvement. Family at bedside. No acute changes or acute overnight event reported. Laying comfortably in bed no acute distress. Denies any acute complaints or distress at this time. 02/26/2022  Patient seen and examined. Doing well. No new complaints. No acute changes or acute overnight event reported. Left lower quadrant abdominal pain improved/resolved. Laying comfortably in bed no acute distress. 02/25/2022  Patient seen and examined this AM.  Doing well. Patient reports left-sided - lower quadrant abdominal discomfort at all of the incision sites. Laying comfortably in bed however no apparent acute distress. Family at bedside. Denies any acute complaints or distress at this time. 02/24/2022  Patient seen and examined this AM.  Doing well. No new complaints. Laying comfortably in bed in no acute distress. Endorses overall improvement in his energy level. No acute changes or acute overnight events reported. 02/23/2022  No acute changes or acute overnight event reported. Patient endorses marked improvement in his energy level. Laying comfortably in bed no acute distress.   Denies any acute complaints or distress at this time. Left arm venous duplex ultrasound (02/02/2022): Positive DVT in left jugular, subclavian, axillary, and brachial veins. Positive SVT in left proximal cephalic vein, media and cubital veins      02/22/2022  Patient seen and examined this AM.  Doing well. No new complaints. No acute changes or acute overnight event reported. Laying comfortably in bed in no acute distress. Family at bedside. Review of Systems:   Review of Systems  ROS: 14 point review of systems is negative except as specifically addressed above. ADULT ORAL NUTRITION SUPPLEMENT; Lunch, Dinner; Frozen Oral Supplement  ADULT DIET;  Regular; LIKES SUGAR FREE VANILLA PUDDING CUPS    Intake/Output Summary (Last 24 hours) at 2/28/2022 0910  Last data filed at 2/28/2022 2680  Gross per 24 hour   Intake 1554.8 ml   Output 1805 ml   Net -250.2 ml       Medications:   sodium chloride 150 mL/hr at 02/27/22 2123    sodium chloride       Current Facility-Administered Medications   Medication Dose Route Frequency Provider Last Rate Last Admin    apixaban (ELIQUIS) tablet 5 mg  5 mg Oral BID KALPESH Carrington   5 mg at 02/28/22 4256    loperamide (IMODIUM) capsule 2 mg  2 mg Oral 4x Daily PRN KALPESH Carrington        megestrol (MEGACE) 40 MG/ML suspension 800 mg  800 mg Oral Daily Juana Elizondo PA-C   800 mg at 02/27/22 1049    0.9 % sodium chloride infusion   IntraVENous Continuous Juana Elizondo PA-C 150 mL/hr at 02/27/22 2123 New Bag at 02/27/22 2123    dronabinol (MARINOL) capsule 2.5 mg  2.5 mg Oral BID Keri Aden MD   2.5 mg at 02/28/22 1493    potassium chloride (KLOR-CON M) extended release tablet 40 mEq  40 mEq Oral PRN Lidia Biggs MD   40 mEq at 02/24/22 7022    Or    potassium bicarb-citric acid (EFFER-K) effervescent tablet 40 mEq  40 mEq Oral PRN Lidia Biggs MD        Or    potassium chloride 10 mEq/100 mL IVPB (Peripheral Line)  10 mEq IntraVENous PRN Lidia Biggs MD       [facility] methocarbamol (ROBAXIN) tablet 500 mg  500 mg Oral TID PRN Cholo Plasencia MD        HYDROmorphone (DILAUDID) tablet 2 mg  2 mg Oral Q6H PRN Cholo Plasencia MD   2 mg at 02/27/22 1744    sodium chloride flush 0.9 % injection 5-40 mL  5-40 mL IntraVENous 2 times per day KALPESH Marshall - CNP   10 mL at 02/27/22 2110    sodium chloride flush 0.9 % injection 5-40 mL  5-40 mL IntraVENous PRN KALPESH Marshall - CNP        0.9 % sodium chloride infusion  25 mL IntraVENous PRN KALPESH Marshall - CNP        ondansetron (ZOFRAN-ODT) disintegrating tablet 4 mg  4 mg Oral Q8H PRN Janette Patel, APRN - CNP   4 mg at 02/27/22 1049    Or    ondansetron (ZOFRAN) injection 4 mg  4 mg IntraVENous Q6H PRN KALPESH Marshall - CNP   4 mg at 02/25/22 1807    acetaminophen (TYLENOL) tablet 650 mg  650 mg Oral Q6H PRN Janette Patel, APRN - CNP   650 mg at 02/23/22 6601    Or    acetaminophen (TYLENOL) suppository 650 mg  650 mg Rectal Q6H PRN Janette Patel APRN - CNP        Vitamin D (CHOLECALCIFEROL) tablet 2,000 Units  2,000 Units Oral Daily Janette Patel APRN - CNP   2,000 Units at 02/28/22 2896    pantoprazole (PROTONIX) tablet 40 mg  40 mg Oral QAM AC Janette Patel APRN - CNP   40 mg at 02/28/22 0732    [Held by provider] prazosin (MINIPRESS) capsule 1 mg  1 mg Oral Nightly Janette Patel APRN - CNP        sertraline (ZOLOFT) tablet 25 mg  25 mg Oral Daily Janette Patel APRN - CNP   25 mg at 02/28/22 6305         sodium chloride 150 mL/hr at 02/27/22 2123    sodium chloride        apixaban  5 mg Oral BID    megestrol  800 mg Oral Daily    dronabinol  2.5 mg Oral BID    sodium chloride flush  5-40 mL IntraVENous 2 times per day    Vitamin D  2,000 Units Oral Daily    pantoprazole  40 mg Oral QAM AC    [Held by provider] prazosin  1 mg Oral Nightly    sertraline  25 mg Oral Daily     loperamide, potassium chloride **OR** potassium alternative oral replacement **OR** potassium chloride, methocarbamol, HYDROmorphone, sodium chloride flush, sodium chloride, ondansetron **OR** ondansetron, acetaminophen **OR** acetaminophen  ADULT ORAL NUTRITION SUPPLEMENT; Lunch, Dinner; Frozen Oral Supplement  ADULT DIET; Regular; LIKES SUGAR FREE VANILLA PUDDING CUPS       Labs:   CBC with DIFF:  Recent Labs     02/26/22  0520 02/27/22  0455 02/28/22  0409   WBC 3.9* 5.1 6.4   RBC 2.94* 3.05* 2.80*   HGB 8.5* 8.7* 8.1*   HCT 27.8* 29.0* 26.7*   MCV 94.6* 95.1* 95.4*   MCH 28.9 28.5 28.9   MCHC 30.6* 30.0* 30.3*   RDW 14.7* 14.7* 14.8*   * 446* 450*   MPV 9.5 8.9* 9.1*   NEUTOPHILPCT 71.7* 74.8*  --    LYMPHOPCT 12.3* 11.0*  --    MONOPCT 12.1* 11.4*  --    EOSRELPCT 3.1 2.0  --    BASOPCT 0.3 0.2  --    NEUTROABS 2.8 3.8  --    LYMPHSABS 0.5* 0.6*  --    MONOSABS 0.50 0.60  --    EOSABS 0.10 0.10  --    BASOSABS 0.00 0.00  --        CMP/BMP:  Recent Labs     02/26/22  0520 02/27/22  0455 02/28/22  0409   * 133* 135*   K 5.0 5.0 4.3   CL 99 99 103   CO2 25 26 22   ANIONGAP 10 8 10   GLUCOSE 104 117* 102   BUN 39* 32* 24*   CREATININE 2.9* 2.6* 2.2*   LABGLOM 22* 25* 30*   CALCIUM 8.6* 8.7* 8.3*   PROT  --  6.4* 5.6*   LABALBU  --  2.5* 2.4*   BILITOT  --  0.4 0.4   ALKPHOS  --  223* 216*   ALT  --  18 18   AST  --  24 21         CRP:  No results for input(s): CRP in the last 72 hours. Sed Rate:  No results for input(s): SEDRATE in the last 72 hours. HgBA1c:  No components found for: HGBA1C  FLP:  No results found for: TRIG, HDL, LDLCALC, LDLDIRECT, LABVLDL  TSH:  No results found for: TSH  Troponin T:   No results for input(s): TROPONINI in the last 72 hours. Pro-BNP: No results for input(s): BNP in the last 72 hours. INR: No results for input(s): INR in the last 72 hours.   ABGs: No results found for: PHART, PO2ART, [id number]  UA:  No results for input(s): NITRITE, COLORU, PHUR, LABCAST, WBCUA, RBCUA, MUCUS, TRICHOMONAS, YEAST, BACTERIA, CLARITYU, SPECGRAV, LEUKOCYTESUR, UROBILINOGEN, BILIRUBINUR, BLOODU, GLUCOSEU, AMORPHOUS in the last 72 hours. Invalid input(s): Maira Bergman      Culture Results:    No results for input(s): CXSURG in the last 720 hours. Blood Culture Recent:   Recent Labs     02/21/22  1739 02/08/22  2220   BC No growth after 5 days of incubation. No growth after 5 days of incubation. No results for input(s): BC, BLOODCULT2, ORG in the last 72 hours. Cultures:   No results for input(s): CULTURE in the last 72 hours. No results for input(s): BC, [employer] in the last 72 hours. No results for input(s): CXSURG in the last 72 hours. Recent Labs     02/26/22  0520 02/27/22  0455 02/28/22  0409   PHOS 3.3 3.2 2.8     Recent Labs     02/27/22  0455 02/28/22  0409   AST 24 21   ALT 18 18   BILITOT 0.4 0.4   ALKPHOS 223* 216*         RAD:   CT ABDOMEN PELVIS WO CONTRAST Additional Contrast? None    Result Date: 2/21/2022  CT ABDOMEN PELVIS WO CONTRAST 2/21/2022 7:53 PM History: Postop pain. Hypotension. Noncontrast abdomen/pelvis CT. Comparison is made with February 8, 2022. In order to have a CT radiation dose as low as reasonably achievable Automated Exposure Control was utilized for adjustment of the mA and/or KV according to patient size. DLP in mGycm= 1115. Interval abdominal surgery. Normal heart size. Patchy atelectasis at the lung bases. Normal noncontrast appearance of the liver, pancreas, and spleen. Normal and symmetric kidneys. No hydronephrosis. No bowel dilation. No abscess or hematoma. 1. Postoperative changes with no sign of bowel obstruction, abscess, or hematoma. Signed by Dr Cortes Sole Additional Contrast? None    Result Date: 2/9/2022  EXAM: CT ABDOMEN PELVIS WO CONTRAST INDICATION: Vomiting, recent surgery, history of cancer COMPARISON: 12/14/2020 DLP: 2066 mGy cm.  In order to have a CT radiation dose as low as reasonably achievable, Automated Exposure Control was utilized for adjustment of the mA and/or KV according to patient size. FINDINGS: Patchy consolidation in the RIGHT and LEFT lung bases is noted. Moderate to large volume pneumoperitoneum. A surgical drain terminates in the LEFT upper abdomen. Postoperative change of RIGHT hemicolectomy with RIGHT upper quadrant anastomosis. Marked diffuse small bowel distention extending to the ileocolic anastomosis, likely representing small bowel obstruction. Small bowel loops measure up to 5 cm diameter. No definite area of pneumatosis. No evidence of portal venous gas. Trace free pelvic fluid. A dense linear device on the RIGHT peritoneal wall is likely represents a mesh device and may be related to recent surgical intervention (correlate with surgical history). Unchanged RIGHT posterolateral lumbar hernia. Postoperative change of RIGHT hepatectomy. The previously demonstrated LEFT liver lesion is not well visualized given lack of IV contrast. Gallbladder surgically absent. No biliary ductal dilatation. Pancreas, spleen, and adrenal glands are unremarkable. No urolithiasis or hydronephrosis. Normal caliber abdominal aorta. No enlarged retroperitoneal lymph nodes. Multiple borderline prominent mesenteric lymph nodes are similar to prior studies. No definite pelvic or inguinal lymphadenopathy. No acute abdominal wall soft tissue normality. No aggressive osseous lesion. 1.  Moderate to large volume pneumoperitoneum. This may be related to recent surgery although bowel perforation is also within the differential. There is a surgical drain in the LEFT upper abdomen which also may be contributing to pneumoperitoneum. 2.  Small bowel obstruction with transition at RIGHT upper quadrant ileocolonic anastomosis. Small bowel loops measure up to 5 cm diameter. 3.  Prior RIGHT hepatectomy.  Previously demonstrated LEFT liver lesion is not well visualized given lack of IV contrast. 4.  Patchy consolidation in both lung bases, with differential including aspiration and pneumonia. Findings in agreement with the emergent findings from the initial [facility] preliminary report. Signed by Dr Anuj Lopez    Result Date: 2/21/2022  Exam:   XR CHEST PORTABLE  Date:  2/21/2022 History:  Male, age  61 years; hypotension COMPARISON:  Chest x-ray dated February 8, 2022 Findings : Chest portable place. A right-sided PICC, new, terminating in the low SVC. The heart and mediastinum are normal in size. Lungs are without focal infiltrate, mass or effusions. The bones show no acute pathology. Catheter in the left upper quadrant. Impression: 1. New right-sided PICC. 2.  Otherwise, no interval changes. Signed by Dr Loretta Ortiz    XR CHEST PORTABLE    Result Date: 2/8/2022  EXAMINATION: XR CHEST PORTABLE 2/8/2022 10:39 PM HISTORY: XR CHEST PORTABLE 2/8/2022 9:00 PM HISTORY: Cough COMPARISON: March 18, 2020. FINDINGS: The lungs are clear. Cardiac silhouette is normal. Left subclavian Port-A-Cath is present. Old healed fracture of the left clavicle is present. The osseous structures and surrounding soft tissues demonstrate no acute abnormality. 1. No radiographic evidence of acute cardiopulmonary process.  Signed by Dr Wandy Villarreal      Objective:   Vitals:   /72   Pulse 84   Temp 98.3 °F (36.8 °C) (Temporal)   Resp 20   Ht 5' 7\" (1.702 m)   Wt 150 lb 2 oz (68.1 kg)   SpO2 97%   BMI 23.51 kg/m²       Patient Vitals for the past 24 hrs:   BP Temp Temp src Pulse Resp SpO2 Weight   02/28/22 0542 123/72 98.3 °F (36.8 °C) Temporal 84 20 97 % 150 lb 2 oz (68.1 kg)   02/28/22 0053 118/62 97.5 °F (36.4 °C) -- 93 18 95 % --   02/27/22 2024 122/66 98.1 °F (36.7 °C) Temporal 95 18 96 % --   02/27/22 1255 113/69 97.3 °F (36.3 °C) Temporal 89 16 98 % --       24HR INTAKE/OUTPUT:      Intake/Output Summary (Last 24 hours) at 2/28/2022 0910  Last data filed at 2/28/2022 0837  Gross per 24 hour   Intake 1554.8 ml   Output 1805 ml   Net -250.2 ml       Physical Exam  Vitals and nursing note reviewed. Constitutional:       General: He is not in acute distress. Appearance: Normal appearance. He is not ill-appearing, toxic-appearing or diaphoretic. HENT:      Head: Normocephalic and atraumatic. Right Ear: External ear normal.      Left Ear: External ear normal.      Nose: Nose normal. No congestion or rhinorrhea. Mouth/Throat:      Mouth: Mucous membranes are moist.      Pharynx: Oropharynx is clear. No oropharyngeal exudate or posterior oropharyngeal erythema. Eyes:      General: No scleral icterus. Right eye: No discharge. Left eye: No discharge. Extraocular Movements: Extraocular movements intact. Conjunctiva/sclera: Conjunctivae normal.      Pupils: Pupils are equal, round, and reactive to light. Cardiovascular:      Rate and Rhythm: Normal rate and regular rhythm. Pulses: Normal pulses. Heart sounds: Normal heart sounds. No murmur heard. No friction rub. No gallop. Pulmonary:      Effort: Pulmonary effort is normal. No respiratory distress. Breath sounds: Normal breath sounds. No stridor. No wheezing, rhonchi or rales. Chest:      Chest wall: No tenderness. Abdominal:      General: Bowel sounds are normal. There is no distension. Palpations: Abdomen is soft. Tenderness: There is no guarding or rebound. Comments: FARHAT drain in place. Right-sided ileostomy in place. Midline incision site/staples with no evidence of bleeding or infection noted. Musculoskeletal:         General: No swelling, tenderness, deformity or signs of injury. Normal range of motion. Cervical back: Normal range of motion and neck supple. No rigidity. No muscular tenderness. Right lower leg: No edema. Left lower leg: No edema. Skin:     General: Skin is warm and dry. Capillary Refill: Capillary refill takes less than 2 seconds. Coloration: Skin is not jaundiced or pale.       Findings: No bruising, erythema, lesion or rash. Neurological:      General: No focal deficit present. Mental Status: He is alert and oriented to person, place, and time. Cranial Nerves: No cranial nerve deficit. Sensory: No sensory deficit. Motor: No weakness. Coordination: Coordination normal.   Psychiatric:         Mood and Affect: Mood normal.         Behavior: Behavior normal.         Thought Content: Thought content normal.         Judgment: Judgment normal.           Assessment/plan:     Hospital Problems           Last Modified POA    * (Principal) Acute kidney injury superimposed on CKD ([address] 2022 Yes    Adenocarcinoma of colon ([address] 2022 Yes    Liver metastases ([address] 2022 Yes    Hypovolemia 2022 Yes    Hyponatremia 2022 Yes    Moderate malnutrition ([address] 2022 Yes    Palliative care patient 2022 Yes    Posttraumatic stress disorder 2022 Yes    Deep vein thrombosis (DVT) of left upper extremity ([address] 2022 Yes          Principal Problem:    Acute kidney injury superimposed on CKD ([address]  Active Problems:    Adenocarcinoma of colon ([address]    Liver metastases (HCC)    Hypovolemia    Hyponatremia    Moderate malnutrition (HCC)    Palliative care patient    Posttraumatic stress disorder    Deep vein thrombosis (DVT) of left upper extremity ([address]  Resolved Problems:    * No resolved hospital problems. *      Brief Summary  Mr Barry Meeks, a 72-year-old male, history of metastatic adenocarcinoma of the colon, presenting to [facility] ED (2022), no account of abnormal lab as well as generalized weakness and fatigue. Patient admitted to [facility] (2022), further work-up and management of acute on chronic renal failure. Further hospital course, as per problem is below;     MARIO on CKD  III  Hyponatremia  Hyperphosphatemia  Hypercalcemia   Creatinine level on presentation: 4.6: 2022   IV hydration -   Creatinine improvin.2 (2022)   Avoid hypotension & Nephrotoxins    Nephrology on board-appreciate recommendations   Further management as per Nephrology      Left lower quadrant abdominal pain  Metastatic adenocarcinoma of colon  · Status post recent ex lap at OSH (02/09/2022), with FARHAT drain placement and diverting loop ileostomy. · CT Abdomen / Pelvis WO Con (02/01/2022): Impression: Postoperative changes with no sign of bowel obstruction, abscess, or hematoma  · KUB (02/25/2022): No acute abdominal disease is seen  · General surgery and Oncology on board-appreciate recommendations    LUE DVT  · Left arm venous duplex ultrasound (02/02/2022): Positive DVT in left jugular, subclavian, axillary, and brachial veins. Positive SVT in left proximal cephalic vein, media and cubital veins  · Heparin gtt--> switched to Apixaban 5 mg p.o. twice daily (start date: 02/28/2022)  · Vascular Surgery on board      Hypokalemia-resolved  · Replace as needed  · Monitor BMP      Continue management of other chronic medical conditions - see above and orders. Advance Directive: Full Code    ADULT ORAL NUTRITION SUPPLEMENT; Lunch, Dinner; Frozen Oral Supplement  ADULT DIET; Regular; LIKES SUGAR FREE VANILLA PUDDING CUPS         Consults Made:   IP CONSULT TO ONCOLOGY  IP CONSULT TO DIETITIAN  IP CONSULT TO NEPHROLOGY  IP CONSULT TO GENERAL SURGERY  IP CONSULT TO PALLIATIVE CARE  IP CONSULT TO VASCULAR SURGERY  IP CONSULT TO DIETITIAN  IP CONSULT TO SOCIAL WORK    DVT prophylaxis: Heparin      Discharge planning:  Continue management as above, including IV fluids  Monitor renal function    Time Spent is 25 mins in the examination, evaluation, counseling and review of medications, assessment and plan.      Electronically signed by   Dwaine Gomez MD, MPH, MD,   Internal Medicine Hospitalist   2/28/2022 9:10 AM

## 2022-02-28 NOTE — CARE COORDINATION
CONI placed call to [facility]; spoke with [place] Jo; re: eliquis coverage; he indicated as long as we fax supporting documentation, it will be covered; CONI faxed supporting documentation. Pt can also get a free month's supply with coupon in [facility] pharmacy at d/c.    Sumi Ramírez also checked with [facility]; and they have already been providing the IV ABX at home; so, they have what they need.      Electronically signed by ISRAEL Bazan on 2/28/2022 at 1:45 PM

## 2022-02-28 NOTE — PROGRESS NOTES
Palliative Care Progress Note  2/28/2022 12:19 PM    Patient:  Elba Mao  YOB: 1955  Primary Care Physician: KALPESH Scott NP  Advance Directive: Full Code  Admit Date: 2/21/2022       Hospital Day: 7  Portions of this note have been copied forward, however, changed to reflect the most current clinical status of this patient. CHIEF COMPLAINT/REASON FOR CONSULTATION Goals of care    SUBJECTIVE:  Mr. Efraín Lozada states he's feeling better overall this morning. Appetite with slow improvement. He is tolerating milk shakes. Discussed addition of protein powder with Denise and his wife who is at bedside. Ileostomy output remains unchanged. Denies nausea. Does have abdominal pain at times and felt to be related to gas. It is intermittent but improved since 02/25/2022. Review of Systems:   14 point review of systems is negative except as specifically addressed above. Objective:   VITALS:  /72   Pulse 84   Temp 98.3 °F (36.8 °C) (Temporal)   Resp 20   Ht 5' 7\" (1.702 m)   Wt 150 lb 2 oz (68.1 kg)   SpO2 97%   BMI 23.51 kg/m²   24HR INTAKE/OUTPUT:      Intake/Output Summary (Last 24 hours) at 2/28/2022 1219  Last data filed at 2/28/2022 1059  Gross per 24 hour   Intake 2190.8 ml   Output 1805 ml   Net 385.8 ml     General appearance: 73 yo male, sitting up comfortably in bed, no acute distress   Head: Normocephalic, without obvious abnormality, atraumatic  Eyes: conjunctivae/corneas clear. PERRL, EOM's intact.    Ears: normal external ears and nose  Neck: no adenopathy, no carotid bruit, no JVD, supple, symmetrical, trachea midline   Lungs: respirations even/unlabored    Heart: RRR, S1, S2 normal, no murmur  Abdomen:soft, mild TTP, ileostomy present w/ liquid stool, FARHAT noted  Extremities:No lower extremity edema,  No erythema, no tenderness to palpation, appropriate ROM to all extremities there is LUE edema that is improving   Skin: Skin color, texture, turgor normal. No rashes or lesions  Lymphatic: No palpable lymph node enlargment  Neurologic: Alert and oriented X 3, generalized weakness, normal tone. No focal deficits  Psychiatric: Flat affect     Medications:      lactated ringers      sodium chloride        apixaban  5 mg Oral BID    loperamide  4 mg Oral TID    megestrol  800 mg Oral Daily    dronabinol  2.5 mg Oral BID    sodium chloride flush  5-40 mL IntraVENous 2 times per day    Vitamin D  2,000 Units Oral Daily    pantoprazole  40 mg Oral QAM AC    [Held by provider] prazosin  1 mg Oral Nightly    sertraline  25 mg Oral Daily     loperamide, potassium chloride **OR** potassium alternative oral replacement **OR** potassium chloride, methocarbamol, HYDROmorphone, sodium chloride flush, sodium chloride, ondansetron **OR** ondansetron, acetaminophen **OR** acetaminophen  ADULT ORAL NUTRITION SUPPLEMENT; Lunch, Dinner; Frozen Oral Supplement  ADULT DIET; Regular; LIKES SUGAR FREE VANILLA PUDDING CUPS     Lab and other Data:     Recent Labs     02/26/22  0520 02/27/22  0455 02/28/22  0409   WBC 3.9* 5.1 6.4   HGB 8.5* 8.7* 8.1*   * 446* 450*     Recent Labs     02/26/22  0520 02/27/22  0455 02/28/22  0409   * 133* 135*   K 5.0 5.0 4.3   CL 99 99 103   CO2 25 26 22   BUN 39* 32* 24*   CREATININE 2.9* 2.6* 2.2*   GLUCOSE 104 117* 102     Recent Labs     02/27/22  0455 02/28/22  0409   AST 24 21   ALT 18 18   BILITOT 0.4 0.4   ALKPHOS 223* 216*     Assessment/Plan   Principal Problem:    Acute kidney injury superimposed on CKD (HCC)  Active Problems:    Adenocarcinoma of colon (HCC)    Liver metastases (HCC)    Hypovolemia    Hyponatremia    Moderate malnutrition (HCC)    Palliative care patient    Posttraumatic stress disorder    Deep vein thrombosis (DVT) of left upper extremity (HCC)  Resolved Problems:    * No resolved hospital problems. *    Visit Summary:  Chart reviewed. Seen at bedside with Nephrology present as well as patient's spouse.  Creatinine slowly improving. Encouraging increase in oral intake. He has enjoyed milk shakes. Spouse to look into addition of protein powder. His diet can become less restrictive once he is a full 6 weeks post-op (last surgery 02/09/2022). Megace was added 02/27/2022. Could also consider increase in Marinol, however, patient is uncertain either has helped. Recommended to continue for now. Imodium has been increased to 2 mg QID. Pain well controlled at present. Recommendations:   1. Palliative care: Via [address] w/ home health once medically stable. SCOP referral placed. Hopeful for decrease in ileostomy output, increase in oral intake with plans for ileostomy reversal when cleared by his surgeon at [facility]. Code status: Full code    2. Colon cancer-s/p HIPEC/debulking at [facility] 01/2022, mgmt per Oncology/General surgery     3. MARIO w/ hyponatremia-mgmt per Nephrology, plans to continue IVF's once home, improving    4. DVT/SVT LUE-transitioned to Eliquis     5. High output Ileostomy-Currently receiving Imodium 2mg QID     6. Decreased appetite-discussed ways of increasing protein intake, currently receiving Marinol 2.5 mg BID and Megace 800 mg daily     Thank you for consulting Palliative Care and allowing us to participate in the care of this patient.    Time Spent Counseling > 50%:  YES                                   Total Time Spent with patient/family counseling, workup/treatment review, counseling and placement of orders/preparation of this note: 27 minutes    Electronically signed by Ruma Giron PA-C on 2/28/2022 at 12:19 PM    (Please note that portions of this note were completed with a voice recognition program.  Norris Todd made to edit the dictations but occasionally words are mis-transcribed.)

## 2022-03-01 VITALS
BODY MASS INDEX: 23.76 KG/M2 | RESPIRATION RATE: 18 BRPM | OXYGEN SATURATION: 97 % | HEIGHT: 67 IN | TEMPERATURE: 97.9 F | HEART RATE: 71 BPM | DIASTOLIC BLOOD PRESSURE: 69 MMHG | WEIGHT: 151.38 LBS | SYSTOLIC BLOOD PRESSURE: 117 MMHG

## 2022-03-01 LAB
ALBUMIN SERPL-MCNC: 2.2 G/DL (ref 3.5–5.2)
ALP BLD-CCNC: 208 U/L (ref 40–130)
ALT SERPL-CCNC: 16 U/L (ref 5–41)
ANION GAP SERPL CALCULATED.3IONS-SCNC: 10 MMOL/L (ref 7–19)
AST SERPL-CCNC: 18 U/L (ref 5–40)
BILIRUB SERPL-MCNC: 0.4 MG/DL (ref 0.2–1.2)
BUN BLDV-MCNC: 18 MG/DL (ref 8–23)
CALCIUM SERPL-MCNC: 8.6 MG/DL (ref 8.8–10.2)
CHLORIDE BLD-SCNC: 102 MMOL/L (ref 98–111)
CO2: 23 MMOL/L (ref 22–29)
CREAT SERPL-MCNC: 1.7 MG/DL (ref 0.5–1.2)
GFR AFRICAN AMERICAN: 49
GFR NON-AFRICAN AMERICAN: 41
GLUCOSE BLD-MCNC: 87 MG/DL (ref 74–109)
HCT VFR BLD CALC: 26.9 % (ref 42–52)
HEMOGLOBIN: 8.3 G/DL (ref 14–18)
MCH RBC QN AUTO: 28.7 PG (ref 27–31)
MCHC RBC AUTO-ENTMCNC: 30.9 G/DL (ref 33–37)
MCV RBC AUTO: 93.1 FL (ref 80–94)
PDW BLD-RTO: 14.8 % (ref 11.5–14.5)
PHOSPHORUS: 3.2 MG/DL (ref 2.5–4.5)
PLATELET # BLD: 459 K/UL (ref 130–400)
PMV BLD AUTO: 9.1 FL (ref 9.4–12.4)
POTASSIUM REFLEX MAGNESIUM: 3.9 MMOL/L (ref 3.5–5)
RBC # BLD: 2.89 M/UL (ref 4.7–6.1)
SODIUM BLD-SCNC: 135 MMOL/L (ref 136–145)
TOTAL PROTEIN: 5.7 G/DL (ref 6.6–8.7)
WBC # BLD: 7.1 K/UL (ref 4.8–10.8)

## 2022-03-01 PROCEDURE — 99232 SBSQ HOSP IP/OBS MODERATE 35: CPT | Performed by: INTERNAL MEDICINE

## 2022-03-01 PROCEDURE — 80053 COMPREHEN METABOLIC PANEL: CPT

## 2022-03-01 PROCEDURE — 6370000000 HC RX 637 (ALT 250 FOR IP): Performed by: SURGERY

## 2022-03-01 PROCEDURE — 99232 SBSQ HOSP IP/OBS MODERATE 35: CPT | Performed by: SURGERY

## 2022-03-01 PROCEDURE — 99232 SBSQ HOSP IP/OBS MODERATE 35: CPT | Performed by: PHYSICIAN ASSISTANT

## 2022-03-01 PROCEDURE — 85027 COMPLETE CBC AUTOMATED: CPT

## 2022-03-01 PROCEDURE — 6370000000 HC RX 637 (ALT 250 FOR IP): Performed by: PHYSICIAN ASSISTANT

## 2022-03-01 PROCEDURE — 84100 ASSAY OF PHOSPHORUS: CPT

## 2022-03-01 PROCEDURE — 2580000003 HC RX 258: Performed by: INTERNAL MEDICINE

## 2022-03-01 PROCEDURE — 6370000000 HC RX 637 (ALT 250 FOR IP): Performed by: INTERNAL MEDICINE

## 2022-03-01 PROCEDURE — 6370000000 HC RX 637 (ALT 250 FOR IP): Performed by: NURSE PRACTITIONER

## 2022-03-01 PROCEDURE — 2580000003 HC RX 258

## 2022-03-01 PROCEDURE — 6370000000 HC RX 637 (ALT 250 FOR IP)

## 2022-03-01 RX ORDER — MEGESTROL ACETATE 40 MG/ML
800 SUSPENSION ORAL DAILY
Qty: 240 ML | Refills: 0 | Status: ON HOLD | OUTPATIENT
Start: 2022-03-01 | End: 2022-03-11 | Stop reason: SDUPTHER

## 2022-03-01 RX ORDER — DRONABINOL 2.5 MG/1
2.5 CAPSULE ORAL
Qty: 60 CAPSULE | Refills: 0 | Status: ON HOLD | OUTPATIENT
Start: 2022-03-01 | End: 2022-03-11 | Stop reason: SDUPTHER

## 2022-03-01 RX ADMIN — SODIUM CHLORIDE, POTASSIUM CHLORIDE, SODIUM LACTATE AND CALCIUM CHLORIDE 100 ML/HR: 600; 310; 30; 20 INJECTION, SOLUTION INTRAVENOUS at 10:14

## 2022-03-01 RX ADMIN — SERTRALINE HYDROCHLORIDE 25 MG: 50 TABLET ORAL at 09:53

## 2022-03-01 RX ADMIN — DIPHENOXYLATE HYDROCHLORIDE AND ATROPINE SULFATE 1 TABLET: 2.5; .025 TABLET ORAL at 12:06

## 2022-03-01 RX ADMIN — PANTOPRAZOLE SODIUM 40 MG: 40 TABLET, DELAYED RELEASE ORAL at 06:04

## 2022-03-01 RX ADMIN — LOPERAMIDE HYDROCHLORIDE 4 MG: 2 CAPSULE ORAL at 09:53

## 2022-03-01 RX ADMIN — MEGESTROL ACETATE 800 MG: 40 SUSPENSION ORAL at 09:54

## 2022-03-01 RX ADMIN — Medication 2000 UNITS: at 10:04

## 2022-03-01 RX ADMIN — SODIUM CHLORIDE, POTASSIUM CHLORIDE, SODIUM LACTATE AND CALCIUM CHLORIDE: 600; 310; 30; 20 INJECTION, SOLUTION INTRAVENOUS at 00:38

## 2022-03-01 RX ADMIN — DIPHENOXYLATE HYDROCHLORIDE AND ATROPINE SULFATE 1 TABLET: 2.5; .025 TABLET ORAL at 09:53

## 2022-03-01 RX ADMIN — HYDROMORPHONE HYDROCHLORIDE 2 MG: 2 TABLET ORAL at 00:37

## 2022-03-01 RX ADMIN — PSYLLIUM HUSK 1 PACKET: 3.4 POWDER ORAL at 12:06

## 2022-03-01 RX ADMIN — APIXABAN 5 MG: 5 TABLET, FILM COATED ORAL at 09:53

## 2022-03-01 RX ADMIN — DRONABINOL 2.5 MG: 2.5 CAPSULE ORAL at 09:53

## 2022-03-01 RX ADMIN — LOPERAMIDE HYDROCHLORIDE 4 MG: 2 CAPSULE ORAL at 12:06

## 2022-03-01 RX ADMIN — SODIUM CHLORIDE, PRESERVATIVE FREE 10 ML: 5 INJECTION INTRAVENOUS at 09:58

## 2022-03-01 RX ADMIN — HYDROMORPHONE HYDROCHLORIDE 2 MG: 2 TABLET ORAL at 10:05

## 2022-03-01 ASSESSMENT — PAIN DESCRIPTION - PAIN TYPE
TYPE: ACUTE PAIN
TYPE: ACUTE PAIN

## 2022-03-01 ASSESSMENT — PAIN DESCRIPTION - LOCATION
LOCATION: ABDOMEN;HEAD
LOCATION: ABDOMEN;HEAD

## 2022-03-01 ASSESSMENT — PAIN SCALES - GENERAL
PAINLEVEL_OUTOF10: 6
PAINLEVEL_OUTOF10: 5
PAINLEVEL_OUTOF10: 6

## 2022-03-01 NOTE — PROGRESS NOTES
Patient pending discharge home today. Attempting to call Oncology for orders regarding PICC/Port accesses. Pharmacy notified Meds-to-Bed Eliquis. Awaiting pharmacy to deliver.

## 2022-03-01 NOTE — PROGRESS NOTES
[facility] Surgery Progress Note    Chief Complaint:   Chief Complaint   Patient presents with    Abnormal Lab     unknown, primary care called       SUBJECTIVE:  Mr. Berta Easley is a 74 y.o. male is seen and examined at bedside. Wanting to go home. OBJECTIVE:  /76   Pulse 93   Temp 99.3 °F (37.4 °C) (Temporal)   Resp 18   Ht 5' 7\" (1.702 m)   Wt 151 lb 6 oz (68.7 kg)   SpO2 95%   BMI 23.71 kg/m²   CONSTITUTIONAL: Alert, appropriate, no acute distress  SKIN: warm, dry with no rashes or lesions  HEENT: NCAT, Non icteric, PERR. Trachea Midline. CHEST/LUNGS: Normal respiratory effort. CARDIOVASCULAR:  No edema. ABDOMEN: soft, ND, FARHAT w seropurulent output  Incisions: Clean, dry, and intact with no erythema or induration, staples in place  NEUROLOGIC: CN II-XI grossly intact, no motor or sensory deficits   MUSCULOSKELETAL: No clubbing or cyanosis. PSYCHIATRIC:  Normal Mood and Affect. Alert and Oriented. Labs:  CBC:   Recent Labs     02/27/22  0455 02/28/22  0409 03/01/22  0615   WBC 5.1 6.4 7.1   HGB 8.7* 8.1* 8.3*   * 450* 459*     BMP:    Recent Labs     02/27/22  0455 02/28/22  0409 03/01/22  0615   * 135* 135*   K 5.0 4.3 3.9   CL 99 103 102   CO2 26 22 23   BUN 32* 24* 18   CREATININE 2.6* 2.2* 1.7*   GLUCOSE 117* 102 87       ASSESSMENT:  Principal Problem:    Acute kidney injury superimposed on CKD (HCC)  Active Problems:    Adenocarcinoma of colon (HCC)    Liver metastases (HCC)    Hypovolemia    Hyponatremia    Moderate malnutrition (HCC)    Palliative care patient    Posttraumatic stress disorder    Deep vein thrombosis (DVT) of left upper extremity (HCC)  Resolved Problems:    * No resolved hospital problems.  *       PLAN:    Leave FARHAT until seen at [facility] to stay in place for least 2 weeks postop  Diet as tolerated  Imodium and lomotil to assist in decreasing Ostomy OP  Stable for Norberto Dominguez, DO   Electronically Signed on 3/1/2022 at 9:00 AM

## 2022-03-01 NOTE — PROGRESS NOTES
Notified Dr. Yaneth Marroquin regarding Meds-to-bed for Eliquis  Free 30 day supply.   [facility] to  [facility]

## 2022-03-01 NOTE — PROGRESS NOTES
Spoke with Oncology. OK to de-access port per oncology. [facility] to continue care of port as before. Sergei Gomez with [facility] notified of patient pending discharge.

## 2022-03-01 NOTE — CARE COORDINATION
[facility] placed f/u call to [facility]; spoke with Saige Rodriguez; he confirmed that they received the script for eliquis and the supporting documentation; it is under review; but he doesn't see why they won't cover it ongoing.     Electronically signed by ISRAEL Young on 3/1/2022 at 11:06 AM

## 2022-03-01 NOTE — PROGRESS NOTES
Palliative Care Progress Note  3/1/2022 12:08 PM    Patient:  Corinne Curry  YOB: 1955  Primary Care Physician: KALPESH Sheridan NP  Advance Directive: Full Code  Admit Date: 2/21/2022       Hospital Day: 8  Portions of this note have been copied forward, however, changed to reflect the most current clinical status of this patient. CHIEF COMPLAINT/REASON FOR CONSULTATION Goals of care    SUBJECTIVE:  Mr. Morgan Evans has no new complaints. He is hopeful for discharge home today. Appetite remains about the same. Denies pain. Review of Systems:   14 point review of systems is negative except as specifically addressed above. Objective:   VITALS:  /69   Pulse 71   Temp 97.9 °F (36.6 °C)   Resp 18   Ht 5' 7\" (1.702 m)   Wt 151 lb 6 oz (68.7 kg)   SpO2 97%   BMI 23.71 kg/m²   24HR INTAKE/OUTPUT:      Intake/Output Summary (Last 24 hours) at 3/1/2022 1208  Last data filed at 3/1/2022 1022  Gross per 24 hour   Intake 1662 ml   Output 2000 ml   Net -338 ml     General appearance: 73 yo male, no acute distress, sitting up comfortably in bed  Head: Normocephalic, without obvious abnormality, atraumatic  Eyes: conjunctivae/corneas clear. EOM's intact  Ears: normal external ears and nose  Neck: no JVD, supple, symmetrical, trachea midline   Lungs:respirations even/unlabored   Heart: Regular rate rhythm, no murmur  Abdomen:soft, mild TTP, ileostomy present w/ liquid stool, FARHAT noted  Extremities:No lower extremity edema,  No erythema, no tenderness to palpation, appropriate ROM to all extremities there is LUE edema that is improving   Skin: Skin color, texture, turgor normal. No rashes or lesions  Lymphatic: No palpable lymph node enlargment  Neurologic: Alert and oriented X 3, generalized weakness, normal tone.  No focal deficits  Psychiatric: Flat affect     Medications:      lactated ringers 100 mL/hr (03/01/22 1014)    sodium chloride        apixaban  5 mg Oral BID    loperamide  4 mg Oral TID    diphenoxylate-atropine  1 tablet Oral 4x Daily    psyllium  1 packet Oral TID WC    megestrol  800 mg Oral Daily    dronabinol  2.5 mg Oral BID    sodium chloride flush  5-40 mL IntraVENous 2 times per day    Vitamin D  2,000 Units Oral Daily    pantoprazole  40 mg Oral QAM AC    [Held by provider] prazosin  1 mg Oral Nightly    sertraline  25 mg Oral Daily     loperamide, potassium chloride **OR** potassium alternative oral replacement **OR** potassium chloride, methocarbamol, HYDROmorphone, sodium chloride flush, sodium chloride, ondansetron **OR** ondansetron, acetaminophen **OR** acetaminophen  ADULT ORAL NUTRITION SUPPLEMENT; Lunch, Dinner; Frozen Oral Supplement  ADULT DIET; Regular; LIKES SUGAR FREE VANILLA PUDDING CUPS     Lab and other Data:     Recent Labs     02/27/22 0455 02/28/22  0409 03/01/22  0615   WBC 5.1 6.4 7.1   HGB 8.7* 8.1* 8.3*   * 450* 459*     Recent Labs     02/27/22  0455 02/28/22  0409 03/01/22  0615   * 135* 135*   K 5.0 4.3 3.9   CL 99 103 102   CO2 26 22 23   BUN 32* 24* 18   CREATININE 2.6* 2.2* 1.7*   GLUCOSE 117* 102 87     Recent Labs     02/27/22 0455 02/28/22  0409 03/01/22  0615   AST 24 21 18   ALT 18 18 16   BILITOT 0.4 0.4 0.4   ALKPHOS 223* 216* 208*     Assessment/Plan   Principal Problem:    Acute kidney injury superimposed on CKD (HCC)  Active Problems:    Adenocarcinoma of colon (HCC)    Liver metastases (HCC)    Hypovolemia    Hyponatremia    Moderate malnutrition (HCC)    Palliative care patient    Posttraumatic stress disorder    Deep vein thrombosis (DVT) of left upper extremity (HCC)  Resolved Problems:    * No resolved hospital problems. *    Visit Summary:  Patient seen at bedside with his spouse present. Chart reviewed. No acute overnight events noted. Plans in place for discharge today with IVF's to be given by home health on Wednesdays and Fridays. Had further discussion regarding ways of increasing protein intake.  His spouse plans to add protein powder to pudding and shakes at home. His diet can become less restrictive once he is a full 6 weeks post-op (last surgery 02/09/2022). Megace was added 02/27/2022. Could also consider increase in Marinol, however, patient is uncertain either has helped. Recommended to continue for now. Receiving Imodium 4 mg TID and Lomotil 2.5-0.025 mg 1 tablet QID for high output ileostomy. Pain remains well controlled at present. Recommendations:   1. Palliative care: Via [address] with home health once medically stable. SCOP referral placed. Hopeful for decrease in ileostomy output, increase in oral intake with plans for ileostomy reversal when cleared by his surgeon at [facility]. Code status: Full code    2. Colon cancer-s/p HIPEC/debulking at [facility] 01/2022, mgmt per Oncology/General surgery, FARHAT to remain in place until follow up w/ [facility]    3. MARIO w/ hyponatremia-mgmt per Nephrology, plans to continue IVF's once home, continued improvement today    4. DVT/SVT LUE-transitioned to Eliquis     5. High output Ileostomy-continued on Imodium/Lomotil     6. Decreased appetite-discussed ways of increasing protein intake, currently receiving Marinol 2.5 mg BID and Megace 800 mg daily per Oncology    Thank you for consulting Palliative Care and allowing us to participate in the care of this patient.    Time Spent Counseling > 50%:  YES                                   Total Time Spent with patient/family counseling, workup/treatment review, counseling and placement of orders/preparation of this note: 25 minutes    Electronically signed by Urbano Kaur PA-C on 3/1/2022 at 12:08 PM    (Please note that portions of this note were completed with a voice recognition program.  May Babin made to edit the dictations but occasionally words are mis-transcribed.)

## 2022-03-01 NOTE — PROGRESS NOTES
CLINICAL PHARMACY NOTE: MEDS TO BEDS    Total # of Prescriptions Filled: 1   The following medications were delivered to the patient:  · Eliquis 5 mg    Additional Documentation:    Placed script on bedside tablet and  patient was alert as well at bedside

## 2022-03-01 NOTE — PLAN OF CARE
Problem: Skin Integrity:  Goal: Will show no infection signs and symptoms  Description: Will show no infection signs and symptoms  Outcome: Ongoing  Goal: Absence of new skin breakdown  Description: Absence of new skin breakdown  Outcome: Ongoing     Problem: Falls - Risk of:  Goal: Will remain free from falls  Description: Will remain free from falls  Outcome: Ongoing  Goal: Absence of physical injury  Description: Absence of physical injury  Outcome: Ongoing     Problem: Pain:  Goal: Pain level will decrease  Description: Pain level will decrease  Outcome: Ongoing  Goal: Control of acute pain  Description: Control of acute pain  Outcome: Ongoing  Goal: Control of chronic pain  Description: Control of chronic pain  Outcome: Ongoing  Goal: Patient's pain/discomfort is manageable  Description: Patient's pain/discomfort is manageable  Outcome: Ongoing     Problem: Nutrition  Goal: Optimal nutrition therapy  Outcome: Ongoing     Problem: Nutrition Deficit:  Goal: Ability to achieve adequate nutritional intake will improve  Description: Ability to achieve adequate nutritional intake will improve  Outcome: Ongoing     Problem: Infection:  Goal: Will remain free from infection  Description: Will remain free from infection  Outcome: Ongoing     Problem: Safety:  Goal: Free from accidental physical injury  Description: Free from accidental physical injury  Outcome: Ongoing  Goal: Free from intentional harm  Description: Free from intentional harm  Outcome: Ongoing     Problem: Daily Care:  Goal: Daily care needs are met  Description: Daily care needs are met  Outcome: Ongoing     Problem: Skin Integrity:  Goal: Skin integrity will stabilize  Description: Skin integrity will stabilize  Outcome: Ongoing     Problem: Discharge Planning:  Goal: Patients continuum of care needs are met  Description: Patients continuum of care needs are met  Outcome: Ongoing

## 2022-03-01 NOTE — CARE COORDINATION
F/u call placed again to [facility] with [facility] again; they have received prescriptions and have Megace, Metamucil, and Eliquis scheduled for [employer] tomorrow; the Marinol they also received script; stated just waiting on approval as it is non-formulary; but with documentation from Oncology will likely be approved.  Reported to [place]  Electronically signed by ISRAEL Gomez on 3/1/2022 at 3:56 PM

## 2022-03-01 NOTE — PROGRESS NOTES
Nephrology ([address] [facility]) Progress Note    Patient:  Danelle Gardner  YOB: 1955  Date of Service: 3/1/2022  MRN: [id number]   Acct: [de-identified]   Primary Care Physician: KALPESH Lynn NP  Advance Directive: Full Code  Admit Date: 2/21/2022       Hospital Day: 8  Referring Provider: Melodie Olea MD    Patient independently seen and examined, Chart, Consults, Notes, Operative notes, Labs, Cardiology, and Radiology studies reviewed as available. Subjective:  Danelle Gardner is a 74 y.o. male for whom we were consulted for evaluation and treatment of acute kidney injury. Patient denies any history of chronic kidney disease. Patient has history of stage IV colon cancer with metastatic disease to liver. Patient was recently hospitalized at [facility] with bowel obstruction and ultimately needed emergent laparotomy and ileostomy. Previously underwent partial colectomy and partial hepatectomy for the treatment of metastatic colon cancer. Patient denied any nausea and vomiting. He has noticed high output from ileostomy and has to change the bag frequently. Patient was supposed to get total parenteral nutrition but was not approved by [employer]. Patient has been trying to eat more but unable to eat due to recent abdominal surgery and lack of appetite. His fluid intake was also very low. In the emergency room his serum creatinine was 4.6 mg, phosphorus was 11.1. Patient also had hyponatremia. He was admitted for acute kidney injury and other severe electrolyte abnormalities. He was getting IV fluid and feeling little better. Today, no overnight events. Patient seen with family. Events with nursing. Denied current chest pain, shortness of at rest, nausea or vomiting. Still with poor appetite and decreased oral intake along with significant ostomy output. Tolerating IV fluids without edema.     Allergies:  Morphine and Oxycodone-acetaminophen    Medicines:  Current Facility-Administered Medications   Medication Dose Route Frequency Provider Last Rate Last Admin    apixaban (ELIQUIS) tablet 5 mg  5 mg Oral BID KALPESH Carrington   5 mg at 03/01/22 9229    loperamide (IMODIUM) capsule 2 mg  2 mg Oral 4x Daily PRN KALPESH Carrington        loperamide (IMODIUM) capsule 4 mg  4 mg Oral TID Michelle Malhotra DO   4 mg at 03/01/22 5730    lactated ringers infusion   IntraVENous Continuous Anjana Tran  mL/hr at 03/01/22 1014 100 mL/hr at 03/01/22 1014    diphenoxylate-atropine (LOMOTIL) 2.5-0.025 MG per tablet 1 tablet  1 tablet Oral 4x Daily Michelle Malhotra DO   1 tablet at 03/01/22 0953    psyllium (METAMUCIL) 58.12 % packet 1 packet  1 packet Oral TID WC Arabella Porras MD   1 packet at 02/28/22 1628    megestrol (MEGACE) 40 MG/ML suspension 800 mg  800 mg Oral Daily Josesito Borrego PA-C   800 mg at 03/01/22 9841    dronabinol (MARINOL) capsule 2.5 mg  2.5 mg Oral BID Arabella Porras MD   2.5 mg at 03/01/22 0953    potassium chloride (KLOR-CON M) extended release tablet 40 mEq  40 mEq Oral PRN Guero Pascual MD   40 mEq at 02/24/22 5645    Or    potassium bicarb-citric acid (EFFER-K) effervescent tablet 40 mEq  40 mEq Oral PRN Guero Pascual MD        Or    potassium chloride 10 mEq/100 mL IVPB (Peripheral Line)  10 mEq IntraVENous PRN Guero Pascual MD        methocarbamol (ROBAXIN) tablet 500 mg  500 mg Oral TID PRN Guero Pascual MD   500 mg at 02/28/22 2143    HYDROmorphone (DILAUDID) tablet 2 mg  2 mg Oral Q6H PRN Guero Pascual MD   2 mg at 03/01/22 1005    sodium chloride flush 0.9 % injection 5-40 mL  5-40 mL IntraVENous 2 times per day KALPESH Montgomery - CNP   10 mL at 03/01/22 0958    sodium chloride flush 0.9 % injection 5-40 mL  5-40 mL IntraVENous PRN Leopoldo Nguyen, APRN - CNP        0.9 % sodium chloride infusion  25 mL IntraVENous PRN Nicole Herrera Corey Ceballos, APRN - CNP        ondansetron (ZOFRAN-ODT) disintegrating tablet 4 mg  4 mg Oral Q8H PRN [facility], APRN - CNP   4 mg at 02/27/22 1049    Or    ondansetron (ZOFRAN) injection 4 mg  4 mg IntraVENous Q6H PRN [facility], APRN - CNP   4 mg at 02/28/22 1841    acetaminophen (TYLENOL) tablet 650 mg  650 mg Oral Q6H PRN [facility], APRN - CNP   650 mg at 02/23/22 6434    Or    acetaminophen (TYLENOL) suppository 650 mg  650 mg Rectal Q6H PRN [facility], APRN - CNP        Vitamin D (CHOLECALCIFEROL) tablet 2,000 Units  2,000 Units Oral Daily [facility], APRN - CNP   2,000 Units at 03/01/22 1004    pantoprazole (PROTONIX) tablet 40 mg  40 mg Oral [facility], APRN - CNP   40 mg at 03/01/22 0604    [Held by provider] prazosin (MINIPRESS) capsule 1 mg  1 mg Oral Nightly [facility], APRN - CNP        sertraline (ZOLOFT) tablet 25 mg  25 mg Oral Daily [facility], APRN - CNP   25 mg at 03/01/22 4796       Past Medical History:  Past Medical History:   Diagnosis Date    Acid reflux     Cancer ([facility] [address]     adenocarcinoma of colon    GERD (gastroesophageal reflux disease)     PTSD (post-traumatic stress disorder)     Stage 3a chronic kidney disease ([facility] [address]        Past Surgical History:  Past Surgical History:   Procedure Laterality Date    ABLATION OF DYSRHYTHMIC FOCUS      ablation of liver at [facility] APPENDECTOMY  2003    CHOLECYSTECTOMY  2013    COLONOSCOPY      when pt was in the 19's    COLONOSCOPY N/A 03/11/2020    COLONOSCOPY POLYPECTOMY SNARE/COLD BIOPSY: Dr. Chambers [facility] colonoscopy: 6-12 months due to findings at colonoscopy today with Cecal mass lesion and large polyps.     COLONOSCOPY      COLONOSCOPY N/A 03/17/2021    Dr Connie Moreno, Post-operative changes w IC anastomosis & single visible staple, Mild Diverticuosis, Int Hemorrhoids Grade 1, 3 year recall    HEMICOLECTOMY Right 03/30/2020    LAPAROSCOPIC-ASSISTED RIGHT HEMICOLECTOMY performed by Alfonso Woodward Derrick Funes MD at [address] N/A 06/03/2020    INSERTION OF VENOUS PORT with flouro performed by Kerri Sierra MD at [place] [address] ENDOSCOPY N/A 03/11/2020    Dr. Hill Mac:   [facility]       Family History  Family History   Problem Relation Age of Onset    Liver Cancer Mother     High Blood Pressure Mother     Colon Cancer Mother         x2    Cancer Father         Lung Cancer    Breast Cancer Sister     Cancer Maternal Grandfather         Lung Cancer    Cancer Paternal Grandfather         Stomach Cancer    Colon Polyps Neg Hx     Cystic Fibrosis Neg Hx     Liver Disease Neg Hx     Rectal Cancer Neg Hx        Social History  Social History     Socioeconomic History    Marital status:      Spouse name: Not on file    Number of children: Not on file    Years of education: Not on file    Highest education level: Not on file   Occupational History    Not on file   Tobacco Use    Smoking status: Never Smoker    Smokeless tobacco: Never Used   Vaping Use    Vaping Use: Never used   Substance and Sexual Activity    Alcohol use: Yes     Comment: rare     Drug use: No    Sexual activity: Yes     Partners: Female   Other Topics Concern    Not on file   Social History Narrative    Not on file     Social Determinants of Health     Financial Resource Strain:     Difficulty of Paying Living Expenses: Not on file   Food Insecurity:     Worried About Running Out of Food in the Last Year: Not on file    [place] of Food in the Last Year: Not on file   Transportation Needs:     Lack of Transportation (Medical): Not on file    Lack of Transportation (Non-Medical):  Not on file   Physical Activity:     Days of Exercise per Week: Not on file    Minutes of Exercise per Session: Not on file   Stress:     Feeling of Stress : Not on file   Social Connections:     Frequency of Communication with Friends and Family: Not on file    Frequency of Social Gatherings with Friends and Family: Not on file    Attends [employer]ology Services: Not on file    Active Member of Clubs or Organizations: Not on file    Attends Club or Organization Meetings: Not on file    Marital Status: Not on file   Intimate Partner Violence:     Fear of Current or Ex-Partner: Not on file    Emotionally Abused: Not on file    Physically Abused: Not on file    Sexually Abused: Not on file   Housing Stability:     Unable to Pay for Housing in the Last Year: Not on file    Number of [place] in the Last Year: Not on file    Unstable Housing in the Last Year: Not on file         Review of Systems:  History obtained from chart review and the patient  General ROS: No fever or chills  Respiratory ROS: No cough, shortness of breath, wheezing  Cardiovascular ROS: No chest pain or palpitations  Gastrointestinal ROS: No abdominal pain or melena  Genito-Urinary ROS: No dysuria or hematuria  Musculoskeletal ROS: No joint pain or swelling         Objective:  Patient Vitals for the past 24 hrs:   BP Temp Temp src Pulse Resp SpO2 Height Weight   03/01/22 0623 127/76 99.3 °F (37.4 °C) Temporal 93 18 95 % -- --   03/01/22 0035 109/65 99.1 °F (37.3 °C) Temporal 91 16 95 % 5' 7\" (1.702 m) 151 lb 6 oz (68.7 kg)   02/28/22 1816 120/66 97.9 °F (36.6 °C) Temporal 89 18 98 % -- --   02/28/22 1220 111/66 97.3 °F (36.3 °C) Temporal 89 18 98 % -- --       Intake/Output Summary (Last 24 hours) at 3/1/2022 1057  Last data filed at 3/1/2022 1022  Gross per 24 hour   Intake 2538 ml   Output 2000 ml   Net 538 ml     General: awake/alert   Chest:  clear to auscultation bilaterally  CVS: regular rate and rhythm  Abdominal: soft, nontender, normal bowel sounds  Extremities: no cyanosis or lower extremity edema, some LUE edema  Skin: warm and dry without rash    Labs:  BMP:   Recent Labs     02/27/22  0455 02/28/22  0409 03/01/22  0615   * 135* 135*   K 5.0 4.3 3.9   CL 99 103 102   CO2 26 22 23   PHOS 3.2 2.8 3.2   BUN 32* 24* 18   CREATININE 2.6* 2.2* 1.7*   CALCIUM 8.7* 8.3* 8.6*     CBC:   Recent Labs     02/27/22  0455 02/28/22  0409 03/01/22  0615   WBC 5.1 6.4 7.1   HGB 8.7* 8.1* 8.3*   HCT 29.0* 26.7* 26.9*   MCV 95.1* 95.4* 93.1   * 450* 459*     LIVER PROFILE:   Recent Labs     02/27/22  0455 02/28/22  0409 03/01/22  0615   AST 24 21 18   ALT 18 18 16   BILITOT 0.4 0.4 0.4   ALKPHOS 223* 216* 208*     PT/INR: No results for input(s): PROTIME, INR in the last 72 hours. APTT:   Recent Labs     02/27/22  1802 02/27/22 2120 02/28/22 0409   APTT 42.2* 44.3* 72.4*     BNP:  No results for input(s): BNP in the last 72 hours. Ionized Calcium:No results for input(s): IONCA in the last 72 hours. Magnesium:No results for input(s): MG in the last 72 hours. Phosphorus:  Recent Labs     02/27/22  0455 02/28/22  0409 03/01/22  0615   PHOS 3.2 2.8 3.2     HgbA1C: No results for input(s): LABA1C in the last 72 hours. Hepatic:   Recent Labs     02/27/22  0455 02/28/22  0409 03/01/22  0615   ALKPHOS 223* 216* 208*   ALT 18 18 16   AST 24 21 18   PROT 6.4* 5.6* 5.7*   BILITOT 0.4 0.4 0.4   LABALBU 2.5* 2.4* 2.2*     Lactic Acid: No results for input(s): LACTA in the last 72 hours. Troponin: No results for input(s): CKTOTAL, CKMB, TROPONINT in the last 72 hours. ABGs: No results found for: PHART, PO2ART, DNT8PEB  CRP:  No results for input(s): CRP in the last 72 hours. Sed Rate:  No results for input(s): SEDRATE in the last 72 hours. Culture Results:   Blood Culture Recent:   Recent Labs     02/21/22  1739   BC No growth after 5 days of incubation. Urine Culture Recent : No results for input(s): LABURIN in the last 720 hours. Radiology reports as per the Radiologist  Radiology: CT ABDOMEN PELVIS WO CONTRAST Additional Contrast? None    Result Date: 2/21/2022  CT ABDOMEN PELVIS WO CONTRAST 2/21/2022 7:53 PM History: Postop pain. Hypotension. Noncontrast abdomen/pelvis CT. Comparison is made with February 8, 2022.  In order to have a CT radiation dose as low as reasonably achievable Automated Exposure Control was utilized for adjustment of the mA and/or KV according to patient size. DLP in mGycm= 1115. Interval abdominal surgery. Normal heart size. Patchy atelectasis at the lung bases. Normal noncontrast appearance of the liver, pancreas, and spleen. Normal and symmetric kidneys. No hydronephrosis. No bowel dilation. No abscess or hematoma. 1. Postoperative changes with no sign of bowel obstruction, abscess, or hematoma. Signed by Dr Toan Conde Additional Contrast? None    Result Date: 2/9/2022  EXAM: CT ABDOMEN PELVIS WO CONTRAST INDICATION: Vomiting, recent surgery, history of cancer COMPARISON: 12/14/2020 DLP: 2066 mGy cm. In order to have a CT radiation dose as low as reasonably achievable, Automated Exposure Control was utilized for adjustment of the mA and/or KV according to patient size. FINDINGS: Patchy consolidation in the RIGHT and LEFT lung bases is noted. Moderate to large volume pneumoperitoneum. A surgical drain terminates in the LEFT upper abdomen. Postoperative change of RIGHT hemicolectomy with RIGHT upper quadrant anastomosis. Marked diffuse small bowel distention extending to the ileocolic anastomosis, likely representing small bowel obstruction. Small bowel loops measure up to 5 cm diameter. No definite area of pneumatosis. No evidence of portal venous gas. Trace free pelvic fluid. A dense linear device on the RIGHT peritoneal wall is likely represents a mesh device and may be related to recent surgical intervention (correlate with surgical history). Unchanged RIGHT posterolateral lumbar hernia. Postoperative change of RIGHT hepatectomy. The previously demonstrated LEFT liver lesion is not well visualized given lack of IV contrast. Gallbladder surgically absent. No biliary ductal dilatation. Pancreas, spleen, and adrenal glands are unremarkable.  No urolithiasis or hydronephrosis. Normal caliber abdominal aorta. No enlarged retroperitoneal lymph nodes. Multiple borderline prominent mesenteric lymph nodes are similar to prior studies. No definite pelvic or inguinal lymphadenopathy. No acute abdominal wall soft tissue normality. No aggressive osseous lesion. 1.  Moderate to large volume pneumoperitoneum. This may be related to recent surgery although bowel perforation is also within the differential. There is a surgical drain in the LEFT upper abdomen which also may be contributing to pneumoperitoneum. 2.  Small bowel obstruction with transition at RIGHT upper quadrant ileocolonic anastomosis. Small bowel loops measure up to 5 cm diameter. 3.  Prior RIGHT hepatectomy. Previously demonstrated LEFT liver lesion is not well visualized given lack of IV contrast. 4.  Patchy consolidation in both lung bases, with differential including aspiration and pneumonia. Findings in agreement with the emergent findings from the initial [facility] preliminary report. Signed by Dr Sara John (KUB) (SINGLE AP VIEW)    Result Date: 2/25/2022  XR ABDOMEN (KUB) (SINGLE AP VIEW) 2/25/2022 1:18 PM History: Abdominal pain/pressure. 2 image KUB. Comparison is made with CT imaging from 4 days ago. Midline incision skin clips are still present. There are multiple surgical clips within the right side of the abdomen. A drainage catheter is present within the left upper quadrant. Nonspecific bowel gas pattern. Air is present within nondilated small bowel loops within the upper and left side abdomen. Right lower quadrant ostomy noted. 1. No acute abnormality is seen. Signed by Dr Estela Taylor RENAL COMPLETE    Result Date: 2/22/2022  US RENAL COMPLETE 2/22/2022 1:40 PM History: Acute renal insufficiency. Grayscale and color-flow renal ultrasound. Normal size and position of both kidneys. Normal cortical thickness and normal cortical echogenicity.  No hydronephrosis or shadowing stone. The right kidney measures 119 x 56 x 48 mm. The left kidney measures 121 x 47 x 50 mm. 1. No abnormality is seen. Signed by Dr Krupa Castellanos    VL Extremity Venous Left    Result Date: 2/24/2022  Vascular Upper Extremities Veins Procedure  Demographics   Patient Name  [facility]      Age                61                Ren Professor Adam Sutherland [address]   Patient       [id number]       Gender             Male  Number   Visit Number  [id number]    Yasmin Gabriel MD                             Physician   Date of Birth 1955   Referring          Cristofer Alcocer MD                             Physician   Accession     [id number]   [address],  Number                                        [facility]  Procedure Type of Study:   Veins:Upper Extremities Veins, UE VENOUS UNILATERAL/FLU. Indications for Study:Arm pain and Arm swelling. Impression   Acute appearing deep vein thrombosis (DVT) is seen in the internal jugular  vein subclavian axillary brachial, left upper extremity. Acute appearing superficial thrombophlebitis (SVT) is seen in the basilic  and cephalic veins, right upper extremity. Signature   ----------------------------------------------------------------  Electronically signed by Alyssa Sullivan MD(Interpreting  physician) on 02/24/2022 02:13 PM  ----------------------------------------------------------------  Velocities are measured in cm/s ; Diameters are measured in mm Right UE Vein Measurements 2D Measurements +---------------+-----------------+----------------------+-----------------+ ! Location       ! Visualized       ! Compressibility       ! Thrombosis       ! +---------------+-----------------+----------------------+-----------------+ ! IJV            ! Yes              ! Yes                   ! None             ! +---------------+-----------------+----------------------+-----------------+ ! SCV            ! Yes              ! Yes                   ! None             ! +---------------+-----------------+----------------------+-----------------+ Left UE Vein Measurements 2D Measurements +---------------+-----------------+----------------------+-----------------+ ! Location       ! Visualized       ! Compressibility       ! Thrombosis       ! +---------------+-----------------+----------------------+-----------------+ ! IJV            ! Yes              ! No                    !                 ! +---------------+-----------------+----------------------+-----------------+ ! SCV            ! Yes              ! No                    !                 ! +---------------+-----------------+----------------------+-----------------+ ! Axillary       ! Yes              ! No                    !                 ! +---------------+-----------------+----------------------+-----------------+ ! Brachial       !Yes              ! No                    !                 ! +---------------+-----------------+----------------------+-----------------+ ! Radial         !Yes              ! Yes                   ! None             ! +---------------+-----------------+----------------------+-----------------+ ! Ulnar          ! Yes              ! Yes                   ! None             ! +---------------+-----------------+----------------------+-----------------+ ! Cephalic       ! Yes              ! No                    !                 ! +---------------+-----------------+----------------------+-----------------+ ! Basilic        ! Yes              ! No                    !                 ! +---------------+-----------------+----------------------+-----------------+    XR CHEST PORTABLE    Result Date: 2/21/2022  Exam:   XR CHEST PORTABLE  Date:  2/21/2022 History:  Male, age  61 years; hypotension COMPARISON:  Chest x-ray dated February 8, 2022 Findings : Chest portable place. A right-sided PICC, new, terminating in the low SVC. The heart and mediastinum are normal in size.  Lungs are without focal infiltrate, mass or effusions. The bones show no acute pathology. Catheter in the left upper quadrant. Impression: 1. New right-sided PICC. 2.  Otherwise, no interval changes. Signed by Dr Flower Rebolledo    XR CHEST PORTABLE    Result Date: 2/8/2022  EXAMINATION: XR CHEST PORTABLE 2/8/2022 10:39 PM HISTORY: XR CHEST PORTABLE 2/8/2022 9:00 PM HISTORY: Cough COMPARISON: March 18, 2020. FINDINGS: The lungs are clear. Cardiac silhouette is normal. Left subclavian Port-A-Cath is present. Old healed fracture of the left clavicle is present. The osseous structures and surrounding soft tissues demonstrate no acute abnormality. 1. No radiographic evidence of acute cardiopulmonary process.  Signed by Dr Lukas Sanders   Acute kidney injury/prerenal  Hyperphosphatemia  Hypercalcemia  Hyponatremia  Hypokalemia  Metastatic colon cancer status post ostomy placement  Anemia      Plan:  Discussed with patient, family, nursing  Work-up reviewed to date  IV fluids adjusted as per orders-continue for now and family notes that he has been on home IV infusion in the past and this would be recommended at discharge  Monitor labs   Hematology/surgery evaluation reviewed    Marietta Peck MD  03/01/22  10:57 AM

## 2022-03-01 NOTE — PROGRESS NOTES
03/01/22 1105   Subjective   Subjective I am going home today I think I will rest.  I like you. You know what you are doing.   Thank you   Physical Therapy      Electronically signed by Opal Mcallister PTA on 3/1/2022 at 11:08 AM

## 2022-03-01 NOTE — PROGRESS NOTES
Patient discharged home with family.  Patient to call for an follow up appointment with [facility].

## 2022-03-01 NOTE — PROGRESS NOTES
PROGRESS NOTE    Patient name: Danelle Gardner  Patient : 1955  Room: 314      SUBJECTIVE: Continues to have significant output from ileostomy. Eager to be discharged home. INTERVAL HISTORY  The patient is a very pleasant 61years old male who has a history of recurrent colon cancer. He recently underwent debulking surgery and HIPEC at [address] [place] [address] in 2022. Postoperative course complicated by internal hernia. He was again taken to the OR on 2/10/2022 at [address] [place] [address]. He has had a complicated postoperative course with severe dehydration and acute kidney injury. He was receiving IV fluids at home according to his ileostomy output. His wife called the clinic yesterday stating that the patient was feeling quite dizzy and had generalized weakness. Laboratory studies reviewed and showed creatinine elevated at 4.6. The patient was directed to emergency. He received IV fluids in the emergency. He was then admitted for further care. I was consulted for his concurrent history of colon cancer.      INTERVAL HISTORY/HISTORY OF PRESENT ILLNESS:  Diagnosis  Colonic adenocarcinoma, 2020  [id number](liver), stage ROXANA  IHC MMR- proficient  K-maria luisa mutated  N-MARIA LUISA/BRAF wild-type  Iron deficiency anemia  Soft tissue mass, 2021  Adenocarcinoma-consistent with colon primary, right psoas muscle, 2021  Metastatic adenocarcinoma, 2022  MLH1, MSH2, MH6, PMS2-intact  MMR- no loss of expression     Treatment summary  3/30/2020-right hemicolectomy at [facility]  Anticipated Liver ablation to be followed by neoadjuvant/adjuvant versus palliative chemotherapy  06/10/2020-2020-FOLFOX + Avastin  20- Right hepatectomy-Dr. Hyacinth Thompson  S/p Injectafer-poor oral iron tolerance  21- Initiate FOLFIRI + Avastin every 2 weeks  22-psoas muscle mass resection/HIPEC by Dr. Javier Chen at [address] [place] [address]  2/10/2022-back to OR for correction of internal hernia        The patient is a 61 years old male who has a diagnosis of colonic adenocarcinoma. The patient had malignant disease with several lymph nodes involved. In addition, the patient was also found to have suspicious liver lesions concerning for metastatic disease. He was seen by [facility] and offered a multimodality approach with liver ablation of the left liver lesion followed by neoadjuvant chemotherapy and partial right hepatectomy. He underwent microwave ablation of the left lobe liver lesion on 6/8/2020. He is status post completion of 11 biweekly cycles of FOLFOX/Avasti completed November 2020. He tolerated treatment with complaints of mild transient cold neuropathy. He had a right hepatectomy on 12/11/2020 that showed no residual disease. His last CEA was normal in January 2021. He had MRI of the abdomen at [facility] in March 2021 that showed no evidence of recurrent disease in the liver or in the abdomen. He also had a surveillance colonoscopy in March 2021 that showed no polyps. CEA was elevated in May 2021. Repeat CEA June 2021 showed persistent elevation. MRI abdomen at [facility] showed no evidence of liver recurrence but a suspicious nodule in the right lower quadrant. Biopsy was performed at [facility] and consistent with recurrent adenocarcinoma. I discussed the findings with hepatobiliary service and also colorectal surgery. He was started on FOLFIRI/Avastin. He was seen by Dr. Svetlana Shine again on 9/24/2021. He had repeat CT abdomen pelvis and also MRI at [facility] that showed persistent disease involving the psoas muscle. In addition, an additional small place that may represent further peritoneal metastatic disease. He underwent surgery at [facility]. He underwent exploratory laparotomy with resection of psoas mass at [facility] on 1/13/2022. He also underwent HIPEC treatment. The patient was taken to the OR on 2/10/2022 for correction of internal hernia.      Cancer history  Mr. Aubrey العلي was first seen by me on 3/23/2020. He was referred for a new diagnosis of colonic adenocarcinoma involving the cecum. The patient reports that he had a wellbeing consult with his provider at the [place]. He was found to have anemia and then recommended a colonoscopy. Of note, the patient has a family history of colon cancer. His mother is a patient of Dr. Reshma Odell he has been diagnosed with colon cancer in 2010.  3/11/2020- colonoscopy revealed a large malignant appearing fungating mass lesion in the cecum. In addition, several other polyps. Biopsy of the mass consistent with moderate differentiated colonic adenocarcinoma. Polyps consistent with tubulovillous adenoma with no high-grade dysplasia. IHC MMR not proficient. K-maria luisa mutated, BRAF and NRAS wild type. MSI proficient  3/11/2020-CEA 5.5 (H)  3/18/2020-CT abdomen pelvis with contrast  Invasive cecal mass adhering to the right lateral abdominal wall muscles with adjacent lymphadenopathy. Mild partial obstruction of the terminal ileum. 2. Suspicious lesions in the right and left hepatic lobes measure up to 1.3 cm and likely represent metastatic disease. 3/18/2020-Xr Chest Standard  No radiographic evidence of acute cardiopulmonary process. 3/23/2020-he was first seen by me. Recommended completion of staging with CT chest.  Also recommended liver MRI for further clarification of liver lesion. S.  Recommend to proceed with a general surgery consultation tomorrow with Dr. Omkar Adams. Patient was informed that I favor surgical resection if feasible of the primary malignancy. 3/30/2020- right hemicolectomy by Dr. Omkar Adams at [address] consistent with invasive moderately differentiated colonic adenocarcinoma measuring 7.2 cm. Carcinoma directly invading the adjacent abdominal wall tissue. Focal lymphovascular space invasion identified. Focal perineural invasion identified. Surgical margins negative for evidence of malignancy.   6 out of 14 lymph nodes positive for metastatic adenocarcinoma. Final pathology staging [id number](liver, stage ROXANA)  4/20/2020-CT chest with contrast showed No convincing intrathoracic metastasis. Nonspecific 4 mm nodule of the inferior lingula and a 2 mm right upper lobe nodule can be followed on subsequent imaging in 6-12 months. Moderate coronary calcifications. Hypodense metastatic liver lesions. Small hiatal hernia. 4/20/2020-Mri Abdomen W Wo Contrast There are about 5 liver lesions. The 2 largest appears similar compared to 3/18/2020, the others are too small to further characterize. Appearance is most concerning for metastatic disease. Enhancement of the right lateral peritoneum. This is favored to be postoperative as there is no nodularity, evidence of omental disease or lymphadenopathy. Recommend attention on follow-up. Cholecystectomy. 4/22/2020-discussed with Dr. Talha Beverly at [place]. He will review imaging studies and give further recommendations regarding eligibility for resection of liver lesions. 5/8/2020 CT Abdomen The two largest suspicious lesions measuring 1.2 and 1.3 cm in the  right and left hepatic lobes respectively are similar compared to the  3/18/2020 CT. Additionally, there are at least five subcentimeter lesions with similar signal characteristics, which are also highly suspicious for metastases. If complete characterization of the number and distribution of lesions is necessary, an MRI with Eovist could be acquired. 5/19/2020- he was seen by the hepatobiliary service at [facility] with Dr. Talha Beverly:  patient adequate risk candidate for a multimodal approach, directed toward curative hepatectomy eventually. Endorsed by Hepatobiliary Conference, I recommended perc ablation of the L hemiliver to clear it, followed by systemic therapy in a neoadjuvant strategy.  Restaging imaging to confirm clearance of disease on the left and lack of progression to unresectability of the R hemiliver disease would then be followed by R hepatectomy. Limitations to this approach may be accessibility of the segment 4A/8 disease high in the hepatic dome and the possibility of heat sink-related recurrence s/p abation of the left-sided disease. 5/21/2020- referral for Mediport placement and start FOLFOX in 2 weeks. Will add bevacizumab after 6 weeks of liver ablation. We will plan for 12 biweekly dose of FOLFOX. Bevacizumab will also be stopped 6 weeks prior to major procedure. 6/8/2020- Microwave ablation of the left liver lesion was then performed for 5 minutes at [address] to achieve a 3.4 x 3.9 cm approximately spherical zone of ablation. 6/10/2020- initiation of FOLFOX.  7/20/2020-added Avastin. 9/10/20 MRI abdomen: Left hepatic lobe segment II lesion demonstrates small T1 hyperintense blood products, status post microwave ablation on 6/8/2020. No definitive enhancement within the lesion. Focal internal thickening or scar present. Recommend attention on follow-up. Additional scattered subcentimeter foci throughout the liver decreased in size compared to MRI dated 4/20/2020 consistent with improving metastatic disease. Additional chronic findings as above. 9/16/2020-discussed with plan with the patient and [address] [place] [address]. Interval response to treatment. Plan to continue chemotherapy through 12 cycles with Avastin. 12/11/20 Right hepatectomy-Dr. Josesito Lockwood  12/11/20 Right hepatectomy pathology: Liver, right, resection: Focus of Fibrosis with calcifications and chronic inflammation (0.3 cm), see comment. Background hepatic parenchyma with minimal periportal fibrosis (trichrome stain), minimal lobular and portal inflammation, and no significant macrovesicular steatosis (<5%) Comments: The patient's history of colorectal cancer status adjuvant therapy is noted. The focus of fibrosis may reflect treatment effect. There is no evidence of viable tumor in the sampled specimen.    12/14/20 Ct Abdomen Pelvis W Iv Contrast A Small bowel obstruction with transition point at the distal small bowel, just proximal to RIGHT upper quadrant ileocolonic anastomosis. Postoperative change of RIGHT hepatectomy with small amount of expected free fluid and intraperitoneal gas. Redemonstrated LEFT liver lesion. Small bilateral pleural effusions. 12/16/20 SBFT-[facility]: Study is limited due to retained contrast in the small bowel from prior attempt at small bowel follow-through on the floor. Small bowel remains dilated, measuring approximately 5.2 cm, which is consistent with partial or resolving small bowel obstruction. Final radiographs show contrast within the colon. 1/4/2020-resolution of small bowel obstruction. 1/7/21 CT chest: No finding to suggest intrathoracic neoplastic process or metastatic disease. The benign-appearing tiny nodule in the right upper lobe probably represent a noncalcified granuloma. A nodule in the lingular segment of the left upper lobe is not visualized in this study. Postsurgical changes of the liver. No evidence of focal complication. A trace right basal pleural effusion. This may be reactive to the previous abdominal surgery. 3/4/21 MRI abd: Status post right hepatectomy and microwave ablation of a left liver lesion. No findings to suggest residual/recurrent disease. No new liver lesion is identified. Postsurgical changes related to right hemicolectomy. Microwave ablation zone in segment II of the left hepatic lobe measures approximately 21 mm in diameter, unchanged. No appreciable postcontrast enhancement or other findings to suggest local disease recurrence. No new liver lesion is identified. Spleen is mildly enlarged, measuring 13.8 cm in length. 3/17/2021-1 year colonoscopy showed no evidence of polyps. 5/3/21 CEA 6.2  6/8/21 MRI abdomen ([facility]): Status post right hepatectomy and MWA of a left liver lesion. No evidence of residual or recurrent metastatic disease in the liver.  Status post prior right hemicolectomy for colon carcinoma. Within the posterior right iliopsoas muscle there is a mildly T2 hyperintense nodular focus with postcontrast rim enhancement and diffusion restriction. This measures approximately 2.7 x 2 x 2 cm. More delayed postcontrast images show irregular area of enhancement measuring 2.4 x 7.7 cm axially involving the right iliopsoas muscle and the right lateral abdominal wall. Suggest correlation with contrast enhanced CT exam for complete evaluation. Consider biopsy of this lesion. 6/15/21 CT CHEST WO CONTRAST No metastatic disease in the chest.  No change in tiny 2 mm RIGHT upper lobe pulmonary nodule. Mild ectasia of the ascending aorta measuring 4 cm.   6/18/2021-I reviewed results of MRI abdomen at [facility]. CT chest without contrast reviewed by me and showed no evidence of metastatic disease. Stable 2 mm right upper lobe nodule. Discussed with hepatobiliary service at [facility]. Biopsy intra-abdominal nodule next week at [facility]. 6/25/[id number]-VP-yshpax biopsy soft tissue mass right psoas muscle at [facility] consistent with recurrent colonic adenocarcinoma. 7/2/2021-discussed with hepatobiliary service at [facility] and also surgical oncology. Surgical oncology will call us back regarding consultation for consideration of HIPEC if he is a candidate  7/13/21-initiation of chemotherapy with FOLFIRI + Avastin every 2 weeks  7/13/21 CEA 10.7  7/27/2021-CEA 9.6  7/30/2021-she was seen by the surgical oncology group at [facility] by Dr Jodi Tang. They recommended to complete 6 cycles of chemotherapy and repeat CT chest abdomen pelvis and liver MRI to assess disease response. They discussed the role of CRS/HIPEC in his situation. He was told that it really depends on the status of his liver lesions as well.  He will complete 6 cycles of chemotherapy and will return to see me with a CTAP as well as MRI of the liver to assess disease burden and determine if he can be a candidate for debulking.  8/25/2021-proceed cycle #4.  9/22/2021 CEA- 8.1  9/24/2021 MRI with and w/o Contrast ([facility])  Tiny left retroperitoneal nodule involving the left lateral conal fascial new compared to 3/4/2021 though unchanged from most recent prior, possibly an additional site of metastasis. No other new metastatic disease within the abdomen. Similar partially visualized right posterior body wall/psoas infiltrative lesion not definitely changed from MRI abdomen 6/8/2021 but better evaluated in its entirety on concurrent CT, reported separately. Status post right hemicolectomy, right hepatectomy, and segment III microwave ablation. Similar mild splenomegaly. Additional chronic and incidental findings as described in the body the report. Liver: Status post right hepatectomy. Ablation zone in segment II measures 1.7 x 1.1 cm, slightly decreased in size from 1.8 x 1.4 cm previously (series 1301 image 56) without appreciable enhancement. Similar tiny subcentimeter cyst in the left hepatic lobe. No new focal hepatic lesion identified. No visualized free fluid. Similar 0.7 cm enhancing nodule along the left lateral conal fascia laterally new from 3/4/2021, grossly unchanged from MRI dated 6/8/2021. (series 801 image 22). No other appreciable peritoneal/retroperitoneal or  omental nodularity. Ill-defined T2 hyperintense signal and hyperenhancement in the right posteromedial body wall involving the lateral aspect of the psoas muscle and posterolateral abdominal wall musculature, partially visualized measuring at least 8.7 x 3.1   cm, grossly similar to the prior exam, better evaluated in its entirety on concurrent CT reported separately. 9/24/2021 CT C/A/P w/ Contrast([facility]) Status post right hemicolectomy, right hepatectomy and left hepatic microwave ablation without new suspicious hepatic lesion.   Left lateral perirenal fascial nodule, which is stable compared to 6/8/2021 MRI, but new compared to 3/4/2021 MRI is concerning for an additional site of metastatic disease. No sites of new or enlarging metastatic disease in the chest.  Similar appearance of right lateral psoas and posterior abdominal wall infiltrative lesion, not significantly changed compared to 6/8/2021 MRI. Mild splenomegaly. Additional findings as outlined in the body the report. Biopsy-proven adenocarcinoma involving the posterior lateral aspect of the right iliopsoas muscle and right lateral abdominal wall. Right iliopsoas component is difficult to measure but appears to be approximately 2.5 x 2.4 cm (series 2, image 153), similar to prior MRI. Nodular thickening noted along the right posterior abdominal wall and possibly involving the the transversus abdominis measures approximately 8.5 x 1.8 cm (series 2 image 153) overall similar compared to prior MRI. 10/6/2021-discussed the results of recent CT abdomen/pelvis and MRI abdomen/pelvis at [facility]. Persistent disease involving the psoas muscle. Discussed with colorectal surgery at [facility]. They recommend to continue current therapy for another 2 months and reassess with CT scans. 12/3/21 CT chest/abd/pelvis [facility]): Status post prior right hemicolectomy, right hepatectomy and left hepatic lesions microwave ablation. No new liver lesion. Soft tissue thickening of the right lower posterior abdominal wall involving the iliopsoas muscle is grossly unchanged consistent with improving metastatic disease. Small right omental nodule and left lateral conal fascia nodule unchanged compared to  recent exam.   1/13/22 Exploratory lap with resections of psoas muscle mass and HIPEC by Dr. Dionisio Corbin at [facility]:  Metastatic colorectal adenocarcinoma involving fibroadipose tissue. IHC MMR proficient.   1/24/22 CT chest/abd/pelvis [facility]): Extensive postsurgical changes in the abdomen including partial right colectomy, resection of right retroperitoneal mass, omentectomy and mesh repair of right lateral abdominal wall defect. There appears to be a defect in the mesh containing herniated loops of small bowel. Extensive diffuse dilated small bowel loops with air-fluid levels are seen throughout the abdomen. There are segmental areas of decompressed small bowel in the left upper quadrant as well as adjacent to the herniated small  bowel loops in the right retroperitoneum. Air-fluid levels are also seen throughout the colon. While pattern could likely represent postoperative ileus given the diffuse small bowel dilatation and distal colonic air and fluid, developing or partial small bowel obstruction secondary to the right lateral abdominal wall hernia cannot entirely be excluded. Small ascites. 8 mm nodule along the left lateral conal fascia is unchanged. Slight increase in size of cardiophrenic lymph nodes measuring up to 7 mm anterior to the right hemidiaphragm. Stable hypodensity in the left hepatic lobe. Diffuse gastric wall thickening/edema could be secondary to gastritis in the appropriate clinical setting. CEA dynamics:  3/11/20 CEA 5.5  8/19/20 CEA 2.4  9/2/20 CEA 2.7  9/16/20 CEA 2.7  9/30/20 CEA 2.7  10/19/20 CEA 2.3  1/4/21 CEA 2.2  5/3/21 CEA 6.2  6/15/21 CEA 8.3  7/13/21 CEA 10.7  7/27/21 CEA 9.6  8/12/21/21 CEA 8.2  8/25/2021-CEA 8.9  9/22/2021 CEA 8.1    Objective   /76   Pulse 93   Temp 99.3 °F (37.4 °C) (Temporal)   Resp 18   Ht 5' 7\" (1.702 m)   Wt 151 lb 6 oz (68.7 kg)   SpO2 95%   BMI 23.71 kg/m²     PHYSICAL EXAM:  CONSTITUTIONAL: Alert, appropriate,ill-appearing, feel stronger  EYES: Non icteric, EOM intact, pupils equal round   ENT: Mucus membranes moist,external inspection of ears and nose are normal  NECK: Supple, no masses. No palpable thyroid mass  CHEST/LUNGS: CTA bilaterally, normal respiratory effort   CARDIOVASCULAR: RRR, no murmurs.   No lower extremity edema  ABDOMEN: mild tender - no guarding or rebound, post op FARHAT drain, ileostomy with stool  EXTREMITIES: 2+ pitting edema LUE, port left chest wall. SKIN: warm, dry with no rashes or lesions  LYMPH: No cervical, clavicular, axillary, or inguinal lymphadenopathy  NEUROLOGIC: follows commands, non focal   PSYCH: mood and affect appropriate. Alert and oriented to time, place, person    Recent Labs     03/01/22  0615 02/28/22  0409 02/27/22  0455   WBC 7.1 6.4 5.1   HGB 8.3* 8.1* 8.7*   HCT 26.9* 26.7* 29.0*   MCV 93.1 95.4* 95.1*   * 450* 446*       Lab Results   Component Value Date     (L) 02/28/2022    K 4.3 02/28/2022     02/28/2022    CO2 22 02/28/2022    BUN 24 (H) 02/28/2022    CREATININE 2.2 (H) 02/28/2022    GLUCOSE 102 02/28/2022    CALCIUM 8.3 (L) 02/28/2022    PROT 5.6 (L) 02/28/2022    LABALBU 2.4 (L) 02/28/2022    BILITOT 0.4 02/28/2022    ALKPHOS 216 (H) 02/28/2022    AST 21 02/28/2022    ALT 18 02/28/2022    LABGLOM 30 (A) 02/28/2022    GFRAA 37 (L) 02/28/2022    AGRATIO 1.5 06/15/2021    GLOB 3.8 02/07/2022       Lab Results   Component Value Date    INR 1.0 06/18/2021    PROTIME 12.1 06/18/2021       30 Day lookback of cultures:    Blood Culture Recent:   Recent Labs     02/21/22  1739   BC No growth after 5 days of incubation. Gram Stain Recent: No results for input(s): LABGRAM in the last 720 hours. Resp Culture Recent: No results for input(s): CULTRESP in the last 720 hours. Body Fluid Recent : No results for input(s): BFCX in the last 720 hours. MRSA Recent : No results for input(s): [facility] in the last 720 hours. Urine Culture Recent : No results for input(s): LABURIN in the last 720 hours. Organism Recent : No results for input(s): ORG in the last 720 hours. Narrative   CT ABDOMEN PELVIS WO CONTRAST    2/21/2022 7:53 PM   History: Postop pain. Hypotension. Noncontrast abdomen/pelvis CT. Comparison is made with February 8, 2022.    In order to have a CT radiation dose as low as reasonably achievable   Automated Exposure Control was utilized for adjustment of the mA   and/or KV according to patient size. DLP in mGycm= 1115. Interval abdominal surgery. Normal heart size. Patchy atelectasis at the lung bases. Normal noncontrast appearance of the liver, pancreas, and spleen. Normal and symmetric kidneys. No hydronephrosis. No bowel dilation. No abscess or hematoma. Impression   1. Postoperative changes with no sign of bowel obstruction, abscess,   or hematoma. Signed by Dr Daniel Mike:  #Colon cancer  -Status post neoadjuvant chemo followed by debulking surgery/HIPEC) at [facility] January 2022  -Status post exploratory laparotomy 2/10/2022 for correction of internal hernia at [facility] (Dr. Evan Mahoney, Cell [phone])  -Patient has ileostomy and abdominal drain  -Patient is currently not on chemotherapy. Last chemotherapy November 2021  -CT A/P Wo contrast 2/21/2022: Pneumoperitoneum, postoperative changes    Dr. Zoltan Maza following     #Acute kidney injury- (baseline creatinine 1.2-1.4) likely prerenal secondary to high ileostomy output  -Patient was receiving IV fluids at home.  -He received IV fluid resuscitation in the emergency  -CT A/P without contrast: No hydronephrosis     Dr. Melissa Nelson following  Currently receiving LR at 100 mL/h            #Normocytic hypochromic anemia         Component hemodilution    Serology 2/24/2022  Iron panel  Ferritin- 536.1  B12 - 1150  Folate - 17.7  LDH - 187  Retic - 3.23% with absolute 0.0930  Hapto - 410    HGB stable - CBC will be followed    #DVT left upper extremity-most likely port associated -    -Left upper extremity ultrasound 2/23/2022  Acute appearing deep vein thrombosis (DVT) is seen in the internal jugular  vein subclavian axillary brachial, left upper extremity. Acute appearing superficial thrombophlebitis (SVT) is seen in the basilic and cephalic veins, right upper extremity.     -Received IV heparin until creatinine clearance above 30. Creatinine clearance 32.24 on 2/28/2022 and transitioned to Eliquis 5 mg p.o. twice daily    #Anorexia and high ileostomy output    - Imodium and loperamide and fiber before meals. - Marinol 2.5 p.o. twice daily initiated  - Megace 800 mg p.o. daily     #Weakness, deconditioning    Physical therapy     PLAN:  Continue aggressive IV fluid resuscitation   Continue Eliquis 5 mg PO BID   Physical therapy   Dietary   Continue Megace and Marinol 2.5 twice daily at discharge  Continue Imodium and Lomotil at discharge  Continue Metamucil when discharged home 3 times daily, before meals    Prior to discharge arrangements need to be arranged with [facility] infusion services: Will need IV fluids weekly x2 with labs at discharge with home health: 1.5 L normal saline weekly x2, CBC, CMP, magnesium and phosphorus weekly x2. IV fluids and labs on 3/2/2022 and 3/4/2022 this week and beginning next week (3/7/2022) IV fluids and labs on Mondays and Thursdays. Okay from oncology standpoint to discharge when appropriate with others. Please ensure that IV fluid and lab arrangements are in place. Also please ensure that he has Eliquis, Imodium and Lomotil at discharge. Follow-up appointment with Alessia Valencia in clinic on 3/16/2022 at [address] [place] [address]    03/01/22  6:48 AM   Physician's attestation/substantial contribution:  I, Dr Victorina Guillen, independently [facility]. I have reviewed relevant medical information/data to include but not limited to medication list, relevant appropriate labs and imaging when applicable. I reviewed other physician's notes, ancillary services and nurses assessment. I have reviewed the above documentation completed by the Nurse Practitioner or Physician Assistant. Please see my additional contributions to the history of present illness, physical examination, and assessment/medical decision-making that reflect my findings and impressions.  I have seen and examined the patient and the key elements of all parts of the encounter have been performed by me. I agree with the assessment and plan as outlined by the ARNP/PA. Subjective-patient still have significant output. Overall, he feels better. Objective-as above  Assessment/plan:  MARIO-improving. Creatinine 1.7  Ileostomy with high output-discharge home with arrangements for home health with IV fluids 2 times a week. Also CBC/CMP  2 c weel  Continue Metamucil/Lomotil and Imodium. DVT-continue Eliquis. We will arrange appoint with me in 2 weeks. Patient will follow [address] [place] [address] as well.       Kelly Pickens MD

## 2022-03-01 NOTE — DISCHARGE SUMMARY
[place], [place], [address]  DEPARTMENT OF HOSPITALIST MEDICINE    DISCHARGE SUMMARY:        Hubert Wilson  :  1955  MRN:  [id number]    Admit date:  2022  Discharge date: 3/1/2022      Admitting Physician:  Cholo Plasencia MD    Advance Directive: Full Code    Consults Made:   IP CONSULT TO ONCOLOGY  IP CONSULT TO DIETITIAN  IP CONSULT TO NEPHROLOGY  IP CONSULT TO GENERAL SURGERY  IP CONSULT TO PALLIATIVE CARE  IP CONSULT TO VASCULAR SURGERY  IP CONSULT TO DIETITIAN  IP CONSULT TO SOCIAL WORK      Primary Care Physician:  KALPESH Farfan - NP    Discharge Diagnoses:  Principal Problem:    Acute kidney injury superimposed on CKD [facility])  Active Problems:    Adenocarcinoma of colon ([facility]    Liver metastases ([facility]    Hypovolemia    Hyponatremia    Moderate malnutrition ([facility]    Palliative care patient    Posttraumatic stress disorder    Deep vein thrombosis (DVT) of left upper extremity ([facility]  Resolved Problems:    * No resolved hospital problems.  *          Pertinent Labs:  CBC with DIFF:  Recent Labs     2215   WBC 5.1 6.4 7.1   RBC 3.05* 2.80* 2.89*   HGB 8.7* 8.1* 8.3*   HCT 29.0* 26.7* 26.9*   MCV 95.1* 95.4* 93.1   MCH 28.5 28.9 28.7   MCHC 30.0* 30.3* 30.9*   RDW 14.7* 14.8* 14.8*   * 450* 459*   MPV 8.9* 9.1* 9.1*   NEUTOPHILPCT 74.8*  --   --    LYMPHOPCT 11.0*  --   --    MONOPCT 11.4*  --   --    EOSRELPCT 2.0  --   --    BASOPCT 0.2  --   --    NEUTROABS 3.8  --   --    LYMPHSABS 0.6*  --   --    MONOSABS 0.60  --   --    EOSABS 0.10  --   --    BASOSABS 0.00  --   --        CMP/BMP:  Recent Labs     2215   * 135* 135*   K 5.0 4.3 3.9   CL 99 103 102   CO2 26 22 23   ANIONGAP 8 10 10   GLUCOSE 117* 102 87   BUN 32* 24* 18   CREATININE 2.6* 2.2* 1.7*   LABGLOM 25* 30* 41*   CALCIUM 8.7* 8.3* 8.6*   PROT 6.4* 5.6* 5.7*   LABALBU 2.5* 2.4* 2.2*   BILITOT 0.4 0.4 0.4   ALKPHOS 223* 216* 208* ALT 18 18 16   AST 24 21 18         CRP:  No results for input(s): CRP in the last 72 hours. Sed Rate:  No results for input(s): SEDRATE in the last 72 hours. HgBA1c:  No components found for: HGBA1C  FLP:  No results found for: TRIG, HDL, LDLCALC, LDLDIRECT, LABVLDL  TSH:  No results found for: TSH  Troponin T: No results for input(s): TROPONINI in the last 72 hours. Pro-BNP: No results for input(s): BNP in the last 72 hours. INR: No results for input(s): INR in the last 72 hours. ABGs: No results found for: PHART, PO2ART, [id number]  UA:No results for input(s): NITRITE, COLORU, PHUR, LABCAST, WBCUA, RBCUA, MUCUS, TRICHOMONAS, YEAST, BACTERIA, CLARITYU, SPECGRAV, LEUKOCYTESUR, UROBILINOGEN, BILIRUBINUR, BLOODU, GLUCOSEU, AMORPHOUS in the last 72 hours. Invalid input(s): Gil Senior      Culture Results:    No results for input(s): CXSURG in the last 720 hours. Blood Culture Recent:   Recent Labs     02/21/22  1739 02/08/22  2220   BC No growth after 5 days of incubation. No growth after 5 days of incubation. Cultures:   No results for input(s): CULTURE in the last 72 hours. No results for input(s): BC, Glorine Hover in the last 72 hours. No results for input(s): CXSURG in the last 72 hours. Recent Labs     02/27/22  0455 02/28/22  0409 03/01/22  0615   PHOS 3.2 2.8 3.2     Recent Labs     02/27/22  0455 02/28/22  0409 03/01/22  0615   AST 24 21 18   ALT 18 18 16   BILITOT 0.4 0.4 0.4   ALKPHOS 223* 216* 208*           Significant Diagnostic Studies:   CT ABDOMEN PELVIS WO CONTRAST Additional Contrast? None    Result Date: 2/21/2022  CT ABDOMEN PELVIS WO CONTRAST 2/21/2022 7:53 PM History: Postop pain. Hypotension. Noncontrast abdomen/pelvis CT. Comparison is made with February 8, 2022. In order to have a CT radiation dose as low as reasonably achievable Automated Exposure Control was utilized for adjustment of the mA and/or KV according to patient size. DLP in mGycm= 1115.  Interval abdominal surgery. Normal heart size. Patchy atelectasis at the lung bases. Normal noncontrast appearance of the liver, pancreas, and spleen. Normal and symmetric kidneys. No hydronephrosis. No bowel dilation. No abscess or hematoma. 1. Postoperative changes with no sign of bowel obstruction, abscess, or hematoma. Signed by Dr Kd Crawford RENAL COMPLETE    Result Date: 2/22/2022  US RENAL COMPLETE 2/22/2022 1:40 PM History: Acute renal insufficiency. Grayscale and color-flow renal ultrasound. Normal size and position of both kidneys. Normal cortical thickness and normal cortical echogenicity. No hydronephrosis or shadowing stone. The right kidney measures 119 x 56 x 48 mm. The left kidney measures 121 x 47 x 50 mm. 1. No abnormality is seen. Signed by Dr Cassia Ospina    XR CHEST PORTABLE    Result Date: 2/21/2022  Exam:   XR CHEST PORTABLE  Date:  2/21/2022 History:  Male, age  61 years; hypotension COMPARISON:  Chest x-ray dated February 8, 2022 Findings : Chest portable place. A right-sided PICC, new, terminating in the low SVC. The heart and mediastinum are normal in size. Lungs are without focal infiltrate, mass or effusions. The bones show no acute pathology. Catheter in the left upper quadrant. Impression: 1. New right-sided PICC. 2.  Otherwise, no interval changes.  Signed by Dr Elmo Avila Course:   Mr Nery Tucker, a 73-year-old male, history of metastatic adenocarcinoma of the colon, presenting to [facility] ED (02/21/2022), no account of abnormal lab as well as generalized weakness and fatigue.     Patient admitted to [facility] (02/21/2022), further work-up and management of acute on chronic renal failure.     Further hospital course, as per problem is below;     MARIO on CKD  III  Hyponatremia  Hyperphosphatemia  Hypercalcemia  · Creatinine level on presentation: 4.6: 02/21/2022  · IV hydration -  · Creatinine improved: 2.2 --> 1.7 (03/01/2022)  · Avoided hypotension & Nephrotoxins   · Nephrology on board-appreciate recommendations  · Further management as per Nephrology        Left lower quadrant abdominal pain  Metastatic adenocarcinoma of colon  Anorexia and high ileostomy output  Generalized weakness and deconditioning due to medical illness  · Status post recent ex lap at OSH (02/09/2022), with FARHAT drain placement and diverting loop ileostomy. · CT Abdomen / Pelvis WO Con (02/01/2022): Impression: Postoperative changes with no sign of bowel obstruction, abscess, or hematoma  · KUB (02/25/2022): No acute abdominal disease is seen  · General surgery and Oncology on board-appreciate recommendations  · Recommend to leave FARHAT drain in place and to follow-up at [facility]. · Staples to remain in place until at least 2 weeks postop. · Continue Imodium and Lomotil, to assist in decreasing ostomy output. · Okay for discharge from general surgery standpoint  · Discharge recommendations, from neurology, as quoted from neurology documentation below;     \"Continue aggressive IV fluid resuscitation   Continue Eliquis 5 mg PO BID   Physical therapy   Dietary   Continue Megace and Marinol 2.5 twice daily at discharge  Continue Imodium and Lomotil at discharge  Continue Metamucil when discharged home 3 times daily, before meals     Prior to discharge arrangements need to be arranged with [facility] infusion services: Will need IV fluids weekly x2 with labs at discharge with home health: 1.5 L normal saline weekly x2, CBC, CMP, magnesium and phosphorus weekly x2. · IV fluids and labs on 3/2/2022 and 3/4/2022 this week and beginning next week (3/7/2022) IV fluids and labs on Mondays and Thursdays. \"  · Okay for discharge from nephrology standpoint      LUE DVT  · Left arm venous duplex ultrasound (02/02/2022): Positive DVT in left jugular, subclavian, axillary, and brachial veins.   Positive SVT in left proximal cephalic vein, media and cubital veins  · Heparin gtt--> switched to Apixaban 5 mg p.o. twice daily (start date: 02/28/2022)  · Seen by vascular surgery vascular Surgery  · No additional recommendations are date       Hypokalemia-resolved  · Replaced as needed  · Monitored BMP        Continued management of other chronic medical conditions            Physical Exam:  Vital Signs: /69   Pulse 71   Temp 97.9 °F (36.6 °C) (Temporal)   Resp 18   Ht 5' 7\" (1.702 m)   Wt 151 lb 6 oz (68.7 kg)   SpO2 97%   BMI 23.71 kg/m²   Physical Exam  Vitals and nursing note reviewed. Constitutional:       General: He is not in acute distress. Appearance: Normal appearance. He is not ill-appearing, toxic-appearing or diaphoretic. HENT:      Head: Normocephalic and atraumatic. Right Ear: External ear normal.      Left Ear: External ear normal.      Nose: Nose normal. No congestion or rhinorrhea. Mouth/Throat:      Mouth: Mucous membranes are moist.      Pharynx: Oropharynx is clear. No oropharyngeal exudate or posterior oropharyngeal erythema. Eyes:      General: No scleral icterus. Right eye: No discharge. Left eye: No discharge. Extraocular Movements: Extraocular movements intact. Conjunctiva/sclera: Conjunctivae normal.      Pupils: Pupils are equal, round, and reactive to light. Cardiovascular:      Rate and Rhythm: Normal rate and regular rhythm. Pulses: Normal pulses. Heart sounds: Normal heart sounds. No murmur heard. No friction rub. No gallop. Pulmonary:      Effort: Pulmonary effort is normal. No respiratory distress. Breath sounds: Normal breath sounds. No stridor. No wheezing, rhonchi or rales. Chest:      Chest wall: No tenderness. Abdominal:      General: Bowel sounds are normal. There is no distension. Palpations: Abdomen is soft. Tenderness: There is no abdominal tenderness. There is no guarding or rebound. Comments: FARHAT drain in place. Right-sided ileostomy in place.   Midline incision site/staples with no evidence of bleeding or infection noted. Musculoskeletal:         General: No swelling, tenderness, deformity or signs of injury. Normal range of motion. Cervical back: Normal range of motion and neck supple. No rigidity. No muscular tenderness. Right lower leg: No edema. Left lower leg: No edema. Skin:     General: Skin is warm and dry. Capillary Refill: Capillary refill takes less than 2 seconds. Coloration: Skin is not jaundiced or pale. Findings: No bruising, erythema, lesion or rash. Neurological:      General: No focal deficit present. Mental Status: He is alert and oriented to person, place, and time. Cranial Nerves: No cranial nerve deficit. Sensory: No sensory deficit. Motor: No weakness. Coordination: Coordination normal.   Psychiatric:         Mood and Affect: Mood normal.         Behavior: Behavior normal.         Thought Content: Thought content normal.         Judgment: Judgment normal.         Discharge Medications:        Medication List      START taking these medications    * apixaban 5 MG Tabs tablet  Commonly known as: Eliquis  Take 1 tablet by mouth 2 times daily     * apixaban 5 MG Tabs tablet  Commonly known as: ELIQUIS  Take 1 tablet by mouth 2 times daily  Start taking on: April 1, 2022     megestrol 40 MG/ML suspension  Commonly known as: MEGACE  Take 20 mLs by mouth daily     psyllium 58.12 % Pack packet  Commonly known as: METAMUCIL  Take 1 packet by mouth 3 times daily (with meals)         * This list has 2 medication(s) that are the same as other medications prescribed for you. Read the directions carefully, and ask your doctor or other care provider to review them with you. CONTINUE taking these medications    dronabinol 2.5 MG capsule  Commonly known as: Marinol  Take 1 capsule by mouth 2 times daily (before meals) for 30 days.      HYDROmorphone 4 MG tablet  Commonly known as: DILAUDID     loperamide 2 MG capsule  Commonly known as: IMODIUM     methocarbamol 500 MG tablet  Commonly known as: ROBAXIN     omeprazole 20 MG delayed release capsule  Commonly known as: PRILOSEC     oxyCODONE HCl 10 MG immediate release tablet  Commonly known as: OXY-IR     prazosin 1 MG capsule  Commonly known as: MINIPRESS     promethazine 12.5 MG tablet  Commonly known as: PHENERGAN  Take 1 tablet by mouth every 6 hours as needed for Nausea     sodium chloride 0.9 % bolus     Vitamin D3 50 MCG (2000 UT) Caps     ZOLOFT PO           Where to Get Your Medications      These medications were sent to [facility], [address]  [address], [address]    Phone: [phone]   · apixaban 5 MG Tabs tablet     These medications were sent to [facility], [address] [facility] - P [phone] - F [phone]  [address] [facility] [address]    Phone: [phone]   · apixaban 5 MG Tabs tablet  · dronabinol 2.5 MG capsule  · megestrol 40 MG/ML suspension  · psyllium 58.12 % Pack packet           Discharge Instructions: Follow up with KALPESH Nava NP in 7 days. Take medications as directed. Resume activity as tolerated. Diet: ADULT ORAL NUTRITION SUPPLEMENT; Lunch, Dinner; Frozen Oral Supplement  ADULT DIET; Regular; LIKES SUGAR FREE VANILLA PUDDING CUPS        DISCHARGE STATUS:    Condition: Fair  Disposition: Patient is medically stable and will be discharged Home    Extended Emergency Contact Information  Primary Emergency Contact: Idania Morfin  Address: [address] [place] [address], [address] Phone: [phone]  Mobile Phone: [phone]  Relation: Spouse  Secondary Emergency Contact: [address], [address] Phone: [phone]  Relation: Child       Time Spent on discharge is  36 mins in the examination, evaluation, counseling and review of medications and discharge plan.      Electronically signed by   Ascencion Ponce MD, MPH, MD,   Internal Medicine Hospitalist   3/1/2022 12:30 PM      Thank you KALPESH Last - CONCEPCION for the opportunity to be involved in this patient's care. If you have any questions or concerns please feel free to contact me at [phone]        EMR Dragon/Transcription disclaimer:   Much of this encounter note is an electronic transcription/translation of spoken language to printed text.  The electronic translation of spoken language may permit erroneous, or at times, nonsensical words or phrases to be inadvertently transcribed; although attempts have made to review the note for such errors, some may still exist.

## 2022-03-02 ENCOUNTER — LAB REQUISITION (OUTPATIENT)
Dept: LAB | Facility: HOSPITAL | Age: 67
End: 2022-03-02

## 2022-03-02 DIAGNOSIS — Z00.00 ENCOUNTER FOR GENERAL ADULT MEDICAL EXAMINATION WITHOUT ABNORMAL FINDINGS: ICD-10-CM

## 2022-03-02 LAB
ALBUMIN SERPL-MCNC: 2.5 G/DL (ref 3.5–5.2)
ALBUMIN/GLOB SERPL: 0.8 G/DL
ALP SERPL-CCNC: 231 U/L (ref 39–117)
ALT SERPL W P-5'-P-CCNC: 17 U/L (ref 1–41)
ANION GAP SERPL CALCULATED.3IONS-SCNC: 14 MMOL/L (ref 5–15)
AST SERPL-CCNC: 21 U/L (ref 1–40)
BASOPHILS # BLD AUTO: 0.03 10*3/MM3 (ref 0–0.2)
BASOPHILS NFR BLD AUTO: 0.4 % (ref 0–1.5)
BILIRUB SERPL-MCNC: 0.3 MG/DL (ref 0–1.2)
BUN SERPL-MCNC: 18 MG/DL (ref 8–23)
BUN/CREAT SERPL: 9.7 (ref 7–25)
CALCIUM SPEC-SCNC: 8.8 MG/DL (ref 8.6–10.5)
CHLORIDE SERPL-SCNC: 97 MMOL/L (ref 98–107)
CO2 SERPL-SCNC: 24 MMOL/L (ref 22–29)
CREAT SERPL-MCNC: 1.85 MG/DL (ref 0.76–1.27)
DEPRECATED RDW RBC AUTO: 50.9 FL (ref 37–54)
EGFRCR SERPLBLD CKD-EPI 2021: 39.9 ML/MIN/1.73
EOSINOPHIL # BLD AUTO: 0.08 10*3/MM3 (ref 0–0.4)
EOSINOPHIL NFR BLD AUTO: 1.2 % (ref 0.3–6.2)
ERYTHROCYTE [DISTWIDTH] IN BLOOD BY AUTOMATED COUNT: 15.2 % (ref 12.3–15.4)
GLOBULIN UR ELPH-MCNC: 3 GM/DL
GLUCOSE SERPL-MCNC: 120 MG/DL (ref 65–99)
HCT VFR BLD AUTO: 27.2 % (ref 37.5–51)
HGB BLD-MCNC: 8.5 G/DL (ref 13–17.7)
IMM GRANULOCYTES # BLD AUTO: 0.11 10*3/MM3 (ref 0–0.05)
IMM GRANULOCYTES NFR BLD AUTO: 1.6 % (ref 0–0.5)
LYMPHOCYTES # BLD AUTO: 0.67 10*3/MM3 (ref 0.7–3.1)
LYMPHOCYTES NFR BLD AUTO: 9.7 % (ref 19.6–45.3)
MAGNESIUM SERPL-MCNC: 1.3 MG/DL (ref 1.6–2.4)
MCH RBC QN AUTO: 28.7 PG (ref 26.6–33)
MCHC RBC AUTO-ENTMCNC: 31.3 G/DL (ref 31.5–35.7)
MCV RBC AUTO: 91.9 FL (ref 79–97)
MONOCYTES # BLD AUTO: 1.02 10*3/MM3 (ref 0.1–0.9)
MONOCYTES NFR BLD AUTO: 14.8 % (ref 5–12)
NEUTROPHILS NFR BLD AUTO: 4.99 10*3/MM3 (ref 1.7–7)
NEUTROPHILS NFR BLD AUTO: 72.3 % (ref 42.7–76)
NRBC BLD AUTO-RTO: 0 /100 WBC (ref 0–0.2)
PHOSPHATE SERPL-MCNC: 4 MG/DL (ref 2.5–4.5)
PLATELET # BLD AUTO: 460 10*3/MM3 (ref 140–450)
PMV BLD AUTO: 9.5 FL (ref 6–12)
POTASSIUM SERPL-SCNC: 3.8 MMOL/L (ref 3.5–5.2)
PROT SERPL-MCNC: 5.5 G/DL (ref 6–8.5)
RBC # BLD AUTO: 2.96 10*6/MM3 (ref 4.14–5.8)
SODIUM SERPL-SCNC: 135 MMOL/L (ref 136–145)
WBC NRBC COR # BLD: 6.9 10*3/MM3 (ref 3.4–10.8)

## 2022-03-02 PROCEDURE — 85025 COMPLETE CBC W/AUTO DIFF WBC: CPT | Performed by: INTERNAL MEDICINE

## 2022-03-02 PROCEDURE — 83735 ASSAY OF MAGNESIUM: CPT | Performed by: INTERNAL MEDICINE

## 2022-03-02 PROCEDURE — 80053 COMPREHEN METABOLIC PANEL: CPT | Performed by: INTERNAL MEDICINE

## 2022-03-02 PROCEDURE — 84100 ASSAY OF PHOSPHORUS: CPT | Performed by: INTERNAL MEDICINE

## 2022-03-04 ENCOUNTER — TELEPHONE (OUTPATIENT)
Dept: INFUSION THERAPY | Age: 67
End: 2022-03-04

## 2022-03-04 ENCOUNTER — LAB REQUISITION (OUTPATIENT)
Dept: LAB | Facility: HOSPITAL | Age: 67
End: 2022-03-04

## 2022-03-04 DIAGNOSIS — Z00.00 ENCOUNTER FOR GENERAL ADULT MEDICAL EXAMINATION WITHOUT ABNORMAL FINDINGS: ICD-10-CM

## 2022-03-04 LAB
ALBUMIN SERPL-MCNC: 3 G/DL (ref 3.5–5.2)
ALBUMIN/GLOB SERPL: 0.8 G/DL
ALP SERPL-CCNC: 294 U/L (ref 39–117)
ALT SERPL W P-5'-P-CCNC: 22 U/L (ref 1–41)
ANION GAP SERPL CALCULATED.3IONS-SCNC: 14 MMOL/L (ref 5–15)
AST SERPL-CCNC: 32 U/L (ref 1–40)
BASOPHILS # BLD AUTO: 0.06 10*3/MM3 (ref 0–0.2)
BASOPHILS NFR BLD AUTO: 0.7 % (ref 0–1.5)
BILIRUB SERPL-MCNC: 0.4 MG/DL (ref 0–1.2)
BUN SERPL-MCNC: 21 MG/DL (ref 8–23)
BUN/CREAT SERPL: 10.4 (ref 7–25)
CALCIUM SPEC-SCNC: 9.4 MG/DL (ref 8.6–10.5)
CHLORIDE SERPL-SCNC: 95 MMOL/L (ref 98–107)
CO2 SERPL-SCNC: 26 MMOL/L (ref 22–29)
CREAT SERPL-MCNC: 2.02 MG/DL (ref 0.76–1.27)
DEPRECATED RDW RBC AUTO: 50 FL (ref 37–54)
EGFRCR SERPLBLD CKD-EPI 2021: 35.9 ML/MIN/1.73
EOSINOPHIL # BLD AUTO: 0.13 10*3/MM3 (ref 0–0.4)
EOSINOPHIL NFR BLD AUTO: 1.5 % (ref 0.3–6.2)
ERYTHROCYTE [DISTWIDTH] IN BLOOD BY AUTOMATED COUNT: 15.4 % (ref 12.3–15.4)
GLOBULIN UR ELPH-MCNC: 3.9 GM/DL
GLUCOSE SERPL-MCNC: 135 MG/DL (ref 65–99)
HCT VFR BLD AUTO: 31.8 % (ref 37.5–51)
HGB BLD-MCNC: 10.1 G/DL (ref 13–17.7)
IMM GRANULOCYTES # BLD AUTO: 0.14 10*3/MM3 (ref 0–0.05)
IMM GRANULOCYTES NFR BLD AUTO: 1.6 % (ref 0–0.5)
LYMPHOCYTES # BLD AUTO: 0.66 10*3/MM3 (ref 0.7–3.1)
LYMPHOCYTES NFR BLD AUTO: 7.8 % (ref 19.6–45.3)
MAGNESIUM SERPL-MCNC: 1.5 MG/DL (ref 1.6–2.4)
MCH RBC QN AUTO: 28.9 PG (ref 26.6–33)
MCHC RBC AUTO-ENTMCNC: 31.8 G/DL (ref 31.5–35.7)
MCV RBC AUTO: 90.9 FL (ref 79–97)
MONOCYTES # BLD AUTO: 1.02 10*3/MM3 (ref 0.1–0.9)
MONOCYTES NFR BLD AUTO: 12 % (ref 5–12)
NEUTROPHILS NFR BLD AUTO: 6.49 10*3/MM3 (ref 1.7–7)
NEUTROPHILS NFR BLD AUTO: 76.4 % (ref 42.7–76)
NRBC BLD AUTO-RTO: 0 /100 WBC (ref 0–0.2)
PHOSPHATE SERPL-MCNC: 4.2 MG/DL (ref 2.5–4.5)
PLATELET # BLD AUTO: 503 10*3/MM3 (ref 140–450)
PMV BLD AUTO: 9.6 FL (ref 6–12)
POTASSIUM SERPL-SCNC: 3.9 MMOL/L (ref 3.5–5.2)
PROT SERPL-MCNC: 6.9 G/DL (ref 6–8.5)
RBC # BLD AUTO: 3.5 10*6/MM3 (ref 4.14–5.8)
SODIUM SERPL-SCNC: 135 MMOL/L (ref 136–145)
WBC NRBC COR # BLD: 8.5 10*3/MM3 (ref 3.4–10.8)

## 2022-03-04 PROCEDURE — 85025 COMPLETE CBC W/AUTO DIFF WBC: CPT | Performed by: INTERNAL MEDICINE

## 2022-03-04 PROCEDURE — 84100 ASSAY OF PHOSPHORUS: CPT | Performed by: INTERNAL MEDICINE

## 2022-03-04 PROCEDURE — 80053 COMPREHEN METABOLIC PANEL: CPT | Performed by: INTERNAL MEDICINE

## 2022-03-04 PROCEDURE — 83735 ASSAY OF MAGNESIUM: CPT | Performed by: INTERNAL MEDICINE

## 2022-03-04 NOTE — TELEPHONE ENCOUNTER
Received a call from [address] [place] [address]. She called to verify fluid order. Per Dr. Esther Conrad it is 1.5 L twice a week.

## 2022-03-04 NOTE — TELEPHONE ENCOUNTER
Received a call from [facility]. Reporting BP 90/40. She stated that he is alert and feeling good. He will receive 1.5 L bolus today per orders. Dr. Jay Jay Urias notified.

## 2022-03-07 ENCOUNTER — TELEPHONE (OUTPATIENT)
Dept: INFUSION THERAPY | Age: 67
End: 2022-03-07

## 2022-03-07 ENCOUNTER — APPOINTMENT (OUTPATIENT)
Dept: CT IMAGING | Age: 67
DRG: 682 | End: 2022-03-07
Payer: OTHER GOVERNMENT

## 2022-03-07 ENCOUNTER — LAB REQUISITION (OUTPATIENT)
Dept: LAB | Facility: HOSPITAL | Age: 67
End: 2022-03-07

## 2022-03-07 ENCOUNTER — HOSPITAL ENCOUNTER (INPATIENT)
Age: 67
LOS: 4 days | Discharge: HOME HEALTH | DRG: 682 | End: 2022-03-11
Attending: INTERNAL MEDICINE | Admitting: INTERNAL MEDICINE
Payer: OTHER GOVERNMENT

## 2022-03-07 DIAGNOSIS — R63.0 DECREASED APPETITE: ICD-10-CM

## 2022-03-07 DIAGNOSIS — Z00.00 ENCOUNTER FOR GENERAL ADULT MEDICAL EXAMINATION WITHOUT ABNORMAL FINDINGS: ICD-10-CM

## 2022-03-07 DIAGNOSIS — C78.7 LIVER METASTASES (HCC): ICD-10-CM

## 2022-03-07 DIAGNOSIS — Z93.2 HIGH OUTPUT ILEOSTOMY (HCC): ICD-10-CM

## 2022-03-07 DIAGNOSIS — N18.9 ACUTE KIDNEY INJURY SUPERIMPOSED ON CKD (HCC): Primary | ICD-10-CM

## 2022-03-07 DIAGNOSIS — R19.8 HIGH OUTPUT ILEOSTOMY (HCC): ICD-10-CM

## 2022-03-07 DIAGNOSIS — N17.9 ACUTE KIDNEY INJURY SUPERIMPOSED ON CKD (HCC): Primary | ICD-10-CM

## 2022-03-07 DIAGNOSIS — C18.2 MALIGNANT NEOPLASM OF ASCENDING COLON (HCC): ICD-10-CM

## 2022-03-07 LAB
ALBUMIN SERPL-MCNC: 2.8 G/DL (ref 3.5–5.2)
ALBUMIN SERPL-MCNC: 3.7 G/DL (ref 3.5–5.2)
ALBUMIN/GLOB SERPL: 0.8 G/DL
ALP BLD-CCNC: 309 U/L (ref 40–130)
ALP SERPL-CCNC: 376 U/L (ref 39–117)
ALT SERPL W P-5'-P-CCNC: 62 U/L (ref 1–41)
ALT SERPL-CCNC: 56 U/L (ref 5–41)
ANION GAP SERPL CALCULATED.3IONS-SCNC: 18 MMOL/L (ref 7–19)
ANION GAP SERPL CALCULATED.3IONS-SCNC: 20 MMOL/L (ref 5–15)
AST SERPL-CCNC: 78 U/L (ref 5–40)
AST SERPL-CCNC: 85 U/L (ref 1–40)
BASOPHILS # BLD AUTO: 0.09 10*3/MM3 (ref 0–0.2)
BASOPHILS ABSOLUTE: 0.1 K/UL (ref 0–0.2)
BASOPHILS NFR BLD AUTO: 0.7 % (ref 0–1.5)
BASOPHILS RELATIVE PERCENT: 0.4 % (ref 0–1)
BILIRUB SERPL-MCNC: 0.5 MG/DL (ref 0.2–1.2)
BILIRUB SERPL-MCNC: 0.5 MG/DL (ref 0–1.2)
BUN BLDV-MCNC: 37 MG/DL (ref 8–23)
BUN SERPL-MCNC: 39 MG/DL (ref 8–23)
BUN/CREAT SERPL: 12.9 (ref 7–25)
CALCIUM SERPL-MCNC: 8.7 MG/DL (ref 8.8–10.2)
CALCIUM SPEC-SCNC: 10.1 MG/DL (ref 8.6–10.5)
CHLORIDE BLD-SCNC: 92 MMOL/L (ref 98–111)
CHLORIDE SERPL-SCNC: 88 MMOL/L (ref 98–107)
CO2 SERPL-SCNC: 25 MMOL/L (ref 22–29)
CO2: 22 MMOL/L (ref 22–29)
CREAT SERPL-MCNC: 2.8 MG/DL (ref 0.5–1.2)
CREAT SERPL-MCNC: 3.03 MG/DL (ref 0.76–1.27)
DEPRECATED RDW RBC AUTO: 48.7 FL (ref 37–54)
EGFRCR SERPLBLD CKD-EPI 2021: 22.1 ML/MIN/1.73
EOSINOPHIL # BLD AUTO: 0.02 10*3/MM3 (ref 0–0.4)
EOSINOPHIL NFR BLD AUTO: 0.2 % (ref 0.3–6.2)
EOSINOPHILS ABSOLUTE: 0 K/UL (ref 0–0.6)
EOSINOPHILS RELATIVE PERCENT: 0.2 % (ref 0–5)
ERYTHROCYTE [DISTWIDTH] IN BLOOD BY AUTOMATED COUNT: 15 % (ref 12.3–15.4)
GFR AFRICAN AMERICAN: 28
GFR NON-AFRICAN AMERICAN: 23
GLOBULIN UR ELPH-MCNC: 4.4 GM/DL
GLUCOSE BLD-MCNC: 106 MG/DL (ref 74–109)
GLUCOSE SERPL-MCNC: 109 MG/DL (ref 65–99)
HCT VFR BLD AUTO: 36.9 % (ref 37.5–51)
HCT VFR BLD CALC: 31.8 % (ref 42–52)
HEMOGLOBIN: 9.8 G/DL (ref 14–18)
HGB BLD-MCNC: 11.8 G/DL (ref 13–17.7)
IMM GRANULOCYTES # BLD AUTO: 0.3 10*3/MM3 (ref 0–0.05)
IMM GRANULOCYTES NFR BLD AUTO: 2.3 % (ref 0–0.5)
IMMATURE GRANULOCYTES #: 0.2 K/UL
LIPASE: 93 U/L (ref 13–60)
LYMPHOCYTES # BLD AUTO: 0.97 10*3/MM3 (ref 0.7–3.1)
LYMPHOCYTES ABSOLUTE: 0.9 K/UL (ref 1.1–4.5)
LYMPHOCYTES NFR BLD AUTO: 7.6 % (ref 19.6–45.3)
LYMPHOCYTES RELATIVE PERCENT: 7.5 % (ref 20–40)
MAGNESIUM SERPL-MCNC: 1.7 MG/DL (ref 1.6–2.4)
MCH RBC QN AUTO: 28.1 PG (ref 27–31)
MCH RBC QN AUTO: 28.5 PG (ref 26.6–33)
MCHC RBC AUTO-ENTMCNC: 30.8 G/DL (ref 33–37)
MCHC RBC AUTO-ENTMCNC: 32 G/DL (ref 31.5–35.7)
MCV RBC AUTO: 89.1 FL (ref 79–97)
MCV RBC AUTO: 91.1 FL (ref 80–94)
MONOCYTES # BLD AUTO: 1.28 10*3/MM3 (ref 0.1–0.9)
MONOCYTES ABSOLUTE: 1.2 K/UL (ref 0–0.9)
MONOCYTES NFR BLD AUTO: 10 % (ref 5–12)
MONOCYTES RELATIVE PERCENT: 9.9 % (ref 0–10)
NEUTROPHILS ABSOLUTE: 9.7 K/UL (ref 1.5–7.5)
NEUTROPHILS NFR BLD AUTO: 10.14 10*3/MM3 (ref 1.7–7)
NEUTROPHILS NFR BLD AUTO: 79.2 % (ref 42.7–76)
NEUTROPHILS RELATIVE PERCENT: 80.1 % (ref 50–65)
NRBC BLD AUTO-RTO: 0 /100 WBC (ref 0–0.2)
PDW BLD-RTO: 15 % (ref 11.5–14.5)
PHOSPHATE SERPL-MCNC: 6.3 MG/DL (ref 2.5–4.5)
PLATELET # BLD AUTO: 537 10*3/MM3 (ref 140–450)
PLATELET # BLD: 416 K/UL (ref 130–400)
PMV BLD AUTO: 10 FL (ref 6–12)
PMV BLD AUTO: 10 FL (ref 9.4–12.4)
POTASSIUM REFLEX MAGNESIUM: 3.9 MMOL/L (ref 3.5–5)
POTASSIUM SERPL-SCNC: 4.2 MMOL/L (ref 3.5–5.2)
PROT SERPL-MCNC: 8.1 G/DL (ref 6–8.5)
RBC # BLD AUTO: 4.14 10*6/MM3 (ref 4.14–5.8)
RBC # BLD: 3.49 M/UL (ref 4.7–6.1)
SARS-COV-2, NAAT: NOT DETECTED
SODIUM BLD-SCNC: 132 MMOL/L (ref 136–145)
SODIUM SERPL-SCNC: 133 MMOL/L (ref 136–145)
TOTAL PROTEIN: 7.6 G/DL (ref 6.6–8.7)
WBC # BLD: 12.1 K/UL (ref 4.8–10.8)
WBC NRBC COR # BLD: 12.8 10*3/MM3 (ref 3.4–10.8)

## 2022-03-07 PROCEDURE — 84100 ASSAY OF PHOSPHORUS: CPT | Performed by: INTERNAL MEDICINE

## 2022-03-07 PROCEDURE — 2580000003 HC RX 258

## 2022-03-07 PROCEDURE — 85025 COMPLETE CBC W/AUTO DIFF WBC: CPT | Performed by: INTERNAL MEDICINE

## 2022-03-07 PROCEDURE — 96374 THER/PROPH/DIAG INJ IV PUSH: CPT

## 2022-03-07 PROCEDURE — 83690 ASSAY OF LIPASE: CPT

## 2022-03-07 PROCEDURE — 83735 ASSAY OF MAGNESIUM: CPT | Performed by: INTERNAL MEDICINE

## 2022-03-07 PROCEDURE — 74176 CT ABD & PELVIS W/O CONTRAST: CPT

## 2022-03-07 PROCEDURE — 99284 EMERGENCY DEPT VISIT MOD MDM: CPT

## 2022-03-07 PROCEDURE — 6370000000 HC RX 637 (ALT 250 FOR IP)

## 2022-03-07 PROCEDURE — 80053 COMPREHEN METABOLIC PANEL: CPT

## 2022-03-07 PROCEDURE — 1210000000 HC MED SURG R&B

## 2022-03-07 PROCEDURE — 87635 SARS-COV-2 COVID-19 AMP PRB: CPT

## 2022-03-07 PROCEDURE — 6360000002 HC RX W HCPCS: Performed by: PHYSICIAN ASSISTANT

## 2022-03-07 PROCEDURE — 2580000003 HC RX 258: Performed by: PHYSICIAN ASSISTANT

## 2022-03-07 PROCEDURE — 85025 COMPLETE CBC W/AUTO DIFF WBC: CPT

## 2022-03-07 PROCEDURE — 80053 COMPREHEN METABOLIC PANEL: CPT | Performed by: INTERNAL MEDICINE

## 2022-03-07 PROCEDURE — 36415 COLL VENOUS BLD VENIPUNCTURE: CPT

## 2022-03-07 RX ORDER — SODIUM CHLORIDE 9 MG/ML
25 INJECTION, SOLUTION INTRAVENOUS PRN
Status: DISCONTINUED | OUTPATIENT
Start: 2022-03-07 | End: 2022-03-11 | Stop reason: HOSPADM

## 2022-03-07 RX ORDER — PANTOPRAZOLE SODIUM 40 MG/1
40 TABLET, DELAYED RELEASE ORAL
Status: DISCONTINUED | OUTPATIENT
Start: 2022-03-08 | End: 2022-03-11 | Stop reason: HOSPADM

## 2022-03-07 RX ORDER — 0.9 % SODIUM CHLORIDE 0.9 %
1000 INTRAVENOUS SOLUTION INTRAVENOUS ONCE
Status: COMPLETED | OUTPATIENT
Start: 2022-03-07 | End: 2022-03-07

## 2022-03-07 RX ORDER — ONDANSETRON 2 MG/ML
4 INJECTION INTRAMUSCULAR; INTRAVENOUS ONCE
Status: COMPLETED | OUTPATIENT
Start: 2022-03-07 | End: 2022-03-07

## 2022-03-07 RX ORDER — ONDANSETRON 4 MG/1
4 TABLET, ORALLY DISINTEGRATING ORAL EVERY 8 HOURS PRN
Status: DISCONTINUED | OUTPATIENT
Start: 2022-03-07 | End: 2022-03-11 | Stop reason: HOSPADM

## 2022-03-07 RX ORDER — ACETAMINOPHEN 650 MG/1
650 SUPPOSITORY RECTAL EVERY 6 HOURS PRN
Status: DISCONTINUED | OUTPATIENT
Start: 2022-03-07 | End: 2022-03-11 | Stop reason: HOSPADM

## 2022-03-07 RX ORDER — ACETAMINOPHEN 325 MG/1
650 TABLET ORAL EVERY 6 HOURS PRN
Status: DISCONTINUED | OUTPATIENT
Start: 2022-03-07 | End: 2022-03-11 | Stop reason: HOSPADM

## 2022-03-07 RX ORDER — LOPERAMIDE HYDROCHLORIDE 2 MG/1
2 CAPSULE ORAL 2 TIMES DAILY PRN
Status: DISCONTINUED | OUTPATIENT
Start: 2022-03-07 | End: 2022-03-08

## 2022-03-07 RX ORDER — DRONABINOL 2.5 MG/1
2.5 CAPSULE ORAL
Status: DISCONTINUED | OUTPATIENT
Start: 2022-03-08 | End: 2022-03-11 | Stop reason: HOSPADM

## 2022-03-07 RX ORDER — ONDANSETRON 2 MG/ML
4 INJECTION INTRAMUSCULAR; INTRAVENOUS EVERY 6 HOURS PRN
Status: DISCONTINUED | OUTPATIENT
Start: 2022-03-07 | End: 2022-03-11 | Stop reason: HOSPADM

## 2022-03-07 RX ORDER — PRAZOSIN HYDROCHLORIDE 1 MG/1
1 CAPSULE ORAL NIGHTLY
Status: DISCONTINUED | OUTPATIENT
Start: 2022-03-07 | End: 2022-03-11 | Stop reason: HOSPADM

## 2022-03-07 RX ORDER — PROMETHAZINE HYDROCHLORIDE 25 MG/1
12.5 TABLET ORAL EVERY 6 HOURS PRN
Status: DISCONTINUED | OUTPATIENT
Start: 2022-03-07 | End: 2022-03-11 | Stop reason: HOSPADM

## 2022-03-07 RX ORDER — SODIUM CHLORIDE 9 MG/ML
INJECTION, SOLUTION INTRAVENOUS CONTINUOUS
Status: DISCONTINUED | OUTPATIENT
Start: 2022-03-07 | End: 2022-03-09

## 2022-03-07 RX ORDER — HYDROMORPHONE HYDROCHLORIDE 2 MG/1
4 TABLET ORAL EVERY 6 HOURS PRN
Status: DISCONTINUED | OUTPATIENT
Start: 2022-03-07 | End: 2022-03-11 | Stop reason: HOSPADM

## 2022-03-07 RX ORDER — MEGESTROL ACETATE 40 MG/ML
800 SUSPENSION ORAL DAILY
Status: DISCONTINUED | OUTPATIENT
Start: 2022-03-07 | End: 2022-03-11 | Stop reason: HOSPADM

## 2022-03-07 RX ORDER — OXYCODONE HYDROCHLORIDE 5 MG/1
10 TABLET ORAL EVERY 4 HOURS PRN
Status: DISCONTINUED | OUTPATIENT
Start: 2022-03-07 | End: 2022-03-11 | Stop reason: HOSPADM

## 2022-03-07 RX ORDER — SODIUM CHLORIDE 0.9 % (FLUSH) 0.9 %
5-40 SYRINGE (ML) INJECTION EVERY 12 HOURS SCHEDULED
Status: DISCONTINUED | OUTPATIENT
Start: 2022-03-07 | End: 2022-03-11 | Stop reason: HOSPADM

## 2022-03-07 RX ORDER — 0.9 % SODIUM CHLORIDE 0.9 %
500 INTRAVENOUS SOLUTION INTRAVENOUS ONCE
Status: COMPLETED | OUTPATIENT
Start: 2022-03-07 | End: 2022-03-08

## 2022-03-07 RX ORDER — SODIUM CHLORIDE 0.9 % (FLUSH) 0.9 %
5-40 SYRINGE (ML) INJECTION PRN
Status: DISCONTINUED | OUTPATIENT
Start: 2022-03-07 | End: 2022-03-11 | Stop reason: HOSPADM

## 2022-03-07 RX ORDER — METHOCARBAMOL 500 MG/1
500 TABLET, FILM COATED ORAL 3 TIMES DAILY PRN
Status: DISCONTINUED | OUTPATIENT
Start: 2022-03-07 | End: 2022-03-11 | Stop reason: HOSPADM

## 2022-03-07 RX ADMIN — METHOCARBAMOL TABLETS 500 MG: 500 TABLET, COATED ORAL at 23:31

## 2022-03-07 RX ADMIN — ONDANSETRON 4 MG: 2 INJECTION INTRAMUSCULAR; INTRAVENOUS at 19:35

## 2022-03-07 RX ADMIN — APIXABAN 5 MG: 5 TABLET, FILM COATED ORAL at 23:31

## 2022-03-07 RX ADMIN — SODIUM CHLORIDE 1000 ML: 9 INJECTION, SOLUTION INTRAVENOUS at 19:42

## 2022-03-07 RX ADMIN — SODIUM CHLORIDE, PRESERVATIVE FREE 10 ML: 5 INJECTION INTRAVENOUS at 22:54

## 2022-03-07 RX ADMIN — PROMETHAZINE HYDROCHLORIDE 12.5 MG: 25 TABLET ORAL at 21:51

## 2022-03-07 RX ADMIN — ONDANSETRON 4 MG: 2 INJECTION INTRAMUSCULAR; INTRAVENOUS at 17:20

## 2022-03-07 RX ADMIN — SODIUM CHLORIDE 500 ML: 9 INJECTION, SOLUTION INTRAVENOUS at 22:57

## 2022-03-07 ASSESSMENT — ENCOUNTER SYMPTOMS
COUGH: 0
SHORTNESS OF BREATH: 0
DIARRHEA: 0
CHEST TIGHTNESS: 0
BACK PAIN: 0
NAUSEA: 1
WHEEZING: 0
COLOR CHANGE: 0
ABDOMINAL DISTENTION: 0
CONSTIPATION: 0
ABDOMINAL PAIN: 1
VOMITING: 1

## 2022-03-07 NOTE — TELEPHONE ENCOUNTER
Dr. Juan M Cabral reviewed labs from 3/4/22. Called and spoke with Lanetta Collet patients wife regarding increase creatine 2.02 and magnesium 1.5. She stated he had an episode of vomiting last night. She said he is more fatigued and weak today. Orders for labs per [place] and nurse to eval today. Spoke with Daniel Morton with [facility] orders given.

## 2022-03-07 NOTE — TELEPHONE ENCOUNTER
Spoke with patients wife.  Notified her that Dr. Sendy Mo recommends that due to his increasing kidney function, he needs to be admitted to the hospital.

## 2022-03-07 NOTE — ED PROVIDER NOTES
[address] [facility]  eMERGENCY dEPARTMENT eNCOUnter      Pt Name: Abhi Garica  MRN: [id number]  [place] 1955  Date of evaluation: 3/7/2022  Provider: Kimberlee Councilman, [facility]       Chief Complaint   Patient presents with    Abnormal Lab     CR 3         HISTORY OF PRESENT ILLNESS   (Location/Symptom, Timing/Onset,Context/Setting, Quality, Duration, Modifying Factors, Severity)  Note limiting factors. Abhi Garcia is a 74 y.o. male with history of adenocarcinoma of the colon with mets to liver, iron deficiency anemia, CKD, chronic alcoholism in remission, depression, GERK, PTSD, SBO, and DVT who presents to the emergency department for an abnormal lab. The patient has home health. They performed blood work and called him with an elevated Creatinine of 3. He was sent by Dr Chayo Linton for admission and fluids for concern of another MARIO superimposed on CKD. The patient does not a decrease in intake starting yesterday evening. He notes yesterday he was feeling much better-increased appetite and eating, being up and walking around. He states last night he began to dry heave and vomit. He has not had any oral intake since then. He denies fever or chills. He notes his abdominal pain is well controlled. He was given 1 L of fluids and 4 of Zofran en route via EMS. HPI    NursingNotes were reviewed. REVIEW OF SYSTEMS    (2-9 systems for level 4, 10 or more for level 5)     Review of Systems   Constitutional: Negative for chills and fever. Respiratory: Negative for shortness of breath. Cardiovascular: Negative for chest pain. Gastrointestinal: Positive for abdominal pain (well controlled currently), nausea and vomiting. Negative for constipation and diarrhea. Musculoskeletal: Negative for back pain and myalgias. Neurological: Negative for dizziness and headaches. All other systems reviewed and are negative.            PAST MEDICALHISTORY     Past Medical History:   Diagnosis Date    Acid reflux     Cancer (HCC)     adenocarcinoma of colon    GERD (gastroesophageal reflux disease)     PTSD (post-traumatic stress disorder)     Stage 3a chronic kidney disease ([facility] [place] [address]          SURGICAL HISTORY       Past Surgical History:   Procedure Laterality Date    ABLATION OF DYSRHYTHMIC FOCUS      ablation of liver at [address]  2003    CHOLECYSTECTOMY  2013    COLONOSCOPY      when pt was in the 19's    COLONOSCOPY N/A 03/11/2020    COLONOSCOPY POLYPECTOMY SNARE/COLD BIOPSY: Dr. Torsten Hurley colonoscopy: 6-12 months due to findings at colonoscopy today with Cecal mass lesion and large polyps.  COLONOSCOPY      COLONOSCOPY N/A 03/17/2021    Dr Kari Askew, Post-operative changes w IC anastomosis & single visible staple, Mild Diverticuosis, Int Hemorrhoids Grade 1, 3 year recall    HEMICOLECTOMY Right 03/30/2020    LAPAROSCOPIC-ASSISTED RIGHT HEMICOLECTOMY [address]en Jackson MD [address] N/A 06/03/2020    INSERTION OF VENOUS PORT with flouro performed by Laureen Jackson MD at [address] N/A 03/11/2020    Dr. Arjun Chun:   [facility]         CURRENT MEDICATIONS     Previous Medications    APIXABAN (ELIQUIS) 5 MG TABS TABLET    Take 1 tablet by mouth 2 times daily    APIXABAN (ELIQUIS) 5 MG TABS TABLET    Take 1 tablet by mouth 2 times daily    CHOLECALCIFEROL (VITAMIN D3) 50 MCG (2000 UT) CAPS    Take by mouth daily    DRONABINOL (MARINOL) 2.5 MG CAPSULE    Take 1 capsule by mouth 2 times daily (before meals) for 30 days. HYDROMORPHONE (DILAUDID) 4 MG TABLET    Take 4 mg by mouth every 4 hours as needed for Pain.     LOPERAMIDE (IMODIUM) 2 MG CAPSULE    Take 2 mg by mouth 2 times daily as needed for Diarrhea    MEGESTROL (MEGACE) 40 MG/ML SUSPENSION    Take 20 mLs by mouth daily    METHOCARBAMOL (ROBAXIN) 500 MG TABLET    Take 500 mg by mouth 3 times daily as needed     OMEPRAZOLE (PRILOSEC) 20 MG DELAYED RELEASE CAPSULE Take 20 mg by mouth daily    OXYCODONE HCL (OXY-IR) 10 MG IMMEDIATE RELEASE TABLET    Take 10 mg by mouth every 4 hours as needed for Pain. PRAZOSIN (MINIPRESS) 1 MG CAPSULE    Take 1 mg by mouth nightly For nightmares    PROMETHAZINE (PHENERGAN) 12.5 MG TABLET    Take 1 tablet by mouth every 6 hours as needed for Nausea    PSYLLIUM (METAMUCIL) 58.12 % PACK PACKET    Take 1 packet by mouth 3 times daily (with meals)    SERTRALINE HCL (ZOLOFT PO)    Take by mouth daily    SODIUM CHLORIDE 0.9 % BOLUS    Infuse 500 mLs intravenously 2 times daily       ALLERGIES     Morphine and Oxycodone-acetaminophen    FAMILY HISTORY       Family History   Problem Relation Age of Onset    Liver Cancer Mother     High Blood Pressure Mother     Colon Cancer Mother         x2    Cancer Father         Lung Cancer    Breast Cancer Sister     Cancer Maternal Grandfather         Lung Cancer    Cancer Paternal Grandfather         Stomach Cancer    Colon Polyps Neg Hx     Cystic Fibrosis Neg Hx     Liver Disease Neg Hx     Rectal Cancer Neg Hx           SOCIAL HISTORY       Social History     Socioeconomic History    Marital status:      Spouse name: None    Number of children: None    Years of education: None    Highest education level: None   Occupational History    None   Tobacco Use    Smoking status: Never Smoker    Smokeless tobacco: Never Used   Vaping Use    Vaping Use: Never used   Substance and Sexual Activity    Alcohol use: Yes     Comment: rare     Drug use: No    Sexual activity: Yes     Partners: Female   Other Topics Concern    None   Social History Narrative    None     Social Determinants of Health     Financial Resource Strain:     Difficulty of Paying Living Expenses: Not on file   Food Insecurity:     Worried About Running Out of Food in the Last Year: Not on file    [place] of Food in the Last Year: Not on file   Transportation Needs:     Lack of Transportation (Medical):  Not on file    Lack of Transportation (Non-Medical): Not on file   Physical Activity:     Days of Exercise per Week: Not on file    Minutes of Exercise per Session: Not on file   Stress:     Feeling of Stress : Not on file   Social Connections:     Frequency of Communication with Friends and Family: Not on file    Frequency of Social Gatherings with Friends and Family: Not on file    Attends Scientologist Services: Not on file    Active Member of [address] [place] [address] or Organizations: Not on file    Attends Club or Organization Meetings: Not on file    Marital Status: Not on file   Intimate Partner Violence:     Fear of Current or Ex-Partner: Not on file    Emotionally Abused: Not on file    Physically Abused: Not on file    Sexually Abused: Not on file   Housing Stability:     Unable to Pay for Housing in the Last Year: Not on file    Number of [place] in the Last Year: Not on file    Unstable Housing in the Last Year: Not on file       SCREENINGS    [place] Coma Scale  Eye Opening: Spontaneous  Best Verbal Response: Oriented  Best Motor Response: Obeys commands  [place] Coma Scale Score: 15        PHYSICAL EXAM    (up to 7 for level 4, 8 or more for level 5)     ED Triage Vitals   BP Temp Temp src Pulse Resp SpO2 Height Weight   -- -- -- -- -- -- -- --       Physical Exam  Vitals and nursing note reviewed. Constitutional:       General: He is not in acute distress. Appearance: He is ill-appearing. He is not toxic-appearing. Cardiovascular:      Rate and Rhythm: Normal rate and regular rhythm. Pulses: Normal pulses. Heart sounds: Normal heart sounds. Pulmonary:      Effort: Pulmonary effort is normal. No respiratory distress. Breath sounds: Normal breath sounds. Chest:      Chest wall: No tenderness. Abdominal:      General: There is no distension. Palpations: Abdomen is soft. Tenderness: There is abdominal tenderness (diffuse).    Musculoskeletal:      Cervical back: Normal range of motion and neck supple. No rigidity or tenderness. Lymphadenopathy:      Cervical: No cervical adenopathy. Skin:     General: Skin is warm and dry. Neurological:      General: No focal deficit present. Mental Status: He is alert and oriented to person, place, and time. DIAGNOSTIC RESULTS     RADIOLOGY:  Non-plain film images such as CT, Ultrasound and MRI are read by the radiologist. Plain radiographic images are visualized and preliminarily interpreted bythe emergency physician with the below findings:      CT ABDOMEN PELVIS WO CONTRAST Additional Contrast? None   Final Result   Stable postsurgical changes, without bowel obstruction or organized   fluid collection on today's examination.    Signed by Dr Ginger Kate:  Labs Reviewed   CBC WITH AUTO DIFFERENTIAL - Abnormal; Notable for the following components:       Result Value    WBC 12.1 (*)     RBC 3.49 (*)     Hemoglobin 9.8 (*)     Hematocrit 31.8 (*)     MCHC 30.8 (*)     RDW 15.0 (*)     Platelets 787 (*)     Neutrophils % 80.1 (*)     Lymphocytes % 7.5 (*)     Neutrophils Absolute 9.7 (*)     Lymphocytes Absolute 0.9 (*)     Monocytes Absolute 1.20 (*)     All other components within normal limits   COMPREHENSIVE METABOLIC PANEL W/ REFLEX TO MG FOR LOW K - Abnormal; Notable for the following components:    Sodium 132 (*)     Chloride 92 (*)     BUN 37 (*)     CREATININE 2.8 (*)     GFR Non- 23 (*)     GFR  28 (*)     Calcium 8.7 (*)     Albumin 2.8 (*)     Alkaline Phosphatase 309 (*)     ALT 56 (*)     AST 78 (*)     All other components within normal limits   LIPASE - Abnormal; Notable for the following components:    Lipase 93 (*)     All other components within normal limits   COVID-19, RAPID   URINALYSIS WITH MICROSCOPIC   COMPREHENSIVE METABOLIC PANEL W/ REFLEX TO MG FOR LOW K   CBC   OSMOLALITY, URINE   CREATININE, RANDOM URINE   SODIUM, URINE, RANDOM   PROTEIN, URINE, RANDOM EOSINOPHIL SMEAR, URINE       All other labs were within normal range or not returned as of this dictation. EMERGENCY DEPARTMENT COURSE and DIFFERENTIAL DIAGNOSIS/MDM:   Vitals:    Vitals:    03/07/22 1737   BP: 114/78   Pulse: 107   Resp: 18   SpO2: 99%       MDM  Patient is a 79-year-old male who presents with complaint of vomiting. He was sent in by his oncologist.  Kaylene Arian are stable. He did have mild tachycardia on arrival.  CBC does show mild leukocytosis which could be related to his dry heaving and vomiting. He also does have anemia which is actually improved since his last encounter for blood work. CMP does show some electrolyte disturbances including hyponatremia and hypochloremia. His kidney function has significantly worsened and he does have an acute kidney injury which is superimposed on his chronic kidney disease. Patient was given a liter of fluids in the ER as well as Zofran for symptom control. CT was obtained and is negative for any acute surgical abdominal process including obstruction. I spoke with Dr. Mecca Corrigan regarding the patient's results and he would like him to be admitted to the hospitalist.  Also spoke with the hospitalist who is agreeable to this admission. CONSULTS:  Dr Granda   Dr Arlene Avila, Hospitalist    FINAL IMPRESSION      1.  Acute kidney injury superimposed on CKD [facility])          DISPOSITION/PLAN   DISPOSITION Admitted 03/07/2022 07:19:39 PM    (Please note that portions of this note were completed with a voice recognition program.  Efforts were made to edit thedictations but occasionally words are mis-transcribed.)    CRYS Sena (electronically signed)     Josselin Sena  03/07/22 1921

## 2022-03-08 PROBLEM — R19.8 HIGH OUTPUT ILEOSTOMY (HCC): Status: ACTIVE | Noted: 2022-03-08

## 2022-03-08 PROBLEM — E43 SEVERE MALNUTRITION (HCC): Chronic | Status: ACTIVE | Noted: 2022-03-08

## 2022-03-08 PROBLEM — Z93.2 HIGH OUTPUT ILEOSTOMY (HCC): Status: ACTIVE | Noted: 2022-03-08

## 2022-03-08 LAB
ALBUMIN SERPL-MCNC: 3 G/DL (ref 3.5–5.2)
ALP BLD-CCNC: 287 U/L (ref 40–130)
ALT SERPL-CCNC: 76 U/L (ref 5–41)
ANION GAP SERPL CALCULATED.3IONS-SCNC: 19 MMOL/L (ref 7–19)
AST SERPL-CCNC: 97 U/L (ref 5–40)
BACTERIA: NEGATIVE /HPF
BILIRUB SERPL-MCNC: 0.4 MG/DL (ref 0.2–1.2)
BILIRUBIN URINE: ABNORMAL
BLOOD, URINE: NEGATIVE
BUN BLDV-MCNC: 42 MG/DL (ref 8–23)
CALCIUM SERPL-MCNC: 8.8 MG/DL (ref 8.8–10.2)
CHLORIDE BLD-SCNC: 95 MMOL/L (ref 98–111)
CLARITY: ABNORMAL
CO2: 21 MMOL/L (ref 22–29)
COLOR: ABNORMAL
CREAT SERPL-MCNC: 3 MG/DL (ref 0.5–1.2)
CREATININE URINE: 319.9 MG/DL (ref 4.2–622)
CRYSTALS, UA: ABNORMAL /HPF
EOSINOPHIL,URINE: NORMAL
EPITHELIAL CELLS, UA: 2 /HPF (ref 0–5)
EPITHELIAL CELLS, UA: ABNORMAL /HPF
GFR AFRICAN AMERICAN: 26
GFR NON-AFRICAN AMERICAN: 21
GLUCOSE BLD-MCNC: 101 MG/DL (ref 74–109)
GLUCOSE URINE: NEGATIVE MG/DL
HCT VFR BLD CALC: 31 % (ref 42–52)
HEMOGLOBIN: 9.8 G/DL (ref 14–18)
HYALINE CASTS: 27 /HPF (ref 0–8)
HYALINE CASTS: ABNORMAL /LPF (ref 0–5)
KETONES, URINE: ABNORMAL MG/DL
LEUKOCYTE ESTERASE, URINE: ABNORMAL
MCH RBC QN AUTO: 28.7 PG (ref 27–31)
MCHC RBC AUTO-ENTMCNC: 31.6 G/DL (ref 33–37)
MCV RBC AUTO: 90.6 FL (ref 80–94)
NITRITE, URINE: NEGATIVE
OSMOLALITY URINE: 467 MOSM/KG (ref 250–1200)
PDW BLD-RTO: 15 % (ref 11.5–14.5)
PH UA: 5 (ref 5–8)
PLATELET # BLD: 432 K/UL (ref 130–400)
PMV BLD AUTO: 10 FL (ref 9.4–12.4)
POTASSIUM REFLEX MAGNESIUM: 3.7 MMOL/L (ref 3.5–5)
PROTEIN PROTEIN: 77 MG/DL (ref 15–45)
PROTEIN UA: 30 MG/DL
RBC # BLD: 3.42 M/UL (ref 4.7–6.1)
RBC UA: ABNORMAL /HPF (ref 0–2)
SODIUM BLD-SCNC: 135 MMOL/L (ref 136–145)
SODIUM URINE: <20 MMOL/L
SPECIFIC GRAVITY UA: 1.02 (ref 1–1.03)
TOTAL PROTEIN: 7 G/DL (ref 6.6–8.7)
UROBILINOGEN, URINE: 0.2 E.U./DL
WBC # BLD: 11.8 K/UL (ref 4.8–10.8)
WBC UA: 5 /HPF (ref 0–5)
WBC UA: ABNORMAL /HPF (ref 0–5)

## 2022-03-08 PROCEDURE — 2580000003 HC RX 258: Performed by: INTERNAL MEDICINE

## 2022-03-08 PROCEDURE — 99221 1ST HOSP IP/OBS SF/LOW 40: CPT | Performed by: SURGERY

## 2022-03-08 PROCEDURE — 83935 ASSAY OF URINE OSMOLALITY: CPT

## 2022-03-08 PROCEDURE — 36415 COLL VENOUS BLD VENIPUNCTURE: CPT

## 2022-03-08 PROCEDURE — 87449 NOS EACH ORGANISM AG IA: CPT

## 2022-03-08 PROCEDURE — 6370000000 HC RX 637 (ALT 250 FOR IP)

## 2022-03-08 PROCEDURE — 2580000003 HC RX 258

## 2022-03-08 PROCEDURE — 87205 SMEAR GRAM STAIN: CPT

## 2022-03-08 PROCEDURE — 84300 ASSAY OF URINE SODIUM: CPT

## 2022-03-08 PROCEDURE — 87324 CLOSTRIDIUM AG IA: CPT

## 2022-03-08 PROCEDURE — 6360000002 HC RX W HCPCS

## 2022-03-08 PROCEDURE — 84156 ASSAY OF PROTEIN URINE: CPT

## 2022-03-08 PROCEDURE — 6360000002 HC RX W HCPCS: Performed by: INTERNAL MEDICINE

## 2022-03-08 PROCEDURE — 80053 COMPREHEN METABOLIC PANEL: CPT

## 2022-03-08 PROCEDURE — 99223 1ST HOSP IP/OBS HIGH 75: CPT | Performed by: INTERNAL MEDICINE

## 2022-03-08 PROCEDURE — 6370000000 HC RX 637 (ALT 250 FOR IP): Performed by: INTERNAL MEDICINE

## 2022-03-08 PROCEDURE — 81001 URINALYSIS AUTO W/SCOPE: CPT

## 2022-03-08 PROCEDURE — 82570 ASSAY OF URINE CREATININE: CPT

## 2022-03-08 PROCEDURE — 2500000003 HC RX 250 WO HCPCS: Performed by: HOSPITALIST

## 2022-03-08 PROCEDURE — 85027 COMPLETE CBC AUTOMATED: CPT

## 2022-03-08 PROCEDURE — 1210000000 HC MED SURG R&B

## 2022-03-08 PROCEDURE — 36592 COLLECT BLOOD FROM PICC: CPT

## 2022-03-08 PROCEDURE — 51798 US URINE CAPACITY MEASURE: CPT

## 2022-03-08 RX ORDER — OCTREOTIDE ACETATE 100 UG/ML
100 INJECTION, SOLUTION INTRAVENOUS; SUBCUTANEOUS EVERY 8 HOURS
Status: DISCONTINUED | OUTPATIENT
Start: 2022-03-08 | End: 2022-03-11 | Stop reason: HOSPADM

## 2022-03-08 RX ORDER — LOPERAMIDE HYDROCHLORIDE 2 MG/1
2 CAPSULE ORAL EVERY 4 HOURS PRN
Status: DISCONTINUED | OUTPATIENT
Start: 2022-03-08 | End: 2022-03-11 | Stop reason: HOSPADM

## 2022-03-08 RX ORDER — DIPHENOXYLATE HYDROCHLORIDE AND ATROPINE SULFATE 2.5; .025 MG/1; MG/1
1 TABLET ORAL 4 TIMES DAILY
Status: DISCONTINUED | OUTPATIENT
Start: 2022-03-08 | End: 2022-03-11 | Stop reason: HOSPADM

## 2022-03-08 RX ADMIN — DRONABINOL 2.5 MG: 2.5 CAPSULE ORAL at 08:52

## 2022-03-08 RX ADMIN — DIPHENOXYLATE HYDROCHLORIDE AND ATROPINE SULFATE 1 TABLET: 2.5; .025 TABLET ORAL at 08:52

## 2022-03-08 RX ADMIN — DIPHENOXYLATE HYDROCHLORIDE AND ATROPINE SULFATE 1 TABLET: 2.5; .025 TABLET ORAL at 12:41

## 2022-03-08 RX ADMIN — PRAZOSIN HYDROCHLORIDE 1 MG: 1 CAPSULE ORAL at 21:17

## 2022-03-08 RX ADMIN — PSYLLIUM HUSK 1 PACKET: 3.4 POWDER ORAL at 08:52

## 2022-03-08 RX ADMIN — ASCORBIC ACID, VITAMIN A PALMITATE, CHOLECALCIFEROL, THIAMINE HYDROCHLORIDE, RIBOFLAVIN-5 PHOSPHATE SODIUM, PYRIDOXINE HYDROCHLORIDE, NIACINAMIDE, DEXPANTHENOL, ALPHA-TOCOPHEROL ACETATE, VITAMIN K1, FOLIC ACID, BIOTIN, CYANOCOBALAMIN: 200; 3300; 200; 6; 3.6; 6; 40; 15; 10; 150; 600; 60; 5 INJECTION, SOLUTION INTRAVENOUS at 18:15

## 2022-03-08 RX ADMIN — DIPHENOXYLATE HYDROCHLORIDE AND ATROPINE SULFATE 1 TABLET: 2.5; .025 TABLET ORAL at 18:04

## 2022-03-08 RX ADMIN — LOPERAMIDE HYDROCHLORIDE 2 MG: 2 CAPSULE ORAL at 08:52

## 2022-03-08 RX ADMIN — SODIUM CHLORIDE: 9 INJECTION, SOLUTION INTRAVENOUS at 18:03

## 2022-03-08 RX ADMIN — ONDANSETRON 4 MG: 2 INJECTION INTRAMUSCULAR; INTRAVENOUS at 01:33

## 2022-03-08 RX ADMIN — OCTREOTIDE ACETATE 100 MCG: 100 INJECTION, SOLUTION INTRAVENOUS; SUBCUTANEOUS at 18:04

## 2022-03-08 RX ADMIN — I.V. FAT EMULSION 250 ML: 20 EMULSION INTRAVENOUS at 18:08

## 2022-03-08 RX ADMIN — OCTREOTIDE ACETATE 100 MCG: 100 INJECTION, SOLUTION INTRAVENOUS; SUBCUTANEOUS at 08:52

## 2022-03-08 RX ADMIN — DIPHENOXYLATE HYDROCHLORIDE AND ATROPINE SULFATE 1 TABLET: 2.5; .025 TABLET ORAL at 21:17

## 2022-03-08 RX ADMIN — SODIUM CHLORIDE: 9 INJECTION, SOLUTION INTRAVENOUS at 01:09

## 2022-03-08 RX ADMIN — SODIUM CHLORIDE, PRESERVATIVE FREE 10 ML: 5 INJECTION INTRAVENOUS at 08:53

## 2022-03-08 RX ADMIN — APIXABAN 5 MG: 5 TABLET, FILM COATED ORAL at 08:52

## 2022-03-08 RX ADMIN — PANTOPRAZOLE SODIUM 40 MG: 40 TABLET, DELAYED RELEASE ORAL at 08:52

## 2022-03-08 RX ADMIN — MEGESTROL ACETATE 800 MG: 40 SUSPENSION ORAL at 08:52

## 2022-03-08 RX ADMIN — APIXABAN 5 MG: 5 TABLET, FILM COATED ORAL at 21:18

## 2022-03-08 RX ADMIN — PROMETHAZINE HYDROCHLORIDE 12.5 MG: 25 TABLET ORAL at 03:58

## 2022-03-08 RX ADMIN — LOPERAMIDE HYDROCHLORIDE 2 MG: 2 CAPSULE ORAL at 18:04

## 2022-03-08 ASSESSMENT — ENCOUNTER SYMPTOMS
NAUSEA: 1
COLOR CHANGE: 0
DIARRHEA: 1
VOMITING: 1
ABDOMINAL PAIN: 0
COUGH: 0
SHORTNESS OF BREATH: 0
ABDOMINAL DISTENTION: 0

## 2022-03-08 NOTE — CARE COORDINATION
Initial CM Assessment    Initial Assessment Completed at bedside with:    [x]   Patient  []   Family/Caregiver/Guardian   []   Other:      Patient Contact Information:  [phone] (home)   Above information verified? [x]   Yes  []   No    ADLS:    Independent   Support System:   Spouse/Significant Other,Home Care Staff  Plan to return to current housing:   [x]   Yes  []   No    Transportation plan for Discharge:  Spouse     Do you have any unmet social needs that would keep you from returning home safely:  []   Yes  [x]   No              Unmet Social Needs Notes:       Had [address] [facility] prior to admission:    []   Yes  [x]   No    Currently ACTIVE with Home Health CARE:    [x]   Yes  []   No  []   Interested at discharge  [facility]:   [facility]     Current PCP:  KALPESH Wakefield NP  PCP verified? [x]   Yes  []   No    Have you been vaccinated for COVID-19 (SARS-CoV-2)? [x]   Yes  []   No                   If so, when? Which :  []   Pfizer-BioNTech  []   Moderna  [x]   [employer]  []   Other:       Pharmacy:    [address], [address] [place] [address] [phone] Hurman Klinefelter [phone]  [address] [facility]  [address]  Phone: [phone] Fax: [address] [place] [address] Ren Huggins [address] [facility] [phone]  [facility]  [address] [place] [address]  Phone: [phone] Fax: [address] [facility], [address] [place] [address] [phone] -  [phone]  [address] [place] [address]  Phone: [phone] Fax: [address] [facility], [address] [place] [address] [address] [place] [address] [address] [place] [address]  [address] [place] [address]  Phone: [phone] Fax: [phone]    Prefer to use Meds to Bed? []   Yes  [x]   No  Potential assistance purchasing medications?      []   Yes  [x]   No    Active with HD/PD prior to admission: []   Yes  [x]   No  HD Center:       Financial:  Payor: Shantell Castillo / Plan: Airam Lazar / Product Type: *No Product type* /     Pre-Cert required for SNF:   [x]   Yes  []   No    Patient Deficits:  []   Yes   [x]   No    If yes:  []   Confusion/Memory  []   Visual  []   Motor/Sensory         []   Right arm         []   Right leg         []   Left arm         []   Left leg  []   Language/Speech         []   Aphasia         []   Dysarthria         []   Swallow         [place] Coma Scale  Eye Opening: Spontaneous  Best Verbal Response: Oriented  Best Motor Response: Obeys commands  [place] Coma Scale Score: 15    Patient Deficit Notes: Additional CM/SW Notes:   Readmission assessment completed. Pt reports that he has been continuing IVFs through [address] [employer]. He denies any needs at this time, SW will follow.      Rocci and/or his family were provided with choice of provider:  [x]   Yes   []   No      Patient Admission Status:       [address]

## 2022-03-08 NOTE — CONSULTS
[facility] General Surgery     Consult Note    Patient ID: Beatrice Hoffman  74 y.o.  male  YOB: 1955    Admitting Diagnosis: Acute kidney injury superimposed on CKD ([facility] [place] [address] [N17.9, N18.9]    Subjective:      Mr. Adry Partida is a very pleasant 80-year-old gentleman well-known to me from: Surgery done in March 2020 for treatment of a colon cancer. I am asked to see him now in regards to high ileostomy output with associated dehydration. Since his initial surgery in 2020 Mr. Adry Partida has been cared for at [facility]. He has undergone several treatments related to metastatic disease. Most recently he underwent tumor debulking with hyperthermic intraperitoneal chemotherapy. He tolerated this well but about 2 weeks later developed a bowel obstruction related to adhesions. He was returned to surgery and underwent correction of a small perforation which required placement of an ileostomy. He has recovered from that surgery and his abdomen is healed but he continues with high output from the ileostomy which in turn has caused problems with dehydration and early kidney failure. I am asked to comment on any additional measures which may help to slow down his ileostomy output and improve his appetite. Today Mr. Adry Partida notes that he feels reasonably well. He reports being quite weak that he is only able to take a few steps while being supported by the staff. He also notes a poor appetite. He just has no interest in food. He did have some nausea overnight last night and vomited once but that is been the first and quite some time but has had nausea and vomiting. He denies significant abdominal pain. His wife is caring for his ileostomy and she reports that the skin is in good condition. She also notes that his midline seems well-healed but that staples remain in place as does a surgical drain exiting the left mid abdomen.     Past Medical History:   Diagnosis Date    Acid reflux     Cancer [facility])     adenocarcinoma of colon    GERD (gastroesophageal reflux disease)     Palliative care patient 03/01/2022    PTSD (post-traumatic stress disorder)     Stage 3a chronic kidney disease ([facility] [address]      Past Surgical History:   Procedure Laterality Date    ABLATION OF DYSRHYTHMIC FOCUS      ablation of liver at [address]  2003    CHOLECYSTECTOMY  2013    COLONOSCOPY      when pt was in the 19's    COLONOSCOPY N/A 03/11/2020    COLONOSCOPY POLYPECTOMY SNARE/COLD BIOPSY: Dr. Les Izquierdo colonoscopy: 6-12 months due to findings at colonoscopy today with Cecal mass lesion and large polyps.  COLONOSCOPY      COLONOSCOPY N/A 03/17/2021    Dr Ruth Ann Brown, Post-operative changes w IC anastomosis & single visible staple, Mild Diverticuosis, Int Hemorrhoids Grade 1, 3 year recall    HEMICOLECTOMY Right 03/30/2020    LAPAROSCOPIC-ASSISTED RIGHT HEMICOLECTOMY performed by Bethany Gross MD at [address] N/A 06/03/2020    INSERTION OF VENOUS PORT with flouro performed by Bethany Gross MD at [address] [facility] N/A 03/11/2020    Dr. May Rojas:   [facility]     No current facility-administered medications on file prior to encounter. Current Outpatient Medications on File Prior to Encounter   Medication Sig Dispense Refill    dronabinol (MARINOL) 2.5 MG capsule Take 1 capsule by mouth 2 times daily (before meals) for 30 days. 60 capsule 0    megestrol (MEGACE) 40 MG/ML suspension Take 20 mLs by mouth daily 240 mL 0    psyllium (METAMUCIL) 58.12 % PACK packet Take 1 packet by mouth 3 times daily (with meals) 90 packet 0    [START ON 4/1/2022] apixaban (ELIQUIS) 5 MG TABS tablet Take 1 tablet by mouth 2 times daily 60 tablet 0    oxyCODONE HCl (OXY-IR) 10 MG immediate release tablet Take 10 mg by mouth every 4 hours as needed for Pain.  HYDROmorphone (DILAUDID) 4 MG tablet Take 2 mg by mouth every 4 hours as needed for Pain.        methocarbamol (ROBAXIN) 500 MG tablet Take 500 mg by mouth 3 times daily as needed       loperamide (IMODIUM) 2 MG capsule Take 2 mg by mouth 2 times daily as needed for Diarrhea      promethazine (PHENERGAN) 12.5 MG tablet Take 1 tablet by mouth every 6 hours as needed for Nausea 60 tablet 5    omeprazole (PRILOSEC) 20 MG delayed release capsule Take 20 mg by mouth daily      prazosin (MINIPRESS) 1 MG capsule Take 1 mg by mouth nightly as needed For nightmares       Sertraline HCl (ZOLOFT PO) Take by mouth daily      Cholecalciferol (VITAMIN D3) 50 MCG (2000 UT) CAPS Take by mouth daily       Allergies: Morphine and Oxycodone-acetaminophen    Family History   Problem Relation Age of Onset    Liver Cancer Mother     High Blood Pressure Mother     Colon Cancer Mother         x2    Cancer Father         Lung Cancer    Breast Cancer Sister     Cancer Maternal Grandfather         Lung Cancer    Cancer Paternal Grandfather         Stomach Cancer    Colon Polyps Neg Hx     Cystic Fibrosis Neg Hx     Liver Disease Neg Hx     Rectal Cancer Neg Hx        Social History     Tobacco Use    Smoking status: Never Smoker    Smokeless tobacco: Never Used   Substance Use Topics    Alcohol use: Yes     Comment: rare      Review of Systems   Constitutional: Positive for activity change, appetite change, chills and fatigue. Negative for fever. Respiratory: Negative for cough and shortness of breath. Cardiovascular: Negative for chest pain and palpitations. Gastrointestinal: Positive for diarrhea, nausea and vomiting. Negative for abdominal distention and abdominal pain. Genitourinary: Negative for dysuria, frequency and urgency. Skin: Negative for color change. Neurological: Positive for dizziness and weakness.        Objective:   /77   Pulse 87   Temp 97.2 °F (36.2 °C) (Temporal)   Resp 18   Ht 5' 7\" (1.702 m)   Wt 119 lb 9.6 oz (54.3 kg)   SpO2 98%   BMI 18.73 kg/m²       Intake/Output Summary (Last 24 hours) at 3/8/2022 1427  Last data filed at 3/8/2022 1211  Gross per 24 hour   Intake 1008.27 ml   Output 1375 ml   Net -366.73 ml     Physical Exam  Constitutional:       Appearance: Normal appearance. Abdominal:      Comments: His abdomen is flat and soft. No tenderness appreciated. Midline incision appears well-healed. Staples are in place. There is a FARHAT drain that exits the left mid abdomen. This contains a small volume of thick greenish-yellow fluid. An ileostomy is in the right lower quadrant. His appliance is well adherent at this time and I did not remove it. There is a large volume of liquid stool within the ostomy bag. Skin:     General: Skin is warm and dry. Coloration: Skin is not jaundiced or pale. Neurological:      Mental Status: He is alert and oriented to person, place, and time. Psychiatric:         Mood and Affect: Mood normal.         Behavior: Behavior normal.         Thought Content: Thought content normal.         Judgment: Judgment normal.         Data:  CBC:   Recent Labs     03/07/22  1557 03/08/22 0422   WBC 12.1* 11.8*   RBC 3.49* 3.42*   HGB 9.8* 9.8*   HCT 31.8* 31.0*   MCV 91.1 90.6   RDW 15.0* 15.0*   * 432*     BMP:   Recent Labs     03/07/22  1557 03/08/22 0422   * 135*   K 3.9 3.7   CL 92* 95*   CO2 22 21*   ANIONGAP 18 19   GLUCOSE 106 101   CREATININE 2.8* 3.0*   LABGLOM 23* 21*   CALCIUM 8.7* 8.8     LFT:   Recent Labs     03/07/22  1557 03/08/22 0422   PROT 7.6 7.0   LABALBU 2.8* 3.0*   BILITOT 0.5 0.4   ALKPHOS 309* 287*   ALT 56* 76*   AST 78* 97*     PT/INR: No results for input(s): PROTIME, INR in the last 72 hours. Imaging:  CT ABDOMEN PELVIS WO CONTRAST Additional Contrast? None   Final Result   Stable postsurgical changes, without bowel obstruction or organized   fluid collection on today's examination. Signed by Dr Katy Loo          Assessment:     1. Ileostomy in place with high output.   In reviewing his chart he is already on all of the modalities that we have available to try to treat this. He is receiving standing Lomotil with as needed Imodium. He is on appropriate dose of octreotide and is also receiving bulking agents by mouth. There is no surgical treatment for this other than reversal of the ileostomy and his surgeon at [facility] is not ready to move in that direction yet. 2.  Dehydration secondary to #1.  3.  Acute kidney injury secondary to #2. Plan:     1. Continue with current intervention aimed at decreasing his ileostomy output. This can be a difficult problem and is occasionally refractory to medical treatment. 2.  We will ask wound ostomy nurse to see him to reinforce teaching with his wife and to see that he has appropriate supplies available. 3.  We will asked the nursing staff to remove his midline staples has been a month since they were placed. 4.  I will recheck in the morning. Thank you for asking me to see Butch Chuyita Merrill in consultation.     Rodo Davidson MD

## 2022-03-08 NOTE — CARE COORDINATION
Per Jose Osuna at [employer]:   [employer] did not cover TPN, but likely will with appropriate documentation. Pt does use [facility] for IV Fluids, HH relayed that they felt TPN would benefic pt tremendously.    Electronically signed by Radha Christianson on 3/8/2022 at 1:38 PM

## 2022-03-08 NOTE — PROGRESS NOTES
Removal of staples done at bedside by this RN. No bleeding. No complaints of pain from the patient. Dry healed midline insicion. 35 staples removed and double counted. Open to air.   Electronically signed by Shila Elizabeth RN on 3/8/2022 at 3:06 PM

## 2022-03-08 NOTE — PROGRESS NOTES
Comprehensive Nutrition Assessment    Type and Reason for Visit:  Initial,Positive Nutrition Screen    Nutrition Recommendations/Plan:   Recommend TPN d/t severe malnutrition and poor tolerance of po intake. Pt at risk for refeeding syndrome, initiate slowly. If TPN desired, recommend : Clinimix E 5/15 @ goal rate of 70 ml/hr with 250 ml of 20% lipids 3X/week. Nutrition Assessment:  +NS for wt loss and poor po intake PTA. Pt meets criteria for Chronic Severe Malnturition AEB poor po intake >1 month and severe wt loss of 23%, 36 lbs since January (2022). Pt and spouse present at time of visit. Pt endorses no appetite and disinterest in food despite multiple appetite stimulants. Reports early satiety when he is able to take a couple of bites of food. Spouse reports pt previously recieved TPN at [facility] following surgery and did well, however was unable to d/c home with TPN support. Pt requiring IFV to maintain hydration status at this time. Pt would benefit from alternative source of nutrition, recommend initiate TPN to prevent further nutrition decline. Malnutrition Assessment:  Malnutrition Status:  Severe malnutrition    Context:  Chronic Illness     Findings of the 6 clinical characteristics of malnutrition:  Energy Intake:  7 - 75% or less estimated energy requirements for 1 month or longer  Weight Loss:  7 - Greater than 5% over 1 month     Body Fat Loss:  1 - Mild body fat loss Orbital,Triceps,Buccal region   Muscle Mass Loss:  1 - Mild muscle mass loss Temples (temporalis),Clavicles (pectoralis & deltoids)  Fluid Accumulation:  No significant fluid accumulation     Strength:  Not Performed    Estimated Daily Nutrient Needs:  Energy (kcal):  0118-2014 kcals/kg;  Weight Used for Energy Requirements:  Current (30-35 kcals/kg)     Protein (g):  65-97 g/pro/day; Weight Used for Protein Requirements:  Current (1.2-1.8 g/kg)        Fluid (ml/day):  4689-5420 ml/fluid/day (in addition to ostomy output replacment); Method Used for Fluid Requirements:  1 ml/kcal      Nutrition Related Findings:  high output ostomy      Wounds:  Stage I,Pressure Injury,Surgical Incision       Current Nutrition Therapies:    ADULT DIET; Regular  Current Parenteral Nutrition Orders:  · Type and Formula: Premix Central   · Lipids: 250ml,Three times weekly  · Duration: Continuous  · Rate/Volume: Clinimix E 5/15 @ 70 ml/hr= 1680 ml/day  · Current PN Order Provides: 0  · Goal PN Orders Provides: 5/15 @ 70 ml/hr = 1193 kcals/day, 84 g Protein, 252 g dextrose. Lipids provide 500 kcals per run. Anthropometric Measures:  · Height: 5' 7\" (170.2 cm)  · Current Body Weight: 119 lb (54 kg)   · Admission Body Weight: 119 lb (54 kg)    · Usual Body Weight: 155 lb (70.3 kg) (2/2022)     · Ideal Body Weight: 148 lbs  · BMI: 18.6   · BMI Categories: Normal Weight (BMI 18.5-24. 9)       Nutrition Diagnosis:   · Severe malnutrition,In context of chronic illness related to catabolic illness,inadequate protein-energy intake as evidenced by poor intake prior to admission,weight loss greater than or equal to 5% in 1 month,mild loss of subcutaneous fat,mild muscle loss    Nutrition Interventions:   Food and/or Nutrient Delivery:  Continue Current Diet,Start Oral Nutrition Supplement,Start Parenteral Nutrition  Nutrition Education/Counseling:  Education not indicated   Coordination of Nutrition Care:  Continue to monitor while inpatient    Goals:  Po intake >50% of meals and ONS or tolerance of PN to meet nutritional needs. Nutrition Monitoring and Evaluation:   Food/Nutrient Intake Outcomes:  Diet Advancement/Tolerance,Food and Nutrient Intake,Supplement Intake  Physical Signs/Symptoms Outcomes:  Biochemical Data,Nutrition Focused Physical Findings,Skin,Weight,GI Status,Fluid Status or Edema     Discharge Planning:     Too soon to determine     Electronically signed by [place] Fu, MS, RD, LD on 3/8/22 at 3:20 PM CST    Contact: [phone]

## 2022-03-08 NOTE — PLAN OF CARE
Nutrition Problem #1: Severe malnutrition,In context of chronic illness  Intervention: Food and/or Nutrient Delivery: Continue Current Diet,Start Oral Nutrition Supplement,Start Parenteral Nutrition  Nutritional Goals: Po intake >50% of meals and ONS or tolerance of PN to meet nutritional needs.     Problem: Nutrition  Goal: Optimal nutrition therapy  Outcome: Ongoing

## 2022-03-08 NOTE — PROGRESS NOTES
MEDICAL ONCOLOGY CONSULTATION    Pt Name: Odessa Hargrove  MRN: [id number]  YOB: 1955  Date of evaluation: 3/8/2022    REASON FOR CONSULTATION: Colon cancer, continuity of care  REQUESTING PHYSICIAN: Hospitalist    History Obtained From:    patient, electronic medical record    HISTORY OF PRESENT ILLNESS:  The patient is well-known to my clinic. He has a diagnosis of recurrent colon cancer. He is a status post debulking surgery of peritoneal carcinomatosis at [facility] in January 2022 followed by hyperthermic chemotherapy. The patient has a ileostomy in place. He has had a high output through his ileostomy resulting in severe dehydration, acute kidney injury. He was recently hospitalized and discharged last Saturday. He had a home health with IV fluids twice a week. Unfortunately, due to poor p.o. intake and high output through ileostomy his creatinine bounced back to 3.0. He was previously hospitalized with creatinine above 4. Due to severe dehydration he was again brought to the hospital for further aggressive IV fluid hydration and further management of his high output ileostomy.     Diagnosis  Colonic adenocarcinoma, March 2020  [id number](liver), stage ROXANA  IHC MMR- proficient  K-maria luisa mutated  N-MARIA LUISA/BRAF wild-type  Iron deficiency anemia  Soft tissue mass, June 2021  Adenocarcinoma-consistent with colon primary, right psoas muscle, June 2021  Metastatic adenocarcinoma, Jan 2022  MLH1, MSH2, MH6, PMS2-intact  MMR- no loss of expression  Port associated DVT right upper extremity, February 2022     Treatment summary  3/30/2020-right hemicolectomy at [facility]  Anticipated Liver ablation to be followed by neoadjuvant/adjuvant versus palliative chemotherapy  06/10/2020-November 2020-FOLFOX + Avastin  12/11/20- Right hepatectomy-Dr. Vinny Tee  S/p Injectafer-poor oral iron tolerance  7/13/21- Initiate FOLFIRI + Avastin every 2 weeks  1/13/22-psoas muscle mass resection/HIPEC by Dr. Dionisio Corbin at [facility]  2/10/2022-back to OR for correction of internal hernia        The patient is a 61years old male who has a diagnosis of colonic adenocarcinoma. The patient had malignant disease with several lymph nodes involved. In addition, the patient was also found to have suspicious liver lesions concerning for metastatic disease. He was seen by [facility] and offered a multimodality approach with liver ablation of the left liver lesion followed by neoadjuvant chemotherapy and partial right hepatectomy. He underwent microwave ablation of the left lobe liver lesion on 6/8/2020. He is status post completion of 11 biweekly cycles of FOLFOX/Avasti completed November 2020. He tolerated treatment with complaints of mild transient cold neuropathy. He had a right hepatectomy on 12/11/2020 that showed no residual disease. His last CEA was normal in January 2021. He had MRI of the abdomen at [facility] in March 2021 that showed no evidence of recurrent disease in the liver or in the abdomen. He also had a surveillance colonoscopy in March 2021 that showed no polyps. CEA was elevated in May 2021. Repeat CEA June 2021 showed persistent elevation. MRI abdomen at [facility] showed no evidence of liver recurrence but a suspicious nodule in the right lower quadrant. Biopsy was performed at [facility] and consistent with recurrent adenocarcinoma. I discussed the findings with hepatobiliary service and also colorectal surgery. He was started on FOLFIRI/Avastin. He was seen by Dr. Kevin Naidu again on 9/24/2021. He had repeat CT abdomen pelvis and also MRI at [facility] that showed persistent disease involving the psoas muscle. In addition, an additional small place that may represent further peritoneal metastatic disease. He underwent surgery at [facility]. He underwent exploratory laparotomy with resection of psoas mass at [facility] on 1/13/2022. He also underwent HIPEC treatment.    The patient was taken to the OR on 2/10/2022 for correction of internal hernia. Cancer history  Mr. Sol Rojas was first seen by me on 3/23/2020. He was referred for a new diagnosis of colonic adenocarcinoma involving the cecum. The patient reports that he had a wellbeing consult with his provider at the [place]. He was found to have anemia and then recommended a colonoscopy. Of note, the patient has a family history of colon cancer. His mother is a patient of Dr. Sherron Finch he has been diagnosed with colon cancer in 2010.  3/11/2020- colonoscopy revealed a large malignant appearing fungating mass lesion in the cecum. In addition, several other polyps. Biopsy of the mass consistent with moderate differentiated colonic adenocarcinoma. Polyps consistent with tubulovillous adenoma with no high-grade dysplasia. IHC MMR not proficient. K-maria luisa mutated, BRAF and NRAS wild type. MSI proficient  3/11/2020-CEA 5.5 (H)  3/18/2020-CT abdomen pelvis with contrast  Invasive cecal mass adhering to the right lateral abdominal wall muscles with adjacent lymphadenopathy. Mild partial obstruction of the terminal ileum. 2. Suspicious lesions in the right and left hepatic lobes measure up to 1.3 cm and likely represent metastatic disease. 3/18/2020-Xr Chest Standard  No radiographic evidence of acute cardiopulmonary process. 3/23/2020-he was first seen by me. Recommended completion of staging with CT chest.  Also recommended liver MRI for further clarification of liver lesion. S.  Recommend to proceed with a general surgery consultation tomorrow with Dr. Saluo Robert. Patient was informed that I favor surgical resection if feasible of the primary malignancy. 3/30/2020- right hemicolectomy by Dr. Saulo Robert at [facility] consistent with invasive moderately differentiated colonic adenocarcinoma measuring 7.2 cm. Carcinoma directly invading the adjacent abdominal wall tissue. Focal lymphovascular space invasion identified.   Focal perineural invasion identified. Surgical margins negative for evidence of malignancy. 6 out of 14 lymph nodes positive for metastatic adenocarcinoma. Final pathology staging [id number](liver, stage ROXANA)  4/20/2020-CT chest with contrast showed No convincing intrathoracic metastasis. Nonspecific 4 mm nodule of the inferior lingula and a 2 mm right upper lobe nodule can be followed on subsequent imaging in 6-12 months. Moderate coronary calcifications. Hypodense metastatic liver lesions. Small hiatal hernia. 4/20/2020-Mri Abdomen W Wo Contrast There are about 5 liver lesions. The 2 largest appears similar compared to 3/18/2020, the others are too small to further characterize. Appearance is most concerning for metastatic disease. Enhancement of the right lateral peritoneum. This is favored to be postoperative as there is no nodularity, evidence of omental disease or lymphadenopathy. Recommend attention on follow-up. Cholecystectomy. 4/22/2020-discussed with Dr. Paris Camargo at [place]. He will review imaging studies and give further recommendations regarding eligibility for resection of liver lesions. 5/8/2020 CT Abdomen The two largest suspicious lesions measuring 1.2 and 1.3 cm in the  right and left hepatic lobes respectively are similar compared to the  3/18/2020 CT. Additionally, there are at least five subcentimeter lesions with similar signal characteristics, which are also highly suspicious for metastases. If complete characterization of the number and distribution of lesions is necessary, an MRI with Eovist could be acquired. 5/19/2020- he was seen by the hepatobiliary service at [facility] with Dr. Paris Camargo:  patient adequate risk candidate for a multimodal approach, directed toward curative hepatectomy eventually. Endorsed by Hepatobiliary Conference, I recommended perc ablation of the L hemiliver to clear it, followed by systemic therapy in a neoadjuvant strategy.  Restaging imaging to confirm clearance of disease on the left and lack of progression to unresectability of the R hemiliver disease would then be followed by R hepatectomy. Limitations to this approach may be accessibility of the segment 4A/8 disease high in the hepatic dome and the possibility of heat sink-related recurrence s/p abation of the left-sided disease. 5/21/2020- referral for Mediport placement and start FOLFOX in 2 weeks. Will add bevacizumab after 6 weeks of liver ablation. We will plan for 12 biweekly dose of FOLFOX. Bevacizumab will also be stopped 6 weeks prior to major procedure. 6/8/2020- Microwave ablation of the left liver lesion was then performed for 5 minutes at [address] to achieve a 3.4 x 3.9 cm approximately spherical zone of ablation. 6/10/2020- initiation of FOLFOX.  7/20/2020-added Avastin. 9/10/20 MRI abdomen: Left hepatic lobe segment II lesion demonstrates small T1 hyperintense blood products, status post microwave ablation on 6/8/2020. No definitive enhancement within the lesion. Focal internal thickening or scar present. Recommend attention on follow-up. Additional scattered subcentimeter foci throughout the liver decreased in size compared to MRI dated 4/20/2020 consistent with improving metastatic disease. Additional chronic findings as above. 9/16/2020-discussed with plan with the patient and Rhys Thakur. Interval response to treatment. Plan to continue chemotherapy through 12 cycles with Avastin. 12/11/20 Right hepatectomy-Dr. Damon Larios  12/11/20 Right hepatectomy pathology: Liver, right, resection: Focus of Fibrosis with calcifications and chronic inflammation (0.3 cm), see comment. Background hepatic parenchyma with minimal periportal fibrosis (trichrome stain), minimal lobular and portal inflammation, and no significant macrovesicular steatosis (<5%) Comments: The patient's history of colorectal cancer status adjuvant therapy is noted.  The focus of fibrosis may reflect treatment effect. There is no evidence of viable tumor in the sampled specimen. 12/14/20 Ct Abdomen Pelvis W Iv Contrast A Small bowel obstruction with transition point at the distal small bowel, just proximal to RIGHT upper quadrant ileocolonic anastomosis. Postoperative change of RIGHT hepatectomy with small amount of expected free fluid and intraperitoneal gas. Redemonstrated LEFT liver lesion. Small bilateral pleural effusions. 12/16/20 SBFT-[facility]: Study is limited due to retained contrast in the small bowel from prior attempt at small bowel follow-through on the floor. Small bowel remains dilated, measuring approximately 5.2 cm, which is consistent with partial or resolving small bowel obstruction. Final radiographs show contrast within the colon. 1/4/2020-resolution of small bowel obstruction. 1/7/21 CT chest: No finding to suggest intrathoracic neoplastic process or metastatic disease. The benign-appearing tiny nodule in the right upper lobe probably represent a noncalcified granuloma. A nodule in the lingular segment of the left upper lobe is not visualized in this study. Postsurgical changes of the liver. No evidence of focal complication. A trace right basal pleural effusion. This may be reactive to the previous abdominal surgery. 3/4/21 MRI abd: Status post right hepatectomy and microwave ablation of a left liver lesion. No findings to suggest residual/recurrent disease. No new liver lesion is identified. Postsurgical changes related to right hemicolectomy. Microwave ablation zone in segment II of the left hepatic lobe measures approximately 21 mm in diameter, unchanged. No appreciable postcontrast enhancement or other findings to suggest local disease recurrence. No new liver lesion is identified. Spleen is mildly enlarged, measuring 13.8 cm in length. 3/17/2021-1 year colonoscopy showed no evidence of polyps.   5/3/21 CEA 6.2  6/8/21 MRI abdomen ([facility]): Status post right hepatectomy and MWA of a left liver lesion. No evidence of residual or recurrent metastatic disease in the liver. Status post prior right hemicolectomy for colon carcinoma. Within the posterior right iliopsoas muscle there is a mildly T2 hyperintense nodular focus with postcontrast rim enhancement and diffusion restriction. This measures approximately 2.7 x 2 x 2 cm. More delayed postcontrast images show irregular area of enhancement measuring 2.4 x 7.7 cm axially involving the right iliopsoas muscle and the right lateral abdominal wall. Suggest correlation with contrast enhanced CT exam for complete evaluation. Consider biopsy of this lesion. 6/15/21 CT CHEST WO CONTRAST No metastatic disease in the chest.  No change in tiny 2 mm RIGHT upper lobe pulmonary nodule. Mild ectasia of the ascending aorta measuring 4 cm.   6/18/2021-I reviewed results of MRI abdomen at [address] [place] [address]. CT chest without contrast reviewed by me and showed no evidence of metastatic disease. Stable 2 mm right upper lobe nodule. Discussed with hepatobiliary service at [address] [place] [address]. Biopsy intra-abdominal nodule next week at [address] [place] [address]. 6/25/[id number]-UB-kcsple biopsy soft tissue mass right psoas muscle at [address] [place] [address] consistent with recurrent colonic adenocarcinoma. 7/2/2021-discussed with hepatobiliary service at [address] [place] [address] and also surgical oncology. Surgical oncology will call us back regarding consultation for consideration of HIPEC if he is a candidate  7/13/21-initiation of chemotherapy with FOLFIRI + Avastin every 2 weeks  7/13/21 CEA 10.7  7/27/2021-CEA 9.6  7/30/2021-she was seen by the surgical oncology group at [address] [place] [address] by Dr Krishan Gordon. They recommended to complete 6 cycles of chemotherapy and repeat CT chest abdomen pelvis and liver MRI to assess disease response. They discussed the role of CRS/HIPEC in his situation. He was told that it really depends on the status of his liver lesions as well.  He will complete 6 cycles of chemotherapy and will return to see me with a CTAP as well as MRI of the liver to assess disease burden and determine if he can be a candidate for debulking.  8/25/2021-proceed cycle #4.  9/22/2021 CEA- 8.1  9/24/2021 MRI with and w/o Contrast ([facility])  Tiny left retroperitoneal nodule involving the left lateral conal fascial new compared to 3/4/2021 though unchanged from most recent prior, possibly an additional site of metastasis. No other new metastatic disease within the abdomen. Similar partially visualized right posterior body wall/psoas infiltrative lesion not definitely changed from MRI abdomen 6/8/2021 but better evaluated in its entirety on concurrent CT, reported separately. Status post right hemicolectomy, right hepatectomy, and segment III microwave ablation. Similar mild splenomegaly. Additional chronic and incidental findings as described in the body the report. Liver: Status post right hepatectomy. Ablation zone in segment II measures 1.7 x 1.1 cm, slightly decreased in size from 1.8 x 1.4 cm previously (series 1301 image 56) without appreciable enhancement. Similar tiny subcentimeter cyst in the left hepatic lobe. No new focal hepatic lesion identified. No visualized free fluid. Similar 0.7 cm enhancing nodule along the left lateral conal fascia laterally new from 3/4/2021, grossly unchanged from MRI dated 6/8/2021. (series 801 image 22). No other appreciable peritoneal/retroperitoneal or  omental nodularity. Ill-defined T2 hyperintense signal and hyperenhancement in the right posteromedial body wall involving the lateral aspect of the psoas muscle and posterolateral abdominal wall musculature, partially visualized measuring at least 8.7 x 3.1   cm, grossly similar to the prior exam, better evaluated in its entirety on concurrent CT reported separately.    9/24/2021 CT C/A/P w/ Contrast([facility]) Status post right hemicolectomy, right hepatectomy and left hepatic microwave ablation without new suspicious hepatic lesion. Left lateral perirenal fascial nodule, which is stable compared to 6/8/2021 MRI, but new compared to 3/4/2021 MRI is concerning for an additional site of metastatic disease. No sites of new or enlarging metastatic disease in the chest.  Similar appearance of right lateral psoas and posterior abdominal wall infiltrative lesion, not significantly changed compared to 6/8/2021 MRI. Mild splenomegaly. Additional findings as outlined in the body the report. Biopsy-proven adenocarcinoma involving the posterior lateral aspect of the right iliopsoas muscle and right lateral abdominal wall. Right iliopsoas component is difficult to measure but appears to be approximately 2.5 x 2.4 cm (series 2, image 153), similar to prior MRI. Nodular thickening noted along the right posterior abdominal wall and possibly involving the the transversus abdominis measures approximately 8.5 x 1.8 cm (series 2 image 153) overall similar compared to prior MRI. 10/6/2021-discussed the results of recent CT abdomen/pelvis and MRI abdomen/pelvis at [address] [place] [address]. Persistent disease involving the psoas muscle. Discussed with colorectal surgery at [address] [place] [address]. They recommend to continue current therapy for another 2 months and reassess with CT scans. 12/3/21 CT chest/abd/pelvis [facility]): Status post prior right hemicolectomy, right hepatectomy and left hepatic lesions microwave ablation. No new liver lesion. Soft tissue thickening of the right lower posterior abdominal wall involving the iliopsoas muscle is grossly unchanged consistent with improving metastatic disease. Small right omental nodule and left lateral conal fascia nodule unchanged compared to  recent exam.   1/13/22 Exploratory lap with resections of psoas muscle mass and HIPEC by Dr. Marilyn Olivares at [facility]:  Metastatic colorectal adenocarcinoma involving fibroadipose tissue. IHC MMR proficient.   1/24/22 CT chest/abd/pelvis [facility]): Extensive postsurgical changes in the abdomen including partial right colectomy, resection of right retroperitoneal mass, omentectomy and mesh repair of right lateral abdominal wall defect. There appears to be a defect in the mesh containing herniated loops of small bowel. Extensive diffuse dilated small bowel loops with air-fluid levels are seen throughout the abdomen. There are segmental areas of decompressed small bowel in the left upper quadrant as well as adjacent to the herniated small  bowel loops in the right retroperitoneum. Air-fluid levels are also seen throughout the colon. While pattern could likely represent postoperative ileus given the diffuse small bowel dilatation and distal colonic air and fluid, developing or partial small bowel obstruction secondary to the right lateral abdominal wall hernia cannot entirely be excluded. Small ascites. 8 mm nodule along the left lateral conal fascia is unchanged. Slight increase in size of cardiophrenic lymph nodes measuring up to 7 mm anterior to the right hemidiaphragm. Stable hypodensity in the left hepatic lobe. Diffuse gastric wall thickening/edema could be secondary to gastritis in the appropriate clinical setting. Past Medical History:    Past Medical History:   Diagnosis Date    Acid reflux     Cancer ([facility] [address]     adenocarcinoma of colon    GERD (gastroesophageal reflux disease)     PTSD (post-traumatic stress disorder)     Stage 3a chronic kidney disease ([facility] [address]        Past Surgical History:    Past Surgical History:   Procedure Laterality Date    ABLATION OF DYSRHYTHMIC FOCUS      ablation of liver at [address]  2003    CHOLECYSTECTOMY  2013    COLONOSCOPY      when pt was in the 19's    COLONOSCOPY N/A 03/11/2020    COLONOSCOPY POLYPECTOMY SNARE/COLD BIOPSY: Dr. Villarreal Fitting colonoscopy: 6-12 months due to findings at colonoscopy today with Cecal mass lesion and large polyps.     COLONOSCOPY      COLONOSCOPY N/A 03/17/2021    Dr Fermin Dumont, Post-operative changes w IC anastomosis & single visible staple, Mild Diverticuosis, Int Hemorrhoids Grade 1, 3 year recall    HEMICOLECTOMY Right 03/30/2020    LAPAROSCOPIC-ASSISTED RIGHT HEMICOLECTOMY performed by Margarita Mojica MD at [facility] N/A 06/03/2020    INSERTION OF VENOUS PORT with flouro performed by Margarita Mojica MD at [facility] OR    UPPER GASTROINTESTINAL ENDOSCOPY N/A 03/11/2020    Dr. Madrigal Aver:   [facility]       Social History:    Marital status:   Smoking status: No  ETOH status: No  Resides: [place], [place]    Family History:   Family History   Problem Relation Age of Onset    Liver Cancer Mother     High Blood Pressure Mother     Colon Cancer Mother         x2    Cancer Father         Lung Cancer    Breast Cancer Sister     Cancer Maternal Grandfather         Lung Cancer    Cancer Paternal Grandfather         Stomach Cancer    Colon Polyps Neg Hx     Cystic Fibrosis Neg Hx     Liver Disease Neg Hx     Rectal Cancer Neg Hx        Current Hospital Medications:    Current Facility-Administered Medications   Medication Dose Route Frequency Provider Last Rate Last Admin    sodium chloride flush 0.9 % injection 5-40 mL  5-40 mL IntraVENous 2 times per day Canaan Tye, APRN - CNP   10 mL at 03/07/22 2254    sodium chloride flush 0.9 % injection 5-40 mL  5-40 mL IntraVENous PRN Canaan Tye, APRN - CNP        0.9 % sodium chloride infusion  25 mL IntraVENous PRN Canaan Tye, APRN - CNP        ondansetron (ZOFRAN-ODT) disintegrating tablet 4 mg  4 mg Oral Q8H PRN Canaan Tye, APRN - CNP        Or    ondansetron (ZOFRAN) injection 4 mg  4 mg IntraVENous Q6H PRN Canaan Tye, APRN - CNP   4 mg at 03/08/22 0133    acetaminophen (TYLENOL) tablet 650 mg  650 mg Oral Q6H PRN Canaan Tye, APRN - CNP        Or    acetaminophen (TYLENOL) suppository 650 mg  650 mg Rectal Q6H PRN Kaia Tye, APRN - CNP        apixaban Kaylan Grist) tablet 5 mg  5 mg Oral BID Canaan Tye, APRN - CNP   5 mg at 03/07/22 2331    dronabinol (MARINOL) capsule 2.5 mg  2.5 mg Oral BID AC [place] Halsted, APRN - CNP        HYDROmorphone (DILAUDID) tablet 4 mg  4 mg Oral Q6H PRN [place] Halsted, APRN - CNP        loperamide (IMODIUM) capsule 2 mg  2 mg Oral BID PRN [place] Halsted, APRN - CNP        megestrol (MEGACE) 40 MG/ML suspension 800 mg  800 mg Oral Daily [place] Halsted, APRN - CNP        methocarbamol (ROBAXIN) tablet 500 mg  500 mg Oral TID PRN [place] Halsted, APRN - CNP   500 mg at 03/07/22 2331    pantoprazole (PROTONIX) tablet 40 mg  40 mg Oral QAM AC [place] Halsted, APRN - CNP        oxyCODONE (ROXICODONE) immediate release tablet 10 mg  10 mg Oral Q4H PRN [place] Halsted, APRN - CNP        prazosin (MINIPRESS) capsule 1 mg  1 mg Oral Nightly [place] Halsted, APRN - CNP        promethazine (PHENERGAN) tablet 12.5 mg  12.5 mg Oral Q6H PRN [place] Halsted, APRN - CNP   12.5 mg at 03/08/22 0358    psyllium (METAMUCIL) 58.12 % packet 1 packet  1 packet Oral TID WC [place] Halsted, APRN - CNP        0.9 % sodium chloride infusion   IntraVENous Continuous [place] Halsted, APRN -  mL/hr at 03/08/22 0109 New Bag at 03/08/22 0109       Allergies:    Allergies   Allergen Reactions    Morphine Nausea And Vomiting     Hallucinations/Vomiting    Oxycodone-Acetaminophen Nausea Only         Subjective   REVIEW OF SYSTEMS:   CONSTITUTIONAL: no fever, no night sweats, generalized weakness, decreased p.o. intake  HEENT: no blurring of vision, no double vision, no hearing difficulty, no tinnitus, no ulceration, no epistaxis;  LUNGS: no cough, no hemoptysis, no wheeze,  no shortness of breath;  CARDIOVASCULAR: no palpitation, no chest pain, no shortness of breath;  GI: no abdominal pain, no nausea, no vomiting, no diarrhea, no constipation;  ANNIE: no dysuria, no hematuria, no frequency or urgency, no nephrolithiasis;  MUSCULOSKELETAL: no joint pain, no swelling, no stiffness;  ENDOCRINE: no polyuria, no polydipsia, no cold or heat intolerance;  HEMATOLOGY: no easy bruising or bleeding, no history of clotting disorder;  DERMATOLOGY: no skin rash, no eczema, no pruritus;  PSYCHIATRY: no depression, no anxiety  NEUROLOGY: no syncope, no seizures, no numbness or tingling of hands, no numbness or tingling of feet, no paresis;    Objective   /74   Pulse 91   Temp 97.1 °F (36.2 °C)   Resp 18   Ht 5' 7\" (1.702 m)   Wt 119 lb 9.6 oz (54.3 kg)   SpO2 96%   BMI 18.73 kg/m²     PHYSICAL EXAM:  CONSTITUTIONAL: Alert, appropriate, no acute distress  EYES: Non icteric, EOM intact, pupils equal round   ENT: Mucus membranes moist,external inspection of ears and nose are normal  NECK: Supple, no masses. No palpable thyroid mass  CHEST/LUNGS: CTA bilaterally, normal respiratory effort   CARDIOVASCULAR: RRR, no murmurs. No lower extremity edema  ABDOMEN: soft non-tender, active bowel sounds, no HSM. No palpable masses  EXTREMITIES: warm, full ROM in all 4 extremities, no focal weakness. SKIN: warm, dry with no rashes or lesions  LYMPH: No cervical, clavicular, axillary, or inguinal lymphadenopathy  NEUROLOGIC: follows commands, non focal   PSYCH: mood and affect appropriate.  Alert and oriented to time, place, person        LABORATORY RESULTS REVIEWED/ANALYZED BY ME:  Recent Labs     03/08/22  0422 03/07/22  1557 03/01/22  0615   WBC 11.8* 12.1* 7.1   HGB 9.8* 9.8* 8.3*   HCT 31.0* 31.8* 26.9*   MCV 90.6 91.1 93.1   * 416* 459*       Lab Results   Component Value Date     (L) 03/08/2022    K 3.7 03/08/2022    CL 95 (L) 03/08/2022    CO2 21 (L) 03/08/2022    BUN 42 (H) 03/08/2022    CREATININE 3.0 (H) 03/08/2022    GLUCOSE 101 03/08/2022    CALCIUM 8.8 03/08/2022    PROT 7.0 03/08/2022    LABALBU 3.0 (L) 03/08/2022    BILITOT 0.4 03/08/2022    ALKPHOS 287 (H) 03/08/2022    AST 97 (H) 03/08/2022    ALT 76 (H) 03/08/2022    LABGLOM 21 (A) 03/08/2022    GFRAA 26 (L) 03/08/2022    AGRATIO 1.5 06/15/2021    GLOB 3.8 02/07/2022       Lab Results   Component Value Date    INR 1.0 06/18/2021    PROTIME 12.1 06/18/2021       RADIOLOGY STUDIES REPORT/REVIEWED AND INTERPRETED BY ME:  CT ABDOMEN PELVIS WO CONTRAST Additional Contrast? None    Result Date: 3/7/2022  EXAM: CT ABDOMEN PELVIS WO CONTRAST   3/7/2022 6:49 PM INDICATION: Nausea, vomiting COMPARISON: CT abdomen pelvis dated February 21, 2022 TECHNIQUE: Abdomen and pelvis were scanned utilizing a multidetector helical scanner from the diaphragm to the ischial tuberosities without administration of IV contrast. Coronal and sagittal reformations were obtained. [Routine protocol is performed.] Radiation dose equals  mGy-cm. Automated exposure control dose reduction technique was implemented. FINDINGS: LINES and TUBES: A peritoneal catheter is in place with left lower quadrant entrance and terminating in the left upper quadrant. . LOWER THORAX: Bilateral lower lobe nodules are redemonstrated. HEPATOBILIARY:  Postoperative changes in the right lobe of the liver. No obvious new mass identified. .   No liver laceration. No biliary ductal dilatation. GALLBLADDER: Absent. SPLEEN:  No splenomegaly. No splenic laceration. PANCREAS:  No focal masses or ductal dilatation. ADRENALS:  No adrenal nodules. KIDNEYS/URETERS:  Kidneys are symmetric. No hydronephrosis. No cystic or solid mass lesions. No stones. GI TRACT: No bowel obstruction. Prior right hemicolectomy. Right lower quadrant ostomy   . Clayborne Hoguet PELVIC ORGANS/BLADDER:  No acute findings. LYMPH NODES:  No lymphadenopathy. VESSELS:  Unremarkable. PERITONEUM / RETROPERITONEUM:  No free air or fluid. BONES:  No acute osseous pathology. Clayborne Hoguet SOFT TISSUES:  Unremarkable. Stable postsurgical changes, without bowel obstruction or organized fluid collection on today's examination.  Signed by Dr Noemi Crump Additional Contrast? None    Result Date: 2/21/2022  CT ABDOMEN PELVIS WO CONTRAST 2/21/2022 7:53 PM History: Postop pain. Hypotension. Noncontrast abdomen/pelvis CT. Comparison is made with February 8, 2022. In order to have a CT radiation dose as low as reasonably achievable Automated Exposure Control was utilized for adjustment of the mA and/or KV according to patient size. DLP in mGycm= 1115. Interval abdominal surgery. Normal heart size. Patchy atelectasis at the lung bases. Normal noncontrast appearance of the liver, pancreas, and spleen. Normal and symmetric kidneys. No hydronephrosis. No bowel dilation. No abscess or hematoma. 1. Postoperative changes with no sign of bowel obstruction, abscess, or hematoma. Signed by Dr Joanna Ellsworth Additional Contrast? None    Result Date: 2/9/2022  EXAM: CT ABDOMEN PELVIS WO CONTRAST INDICATION: Vomiting, recent surgery, history of cancer COMPARISON: 12/14/2020 DLP: 2066 mGy cm. In order to have a CT radiation dose as low as reasonably achievable, Automated Exposure Control was utilized for adjustment of the mA and/or KV according to patient size. FINDINGS: Patchy consolidation in the RIGHT and LEFT lung bases is noted. Moderate to large volume pneumoperitoneum. A surgical drain terminates in the LEFT upper abdomen. Postoperative change of RIGHT hemicolectomy with RIGHT upper quadrant anastomosis. Marked diffuse small bowel distention extending to the ileocolic anastomosis, likely representing small bowel obstruction. Small bowel loops measure up to 5 cm diameter. No definite area of pneumatosis. No evidence of portal venous gas. Trace free pelvic fluid. A dense linear device on the RIGHT peritoneal wall is likely represents a mesh device and may be related to recent surgical intervention (correlate with surgical history). Unchanged RIGHT posterolateral lumbar hernia. Postoperative change of RIGHT hepatectomy. The previously demonstrated LEFT liver lesion is not well visualized given lack of IV contrast. Gallbladder surgically absent.  No biliary ductal dilatation. Pancreas, spleen, and adrenal glands are unremarkable. No urolithiasis or hydronephrosis. Normal caliber abdominal aorta. No enlarged retroperitoneal lymph nodes. Multiple borderline prominent mesenteric lymph nodes are similar to prior studies. No definite pelvic or inguinal lymphadenopathy. No acute abdominal wall soft tissue normality. No aggressive osseous lesion. 1.  Moderate to large volume pneumoperitoneum. This may be related to recent surgery although bowel perforation is also within the differential. There is a surgical drain in the LEFT upper abdomen which also may be contributing to pneumoperitoneum. 2.  Small bowel obstruction with transition at RIGHT upper quadrant ileocolonic anastomosis. Small bowel loops measure up to 5 cm diameter. 3.  Prior RIGHT hepatectomy. Previously demonstrated LEFT liver lesion is not well visualized given lack of IV contrast. 4.  Patchy consolidation in both lung bases, with differential including aspiration and pneumonia. Findings in agreement with the emergent findings from the initial [facility] preliminary report. Signed by Dr Ashlee Flores (KUB) (SINGLE AP VIEW)    Result Date: 2/25/2022  XR ABDOMEN (KUB) (SINGLE AP VIEW) 2/25/2022 1:18 PM History: Abdominal pain/pressure. 2 image KUB. Comparison is made with CT imaging from 4 days ago. Midline incision skin clips are still present. There are multiple surgical clips within the right side of the abdomen. A drainage catheter is present within the left upper quadrant. Nonspecific bowel gas pattern. Air is present within nondilated small bowel loops within the upper and left side abdomen. Right lower quadrant ostomy noted. 1. No acute abnormality is seen. Signed by Dr Adilene Watkins RENAL COMPLETE    Result Date: 2/22/2022  US RENAL COMPLETE 2/22/2022 1:40 PM History: Acute renal insufficiency. Grayscale and color-flow renal ultrasound.  Normal size and position of both kidneys. Normal cortical thickness and normal cortical echogenicity. No hydronephrosis or shadowing stone. The right kidney measures 119 x 56 x 48 mm. The left kidney measures 121 x 47 x 50 mm. 1. No abnormality is seen. Signed by Dr Flor Munoz    VL Extremity Venous Left    Result Date: 2/24/2022  Vascular Upper Extremities Veins Procedure  Demographics   Patient Name  [facility]      Age                61                Ren Professor [address]   Patient       [id number]       Gender             Male  Number   Visit Number  [id number]    Elaina Millard MD                             Physician   Date of Birth 1955   Referring          Annamaria Okeefe MD                             Physician   Accession     [id number]   [address],  Number                                        [facility]  Procedure Type of Study:   Veins:Upper Extremities Veins, UE VENOUS UNILATERAL/FLU. Indications for Study:Arm pain and Arm swelling. Impression   Acute appearing deep vein thrombosis (DVT) is seen in the internal jugular  vein subclavian axillary brachial, left upper extremity. Acute appearing superficial thrombophlebitis (SVT) is seen in the basilic  and cephalic veins, right upper extremity. XR CHEST PORTABLE    Result Date: 2/21/2022  Exam:   XR CHEST PORTABLE  Date:  2/21/2022 History:  Male, age  61 years; hypotension COMPARISON:  Chest x-ray dated February 8, 2022 Findings : Chest portable place. A right-sided PICC, new, terminating in the low SVC. The heart and mediastinum are normal in size. Lungs are without focal infiltrate, mass or effusions. The bones show no acute pathology. Catheter in the left upper quadrant. Impression: 1. New right-sided PICC. 2.  Otherwise, no interval changes.  Signed by Dr Ena Real    XR CHEST PORTABLE    Result Date: 2/8/2022  EXAMINATION: XR CHEST PORTABLE 2/8/2022 10:39 PM HISTORY: XR CHEST PORTABLE 2/8/2022 9:00 PM HISTORY: Cough COMPARISON: March 18, 2020. FINDINGS: The lungs are clear. Cardiac silhouette is normal. Left subclavian Port-A-Cath is present. Old healed fracture of the left clavicle is present. The osseous structures and surrounding soft tissues demonstrate no acute abnormality. 1. No radiographic evidence of acute cardiopulmonary process. Signed by Dr Zuniga Grow:  #Colon cancer  -Status post neoadjuvant chemo followed by debulking surgery/HIPEC) at [facility] January 2022  -Status post exploratory laparotomy 2/10/2022 for correction of internal hernia at [facility] (Dr. Jaime Bal, Cell [phone])  -Patient has ileostomy and abdominal drain  -Patient is currently not on chemotherapy. Last chemotherapy November 2021  -CT A/P Wo contrast 3/07/2022: Pneumoperitoneum, postoperative changes     #Acute kidney injury-likely prerenal secondary to high ileostomy output  -Patient was receiving IV fluids at home.  -He received IV fluid resuscitation in the emergency  -CT A/P without contrast: No hydronephrosis, postoperative changes  (baseline creatinine 1.2-1.4)     Creatinine at discharge 1.7  3/7/2022-creatinine 2.8  3/8/2022-creatinine 3.0/GFR 21    High output ileostomy  -Continue aggressive IV fluid resuscitation with normal saline 150 cc an hour  -Continue to monitor electrolytes daily  -Lomotil every 6 hours scheduled  -As needed any Imodium  -Octreotide 100 mcg every 8 hours  -Surgery consultation to help with management       #Normocytic hypochromic anemia  Anemia of chronic disease  Hemoglobin 9.8/MCV 90  Ferritin 536, iron sat 37%, iron 16, TIBC 218, folate normal.  Vitamin B12 1150. #Port associated DVT left upper extremity-  -Left upper extremity ultrasound 2/23/2022  Acute appearing deep vein thrombosis (DVT) is seen in the internal jugular  vein subclavian axillary brachial, left upper extremity.   Acute appearing superficial thrombophlebitis (SVT) is seen in the basilic and cephalic veins, right upper extremity.       - currently on Eliquis 5 mg p.o. twice daily     #Anorexia   - Marinol 2.5 p.o. twice daily initiated  - Megace started  - Dietary      #Weakness, deconditioning  Physical therapy      PLAN:  Continue aggressive IV fluid resuscitation   Eliquis 5 mg PO BID  Physical therapy   Dietary   Continue Megace and Marinol 2.5 twice daily  Imodium every 4 hours as needed  Lomotil schedule every 6 hours  octreotide 100 mcg 3 times daily  Surgery consultation  Increase IV fluids 150 cc an hour      Eliezer Merrill MD    03/08/22  6:41 AM

## 2022-03-08 NOTE — PROGRESS NOTES
4 Eyes Skin Assessment    Denise Waters is being assessed upon: Admission    I agree that Sachin Galdamez, RN, along with Mar Reyna (either 2 RN's or 1 LPN and 1 RN) have performed a thorough Head to Toe Skin Assessment on the patient. ALL assessment sites listed below have been assessed. Areas assessed by both nurses:     [x]   Head, Face, and Ears   [x]   Shoulders, Back, and Chest  [x]   Arms, Elbows, and Hands   [x]   Coccyx, Sacrum, and Ischium  [x]   Legs, Feet, and Heels    Does the Patient have Skin Breakdown? Yes, wound(s) noted upon assessment. It is the responsibility of the Primary Nurse to assure that the following documentation, preventions, orders, and consults are complete on the above noted wound(s): Wound LDA initiated. LDA Flowsheet Documentation includes the Talya-wound, Wound Assessment, Measurements, Dressing Treatment, Drainage, and Color.     Charles Prevention initiated: Yes  Wound Care Orders initiated: Yes    [address]  nurse consulted for Pressure Injury (Stage 3,4, Unstageable, DTI, NWPT, and Complex wounds) and New or Established Ostomies: NA        Primary Nurse eSignature: Robbin Gonzales RN on 3/8/2022 at 1:03 AM      Co-Signer eSignature: Electronically signed by Soraya Jimenez RN on 3/8/22 at 1:19 AM CST

## 2022-03-08 NOTE — CARE COORDINATION
Patient is current with [facility] .   Please place Resumption of Care orders at discharge  P. [phone]  F. [phone]  Electronically signed by Stanley Reese on 3/8/22 at 3:10 PM CST

## 2022-03-08 NOTE — ED NOTES
Pt stating zofran isnt helping with N/V. Pt says phenergan works better for him. [place], [place] notified.      Whit Lentz RN  03/07/22 [id number]

## 2022-03-08 NOTE — ED NOTES
Report given to [facility], bed ready 412.      Chapin Velasquez, ADITYA  03/07/22 9930

## 2022-03-08 NOTE — CONSULTS
Palliative Care:  Pt is resting in bed eating some ice chips. Presents to ED at direction of his MD d/t elevated creatinine. Spouse is at bedside. Pt is known to palliative care team.          Past Medical History:        Past Medical History:   Diagnosis Date    Acid reflux     Cancer ([facility] [address]     adenocarcinoma of colon    GERD (gastroesophageal reflux disease)     Palliative care patient 03/01/2022    PTSD (post-traumatic stress disorder)     Stage 3a chronic kidney disease ([facility] [address]        Advance Directives:  Full Code. Reviewed pt wishes at bedside. ACP completed. Pain/Other Symptoms:  Denies      Activity:   As rebecca                   Plan:  IVF,  PT, Dietary, Surgery cons, med management    Patient/family discussion r/t goals:  Per spouse, goal is for pt to be able to \"eat\" and to have \"TPN\". Spouse reports that after pt surgeries he was given TPN and he seemed to gain strength. Spouse is very focused on pt getting TPN, saying, \"What are we going to do just let him lay there and die, because he's not eating\". Spouse is pleasant but anxious, rightfully so, considering what pt has already been through along with her concerns about being off work. Spouse wants pt to regain strength so she can feel better about leaving him alone if needed. She expresses \"frustration\" in pt inability to eat even though he is getting appetite stimulants. She does repeat the above several times. This nurse provided listening presence and assured spouse I would consult with NP and PC MD for assistance reaching out to pt's doctors. Pt tells me he did eat a donut day before yesterday along with some pudding later. In the evening he attempted pizza but felt sick and has not eaten since. Pt tells me \"nothing sounds good\". I did reach out to [facility] NP, Lee Ann Nevarez, to inform her of pt spouse concerns. Pt tells me \"If all we are going to do is fluids, I can do this at home\".   Pt ad spouse express their gratitude for the care and support. Palliative following for support, GOC.          Review of any needed services:   SC[facility]             Electronically signed by Ashley Mari RN on 3/8/2022 at 11:16 AM

## 2022-03-08 NOTE — ACP (ADVANCE CARE PLANNING)
Advance Care Planning     Advance Care Planning Activator (Inpatient)  Conversation Note      Date of ACP Conversation: 3/8/2022     [employer] with:  Pt, alert and oriented x4. ACP Activator: Brunilda Quiroz RN        Health Care Decision Maker:     Current Designated Health Care Decision Maker:     Primary Decision Maker: Sammy Clark - Spouse - [phone]    Care Preferences    Ventilation: \"If you were in your present state of health and suddenly became very ill and were unable to breathe on your own, what would your preference be about the use of a ventilator (breathing machine) if it were available to you? \"      Would the patient desire the use of ventilator (breathing machine)?: Yes          Resuscitation  \"In the event your heart stopped as a result of an underlying serious health condition, would you want attempts to be made to restart your heart (answer \"yes\" for attempt to resuscitate) or would you prefer a natural death (answer \"no\" for do not attempt to resuscitate)? \" Yes          Conversation Outcomes:  [x] ACP discussion completed  [] Existing advance directive reviewed with patient; no changes to patient's previously recorded wishes  [] New Advance Directive completed  [] Portable Do Not Rescitate prepared for Provider review and signature  [] POLST/POST/MOLST/MOST prepared for Provider review and signature      Follow-up plan:    [] Schedule follow-up conversation to continue planning  [] Referred individual to Provider for additional questions/concerns   [] Advised patient/agent/surrogate to review completed ACP document and update if needed with changes in condition, patient preferences or care setting    [] This note routed to one or more involved healthcare providers         Electronically signed by Brunilda Quiroz RN on 3/8/2022 at 11:54 AM

## 2022-03-08 NOTE — H&P
today with Cecal mass lesion and large polyps.  COLONOSCOPY      COLONOSCOPY N/A 03/17/2021    Dr Kari Askew, Post-operative changes w IC anastomosis & single visible staple, Mild Diverticuosis, Int Hemorrhoids Grade 1, 3 year recall    HEMICOLECTOMY Right 03/30/2020    LAPAROSCOPIC-ASSISTED RIGHT HEMICOLECTOMY performed by Laureen Jackson MD at [address] N/A 06/03/2020    INSERTION OF VENOUS PORT with flouro performed by Laureen Jackson MD at [place] [address] ENDOSCOPY N/A 03/11/2020    Dr. Arjun Chun:   [facility]       Home Medications:  Prior to Admission medications    Medication Sig Start Date End Date Taking? Authorizing Provider   dronabinol (MARINOL) 2.5 MG capsule Take 1 capsule by mouth 2 times daily (before meals) for 30 days. 3/1/22 3/31/22  Armaan Ybarra MD   megestrol (MEGACE) 40 MG/ML suspension Take 20 mLs by mouth daily 3/1/22   Armaan Ybarra MD   psyllium (METAMUCIL) 58.12 % PACK packet Take 1 packet by mouth 3 times daily (with meals) 3/1/22 3/31/22  Armaan Ybarra MD   apixaban (ELIQUIS) 5 MG TABS tablet Take 1 tablet by mouth 2 times daily 3/1/22 3/31/22  Armaan Ybarra MD   apixaban (ELIQUIS) 5 MG TABS tablet Take 1 tablet by mouth 2 times daily 4/1/22   Armaan Ybarra MD   oxyCODONE HCl (OXY-IR) 10 MG immediate release tablet Take 10 mg by mouth every 4 hours as needed for Pain. Historical Provider, MD   HYDROmorphone (DILAUDID) 4 MG tablet Take 4 mg by mouth every 4 hours as needed for Pain.     Historical Provider, MD   methocarbamol (ROBAXIN) 500 MG tablet Take 500 mg by mouth 3 times daily as needed     Historical Provider, MD   loperamide (IMODIUM) 2 MG capsule Take 2 mg by mouth 2 times daily as needed for Diarrhea    Historical Provider, MD   sodium chloride 0.9 % bolus Infuse 500 mLs intravenously 2 times daily    Historical Provider, MD   promethazine (PHENERGAN) 12.5 MG tablet Take 1 tablet by mouth every 6 hours as needed for Nausea 11/9/21   Wing Venegas MD   omeprazole (PRILOSEC) 20 MG delayed release capsule Take 20 mg by mouth daily    Historical Provider, MD   Cholecalciferol (VITAMIN D3) 50 MCG (2000 UT) CAPS Take by mouth daily    Historical Provider, MD   prazosin (MINIPRESS) 1 MG capsule Take 1 mg by mouth nightly For nightmares    Historical Provider, MD   Sertraline HCl (ZOLOFT PO) Take by mouth daily    Historical Provider, MD       Allergies:    Morphine and Oxycodone-acetaminophen    Social History:    The patient currently lives home  Tobacco:   reports that he has never smoked. He has never used smokeless tobacco.  Alcohol:   reports current alcohol use. Illicit Drugs: denies    Family History:      Problem Relation Age of Onset    Liver Cancer Mother     High Blood Pressure Mother     Colon Cancer Mother         x2    Cancer Father         Lung Cancer    Breast Cancer Sister     Cancer Maternal Grandfather         Lung Cancer    Cancer Paternal Grandfather         Stomach Cancer    Colon Polyps Neg Hx     Cystic Fibrosis Neg Hx     Liver Disease Neg Hx     Rectal Cancer Neg Hx        Review of Systems:   Review of Systems   Constitutional: Positive for appetite change and fatigue. Negative for chills and diaphoresis. Respiratory: Negative for cough, chest tightness, shortness of breath and wheezing. Cardiovascular: Negative for chest pain and palpitations. Gastrointestinal: Positive for abdominal pain, nausea and vomiting. Negative for abdominal distention and constipation. Skin: Negative for color change, pallor and rash. Neurological: Positive for weakness. Negative for tremors, syncope, light-headedness, numbness and headaches. Psychiatric/Behavioral: Negative for agitation, behavioral problems and confusion. 14 point review of systems is negative except as specifically addressed above.     Physical Examination:  /78   Pulse 107   Resp 18   SpO2 99%   Physical Exam  Vitals and nursing note reviewed. Constitutional:       Appearance: Normal appearance. HENT:      Mouth/Throat:      Mouth: Mucous membranes are moist.      Pharynx: Oropharynx is clear. Eyes:      Extraocular Movements: Extraocular movements intact. Conjunctiva/sclera: Conjunctivae normal.      Pupils: Pupils are equal, round, and reactive to light. Cardiovascular:      Rate and Rhythm: Normal rate and regular rhythm. Pulses: Normal pulses. Heart sounds: Normal heart sounds. No murmur heard. Pulmonary:      Effort: Pulmonary effort is normal. No respiratory distress. Breath sounds: Normal breath sounds. Abdominal:      General: Bowel sounds are normal. There is no distension. Palpations: Abdomen is soft. Tenderness: There is generalized abdominal tenderness. There is no guarding or rebound. Musculoskeletal:         General: No swelling. Normal range of motion. Cervical back: Normal range of motion and neck supple. No rigidity or tenderness. Right lower leg: No edema. Left lower leg: No edema. Skin:     General: Skin is warm and dry. Capillary Refill: Capillary refill takes less than 2 seconds. Comments: Surgical site is clean, dry, intact. Staples intact no drainage noted. Ostomy with stool present. Drain present   Neurological:      General: No focal deficit present. Mental Status: He is alert and oriented to person, place, and time. Cranial Nerves: No cranial nerve deficit. Motor: Weakness present.    Psychiatric:         Mood and Affect: Mood normal.         Behavior: Behavior normal.        Diagnostic Data:  CBC:  Recent Labs     03/07/22  1557   WBC 12.1*   HGB 9.8*   HCT 31.8*   *     BMP:  Recent Labs     03/07/22  1557   *   K 3.9   CL 92*   CO2 22   BUN 37*   CREATININE 2.8*   CALCIUM 8.7*     Recent Labs     03/07/22  1557   AST 78*   ALT 56*   BILITOT 0.5   ALKPHOS 309*     Urinalysis:  Lab CKD   -Urinalysis & Culture    -Urine studies    -Bladder scan X1               - IVFs               - Monitor I's and O's closely              - Monitor labs closely              - Avoid hypotension              - Avoid nephrotoxic agents   -Daily labs     Adenocarcinoma of colon    -Consultation to oncology   -As needed pain medication      DVT prophylactic: Eliquis     Signed:  KALPESH Vaughan - CNP, 3/7/2022 7:23 PM

## 2022-03-08 NOTE — PROGRESS NOTES
Elba Mao arrived to room # 412. Presented with: MARIO  Mental Status: Patient is oriented and alert. Vitals:    03/07/22 2330   BP: 123/87   Pulse: 91   Resp: 18   Temp: 97.5 °F (36.4 °C)   SpO2: 99%     Patient safety contract and falls prevention contract reviewed with patient No.  Oriented Patient to room. Call light within reach. Yes.   Needs, issues or concerns expressed at this time: no.      Electronically signed by Roger Nye RN on 3/7/2022 at 11:58 PM

## 2022-03-09 LAB
ALBUMIN SERPL-MCNC: 2.6 G/DL (ref 3.5–5.2)
ALP BLD-CCNC: 225 U/L (ref 40–130)
ALT SERPL-CCNC: 96 U/L (ref 5–41)
ANION GAP SERPL CALCULATED.3IONS-SCNC: 11 MMOL/L (ref 7–19)
AST SERPL-CCNC: 101 U/L (ref 5–40)
BILIRUB SERPL-MCNC: 0.3 MG/DL (ref 0.2–1.2)
BUN BLDV-MCNC: 37 MG/DL (ref 8–23)
C DIFF TOXIN/ANTIGEN: NORMAL
CALCIUM SERPL-MCNC: 8 MG/DL (ref 8.8–10.2)
CHLORIDE BLD-SCNC: 100 MMOL/L (ref 98–111)
CO2: 25 MMOL/L (ref 22–29)
CREAT SERPL-MCNC: 2.7 MG/DL (ref 0.5–1.2)
GFR AFRICAN AMERICAN: 29
GFR NON-AFRICAN AMERICAN: 24
GLUCOSE BLD-MCNC: 129 MG/DL (ref 74–109)
HCT VFR BLD CALC: 26.3 % (ref 42–52)
HEMOGLOBIN: 8.2 G/DL (ref 14–18)
MAGNESIUM: 1.7 MG/DL (ref 1.6–2.4)
MCH RBC QN AUTO: 28.7 PG (ref 27–31)
MCHC RBC AUTO-ENTMCNC: 31.2 G/DL (ref 33–37)
MCV RBC AUTO: 92 FL (ref 80–94)
PDW BLD-RTO: 15 % (ref 11.5–14.5)
PHOSPHORUS: 3.5 MG/DL (ref 2.5–4.5)
PLATELET # BLD: 303 K/UL (ref 130–400)
PMV BLD AUTO: 10.2 FL (ref 9.4–12.4)
POTASSIUM REFLEX MAGNESIUM: 3.5 MMOL/L (ref 3.5–5)
RBC # BLD: 2.86 M/UL (ref 4.7–6.1)
SODIUM BLD-SCNC: 136 MMOL/L (ref 136–145)
TOTAL PROTEIN: 6.1 G/DL (ref 6.6–8.7)
VITAMIN D 25-HYDROXY: 24 NG/ML
WBC # BLD: 8.8 K/UL (ref 4.8–10.8)

## 2022-03-09 PROCEDURE — 6370000000 HC RX 637 (ALT 250 FOR IP)

## 2022-03-09 PROCEDURE — 99232 SBSQ HOSP IP/OBS MODERATE 35: CPT | Performed by: INTERNAL MEDICINE

## 2022-03-09 PROCEDURE — 2580000003 HC RX 258

## 2022-03-09 PROCEDURE — 6360000002 HC RX W HCPCS: Performed by: INTERNAL MEDICINE

## 2022-03-09 PROCEDURE — 36415 COLL VENOUS BLD VENIPUNCTURE: CPT

## 2022-03-09 PROCEDURE — 6370000000 HC RX 637 (ALT 250 FOR IP): Performed by: INTERNAL MEDICINE

## 2022-03-09 PROCEDURE — 85027 COMPLETE CBC AUTOMATED: CPT

## 2022-03-09 PROCEDURE — 6370000000 HC RX 637 (ALT 250 FOR IP): Performed by: HOSPITALIST

## 2022-03-09 PROCEDURE — 6360000002 HC RX W HCPCS: Performed by: HOSPITALIST

## 2022-03-09 PROCEDURE — 82306 VITAMIN D 25 HYDROXY: CPT

## 2022-03-09 PROCEDURE — 1210000000 HC MED SURG R&B

## 2022-03-09 PROCEDURE — 2500000003 HC RX 250 WO HCPCS: Performed by: HOSPITALIST

## 2022-03-09 PROCEDURE — 80053 COMPREHEN METABOLIC PANEL: CPT

## 2022-03-09 PROCEDURE — 84100 ASSAY OF PHOSPHORUS: CPT

## 2022-03-09 PROCEDURE — 83735 ASSAY OF MAGNESIUM: CPT

## 2022-03-09 RX ORDER — SODIUM CHLORIDE AND POTASSIUM CHLORIDE .9; .15 G/100ML; G/100ML
SOLUTION INTRAVENOUS CONTINUOUS
Status: DISCONTINUED | OUTPATIENT
Start: 2022-03-09 | End: 2022-03-10

## 2022-03-09 RX ORDER — POTASSIUM CHLORIDE 20 MEQ/1
40 TABLET, EXTENDED RELEASE ORAL EVERY 6 HOURS
Status: COMPLETED | OUTPATIENT
Start: 2022-03-09 | End: 2022-03-09

## 2022-03-09 RX ORDER — ERGOCALCIFEROL 1.25 MG/1
50000 CAPSULE ORAL WEEKLY
Status: DISCONTINUED | OUTPATIENT
Start: 2022-03-09 | End: 2022-03-11 | Stop reason: HOSPADM

## 2022-03-09 RX ADMIN — OXYCODONE 10 MG: 5 TABLET ORAL at 19:24

## 2022-03-09 RX ADMIN — OCTREOTIDE ACETATE 100 MCG: 100 INJECTION, SOLUTION INTRAVENOUS; SUBCUTANEOUS at 17:58

## 2022-03-09 RX ADMIN — OCTREOTIDE ACETATE 100 MCG: 100 INJECTION, SOLUTION INTRAVENOUS; SUBCUTANEOUS at 07:54

## 2022-03-09 RX ADMIN — DIPHENOXYLATE HYDROCHLORIDE AND ATROPINE SULFATE 1 TABLET: 2.5; .025 TABLET ORAL at 11:37

## 2022-03-09 RX ADMIN — PSYLLIUM HUSK 1 PACKET: 3.4 POWDER ORAL at 11:37

## 2022-03-09 RX ADMIN — PSYLLIUM HUSK 1 PACKET: 3.4 POWDER ORAL at 17:58

## 2022-03-09 RX ADMIN — DIPHENOXYLATE HYDROCHLORIDE AND ATROPINE SULFATE 1 TABLET: 2.5; .025 TABLET ORAL at 19:23

## 2022-03-09 RX ADMIN — DRONABINOL 2.5 MG: 2.5 CAPSULE ORAL at 15:38

## 2022-03-09 RX ADMIN — ERGOCALCIFEROL 50000 UNITS: 1.25 CAPSULE ORAL at 10:44

## 2022-03-09 RX ADMIN — I.V. FAT EMULSION 250 ML: 20 EMULSION INTRAVENOUS at 18:44

## 2022-03-09 RX ADMIN — LOPERAMIDE HYDROCHLORIDE 2 MG: 2 CAPSULE ORAL at 10:44

## 2022-03-09 RX ADMIN — ASCORBIC ACID, VITAMIN A PALMITATE, CHOLECALCIFEROL, THIAMINE HYDROCHLORIDE, RIBOFLAVIN-5 PHOSPHATE SODIUM, PYRIDOXINE HYDROCHLORIDE, NIACINAMIDE, DEXPANTHENOL, ALPHA-TOCOPHEROL ACETATE, VITAMIN K1, FOLIC ACID, BIOTIN, CYANOCOBALAMIN: 200; 3300; 200; 6; 3.6; 6; 40; 15; 10; 150; 600; 60; 5 INJECTION, SOLUTION INTRAVENOUS at 20:47

## 2022-03-09 RX ADMIN — DIPHENOXYLATE HYDROCHLORIDE AND ATROPINE SULFATE 1 TABLET: 2.5; .025 TABLET ORAL at 07:52

## 2022-03-09 RX ADMIN — SODIUM CHLORIDE, PRESERVATIVE FREE 10 ML: 5 INJECTION INTRAVENOUS at 08:00

## 2022-03-09 RX ADMIN — PANTOPRAZOLE SODIUM 40 MG: 40 TABLET, DELAYED RELEASE ORAL at 07:54

## 2022-03-09 RX ADMIN — APIXABAN 5 MG: 5 TABLET, FILM COATED ORAL at 07:54

## 2022-03-09 RX ADMIN — LOPERAMIDE HYDROCHLORIDE 2 MG: 2 CAPSULE ORAL at 21:48

## 2022-03-09 RX ADMIN — POTASSIUM CHLORIDE 40 MEQ: 1500 TABLET, EXTENDED RELEASE ORAL at 10:44

## 2022-03-09 RX ADMIN — POTASSIUM CHLORIDE 40 MEQ: 1500 TABLET, EXTENDED RELEASE ORAL at 15:38

## 2022-03-09 RX ADMIN — APIXABAN 5 MG: 5 TABLET, FILM COATED ORAL at 19:24

## 2022-03-09 RX ADMIN — DIPHENOXYLATE HYDROCHLORIDE AND ATROPINE SULFATE 1 TABLET: 2.5; .025 TABLET ORAL at 17:58

## 2022-03-09 RX ADMIN — PRAZOSIN HYDROCHLORIDE 1 MG: 1 CAPSULE ORAL at 19:23

## 2022-03-09 RX ADMIN — LOPERAMIDE HYDROCHLORIDE 2 MG: 2 CAPSULE ORAL at 15:38

## 2022-03-09 RX ADMIN — POTASSIUM CHLORIDE AND SODIUM CHLORIDE: 900; 150 INJECTION, SOLUTION INTRAVENOUS at 15:37

## 2022-03-09 ASSESSMENT — PAIN SCALES - GENERAL
PAINLEVEL_OUTOF10: 4
PAINLEVEL_OUTOF10: 6

## 2022-03-09 NOTE — PROGRESS NOTES
Palliative care follow up visit. Pt is resting in bed. TPN infusing. Spouse at bedside. Pt tells me he feels better today. Asked him to explain \"better\". Pt tells me \"I feel like my appetite is better today\". He did eat part of a pamela's cup and tells me he asked for fruit plate for lunch. He denies pain at this time. SW/CM working on approval for home TPN when pt is dc'd. Pt and spouse very thankful for PC support. Pt/spouse still interested in SCOP after pt goes home. Call spouse, Mariah Manuel, [phone] for appt time.    Electronically signed by Adam Enamorado RN on 3/9/2022 at 11:27 AM

## 2022-03-09 NOTE — PROGRESS NOTES
Addendum: initiate cyclic TPN 9/63 @ 9928 @ 140 ml/hr for 12 hours. Comprehensive Nutrition Assessment    Type and Reason for Visit:  Reassess    Nutrition Recommendations/Plan:   Continue TPN at goal rate of 70 ml/hr until 0800 (3/10)as tolerated. Initiate cyclic TPN  (7/58)NI 9121 and run for 12 hours at 140 ml/hr with appropriate ramping/titration rates to prevent hyperglycemia. - communicated to Tanner Zimmer RN    Nutrition Assessment:  Pt and spouse seen at bedside with MD and Ostomy nurse present. Pt tolerating TPN (5/15) at 40 ml/hr and reports feeling \"better\" and improved energy. Discussed goal rate of 24hr continuous TPN and transition to cyclic 12 hr TPN once goal rate achieved and tolerated. Spoke with RN to advance to goal rate of 70 ml/hr and transition to cyclic TPN tomorrow evening as tolerated. Pt will need continued TPN at d/c d/t poor tolerance of po intake, high output ostomy, and severe chronic malnutrition. Malnutrition Assessment:  Malnutrition Status:  Severe malnutrition      Estimated Daily Nutrient Needs:  Energy (kcal):  4226-6846 kcals/kg; Weight Used for Energy Requirements:  Current (30-35 kcals/kg)     Protein (g):  65-97 g/pro/day; Weight Used for Protein Requirements:  Current (1.2-1.8 g/kg)        Fluid (ml/day):  8530-9656 ml/fluid/day (in addition to ostomy output replacment); Method Used for Fluid Requirements:  1 ml/kcal      Nutrition Related Findings:  Mg, Phos WNL- no signs of refeeding at this time. Glucose 129. BUN, Cr downtrending. Wounds:  Stage I,Pressure Injury,Surgical Incision       Current Nutrition Therapies:    ADULT ORAL NUTRITION SUPPLEMENT; Lunch; Fortified Pudding Oral Supplement  ADULT ORAL NUTRITION SUPPLEMENT; Dinner; Frozen Oral Supplement  PN-Adult Premix 5/15 - Standardard Electrolytes - Central Line  ADULT DIET;  Regular  Current Parenteral Nutrition Orders:  · Type and Formula: Premix Central   · Lipids: 250ml,Three times weekly  · Duration: Continuous  · Rate/Volume: Clinimix E 5/15 @ 70 ml/hr= 1680 ml/day  · Current PN Order Provides: 5/15 @ 70 ml/hr = 1193 kcals/day, 84 g Protein, 252 g dextrose. Lipids provide 500 kcals per run. · Goal PN Orders Provides: 5/15 @ 140 ml/hr for 12 hrs=1193 kcals/day, 84 g Protein, 252 g dextrose. Lipids provide 500 kcals per run. Anthropometric Measures:  · Height: 5' 7\" (170.2 cm)  · Current Body Weight: 119 lb (54 kg)   · BMI: 18.6     Nutrition Diagnosis:   · Severe malnutrition,In context of chronic illness related to catabolic illness,inadequate protein-energy intake as evidenced by poor intake prior to admission,weight loss greater than or equal to 5% in 1 month,mild loss of subcutaneous fat,mild muscle loss    Nutrition Interventions:   Food and/or Nutrient Delivery:  Continue Current Parenteral Nutrition,Continue Current Diet,Continue Oral Nutrition Supplement   Coordination of Nutrition Care:  Continue to monitor while inpatient    Goals:  Po intake >25% of meals and ONS and tolerance of PN to meet nutritional needs.        Nutrition Monitoring and Evaluation:   Food/Nutrient Intake Outcomes:  Food and Nutrient Intake,Supplement Intake,Parenteral Nutrition Intake/Tolerance  Physical Signs/Symptoms Outcomes:  Biochemical Data,Nutrition Focused Physical Findings,Skin,Weight,GI Status,Fluid Status or Edema     Discharge Planning:    Parenteral Nutrition,Continue current diet     Electronically signed by Anh Celis, MS, RD, LD on 3/9/22 at 1:15 PM CST    Contact: [phone]

## 2022-03-09 NOTE — PROGRESS NOTES
[facility] General Surgery    Progress Note        Subjective:    Feeling much better today. Is actually been able to eat some cottage cheese and feels hungry. Ostomy output continues to be liquid. Plans to walk a little later this afternoon. Objective:    Vitals:    03/08/22 1706 03/09/22 0000 03/09/22 0621 03/09/22 1112   BP: 102/83 111/68 116/62 104/75   Pulse: 86 81 76 72   Resp: 20 18 18    Temp: 97.7 °F (36.5 °C) 98.5 °F (36.9 °C) 98 °F (36.7 °C) 97.9 °F (36.6 °C)   TempSrc: Temporal   Temporal   SpO2: 97% 98% 98% 98%   Weight:       Height:         I/O last 3 completed shifts: In: 2408.3 [P.O.:1400; I.V.:508.3; IV Piggyback:500]  Out: 2525 [Urine:175; Stool:2350]   Ins and outs are incomplete and not being accurately recorded by the nursing staff     Abdomen is soft. No tenderness. Liquid stool in the ileostomy bag. Stoma is pink and healthy. Red rubber catheter bridge remains in place. FARHAT drain with no change in the appearance of output which remains minimal in volume. Loree Elliott LABS:   CBC:   Recent Labs     03/07/22  1557 03/08/22  0422 03/09/22  0550   WBC 12.1* 11.8* 8.8   RBC 3.49* 3.42* 2.86*   HGB 9.8* 9.8* 8.2*   HCT 31.8* 31.0* 26.3*   * 432* 303   LYMPHOPCT 7.5*  --   --    MONOPCT 9.9  --   --    BASOPCT 0.4  --   --    MONOSABS 1.20*  --   --    LYMPHSABS 0.9*  --   --    EOSABS 0.00  --   --    BASOSABS 0.10  --   --       BMP:   Recent Labs     03/07/22  1557 03/08/22  0422 03/09/22  0800   * 135* 136   K 3.9 3.7 3.5   CL 92* 95* 100   CO2 22 21* 25   ANIONGAP 18 19 11   GLUCOSE 106 101 129*   CREATININE 2.8* 3.0* 2.7*   LABGLOM 23* 21* 24*   CALCIUM 8.7* 8.8 8.0*     LFT:   Recent Labs     03/07/22  1557 03/08/22  0422 03/09/22  0800   PROT 7.6 7.0 6.1*   LABALBU 2.8* 3.0* 2.6*   BILITOT 0.5 0.4 0.3   ALKPHOS 309* 287* 225*   ALT 56* 76* 96*   AST 78* 97* 101*       Assessment:    1. Ileostomy with high output. 2.  Dehydration secondary to #1.  This is improved following IV fluid administration. 3.  Acute kidney injury improved as his dehydration has been corrected. 4.  Metastatic colon cancer with recent cytoreductive surgery/HIPEC procedure at [facility]. Plan:    1. I encouraged him to continue eating as best he can. I also encouraged him to continue working with the nursing staff and physical therapy to increase his activity. 2.  Continue current measures to slow down his small bowel and decrease the ostomy output. I note no additional measures to institute at this time. 3.  With no other surgical issue at present I will sign off. My partners and I remain available as needed.

## 2022-03-09 NOTE — PROGRESS NOTES
Progress Note    Patient name: Twin Machuca  Patient : 1955  MR #[id number]  Room: 12    Portions of this note have been copied forward, however, changed to reflect the most current clinical status of this patient. Subjective: feeling much better this morning. Receiving TPN    HISTORY OF PRESENT ILLNESS:  The patient is well-known to my clinic. He has a diagnosis of recurrent colon cancer. He is a status post debulking surgery of peritoneal carcinomatosis at [facility] in 2022 followed by hyperthermic chemotherapy. The patient has a ileostomy in place. He has had a high output through his ileostomy resulting in severe dehydration, acute kidney injury. He was recently hospitalized and discharged last Saturday. He had a home health with IV fluids twice a week. Unfortunately, due to poor p.o. intake and high output through ileostomy his creatinine bounced back to 3.0. He was previously hospitalized with creatinine above 4.   Due to severe dehydration he was again brought to the hospital for further aggressive IV fluid hydration and further management of his high output ileostomy.     Diagnosis  · Colonic adenocarcinoma, 2020  · [id number](liver), stage ROXANA  · IHC MMR- proficient  · K-maria luisa mutated  · N-MARIA LUISA/BRAF wild-type  · Iron deficiency anemia  · Soft tissue mass, 2021  · Adenocarcinoma-consistent with colon primary, right psoas muscle, 2021  · Metastatic adenocarcinoma, 2022  · MLH1, MSH2, MH6, PMS2-intact  · MMR- no loss of expression  · Port associated DVT right upper extremity, 2022     Treatment summary  · 3/30/2020-right hemicolectomy at [facility]  · Anticipated Liver ablation to be followed by neoadjuvant/adjuvant versus palliative chemotherapy  · 06/10/2020-2020-FOLFOX + Avastin  · 20- Right hepatectomy-Dr. Julian Quiroz  · S/p Injectafer-poor oral iron tolerance  · 21- Initiate FOLFIRI + Avastin every 2 weeks  · 1/13/22-psoas muscle mass resection/HIPEC by Dr. Justyna Lindsey at [facility]  · 2/10/2022-back to OR for correction of internal hernia        The patient is a 61years old male who has a diagnosis of colonic adenocarcinoma. The patient had malignant disease with several lymph nodes involved. In addition, the patient was also found to have suspicious liver lesions concerning for metastatic disease. He was seen by [facility] and offered a multimodality approach with liver ablation of the left liver lesion followed by neoadjuvant chemotherapy and partial right hepatectomy. He underwent microwave ablation of the left lobe liver lesion on 6/8/2020. He is status post completion of 11 biweekly cycles of FOLFOX/Avasti completed November 2020. He tolerated treatment with complaints of mild transient cold neuropathy. He had a right hepatectomy on 12/11/2020 that showed no residual disease. His last CEA was normal in January 2021. He had MRI of the abdomen at [facility] in March 2021 that showed no evidence of recurrent disease in the liver or in the abdomen. He also had a surveillance colonoscopy in March 2021 that showed no polyps. CEA was elevated in May 2021. Repeat CEA June 2021 showed persistent elevation. MRI abdomen at [facility] showed no evidence of liver recurrence but a suspicious nodule in the right lower quadrant. Biopsy was performed at [facility] and consistent with recurrent adenocarcinoma.  I discussed the findings with hepatobiliary service and also colorectal surgery. He was started on FOLFIRI/Avastin. He was seen by Dr. Justyna Lindsey again on 9/24/2021. He had repeat CT abdomen pelvis and also MRI at [facility] that showed persistent disease involving the psoas muscle.  In addition, an additional small place that may represent further peritoneal metastatic disease. He underwent surgery at [facility].   He underwent exploratory laparotomy with resection of psoas mass at [facility] on 1/13/202Jazmin Bedoya also underwent HIPEC treatment.   The patient was taken to the OR on 2/10/2022 for correction of internal hernia.     Cancer history  Mr. Fe Knox was first seen by me on 3/23/2020. Yarelis Brennan was referred for a new diagnosis of colonic adenocarcinoma involving the cecum.  The patient reports that he had a wellbeing consult with his provider at the [address] [place] [address] was found to have anemia and then recommended a colonoscopy.  Of note, the patient has a family history of colon cancer.  His mother is a patient of Dr. Harwood Ganser he has been diagnosed with colon cancer in 2010. · 3/11/2020- colonoscopy revealed a large malignant appearing fungating mass lesion in the cecum.  In addition, several other polyps.  Biopsy of the mass consistent with moderate differentiated colonic adenocarcinoma.  Polyps consistent with tubulovillous adenoma with no high-grade dysplasia. [facility] MMR not proficient.  K-maria luisa mutated, BRAF and NRAS wild type.  MSI proficient  · 3/11/2020-CEA 5.5 (H)  · 3/18/2020-CT abdomen pelvis with contrast  Invasive cecal mass adhering to the right lateral abdominal wall muscles with adjacent lymphadenopathy. Mild partial obstruction of the terminal ileum. 2. Suspicious lesions in the right and left hepatic lobes measure up to 1.3 cm and likely represent metastatic disease. · 3/18/2020-Xr Chest Standard  No radiographic evidence of acute cardiopulmonary process. · 3/23/2020-he was first seen by me.  Recommended completion of staging with CT chest.  Also recommended liver MRI for further clarification of liver lesion.  S.  Recommend to proceed with a general surgery consultation tomorrow with Dr. Locke Holes was informed that I favor surgical resection if feasible of the primary malignancy.   · 3/30/2020- right hemicolectomy by Dr. Navi Varela at [address] consistent with invasive moderately differentiated colonic adenocarcinoma measuring 7.2 cm.  Carcinoma directly invading the adjacent abdominal wall tissue.  Focal lymphovascular space invasion identified.  Focal perineural invasion identified.  Surgical margins negative for evidence of malignancy.  6 out of 14 lymph nodes positive for metastatic adenocarcinoma.  Final pathology staging [id number](liver, stage ROXANA)  · 4/20/2020-CT chest with contrast showed No convincing intrathoracic metastasis. Nonspecific 4 mm nodule of the inferior lingula and a 2 mm right upper lobe nodule can be followed on subsequent imaging in 6-12 months.  Moderate coronary calcifications. Hypodense metastatic liver lesions.  Small hiatal hernia. · 4/20/2020-Mri Abdomen W Wo Contrast There are about 5 liver lesions. The 2 largest appears similar compared to 3/18/2020, the others are too small to further characterize. Appearance is most concerning for metastatic disease. Enhancement of the right lateral peritoneum. This is favored to be postoperative as there is no nodularity, evidence of omental disease or lymphadenopathy. Recommend attention on follow-up. Cholecystectomy. · 4/22/2020-discussed with Dr. Kayla Dial at [facility] will review imaging studies and give further recommendations regarding eligibility for resection of liver lesions. · 5/8/2020 CT Abdomen The two largest suspicious lesions measuring 1.2 and 1.3 cm in the  right and left hepatic lobes respectively are similar compared to the  3/18/2020 CT. Additionally, there are at least five subcentimeter lesions with similar signal characteristics, which are also highly suspicious for metastases. If complete characterization of the number and distribution of lesions is necessary, an MRI with Eovist could be acquired. · 5/19/2020- he was seen by the hepatobiliary service at [facility] with Dr. Kayla Dial:  patient adequate risk candidate for a multimodal approach, directed toward curative hepatectomy eventually.  Endorsed by Hepatobiliary Conference, I recommended perc ablation of the L hemiliver to clear it, followed by systemic therapy in a neoadjuvant strategy. Restaging imaging to confirm clearance of disease on the left and lack of progression to unresectability of the R hemiliver disease would then be followed by R hepatectomy. Limitations to this approach may be accessibility of the segment 4A/8 disease high in the hepatic dome and the possibility of heat sink-related recurrence s/p abation of the left-sided disease. · 5/21/2020- referral for Mediport placement and start FOLFOX in 2 weeks.  Will add bevacizumab after 6 weeks of liver ablation.  We will plan for 12 biweekly dose of FOLFOX.  Bevacizumab will also be stopped 6 weeks prior to major procedure. · 6/8/2020- Microwave ablation of the left liver lesion was then performed for 5 minutes at [address] to achieve a 3.4 x 3.9 cm approximately spherical zone of ablation.    · 6/10/2020- initiation of FOLFOX. · 7/20/2020-added Avastin. · 9/10/20 MRI abdomen: Left hepatic lobe segment II lesion demonstrates small T1 hyperintense blood products, status post microwave ablation on 6/8/2020. No definitive enhancement within the lesion. Focal internal thickening or scar present. Recommend attention on follow-up. Additional scattered subcentimeter foci throughout the liver decreased in size compared to MRI dated 4/20/2020 consistent with improving metastatic disease. Additional chronic findings as above. · 9/16/2020-discussed with plan with the patient and [place].  Interval response to treatment.  Plan to continue chemotherapy through 12 cycles with Avastin. · 12/11/20 Right hepatectomy-Dr. Josesito Lockwood  · 12/11/20 Right hepatectomy pathology: Liver, right, resection: Focus of Fibrosis with calcifications and chronic inflammation (0.3 cm), see comment. Background hepatic parenchyma with minimal periportal fibrosis (trichrome stain), minimal lobular and portal inflammation, and no significant macrovesicular steatosis (<5%) Comments:  The patient's history of colorectal cancer status adjuvant therapy is noted. The focus of fibrosis may reflect treatment effect. There is no evidence of viable tumor in the sampled specimen. · 12/14/20 Ct Abdomen Pelvis W Iv Contrast A Small bowel obstruction with transition point at the distal small bowel, just proximal to RIGHT upper quadrant ileocolonic anastomosis.  Postoperative change of RIGHT hepatectomy with small amount of expected free fluid and intraperitoneal gas.  Redemonstrated LEFT liver lesion.  Small bilateral pleural effusions. · 12/16/20 SBFT-[place]: Study is limited due to retained contrast in the small bowel from prior attempt at small bowel follow-through on the floor. Small bowel remains dilated, measuring approximately 5.2 cm, which is consistent with partial or resolving small bowel obstruction. Final radiographs show contrast within the colon. · 1/4/2020-resolution of small bowel obstruction. · 1/7/21 CT chest: No finding to suggest intrathoracic neoplastic process or metastatic disease. The benign-appearing tiny nodule in the right upper lobe probably represent a noncalcified granuloma. A nodule in the lingular segment of the left upper lobe is not visualized in this study. Postsurgical changes of the liver. No evidence of focal complication. A trace right basal pleural effusion. This may be reactive to the previous abdominal surgery. · 3/4/21 MRI abd: Status post right hepatectomy and microwave ablation of a left liver lesion. No findings to suggest residual/recurrent disease. No new liver lesion is identified. Postsurgical changes related to right hemicolectomy. Microwave ablation zone in segment II of the left hepatic lobe measures approximately 21 mm in diameter, unchanged. No appreciable postcontrast enhancement or other findings to suggest local disease recurrence. No new liver lesion is identified. Spleen is mildly enlarged, measuring 13.8 cm in length.   · 3/17/2021-1 year colonoscopy showed no evidence of polyps. · 5/3/21 CEA 6.2  · 6/8/21 MRI abdomen ([place]): Status post right hepatectomy and MWA of a left liver lesion.  No evidence of residual or recurrent metastatic disease in the liver. Status post prior right hemicolectomy for colon carcinoma. Within the posterior right iliopsoas muscle there is a mildly T2 hyperintense nodular focus with postcontrast rim enhancement and diffusion restriction. This measures approximately 2.7 x 2 x 2 cm. More delayed postcontrast images show irregular area of enhancement measuring 2.4 x 7.7 cm axially involving the right iliopsoas muscle and the right lateral abdominal wall. Suggest correlation with contrast enhanced CT exam for complete evaluation. Consider biopsy of this lesion. · 6/15/21 CT CHEST WO CONTRAST No metastatic disease in the chest.  No change in tiny 2 mm RIGHT upper lobe pulmonary nodule.  Mild ectasia of the ascending aorta measuring 4 cm. · 6/18/2021-I reviewed results of MRI abdomen at [place].  CT chest without contrast reviewed by me and showed no evidence of metastatic disease.  Stable 2 mm right upper lobe nodule.  Discussed with hepatobiliary service at [address] [place] [address]. Biopsy intra-abdominal nodule next week at [address] [place] [address].    · 6/25/20212773-KB-jshpsp biopsy soft tissue mass right psoas muscle at [address] [place] [address] consistent with recurrent colonic adenocarcinoma.    · 7/2/2021-discussed with hepatobiliary service at [address] [place] [address] and also surgical oncology.  Surgical oncology will call us back regarding consultation for consideration of HIPEC if he is a candidate  · 7/13/21-initiation of chemotherapy with FOLFIRI + Avastin every 2 weeks  · 7/13/21 CEA 10.7  · 7/27/2021-CEA 9.6  · 7/30/2021-she was seen by the surgical oncology group at [address] [place] [address] by Dr Guerrier Leader recommended to complete 6 cycles of chemotherapy and repeat CT chest abdomen pelvis and liver MRI to assess disease response.  They discussed the role of CRS/HIPEC in his situation. He was told that it really depends on the status of his liver lesions as well. He will complete 6 cycles of chemotherapy and will return to see me with a CTAP as well as MRI of the liver to assess disease burden and determine if he can be a candidate for debulking. · 8/25/2021-proceed cycle #4. · 9/22/2021 CEA- 8.1  · 9/24/2021 MRI with and w/o Contrast ([place])  Tiny left retroperitoneal nodule involving the left lateral conal fascial new compared to 3/4/2021 though unchanged from most recent prior, possibly an additional site of metastasis. No other new metastatic disease within the abdomen. Similar partially visualized right posterior body wall/psoas infiltrative lesion not definitely changed from MRI abdomen 6/8/2021 but better evaluated in its entirety on concurrent CT, reported separately.   Status post right hemicolectomy, right hepatectomy, and segment III microwave ablation. Similar mild splenomegaly.  Additional chronic and incidental findings as described in the body the report.  Liver: Status post right hepatectomy. Ablation zone in segment II measures 1.7 x 1.1 cm, slightly decreased in size from 1.8 x 1.4 cm previously (series 1301 image 56) without appreciable enhancement. Similar tiny subcentimeter cyst in the left hepatic lobe. No new focal hepatic lesion identified. No visualized free fluid. Similar 0.7 cm enhancing nodule along the left lateral conal fascia laterally new from 3/4/2021, grossly unchanged from MRI dated 6/8/2021. (series 801 image 22). No other appreciable peritoneal/retroperitoneal or  omental nodularity. Ill-defined T2 hyperintense signal and hyperenhancement in the right posteromedial body wall involving the lateral aspect of the psoas muscle and posterolateral abdominal wall musculature, partially visualized measuring at least 8.7 x 3.1   cm, grossly similar to the prior exam, better evaluated in its entirety on concurrent CT reported separately.    · 9/24/2021  Metastatic colorectal adenocarcinoma involving fibroadipose tissue. [facility] MMR proficient. · 1/24/22 CT chest/abd/pelvis ([facility]): Extensive postsurgical changes in the abdomen including partial right colectomy, resection of right retroperitoneal mass, omentectomy and mesh repair of right lateral abdominal wall defect. There appears to be a defect in the mesh containing herniated loops of small bowel. Extensive diffuse dilated small bowel loops with air-fluid levels are seen throughout the abdomen. There are segmental areas of decompressed small bowel in the left upper quadrant as well as adjacent to the herniated small  bowel loops in the right retroperitoneum. Air-fluid levels are also seen throughout the colon. While pattern could likely represent postoperative ileus given the diffuse small bowel dilatation and distal colonic air and fluid, developing or partial small bowel obstruction secondary to the right lateral abdominal wall hernia cannot entirely be excluded.  Small ascites. 8 mm nodule along the left lateral conal fascia is unchanged. Slight increase in size of cardiophrenic lymph nodes measuring up to 7 mm anterior to the right hemidiaphragm. Stable hypodensity in the left hepatic lobe. Diffuse gastric wall thickening/edema could be secondary to gastritis in the appropriate clinical setting.      Objective   PHYSICAL EXAM:  CONSTITUTIONAL: Alert, appropriate, no acute distress. Appears chronically ill  EYES: Non icteric, EOM intact, pupils equal round   ENT: Mucus membranes mild dry, external inspection of ears and nose are normal  NECK: Supple, no masses.  No palpable thyroid mass  CHEST/LUNGS: CTA bilaterally, normal respiratory effort   CARDIOVASCULAR: RRR, no murmurs.  No lower extremity edema  ABDOMEN: soft non-tender, active bowel sounds. Ileostomy, FARHAT left abdomen  EXTREMITIES: warm, full ROM in all 4 extremities, no focal weakness. Muscle wasting  SKIN: warm, dry with no rashes or lesions.  [facility] accessed. PICC RUE removed  LYMPH: No cervical, clavicular, axillary, or inguinal lymphadenopathy  NEUROLOGIC: follows commands, non focal   PSYCH: mood and affect appropriate. Alert and oriented to time, place, person     Vital Signs  /62   Pulse 76   Temp 98 °F (36.7 °C)   Resp 18   Ht 5' 7\" (1.702 m)   Wt 119 lb 9.6 oz (54.3 kg)   SpO2 98%   BMI 18.73 kg/m²     Intake/Output Summary (Last 24 hours) at 3/9/2022 0808  Last data filed at 3/9/2022 0800  Gross per 24 hour   Intake 1410 ml   Output 1450 ml   Net -40 ml     Labs:  CBC:   Recent Labs     03/07/22  1557 03/08/22  0422 03/09/22  0550   WBC 12.1* 11.8* 8.8   HGB 9.8* 9.8* 8.2*   * 432* 303     CMP:   Recent Labs     03/07/22  1557 03/08/22  0422   GLUCOSE 106 101   BUN 37* 42*   CREATININE 2.8* 3.0*   CO2 22 21*   CALCIUM 8.7* 8.8   ALKPHOS 309* 287*   AST 78* 97*   ALT 56* 76*     Hepatic:   Recent Labs     03/07/22  1557 03/08/22  0422   AST 78* 97*   ALT 56* 76*   BILITOT 0.5 0.4   ALKPHOS 309* 287*     Troponin: No results for input(s): TROPONINI in the last 72 hours. BNP: No results for input(s): BNP in the last 72 hours. INR: No results for input(s): INR in the last 72 hours. ABG: No results for input(s): PHART, GMC5BFE, PO2ART, TGS8AKO, P6JMZWVX, BEART in the last 72 hours. 30 Day lookback of cultures:    Blood Culture Recent:   Recent Labs     02/21/22  1739   BC No growth after 5 days of incubation. Gram Stain Recent: No results for input(s): LABGRAM in the last 720 hours. Resp Culture Recent: No results for input(s): CULTRESP in the last 720 hours. Body Fluid Recent : No results for input(s): BFCX in the last 720 hours. MRSA Recent : No results for input(s): [facility] in the last 720 hours. Urine Culture Recent : No results for input(s): LABURIN in the last 720 hours. Organism Recent : No results for input(s): ORG in the last 720 hours.      ASSESSMENT:  #Colon cancer  -Status post neoadjuvant chemo followed by debulking surgery/hyperthermic intraperitoneal chemotherapy at [facility] January 2022  -Status post exploratory laparotomy 2/10/2022 for correction of internal hernia at [place] (Dr. Sharda Saenz, Cell [phone])  -Patient has ileostomy and abdominal drain  -Patient is currently not on chemotherapy.  Last chemotherapy November 2021  -CT A/P Wo contrast 3/07/2022: Pneumoperitoneum, postoperative changes     #Acute kidney injury-likely prerenal secondary to high ileostomy output  -Patient was receiving IV fluids at home.  -He received IV fluid resuscitation in the emergency room  -CT A/P without contrast: No hydronephrosis, postoperative changes  (baseline creatinine 1.2-1.4)     Creatinine at recent discharge 1.7         Follow-up renal function this am    High output ileostomy  -Continue aggressive IV fluid resuscitation with normal saline 150 cc an hour  -Continue to monitor electrolytes daily  -Lomotil every 6 hours scheduled  -As needed any Imodium  -Octreotide 100 mcg every 8 hours  -continue supportive care  -receiving TPN, dietary following. will need TPN at discharge - consult  to help arrange        #Normocytic hypochromic anemia  Anemia of chronic disease  Hemoglobin 8.2/MCV 92.0  Ferritin 536, iron sat 37%, iron 16, TIBC 218, folate normal.  Vitamin B12 1150.      #Port associated DVT left upper extremity-  -Left upper extremity ultrasound 2/23/2022  · Acute appearing deep vein thrombosis (DVT) is seen in the internal jugular  vein subclavian axillary brachial, left upper extremity.   · Acute appearing superficial thrombophlebitis (SVT) is seen in the basilic and cephalic veins, right upper extremity.      - currently on Eliquis 5 mg p.o. twice daily     #Anorexia  - Marinol 2.5 p.o. twice daily initiated  - Megace started  - Dietary      #Weakness, deconditioning  Physical therapy        PLAN:  Continue aggressive IV fluid resuscitation   Continue TPN (dietary assisting), consult S.S.

## 2022-03-09 NOTE — PROGRESS NOTES
[place], [place], [address]    DEPARTMENT OF HOSPITALIST MEDICINE      PROGRESS  NOTE:    NOTE: Portions of this note have been copied forward, however, changed to reflect the most current clinical status of this patient. Patient:  Alok Butler  YOB: 1955  Date of Service: 3/8/2022  MRN: [id number]   Acct: [de-identified]   Primary Care Physician: KALPESH Rangel NP  Advance Directive: Full Code  Admit Date: 3/7/2022       Hospital Day: 1      CHIEF COMPLAINT:  Chief Complaint   Patient presents with    Abnormal Lab     CR 3       SUBJECTIVE:  Patient lying in bed during my morning rounds. He is feeling better after receiving IV HYDRATION. [address] [place] [address]  COURSE:    03/08/2022:  Patient seen and evaluated by oncology who advised to continue with IV fluids. Surgery consult given to recommendations regarding high output ileostomy which is the cause of his dehydration and mario. Dietary recommended starting patient on TPN.    03/07/2022: History Of Present Illness: The patient is a 74 y.o. male who presented to [facility] ER via EMS with PMH adenocarcinoma of the colon with mets to liver, EMI, CKD III, chronic alcoholism in remission, GERD, PTSD, SBO, and DVT who presents to the emergency department due to abnormal lab. Has had recent hospitalization admitted on 2/21/22 due to MARIO on CKD and was just discharged from [address] [place] [address] on 3/1/22. Patient currently utilizes home health, they performed blood work and noted an elevated Creatinine of 3. 's office called him and instructed him to go to [address] [place] [address] ER. Patient states that he has been having decreased appetite and started having nausea with vomiting that began last night. Endorses decreased intake, nausea, vomiting, fatigue, generalized weakness, and abdominal pain. Denies fever, chills, hemoptysis, hematuria, shortness of breath, bloody stool, and chest pain.  Work up in [address] [place] [address] abd/pelvis no bowel obstruction or fluid collection noted. WBC 12.1, creat 2.8 BUN 37, Alk phos 309, ALT 56, and AST 78. Received 1L NS and Zofran total 8mg IV while in ER. Patient is to be admitted to hospitalist service due to MARIO on CKD. REVIEW  OF  SYSTEMS:    Constitutional:  No fevers, chills, nausea, vomiting, + tiredness and fatigue   Lungs:   No hemoptysis, pleurisy, cough, SOB   Heart:  No chest pressure with exertion, palpitations,    Abdomen:   No new masses, no bright red blood per rectum   Extremities:  + difficulty ambulation due to weakness, no new lesions   Neurologic: No new motor or sensory changes     14 point review of systems addressed with patient which is essentially negative except as specifically addressed above:    PAST MEDICAL HISTORY:  Past Medical History:   Diagnosis Date    Acid reflux     [facility] [address]     adenocarcinoma of colon    GERD (gastroesophageal reflux disease)     Palliative care patient 03/01/2022    PTSD (post-traumatic stress disorder)     Stage 3a chronic kidney disease ([facility] [address]          PAST SURGICAL HISTORY:  Past Surgical History:   Procedure Laterality Date    ABLATION OF DYSRHYTHMIC FOCUS      ablation of liver at [facility] [address]  2003    CHOLECYSTECTOMY  2013    COLONOSCOPY      when pt was in the 19's    COLONOSCOPY N/A 03/11/2020    COLONOSCOPY POLYPECTOMY SNARE/COLD BIOPSY: Dr. Torsten Hurley colonoscopy: 6-12 months due to findings at colonoscopy today with Cecal mass lesion and large polyps.     COLONOSCOPY      COLONOSCOPY N/A 03/17/2021    Dr Tha Magana, Post-operative changes w IC anastomosis & single visible staple, Mild Diverticuosis, Int Hemorrhoids Grade 1, 3 year recall    HEMICOLECTOMY Right 03/30/2020    LAPAROSCOPIC-ASSISTED RIGHT HEMICOLECTOMY performed by Laureen Jackson MD at [address] N/A 06/03/2020    INSERTION OF VENOUS PORT with flouro performed by Laureen Jackson MD at [address] [facility] N/A 03/11/2020    Dr. Arjun Chun: BCM        FAMILY HISTORY:  Family History   Problem Relation Age of Onset    Liver Cancer Mother     High Blood Pressure Mother     Colon Cancer Mother         x2    Cancer Father         Lung Cancer    Breast Cancer Sister     Cancer Maternal Grandfather         Lung Cancer    Cancer Paternal Grandfather         Stomach Cancer    Colon Polyps Neg Hx     Cystic Fibrosis Neg Hx     Liver Disease Neg Hx     Rectal Cancer Neg Hx            OBJECTIVE:  /83   Pulse 86   Temp 97.7 °F (36.5 °C) (Temporal)   Resp 20   Ht 5' 7\" (1.702 m)   Wt 119 lb 9.6 oz (54.3 kg)   SpO2 97%   BMI 18.73 kg/m²   No intake/output data recorded.     PHYSICAL  EXAMINATION:    DANIEL:  Awake, alert, oriented x 3, patient appears tired and fatigued   Head/Eyes:  Normocephalic, atraumatic, EOMI and PERRLA bilaterally   Respiratory:   Bilateral decreased air entry in both lung fields, mild B/L crackles, symmetric expansion of chest   Cardiovascular:  Regular rate and rhythm, S1+S2+0, no murmurs/rubs   Abdomen:   Soft, non-tender, bowel sounds +ve, no organomegaly   Extremities: Moves all, decreased range of motion, no edema   Neurologic: Awake, alert, oriented x 3, cranial nerves II-XII intact, no focal neurological deficits, sensory system intact   Psychiatric: Normal mood, non-suicidal       CURRENT MEDICATIONS:  Scheduled:   diphenoxylate-atropine  1 tablet Oral 4x Daily    octreotide  100 mcg SubCUTAneous Q8H    fat emulsion  250 mL IntraVENous Once per day on Mon Wed Fri    sodium chloride flush  5-40 mL IntraVENous 2 times per day    apixaban  5 mg Oral BID    dronabinol  2.5 mg Oral BID AC    megestrol  800 mg Oral Daily    pantoprazole  40 mg Oral QAM AC    prazosin  1 mg Oral Nightly    psyllium  1 packet Oral TID WC        PRN:  loperamide, 2 mg, Q4H PRN  sodium chloride flush, 5-40 mL, PRN  sodium chloride, 25 mL, PRN  ondansetron, 4 mg, Q8H PRN   Or  ondansetron, 4 mg, Q6H PRN  acetaminophen, 650 mg, Q6H PRN   Or  acetaminophen, 650 mg, Q6H PRN  HYDROmorphone, 4 mg, Q6H PRN  methocarbamol, 500 mg, TID PRN  oxyCODONE HCl, 10 mg, Q4H PRN  promethazine, 12.5 mg, Q6H PRN        Infusions:   PN-Adult Premix 5/15 - Standard Electrolytes - Central Line 70 mL/hr at 03/08/22 1815    sodium chloride      sodium chloride 150 mL/hr at 03/08/22 1803       Laboratory Data:  Recent Labs     03/07/22  1557 03/08/22  0422   WBC 12.1* 11.8*   HGB 9.8* 9.8*   * 432*     Recent Labs     03/07/22  1557 03/08/22  0422   * 135*   K 3.9 3.7   CL 92* 95*   CO2 22 21*   BUN 37* 42*   CREATININE 2.8* 3.0*   GLUCOSE 106 101     Recent Labs     03/07/22  1557 03/08/22  0422   AST 78* 97*   ALT 56* 76*   BILITOT 0.5 0.4   ALKPHOS 309* 287*     Troponin T: No results for input(s): TROPONINI in the last 72 hours. Pro-BNP: No results for input(s): BNP in the last 72 hours. INR: No results for input(s): INR in the last 72 hours. UA:  Recent Labs     03/08/22  0015   COLORU DARK YELLOW*   PHUR 5.0   WBCUA 2-4  5   RBCUA 0-1   BACTERIA NEGATIVE*   CLARITYU CLOUDY*   SPECGRAV 1.021   LEUKOCYTESUR TRACE*   UROBILINOGEN 0.2   BILIRUBINUR SMALL*   BLOODU Negative   GLUCOSEU Negative     A1C: No results for input(s): LABA1C in the last 72 hours. ABG:No results for input(s): PHART, RMV4WII, PO2ART, NHQ4SSL, BEART, HGBAE, N9ZGOWSO, CARBOXHGBART in the last 72 hours. Imaging:    CT ABDOMEN PELVIS WO CONTRAST Additional Contrast? None    Result Date: 3/7/2022  Stable postsurgical changes, without bowel obstruction or organized fluid collection on today's examination. Signed by Dr Madyson Low:    Principal Problem:    Acute kidney injury superimposed on CKD [facility])  Active Problems:    Adenocarcinoma of colon ([address]    High output ileostomy ([address]    Severe malnutrition ([address]  Resolved Problems:    * No resolved hospital problems.  *      Continue with current medications  Continue with aggressive IV hydration  Strict I's and O's  Daily weights  Oncology evaluation and recommendations reviewed, appreciated and agreed with  General surgery evaluated patient at the bedside as well  Patient has high output ileostomy which is a difficult problem and occasionally refractory to medical treatment as per general surgery next  Monitor level electrolytes closely  Patient seen and evaluated by nutrition for severe malnutrition and TPN recommendations given  Orders placed for TPN to be managed as per dietary  Consider nephrology evaluation if patient labs, electrolytes or symptoms worsen  Follow-up closely with general surgery and oncology for further treatment recommendations next      Chronic medical issues . .. Continue with home meds. Monitor patient closely while admitted. Advised very close f/u with patient's PCP as an outpatient to address chronic medical issues. Repeat labs in a.m. Electrolyte replacement as per protocol. Patient will be monitored very closely on the floor. Further recommendations as per the hospital course. Discharge Plan: To be determined as per the hospital course      Patient  is on DVT prophylaxis  Current medications reviewed  Lab work reviewed  Radiology/Chest x-ray films reviewed  Treatment recommendations from suspecialities reviewed, appreciated and agreed with  Discussed with the nurse and addressed all questions/concerns  Discussed with Patient and/or Family at the bedside in detail . .. they understand and agree with the management plan. Chriss Mcdermott MD  3/8/2022 7:23 PM      DISCLAIMER: This note was created with electronic voice recognition which does have occasional errors. If you have any questions regarding the content within the note please do not hesitate to contact me. .. Thanks.

## 2022-03-09 NOTE — PROGRESS NOTES
[place], [place], [address]    DEPARTMENT OF HOSPITALIST MEDICINE      PROGRESS  NOTE:    NOTE: Portions of this note have been copied forward, however, changed to reflect the most current clinical status of this patient. Patient:  Paul Villagran  YOB: 1955  Date of Service: 3/9/2022  MRN: [id number]   Acct: [de-identified]   Primary Care Physician: KALPESH Gray NP  Advance Directive: Full Code  Admit Date: 3/7/2022       Hospital Day: 2      CHIEF COMPLAINT:  Chief Complaint   Patient presents with    Abnormal Lab     CR 3       SUBJECTIVE:  Patient seen and evaluated at bedside. He is feeling a little better today. Pain has been started. Patient and wife working with dietitian for TPN home recommendations. Sebastian eDe  COURSE:    03/09/2022:  Patient requires TPN for nutrition as recommended by dietary and I fully support this recommendation. TPN started. Home health care also notified about patient's TPN needs. Continue with IV hydration. Renal functions are slowly improving on IV hydration. Oral potassium supplementation ordered for borderline potassium levels. IV fluids switch to potassium containing fluid. 03/08/2022:  Patient seen and evaluated by oncology who advised to continue with IV fluids. Surgery consult given to recommendations regarding high output ileostomy which is the cause of his dehydration and mario. Dietary recommended starting patient on TPN.    03/07/2022: History Of Present Illness: The patient is a 74 y.o. male who presented to [facility] ER via EMS with PMH adenocarcinoma of the colon with mets to liver, EMI, CKD III, chronic alcoholism in remission, GERD, PTSD, SBO, and DVT who presents to the emergency department due to abnormal lab. Has had recent hospitalization admitted on 2/21/22 due to MARIO on CKD and was just discharged from [facility] on 3/1/22. Patient currently utilizes home health, they performed blood work and noted an elevated Creatinine of 3. 's office called him and instructed him to go to [facility] ER. Patient states that he has been having decreased appetite and started having nausea with vomiting that began last night. Endorses decreased intake, nausea, vomiting, fatigue, generalized weakness, and abdominal pain. Denies fever, chills, hemoptysis, hematuria, shortness of breath, bloody stool, and chest pain. Work up in [address] [place] [address] abd/pelvis no bowel obstruction or fluid collection noted. WBC 12.1, creat 2.8 BUN 37, Alk phos 309, ALT 56, and AST 78. Received 1L NS and Zofran total 8mg IV while in ER. Patient is to be admitted to hospitalist service due to MARIO on CKD. REVIEW  OF  SYSTEMS:    Constitutional:  No fevers, chills, nausea, vomiting, + tiredness and fatigue   Lungs:   No hemoptysis, pleurisy, cough, SOB   Heart:  No chest pressure with exertion, palpitations,    Abdomen:   No new masses, + ileostomy in place   Extremities:  + difficulty ambulation due to weakness, no new lesions   Neurologic: No new motor or sensory changes     14 point review of systems addressed with patient which is essentially negative except as specifically addressed above:    PAST MEDICAL HISTORY:  Past Medical History:   Diagnosis Date    Acid reflux     Cancer ([facility] [address]     adenocarcinoma of colon    GERD (gastroesophageal reflux disease)     Palliative care patient 03/01/2022    PTSD (post-traumatic stress disorder)     Stage 3a chronic kidney disease ([facility] [address]          PAST SURGICAL HISTORY:  Past Surgical History:   Procedure Laterality Date    ABLATION OF DYSRHYTHMIC FOCUS      ablation of liver at [address] [facility] [address]  2003    CHOLECYSTECTOMY  2013    COLONOSCOPY      when pt was in the 19's    COLONOSCOPY N/A 03/11/2020    COLONOSCOPY POLYPECTOMY SNARE/COLD BIOPSY: Dr. Yayo Griffith colonoscopy: 6-12 months due to findings at colonoscopy today with Cecal mass lesion and large polyps.     COLONOSCOPY      COLONOSCOPY N/A 03/17/2021 Dr Wood Headings, Post-operative changes w IC anastomosis & single visible staple, Mild Diverticuosis, Int Hemorrhoids Grade 1, 3 year recall    HEMICOLECTOMY Right 03/30/2020    LAPAROSCOPIC-ASSISTED RIGHT HEMICOLECTOMY performed by Emeterio Boyle MD at [address] N/A 06/03/2020    INSERTION OF VENOUS PORT with flouro performed by Emeterio Boyle MD at [place] [address] ENDOSCOPY N/A 03/11/2020    Dr. Soto Mail:   Rommel Turk        FAMILY HISTORY:  Family History   Problem Relation Age of Onset    Liver Cancer Mother     High Blood Pressure Mother     Colon Cancer Mother         x2    Cancer Father         Lung Cancer    Breast Cancer Sister     Cancer Maternal Grandfather         Lung Cancer    Cancer Paternal Grandfather         Stomach Cancer    Colon Polyps Neg Hx     Cystic Fibrosis Neg Hx     Liver Disease Neg Hx     Rectal Cancer Neg Hx            OBJECTIVE:  /75   Pulse 72   Temp 97.9 °F (36.6 °C) (Temporal)   Resp 18   Ht 5' 7\" (1.702 m)   Wt 119 lb 9.6 oz (54.3 kg)   SpO2 98%   BMI 18.73 kg/m²   I/O this shift:  In: 10 [I.V.:10]  Out: 75 [Stool:75]    PHYSICAL  EXAMINATION:    DANIEL:  Awake, alert, oriented x 3, patient appears tired and fatigued   Head/Eyes:  Normocephalic, atraumatic, EOMI and PERRLA bilaterally   Respiratory:   Bilateral decreased air entry in both lung fields, mild B/L crackles, symmetric expansion of chest   Cardiovascular:  Regular rate and rhythm, S1+S2+0, no murmurs/rubs   Abdomen:   Soft, non-tender, bowel sounds +ve, no organomegaly, + ileostomy in place draining well   Extremities: Moves all, decreased range of motion, no edema   Neurologic: Awake, alert, oriented x 3, cranial nerves II-XII intact, no focal neurological deficits, sensory system intact   Psychiatric: Normal mood, non-suicidal       CURRENT MEDICATIONS:  Scheduled:   potassium chloride  40 mEq Oral Q6H    vitamin D  50,000 Units Oral Weekly    diphenoxylate-atropine  1 tablet Oral 4x Daily    octreotide  100 mcg SubCUTAneous Q8H    fat emulsion  250 mL IntraVENous Once per day on Mon Wed Fri    sodium chloride flush  5-40 mL IntraVENous 2 times per day    apixaban  5 mg Oral BID    dronabinol  2.5 mg Oral BID AC    megestrol  800 mg Oral Daily    pantoprazole  40 mg Oral QAM AC    prazosin  1 mg Oral Nightly    psyllium  1 packet Oral TID WC        PRN:  loperamide, 2 mg, Q4H PRN  sodium chloride flush, 5-40 mL, PRN  sodium chloride, 25 mL, PRN  ondansetron, 4 mg, Q8H PRN   Or  ondansetron, 4 mg, Q6H PRN  acetaminophen, 650 mg, Q6H PRN   Or  acetaminophen, 650 mg, Q6H PRN  HYDROmorphone, 4 mg, Q6H PRN  methocarbamol, 500 mg, TID PRN  oxyCODONE HCl, 10 mg, Q4H PRN  promethazine, 12.5 mg, Q6H PRN        Infusions:   0.9% NaCl with KCl 20 mEq      PN-Adult Premix 5/15 - Standard Electrolytes - Central Line 70 mL/hr at 03/09/22 1049    sodium chloride         Laboratory Data:  Recent Labs     03/07/22  1557 03/08/22  0422 03/09/22  0550   WBC 12.1* 11.8* 8.8   HGB 9.8* 9.8* 8.2*   * 432* 303     Recent Labs     03/07/22  1557 03/08/22  0422 03/09/22  0800   * 135* 136   K 3.9 3.7 3.5   CL 92* 95* 100   CO2 22 21* 25   BUN 37* 42* 37*   CREATININE 2.8* 3.0* 2.7*   GLUCOSE 106 101 129*     Recent Labs     03/07/22  1557 03/08/22  0422 03/09/22  0800   AST 78* 97* 101*   ALT 56* 76* 96*   BILITOT 0.5 0.4 0.3   ALKPHOS 309* 287* 225*     Troponin T: No results for input(s): TROPONINI in the last 72 hours. Pro-BNP: No results for input(s): BNP in the last 72 hours. INR: No results for input(s): INR in the last 72 hours.   UA:  Recent Labs     03/08/22  0015   COLORU DARK YELLOW*   PHUR 5.0   WBCUA 2-4  5   RBCUA 0-1   BACTERIA NEGATIVE*   CLARITYU CLOUDY*   SPECGRAV 1.021   LEUKOCYTESUR TRACE*   UROBILINOGEN 0.2   BILIRUBINUR SMALL*   BLOODU Negative   GLUCOSEU Negative     A1C: No results for input(s): LABA1C in the last 72 hours.  ABG:No results for input(s): PHART, QON7GVW, PO2ART, FQR3AZN, BEART, HGBAE, F5MXVNSC, CARBOXHGBART in the last 72 hours. Imaging:    CT ABDOMEN PELVIS WO CONTRAST Additional Contrast? None    Result Date: 3/7/2022  Stable postsurgical changes, without bowel obstruction or organized fluid collection on today's examination. Signed by Dr Martin Quintanilla:    Principal Problem:    Acute kidney injury superimposed on CKD [facility])  Active Problems:    Adenocarcinoma of colon ([facility]    High output ileostomy ([facility] [facility]    Severe malnutrition ([facility] [facility]  Resolved Problems:    * No resolved hospital problems. *      Continue with current medications  Continue with aggressive IV hydration   IV fluids switch to normal saline with the KCl supplementation at 125 cc/h  Oral potassium replacement ordered for borderline potassium  Strict I's and O's  Daily weights  Oncology evaluation and recommendations reviewed, appreciated and agreed with  General surgery evaluated patient at the bedside as well  Patient has high output ileostomy which is a difficult problem and occasionally refractory to medical treatment as per general surgery next  Monitor level electrolytes closely  Patient seen and evaluated by nutrition for severe malnutrition and TPN recommendations given  Orders placed for TPN to be managed as per dietary  Patient's leukocytosis was likely reactive in nature and has now normalized  Patient started on vitamin D 50,000 units orally q. weekly for vitamin D level of 24  Follow-up closely with general surgery and oncology for further treatment recommendations next    Nutritional recommendations:    [employer] to manage home TPN and labs. JESSY > 90 days. High fiber diet. No sugary foods or fluids. Eat 6-8 small meals a day and limit fluid intake with food to 4 fl oz. (Fluid intake should be  from eating.)  Take anti-diarrheals on schedule vs prn.   Measure ostomy output and be sure that intake with oral fluids and TPN is at least equal to output. Empty pouch frequently at 1/3 full. Use low sugar oral rehydration solutions that are balanced with sugar & sodium, and those that include other electrolytes. Goal is output is the consistency of toothpaste and <= 1000 ml per 24 hours. Go to www. [employer] for good information on managing high volume stoma output.       Chronic medical issues . .. Continue with home meds. Monitor patient closely while admitted. Advised very close f/u with patient's PCP as an outpatient to address chronic medical issues. Repeat labs in a.m. Electrolyte replacement as per protocol. Patient will be monitored very closely on the floor. Further recommendations as per the hospital course. Discharge Plan: DC patient home with home health care in the few days if he remains stable and electrolytes improved      Patient  is on DVT prophylaxis  Current medications reviewed  Lab work reviewed  Radiology/Chest x-ray films reviewed  Treatment recommendations from suspecialities reviewed, appreciated and agreed with  Discussed with the nurse and addressed all questions/concerns  Discussed with Patient and/or Family at the bedside in detail . .. they understand and agree with the management plan. Parag Hernandez MD  3/9/2022 12:38 PM      DISCLAIMER: This note was created with electronic voice recognition which does have occasional errors. If you have any questions regarding the content within the note please do not hesitate to contact me. .. Thanks.

## 2022-03-09 NOTE — PROGRESS NOTES
Discussed with patient and his wife on managing high volume stoma output. Wife states there is no skin breakdown around stoma. She changed the pouch last night after patient \"had a blow-out. \" She had just emptied the pouch before entering room. Stool is still mostly watery. Educated on medications and dietary choices to help thicken stool and reduce output. Goal should be 1000 ml or less but not over 1200 per 24 hours. Discussed importance of measuring output and replacing equal + volume with oral rehydration solutions, TPN, and other non-sugary liquids. Provided printed information for dietary considerations and various options for oral rehydration solutions. Recommendations:    High fiber diet. No sugary foods or fluids. Eat 6-8 small meals a day and limit fluid intake with food to 4 fl oz. (Fluid intake should be  from eating.)  Take anti-diarrheals on schedule vs prn. Measure ostomy output and be sure that intake with oral fluids and TPN is at least equal to output. Empty pouch frequently at 1/3 full. Use low sugar oral rehydration solutions that are balanced with sugar & sodium, and those that include other electrolytes. Goal is output is the consistency of toothpaste and <= 1000 ml per 24 hours. Go to www. [employer] for good information on managing high volume stoma output.       Electronically signed by Linda Alicea RN, [facility] on 3/9/2022 at 11:18 AM

## 2022-03-09 NOTE — PLAN OF CARE
Problem: Skin Integrity:  Goal: Will show no infection signs and symptoms  Description: Will show no infection signs and symptoms  3/9/2022 0938 by Molina Pierson RN  Outcome: Ongoing  3/9/2022 0932 by Jayant Hernandez RN  Outcome: Ongoing  3/9/2022 0410 by Rene Arriaza RN  Outcome: Ongoing  Goal: Absence of new skin breakdown  Description: Absence of new skin breakdown  3/9/2022 0938 by Molina Pierson RN  Outcome: Ongoing  3/9/2022 0932 by Jayant Hernandez RN  Outcome: Ongoing  3/9/2022 0410 by Rene Arriaza RN  Outcome: Ongoing     Problem: Falls - Risk of:  Goal: Will remain free from falls  Description: Will remain free from falls  3/9/2022 0938 by Molina Pierson RN  Outcome: Ongoing  3/9/2022 0932 by Jayant Hernandez RN  Outcome: Ongoing  3/9/2022 0410 by Rene Arriaza RN  Outcome: Ongoing  Goal: Absence of physical injury  Description: Absence of physical injury  3/9/2022 0938 by Molina Pierson RN  Outcome: Ongoing  3/9/2022 0932 by Jayant Hernandez RN  Outcome: Ongoing  3/9/2022 0410 by Rene Arriaza RN  Outcome: Ongoing     Problem: Nutrition  Goal: Optimal nutrition therapy  3/9/2022 0938 by Molina Pierson RN  Outcome: Ongoing  3/9/2022 0932 by Jayant Hernandez RN  Outcome: Ongoing  3/9/2022 0410 by Rene Arriaza RN  Outcome: Ongoing

## 2022-03-09 NOTE — CARE COORDINATION
If TPN is desired at dc, will eed orders that state \"[facility] to manage home TPN and labs. JESSY > 90 days. \" and documentation in progress note that states pt declined at home without TPN. Electronically signed by Monique Brannon on 3/9/2022 at 9:44 AM    TPN documentation sent to [address] [place] [address] Dr Marrufo.    [facility]   P   [phone] main fax for [facility] weekend coverage  Electronically signed by Monique Brannon on 3/9/2022 at 1:29 PM

## 2022-03-09 NOTE — PLAN OF CARE
Problem: Skin Integrity:  Goal: Will show no infection signs and symptoms  Description: Will show no infection signs and symptoms  Outcome: Ongoing  Goal: Absence of new skin breakdown  Description: Absence of new skin breakdown  Outcome: Ongoing     Problem: Falls - Risk of:  Goal: Will remain free from falls  Description: Will remain free from falls  Outcome: Ongoing  Goal: Absence of physical injury  Description: Absence of physical injury  Outcome: Ongoing     Problem: Nutrition  Goal: Optimal nutrition therapy  3/9/2022 0410 by Micah Payton RN  Outcome: Ongoing  3/8/2022 1523 by Rigoberto Slater, MS, RD, LD  Outcome: Ongoing

## 2022-03-10 LAB
ALBUMIN SERPL-MCNC: 2.6 G/DL (ref 3.5–5.2)
ALP BLD-CCNC: 198 U/L (ref 40–130)
ALT SERPL-CCNC: 75 U/L (ref 5–41)
ANION GAP SERPL CALCULATED.3IONS-SCNC: 10 MMOL/L (ref 7–19)
AST SERPL-CCNC: 56 U/L (ref 5–40)
BILIRUB SERPL-MCNC: <0.2 MG/DL (ref 0.2–1.2)
BUN BLDV-MCNC: 45 MG/DL (ref 8–23)
CALCIUM SERPL-MCNC: 8.1 MG/DL (ref 8.8–10.2)
CHLORIDE BLD-SCNC: 101 MMOL/L (ref 98–111)
CO2: 21 MMOL/L (ref 22–29)
CREAT SERPL-MCNC: 2.1 MG/DL (ref 0.5–1.2)
GFR AFRICAN AMERICAN: 39
GFR NON-AFRICAN AMERICAN: 32
GLUCOSE BLD-MCNC: 102 MG/DL (ref 70–99)
GLUCOSE BLD-MCNC: 135 MG/DL (ref 74–109)
HCT VFR BLD CALC: 26.8 % (ref 42–52)
HEMOGLOBIN: 8.1 G/DL (ref 14–18)
MCH RBC QN AUTO: 28.4 PG (ref 27–31)
MCHC RBC AUTO-ENTMCNC: 30.2 G/DL (ref 33–37)
MCV RBC AUTO: 94 FL (ref 80–94)
PDW BLD-RTO: 14.9 % (ref 11.5–14.5)
PERFORMED ON: ABNORMAL
PLATELET # BLD: 277 K/UL (ref 130–400)
PMV BLD AUTO: 10.1 FL (ref 9.4–12.4)
POTASSIUM REFLEX MAGNESIUM: 4.7 MMOL/L (ref 3.5–5)
RBC # BLD: 2.85 M/UL (ref 4.7–6.1)
SODIUM BLD-SCNC: 132 MMOL/L (ref 136–145)
TOTAL PROTEIN: 5.6 G/DL (ref 6.6–8.7)
WBC # BLD: 7.7 K/UL (ref 4.8–10.8)

## 2022-03-10 PROCEDURE — 85027 COMPLETE CBC AUTOMATED: CPT

## 2022-03-10 PROCEDURE — 82947 ASSAY GLUCOSE BLOOD QUANT: CPT

## 2022-03-10 PROCEDURE — 97116 GAIT TRAINING THERAPY: CPT

## 2022-03-10 PROCEDURE — 99232 SBSQ HOSP IP/OBS MODERATE 35: CPT | Performed by: INTERNAL MEDICINE

## 2022-03-10 PROCEDURE — 80053 COMPREHEN METABOLIC PANEL: CPT

## 2022-03-10 PROCEDURE — 97165 OT EVAL LOW COMPLEX 30 MIN: CPT

## 2022-03-10 PROCEDURE — 2500000003 HC RX 250 WO HCPCS: Performed by: HOSPITALIST

## 2022-03-10 PROCEDURE — 6360000002 HC RX W HCPCS: Performed by: INTERNAL MEDICINE

## 2022-03-10 PROCEDURE — 2580000003 HC RX 258: Performed by: HOSPITALIST

## 2022-03-10 PROCEDURE — 6370000000 HC RX 637 (ALT 250 FOR IP): Performed by: INTERNAL MEDICINE

## 2022-03-10 PROCEDURE — 97161 PT EVAL LOW COMPLEX 20 MIN: CPT

## 2022-03-10 PROCEDURE — 1210000000 HC MED SURG R&B

## 2022-03-10 PROCEDURE — 2580000003 HC RX 258

## 2022-03-10 PROCEDURE — 6370000000 HC RX 637 (ALT 250 FOR IP)

## 2022-03-10 RX ORDER — SODIUM CHLORIDE 9 MG/ML
INJECTION, SOLUTION INTRAVENOUS CONTINUOUS
Status: DISCONTINUED | OUTPATIENT
Start: 2022-03-10 | End: 2022-03-11 | Stop reason: HOSPADM

## 2022-03-10 RX ADMIN — OCTREOTIDE ACETATE 100 MCG: 100 INJECTION, SOLUTION INTRAVENOUS; SUBCUTANEOUS at 10:04

## 2022-03-10 RX ADMIN — LOPERAMIDE HYDROCHLORIDE 2 MG: 2 CAPSULE ORAL at 05:56

## 2022-03-10 RX ADMIN — DRONABINOL 2.5 MG: 2.5 CAPSULE ORAL at 05:56

## 2022-03-10 RX ADMIN — PRAZOSIN HYDROCHLORIDE 1 MG: 1 CAPSULE ORAL at 21:53

## 2022-03-10 RX ADMIN — PANTOPRAZOLE SODIUM 40 MG: 40 TABLET, DELAYED RELEASE ORAL at 05:56

## 2022-03-10 RX ADMIN — DIPHENOXYLATE HYDROCHLORIDE AND ATROPINE SULFATE 1 TABLET: 2.5; .025 TABLET ORAL at 21:53

## 2022-03-10 RX ADMIN — PSYLLIUM HUSK 1 PACKET: 3.4 POWDER ORAL at 10:04

## 2022-03-10 RX ADMIN — DIPHENOXYLATE HYDROCHLORIDE AND ATROPINE SULFATE 1 TABLET: 2.5; .025 TABLET ORAL at 10:05

## 2022-03-10 RX ADMIN — SODIUM CHLORIDE, PRESERVATIVE FREE 10 ML: 5 INJECTION INTRAVENOUS at 07:55

## 2022-03-10 RX ADMIN — MEGESTROL ACETATE 800 MG: 40 SUSPENSION ORAL at 10:04

## 2022-03-10 RX ADMIN — SODIUM CHLORIDE: 9 INJECTION, SOLUTION INTRAVENOUS at 17:22

## 2022-03-10 RX ADMIN — DRONABINOL 2.5 MG: 2.5 CAPSULE ORAL at 18:39

## 2022-03-10 RX ADMIN — OCTREOTIDE ACETATE 100 MCG: 100 INJECTION, SOLUTION INTRAVENOUS; SUBCUTANEOUS at 01:06

## 2022-03-10 RX ADMIN — APIXABAN 5 MG: 5 TABLET, FILM COATED ORAL at 21:52

## 2022-03-10 RX ADMIN — OCTREOTIDE ACETATE 100 MCG: 100 INJECTION, SOLUTION INTRAVENOUS; SUBCUTANEOUS at 18:39

## 2022-03-10 RX ADMIN — SODIUM CHLORIDE, PRESERVATIVE FREE 10 ML: 5 INJECTION INTRAVENOUS at 21:53

## 2022-03-10 RX ADMIN — DIPHENOXYLATE HYDROCHLORIDE AND ATROPINE SULFATE 1 TABLET: 2.5; .025 TABLET ORAL at 18:39

## 2022-03-10 RX ADMIN — APIXABAN 5 MG: 5 TABLET, FILM COATED ORAL at 10:05

## 2022-03-10 RX ADMIN — ASCORBIC ACID, VITAMIN A PALMITATE, CHOLECALCIFEROL, THIAMINE HYDROCHLORIDE, RIBOFLAVIN-5 PHOSPHATE SODIUM, PYRIDOXINE HYDROCHLORIDE, NIACINAMIDE, DEXPANTHENOL, ALPHA-TOCOPHEROL ACETATE, VITAMIN K1, FOLIC ACID, BIOTIN, CYANOCOBALAMIN: 200; 3300; 200; 6; 3.6; 6; 40; 15; 10; 150; 600; 60; 5 INJECTION, SOLUTION INTRAVENOUS at 18:25

## 2022-03-10 NOTE — PROGRESS NOTES
Occupational Therapy Initial Assessment  Date: 3/10/2022   Patient Name: Yue Montenegro  MRN: [id number]     : 1955    Date of Service: 3/10/2022    Discharge Recommendations:  Patient would benefit from continued therapy after discharge       Assessment   Assessment: Evaluation completed. No deficits for ADL identified. SBA for ambulatory tasks--will defer to P.T. Treatment Diagnosis: MARIO on CKD, Dehydration  History: Adenocarcinoma colon with mets to liver     Patient Education: Spouse instructed in gait belt use  REQUIRES OT FOLLOW UP: no  Activity Tolerance  Activity Tolerance: Patient Tolerated treatment well  Safety Devices  Safety Devices in place: Yes  Type of devices: Call light within reach           Patient Diagnosis(es): The encounter diagnosis was Acute kidney injury superimposed on CKD ([facility] [address] has a past medical history of Acid reflux, Cancer ([facility] [address] GERD (gastroesophageal reflux disease), Palliative care patient, PTSD (post-traumatic stress disorder), and Stage 3a chronic kidney disease ([facility] [address] has a past surgical history that includes Colonoscopy; Appendectomy (); Cholecystectomy (); Upper gastrointestinal endoscopy (N/A, 2020); Colonoscopy (N/A, 2020); Colonoscopy; hemicolectomy (Right, 2020); [address] Surgery (N/A, 2020); ablation of dysrhythmic focus; and Colonoscopy (N/A, 2021).     Treatment Diagnosis: MARIO on CKD, Dehydration      Restrictions       Subjective   General  Chart Reviewed: Yes  Patient assessed for rehabilitation services?: Yes  Family / Caregiver Present: No  Patient Currently in Pain: No  Vital Signs  Level of Consciousness: Alert (0)  Patient Currently in Pain: No    Social/Functional History  Social/Functional History  Lives With: Spouse  Type of Home: House  Home Layout: One level  Home Access: Stairs to enter without rails  Entrance Stairs - Number of Steps: 3  Bathroom Shower/Tub: Tub/Shower unit  Bathroom Toilet: Standard  ADL Assistance: Independent  Ambulation Assistance: Independent       Objective   Vision: Within Functional Limits  Hearing: Within functional limits          Balance  Sitting Balance: Independent  Standing Balance: Independent  Functional Mobility  Functional - Mobility Device: Rolling Walker  Assist Level: Stand by assistance  Toilet Transfers  Toilet - Technique: Ambulating  Toilet Transfer: Stand by assistance  ADL  Feeding: Independent  Grooming: Independent  UE Bathing: Independent  LE Bathing: Independent  UE Dressing: Independent  LE Dressing: Independent  Toileting: Independent        Bed mobility  Supine to Sit: Independent  Sit to Supine: Independent  Transfers  Stand Step Transfers: Independent     Cognition  Overall Cognitive Status: WNL               Spouse instructed in gait belt use.        LUE AROM (degrees)  LUE AROM : WNL  RUE AROM (degrees)  RUE AROM : WNL                    Plan   Plan  Plan Comment: No further visits planned    Goals                Sean Ford OT/L  Electronically signed by Sean Ford OT/YURI on 3/10/2022 at 1:07 PM.

## 2022-03-10 NOTE — PLAN OF CARE
Nutrition Problem #1: Severe malnutrition,In context of chronic illness  Intervention: Food and/or Nutrient Delivery: Continue Current Diet,Continue Current Parenteral Nutrition,Continue Oral Nutrition Supplement  Nutritional Goals: Po intake >25% of meals and ONS and tolerance of PN to meet nutritional needs.     Problem: Nutrition  Goal: Optimal nutrition therapy  Outcome: Ongoing

## 2022-03-10 NOTE — PROGRESS NOTES
Facility/Department: [facility] ONCOLOGY UNIT  Initial Assessment    NAME: Tiana Schmitt  : 1955  MRN: [id number]    Date of Service: 3/10/2022    Discharge Recommendations:  Continue to assess pending progress,24 hour supervision or assist        Assessment   Body structures, Functions, Activity limitations: Decreased functional mobility ; Decreased endurance  Assessment: Pt DID WELL WITH SHORT AMBULATION IN ROOM USING RW. WILL SEE 1-2 MORE VISITS FOR INCREASED GT DISTANCE WITH NO AD. PT Education: PT Role;Plan of Care;Family Education  REQUIRES PT FOLLOW UP: Yes  Activity Tolerance  Activity Tolerance: Patient Tolerated treatment well       Patient Diagnosis(es): The encounter diagnosis was Acute kidney injury superimposed on CKD ([facility] [address] has a past medical history of Acid reflux, Cancer ([facility] [address] GERD (gastroesophageal reflux disease), Palliative care patient, PTSD (post-traumatic stress disorder), and Stage 3a chronic kidney disease ([facility] [address] has a past surgical history that includes Colonoscopy; Appendectomy (); Cholecystectomy (); Upper gastrointestinal endoscopy (N/A, 2020); Colonoscopy (N/A, 2020); Colonoscopy; hemicolectomy (Right, 2020); [address] Surgery (N/A, 2020); ablation of dysrhythmic focus; and Colonoscopy (N/A, 2021). Restrictions     Vision/Hearing  Vision: Within Functional Limits  Hearing: Within functional limits     Subjective  General  Diagnosis: MET COLON CA  Subjective  Subjective: Pt READY TO GET OOB. STATES WILL WALK AGAIN LATER NEXT DOOR TO VISIT HIS MOTHER.   Pain Screening  Patient Currently in Pain: Denies  Vital Signs  Patient Currently in Pain: Denies       Orientation  Orientation  Overall Orientation Status: Within Normal Limits  Social/Functional History  Social/Functional History  Lives With: Spouse  Type of Home: House  Home Layout: One level  Home Access: Stairs to enter without rails  Entrance Stairs - Number of Steps: 3  Bathroom Shower/Tub: Tub/Shower unit  Bathroom Toilet: Standard  ADL Assistance: Independent  Ambulation Assistance: Independent  Cognition   Cognition  Overall Cognitive Status: WNL    Objective          AROM RLE (degrees)  RLE AROM: WFL  AROM LLE (degrees)  LLE AROM : WFL  Strength RLE  Strength RLE: WFL  Strength LLE  Strength LLE: WFL        Bed mobility  Supine to Sit: Independent  Sit to Supine: Independent  Transfers  Sit to Stand: Stand by assistance  Stand to sit: Stand by assistance  Ambulation 1  Device: Rolling Walker  Assistance: Stand by assistance;Contact guard assistance  Quality of Gait: MODERATE PACE, NO LOB  Distance: 20'     Balance  Sitting - Dynamic: Good  Standing - Dynamic: Good;-        Plan   Plan  Times per week: 1-2 MORE VISITS  Current Treatment Recommendations: Functional Mobility Training,Gait Training,Safety Education & Training,Patient/Caregiver Education & Training  Plan Comment: INC GT DISTANCE WITHOUT RW  Safety Devices  Type of devices: Call light within reach      Goals  Short term goals  Time Frame for Short term goals: 14 DAYS  Short term goal 1: TRANSFERS INDEPENDENT  Short term goal 2: ' SUP-IND                 Ema Martínez, PT

## 2022-03-10 NOTE — CARE COORDINATION
TPN order and I-O information faxed to [facility]. [facility]  P [phone]  F [phone]  Electronically signed by Sheryle Sell, RN on 3/10/2022 at 10:42 AM    TPN has been approved at 100% per Bucky Dior, with [facility]. TPN will be available tomorrow, 3/11/2022. MD informed.   Electronically signed by Sheryle Sell, RN on 3/10/2022 at 2:39 PM

## 2022-03-10 NOTE — PROGRESS NOTES
Pt TPN weaned and stopped per Dietitian directive. Pt tolerating increased oral intake at this time.

## 2022-03-10 NOTE — PROGRESS NOTES
Progress Note    Patient name: Twin Machuca  Patient : 1955  MR #[id number]  Room: 12    Portions of this note have been copied forward, however, changed to reflect the most current clinical status of this patient. Subjective: feeling much better this morning. Was out of bed yesterday. Eating better yesterday. TPN continues. Patient is anxious for discharge home. Ileostomy output similar. HISTORY OF PRESENT ILLNESS:  The patient is well-known to my clinic. He has a diagnosis of recurrent colon cancer. He is a status post debulking surgery of peritoneal carcinomatosis at [address] [place] [address] in 2022 followed by hyperthermic chemotherapy. The patient has a ileostomy in place. He has had a high output through his ileostomy resulting in severe dehydration, acute kidney injury. He was recently hospitalized and discharged last Saturday. He had a home health with IV fluids twice a week. Unfortunately, due to poor p.o. intake and high output through ileostomy his creatinine bounced back to 3.0. He was previously hospitalized with creatinine above 4. Due to severe dehydration he was again brought to the hospital for further aggressive IV fluid hydration and further management of his high output ileostomy.      Diagnosis  Colonic adenocarcinoma, 2020  [id number](liver), stage ROXANA  IHC MMR- proficient  K-maria luisa mutated  N-MARIA LUISA/BRAF wild-type  Iron deficiency anemia  Soft tissue mass, 2021  Adenocarcinoma-consistent with colon primary, right psoas muscle, 2021  Metastatic adenocarcinoma, 2022  MLH1, MSH2, MH6, PMS2-intact  MMR- no loss of expression  Port associated DVT right upper extremity, 2022     Treatment summary  3/30/2020-right hemicolectomy at [facility]  Anticipated Liver ablation to be followed by neoadjuvant/adjuvant versus palliative chemotherapy  06/10/2020-2020-FOLFOX + Avastin  20- Right hepatectomy-Dr. Julian Quiroz  S/p Injectafer-poor oral iron tolerance  7/13/21- Initiate FOLFIRI + Avastin every 2 weeks  1/13/22-psoas muscle mass resection/HIPEC by Dr. Justyna Lindsey at [facility]  2/10/2022-back to OR for correction of internal hernia        The patient is a 61years old male who has a diagnosis of colonic adenocarcinoma. The patient had malignant disease with several lymph nodes involved. In addition, the patient was also found to have suspicious liver lesions concerning for metastatic disease. He was seen by [facility] and offered a multimodality approach with liver ablation of the left liver lesion followed by neoadjuvant chemotherapy and partial right hepatectomy. He underwent microwave ablation of the left lobe liver lesion on 6/8/2020. He is status post completion of 11 biweekly cycles of FOLFOX/Avasti completed November 2020. He tolerated treatment with complaints of mild transient cold neuropathy. He had a right hepatectomy on 12/11/2020 that showed no residual disease. His last CEA was normal in January 2021. He had MRI of the abdomen at [facility] in March 2021 that showed no evidence of recurrent disease in the liver or in the abdomen. He also had a surveillance colonoscopy in March 2021 that showed no polyps. CEA was elevated in May 2021. Repeat CEA June 2021 showed persistent elevation. MRI abdomen at [facility] showed no evidence of liver recurrence but a suspicious nodule in the right lower quadrant. Biopsy was performed at [facility] and consistent with recurrent adenocarcinoma. I discussed the findings with hepatobiliary service and also colorectal surgery. He was started on FOLFIRI/Avastin. He was seen by Dr. Justyna Lindsey again on 9/24/2021. He had repeat CT abdomen pelvis and also MRI at [facility] that showed persistent disease involving the psoas muscle. In addition, an additional small place that may represent further peritoneal metastatic disease. He underwent surgery at [facility].   He underwent cm.  Carcinoma directly invading the adjacent abdominal wall tissue. Focal lymphovascular space invasion identified. Focal perineural invasion identified. Surgical margins negative for evidence of malignancy. 6 out of 14 lymph nodes positive for metastatic adenocarcinoma. Final pathology staging [id number](liver, stage ROXANA)  4/20/2020-CT chest with contrast showed No convincing intrathoracic metastasis. Nonspecific 4 mm nodule of the inferior lingula and a 2 mm right upper lobe nodule can be followed on subsequent imaging in 6-12 months. Moderate coronary calcifications. Hypodense metastatic liver lesions. Small hiatal hernia. 4/20/2020-Mri Abdomen W Wo Contrast There are about 5 liver lesions. The 2 largest appears similar compared to 3/18/2020, the others are too small to further characterize. Appearance is most concerning for metastatic disease. Enhancement of the right lateral peritoneum. This is favored to be postoperative as there is no nodularity, evidence of omental disease or lymphadenopathy. Recommend attention on follow-up. Cholecystectomy. 4/22/2020-discussed with Dr. Jackelyn Arreguin at [facility]. He will review imaging studies and give further recommendations regarding eligibility for resection of liver lesions. 5/8/2020 CT Abdomen The two largest suspicious lesions measuring 1.2 and 1.3 cm in the  right and left hepatic lobes respectively are similar compared to the  3/18/2020 CT. Additionally, there are at least five subcentimeter lesions with similar signal characteristics, which are also highly suspicious for metastases. If complete characterization of the number and distribution of lesions is necessary, an MRI with Eovist could be acquired. 5/19/2020- he was seen by the hepatobiliary service at [address] [place] [address] with Dr. Jackelyn Arreguin:  patient adequate risk candidate for a multimodal approach, directed toward curative hepatectomy eventually.  Endorsed by Hepatobiliary Conference, I recommended perc ablation of the L hemiliver to clear it, followed by systemic therapy in a neoadjuvant strategy. Restaging imaging to confirm clearance of disease on the left and lack of progression to unresectability of the R hemiliver disease would then be followed by R hepatectomy. Limitations to this approach may be accessibility of the segment 4A/8 disease high in the hepatic dome and the possibility of heat sink-related recurrence s/p abation of the left-sided disease. 5/21/2020- referral for Mediport placement and start FOLFOX in 2 weeks. Will add bevacizumab after 6 weeks of liver ablation. We will plan for 12 biweekly dose of FOLFOX. Bevacizumab will also be stopped 6 weeks prior to major procedure. 6/8/2020- Microwave ablation of the left liver lesion was then performed for 5 minutes at [address] to achieve a 3.4 x 3.9 cm approximately spherical zone of ablation. 6/10/2020- initiation of FOLFOX.  7/20/2020-added Avastin. 9/10/20 MRI abdomen: Left hepatic lobe segment II lesion demonstrates small T1 hyperintense blood products, status post microwave ablation on 6/8/2020. No definitive enhancement within the lesion. Focal internal thickening or scar present. Recommend attention on follow-up. Additional scattered subcentimeter foci throughout the liver decreased in size compared to MRI dated 4/20/2020 consistent with improving metastatic disease. Additional chronic findings as above. 9/16/2020-discussed with plan with the patient and [address] [place] [address]. Interval response to treatment. Plan to continue chemotherapy through 12 cycles with Avastin. 12/11/20 Right hepatectomy-Dr. Krystyna York  12/11/20 Right hepatectomy pathology: Liver, right, resection: Focus of Fibrosis with calcifications and chronic inflammation (0.3 cm), see comment.  Background hepatic parenchyma with minimal periportal fibrosis (trichrome stain), minimal lobular and portal inflammation, and no significant macrovesicular steatosis (<5%) Comments: The patient's history of colorectal cancer status adjuvant therapy is noted. The focus of fibrosis may reflect treatment effect. There is no evidence of viable tumor in the sampled specimen. 12/14/20 Ct Abdomen Pelvis W Iv Contrast A Small bowel obstruction with transition point at the distal small bowel, just proximal to RIGHT upper quadrant ileocolonic anastomosis. Postoperative change of RIGHT hepatectomy with small amount of expected free fluid and intraperitoneal gas. Redemonstrated LEFT liver lesion. Small bilateral pleural effusions. 12/16/20 SBFT-[place]: Study is limited due to retained contrast in the small bowel from prior attempt at small bowel follow-through on the floor. Small bowel remains dilated, measuring approximately 5.2 cm, which is consistent with partial or resolving small bowel obstruction. Final radiographs show contrast within the colon. 1/4/2020-resolution of small bowel obstruction. 1/7/21 CT chest: No finding to suggest intrathoracic neoplastic process or metastatic disease. The benign-appearing tiny nodule in the right upper lobe probably represent a noncalcified granuloma. A nodule in the lingular segment of the left upper lobe is not visualized in this study. Postsurgical changes of the liver. No evidence of focal complication. A trace right basal pleural effusion. This may be reactive to the previous abdominal surgery. 3/4/21 MRI abd: Status post right hepatectomy and microwave ablation of a left liver lesion. No findings to suggest residual/recurrent disease. No new liver lesion is identified. Postsurgical changes related to right hemicolectomy. Microwave ablation zone in segment II of the left hepatic lobe measures approximately 21 mm in diameter, unchanged. No appreciable postcontrast enhancement or other findings to suggest local disease recurrence. No new liver lesion is identified. Spleen is mildly enlarged, measuring 13.8 cm in length.   3/17/2021-1 year colonoscopy showed no evidence of polyps. 5/3/21 CEA 6.2  6/8/21 MRI abdomen ([facility]): Status post right hepatectomy and MWA of a left liver lesion. No evidence of residual or recurrent metastatic disease in the liver. Status post prior right hemicolectomy for colon carcinoma. Within the posterior right iliopsoas muscle there is a mildly T2 hyperintense nodular focus with postcontrast rim enhancement and diffusion restriction. This measures approximately 2.7 x 2 x 2 cm. More delayed postcontrast images show irregular area of enhancement measuring 2.4 x 7.7 cm axially involving the right iliopsoas muscle and the right lateral abdominal wall. Suggest correlation with contrast enhanced CT exam for complete evaluation. Consider biopsy of this lesion. 6/15/21 CT CHEST WO CONTRAST No metastatic disease in the chest.  No change in tiny 2 mm RIGHT upper lobe pulmonary nodule. Mild ectasia of the ascending aorta measuring 4 cm.   6/18/2021-I reviewed results of MRI abdomen at [facility]. CT chest without contrast reviewed by me and showed no evidence of metastatic disease. Stable 2 mm right upper lobe nodule. Discussed with hepatobiliary service at [facility]. Biopsy intra-abdominal nodule next week at [facility]. 6/25/[id number]-JH-siryie biopsy soft tissue mass right psoas muscle at [facility] consistent with recurrent colonic adenocarcinoma. 7/2/2021-discussed with hepatobiliary service at [facility] and also surgical oncology. Surgical oncology will call us back regarding consultation for consideration of HIPEC if he is a candidate  7/13/21-initiation of chemotherapy with FOLFIRI + Avastin every 2 weeks  7/13/21 CEA 10.7  7/27/2021-CEA 9.6  7/30/2021-she was seen by the surgical oncology group at [facility] by Dr Dionisio Corbin. They recommended to complete 6 cycles of chemotherapy and repeat CT chest abdomen pelvis and liver MRI to assess disease response. They discussed the role of CRS/HIPEC in his situation. He was told that it really depends on the status of his liver lesions as well. He will complete 6 cycles of chemotherapy and will return to see me with a CTAP as well as MRI of the liver to assess disease burden and determine if he can be a candidate for debulking.  8/25/2021-proceed cycle #4.  9/22/2021 CEA- 8.1  9/24/2021 MRI with and w/o Contrast ([facility])  Tiny left retroperitoneal nodule involving the left lateral conal fascial new compared to 3/4/2021 though unchanged from most recent prior, possibly an additional site of metastasis. No other new metastatic disease within the abdomen. Similar partially visualized right posterior body wall/psoas infiltrative lesion not definitely changed from MRI abdomen 6/8/2021 but better evaluated in its entirety on concurrent CT, reported separately. Status post right hemicolectomy, right hepatectomy, and segment III microwave ablation. Similar mild splenomegaly. Additional chronic and incidental findings as described in the body the report. Liver: Status post right hepatectomy. Ablation zone in segment II measures 1.7 x 1.1 cm, slightly decreased in size from 1.8 x 1.4 cm previously (series 1301 image 56) without appreciable enhancement. Similar tiny subcentimeter cyst in the left hepatic lobe. No new focal hepatic lesion identified. No visualized free fluid. Similar 0.7 cm enhancing nodule along the left lateral conal fascia laterally new from 3/4/2021, grossly unchanged from MRI dated 6/8/2021. (series 801 image 22). No other appreciable peritoneal/retroperitoneal or  omental nodularity. Ill-defined T2 hyperintense signal and hyperenhancement in the right posteromedial body wall involving the lateral aspect of the psoas muscle and posterolateral abdominal wall musculature, partially visualized measuring at least 8.7 x 3.1   cm, grossly similar to the prior exam, better evaluated in its entirety on concurrent CT reported separately.    9/24/2021 CT C/A/P w/ Contrast([place]) Status post right hemicolectomy, right hepatectomy and left hepatic microwave ablation without new suspicious hepatic lesion. Left lateral perirenal fascial nodule, which is stable compared to 6/8/2021 MRI, but new compared to 3/4/2021 MRI is concerning for an additional site of metastatic disease. No sites of new or enlarging metastatic disease in the chest.  Similar appearance of right lateral psoas and posterior abdominal wall infiltrative lesion, not significantly changed compared to 6/8/2021 MRI. Mild splenomegaly. Additional findings as outlined in the body the report. Biopsy-proven adenocarcinoma involving the posterior lateral aspect of the right iliopsoas muscle and right lateral abdominal wall. Right iliopsoas component is difficult to measure but appears to be approximately 2.5 x 2.4 cm (series 2, image 153), similar to prior MRI. Nodular thickening noted along the right posterior abdominal wall and possibly involving the the transversus abdominis measures approximately 8.5 x 1.8 cm (series 2 image 153) overall similar compared to prior MRI. 10/6/2021-discussed the results of recent CT abdomen/pelvis and MRI abdomen/pelvis at [facility]. Persistent disease involving the psoas muscle. Discussed with colorectal surgery at [facility]. They recommend to continue current therapy for another 2 months and reassess with CT scans. 12/3/21 CT chest/abd/pelvis [facility]): Status post prior right hemicolectomy, right hepatectomy and left hepatic lesions microwave ablation. No new liver lesion. Soft tissue thickening of the right lower posterior abdominal wall involving the iliopsoas muscle is grossly unchanged consistent with improving metastatic disease.  Small right omental nodule and left lateral conal fascia nodule unchanged compared to  recent exam.   1/13/22 Exploratory lap with resections of psoas muscle mass and HIPEC by Dr. Francisco Sotomayor at [facility]:  Metastatic colorectal adenocarcinoma involving fibroadipose tissue. IHC MMR proficient. 1/24/22 CT chest/abd/pelvis [facility]): Extensive postsurgical changes in the abdomen including partial right colectomy, resection of right retroperitoneal mass, omentectomy and mesh repair of right lateral abdominal wall defect. There appears to be a defect in the mesh containing herniated loops of small bowel. Extensive diffuse dilated small bowel loops with air-fluid levels are seen throughout the abdomen. There are segmental areas of decompressed small bowel in the left upper quadrant as well as adjacent to the herniated small  bowel loops in the right retroperitoneum. Air-fluid levels are also seen throughout the colon. While pattern could likely represent postoperative ileus given the diffuse small bowel dilatation and distal colonic air and fluid, developing or partial small bowel obstruction secondary to the right lateral abdominal wall hernia cannot entirely be excluded. Small ascites. 8 mm nodule along the left lateral conal fascia is unchanged. Slight increase in size of cardiophrenic lymph nodes measuring up to 7 mm anterior to the right hemidiaphragm. Stable hypodensity in the left hepatic lobe. Diffuse gastric wall thickening/edema could be secondary to gastritis in the appropriate clinical setting. Objective   PHYSICAL EXAM:  CONSTITUTIONAL: Alert, appropriate, no acute distress. Appears chronically ill  EYES: Non icteric, EOM intact, pupils equal round   ENT: Mucus membranes mild moist, external inspection of ears and nose are normal  NECK: Supple, no masses. No palpable thyroid mass  CHEST/LUNGS: CTA bilaterally, normal respiratory effort   CARDIOVASCULAR: RRR, no murmurs. No lower extremity edema  ABDOMEN: soft non-tender, active bowel sounds. Ileostomy, FARHAT left abdomen  EXTREMITIES: warm, full ROM in all 4 extremities, no focal weakness. Muscle wasting  SKIN: warm, dry with no rashes or lesions. mediport accessed. Dressing to prior RUE PICC site  LYMPH: No cervical, clavicular, axillary, or inguinal lymphadenopathy  NEUROLOGIC: follows commands, non focal   PSYCH: mood and affect appropriate. Alert and oriented to time, place, person     Vital Signs  BP 88/65   Pulse 96   Temp 96.8 °F (36 °C) (Temporal)   Resp 18   Ht 5' 7\" (1.702 m)   Wt 119 lb 9.6 oz (54.3 kg)   SpO2 96%   BMI 18.73 kg/m²     Intake/Output Summary (Last 24 hours) at 3/10/2022 0643  Last data filed at 3/10/2022 0556  Gross per 24 hour   Intake 8260.46 ml   Output 1625 ml   Net 6635.46 ml     Labs:  CBC:   Recent Labs     03/08/22  0422 03/09/22  0550 03/10/22  0600   WBC 11.8* 8.8 7.7   HGB 9.8* 8.2* 8.1*   * 303 277     CMP:   Recent Labs     03/07/22  1557 03/08/22  0422 03/09/22  0800   GLUCOSE 106 101 129*   BUN 37* 42* 37*   CREATININE 2.8* 3.0* 2.7*   CO2 22 21* 25   CALCIUM 8.7* 8.8 8.0*   ALKPHOS 309* 287* 225*   AST 78* 97* 101*   ALT 56* 76* 96*     Hepatic:   Recent Labs     03/07/22  1557 03/08/22  0422 03/09/22  0800   AST 78* 97* 101*   ALT 56* 76* 96*   BILITOT 0.5 0.4 0.3   ALKPHOS 309* 287* 225*         ASSESSMENT:  #Colon cancer  -Status post neoadjuvant chemo followed by debulking surgery/hyperthermic intraperitoneal chemotherapy at [facility] January 2022  -Status post exploratory laparotomy 2/10/2022 for correction of internal hernia at [facility] (Dr. Nadine Kurtz, Cell [phone])  -Patient has ileostomy and abdominal drain  -Patient is currently not on chemotherapy.   Last chemotherapy November 2021  -CT A/P Wo contrast 3/07/2022: Pneumoperitoneum, postoperative changes    Dr. Rodriguez Tariq d/w [facility] 3/9/2022 - continue current plan of care     #Acute kidney injury-likely prerenal secondary to high ileostomy output  -Patient was receiving IV fluids at home.  -He received IV fluid resuscitation in the emergency room and IVF continues  -CT A/P without contrast: No hydronephrosis, postoperative changes  (baseline creatinine 1.2-1. 4)     Creatinine at recent discharge 1.7        Creatinine 2.1, GFR 32 today, 3/10/2022    High output ileostomy  -Continue IVF  -Lomotil every 6 hours scheduled  -As needed any Imodium  -Octreotide 100 mcg every 8 hours  -continue supportive care  -receiving TPN, dietary following. will need TPN at discharge - assisting        #Normocytic hypochromic anemia  Anemia of chronic disease  Hemoglobin 8.1/MCV 94.0  Ferritin 536, iron sat 37%, iron 16, TIBC 218, folate normal.  Vitamin B12 1150. #Port associated DVT left upper extremity-  -Left upper extremity ultrasound 2/23/2022  Acute appearing deep vein thrombosis (DVT) is seen in the internal jugular  vein subclavian axillary brachial, left upper extremity. Acute appearing superficial thrombophlebitis (SVT) is seen in the basilic and cephalic veins, right upper extremity. - currently on Eliquis 5 mg p.o. twice daily     #Anorexia  - continue Marinol 2.5 p.o. twice daily   - continue Megace   - Dietary following     #Weakness, deconditioning  Physical therapy        PLAN:  Continue IV hydration  Continue TPN (dietary assisting)  Anticipate discharge when outpatient TPN arranged and okay with others  Eliquis 5 mg PO BID  Physical therapy, encourage OOB  Continue Megace and Marinol 2.5 twice daily  Imodium every 4 hours as needed  Lomotil schedule every 6 hours  octreotide 100 mcg 3 times daily -will inquire regarding continuation at home  Appreciate GS assist       Physician's attestation/substantial contribution:  I, Dr Giles Alford, independently performed an evaluation on Denise Morfin. I have reviewed relevant medical information/data to include but not limited to medication list, relevant appropriate labs and imaging when applicable. I reviewed other physician's notes, ancillary services and nurses assessment. I have reviewed the above documentation completed by the Nurse Practitioner or Physician Assistant.  Please see my additional contributions to the history of present illness, physical examination, and assessment/medical decision-making that reflect my findings and impressions. I have seen and examined the patient and the key elements of all parts of the encounter have been performed by me. I agree with the assessment and plan as outlined by the ARNP/PA. Subjective-Eager to go home. He tells me that he ate better yesterday. He spent some time up in the chair. Denies any bleeding. Ostomy still with high output. Objective-emaciated, ill-appearing  Assessment/plan:  High output through ostomy-continue octreotide. Will consider to continue octreotide at home. Continue Lomotil scheduled and as needed Imodium. Continue bulking agents, Metamucil. Appreciate general surgery following.  working on get approval for TPN when he goes home.

## 2022-03-10 NOTE — PROGRESS NOTES
Comprehensive Nutrition Assessment    Type and Reason for Visit:  Reassess    Nutrition Recommendations/Plan:   Initiate 12 hr cyclic TPN at 1180 tonight, 3/10. Start TPN at 80 ml/hr for 1 hour, advance to 160 ml/hr for 9 hours, reduce to 80 ml/hr for 2 hours prior to turning off cyclic run. Nutrition Assessment:  Pt tolerated continuous TPN for >24 hours with no s/sx of refeeding or intolerance. Po intake improving, 25-50% last 2 meals. Will initiate 12 hr cyclic TPN tonight and monitor tolerance. Malnutrition Assessment:  Malnutrition Status:  Severe malnutrition      Estimated Daily Nutrient Needs:  Energy (kcal):  2589-0414 kcals/kg; Weight Used for Energy Requirements:  Current (30-35 kcals/kg)     Protein (g):  65-97 g/pro/day; Weight Used for Protein Requirements:  Current (1.2-1.8 g/kg)        Fluid (ml/day):  4460-4794 ml/fluid/day (in addition to ostomy output replacment); Method Used for Fluid Requirements:  1 ml/kcal      Nutrition Related Findings:  BUN 45, Cr 1.2, glucose 135      Wounds:  Stage I,Pressure Injury,Surgical Incision       Current Nutrition Therapies:    ADULT ORAL NUTRITION SUPPLEMENT; Lunch; Fortified Pudding Oral Supplement  ADULT ORAL NUTRITION SUPPLEMENT; Dinner; Frozen Oral Supplement  ADULT DIET; Regular  Current Parenteral Nutrition Orders:  · Type and Formula: Premix Central   · Lipids: 250ml,Three times weekly  · Duration: Continuous  · Rate/Volume: Clinimix E 5/15 @ 70 ml/hr= 1680 ml/day  · Current PN Order Provides: 5/15 @ 140 ml/hr for 12 hrs=1193 kcals/day, 84 g Protein, 252 g dextrose. Lipids provide 500 kcals per run. · Goal PN Orders Provides: 5/15 @ 140 ml/hr for 12 hrs=1193 kcals/day, 84 g Protein, 252 g dextrose. Lipids provide 500 kcals per run.     Anthropometric Measures:  · Height: 5' 7\" (170.2 cm)  · Current Body Weight: 119 lb (54 kg)   · BMI: 18.6     Nutrition Diagnosis:   · Severe malnutrition,In context of chronic illness related to catabolic illness,inadequate protein-energy intake as evidenced by poor intake prior to admission,weight loss greater than or equal to 5% in 1 month,mild loss of subcutaneous fat,mild muscle loss    Nutrition Interventions:   Food and/or Nutrient Delivery:  Continue Current Diet,Continue Current Parenteral Nutrition,Continue Oral Nutrition Supplement   Coordination of Nutrition Care:  Continue to monitor while inpatient    Goals:  Po intake >25% of meals and ONS and tolerance of PN to meet nutritional needs.        Nutrition Monitoring and Evaluation:   Food/Nutrient Intake Outcomes:  Food and Nutrient Intake,Supplement Intake,Parenteral Nutrition Intake/Tolerance  Physical Signs/Symptoms Outcomes:  Biochemical Data,Nutrition Focused Physical Findings,Skin,Weight,GI Status,Fluid Status or Edema     Discharge Planning:    Parenteral Nutrition,Continue current diet     Electronically signed by Verenice Woodard MS, RD, LD on 3/10/22 at 2:48 PM CST    Contact: [phone]

## 2022-03-11 VITALS
TEMPERATURE: 98.1 F | SYSTOLIC BLOOD PRESSURE: 113 MMHG | HEIGHT: 67 IN | BODY MASS INDEX: 18.77 KG/M2 | WEIGHT: 119.6 LBS | OXYGEN SATURATION: 99 % | RESPIRATION RATE: 20 BRPM | HEART RATE: 85 BPM | DIASTOLIC BLOOD PRESSURE: 69 MMHG

## 2022-03-11 DIAGNOSIS — R19.8 HIGH OUTPUT ILEOSTOMY (HCC): Primary | ICD-10-CM

## 2022-03-11 DIAGNOSIS — R63.0 DECREASED APPETITE: ICD-10-CM

## 2022-03-11 DIAGNOSIS — Z93.2 HIGH OUTPUT ILEOSTOMY (HCC): Primary | ICD-10-CM

## 2022-03-11 DIAGNOSIS — K90.9 DIARRHEA DUE TO MALABSORPTION: ICD-10-CM

## 2022-03-11 DIAGNOSIS — R19.7 DIARRHEA DUE TO MALABSORPTION: ICD-10-CM

## 2022-03-11 LAB
ALBUMIN SERPL-MCNC: 2.5 G/DL (ref 3.5–5.2)
ALP BLD-CCNC: 194 U/L (ref 40–130)
ALT SERPL-CCNC: 65 U/L (ref 5–41)
ANION GAP SERPL CALCULATED.3IONS-SCNC: 8 MMOL/L (ref 7–19)
AST SERPL-CCNC: 44 U/L (ref 5–40)
BILIRUB SERPL-MCNC: 0.3 MG/DL (ref 0.2–1.2)
BUN BLDV-MCNC: 54 MG/DL (ref 8–23)
CALCIUM SERPL-MCNC: 8.7 MG/DL (ref 8.8–10.2)
CHLORIDE BLD-SCNC: 105 MMOL/L (ref 98–111)
CO2: 20 MMOL/L (ref 22–29)
CREAT SERPL-MCNC: 2 MG/DL (ref 0.5–1.2)
GFR AFRICAN AMERICAN: 41
GFR NON-AFRICAN AMERICAN: 34
GLUCOSE BLD-MCNC: 108 MG/DL (ref 74–109)
HBA1C MFR BLD: 5.1 % (ref 4–6)
HCT VFR BLD CALC: 26.6 % (ref 42–52)
HEMOGLOBIN: 8.2 G/DL (ref 14–18)
MCH RBC QN AUTO: 28.5 PG (ref 27–31)
MCHC RBC AUTO-ENTMCNC: 30.8 G/DL (ref 33–37)
MCV RBC AUTO: 92.4 FL (ref 80–94)
PDW BLD-RTO: 15.3 % (ref 11.5–14.5)
PLATELET # BLD: 258 K/UL (ref 130–400)
PMV BLD AUTO: 9.9 FL (ref 9.4–12.4)
POTASSIUM REFLEX MAGNESIUM: 4.7 MMOL/L (ref 3.5–5)
RBC # BLD: 2.88 M/UL (ref 4.7–6.1)
SODIUM BLD-SCNC: 133 MMOL/L (ref 136–145)
TOTAL PROTEIN: 5.8 G/DL (ref 6.6–8.7)
WBC # BLD: 8.2 K/UL (ref 4.8–10.8)

## 2022-03-11 PROCEDURE — 6370000000 HC RX 637 (ALT 250 FOR IP): Performed by: INTERNAL MEDICINE

## 2022-03-11 PROCEDURE — 36591 DRAW BLOOD OFF VENOUS DEVICE: CPT

## 2022-03-11 PROCEDURE — 99232 SBSQ HOSP IP/OBS MODERATE 35: CPT | Performed by: INTERNAL MEDICINE

## 2022-03-11 PROCEDURE — 80053 COMPREHEN METABOLIC PANEL: CPT

## 2022-03-11 PROCEDURE — 6360000002 HC RX W HCPCS: Performed by: INTERNAL MEDICINE

## 2022-03-11 PROCEDURE — 2580000003 HC RX 258

## 2022-03-11 PROCEDURE — 83036 HEMOGLOBIN GLYCOSYLATED A1C: CPT

## 2022-03-11 PROCEDURE — 6370000000 HC RX 637 (ALT 250 FOR IP)

## 2022-03-11 PROCEDURE — 85027 COMPLETE CBC AUTOMATED: CPT

## 2022-03-11 RX ORDER — MEGESTROL ACETATE 40 MG/ML
800 SUSPENSION ORAL DAILY
Qty: 600 ML | Refills: 0 | Status: SHIPPED | OUTPATIENT
Start: 2022-03-11 | End: 2022-05-24

## 2022-03-11 RX ORDER — DRONABINOL 2.5 MG/1
2.5 CAPSULE ORAL
Qty: 60 CAPSULE | Refills: 0 | Status: SHIPPED | OUTPATIENT
Start: 2022-03-11 | End: 2022-03-11 | Stop reason: HOSPADM

## 2022-03-11 RX ORDER — LOPERAMIDE HYDROCHLORIDE 2 MG/1
2 CAPSULE ORAL 4 TIMES DAILY PRN
Qty: 40 CAPSULE | Refills: 0 | Status: SHIPPED | OUTPATIENT
Start: 2022-03-11 | End: 2022-03-21

## 2022-03-11 RX ORDER — DIPHENOXYLATE HYDROCHLORIDE AND ATROPINE SULFATE 2.5; .025 MG/1; MG/1
1 TABLET ORAL 4 TIMES DAILY
Qty: 12 TABLET | Refills: 0 | Status: SHIPPED | OUTPATIENT
Start: 2022-03-11 | End: 2022-03-14

## 2022-03-11 RX ORDER — OCTREOTIDE ACETATE 100 UG/ML
100 INJECTION, SOLUTION INTRAVENOUS; SUBCUTANEOUS EVERY 8 HOURS
Qty: 90 ML | Refills: 0 | Status: SHIPPED | OUTPATIENT
Start: 2022-03-11 | End: 2022-04-23

## 2022-03-11 RX ORDER — ERGOCALCIFEROL 1.25 MG/1
50000 CAPSULE ORAL WEEKLY
Qty: 5 CAPSULE | Refills: 0 | Status: SHIPPED | OUTPATIENT
Start: 2022-03-16 | End: 2022-04-20

## 2022-03-11 RX ORDER — MEGESTROL ACETATE 40 MG/ML
800 SUSPENSION ORAL DAILY
Qty: 240 ML | Refills: 3 | Status: SHIPPED | OUTPATIENT
Start: 2022-03-11 | End: 2022-03-11 | Stop reason: HOSPADM

## 2022-03-11 RX ORDER — OCTREOTIDE ACETATE 100 UG/ML
100 INJECTION, SOLUTION INTRAVENOUS; SUBCUTANEOUS EVERY 8 HOURS
Qty: 90 ML | Refills: 0 | Status: SHIPPED | OUTPATIENT
Start: 2022-03-11 | End: 2022-03-11 | Stop reason: HOSPADM

## 2022-03-11 RX ORDER — DIPHENOXYLATE HYDROCHLORIDE AND ATROPINE SULFATE 2.5; .025 MG/1; MG/1
1 TABLET ORAL 4 TIMES DAILY
Qty: 120 TABLET | Refills: 0 | Status: SHIPPED | OUTPATIENT
Start: 2022-03-11 | End: 2022-03-11

## 2022-03-11 RX ORDER — DRONABINOL 2.5 MG/1
2.5 CAPSULE ORAL
Qty: 6 CAPSULE | Refills: 0 | Status: SHIPPED | OUTPATIENT
Start: 2022-03-11 | End: 2022-03-14

## 2022-03-11 RX ORDER — DRONABINOL 2.5 MG/1
2.5 CAPSULE ORAL
Qty: 60 CAPSULE | Refills: 0 | Status: SHIPPED | OUTPATIENT
Start: 2022-03-11 | End: 2022-03-11 | Stop reason: SDUPTHER

## 2022-03-11 RX ADMIN — DIPHENOXYLATE HYDROCHLORIDE AND ATROPINE SULFATE 1 TABLET: 2.5; .025 TABLET ORAL at 09:52

## 2022-03-11 RX ADMIN — MEGESTROL ACETATE 800 MG: 40 SUSPENSION ORAL at 09:50

## 2022-03-11 RX ADMIN — DIPHENOXYLATE HYDROCHLORIDE AND ATROPINE SULFATE 1 TABLET: 2.5; .025 TABLET ORAL at 14:53

## 2022-03-11 RX ADMIN — PANTOPRAZOLE SODIUM 40 MG: 40 TABLET, DELAYED RELEASE ORAL at 06:14

## 2022-03-11 RX ADMIN — SODIUM CHLORIDE, PRESERVATIVE FREE 10 ML: 5 INJECTION INTRAVENOUS at 10:05

## 2022-03-11 RX ADMIN — APIXABAN 5 MG: 5 TABLET, FILM COATED ORAL at 09:50

## 2022-03-11 RX ADMIN — OCTREOTIDE ACETATE 100 MCG: 100 INJECTION, SOLUTION INTRAVENOUS; SUBCUTANEOUS at 09:50

## 2022-03-11 RX ADMIN — DRONABINOL 2.5 MG: 2.5 CAPSULE ORAL at 06:14

## 2022-03-11 RX ADMIN — PSYLLIUM HUSK 1 PACKET: 3.4 POWDER ORAL at 09:53

## 2022-03-11 NOTE — CARE COORDINATION
[address] notified of patient discharge today. DC Summary and DC med list faxed.     Electronically signed by Moody Alejandra on 3/11/22 at 1:05 PM CST

## 2022-03-11 NOTE — PROGRESS NOTES
Progress Note    Patient name: José Miguel Kidney  Patient : 1955  MR #[id number]  Room: 12    Portions of this note have been copied forward, however, changed to reflect the most current clinical status of this patient. Subjective: continues to feel better. Ambulatory. Ready for discharge    HISTORY OF PRESENT ILLNESS:  The patient is well-known to my clinic. He has a diagnosis of recurrent colon cancer. He is a status post debulking surgery of peritoneal carcinomatosis at [facility] in 2022 followed by hyperthermic chemotherapy. The patient has a ileostomy in place. He has had a high output through his ileostomy resulting in severe dehydration, acute kidney injury. He was recently hospitalized and discharged last Saturday. He had a home health with IV fluids twice a week. Unfortunately, due to poor p.o. intake and high output through ileostomy his creatinine bounced back to 3.0. He was previously hospitalized with creatinine above 4. Due to severe dehydration he was again brought to the hospital for further aggressive IV fluid hydration and further management of his high output ileostomy.      Diagnosis  · Colonic adenocarcinoma, 2020  · [id number](liver), stage ROXANA  · IHC MMR- proficient  · K-maria luisa mutated  · N-MARIA LUISA/BRAF wild-type  · Iron deficiency anemia  · Soft tissue mass, 2021  · Adenocarcinoma-consistent with colon primary, right psoas muscle, 2021  · Metastatic adenocarcinoma, 2022  · MLH1, MSH2, MH6, PMS2-intact  · MMR- no loss of expression  · Port associated DVT right upper extremity, 2022     Treatment summary  · 3/30/2020-right hemicolectomy at [facility]  · Anticipated Liver ablation to be followed by neoadjuvant/adjuvant versus palliative chemotherapy  · 06/10/2020-2020-FOLFOX + Avastin  · 20- Right hepatectomy-Dr. Odin Gallo  · S/p Injectafer-poor oral iron tolerance  · 21- Initiate FOLFIRI + Avastin every 2 weeks  · 1/13/22-psoas muscle mass resection/HIPEC by Dr. Nyla Powell at [facility]  · 2/10/2022-back to OR for correction of internal hernia        The patient is a 61years old male who has a diagnosis of colonic adenocarcinoma. The patient had malignant disease with several lymph nodes involved. In addition, the patient was also found to have suspicious liver lesions concerning for metastatic disease. He was seen by [facility] and offered a multimodality approach with liver ablation of the left liver lesion followed by neoadjuvant chemotherapy and partial right hepatectomy. He underwent microwave ablation of the left lobe liver lesion on 6/8/2020. He is status post completion of 11 biweekly cycles of FOLFOX/Avasti completed November 2020. He tolerated treatment with complaints of mild transient cold neuropathy. He had a right hepatectomy on 12/11/2020 that showed no residual disease. His last CEA was normal in January 2021. He had MRI of the abdomen at [facility] in March 2021 that showed no evidence of recurrent disease in the liver or in the abdomen. He also had a surveillance colonoscopy in March 2021 that showed no polyps. CEA was elevated in May 2021. Repeat CEA June 2021 showed persistent elevation. MRI abdomen at [facility] showed no evidence of liver recurrence but a suspicious nodule in the right lower quadrant. Biopsy was performed at [facility] and consistent with recurrent adenocarcinoma. I discussed the findings with hepatobiliary service and also colorectal surgery. He was started on FOLFIRI/Avastin. He was seen by Dr. Nyla Powell again on 9/24/2021. He had repeat CT abdomen pelvis and also MRI at [facility] that showed persistent disease involving the psoas muscle. In addition, an additional small place that may represent further peritoneal metastatic disease. He underwent surgery at [facility].   He underwent exploratory laparotomy with resection of psoas mass at [facility] on wall tissue. Focal lymphovascular space invasion identified. Focal perineural invasion identified. Surgical margins negative for evidence of malignancy. 6 out of 14 lymph nodes positive for metastatic adenocarcinoma. Final pathology staging [id number](liver, stage ROXANA)  · 4/20/2020-CT chest with contrast showed No convincing intrathoracic metastasis. Nonspecific 4 mm nodule of the inferior lingula and a 2 mm right upper lobe nodule can be followed on subsequent imaging in 6-12 months. Moderate coronary calcifications. Hypodense metastatic liver lesions. Small hiatal hernia. · 4/20/2020-Mri Abdomen W Wo Contrast There are about 5 liver lesions. The 2 largest appears similar compared to 3/18/2020, the others are too small to further characterize. Appearance is most concerning for metastatic disease. Enhancement of the right lateral peritoneum. This is favored to be postoperative as there is no nodularity, evidence of omental disease or lymphadenopathy. Recommend attention on follow-up. Cholecystectomy. · 4/22/2020-discussed with Dr. Johnathan Cruz at [place]. He will review imaging studies and give further recommendations regarding eligibility for resection of liver lesions. · 5/8/2020 CT Abdomen The two largest suspicious lesions measuring 1.2 and 1.3 cm in the  right and left hepatic lobes respectively are similar compared to the  3/18/2020 CT. Additionally, there are at least five subcentimeter lesions with similar signal characteristics, which are also highly suspicious for metastases. If complete characterization of the number and distribution of lesions is necessary, an MRI with Eovist could be acquired. · 5/19/2020- he was seen by the hepatobiliary service at [facility] with Dr. Johnathan Cruz:  patient adequate risk candidate for a multimodal approach, directed toward curative hepatectomy eventually.  Endorsed by Hepatobiliary Conference, I recommended perc ablation of the L hemiliver to clear it, followed by systemic therapy in a neoadjuvant strategy. Restaging imaging to confirm clearance of disease on the left and lack of progression to unresectability of the R hemiliver disease would then be followed by R hepatectomy. Limitations to this approach may be accessibility of the segment 4A/8 disease high in the hepatic dome and the possibility of heat sink-related recurrence s/p abation of the left-sided disease. · 5/21/2020- referral for Mediport placement and start FOLFOX in 2 weeks. Will add bevacizumab after 6 weeks of liver ablation. We will plan for 12 biweekly dose of FOLFOX. Bevacizumab will also be stopped 6 weeks prior to major procedure. · 6/8/2020- Microwave ablation of the left liver lesion was then performed for 5 minutes at 100 W to achieve a 3.4 x 3.9 cm approximately spherical zone of ablation. · 6/10/2020- initiation of FOLFOX. · 7/20/2020-added Avastin. · 9/10/20 MRI abdomen: Left hepatic lobe segment II lesion demonstrates small T1 hyperintense blood products, status post microwave ablation on 6/8/2020. No definitive enhancement within the lesion. Focal internal thickening or scar present. Recommend attention on follow-up. Additional scattered subcentimeter foci throughout the liver decreased in size compared to MRI dated 4/20/2020 consistent with improving metastatic disease. Additional chronic findings as above. · 9/16/2020-discussed with plan with the patient and [facility]. Interval response to treatment. Plan to continue chemotherapy through 12 cycles with Avastin. · 12/11/20 Right hepatectomy-Dr. Cuellar Cure  · 12/11/20 Right hepatectomy pathology: Liver, right, resection: Focus of Fibrosis with calcifications and chronic inflammation (0.3 cm), see comment. Background hepatic parenchyma with minimal periportal fibrosis (trichrome stain), minimal lobular and portal inflammation, and no significant macrovesicular steatosis (<5%) Comments:  The patient's history of colorectal cancer status adjuvant therapy is noted. The focus of fibrosis may reflect treatment effect. There is no evidence of viable tumor in the sampled specimen. · 12/14/20 Ct Abdomen Pelvis W Iv Contrast A Small bowel obstruction with transition point at the distal small bowel, just proximal to RIGHT upper quadrant ileocolonic anastomosis. Postoperative change of RIGHT hepatectomy with small amount of expected free fluid and intraperitoneal gas. Redemonstrated LEFT liver lesion. Small bilateral pleural effusions. · 12/16/20 SBFT-[place]: Study is limited due to retained contrast in the small bowel from prior attempt at small bowel follow-through on the floor. Small bowel remains dilated, measuring approximately 5.2 cm, which is consistent with partial or resolving small bowel obstruction. Final radiographs show contrast within the colon. · 1/4/2020-resolution of small bowel obstruction. · 1/7/21 CT chest: No finding to suggest intrathoracic neoplastic process or metastatic disease. The benign-appearing tiny nodule in the right upper lobe probably represent a noncalcified granuloma. A nodule in the lingular segment of the left upper lobe is not visualized in this study. Postsurgical changes of the liver. No evidence of focal complication. A trace right basal pleural effusion. This may be reactive to the previous abdominal surgery. · 3/4/21 MRI abd: Status post right hepatectomy and microwave ablation of a left liver lesion. No findings to suggest residual/recurrent disease. No new liver lesion is identified. Postsurgical changes related to right hemicolectomy. Microwave ablation zone in segment II of the left hepatic lobe measures approximately 21 mm in diameter, unchanged. No appreciable postcontrast enhancement or other findings to suggest local disease recurrence. No new liver lesion is identified. Spleen is mildly enlarged, measuring 13.8 cm in length.   · 3/17/2021-1 year colonoscopy showed no evidence of polyps. · 5/3/21 CEA 6.2  · 6/8/21 MRI abdomen ([place]): Status post right hepatectomy and MWA of a left liver lesion. No evidence of residual or recurrent metastatic disease in the liver. Status post prior right hemicolectomy for colon carcinoma. Within the posterior right iliopsoas muscle there is a mildly T2 hyperintense nodular focus with postcontrast rim enhancement and diffusion restriction. This measures approximately 2.7 x 2 x 2 cm. More delayed postcontrast images show irregular area of enhancement measuring 2.4 x 7.7 cm axially involving the right iliopsoas muscle and the right lateral abdominal wall. Suggest correlation with contrast enhanced CT exam for complete evaluation. Consider biopsy of this lesion. · 6/15/21 CT CHEST WO CONTRAST No metastatic disease in the chest.  No change in tiny 2 mm RIGHT upper lobe pulmonary nodule. Mild ectasia of the ascending aorta measuring 4 cm. · 6/18/2021-I reviewed results of MRI abdomen at [facility]. CT chest without contrast reviewed by me and showed no evidence of metastatic disease. Stable 2 mm right upper lobe nodule. Discussed with hepatobiliary service at [facility]. Biopsy intra-abdominal nodule next week at [facility]. · 6/25/[id number] biopsy soft tissue mass right psoas muscle at [facility] consistent with recurrent colonic adenocarcinoma. · 7/2/2021-discussed with hepatobiliary service at [facility] and also surgical oncology. Surgical oncology will call us back regarding consultation for consideration of HIPEC if he is a candidate  · 7/13/21-initiation of chemotherapy with FOLFIRI + Avastin every 2 weeks  · 7/13/21 CEA 10.7  · 7/27/2021-CEA 9.6  · 7/30/2021-she was seen by the surgical oncology group at [facility] by Dr Inga Pascual. They recommended to complete 6 cycles of chemotherapy and repeat CT chest abdomen pelvis and liver MRI to assess disease response.   They discussed the role of CRS/HIPEC in his situation. He was told that it really depends on the status of his liver lesions as well. He will complete 6 cycles of chemotherapy and will return to see me with a CTAP as well as MRI of the liver to assess disease burden and determine if he can be a candidate for debulking. · 8/25/2021-proceed cycle #4. · 9/22/2021 CEA- 8.1  · 9/24/2021 MRI with and w/o Contrast ([place])  Tiny left retroperitoneal nodule involving the left lateral conal fascial new compared to 3/4/2021 though unchanged from most recent prior, possibly an additional site of metastasis. No other new metastatic disease within the abdomen. Similar partially visualized right posterior body wall/psoas infiltrative lesion not definitely changed from MRI abdomen 6/8/2021 but better evaluated in its entirety on concurrent CT, reported separately. Status post right hemicolectomy, right hepatectomy, and segment III microwave ablation. Similar mild splenomegaly. Additional chronic and incidental findings as described in the body the report. Liver: Status post right hepatectomy. Ablation zone in segment II measures 1.7 x 1.1 cm, slightly decreased in size from 1.8 x 1.4 cm previously (series 1301 image 56) without appreciable enhancement. Similar tiny subcentimeter cyst in the left hepatic lobe. No new focal hepatic lesion identified. No visualized free fluid. Similar 0.7 cm enhancing nodule along the left lateral conal fascia laterally new from 3/4/2021, grossly unchanged from MRI dated 6/8/2021. (series 801 image 22). No other appreciable peritoneal/retroperitoneal or  omental nodularity. Ill-defined T2 hyperintense signal and hyperenhancement in the right posteromedial body wall involving the lateral aspect of the psoas muscle and posterolateral abdominal wall musculature, partially visualized measuring at least 8.7 x 3.1   cm, grossly similar to the prior exam, better evaluated in its entirety on concurrent CT reported separately.    · 9/24/2021 CT C/A/P w/ Contrast([place]) Status post right hemicolectomy, right hepatectomy and left hepatic microwave ablation without new suspicious hepatic lesion. Left lateral perirenal fascial nodule, which is stable compared to 6/8/2021 MRI, but new compared to 3/4/2021 MRI is concerning for an additional site of metastatic disease. No sites of new or enlarging metastatic disease in the chest.  Similar appearance of right lateral psoas and posterior abdominal wall infiltrative lesion, not significantly changed compared to 6/8/2021 MRI. Mild splenomegaly. Additional findings as outlined in the body the report. Biopsy-proven adenocarcinoma involving the posterior lateral aspect of the right iliopsoas muscle and right lateral abdominal wall. Right iliopsoas component is difficult to measure but appears to be approximately 2.5 x 2.4 cm (series 2, image 153), similar to prior MRI. Nodular thickening noted along the right posterior abdominal wall and possibly involving the the transversus abdominis measures approximately 8.5 x 1.8 cm (series 2 image 153) overall similar compared to prior MRI. · 10/6/2021-discussed the results of recent CT abdomen/pelvis and MRI abdomen/pelvis at [address] [place] [address]. Persistent disease involving the psoas muscle. Discussed with colorectal surgery at [address] [place] [address]. They recommend to continue current therapy for another 2 months and reassess with CT scans. · 12/3/21 CT chest/abd/pelvis [facility]): Status post prior right hemicolectomy, right hepatectomy and left hepatic lesions microwave ablation. No new liver lesion. Soft tissue thickening of the right lower posterior abdominal wall involving the iliopsoas muscle is grossly unchanged consistent with improving metastatic disease.  Small right omental nodule and left lateral conal fascia nodule unchanged compared to  recent exam.   · 1/13/22 Exploratory lap with resections of psoas muscle mass and HIPEC by Dr. Yoly Robles at [facility]: Metastatic colorectal adenocarcinoma involving fibroadipose tissue. IHC MMR proficient. · 1/24/22 CT chest/abd/pelvis [facility]): Extensive postsurgical changes in the abdomen including partial right colectomy, resection of right retroperitoneal mass, omentectomy and mesh repair of right lateral abdominal wall defect. There appears to be a defect in the mesh containing herniated loops of small bowel. Extensive diffuse dilated small bowel loops with air-fluid levels are seen throughout the abdomen. There are segmental areas of decompressed small bowel in the left upper quadrant as well as adjacent to the herniated small  bowel loops in the right retroperitoneum. Air-fluid levels are also seen throughout the colon. While pattern could likely represent postoperative ileus given the diffuse small bowel dilatation and distal colonic air and fluid, developing or partial small bowel obstruction secondary to the right lateral abdominal wall hernia cannot entirely be excluded. Small ascites. 8 mm nodule along the left lateral conal fascia is unchanged. Slight increase in size of cardiophrenic lymph nodes measuring up to 7 mm anterior to the right hemidiaphragm. Stable hypodensity in the left hepatic lobe. Diffuse gastric wall thickening/edema could be secondary to gastritis in the appropriate clinical setting. Objective   PHYSICAL EXAM:  CONSTITUTIONAL: Alert, appropriate, no acute distress. Appears chronically ill  EYES: Non icteric, EOM intact, pupils equal round   ENT: Mucus membranes moist, external inspection of ears and nose are normal  NECK: Supple, no masses. No JVD  CHEST/LUNGS: CTA bilaterally, normal respiratory effort   CARDIOVASCULAR: RRR, no murmurs. No lower extremity edema  ABDOMEN: soft non-tender, active bowel sounds. Ileostomy, FARHAT left abdomen  EXTREMITIES: warm, full ROM in all 4 extremities, no focal weakness. Muscle wasting  SKIN: warm, dry with no rashes or lesions. mediport accessed.   Dressing to prior RUE PICC site. pale  LYMPH: No cervical, clavicular, axillary, or inguinal lymphadenopathy  NEUROLOGIC: follows commands, non focal   PSYCH: mood and affect appropriate. Alert and oriented to time, place, person     Vital Signs  /81   Pulse 95   Temp 97.3 °F (36.3 °C)   Resp 20   Ht 5' 7\" (1.702 m)   Wt 119 lb 9.6 oz (54.3 kg)   SpO2 97%   BMI 18.73 kg/m²     Intake/Output Summary (Last 24 hours) at 3/11/2022 0645  Last data filed at 3/10/2022 1826  Gross per 24 hour   Intake 120 ml   Output 1400 ml   Net -1280 ml     Labs:  CBC:   Recent Labs     03/09/22  0550 03/10/22  0600 03/11/22  0354   WBC 8.8 7.7 8.2   HGB 8.2* 8.1* 8.2*    277 258     CMP:   Recent Labs     03/09/22  0800 03/10/22  0600 03/11/22  0354   GLUCOSE 129* 135* 108   BUN 37* 45* 54*   CREATININE 2.7* 2.1* 2.0*   CO2 25 21* 20*   CALCIUM 8.0* 8.1* 8.7*   ALKPHOS 225* 198* 194*   * 56* 44*   ALT 96* 75* 65*     Hepatic:   Recent Labs     03/09/22  0800 03/10/22  0600 03/11/22  0354   * 56* 44*   ALT 96* 75* 65*   BILITOT 0.3 <0.2 0.3   ALKPHOS 225* 198* 194*         ASSESSMENT:  #Colon cancer  -Status post neoadjuvant chemo followed by debulking surgery/hyperthermic intraperitoneal chemotherapy at [facility] January 2022  -Status post exploratory laparotomy 2/10/2022 for correction of internal hernia at [facility] (Dr. Cheryl Cabral, Cell [phone])  -Patient has ileostomy and abdominal drain  -Patient is currently not on chemotherapy.   Last chemotherapy November 2021  -CT A/P Wo contrast 3/07/2022: Pneumoperitoneum, postoperative changes    Dr. Jean-Pierre Hannah d/w [facility] 3/9/2022 - continue current plan of care     #Acute kidney injury-likely prerenal secondary to high ileostomy output  -Patient was receiving IV fluids at home.  -He received IV fluid resuscitation in the emergency room and IVF continues  -CT A/P without contrast: No hydronephrosis, postoperative changes  (baseline creatinine 1.2-1.4) Creatinine at recent discharge 1.7        High output ileostomy/malnutruition   -Continue IVF - continue as needed at discharge  -Lomotil every 6 hours scheduled - continue at discharge  -As needed any Imodium - continue at discharge  -Octreotide 100 mcg every 8 hours - continue at discharge  -continue supportive care  -receiving TPN, dietary following. Arrange TPN at discharge - assisting        #Normocytic hypochromic anemia  Anemia of chronic disease  Hemoglobin 8.2/MCV 92.4  Ferritin 536, iron sat 37%, iron 16, TIBC 218, folate normal.  Vitamin B12 1150. #Port associated DVT left upper extremity-  -Left upper extremity ultrasound 2/23/2022  · Acute appearing deep vein thrombosis (DVT) is seen in the internal jugular  vein subclavian axillary brachial, left upper extremity. · Acute appearing superficial thrombophlebitis (SVT) is seen in the basilic and cephalic veins, right upper extremity. - currently on Eliquis 5 mg p.o. twice daily     #Anorexia  - continue Marinol 2.5 p.o. twice daily   - continue Megace   - Dietary following     #Weakness, deconditioning  Physical therapy      PLAN:  Continue IV hydration -  continue at discharge PRN, nursing asked to discuss recommendations with dietitian   Continue TPN (dietary assisting)  Okay from oncology standpoint for discharge home if TPN arranged and okay with others  Eliquis 5 mg PO BID -  continue at discharge  Continue Megace and Marinol 2.5 twice daily -  continue at discharge  Imodium every 4 hours as needed -  continue at discharge  Lomotil schedule every 6 hours -  continue at discharge  octreotide 100 mcg 3 times daily - continue at discharge  Appreciate GS assist     Rx for Megace, Marinol and Octreotide will be sent to patient's pharmacy, [id number]. He states he has Lomotil, Imodium and Metamucil available at home. Please have H.H. draw CBC, CMP, Magnesium, Phosphorus every Mon. And Thurs.  Fax results to  Mecca Corrigan, [phone]  Outpatient follow-up with Dr. Mecca Corrigan in 2 weeks. [facility], APRN  03/11/22  8:01 AM    Physician's attestation/substantial contribution:  I, Dr Giles Alford, independently performed an evaluation on Denise Morfin. I have reviewed relevant medical information/data to include but not limited to medication list, relevant appropriate labs and imaging when applicable. I reviewed other physician's notes, ancillary services and nurses assessment. I have reviewed the above documentation completed by the Nurse Practitioner or Physician Assistant. Please see my additional contributions to the history of present illness, physical examination, and assessment/medical decision-making that reflect my findings and impressions. I have seen and examined the patient and the key elements of all parts of the encounter have been performed by me. I agree with the assessment and plan as outlined by the ARNP/PA. Subjective-Eager to go home. Objective-ileostomy with high output  Assessment/plan:  High output ileostomy-patient is now on TPN. I would like him to go home with octreotide, Imodium, Lomotil and Metamucil. Appreciated surgery recommendations. MARIO-creatinine trending down. Creatinine 2.0 today. Continue home health with IV fluids twice a week. TPN as outpatient. Malnourishment-outpatient TPN. Patient will arrange follow-up with [facility]. Discussed the patient plan of care. Discussed with [facility]. We will arrange follow-up with me. Labs with [facility] twice a week.

## 2022-03-11 NOTE — PROGRESS NOTES
Comprehensive Nutrition Assessment    Type and Reason for Visit:  Reassess    Nutrition Recommendations/Plan:   Continue cyclic TPN per home orders post d/c. Monitor blood glucose off TPN this AM.  Encourage po intake. Nutrition Assessment:  Pt tolerated cyclic TPN well for full volume, RN stopping tapered rate at time of visit. Glucose & K+ WNL. Aware planned d/c home today with [facility] and contiuned TPN per [facility]. Po intake remains fair, however much improved since admission. Continue current diet as tolerated and TPN per home orders: Clinimix E 5/15 @ 140 ml/hr for total volume of 1680 ml. Malnutrition Assessment:  Malnutrition Status:  Severe malnutrition      Estimated Daily Nutrient Needs:  Energy (kcal):  2079-6170 kcals/kg; Weight Used for Energy Requirements:  Current (30-35 kcals/kg)     Protein (g):  65-97 g/pro/day; Weight Used for Protein Requirements:  Current (1.2-1.8 g/kg)        Fluid (ml/day):  5398-8573 ml/fluid/day (in addition to ostomy output replacment); Method Used for Fluid Requirements:  1 ml/kcal      Nutrition Related Findings:  BUN 54, Cr 2.0, glucose 108      Wounds:  Stage I,Pressure Injury,Surgical Incision       Current Nutrition Therapies:    ADULT ORAL NUTRITION SUPPLEMENT; Lunch; Fortified Pudding Oral Supplement  ADULT ORAL NUTRITION SUPPLEMENT; Dinner; Frozen Oral Supplement  ADULT DIET; Regular  Current Parenteral Nutrition Orders:  · Type and Formula: Premix Central   · Lipids: 250ml,Three times weekly  · Duration: Cyclic  · Rate/Volume: Clinimix E 5/15 for 1680 ml/day  · Current PN Order Provides: 1680 ml of 5/15 over 12 hrs=1193 kcals/day, 84 g Protein, 252 g dextrose. Lipids provide 500 kcals per run. · Goal PN Orders Provides: 1680 ml of 5/15 over 12 hrs=1193 kcals/day, 84 g Protein, 252 g dextrose. Lipids provide 500 kcals per run.     Anthropometric Measures:  · Height: 5' 7\" (170.2 cm)  · Current Body Weight: 119 lb (54 kg)    · BMI: 18.6   · BMI Categories: Normal Weight (BMI 18.5-24. 9)       Nutrition Diagnosis:   · Severe malnutrition,In context of chronic illness related to catabolic illness,inadequate protein-energy intake as evidenced by poor intake prior to admission,weight loss greater than or equal to 5% in 1 month,mild loss of subcutaneous fat,mild muscle loss    Nutrition Interventions:   Food and/or Nutrient Delivery:  Continue Current Diet,Continue Current Parenteral Nutrition,Continue Oral Nutrition Supplement   Coordination of Nutrition Care:  Continue to monitor while inpatient    Goals:  Po intake >25% of meals and ONS and tolerance of PN to meet nutritional needs.        Nutrition Monitoring and Evaluation:   Food/Nutrient Intake Outcomes:  Food and Nutrient Intake,Supplement Intake,Parenteral Nutrition Intake/Tolerance  Physical Signs/Symptoms Outcomes:  Biochemical Data,Nutrition Focused Physical Findings,Skin,Weight,GI Status,Fluid Status or Edema     Discharge Planning:    Parenteral Nutrition,Continue current diet     Electronically signed by Anh Celis, MS, RD, LD on 3/11/22 at 8:14 AM CST    Contact: [phone]

## 2022-03-11 NOTE — CARE COORDINATION
Per Jazmine Mari, is able to use patient's port for TPN. Mark Espinosa [address] to learn if Octreotide will be covered by his insurance.  (p [phone])  Pharmacist is in a meeting. Awaiting call back. Electronically signed by Bre Wray RN on 3/11/2022 at 9:28 AM     Octreotide at [facility] is on backorder until 5/6/2022. After calling several other drug stores, [facility] [address] is able to obtain Octreotide from their [facility] in [place]. Cost will be 100% covered by his insurance. Medication will be directly mailed to his home. [facility] was requested to transfer scripts to [place]. Prescription for Marinol could not be transferred since it is a controlled substance. KALPESH Donato called and asked to send e-script for Marinol to [facility]. Patient informed of plan.   Electronically signed by Bre Wray RN on 3/11/2022 at 12:54 PM

## 2022-03-11 NOTE — DISCHARGE SUMMARY
Annelise Lawrence eric, [address] [place] [address] MEDICINE      DISCHARGE SUMMARY:      PATIENT NAME:  Guadalupe Phelps  :  1955  MRN:  [id number]    Admission Date:   3/7/2022  3:33 PM Attending: Emilio Clemente MD   Discharge Date:   3/11/2022              PCP: KALPESH Garrison - NP  Length of Stay: 4 days     Chief Complaint on Admission:   Chief Complaint   Patient presents with    Abnormal Lab     CR 3       Consultants:     IP CONSULT TO ONCOLOGY  IP CONSULT TO GENERAL SURGERY  PALLIATIVE CARE EVAL  IP CONSULT TO CASE MANAGEMENT  IP CONSULT TO CASE MANAGEMENT  IP CONSULT TO HOME CARE NEEDS       Discharge Problem List:   Principal Problem:    Acute kidney injury superimposed on CKD ([address]  Active Problems:    Adenocarcinoma of colon ([address]    Malignant neoplasm of ascending colon ([address]    Liver metastases ([address]    Hypovolemia    Hyponatremia    Palliative care patient    Chronic alcoholism in remission ([address]    Chronic headache disorder    Depressive disorder    Gastroesophageal reflux disease    Hyperlipidemia    Posttraumatic stress disorder    Deep vein thrombosis (DVT) of left upper extremity (HCC)    High output ileostomy ([address]    Severe malnutrition ([address]  Resolved Problems:    * No resolved hospital problems. [facility]  COURSE  AND  TREATMENT:    2022:  Patient seen and evaluated at bedside. He is doing better and wants to go home. His level electrolytes are stable. Renal functions continue to improve on IV hydration and creatinine has dropped from 3 to 2. TPN has been approved by insurance. His hemoglobin A1c is 5.1% which rules out diabetes mellitus.   He has been cleared by oncology to be discharged with follow-up instructions as below: He will need close follow-up with PCP and oncology locally as well as surgery at [facility].  I would request the PCP and oncology to monitor his labs, electrolytes and renal functions very closely as an outpatient. Oncology recommendations from today . .. 03/11/2022:      03/10/2022:  Patient is tolerating IV fluids and TPN well. IV fluids switched to normal saline after potassium supplementation. TPN has been approved by insurance and home health care ordered for DC planning tomorrow.     03/09/2022:  Patient requires TPN for nutrition as recommended by dietary and I fully support this recommendation. TPN started. Home health care also notified about patient's TPN needs. Continue with IV hydration. Renal functions are slowly improving on IV hydration. Oral potassium supplementation ordered for borderline potassium levels. IV fluids switch to potassium containing fluid.     03/08/2022:  Patient seen and evaluated by oncology who advised to continue with IV fluids. Surgery consult given to recommendations regarding high output ileostomy which is the cause of his dehydration and mario. Dietary recommended starting patient on TPN.    03/07/2022: History Of Present Illness: The patient is a 76 y. o. male who presented to [facility] with PMH adenocarcinoma of the colon with mets to liver, EMI, CKD III, chronic alcoholism in remission, GERD, PTSD, SBO, and DVT who presents to the emergency department due to abnormal lab. Has had recent hospitalization admitted on 2/21/22 due to MARIO on CKD and was just discharged from [facility] on 3/1/22. Patient currently utilizes home health, they performed blood work and noted an elevated Creatinine of 3. 's office called him and instructed him to go to [facility] ER. Patient states that he has been having decreased appetite and started having nausea with vomiting that began last night.  Endorses decreased intake, nausea, vomiting, fatigue, generalized weakness, and abdominal pain. Denies fever, chills, hemoptysis, hematuria, shortness of breath, bloody stool, and chest pain. Work up in ER CT abd/pelvis no bowel obstruction or fluid collection noted.  WBC 12.1, creat 2.8 BUN 37, Alk phos 309, ALT 56, and AST 78. Received 1L NS and Zofran total 8mg IV while in ER. Patient is to be admitted to hospitalist service due to MARIO on CKD.        OBJECTIVE:  /69   Pulse 85   Temp 98.1 °F (36.7 °C) (Temporal)   Resp 20   Ht 5' 7\" (1.702 m)   Wt 119 lb 9.6 oz (54.3 kg)   SpO2 99%   BMI 18.73 kg/m²       Heart: RRR   Lungs: Bilateral decreased air entry   Abdomen: Soft, non-tender, + ileostomy in place draining well   Extremities: No edema   Neurologic: Alert and oriented   Skin: Warm and dry          Laboratory Data:  Recent Labs     03/09/22  0550 03/10/22  0600 03/11/22  0354   WBC 8.8 7.7 8.2   HGB 8.2* 8.1* 8.2*    277 258     Recent Labs     03/09/22  0800 03/10/22  0600 03/11/22  0354    132* 133*   K 3.5 4.7 4.7    101 105   CO2 25 21* 20*   BUN 37* 45* 54*   CREATININE 2.7* 2.1* 2.0*   GLUCOSE 129* 135* 108     Recent Labs     03/09/22  0800 03/10/22  0600 03/11/22  0354   * 56* 44*   ALT 96* 75* 65*   BILITOT 0.3 <0.2 0.3   ALKPHOS 225* 198* 194*     Troponin T: No results for input(s): TROPONINI in the last 72 hours. Pro-BNP: No results for input(s): BNP in the last 72 hours. INR: No results for input(s): INR in the last 72 hours. UA:No results for input(s): NITRITE, COLORU, PHUR, LABCAST, WBCUA, RBCUA, MUCUS, TRICHOMONAS, YEAST, BACTERIA, CLARITYU, SPECGRAV, LEUKOCYTESUR, UROBILINOGEN, BILIRUBINUR, BLOODU, GLUCOSEU, AMORPHOUS in the last 72 hours. Invalid input(s): Barry Tadeo  A1C:   Recent Labs     03/11/22  0354   LABA1C 5.1     ABG:No results for input(s): PHART, PJA6IPQ, PO2ART, JGJ8DHE, BEART, HGBAE, G8GLJVEC, CARBOXHGBART in the last 72 hours. Impressions of imaging performed in 48 hours before discharge:    No results found. Medication List      START taking these medications    diphenoxylate-atropine 2.5-0.025 MG per tablet  Commonly known as: LOMOTIL  Take 1 tablet by mouth 4 times daily for 3 days.      octreotide 100 MCG/ML Soln injection  Commonly known as: SANDOSTATIN  Inject 1 mL into the skin every 8 hours     vitamin D 1.25 MG (60901 UT) Caps capsule  Commonly known as: ERGOCALCIFEROL  Take 1 capsule by mouth once a week  Start taking on: March 16, 2022        CHANGE how you take these medications    apixaban 5 MG Tabs tablet  Commonly known as: Eliquis  Take 1 tablet by mouth 2 times daily  What changed: Another medication with the same name was removed. Continue taking this medication, and follow the directions you see here. loperamide 2 MG capsule  Commonly known as: IMODIUM  Take 1 capsule by mouth 4 times daily as needed for Diarrhea  What changed: when to take this        CONTINUE taking these medications    dronabinol 2.5 MG capsule  Commonly known as: Marinol  Take 1 capsule by mouth 2 times daily (before meals) for 3 days.      HYDROmorphone 4 MG tablet  Commonly known as: DILAUDID     megestrol 40 MG/ML suspension  Commonly known as: MEGACE  Take 20 mLs by mouth daily     methocarbamol 500 MG tablet  Commonly known as: ROBAXIN     omeprazole 20 MG delayed release capsule  Commonly known as: PRILOSEC     oxyCODONE HCl 10 MG immediate release tablet  Commonly known as: OXY-IR     prazosin 1 MG capsule  Commonly known as: MINIPRESS     promethazine 12.5 MG tablet  Commonly known as: PHENERGAN  Take 1 tablet by mouth every 6 hours as needed for Nausea     psyllium 58.12 % Pack packet  Commonly known as: METAMUCIL  Take 1 packet by mouth 3 times daily (with meals)     ZOLOFT PO        STOP taking these medications    Vitamin D3 50 MCG (2000 UT) Caps           Where to Get Your Medications      These medications were sent to [place] [address] [address] [address]    Phone: [phone]   · apixaban 5 MG Tabs tablet  · loperamide 2 MG capsule  · megestrol 40 MG/ML suspension  · octreotide 100 MCG/ML Soln injection  · vitamin D 1.25 MG (13260 UT) Caps capsule     You can get these medications from any pharmacy    Bring a paper prescription for each of these medications  · diphenoxylate-atropine 2.5-0.025 MG per tablet  · dronabinol 2.5 MG capsule           ISSUES TO BE ADDRESSED AT HOSPITAL FOLLOW-UP VISIT:    Advised patient to follow-up closely with PCP upon discharge for management of chronic medical issues  Please see the detailed discharge directions delivered from earlier today! Condition on Discharge: gradually improving  Discharge Disposition: Home with Home Health Care    Recommended Follow Up:  [facility]  [address]  [place] [phone]  In 1 week      Andrei Gonzales MD  [address] [place] [address] Dr Letty Kehr [address] [facility] [address]  [phone]    Go on 3/25/2022  at 11:30    Followup Appointments Scheduled at Time of Discharge:  Future Appointments   Date Time Provider Mary Matute   3/25/2022 11:15 AM SCHEDULE, [facility]   3/25/2022 11:30 AM [facility]        Discharge Instructions:   Please see the discharge paperwork. Patient was seen at bedside today, and the examination shows improvement since yesterday. Detailed discharge directions delivered to the patient by myself and our nursing staff, who verbalizes understanding and is very happy and satisfied with the plan. Patient has been advised to continue all medications as prescribed and advised, and f/u with PCP within 1 week. Patient is stable from medical standpoint to be discharged. Total time spent during patient evaluation and assessment, discussion with the nurse/family, addressing discharge medications/scripts and coordination of care for safe discharge was in excess of 40 minutes.       Signed Electronically:    Columba Mejia MD  2:31 PM 3/11/2022

## 2022-03-11 NOTE — PLAN OF CARE
Problem: Skin Integrity:  Goal: Will show no infection signs and symptoms  Outcome: Ongoing  Goal: Absence of new skin breakdown  Outcome: Ongoing     Problem: Falls - Risk of:  Goal: Will remain free from falls  Outcome: Ongoing  Goal: Absence of physical injury  Outcome: Ongoing     Problem: Nutrition  Goal: Optimal nutrition therapy  Outcome: Ongoing

## 2022-03-11 NOTE — PLAN OF CARE
Nutrition Problem #1: Severe malnutrition,In context of chronic illness  Intervention: Food and/or Nutrient Delivery: Continue Current Diet,Continue Current Parenteral Nutrition,Continue Oral Nutrition Supplement  Nutritional Goals: Po intake >25% of meals and ONS and tolerance of PN to meet nutritional needs.       Problem: Nutrition  Goal: Optimal nutrition therapy  3/11/2022 0816 by Violet Martinez MS, RD, LD  Outcome: Completed  3/11/2022 0743 by Angie Coughlin RN  Outcome: Ongoing

## 2022-03-15 ENCOUNTER — LAB REQUISITION (OUTPATIENT)
Dept: LAB | Facility: HOSPITAL | Age: 67
End: 2022-03-15

## 2022-03-15 DIAGNOSIS — Z00.00 ENCOUNTER FOR GENERAL ADULT MEDICAL EXAMINATION WITHOUT ABNORMAL FINDINGS: ICD-10-CM

## 2022-03-15 LAB
ALBUMIN SERPL-MCNC: 4.1 G/DL (ref 3.5–5.2)
ALBUMIN/GLOB SERPL: 1 G/DL
ALP SERPL-CCNC: 311 U/L (ref 39–117)
ALT SERPL W P-5'-P-CCNC: 44 U/L (ref 1–41)
ANION GAP SERPL CALCULATED.3IONS-SCNC: 20 MMOL/L (ref 5–15)
AST SERPL-CCNC: 40 U/L (ref 1–40)
BASOPHILS # BLD AUTO: 0.09 10*3/MM3 (ref 0–0.2)
BASOPHILS NFR BLD AUTO: 0.6 % (ref 0–1.5)
BILIRUB SERPL-MCNC: 0.3 MG/DL (ref 0–1.2)
BUN SERPL-MCNC: 94 MG/DL (ref 8–23)
BUN/CREAT SERPL: 32.8 (ref 7–25)
CALCIUM SPEC-SCNC: 10.9 MG/DL (ref 8.6–10.5)
CHLORIDE SERPL-SCNC: 85 MMOL/L (ref 98–107)
CO2 SERPL-SCNC: 22 MMOL/L (ref 22–29)
CREAT SERPL-MCNC: 2.87 MG/DL (ref 0.76–1.27)
DEPRECATED RDW RBC AUTO: 49.3 FL (ref 37–54)
EGFRCR SERPLBLD CKD-EPI 2021: 23.6 ML/MIN/1.73
EOSINOPHIL # BLD AUTO: 0.01 10*3/MM3 (ref 0–0.4)
EOSINOPHIL NFR BLD AUTO: 0.1 % (ref 0.3–6.2)
ERYTHROCYTE [DISTWIDTH] IN BLOOD BY AUTOMATED COUNT: 15.6 % (ref 12.3–15.4)
GLOBULIN UR ELPH-MCNC: 4.2 GM/DL
GLUCOSE SERPL-MCNC: 102 MG/DL (ref 65–99)
HCT VFR BLD AUTO: 35.2 % (ref 37.5–51)
HGB BLD-MCNC: 11.5 G/DL (ref 13–17.7)
IMM GRANULOCYTES # BLD AUTO: 0.21 10*3/MM3 (ref 0–0.05)
IMM GRANULOCYTES NFR BLD AUTO: 1.3 % (ref 0–0.5)
LYMPHOCYTES # BLD AUTO: 1.23 10*3/MM3 (ref 0.7–3.1)
LYMPHOCYTES NFR BLD AUTO: 7.8 % (ref 19.6–45.3)
MAGNESIUM SERPL-MCNC: 2.7 MG/DL (ref 1.6–2.4)
MCH RBC QN AUTO: 28.5 PG (ref 26.6–33)
MCHC RBC AUTO-ENTMCNC: 32.7 G/DL (ref 31.5–35.7)
MCV RBC AUTO: 87.1 FL (ref 79–97)
MONOCYTES # BLD AUTO: 1.43 10*3/MM3 (ref 0.1–0.9)
MONOCYTES NFR BLD AUTO: 9 % (ref 5–12)
NEUTROPHILS NFR BLD AUTO: 12.84 10*3/MM3 (ref 1.7–7)
NEUTROPHILS NFR BLD AUTO: 81.2 % (ref 42.7–76)
NRBC BLD AUTO-RTO: 0 /100 WBC (ref 0–0.2)
PHOSPHATE SERPL-MCNC: 8.1 MG/DL (ref 2.5–4.5)
PLATELET # BLD AUTO: 696 10*3/MM3 (ref 140–450)
PMV BLD AUTO: 11.2 FL (ref 6–12)
POTASSIUM SERPL-SCNC: 6.3 MMOL/L (ref 3.5–5.2)
PROT SERPL-MCNC: 8.3 G/DL (ref 6–8.5)
RBC # BLD AUTO: 4.04 10*6/MM3 (ref 4.14–5.8)
SODIUM SERPL-SCNC: 127 MMOL/L (ref 136–145)
WBC NRBC COR # BLD: 15.81 10*3/MM3 (ref 3.4–10.8)

## 2022-03-15 PROCEDURE — 84100 ASSAY OF PHOSPHORUS: CPT | Performed by: INTERNAL MEDICINE

## 2022-03-15 PROCEDURE — 83735 ASSAY OF MAGNESIUM: CPT | Performed by: INTERNAL MEDICINE

## 2022-03-15 PROCEDURE — 80053 COMPREHEN METABOLIC PANEL: CPT | Performed by: INTERNAL MEDICINE

## 2022-03-15 PROCEDURE — 85025 COMPLETE CBC W/AUTO DIFF WBC: CPT | Performed by: INTERNAL MEDICINE

## 2022-03-16 ENCOUNTER — TELEPHONE (OUTPATIENT)
Dept: INFUSION THERAPY | Age: 67
End: 2022-03-16

## 2022-03-16 LAB
ALBUMIN SERPL-MCNC: 4 G/DL (ref 3.5–5.2)
ALP BLD-CCNC: 286 U/L (ref 40–130)
ALT SERPL-CCNC: 51 U/L (ref 5–41)
ANION GAP SERPL CALCULATED.3IONS-SCNC: 24 MMOL/L (ref 7–19)
AST SERPL-CCNC: 50 U/L (ref 5–40)
BASOPHILS ABSOLUTE: 0.1 K/UL (ref 0–0.2)
BASOPHILS RELATIVE PERCENT: 0.6 % (ref 0–1)
BILIRUB SERPL-MCNC: 0.4 MG/DL (ref 0.2–1.2)
BUN BLDV-MCNC: 103 MG/DL (ref 8–23)
CALCIUM SERPL-MCNC: 10.1 MG/DL (ref 8.8–10.2)
CHLORIDE BLD-SCNC: 87 MMOL/L (ref 98–111)
CO2: 22 MMOL/L (ref 22–29)
CREAT SERPL-MCNC: 3.5 MG/DL (ref 0.5–1.2)
EOSINOPHILS ABSOLUTE: 0 K/UL (ref 0–0.6)
EOSINOPHILS RELATIVE PERCENT: 0.3 % (ref 0–5)
GFR AFRICAN AMERICAN: 21
GFR NON-AFRICAN AMERICAN: 18
GLUCOSE BLD-MCNC: 124 MG/DL (ref 74–109)
HCT VFR BLD CALC: 35.1 % (ref 42–52)
HEMOGLOBIN: 11 G/DL (ref 14–18)
IMMATURE GRANULOCYTES #: 0.2 K/UL
LYMPHOCYTES ABSOLUTE: 1.3 K/UL (ref 1.1–4.5)
LYMPHOCYTES RELATIVE PERCENT: 10.2 % (ref 20–40)
MAGNESIUM: 2.7 MG/DL (ref 1.6–2.4)
MCH RBC QN AUTO: 27.6 PG (ref 27–31)
MCHC RBC AUTO-ENTMCNC: 31.3 G/DL (ref 33–37)
MCV RBC AUTO: 88 FL (ref 80–94)
MONOCYTES ABSOLUTE: 1.6 K/UL (ref 0–0.9)
MONOCYTES RELATIVE PERCENT: 12 % (ref 0–10)
NEUTROPHILS ABSOLUTE: 10 K/UL (ref 1.5–7.5)
NEUTROPHILS RELATIVE PERCENT: 75.8 % (ref 50–65)
PDW BLD-RTO: 15.6 % (ref 11.5–14.5)
PHOSPHORUS: 9.8 MG/DL (ref 2.5–4.5)
PLATELET # BLD: 689 K/UL (ref 130–400)
PMV BLD AUTO: 11 FL (ref 9.4–12.4)
POTASSIUM SERPL-SCNC: 5.7 MMOL/L (ref 3.5–5)
RBC # BLD: 3.99 M/UL (ref 4.7–6.1)
SODIUM BLD-SCNC: 133 MMOL/L (ref 136–145)
TOTAL PROTEIN: 8 G/DL (ref 6.6–8.7)
TRIGL SERPL-MCNC: 214 MG/DL (ref 0–149)
WBC # BLD: 13.1 K/UL (ref 4.8–10.8)

## 2022-03-16 NOTE — TELEPHONE ENCOUNTER
Spoke with Pop Brooke at [facility] regarding IVF and TPN. She stated that a nurse from Dr. Smallwood Tiffanie that they would be handing the IVF and TPN. Dr. Karey Ba notified.

## 2022-03-21 ENCOUNTER — TELEPHONE (OUTPATIENT)
Dept: PALLATIVE CARE | Age: 67
End: 2022-03-21

## 2022-03-21 NOTE — TELEPHONE ENCOUNTER
I spoke to Irma Banegas about the Supportive Care referral and she said . Avis Mooney is supposed to start a similar service through the [place]. However, he is currently in the hospital in [place]. She is hopeful he will get better and won't need to services.

## 2022-04-01 ENCOUNTER — TELEPHONE (OUTPATIENT)
Dept: INFUSION THERAPY | Age: 67
End: 2022-04-01

## 2022-04-04 DIAGNOSIS — R11.0 NAUSEA: ICD-10-CM

## 2022-04-04 LAB
ALBUMIN SERPL-MCNC: 3.1 G/DL (ref 3.5–5.2)
ALP BLD-CCNC: 402 U/L (ref 40–130)
ALT SERPL-CCNC: 32 U/L (ref 5–41)
ANION GAP SERPL CALCULATED.3IONS-SCNC: 14 MMOL/L (ref 7–19)
AST SERPL-CCNC: 23 U/L (ref 5–40)
BASOPHILS ABSOLUTE: 0 K/UL (ref 0–0.2)
BASOPHILS RELATIVE PERCENT: 0.4 % (ref 0–1)
BILIRUB SERPL-MCNC: 0.3 MG/DL (ref 0.2–1.2)
BUN BLDV-MCNC: 13 MG/DL (ref 8–23)
CALCIUM SERPL-MCNC: 8.7 MG/DL (ref 8.8–10.2)
CHLORIDE BLD-SCNC: 104 MMOL/L (ref 98–111)
CO2: 18 MMOL/L (ref 22–29)
CREAT SERPL-MCNC: 1.3 MG/DL (ref 0.5–1.2)
EOSINOPHILS ABSOLUTE: 0.2 K/UL (ref 0–0.6)
EOSINOPHILS RELATIVE PERCENT: 2.1 % (ref 0–5)
GFR AFRICAN AMERICAN: >59
GFR NON-AFRICAN AMERICAN: 55
GLUCOSE BLD-MCNC: 146 MG/DL (ref 74–109)
HCT VFR BLD CALC: 27.1 % (ref 42–52)
HEMOGLOBIN: 8.4 G/DL (ref 14–18)
IMMATURE GRANULOCYTES #: 0.1 K/UL
LYMPHOCYTES ABSOLUTE: 0.8 K/UL (ref 1.1–4.5)
LYMPHOCYTES RELATIVE PERCENT: 11.4 % (ref 20–40)
MAGNESIUM: 1.5 MG/DL (ref 1.6–2.4)
MCH RBC QN AUTO: 28.4 PG (ref 27–31)
MCHC RBC AUTO-ENTMCNC: 31 G/DL (ref 33–37)
MCV RBC AUTO: 91.6 FL (ref 80–94)
MONOCYTES ABSOLUTE: 0.6 K/UL (ref 0–0.9)
MONOCYTES RELATIVE PERCENT: 9 % (ref 0–10)
NEUTROPHILS ABSOLUTE: 5.4 K/UL (ref 1.5–7.5)
NEUTROPHILS RELATIVE PERCENT: 76.4 % (ref 50–65)
PDW BLD-RTO: 15.8 % (ref 11.5–14.5)
PHOSPHORUS: 2.7 MG/DL (ref 2.5–4.5)
PLATELET # BLD: 333 K/UL (ref 130–400)
PMV BLD AUTO: 10.4 FL (ref 9.4–12.4)
POTASSIUM SERPL-SCNC: 3.5 MMOL/L (ref 3.5–5)
RBC # BLD: 2.96 M/UL (ref 4.7–6.1)
SODIUM BLD-SCNC: 136 MMOL/L (ref 136–145)
TOTAL PROTEIN: 5.9 G/DL (ref 6.6–8.7)
WBC # BLD: 7.1 K/UL (ref 4.8–10.8)

## 2022-04-04 RX ORDER — PROMETHAZINE HYDROCHLORIDE 12.5 MG/1
12.5 TABLET ORAL EVERY 6 HOURS PRN
Qty: 60 TABLET | Refills: 5 | Status: SHIPPED | OUTPATIENT
Start: 2022-04-04 | End: 2022-04-22 | Stop reason: SDUPTHER

## 2022-04-05 ENCOUNTER — HOSPITAL ENCOUNTER (OUTPATIENT)
Dept: INFUSION THERAPY | Age: 67
End: 2022-04-05

## 2022-04-11 DIAGNOSIS — R79.0 LOW BLOOD MAGNESIUM LEVEL: Primary | ICD-10-CM

## 2022-04-11 LAB
ALBUMIN SERPL-MCNC: 3 G/DL (ref 3.5–5.2)
ALP BLD-CCNC: 250 U/L (ref 40–130)
ALT SERPL-CCNC: 14 U/L (ref 5–41)
ANION GAP SERPL CALCULATED.3IONS-SCNC: 12 MMOL/L (ref 7–19)
AST SERPL-CCNC: 19 U/L (ref 5–40)
BASOPHILS ABSOLUTE: 0 K/UL (ref 0–0.2)
BASOPHILS RELATIVE PERCENT: 0.5 % (ref 0–1)
BILIRUB SERPL-MCNC: 0.4 MG/DL (ref 0.2–1.2)
BUN BLDV-MCNC: 7 MG/DL (ref 8–23)
CALCIUM SERPL-MCNC: 8.3 MG/DL (ref 8.8–10.2)
CHLORIDE BLD-SCNC: 102 MMOL/L (ref 98–111)
CO2: 22 MMOL/L (ref 22–29)
CREAT SERPL-MCNC: 1.3 MG/DL (ref 0.5–1.2)
EOSINOPHILS ABSOLUTE: 0.2 K/UL (ref 0–0.6)
EOSINOPHILS RELATIVE PERCENT: 3.1 % (ref 0–5)
GFR AFRICAN AMERICAN: >59
GFR NON-AFRICAN AMERICAN: 55
GLUCOSE BLD-MCNC: 82 MG/DL (ref 74–109)
HCT VFR BLD CALC: 26 % (ref 42–52)
HEMOGLOBIN: 7.9 G/DL (ref 14–18)
IMMATURE GRANULOCYTES #: 0 K/UL
LYMPHOCYTES ABSOLUTE: 1 K/UL (ref 1.1–4.5)
LYMPHOCYTES RELATIVE PERCENT: 16.4 % (ref 20–40)
MAGNESIUM: 1.4 MG/DL (ref 1.6–2.4)
MCH RBC QN AUTO: 28.1 PG (ref 27–31)
MCHC RBC AUTO-ENTMCNC: 30.4 G/DL (ref 33–37)
MCV RBC AUTO: 92.5 FL (ref 80–94)
MONOCYTES ABSOLUTE: 0.8 K/UL (ref 0–0.9)
MONOCYTES RELATIVE PERCENT: 14.3 % (ref 0–10)
NEUTROPHILS ABSOLUTE: 3.8 K/UL (ref 1.5–7.5)
NEUTROPHILS RELATIVE PERCENT: 65.2 % (ref 50–65)
PDW BLD-RTO: 15.6 % (ref 11.5–14.5)
PHOSPHORUS: 3.3 MG/DL (ref 2.5–4.5)
PLATELET # BLD: 275 K/UL (ref 130–400)
PMV BLD AUTO: 10 FL (ref 9.4–12.4)
POTASSIUM SERPL-SCNC: 3.5 MMOL/L (ref 3.5–5)
RBC # BLD: 2.81 M/UL (ref 4.7–6.1)
SODIUM BLD-SCNC: 136 MMOL/L (ref 136–145)
TOTAL PROTEIN: 5.3 G/DL (ref 6.6–8.7)
WBC # BLD: 5.8 K/UL (ref 4.8–10.8)

## 2022-04-11 RX ORDER — CALCIUM CARBONATE/VITAMIN D3 500-10/5ML
400 LIQUID (ML) ORAL 2 TIMES DAILY
Qty: 60 CAPSULE | Refills: 3 | Status: SHIPPED | OUTPATIENT
Start: 2022-04-11

## 2022-04-19 LAB
ALBUMIN SERPL-MCNC: 2.9 G/DL (ref 3.5–5.2)
ALP BLD-CCNC: 224 U/L (ref 40–130)
ALT SERPL-CCNC: 10 U/L (ref 5–41)
ANION GAP SERPL CALCULATED.3IONS-SCNC: 11 MMOL/L (ref 7–19)
AST SERPL-CCNC: 18 U/L (ref 5–40)
BASOPHILS ABSOLUTE: 0 K/UL (ref 0–0.2)
BASOPHILS RELATIVE PERCENT: 0.3 % (ref 0–1)
BILIRUB SERPL-MCNC: <0.2 MG/DL (ref 0.2–1.2)
BUN BLDV-MCNC: 12 MG/DL (ref 8–23)
CALCIUM SERPL-MCNC: 8.3 MG/DL (ref 8.8–10.2)
CHLORIDE BLD-SCNC: 106 MMOL/L (ref 98–111)
CO2: 23 MMOL/L (ref 22–29)
CREAT SERPL-MCNC: 1.4 MG/DL (ref 0.5–1.2)
EOSINOPHILS ABSOLUTE: 0.1 K/UL (ref 0–0.6)
EOSINOPHILS RELATIVE PERCENT: 2.1 % (ref 0–5)
GFR AFRICAN AMERICAN: >59
GFR NON-AFRICAN AMERICAN: 51
GLUCOSE BLD-MCNC: 105 MG/DL (ref 74–109)
HCT VFR BLD CALC: 27.6 % (ref 42–52)
HEMOGLOBIN: 8.3 G/DL (ref 14–18)
IMMATURE GRANULOCYTES #: 0 K/UL
LYMPHOCYTES ABSOLUTE: 0.8 K/UL (ref 1.1–4.5)
LYMPHOCYTES RELATIVE PERCENT: 11.8 % (ref 20–40)
MAGNESIUM: 1.6 MG/DL (ref 1.6–2.4)
MCH RBC QN AUTO: 27.9 PG (ref 27–31)
MCHC RBC AUTO-ENTMCNC: 30.1 G/DL (ref 33–37)
MCV RBC AUTO: 92.9 FL (ref 80–94)
MONOCYTES ABSOLUTE: 0.7 K/UL (ref 0–0.9)
MONOCYTES RELATIVE PERCENT: 10.3 % (ref 0–10)
NEUTROPHILS ABSOLUTE: 4.8 K/UL (ref 1.5–7.5)
NEUTROPHILS RELATIVE PERCENT: 75 % (ref 50–65)
PDW BLD-RTO: 15.5 % (ref 11.5–14.5)
PHOSPHORUS: 3.1 MG/DL (ref 2.5–4.5)
PLATELET # BLD: 207 K/UL (ref 130–400)
PMV BLD AUTO: 10.3 FL (ref 9.4–12.4)
POTASSIUM SERPL-SCNC: 3.7 MMOL/L (ref 3.5–5)
RBC # BLD: 2.97 M/UL (ref 4.7–6.1)
SODIUM BLD-SCNC: 140 MMOL/L (ref 136–145)
TOTAL PROTEIN: 5.3 G/DL (ref 6.6–8.7)
WBC # BLD: 6.3 K/UL (ref 4.8–10.8)

## 2022-04-19 NOTE — PROGRESS NOTES
MEDICAL ONCOLOGY PROGRESS NOTE                                                          Denise Seo [place]   1955 4/22/2022     Chief Complaint   Patient presents with    Follow-up     Failure to thrive in adult      INTERVAL HISTORY/HISTORY OF PRESENT ILLNESS:  Diagnosis  · Colonic adenocarcinoma, March 2020  · [id number](liver), stage ROXANA  · IHC MMR- proficient  · K-maria luisa mutated  · N-MARIA LUISA/BRAF wild-type  · Iron deficiency anemia  · Soft tissue mass, June 2021  · Adenocarcinoma-consistent with colon primary, right psoas muscle, June 2021  · Metastatic adenocarcinoma, Jan 2022  · MLH1, MSH2, MH6, PMS2-intact  · MMR- no loss of expression     Treatment summary  · 3/30/2020-right hemicolectomy at [facility]  · Anticipated Liver ablation to be followed by neoadjuvant/adjuvant versus palliative chemotherapy  · 06/10/2020-November 2020-FOLFOX + Avastin  · 12/11/20- Right hepatectomy-Dr. Lawyer Verma  · S/p Injectafer-poor oral iron tolerance  · 7/13/21- Initiate FOLFIRI + Avastin every 2 weeks  · 1/13/22-psoas muscle mass resection/HIPEC by Dr. Any Carballo at [address] [place] [address]      The patient is a 74years old male who has a diagnosis of colonic adenocarcinoma. He has a complex oncological history. He initially presented with a stage Roxana disease with liver metastasis and several dennis involvement. He was seen by [facility] and offered a multimodality approach with liver ablation of the left liver lesion followed by neoadjuvant chemotherapy and partial right hepatectomy. He underwent microwave ablation of the left lobe liver lesion on 6/8/2020. He is status post completion of 11 biweekly cycles of FOLFOX/Avasti completed November 2020. He tolerated treatment with complaints of mild transient cold neuropathy. He had a right hepatectomy on 12/11/2020 that showed no residual disease. His last CEA was normal in January 2021.  He had MRI of the abdomen at [facility] in March 2021 that showed no evidence of recurrent disease in the liver or in the abdomen. He also had a surveillance colonoscopy in March 2021 that showed no polyps. CEA was elevated in May 2021. Repeat CEA June 2021 showed persistent elevation. MRI abdomen at [facility] showed no evidence of liver recurrence but a suspicious nodule in the right lower quadrant. Biopsy was performed at [facility] and consistent with recurrent adenocarcinoma. I discussed the findings with hepatobiliary service and also colorectal surgery. He was started on FOLFIRI/Avastin. He was seen by Dr. Kirk Rouse again on 9/24/2021. He had repeat CT abdomen pelvis and also MRI at [facility] that showed persistent disease involving the psoas muscle. In addition, an additional small place that may represent further peritoneal metastatic disease. He underwent surgery at [facility]. He underwent exploratory laparotomy with resection of psoas mass at [facility] on 1/13/2022. He also underwent HIPEC treatment. The patient is a very pleasant 74years old male who has been diagnosed with colonic adenocarcinoma. He has had several treat modalities in the past.  He is currently status post CRS/HIPEC in mid January, 2022 at [facility]. This was complicated by a leak from his ileocolic anastomosis secondary to closed-loop obstruction at his hernia repair site (mesh displaced). He was taken back to the OR and had a primary repair of his anastomosis and diverting ileostomy in February 2022. He presented again on March 17 with dehydration and MARIO. He had several prior hospitalizations due to the same reason. He was on TPN. He had a ileostomy reversal on March 23, 2022. He has improved significantly after his ileostomy reversal.  He still have diarrhea but this has decreased in frequency and intensity significantly. He does have solid stools oftentimes.   Latest imaging studies at [facility] showed evidence of progressive liver metastasis in the left lobe. He was recommended to reinitiate chemotherapy.     Cancer history  Mr. Hortensia Taylor was first seen by me on 3/23/2020. He was referred for a new diagnosis of colonic adenocarcinoma involving the cecum. The patient reports that he had a wellbeing consult with his provider at the [address] [place] [address]. He was found to have anemia and then recommended a colonoscopy. Of note, the patient has a family history of colon cancer. His mother is a patient of Dr. Avinash Johnson he has been diagnosed with colon cancer in 2010. · 3/11/2020- colonoscopy revealed a large malignant appearing fungating mass lesion in the cecum. In addition, several other polyps. Biopsy of the mass consistent with moderate differentiated colonic adenocarcinoma. Polyps consistent with tubulovillous adenoma with no high-grade dysplasia. IHC MMR not proficient. K-maria luisa mutated, BRAF and NRAS wild type. MSI proficient  · 3/11/2020-CEA 5.5 (H)  · 3/18/2020-CT abdomen pelvis with contrast  Invasive cecal mass adhering to the right lateral abdominal wall muscles with adjacent lymphadenopathy. Mild partial obstruction of the terminal ileum. 2. Suspicious lesions in the right and left hepatic lobes measure up to 1.3 cm and likely represent metastatic disease. · 3/18/2020-Xr Chest Standard  No radiographic evidence of acute cardiopulmonary process. · 3/23/2020-he was first seen by me. Recommended completion of staging with CT chest.  Also recommended liver MRI for further clarification of liver lesion. S.  Recommend to proceed with a general surgery consultation tomorrow with Dr. Husam Leach. Patient was informed that I favor surgical resection if feasible of the primary malignancy. · 3/30/2020- right hemicolectomy by Dr. Husam Leach at [facility] consistent with invasive moderately differentiated colonic adenocarcinoma measuring 7.2 cm. Carcinoma directly invading the adjacent abdominal wall tissue. Focal lymphovascular space invasion identified.   Focal perineural invasion identified. Surgical margins negative for evidence of malignancy. 6 out of 14 lymph nodes positive for metastatic adenocarcinoma. Final pathology staging [id number](liver, stage ROXANA)  · 4/20/2020-CT chest with contrast showed No convincing intrathoracic metastasis. Nonspecific 4 mm nodule of the inferior lingula and a 2 mm right upper lobe nodule can be followed on subsequent imaging in 6-12 months. Moderate coronary calcifications. Hypodense metastatic liver lesions. Small hiatal hernia. · 4/20/2020-Mri Abdomen W Wo Contrast There are about 5 liver lesions. The 2 largest appears similar compared to 3/18/2020, the others are too small to further characterize. Appearance is most concerning for metastatic disease. Enhancement of the right lateral peritoneum. This is favored to be postoperative as there is no nodularity, evidence of omental disease or lymphadenopathy. Recommend attention on follow-up. Cholecystectomy. · 4/22/2020-discussed with Dr. Melvina Robin at [facility]. He will review imaging studies and give further recommendations regarding eligibility for resection of liver lesions. · 5/8/2020 CT Abdomen The two largest suspicious lesions measuring 1.2 and 1.3 cm in the  right and left hepatic lobes respectively are similar compared to the  3/18/2020 CT. Additionally, there are at least five subcentimeter lesions with similar signal characteristics, which are also highly suspicious for metastases. If complete characterization of the number and distribution of lesions is necessary, an MRI with Eovist could be acquired. · 5/19/2020- he was seen by the hepatobiliary service at [facility] with Dr. Melvina Robin:  patient adequate risk candidate for a multimodal approach, directed toward curative hepatectomy eventually. Endorsed by Hepatobiliary Conference, I recommended perc ablation of the L hemiliver to clear it, followed by systemic therapy in a neoadjuvant strategy. Restaging imaging to confirm clearance of disease on the left and lack of progression to unresectability of the R hemiliver disease would then be followed by R hepatectomy. Limitations to this approach may be accessibility of the segment 4A/8 disease high in the hepatic dome and the possibility of heat sink-related recurrence s/p abation of the left-sided disease. · 5/21/2020- referral for Mediport placement and start FOLFOX in 2 weeks. Will add bevacizumab after 6 weeks of liver ablation. We will plan for 12 biweekly dose of FOLFOX. Bevacizumab will also be stopped 6 weeks prior to major procedure. · 6/8/2020- Microwave ablation of the left liver lesion was then performed for 5 minutes at [address] to achieve a 3.4 x 3.9 cm approximately spherical zone of ablation. · 6/10/2020- initiation of FOLFOX. · 7/20/2020-added Avastin. · 9/10/20 MRI abdomen: Left hepatic lobe segment II lesion demonstrates small T1 hyperintense blood products, status post microwave ablation on 6/8/2020. No definitive enhancement within the lesion. Focal internal thickening or scar present. Recommend attention on follow-up. Additional scattered subcentimeter foci throughout the liver decreased in size compared to MRI dated 4/20/2020 consistent with improving metastatic disease. Additional chronic findings as above. · 9/16/2020-discussed with plan with the patient and [address] [place] [address]. Interval response to treatment. Plan to continue chemotherapy through 12 cycles with Avastin. · 12/11/20 Right hepatectomy-Dr. Adriana Slaughter  · 12/11/20 Right hepatectomy pathology: Liver, right, resection: Focus of Fibrosis with calcifications and chronic inflammation (0.3 cm), see comment. Background hepatic parenchyma with minimal periportal fibrosis (trichrome stain), minimal lobular and portal inflammation, and no significant macrovesicular steatosis (<5%) Comments: The patient's history of colorectal cancer status adjuvant therapy is noted.  The focus of fibrosis may reflect treatment effect. There is no evidence of viable tumor in the sampled specimen. · 12/14/20 Ct Abdomen Pelvis W Iv Contrast A Small bowel obstruction with transition point at the distal small bowel, just proximal to RIGHT upper quadrant ileocolonic anastomosis. Postoperative change of RIGHT hepatectomy with small amount of expected free fluid and intraperitoneal gas. Redemonstrated LEFT liver lesion. Small bilateral pleural effusions. · 12/16/20 SBFT-[place]: Study is limited due to retained contrast in the small bowel from prior attempt at small bowel follow-through on the floor. Small bowel remains dilated, measuring approximately 5.2 cm, which is consistent with partial or resolving small bowel obstruction. Final radiographs show contrast within the colon. · 1/4/2020-resolution of small bowel obstruction. · 1/7/21 CT chest: No finding to suggest intrathoracic neoplastic process or metastatic disease. The benign-appearing tiny nodule in the right upper lobe probably represent a noncalcified granuloma. A nodule in the lingular segment of the left upper lobe is not visualized in this study. Postsurgical changes of the liver. No evidence of focal complication. A trace right basal pleural effusion. This may be reactive to the previous abdominal surgery. · 3/4/21 MRI abd: Status post right hepatectomy and microwave ablation of a left liver lesion. No findings to suggest residual/recurrent disease. No new liver lesion is identified. Postsurgical changes related to right hemicolectomy. Microwave ablation zone in segment II of the left hepatic lobe measures approximately 21 mm in diameter, unchanged. No appreciable postcontrast enhancement or other findings to suggest local disease recurrence. No new liver lesion is identified. Spleen is mildly enlarged, measuring 13.8 cm in length. · 3/17/2021-1 year colonoscopy showed no evidence of polyps.   · 5/3/21 CEA 6.2  · 6/8/21 MRI abdomen ([place]): Status post right hepatectomy and MWA of a left liver lesion.  No evidence of residual or recurrent metastatic disease in the liver. Status post prior right hemicolectomy for colon carcinoma. Within the posterior right iliopsoas muscle there is a mildly T2 hyperintense nodular focus with postcontrast rim enhancement and diffusion restriction. This measures approximately 2.7 x 2 x 2 cm. More delayed postcontrast images show irregular area of enhancement measuring 2.4 x 7.7 cm axially involving the right iliopsoas muscle and the right lateral abdominal wall. Suggest correlation with contrast enhanced CT exam for complete evaluation. Consider biopsy of this lesion. · 6/15/21 CT CHEST WO CONTRAST No metastatic disease in the chest.  No change in tiny 2 mm RIGHT upper lobe pulmonary nodule. Mild ectasia of the ascending aorta measuring 4 cm. · 6/18/2021-I reviewed results of MRI abdomen at [address] [place] [address]. CT chest without contrast reviewed by me and showed no evidence of metastatic disease. Stable 2 mm right upper lobe nodule. Discussed with hepatobiliary service at [address] [place] [address]. Biopsy intra-abdominal nodule next week at [address] [place] [address]. · 6/25/20212222-[id number] biopsy soft tissue mass right psoas muscle at [address] [place] [address] consistent with recurrent colonic adenocarcinoma. · 7/2/2021-discussed with hepatobiliary service at [address] [place] [address] and also surgical oncology. Surgical oncology will call us back regarding consultation for consideration of HIPEC if he is a candidate  · 7/13/21-initiation of chemotherapy with FOLFIRI + Avastin every 2 weeks  · 7/13/21 CEA 10.7  · 7/27/2021-CEA 9.6  · 7/30/2021-she was seen by the surgical oncology group at [address] [place] [address] by Dr Lisa Carlos. They recommended to complete 6 cycles of chemotherapy and repeat CT chest abdomen pelvis and liver MRI to assess disease response. They discussed the role of CRS/HIPEC in his situation.  He was told that it really depends on the status of his liver lesions as well. He will complete 6 cycles of chemotherapy and will return to see me with a CTAP as well as MRI of the liver to assess disease burden and determine if he can be a candidate for debulking. · 8/25/2021-proceed cycle #4. · 9/22/2021 CEA- 8.1  · 9/24/2021 MRI with and w/o Contrast ([place])  Tiny left retroperitoneal nodule involving the left lateral conal fascial new compared to 3/4/2021 though unchanged from most recent prior, possibly an additional site of metastasis. No other new metastatic disease within the abdomen. Similar partially visualized right posterior body wall/psoas infiltrative lesion not definitely changed from MRI abdomen 6/8/2021 but better evaluated in its entirety on concurrent CT, reported separately.   Status post right hemicolectomy, right hepatectomy, and segment III microwave ablation. Similar mild splenomegaly.  Additional chronic and incidental findings as described in the body the report. Liver: Status post right hepatectomy. Ablation zone in segment II measures 1.7 x 1.1 cm, slightly decreased in size from 1.8 x 1.4 cm previously (series 1301 image 56) without appreciable enhancement. Similar tiny subcentimeter cyst in the left hepatic lobe. No new focal hepatic lesion identified. No visualized free fluid. Similar 0.7 cm enhancing nodule along the left lateral conal fascia laterally new from 3/4/2021, grossly unchanged from MRI dated 6/8/2021. (series 801 image 22). No other appreciable peritoneal/retroperitoneal or  omental nodularity. Ill-defined T2 hyperintense signal and hyperenhancement in the right posteromedial body wall involving the lateral aspect of the psoas muscle and posterolateral abdominal wall musculature, partially visualized measuring at least 8.7 x 3.1   cm, grossly similar to the prior exam, better evaluated in its entirety on concurrent CT reported separately.    · 9/24/2021 CT C/A/P w/ Contrast([place]) Status post right hemicolectomy, right hepatectomy and left hepatic microwave ablation without new suspicious hepatic lesion.  Left lateral perirenal fascial nodule, which is stable compared to 6/8/2021 MRI, but new compared to 3/4/2021 MRI is concerning for an additional site of metastatic disease.  No sites of new or enlarging metastatic disease in the chest.  Similar appearance of right lateral psoas and posterior abdominal wall infiltrative lesion, not significantly changed compared to 6/8/2021 MRI.  Mild splenomegaly.  Additional findings as outlined in the body the report. Biopsy-proven adenocarcinoma involving the posterior lateral aspect of the right iliopsoas muscle and right lateral abdominal wall. Right iliopsoas component is difficult to measure but appears to be approximately 2.5 x 2.4 cm (series 2, image 153), similar to prior MRI. Nodular thickening noted along the right posterior abdominal wall and possibly involving the the transversus abdominis measures approximately 8.5 x 1.8 cm (series 2 image 153) overall similar compared to prior MRI. · 10/6/2021-discussed the results of recent CT abdomen/pelvis and MRI abdomen/pelvis at [facility]. Persistent disease involving the psoas muscle. Discussed with colorectal surgery at [facility]. They recommend to continue current therapy for another 2 months and reassess with CT scans. · 12/3/21 CT chest/abd/pelvis [facility]): Status post prior right hemicolectomy, right hepatectomy and left hepatic lesions microwave ablation. No new liver lesion. Soft tissue thickening of the right lower posterior abdominal wall involving the iliopsoas muscle is grossly unchanged consistent with improving metastatic disease. Small right omental nodule and left lateral conal fascia nodule unchanged compared to  recent exam.   · 1/13/22 Exploratory lap with resections of psoas muscle mass and HIPEC by Dr. Kian Real at [facility]:  Metastatic colorectal adenocarcinoma involving fibroadipose tissue.  IHC INSERTION OF VENOUS PORT with flouro performed by Reta Ormond, MD at [place] [address] ENDOSCOPY N/A 03/11/2020    Dr. Andrea Florence:   [facility]      SOCIAL HISTORY:  Social History     Socioeconomic History    Marital status:      Spouse name: None    Number of children: None    Years of education: None    Highest education level: None   Occupational History    None   Tobacco Use    Smoking status: Never Smoker    Smokeless tobacco: Never Used   Vaping Use    Vaping Use: Never used   Substance and Sexual Activity    Alcohol use: Yes     Comment: rare     Drug use: No    Sexual activity: Yes     Partners: Female   Other Topics Concern    None   Social History Narrative    None     Social Determinants of Health     Financial Resource Strain:     Difficulty of Paying Living Expenses: Not on file   Food Insecurity:     Worried About Running Out of Food in the Last Year: Not on file    [place] of Food in the Last Year: Not on file   Transportation Needs:     Lack of Transportation (Medical): Not on file    Lack of Transportation (Non-Medical):  Not on file   Physical Activity:     Days of Exercise per Week: Not on file    Minutes of Exercise per Session: Not on file   Stress:     Feeling of Stress : Not on file   Social Connections:     Frequency of Communication with Friends and Family: Not on file    Frequency of Social Gatherings with Friends and Family: Not on file    Attends Hinduism Services: Not on file    Active Member of Clubs or Organizations: Not on file    Attends Club or Organization Meetings: Not on file    Marital Status: Not on file   Intimate Partner Violence:     Fear of Current or Ex-Partner: Not on file    Emotionally Abused: Not on file    Physically Abused: Not on file    Sexually Abused: Not on file   Housing Stability:     Unable to Pay for Housing in the Last Year: Not on file    Number of [place] in the Last Year: Not on file    Unstable Housing in the Last Year: Not on file     FAMILY HISTORY:  Family History   Problem Relation Age of Onset    Liver Cancer Mother     High Blood Pressure Mother     Colon Cancer Mother         x2    Cancer Father         Lung Cancer    Breast Cancer Sister     Cancer Maternal Grandfather         Lung Cancer    Cancer Paternal Grandfather         Stomach Cancer    Colon Polyps Neg Hx     Cystic Fibrosis Neg Hx     Liver Disease Neg Hx     Rectal Cancer Neg Hx         Current Outpatient Medications   Medication Sig Dispense Refill    Magnesium Oxide 400 MG CAPS Take 400 mg by mouth in the morning and at bedtime 60 capsule 3    promethazine (PHENERGAN) 12.5 MG tablet Take 1 tablet by mouth every 6 hours as needed for Nausea 60 tablet 5    oxyCODONE HCl (OXY-IR) 10 MG immediate release tablet Take 10 mg by mouth every 4 hours as needed for Pain.  methocarbamol (ROBAXIN) 500 MG tablet Take 500 mg by mouth 3 times daily as needed       omeprazole (PRILOSEC) 20 MG delayed release capsule Take 20 mg by mouth daily      prazosin (MINIPRESS) 1 MG capsule Take 1 mg by mouth nightly as needed For nightmares       Sertraline HCl (ZOLOFT PO) Take by mouth daily      apixaban (ELIQUIS) 5 MG TABS tablet Take 1 tablet by mouth 2 times daily 60 tablet 0    octreotide (SANDOSTATIN) 100 MCG/ML SOLN injection Inject 1 mL into the skin every 8 hours 90 mL 0    vitamin D (ERGOCALCIFEROL) 1.25 MG (70245 UT) CAPS capsule Take 1 capsule by mouth once a week 5 capsule 0    megestrol (MEGACE) 40 MG/ML suspension Take 20 mLs by mouth daily 600 mL 0     No current facility-administered medications for this visit.         REVIEW OF SYSTEMS:   CONSTITUTIONAL: no fever, no night sweats, weakness,fatigue, ostomy pain;  HEENT: no blurring of vision, no double vision, no hearing difficulty, no tinnitus, no ulceration, no dysplasia, no epistaxis;   LUNGS: no cough, no hemoptysis, no wheeze,  no shortness of breath;  CARDIOVASCULAR: no palpitation, no chest pain, no shortness of breath;  GI: no abdominal pain, nausea, no vomiting, mild diarrhea, no constipation;  ANNIE: no dysuria, no hematuria, no frequency or urgency, no nephrolithiasis;  MUSCULOSKELETAL: no joint pain, no swelling, no stiffness;  ENDOCRINE: no polyuria, no polydipsia, no cold or heat intolerance;  HEMATOLOGY: no easy bruising or bleeding, no history of clotting disorder;  DERMATOLOGY: no skin rash, no eczema, no pruritus;  PSYCHIATRY: no depression, no anxiety, no panic attacks, no suicidal ideation, no homicidal ideation;  NEUROLOGY: no syncope, no seizures, no numbness or tingling of hands, no numbness or tingling of feet, no paresis;       Vitals signs:  /75   Pulse 110   Ht 5' 7\" (1.702 m)   Wt 137 lb 3.2 oz (62.2 kg)   SpO2 99%   BMI 21.49 kg/m²     PHYSICAL EXAM:  CONSTITUTIONAL: Alert, appropriate, no acute distress  EYES: Non icteric, EOM intact, pupils equal round   ENT: Mucus membranes moist, no oral pharyngeal lesions, external inspection of ears and nose are normal.  NECK: Supple, no masses. No palpable thyroid mass  CHEST/LUNGS: CTA bilaterally, normal respiratory effort   CARDIOVASCULAR: RRR, no murmurs. No lower extremity edema  ABDOMEN: soft non-tender, active bowel sounds, no HSM. No palpable masses  EXTREMITIES: warm, full ROM in all 4 extremities, no focal weakness. SKIN: warm, dry with no rashes or lesions  LYMPH: No cervical, clavicular, axillary, or inguinal lymphadenopathy  NEUROLOGIC: follows commands, non focal   PSYCH: mood and affect appropriate.   Alert and oriented to time, place, person        Relevant Lab findings/reviewed by me:  Lab Results   Component Value Date    WBC 6.3 04/19/2022    HGB 8.3 (L) 04/19/2022    HCT 27.6 (L) 04/19/2022    MCV 92.9 04/19/2022     04/19/2022     Lab Results   Component Value Date    NEUTROABS 4.8 04/19/2022     Lab Results   Component Value Date     04/19/2022 K 3.7 04/19/2022     04/19/2022    CO2 23 04/19/2022    BUN 12 04/19/2022    CREATININE 1.4 (H) 04/19/2022    GLUCOSE 105 04/19/2022    CALCIUM 8.3 (L) 04/19/2022    PROT 5.3 (L) 04/19/2022    LABALBU 2.9 (L) 04/19/2022    BILITOT <0.2 04/19/2022    ALKPHOS 224 (H) 04/19/2022    AST 18 04/19/2022    ALT 10 04/19/2022    LABGLOM 51 (A) 04/19/2022    GFRAA >59 04/19/2022    AGRATIO 1.5 06/15/2021    GLOB 3.8 02/07/2022         Relevant Imaging studies/reviewed by me:  3/20/2022 CT Abd/Pelvis WO Contrast ([facility]) Findings suspicious for aspiration at the lung bases. Postsurgical changes related to right hepatectomy. There is a new low-attenuation lesion in the left hemiliver, highly concerning for metastasis. Postsurgical changes related to right hemicolectomy with ileocolonic anastomosis and right lower quadrant diverting loop ileostomy. No evidence of bowel obstruction. No intra-abdominal fluid collection. Other findings as above. A critical alert was sent at the time of dictation       ASSESSMENT:    No orders of the defined types were placed in this encounter. Denise was seen today for follow-up. Diagnoses and all orders for this visit:    Adenocarcinoma of colon [facility])    Liver metastases [facility])    Care plan discussed with patient    Cancer associated pain    Nausea       #Colonic adenocarcinoma-  [id number] (liver) K-maria luisa mutated, IHC MMR not proficient  Status post microwave ablation left hepatic lesion  Status post neoadjuvant FOLFOX/bevacizumab x11 biweekly cycles  Status post right hemicolectomy. Pathology consistent complete pathologic response. Recommended surveillance as per NCCN guidelines  Discussed at length results of pathology with the patient. 3/17/21- 1 year colonoscopy showed no large polyps or masses. Repeat in 3 years. June 2021-CT chest clear. MRI abdomen showed suspicious lesion in the right lower quadrant. Biopsy arranged [place].   Discussed with hepatobiliary service at [facility]. 6/25/[id number]-[facility]-ceadbm biopsy consistent with recurrent primary colorectal adenocarcinoma. 7/2/2021-discussed with hepatobiliary service/surgical oncology at [address] [place] [address]. They will review images and call the patient back regarding consultation for consideration of HIPEC  7/2/2021-they recommend systemic therapy. 7/2/2021-recommended FOLFIRI/Avastin  7/13/21- Initiated FOLFIRI + Avastin every 2 weeks  07/30/21-Seen at [facility] by GI surgeon oncology. They discussed the role of CRS/HIPEC in his situation. He was told hat it really depends on the status of his liver lesions as well. He will complete 6 cycles of chemotherapy and will return to see me with a CTAP as well as MRI of the liver to assess disease burden and determine if he can be a candidate for debulking.    8/25/2021-proceed with FOLFIRI/Avastin   9/24/2021-repeat MRI abdomen/CT abdomen showed persistent disease. 1/13/2022-Exploratory laparotomy/resection of psoas mass/HIPEC at [address] [place] [address]. Path Isaac:     OMENTUM, OMENTECTOMY:   - METASTATIC COLORECTAL ADENOCARCINOMA INVOLVING FIBROADIPOSE TISSUE.   - TWO (2) LYMPH NODES, NEGATIVE FOR CARCINOMA.      SMALL BOWEL, MESENTERIC NODULE, EXCISION:   - METASTATIC COLORECTAL ADENOCARCINOMA INVOLVING FIBROMUSCULAR TISSUE AND EXTENDING TO TISSUE EDGE. SMALL BOWEL AND RIGHT COLON, ILEOCOLIC RESECTION:     - RESIDUAL RECURRENT INVASIVE MODERATELY DIFFERENTIATED COLORECTAL ADENOCARCINOMA (2.1 CM) INVOLVING ANASTOMOTIC SITE; SEE COMMENT AND SYNOPTIC REPORT.   - TUMOR FOCALLY INVOLVES THE SEROSA SURFACE.   - METASTATIC CARCINOMA INVOLVES ONE (1) OF TWELVE (12) LYMPH NODES.   - ONE (1) TUMOR DEPOSIT PRESENT.   - ALL RESECTION MARGINS ARE NEGATIVE FOR CARCINOMA. RETROPERITONEAL NODULE, EXCISION:   - METASTATIC COLORECTAL ADENOCARCINOMA INVOLVING FREDDY-PANCREATIC TISSUE AND EXTENDING TO TISSUE EDGE.    LATERAL DUODENECTOMY, RESECTION:   - METASTATIC COLORECTAL ADENOCARCINOMA INVOLVING DUODENAL SUBMUCOSA, MUSCULARIS PROPRIA, AND SUBSEROSA. - TUMOR IS PRESENT WITHIN 1 MM OF INKED RESECTION MARGINS. RIGHT ABDOMINAL WALL NODULE, EXCISION:   - METASTATIC COLORECTAL ADENOCARCINOMA INVOLVING FIBROMUSCULAR TISSUE. RIGHT RETROPERITONEAL MASS, RESECTION:   - METASTATIC COLORECTAL ADENOCARCINOMA INVOLVING FIBROADIPOSE TISSUE (7.3 CM). - MEDIAL, SUPERIOR AND DEEP RESECTION MARGINS ARE POSITIVE FOR CARCINOMA. - INKED INFERIOR AND LATERAL MARGINS ARE NEGATIVE FOR CARCINOMA (<1 MM) RIGHT RETROPERITONEAL MASS, FINAL SUPERIOR MARGIN, EXCISION:   - METASTATIC COLORECTAL ADENOCARCINOMA INVOLVING FIBROADIPOSE TISSUE, FOCALLY PRESENT AT CAUTERIZED, INKED TISSUE EDGE.   2/7/2022-postoperative dehydration and electrolyte abnormalities. Home health arrangements IV fluids twice a week. 3/23/2022- reversal of ileostomy at [facility]. 3/20/2022 CT Abd/Pelvis WO Contrast ([facility]) Findings suspicious for aspiration at the lung bases. Postsurgical changes related to right hepatectomy. There is a new low-attenuation lesion in the left hemiliver, highly concerning for metastasis. Postsurgical changes related to right hemicolectomy with ileocolonic anastomosis and right lower quadrant diverting loop ileostomy. No evidence of bowel obstruction. No intra-abdominal fluid collection. Other findings as above. A critical alert was sent at the time of dictation. Liver: Postsurgical changes related to right hepatectomy. There is a new, approximately 2.1 cm low-attenuation lesion seen in the left hepatic lobe on image 25 of series 3, highly concerning for metastasis. 4/23/2022- discussed the patient reinitiation of chemotherapy. We will continue FOLFIRI. He had no prior progression on FOLFIRI of FOLFOX. 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  We would avoid Avastin at this time due to recent surgery. He is K-maria luisa mutated and therefore cannot use anti-EGFR agent. May contemplate adding Avastin in 4 weeks.       Plan:  -Resume FOLFIRI in about 2 weeks. Add Avastin in about 4 weeks  -CEA during next visit  -Repeat CT chest    #Chronic kidney disease stage III- creatinine 1.4  Encouraged to increase PO fluid intake     #Liver metastasis- plan as above. Status post ablation left liver lobe lesion at [facility] on 6/8/2020. Status post neoadjuvant FOLFOX/bevacizumab x11 biweekly cyclesStatus post right hemicolectomy. Pathology consistent complete pathologic response. 3/4/2021-MRI abdomen was unremarkable  6/8/2021-MRI abdomen showed Postsurgical changes of right hepatectomy. Redemonstration of an ablation zone in segment II, measuring up to 1.7 cm, previously 1.8 cm. No evidence of postcontrast enhancement.  No new lesion identified. 9/24/2021-MRI abdomen [place] showed Liver: Status post right hepatectomy. Ablation zone in segment II measures 1.7 x 1.1 cm, slightly decreased in size from 1.8 x 1.4 cm previously (series 1301 image 56) without apreciable enhancement. Similar tiny subcentimeter cyst in the left hepatic lobe. No new focal hepatic lesion identified. 3/20/2022 CT Abd/Pelvis WO Contrast ([facility]) Findings suspicious for aspiration at the lung bases. Postsurgical changes related to right hepatectomy. There is a new low-attenuation lesion in the left hemiliver, highly concerning for metastasis. Postsurgical changes related to right hemicolectomy with ileocolonic anastomosis and right lower quadrant diverting loop ileostomy. No evidence of bowel obstruction. No intra-abdominal fluid collection. Other findings as above. A critical alert was sent at the time of dictation. Liver: Postsurgical changes related to right hepatectomy. There is a new, approximately 2.1 cm low-attenuation lesion seen in the left hepatic lobe on image 25 of series 3, highly concerning for metastasis. 4/23/2022- discussed the patient reinitiation of chemotherapy. We will continue FOLFIRI. He had no prior progression on FOLFIRI of FOLFOX.   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  We would avoid Avastin at this time due to recent surgery. He is K-maria luisa mutated and therefore cannot use anti-EGFR agent. May contemplate adding Avastin in 4 weeks.     #Iron deficiency anemia-  hemoglobin 8.5/MCV 89. Ferritin 12.9, iron saturation 15%, TIBC 458, iron 72. Status post IV iron therapy. Hemoglobin 8.4  -Repeat iron profile during next visit     #Chemotherapy-induced neuropathy-stable, mild    #Cancer related pain-  -Oxycodone IR 10 mg every 6 hours    #High complexity- extensive review of several [facility]-, prior hospitalization, telephone conversations with colorectal surgery at [facility] to coordinate care. PLAN:  · RTC with MD in treatment room C# 2  · Recommend re-starting FOLFIRI every2 weeks on 5/9/22  · Continue follow-up with [facility]/Dr. Brian Stone   · Continue OxyIR 10mg every 6 hours as needed-script sent  · Continue Phenergan 12.5mg every 6 hrs as needed-script sent  · Continue OTC Imodium as needed  · Recommend increase p.o. fluid intake  · Discussed with Dr. Kaylene Ashley today to coordinate care  · Iron profile during next visit, CEA  · Add Avastin starting cycle #2 FOLFIRI      Follow Up:     Return in about 31 days (around 5/23/2022) for Treatment & see Luis Manning in [place] RM FOLFIRI. RTC 5/9 to re-start FOLFIRI every 2 weeks       IAlberto am pre charting  as Medical Assistant for Shahida Green MD. Electronically signed by Alberto Urias MA on 4/22/2022 at 8:56 AM CDT. Molina Guajardo am scribing for Shahida Green MD. Electronically signed by Martita Gomez, RN on 4/22/2022 at 12:56 PM CDT. I, Dr Otto Prasad, personally performed the services described in this documentation as scribed by Martita Gomez, RN in my presence and is both accurate and complete. I have seen, examined and reviewed this patient medication list, appropriate labs and imaging studies.  I reviewed relevant medical records and others physicians notes. I discussed the plans of care with the patient. I answered all the questions to the patients satisfaction. I have also reviewed the chief complaint (CC) and part of the history (History of Present Illness (HPI), Past Family Social History [facility]), or Review of Systems (ROS) and made changes when appropriated. (Please note that portions of this note were completed with a voice recognition program. Efforts were made to edit the dictations but occasionally words are mis-transcribed.)    Electronically signed by Irene Hawley MD on 4/22/2022 at 1:10 PM      I spent a total of 40 minutes or more coordinating care with [facility] over the phone, preparing to see the patient by reviewing prior tests, extensive review of prior notes (include notes from [address] [place] [address]) or other relevant information, performing appropriate independent examination and evaluation, counseling, ordering of medications, tests or procedures, communicating with other healthcare professionals when appropriated to coordinate care, documenting clinic information in the electronic medical record or other health records, independently interpreting results of tests, managing test results and communicating the results to the patient/family or caregiver.

## 2022-04-20 DIAGNOSIS — R79.0 LOW BLOOD MAGNESIUM LEVEL: Primary | ICD-10-CM

## 2022-04-22 ENCOUNTER — HOSPITAL ENCOUNTER (OUTPATIENT)
Dept: INFUSION THERAPY | Age: 67
Discharge: HOME | End: 2022-04-22
Payer: OTHER GOVERNMENT

## 2022-04-22 ENCOUNTER — OFFICE VISIT (OUTPATIENT)
Dept: HEMATOLOGY | Age: 67
End: 2022-04-22
Payer: MEDICARE

## 2022-04-22 VITALS
BODY MASS INDEX: 21.53 KG/M2 | HEART RATE: 110 BPM | HEIGHT: 67 IN | DIASTOLIC BLOOD PRESSURE: 75 MMHG | OXYGEN SATURATION: 99 % | SYSTOLIC BLOOD PRESSURE: 130 MMHG | WEIGHT: 137.2 LBS

## 2022-04-22 DIAGNOSIS — Z71.89 COORDINATION OF COMPLEX CARE: ICD-10-CM

## 2022-04-22 DIAGNOSIS — R79.0 LOW BLOOD MAGNESIUM LEVEL: ICD-10-CM

## 2022-04-22 DIAGNOSIS — D64.9 POSTOPERATIVE ANEMIA: ICD-10-CM

## 2022-04-22 DIAGNOSIS — Z71.89 CARE PLAN DISCUSSED WITH PATIENT: ICD-10-CM

## 2022-04-22 DIAGNOSIS — G89.3 CANCER ASSOCIATED PAIN: ICD-10-CM

## 2022-04-22 DIAGNOSIS — R11.0 NAUSEA: ICD-10-CM

## 2022-04-22 DIAGNOSIS — C18.9 ADENOCARCINOMA OF COLON (HCC): Primary | ICD-10-CM

## 2022-04-22 DIAGNOSIS — C78.7 LIVER METASTASES (HCC): ICD-10-CM

## 2022-04-22 PROCEDURE — 99215 OFFICE O/P EST HI 40 MIN: CPT | Performed by: INTERNAL MEDICINE

## 2022-04-22 PROCEDURE — 99212 OFFICE O/P EST SF 10 MIN: CPT

## 2022-04-23 ENCOUNTER — CLINICAL DOCUMENTATION (OUTPATIENT)
Dept: HEMATOLOGY | Age: 67
End: 2022-04-23

## 2022-04-23 RX ORDER — OXYCODONE HYDROCHLORIDE 10 MG/1
10 TABLET ORAL EVERY 6 HOURS PRN
Qty: 120 TABLET | Refills: 0 | Status: SHIPPED | OUTPATIENT
Start: 2022-04-23 | End: 2022-05-23

## 2022-04-23 RX ORDER — PROMETHAZINE HYDROCHLORIDE 12.5 MG/1
12.5 TABLET ORAL EVERY 6 HOURS PRN
Qty: 60 TABLET | Refills: 5 | Status: SHIPPED | OUTPATIENT
Start: 2022-04-23 | End: 2022-05-24 | Stop reason: SDUPTHER

## 2022-04-23 NOTE — PROGRESS NOTES
Aysha:   -Add Avastin 5mg/kg q 2 weeks starting cycle #2  -Iron profile, ferritin, CEA when he comes for his chemotherapy on 5/9/2022  -Order CT chest to be performed next week, to have a baseline CT chest.

## 2022-04-25 DIAGNOSIS — C18.9 ADENOCARCINOMA OF COLON (HCC): Primary | ICD-10-CM

## 2022-04-25 DIAGNOSIS — C78.7 LIVER METASTASES (HCC): ICD-10-CM

## 2022-04-25 DIAGNOSIS — M62.89 MASS OF PSOAS MUSCLE: ICD-10-CM

## 2022-04-25 LAB
ALBUMIN SERPL-MCNC: 3.1 G/DL (ref 3.5–5.2)
ALP BLD-CCNC: 217 U/L (ref 40–130)
ALT SERPL-CCNC: 11 U/L (ref 5–41)
ANION GAP SERPL CALCULATED.3IONS-SCNC: 11 MMOL/L (ref 7–19)
AST SERPL-CCNC: 20 U/L (ref 5–40)
BASOPHILS ABSOLUTE: 0 K/UL (ref 0–0.2)
BASOPHILS RELATIVE PERCENT: 0.3 % (ref 0–1)
BILIRUB SERPL-MCNC: <0.2 MG/DL (ref 0.2–1.2)
BUN BLDV-MCNC: 10 MG/DL (ref 8–23)
CALCIUM SERPL-MCNC: 8.3 MG/DL (ref 8.8–10.2)
CHLORIDE BLD-SCNC: 108 MMOL/L (ref 98–111)
CO2: 21 MMOL/L (ref 22–29)
CREAT SERPL-MCNC: 1.3 MG/DL (ref 0.5–1.2)
EOSINOPHILS ABSOLUTE: 0.1 K/UL (ref 0–0.6)
EOSINOPHILS RELATIVE PERCENT: 2.2 % (ref 0–5)
GFR AFRICAN AMERICAN: >59
GFR NON-AFRICAN AMERICAN: 55
GLUCOSE BLD-MCNC: 83 MG/DL (ref 74–109)
HCT VFR BLD CALC: 27.8 % (ref 42–52)
HEMOGLOBIN: 8.3 G/DL (ref 14–18)
IMMATURE GRANULOCYTES #: 0 K/UL
LYMPHOCYTES ABSOLUTE: 0.7 K/UL (ref 1.1–4.5)
LYMPHOCYTES RELATIVE PERCENT: 12.4 % (ref 20–40)
MAGNESIUM: 1.6 MG/DL (ref 1.6–2.4)
MCH RBC QN AUTO: 27.9 PG (ref 27–31)
MCHC RBC AUTO-ENTMCNC: 29.9 G/DL (ref 33–37)
MCV RBC AUTO: 93.3 FL (ref 80–94)
MONOCYTES ABSOLUTE: 0.7 K/UL (ref 0–0.9)
MONOCYTES RELATIVE PERCENT: 11.9 % (ref 0–10)
NEUTROPHILS ABSOLUTE: 4.3 K/UL (ref 1.5–7.5)
NEUTROPHILS RELATIVE PERCENT: 72.9 % (ref 50–65)
PDW BLD-RTO: 16 % (ref 11.5–14.5)
PHOSPHORUS: 2.7 MG/DL (ref 2.5–4.5)
PLATELET # BLD: 229 K/UL (ref 130–400)
PMV BLD AUTO: 10.7 FL (ref 9.4–12.4)
POTASSIUM SERPL-SCNC: 3.8 MMOL/L (ref 3.5–5)
RBC # BLD: 2.98 M/UL (ref 4.7–6.1)
SODIUM BLD-SCNC: 140 MMOL/L (ref 136–145)
TOTAL PROTEIN: 5.3 G/DL (ref 6.6–8.7)
WBC # BLD: 6 K/UL (ref 4.8–10.8)

## 2022-05-02 LAB
ALBUMIN SERPL-MCNC: 3.2 G/DL (ref 3.5–5.2)
ALP BLD-CCNC: 196 U/L (ref 40–130)
ALT SERPL-CCNC: 11 U/L (ref 5–41)
ANION GAP SERPL CALCULATED.3IONS-SCNC: 12 MMOL/L (ref 7–19)
AST SERPL-CCNC: 23 U/L (ref 5–40)
BASOPHILS ABSOLUTE: 0 K/UL (ref 0–0.2)
BASOPHILS RELATIVE PERCENT: 0.5 % (ref 0–1)
BILIRUB SERPL-MCNC: 0.3 MG/DL (ref 0.2–1.2)
BUN BLDV-MCNC: 13 MG/DL (ref 8–23)
CALCIUM SERPL-MCNC: 9 MG/DL (ref 8.8–10.2)
CHLORIDE BLD-SCNC: 107 MMOL/L (ref 98–111)
CO2: 20 MMOL/L (ref 22–29)
CREAT SERPL-MCNC: 1.3 MG/DL (ref 0.5–1.2)
EOSINOPHILS ABSOLUTE: 0.3 K/UL (ref 0–0.6)
EOSINOPHILS RELATIVE PERCENT: 5.2 % (ref 0–5)
GFR AFRICAN AMERICAN: >59
GFR NON-AFRICAN AMERICAN: 55
GLUCOSE BLD-MCNC: 101 MG/DL (ref 74–109)
HCT VFR BLD CALC: 30.4 % (ref 42–52)
HEMOGLOBIN: 9.1 G/DL (ref 14–18)
IMMATURE GRANULOCYTES #: 0 K/UL
LYMPHOCYTES ABSOLUTE: 1 K/UL (ref 1.1–4.5)
LYMPHOCYTES RELATIVE PERCENT: 16.4 % (ref 20–40)
MAGNESIUM: 1.6 MG/DL (ref 1.6–2.4)
MCH RBC QN AUTO: 27.9 PG (ref 27–31)
MCHC RBC AUTO-ENTMCNC: 29.9 G/DL (ref 33–37)
MCV RBC AUTO: 93.3 FL (ref 80–94)
MONOCYTES ABSOLUTE: 0.7 K/UL (ref 0–0.9)
MONOCYTES RELATIVE PERCENT: 11.5 % (ref 0–10)
NEUTROPHILS ABSOLUTE: 4 K/UL (ref 1.5–7.5)
NEUTROPHILS RELATIVE PERCENT: 66.1 % (ref 50–65)
PDW BLD-RTO: 16 % (ref 11.5–14.5)
PHOSPHORUS: 3.8 MG/DL (ref 2.5–4.5)
PLATELET # BLD: 273 K/UL (ref 130–400)
PMV BLD AUTO: 10.4 FL (ref 9.4–12.4)
POTASSIUM SERPL-SCNC: 4.6 MMOL/L (ref 3.5–5)
RBC # BLD: 3.26 M/UL (ref 4.7–6.1)
SODIUM BLD-SCNC: 139 MMOL/L (ref 136–145)
TOTAL PROTEIN: 5.9 G/DL (ref 6.6–8.7)
WBC # BLD: 6.1 K/UL (ref 4.8–10.8)

## 2022-05-09 ENCOUNTER — HOSPITAL ENCOUNTER (OUTPATIENT)
Dept: CT IMAGING | Age: 67
Discharge: HOME | End: 2022-05-09
Payer: MEDICARE

## 2022-05-09 DIAGNOSIS — M62.89 MASS OF PSOAS MUSCLE: ICD-10-CM

## 2022-05-09 DIAGNOSIS — C78.7 LIVER METASTASES (HCC): ICD-10-CM

## 2022-05-09 DIAGNOSIS — C18.9 ADENOCARCINOMA OF COLON (HCC): ICD-10-CM

## 2022-05-09 PROCEDURE — 71260 CT THORAX DX C+: CPT

## 2022-05-09 PROCEDURE — 6360000004 HC RX CONTRAST MEDICATION: Performed by: INTERNAL MEDICINE

## 2022-05-09 RX ADMIN — IOPAMIDOL 90 ML: 755 INJECTION, SOLUTION INTRAVENOUS at 14:17

## 2022-05-10 ENCOUNTER — HOSPITAL ENCOUNTER (OUTPATIENT)
Dept: INFUSION THERAPY | Age: 67
Discharge: HOME | End: 2022-05-10
Payer: MEDICARE

## 2022-05-10 VITALS
BODY MASS INDEX: 22.29 KG/M2 | TEMPERATURE: 98.6 F | HEIGHT: 67 IN | SYSTOLIC BLOOD PRESSURE: 124 MMHG | WEIGHT: 142 LBS | DIASTOLIC BLOOD PRESSURE: 84 MMHG | HEART RATE: 115 BPM | OXYGEN SATURATION: 98 %

## 2022-05-10 DIAGNOSIS — C78.7 LIVER METASTASES (HCC): ICD-10-CM

## 2022-05-10 DIAGNOSIS — R62.7 FAILURE TO THRIVE IN ADULT: Primary | ICD-10-CM

## 2022-05-10 DIAGNOSIS — D50.8 OTHER IRON DEFICIENCY ANEMIA: Primary | ICD-10-CM

## 2022-05-10 DIAGNOSIS — C18.9 ADENOCARCINOMA OF COLON (HCC): ICD-10-CM

## 2022-05-10 DIAGNOSIS — D50.8 OTHER IRON DEFICIENCY ANEMIA: ICD-10-CM

## 2022-05-10 LAB
ALBUMIN SERPL-MCNC: 3.5 G/DL (ref 3.5–5.2)
ALP BLD-CCNC: 193 U/L (ref 40–130)
ALT SERPL-CCNC: 20 U/L (ref 21–72)
ANION GAP SERPL CALCULATED.3IONS-SCNC: 13 MMOL/L (ref 7–19)
AST SERPL-CCNC: 34 U/L (ref 17–59)
BILIRUB SERPL-MCNC: 0.3 MG/DL (ref 0.2–1.3)
BUN BLDV-MCNC: 14 MG/DL (ref 9–20)
CALCIUM SERPL-MCNC: 8.9 MG/DL (ref 8.4–10.2)
CHLORIDE BLD-SCNC: 104 MMOL/L (ref 98–111)
CO2: 20 MMOL/L (ref 22–29)
CREAT SERPL-MCNC: 1.6 MG/DL (ref 0.6–1.2)
GFR NON-AFRICAN AMERICAN: 44
GLUCOSE BLD-MCNC: 100 MG/DL (ref 74–106)
HCT VFR BLD CALC: 30.2 % (ref 40.1–51)
HEMOGLOBIN: 9.1 G/DL (ref 13.7–17.5)
LYMPHOCYTES ABSOLUTE: 0.96 K/UL (ref 1.18–3.74)
LYMPHOCYTES RELATIVE PERCENT: 13.6 % (ref 19.3–53.1)
MCH RBC QN AUTO: 27.2 PG (ref 25.7–32.2)
MCHC RBC AUTO-ENTMCNC: 30.1 G/DL (ref 32.3–36.5)
MCV RBC AUTO: 90.4 FL (ref 79–92.2)
MONOCYTES ABSOLUTE: 0.84 K/UL (ref 0.24–0.82)
MONOCYTES RELATIVE PERCENT: 11.9 % (ref 4.7–12.5)
NEUTROPHILS ABSOLUTE: 4.87 K/UL (ref 1.56–6.13)
NEUTROPHILS RELATIVE PERCENT: 69.1 % (ref 34–71.1)
PDW BLD-RTO: 16.2 % (ref 11.6–14.4)
PLATELET # BLD: 274 K/UL (ref 163–337)
PMV BLD AUTO: 10.1 FL (ref 7.4–10.4)
POTASSIUM SERPL-SCNC: 4.3 MMOL/L (ref 3.5–5.1)
RBC # BLD: 3.34 M/UL (ref 4.63–6.08)
SODIUM BLD-SCNC: 137 MMOL/L (ref 137–145)
TOTAL PROTEIN: 6.7 G/DL (ref 6.3–8.2)
WBC # BLD: 7.05 K/UL (ref 4.23–9.07)

## 2022-05-10 PROCEDURE — 2580000003 HC RX 258: Performed by: NURSE PRACTITIONER

## 2022-05-10 PROCEDURE — 96366 THER/PROPH/DIAG IV INF ADDON: CPT

## 2022-05-10 PROCEDURE — 96415 CHEMO IV INFUSION ADDL HR: CPT

## 2022-05-10 PROCEDURE — 6360000002 HC RX W HCPCS: Performed by: NURSE PRACTITIONER

## 2022-05-10 PROCEDURE — 96417 CHEMO IV INFUS EACH ADDL SEQ: CPT

## 2022-05-10 PROCEDURE — 80053 COMPREHEN METABOLIC PANEL: CPT

## 2022-05-10 PROCEDURE — 96413 CHEMO IV INFUSION 1 HR: CPT

## 2022-05-10 PROCEDURE — 85025 COMPLETE CBC W/AUTO DIFF WBC: CPT

## 2022-05-10 PROCEDURE — 96367 TX/PROPH/DG ADDL SEQ IV INF: CPT

## 2022-05-10 PROCEDURE — 96375 TX/PRO/DX INJ NEW DRUG ADDON: CPT

## 2022-05-10 PROCEDURE — G0498 CHEMO EXTEND IV INFUS W/PUMP: HCPCS

## 2022-05-10 RX ORDER — DIPHENHYDRAMINE HYDROCHLORIDE 50 MG/ML
50 INJECTION INTRAMUSCULAR; INTRAVENOUS ONCE
Status: CANCELLED | OUTPATIENT
Start: 2022-05-10 | End: 2022-05-10

## 2022-05-10 RX ORDER — SODIUM CHLORIDE 0.9 % (FLUSH) 0.9 %
5-40 SYRINGE (ML) INJECTION PRN
Status: CANCELLED | OUTPATIENT
Start: 2022-05-10

## 2022-05-10 RX ORDER — DEXTROSE MONOHYDRATE 50 MG/ML
INJECTION, SOLUTION INTRAVENOUS ONCE
Status: DISCONTINUED | OUTPATIENT
Start: 2022-05-10 | End: 2022-05-12 | Stop reason: HOSPADM

## 2022-05-10 RX ORDER — FAMOTIDINE 10 MG/ML
20 INJECTION, SOLUTION INTRAVENOUS ONCE
Status: CANCELLED | OUTPATIENT
Start: 2022-05-10 | End: 2022-05-10

## 2022-05-10 RX ORDER — ATROPINE SULFATE 1 MG/ML
0.5 INJECTION, SOLUTION INTRAMUSCULAR; INTRAVENOUS; SUBCUTANEOUS
Status: CANCELLED
Start: 2022-05-10

## 2022-05-10 RX ORDER — METHYLPREDNISOLONE SODIUM SUCCINATE 125 MG/2ML
125 INJECTION, POWDER, LYOPHILIZED, FOR SOLUTION INTRAMUSCULAR; INTRAVENOUS ONCE
Status: CANCELLED | OUTPATIENT
Start: 2022-05-10 | End: 2022-05-10

## 2022-05-10 RX ORDER — ATROPINE SULFATE 1 MG/ML
0.5 INJECTION, SOLUTION INTRAMUSCULAR; INTRAVENOUS; SUBCUTANEOUS
Status: COMPLETED | OUTPATIENT
Start: 2022-05-10 | End: 2022-05-10

## 2022-05-10 RX ORDER — HEPARIN SODIUM (PORCINE) LOCK FLUSH IV SOLN 100 UNIT/ML 100 UNIT/ML
500 SOLUTION INTRAVENOUS PRN
Status: DISCONTINUED | OUTPATIENT
Start: 2022-05-10 | End: 2022-05-11 | Stop reason: HOSPADM

## 2022-05-10 RX ORDER — SODIUM CHLORIDE 0.9 % (FLUSH) 0.9 %
5-40 SYRINGE (ML) INJECTION PRN
Status: DISCONTINUED | OUTPATIENT
Start: 2022-05-10 | End: 2022-05-11 | Stop reason: HOSPADM

## 2022-05-10 RX ORDER — HEPARIN SODIUM (PORCINE) LOCK FLUSH IV SOLN 100 UNIT/ML 100 UNIT/ML
500 SOLUTION INTRAVENOUS PRN
Status: CANCELLED | OUTPATIENT
Start: 2022-05-10

## 2022-05-10 RX ORDER — SODIUM CHLORIDE 9 MG/ML
INJECTION, SOLUTION INTRAVENOUS ONCE
Status: CANCELLED | OUTPATIENT
Start: 2022-05-10 | End: 2022-05-10

## 2022-05-10 RX ORDER — SODIUM CHLORIDE 9 MG/ML
25 INJECTION, SOLUTION INTRAVENOUS PRN
Status: CANCELLED | OUTPATIENT
Start: 2022-05-10

## 2022-05-10 RX ORDER — EPINEPHRINE 1 MG/ML
0.3 INJECTION, SOLUTION, CONCENTRATE INTRAVENOUS PRN
Status: CANCELLED | OUTPATIENT
Start: 2022-05-10

## 2022-05-10 RX ORDER — MEPERIDINE HYDROCHLORIDE 50 MG/ML
12.5 INJECTION INTRAMUSCULAR; INTRAVENOUS; SUBCUTANEOUS ONCE
Status: CANCELLED | OUTPATIENT
Start: 2022-05-10 | End: 2022-05-10

## 2022-05-10 RX ORDER — PALONOSETRON 0.05 MG/ML
0.25 INJECTION, SOLUTION INTRAVENOUS ONCE
Status: COMPLETED | OUTPATIENT
Start: 2022-05-10 | End: 2022-05-10

## 2022-05-10 RX ORDER — SODIUM CHLORIDE 9 MG/ML
INJECTION, SOLUTION INTRAVENOUS ONCE
Status: DISCONTINUED | OUTPATIENT
Start: 2022-05-10 | End: 2022-05-12 | Stop reason: HOSPADM

## 2022-05-10 RX ORDER — DEXTROSE MONOHYDRATE 50 MG/ML
INJECTION, SOLUTION INTRAVENOUS ONCE
Status: CANCELLED | OUTPATIENT
Start: 2022-05-10 | End: 2022-05-10

## 2022-05-10 RX ORDER — PALONOSETRON 0.05 MG/ML
0.25 INJECTION, SOLUTION INTRAVENOUS ONCE
Status: CANCELLED | OUTPATIENT
Start: 2022-05-10

## 2022-05-10 RX ORDER — SODIUM CHLORIDE 9 MG/ML
INJECTION, SOLUTION INTRAVENOUS CONTINUOUS
Status: CANCELLED | OUTPATIENT
Start: 2022-05-10

## 2022-05-10 RX ADMIN — IRINOTECAN HYDROCHLORIDE 340 MG: 20 INJECTION, SOLUTION INTRAVENOUS at 10:23

## 2022-05-10 RX ADMIN — LEUCOVORIN CALCIUM 750 MG: 200 INJECTION, POWDER, LYOPHILIZED, FOR SOLUTION INTRAMUSCULAR; INTRAVENOUS at 10:24

## 2022-05-10 RX ADMIN — DEXAMETHASONE SODIUM PHOSPHATE: 10 INJECTION, SOLUTION INTRAMUSCULAR; INTRAVENOUS at 10:01

## 2022-05-10 RX ADMIN — PALONOSETRON HYDROCHLORIDE 0.25 MG: 0.25 INJECTION INTRAVENOUS at 10:01

## 2022-05-10 RX ADMIN — ATROPINE SULFATE 0.5 MG: 1 INJECTION, SOLUTION INTRAMUSCULAR; INTRAVENOUS; SUBCUTANEOUS at 10:19

## 2022-05-10 RX ADMIN — FLUOROURACIL 4475 MG: 50 INJECTION, SOLUTION INTRAVENOUS at 12:10

## 2022-05-10 RX ADMIN — SODIUM CHLORIDE, PRESERVATIVE FREE 10 ML: 5 INJECTION INTRAVENOUS at 12:06

## 2022-05-11 ENCOUNTER — TELEPHONE (OUTPATIENT)
Dept: HEMATOLOGY | Age: 67
End: 2022-05-11

## 2022-05-11 NOTE — TELEPHONE ENCOUNTER
Spoke with Alice Araujo patient's wife to notify him of creatinine being slightly elevated. Cali Brown has advised him to increase fluid intake. Patient is notified.

## 2022-05-12 ENCOUNTER — HOSPITAL ENCOUNTER (OUTPATIENT)
Dept: INFUSION THERAPY | Age: 67
Discharge: HOME | End: 2022-05-12
Payer: MEDICARE

## 2022-05-12 DIAGNOSIS — C18.2 MALIGNANT NEOPLASM OF ASCENDING COLON (HCC): Primary | ICD-10-CM

## 2022-05-12 PROCEDURE — 96523 IRRIG DRUG DELIVERY DEVICE: CPT

## 2022-05-12 PROCEDURE — 2580000003 HC RX 258: Performed by: INTERNAL MEDICINE

## 2022-05-12 PROCEDURE — 6360000002 HC RX W HCPCS: Performed by: INTERNAL MEDICINE

## 2022-05-12 RX ORDER — SODIUM CHLORIDE 0.9 % (FLUSH) 0.9 %
10 SYRINGE (ML) INJECTION PRN
Status: DISCONTINUED | OUTPATIENT
Start: 2022-05-12 | End: 2022-05-13 | Stop reason: HOSPADM

## 2022-05-12 RX ORDER — HEPARIN SODIUM (PORCINE) LOCK FLUSH IV SOLN 100 UNIT/ML 100 UNIT/ML
500 SOLUTION INTRAVENOUS PRN
Status: DISCONTINUED | OUTPATIENT
Start: 2022-05-12 | End: 2022-05-13 | Stop reason: HOSPADM

## 2022-05-12 RX ADMIN — SODIUM CHLORIDE, PRESERVATIVE FREE 10 ML: 5 INJECTION INTRAVENOUS at 14:18

## 2022-05-12 RX ADMIN — HEPARIN 500 UNITS: 100 SYRINGE at 14:18

## 2022-05-23 NOTE — PROGRESS NOTES
MEDICAL ONCOLOGY PROGRESS NOTE                                                          Denise Corral   1955 5/24/2022     Chief Complaint   Patient presents with    Cancer      INTERVAL HISTORY/HISTORY OF PRESENT ILLNESS:  Diagnosis  · Colonic adenocarcinoma, March 2020  · [id number](liver), stage ROXANA  · IHC MMR- proficient  · K-maria luisa mutated  · N-MARIA LUISA/BRAF wild-type  · Iron deficiency anemia  · Soft tissue mass, June 2021  · Adenocarcinoma-consistent with colon primary, right psoas muscle, June 2021  · Metastatic adenocarcinoma, Jan 2022  · MLH1, MSH2, MH6, PMS2-intact  · MMR- no loss of expression     Treatment summary  · 3/30/2020-right hemicolectomy at [facility]  · Anticipated Liver ablation to be followed by neoadjuvant/adjuvant versus palliative chemotherapy  · 06/10/2020-November 2020-FOLFOX + Avastin  · 12/11/20- Right hepatectomy-Dr. Chata Ma  · S/p Injectafer-poor oral iron tolerance  · 7/13/21- 11/17/21 FOLFIRI + Avastin every 2 weeks  · 1/13/22-psoas muscle mass resection/HIPEC by Dr. Ben Sandra at [address] [place] [address]  · 5/10/22 Re-initiate FOLFIRI + Avastin every 2 weeks     The patient is a very pleasant 74years old male who has been diagnosed with colonic adenocarcinoma. He has had several treat modalities in the past.  He is currently status post CRS/HIPEC in mid January, 2022 at [facility]. This was complicated by a leak from his ileocolic anastomosis secondary to closed-loop obstruction at his hernia repair site (mesh displaced). He was taken back to the OR and had a primary repair of his anastomosis and diverting ileostomy in February 2022. CT scans now showing evidence of hepatic metastasis. He was recommended to reinitiate FOLFIRI. He will also receive Avastin. He has complaints of significant right lower quadrant pain. In addition, complains of bulging of the right lower quadrant. He is taking oxycodone every 6 hours as needed.   He does not have any long-acting medication at this time. Has occasional diarrhea. Cancer history  Mr. Yumiko Bond was first seen by me on 3/23/2020. He was referred for a new diagnosis of colonic adenocarcinoma involving the cecum. The patient reports that he had a wellbeing consult with his provider at the [place]. He was found to have anemia and then recommended a colonoscopy. Of note, the patient has a family history of colon cancer. His mother is a patient of Dr. Octavio Turk he has been diagnosed with colon cancer in 2010. · 3/11/2020- colonoscopy revealed a large malignant appearing fungating mass lesion in the cecum. In addition, several other polyps. Biopsy of the mass consistent with moderate differentiated colonic adenocarcinoma. Polyps consistent with tubulovillous adenoma with no high-grade dysplasia. IHC MMR not proficient. K-maria luisa mutated, BRAF and NRAS wild type. MSI proficient  · 3/11/2020-CEA 5.5 (H)  · 3/18/2020-CT abdomen pelvis with contrast  Invasive cecal mass adhering to the right lateral abdominal wall muscles with adjacent lymphadenopathy. Mild partial obstruction of the terminal ileum. 2. Suspicious lesions in the right and left hepatic lobes measure up to 1.3 cm and likely represent metastatic disease. · 3/18/2020-Xr Chest Standard  No radiographic evidence of acute cardiopulmonary process. · 3/23/2020-he was first seen by me. Recommended completion of staging with CT chest.  Also recommended liver MRI for further clarification of liver lesion. S.  Recommend to proceed with a general surgery consultation tomorrow with Dr. Briana Cotton. Patient was informed that I favor surgical resection if feasible of the primary malignancy. · 3/30/2020- right hemicolectomy by Dr. Birana Cotton at [facility] consistent with invasive moderately differentiated colonic adenocarcinoma measuring 7.2 cm. Carcinoma directly invading the adjacent abdominal wall tissue. Focal lymphovascular space invasion identified.   Focal perineural invasion identified. Surgical margins negative for evidence of malignancy. 6 out of 14 lymph nodes positive for metastatic adenocarcinoma. Final pathology staging [id number](liver, stage ROXANA)  · 4/20/2020-CT chest with contrast showed No convincing intrathoracic metastasis. Nonspecific 4 mm nodule of the inferior lingula and a 2 mm right upper lobe nodule can be followed on subsequent imaging in 6-12 months. Moderate coronary calcifications. Hypodense metastatic liver lesions. Small hiatal hernia. · 4/20/2020-Mri Abdomen W Wo Contrast There are about 5 liver lesions. The 2 largest appears similar compared to 3/18/2020, the others are too small to further characterize. Appearance is most concerning for metastatic disease. Enhancement of the right lateral peritoneum. This is favored to be postoperative as there is no nodularity, evidence of omental disease or lymphadenopathy. Recommend attention on follow-up. Cholecystectomy. · 4/22/2020-discussed with Dr. Rony Blanco at [place]. He will review imaging studies and give further recommendations regarding eligibility for resection of liver lesions. · 5/8/2020 CT Abdomen The two largest suspicious lesions measuring 1.2 and 1.3 cm in the  right and left hepatic lobes respectively are similar compared to the  3/18/2020 CT. Additionally, there are at least five subcentimeter lesions with similar signal characteristics, which are also highly suspicious for metastases. If complete characterization of the number and distribution of lesions is necessary, an MRI with Eovist could be acquired. · 5/19/2020- he was seen by the hepatobiliary service at [address] [place] [address] with Dr. Rony Blanco:  patient adequate risk candidate for a multimodal approach, directed toward curative hepatectomy eventually. Endorsed by Hepatobiliary Conference, I recommended perc ablation of the L hemiliver to clear it, followed by systemic therapy in a neoadjuvant strategy. Restaging imaging to confirm clearance of disease on the left and lack of progression to unresectability of the R hemiliver disease would then be followed by R hepatectomy. Limitations to this approach may be accessibility of the segment 4A/8 disease high in the hepatic dome and the possibility of heat sink-related recurrence s/p abation of the left-sided disease. · 5/21/2020- referral for Mediport placement and start FOLFOX in 2 weeks. Will add bevacizumab after 6 weeks of liver ablation. We will plan for 12 biweekly dose of FOLFOX. Bevacizumab will also be stopped 6 weeks prior to major procedure. · 6/8/2020- Microwave ablation of the left liver lesion was then performed for 5 minutes at [address] to achieve a 3.4 x 3.9 cm approximately spherical zone of ablation. · 6/10/2020- initiation of FOLFOX. · 7/20/2020-added Avastin. · 9/10/20 MRI abdomen: Left hepatic lobe segment II lesion demonstrates small T1 hyperintense blood products, status post microwave ablation on 6/8/2020. No definitive enhancement within the lesion. Focal internal thickening or scar present. Recommend attention on follow-up. Additional scattered subcentimeter foci throughout the liver decreased in size compared to MRI dated 4/20/2020 consistent with improving metastatic disease. Additional chronic findings as above. · 9/16/2020-discussed with plan with the patient and [address] [place] [address]. Interval response to treatment. Plan to continue chemotherapy through 12 cycles with Avastin. · 12/11/20 Right hepatectomy-Dr. Ghislaine De La Vega  · 12/11/20 Right hepatectomy pathology: Liver, right, resection: Focus of Fibrosis with calcifications and chronic inflammation (0.3 cm), see comment. Background hepatic parenchyma with minimal periportal fibrosis (trichrome stain), minimal lobular and portal inflammation, and no significant macrovesicular steatosis (<5%) Comments: The patient's history of colorectal cancer status adjuvant therapy is noted.  The focus of fibrosis may reflect treatment effect. There is no evidence of viable tumor in the sampled specimen. · 12/14/20 Ct Abdomen Pelvis W Iv Contrast A Small bowel obstruction with transition point at the distal small bowel, just proximal to RIGHT upper quadrant ileocolonic anastomosis. Postoperative change of RIGHT hepatectomy with small amount of expected free fluid and intraperitoneal gas. Redemonstrated LEFT liver lesion. Small bilateral pleural effusions. · 12/16/20 SBFT-[place]: Study is limited due to retained contrast in the small bowel from prior attempt at small bowel follow-through on the floor. Small bowel remains dilated, measuring approximately 5.2 cm, which is consistent with partial or resolving small bowel obstruction. Final radiographs show contrast within the colon. · 1/4/2020-resolution of small bowel obstruction. · 1/7/21 CT chest: No finding to suggest intrathoracic neoplastic process or metastatic disease. The benign-appearing tiny nodule in the right upper lobe probably represent a noncalcified granuloma. A nodule in the lingular segment of the left upper lobe is not visualized in this study. Postsurgical changes of the liver. No evidence of focal complication. A trace right basal pleural effusion. This may be reactive to the previous abdominal surgery. · 3/4/21 MRI abd: Status post right hepatectomy and microwave ablation of a left liver lesion. No findings to suggest residual/recurrent disease. No new liver lesion is identified. Postsurgical changes related to right hemicolectomy. Microwave ablation zone in segment II of the left hepatic lobe measures approximately 21 mm in diameter, unchanged. No appreciable postcontrast enhancement or other findings to suggest local disease recurrence. No new liver lesion is identified. Spleen is mildly enlarged, measuring 13.8 cm in length. · 3/17/2021-1 year colonoscopy showed no evidence of polyps.   · 5/3/21 CEA 6.2  · 6/8/21 MRI abdomen ([place]): Status post right hepatectomy and MWA of a left liver lesion.  No evidence of residual or recurrent metastatic disease in the liver. Status post prior right hemicolectomy for colon carcinoma. Within the posterior right iliopsoas muscle there is a mildly T2 hyperintense nodular focus with postcontrast rim enhancement and diffusion restriction. This measures approximately 2.7 x 2 x 2 cm. More delayed postcontrast images show irregular area of enhancement measuring 2.4 x 7.7 cm axially involving the right iliopsoas muscle and the right lateral abdominal wall. Suggest correlation with contrast enhanced CT exam for complete evaluation. Consider biopsy of this lesion. · 6/15/21 CT CHEST WO CONTRAST No metastatic disease in the chest.  No change in tiny 2 mm RIGHT upper lobe pulmonary nodule. Mild ectasia of the ascending aorta measuring 4 cm. · 6/18/2021-I reviewed results of MRI abdomen at [facility]. CT chest without contrast reviewed by me and showed no evidence of metastatic disease. Stable 2 mm right upper lobe nodule. Discussed with hepatobiliary service at [facility]. Biopsy intra-abdominal nodule next week at [facility]. · 6/25/[id number] biopsy soft tissue mass right psoas muscle at [facility] consistent with recurrent colonic adenocarcinoma. · 7/2/2021-discussed with hepatobiliary service at [facility] and also surgical oncology. Surgical oncology will call us back regarding consultation for consideration of HIPEC if he is a candidate  · 7/13/21-initiation of chemotherapy with FOLFIRI + Avastin every 2 weeks  · 7/13/21 CEA 10.7  · 7/27/2021-CEA 9.6  · 7/30/2021-she was seen by the surgical oncology group at [facility] by Dr Kristen Marcos. They recommended to complete 6 cycles of chemotherapy and repeat CT chest abdomen pelvis and liver MRI to assess disease response. They discussed the role of CRS/HIPEC in his situation.  He was told that it really depends on the status of his liver lesions as well. He will complete 6 cycles of chemotherapy and will return to see me with a CTAP as well as MRI of the liver to assess disease burden and determine if he can be a candidate for debulking. · 8/25/2021-proceed cycle #4. · 9/22/2021 CEA- 8.1  · 9/24/2021 MRI with and w/o Contrast ([place])  Tiny left retroperitoneal nodule involving the left lateral conal fascial new compared to 3/4/2021 though unchanged from most recent prior, possibly an additional site of metastasis. No other new metastatic disease within the abdomen. Similar partially visualized right posterior body wall/psoas infiltrative lesion not definitely changed from MRI abdomen 6/8/2021 but better evaluated in its entirety on concurrent CT, reported separately.   Status post right hemicolectomy, right hepatectomy, and segment III microwave ablation. Similar mild splenomegaly.  Additional chronic and incidental findings as described in the body the report. Liver: Status post right hepatectomy. Ablation zone in segment II measures 1.7 x 1.1 cm, slightly decreased in size from 1.8 x 1.4 cm previously (series 1301 image 56) without appreciable enhancement. Similar tiny subcentimeter cyst in the left hepatic lobe. No new focal hepatic lesion identified. No visualized free fluid. Similar 0.7 cm enhancing nodule along the left lateral conal fascia laterally new from 3/4/2021, grossly unchanged from MRI dated 6/8/2021. (series 801 image 22). No other appreciable peritoneal/retroperitoneal or  omental nodularity. Ill-defined T2 hyperintense signal and hyperenhancement in the right posteromedial body wall involving the lateral aspect of the psoas muscle and posterolateral abdominal wall musculature, partially visualized measuring at least 8.7 x 3.1   cm, grossly similar to the prior exam, better evaluated in its entirety on concurrent CT reported separately.    · 9/24/2021 CT C/A/P w/ Contrast([place]) Status post right hemicolectomy, right hepatectomy and left hepatic microwave ablation without new suspicious hepatic lesion.  Left lateral perirenal fascial nodule, which is stable compared to 6/8/2021 MRI, but new compared to 3/4/2021 MRI is concerning for an additional site of metastatic disease.  No sites of new or enlarging metastatic disease in the chest.  Similar appearance of right lateral psoas and posterior abdominal wall infiltrative lesion, not significantly changed compared to 6/8/2021 MRI.  Mild splenomegaly.  Additional findings as outlined in the body the report. Biopsy-proven adenocarcinoma involving the posterior lateral aspect of the right iliopsoas muscle and right lateral abdominal wall. Right iliopsoas component is difficult to measure but appears to be approximately 2.5 x 2.4 cm (series 2, image 153), similar to prior MRI. Nodular thickening noted along the right posterior abdominal wall and possibly involving the the transversus abdominis measures approximately 8.5 x 1.8 cm (series 2 image 153) overall similar compared to prior MRI. · 10/6/2021-discussed the results of recent CT abdomen/pelvis and MRI abdomen/pelvis at [facility]. Persistent disease involving the psoas muscle. Discussed with colorectal surgery at [facility]. They recommend to continue current therapy for another 2 months and reassess with CT scans. · 7/13/21- 11/17/21 FOLFIRI + Avastin every 2 weeks  · 12/3/21 CT chest/abd/pelvis [facility]): Status post prior right hemicolectomy, right hepatectomy and left hepatic lesions microwave ablation. No new liver lesion. Soft tissue thickening of the right lower posterior abdominal wall involving the iliopsoas muscle is grossly unchanged consistent with improving metastatic disease.  Small right omental nodule and left lateral conal fascia nodule unchanged compared to  recent exam.   · 1/13/22 Exploratory lap with resections of psoas muscle mass and HIPEC by Dr. Annette Will at [facility]:  Metastatic colorectal adenocarcinoma involving fibroadipose tissue. IHC MMR proficient. · 1/24/22 CT chest/abd/pelvis [facility]): Extensive postsurgical changes in the abdomen including partial right colectomy, resection of right retroperitoneal mass, omentectomy and mesh repair of right lateral abdominal wall defect. There appears to be a defect in the mesh containing herniated loops of small bowel. Extensive diffuse dilated small bowel loops with air-fluid levels are seen throughout the abdomen. There are segmental areas of decompressed small bowel in the left upper quadrant as well as adjacent to the herniated small  bowel loops in the right retroperitoneum. Air-fluid levels are also seen throughout the colon. While pattern could likely represent postoperative ileus given the diffuse small bowel dilatation and distal colonic air and fluid, developing or partial small bowel obstruction secondary to the right lateral abdominal wall hernia cannot entirely be excluded. Small ascites. 8 mm nodule along the left lateral conal fascia is unchanged. Slight increase in size of cardiophrenic lymph nodes measuring up to 7 mm anterior to the right hemidiaphragm. Stable hypodensity in the left hepatic lobe. Diffuse gastric wall thickening/edema could be secondary to gastritis in the appropriate clinical setting. · February 2022- HIPEC/tumor debulking January. This was complicated by a leak from his ileocolic anastomosis secondary to closed-loop obstruction at his hernia repair site (mesh displaced). He was taken back to the OR and had a primary repair of his anastomosis and diverting ileostomy in February 2022  · 3/23/2022-ileostomy reversal at [facility] Dr. Jahaira Rios at [facility]  · 3/20/2022 CT Abd/Pelvis WO Contrast [facility]) Findings suspicious for aspiration at the lung bases. Postsurgical changes related to right hepatectomy.  There is a new low-attenuation lesion in the left hemiliver, highly concerning for metastasis. Postsurgical changes related to right hemicolectomy with ileocolonic anastomosis and right lower quadrant diverting loop ileostomy. No evidence of bowel obstruction. No intra-abdominal fluid collection. Other findings as above. A critical alert was sent at the time of dictation. Liver: Postsurgical changes related to right hepatectomy. There is a new, approximately 2.1 cm low-attenuation lesion seen in the left hepatic lobe on image 25 of series 3, highly concerning for metastasis. · 4/23/2022- discussed the patient reinitiation of chemotherapy. We will continue FOLFIRI. He had no prior progression on FOLFIRI of FOLFOX. 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  We would avoid Avastin at this time due to recent surgery. He is K-maria luisa mutated and therefore cannot use anti-EGFR agent. May contemplate adding Avastin in 4 weeks. · 5/9/2022 CT Chest W Contrast New pulmonary nodules are present in the right and left lung as described above. It cannot be determined if these are infectious nodules or metastatic disease. Dilated loops of bowel are present in the upper abdomen. The abdomen is incompletely evaluated however this may be wither an ileus or partial obstruction. Hepatic metastasis. · 5/10/22 Re-initiate FOLFIRI + Avastin every 2 weeks.     CEA dynamics:  2/27/22 CEA 3.5    PAST MEDICAL HISTORY:   Past Medical History:   Diagnosis Date    Acid reflux     Cancer ([address]     adenocarcinoma of colon    GERD (gastroesophageal reflux disease)     Palliative care patient 03/01/2022    PTSD (post-traumatic stress disorder)     Stage 3a chronic kidney disease ([address]       PAST SURGICAL HISTORY:  Past Surgical History:   Procedure Laterality Date    ABLATION OF DYSRHYTHMIC FOCUS      ablation of liver at [facility]  2003    CHOLECYSTECTOMY  2013    COLONOSCOPY      when pt was in the 19's    COLONOSCOPY N/A 03/11/2020    COLONOSCOPY POLYPECTOMY SNARE/COLD BIOPSY: Dr. Casey Moody colonoscopy: 6-12 months due to findings at colonoscopy today with Cecal mass lesion and large polyps.  COLONOSCOPY      COLONOSCOPY N/A 03/17/2021    Dr Oscar Bee, Post-operative changes w IC anastomosis & single visible staple, Mild Diverticuosis, Int Hemorrhoids Grade 1, 3 year recall    HEMICOLECTOMY Right 03/30/2020    LAPAROSCOPIC-ASSISTED RIGHT HEMICOLECTOMY performed by Luisa Holter, [facility] at [facility] [address] N/A 06/03/2020    INSERTION OF VENOUS PORT with flouro performed by Luisa Holter, MD at [place] [address] ENDOSCOPY N/A 03/11/2020    Dr. Culp [facility]:   [facility]      SOCIAL HISTORY:  Social History     Socioeconomic History    Marital status:      Spouse name: Not on file    Number of children: Not on file    Years of education: Not on file    Highest education level: Not on file   Occupational History    Not on file   Tobacco Use    Smoking status: Never Smoker    Smokeless tobacco: Never Used   Vaping Use    Vaping Use: Never used   Substance and Sexual Activity    Alcohol use: Yes     Comment: rare     Drug use: No    Sexual activity: Yes     Partners: Female   Other Topics Concern    Not on file   Social History Narrative    Not on file     Social Determinants of Health     Financial Resource Strain:     Difficulty of Paying Living Expenses: Not on file   Food Insecurity:     Worried About [address] in the Last Year: Not on file    [address] in the Last Year: Not on file   Transportation Needs:     Lack of Transportation (Medical): Not on file    Lack of Transportation (Non-Medical):  Not on file   Physical Activity:     Days of Exercise per Week: Not on file    Minutes of Exercise per Session: Not on file   Stress:     Feeling of Stress : Not on file   Social Connections:     Frequency of Communication with Friends and Family: Not on file    Frequency of Social Gatherings with Friends and Family: Not on file    Attends [employer] Services: Not on file    Active Member of Clubs or Organizations: Not on file    Attends Club or Organization Meetings: Not on file    Marital Status: Not on file   Intimate Partner Violence:     Fear of Current or Ex-Partner: Not on file    Emotionally Abused: Not on file    Physically Abused: Not on file    Sexually Abused: Not on file   Housing Stability:     Unable to Pay for Housing in the Last Year: Not on file    Number of Places Lived in the Last Year: Not on file    Unstable Housing in the Last Year: Not on file     FAMILY HISTORY:  Family History   Problem Relation Age of Onset    Liver Cancer Mother     High Blood Pressure Mother     Colon Cancer Mother         x2    Cancer Father         Lung Cancer    Breast Cancer Sister     Cancer Maternal Grandfather         Lung Cancer    Cancer Paternal Grandfather         Stomach Cancer    Colon Polyps Neg Hx     Cystic Fibrosis Neg Hx     Liver Disease Neg Hx     Rectal Cancer Neg Hx         Current Outpatient Medications   Medication Sig Dispense Refill    fentaNYL (DURAGESIC) 12 MCG/HR Place 1 patch onto the skin every 3 days for 30 days.  Intended supply: 30 days 10 patch 0    promethazine (PHENERGAN) 12.5 MG tablet Take 1 tablet by mouth every 6 hours as needed for Nausea 60 tablet 5    Magnesium Oxide 400 MG CAPS Take 400 mg by mouth in the morning and at bedtime 60 capsule 3    methocarbamol (ROBAXIN) 500 MG tablet Take 500 mg by mouth 3 times daily as needed       omeprazole (PRILOSEC) 20 MG delayed release capsule Take 20 mg by mouth daily      prazosin (MINIPRESS) 1 MG capsule Take 1 mg by mouth nightly as needed For nightmares       Sertraline HCl (ZOLOFT PO) Take by mouth daily      apixaban (ELIQUIS) 5 MG TABS tablet Take 1 tablet by mouth 2 times daily 60 tablet 0    vitamin D (ERGOCALCIFEROL) 1.25 MG (00022 UT) CAPS capsule Take 1 capsule by mouth once a week 5 capsule 0     No current facility-administered medications for this visit.      Facility-Administered Medications Ordered in Other Visits   Medication Dose Route Frequency Provider Last Rate Last Admin    0.9 % sodium chloride infusion   IntraVENous Once Maribel Hernandez MD 20 mL/hr at 05/24/22 1023 New Bag at 05/24/22 1023    dextrose 5 % solution   IntraVENous Once Maribel Hernandez MD        atropine injection 0.5 mg  0.5 mg IntraVENous Once PRN Maribel Hernandez MD        bevacizumab-bvzr (ZIRABEV) 375 mg in sodium chloride 0.9 % 100 mL chemo IVPB  5 mg/kg (Treatment Plan Recorded) IntraVENous Once Maribel Hernandez MD        irinotecan (CAMPTOSAR) 340 mg in dextrose 5 % 250 mL chemo IVPB  180 mg/m2 (Treatment Plan Recorded) IntraVENous Once Maribel Hernandez MD        leucovorin calcium (WELLCOVORIN) 750 mg in dextrose 5 % 250 mL IVPB  400 mg/m2 (Treatment Plan Recorded) IntraVENous Once Maribel Hernandez MD        fluorouracil (ADRUCIL) 4,475 mg in sodium chloride 0.9 % 250 mL chemo infusion  2,400 mg/m2 (Treatment Plan Recorded) IntraVENous Over 46 hours Maribel Hernandez MD        sodium chloride flush 0.9 % injection 5-40 mL  5-40 mL IntraVENous PRN Maribel Hernandez MD            REVIEW OF SYSTEMS:   CONSTITUTIONAL: no fever, no night sweats, fatigue;  HEENT: no blurring of vision, no double vision, no hearing difficulty, no tinnitus, no ulceration, no dysplasia, no epistaxis;   LUNGS: no cough, no hemoptysis, no wheeze,  no shortness of breath;  CARDIOVASCULAR: no palpitation, no chest pain, no shortness of breath;  GI: abdominal pain, no nausea, no vomiting, no diarrhea, no constipation;  ANNIE: no dysuria, no hematuria, no frequency or urgency, no nephrolithiasis;  MUSCULOSKELETAL: no joint pain, no swelling, no stiffness;  ENDOCRINE: no polyuria, no polydipsia, no cold or heat intolerance;  HEMATOLOGY: no easy bruising or bleeding, no history of clotting disorder;  DERMATOLOGY: no skin rash, no eczema, no pruritus;  PSYCHIATRY: no depression, no anxiety, no panic attacks, no suicidal ideation, no homicidal ideation;  NEUROLOGY: no syncope, no seizures, no numbness or tingling of hands, no numbness or tingling of feet, no paresis;    Vitals signs:  /82   Pulse 107   Temp 98.4 °F (36.9 °C) (Oral)   Ht 5' 7\" (1.702 m)   Wt 138 lb 8 oz (62.8 kg)   SpO2 98%   BMI 21.69 kg/m²     PHYSICAL EXAM:  CONSTITUTIONAL: Alert, appropriate, no acute distress  EYES: Non icteric, EOM intact, pupils equal round   ENT: Mucus membranes moist, no oral pharyngeal lesions, external inspection of ears and nose are normal.  NECK: Supple, no masses. No palpable thyroid mass  CHEST/LUNGS: CTA bilaterally, normal respiratory effort   CARDIOVASCULAR: RRR, no murmurs. No lower extremity edema  ABDOMEN: soft non-tender, active bowel sounds, no HSM. No palpable masses  EXTREMITIES: warm, full ROM in all 4 extremities, no focal weakness. SKIN: warm, dry with no rashes or lesions  LYMPH: No cervical, clavicular, axillary, or inguinal lymphadenopathy  NEUROLOGIC: follows commands, non focal   PSYCH: mood and affect appropriate.   Alert and oriented to time, place, person        Relevant Lab findings/reviewed by me:  Lab Results   Component Value Date    WBC 5.48 05/24/2022    HGB 9.6 (L) 05/24/2022    HCT 31.6 (L) 05/24/2022    MCV 91.1 05/24/2022     05/24/2022     Lab Results   Component Value Date    NEUTROABS 3.77 05/24/2022     Lab Results   Component Value Date     05/24/2022    K 4.0 05/24/2022     05/24/2022    CO2 21 (L) 05/24/2022    BUN 14 05/24/2022    CREATININE 1.5 (H) 05/24/2022    GLUCOSE 102 05/24/2022    CALCIUM 9.2 05/24/2022    PROT 7.3 05/24/2022    LABALBU 3.8 05/24/2022    BILITOT 0.4 05/24/2022    ALKPHOS 181 (H) 05/24/2022    AST 27 05/24/2022    ALT 14 (L) 05/24/2022    LABGLOM 47 (A) 05/24/2022    GFRAA >59 05/02/2022    AGRATIO 1.5 06/15/2021    GLOB 3.8 02/07/2022 Relevant Imaging studies/reviewed by me:  5/9/2022 CT Chest W Contrast New pulmonary nodules are present in the right and left lung as described above. It cannot be determined if these are infectious nodules or metastatic disease. Dilated loops of bowel are present in the upper abdomen. The abdomen is incompletely evaluated however this may be wither an ileus or partial obstruction. Hepatic metastasis. ASSESSMENT:    No orders of the defined types were placed in this encounter. Denise was seen today for cancer. Diagnoses and all orders for this visit:    Adenocarcinoma of colon ([facility] [address]  -     Comprehensive Metabolic Panel  -     CBC Auto Differential  -     Phosphorus  -     Magnesium  -     fentaNYL (DURAGESIC) 12 MCG/HR; Place 1 patch onto the skin every 3 days for 30 days. Intended supply: 30 days    Failure to thrive in adult  -     Comprehensive Metabolic Panel  -     CBC Auto Differential  -     Phosphorus  -     Magnesium    Dehydration  -     Comprehensive Metabolic Panel  -     CBC Auto Differential  -     Phosphorus  -     Magnesium    Liver metastases (HCC)  -     Comprehensive Metabolic Panel  -     CBC Auto Differential  -     Phosphorus  -     Magnesium  -     fentaNYL (DURAGESIC) 12 MCG/HR; Place 1 patch onto the skin every 3 days for 30 days. Intended supply: 30 days    Acute kidney injury (MARIO) with acute tubular necrosis (ATN) (HCC)  -     Comprehensive Metabolic Panel  -     CBC Auto Differential  -     Phosphorus  -     Magnesium    Physical deconditioning  -     Comprehensive Metabolic Panel  -     CBC Auto Differential  -     Phosphorus  -     Magnesium    Antineoplastic chemotherapy induced anemia    Cancer associated pain  -     fentaNYL (DURAGESIC) 12 MCG/HR; Place 1 patch onto the skin every 3 days for 30 days. Intended supply: 30 days    Nausea  -     promethazine (PHENERGAN) 12.5 MG tablet;  Take 1 tablet by mouth every 6 hours as needed for Nausea    Care plan discussed with patient    Chemotherapy management, encounter for    Encounter for antineoplastic immunotherapy    Adverse effect of chemotherapy, subsequent encounter    Malignant neoplasm of ascending colon ([facility] [address]    Chronic deep vein thrombosis (DVT) of other vein of left upper extremity (HCC)    Acute deep vein thrombosis (DVT) of left upper extremity, unspecified vein (HCC)       #Colonic adenocarcinoma-  [id number] (liver) K-maria luisa mutated, IHC MMR not proficient  Status post microwave ablation left hepatic lesion  Status post neoadjuvant FOLFOX/bevacizumab x11 biweekly cycles  Status post right hemicolectomy. Pathology consistent complete pathologic response. Recommended surveillance as per NCCN guidelines  Discussed at length results of pathology with the patient. 3/17/21- 1 year colonoscopy showed no large polyps or masses. Repeat in 3 years. June 2021-CT chest clear. MRI abdomen showed suspicious lesion in the right lower quadrant. Biopsy arranged Arab[place]. Discussed with hepatobiliary service at [facility]. [id number] biopsy consistent with recurrent primary colorectal adenocarcinoma. 7/2/2021-discussed with hepatobiliary service/surgical oncology at [facility]. They will review images and call the patient back regarding consultation for consideration of HIPEC  7/2/2021-they recommend systemic therapy. 7/2/2021-recommended FOLFIRI/Avastin  7/13/21- Initiated FOLFIRI + Avastin every 2 weeks  07/30/21-Seen at [place] by GI surgeon oncology. They discussed the role of CRS/HIPEC in his situation. He was told hat it really depends on the status of his liver lesions as well. He will complete 6 cycles of chemotherapy and will return to see me with a CTAP as well as MRI of the liver to assess disease burden and determine if he can be a candidate for debulking.    8/25/2021-proceed with FOLFIRI/Avastin   9/24/2021-repeat MRI abdomen/CT abdomen showed persistent disease.     1/13/2022-Exploratory laparotomy/resection of psoas mass/HIPEC at [place]. Path Isaac:     OMENTUM, OMENTECTOMY:   - METASTATIC COLORECTAL ADENOCARCINOMA INVOLVING FIBROADIPOSE TISSUE.   - TWO (2) LYMPH NODES, NEGATIVE FOR CARCINOMA.      SMALL BOWEL, MESENTERIC NODULE, EXCISION:   - METASTATIC COLORECTAL ADENOCARCINOMA INVOLVING FIBROMUSCULAR TISSUE AND EXTENDING TO TISSUE EDGE. SMALL BOWEL AND RIGHT COLON, ILEOCOLIC RESECTION:     - RESIDUAL RECURRENT INVASIVE MODERATELY DIFFERENTIATED COLORECTAL ADENOCARCINOMA (2.1 CM) INVOLVING ANASTOMOTIC SITE; SEE COMMENT AND SYNOPTIC REPORT.   - TUMOR FOCALLY INVOLVES THE SEROSA SURFACE.   - METASTATIC CARCINOMA INVOLVES ONE (1) OF TWELVE (12) LYMPH NODES.   - ONE (1) TUMOR DEPOSIT PRESENT.   - ALL RESECTION MARGINS ARE NEGATIVE FOR CARCINOMA. RETROPERITONEAL NODULE, EXCISION:   - METASTATIC COLORECTAL ADENOCARCINOMA INVOLVING FREDDY-PANCREATIC TISSUE AND EXTENDING TO TISSUE EDGE. LATERAL DUODENECTOMY, RESECTION:   - METASTATIC COLORECTAL ADENOCARCINOMA INVOLVING DUODENAL SUBMUCOSA, MUSCULARIS PROPRIA, AND SUBSEROSA. - TUMOR IS PRESENT WITHIN 1 MM OF INKED RESECTION MARGINS. RIGHT ABDOMINAL WALL NODULE, EXCISION:   - METASTATIC COLORECTAL ADENOCARCINOMA INVOLVING FIBROMUSCULAR TISSUE. RIGHT RETROPERITONEAL MASS, RESECTION:   - METASTATIC COLORECTAL ADENOCARCINOMA INVOLVING FIBROADIPOSE TISSUE (7.3 CM). - MEDIAL, SUPERIOR AND DEEP RESECTION MARGINS ARE POSITIVE FOR CARCINOMA. - INKED INFERIOR AND LATERAL MARGINS ARE NEGATIVE FOR CARCINOMA (<1 MM) RIGHT RETROPERITONEAL MASS, FINAL SUPERIOR MARGIN, EXCISION:   - METASTATIC COLORECTAL ADENOCARCINOMA INVOLVING FIBROADIPOSE TISSUE, FOCALLY PRESENT AT CAUTERIZED, INKED TISSUE EDGE.   2/7/2022-postoperative dehydration and electrolyte abnormalities. Home health arrangements IV fluids twice a week. 3/23/2022- reversal of ileostomy at [facility]. 3/20/2022 CT Abd/Pelvis WO Contrast ([facility]) Findings suspicious for aspiration at the lung bases. Postsurgical changes related to right hepatectomy. There is a new low-attenuation lesion in the left hemiliver, highly concerning for metastasis. Postsurgical changes related to right hemicolectomy with ileocolonic anastomosis and right lower quadrant diverting loop ileostomy. No evidence of bowel obstruction. No intra-abdominal fluid collection. Other findings as above. A critical alert was sent at the time of dictation. Liver: Postsurgical changes related to right hepatectomy. There is a new, approximately 2.1 cm low-attenuation lesion seen in the left hepatic lobe on image 25 of series 3, highly concerning for metastasis. 4/23/2022- discussed the patient reinitiation of chemotherapy. We will continue FOLFIRI. He had no prior progression on FOLFIRI of FOLFOX. 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  We would avoid Avastin at this time due to recent surgery. He is K-maria luisa mutated and therefore cannot use anti-EGFR agent. May contemplate adding Avastin in 4 weeks. 5/9/2022 CT Chest W Contrast New pulmonary nodules are present in the right and left lung as described above. It cannot be determined if these are infectious nodules or metastatic disease. Dilated loops of bowel are present in the upper abdomen. The abdomen is incompletely evaluated however this may be wither an ileus or partial obstruction. Hepatic metastasis. Plan:  -Proceed cycle #1 FOLFIRI/ Avastin, started 5/24/2022  -CEA today      #Chronic kidney disease stage III- creatinine 1.5  Encouraged to increase PO fluid intake     #Liver metastasis- plan as above. Status post ablation left liver lobe lesion at [facility] on 6/8/2020. Status post neoadjuvant FOLFOX/bevacizumab x11 biweekly cyclesStatus post right hemicolectomy. Pathology consistent complete pathologic response. 3/4/2021-MRI abdomen was unremarkable  6/8/2021-MRI abdomen showed Postsurgical changes of right hepatectomy.  Redemonstration of an ablation zone in segment II, measuring up to 1.7 cm, previously 1.8 cm. No evidence of postcontrast enhancement.  No new lesion identified. 9/24/2021-MRI abdomen [place] showed Liver: Status post right hepatectomy. Ablation zone in segment II measures 1.7 x 1.1 cm, slightly decreased in size from 1.8 x 1.4 cm previously (series 1301 image 56) without apreciable enhancement. Similar tiny subcentimeter cyst in the left hepatic lobe. No new focal hepatic lesion identified. 3/20/2022 CT Abd/Pelvis WO Contrast ([facility]) Findings suspicious for aspiration at the lung bases. Postsurgical changes related to right hepatectomy. There is a new low-attenuation lesion in the left hemiliver, highly concerning for metastasis. Postsurgical changes related to right hemicolectomy with ileocolonic anastomosis and right lower quadrant diverting loop ileostomy. No evidence of bowel obstruction. No intra-abdominal fluid collection. Other findings as above. A critical alert was sent at the time of dictation. Liver: Postsurgical changes related to right hepatectomy. There is a new, approximately 2.1 cm low-attenuation lesion seen in the left hepatic lobe on image 25 of series 3, highly concerning for metastasis. 4/23/2022- discussed the patient reinitiation of chemotherapy. We will continue FOLFIRI. He had no prior progression on FOLFIRI of FOLFOX. 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  We would avoid Avastin at this time due to recent surgery. He is K-maria luisa mutated and therefore cannot use anti-EGFR agent. May contemplate adding Avastin in 4 weeks. 5/9/2022 CT Chest W Contrast New pulmonary nodules are present in the right and left lung as described above. It cannot be determined if these are infectious nodules or metastatic disease. Dilated loops of bowel are present in the upper abdomen. The abdomen is incompletely evaluated however this may be wither an ileus or partial obstruction.  Hepatic metastasis.     #Iron deficiency anemia-  hemoglobin 8.5/MCV 89. Ferritin 12.9, iron saturation 15%, TIBC 458, iron 72. Status post IV iron therapy. Hemoglobin 9.6  -Repeat iron profile     #Chemotherapy-induced neuropathy-stable, mild    #Cancer related pain-  -Oxycodone IR 10 mg every 6 hours as needed  -Add Fentanyl 12 mcg every 72 hrs    DVT port associated left upper extremity-February 2022  internal jugular vein subclavian axillary brachial, left upper extremity. Acute appearing superficial thrombophlebitis (SVT) is seen in the basilic  and cephalic veins, right upper extremity.  -Eliquis 5mg twice a day    PLAN:  · RTC with MD in treatment room 4 weeks  · C#2 FOLFIRI + Avastin every 2 weeks   · Continue follow-up with [facility]/Dr. Devine Both   · Recommend Fentanyl 12 mcg every 72 hrs-script sent  · Continue OxyIR 10mg every 6 hours as needed  · Continue Phenergan 12.5mg every 6 hrs as needed-script sent  · Continue Eliquis 5mg twice a day  · Continue OTC Imodium as needed  · Recommend increase p.o. fluid intake  · Discussed with Dr. Lacy Vela today to coordinate care  · Repeat CT scans after C#6    Follow Up:     Return in about 4 weeks (around 6/21/2022) for Treatment & see  in [address] [place] [address] 153 RM FOLFIRI + Avastin. FOLFIRI + Avastin every 2 weeks     Lien JACOME am pre charting  as Medical Assistant for Tim House MD. Electronically signed by Lien Sims MA on 5/24/2022 at 8:35 AM CDT. Gael Acosta am scribing for Tim House MD. Electronically signed by Glen Ramírez RN on 5/24/2022 at 9:54 AM CDT. I, Dr Orpha Kanner, personally performed the services described in this documentation as scribed by Glen Ramírez, ADITYA in my presence and is both accurate and complete. I have seen, examined and reviewed this patient medication list, appropriate labs and imaging studies. I reviewed relevant medical records and others physicians notes. I discussed the plans of care with the patient.  I answered

## 2022-05-24 ENCOUNTER — HOSPITAL ENCOUNTER (OUTPATIENT)
Dept: INFUSION THERAPY | Age: 67
Discharge: HOME | End: 2022-05-24
Payer: OTHER GOVERNMENT

## 2022-05-24 ENCOUNTER — OFFICE VISIT (OUTPATIENT)
Dept: HEMATOLOGY | Age: 67
End: 2022-05-24
Payer: MEDICARE

## 2022-05-24 VITALS
SYSTOLIC BLOOD PRESSURE: 126 MMHG | DIASTOLIC BLOOD PRESSURE: 82 MMHG | TEMPERATURE: 98.4 F | HEART RATE: 107 BPM | OXYGEN SATURATION: 98 % | HEIGHT: 67 IN | BODY MASS INDEX: 21.74 KG/M2 | WEIGHT: 138.5 LBS

## 2022-05-24 DIAGNOSIS — C18.9 ADENOCARCINOMA OF COLON (HCC): ICD-10-CM

## 2022-05-24 DIAGNOSIS — R53.81 PHYSICAL DECONDITIONING: ICD-10-CM

## 2022-05-24 DIAGNOSIS — C78.7 LIVER METASTASES (HCC): ICD-10-CM

## 2022-05-24 DIAGNOSIS — Z71.89 CARE PLAN DISCUSSED WITH PATIENT: ICD-10-CM

## 2022-05-24 DIAGNOSIS — C18.2 MALIGNANT NEOPLASM OF ASCENDING COLON (HCC): ICD-10-CM

## 2022-05-24 DIAGNOSIS — I82.622 ACUTE DEEP VEIN THROMBOSIS (DVT) OF LEFT UPPER EXTREMITY, UNSPECIFIED VEIN (HCC): ICD-10-CM

## 2022-05-24 DIAGNOSIS — D64.81 ANTINEOPLASTIC CHEMOTHERAPY INDUCED ANEMIA: ICD-10-CM

## 2022-05-24 DIAGNOSIS — E86.0 DEHYDRATION: ICD-10-CM

## 2022-05-24 DIAGNOSIS — T45.1X5D ADVERSE EFFECT OF CHEMOTHERAPY, SUBSEQUENT ENCOUNTER: ICD-10-CM

## 2022-05-24 DIAGNOSIS — N17.0 ACUTE KIDNEY INJURY (AKI) WITH ACUTE TUBULAR NECROSIS (ATN) (HCC): ICD-10-CM

## 2022-05-24 DIAGNOSIS — C18.9 ADENOCARCINOMA OF COLON (HCC): Primary | ICD-10-CM

## 2022-05-24 DIAGNOSIS — R11.0 NAUSEA: ICD-10-CM

## 2022-05-24 DIAGNOSIS — I82.722 CHRONIC DEEP VEIN THROMBOSIS (DVT) OF OTHER VEIN OF LEFT UPPER EXTREMITY (HCC): ICD-10-CM

## 2022-05-24 DIAGNOSIS — Z51.12 ENCOUNTER FOR ANTINEOPLASTIC IMMUNOTHERAPY: ICD-10-CM

## 2022-05-24 DIAGNOSIS — G89.3 CANCER ASSOCIATED PAIN: ICD-10-CM

## 2022-05-24 DIAGNOSIS — R62.7 FAILURE TO THRIVE IN ADULT: ICD-10-CM

## 2022-05-24 DIAGNOSIS — T45.1X5A ANTINEOPLASTIC CHEMOTHERAPY INDUCED ANEMIA: ICD-10-CM

## 2022-05-24 DIAGNOSIS — Z51.11 CHEMOTHERAPY MANAGEMENT, ENCOUNTER FOR: ICD-10-CM

## 2022-05-24 LAB
ALBUMIN SERPL-MCNC: 3.8 G/DL (ref 3.5–5.2)
ALP BLD-CCNC: 181 U/L (ref 40–130)
ALT SERPL-CCNC: 14 U/L (ref 21–72)
ANION GAP SERPL CALCULATED.3IONS-SCNC: 11 MMOL/L (ref 7–19)
AST SERPL-CCNC: 27 U/L (ref 17–59)
BILIRUB SERPL-MCNC: 0.4 MG/DL (ref 0.2–1.3)
BUN BLDV-MCNC: 14 MG/DL (ref 9–20)
CALCIUM SERPL-MCNC: 9.2 MG/DL (ref 8.4–10.2)
CEA: 29.1 NG/ML (ref 0–4.7)
CHLORIDE BLD-SCNC: 108 MMOL/L (ref 98–111)
CO2: 21 MMOL/L (ref 22–29)
CREAT SERPL-MCNC: 1.5 MG/DL (ref 0.6–1.2)
FERRITIN: 116.9 NG/ML (ref 30–400)
GFR NON-AFRICAN AMERICAN: 47
GLUCOSE BLD-MCNC: 102 MG/DL (ref 74–106)
HCT VFR BLD CALC: 31.6 % (ref 40.1–51)
HEMOGLOBIN: 9.6 G/DL (ref 13.7–17.5)
IRON SATURATION: 11 % (ref 14–50)
IRON: 29 UG/DL (ref 59–158)
LYMPHOCYTES ABSOLUTE: 0.81 K/UL (ref 1.18–3.74)
LYMPHOCYTES RELATIVE PERCENT: 14.8 % (ref 19.3–53.1)
MAGNESIUM: 1.5 MG/DL (ref 1.6–2.3)
MCH RBC QN AUTO: 27.7 PG (ref 25.7–32.2)
MCHC RBC AUTO-ENTMCNC: 30.4 G/DL (ref 32.3–36.5)
MCV RBC AUTO: 91.1 FL (ref 79–92.2)
MONOCYTES ABSOLUTE: 0.55 K/UL (ref 0.24–0.82)
MONOCYTES RELATIVE PERCENT: 10 % (ref 4.7–12.5)
NEUTROPHILS ABSOLUTE: 3.77 K/UL (ref 1.56–6.13)
NEUTROPHILS RELATIVE PERCENT: 68.8 % (ref 34–71.1)
PDW BLD-RTO: 15.4 % (ref 11.6–14.4)
PHOSPHORUS: 3.7 MG/DL (ref 2.5–4.5)
PLATELET # BLD: 302 K/UL (ref 163–337)
PMV BLD AUTO: 9.3 FL (ref 7.4–10.4)
POTASSIUM SERPL-SCNC: 4 MMOL/L (ref 3.5–5.1)
RBC # BLD: 3.47 M/UL (ref 4.63–6.08)
SODIUM BLD-SCNC: 140 MMOL/L (ref 137–145)
TOTAL IRON BINDING CAPACITY: 256 UG/DL (ref 250–400)
TOTAL PROTEIN: 7.3 G/DL (ref 6.3–8.2)
WBC # BLD: 5.48 K/UL (ref 4.23–9.07)

## 2022-05-24 PROCEDURE — G8420 CALC BMI NORM PARAMETERS: HCPCS | Performed by: INTERNAL MEDICINE

## 2022-05-24 PROCEDURE — 96367 TX/PROPH/DG ADDL SEQ IV INF: CPT

## 2022-05-24 PROCEDURE — 96417 CHEMO IV INFUS EACH ADDL SEQ: CPT

## 2022-05-24 PROCEDURE — 99214 OFFICE O/P EST MOD 30 MIN: CPT | Performed by: INTERNAL MEDICINE

## 2022-05-24 PROCEDURE — 96366 THER/PROPH/DIAG IV INF ADDON: CPT

## 2022-05-24 PROCEDURE — 96375 TX/PRO/DX INJ NEW DRUG ADDON: CPT

## 2022-05-24 PROCEDURE — 1036F TOBACCO NON-USER: CPT | Performed by: INTERNAL MEDICINE

## 2022-05-24 PROCEDURE — 6360000002 HC RX W HCPCS: Performed by: INTERNAL MEDICINE

## 2022-05-24 PROCEDURE — 3017F COLORECTAL CA SCREEN DOC REV: CPT | Performed by: INTERNAL MEDICINE

## 2022-05-24 PROCEDURE — 1123F ACP DISCUSS/DSCN MKR DOCD: CPT | Performed by: INTERNAL MEDICINE

## 2022-05-24 PROCEDURE — 96415 CHEMO IV INFUSION ADDL HR: CPT

## 2022-05-24 PROCEDURE — 2580000003 HC RX 258: Performed by: INTERNAL MEDICINE

## 2022-05-24 PROCEDURE — G0498 CHEMO EXTEND IV INFUS W/PUMP: HCPCS

## 2022-05-24 PROCEDURE — 96413 CHEMO IV INFUSION 1 HR: CPT

## 2022-05-24 PROCEDURE — G8427 DOCREV CUR MEDS BY ELIG CLIN: HCPCS | Performed by: INTERNAL MEDICINE

## 2022-05-24 RX ORDER — HEPARIN SODIUM (PORCINE) LOCK FLUSH IV SOLN 100 UNIT/ML 100 UNIT/ML
500 SOLUTION INTRAVENOUS PRN
Status: CANCELLED | OUTPATIENT
Start: 2022-05-24

## 2022-05-24 RX ORDER — SODIUM CHLORIDE 0.9 % (FLUSH) 0.9 %
5-40 SYRINGE (ML) INJECTION PRN
Status: CANCELLED | OUTPATIENT
Start: 2022-05-24

## 2022-05-24 RX ORDER — PROMETHAZINE HYDROCHLORIDE 12.5 MG/1
12.5 TABLET ORAL EVERY 6 HOURS PRN
Qty: 60 TABLET | Refills: 5 | Status: SHIPPED | OUTPATIENT
Start: 2022-05-24 | End: 2022-06-29 | Stop reason: SDUPTHER

## 2022-05-24 RX ORDER — DIPHENHYDRAMINE HYDROCHLORIDE 50 MG/ML
50 INJECTION INTRAMUSCULAR; INTRAVENOUS ONCE
Status: CANCELLED | OUTPATIENT
Start: 2022-05-24 | End: 2022-05-24

## 2022-05-24 RX ORDER — EPINEPHRINE 1 MG/ML
0.3 INJECTION, SOLUTION, CONCENTRATE INTRAVENOUS PRN
Status: CANCELLED | OUTPATIENT
Start: 2022-05-24

## 2022-05-24 RX ORDER — FAMOTIDINE 10 MG/ML
20 INJECTION, SOLUTION INTRAVENOUS ONCE
Status: CANCELLED | OUTPATIENT
Start: 2022-05-24 | End: 2022-05-24

## 2022-05-24 RX ORDER — SODIUM CHLORIDE 9 MG/ML
INJECTION, SOLUTION INTRAVENOUS CONTINUOUS
Status: CANCELLED | OUTPATIENT
Start: 2022-05-24

## 2022-05-24 RX ORDER — SODIUM CHLORIDE 0.9 % (FLUSH) 0.9 %
5-40 SYRINGE (ML) INJECTION PRN
Status: DISCONTINUED | OUTPATIENT
Start: 2022-05-24 | End: 2022-05-25 | Stop reason: HOSPADM

## 2022-05-24 RX ORDER — FENTANYL 12 UG/H
1 PATCH TRANSDERMAL
Qty: 10 PATCH | Refills: 0 | Status: SHIPPED | OUTPATIENT
Start: 2022-05-24 | End: 2022-06-22 | Stop reason: SDUPTHER

## 2022-05-24 RX ORDER — SODIUM CHLORIDE 9 MG/ML
INJECTION, SOLUTION INTRAVENOUS ONCE
Status: CANCELLED | OUTPATIENT
Start: 2022-05-24 | End: 2022-05-24

## 2022-05-24 RX ORDER — SODIUM CHLORIDE 9 MG/ML
25 INJECTION, SOLUTION INTRAVENOUS PRN
Status: CANCELLED | OUTPATIENT
Start: 2022-05-24

## 2022-05-24 RX ORDER — ATROPINE SULFATE 1 MG/ML
0.5 INJECTION, SOLUTION INTRAMUSCULAR; INTRAVENOUS; SUBCUTANEOUS
Status: CANCELLED
Start: 2022-05-24

## 2022-05-24 RX ORDER — PALONOSETRON 0.05 MG/ML
0.25 INJECTION, SOLUTION INTRAVENOUS ONCE
Status: COMPLETED | OUTPATIENT
Start: 2022-05-24 | End: 2022-05-24

## 2022-05-24 RX ORDER — MEPERIDINE HYDROCHLORIDE 50 MG/ML
12.5 INJECTION INTRAMUSCULAR; INTRAVENOUS; SUBCUTANEOUS ONCE
Status: CANCELLED | OUTPATIENT
Start: 2022-05-24 | End: 2022-05-24

## 2022-05-24 RX ORDER — PALONOSETRON 0.05 MG/ML
0.25 INJECTION, SOLUTION INTRAVENOUS ONCE
Status: CANCELLED | OUTPATIENT
Start: 2022-05-24

## 2022-05-24 RX ORDER — DEXTROSE MONOHYDRATE 50 MG/ML
INJECTION, SOLUTION INTRAVENOUS ONCE
Status: DISCONTINUED | OUTPATIENT
Start: 2022-05-24 | End: 2022-05-26 | Stop reason: HOSPADM

## 2022-05-24 RX ORDER — METHYLPREDNISOLONE SODIUM SUCCINATE 125 MG/2ML
125 INJECTION, POWDER, LYOPHILIZED, FOR SOLUTION INTRAMUSCULAR; INTRAVENOUS ONCE
Status: CANCELLED | OUTPATIENT
Start: 2022-05-24 | End: 2022-05-24

## 2022-05-24 RX ORDER — ATROPINE SULFATE 1 MG/ML
0.5 INJECTION, SOLUTION INTRAMUSCULAR; INTRAVENOUS; SUBCUTANEOUS
Status: COMPLETED | OUTPATIENT
Start: 2022-05-24 | End: 2022-05-24

## 2022-05-24 RX ORDER — DEXTROSE MONOHYDRATE 50 MG/ML
INJECTION, SOLUTION INTRAVENOUS ONCE
Status: CANCELLED | OUTPATIENT
Start: 2022-05-24 | End: 2022-05-24

## 2022-05-24 RX ORDER — SODIUM CHLORIDE 9 MG/ML
INJECTION, SOLUTION INTRAVENOUS ONCE
Status: COMPLETED | OUTPATIENT
Start: 2022-05-24 | End: 2022-05-25

## 2022-05-24 RX ADMIN — LEUCOVORIN CALCIUM 750 MG: 500 INJECTION, POWDER, LYOPHILIZED, FOR SOLUTION INTRAMUSCULAR; INTRAVENOUS at 11:23

## 2022-05-24 RX ADMIN — DEXAMETHASONE SODIUM PHOSPHATE: 10 INJECTION, SOLUTION INTRAMUSCULAR; INTRAVENOUS at 10:24

## 2022-05-24 RX ADMIN — FLUOROURACIL 4475 MG: 50 INJECTION, SOLUTION INTRAVENOUS at 13:13

## 2022-05-24 RX ADMIN — PALONOSETRON 0.25 MG: 0.05 INJECTION, SOLUTION INTRAVENOUS at 10:23

## 2022-05-24 RX ADMIN — ATROPINE SULFATE 0.5 MG: 1 INJECTION, SOLUTION INTRAMUSCULAR; INTRAVENOUS; SUBCUTANEOUS at 11:16

## 2022-05-24 RX ADMIN — SODIUM CHLORIDE: 9 INJECTION, SOLUTION INTRAVENOUS at 10:23

## 2022-05-24 RX ADMIN — SODIUM CHLORIDE 375 MG: 9 INJECTION, SOLUTION INTRAVENOUS at 10:41

## 2022-05-24 RX ADMIN — IRINOTECAN HYDROCHLORIDE 340 MG: 20 INJECTION, SOLUTION INTRAVENOUS at 11:20

## 2022-05-26 ENCOUNTER — HOSPITAL ENCOUNTER (OUTPATIENT)
Dept: INFUSION THERAPY | Age: 67
Discharge: HOME | End: 2022-05-26
Payer: OTHER GOVERNMENT

## 2022-05-26 DIAGNOSIS — C18.2 MALIGNANT NEOPLASM OF ASCENDING COLON (HCC): Primary | ICD-10-CM

## 2022-05-26 PROCEDURE — 96523 IRRIG DRUG DELIVERY DEVICE: CPT

## 2022-05-26 PROCEDURE — 6360000002 HC RX W HCPCS: Performed by: INTERNAL MEDICINE

## 2022-05-26 PROCEDURE — 2580000003 HC RX 258: Performed by: INTERNAL MEDICINE

## 2022-05-26 RX ORDER — HEPARIN SODIUM (PORCINE) LOCK FLUSH IV SOLN 100 UNIT/ML 100 UNIT/ML
500 SOLUTION INTRAVENOUS PRN
Status: DISCONTINUED | OUTPATIENT
Start: 2022-05-26 | End: 2022-05-27 | Stop reason: HOSPADM

## 2022-05-26 RX ORDER — SODIUM CHLORIDE 0.9 % (FLUSH) 0.9 %
10 SYRINGE (ML) INJECTION PRN
Status: DISCONTINUED | OUTPATIENT
Start: 2022-05-26 | End: 2022-05-27 | Stop reason: HOSPADM

## 2022-05-26 RX ADMIN — SODIUM CHLORIDE, PRESERVATIVE FREE 10 ML: 5 INJECTION INTRAVENOUS at 13:47

## 2022-05-26 RX ADMIN — HEPARIN 500 UNITS: 100 SYRINGE at 13:47

## 2022-06-07 ENCOUNTER — HOSPITAL ENCOUNTER (OUTPATIENT)
Dept: INFUSION THERAPY | Age: 67
Discharge: HOME | End: 2022-06-07
Payer: MEDICARE

## 2022-06-07 VITALS
HEIGHT: 67 IN | DIASTOLIC BLOOD PRESSURE: 82 MMHG | BODY MASS INDEX: 21.83 KG/M2 | WEIGHT: 139.1 LBS | SYSTOLIC BLOOD PRESSURE: 128 MMHG | HEART RATE: 92 BPM | RESPIRATION RATE: 18 BRPM | OXYGEN SATURATION: 98 % | TEMPERATURE: 98.2 F

## 2022-06-07 DIAGNOSIS — E86.0 DEHYDRATION: ICD-10-CM

## 2022-06-07 DIAGNOSIS — C18.9 ADENOCARCINOMA OF COLON (HCC): ICD-10-CM

## 2022-06-07 DIAGNOSIS — C78.7 LIVER METASTASES (HCC): ICD-10-CM

## 2022-06-07 DIAGNOSIS — R53.81 PHYSICAL DECONDITIONING: ICD-10-CM

## 2022-06-07 DIAGNOSIS — R62.7 FAILURE TO THRIVE IN ADULT: Primary | ICD-10-CM

## 2022-06-07 DIAGNOSIS — N17.0 ACUTE KIDNEY INJURY (AKI) WITH ACUTE TUBULAR NECROSIS (ATN) (HCC): ICD-10-CM

## 2022-06-07 LAB
ALBUMIN SERPL-MCNC: 3.8 G/DL (ref 3.5–5.2)
ALP BLD-CCNC: 153 U/L (ref 40–130)
ALT SERPL-CCNC: 15 U/L (ref 21–72)
ANION GAP SERPL CALCULATED.3IONS-SCNC: 9 MMOL/L (ref 7–19)
AST SERPL-CCNC: 34 U/L (ref 17–59)
BILIRUB SERPL-MCNC: 0.3 MG/DL (ref 0.2–1.3)
BUN BLDV-MCNC: 15 MG/DL (ref 9–20)
CALCIUM SERPL-MCNC: 9.6 MG/DL (ref 8.4–10.2)
CHLORIDE BLD-SCNC: 109 MMOL/L (ref 98–111)
CO2: 22 MMOL/L (ref 22–29)
CREAT SERPL-MCNC: 1.5 MG/DL (ref 0.6–1.2)
GFR NON-AFRICAN AMERICAN: 47
GLOBULIN: 3.3 G/DL
GLUCOSE BLD-MCNC: 100 MG/DL (ref 74–106)
HCT VFR BLD CALC: 32.9 % (ref 40.1–51)
HEMOGLOBIN: 10 G/DL (ref 13.7–17.5)
LYMPHOCYTES ABSOLUTE: 0.7 K/UL (ref 1.18–3.74)
LYMPHOCYTES RELATIVE PERCENT: 13.2 % (ref 19.3–53.1)
MAGNESIUM: 1.5 MG/DL (ref 1.6–2.3)
MCH RBC QN AUTO: 27.3 PG (ref 25.7–32.2)
MCHC RBC AUTO-ENTMCNC: 30.4 G/DL (ref 32.3–36.5)
MCV RBC AUTO: 89.9 FL (ref 79–92.2)
MONOCYTES ABSOLUTE: 0.62 K/UL (ref 0.24–0.82)
MONOCYTES RELATIVE PERCENT: 11.7 % (ref 4.7–12.5)
NEUTROPHILS ABSOLUTE: 3.79 K/UL (ref 1.56–6.13)
NEUTROPHILS RELATIVE PERCENT: 71.6 % (ref 34–71.1)
PDW BLD-RTO: 15 % (ref 11.6–14.4)
PHOSPHORUS: 4.9 MG/DL (ref 2.5–4.5)
PLATELET # BLD: 251 K/UL (ref 163–337)
PMV BLD AUTO: 9.6 FL (ref 7.4–10.4)
POTASSIUM SERPL-SCNC: 4.3 MMOL/L (ref 3.5–5.1)
PROTEIN UA: POSITIVE
RBC # BLD: 3.66 M/UL (ref 4.63–6.08)
SODIUM BLD-SCNC: 140 MMOL/L (ref 137–145)
TOTAL PROTEIN: 7.1 G/DL (ref 6.3–8.2)
WBC # BLD: 5.29 K/UL (ref 4.23–9.07)

## 2022-06-07 PROCEDURE — 83735 ASSAY OF MAGNESIUM: CPT

## 2022-06-07 PROCEDURE — G0498 CHEMO EXTEND IV INFUS W/PUMP: HCPCS

## 2022-06-07 PROCEDURE — 84100 ASSAY OF PHOSPHORUS: CPT

## 2022-06-07 PROCEDURE — 96415 CHEMO IV INFUSION ADDL HR: CPT

## 2022-06-07 PROCEDURE — 96417 CHEMO IV INFUS EACH ADDL SEQ: CPT

## 2022-06-07 PROCEDURE — 6360000002 HC RX W HCPCS: Performed by: INTERNAL MEDICINE

## 2022-06-07 PROCEDURE — 85025 COMPLETE CBC W/AUTO DIFF WBC: CPT

## 2022-06-07 PROCEDURE — 96413 CHEMO IV INFUSION 1 HR: CPT

## 2022-06-07 PROCEDURE — 96375 TX/PRO/DX INJ NEW DRUG ADDON: CPT

## 2022-06-07 PROCEDURE — 96367 TX/PROPH/DG ADDL SEQ IV INF: CPT

## 2022-06-07 PROCEDURE — 80053 COMPREHEN METABOLIC PANEL: CPT

## 2022-06-07 PROCEDURE — 96366 THER/PROPH/DIAG IV INF ADDON: CPT

## 2022-06-07 PROCEDURE — 2580000003 HC RX 258: Performed by: INTERNAL MEDICINE

## 2022-06-07 RX ORDER — SODIUM CHLORIDE 9 MG/ML
INJECTION, SOLUTION INTRAVENOUS ONCE
Status: CANCELLED | OUTPATIENT
Start: 2022-06-07 | End: 2022-06-07

## 2022-06-07 RX ORDER — SODIUM CHLORIDE 9 MG/ML
INJECTION, SOLUTION INTRAVENOUS ONCE
Status: DISCONTINUED | OUTPATIENT
Start: 2022-06-07 | End: 2022-06-09 | Stop reason: HOSPADM

## 2022-06-07 RX ORDER — PALONOSETRON 0.05 MG/ML
0.25 INJECTION, SOLUTION INTRAVENOUS ONCE
Status: CANCELLED | OUTPATIENT
Start: 2022-06-07

## 2022-06-07 RX ORDER — METHYLPREDNISOLONE SODIUM SUCCINATE 125 MG/2ML
125 INJECTION, POWDER, LYOPHILIZED, FOR SOLUTION INTRAMUSCULAR; INTRAVENOUS ONCE
Status: CANCELLED | OUTPATIENT
Start: 2022-06-07 | End: 2022-06-07

## 2022-06-07 RX ORDER — ATROPINE SULFATE 1 MG/ML
0.5 INJECTION, SOLUTION INTRAMUSCULAR; INTRAVENOUS; SUBCUTANEOUS
Status: CANCELLED
Start: 2022-06-07

## 2022-06-07 RX ORDER — DEXTROSE MONOHYDRATE 50 MG/ML
INJECTION, SOLUTION INTRAVENOUS ONCE
Status: DISCONTINUED | OUTPATIENT
Start: 2022-06-07 | End: 2022-06-09 | Stop reason: HOSPADM

## 2022-06-07 RX ORDER — SODIUM CHLORIDE 9 MG/ML
25 INJECTION, SOLUTION INTRAVENOUS PRN
Status: CANCELLED | OUTPATIENT
Start: 2022-06-07

## 2022-06-07 RX ORDER — DEXTROSE MONOHYDRATE 50 MG/ML
INJECTION, SOLUTION INTRAVENOUS ONCE
Status: CANCELLED | OUTPATIENT
Start: 2022-06-07 | End: 2022-06-07

## 2022-06-07 RX ORDER — HEPARIN SODIUM (PORCINE) LOCK FLUSH IV SOLN 100 UNIT/ML 100 UNIT/ML
500 SOLUTION INTRAVENOUS PRN
Status: CANCELLED | OUTPATIENT
Start: 2022-06-07

## 2022-06-07 RX ORDER — SODIUM CHLORIDE 9 MG/ML
INJECTION, SOLUTION INTRAVENOUS CONTINUOUS
Status: CANCELLED | OUTPATIENT
Start: 2022-06-07

## 2022-06-07 RX ORDER — MEPERIDINE HYDROCHLORIDE 50 MG/ML
12.5 INJECTION INTRAMUSCULAR; INTRAVENOUS; SUBCUTANEOUS ONCE
Status: CANCELLED | OUTPATIENT
Start: 2022-06-07 | End: 2022-06-07

## 2022-06-07 RX ORDER — SODIUM CHLORIDE 0.9 % (FLUSH) 0.9 %
5-40 SYRINGE (ML) INJECTION PRN
Status: CANCELLED | OUTPATIENT
Start: 2022-06-07

## 2022-06-07 RX ORDER — ATROPINE SULFATE 1 MG/ML
0.5 INJECTION, SOLUTION INTRAMUSCULAR; INTRAVENOUS; SUBCUTANEOUS
Status: COMPLETED | OUTPATIENT
Start: 2022-06-07 | End: 2022-06-07

## 2022-06-07 RX ORDER — PALONOSETRON 0.05 MG/ML
0.25 INJECTION, SOLUTION INTRAVENOUS ONCE
Status: COMPLETED | OUTPATIENT
Start: 2022-06-07 | End: 2022-06-07

## 2022-06-07 RX ORDER — FAMOTIDINE 10 MG/ML
20 INJECTION, SOLUTION INTRAVENOUS ONCE
Status: CANCELLED | OUTPATIENT
Start: 2022-06-07 | End: 2022-06-07

## 2022-06-07 RX ORDER — DIPHENHYDRAMINE HYDROCHLORIDE 50 MG/ML
50 INJECTION INTRAMUSCULAR; INTRAVENOUS ONCE
Status: CANCELLED | OUTPATIENT
Start: 2022-06-07 | End: 2022-06-07

## 2022-06-07 RX ORDER — EPINEPHRINE 1 MG/ML
0.3 INJECTION, SOLUTION, CONCENTRATE INTRAVENOUS PRN
Status: CANCELLED | OUTPATIENT
Start: 2022-06-07

## 2022-06-07 RX ADMIN — ATROPINE SULFATE 0.5 MG: 1 INJECTION, SOLUTION INTRAMUSCULAR; INTRAVENOUS; SUBCUTANEOUS at 11:36

## 2022-06-07 RX ADMIN — PALONOSETRON 0.25 MG: 0.05 INJECTION, SOLUTION INTRAVENOUS at 10:37

## 2022-06-07 RX ADMIN — SODIUM CHLORIDE 375 MG: 9 INJECTION, SOLUTION INTRAVENOUS at 10:56

## 2022-06-07 RX ADMIN — IRINOTECAN HYDROCHLORIDE 340 MG: 20 INJECTION, SOLUTION INTRAVENOUS at 11:34

## 2022-06-07 RX ADMIN — FLUOROURACIL 4475 MG: 50 INJECTION, SOLUTION INTRAVENOUS at 13:17

## 2022-06-07 RX ADMIN — DEXAMETHASONE SODIUM PHOSPHATE: 10 INJECTION, SOLUTION INTRAMUSCULAR; INTRAVENOUS at 10:36

## 2022-06-07 RX ADMIN — LEUCOVORIN CALCIUM 750 MG: 350 INJECTION, POWDER, LYOPHILIZED, FOR SOLUTION INTRAMUSCULAR; INTRAVENOUS at 11:35

## 2022-06-09 ENCOUNTER — HOSPITAL ENCOUNTER (OUTPATIENT)
Dept: INFUSION THERAPY | Age: 67
Discharge: HOME | End: 2022-06-09
Payer: MEDICARE

## 2022-06-09 DIAGNOSIS — C18.2 MALIGNANT NEOPLASM OF ASCENDING COLON (HCC): Primary | ICD-10-CM

## 2022-06-09 PROCEDURE — 96523 IRRIG DRUG DELIVERY DEVICE: CPT

## 2022-06-09 PROCEDURE — 6360000002 HC RX W HCPCS: Performed by: INTERNAL MEDICINE

## 2022-06-09 PROCEDURE — 2580000003 HC RX 258: Performed by: INTERNAL MEDICINE

## 2022-06-09 RX ORDER — HEPARIN SODIUM (PORCINE) LOCK FLUSH IV SOLN 100 UNIT/ML 100 UNIT/ML
500 SOLUTION INTRAVENOUS PRN
Status: DISCONTINUED | OUTPATIENT
Start: 2022-06-09 | End: 2022-06-10 | Stop reason: HOSPADM

## 2022-06-09 RX ORDER — SODIUM CHLORIDE 0.9 % (FLUSH) 0.9 %
10 SYRINGE (ML) INJECTION PRN
Status: DISCONTINUED | OUTPATIENT
Start: 2022-06-09 | End: 2022-06-10 | Stop reason: HOSPADM

## 2022-06-09 RX ADMIN — HEPARIN 500 UNITS: 100 SYRINGE at 13:00

## 2022-06-09 RX ADMIN — SODIUM CHLORIDE, PRESERVATIVE FREE 10 ML: 5 INJECTION INTRAVENOUS at 13:00

## 2022-06-21 NOTE — PROGRESS NOTES
MEDICAL ONCOLOGY PROGRESS NOTE                                                          Denise Callahan   1955 6/22/2022     Chief Complaint   Patient presents with    Colon Cancer      INTERVAL HISTORY/HISTORY OF PRESENT ILLNESS:  Diagnosis  · Colonic adenocarcinoma, March 2020  · [id number](liver), stage ROXANA  · IHC MMR- proficient  · K-maria luisa mutated  · N-MARIA LUISA/BRAF wild-type  · Iron deficiency anemia  · Soft tissue mass, June 2021  · Adenocarcinoma-consistent with colon primary, right psoas muscle, June 2021  · Metastatic adenocarcinoma, Jan 2022  · MLH1, MSH2, MH6, PMS2-intact  · MMR- no loss of expression     Treatment summary  · 3/30/2020-right hemicolectomy at [facility]  · Anticipated Liver ablation to be followed by neoadjuvant/adjuvant versus palliative chemotherapy  · 06/10/2020-November 2020-FOLFOX + Avastin  · 12/11/20- Right hepatectomy-Dr. Ghislaine De La Vega  · S/p Injectafer-poor oral iron tolerance  · 7/13/21- 11/17/21 FOLFIRI + Avastin every 2 weeks  · 1/13/22-psoas muscle mass resection/HIPEC by Dr. Vinny Wilde at [facility]  · 5/10/22 Re-initiate FOLFIRI + Avastin every 2 weeks     The patient is a very pleasant 74years old male who has been diagnosed with colonic adenocarcinoma. He has had several treat modalities in the past.  He is currently status post CRS/HIPEC in mid January, 2022 at [facility]. This was complicated by a leak from his ileocolic anastomosis secondary to closed-loop obstruction at his hernia repair site (mesh displaced). He was taken back to the OR and had a primary repair of his anastomosis and diverting ileostomy in February 2022. CT scans now showing evidence of hepatic metastasis. He was recommended to reinitiate FOLFIRI. He will also receive Avastin. His abdominal pain has been significantly. He has been complaining of erectile dysfunction. Cancer history  Mr. Abbey Vlale was first seen by me on 3/23/2020.   He was referred for a new diagnosis of colonic adenocarcinoma involving the cecum. The patient reports that he had a wellbeing consult with his provider at the [facility]. He was found to have anemia and then recommended a colonoscopy. Of note, the patient has a family history of colon cancer. His mother is a patient of Dr. Chaz Hair he has been diagnosed with colon cancer in 2010. · 3/11/2020- colonoscopy revealed a large malignant appearing fungating mass lesion in the cecum. In addition, several other polyps. Biopsy of the mass consistent with moderate differentiated colonic adenocarcinoma. Polyps consistent with tubulovillous adenoma with no high-grade dysplasia. IHC MMR not proficient. K-maria luisa mutated, BRAF and NRAS wild type. MSI proficient  · 3/11/2020-CEA 5.5 (H)  · 3/18/2020-CT abdomen pelvis with contrast  Invasive cecal mass adhering to the right lateral abdominal wall muscles with adjacent lymphadenopathy. Mild partial obstruction of the terminal ileum. 2. Suspicious lesions in the right and left hepatic lobes measure up to 1.3 cm and likely represent metastatic disease. · 3/18/2020-Xr Chest Standard  No radiographic evidence of acute cardiopulmonary process. · 3/23/2020-he was first seen by me. Recommended completion of staging with CT chest.  Also recommended liver MRI for further clarification of liver lesion. S.  Recommend to proceed with a general surgery consultation tomorrow with Dr. Javon Stephen. Patient was informed that I favor surgical resection if feasible of the primary malignancy. · 3/30/2020- right hemicolectomy by Dr. Javon Stephen at [address] consistent with invasive moderately differentiated colonic adenocarcinoma measuring 7.2 cm. Carcinoma directly invading the adjacent abdominal wall tissue. Focal lymphovascular space invasion identified. Focal perineural invasion identified. Surgical margins negative for evidence of malignancy. 6 out of 14 lymph nodes positive for metastatic adenocarcinoma.   Final hepatectomy. Limitations to this approach may be accessibility of the segment 4A/8 disease high in the hepatic dome and the possibility of heat sink-related recurrence s/p abation of the left-sided disease. · 5/21/2020- referral for Mediport placement and start FOLFOX in 2 weeks. Will add bevacizumab after 6 weeks of liver ablation. We will plan for 12 biweekly dose of FOLFOX. Bevacizumab will also be stopped 6 weeks prior to major procedure. · 6/8/2020- Microwave ablation of the left liver lesion was then performed for 5 minutes at [address] to achieve a 3.4 x 3.9 cm approximately spherical zone of ablation. · 6/10/2020- initiation of FOLFOX. · 7/20/2020-added Avastin. · 9/10/20 MRI abdomen: Left hepatic lobe segment II lesion demonstrates small T1 hyperintense blood products, status post microwave ablation on 6/8/2020. No definitive enhancement within the lesion. Focal internal thickening or scar present. Recommend attention on follow-up. Additional scattered subcentimeter foci throughout the liver decreased in size compared to MRI dated 4/20/2020 consistent with improving metastatic disease. Additional chronic findings as above. · 9/16/2020-discussed with plan with the patient and [facility]. Interval response to treatment. Plan to continue chemotherapy through 12 cycles with Avastin. · 12/11/20 Right hepatectomy-Dr. Osmin Gonzalez  · 12/11/20 Right hepatectomy pathology: Liver, right, resection: Focus of Fibrosis with calcifications and chronic inflammation (0.3 cm), see comment. Background hepatic parenchyma with minimal periportal fibrosis (trichrome stain), minimal lobular and portal inflammation, and no significant macrovesicular steatosis (<5%) Comments: The patient's history of colorectal cancer status adjuvant therapy is noted. The focus of fibrosis may reflect treatment effect. There is no evidence of viable tumor in the sampled specimen.    · 12/14/20 Ct Abdomen Pelvis W Iv Contrast A Small bowel obstruction with transition point at the distal small bowel, just proximal to RIGHT upper quadrant ileocolonic anastomosis. Postoperative change of RIGHT hepatectomy with small amount of expected free fluid and intraperitoneal gas. Redemonstrated LEFT liver lesion. Small bilateral pleural effusions. · 12/16/20 SBFT-[place]: Study is limited due to retained contrast in the small bowel from prior attempt at small bowel follow-through on the floor. Small bowel remains dilated, measuring approximately 5.2 cm, which is consistent with partial or resolving small bowel obstruction. Final radiographs show contrast within the colon. · 1/4/2020-resolution of small bowel obstruction. · 1/7/21 CT chest: No finding to suggest intrathoracic neoplastic process or metastatic disease. The benign-appearing tiny nodule in the right upper lobe probably represent a noncalcified granuloma. A nodule in the lingular segment of the left upper lobe is not visualized in this study. Postsurgical changes of the liver. No evidence of focal complication. A trace right basal pleural effusion. This may be reactive to the previous abdominal surgery. · 3/4/21 MRI abd: Status post right hepatectomy and microwave ablation of a left liver lesion. No findings to suggest residual/recurrent disease. No new liver lesion is identified. Postsurgical changes related to right hemicolectomy. Microwave ablation zone in segment II of the left hepatic lobe measures approximately 21 mm in diameter, unchanged. No appreciable postcontrast enhancement or other findings to suggest local disease recurrence. No new liver lesion is identified. Spleen is mildly enlarged, measuring 13.8 cm in length. · 3/17/2021-1 year colonoscopy showed no evidence of polyps. · 5/3/21 CEA 6.2  · 6/8/21 MRI abdomen ([place]): Status post right hepatectomy and MWA of a left liver lesion.  No evidence of residual or recurrent metastatic disease in the liver.  Status post prior right hemicolectomy for colon carcinoma. Within the posterior right iliopsoas muscle there is a mildly T2 hyperintense nodular focus with postcontrast rim enhancement and diffusion restriction. This measures approximately 2.7 x 2 x 2 cm. More delayed postcontrast images show irregular area of enhancement measuring 2.4 x 7.7 cm axially involving the right iliopsoas muscle and the right lateral abdominal wall. Suggest correlation with contrast enhanced CT exam for complete evaluation. Consider biopsy of this lesion. · 6/15/21 CT CHEST WO CONTRAST No metastatic disease in the chest.  No change in tiny 2 mm RIGHT upper lobe pulmonary nodule. Mild ectasia of the ascending aorta measuring 4 cm. · 6/18/2021-I reviewed results of MRI abdomen at [facility]. CT chest without contrast reviewed by me and showed no evidence of metastatic disease. Stable 2 mm right upper lobe nodule. Discussed with hepatobiliary service at [facility]. Biopsy intra-abdominal nodule next week at [facility]. · 6/25/[id number]-FO-lubqdr biopsy soft tissue mass right psoas muscle at [facility] consistent with recurrent colonic adenocarcinoma. · 7/2/2021-discussed with hepatobiliary service at [facility] and also surgical oncology. Surgical oncology will call us back regarding consultation for consideration of HIPEC if he is a candidate  · 7/13/21-initiation of chemotherapy with FOLFIRI + Avastin every 2 weeks  · 7/13/21 CEA 10.7  · 7/27/2021-CEA 9.6  · 7/30/2021-she was seen by the surgical oncology group at [facility] by Dr Jhonny Rubin. They recommended to complete 6 cycles of chemotherapy and repeat CT chest abdomen pelvis and liver MRI to assess disease response. They discussed the role of CRS/HIPEC in his situation. He was told that it really depends on the status of his liver lesions as well.  He will complete 6 cycles of chemotherapy and will return to see me with a CTAP as well as MRI of the liver to assess disease burden and determine if he can be a candidate for debulking. · 8/25/2021-proceed cycle #4. · 9/22/2021 CEA- 8.1  · 9/24/2021 MRI with and w/o Contrast ([place])  Tiny left retroperitoneal nodule involving the left lateral conal fascial new compared to 3/4/2021 though unchanged from most recent prior, possibly an additional site of metastasis. No other new metastatic disease within the abdomen. Similar partially visualized right posterior body wall/psoas infiltrative lesion not definitely changed from MRI abdomen 6/8/2021 but better evaluated in its entirety on concurrent CT, reported separately.   Status post right hemicolectomy, right hepatectomy, and segment III microwave ablation. Similar mild splenomegaly.  Additional chronic and incidental findings as described in the body the report. Liver: Status post right hepatectomy. Ablation zone in segment II measures 1.7 x 1.1 cm, slightly decreased in size from 1.8 x 1.4 cm previously (series 1301 image 56) without appreciable enhancement. Similar tiny subcentimeter cyst in the left hepatic lobe. No new focal hepatic lesion identified. No visualized free fluid. Similar 0.7 cm enhancing nodule along the left lateral conal fascia laterally new from 3/4/2021, grossly unchanged from MRI dated 6/8/2021. (series 801 image 22). No other appreciable peritoneal/retroperitoneal or  omental nodularity. Ill-defined T2 hyperintense signal and hyperenhancement in the right posteromedial body wall involving the lateral aspect of the psoas muscle and posterolateral abdominal wall musculature, partially visualized measuring at least 8.7 x 3.1   cm, grossly similar to the prior exam, better evaluated in its entirety on concurrent CT reported separately.    · 9/24/2021 CT C/A/P w/ Contrast([place]) Status post right hemicolectomy, right hepatectomy and left hepatic microwave ablation without new suspicious hepatic lesion.  Left lateral perirenal fascial nodule, which is stable compared to 6/8/2021 MRI, but new compared to 3/4/2021 MRI is concerning for an additional site of metastatic disease.  No sites of new or enlarging metastatic disease in the chest.  Similar appearance of right lateral psoas and posterior abdominal wall infiltrative lesion, not significantly changed compared to 6/8/2021 MRI.  Mild splenomegaly.  Additional findings as outlined in the body the report. Biopsy-proven adenocarcinoma involving the posterior lateral aspect of the right iliopsoas muscle and right lateral abdominal wall. Right iliopsoas component is difficult to measure but appears to be approximately 2.5 x 2.4 cm (series 2, image 153), similar to prior MRI. Nodular thickening noted along the right posterior abdominal wall and possibly involving the the transversus abdominis measures approximately 8.5 x 1.8 cm (series 2 image 153) overall similar compared to prior MRI. · 10/6/2021-discussed the results of recent CT abdomen/pelvis and MRI abdomen/pelvis at [facility]. Persistent disease involving the psoas muscle. Discussed with colorectal surgery at [facility]. They recommend to continue current therapy for another 2 months and reassess with CT scans. · 7/13/21- 11/17/21 FOLFIRI + Avastin every 2 weeks  · 12/3/21 CT chest/abd/pelvis [facility]): Status post prior right hemicolectomy, right hepatectomy and left hepatic lesions microwave ablation. No new liver lesion. Soft tissue thickening of the right lower posterior abdominal wall involving the iliopsoas muscle is grossly unchanged consistent with improving metastatic disease. Small right omental nodule and left lateral conal fascia nodule unchanged compared to  recent exam.   · 1/13/22 Exploratory lap with resections of psoas muscle mass and HIPEC by Dr. Martha Bautista at [facility]:  Metastatic colorectal adenocarcinoma involving fibroadipose tissue. IHC MMR proficient.   · 1/24/22 CT chest/abd/pelvis [facility]): Extensive postsurgical changes in the abdomen including partial right colectomy, resection of right retroperitoneal mass, omentectomy and mesh repair of right lateral abdominal wall defect. There appears to be a defect in the mesh containing herniated loops of small bowel. Extensive diffuse dilated small bowel loops with air-fluid levels are seen throughout the abdomen. There are segmental areas of decompressed small bowel in the left upper quadrant as well as adjacent to the herniated small  bowel loops in the right retroperitoneum. Air-fluid levels are also seen throughout the colon. While pattern could likely represent postoperative ileus given the diffuse small bowel dilatation and distal colonic air and fluid, developing or partial small bowel obstruction secondary to the right lateral abdominal wall hernia cannot entirely be excluded. Small ascites. 8 mm nodule along the left lateral conal fascia is unchanged. Slight increase in size of cardiophrenic lymph nodes measuring up to 7 mm anterior to the right hemidiaphragm. Stable hypodensity in the left hepatic lobe. Diffuse gastric wall thickening/edema could be secondary to gastritis in the appropriate clinical setting. · February 2022- HIPEC/tumor debulking January. This was complicated by a leak from his ileocolic anastomosis secondary to closed-loop obstruction at his hernia repair site (mesh displaced). He was taken back to the OR and had a primary repair of his anastomosis and diverting ileostomy in February 2022  · 3/23/2022-ileostomy reversal at [facility] Dr. Kendell Augustine at [facility]  · 3/20/2022 CT Abd/Pelvis WO Contrast [facility]) Findings suspicious for aspiration at the lung bases. Postsurgical changes related to right hepatectomy. There is a new low-attenuation lesion in the left hemiliver, highly concerning for metastasis. Postsurgical changes related to right hemicolectomy with ileocolonic anastomosis and right lower quadrant diverting loop ileostomy.  No evidence of bowel obstruction. No intra-abdominal fluid collection. Other findings as above. A critical alert was sent at the time of dictation. Liver: Postsurgical changes related to right hepatectomy. There is a new, approximately 2.1 cm low-attenuation lesion seen in the left hepatic lobe on image 25 of series 3, highly concerning for metastasis. · 4/18/2022 Ileostomy,closure enterocutaneous stoma with mural acute inflammation and jessa-intestinal Fat necrosis. Negative for malignancy. · 4/23/2022- discussed the patient reinitiation of chemotherapy. We will continue FOLFIRI. He had no prior progression on FOLFIRI of FOLFOX. 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  We would avoid Avastin at this time due to recent surgery. He is K-maria luisa mutated and therefore cannot use anti-EGFR agent. May contemplate adding Avastin in 4 weeks. · 5/9/2022 CT Chest W Contrast New pulmonary nodules are present in the right and left lung as described above. It cannot be determined if these are infectious nodules or metastatic disease. Dilated loops of bowel are present in the upper abdomen. The abdomen is incompletely evaluated however this may be wither an ileus or partial obstruction. Hepatic metastasis. · 5/10/22 Re-initiate FOLFIRI + Avastin every 2 weeks.   · 5/24/2022  CEA 29.1  · 5/24/2022 Iron Studies:Iron 29, TIBC 256, Iron Sat 11,Ferritin 116.9    CEA dynamics:  2/27/22 CEA 3.5  5/24/22 CEA 29.1    PAST MEDICAL HISTORY:   Past Medical History:   Diagnosis Date    Acid reflux     Cancer ([facility] [address]     adenocarcinoma of colon    GERD (gastroesophageal reflux disease)     Palliative care patient 03/01/2022    PTSD (post-traumatic stress disorder)     Stage 3a chronic kidney disease ([facility] [address]       PAST SURGICAL HISTORY:  Past Surgical History:   Procedure Laterality Date    ABLATION OF DYSRHYTHMIC FOCUS      ablation of liver at [facility]  2003   [address] [facility]  2013    COLONOSCOPY when pt was in the 19's    COLONOSCOPY N/A 03/11/2020    COLONOSCOPY POLYPECTOMY SNARE/COLD BIOPSY: Dr. Casey Moody colonoscopy: 6-12 months due to findings at colonoscopy today with Cecal mass lesion and large polyps.  COLONOSCOPY      COLONOSCOPY N/A 03/17/2021    Dr Oscar Bee, Post-operative changes w IC anastomosis & single visible staple, Mild Diverticuosis, Int Hemorrhoids Grade 1, 3 year recall    HEMICOLECTOMY Right 03/30/2020    LAPAROSCOPIC-ASSISTED RIGHT HEMICOLECTOMY performed by Luisa Holter, MD at [address] [place] [address] N/A 06/03/2020    INSERTION OF VENOUS PORT with flouro performed by Luisa Holter, MD at [place] [address] ENDOSCOPY N/A 03/11/2020    Dr. Culp [facility]:   [facility]      SOCIAL HISTORY:  Social History     Socioeconomic History    Marital status:      Spouse name: None    Number of children: None    Years of education: None    Highest education level: None   Occupational History    None   Tobacco Use    Smoking status: Never Smoker    Smokeless tobacco: Never Used   Vaping Use    Vaping Use: Never used   Substance and Sexual Activity    Alcohol use: Yes     Comment: rare     Drug use: No    Sexual activity: Yes     Partners: Female   Other Topics Concern    None   Social History Narrative    None     Social Determinants of Health     Financial Resource Strain:     Difficulty of Paying Living Expenses: Not on file   Food Insecurity:     Worried About Running Out of Food in the Last Year: Not on file    [place] of Food in the Last Year: Not on file   Transportation Needs:     Lack of Transportation (Medical): Not on file    Lack of Transportation (Non-Medical):  Not on file   Physical Activity:     Days of Exercise per Week: Not on file    Minutes of Exercise per Session: Not on file   Stress:     Feeling of Stress : Not on file   Social Connections:     Frequency of Communication with Friends and Family: Not on file    Frequency of Social Gatherings with Friends and Family: Not on file    Attends Shinto Services: Not on file    Active Member of Clubs or Organizations: Not on file    Attends Club or Organization Meetings: Not on file    Marital Status: Not on file   Intimate Partner Violence:     Fear of Current or Ex-Partner: Not on file    Emotionally Abused: Not on file    Physically Abused: Not on file    Sexually Abused: Not on file   Housing Stability:     Unable to Pay for Housing in the Last Year: Not on file    Number of Places Lived in the Last Year: Not on file    Unstable Housing in the Last Year: Not on file     FAMILY HISTORY:  Family History   Problem Relation Age of Onset    Liver Cancer Mother     High Blood Pressure Mother     Colon Cancer Mother         x2    Cancer Father         Lung Cancer    Breast Cancer Sister     Cancer Maternal Grandfather         Lung Cancer    Cancer Paternal Grandfather         Stomach Cancer    Colon Polyps Neg Hx     Cystic Fibrosis Neg Hx     Liver Disease Neg Hx     Rectal Cancer Neg Hx         Current Outpatient Medications   Medication Sig Dispense Refill    fentaNYL (DURAGESIC) 12 MCG/HR Place 1 patch onto the skin every 3 days for 30 days.  Intended supply: 30 days 10 patch 0    promethazine (PHENERGAN) 12.5 MG tablet Take 1 tablet by mouth every 6 hours as needed for Nausea 60 tablet 5    Magnesium Oxide 400 MG CAPS Take 400 mg by mouth in the morning and at bedtime 60 capsule 3    apixaban (ELIQUIS) 5 MG TABS tablet Take 1 tablet by mouth 2 times daily 60 tablet 0    vitamin D (ERGOCALCIFEROL) 1.25 MG (57331 UT) CAPS capsule Take 1 capsule by mouth once a week 5 capsule 0    methocarbamol (ROBAXIN) 500 MG tablet Take 500 mg by mouth 3 times daily as needed       omeprazole (PRILOSEC) 20 MG delayed release capsule Take 20 mg by mouth daily      prazosin (MINIPRESS) 1 MG capsule Take 1 mg by mouth nightly as needed For nightmares       Sertraline HCl (ZOLOFT PO) Take by mouth daily       No current facility-administered medications for this visit. REVIEW OF SYSTEMS:   CONSTITUTIONAL: no fever, no night sweats, fatigue;  HEENT: no blurring of vision, no double vision, no hearing difficulty, no tinnitus, no ulceration, no dysplasia, no epistaxis;   LUNGS: no cough, no hemoptysis, no wheeze,  no shortness of breath;  CARDIOVASCULAR: no palpitation, no chest pain, no shortness of breath;  GI: no abdominal pain, no nausea, no vomiting, mild diarrhea, no constipation;  ANNIE: no dysuria, no hematuria, no frequency or urgency, no nephrolithiasis;  MUSCULOSKELETAL: no joint pain, no swelling, no stiffness;  ENDOCRINE: no polyuria, no polydipsia, no cold or heat intolerance;  HEMATOLOGY: no easy bruising or bleeding, no history of clotting disorder;  DERMATOLOGY: no skin rash, no eczema, no pruritus;  PSYCHIATRY: no depression, no anxiety, no panic attacks, no suicidal ideation, no homicidal ideation;  NEUROLOGY: no syncope, no seizures, no numbness or tingling of hands, no numbness or tingling of feet, no paresis;     Vitals signs:  BP (!) 152/97 (Site: Right Upper Arm, Position: Sitting)   Pulse 82   Temp 97.7 °F (36.5 °C) (Oral)   Resp 18   Ht 5' 7\" (1.702 m)   Wt 136 lb (61.7 kg)   SpO2 100%   BMI 21.30 kg/m²     PHYSICAL EXAM:  CONSTITUTIONAL: Alert, appropriate, no acute distress  EYES: Non icteric, EOM intact, pupils equal round   ENT: Mucus membranes moist, no oral pharyngeal lesions, external inspection of ears and nose are normal.  NECK: Supple, no masses. No palpable thyroid mass  CHEST/LUNGS: CTA bilaterally, normal respiratory effort   CARDIOVASCULAR: RRR, no murmurs. No lower extremity edema  ABDOMEN: soft non-tender, active bowel sounds, no HSM. No palpable masses  EXTREMITIES: warm, full ROM in all 4 extremities, no focal weakness.   SKIN: warm, dry with no rashes or lesions  LYMPH: No cervical, clavicular, axillary, or inguinal lymphadenopathy  NEUROLOGIC: follows commands, non focal   PSYCH: mood and affect appropriate. Alert and oriented to time, place, person        Relevant Lab findings/reviewed by me:  4/18/2022 Ileostomy,closure enterocutaneous stoma with mural acute inflammation and jessa-intestinal Fat necrosis. Negative for malignancy. 5/24/2022  CEA 29.1    5/24/2022 Iron Studies:Iron 29, TIBC 256, Iron Sat 11,Ferritin 116.9  Lab Results   Component Value Date    WBC 4.44 06/22/2022    HGB 10.4 (L) 06/22/2022    HCT 34.2 (L) 06/22/2022    MCV 87.5 06/22/2022     06/22/2022     Lab Results   Component Value Date    NEUTROABS 2.45 06/22/2022     Lab Results   Component Value Date     06/22/2022    K 4.0 06/22/2022     06/22/2022    CO2 19 (L) 06/22/2022    BUN 19 06/22/2022    CREATININE 1.8 (H) 06/22/2022    GLUCOSE 112 (H) 06/22/2022    CALCIUM 9.8 06/22/2022    PROT 7.3 06/22/2022    LABALBU 4.1 06/22/2022    BILITOT 0.3 06/22/2022    ALKPHOS 159 (H) 06/22/2022    AST 35 06/22/2022    ALT 21 06/22/2022    LABGLOM 38 (A) 06/22/2022    GFRAA >59 05/02/2022    AGRATIO 1.5 06/15/2021    GLOB 3.2 06/22/2022       Relevant Imaging studies/reviewed by me:  None      ASSESSMENT:    Orders Placed This Encounter   Procedures    CBC with Auto Differential     Standing Status:   Future     Number of Occurrences:   1     Standing Expiration Date:   6/22/2023    Comprehensive Metabolic Panel     Standing Status:   Future     Number of Occurrences:   1     Standing Expiration Date:   6/22/2023      Denise was seen today for colon cancer. Diagnoses and all orders for this visit:    Adenocarcinoma of colon ([facility] [address]  -     CBC with Auto Differential; Future  -     Comprehensive Metabolic Panel;  Future       #Colonic adenocarcinoma-  [id number] (liver) K-maria luisa mutated, IHC MMR not proficient  Status post microwave ablation left hepatic lesion  Status post neoadjuvant FOLFOX/bevacizumab x11 biweekly cycles  Status post right hemicolectomy. Pathology consistent complete pathologic response. Recommended surveillance as per NCCN guidelines  Discussed at length results of pathology with the patient. 3/17/21- 1 year colonoscopy showed no large polyps or masses. Repeat in 3 years. June 2021-CT chest clear. MRI abdomen showed suspicious lesion in the right lower quadrant. Biopsy arranged [place]. Discussed with hepatobiliary service at [facility]. 6/25/[id number] biopsy consistent with recurrent primary colorectal adenocarcinoma. 7/2/2021-discussed with hepatobiliary service/surgical oncology at [facility]. They will review images and call the patient back regarding consultation for consideration of HIPEC  7/2/2021-they recommend systemic therapy. 7/2/2021-recommended FOLFIRI/Avastin  7/13/21- Initiated FOLFIRI + Avastin every 2 weeks  07/30/21-Seen at [facility] by GI surgeon oncology. They discussed the role of CRS/HIPEC in his situation. He was told hat it really depends on the status of his liver lesions as well. He will complete 6 cycles of chemotherapy and will return to see me with a CTAP as well as MRI of the liver to assess disease burden and determine if he can be a candidate for debulking.    8/25/2021-proceed with FOLFIRI/Avastin   9/24/2021-repeat MRI abdomen/CT abdomen showed persistent disease. 1/13/2022-Exploratory laparotomy/resection of psoas mass/HIPEC at [facility]. Path Isaac:     OMENTUM, OMENTECTOMY:   - METASTATIC COLORECTAL ADENOCARCINOMA INVOLVING FIBROADIPOSE TISSUE.   - TWO (2) LYMPH NODES, NEGATIVE FOR CARCINOMA.      SMALL BOWEL, MESENTERIC NODULE, EXCISION:   - METASTATIC COLORECTAL ADENOCARCINOMA INVOLVING FIBROMUSCULAR TISSUE AND EXTENDING TO TISSUE EDGE.    SMALL BOWEL AND RIGHT COLON, ILEOCOLIC RESECTION:     - RESIDUAL RECURRENT INVASIVE MODERATELY DIFFERENTIATED COLORECTAL ADENOCARCINOMA (2.1 CM) INVOLVING ANASTOMOTIC SITE; SEE COMMENT AND SYNOPTIC REPORT.   - TUMOR FOCALLY INVOLVES THE concerning for metastasis. 4/23/2022- discussed the patient reinitiation of chemotherapy. We will continue FOLFIRI. He had no prior progression on FOLFIRI of FOLFOX. 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  We would avoid Avastin at this time due to recent surgery. He is K-maria luisa mutated and therefore cannot use anti-EGFR agent. May contemplate adding Avastin in 4 weeks. 5/9/2022 CT Chest W Contrast New pulmonary nodules are present in the right and left lung as described above. It cannot be determined if these are infectious nodules or metastatic disease. Dilated loops of bowel are present in the upper abdomen. The abdomen is incompletely evaluated however this may be wither an ileus or partial obstruction. Hepatic metastasis. Plan:  -Proceed cycle #4 FOLFIRI/ Avastin, started 5/24/2022  -CEA today  -Repeat CT chest, abdomen, pelvis with IV contrast and oral contrast after 6 cycles. #Chronic kidney disease stage III- creatinine 1.8  Encouraged to increase PO fluid intake  -IV fluid 1 L today     #Liver metastasis- plan as above. Status post ablation left liver lobe lesion at [facility] on 6/8/2020. Status post neoadjuvant FOLFOX/bevacizumab x11 biweekly cyclesStatus post right hemicolectomy. Pathology consistent complete pathologic response. 3/4/2021-MRI abdomen was unremarkable  6/8/2021-MRI abdomen showed Postsurgical changes of right hepatectomy. Redemonstration of an ablation zone in segment II, measuring up to 1.7 cm, previously 1.8 cm. No evidence of postcontrast enhancement.  No new lesion identified. 9/24/2021-MRI abdomen [place] showed Liver: Status post right hepatectomy. Ablation zone in segment II measures 1.7 x 1.1 cm, slightly decreased in size from 1.8 x 1.4 cm previously (series 1301 image 56) without apreciable enhancement. Similar tiny subcentimeter cyst in the left hepatic lobe. No new focal hepatic lesion identified.   3/20/2022 CT Abd/Pelvis WO Contrast [facility]) Findings suspicious for aspiration at the lung bases. Postsurgical changes related to right hepatectomy. There is a new low-attenuation lesion in the left hemiliver, highly concerning for metastasis. Postsurgical changes related to right hemicolectomy with ileocolonic anastomosis and right lower quadrant diverting loop ileostomy. No evidence of bowel obstruction. No intra-abdominal fluid collection. Other findings as above. A critical alert was sent at the time of dictation. Liver: Postsurgical changes related to right hepatectomy. There is a new, approximately 2.1 cm low-attenuation lesion seen in the left hepatic lobe on image 25 of series 3, highly concerning for metastasis. 4/23/2022- discussed the patient reinitiation of chemotherapy. We will continue FOLFIRI. He had no prior progression on FOLFIRI of FOLFOX. 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  We would avoid Avastin at this time due to recent surgery. He is K-maria luisa mutated and therefore cannot use anti-EGFR agent. May contemplate adding Avastin in 4 weeks. 5/9/2022 CT Chest W Contrast New pulmonary nodules are present in the right and left lung as described above. It cannot be determined if these are infectious nodules or metastatic disease. Dilated loops of bowel are present in the upper abdomen. The abdomen is incompletely evaluated however this may be wither an ileus or partial obstruction. Hepatic metastasis.     #Iron deficiency anemia-  hemoglobin 8.5/MCV 89. Ferritin 12.9, iron saturation 15%, TIBC 458, iron 72. Status post IV iron therapy. Hemoglobin 9.6  -Repeat iron profile     #Chemotherapy-induced neuropathy-stable, mild    #Cancer related pain-  -Oxycodone IR 10 mg every 6 hours as needed  -Add Fentanyl 12 mcg every 72 hrs    DVT port associated left upper extremity-February 2022  internal jugular vein subclavian axillary brachial, left upper extremity.   Acute appearing superficial thrombophlebitis (SVT) is by Melissa Christianson MD on 6/22/2022 at 10:16 AM

## 2022-06-22 ENCOUNTER — HOSPITAL ENCOUNTER (OUTPATIENT)
Dept: INFUSION THERAPY | Age: 67
Discharge: HOME | End: 2022-06-22
Payer: MEDICARE

## 2022-06-22 ENCOUNTER — OFFICE VISIT (OUTPATIENT)
Dept: HEMATOLOGY | Age: 67
End: 2022-06-22

## 2022-06-22 VITALS
DIASTOLIC BLOOD PRESSURE: 97 MMHG | TEMPERATURE: 97.7 F | BODY MASS INDEX: 21.35 KG/M2 | HEART RATE: 82 BPM | HEIGHT: 67 IN | OXYGEN SATURATION: 100 % | WEIGHT: 136 LBS | SYSTOLIC BLOOD PRESSURE: 152 MMHG | RESPIRATION RATE: 18 BRPM

## 2022-06-22 DIAGNOSIS — N52.1 ERECTILE DYSFUNCTION DUE TO DISEASES CLASSIFIED ELSEWHERE: ICD-10-CM

## 2022-06-22 DIAGNOSIS — G89.3 CANCER ASSOCIATED PAIN: ICD-10-CM

## 2022-06-22 DIAGNOSIS — C18.9 ADENOCARCINOMA OF COLON (HCC): Primary | ICD-10-CM

## 2022-06-22 DIAGNOSIS — C78.7 LIVER METASTASES (HCC): ICD-10-CM

## 2022-06-22 DIAGNOSIS — C18.2 MALIGNANT NEOPLASM OF ASCENDING COLON (HCC): ICD-10-CM

## 2022-06-22 DIAGNOSIS — Z51.12 ENCOUNTER FOR ANTINEOPLASTIC IMMUNOTHERAPY: ICD-10-CM

## 2022-06-22 DIAGNOSIS — Z51.11 CHEMOTHERAPY MANAGEMENT, ENCOUNTER FOR: ICD-10-CM

## 2022-06-22 DIAGNOSIS — Z71.89 CARE PLAN DISCUSSED WITH PATIENT: ICD-10-CM

## 2022-06-22 DIAGNOSIS — C18.9 ADENOCARCINOMA OF COLON (HCC): ICD-10-CM

## 2022-06-22 LAB
ALBUMIN SERPL-MCNC: 4.1 G/DL (ref 3.5–5.2)
ALP BLD-CCNC: 159 U/L (ref 40–130)
ALT SERPL-CCNC: 21 U/L (ref 21–72)
ANION GAP SERPL CALCULATED.3IONS-SCNC: 13 MMOL/L (ref 7–19)
AST SERPL-CCNC: 35 U/L (ref 17–59)
BILIRUB SERPL-MCNC: 0.3 MG/DL (ref 0.2–1.3)
BUN BLDV-MCNC: 19 MG/DL (ref 9–20)
CALCIUM SERPL-MCNC: 9.8 MG/DL (ref 8.4–10.2)
CEA: 21.5 NG/ML (ref 0–4.7)
CHLORIDE BLD-SCNC: 108 MMOL/L (ref 98–111)
CO2: 19 MMOL/L (ref 22–29)
CREAT SERPL-MCNC: 1.8 MG/DL (ref 0.6–1.2)
GFR NON-AFRICAN AMERICAN: 38
GLOBULIN: 3.2 G/DL
GLUCOSE BLD-MCNC: 112 MG/DL (ref 74–106)
HCT VFR BLD CALC: 34.2 % (ref 40.1–51)
HEMOGLOBIN: 10.4 G/DL (ref 13.7–17.5)
LYMPHOCYTES ABSOLUTE: 1.19 K/UL (ref 1.18–3.74)
LYMPHOCYTES RELATIVE PERCENT: 26.8 % (ref 19.3–53.1)
MAGNESIUM: 1.5 MG/DL (ref 1.6–2.3)
MCH RBC QN AUTO: 26.6 PG (ref 25.7–32.2)
MCHC RBC AUTO-ENTMCNC: 30.4 G/DL (ref 32.3–36.5)
MCV RBC AUTO: 87.5 FL (ref 79–92.2)
MONOCYTES ABSOLUTE: 0.56 K/UL (ref 0.24–0.82)
MONOCYTES RELATIVE PERCENT: 12.6 % (ref 4.7–12.5)
NEUTROPHILS ABSOLUTE: 2.45 K/UL (ref 1.56–6.13)
NEUTROPHILS RELATIVE PERCENT: 55.2 % (ref 34–71.1)
PDW BLD-RTO: 15.3 % (ref 11.6–14.4)
PLATELET # BLD: 327 K/UL (ref 163–337)
PMV BLD AUTO: 10.5 FL (ref 7.4–10.4)
POTASSIUM SERPL-SCNC: 4 MMOL/L (ref 3.5–5.1)
PROTEIN UA: NEGATIVE
RBC # BLD: 3.91 M/UL (ref 4.63–6.08)
SODIUM BLD-SCNC: 140 MMOL/L (ref 137–145)
TOTAL PROTEIN: 7.3 G/DL (ref 6.3–8.2)
WBC # BLD: 4.44 K/UL (ref 4.23–9.07)

## 2022-06-22 PROCEDURE — 6360000002 HC RX W HCPCS: Performed by: INTERNAL MEDICINE

## 2022-06-22 PROCEDURE — 83735 ASSAY OF MAGNESIUM: CPT

## 2022-06-22 PROCEDURE — 2580000003 HC RX 258: Performed by: INTERNAL MEDICINE

## 2022-06-22 PROCEDURE — 80053 COMPREHEN METABOLIC PANEL: CPT

## 2022-06-22 PROCEDURE — 99214 OFFICE O/P EST MOD 30 MIN: CPT | Performed by: INTERNAL MEDICINE

## 2022-06-22 PROCEDURE — 96415 CHEMO IV INFUSION ADDL HR: CPT

## 2022-06-22 PROCEDURE — 96360 HYDRATION IV INFUSION INIT: CPT

## 2022-06-22 PROCEDURE — 1123F ACP DISCUSS/DSCN MKR DOCD: CPT | Performed by: INTERNAL MEDICINE

## 2022-06-22 PROCEDURE — G8420 CALC BMI NORM PARAMETERS: HCPCS | Performed by: INTERNAL MEDICINE

## 2022-06-22 PROCEDURE — 3017F COLORECTAL CA SCREEN DOC REV: CPT | Performed by: INTERNAL MEDICINE

## 2022-06-22 PROCEDURE — 96367 TX/PROPH/DG ADDL SEQ IV INF: CPT

## 2022-06-22 PROCEDURE — 96366 THER/PROPH/DIAG IV INF ADDON: CPT

## 2022-06-22 PROCEDURE — G0498 CHEMO EXTEND IV INFUS W/PUMP: HCPCS

## 2022-06-22 PROCEDURE — 96413 CHEMO IV INFUSION 1 HR: CPT

## 2022-06-22 PROCEDURE — 96417 CHEMO IV INFUS EACH ADDL SEQ: CPT

## 2022-06-22 PROCEDURE — 85025 COMPLETE CBC W/AUTO DIFF WBC: CPT

## 2022-06-22 PROCEDURE — 1036F TOBACCO NON-USER: CPT | Performed by: INTERNAL MEDICINE

## 2022-06-22 PROCEDURE — 96375 TX/PRO/DX INJ NEW DRUG ADDON: CPT

## 2022-06-22 PROCEDURE — 96361 HYDRATE IV INFUSION ADD-ON: CPT

## 2022-06-22 PROCEDURE — G8428 CUR MEDS NOT DOCUMENT: HCPCS | Performed by: INTERNAL MEDICINE

## 2022-06-22 RX ORDER — MEPERIDINE HYDROCHLORIDE 50 MG/ML
12.5 INJECTION INTRAMUSCULAR; INTRAVENOUS; SUBCUTANEOUS ONCE
Status: CANCELLED | OUTPATIENT
Start: 2022-06-22 | End: 2022-06-22

## 2022-06-22 RX ORDER — FENTANYL 12 UG/H
1 PATCH TRANSDERMAL
Qty: 10 PATCH | Refills: 0 | Status: SHIPPED | OUTPATIENT
Start: 2022-06-22 | End: 2022-07-22

## 2022-06-22 RX ORDER — EPINEPHRINE 1 MG/ML
0.3 INJECTION, SOLUTION, CONCENTRATE INTRAVENOUS PRN
Status: CANCELLED | OUTPATIENT
Start: 2022-06-22

## 2022-06-22 RX ORDER — SODIUM CHLORIDE 9 MG/ML
25 INJECTION, SOLUTION INTRAVENOUS PRN
Status: CANCELLED | OUTPATIENT
Start: 2022-06-22

## 2022-06-22 RX ORDER — SILDENAFIL 50 MG/1
50 TABLET, FILM COATED ORAL DAILY PRN
Qty: 10 TABLET | Refills: 5 | Status: SHIPPED | OUTPATIENT
Start: 2022-06-22 | End: 2022-10-12 | Stop reason: SDUPTHER

## 2022-06-22 RX ORDER — METHYLPREDNISOLONE SODIUM SUCCINATE 125 MG/2ML
125 INJECTION, POWDER, LYOPHILIZED, FOR SOLUTION INTRAMUSCULAR; INTRAVENOUS ONCE
Status: CANCELLED | OUTPATIENT
Start: 2022-06-22 | End: 2022-06-22

## 2022-06-22 RX ORDER — HEPARIN SODIUM (PORCINE) LOCK FLUSH IV SOLN 100 UNIT/ML 100 UNIT/ML
500 SOLUTION INTRAVENOUS PRN
Status: CANCELLED | OUTPATIENT
Start: 2022-06-22

## 2022-06-22 RX ORDER — ATROPINE SULFATE 1 MG/ML
0.5 INJECTION, SOLUTION INTRAMUSCULAR; INTRAVENOUS; SUBCUTANEOUS
Status: CANCELLED
Start: 2022-06-22

## 2022-06-22 RX ORDER — ATROPINE SULFATE 1 MG/ML
0.5 INJECTION, SOLUTION INTRAMUSCULAR; INTRAVENOUS; SUBCUTANEOUS
Status: COMPLETED | OUTPATIENT
Start: 2022-06-22 | End: 2022-06-22

## 2022-06-22 RX ORDER — PALONOSETRON 0.05 MG/ML
0.25 INJECTION, SOLUTION INTRAVENOUS ONCE
Status: CANCELLED | OUTPATIENT
Start: 2022-06-22

## 2022-06-22 RX ORDER — SODIUM CHLORIDE 0.9 % (FLUSH) 0.9 %
5-40 SYRINGE (ML) INJECTION PRN
Status: CANCELLED | OUTPATIENT
Start: 2022-06-22

## 2022-06-22 RX ORDER — MAGNESIUM SULFATE IN WATER 40 MG/ML
2000 INJECTION, SOLUTION INTRAVENOUS ONCE
Status: COMPLETED | OUTPATIENT
Start: 2022-06-22 | End: 2022-06-22

## 2022-06-22 RX ORDER — OXYCODONE HYDROCHLORIDE 10 MG/1
10 TABLET ORAL EVERY 6 HOURS PRN
Qty: 120 TABLET | Refills: 0 | Status: SHIPPED | OUTPATIENT
Start: 2022-06-22 | End: 2022-07-22

## 2022-06-22 RX ORDER — FAMOTIDINE 10 MG/ML
20 INJECTION, SOLUTION INTRAVENOUS ONCE
Status: CANCELLED | OUTPATIENT
Start: 2022-06-22 | End: 2022-06-22

## 2022-06-22 RX ORDER — PALONOSETRON 0.05 MG/ML
0.25 INJECTION, SOLUTION INTRAVENOUS ONCE
Status: COMPLETED | OUTPATIENT
Start: 2022-06-22 | End: 2022-06-22

## 2022-06-22 RX ORDER — SODIUM CHLORIDE 9 MG/ML
INJECTION, SOLUTION INTRAVENOUS ONCE
Status: CANCELLED | OUTPATIENT
Start: 2022-06-22 | End: 2022-06-22

## 2022-06-22 RX ORDER — DIPHENHYDRAMINE HYDROCHLORIDE 50 MG/ML
50 INJECTION INTRAMUSCULAR; INTRAVENOUS ONCE
Status: CANCELLED | OUTPATIENT
Start: 2022-06-22 | End: 2022-06-22

## 2022-06-22 RX ORDER — SODIUM CHLORIDE 9 MG/ML
INJECTION, SOLUTION INTRAVENOUS CONTINUOUS
Status: CANCELLED | OUTPATIENT
Start: 2022-06-22

## 2022-06-22 RX ORDER — DEXTROSE MONOHYDRATE 50 MG/ML
INJECTION, SOLUTION INTRAVENOUS ONCE
Status: CANCELLED | OUTPATIENT
Start: 2022-06-22 | End: 2022-06-22

## 2022-06-22 RX ADMIN — MAGNESIUM SULFATE HEPTAHYDRATE 2000 MG: 40 INJECTION, SOLUTION INTRAVENOUS at 12:28

## 2022-06-22 RX ADMIN — LEUCOVORIN CALCIUM 700 MG: 350 INJECTION, POWDER, LYOPHILIZED, FOR SOLUTION INTRAMUSCULAR; INTRAVENOUS at 10:42

## 2022-06-22 RX ADMIN — ATROPINE SULFATE 0.5 MG: 1 INJECTION, SOLUTION INTRAMUSCULAR; INTRAVENOUS; SUBCUTANEOUS at 10:39

## 2022-06-22 RX ADMIN — DEXTROSE MONOHYDRATE 300 MG: 50 INJECTION, SOLUTION INTRAVENOUS at 10:43

## 2022-06-22 RX ADMIN — PALONOSETRON 0.25 MG: 0.05 INJECTION, SOLUTION INTRAVENOUS at 09:46

## 2022-06-22 RX ADMIN — FLUOROURACIL 4475 MG: 50 INJECTION, SOLUTION INTRAVENOUS at 14:21

## 2022-06-22 RX ADMIN — DEXAMETHASONE SODIUM PHOSPHATE: 10 INJECTION, SOLUTION INTRAMUSCULAR; INTRAVENOUS at 09:45

## 2022-06-22 RX ADMIN — SODIUM CHLORIDE 300 MG: 9 INJECTION, SOLUTION INTRAVENOUS at 10:05

## 2022-06-24 ENCOUNTER — HOSPITAL ENCOUNTER (OUTPATIENT)
Dept: INFUSION THERAPY | Age: 67
Discharge: HOME | End: 2022-06-24
Payer: MEDICARE

## 2022-06-24 DIAGNOSIS — C18.2 MALIGNANT NEOPLASM OF ASCENDING COLON (HCC): Primary | ICD-10-CM

## 2022-06-24 PROCEDURE — 2580000003 HC RX 258: Performed by: INTERNAL MEDICINE

## 2022-06-24 PROCEDURE — 96523 IRRIG DRUG DELIVERY DEVICE: CPT

## 2022-06-24 PROCEDURE — 6360000002 HC RX W HCPCS: Performed by: INTERNAL MEDICINE

## 2022-06-24 RX ORDER — SODIUM CHLORIDE 0.9 % (FLUSH) 0.9 %
20 SYRINGE (ML) INJECTION PRN
Status: CANCELLED | OUTPATIENT
Start: 2022-06-24

## 2022-06-24 RX ORDER — SODIUM CHLORIDE 0.9 % (FLUSH) 0.9 %
10 SYRINGE (ML) INJECTION PRN
Status: CANCELLED | OUTPATIENT
Start: 2022-06-24

## 2022-06-24 RX ORDER — HEPARIN SODIUM (PORCINE) LOCK FLUSH IV SOLN 100 UNIT/ML 100 UNIT/ML
500 SOLUTION INTRAVENOUS PRN
Status: DISCONTINUED | OUTPATIENT
Start: 2022-06-24 | End: 2022-06-25 | Stop reason: HOSPADM

## 2022-06-24 RX ORDER — SODIUM CHLORIDE 0.9 % (FLUSH) 0.9 %
10 SYRINGE (ML) INJECTION PRN
Status: DISCONTINUED | OUTPATIENT
Start: 2022-06-24 | End: 2022-06-25 | Stop reason: HOSPADM

## 2022-06-24 RX ORDER — HEPARIN SODIUM (PORCINE) LOCK FLUSH IV SOLN 100 UNIT/ML 100 UNIT/ML
500 SOLUTION INTRAVENOUS PRN
Status: CANCELLED | OUTPATIENT
Start: 2022-06-24

## 2022-06-24 RX ADMIN — HEPARIN 500 UNITS: 100 SYRINGE at 12:46

## 2022-06-24 RX ADMIN — SODIUM CHLORIDE, PRESERVATIVE FREE 10 ML: 5 INJECTION INTRAVENOUS at 12:46

## 2022-06-29 DIAGNOSIS — R11.0 NAUSEA: ICD-10-CM

## 2022-06-29 RX ORDER — PROMETHAZINE HYDROCHLORIDE 12.5 MG/1
12.5 TABLET ORAL EVERY 6 HOURS PRN
Qty: 60 TABLET | Refills: 5 | Status: SHIPPED | OUTPATIENT
Start: 2022-06-29 | End: 2022-10-12 | Stop reason: SDUPTHER

## 2022-07-18 ENCOUNTER — HOSPITAL ENCOUNTER (OUTPATIENT)
Dept: INFUSION THERAPY | Age: 67
Discharge: HOME | End: 2022-07-18
Payer: OTHER GOVERNMENT

## 2022-07-18 VITALS
BODY MASS INDEX: 21.03 KG/M2 | OXYGEN SATURATION: 98 % | TEMPERATURE: 98.2 F | RESPIRATION RATE: 16 BRPM | WEIGHT: 134 LBS | SYSTOLIC BLOOD PRESSURE: 138 MMHG | HEART RATE: 89 BPM | HEIGHT: 67 IN | DIASTOLIC BLOOD PRESSURE: 91 MMHG

## 2022-07-18 DIAGNOSIS — C18.2 MALIGNANT NEOPLASM OF ASCENDING COLON (HCC): Primary | ICD-10-CM

## 2022-07-18 DIAGNOSIS — C18.9 ADENOCARCINOMA OF COLON (HCC): ICD-10-CM

## 2022-07-18 DIAGNOSIS — C18.2 MALIGNANT NEOPLASM OF ASCENDING COLON (HCC): ICD-10-CM

## 2022-07-18 DIAGNOSIS — C78.7 LIVER METASTASES (HCC): ICD-10-CM

## 2022-07-18 LAB
HCT VFR BLD CALC: 37.9 % (ref 40.1–51)
HEMOGLOBIN: 11.5 G/DL (ref 13.7–17.5)
LYMPHOCYTES ABSOLUTE: 1.04 K/UL (ref 1.18–3.74)
LYMPHOCYTES RELATIVE PERCENT: 18.5 % (ref 19.3–53.1)
MCH RBC QN AUTO: 27.1 PG (ref 25.7–32.2)
MCHC RBC AUTO-ENTMCNC: 30.3 G/DL (ref 32.3–36.5)
MCV RBC AUTO: 89.2 FL (ref 79–92.2)
MONOCYTES ABSOLUTE: 0.91 K/UL (ref 0.24–0.82)
MONOCYTES RELATIVE PERCENT: 16.2 % (ref 4.7–12.5)
NEUTROPHILS ABSOLUTE: 3.49 K/UL (ref 1.56–6.13)
NEUTROPHILS RELATIVE PERCENT: 61.9 % (ref 34–71.1)
PDW BLD-RTO: 16.7 % (ref 11.6–14.4)
PLATELET # BLD: 312 K/UL (ref 163–337)
PMV BLD AUTO: 10 FL (ref 7.4–10.4)
PROTEIN UA: NEGATIVE
RBC # BLD: 4.25 M/UL (ref 4.63–6.08)
WBC # BLD: 5.63 K/UL (ref 4.23–9.07)

## 2022-07-18 PROCEDURE — 2580000003 HC RX 258: Performed by: NURSE PRACTITIONER

## 2022-07-18 PROCEDURE — 85025 COMPLETE CBC W/AUTO DIFF WBC: CPT

## 2022-07-18 PROCEDURE — 96417 CHEMO IV INFUS EACH ADDL SEQ: CPT

## 2022-07-18 PROCEDURE — G0498 CHEMO EXTEND IV INFUS W/PUMP: HCPCS

## 2022-07-18 PROCEDURE — 96415 CHEMO IV INFUSION ADDL HR: CPT

## 2022-07-18 PROCEDURE — 6360000002 HC RX W HCPCS: Performed by: NURSE PRACTITIONER

## 2022-07-18 PROCEDURE — 96375 TX/PRO/DX INJ NEW DRUG ADDON: CPT

## 2022-07-18 PROCEDURE — 96413 CHEMO IV INFUSION 1 HR: CPT

## 2022-07-18 PROCEDURE — 6360000002 HC RX W HCPCS: Performed by: INTERNAL MEDICINE

## 2022-07-18 PROCEDURE — 2580000003 HC RX 258: Performed by: INTERNAL MEDICINE

## 2022-07-18 PROCEDURE — 96367 TX/PROPH/DG ADDL SEQ IV INF: CPT

## 2022-07-18 PROCEDURE — 96366 THER/PROPH/DIAG IV INF ADDON: CPT

## 2022-07-18 RX ORDER — SODIUM CHLORIDE 0.9 % (FLUSH) 0.9 %
5-40 SYRINGE (ML) INJECTION PRN
Status: CANCELLED | OUTPATIENT
Start: 2022-07-18

## 2022-07-18 RX ORDER — ATROPINE SULFATE 1 MG/ML
0.5 INJECTION, SOLUTION INTRAMUSCULAR; INTRAVENOUS; SUBCUTANEOUS
Status: CANCELLED
Start: 2022-07-18

## 2022-07-18 RX ORDER — SODIUM CHLORIDE 9 MG/ML
25 INJECTION, SOLUTION INTRAVENOUS PRN
Status: CANCELLED | OUTPATIENT
Start: 2022-07-18

## 2022-07-18 RX ORDER — SODIUM CHLORIDE 9 MG/ML
INJECTION, SOLUTION INTRAVENOUS ONCE
Status: CANCELLED | OUTPATIENT
Start: 2022-07-18 | End: 2022-07-18

## 2022-07-18 RX ORDER — SODIUM CHLORIDE 9 MG/ML
INJECTION, SOLUTION INTRAVENOUS CONTINUOUS
Status: CANCELLED | OUTPATIENT
Start: 2022-07-18

## 2022-07-18 RX ORDER — DIPHENHYDRAMINE HYDROCHLORIDE 50 MG/ML
50 INJECTION INTRAMUSCULAR; INTRAVENOUS ONCE
Status: CANCELLED | OUTPATIENT
Start: 2022-07-18 | End: 2022-07-18

## 2022-07-18 RX ORDER — SODIUM CHLORIDE 9 MG/ML
INJECTION, SOLUTION INTRAVENOUS ONCE
Status: COMPLETED | OUTPATIENT
Start: 2022-07-18 | End: 2022-07-18

## 2022-07-18 RX ORDER — MEPERIDINE HYDROCHLORIDE 50 MG/ML
12.5 INJECTION INTRAMUSCULAR; INTRAVENOUS; SUBCUTANEOUS ONCE
Status: CANCELLED | OUTPATIENT
Start: 2022-07-18 | End: 2022-07-18

## 2022-07-18 RX ORDER — HEPARIN SODIUM (PORCINE) LOCK FLUSH IV SOLN 100 UNIT/ML 100 UNIT/ML
500 SOLUTION INTRAVENOUS PRN
Status: CANCELLED | OUTPATIENT
Start: 2022-07-18

## 2022-07-18 RX ORDER — PALONOSETRON 0.05 MG/ML
0.25 INJECTION, SOLUTION INTRAVENOUS ONCE
Status: CANCELLED | OUTPATIENT
Start: 2022-07-18

## 2022-07-18 RX ORDER — DEXTROSE MONOHYDRATE 50 MG/ML
INJECTION, SOLUTION INTRAVENOUS ONCE
Status: COMPLETED | OUTPATIENT
Start: 2022-07-18 | End: 2022-07-18

## 2022-07-18 RX ORDER — METHYLPREDNISOLONE SODIUM SUCCINATE 125 MG/2ML
125 INJECTION, POWDER, LYOPHILIZED, FOR SOLUTION INTRAMUSCULAR; INTRAVENOUS ONCE
Status: CANCELLED | OUTPATIENT
Start: 2022-07-18 | End: 2022-07-18

## 2022-07-18 RX ORDER — PALONOSETRON 0.05 MG/ML
0.25 INJECTION, SOLUTION INTRAVENOUS ONCE
Status: COMPLETED | OUTPATIENT
Start: 2022-07-18 | End: 2022-07-18

## 2022-07-18 RX ORDER — FAMOTIDINE 10 MG/ML
20 INJECTION, SOLUTION INTRAVENOUS ONCE
Status: CANCELLED | OUTPATIENT
Start: 2022-07-18 | End: 2022-07-18

## 2022-07-18 RX ORDER — DEXTROSE MONOHYDRATE 50 MG/ML
INJECTION, SOLUTION INTRAVENOUS ONCE
Status: CANCELLED | OUTPATIENT
Start: 2022-07-18 | End: 2022-07-18

## 2022-07-18 RX ORDER — ATROPINE SULFATE 1 MG/ML
0.5 INJECTION, SOLUTION INTRAMUSCULAR; INTRAVENOUS; SUBCUTANEOUS
Status: COMPLETED | OUTPATIENT
Start: 2022-07-18 | End: 2022-07-18

## 2022-07-18 RX ORDER — EPINEPHRINE 1 MG/ML
0.3 INJECTION, SOLUTION, CONCENTRATE INTRAVENOUS PRN
Status: CANCELLED | OUTPATIENT
Start: 2022-07-18

## 2022-07-18 RX ADMIN — PALONOSETRON 0.25 MG: 0.05 INJECTION, SOLUTION INTRAVENOUS at 09:51

## 2022-07-18 RX ADMIN — FLUOROURACIL 4475 MG: 50 INJECTION, SOLUTION INTRAVENOUS at 12:26

## 2022-07-18 RX ADMIN — LEUCOVORIN CALCIUM 700 MG: 350 INJECTION, POWDER, LYOPHILIZED, FOR SOLUTION INTRAMUSCULAR; INTRAVENOUS at 10:44

## 2022-07-18 RX ADMIN — SODIUM CHLORIDE: 9 INJECTION, SOLUTION INTRAVENOUS at 09:51

## 2022-07-18 RX ADMIN — SODIUM CHLORIDE 300 MG: 9 INJECTION, SOLUTION INTRAVENOUS at 10:07

## 2022-07-18 RX ADMIN — DEXAMETHASONE SODIUM PHOSPHATE 10 MG: 100 INJECTION INTRAMUSCULAR; INTRAVENOUS at 09:51

## 2022-07-18 RX ADMIN — DEXTROSE MONOHYDRATE: 50 INJECTION, SOLUTION INTRAVENOUS at 10:44

## 2022-07-18 RX ADMIN — ATROPINE SULFATE 0.5 MG: 1 INJECTION, SOLUTION INTRAMUSCULAR; INTRAVENOUS; SUBCUTANEOUS at 10:37

## 2022-07-18 RX ADMIN — IRINOTECAN HYDROCHLORIDE 300 MG: 20 INJECTION, SOLUTION INTRAVENOUS at 10:45

## 2022-07-20 ENCOUNTER — HOSPITAL ENCOUNTER (OUTPATIENT)
Dept: INFUSION THERAPY | Age: 67
Discharge: HOME | End: 2022-07-20
Payer: OTHER GOVERNMENT

## 2022-07-20 DIAGNOSIS — C18.2 MALIGNANT NEOPLASM OF ASCENDING COLON (HCC): Primary | ICD-10-CM

## 2022-07-20 PROCEDURE — 96523 IRRIG DRUG DELIVERY DEVICE: CPT

## 2022-07-20 PROCEDURE — 2580000003 HC RX 258: Performed by: INTERNAL MEDICINE

## 2022-07-20 PROCEDURE — 6360000002 HC RX W HCPCS: Performed by: INTERNAL MEDICINE

## 2022-07-20 RX ORDER — SODIUM CHLORIDE 0.9 % (FLUSH) 0.9 %
20 SYRINGE (ML) INJECTION PRN
Status: CANCELLED | OUTPATIENT
Start: 2022-07-20

## 2022-07-20 RX ORDER — SODIUM CHLORIDE 0.9 % (FLUSH) 0.9 %
10 SYRINGE (ML) INJECTION PRN
Status: DISCONTINUED | OUTPATIENT
Start: 2022-07-20 | End: 2022-07-21 | Stop reason: HOSPADM

## 2022-07-20 RX ORDER — HEPARIN SODIUM (PORCINE) LOCK FLUSH IV SOLN 100 UNIT/ML 100 UNIT/ML
500 SOLUTION INTRAVENOUS PRN
Status: DISCONTINUED | OUTPATIENT
Start: 2022-07-20 | End: 2022-07-21 | Stop reason: HOSPADM

## 2022-07-20 RX ORDER — HEPARIN SODIUM (PORCINE) LOCK FLUSH IV SOLN 100 UNIT/ML 100 UNIT/ML
500 SOLUTION INTRAVENOUS PRN
Status: CANCELLED | OUTPATIENT
Start: 2022-07-20

## 2022-07-20 RX ORDER — SODIUM CHLORIDE 0.9 % (FLUSH) 0.9 %
10 SYRINGE (ML) INJECTION PRN
Status: CANCELLED | OUTPATIENT
Start: 2022-07-20

## 2022-07-20 RX ADMIN — SODIUM CHLORIDE, PRESERVATIVE FREE 10 ML: 5 INJECTION INTRAVENOUS at 13:37

## 2022-07-20 RX ADMIN — HEPARIN 500 UNITS: 100 SYRINGE at 13:37

## 2022-07-21 ENCOUNTER — HOSPITAL ENCOUNTER (OUTPATIENT)
Dept: CT IMAGING | Age: 67
Discharge: HOME | End: 2022-07-21
Payer: OTHER GOVERNMENT

## 2022-07-21 DIAGNOSIS — E86.0 DEHYDRATION: ICD-10-CM

## 2022-07-21 DIAGNOSIS — C78.7 LIVER METASTASES (HCC): ICD-10-CM

## 2022-07-21 DIAGNOSIS — C18.9 ADENOCARCINOMA OF COLON (HCC): ICD-10-CM

## 2022-07-21 DIAGNOSIS — R79.0 LOW BLOOD MAGNESIUM LEVEL: ICD-10-CM

## 2022-07-21 DIAGNOSIS — N17.0 ACUTE KIDNEY INJURY (AKI) WITH ACUTE TUBULAR NECROSIS (ATN) (HCC): ICD-10-CM

## 2022-07-21 DIAGNOSIS — R62.7 FAILURE TO THRIVE IN ADULT: ICD-10-CM

## 2022-07-21 DIAGNOSIS — R53.81 PHYSICAL DECONDITIONING: ICD-10-CM

## 2022-07-21 LAB
ALBUMIN SERPL-MCNC: 4.2 G/DL (ref 3.5–5.2)
ALP BLD-CCNC: 157 U/L (ref 40–130)
ALT SERPL-CCNC: 16 U/L (ref 5–41)
ANION GAP SERPL CALCULATED.3IONS-SCNC: 12 MMOL/L (ref 7–19)
AST SERPL-CCNC: 30 U/L (ref 5–40)
BASOPHILS ABSOLUTE: 0 K/UL (ref 0–0.2)
BASOPHILS RELATIVE PERCENT: 0.5 % (ref 0–1)
BILIRUB SERPL-MCNC: 0.5 MG/DL (ref 0.2–1.2)
BUN BLDV-MCNC: 22 MG/DL (ref 8–23)
CALCIUM SERPL-MCNC: 9.5 MG/DL (ref 8.8–10.2)
CHLORIDE BLD-SCNC: 102 MMOL/L (ref 98–111)
CO2: 23 MMOL/L (ref 22–29)
CREAT SERPL-MCNC: 1.6 MG/DL (ref 0.5–1.2)
EOSINOPHILS ABSOLUTE: 0.2 K/UL (ref 0–0.6)
EOSINOPHILS RELATIVE PERCENT: 3.7 % (ref 0–5)
GFR AFRICAN AMERICAN: 53
GFR AFRICAN AMERICAN: 53
GFR NON-AFRICAN AMERICAN: 44
GFR NON-AFRICAN AMERICAN: 44
GLUCOSE BLD-MCNC: 103 MG/DL (ref 74–109)
HCT VFR BLD CALC: 38.5 % (ref 42–52)
HEMOGLOBIN: 11.6 G/DL (ref 14–18)
IMMATURE GRANULOCYTES #: 0 K/UL
LYMPHOCYTES ABSOLUTE: 0.6 K/UL (ref 1.1–4.5)
LYMPHOCYTES RELATIVE PERCENT: 10.4 % (ref 20–40)
MAGNESIUM: 1.8 MG/DL (ref 1.6–2.4)
MCH RBC QN AUTO: 26.8 PG (ref 27–31)
MCHC RBC AUTO-ENTMCNC: 30.1 G/DL (ref 33–37)
MCV RBC AUTO: 88.9 FL (ref 80–94)
MONOCYTES ABSOLUTE: 0.2 K/UL (ref 0–0.9)
MONOCYTES RELATIVE PERCENT: 3.5 % (ref 0–10)
NEUTROPHILS ABSOLUTE: 4.6 K/UL (ref 1.5–7.5)
NEUTROPHILS RELATIVE PERCENT: 81.5 % (ref 50–65)
PDW BLD-RTO: 16.4 % (ref 11.5–14.5)
PERFORMED ON: ABNORMAL
PHOSPHORUS: 3.6 MG/DL (ref 2.5–4.5)
PLATELET # BLD: 292 K/UL (ref 130–400)
PMV BLD AUTO: 10.3 FL (ref 9.4–12.4)
POC CREATININE: 1.6 MG/DL (ref 0.3–1.3)
POC SAMPLE TYPE: ABNORMAL
POTASSIUM SERPL-SCNC: 4.2 MMOL/L (ref 3.5–5)
RBC # BLD: 4.33 M/UL (ref 4.7–6.1)
SODIUM BLD-SCNC: 137 MMOL/L (ref 136–145)
TOTAL PROTEIN: 6.9 G/DL (ref 6.6–8.7)
WBC # BLD: 5.7 K/UL (ref 4.8–10.8)

## 2022-07-21 PROCEDURE — 71260 CT THORAX DX C+: CPT

## 2022-07-21 PROCEDURE — 74177 CT ABD & PELVIS W/CONTRAST: CPT

## 2022-07-21 PROCEDURE — 6360000004 HC RX CONTRAST MEDICATION: Performed by: INTERNAL MEDICINE

## 2022-07-21 PROCEDURE — 74177 CT ABD & PELVIS W/CONTRAST: CPT | Performed by: RADIOLOGY

## 2022-07-21 PROCEDURE — 71260 CT THORAX DX C+: CPT | Performed by: RADIOLOGY

## 2022-07-21 PROCEDURE — 82565 ASSAY OF CREATININE: CPT

## 2022-07-21 RX ADMIN — IOPAMIDOL 70 ML: 755 INJECTION, SOLUTION INTRAVENOUS at 10:57

## 2022-08-03 ENCOUNTER — HOSPITAL ENCOUNTER (OUTPATIENT)
Dept: INFUSION THERAPY | Age: 67
Discharge: HOME | End: 2022-08-03
Payer: OTHER GOVERNMENT

## 2022-08-03 ENCOUNTER — OFFICE VISIT (OUTPATIENT)
Dept: HEMATOLOGY | Age: 67
End: 2022-08-03

## 2022-08-03 VITALS
DIASTOLIC BLOOD PRESSURE: 97 MMHG | TEMPERATURE: 98.8 F | OXYGEN SATURATION: 98 % | BODY MASS INDEX: 21.46 KG/M2 | HEIGHT: 67 IN | WEIGHT: 136.7 LBS | SYSTOLIC BLOOD PRESSURE: 146 MMHG | HEART RATE: 98 BPM

## 2022-08-03 VITALS
DIASTOLIC BLOOD PRESSURE: 97 MMHG | TEMPERATURE: 98.8 F | OXYGEN SATURATION: 98 % | RESPIRATION RATE: 18 BRPM | WEIGHT: 136.7 LBS | HEART RATE: 98 BPM | HEIGHT: 67 IN | BODY MASS INDEX: 21.46 KG/M2 | SYSTOLIC BLOOD PRESSURE: 146 MMHG

## 2022-08-03 DIAGNOSIS — C18.9 ADENOCARCINOMA OF COLON (HCC): Primary | ICD-10-CM

## 2022-08-03 DIAGNOSIS — C78.7 LIVER METASTASES (HCC): ICD-10-CM

## 2022-08-03 DIAGNOSIS — T45.1X5D ADVERSE EFFECT OF CHEMOTHERAPY, SUBSEQUENT ENCOUNTER: ICD-10-CM

## 2022-08-03 DIAGNOSIS — C18.2 MALIGNANT NEOPLASM OF ASCENDING COLON (HCC): Primary | ICD-10-CM

## 2022-08-03 DIAGNOSIS — Z51.11 CHEMOTHERAPY MANAGEMENT, ENCOUNTER FOR: ICD-10-CM

## 2022-08-03 DIAGNOSIS — G89.3 CANCER ASSOCIATED PAIN: ICD-10-CM

## 2022-08-03 DIAGNOSIS — R62.7 FAILURE TO THRIVE IN ADULT: ICD-10-CM

## 2022-08-03 DIAGNOSIS — D64.9 NORMOCYTIC ANEMIA: ICD-10-CM

## 2022-08-03 DIAGNOSIS — C18.9 ADENOCARCINOMA OF COLON (HCC): ICD-10-CM

## 2022-08-03 DIAGNOSIS — E86.0 DEHYDRATION: ICD-10-CM

## 2022-08-03 DIAGNOSIS — Z51.12 ENCOUNTER FOR ANTINEOPLASTIC IMMUNOTHERAPY: ICD-10-CM

## 2022-08-03 DIAGNOSIS — E83.42 HYPOMAGNESEMIA: ICD-10-CM

## 2022-08-03 DIAGNOSIS — Z71.89 CARE PLAN DISCUSSED WITH PATIENT: ICD-10-CM

## 2022-08-03 DIAGNOSIS — Z71.89 COORDINATION OF COMPLEX CARE: ICD-10-CM

## 2022-08-03 DIAGNOSIS — N17.0 ACUTE KIDNEY INJURY (AKI) WITH ACUTE TUBULAR NECROSIS (ATN) (HCC): ICD-10-CM

## 2022-08-03 DIAGNOSIS — R53.81 PHYSICAL DECONDITIONING: ICD-10-CM

## 2022-08-03 LAB
ALBUMIN SERPL-MCNC: 4.1 G/DL (ref 3.5–5.2)
ALP BLD-CCNC: 140 U/L (ref 40–130)
ALT SERPL-CCNC: 14 U/L (ref 21–72)
ANION GAP SERPL CALCULATED.3IONS-SCNC: 10 MMOL/L (ref 7–19)
AST SERPL-CCNC: 23 U/L (ref 17–59)
BILIRUB SERPL-MCNC: 0.5 MG/DL (ref 0.2–1.3)
BUN BLDV-MCNC: 19 MG/DL (ref 9–20)
CALCIUM SERPL-MCNC: 9.7 MG/DL (ref 8.4–10.2)
CEA: 26.6 NG/ML (ref 0–4.7)
CHLORIDE BLD-SCNC: 110 MMOL/L (ref 98–111)
CO2: 21 MMOL/L (ref 22–29)
CREAT SERPL-MCNC: 1.5 MG/DL (ref 0.6–1.2)
GFR NON-AFRICAN AMERICAN: 47
GLOBULIN: 3 G/DL
GLUCOSE BLD-MCNC: 105 MG/DL (ref 74–106)
HCT VFR BLD CALC: 37.2 % (ref 40.1–51)
HEMOGLOBIN: 11.3 G/DL (ref 13.7–17.5)
LYMPHOCYTES ABSOLUTE: 0.68 K/UL (ref 1.18–3.74)
LYMPHOCYTES RELATIVE PERCENT: 15.2 % (ref 19.3–53.1)
MAGNESIUM: 1.5 MG/DL (ref 1.6–2.3)
MCH RBC QN AUTO: 27 PG (ref 25.7–32.2)
MCHC RBC AUTO-ENTMCNC: 30.4 G/DL (ref 32.3–36.5)
MCV RBC AUTO: 88.8 FL (ref 79–92.2)
MONOCYTES ABSOLUTE: 0.61 K/UL (ref 0.24–0.82)
MONOCYTES RELATIVE PERCENT: 13.7 % (ref 4.7–12.5)
NEUTROPHILS ABSOLUTE: 2.85 K/UL (ref 1.56–6.13)
NEUTROPHILS RELATIVE PERCENT: 64 % (ref 34–71.1)
PDW BLD-RTO: 16.9 % (ref 11.6–14.4)
PLATELET # BLD: 249 K/UL (ref 163–337)
PMV BLD AUTO: 10.1 FL (ref 7.4–10.4)
POTASSIUM SERPL-SCNC: 4.5 MMOL/L (ref 3.5–5.1)
PROTEIN UA: NEGATIVE
RBC # BLD: 4.19 M/UL (ref 4.63–6.08)
SODIUM BLD-SCNC: 141 MMOL/L (ref 137–145)
TOTAL PROTEIN: 7 G/DL (ref 6.3–8.2)
WBC # BLD: 4.46 K/UL (ref 4.23–9.07)

## 2022-08-03 PROCEDURE — 96367 TX/PROPH/DG ADDL SEQ IV INF: CPT

## 2022-08-03 PROCEDURE — 2580000003 HC RX 258: Performed by: INTERNAL MEDICINE

## 2022-08-03 PROCEDURE — G8428 CUR MEDS NOT DOCUMENT: HCPCS | Performed by: INTERNAL MEDICINE

## 2022-08-03 PROCEDURE — 3017F COLORECTAL CA SCREEN DOC REV: CPT | Performed by: INTERNAL MEDICINE

## 2022-08-03 PROCEDURE — 1123F ACP DISCUSS/DSCN MKR DOCD: CPT | Performed by: INTERNAL MEDICINE

## 2022-08-03 PROCEDURE — 96361 HYDRATE IV INFUSION ADD-ON: CPT

## 2022-08-03 PROCEDURE — 96365 THER/PROPH/DIAG IV INF INIT: CPT

## 2022-08-03 PROCEDURE — 1036F TOBACCO NON-USER: CPT | Performed by: INTERNAL MEDICINE

## 2022-08-03 PROCEDURE — 83735 ASSAY OF MAGNESIUM: CPT

## 2022-08-03 PROCEDURE — 96366 THER/PROPH/DIAG IV INF ADDON: CPT

## 2022-08-03 PROCEDURE — 99215 OFFICE O/P EST HI 40 MIN: CPT | Performed by: INTERNAL MEDICINE

## 2022-08-03 PROCEDURE — 6360000002 HC RX W HCPCS: Performed by: INTERNAL MEDICINE

## 2022-08-03 PROCEDURE — 85025 COMPLETE CBC W/AUTO DIFF WBC: CPT

## 2022-08-03 PROCEDURE — 80053 COMPREHEN METABOLIC PANEL: CPT

## 2022-08-03 PROCEDURE — 96360 HYDRATION IV INFUSION INIT: CPT

## 2022-08-03 PROCEDURE — G8420 CALC BMI NORM PARAMETERS: HCPCS | Performed by: INTERNAL MEDICINE

## 2022-08-03 RX ORDER — SODIUM CHLORIDE 0.9 % (FLUSH) 0.9 %
5-40 SYRINGE (ML) INJECTION PRN
Status: DISCONTINUED | OUTPATIENT
Start: 2022-08-03 | End: 2022-08-04 | Stop reason: HOSPADM

## 2022-08-03 RX ORDER — HEPARIN SODIUM (PORCINE) LOCK FLUSH IV SOLN 100 UNIT/ML 100 UNIT/ML
500 SOLUTION INTRAVENOUS PRN
Status: CANCELLED | OUTPATIENT
Start: 2022-08-03

## 2022-08-03 RX ORDER — SODIUM CHLORIDE 0.9 % (FLUSH) 0.9 %
5-40 SYRINGE (ML) INJECTION PRN
Status: CANCELLED | OUTPATIENT
Start: 2022-08-03

## 2022-08-03 RX ORDER — SODIUM CHLORIDE 0.9 % (FLUSH) 0.9 %
10 SYRINGE (ML) INJECTION PRN
Status: CANCELLED | OUTPATIENT
Start: 2022-08-03

## 2022-08-03 RX ORDER — OXYCODONE HYDROCHLORIDE 10 MG/1
10 TABLET ORAL EVERY 6 HOURS PRN
Qty: 120 TABLET | Refills: 0 | Status: SHIPPED | OUTPATIENT
Start: 2022-08-03 | End: 2022-09-02

## 2022-08-03 RX ORDER — SODIUM CHLORIDE 0.9 % (FLUSH) 0.9 %
20 SYRINGE (ML) INJECTION PRN
Status: CANCELLED | OUTPATIENT
Start: 2022-08-03

## 2022-08-03 RX ORDER — FENTANYL 12 UG/H
1 PATCH TRANSDERMAL
Qty: 10 PATCH | Refills: 0 | Status: SHIPPED | OUTPATIENT
Start: 2022-08-03 | End: 2022-09-02

## 2022-08-03 RX ORDER — SODIUM CHLORIDE 9 MG/ML
5-250 INJECTION, SOLUTION INTRAVENOUS PRN
Status: CANCELLED | OUTPATIENT
Start: 2022-08-03

## 2022-08-03 RX ORDER — HEPARIN SODIUM (PORCINE) LOCK FLUSH IV SOLN 100 UNIT/ML 100 UNIT/ML
500 SOLUTION INTRAVENOUS PRN
Status: DISCONTINUED | OUTPATIENT
Start: 2022-08-03 | End: 2022-08-04 | Stop reason: HOSPADM

## 2022-08-03 RX ORDER — MAGNESIUM SULFATE IN WATER 40 MG/ML
2000 INJECTION, SOLUTION INTRAVENOUS ONCE
Status: COMPLETED | OUTPATIENT
Start: 2022-08-03 | End: 2022-08-03

## 2022-08-03 RX ORDER — 0.9 % SODIUM CHLORIDE 0.9 %
500 INTRAVENOUS SOLUTION INTRAVENOUS ONCE
Status: COMPLETED | OUTPATIENT
Start: 2022-08-03 | End: 2022-08-03

## 2022-08-03 RX ORDER — 0.9 % SODIUM CHLORIDE 0.9 %
500 INTRAVENOUS SOLUTION INTRAVENOUS ONCE
Status: CANCELLED | OUTPATIENT
Start: 2022-08-03 | End: 2022-08-03

## 2022-08-03 RX ADMIN — SODIUM CHLORIDE, PRESERVATIVE FREE 10 ML: 5 INJECTION INTRAVENOUS at 11:57

## 2022-08-03 RX ADMIN — Medication 500 UNITS: at 11:57

## 2022-08-03 RX ADMIN — SODIUM CHLORIDE 500 ML: 9 INJECTION, SOLUTION INTRAVENOUS at 10:02

## 2022-08-03 RX ADMIN — MAGNESIUM SULFATE HEPTAHYDRATE 2000 MG: 40 INJECTION, SOLUTION INTRAVENOUS at 10:02

## 2022-08-03 NOTE — PROGRESS NOTES
MEDICAL ONCOLOGY PROGRESS NOTE                                                          Denise Guardado   1955  8/3/2022     Chief Complaint   Patient presents with    Treatment     Adenocarcinoma of colon         INTERVAL HISTORY/HISTORY OF PRESENT ILLNESS:  Diagnosis  Colonic adenocarcinoma, March 2020  [id number](liver), stage ROXANA  IHC MMR- proficient  K-maria luisa mutated  N-MARIA LUISA/BRAF wild-type  Iron deficiency anemia  Soft tissue mass, June 2021  Adenocarcinoma-consistent with colon primary, right psoas muscle, June 2021  Metastatic adenocarcinoma, Jan 2022  MLH1, MSH2, MH6, PMS2-intact  MMR- no loss of expression     Treatment summary  3/30/2020-right hemicolectomy at [facility]  Anticipated Liver ablation to be followed by neoadjuvant/adjuvant versus palliative chemotherapy  06/10/2020-November 2020-FOLFOX + Avastin  12/11/20- Right hepatectomy-Dr. Sonia Harvey  S/p Injectafer-poor oral iron tolerance  7/13/21- 11/17/21 FOLFIRI + Avastin every 2 weeks  1/13/22-psoas muscle mass resection/HIPEC by Dr. Blanca Reich at [facility]  5/10/22 - 8/3/22 Discontinue FOLFIRI + Avastin every 2 weeks due to progression in the liver  Anticipate FOLFOX + Avastin every 2 weeks-     The patient is a very pleasant 74years old male who has been diagnosed with colonic adenocarcinoma. He has had several treatment modalities in the past.  He is currently status post CRS/HIPEC in mid January, 2022 at [facility]. This was complicated by a leak from his ileocolic anastomosis secondary to closed-loop obstruction at his hernia repair site (mesh displaced). He was taken back to the OR and had a primary repair of his anastomosis and diverting ileostomy in February 2022. He is currently on treatment with FOLFIRI/Avastin. Unfortunately, most recent CT scans showed evidence of liver metastasis progression. I called and discussed with hepatobiliary surgery from [facility].   Unfortunately, there is no liver directed therapy recommended at this time due to diffuse hepatic metastasis. Cancer history  Mr. Zurdo Wolfe was first seen by me on 3/23/2020. He was referred for a new diagnosis of colonic adenocarcinoma involving the cecum. The patient reports that he had a wellbeing consult with his provider at the [place]. He was found to have anemia and then recommended a colonoscopy. Of note, the patient has a family history of colon cancer. His mother is a patient of Dr. Yeny Sotomayor he has been diagnosed with colon cancer in 2010.  3/11/2020- colonoscopy revealed a large malignant appearing fungating mass lesion in the cecum. In addition, several other polyps. Biopsy of the mass consistent with moderate differentiated colonic adenocarcinoma. Polyps consistent with tubulovillous adenoma with no high-grade dysplasia. IHC MMR not proficient. K-maria luisa mutated, BRAF and NRAS wild type. MSI proficient  3/11/2020-CEA 5.5 (H)  3/18/2020-CT abdomen pelvis with contrast  Invasive cecal mass adhering to the right lateral abdominal wall muscles with adjacent lymphadenopathy. Mild partial obstruction of the terminal ileum. 2. Suspicious lesions in the right and left hepatic lobes measure up to 1.3 cm and likely represent metastatic disease. 3/18/2020-Xr Chest Standard  No radiographic evidence of acute cardiopulmonary process. 3/23/2020-he was first seen by me. Recommended completion of staging with CT chest.  Also recommended liver MRI for further clarification of liver lesion. S.  Recommend to proceed with a general surgery consultation tomorrow with Dr. Monika Kenny. Patient was informed that I favor surgical resection if feasible of the primary malignancy. 3/30/2020- right hemicolectomy by Dr. Monika Kenny at [facility] consistent with invasive moderately differentiated colonic adenocarcinoma measuring 7.2 cm. Carcinoma directly invading the adjacent abdominal wall tissue. Focal lymphovascular space invasion identified.   Focal perineural invasion identified. Surgical margins negative for evidence of malignancy. 6 out of 14 lymph nodes positive for metastatic adenocarcinoma. Final pathology staging [id number](liver, stage ROXANA)  4/20/2020-CT chest with contrast showed No convincing intrathoracic metastasis. Nonspecific 4 mm nodule of the inferior lingula and a 2 mm right upper lobe nodule can be followed on subsequent imaging in 6-12 months. Moderate coronary calcifications. Hypodense metastatic liver lesions. Small hiatal hernia. 4/20/2020-Mri Abdomen W Wo Contrast There are about 5 liver lesions. The 2 largest appears similar compared to 3/18/2020, the others are too small to further characterize. Appearance is most concerning for metastatic disease. Enhancement of the right lateral peritoneum. This is favored to be postoperative as there is no nodularity, evidence of omental disease or lymphadenopathy. Recommend attention on follow-up. Cholecystectomy. 4/22/2020-discussed with Dr. Hua Mcdermott at [place]. He will review imaging studies and give further recommendations regarding eligibility for resection of liver lesions. 5/8/2020 CT Abdomen The two largest suspicious lesions measuring 1.2 and 1.3 cm in the  right and left hepatic lobes respectively are similar compared to the  3/18/2020 CT. Additionally, there are at least five subcentimeter lesions with similar signal characteristics, which are also highly suspicious for metastases. If complete characterization of the number and distribution of lesions is necessary, an MRI with Eovist could be acquired. 5/19/2020- he was seen by the hepatobiliary service at [facility] with Dr. Hua Mcdermott:  patient adequate risk candidate for a multimodal approach, directed toward curative hepatectomy eventually. Endorsed by Hepatobiliary Conference, I recommended perc ablation of the L hemiliver to clear it, followed by systemic therapy in a neoadjuvant strategy.  Restaging imaging to confirm clearance of disease on the left and lack of progression to unresectability of the R hemiliver disease would then be followed by R hepatectomy. Limitations to this approach may be accessibility of the segment 4A/8 disease high in the hepatic dome and the possibility of heat sink-related recurrence s/p abation of the left-sided disease. 5/21/2020- referral for Mediport placement and start FOLFOX in 2 weeks. Will add bevacizumab after 6 weeks of liver ablation. We will plan for 12 biweekly dose of FOLFOX. Bevacizumab will also be stopped 6 weeks prior to major procedure. 6/8/2020- Microwave ablation of the left liver lesion was then performed for 5 minutes at [address] to achieve a 3.4 x 3.9 cm approximately spherical zone of ablation. 6/10/2020- initiation of FOLFOX.  7/20/2020-added Avastin. 9/10/20 MRI abdomen: Left hepatic lobe segment II lesion demonstrates small T1 hyperintense blood products, status post microwave ablation on 6/8/2020. No definitive enhancement within the lesion. Focal internal thickening or scar present. Recommend attention on follow-up. Additional scattered subcentimeter foci throughout the liver decreased in size compared to MRI dated 4/20/2020 consistent with improving metastatic disease. Additional chronic findings as above. 9/16/2020-discussed with plan with the patient and [address] [place] [address]. Interval response to treatment. Plan to continue chemotherapy through 12 cycles with Avastin. 12/11/20 Right hepatectomy-Dr. Mitali Kaur  12/11/20 Right hepatectomy pathology: Liver, right, resection: Focus of Fibrosis with calcifications and chronic inflammation (0.3 cm), see comment. Background hepatic parenchyma with minimal periportal fibrosis (trichrome stain), minimal lobular and portal inflammation, and no significant macrovesicular steatosis (<5%) Comments: The patient's history of colorectal cancer status adjuvant therapy is noted.  The focus of fibrosis may reflect treatment effect. There is no evidence of viable tumor in the sampled specimen. 12/14/20 Ct Abdomen Pelvis W Iv Contrast A Small bowel obstruction with transition point at the distal small bowel, just proximal to RIGHT upper quadrant ileocolonic anastomosis. Postoperative change of RIGHT hepatectomy with small amount of expected free fluid and intraperitoneal gas. Redemonstrated LEFT liver lesion. Small bilateral pleural effusions. 12/16/20 SBFT-[place]: Study is limited due to retained contrast in the small bowel from prior attempt at small bowel follow-through on the floor. Small bowel remains dilated, measuring approximately 5.2 cm, which is consistent with partial or resolving small bowel obstruction. Final radiographs show contrast within the colon. 1/4/2020-resolution of small bowel obstruction. 1/7/21 CT chest: No finding to suggest intrathoracic neoplastic process or metastatic disease. The benign-appearing tiny nodule in the right upper lobe probably represent a noncalcified granuloma. A nodule in the lingular segment of the left upper lobe is not visualized in this study. Postsurgical changes of the liver. No evidence of focal complication. A trace right basal pleural effusion. This may be reactive to the previous abdominal surgery. 3/4/21 MRI abd: Status post right hepatectomy and microwave ablation of a left liver lesion. No findings to suggest residual/recurrent disease. No new liver lesion is identified. Postsurgical changes related to right hemicolectomy. Microwave ablation zone in segment II of the left hepatic lobe measures approximately 21 mm in diameter, unchanged. No appreciable postcontrast enhancement or other findings to suggest local disease recurrence. No new liver lesion is identified. Spleen is mildly enlarged, measuring 13.8 cm in length. 3/17/2021-1 year colonoscopy showed no evidence of polyps.   5/3/21 CEA 6.2  6/8/21 MRI abdomen ([place]): Status post right hepatectomy and MWA of a left liver lesion. No evidence of residual or recurrent metastatic disease in the liver. Status post prior right hemicolectomy for colon carcinoma. Within the posterior right iliopsoas muscle there is a mildly T2 hyperintense nodular focus with postcontrast rim enhancement and diffusion restriction. This measures approximately 2.7 x 2 x 2 cm. More delayed postcontrast images show irregular area of enhancement measuring 2.4 x 7.7 cm axially involving the right iliopsoas muscle and the right lateral abdominal wall. Suggest correlation with contrast enhanced CT exam for complete evaluation. Consider biopsy of this lesion. 6/15/21 CT CHEST WO CONTRAST No metastatic disease in the chest.  No change in tiny 2 mm RIGHT upper lobe pulmonary nodule. Mild ectasia of the ascending aorta measuring 4 cm.   6/18/2021-I reviewed results of MRI abdomen at [facility]. CT chest without contrast reviewed by me and showed no evidence of metastatic disease. Stable 2 mm right upper lobe nodule. Discussed with hepatobiliary service at [facility]. Biopsy intra-abdominal nodule next week at [facility]. [id number]/25/[id number] biopsy soft tissue mass right psoas muscle at [facility] consistent with recurrent colonic adenocarcinoma. 7/2/2021-discussed with hepatobiliary service at [facility] and also surgical oncology. Surgical oncology will call us back regarding consultation for consideration of HIPEC if he is a candidate  7/13/21-initiation of chemotherapy with FOLFIRI + Avastin every 2 weeks  7/13/21 CEA 10.7  7/27/2021-CEA 9.6  7/30/2021-she was seen by the surgical oncology group at [facility] by Dr Rey Bray. They recommended to complete 6 cycles of chemotherapy and repeat CT chest abdomen pelvis and liver MRI to assess disease response. They discussed the role of CRS/HIPEC in his situation. He was told that it really depends on the status of his liver lesions as well.  He will complete 6 cycles of new suspicious hepatic lesion. Left lateral perirenal fascial nodule, which is stable compared to 6/8/2021 MRI, but new compared to 3/4/2021 MRI is concerning for an additional site of metastatic disease. No sites of new or enlarging metastatic disease in the chest.  Similar appearance of right lateral psoas and posterior abdominal wall infiltrative lesion, not significantly changed compared to 6/8/2021 MRI. Mild splenomegaly. Additional findings as outlined in the body the report. Biopsy-proven adenocarcinoma involving the posterior lateral aspect of the right iliopsoas muscle and right lateral abdominal wall. Right iliopsoas component is difficult to measure but appears to be approximately 2.5 x 2.4 cm (series 2, image 153), similar to prior MRI. Nodular thickening noted along the right posterior abdominal wall and possibly involving the the transversus abdominis measures approximately 8.5 x 1.8 cm (series 2 image 153) overall similar compared to prior MRI. 10/6/2021-discussed the results of recent CT abdomen/pelvis and MRI abdomen/pelvis at [facility]. Persistent disease involving the psoas muscle. Discussed with colorectal surgery at [facility]. They recommend to continue current therapy for another 2 months and reassess with CT scans. 7/13/21- 11/17/21 FOLFIRI + Avastin every 2 weeks  12/3/21 CT chest/abd/pelvis [facility]): Status post prior right hemicolectomy, right hepatectomy and left hepatic lesions microwave ablation. No new liver lesion. Soft tissue thickening of the right lower posterior abdominal wall involving the iliopsoas muscle is grossly unchanged consistent with improving metastatic disease. Small right omental nodule and left lateral conal fascia nodule unchanged compared to  recent exam.   1/13/22 Exploratory lap with resections of psoas muscle mass and HIPEC by Dr. Deb Arevalo at [facility]:  Metastatic colorectal adenocarcinoma involving fibroadipose tissue.  IHC MMR proficient. 1/24/22 CT chest/abd/pelvis [facility]): Extensive postsurgical changes in the abdomen including partial right colectomy, resection of right retroperitoneal mass, omentectomy and mesh repair of right lateral abdominal wall defect. There appears to be a defect in the mesh containing herniated loops of small bowel. Extensive diffuse dilated small bowel loops with air-fluid levels are seen throughout the abdomen. There are segmental areas of decompressed small bowel in the left upper quadrant as well as adjacent to the herniated small  bowel loops in the right retroperitoneum. Air-fluid levels are also seen throughout the colon. While pattern could likely represent postoperative ileus given the diffuse small bowel dilatation and distal colonic air and fluid, developing or partial small bowel obstruction secondary to the right lateral abdominal wall hernia cannot entirely be excluded. Small ascites. 8 mm nodule along the left lateral conal fascia is unchanged. Slight increase in size of cardiophrenic lymph nodes measuring up to 7 mm anterior to the right hemidiaphragm. Stable hypodensity in the left hepatic lobe. Diffuse gastric wall thickening/edema could be secondary to gastritis in the appropriate clinical setting. February 2022- HIPEC/tumor debulking January. This was complicated by a leak from his ileocolic anastomosis secondary to closed-loop obstruction at his hernia repair site (mesh displaced). He was taken back to the OR and had a primary repair of his anastomosis and diverting ileostomy in February 2022  3/23/2022-ileostomy reversal at [facility] Dr. Bridgette Luevano at [facility]  3/20/2022 CT Abd/Pelvis WO Contrast [facility]) Findings suspicious for aspiration at the lung bases. Postsurgical changes related to right hepatectomy. There is a new low-attenuation lesion in the left hemiliver, highly concerning for metastasis.  Postsurgical changes related to right hemicolectomy with ileocolonic anastomosis and right lower quadrant diverting loop ileostomy. No evidence of bowel obstruction. No intra-abdominal fluid collection. Other findings as above. A critical alert was sent at the time of dictation. Liver: Postsurgical changes related to right hepatectomy. There is a new, approximately 2.1 cm low-attenuation lesion seen in the left hepatic lobe on image 25 of series 3, highly concerning for metastasis. 4/18/2022 Ileostomy,closure enterocutaneous stoma with mural acute inflammation and jessa-intestinal Fat necrosis. Negative for malignancy. 4/23/2022- discussed the patient reinitiation of chemotherapy. We will continue FOLFIRI. He had no prior progression on FOLFIRI of FOLFOX. 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  We would avoid Avastin at this time due to recent surgery. He is K-maria luisa mutated and therefore cannot use anti-EGFR agent. May contemplate adding Avastin in 4 weeks. 5/9/2022 CT Chest W Contrast New pulmonary nodules are present in the right and left lung as described above. It cannot be determined if these are infectious nodules or metastatic disease. Dilated loops of bowel are present in the upper abdomen. The abdomen is incompletely evaluated however this may be wither an ileus or partial obstruction. Hepatic metastasis. 5/10/22 Re-initiate FOLFIRI + Avastin every 2 weeks. 5/24/2022  CEA 29.1  5/24/2022 Iron Studies:Iron 29, TIBC 256, Iron Sat 11,Ferritin 116.9  6/22/2022 CEA 21.5  7/21/2022 CT Chest W Contrast Bibasilar linear atelectasis life scarring. No pulmonary nodules, masses or infiltrates. Multiple enhancing low attenuation masses in the liver similar to the previous study. Findings consistent with metastatic liver disease. 7/21/2022 CT Abd/Pelvis W IV Contrast (Oral) Multiple enhancing low attenuation liver lesions increase in size and number since the previous study 3/7/22.  Findings consistent with progression of metastatic liver disease. Status postcholecystectomy. The appendix is not visualized. No acute findings. 5/10/22 - 8/3/22 Discontinue FOLFIRI + Avastin every 2 weeks due to progression in the liver  8/3/2022-unfortunately, interval progression of liver metastasis when compared to last CT scans performed on 3/7/2022. In addition, when compared to report from [address] [place] [address] abdomen/pelvis on 3/20/2022 when he had only 1 single metastatic lesion measuring 2.1 cm. This is considered progression. I have recommended to discontinue FOLFIRI/Avastin and consider initiation of palliative FOLFOX with Avastin. Discussed with hepatobiliary surgery. The patient is not a candidate for further surgery. He is not a candidate for any liver directed therapy. CEA dynamics:  2/27/22 CEA 3.5  5/24/22 CEA 29.1  6/22/22 CEA 21.5    PAST MEDICAL HISTORY:   Past Medical History:   Diagnosis Date    Acid reflux     Cancer ([facility] [address]     adenocarcinoma of colon    GERD (gastroesophageal reflux disease)     Palliative care patient 03/01/2022    PTSD (post-traumatic stress disorder)     Stage 3a chronic kidney disease ([facility] [address]       PAST SURGICAL HISTORY:  Past Surgical History:   Procedure Laterality Date    ABLATION OF DYSRHYTHMIC FOCUS      ablation of liver at [address]  2003    CHOLECYSTECTOMY  2013    COLONOSCOPY      when pt was in the 20's    COLONOSCOPY N/A 03/11/2020    COLONOSCOPY POLYPECTOMY SNARE/COLD BIOPSY: Dr. Yana Read colonoscopy: 6-12 months due to findings at colonoscopy today with Cecal mass lesion and large polyps.     COLONOSCOPY      COLONOSCOPY N/A 03/17/2021    Dr Arreguin Factor, Post-operative changes w IC anastomosis & single visible staple, Mild Diverticuosis, Int Hemorrhoids Grade 1, 3 year recall    HEMICOLECTOMY Right 03/30/2020    LAPAROSCOPIC-ASSISTED RIGHT HEMICOLECTOMY performed by Allyssa Luke MD at [facility] N/A 06/03/2020    INSERTION OF VENOUS PORT with flouro performed by Allyssa Luke, MD at [facility] OR    UPPER GASTROINTESTINAL ENDOSCOPY N/A 03/11/2020    Dr. Julia Germain:   [facility]      SOCIAL HISTORY:  Social History     Socioeconomic History    Marital status:      Spouse name: None    Number of children: None    Years of education: None    Highest education level: None   Tobacco Use    Smoking status: Never    Smokeless tobacco: Never   Vaping Use    Vaping Use: Never used   Substance and Sexual Activity    Alcohol use: Yes     Comment: rare     Drug use: No    Sexual activity: Yes     Partners: Female     FAMILY HISTORY:  Family History   Problem Relation Age of Onset    Liver Cancer Mother     High Blood Pressure Mother     Colon Cancer Mother         x2    Cancer Father         Lung Cancer    Breast Cancer Sister     Cancer Maternal Grandfather         Lung Cancer    Cancer Paternal Grandfather         Stomach Cancer    Colon Polyps Neg Hx     Cystic Fibrosis Neg Hx     Liver Disease Neg Hx     Rectal Cancer Neg Hx         Current Outpatient Medications   Medication Sig Dispense Refill    oxyCODONE HCl (OXY-IR) 10 MG immediate release tablet Take 1 tablet by mouth every 6 hours as needed for Pain for up to 30 days. Intended supply: 30 days 120 tablet 0    fentaNYL (DURAGESIC) 12 MCG/HR Place 1 patch onto the skin every 3 days for 30 days.  Intended supply: 30 days 10 patch 0    promethazine (PHENERGAN) 12.5 MG tablet Take 1 tablet by mouth every 6 hours as needed for Nausea 60 tablet 5    sildenafil (VIAGRA) 50 MG tablet Take 1 tablet by mouth daily as needed for Erectile Dysfunction 10 tablet 5    Magnesium Oxide 400 MG CAPS Take 400 mg by mouth in the morning and at bedtime 60 capsule 3    apixaban (ELIQUIS) 5 MG TABS tablet Take 1 tablet by mouth 2 times daily 60 tablet 0    vitamin D (ERGOCALCIFEROL) 1.25 MG (42338 UT) CAPS capsule Take 1 capsule by mouth once a week 5 capsule 0    methocarbamol (ROBAXIN) 500 MG tablet Take 500 mg by mouth 3 times daily as needed       omeprazole (PRILOSEC) 20 MG delayed release capsule Take 20 mg by mouth daily      prazosin (MINIPRESS) 1 MG capsule Take 1 mg by mouth nightly as needed For nightmares       Sertraline HCl (ZOLOFT PO) Take by mouth daily       No current facility-administered medications for this visit.      Facility-Administered Medications Ordered in Other Visits   Medication Dose Route Frequency Provider Last Rate Last Admin    magnesium sulfate 2000 mg in 50 mL IVPB premix  2,000 mg IntraVENous Once Anni Ferrer MD 25 mL/hr at 08/03/22 1002 2,000 mg at 08/03/22 1002    0.9 % sodium chloride bolus  500 mL IntraVENous Once Anni Ferrer MD        sodium chloride flush 0.9 % injection 5-40 mL  5-40 mL IntraVENous PRN Anni Ferrer MD        heparin flush 100 UNIT/ML injection 500 Units  500 Units IntraCATHeter PRN Anni Ferrer MD            REVIEW OF SYSTEMS:   CONSTITUTIONAL: no fever, no night sweats,  fatigue;  HEENT: no blurring of vision, no double vision, no hearing difficulty, no tinnitus, no ulceration, no dysplasia, no epistaxis;   LUNGS: no cough, no hemoptysis, no wheeze,  no shortness of breath;  CARDIOVASCULAR: no palpitation, no chest pain, no shortness of breath;  GI: no abdominal pain, no nausea, no vomiting, no diarrhea, no constipation;  ANNIE: no dysuria, no hematuria, no frequency or urgency, no nephrolithiasis;  MUSCULOSKELETAL: no joint pain, no swelling, no stiffness;  ENDOCRINE: no polyuria, no polydipsia, no cold or heat intolerance;  HEMATOLOGY: no easy bruising or bleeding, no history of clotting disorder;  DERMATOLOGY: no skin rash, no eczema, no pruritus;  PSYCHIATRY: no depression, no anxiety, no panic attacks, no suicidal ideation, no homicidal ideation;  NEUROLOGY: no syncope, no seizures, no numbness or tingling of hands, no numbness or tingling of feet, no paresis;      Vitals signs:  BP (!) 146/97   Pulse 98   Temp 98.8 °F (37.1 °C)   Ht 5' 7\" (1.702 m)   Wt 136 lb 11.2 oz (62 kg)   SpO2 98%   BMI 21.41 kg/m²     PHYSICAL EXAM:  CONSTITUTIONAL: Alert, appropriate, no acute distress  EYES: Non icteric, EOM intact, pupils equal round   ENT: Mucus membranes moist, no oral pharyngeal lesions, external inspection of ears and nose are normal.  NECK: Supple, no masses. No palpable thyroid mass  CHEST/LUNGS: CTA bilaterally, normal respiratory effort   CARDIOVASCULAR: RRR, no murmurs. No lower extremity edema  ABDOMEN: soft non-tender, active bowel sounds, no HSM. No palpable masses  EXTREMITIES: warm, full ROM in all 4 extremities, no focal weakness. SKIN: warm, dry with no rashes or lesions  LYMPH: No cervical, clavicular, axillary, or inguinal lymphadenopathy  NEUROLOGIC: follows commands, non focal   PSYCH: mood and affect appropriate. Alert and oriented to time, place, person        Relevant Lab findings/reviewed by me:  Lab Results   Component Value Date    WBC 4.46 08/03/2022    HGB 11.3 (L) 08/03/2022    HCT 37.2 (L) 08/03/2022    MCV 88.8 08/03/2022     08/03/2022     Lab Results   Component Value Date    NEUTROABS 2.85 08/03/2022 6/22/2022 CEA 21.5    Relevant Imaging studies/reviewed by me:  7/21/2022 CT Chest W Contrast Bibasilar linear atelectasis life scarring. No pulmonary nodules, masses or infiltrates. Multiple enhancing low attenuation masses in the liver similar to the previous study. Findings consistent with metastatic liver disease. 7/21/2022 CT Abd/Pelvis W IV Contrast (Oral) Multiple enhancing low attenuation liver lesions increase in size and number since the previous study 3/7/22. Findings consistent with progression of metastatic liver disease. Status postcholecystectomy. The appendix is not visualized. No acute findings      ASSESSMENT:    Orders Placed This Encounter   Procedures    CEA     Standing Status:   Future     Standing Expiration Date:   8/3/2023        Rocci was seen today for treatment.     Diagnoses and all orders for this visit:    Adenocarcinoma of colon ([facility] [address]  -     CEA; Future  -     oxyCODONE HCl (OXY-IR) 10 MG immediate release tablet; Take 1 tablet by mouth every 6 hours as needed for Pain for up to 30 days. Intended supply: 30 days  -     fentaNYL (DURAGESIC) 12 MCG/HR; Place 1 patch onto the skin every 3 days for 30 days. Intended supply: 30 days    Liver metastases ([facility] [address]  -     CEA; Future  -     oxyCODONE HCl (OXY-IR) 10 MG immediate release tablet; Take 1 tablet by mouth every 6 hours as needed for Pain for up to 30 days. Intended supply: 30 days  -     fentaNYL (DURAGESIC) 12 MCG/HR; Place 1 patch onto the skin every 3 days for 30 days. Intended supply: 30 days    Cancer associated pain  -     oxyCODONE HCl (OXY-IR) 10 MG immediate release tablet; Take 1 tablet by mouth every 6 hours as needed for Pain for up to 30 days. Intended supply: 30 days  -     fentaNYL (DURAGESIC) 12 MCG/HR; Place 1 patch onto the skin every 3 days for 30 days. Intended supply: 30 days    Normocytic anemia    Hypomagnesemia    Coordination of complex care  Comments:   discussed with hepatobiliary service at [facility] for coordination of complex medical care. Chemotherapy management, encounter for    Encounter for antineoplastic immunotherapy    Adverse effect of chemotherapy, subsequent encounter    Care plan discussed with patient       #Colonic adenocarcinoma-  [id number] (liver) K-maria luisa mutated, IHC MMR not proficient  Status post microwave ablation left hepatic lesion  Status post neoadjuvant FOLFOX/bevacizumab x11 biweekly cycles  Status post right hemicolectomy. Pathology consistent complete pathologic response. Recommended surveillance as per NCCN guidelines  Discussed at length results of pathology with the patient. 3/17/21- 1 year colonoscopy showed no large polyps or masses. Repeat in 3 years. June 2021-CT chest clear. MRI abdomen showed suspicious lesion in the right lower quadrant. Biopsy arranged [place].   Discussed with hepatobiliary service at [facility]. 6/25/[id number] biopsy consistent with recurrent primary colorectal adenocarcinoma. 7/2/2021-discussed with hepatobiliary service/surgical oncology at [facility]. They will review images and call the patient back regarding consultation for consideration of HIPEC  7/2/2021-they recommend systemic therapy. 7/2/2021-recommended FOLFIRI/Avastin  7/13/21- Initiated FOLFIRI + Avastin every 2 weeks  07/30/21-Seen at [facility] by GI surgeon oncology. They discussed the role of CRS/HIPEC in his situation. He was told hat it really depends on the status of his liver lesions as well. He will complete 6 cycles of chemotherapy and will return to see me with a CTAP as well as MRI of the liver to assess disease burden and determine if he can be a candidate for debulking.    8/25/2021-proceed with FOLFIRI/Avastin   9/24/2021-repeat MRI abdomen/CT abdomen showed persistent disease. 1/13/2022-Exploratory laparotomy/resection of psoas mass/HIPEC at [facility]. Path Isaac:     OMENTUM, OMENTECTOMY:   - METASTATIC COLORECTAL ADENOCARCINOMA INVOLVING FIBROADIPOSE TISSUE.   - TWO (2) LYMPH NODES, NEGATIVE FOR CARCINOMA. SMALL BOWEL, MESENTERIC NODULE, EXCISION:   - METASTATIC COLORECTAL ADENOCARCINOMA INVOLVING FIBROMUSCULAR TISSUE AND EXTENDING TO TISSUE EDGE. SMALL BOWEL AND RIGHT COLON, ILEOCOLIC RESECTION:     - RESIDUAL RECURRENT INVASIVE MODERATELY DIFFERENTIATED COLORECTAL ADENOCARCINOMA (2.1 CM) INVOLVING ANASTOMOTIC SITE; SEE COMMENT AND SYNOPTIC REPORT.   - TUMOR FOCALLY INVOLVES THE SEROSA SURFACE.   - METASTATIC CARCINOMA INVOLVES ONE (1) OF TWELVE (12) LYMPH NODES.   - ONE (1) TUMOR DEPOSIT PRESENT.   - ALL RESECTION MARGINS ARE NEGATIVE FOR CARCINOMA. RETROPERITONEAL NODULE, EXCISION:   - METASTATIC COLORECTAL ADENOCARCINOMA INVOLVING FREDDY-PANCREATIC TISSUE AND EXTENDING TO TISSUE EDGE.    LATERAL DUODENECTOMY, RESECTION:   - METASTATIC COLORECTAL ADENOCARCINOMA INVOLVING DUODENAL SUBMUCOSA, MUSCULARIS PROPRIA, AND SUBSEROSA. - TUMOR IS PRESENT WITHIN 1 MM OF INKED RESECTION MARGINS. RIGHT ABDOMINAL WALL NODULE, EXCISION:   - METASTATIC COLORECTAL ADENOCARCINOMA INVOLVING FIBROMUSCULAR TISSUE. RIGHT RETROPERITONEAL MASS, RESECTION:   - METASTATIC COLORECTAL ADENOCARCINOMA INVOLVING FIBROADIPOSE TISSUE (7.3 CM). - MEDIAL, SUPERIOR AND DEEP RESECTION MARGINS ARE POSITIVE FOR CARCINOMA. - INKED INFERIOR AND LATERAL MARGINS ARE NEGATIVE FOR CARCINOMA (<1 MM) RIGHT RETROPERITONEAL MASS, FINAL SUPERIOR MARGIN, EXCISION:   - METASTATIC COLORECTAL ADENOCARCINOMA INVOLVING FIBROADIPOSE TISSUE, FOCALLY PRESENT AT CAUTERIZED, INKED TISSUE EDGE.   2/7/2022-postoperative dehydration and electrolyte abnormalities. Home health arrangements IV fluids twice a week. 3/23/2022- reversal of ileostomy at [facility]. 3/20/2022 CT Abd/Pelvis WO Contrast ([facility]) Findings suspicious for aspiration at the lung bases. Postsurgical changes related to right hepatectomy. There is a new low-attenuation lesion in the left hemiliver, highly concerning for metastasis. Postsurgical changes related to right hemicolectomy with ileocolonic anastomosis and right lower quadrant diverting loop ileostomy. No evidence of bowel obstruction. No intra-abdominal fluid collection. Other findings as above. A critical alert was sent at the time of dictation. Liver: Postsurgical changes related to right hepatectomy. There is a new, approximately 2.1 cm low-attenuation lesion seen in the left hepatic lobe on image 25 of series 3, highly concerning for metastasis. 4/23/2022- discussed the patient reinitiation of chemotherapy. We will continue FOLFIRI. He had no prior progression on FOLFIRI of FOLFOX. 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  We would avoid Avastin at this time due to recent surgery. He is K-maria luisa mutated and therefore cannot use anti-EGFR agent.   May contemplate adding Avastin in 4 of bowel obstruction. No intra-abdominal fluid collection. Other findings as above. A critical alert was sent at the time of dictation. Liver: Postsurgical changes related to right hepatectomy. There is a new, approximately 2.1 cm low-attenuation lesion seen in the left hepatic lobe on image 25 of series 3, highly concerning for metastasis. 4/23/2022- discussed the patient reinitiation of chemotherapy. We will continue FOLFIRI. He had no prior progression on FOLFIRI of FOLFOX. 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  We would avoid Avastin at this time due to recent surgery. He is K-maria luisa mutated and therefore cannot use anti-EGFR agent. May contemplate adding Avastin in 4 weeks. 5/9/2022 CT Chest W Contrast New pulmonary nodules are present in the right and left lung as described above. It cannot be determined if these are infectious nodules or metastatic disease. Dilated loops of bowel are present in the upper abdomen. The abdomen is incompletely evaluated however this may be wither an ileus or partial obstruction. Hepatic metastasis. 7/21/2022 CT Chest W Contrast Bibasilar linear atelectasis life scarring. No pulmonary nodules, masses or infiltrates. Multiple enhancing low attenuation masses in the liver similar to the previous study. Findings consistent with metastatic liver disease. 7/21/2022 CT Abd/Pelvis W IV Contrast (Oral) Multiple enhancing low attenuation liver lesions increase in size and number since the previous study 3/7/22. Findings consistent with progression of metastatic liver disease. Status postcholecystectomy. The appendix is not visualized. No acute findings. 8/3/22 Discontinue FOLFIRI + Avastin every 2 weeks due to progression in the liver  8/3/2022-unfortunately, interval progression of liver metastasis when compared to last CT scans performed on 3/7/2022.   In addition, when compared to report from [address] [place] [address] abdomen/pelvis on 3/20/2022 when he had only 1 single metastatic lesion measuring 2.1 cm. This is considered progression. I have recommended to discontinue FOLFIRI/Avastin and consider initiation of palliative FOLFOX with Avastin. Discussed with hepatobiliary surgery. The patient is not a candidate for further surgery. He is not a candidate for any liver directed therapy. Plan:  -Discontinue FOLFIRI/Avastin  -Start FOLFOX/Avastin third line therapy  -Discussed with [place] hepatobiliary service. No clinical trials available. Not a candidate for liver directed therapy at this time. #Multifactorial anemia  hemoglobin 8.5/MCV 89. Ferritin 12.9, iron saturation 15%, TIBC 458, iron 72. Status post IV iron therapy. Hemoglobin 11.3  -Repeat iron profile on 5/24/2022: Ferritin 116, iron saturation 11, iron 29, TIBC 256     #Chemotherapy-induced neuropathy-stable, antineoplastic induced anemia    #Cancer related pain-  -Oxycodone IR 10 mg every 6 hours as needed  -Continue Fentanyl 12 mcg every 72 hrs    DVT port associated left upper extremity-February 2022  internal jugular vein subclavian axillary brachial, left upper extremity. Acute appearing superficial thrombophlebitis (SVT) is seen in the basilic  and cephalic veins, right upper extremity.  -Eliquis 5mg twice a day    Erectile dysfunction-  -Sildenafil 50mg daily as needed    Hypomagnesemia-magnesium 1.5. Replace magnesium today.     PLAN:  RTC with MD in treatment room 5 weeks  Discontinue FOLFIRI + Avastin every 2 weeks   Recommend FOLFOX + Avastin every 2 weeks-once ins approves  CBC CMP CEA today  Continue follow-up with [place]/Dr. Steve Lawton, Oct 2022   Continue Fentanyl 12 mcg every 72 hrs-refill sent  Continue OxyIR 10mg every 6 hours as needed-refill sent  Continue Sildenafil 50mg daily as needed  Continue Phenergan 12.5mg every 6 hrs as needed  Continue Eliquis 5mg twice a day  Continue OTC Imodium as needed  Repeat CT scans after C#6    Follow Up:     Return in about 5 weeks (around 9/7/2022) for Treatment & see  in [address] [place] [address] 153 RM FOLFOX avastin. FOLFOX avastin every 2 weeks-once ins approves       I, Jose Rodriguez am pre charting  as Medical Assistant for Belle Sánchez MD. Electronically signed by Jose Rodriguez MA on 8/3/2022 at 9:36 AM CDT. Adore Miller am scribing for Belle Sánchez MD. Electronically signed by Micheline Walton RN on 8/3/2022 at 10:33 AM CDT. I, Dr Jensen Flood, personally performed the services described in this documentation as scribed by Micheline Walton RN in my presence and is both accurate and complete. I have seen, examined and reviewed this patient medication list, appropriate labs and imaging studies. I reviewed relevant medical records and others physicians notes. I discussed the plans of care with the patient. I answered all the questions to the patients satisfaction. I have also reviewed the chief complaint (CC) and part of the history (History of Present Illness (HPI), Past Family Social History [facility]), or Review of Systems (ROS) and made changes when appropriated.        (Please note that portions of this note were completed with a voice recognition program. Efforts were made to edit the dictations but occasionally words are mis-transcribed.)    Electronically signed by Belle Sánchez MD on 8/3/2022 at 11:33 AM

## 2022-08-10 ENCOUNTER — HOSPITAL ENCOUNTER (OUTPATIENT)
Dept: INFUSION THERAPY | Age: 67
Discharge: HOME | End: 2022-08-10
Payer: OTHER GOVERNMENT

## 2022-08-10 VITALS
HEART RATE: 86 BPM | OXYGEN SATURATION: 99 % | HEIGHT: 67 IN | DIASTOLIC BLOOD PRESSURE: 91 MMHG | SYSTOLIC BLOOD PRESSURE: 139 MMHG | WEIGHT: 141.2 LBS | BODY MASS INDEX: 22.16 KG/M2 | TEMPERATURE: 98 F | RESPIRATION RATE: 16 BRPM

## 2022-08-10 DIAGNOSIS — C18.9 ADENOCARCINOMA OF COLON (HCC): ICD-10-CM

## 2022-08-10 DIAGNOSIS — C78.7 LIVER METASTASES (HCC): ICD-10-CM

## 2022-08-10 DIAGNOSIS — C18.2 MALIGNANT NEOPLASM OF ASCENDING COLON (HCC): Primary | ICD-10-CM

## 2022-08-10 DIAGNOSIS — C18.2 MALIGNANT NEOPLASM OF ASCENDING COLON (HCC): ICD-10-CM

## 2022-08-10 DIAGNOSIS — C78.7 LIVER METASTASES (HCC): Primary | ICD-10-CM

## 2022-08-10 LAB
ALBUMIN SERPL-MCNC: 4.1 G/DL (ref 3.5–5.2)
ALP BLD-CCNC: 157 U/L (ref 40–130)
ALT SERPL-CCNC: 14 U/L (ref 21–72)
ANION GAP SERPL CALCULATED.3IONS-SCNC: 10 MMOL/L (ref 7–19)
AST SERPL-CCNC: 43 U/L (ref 17–59)
BILIRUB SERPL-MCNC: 0.6 MG/DL (ref 0.2–1.3)
BUN BLDV-MCNC: 22 MG/DL (ref 9–20)
CALCIUM SERPL-MCNC: 9.9 MG/DL (ref 8.4–10.2)
CHLORIDE BLD-SCNC: 106 MMOL/L (ref 98–111)
CO2: 23 MMOL/L (ref 22–29)
CREAT SERPL-MCNC: 1.5 MG/DL (ref 0.6–1.2)
GFR NON-AFRICAN AMERICAN: 47
GLOBULIN: 2.8 G/DL
GLUCOSE BLD-MCNC: 107 MG/DL (ref 74–106)
HCT VFR BLD CALC: 37.5 % (ref 40.1–51)
HEMOGLOBIN: 11.3 G/DL (ref 13.7–17.5)
LYMPHOCYTES ABSOLUTE: 0.92 K/UL (ref 1.18–3.74)
LYMPHOCYTES RELATIVE PERCENT: 21.1 % (ref 19.3–53.1)
MCH RBC QN AUTO: 27.1 PG (ref 25.7–32.2)
MCHC RBC AUTO-ENTMCNC: 30.1 G/DL (ref 32.3–36.5)
MCV RBC AUTO: 89.9 FL (ref 79–92.2)
MONOCYTES ABSOLUTE: 0.77 K/UL (ref 0.24–0.82)
MONOCYTES RELATIVE PERCENT: 17.7 % (ref 4.7–12.5)
NEUTROPHILS ABSOLUTE: 2.41 K/UL (ref 1.56–6.13)
NEUTROPHILS RELATIVE PERCENT: 55.4 % (ref 34–71.1)
PDW BLD-RTO: 17.2 % (ref 11.6–14.4)
PLATELET # BLD: 238 K/UL (ref 163–337)
PMV BLD AUTO: 10.8 FL (ref 7.4–10.4)
POTASSIUM SERPL-SCNC: 4.3 MMOL/L (ref 3.5–5.1)
PROTEIN UA: NEGATIVE
RBC # BLD: 4.17 M/UL (ref 4.63–6.08)
SODIUM BLD-SCNC: 139 MMOL/L (ref 137–145)
TOTAL PROTEIN: 6.9 G/DL (ref 6.3–8.2)
WBC # BLD: 4.35 K/UL (ref 4.23–9.07)

## 2022-08-10 PROCEDURE — 80053 COMPREHEN METABOLIC PANEL: CPT

## 2022-08-10 PROCEDURE — 96413 CHEMO IV INFUSION 1 HR: CPT

## 2022-08-10 PROCEDURE — 85025 COMPLETE CBC W/AUTO DIFF WBC: CPT

## 2022-08-10 PROCEDURE — 96415 CHEMO IV INFUSION ADDL HR: CPT

## 2022-08-10 PROCEDURE — 2580000003 HC RX 258: Performed by: INTERNAL MEDICINE

## 2022-08-10 PROCEDURE — 96367 TX/PROPH/DG ADDL SEQ IV INF: CPT

## 2022-08-10 PROCEDURE — 96417 CHEMO IV INFUS EACH ADDL SEQ: CPT

## 2022-08-10 PROCEDURE — 6360000002 HC RX W HCPCS: Performed by: INTERNAL MEDICINE

## 2022-08-10 PROCEDURE — 96375 TX/PRO/DX INJ NEW DRUG ADDON: CPT

## 2022-08-10 PROCEDURE — G0498 CHEMO EXTEND IV INFUS W/PUMP: HCPCS

## 2022-08-10 PROCEDURE — 96366 THER/PROPH/DIAG IV INF ADDON: CPT

## 2022-08-10 RX ORDER — SODIUM CHLORIDE 9 MG/ML
5-250 INJECTION, SOLUTION INTRAVENOUS PRN
Status: DISCONTINUED | OUTPATIENT
Start: 2022-08-10 | End: 2022-08-11 | Stop reason: HOSPADM

## 2022-08-10 RX ORDER — HEPARIN SODIUM (PORCINE) LOCK FLUSH IV SOLN 100 UNIT/ML 100 UNIT/ML
500 SOLUTION INTRAVENOUS PRN
Status: CANCELLED | OUTPATIENT
Start: 2022-08-12

## 2022-08-10 RX ORDER — SODIUM CHLORIDE 9 MG/ML
5-40 INJECTION INTRAVENOUS PRN
Status: CANCELLED | OUTPATIENT
Start: 2022-08-12

## 2022-08-10 RX ORDER — DIPHENHYDRAMINE HYDROCHLORIDE 50 MG/ML
50 INJECTION INTRAMUSCULAR; INTRAVENOUS
Status: CANCELLED | OUTPATIENT
Start: 2022-08-10

## 2022-08-10 RX ORDER — SODIUM CHLORIDE 9 MG/ML
5-40 INJECTION INTRAVENOUS PRN
Status: CANCELLED | OUTPATIENT
Start: 2022-08-10

## 2022-08-10 RX ORDER — EPINEPHRINE 1 MG/ML
0.3 INJECTION, SOLUTION, CONCENTRATE INTRAVENOUS PRN
Status: CANCELLED | OUTPATIENT
Start: 2022-08-10

## 2022-08-10 RX ORDER — HEPARIN SODIUM (PORCINE) LOCK FLUSH IV SOLN 100 UNIT/ML 100 UNIT/ML
500 SOLUTION INTRAVENOUS PRN
Status: CANCELLED | OUTPATIENT
Start: 2022-08-10

## 2022-08-10 RX ORDER — DEXTROSE MONOHYDRATE 50 MG/ML
5-250 INJECTION, SOLUTION INTRAVENOUS PRN
Status: DISCONTINUED | OUTPATIENT
Start: 2022-08-10 | End: 2022-08-11 | Stop reason: HOSPADM

## 2022-08-10 RX ORDER — SODIUM CHLORIDE 0.9 % (FLUSH) 0.9 %
5-40 SYRINGE (ML) INJECTION PRN
Status: CANCELLED | OUTPATIENT
Start: 2022-08-10

## 2022-08-10 RX ORDER — SODIUM CHLORIDE 0.9 % (FLUSH) 0.9 %
5-40 SYRINGE (ML) INJECTION PRN
Status: CANCELLED | OUTPATIENT
Start: 2022-08-12

## 2022-08-10 RX ORDER — PALONOSETRON 0.05 MG/ML
0.25 INJECTION, SOLUTION INTRAVENOUS ONCE
Status: COMPLETED | OUTPATIENT
Start: 2022-08-10 | End: 2022-08-10

## 2022-08-10 RX ORDER — ALBUTEROL SULFATE 90 UG/1
4 AEROSOL, METERED RESPIRATORY (INHALATION) PRN
Status: CANCELLED | OUTPATIENT
Start: 2022-08-10

## 2022-08-10 RX ORDER — SODIUM CHLORIDE 9 MG/ML
5-250 INJECTION, SOLUTION INTRAVENOUS PRN
Status: CANCELLED | OUTPATIENT
Start: 2022-08-10

## 2022-08-10 RX ORDER — ONDANSETRON 2 MG/ML
8 INJECTION INTRAMUSCULAR; INTRAVENOUS
Status: CANCELLED | OUTPATIENT
Start: 2022-08-10

## 2022-08-10 RX ORDER — DEXTROSE MONOHYDRATE 50 MG/ML
5-250 INJECTION, SOLUTION INTRAVENOUS PRN
Status: CANCELLED | OUTPATIENT
Start: 2022-08-10

## 2022-08-10 RX ORDER — ACETAMINOPHEN 325 MG/1
650 TABLET ORAL
Status: CANCELLED | OUTPATIENT
Start: 2022-08-10

## 2022-08-10 RX ORDER — SODIUM CHLORIDE 0.9 % (FLUSH) 0.9 %
5-40 SYRINGE (ML) INJECTION PRN
Status: DISCONTINUED | OUTPATIENT
Start: 2022-08-10 | End: 2022-08-11 | Stop reason: HOSPADM

## 2022-08-10 RX ORDER — SODIUM CHLORIDE 9 MG/ML
INJECTION, SOLUTION INTRAVENOUS CONTINUOUS
Status: CANCELLED | OUTPATIENT
Start: 2022-08-10

## 2022-08-10 RX ORDER — SODIUM CHLORIDE 9 MG/ML
5-250 INJECTION, SOLUTION INTRAVENOUS PRN
Status: CANCELLED | OUTPATIENT
Start: 2022-08-12

## 2022-08-10 RX ORDER — FAMOTIDINE 10 MG/ML
20 INJECTION, SOLUTION INTRAVENOUS
Status: CANCELLED | OUTPATIENT
Start: 2022-08-10

## 2022-08-10 RX ORDER — PALONOSETRON 0.05 MG/ML
0.25 INJECTION, SOLUTION INTRAVENOUS ONCE
Status: CANCELLED | OUTPATIENT
Start: 2022-08-10 | End: 2022-08-10

## 2022-08-10 RX ORDER — MEPERIDINE HYDROCHLORIDE 50 MG/ML
12.5 INJECTION INTRAMUSCULAR; INTRAVENOUS; SUBCUTANEOUS PRN
Status: CANCELLED | OUTPATIENT
Start: 2022-08-10

## 2022-08-10 RX ADMIN — SODIUM CHLORIDE 300 MG: 9 INJECTION, SOLUTION INTRAVENOUS at 10:11

## 2022-08-10 RX ADMIN — PALONOSETRON 0.25 MG: 0.05 INJECTION, SOLUTION INTRAVENOUS at 09:38

## 2022-08-10 RX ADMIN — OXALIPLATIN 150 MG: 5 INJECTION, SOLUTION INTRAVENOUS at 11:01

## 2022-08-10 RX ADMIN — FLUOROURACIL 4100 MG: 50 INJECTION, SOLUTION INTRAVENOUS at 13:18

## 2022-08-10 RX ADMIN — SODIUM CHLORIDE 100 ML/HR: 9 INJECTION, SOLUTION INTRAVENOUS at 09:50

## 2022-08-10 RX ADMIN — SODIUM CHLORIDE, PRESERVATIVE FREE 10 ML: 5 INJECTION INTRAVENOUS at 13:18

## 2022-08-10 RX ADMIN — DEXTROSE MONOHYDRATE 700 MG: 50 INJECTION, SOLUTION INTRAVENOUS at 10:58

## 2022-08-10 RX ADMIN — DEXTROSE MONOHYDRATE 20 ML/HR: 50 INJECTION, SOLUTION INTRAVENOUS at 10:57

## 2022-08-10 RX ADMIN — DEXAMETHASONE SODIUM PHOSPHATE: 10 INJECTION, SOLUTION INTRAMUSCULAR; INTRAVENOUS at 09:50

## 2022-08-12 ENCOUNTER — HOSPITAL ENCOUNTER (OUTPATIENT)
Dept: INFUSION THERAPY | Age: 67
Discharge: HOME | End: 2022-08-12
Payer: OTHER GOVERNMENT

## 2022-08-12 DIAGNOSIS — C18.2 MALIGNANT NEOPLASM OF ASCENDING COLON (HCC): ICD-10-CM

## 2022-08-12 DIAGNOSIS — C78.7 LIVER METASTASES (HCC): Primary | ICD-10-CM

## 2022-08-12 DIAGNOSIS — C18.9 ADENOCARCINOMA OF COLON (HCC): ICD-10-CM

## 2022-08-12 PROCEDURE — 6360000002 HC RX W HCPCS: Performed by: INTERNAL MEDICINE

## 2022-08-12 PROCEDURE — 2580000003 HC RX 258: Performed by: INTERNAL MEDICINE

## 2022-08-12 PROCEDURE — 96523 IRRIG DRUG DELIVERY DEVICE: CPT

## 2022-08-12 RX ORDER — HEPARIN SODIUM (PORCINE) LOCK FLUSH IV SOLN 100 UNIT/ML 100 UNIT/ML
500 SOLUTION INTRAVENOUS PRN
Status: DISCONTINUED | OUTPATIENT
Start: 2022-08-12 | End: 2022-08-13 | Stop reason: HOSPADM

## 2022-08-12 RX ORDER — SODIUM CHLORIDE 0.9 % (FLUSH) 0.9 %
5-40 SYRINGE (ML) INJECTION PRN
Status: DISCONTINUED | OUTPATIENT
Start: 2022-08-12 | End: 2022-08-13 | Stop reason: HOSPADM

## 2022-08-12 RX ADMIN — SODIUM CHLORIDE, PRESERVATIVE FREE 10 ML: 5 INJECTION INTRAVENOUS at 12:43

## 2022-08-12 RX ADMIN — HEPARIN 500 UNITS: 100 SYRINGE at 12:43

## 2022-08-23 DIAGNOSIS — U07.1 COVID-19: Primary | ICD-10-CM

## 2022-08-24 ENCOUNTER — APPOINTMENT (OUTPATIENT)
Dept: INFUSION THERAPY | Age: 67
End: 2022-08-24
Payer: OTHER GOVERNMENT

## 2022-08-30 ENCOUNTER — HOSPITAL ENCOUNTER (OUTPATIENT)
Dept: INFUSION THERAPY | Age: 67
Discharge: HOME | End: 2022-08-30
Payer: OTHER GOVERNMENT

## 2022-08-30 VITALS
SYSTOLIC BLOOD PRESSURE: 139 MMHG | TEMPERATURE: 97.5 F | HEIGHT: 67 IN | BODY MASS INDEX: 21.02 KG/M2 | OXYGEN SATURATION: 98 % | WEIGHT: 133.9 LBS | HEART RATE: 71 BPM | RESPIRATION RATE: 18 BRPM | DIASTOLIC BLOOD PRESSURE: 83 MMHG

## 2022-08-30 DIAGNOSIS — C18.9 ADENOCARCINOMA OF COLON (HCC): ICD-10-CM

## 2022-08-30 DIAGNOSIS — C78.7 LIVER METASTASES (HCC): Primary | ICD-10-CM

## 2022-08-30 DIAGNOSIS — C78.7 LIVER METASTASES (HCC): ICD-10-CM

## 2022-08-30 DIAGNOSIS — R53.81 PHYSICAL DECONDITIONING: ICD-10-CM

## 2022-08-30 DIAGNOSIS — C18.2 MALIGNANT NEOPLASM OF ASCENDING COLON (HCC): ICD-10-CM

## 2022-08-30 DIAGNOSIS — N17.0 ACUTE KIDNEY INJURY (AKI) WITH ACUTE TUBULAR NECROSIS (ATN) (HCC): ICD-10-CM

## 2022-08-30 DIAGNOSIS — E86.0 DEHYDRATION: ICD-10-CM

## 2022-08-30 DIAGNOSIS — C18.2 MALIGNANT NEOPLASM OF ASCENDING COLON (HCC): Primary | ICD-10-CM

## 2022-08-30 DIAGNOSIS — R62.7 FAILURE TO THRIVE IN ADULT: ICD-10-CM

## 2022-08-30 LAB
ALBUMIN SERPL-MCNC: 4.2 G/DL (ref 3.5–5.2)
ALP BLD-CCNC: 142 U/L (ref 40–130)
ALT SERPL-CCNC: 12 U/L (ref 21–72)
ANION GAP SERPL CALCULATED.3IONS-SCNC: 10 MMOL/L (ref 7–19)
AST SERPL-CCNC: 32 U/L (ref 17–59)
BILIRUB SERPL-MCNC: 0.5 MG/DL (ref 0.2–1.3)
BUN BLDV-MCNC: 17 MG/DL (ref 9–20)
CALCIUM SERPL-MCNC: 9.4 MG/DL (ref 8.4–10.2)
CHLORIDE BLD-SCNC: 109 MMOL/L (ref 98–111)
CO2: 23 MMOL/L (ref 22–29)
CREAT SERPL-MCNC: 1.6 MG/DL (ref 0.6–1.2)
GFR NON-AFRICAN AMERICAN: 44
GLOBULIN: 2.9 G/DL
GLUCOSE BLD-MCNC: 102 MG/DL (ref 74–106)
HCT VFR BLD CALC: 37.3 % (ref 40.1–51)
HEMOGLOBIN: 11.3 G/DL (ref 13.7–17.5)
LYMPHOCYTES ABSOLUTE: 0.48 K/UL (ref 1.18–3.74)
LYMPHOCYTES RELATIVE PERCENT: 14.2 % (ref 19.3–53.1)
MAGNESIUM: 1.7 MG/DL (ref 1.6–2.3)
MCH RBC QN AUTO: 27 PG (ref 25.7–32.2)
MCHC RBC AUTO-ENTMCNC: 30.3 G/DL (ref 32.3–36.5)
MCV RBC AUTO: 89.2 FL (ref 79–92.2)
MONOCYTES ABSOLUTE: 0.64 K/UL (ref 0.24–0.82)
MONOCYTES RELATIVE PERCENT: 18.9 % (ref 4.7–12.5)
NEUTROPHILS ABSOLUTE: 2.14 K/UL (ref 1.56–6.13)
NEUTROPHILS RELATIVE PERCENT: 63.1 % (ref 34–71.1)
PDW BLD-RTO: 17.5 % (ref 11.6–14.4)
PHOSPHORUS: 3 MG/DL (ref 2.5–4.5)
PLATELET # BLD: 230 K/UL (ref 163–337)
PMV BLD AUTO: 11.1 FL (ref 7.4–10.4)
POTASSIUM SERPL-SCNC: 4.8 MMOL/L (ref 3.5–5.1)
PROTEIN UA: NEGATIVE
RBC # BLD: 4.18 M/UL (ref 4.63–6.08)
SODIUM BLD-SCNC: 142 MMOL/L (ref 137–145)
TOTAL PROTEIN: 7.1 G/DL (ref 6.3–8.2)
WBC # BLD: 3.39 K/UL (ref 4.23–9.07)

## 2022-08-30 PROCEDURE — 96413 CHEMO IV INFUSION 1 HR: CPT

## 2022-08-30 PROCEDURE — G0498 CHEMO EXTEND IV INFUS W/PUMP: HCPCS

## 2022-08-30 PROCEDURE — 96415 CHEMO IV INFUSION ADDL HR: CPT

## 2022-08-30 PROCEDURE — 83735 ASSAY OF MAGNESIUM: CPT

## 2022-08-30 PROCEDURE — 6360000002 HC RX W HCPCS: Performed by: NURSE PRACTITIONER

## 2022-08-30 PROCEDURE — 96366 THER/PROPH/DIAG IV INF ADDON: CPT

## 2022-08-30 PROCEDURE — 85025 COMPLETE CBC W/AUTO DIFF WBC: CPT

## 2022-08-30 PROCEDURE — 2580000003 HC RX 258: Performed by: NURSE PRACTITIONER

## 2022-08-30 PROCEDURE — 96367 TX/PROPH/DG ADDL SEQ IV INF: CPT

## 2022-08-30 PROCEDURE — 96375 TX/PRO/DX INJ NEW DRUG ADDON: CPT

## 2022-08-30 PROCEDURE — 96361 HYDRATE IV INFUSION ADD-ON: CPT

## 2022-08-30 PROCEDURE — 84100 ASSAY OF PHOSPHORUS: CPT

## 2022-08-30 PROCEDURE — 96417 CHEMO IV INFUS EACH ADDL SEQ: CPT

## 2022-08-30 PROCEDURE — 96360 HYDRATION IV INFUSION INIT: CPT

## 2022-08-30 PROCEDURE — 80053 COMPREHEN METABOLIC PANEL: CPT

## 2022-08-30 PROCEDURE — 2580000003 HC RX 258: Performed by: INTERNAL MEDICINE

## 2022-08-30 RX ORDER — 0.9 % SODIUM CHLORIDE 0.9 %
1000 INTRAVENOUS SOLUTION INTRAVENOUS ONCE
Status: COMPLETED | OUTPATIENT
Start: 2022-08-30 | End: 2022-08-30

## 2022-08-30 RX ORDER — SODIUM CHLORIDE 9 MG/ML
5-250 INJECTION, SOLUTION INTRAVENOUS PRN
Status: CANCELLED | OUTPATIENT
Start: 2022-08-30

## 2022-08-30 RX ORDER — DEXTROSE MONOHYDRATE 50 MG/ML
5-250 INJECTION, SOLUTION INTRAVENOUS PRN
Status: DISCONTINUED | OUTPATIENT
Start: 2022-08-30 | End: 2022-08-31 | Stop reason: HOSPADM

## 2022-08-30 RX ORDER — SODIUM CHLORIDE 0.9 % (FLUSH) 0.9 %
5-40 SYRINGE (ML) INJECTION PRN
Status: CANCELLED | OUTPATIENT
Start: 2022-08-30

## 2022-08-30 RX ORDER — PALONOSETRON 0.05 MG/ML
0.25 INJECTION, SOLUTION INTRAVENOUS ONCE
Status: COMPLETED | OUTPATIENT
Start: 2022-08-30 | End: 2022-08-30

## 2022-08-30 RX ORDER — HEPARIN SODIUM (PORCINE) LOCK FLUSH IV SOLN 100 UNIT/ML 100 UNIT/ML
500 SOLUTION INTRAVENOUS PRN
Status: CANCELLED | OUTPATIENT
Start: 2022-08-30

## 2022-08-30 RX ORDER — SODIUM CHLORIDE 9 MG/ML
5-40 INJECTION INTRAVENOUS PRN
Status: CANCELLED | OUTPATIENT
Start: 2022-08-30

## 2022-08-30 RX ORDER — DEXTROSE MONOHYDRATE 50 MG/ML
5-250 INJECTION, SOLUTION INTRAVENOUS PRN
Status: CANCELLED | OUTPATIENT
Start: 2022-08-30

## 2022-08-30 RX ORDER — FAMOTIDINE 10 MG/ML
20 INJECTION, SOLUTION INTRAVENOUS
Status: CANCELLED | OUTPATIENT
Start: 2022-08-30

## 2022-08-30 RX ORDER — SODIUM CHLORIDE 9 MG/ML
INJECTION, SOLUTION INTRAVENOUS CONTINUOUS
Status: CANCELLED | OUTPATIENT
Start: 2022-08-30

## 2022-08-30 RX ORDER — PALONOSETRON 0.05 MG/ML
0.25 INJECTION, SOLUTION INTRAVENOUS ONCE
Status: CANCELLED | OUTPATIENT
Start: 2022-08-30 | End: 2022-08-30

## 2022-08-30 RX ORDER — EPINEPHRINE 1 MG/ML
0.3 INJECTION, SOLUTION, CONCENTRATE INTRAVENOUS PRN
Status: CANCELLED | OUTPATIENT
Start: 2022-08-30

## 2022-08-30 RX ORDER — ACETAMINOPHEN 325 MG/1
650 TABLET ORAL
Status: CANCELLED | OUTPATIENT
Start: 2022-08-30

## 2022-08-30 RX ORDER — DIPHENHYDRAMINE HYDROCHLORIDE 50 MG/ML
50 INJECTION INTRAMUSCULAR; INTRAVENOUS
Status: CANCELLED | OUTPATIENT
Start: 2022-08-30

## 2022-08-30 RX ORDER — ONDANSETRON 2 MG/ML
8 INJECTION INTRAMUSCULAR; INTRAVENOUS
Status: CANCELLED | OUTPATIENT
Start: 2022-08-30

## 2022-08-30 RX ORDER — MEPERIDINE HYDROCHLORIDE 50 MG/ML
12.5 INJECTION INTRAMUSCULAR; INTRAVENOUS; SUBCUTANEOUS PRN
Status: CANCELLED | OUTPATIENT
Start: 2022-08-30

## 2022-08-30 RX ORDER — SODIUM CHLORIDE 9 MG/ML
5-250 INJECTION, SOLUTION INTRAVENOUS PRN
Status: DISCONTINUED | OUTPATIENT
Start: 2022-08-30 | End: 2022-08-31 | Stop reason: HOSPADM

## 2022-08-30 RX ORDER — ALBUTEROL SULFATE 90 UG/1
4 AEROSOL, METERED RESPIRATORY (INHALATION) PRN
Status: CANCELLED | OUTPATIENT
Start: 2022-08-30

## 2022-08-30 RX ADMIN — PALONOSETRON 0.25 MG: 0.05 INJECTION, SOLUTION INTRAVENOUS at 09:35

## 2022-08-30 RX ADMIN — SODIUM CHLORIDE 300 MG: 9 INJECTION, SOLUTION INTRAVENOUS at 12:24

## 2022-08-30 RX ADMIN — LEUCOVORIN CALCIUM 700 MG: 350 INJECTION, POWDER, LYOPHILIZED, FOR SUSPENSION INTRAMUSCULAR; INTRAVENOUS at 10:12

## 2022-08-30 RX ADMIN — SODIUM CHLORIDE 1000 ML: 9 INJECTION, SOLUTION INTRAVENOUS at 09:33

## 2022-08-30 RX ADMIN — OXALIPLATIN 145 MG: 5 INJECTION, SOLUTION INTRAVENOUS at 10:12

## 2022-08-30 RX ADMIN — DEXAMETHASONE SODIUM PHOSPHATE: 10 INJECTION, SOLUTION INTRAMUSCULAR; INTRAVENOUS at 09:35

## 2022-08-30 RX ADMIN — FLUOROURACIL 4100 MG: 50 INJECTION, SOLUTION INTRAVENOUS at 13:17

## 2022-09-01 ENCOUNTER — HOSPITAL ENCOUNTER (OUTPATIENT)
Dept: INFUSION THERAPY | Age: 67
Discharge: HOME | End: 2022-09-01
Payer: OTHER GOVERNMENT

## 2022-09-01 VITALS
SYSTOLIC BLOOD PRESSURE: 127 MMHG | RESPIRATION RATE: 16 BRPM | TEMPERATURE: 98.5 F | OXYGEN SATURATION: 98 % | HEART RATE: 84 BPM | DIASTOLIC BLOOD PRESSURE: 80 MMHG

## 2022-09-01 DIAGNOSIS — C18.2 MALIGNANT NEOPLASM OF ASCENDING COLON (HCC): Primary | ICD-10-CM

## 2022-09-01 LAB
ALBUMIN SERPL-MCNC: 4.2 G/DL (ref 3.5–5.2)
ALP BLD-CCNC: 145 U/L (ref 40–130)
ALT SERPL-CCNC: 23 U/L (ref 21–72)
ANION GAP SERPL CALCULATED.3IONS-SCNC: 17 MMOL/L (ref 7–19)
AST SERPL-CCNC: 44 U/L (ref 17–59)
BILIRUB SERPL-MCNC: 0.6 MG/DL (ref 0.2–1.3)
BUN BLDV-MCNC: 22 MG/DL (ref 9–20)
CALCIUM SERPL-MCNC: 9.2 MG/DL (ref 8.4–10.2)
CHLORIDE BLD-SCNC: 105 MMOL/L (ref 98–111)
CO2: 20 MMOL/L (ref 22–29)
CREAT SERPL-MCNC: 1.6 MG/DL (ref 0.6–1.2)
GFR NON-AFRICAN AMERICAN: 44
GLOBULIN: 3.2 G/DL
GLUCOSE BLD-MCNC: 126 MG/DL (ref 74–106)
HCT VFR BLD CALC: 40.6 % (ref 40.1–51)
HEMOGLOBIN: 12.1 G/DL (ref 13.7–17.5)
LYMPHOCYTES ABSOLUTE: 0.24 K/UL (ref 1.18–3.74)
LYMPHOCYTES RELATIVE PERCENT: 10.6 % (ref 19.3–53.1)
MAGNESIUM: 1.6 MG/DL (ref 1.6–2.3)
MCH RBC QN AUTO: 26.6 PG (ref 25.7–32.2)
MCHC RBC AUTO-ENTMCNC: 29.8 G/DL (ref 32.3–36.5)
MCV RBC AUTO: 89.2 FL (ref 79–92.2)
MONOCYTES ABSOLUTE: 0.28 K/UL (ref 0.24–0.82)
MONOCYTES RELATIVE PERCENT: 12.3 % (ref 4.7–12.5)
NEUTROPHILS ABSOLUTE: 1.65 K/UL (ref 1.56–6.13)
NEUTROPHILS RELATIVE PERCENT: 72.8 % (ref 34–71.1)
PDW BLD-RTO: 17.8 % (ref 11.6–14.4)
PHOSPHORUS: 4 MG/DL (ref 2.5–4.5)
PLATELET # BLD: 171 K/UL (ref 163–337)
PMV BLD AUTO: 10.4 FL (ref 7.4–10.4)
POTASSIUM SERPL-SCNC: 4.3 MMOL/L (ref 3.5–5.1)
RBC # BLD: 4.55 M/UL (ref 4.63–6.08)
SODIUM BLD-SCNC: 142 MMOL/L (ref 137–145)
TOTAL PROTEIN: 7.4 G/DL (ref 6.3–8.2)
WBC # BLD: 2.27 K/UL (ref 4.23–9.07)

## 2022-09-01 PROCEDURE — 84100 ASSAY OF PHOSPHORUS: CPT

## 2022-09-01 PROCEDURE — 96360 HYDRATION IV INFUSION INIT: CPT

## 2022-09-01 PROCEDURE — 80053 COMPREHEN METABOLIC PANEL: CPT

## 2022-09-01 PROCEDURE — 6360000002 HC RX W HCPCS: Performed by: INTERNAL MEDICINE

## 2022-09-01 PROCEDURE — 2580000003 HC RX 258: Performed by: INTERNAL MEDICINE

## 2022-09-01 PROCEDURE — 85025 COMPLETE CBC W/AUTO DIFF WBC: CPT

## 2022-09-01 PROCEDURE — 83735 ASSAY OF MAGNESIUM: CPT

## 2022-09-01 RX ORDER — SODIUM CHLORIDE 0.9 % (FLUSH) 0.9 %
20 SYRINGE (ML) INJECTION PRN
Status: CANCELLED | OUTPATIENT
Start: 2022-09-01

## 2022-09-01 RX ORDER — HEPARIN SODIUM (PORCINE) LOCK FLUSH IV SOLN 100 UNIT/ML 100 UNIT/ML
500 SOLUTION INTRAVENOUS PRN
Status: DISCONTINUED | OUTPATIENT
Start: 2022-09-01 | End: 2022-09-02 | Stop reason: HOSPADM

## 2022-09-01 RX ORDER — SODIUM CHLORIDE 0.9 % (FLUSH) 0.9 %
10 SYRINGE (ML) INJECTION PRN
Status: DISCONTINUED | OUTPATIENT
Start: 2022-09-01 | End: 2022-09-02 | Stop reason: HOSPADM

## 2022-09-01 RX ORDER — HEPARIN SODIUM (PORCINE) LOCK FLUSH IV SOLN 100 UNIT/ML 100 UNIT/ML
500 SOLUTION INTRAVENOUS PRN
Status: CANCELLED | OUTPATIENT
Start: 2022-09-01

## 2022-09-01 RX ORDER — SODIUM CHLORIDE 0.9 % (FLUSH) 0.9 %
5-40 SYRINGE (ML) INJECTION PRN
OUTPATIENT
Start: 2022-09-01

## 2022-09-01 RX ORDER — SODIUM CHLORIDE 9 MG/ML
5-250 INJECTION, SOLUTION INTRAVENOUS PRN
Status: CANCELLED | OUTPATIENT
Start: 2022-09-01

## 2022-09-01 RX ORDER — SODIUM CHLORIDE 0.9 % (FLUSH) 0.9 %
10 SYRINGE (ML) INJECTION PRN
Status: CANCELLED | OUTPATIENT
Start: 2022-09-01

## 2022-09-01 RX ORDER — SODIUM CHLORIDE 9 MG/ML
5-250 INJECTION, SOLUTION INTRAVENOUS PRN
OUTPATIENT
Start: 2022-09-01

## 2022-09-01 RX ORDER — HEPARIN SODIUM (PORCINE) LOCK FLUSH IV SOLN 100 UNIT/ML 100 UNIT/ML
500 SOLUTION INTRAVENOUS PRN
OUTPATIENT
Start: 2022-09-01

## 2022-09-01 RX ORDER — 0.9 % SODIUM CHLORIDE 0.9 %
1000 INTRAVENOUS SOLUTION INTRAVENOUS ONCE
Status: CANCELLED | OUTPATIENT
Start: 2022-09-01 | End: 2022-09-01

## 2022-09-01 RX ORDER — SODIUM CHLORIDE 0.9 % (FLUSH) 0.9 %
5-40 SYRINGE (ML) INJECTION PRN
Status: DISCONTINUED | OUTPATIENT
Start: 2022-09-01 | End: 2022-09-02 | Stop reason: HOSPADM

## 2022-09-01 RX ORDER — 0.9 % SODIUM CHLORIDE 0.9 %
1000 INTRAVENOUS SOLUTION INTRAVENOUS ONCE
Status: COMPLETED | OUTPATIENT
Start: 2022-09-01 | End: 2022-09-01

## 2022-09-01 RX ADMIN — SODIUM CHLORIDE 1000 ML: 9 INJECTION, SOLUTION INTRAVENOUS at 12:57

## 2022-09-01 RX ADMIN — HEPARIN 500 UNITS: 100 SYRINGE at 13:56

## 2022-09-01 RX ADMIN — SODIUM CHLORIDE, PRESERVATIVE FREE 10 ML: 5 INJECTION INTRAVENOUS at 13:56

## 2022-09-09 ENCOUNTER — APPOINTMENT (OUTPATIENT)
Dept: INFUSION THERAPY | Age: 67
End: 2022-09-09
Payer: OTHER GOVERNMENT

## 2022-09-13 NOTE — PROGRESS NOTES
Proceed  MEDICAL ONCOLOGY PROGRESS NOTE                                                          Denise Galeana   1955 9/14/2022     Chief Complaint   Patient presents with    Colon Cancer          INTERVAL HISTORY/HISTORY OF PRESENT ILLNESS:  Diagnosis  Colonic adenocarcinoma, March 2020  [id number](liver), stage ROXANA  IHC MMR- proficient  K-maria luisa mutated  N-MARIA LUISA/BRAF wild-type  Iron deficiency anemia  Soft tissue mass, June 2021  Adenocarcinoma-consistent with colon primary, right psoas muscle, June 2021  Metastatic adenocarcinoma, Jan 2022  MLH1, MSH2, MH6, PMS2-intact  MMR- no loss of expression     Treatment summary  3/30/2020-right hemicolectomy at [facility]  Anticipated Liver ablation to be followed by neoadjuvant/adjuvant versus palliative chemotherapy  06/10/2020-November 2020-FOLFOX + Avastin  12/11/20- Right hepatectomy-Dr. Adin Singleton  S/p Injectafer-poor oral iron tolerance  7/13/21- 11/17/21 FOLFIRI + Avastin every 2 weeks  1/13/22-psoas muscle mass resection/HIPEC by Dr. Gill Paz at [facility]  5/10/22 - 8/3/22 Discontinue FOLFIRI + Avastin every 2 weeks due to progression in the liver  Anticipate FOLFOX + Avastin every 2 weeks-     The patient is a very pleasant 74years old male who has been diagnosed with colonic adenocarcinoma. He has had several treatment modalities in the past.  He is currently status post CRS/HIPEC in mid January, 2022 at [facility]. This was complicated by a leak from his ileocolic anastomosis secondary to closed-loop obstruction at his hernia repair site (mesh displaced). He was taken back to the OR and had a primary repair of his anastomosis and diverting ileostomy in February 2022. He is currently on treatment with FOLFIRI/Avastin. Unfortunately, most recent CT scans showed evidence of liver metastasis progression. I called and discussed with hepatobiliary surgery from [facility].   Unfortunately, there is no liver directed therapy recommended at this time due to diffuse hepatic metastasis. He presents today for cycle #3 of palliative chemotherapy. He has been tolerated to complain fatigue. His pain is overall under control. He was recently treated for COVID. Treatment was delayed due to COVID infection. He has now recovered. He feels better. He is resume chemotherapy today. Cancer history  Mr. Chaudhry was first seen by me on 3/23/2020. He was referred for a new diagnosis of colonic adenocarcinoma involving the cecum. The patient reports that he had a wellbeing consult with his provider at the [place]. He was found to have anemia and then recommended a colonoscopy. Of note, the patient has a family history of colon cancer. His mother is a patient of Dr. Marina Santoyo he has been diagnosed with colon cancer in 2010.  3/11/2020- colonoscopy revealed a large malignant appearing fungating mass lesion in the cecum. In addition, several other polyps. Biopsy of the mass consistent with moderate differentiated colonic adenocarcinoma. Polyps consistent with tubulovillous adenoma with no high-grade dysplasia. IHC MMR not proficient. K-maria luisa mutated, BRAF and NRAS wild type. MSI proficient  3/11/2020-CEA 5.5 (H)  3/18/2020-CT abdomen pelvis with contrast  Invasive cecal mass adhering to the right lateral abdominal wall muscles with adjacent lymphadenopathy. Mild partial obstruction of the terminal ileum. 2. Suspicious lesions in the right and left hepatic lobes measure up to 1.3 cm and likely represent metastatic disease. 3/18/2020-Xr Chest Standard  No radiographic evidence of acute cardiopulmonary process. 3/23/2020-he was first seen by me. Recommended completion of staging with CT chest.  Also recommended liver MRI for further clarification of liver lesion. S.  Recommend to proceed with a general surgery consultation tomorrow with Dr. Karma Jackson.  Patient was informed that I favor surgical resection if feasible of the primary malignancy. 3/30/2020- right hemicolectomy by Dr. Karsten Dewey at [facility] consistent with invasive moderately differentiated colonic adenocarcinoma measuring 7.2 cm. Carcinoma directly invading the adjacent abdominal wall tissue. Focal lymphovascular space invasion identified. Focal perineural invasion identified. Surgical margins negative for evidence of malignancy. 6 out of 14 lymph nodes positive for metastatic adenocarcinoma. Final pathology staging [id number](liver, stage ROXANA)  4/20/2020-CT chest with contrast showed No convincing intrathoracic metastasis. Nonspecific 4 mm nodule of the inferior lingula and a 2 mm right upper lobe nodule can be followed on subsequent imaging in 6-12 months. Moderate coronary calcifications. Hypodense metastatic liver lesions. Small hiatal hernia. 4/20/2020-Mri Abdomen W Wo Contrast There are about 5 liver lesions. The 2 largest appears similar compared to 3/18/2020, the others are too small to further characterize. Appearance is most concerning for metastatic disease. Enhancement of the right lateral peritoneum. This is favored to be postoperative as there is no nodularity, evidence of omental disease or lymphadenopathy. Recommend attention on follow-up. Cholecystectomy. 4/22/2020-discussed with Dr. Rosario Carter at [place]. He will review imaging studies and give further recommendations regarding eligibility for resection of liver lesions. 5/8/2020 CT Abdomen The two largest suspicious lesions measuring 1.2 and 1.3 cm in the  right and left hepatic lobes respectively are similar compared to the  3/18/2020 CT. Additionally, there are at least five subcentimeter lesions with similar signal characteristics, which are also highly suspicious for metastases. If complete characterization of the number and distribution of lesions is necessary, an MRI with Eovist could be acquired.   5/19/2020- he was seen by the hepatobiliary service at [facility] with Dr. Krista Nice Allison:  patient adequate risk candidate for a multimodal approach, directed toward curative hepatectomy eventually. Endorsed by Hepatobiliary Conference, I recommended perc ablation of the L hemiliver to clear it, followed by systemic therapy in a neoadjuvant strategy. Restaging imaging to confirm clearance of disease on the left and lack of progression to unresectability of the R hemiliver disease would then be followed by R hepatectomy. Limitations to this approach may be accessibility of the segment 4A/8 disease high in the hepatic dome and the possibility of heat sink-related recurrence s/p abation of the left-sided disease. 5/21/2020- referral for Mediport placement and start FOLFOX in 2 weeks. Will add bevacizumab after 6 weeks of liver ablation. We will plan for 12 biweekly dose of FOLFOX. Bevacizumab will also be stopped 6 weeks prior to major procedure. 6/8/2020- Microwave ablation of the left liver lesion was then performed for 5 minutes at 100 W to achieve a 3.4 x 3.9 cm approximately spherical zone of ablation. 6/10/2020- initiation of FOLFOX.  7/20/2020-added Avastin. 9/10/20 MRI abdomen: Left hepatic lobe segment II lesion demonstrates small T1 hyperintense blood products, status post microwave ablation on 6/8/2020. No definitive enhancement within the lesion. Focal internal thickening or scar present. Recommend attention on follow-up. Additional scattered subcentimeter foci throughout the liver decreased in size compared to MRI dated 4/20/2020 consistent with improving metastatic disease. Additional chronic findings as above. 9/16/2020-discussed with plan with the patient and [facility]. Interval response to treatment. Plan to continue chemotherapy through 12 cycles with Avastin. 12/11/20 Right hepatectomy-Dr. Amanda Sung  12/11/20 Right hepatectomy pathology: Liver, right, resection: Focus of Fibrosis with calcifications and chronic inflammation (0.3 cm), see comment.  Background hepatic parenchyma with minimal periportal fibrosis (trichrome stain), minimal lobular and portal inflammation, and no significant macrovesicular steatosis (<5%) Comments: The patient's history of colorectal cancer status adjuvant therapy is noted. The focus of fibrosis may reflect treatment effect. There is no evidence of viable tumor in the sampled specimen. 12/14/20 Ct Abdomen Pelvis W Iv Contrast A Small bowel obstruction with transition point at the distal small bowel, just proximal to RIGHT upper quadrant ileocolonic anastomosis. Postoperative change of RIGHT hepatectomy with small amount of expected free fluid and intraperitoneal gas. Redemonstrated LEFT liver lesion. Small bilateral pleural effusions. 12/16/20 SB-[place]: Study is limited due to retained contrast in the small bowel from prior attempt at small bowel follow-through on the floor. Small bowel remains dilated, measuring approximately 5.2 cm, which is consistent with partial or resolving small bowel obstruction. Final radiographs show contrast within the colon. 1/4/2020-resolution of small bowel obstruction. 1/7/21 CT chest: No finding to suggest intrathoracic neoplastic process or metastatic disease. The benign-appearing tiny nodule in the right upper lobe probably represent a noncalcified granuloma. A nodule in the lingular segment of the left upper lobe is not visualized in this study. Postsurgical changes of the liver. No evidence of focal complication. A trace right basal pleural effusion. This may be reactive to the previous abdominal surgery. 3/4/21 MRI abd: Status post right hepatectomy and microwave ablation of a left liver lesion. No findings to suggest residual/recurrent disease. No new liver lesion is identified. Postsurgical changes related to right hemicolectomy. Microwave ablation zone in segment II of the left hepatic lobe measures approximately 21 mm in diameter, unchanged.  No appreciable postcontrast enhancement complete 6 cycles of chemotherapy and repeat CT chest abdomen pelvis and liver MRI to assess disease response. They discussed the role of CRS/HIPEC in his situation. He was told that it really depends on the status of his liver lesions as well. He will complete 6 cycles of chemotherapy and will return to see me with a CTAP as well as MRI of the liver to assess disease burden and determine if he can be a candidate for debulking.  8/25/2021-proceed cycle #4.  9/22/2021 CEA- 8.1  9/24/2021 MRI with and w/o Contrast ([place])  Tiny left retroperitoneal nodule involving the left lateral conal fascial new compared to 3/4/2021 though unchanged from most recent prior, possibly an additional site of metastasis. No other new metastatic disease within the abdomen. Similar partially visualized right posterior body wall/psoas infiltrative lesion not definitely changed from MRI abdomen 6/8/2021 but better evaluated in its entirety on concurrent CT, reported separately. Status post right hemicolectomy, right hepatectomy, and segment III microwave ablation. Similar mild splenomegaly. Additional chronic and incidental findings as described in the body the report. Liver: Status post right hepatectomy. Ablation zone in segment II measures 1.7 x 1.1 cm, slightly decreased in size from 1.8 x 1.4 cm previously (series 1301 image 56) without appreciable enhancement. Similar tiny subcentimeter cyst in the left hepatic lobe. No new focal hepatic lesion identified. No visualized free fluid. Similar 0.7 cm enhancing nodule along the left lateral conal fascia laterally new from 3/4/2021, grossly unchanged from MRI dated 6/8/2021. (series 801 image 22). No other appreciable peritoneal/retroperitoneal or  omental nodularity.  Ill-defined T2 hyperintense signal and hyperenhancement in the right posteromedial body wall involving the lateral aspect of the psoas muscle and posterolateral abdominal wall musculature, partially visualized nodule and left lateral conal fascia nodule unchanged compared to  recent exam.   1/13/22 Exploratory lap with resections of psoas muscle mass and HIPEC by Dr. Jeremiah Ponce at [facility]:  Metastatic colorectal adenocarcinoma involving fibroadipose tissue. IHC MMR proficient. 1/24/22 CT chest/abd/pelvis [facility]): Extensive postsurgical changes in the abdomen including partial right colectomy, resection of right retroperitoneal mass, omentectomy and mesh repair of right lateral abdominal wall defect. There appears to be a defect in the mesh containing herniated loops of small bowel. Extensive diffuse dilated small bowel loops with air-fluid levels are seen throughout the abdomen. There are segmental areas of decompressed small bowel in the left upper quadrant as well as adjacent to the herniated small  bowel loops in the right retroperitoneum. Air-fluid levels are also seen throughout the colon. While pattern could likely represent postoperative ileus given the diffuse small bowel dilatation and distal colonic air and fluid, developing or partial small bowel obstruction secondary to the right lateral abdominal wall hernia cannot entirely be excluded. Small ascites. 8 mm nodule along the left lateral conal fascia is unchanged. Slight increase in size of cardiophrenic lymph nodes measuring up to 7 mm anterior to the right hemidiaphragm. Stable hypodensity in the left hepatic lobe. Diffuse gastric wall thickening/edema could be secondary to gastritis in the appropriate clinical setting. February 2022- HIPEC/tumor debulking January. This was complicated by a leak from his ileocolic anastomosis secondary to closed-loop obstruction at his hernia repair site (mesh displaced).   He was taken back to the OR and had a primary repair of his anastomosis and diverting ileostomy in February 2022  3/23/2022-ileostomy reversal at [facility] Dr. Jeremiah Ponce at [facility]  3/20/2022 CT Abd/Pelvis WO Contrast [facility]) Findings suspicious for aspiration at the lung bases. Postsurgical changes related to right hepatectomy. There is a new low-attenuation lesion in the left hemiliver, highly concerning for metastasis. Postsurgical changes related to right hemicolectomy with ileocolonic anastomosis and right lower quadrant diverting loop ileostomy. No evidence of bowel obstruction. No intra-abdominal fluid collection. Other findings as above. A critical alert was sent at the time of dictation. Liver: Postsurgical changes related to right hepatectomy. There is a new, approximately 2.1 cm low-attenuation lesion seen in the left hepatic lobe on image 25 of series 3, highly concerning for metastasis. 4/18/2022 Ileostomy,closure enterocutaneous stoma with mural acute inflammation and jessa-intestinal Fat necrosis. Negative for malignancy. 4/23/2022- discussed the patient reinitiation of chemotherapy. We will continue FOLFIRI. He had no prior progression on FOLFIRI of FOLFOX. 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  We would avoid Avastin at this time due to recent surgery. He is K-maria luisa mutated and therefore cannot use anti-EGFR agent. May contemplate adding Avastin in 4 weeks. 5/9/2022 CT Chest W Contrast New pulmonary nodules are present in the right and left lung as described above. It cannot be determined if these are infectious nodules or metastatic disease. Dilated loops of bowel are present in the upper abdomen. The abdomen is incompletely evaluated however this may be wither an ileus or partial obstruction. Hepatic metastasis. 5/10/22 Re-initiate FOLFIRI + Avastin every 2 weeks. 5/24/2022  CEA 29.1  5/24/2022 Iron Studies:Iron 29, TIBC 256, Iron Sat 11,Ferritin 116.9  6/22/2022 CEA 21.5  7/21/2022 CT Chest W Contrast Bibasilar linear atelectasis life scarring. No pulmonary nodules, masses or infiltrates.  Multiple enhancing low attenuation masses in the liver similar to the previous study. Findings consistent with metastatic liver disease. 7/21/2022 CT Abd/Pelvis W IV Contrast ([place]) Multiple enhancing low attenuation liver lesions increase in size and number since the previous study 3/7/22. Findings consistent with progression of metastatic liver disease. Status postcholecystectomy. The appendix is not visualized. No acute findings. 5/10/22 - 8/3/22 Discontinue FOLFIRI + Avastin every 2 weeks due to progression in the liver  8/3/2022-unfortunately, interval progression of liver metastasis when compared to last CT scans performed on 3/7/2022. In addition, when compared to report from [address] [place] [address] abdomen/pelvis on 3/20/2022 when he had only 1 single metastatic lesion measuring 2.1 cm. This is considered progression. I have recommended to discontinue FOLFIRI/Avastin and consider initiation of palliative FOLFOX with Avastin. Discussed with hepatobiliary surgery. The patient is not a candidate for further surgery. He is not a candidate for any liver directed therapy. 8/3/2022 CEA 26.6    CEA dynamics:  2/27/22 CEA 3.5  5/24/22 CEA 29.1  6/22/22 CEA 21.5  8/3/22 CEA 26.6    PAST MEDICAL HISTORY:   Past Medical History:   Diagnosis Date    Acid reflux     Cancer ([facility] [address]     adenocarcinoma of colon    GERD (gastroesophageal reflux disease)     Palliative care patient 03/01/2022    PTSD (post-traumatic stress disorder)     Stage 3a chronic kidney disease ([address]       PAST SURGICAL HISTORY:  Past Surgical History:   Procedure Laterality Date    ABLATION OF DYSRHYTHMIC FOCUS      ablation of liver at [address]  2003    CHOLECYSTECTOMY  2013    COLONOSCOPY      when pt was in the 20's    COLONOSCOPY N/A 03/11/2020    COLONOSCOPY POLYPECTOMY SNARE/COLD BIOPSY: Dr. Vicente Copeland colonoscopy: 6-12 months due to findings at colonoscopy today with Cecal mass lesion and large polyps.     COLONOSCOPY      COLONOSCOPY N/A 03/17/2021    Dr Timothy Shane, Post-operative changes w IC anastomosis & single visible staple, Mild Diverticuosis, Int Hemorrhoids Grade 1, 3 year recall    HEMICOLECTOMY Right 03/30/2020    LAPAROSCOPIC-ASSISTED RIGHT HEMICOLECTOMY performed by Trisha Jolley MD at [facility] N/A 06/03/2020    INSERTION OF VENOUS PORT with flouro performed by Trisha Jolley MD at [address] [place] [address] Dr ENDOSCOPY N/A 03/11/2020    Dr. Jayla Medrano:   [facility]      SOCIAL HISTORY:  Social History     Socioeconomic History    Marital status:    Tobacco Use    Smoking status: Never    Smokeless tobacco: Never   Vaping Use    Vaping Use: Never used   Substance and Sexual Activity    Alcohol use: Yes     Comment: rare     Drug use: No    Sexual activity: Yes     Partners: Female     FAMILY HISTORY:  Family History   Problem Relation Age of Onset    Liver Cancer Mother     High Blood Pressure Mother     Colon Cancer Mother         x2    Cancer Father         Lung Cancer    Breast Cancer Sister     Cancer Maternal Grandfather         Lung Cancer    Cancer Paternal Grandfather         Stomach Cancer    Colon Polyps Neg Hx     Cystic Fibrosis Neg Hx     Liver Disease Neg Hx     Rectal Cancer Neg Hx         Current Outpatient Medications   Medication Sig Dispense Refill    fentaNYL (DURAGESIC) 12 MCG/HR Place 1 patch onto the skin every 3 days for 30 days. Intended supply: 30 days 10 patch 0    oxyCODONE HCl (OXY-IR) 10 MG immediate release tablet Take 1 tablet by mouth every 6 hours as needed for Pain for up to 30 days.  Intended supply: 30 days 120 tablet 0    promethazine (PHENERGAN) 12.5 MG tablet Take 1 tablet by mouth every 6 hours as needed for Nausea 60 tablet 5    sildenafil (VIAGRA) 50 MG tablet Take 1 tablet by mouth daily as needed for Erectile Dysfunction 10 tablet 5    Magnesium Oxide 400 MG CAPS Take 400 mg by mouth in the morning and at bedtime 60 capsule 3    apixaban (ELIQUIS) 5 MG TABS tablet Take 1 tablet by mouth 2 times daily 60 tablet 0    vitamin D (ERGOCALCIFEROL) 1.25 MG (71499 UT) CAPS capsule Take 1 capsule by mouth once a week 5 capsule 0    methocarbamol (ROBAXIN) 500 MG tablet Take 500 mg by mouth 3 times daily as needed       omeprazole (PRILOSEC) 20 MG delayed release capsule Take 20 mg by mouth daily      prazosin (MINIPRESS) 1 MG capsule Take 1 mg by mouth nightly as needed For nightmares       Sertraline HCl (ZOLOFT PO) Take by mouth daily       No current facility-administered medications for this visit.      Facility-Administered Medications Ordered in Other Visits   Medication Dose Route Frequency Provider Last Rate Last Admin    0.9 % sodium chloride infusion  5-250 mL/hr IntraVENous PRN Du Allen MD        acetaminophen (TYLENOL) tablet 1,000 mg  1,000 mg Oral Once Du Allen MD        diphenhydrAMINE (BENADRYL) injection 50 mg  50 mg IntraVENous Once PRN Du Allen MD        dextrose 5 % solution  5-250 mL/hr IntraVENous PRN Du Allen MD        fluorouracil (ADRUCIL) 4,100 mg in sodium chloride 0.9 % 250 mL chemo infusion  2,400 mg/m2 (Treatment Plan Recorded) IntraVENous Over 46 hours Du Allen MD   4,100 mg at 09/14/22 1234        REVIEW OF SYSTEMS:   CONSTITUTIONAL: no fever, no night sweats, fatigue;  HEENT: no blurring of vision, no double vision, no hearing difficulty, no tinnitus, no ulceration, no dysplasia, no epistaxis;   LUNGS: no cough, no hemoptysis, no wheeze,  no shortness of breath;  CARDIOVASCULAR: no palpitation, no chest pain, no shortness of breath;  GI: no abdominal pain, no nausea, no vomiting, no diarrhea, no constipation;  ANNIE: no dysuria, no hematuria, no frequency or urgency, no nephrolithiasis;  MUSCULOSKELETAL: no joint pain, no swelling, no stiffness;  ENDOCRINE: no polyuria, no polydipsia, no cold or heat intolerance;  HEMATOLOGY: no easy bruising or bleeding, no history of clotting disorder;  DERMATOLOGY: no skin rash, no eczema, no pruritus;  PSYCHIATRY: no depression, no anxiety, no panic attacks, no suicidal ideation, no homicidal ideation;  NEUROLOGY: no syncope, no seizures, no numbness or tingling of hands, no numbness or tingling of feet, no paresis;      Vitals signs:  /77   Pulse 92   Temp 98.2 °F (36.8 °C)   Resp 16   Ht 5' 7\" (1.702 m)   Wt 135 lb 11.2 oz (61.6 kg)   SpO2 100%   BMI 21.25 kg/m²   Wt Readings from Last 3 Encounters:   09/14/22 135 lb 11.2 oz (61.6 kg)   08/30/22 133 lb 14.4 oz (60.7 kg)   08/10/22 141 lb 3.2 oz (64 kg)       PHYSICAL EXAM:  CONSTITUTIONAL: Alert, appropriate, no acute distress  EYES: Non icteric, EOM intact, pupils equal round   ENT: Mucus membranes moist, no oral pharyngeal lesions, external inspection of ears and nose are normal.  NECK: Supple, no masses. No palpable thyroid mass  CHEST/LUNGS: CTA bilaterally, normal respiratory effort   CARDIOVASCULAR: RRR, no murmurs. No lower extremity edema  ABDOMEN: soft non-tender, active bowel sounds, no HSM. No palpable masses  EXTREMITIES: warm, full ROM in all 4 extremities, no focal weakness. SKIN: warm, dry with no rashes or lesions  LYMPH: No cervical, clavicular, axillary, or inguinal lymphadenopathy  NEUROLOGIC: follows commands, non focal   PSYCH: mood and affect appropriate.   Alert and oriented to time, place, person        Relevant Lab findings/reviewed by me:  8/3/2022 CEA 26.6  Lab Results   Component Value Date    WBC 3.37 (L) 09/14/2022    HGB 10.8 (L) 09/14/2022    HCT 35.2 (L) 09/14/2022    MCV 88.4 09/14/2022     09/14/2022     Lab Results   Component Value Date    NEUTROABS 1.42 (L) 09/14/2022     Lab Results   Component Value Date     09/14/2022    K 4.3 09/14/2022     09/14/2022    CO2 20 (L) 09/14/2022    BUN 16 09/14/2022    CREATININE 1.6 (H) 09/14/2022    GLUCOSE 98 09/14/2022    CALCIUM 9.2 09/14/2022    PROT 7.2 09/14/2022    LABALBU 4.0 09/14/2022    BILITOT 0.7 09/14/2022    ALKPHOS 135 (H) 09/14/2022    AST 37 09/14/2022    ALT 17 (L) 09/14/2022    LABGLOM 44 (A) 09/14/2022    GFRAA 53 (A) 07/21/2022    AGRATIO 1.5 06/15/2021    GLOB 3.2 09/01/2022         Relevant Imaging studies/reviewed by me:  None    ASSESSMENT:    Orders Placed This Encounter   Procedures    CEA     Standing Status:   Future     Standing Expiration Date:   9/14/2023    CEA          Rocci was seen today for colon cancer. Diagnoses and all orders for this visit:    Adenocarcinoma of colon ([address]  -     fentaNYL (DURAGESIC) 12 MCG/HR; Place 1 patch onto the skin every 3 days for 30 days. Intended supply: 30 days  -     oxyCODONE HCl (OXY-IR) 10 MG immediate release tablet; Take 1 tablet by mouth every 6 hours as needed for Pain for up to 30 days. Intended supply: 30 days  -     CEA; Future    Liver metastases (HCC)  -     fentaNYL (DURAGESIC) 12 MCG/HR; Place 1 patch onto the skin every 3 days for 30 days. Intended supply: 30 days  -     oxyCODONE HCl (OXY-IR) 10 MG immediate release tablet; Take 1 tablet by mouth every 6 hours as needed for Pain for up to 30 days. Intended supply: 30 days    Cancer associated pain  -     fentaNYL (DURAGESIC) 12 MCG/HR; Place 1 patch onto the skin every 3 days for 30 days. Intended supply: 30 days  -     oxyCODONE HCl (OXY-IR) 10 MG immediate release tablet; Take 1 tablet by mouth every 6 hours as needed for Pain for up to 30 days. Intended supply: 30 days    Chemotherapy management, encounter for    Encounter for antineoplastic immunotherapy    Adverse effect of chemotherapy, subsequent encounter    Care plan discussed with patient    Other orders  -     CEA         #Colonic adenocarcinoma-  [id number] (liver) K-maria luisa mutated, IHC MMR not proficient  Status post microwave ablation left hepatic lesion  Status post neoadjuvant FOLFOX/bevacizumab x11 biweekly cycles  Status post right hemicolectomy. Pathology consistent complete pathologic response.    Recommended surveillance as per NCCN guidelines  Discussed at length results of pathology with the patient. 3/17/21- 1 year colonoscopy showed no large polyps or masses. Repeat in 3 years. June 2021-CT chest clear. MRI abdomen showed suspicious lesion in the right lower quadrant. Biopsy arranged [place]. Discussed with hepatobiliary service at [facility]. 6/25/[id number]-[id number] biopsy consistent with recurrent primary colorectal adenocarcinoma. 7/2/2021-discussed with hepatobiliary service/surgical oncology at [facility]. They will review images and call the patient back regarding consultation for consideration of HIPEC  7/2/2021-they recommend systemic therapy. 7/2/2021-recommended FOLFIRI/Avastin  7/13/21- Initiated FOLFIRI + Avastin every 2 weeks  07/30/21-Seen at [facility] by GI surgeon oncology. They discussed the role of CRS/HIPEC in his situation. He was told hat it really depends on the status of his liver lesions as well. He will complete 6 cycles of chemotherapy and will return to see me with a CTAP as well as MRI of the liver to assess disease burden and determine if he can be a candidate for debulking.    8/25/2021-proceed with FOLFIRI/Avastin   9/24/2021-repeat MRI abdomen/CT abdomen showed persistent disease. 1/13/2022-Exploratory laparotomy/resection of psoas mass/HIPEC at [facility]. Path Isaac:     OMENTUM, OMENTECTOMY:   - METASTATIC COLORECTAL ADENOCARCINOMA INVOLVING FIBROADIPOSE TISSUE.   - TWO (2) LYMPH NODES, NEGATIVE FOR CARCINOMA. SMALL BOWEL, MESENTERIC NODULE, EXCISION:   - METASTATIC COLORECTAL ADENOCARCINOMA INVOLVING FIBROMUSCULAR TISSUE AND EXTENDING TO TISSUE EDGE.    SMALL BOWEL AND RIGHT COLON, ILEOCOLIC RESECTION:     - RESIDUAL RECURRENT INVASIVE MODERATELY DIFFERENTIATED COLORECTAL ADENOCARCINOMA (2.1 CM) INVOLVING ANASTOMOTIC SITE; SEE COMMENT AND SYNOPTIC REPORT.   - TUMOR FOCALLY INVOLVES THE SEROSA SURFACE.   - METASTATIC CARCINOMA INVOLVES ONE (1) OF TWELVE (12) LYMPH NODES.   - ONE (1) TUMOR DEPOSIT PRESENT.   - ALL RESECTION progression on FOLFIRI of FOLFOX. 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  We would avoid Avastin at this time due to recent surgery. He is K-maria luisa mutated and therefore cannot use anti-EGFR agent. May contemplate adding Avastin in 4 weeks. 5/9/2022 CT Chest W Contrast New pulmonary nodules are present in the right and left lung as described above. It cannot be determined if these are infectious nodules or metastatic disease. Dilated loops of bowel are present in the upper abdomen. The abdomen is incompletely evaluated however this may be wither an ileus or partial obstruction. Hepatic metastasis. Plan:  -Proceed cycle #3 FOLFOX/Avastin third line therapy  -Discussed with [facility] hepatobiliary service. No clinical trials available. Not a candidate for liver directed therapy at this time. #Chronic kidney disease stage III- creatinine 1.6  Encouraged to increase PO fluid intake  Labs Renal Latest Ref Rng & Units 9/14/2022 9/1/2022 8/30/2022 8/10/2022 8/3/2022   BUN 9 - 20 mg/dL 16 22(H) 17 22(H) 19   Cr 0.6 - 1.2 mg/dL 1.6(H) 1.6(H) 1.6(H) 1.5(H) 1.5(H)   K 3.5 - 5.1 mmol/L 4.3 4.3 4.8 4.3 4.5   Na 137 - 145 mmol/L 143 142 142 139 141            #Liver metastasis- plan as above. Status post ablation left liver lobe lesion at [facility] on 6/8/2020. Status post neoadjuvant FOLFOX/bevacizumab x11 biweekly cyclesStatus post right hemicolectomy. Pathology consistent complete pathologic response. 3/4/2021-MRI abdomen was unremarkable  6/8/2021-MRI abdomen showed Postsurgical changes of right hepatectomy. Redemonstration of an ablation zone in segment II, measuring up to 1.7 cm, previously 1.8 cm. No evidence of postcontrast enhancement. No new lesion identified. 9/24/2021-MRI abdomen [facility] showed Liver: Status post right hepatectomy.  Ablation zone in segment II measures 1.7 x 1.1 cm, slightly decreased in size from 1.8 x 1.4 cm previously (series 1301 image 56) without apreciable enhancement. Similar tiny subcentimeter cyst in the left hepatic lobe. No new focal hepatic lesion identified. 3/20/2022 CT Abd/Pelvis WO Contrast ([facility]) Findings suspicious for aspiration at the lung bases. Postsurgical changes related to right hepatectomy. There is a new low-attenuation lesion in the left hemiliver, highly concerning for metastasis. Postsurgical changes related to right hemicolectomy with ileocolonic anastomosis and right lower quadrant diverting loop ileostomy. No evidence of bowel obstruction. No intra-abdominal fluid collection. Other findings as above. A critical alert was sent at the time of dictation. Liver: Postsurgical changes related to right hepatectomy. There is a new, approximately 2.1 cm low-attenuation lesion seen in the left hepatic lobe on image 25 of series 3, highly concerning for metastasis. 4/23/2022- discussed the patient reinitiation of chemotherapy. We will continue FOLFIRI. He had no prior progression on FOLFIRI of FOLFOX. 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  We would avoid Avastin at this time due to recent surgery. He is K-maria luisa mutated and therefore cannot use anti-EGFR agent. May contemplate adding Avastin in 4 weeks. 5/9/2022 CT Chest W Contrast New pulmonary nodules are present in the right and left lung as described above. It cannot be determined if these are infectious nodules or metastatic disease. Dilated loops of bowel are present in the upper abdomen. The abdomen is incompletely evaluated however this may be wither an ileus or partial obstruction. Hepatic metastasis. 7/21/2022 CT Chest W Contrast Bibasilar linear atelectasis life scarring. No pulmonary nodules, masses or infiltrates. Multiple enhancing low attenuation masses in the liver similar to the previous study. Findings consistent with metastatic liver disease.   7/21/2022 CT Abd/Pelvis W IV Contrast ([place]) Multiple enhancing low attenuation liver lesions increase in size and number since the previous study 3/7/22. Findings consistent with progression of metastatic liver disease. Status postcholecystectomy. The appendix is not visualized. No acute findings. 8/3/22 Discontinue FOLFIRI + Avastin every 2 weeks due to progression in the liver  8/3/2022-unfortunately, interval progression of liver metastasis when compared to last CT scans performed on 3/7/2022. In addition, when compared to report from [address] [place] [address] abdomen/pelvis on 3/20/2022 when he had only 1 single metastatic lesion measuring 2.1 cm. This is considered progression. I have recommended to discontinue FOLFIRI/Avastin and consider initiation of palliative FOLFOX with Avastin. Discussed with hepatobiliary surgery. The patient is not a candidate for further surgery. He is not a candidate for any liver directed therapy. Plan:  -Proceed cycle #3 FOLFOX/Avastin third line therapy  -Discussed with [facility] hepatobiliary service. No clinical trials available. Not a candidate for liver directed therapy at this time. Lab Results   Component Value Date    CEA 36.5 (H) 09/14/2022          #Multifactorial anemia  hemoglobin 8.5/MCV 89. Ferritin 12.9, iron saturation 15%, TIBC 458, iron 72. Status post IV iron therapy. Hemoglobin 11.3  -Repeat iron profile on 5/24/2022: Ferritin 116, iron saturation 11, iron 29, TIBC 256  Recent Labs     09/14/22  0905 09/01/22  1332 08/30/22  0909   WBC 3.37* 2.27* 3.39*   HGB 10.8* 12.1* 11.3*   HCT 35.2* 40.6 37.3*   MCV 88.4 89.2 89.2    171 230   -Likely related to chemotherapy       #Chemotherapy-induced neuropathy-stable, antineoplastic induced anemia    #Cancer related pain-  -Oxycodone IR 10 mg every 6 hours as needed  -Continue Fentanyl 12 mcg every 72 hrs    DVT port associated left upper extremity-February 2022  internal jugular vein subclavian axillary brachial, left upper extremity.   Acute appearing superficial thrombophlebitis (SVT) is seen in the basilic  and cephalic veins, right upper extremity.  -Eliquis 5mg twice a day    Erectile dysfunction-  -Sildenafil 50mg daily as needed    Hypomagnesemia-magnesium 1.5. Replace magnesium today. PLAN:  RTC with MD in treatment room 4 weeks  C# 3 FOLFOX + Avastin today and every 2 weeks  CBC CMP CEA today  Continue follow-up with [facility]/Dr. Aroldo Hamilton, Oct 2022   Continue Fentanyl 12 mcg every 72 hrs-refill sent  Continue OxyIR 10mg every 6 hours as needed-refill sent  Continue Sildenafil 50mg daily as needed  Continue Phenergan 12.5mg every 6 hrs as needed  Continue Eliquis 5mg twice a day  Continue OTC Imodium as needed  Repeat CT scans after C#6    Follow Up:     Return in about 4 weeks (around 10/12/2022) for Treatment & see  in [facility] FOLFOX Avastin. FOLFOX Avastin every 2 weeks       IDay am pre charting  as Medical Assistant for Kareem White MD. Electronically signed by Day Cespedes MA on 9/14/2022 at 9:37 AM CDT. Augustin Watson am scribing for Kareem White MD. Electronically signed by Colonel Irene RN on 9/14/2022 at 12:05 PM CDT. I, Dr Ap Dumont, personally performed the services described in this documentation as scribed by Colonel Bonilla, ADITYA in my presence and is both accurate and complete. I have seen, examined and reviewed this patient medication list, appropriate labs and imaging studies. I reviewed relevant medical records and others physicians notes. I discussed the plans of care with the patient. I answered all the questions to the patients satisfaction. I have also reviewed the chief complaint (CC) and part of the history (History of Present Illness (HPI), Past Family Social History [facility]), or Review of Systems (ROS) and made changes when appropriated.        (Please note that portions of this note were completed with a voice recognition program. Efforts were made to edit the dictations but occasionally words are mis-transcribed.)  Electronically signed by Bhavesh Blanco MD on 9/14/2022 at 8:10 PM

## 2022-09-14 ENCOUNTER — HOSPITAL ENCOUNTER (OUTPATIENT)
Dept: INFUSION THERAPY | Age: 67
Discharge: HOME | End: 2022-09-14
Payer: OTHER GOVERNMENT

## 2022-09-14 ENCOUNTER — OFFICE VISIT (OUTPATIENT)
Dept: HEMATOLOGY | Age: 67
End: 2022-09-14

## 2022-09-14 VITALS
BODY MASS INDEX: 21.3 KG/M2 | HEART RATE: 92 BPM | SYSTOLIC BLOOD PRESSURE: 117 MMHG | OXYGEN SATURATION: 100 % | WEIGHT: 135.7 LBS | HEIGHT: 67 IN | DIASTOLIC BLOOD PRESSURE: 77 MMHG | RESPIRATION RATE: 16 BRPM | TEMPERATURE: 98.2 F

## 2022-09-14 DIAGNOSIS — C78.7 LIVER METASTASES (HCC): ICD-10-CM

## 2022-09-14 DIAGNOSIS — C18.9 ADENOCARCINOMA OF COLON (HCC): ICD-10-CM

## 2022-09-14 DIAGNOSIS — Z51.12 ENCOUNTER FOR ANTINEOPLASTIC IMMUNOTHERAPY: ICD-10-CM

## 2022-09-14 DIAGNOSIS — N17.0 ACUTE KIDNEY INJURY (AKI) WITH ACUTE TUBULAR NECROSIS (ATN) (HCC): ICD-10-CM

## 2022-09-14 DIAGNOSIS — C18.9 ADENOCARCINOMA OF COLON (HCC): Primary | ICD-10-CM

## 2022-09-14 DIAGNOSIS — T45.1X5D ADVERSE EFFECT OF CHEMOTHERAPY, SUBSEQUENT ENCOUNTER: ICD-10-CM

## 2022-09-14 DIAGNOSIS — R53.81 PHYSICAL DECONDITIONING: ICD-10-CM

## 2022-09-14 DIAGNOSIS — C78.7 LIVER METASTASES (HCC): Primary | ICD-10-CM

## 2022-09-14 DIAGNOSIS — Z51.11 CHEMOTHERAPY MANAGEMENT, ENCOUNTER FOR: ICD-10-CM

## 2022-09-14 DIAGNOSIS — C25.9 PANCREATIC ADENOCARCINOMA (HCC): Primary | ICD-10-CM

## 2022-09-14 DIAGNOSIS — C18.2 MALIGNANT NEOPLASM OF ASCENDING COLON (HCC): Primary | ICD-10-CM

## 2022-09-14 DIAGNOSIS — Z71.89 CARE PLAN DISCUSSED WITH PATIENT: ICD-10-CM

## 2022-09-14 DIAGNOSIS — C18.2 MALIGNANT NEOPLASM OF ASCENDING COLON (HCC): ICD-10-CM

## 2022-09-14 DIAGNOSIS — C25.9 PANCREATIC ADENOCARCINOMA (HCC): ICD-10-CM

## 2022-09-14 DIAGNOSIS — G89.3 CANCER ASSOCIATED PAIN: ICD-10-CM

## 2022-09-14 DIAGNOSIS — R62.7 FAILURE TO THRIVE IN ADULT: ICD-10-CM

## 2022-09-14 DIAGNOSIS — E86.0 DEHYDRATION: ICD-10-CM

## 2022-09-14 LAB
ALBUMIN SERPL-MCNC: 4 G/DL (ref 3.5–5.2)
ALP BLD-CCNC: 135 U/L (ref 40–130)
ALT SERPL-CCNC: 17 U/L (ref 21–72)
ANION GAP SERPL CALCULATED.3IONS-SCNC: 13 MMOL/L (ref 7–19)
AST SERPL-CCNC: 37 U/L (ref 17–59)
BILIRUB SERPL-MCNC: 0.7 MG/DL (ref 0.2–1.3)
BUN BLDV-MCNC: 16 MG/DL (ref 9–20)
CALCIUM SERPL-MCNC: 9.2 MG/DL (ref 8.4–10.2)
CEA: 36.5 NG/ML (ref 0–4.7)
CHLORIDE BLD-SCNC: 110 MMOL/L (ref 98–111)
CO2: 20 MMOL/L (ref 22–29)
CREAT SERPL-MCNC: 1.6 MG/DL (ref 0.6–1.2)
GFR NON-AFRICAN AMERICAN: 44
GLUCOSE BLD-MCNC: 98 MG/DL (ref 74–106)
HCT VFR BLD CALC: 35.2 % (ref 40.1–51)
HEMOGLOBIN: 10.8 G/DL (ref 13.7–17.5)
LYMPHOCYTES ABSOLUTE: 1.07 K/UL (ref 1.18–3.74)
LYMPHOCYTES RELATIVE PERCENT: 31.8 % (ref 19.3–53.1)
MAGNESIUM: 1.5 MG/DL (ref 1.6–2.3)
MCH RBC QN AUTO: 27.1 PG (ref 25.7–32.2)
MCHC RBC AUTO-ENTMCNC: 30.7 G/DL (ref 32.3–36.5)
MCV RBC AUTO: 88.4 FL (ref 79–92.2)
MONOCYTES ABSOLUTE: 0.63 K/UL (ref 0.24–0.82)
MONOCYTES RELATIVE PERCENT: 18.7 % (ref 4.7–12.5)
NEUTROPHILS ABSOLUTE: 1.42 K/UL (ref 1.56–6.13)
NEUTROPHILS RELATIVE PERCENT: 42.1 % (ref 34–71.1)
PDW BLD-RTO: 17.2 % (ref 11.6–14.4)
PHOSPHORUS: 3.8 MG/DL (ref 2.5–4.5)
PLATELET # BLD: 170 K/UL (ref 163–337)
PMV BLD AUTO: 10.4 FL (ref 7.4–10.4)
POTASSIUM SERPL-SCNC: 4.3 MMOL/L (ref 3.5–5.1)
PROTEIN UA: NEGATIVE
RBC # BLD: 3.98 M/UL (ref 4.63–6.08)
SODIUM BLD-SCNC: 143 MMOL/L (ref 137–145)
TOTAL PROTEIN: 7.2 G/DL (ref 6.3–8.2)
WBC # BLD: 3.37 K/UL (ref 4.23–9.07)

## 2022-09-14 PROCEDURE — 1123F ACP DISCUSS/DSCN MKR DOCD: CPT | Performed by: INTERNAL MEDICINE

## 2022-09-14 PROCEDURE — 80053 COMPREHEN METABOLIC PANEL: CPT

## 2022-09-14 PROCEDURE — 96415 CHEMO IV INFUSION ADDL HR: CPT

## 2022-09-14 PROCEDURE — 84100 ASSAY OF PHOSPHORUS: CPT

## 2022-09-14 PROCEDURE — 85025 COMPLETE CBC W/AUTO DIFF WBC: CPT

## 2022-09-14 PROCEDURE — 96366 THER/PROPH/DIAG IV INF ADDON: CPT

## 2022-09-14 PROCEDURE — 96367 TX/PROPH/DG ADDL SEQ IV INF: CPT

## 2022-09-14 PROCEDURE — 96413 CHEMO IV INFUSION 1 HR: CPT

## 2022-09-14 PROCEDURE — G8428 CUR MEDS NOT DOCUMENT: HCPCS | Performed by: INTERNAL MEDICINE

## 2022-09-14 PROCEDURE — 6360000002 HC RX W HCPCS: Performed by: INTERNAL MEDICINE

## 2022-09-14 PROCEDURE — 99214 OFFICE O/P EST MOD 30 MIN: CPT | Performed by: INTERNAL MEDICINE

## 2022-09-14 PROCEDURE — 3017F COLORECTAL CA SCREEN DOC REV: CPT | Performed by: INTERNAL MEDICINE

## 2022-09-14 PROCEDURE — 83735 ASSAY OF MAGNESIUM: CPT

## 2022-09-14 PROCEDURE — 1036F TOBACCO NON-USER: CPT | Performed by: INTERNAL MEDICINE

## 2022-09-14 PROCEDURE — G8420 CALC BMI NORM PARAMETERS: HCPCS | Performed by: INTERNAL MEDICINE

## 2022-09-14 PROCEDURE — 96375 TX/PRO/DX INJ NEW DRUG ADDON: CPT

## 2022-09-14 PROCEDURE — 96417 CHEMO IV INFUS EACH ADDL SEQ: CPT

## 2022-09-14 PROCEDURE — 2580000003 HC RX 258: Performed by: INTERNAL MEDICINE

## 2022-09-14 PROCEDURE — G0498 CHEMO EXTEND IV INFUS W/PUMP: HCPCS

## 2022-09-14 RX ORDER — ALBUTEROL SULFATE 90 UG/1
4 AEROSOL, METERED RESPIRATORY (INHALATION) PRN
Status: CANCELLED | OUTPATIENT
Start: 2022-09-14

## 2022-09-14 RX ORDER — DIPHENHYDRAMINE HYDROCHLORIDE 50 MG/ML
50 INJECTION INTRAMUSCULAR; INTRAVENOUS
Status: DISPENSED | OUTPATIENT
Start: 2022-09-14 | End: 2022-09-14

## 2022-09-14 RX ORDER — PALONOSETRON 0.05 MG/ML
0.25 INJECTION, SOLUTION INTRAVENOUS ONCE
Status: CANCELLED | OUTPATIENT
Start: 2022-09-14 | End: 2022-09-14

## 2022-09-14 RX ORDER — ACETAMINOPHEN 325 MG/1
650 TABLET ORAL
Status: CANCELLED | OUTPATIENT
Start: 2022-09-14

## 2022-09-14 RX ORDER — DIPHENHYDRAMINE HYDROCHLORIDE 50 MG/ML
50 INJECTION INTRAMUSCULAR; INTRAVENOUS
Status: CANCELLED | OUTPATIENT
Start: 2022-09-14

## 2022-09-14 RX ORDER — DEXTROSE MONOHYDRATE 50 MG/ML
5-250 INJECTION, SOLUTION INTRAVENOUS PRN
Status: DISCONTINUED | OUTPATIENT
Start: 2022-09-14 | End: 2022-09-15 | Stop reason: HOSPADM

## 2022-09-14 RX ORDER — SODIUM CHLORIDE 9 MG/ML
5-40 INJECTION INTRAVENOUS PRN
Status: CANCELLED | OUTPATIENT
Start: 2022-09-14

## 2022-09-14 RX ORDER — HEPARIN SODIUM (PORCINE) LOCK FLUSH IV SOLN 100 UNIT/ML 100 UNIT/ML
500 SOLUTION INTRAVENOUS PRN
Status: CANCELLED | OUTPATIENT
Start: 2022-09-16

## 2022-09-14 RX ORDER — MAGNESIUM SULFATE IN WATER 40 MG/ML
2000 INJECTION, SOLUTION INTRAVENOUS ONCE
Status: COMPLETED | OUTPATIENT
Start: 2022-09-14 | End: 2022-09-14

## 2022-09-14 RX ORDER — SODIUM CHLORIDE 9 MG/ML
5-250 INJECTION, SOLUTION INTRAVENOUS PRN
Status: CANCELLED | OUTPATIENT
Start: 2022-09-16

## 2022-09-14 RX ORDER — ACETAMINOPHEN 500 MG
1000 TABLET ORAL ONCE
Status: DISCONTINUED | OUTPATIENT
Start: 2022-09-14 | End: 2022-09-16 | Stop reason: HOSPADM

## 2022-09-14 RX ORDER — MEPERIDINE HYDROCHLORIDE 50 MG/ML
12.5 INJECTION INTRAMUSCULAR; INTRAVENOUS; SUBCUTANEOUS PRN
Status: CANCELLED | OUTPATIENT
Start: 2022-09-14

## 2022-09-14 RX ORDER — OXYCODONE HYDROCHLORIDE 10 MG/1
10 TABLET ORAL EVERY 6 HOURS PRN
Qty: 120 TABLET | Refills: 0 | Status: SHIPPED | OUTPATIENT
Start: 2022-09-14 | End: 2022-10-12 | Stop reason: SDUPTHER

## 2022-09-14 RX ORDER — EPINEPHRINE 1 MG/ML
0.3 INJECTION, SOLUTION, CONCENTRATE INTRAVENOUS PRN
Status: CANCELLED | OUTPATIENT
Start: 2022-09-14

## 2022-09-14 RX ORDER — DEXTROSE MONOHYDRATE 50 MG/ML
5-250 INJECTION, SOLUTION INTRAVENOUS PRN
Status: CANCELLED | OUTPATIENT
Start: 2022-09-14

## 2022-09-14 RX ORDER — HEPARIN SODIUM (PORCINE) LOCK FLUSH IV SOLN 100 UNIT/ML 100 UNIT/ML
500 SOLUTION INTRAVENOUS PRN
Status: CANCELLED | OUTPATIENT
Start: 2022-09-14

## 2022-09-14 RX ORDER — FENTANYL 12 UG/H
1 PATCH TRANSDERMAL
Qty: 10 PATCH | Refills: 0 | Status: SHIPPED | OUTPATIENT
Start: 2022-09-14 | End: 2022-10-12 | Stop reason: SDUPTHER

## 2022-09-14 RX ORDER — FAMOTIDINE 10 MG/ML
20 INJECTION, SOLUTION INTRAVENOUS
Status: CANCELLED | OUTPATIENT
Start: 2022-09-14

## 2022-09-14 RX ORDER — SODIUM CHLORIDE 9 MG/ML
5-250 INJECTION, SOLUTION INTRAVENOUS PRN
Status: CANCELLED | OUTPATIENT
Start: 2022-09-14

## 2022-09-14 RX ORDER — ONDANSETRON 2 MG/ML
8 INJECTION INTRAMUSCULAR; INTRAVENOUS
Status: CANCELLED | OUTPATIENT
Start: 2022-09-14

## 2022-09-14 RX ORDER — SODIUM CHLORIDE 9 MG/ML
5-250 INJECTION, SOLUTION INTRAVENOUS PRN
Status: DISCONTINUED | OUTPATIENT
Start: 2022-09-14 | End: 2022-09-15 | Stop reason: HOSPADM

## 2022-09-14 RX ORDER — SODIUM CHLORIDE 0.9 % (FLUSH) 0.9 %
5-40 SYRINGE (ML) INJECTION PRN
Status: CANCELLED | OUTPATIENT
Start: 2022-09-16

## 2022-09-14 RX ORDER — PALONOSETRON 0.05 MG/ML
0.25 INJECTION, SOLUTION INTRAVENOUS ONCE
Status: COMPLETED | OUTPATIENT
Start: 2022-09-14 | End: 2022-09-14

## 2022-09-14 RX ORDER — SODIUM CHLORIDE 0.9 % (FLUSH) 0.9 %
5-40 SYRINGE (ML) INJECTION PRN
Status: CANCELLED | OUTPATIENT
Start: 2022-09-14

## 2022-09-14 RX ORDER — SODIUM CHLORIDE 9 MG/ML
INJECTION, SOLUTION INTRAVENOUS CONTINUOUS
Status: CANCELLED | OUTPATIENT
Start: 2022-09-14

## 2022-09-14 RX ORDER — SODIUM CHLORIDE 9 MG/ML
5-40 INJECTION INTRAVENOUS PRN
Status: CANCELLED | OUTPATIENT
Start: 2022-09-16

## 2022-09-14 RX ADMIN — DEXAMETHASONE SODIUM PHOSPHATE: 10 INJECTION, SOLUTION INTRAMUSCULAR; INTRAVENOUS at 09:41

## 2022-09-14 RX ADMIN — SODIUM CHLORIDE 300 MG: 9 INJECTION, SOLUTION INTRAVENOUS at 09:56

## 2022-09-14 RX ADMIN — OXALIPLATIN 145 MG: 5 INJECTION, SOLUTION INTRAVENOUS at 10:29

## 2022-09-14 RX ADMIN — PALONOSETRON 0.25 MG: 0.05 INJECTION, SOLUTION INTRAVENOUS at 09:36

## 2022-09-14 RX ADMIN — MAGNESIUM SULFATE HEPTAHYDRATE 2000 MG: 40 INJECTION, SOLUTION INTRAVENOUS at 10:32

## 2022-09-14 RX ADMIN — LEUCOVORIN CALCIUM 700 MG: 350 INJECTION, POWDER, LYOPHILIZED, FOR SUSPENSION INTRAMUSCULAR; INTRAVENOUS at 10:30

## 2022-09-14 RX ADMIN — FLUOROURACIL 4100 MG: 50 INJECTION, SOLUTION INTRAVENOUS at 12:34

## 2022-09-16 ENCOUNTER — HOSPITAL ENCOUNTER (OUTPATIENT)
Dept: INFUSION THERAPY | Age: 67
Discharge: HOME | End: 2022-09-16
Payer: OTHER GOVERNMENT

## 2022-09-16 DIAGNOSIS — C18.2 MALIGNANT NEOPLASM OF ASCENDING COLON (HCC): ICD-10-CM

## 2022-09-16 DIAGNOSIS — C18.9 ADENOCARCINOMA OF COLON (HCC): ICD-10-CM

## 2022-09-16 DIAGNOSIS — C78.7 LIVER METASTASES (HCC): Primary | ICD-10-CM

## 2022-09-16 PROCEDURE — 6360000002 HC RX W HCPCS: Performed by: INTERNAL MEDICINE

## 2022-09-16 PROCEDURE — 96523 IRRIG DRUG DELIVERY DEVICE: CPT

## 2022-09-16 PROCEDURE — 2580000003 HC RX 258: Performed by: INTERNAL MEDICINE

## 2022-09-16 RX ORDER — SODIUM CHLORIDE 9 MG/ML
5-40 INJECTION INTRAVENOUS PRN
Status: DISCONTINUED | OUTPATIENT
Start: 2022-09-16 | End: 2022-09-17 | Stop reason: HOSPADM

## 2022-09-16 RX ORDER — HEPARIN SODIUM (PORCINE) LOCK FLUSH IV SOLN 100 UNIT/ML 100 UNIT/ML
500 SOLUTION INTRAVENOUS PRN
Status: DISCONTINUED | OUTPATIENT
Start: 2022-09-16 | End: 2022-09-17 | Stop reason: HOSPADM

## 2022-09-16 RX ADMIN — SODIUM CHLORIDE, PRESERVATIVE FREE 10 ML: 5 INJECTION INTRAVENOUS at 13:13

## 2022-09-16 RX ADMIN — HEPARIN 500 UNITS: 100 SYRINGE at 13:13

## 2022-09-28 ENCOUNTER — HOSPITAL ENCOUNTER (OUTPATIENT)
Dept: INFUSION THERAPY | Age: 67
Discharge: HOME | End: 2022-09-28
Payer: OTHER GOVERNMENT

## 2022-09-28 VITALS
DIASTOLIC BLOOD PRESSURE: 79 MMHG | HEART RATE: 86 BPM | OXYGEN SATURATION: 98 % | WEIGHT: 136.5 LBS | RESPIRATION RATE: 16 BRPM | HEIGHT: 67 IN | BODY MASS INDEX: 21.43 KG/M2 | TEMPERATURE: 98.1 F | SYSTOLIC BLOOD PRESSURE: 126 MMHG

## 2022-09-28 DIAGNOSIS — C18.9 ADENOCARCINOMA OF COLON (HCC): ICD-10-CM

## 2022-09-28 DIAGNOSIS — C78.7 LIVER METASTASES (HCC): Primary | ICD-10-CM

## 2022-09-28 DIAGNOSIS — C18.2 MALIGNANT NEOPLASM OF ASCENDING COLON (HCC): ICD-10-CM

## 2022-09-28 LAB
ALBUMIN SERPL-MCNC: 4.3 G/DL (ref 3.5–5.2)
ALP BLD-CCNC: 160 U/L (ref 40–130)
ALT SERPL-CCNC: 15 U/L (ref 21–72)
ANION GAP SERPL CALCULATED.3IONS-SCNC: 13 MMOL/L (ref 7–19)
AST SERPL-CCNC: 38 U/L (ref 17–59)
BILIRUB SERPL-MCNC: 0.4 MG/DL (ref 0.2–1.3)
BUN BLDV-MCNC: 25 MG/DL (ref 9–20)
CALCIUM SERPL-MCNC: 9.4 MG/DL (ref 8.4–10.2)
CHLORIDE BLD-SCNC: 108 MMOL/L (ref 98–111)
CO2: 21 MMOL/L (ref 22–29)
CREAT SERPL-MCNC: 1.7 MG/DL (ref 0.6–1.2)
GFR NON-AFRICAN AMERICAN: 41
GLOBULIN: 3.5 G/DL
GLUCOSE BLD-MCNC: 103 MG/DL (ref 74–106)
HCT VFR BLD CALC: 35.6 % (ref 40.1–51)
HEMOGLOBIN: 11 G/DL (ref 13.7–17.5)
LYMPHOCYTES ABSOLUTE: 0.67 K/UL (ref 1.18–3.74)
LYMPHOCYTES RELATIVE PERCENT: 14 % (ref 19.3–53.1)
MCH RBC QN AUTO: 27.7 PG (ref 25.7–32.2)
MCHC RBC AUTO-ENTMCNC: 30.9 G/DL (ref 32.3–36.5)
MCV RBC AUTO: 89.7 FL (ref 79–92.2)
MONOCYTES ABSOLUTE: 0.91 K/UL (ref 0.24–0.82)
MONOCYTES RELATIVE PERCENT: 19 % (ref 4.7–12.5)
NEUTROPHILS ABSOLUTE: 3.1 K/UL (ref 1.56–6.13)
NEUTROPHILS RELATIVE PERCENT: 64.5 % (ref 34–71.1)
PDW BLD-RTO: 18 % (ref 11.6–14.4)
PLATELET # BLD: 160 K/UL (ref 163–337)
PMV BLD AUTO: 11.2 FL (ref 7.4–10.4)
POTASSIUM SERPL-SCNC: 4.4 MMOL/L (ref 3.5–5.1)
PROTEIN UA: NEGATIVE
RBC # BLD: 3.97 M/UL (ref 4.63–6.08)
SODIUM BLD-SCNC: 142 MMOL/L (ref 137–145)
TOTAL PROTEIN: 7.7 G/DL (ref 6.3–8.2)
WBC # BLD: 4.8 K/UL (ref 4.23–9.07)

## 2022-09-28 PROCEDURE — 80053 COMPREHEN METABOLIC PANEL: CPT

## 2022-09-28 PROCEDURE — 6360000002 HC RX W HCPCS: Performed by: INTERNAL MEDICINE

## 2022-09-28 PROCEDURE — 96375 TX/PRO/DX INJ NEW DRUG ADDON: CPT

## 2022-09-28 PROCEDURE — 96360 HYDRATION IV INFUSION INIT: CPT

## 2022-09-28 PROCEDURE — 85025 COMPLETE CBC W/AUTO DIFF WBC: CPT

## 2022-09-28 PROCEDURE — G0498 CHEMO EXTEND IV INFUS W/PUMP: HCPCS

## 2022-09-28 PROCEDURE — 96413 CHEMO IV INFUSION 1 HR: CPT

## 2022-09-28 PROCEDURE — 96415 CHEMO IV INFUSION ADDL HR: CPT

## 2022-09-28 PROCEDURE — 2580000003 HC RX 258: Performed by: INTERNAL MEDICINE

## 2022-09-28 PROCEDURE — 96367 TX/PROPH/DG ADDL SEQ IV INF: CPT

## 2022-09-28 PROCEDURE — 96366 THER/PROPH/DIAG IV INF ADDON: CPT

## 2022-09-28 PROCEDURE — 96417 CHEMO IV INFUS EACH ADDL SEQ: CPT

## 2022-09-28 RX ORDER — SODIUM CHLORIDE 9 MG/ML
5-250 INJECTION, SOLUTION INTRAVENOUS PRN
OUTPATIENT
Start: 2022-09-28

## 2022-09-28 RX ORDER — ALBUTEROL SULFATE 90 UG/1
4 AEROSOL, METERED RESPIRATORY (INHALATION) PRN
Status: CANCELLED | OUTPATIENT
Start: 2022-09-28

## 2022-09-28 RX ORDER — 0.9 % SODIUM CHLORIDE 0.9 %
1000 INTRAVENOUS SOLUTION INTRAVENOUS ONCE
Status: COMPLETED | OUTPATIENT
Start: 2022-09-28 | End: 2022-09-28

## 2022-09-28 RX ORDER — SODIUM CHLORIDE 9 MG/ML
INJECTION, SOLUTION INTRAVENOUS CONTINUOUS
Status: CANCELLED | OUTPATIENT
Start: 2022-09-28

## 2022-09-28 RX ORDER — SODIUM CHLORIDE 9 MG/ML
5-250 INJECTION, SOLUTION INTRAVENOUS PRN
Status: CANCELLED | OUTPATIENT
Start: 2022-09-30

## 2022-09-28 RX ORDER — EPINEPHRINE 1 MG/ML
0.3 INJECTION, SOLUTION, CONCENTRATE INTRAVENOUS PRN
Status: CANCELLED | OUTPATIENT
Start: 2022-09-28

## 2022-09-28 RX ORDER — PALONOSETRON 0.05 MG/ML
0.25 INJECTION, SOLUTION INTRAVENOUS ONCE
Status: COMPLETED | OUTPATIENT
Start: 2022-09-28 | End: 2022-09-28

## 2022-09-28 RX ORDER — MEPERIDINE HYDROCHLORIDE 50 MG/ML
12.5 INJECTION INTRAMUSCULAR; INTRAVENOUS; SUBCUTANEOUS PRN
Status: CANCELLED | OUTPATIENT
Start: 2022-09-28

## 2022-09-28 RX ORDER — SODIUM CHLORIDE 0.9 % (FLUSH) 0.9 %
5-40 SYRINGE (ML) INJECTION PRN
Status: CANCELLED | OUTPATIENT
Start: 2022-09-30

## 2022-09-28 RX ORDER — HEPARIN SODIUM (PORCINE) LOCK FLUSH IV SOLN 100 UNIT/ML 100 UNIT/ML
500 SOLUTION INTRAVENOUS PRN
OUTPATIENT
Start: 2022-09-28

## 2022-09-28 RX ORDER — DEXTROSE MONOHYDRATE 50 MG/ML
5-250 INJECTION, SOLUTION INTRAVENOUS PRN
Status: CANCELLED | OUTPATIENT
Start: 2022-09-28

## 2022-09-28 RX ORDER — 0.9 % SODIUM CHLORIDE 0.9 %
1000 INTRAVENOUS SOLUTION INTRAVENOUS ONCE
OUTPATIENT
Start: 2022-09-28 | End: 2022-09-28

## 2022-09-28 RX ORDER — SODIUM CHLORIDE 0.9 % (FLUSH) 0.9 %
5-40 SYRINGE (ML) INJECTION PRN
Status: DISCONTINUED | OUTPATIENT
Start: 2022-09-28 | End: 2022-09-29 | Stop reason: HOSPADM

## 2022-09-28 RX ORDER — DIPHENHYDRAMINE HYDROCHLORIDE 50 MG/ML
50 INJECTION INTRAMUSCULAR; INTRAVENOUS
Status: CANCELLED | OUTPATIENT
Start: 2022-09-28

## 2022-09-28 RX ORDER — SODIUM CHLORIDE 0.9 % (FLUSH) 0.9 %
5-40 SYRINGE (ML) INJECTION PRN
Status: CANCELLED | OUTPATIENT
Start: 2022-09-28

## 2022-09-28 RX ORDER — SODIUM CHLORIDE 9 MG/ML
5-250 INJECTION, SOLUTION INTRAVENOUS PRN
Status: CANCELLED | OUTPATIENT
Start: 2022-09-28

## 2022-09-28 RX ORDER — HEPARIN SODIUM (PORCINE) LOCK FLUSH IV SOLN 100 UNIT/ML 100 UNIT/ML
500 SOLUTION INTRAVENOUS PRN
Status: CANCELLED | OUTPATIENT
Start: 2022-09-30

## 2022-09-28 RX ORDER — SODIUM CHLORIDE 9 MG/ML
5-40 INJECTION INTRAVENOUS PRN
Status: CANCELLED | OUTPATIENT
Start: 2022-09-30

## 2022-09-28 RX ORDER — FAMOTIDINE 10 MG/ML
20 INJECTION, SOLUTION INTRAVENOUS
Status: CANCELLED | OUTPATIENT
Start: 2022-09-28

## 2022-09-28 RX ORDER — SODIUM CHLORIDE 0.9 % (FLUSH) 0.9 %
5-40 SYRINGE (ML) INJECTION PRN
OUTPATIENT
Start: 2022-09-28

## 2022-09-28 RX ORDER — ONDANSETRON 2 MG/ML
8 INJECTION INTRAMUSCULAR; INTRAVENOUS
Status: CANCELLED | OUTPATIENT
Start: 2022-09-28

## 2022-09-28 RX ORDER — ACETAMINOPHEN 325 MG/1
650 TABLET ORAL
Status: CANCELLED | OUTPATIENT
Start: 2022-09-28

## 2022-09-28 RX ORDER — SODIUM CHLORIDE 9 MG/ML
5-40 INJECTION INTRAVENOUS PRN
Status: CANCELLED | OUTPATIENT
Start: 2022-09-28

## 2022-09-28 RX ORDER — HEPARIN SODIUM (PORCINE) LOCK FLUSH IV SOLN 100 UNIT/ML 100 UNIT/ML
500 SOLUTION INTRAVENOUS PRN
Status: CANCELLED | OUTPATIENT
Start: 2022-09-28

## 2022-09-28 RX ORDER — PALONOSETRON 0.05 MG/ML
0.25 INJECTION, SOLUTION INTRAVENOUS ONCE
Status: CANCELLED | OUTPATIENT
Start: 2022-09-28 | End: 2022-09-28

## 2022-09-28 RX ADMIN — OXALIPLATIN 145 MG: 5 INJECTION, SOLUTION INTRAVENOUS at 12:39

## 2022-09-28 RX ADMIN — PALONOSETRON HYDROCHLORIDE 0.25 MG: 0.25 INJECTION, SOLUTION INTRAVENOUS at 11:49

## 2022-09-28 RX ADMIN — LEUCOVORIN CALCIUM 700 MG: 350 INJECTION, POWDER, LYOPHILIZED, FOR SUSPENSION INTRAMUSCULAR; INTRAVENOUS at 12:40

## 2022-09-28 RX ADMIN — SODIUM CHLORIDE 1000 ML: 9 INJECTION, SOLUTION INTRAVENOUS at 11:30

## 2022-09-28 RX ADMIN — DEXAMETHASONE SODIUM PHOSPHATE: 10 INJECTION, SOLUTION INTRAMUSCULAR; INTRAVENOUS at 11:49

## 2022-09-28 RX ADMIN — SODIUM CHLORIDE 300 MG: 9 INJECTION, SOLUTION INTRAVENOUS at 12:04

## 2022-09-28 RX ADMIN — FLUOROURACIL 4100 MG: 50 INJECTION, SOLUTION INTRAVENOUS at 14:53

## 2022-09-28 NOTE — PROGRESS NOTES
Lab Results   Component Value Date    WBC 4.80 09/28/2022    HGB 11.0 (L) 09/28/2022    HCT 35.6 (L) 09/28/2022    MCV 89.7 09/28/2022     (L) 09/28/2022     Lab Results   Component Value Date    NEUTROABS 3.10 09/28/2022     Lab Results   Component Value Date     09/28/2022    K 4.4 09/28/2022     09/28/2022    CO2 21 (L) 09/28/2022    BUN 25 (H) 09/28/2022    CREATININE 1.7 (H) 09/28/2022    GLUCOSE 103 09/28/2022    CALCIUM 9.4 09/28/2022    PROT 7.7 09/28/2022    LABALBU 4.3 09/28/2022    BILITOT 0.4 09/28/2022    ALKPHOS 160 (H) 09/28/2022    AST 38 09/28/2022    ALT 15 (L) 09/28/2022    LABGLOM 41 (A) 09/28/2022    GFRAA 53 (A) 07/21/2022    AGRATIO 1.5 06/15/2021    GLOB 3.5 09/28/2022   Creatinine 1.7. Proceed with treatment.

## 2022-09-30 ENCOUNTER — HOSPITAL ENCOUNTER (OUTPATIENT)
Dept: INFUSION THERAPY | Age: 67
Discharge: HOME | End: 2022-09-30
Payer: OTHER GOVERNMENT

## 2022-09-30 DIAGNOSIS — C18.2 MALIGNANT NEOPLASM OF ASCENDING COLON (HCC): Primary | ICD-10-CM

## 2022-09-30 PROCEDURE — 6360000002 HC RX W HCPCS: Performed by: INTERNAL MEDICINE

## 2022-09-30 PROCEDURE — 2580000003 HC RX 258: Performed by: INTERNAL MEDICINE

## 2022-09-30 PROCEDURE — 96523 IRRIG DRUG DELIVERY DEVICE: CPT

## 2022-09-30 RX ORDER — SODIUM CHLORIDE 0.9 % (FLUSH) 0.9 %
10 SYRINGE (ML) INJECTION PRN
Status: DISCONTINUED | OUTPATIENT
Start: 2022-09-30 | End: 2022-10-01 | Stop reason: HOSPADM

## 2022-09-30 RX ORDER — HEPARIN SODIUM (PORCINE) LOCK FLUSH IV SOLN 100 UNIT/ML 100 UNIT/ML
500 SOLUTION INTRAVENOUS PRN
Status: DISCONTINUED | OUTPATIENT
Start: 2022-09-30 | End: 2022-10-01 | Stop reason: HOSPADM

## 2022-09-30 RX ORDER — SODIUM CHLORIDE 0.9 % (FLUSH) 0.9 %
10 SYRINGE (ML) INJECTION PRN
Status: CANCELLED | OUTPATIENT
Start: 2022-09-30

## 2022-09-30 RX ORDER — HEPARIN SODIUM (PORCINE) LOCK FLUSH IV SOLN 100 UNIT/ML 100 UNIT/ML
500 SOLUTION INTRAVENOUS PRN
Status: CANCELLED | OUTPATIENT
Start: 2022-09-30

## 2022-09-30 RX ORDER — SODIUM CHLORIDE 0.9 % (FLUSH) 0.9 %
20 SYRINGE (ML) INJECTION PRN
OUTPATIENT
Start: 2022-09-30

## 2022-09-30 RX ADMIN — HEPARIN 500 UNITS: 100 SYRINGE at 11:50

## 2022-09-30 RX ADMIN — SODIUM CHLORIDE, PRESERVATIVE FREE 10 ML: 5 INJECTION INTRAVENOUS at 11:50

## 2022-10-10 NOTE — PROGRESS NOTES
MEDICAL ONCOLOGY PROGRESS NOTE                                                          Denise Lynch   1955  10/12/2022     Chief Complaint   Patient presents with    Cancer     Adenocarcinoma of colon       INTERVAL HISTORY/HISTORY OF PRESENT ILLNESS:  Diagnosis  Colonic adenocarcinoma, March 2020  [id number](liver), stage ROXANA  IHC MMR- proficient  K-maria luisa mutated  N-MARIA LUISA/BRAF wild-type  Iron deficiency anemia  Soft tissue mass, June 2021  Adenocarcinoma-consistent with colon primary, right psoas muscle, June 2021  Metastatic adenocarcinoma, Jan 2022  MLH1, MSH2, MH6, PMS2-intact  MMR- no loss of expression     Treatment summary  3/30/2020-right hemicolectomy at [facility]  Anticipated Liver ablation to be followed by neoadjuvant/adjuvant versus palliative chemotherapy  06/10/2020-November 2020-FOLFOX + Avastin  12/11/20- Right hepatectomy-Dr. Rebecca Peacock  S/p Injectafer-poor oral iron tolerance  7/13/21- 11/17/21 FOLFIRI + Avastin every 2 weeks  1/13/22-psoas muscle mass resection/HIPEC by Dr. Morgan Vinson at [facility]  5/10/22 - 8/3/22 Discontinue FOLFIRI + Avastin every 2 weeks due to progression in the liver  Anticipate FOLFOX + Avastin every 2 weeks-     The patient is a very pleasant 74years old male who has been diagnosed with colonic adenocarcinoma. He has had several treatment modalities in the past.  He is currently status post CRS/HIPEC in mid January, 2022 at [facility]. This was complicated by a leak from his ileocolic anastomosis secondary to closed-loop obstruction at his hernia repair site (mesh displaced). He was taken back to the OR and had a primary repair of his anastomosis and diverting ileostomy in February 2022. He is currently on treatment with FOLFIRI/Avastin. Unfortunately, most recent CT scans showed evidence of liver metastasis progression. I called and discussed with hepatobiliary surgery from [facility].   Unfortunately, there is no liver directed therapy recommended at this time due to diffuse hepatic metastasis. He presents today for cycle #5 of palliative chemotherapy. He has no new complaints today. His pain is under control. Cancer history  Mr. Stewart Child was first seen by me on 3/23/2020. He was referred for a new diagnosis of colonic adenocarcinoma involving the cecum. The patient reports that he had a wellbeing consult with his provider at the [place]. He was found to have anemia and then recommended a colonoscopy. Of note, the patient has a family history of colon cancer. His mother is a patient of Dr. Renea Camara he has been diagnosed with colon cancer in 2010.  3/11/2020- colonoscopy revealed a large malignant appearing fungating mass lesion in the cecum. In addition, several other polyps. Biopsy of the mass consistent with moderate differentiated colonic adenocarcinoma. Polyps consistent with tubulovillous adenoma with no high-grade dysplasia. IHC MMR not proficient. K-maria luisa mutated, BRAF and NRAS wild type. MSI proficient  3/11/2020-CEA 5.5 (H)  3/18/2020-CT abdomen pelvis with contrast  Invasive cecal mass adhering to the right lateral abdominal wall muscles with adjacent lymphadenopathy. Mild partial obstruction of the terminal ileum. 2. Suspicious lesions in the right and left hepatic lobes measure up to 1.3 cm and likely represent metastatic disease. 3/18/2020-Xr Chest Standard  No radiographic evidence of acute cardiopulmonary process. 3/23/2020-he was first seen by me. Recommended completion of staging with CT chest.  Also recommended liver MRI for further clarification of liver lesion. S.  Recommend to proceed with a general surgery consultation tomorrow with Dr. Marielle Escobar. Patient was informed that I favor surgical resection if feasible of the primary malignancy. 3/30/2020- right hemicolectomy by Dr. Marielle Escobar at [facility] consistent with invasive moderately differentiated colonic adenocarcinoma measuring 7.2 cm. Carcinoma directly invading the adjacent abdominal wall tissue. Focal lymphovascular space invasion identified. Focal perineural invasion identified. Surgical margins negative for evidence of malignancy. 6 out of 14 lymph nodes positive for metastatic adenocarcinoma. Final pathology staging [id number](liver, stage ROXANA)  4/20/2020-CT chest with contrast showed No convincing intrathoracic metastasis. Nonspecific 4 mm nodule of the inferior lingula and a 2 mm right upper lobe nodule can be followed on subsequent imaging in 6-12 months. Moderate coronary calcifications. Hypodense metastatic liver lesions. Small hiatal hernia. 4/20/2020-Mri Abdomen W Wo Contrast There are about 5 liver lesions. The 2 largest appears similar compared to 3/18/2020, the others are too small to further characterize. Appearance is most concerning for metastatic disease. Enhancement of the right lateral peritoneum. This is favored to be postoperative as there is no nodularity, evidence of omental disease or lymphadenopathy. Recommend attention on follow-up. Cholecystectomy. 4/22/2020-discussed with Dr. Hannah Villareal at [place]. He will review imaging studies and give further recommendations regarding eligibility for resection of liver lesions. 5/8/2020 CT Abdomen The two largest suspicious lesions measuring 1.2 and 1.3 cm in the  right and left hepatic lobes respectively are similar compared to the  3/18/2020 CT. Additionally, there are at least five subcentimeter lesions with similar signal characteristics, which are also highly suspicious for metastases. If complete characterization of the number and distribution of lesions is necessary, an MRI with Eovist could be acquired. 5/19/2020- he was seen by the hepatobiliary service at [facility] with Dr. Hannah Villareal:  patient adequate risk candidate for a multimodal approach, directed toward curative hepatectomy eventually.  Endorsed by Hepatobiliary Conference, I recommended perc ablation of the L hemiliver to clear it, followed by systemic therapy in a neoadjuvant strategy. Restaging imaging to confirm clearance of disease on the left and lack of progression to unresectability of the R hemiliver disease would then be followed by R hepatectomy. Limitations to this approach may be accessibility of the segment 4A/8 disease high in the hepatic dome and the possibility of heat sink-related recurrence s/p abation of the left-sided disease. 5/21/2020- referral for Mediport placement and start FOLFOX in 2 weeks. Will add bevacizumab after 6 weeks of liver ablation. We will plan for 12 biweekly dose of FOLFOX. Bevacizumab will also be stopped 6 weeks prior to major procedure. 6/8/2020- Microwave ablation of the left liver lesion was then performed for 5 minutes at [address] to achieve a 3.4 x 3.9 cm approximately spherical zone of ablation. 6/10/2020- initiation of FOLFOX.  7/20/2020-added Avastin. 9/10/20 MRI abdomen: Left hepatic lobe segment II lesion demonstrates small T1 hyperintense blood products, status post microwave ablation on 6/8/2020. No definitive enhancement within the lesion. Focal internal thickening or scar present. Recommend attention on follow-up. Additional scattered subcentimeter foci throughout the liver decreased in size compared to MRI dated 4/20/2020 consistent with improving metastatic disease. Additional chronic findings as above. 9/16/2020-discussed with plan with the patient and [address] [place] [address]. Interval response to treatment. Plan to continue chemotherapy through 12 cycles with Avastin. 12/11/20 Right hepatectomy-Dr. Howie Dumont  12/11/20 Right hepatectomy pathology: Liver, right, resection: Focus of Fibrosis with calcifications and chronic inflammation (0.3 cm), see comment.  Background hepatic parenchyma with minimal periportal fibrosis (trichrome stain), minimal lobular and portal inflammation, and no significant macrovesicular steatosis (<5%) Comments: The patient's history of colorectal cancer status adjuvant therapy is noted. The focus of fibrosis may reflect treatment effect. There is no evidence of viable tumor in the sampled specimen. 12/14/20 Ct Abdomen Pelvis W Iv Contrast A Small bowel obstruction with transition point at the distal small bowel, just proximal to RIGHT upper quadrant ileocolonic anastomosis. Postoperative change of RIGHT hepatectomy with small amount of expected free fluid and intraperitoneal gas. Redemonstrated LEFT liver lesion. Small bilateral pleural effusions. 12/16/20 SBFT-[place]: Study is limited due to retained contrast in the small bowel from prior attempt at small bowel follow-through on the floor. Small bowel remains dilated, measuring approximately 5.2 cm, which is consistent with partial or resolving small bowel obstruction. Final radiographs show contrast within the colon. 1/4/2020-resolution of small bowel obstruction. 1/7/21 CT chest: No finding to suggest intrathoracic neoplastic process or metastatic disease. The benign-appearing tiny nodule in the right upper lobe probably represent a noncalcified granuloma. A nodule in the lingular segment of the left upper lobe is not visualized in this study. Postsurgical changes of the liver. No evidence of focal complication. A trace right basal pleural effusion. This may be reactive to the previous abdominal surgery. 3/4/21 MRI abd: Status post right hepatectomy and microwave ablation of a left liver lesion. No findings to suggest residual/recurrent disease. No new liver lesion is identified. Postsurgical changes related to right hemicolectomy. Microwave ablation zone in segment II of the left hepatic lobe measures approximately 21 mm in diameter, unchanged. No appreciable postcontrast enhancement or other findings to suggest local disease recurrence. No new liver lesion is identified. Spleen is mildly enlarged, measuring 13.8 cm in length.   3/17/2021-1 year colonoscopy showed no evidence of polyps. 5/3/21 CEA 6.2  6/8/21 MRI abdomen ([place]): Status post right hepatectomy and MWA of a left liver lesion. No evidence of residual or recurrent metastatic disease in the liver. Status post prior right hemicolectomy for colon carcinoma. Within the posterior right iliopsoas muscle there is a mildly T2 hyperintense nodular focus with postcontrast rim enhancement and diffusion restriction. This measures approximately 2.7 x 2 x 2 cm. More delayed postcontrast images show irregular area of enhancement measuring 2.4 x 7.7 cm axially involving the right iliopsoas muscle and the right lateral abdominal wall. Suggest correlation with contrast enhanced CT exam for complete evaluation. Consider biopsy of this lesion. 6/15/21 CT CHEST WO CONTRAST No metastatic disease in the chest.  No change in tiny 2 mm RIGHT upper lobe pulmonary nodule. Mild ectasia of the ascending aorta measuring 4 cm.   6/18/2021-I reviewed results of MRI abdomen at [facility]. CT chest without contrast reviewed by me and showed no evidence of metastatic disease. Stable 2 mm right upper lobe nodule. Discussed with hepatobiliary service at [facility]. Biopsy intra-abdominal nodule next week at [facility]. 6/25/20210039-[id number] biopsy soft tissue mass right psoas muscle at [facility] consistent with recurrent colonic adenocarcinoma. 7/2/2021-discussed with hepatobiliary service at [facility] and also surgical oncology. Surgical oncology will call us back regarding consultation for consideration of HIPEC if he is a candidate  7/13/21-initiation of chemotherapy with FOLFIRI + Avastin every 2 weeks  7/13/21 CEA 10.7  7/27/2021-CEA 9.6  7/30/2021-she was seen by the surgical oncology group at [facility] by Dr Edel Abreu. They recommended to complete 6 cycles of chemotherapy and repeat CT chest abdomen pelvis and liver MRI to assess disease response. They discussed the role of CRS/HIPEC in his situation. He was told that it really depends on the status of his liver lesions as well. He will complete 6 cycles of chemotherapy and will return to see me with a CTAP as well as MRI of the liver to assess disease burden and determine if he can be a candidate for debulking.  8/25/2021-proceed cycle #4.  9/22/2021 CEA- 8.1  9/24/2021 MRI with and w/o Contrast ([place])  Tiny left retroperitoneal nodule involving the left lateral conal fascial new compared to 3/4/2021 though unchanged from most recent prior, possibly an additional site of metastasis. No other new metastatic disease within the abdomen. Similar partially visualized right posterior body wall/psoas infiltrative lesion not definitely changed from MRI abdomen 6/8/2021 but better evaluated in its entirety on concurrent CT, reported separately. Status post right hemicolectomy, right hepatectomy, and segment III microwave ablation. Similar mild splenomegaly. Additional chronic and incidental findings as described in the body the report. Liver: Status post right hepatectomy. Ablation zone in segment II measures 1.7 x 1.1 cm, slightly decreased in size from 1.8 x 1.4 cm previously (series 1301 image 56) without appreciable enhancement. Similar tiny subcentimeter cyst in the left hepatic lobe. No new focal hepatic lesion identified. No visualized free fluid. Similar 0.7 cm enhancing nodule along the left lateral conal fascia laterally new from 3/4/2021, grossly unchanged from MRI dated 6/8/2021. (series 801 image 22). No other appreciable peritoneal/retroperitoneal or  omental nodularity. Ill-defined T2 hyperintense signal and hyperenhancement in the right posteromedial body wall involving the lateral aspect of the psoas muscle and posterolateral abdominal wall musculature, partially visualized measuring at least 8.7 x 3.1   cm, grossly similar to the prior exam, better evaluated in its entirety on concurrent CT reported separately.    9/24/2021 CT C/A/P w/ Contrast([place]) Status post right hemicolectomy, right hepatectomy and left hepatic microwave ablation without new suspicious hepatic lesion. Left lateral perirenal fascial nodule, which is stable compared to 6/8/2021 MRI, but new compared to 3/4/2021 MRI is concerning for an additional site of metastatic disease. No sites of new or enlarging metastatic disease in the chest.  Similar appearance of right lateral psoas and posterior abdominal wall infiltrative lesion, not significantly changed compared to 6/8/2021 MRI. Mild splenomegaly. Additional findings as outlined in the body the report. Biopsy-proven adenocarcinoma involving the posterior lateral aspect of the right iliopsoas muscle and right lateral abdominal wall. Right iliopsoas component is difficult to measure but appears to be approximately 2.5 x 2.4 cm (series 2, image 153), similar to prior MRI. Nodular thickening noted along the right posterior abdominal wall and possibly involving the the transversus abdominis measures approximately 8.5 x 1.8 cm (series 2 image 153) overall similar compared to prior MRI. 10/6/2021-discussed the results of recent CT abdomen/pelvis and MRI abdomen/pelvis at [facility]. Persistent disease involving the psoas muscle. Discussed with colorectal surgery at [facility]. They recommend to continue current therapy for another 2 months and reassess with CT scans. 7/13/21- 11/17/21 FOLFIRI + Avastin every 2 weeks  12/3/21 CT chest/abd/pelvis [facility]): Status post prior right hemicolectomy, right hepatectomy and left hepatic lesions microwave ablation. No new liver lesion. Soft tissue thickening of the right lower posterior abdominal wall involving the iliopsoas muscle is grossly unchanged consistent with improving metastatic disease.  Small right omental nodule and left lateral conal fascia nodule unchanged compared to  recent exam.   1/13/22 Exploratory lap with resections of psoas muscle mass and HIPEC by Dr. Katya Bojorquez Damian at [facility]:  Metastatic colorectal adenocarcinoma involving fibroadipose tissue. IHC MMR proficient. 1/24/22 CT chest/abd/pelvis [facility]): Extensive postsurgical changes in the abdomen including partial right colectomy, resection of right retroperitoneal mass, omentectomy and mesh repair of right lateral abdominal wall defect. There appears to be a defect in the mesh containing herniated loops of small bowel. Extensive diffuse dilated small bowel loops with air-fluid levels are seen throughout the abdomen. There are segmental areas of decompressed small bowel in the left upper quadrant as well as adjacent to the herniated small  bowel loops in the right retroperitoneum. Air-fluid levels are also seen throughout the colon. While pattern could likely represent postoperative ileus given the diffuse small bowel dilatation and distal colonic air and fluid, developing or partial small bowel obstruction secondary to the right lateral abdominal wall hernia cannot entirely be excluded. Small ascites. 8 mm nodule along the left lateral conal fascia is unchanged. Slight increase in size of cardiophrenic lymph nodes measuring up to 7 mm anterior to the right hemidiaphragm. Stable hypodensity in the left hepatic lobe. Diffuse gastric wall thickening/edema could be secondary to gastritis in the appropriate clinical setting. February 2022- HIPEC/tumor debulking January. This was complicated by a leak from his ileocolic anastomosis secondary to closed-loop obstruction at his hernia repair site (mesh displaced). He was taken back to the OR and had a primary repair of his anastomosis and diverting ileostomy in February 2022  3/23/2022-ileostomy reversal at [facility] Dr. Praveen Brizuela at [facility]  3/20/2022 CT Abd/Pelvis WO Contrast [facility]) Findings suspicious for aspiration at the lung bases. Postsurgical changes related to right hepatectomy.  There is a new low-attenuation lesion in the left hemiliver, highly concerning for metastasis. Postsurgical changes related to right hemicolectomy with ileocolonic anastomosis and right lower quadrant diverting loop ileostomy. No evidence of bowel obstruction. No intra-abdominal fluid collection. Other findings as above. A critical alert was sent at the time of dictation. Liver: Postsurgical changes related to right hepatectomy. There is a new, approximately 2.1 cm low-attenuation lesion seen in the left hepatic lobe on image 25 of series 3, highly concerning for metastasis. 4/18/2022 Ileostomy,closure enterocutaneous stoma with mural acute inflammation and jessa-intestinal Fat necrosis. Negative for malignancy. 4/23/2022- discussed the patient reinitiation of chemotherapy. We will continue FOLFIRI. He had no prior progression on FOLFIRI of FOLFOX. 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  We would avoid Avastin at this time due to recent surgery. He is K-maria luisa mutated and therefore cannot use anti-EGFR agent. May contemplate adding Avastin in 4 weeks. 5/9/2022 CT Chest W Contrast New pulmonary nodules are present in the right and left lung as described above. It cannot be determined if these are infectious nodules or metastatic disease. Dilated loops of bowel are present in the upper abdomen. The abdomen is incompletely evaluated however this may be wither an ileus or partial obstruction. Hepatic metastasis. 5/10/22 Re-initiate FOLFIRI + Avastin every 2 weeks. 5/24/2022  CEA 29.1  5/24/2022 Iron Studies:Iron 29, TIBC 256, Iron Sat 11,Ferritin 116.9  6/22/2022 CEA 21.5  7/21/2022 CT Chest W Contrast Bibasilar linear atelectasis life scarring. No pulmonary nodules, masses or infiltrates. Multiple enhancing low attenuation masses in the liver similar to the previous study. Findings consistent with metastatic liver disease.   7/21/2022 CT Abd/Pelvis W IV Contrast ([place]) Multiple enhancing low attenuation liver lesions increase in size and number since the previous study 3/7/22. Findings consistent with progression of metastatic liver disease. Status postcholecystectomy. The appendix is not visualized. No acute findings. 5/10/22 - 8/3/22 Discontinue FOLFIRI + Avastin every 2 weeks due to progression in the liver  8/3/2022-unfortunately, interval progression of liver metastasis when compared to last CT scans performed on 3/7/2022. In addition, when compared to report from [address] [place] [address] abdomen/pelvis on 3/20/2022 when he had only 1 single metastatic lesion measuring 2.1 cm. This is considered progression. I have recommended to discontinue FOLFIRI/Avastin and consider initiation of palliative FOLFOX with Avastin. Discussed with hepatobiliary surgery. The patient is not a candidate for further surgery. He is not a candidate for any liver directed therapy. 8/3/2022 CEA 26.6  9/14/2022 CEA 36.5      CEA dynamics:  2/27/22 CEA 3.5  5/24/22 CEA 29.1  6/22/22 CEA 21.5  8/3/22 CEA 26.6  9/4/22 CEA 36.5    PAST MEDICAL HISTORY:   Past Medical History:   Diagnosis Date    Acid reflux     Cancer ([facility]     adenocarcinoma of colon    GERD (gastroesophageal reflux disease)     Palliative care patient 03/01/2022    PTSD (post-traumatic stress disorder)     Stage 3a chronic kidney disease ([facility]       PAST SURGICAL HISTORY:  Past Surgical History:   Procedure Laterality Date    ABLATION OF DYSRHYTHMIC FOCUS      ablation of liver at [address]  2003    CHOLECYSTECTOMY  2013    COLONOSCOPY      when pt was in the 20's    COLONOSCOPY N/A 03/11/2020    COLONOSCOPY POLYPECTOMY SNARE/COLD BIOPSY: Dr. Missy Almeida colonoscopy: 6-12 months due to findings at colonoscopy today with Cecal mass lesion and large polyps.     COLONOSCOPY      COLONOSCOPY N/A 03/17/2021    Dr Abimael Malhotra, Post-operative changes w IC anastomosis & single visible staple, Mild Diverticuosis, Int Hemorrhoids Grade 1, 3 year recall    HEMICOLECTOMY Right 03/30/2020 LAPAROSCOPIC-ASSISTED RIGHT HEMICOLECTOMY performed by Sharyle Croon, MD at [address] [facility] N/A 06/03/2020    INSERTION OF VENOUS PORT with flouro performed by Sharyle Croon, MD at [address] [place] [address]  ENDOSCOPY N/A 03/11/2020    Dr. [facility]:   [facility]      SOCIAL HISTORY:  Social History     Socioeconomic History    Marital status:      Spouse name: None    Number of children: None    Years of education: None    Highest education level: None   Tobacco Use    Smoking status: Never    Smokeless tobacco: Never   Vaping Use    Vaping Use: Never used   Substance and Sexual Activity    Alcohol use: Yes     Comment: rare     Drug use: No    Sexual activity: Yes     Partners: Female     FAMILY HISTORY:  Family History   Problem Relation Age of Onset    Liver Cancer Mother     High Blood Pressure Mother     Colon Cancer Mother         x2    Cancer Father         Lung Cancer    Breast Cancer Sister     Cancer Maternal Grandfather         Lung Cancer    Cancer Paternal Grandfather         Stomach Cancer    Colon Polyps Neg Hx     Cystic Fibrosis Neg Hx     Liver Disease Neg Hx     Rectal Cancer Neg Hx         Current Outpatient Medications   Medication Sig Dispense Refill    fentaNYL (DURAGESIC) 12 MCG/HR Place 1 patch onto the skin every 3 days for 30 days. Intended supply: 30 days 10 patch 0    oxyCODONE HCl (OXY-IR) 10 MG immediate release tablet Take 1 tablet by mouth every 6 hours as needed for Pain for up to 30 days.  Intended supply: 30 days 120 tablet 0    promethazine (PHENERGAN) 12.5 MG tablet Take 1 tablet by mouth every 6 hours as needed for Nausea 60 tablet 5    sildenafil (VIAGRA) 50 MG tablet Take 1 tablet by mouth daily as needed for Erectile Dysfunction 10 tablet 5    Magnesium Oxide 400 MG CAPS Take 400 mg by mouth in the morning and at bedtime 60 capsule 3    apixaban (ELIQUIS) 5 MG TABS tablet Take 1 tablet by mouth 2 times daily 60 tablet 0    vitamin D (ERGOCALCIFEROL) 1.25 MG (71388 UT) CAPS capsule Take 1 capsule by mouth once a week 5 capsule 0    methocarbamol (ROBAXIN) 500 MG tablet Take 500 mg by mouth 3 times daily as needed       omeprazole (PRILOSEC) 20 MG delayed release capsule Take 20 mg by mouth daily      prazosin (MINIPRESS) 1 MG capsule Take 1 mg by mouth nightly as needed For nightmares       Sertraline HCl (ZOLOFT PO) Take by mouth daily       No current facility-administered medications for this visit.      Facility-Administered Medications Ordered in Other Visits   Medication Dose Route Frequency Provider Last Rate Last Admin    dexamethasone (DECADRON) 10 mg in sodium chloride 0.9 % 50 mL IVPB   IntraVENous Once Tulio Larkin MD        palonosetron (ALOXI) injection 0.25 mg  0.25 mg IntraVENous Once Tulio Larkin MD          REVIEW OF SYSTEMS:   CONSTITUTIONAL: no fever, no night sweats, fatigue;  HEENT: no blurring of vision, no double vision, no hearing difficulty, no tinnitus, no ulceration, no dysplasia, no epistaxis;   LUNGS: no cough, no hemoptysis, no wheeze,  no shortness of breath;  CARDIOVASCULAR: no palpitation, no chest pain, no shortness of breath;  GI: no abdominal pain, no nausea, no vomiting, no diarrhea, no constipation;  ANNIE: no dysuria, no hematuria, no frequency or urgency, no nephrolithiasis;  MUSCULOSKELETAL: no joint pain, no swelling, no stiffness;  ENDOCRINE: no polyuria, no polydipsia, no cold or heat intolerance;  HEMATOLOGY: no easy bruising or bleeding, no history of clotting disorder;  DERMATOLOGY: no skin rash, no eczema, no pruritus;  PSYCHIATRY: no depression, no anxiety, no panic attacks, no suicidal ideation, no homicidal ideation;  NEUROLOGY: no syncope, no seizures, no numbness or tingling of hands, no numbness or tingling of feet, no paresis;     Vitals signs:  /77   Pulse 84   Temp 97.9 °F (36.6 °C)   Resp 18   Ht 5' 7\" (1.702 m)   Wt 136 lb 12.8 oz (62.1 kg)   SpO2 98%   BMI 21.43 kg/m²     PHYSICAL EXAM:  CONSTITUTIONAL: Alert, appropriate, no acute distress  EYES: Non icteric, EOM intact, pupils equal round   ENT: Mucus membranes moist, no oral pharyngeal lesions, external inspection of ears and nose are normal.  NECK: Supple, no masses. No palpable thyroid mass  CHEST/LUNGS: CTA bilaterally, normal respiratory effort   CARDIOVASCULAR: RRR, no murmurs. No lower extremity edema  ABDOMEN: soft non-tender, active bowel sounds, no HSM. No palpable masses  EXTREMITIES: warm, full ROM in all 4 extremities, no focal weakness. SKIN: warm, dry with no rashes or lesions  LYMPH: No cervical, clavicular, axillary, or inguinal lymphadenopathy  NEUROLOGIC: follows commands, non focal   PSYCH: mood and affect appropriate.   Alert and oriented to time, place, person        Relevant Lab findings/reviewed by me:  9/14/2022 CEA 36.5  Lab Results   Component Value Date    WBC 4.21 (L) 10/12/2022    HGB 10.7 (L) 10/12/2022    HCT 34.6 (L) 10/12/2022    MCV 88.0 10/12/2022     10/12/2022     Lab Results   Component Value Date    NEUTROABS 2.27 10/12/2022     Lab Results   Component Value Date     10/12/2022    K 4.7 10/12/2022     10/12/2022    CO2 24 10/12/2022    BUN 14 10/12/2022    CREATININE 1.5 (H) 10/12/2022    GLUCOSE 108 (H) 10/12/2022    CALCIUM 8.8 10/12/2022    PROT 7.3 10/12/2022    LABALBU 4.0 10/12/2022    BILITOT 0.4 10/12/2022    ALKPHOS 145 (H) 10/12/2022    AST 31 10/12/2022    ALT 17 (L) 10/12/2022    LABGLOM 47 (A) 10/12/2022    GFRAA 53 (A) 07/21/2022    AGRATIO 1.5 06/15/2021    GLOB 3.3 10/12/2022         Relevant Imaging studies/reviewed by me:  None    ASSESSMENT:    Orders Placed This Encounter   Procedures    CBC with Auto Differential     Standing Status:   Future     Number of Occurrences:   1     Standing Expiration Date:   10/12/2023    Comprehensive Metabolic Panel     Standing Status:   Future     Number of Occurrences:   1     Standing Expiration Date:   10/12/2023    CEA Standing Status:   Future     Number of Occurrences:   1     Standing Expiration Date:   10/12/2023            Denise was seen today for cancer. Diagnoses and all orders for this visit:    Malignant neoplasm of ascending colon ([address]  -     CBC with Auto Differential; Future  -     Comprehensive Metabolic Panel; Future  -     CEA; Future           #Colonic adenocarcinoma-  [id number] (liver) K-maria luisa mutated, IHC MMR not proficient  Status post microwave ablation left hepatic lesion  Status post neoadjuvant FOLFOX/bevacizumab x11 biweekly cycles  Status post right hemicolectomy. Pathology consistent complete pathologic response. Recommended surveillance as per NCCN guidelines  Discussed at length results of pathology with the patient. 3/17/21- 1 year colonoscopy showed no large polyps or masses. Repeat in 3 years. June 2021-CT chest clear. MRI abdomen showed suspicious lesion in the right lower quadrant. Biopsy arranged [place]. Discussed with hepatobiliary service at [facility]. 6/25/[id number] biopsy consistent with recurrent primary colorectal adenocarcinoma. 7/2/2021-discussed with hepatobiliary service/surgical oncology at [facility]. They will review images and call the patient back regarding consultation for consideration of HIPEC  7/2/2021-they recommend systemic therapy. 7/2/2021-recommended FOLFIRI/Avastin  7/13/21- Initiated FOLFIRI + Avastin every 2 weeks  07/30/21-Seen at [facility] by GI surgeon oncology. They discussed the role of CRS/HIPEC in his situation. He was told hat it really depends on the status of his liver lesions as well. He will complete 6 cycles of chemotherapy and will return to see me with a CTAP as well as MRI of the liver to assess disease burden and determine if he can be a candidate for debulking.    8/25/2021-proceed with FOLFIRI/Avastin   9/24/2021-repeat MRI abdomen/CT abdomen showed persistent disease.     1/13/2022-Exploratory laparotomy/resection of psoas mass/HIPEC at [facility]. Path Isaac:     OMENTUM, OMENTECTOMY:   - METASTATIC COLORECTAL ADENOCARCINOMA INVOLVING FIBROADIPOSE TISSUE.   - TWO (2) LYMPH NODES, NEGATIVE FOR CARCINOMA. SMALL BOWEL, MESENTERIC NODULE, EXCISION:   - METASTATIC COLORECTAL ADENOCARCINOMA INVOLVING FIBROMUSCULAR TISSUE AND EXTENDING TO TISSUE EDGE. SMALL BOWEL AND RIGHT COLON, ILEOCOLIC RESECTION:     - RESIDUAL RECURRENT INVASIVE MODERATELY DIFFERENTIATED COLORECTAL ADENOCARCINOMA (2.1 CM) INVOLVING ANASTOMOTIC SITE; SEE COMMENT AND SYNOPTIC REPORT.   - TUMOR FOCALLY INVOLVES THE SEROSA SURFACE.   - METASTATIC CARCINOMA INVOLVES ONE (1) OF TWELVE (12) LYMPH NODES.   - ONE (1) TUMOR DEPOSIT PRESENT.   - ALL RESECTION MARGINS ARE NEGATIVE FOR CARCINOMA. RETROPERITONEAL NODULE, EXCISION:   - METASTATIC COLORECTAL ADENOCARCINOMA INVOLVING FREDDY-PANCREATIC TISSUE AND EXTENDING TO TISSUE EDGE. LATERAL DUODENECTOMY, RESECTION:   - METASTATIC COLORECTAL ADENOCARCINOMA INVOLVING DUODENAL SUBMUCOSA, MUSCULARIS PROPRIA, AND SUBSEROSA. - TUMOR IS PRESENT WITHIN 1 MM OF INKED RESECTION MARGINS. RIGHT ABDOMINAL WALL NODULE, EXCISION:   - METASTATIC COLORECTAL ADENOCARCINOMA INVOLVING FIBROMUSCULAR TISSUE. RIGHT RETROPERITONEAL MASS, RESECTION:   - METASTATIC COLORECTAL ADENOCARCINOMA INVOLVING FIBROADIPOSE TISSUE (7.3 CM). - MEDIAL, SUPERIOR AND DEEP RESECTION MARGINS ARE POSITIVE FOR CARCINOMA. - INKED INFERIOR AND LATERAL MARGINS ARE NEGATIVE FOR CARCINOMA (<1 MM) RIGHT RETROPERITONEAL MASS, FINAL SUPERIOR MARGIN, EXCISION:   - METASTATIC COLORECTAL ADENOCARCINOMA INVOLVING FIBROADIPOSE TISSUE, FOCALLY PRESENT AT CAUTERIZED, INKED TISSUE EDGE.   2/7/2022-postoperative dehydration and electrolyte abnormalities. Home health arrangements IV fluids twice a week. 3/23/2022- reversal of ileostomy at [facility]. 3/20/2022 CT Abd/Pelvis WO Contrast ([facility]) Findings suspicious for aspiration at the lung bases.  Postsurgical changes related to right hepatectomy. There is a new low-attenuation lesion in the left hemiliver, highly concerning for metastasis. Postsurgical changes related to right hemicolectomy with ileocolonic anastomosis and right lower quadrant diverting loop ileostomy. No evidence of bowel obstruction. No intra-abdominal fluid collection. Other findings as above. A critical alert was sent at the time of dictation. Liver: Postsurgical changes related to right hepatectomy. There is a new, approximately 2.1 cm low-attenuation lesion seen in the left hepatic lobe on image 25 of series 3, highly concerning for metastasis. 4/23/2022- discussed the patient reinitiation of chemotherapy. We will continue FOLFIRI. He had no prior progression on FOLFIRI of FOLFOX. 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  We would avoid Avastin at this time due to recent surgery. He is K-maria luisa mutated and therefore cannot use anti-EGFR agent. May contemplate adding Avastin in 4 weeks. 5/9/2022 CT Chest W Contrast New pulmonary nodules are present in the right and left lung as described above. It cannot be determined if these are infectious nodules or metastatic disease. Dilated loops of bowel are present in the upper abdomen. The abdomen is incompletely evaluated however this may be wither an ileus or partial obstruction. Hepatic metastasis. Plan:  -Proceed cycle #3 FOLFOX/Avastin third line therapy  -Discussed with [facility] hepatobiliary service. No clinical trials available. Not a candidate for liver directed therapy at this time. #Chronic kidney disease stage III- creatinine 1.6  Encouraged to increase PO fluid intake  Labs Renal Latest Ref Rng & Units 10/12/2022 9/28/2022 9/14/2022 9/1/2022 8/30/2022   BUN 9 - 20 mg/dL 14 25(H) 16 22(H) 17   Cr 0.6 - 1.2 mg/dL 1.5(H) 1.7(H) 1.6(H) 1.6(H) 1.6(H)   K 3.5 - 5.1 mmol/L 4.7 4.4 4.3 4.3 4.8   Na 137 - 145 mmol/L 142 142 143 142 142            #Liver metastasis- plan as above. Status post ablation left liver lobe lesion at [facility] on 6/8/2020. Status post neoadjuvant FOLFOX/bevacizumab x11 biweekly cyclesStatus post right hemicolectomy. Pathology consistent complete pathologic response. 3/4/2021-MRI abdomen was unremarkable  6/8/2021-MRI abdomen showed Postsurgical changes of right hepatectomy. Redemonstration of an ablation zone in segment II, measuring up to 1.7 cm, previously 1.8 cm. No evidence of postcontrast enhancement. No new lesion identified. 9/24/2021-MRI abdomen [place] showed Liver: Status post right hepatectomy. Ablation zone in segment II measures 1.7 x 1.1 cm, slightly decreased in size from 1.8 x 1.4 cm previously (series 1301 image 56) without apreciable enhancement. Similar tiny subcentimeter cyst in the left hepatic lobe. No new focal hepatic lesion identified. 3/20/2022 CT Abd/Pelvis WO Contrast ([facility]) Findings suspicious for aspiration at the lung bases. Postsurgical changes related to right hepatectomy. There is a new low-attenuation lesion in the left hemiliver, highly concerning for metastasis. Postsurgical changes related to right hemicolectomy with ileocolonic anastomosis and right lower quadrant diverting loop ileostomy. No evidence of bowel obstruction. No intra-abdominal fluid collection. Other findings as above. A critical alert was sent at the time of dictation. Liver: Postsurgical changes related to right hepatectomy. There is a new, approximately 2.1 cm low-attenuation lesion seen in the left hepatic lobe on image 25 of series 3, highly concerning for metastasis. 4/23/2022- discussed the patient reinitiation of chemotherapy. We will continue FOLFIRI. He had no prior progression on FOLFIRI of FOLFOX. 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  We would avoid Avastin at this time due to recent surgery. He is K-maria luisa mutated and therefore cannot use anti-EGFR agent. May contemplate adding Avastin in 4 weeks.   5/9/2022 CT Chest W Contrast New pulmonary nodules are present in the right and left lung as described above. It cannot be determined if these are infectious nodules or metastatic disease. Dilated loops of bowel are present in the upper abdomen. The abdomen is incompletely evaluated however this may be wither an ileus or partial obstruction. Hepatic metastasis. 7/21/2022 CT Chest W Contrast Bibasilar linear atelectasis life scarring. No pulmonary nodules, masses or infiltrates. Multiple enhancing low attenuation masses in the liver similar to the previous study. Findings consistent with metastatic liver disease. 7/21/2022 CT Abd/Pelvis W IV Contrast ([facility]) Multiple enhancing low attenuation liver lesions increase in size and number since the previous study 3/7/22. Findings consistent with progression of metastatic liver disease. Status postcholecystectomy. The appendix is not visualized. No acute findings. 8/3/22 Discontinue FOLFIRI + Avastin every 2 weeks due to progression in the liver  8/3/2022-unfortunately, interval progression of liver metastasis when compared to last CT scans performed on 3/7/2022. In addition, when compared to report from [address] [place] [address] abdomen/pelvis on 3/20/2022 when he had only 1 single metastatic lesion measuring 2.1 cm. This is considered progression. I have recommended to discontinue FOLFIRI/Avastin and consider initiation of palliative FOLFOX with Avastin. Discussed with hepatobiliary surgery. The patient is not a candidate for further surgery. He is not a candidate for any liver directed therapy. Plan:  -Proceed cycle #3 FOLFOX/Avastin third line therapy  -Discussed with [facility] hepatobiliary service. No clinical trials available. Not a candidate for liver directed therapy at this time. Lab Results   Component Value Date    CEA 36.5 (H) 09/14/2022          #Multifactorial anemia  hemoglobin 8.5/MCV 89. Ferritin 12.9, iron saturation 15%, TIBC 458, iron 72.    Status post IV iron therapy. Hemoglobin 11.3  -Repeat iron profile on 5/24/2022: Ferritin 116, iron saturation 11, iron 29, TIBC 256  Recent Labs     10/12/22  0919 09/28/22  1111 09/14/22  0905   WBC 4.21* 4.80 3.37*   HGB 10.7* 11.0* 10.8*   HCT 34.6* 35.6* 35.2*   MCV 88.0 89.7 88.4    160* 170   -Likely related to chemotherapy       #Chemotherapy-induced neuropathy-stable, antineoplastic induced anemia    #Cancer related pain-  -Oxycodone IR 10 mg every 6 hours as needed  -Continue Fentanyl 12 mcg every 72 hrs    DVT port associated left upper extremity-February 2022  internal jugular vein subclavian axillary brachial, left upper extremity. Acute appearing superficial thrombophlebitis (SVT) is seen in the basilic  and cephalic veins, right upper extremity.  -Eliquis 5mg twice a day    Erectile dysfunction-  -Sildenafil 50mg daily as needed    Hypomagnesemia-magnesium 1.5. Replace magnesium today. PLAN:  RTC with MD in treatment room 4 weeks  C# 5 FOLFOX + Avastin today and every 2 weeks  CBC CMP CEA today  Continue follow-up with [facility]/Dr. Andrea Mooney, Oct 2022   Continue Fentanyl 12 mcg every 72 hrs-refill sent  Continue OxyIR 10mg every 6 hours as needed-refill sent  Continue Sildenafil 50mg daily as needed-refill sent  Continue Phenergan 12.5mg every 6 hrs as needed-refill sent  Continue Eliquis 5mg twice a day  Continue OTC Imodium as needed  Repeat CT scans 3 weeks    Follow Up:     No follow-ups on file. Data Boris Aguillon am pre charting  as Medical Assistant for David Chan MD. Electronically signed by Joselyn Kuhn MA on 10/12/2022 at 4:47 PM CDT. Ector Lindsey am scribing for David Chan MD. Electronically signed by Sagar Fleming, ADITYA on 10/12/2022 at 10:12 AM CDT. I, Dr Maikel Ball, personally performed the services described in this documentation as scribed by Sagar Fleming, RN in my presence and is both accurate and complete.     I have seen, examined and reviewed this patient medication list, appropriate labs and imaging studies. I reviewed relevant medical records and others physicians notes. I discussed the plans of care with the patient. I answered all the questions to the patients satisfaction. I have also reviewed the chief complaint (CC) and part of the history (History of Present Illness (HPI), Past Family Social History [facility]), or Review of Systems (ROS) and made changes when appropriated.        (Please note that portions of this note were completed with a voice recognition program. Efforts were made to edit the dictations but occasionally words are mis-transcribed.)  Electronically signed by Gill Manrique MD on 10/12/2022 at 10:12 AM

## 2022-10-12 ENCOUNTER — HOSPITAL ENCOUNTER (OUTPATIENT)
Dept: INFUSION THERAPY | Age: 67
Discharge: HOME | End: 2022-10-12
Payer: OTHER GOVERNMENT

## 2022-10-12 ENCOUNTER — OFFICE VISIT (OUTPATIENT)
Dept: HEMATOLOGY | Age: 67
End: 2022-10-12
Payer: MEDICARE

## 2022-10-12 VITALS
SYSTOLIC BLOOD PRESSURE: 130 MMHG | HEIGHT: 67 IN | OXYGEN SATURATION: 98 % | WEIGHT: 136.8 LBS | TEMPERATURE: 97.9 F | HEART RATE: 84 BPM | DIASTOLIC BLOOD PRESSURE: 77 MMHG | BODY MASS INDEX: 21.47 KG/M2 | RESPIRATION RATE: 18 BRPM

## 2022-10-12 DIAGNOSIS — R11.0 NAUSEA: ICD-10-CM

## 2022-10-12 DIAGNOSIS — T45.1X5D ADVERSE EFFECT OF CHEMOTHERAPY, SUBSEQUENT ENCOUNTER: ICD-10-CM

## 2022-10-12 DIAGNOSIS — C78.7 LIVER METASTASES (HCC): ICD-10-CM

## 2022-10-12 DIAGNOSIS — Z71.89 CARE PLAN DISCUSSED WITH PATIENT: ICD-10-CM

## 2022-10-12 DIAGNOSIS — C18.9 ADENOCARCINOMA OF COLON (HCC): ICD-10-CM

## 2022-10-12 DIAGNOSIS — C18.2 MALIGNANT NEOPLASM OF ASCENDING COLON (HCC): Primary | ICD-10-CM

## 2022-10-12 DIAGNOSIS — D64.81 ANTINEOPLASTIC CHEMOTHERAPY INDUCED ANEMIA: ICD-10-CM

## 2022-10-12 DIAGNOSIS — Z51.11 CHEMOTHERAPY MANAGEMENT, ENCOUNTER FOR: ICD-10-CM

## 2022-10-12 DIAGNOSIS — Z51.12 ENCOUNTER FOR ANTINEOPLASTIC IMMUNOTHERAPY: ICD-10-CM

## 2022-10-12 DIAGNOSIS — T45.1X5A ANTINEOPLASTIC CHEMOTHERAPY INDUCED ANEMIA: ICD-10-CM

## 2022-10-12 DIAGNOSIS — G89.3 CANCER ASSOCIATED PAIN: ICD-10-CM

## 2022-10-12 DIAGNOSIS — N52.1 ERECTILE DYSFUNCTION DUE TO DISEASES CLASSIFIED ELSEWHERE: ICD-10-CM

## 2022-10-12 LAB
ALBUMIN SERPL-MCNC: 4 G/DL (ref 3.5–5.2)
ALP BLD-CCNC: 145 U/L (ref 40–130)
ALT SERPL-CCNC: 17 U/L (ref 21–72)
ANION GAP SERPL CALCULATED.3IONS-SCNC: 11 MMOL/L (ref 7–19)
AST SERPL-CCNC: 31 U/L (ref 17–59)
BILIRUB SERPL-MCNC: 0.4 MG/DL (ref 0.2–1.3)
BUN BLDV-MCNC: 14 MG/DL (ref 9–20)
CALCIUM SERPL-MCNC: 8.8 MG/DL (ref 8.4–10.2)
CEA: 40.7 NG/ML (ref 0–4.7)
CHLORIDE BLD-SCNC: 107 MMOL/L (ref 98–111)
CO2: 24 MMOL/L (ref 22–29)
CREAT SERPL-MCNC: 1.5 MG/DL (ref 0.6–1.2)
GFR NON-AFRICAN AMERICAN: 47
GLOBULIN: 3.3 G/DL
GLUCOSE BLD-MCNC: 108 MG/DL (ref 74–106)
HCT VFR BLD CALC: 34.6 % (ref 40.1–51)
HEMOGLOBIN: 10.7 G/DL (ref 13.7–17.5)
LYMPHOCYTES ABSOLUTE: 0.69 K/UL (ref 1.18–3.74)
LYMPHOCYTES RELATIVE PERCENT: 16.4 % (ref 19.3–53.1)
MCH RBC QN AUTO: 27.2 PG (ref 25.7–32.2)
MCHC RBC AUTO-ENTMCNC: 30.9 G/DL (ref 32.3–36.5)
MCV RBC AUTO: 88 FL (ref 79–92.2)
MONOCYTES ABSOLUTE: 1.1 K/UL (ref 0.24–0.82)
MONOCYTES RELATIVE PERCENT: 26.1 % (ref 4.7–12.5)
NEUTROPHILS ABSOLUTE: 2.27 K/UL (ref 1.56–6.13)
NEUTROPHILS RELATIVE PERCENT: 53.9 % (ref 34–71.1)
PDW BLD-RTO: 18.1 % (ref 11.6–14.4)
PLATELET # BLD: 165 K/UL (ref 163–337)
PMV BLD AUTO: 10.7 FL (ref 7.4–10.4)
POTASSIUM SERPL-SCNC: 4.7 MMOL/L (ref 3.5–5.1)
RBC # BLD: 3.93 M/UL (ref 4.63–6.08)
SODIUM BLD-SCNC: 142 MMOL/L (ref 137–145)
TOTAL PROTEIN: 7.3 G/DL (ref 6.3–8.2)
WBC # BLD: 4.21 K/UL (ref 4.23–9.07)

## 2022-10-12 PROCEDURE — 96366 THER/PROPH/DIAG IV INF ADDON: CPT

## 2022-10-12 PROCEDURE — 96417 CHEMO IV INFUS EACH ADDL SEQ: CPT

## 2022-10-12 PROCEDURE — 85025 COMPLETE CBC W/AUTO DIFF WBC: CPT

## 2022-10-12 PROCEDURE — G0498 CHEMO EXTEND IV INFUS W/PUMP: HCPCS

## 2022-10-12 PROCEDURE — 96413 CHEMO IV INFUSION 1 HR: CPT

## 2022-10-12 PROCEDURE — 96375 TX/PRO/DX INJ NEW DRUG ADDON: CPT

## 2022-10-12 PROCEDURE — G8484 FLU IMMUNIZE NO ADMIN: HCPCS | Performed by: INTERNAL MEDICINE

## 2022-10-12 PROCEDURE — 99214 OFFICE O/P EST MOD 30 MIN: CPT | Performed by: INTERNAL MEDICINE

## 2022-10-12 PROCEDURE — 80053 COMPREHEN METABOLIC PANEL: CPT

## 2022-10-12 PROCEDURE — 96415 CHEMO IV INFUSION ADDL HR: CPT

## 2022-10-12 PROCEDURE — 96367 TX/PROPH/DG ADDL SEQ IV INF: CPT

## 2022-10-12 PROCEDURE — 36415 COLL VENOUS BLD VENIPUNCTURE: CPT

## 2022-10-12 PROCEDURE — 3017F COLORECTAL CA SCREEN DOC REV: CPT | Performed by: INTERNAL MEDICINE

## 2022-10-12 PROCEDURE — 1123F ACP DISCUSS/DSCN MKR DOCD: CPT | Performed by: INTERNAL MEDICINE

## 2022-10-12 PROCEDURE — G8427 DOCREV CUR MEDS BY ELIG CLIN: HCPCS | Performed by: INTERNAL MEDICINE

## 2022-10-12 PROCEDURE — 1036F TOBACCO NON-USER: CPT | Performed by: INTERNAL MEDICINE

## 2022-10-12 PROCEDURE — G8420 CALC BMI NORM PARAMETERS: HCPCS | Performed by: INTERNAL MEDICINE

## 2022-10-12 PROCEDURE — 2580000003 HC RX 258: Performed by: INTERNAL MEDICINE

## 2022-10-12 PROCEDURE — 6360000002 HC RX W HCPCS: Performed by: INTERNAL MEDICINE

## 2022-10-12 RX ORDER — EPINEPHRINE 1 MG/ML
0.3 INJECTION, SOLUTION, CONCENTRATE INTRAVENOUS PRN
Status: CANCELLED | OUTPATIENT
Start: 2022-10-12

## 2022-10-12 RX ORDER — OXYCODONE HYDROCHLORIDE 10 MG/1
10 TABLET ORAL EVERY 6 HOURS PRN
Qty: 120 TABLET | Refills: 0 | Status: SHIPPED | OUTPATIENT
Start: 2022-10-12 | End: 2022-11-11

## 2022-10-12 RX ORDER — ACETAMINOPHEN 325 MG/1
650 TABLET ORAL
Status: CANCELLED | OUTPATIENT
Start: 2022-10-12

## 2022-10-12 RX ORDER — SODIUM CHLORIDE 9 MG/ML
5-250 INJECTION, SOLUTION INTRAVENOUS PRN
Status: CANCELLED | OUTPATIENT
Start: 2022-10-12

## 2022-10-12 RX ORDER — SILDENAFIL 50 MG/1
50 TABLET, FILM COATED ORAL DAILY PRN
Qty: 10 TABLET | Refills: 5 | Status: SHIPPED | OUTPATIENT
Start: 2022-10-12

## 2022-10-12 RX ORDER — SODIUM CHLORIDE 9 MG/ML
5-40 INJECTION INTRAVENOUS PRN
Status: CANCELLED | OUTPATIENT
Start: 2022-10-14

## 2022-10-12 RX ORDER — PALONOSETRON 0.05 MG/ML
0.25 INJECTION, SOLUTION INTRAVENOUS ONCE
Status: COMPLETED | OUTPATIENT
Start: 2022-10-12 | End: 2022-10-12

## 2022-10-12 RX ORDER — PALONOSETRON 0.05 MG/ML
0.25 INJECTION, SOLUTION INTRAVENOUS ONCE
Status: CANCELLED | OUTPATIENT
Start: 2022-10-12 | End: 2022-10-12

## 2022-10-12 RX ORDER — SODIUM CHLORIDE 0.9 % (FLUSH) 0.9 %
5-40 SYRINGE (ML) INJECTION PRN
Status: CANCELLED | OUTPATIENT
Start: 2022-10-14

## 2022-10-12 RX ORDER — ALBUTEROL SULFATE 90 UG/1
4 AEROSOL, METERED RESPIRATORY (INHALATION) PRN
Status: CANCELLED | OUTPATIENT
Start: 2022-10-12

## 2022-10-12 RX ORDER — DIPHENHYDRAMINE HYDROCHLORIDE 50 MG/ML
50 INJECTION INTRAMUSCULAR; INTRAVENOUS
Status: CANCELLED | OUTPATIENT
Start: 2022-10-12

## 2022-10-12 RX ORDER — ONDANSETRON 2 MG/ML
8 INJECTION INTRAMUSCULAR; INTRAVENOUS
Status: CANCELLED | OUTPATIENT
Start: 2022-10-12

## 2022-10-12 RX ORDER — DEXTROSE MONOHYDRATE 50 MG/ML
5-250 INJECTION, SOLUTION INTRAVENOUS PRN
Status: CANCELLED | OUTPATIENT
Start: 2022-10-12

## 2022-10-12 RX ORDER — SODIUM CHLORIDE 9 MG/ML
INJECTION, SOLUTION INTRAVENOUS CONTINUOUS
Status: CANCELLED | OUTPATIENT
Start: 2022-10-12

## 2022-10-12 RX ORDER — SODIUM CHLORIDE 0.9 % (FLUSH) 0.9 %
5-40 SYRINGE (ML) INJECTION PRN
Status: CANCELLED | OUTPATIENT
Start: 2022-10-12

## 2022-10-12 RX ORDER — MEPERIDINE HYDROCHLORIDE 50 MG/ML
12.5 INJECTION INTRAMUSCULAR; INTRAVENOUS; SUBCUTANEOUS PRN
Status: CANCELLED | OUTPATIENT
Start: 2022-10-12

## 2022-10-12 RX ORDER — PROMETHAZINE HYDROCHLORIDE 12.5 MG/1
12.5 TABLET ORAL EVERY 6 HOURS PRN
Qty: 60 TABLET | Refills: 5 | Status: SHIPPED | OUTPATIENT
Start: 2022-10-12

## 2022-10-12 RX ORDER — HEPARIN SODIUM (PORCINE) LOCK FLUSH IV SOLN 100 UNIT/ML 100 UNIT/ML
500 SOLUTION INTRAVENOUS PRN
Status: CANCELLED | OUTPATIENT
Start: 2022-10-12

## 2022-10-12 RX ORDER — SODIUM CHLORIDE 9 MG/ML
5-250 INJECTION, SOLUTION INTRAVENOUS PRN
Status: CANCELLED | OUTPATIENT
Start: 2022-10-14

## 2022-10-12 RX ORDER — SODIUM CHLORIDE 9 MG/ML
5-40 INJECTION INTRAVENOUS PRN
Status: CANCELLED | OUTPATIENT
Start: 2022-10-12

## 2022-10-12 RX ORDER — FAMOTIDINE 10 MG/ML
20 INJECTION, SOLUTION INTRAVENOUS
Status: CANCELLED | OUTPATIENT
Start: 2022-10-12

## 2022-10-12 RX ORDER — FENTANYL 12 UG/H
1 PATCH TRANSDERMAL
Qty: 10 PATCH | Refills: 0 | Status: SHIPPED | OUTPATIENT
Start: 2022-10-12 | End: 2022-11-11

## 2022-10-12 RX ORDER — HEPARIN SODIUM (PORCINE) LOCK FLUSH IV SOLN 100 UNIT/ML 100 UNIT/ML
500 SOLUTION INTRAVENOUS PRN
Status: CANCELLED | OUTPATIENT
Start: 2022-10-14

## 2022-10-12 RX ADMIN — OXALIPLATIN 145 MG: 5 INJECTION, SOLUTION INTRAVENOUS at 11:23

## 2022-10-12 RX ADMIN — PALONOSETRON 0.25 MG: 0.25 INJECTION, SOLUTION INTRAVENOUS at 10:19

## 2022-10-12 RX ADMIN — SODIUM CHLORIDE 300 MG: 9 INJECTION, SOLUTION INTRAVENOUS at 10:45

## 2022-10-12 RX ADMIN — FLUOROURACIL 4100 MG: 50 INJECTION, SOLUTION INTRAVENOUS at 13:40

## 2022-10-12 RX ADMIN — DEXAMETHASONE SODIUM PHOSPHATE: 10 INJECTION, SOLUTION INTRAMUSCULAR; INTRAVENOUS at 10:19

## 2022-10-12 RX ADMIN — LEUCOVORIN CALCIUM 700 MG: 350 INJECTION, POWDER, LYOPHILIZED, FOR SOLUTION INTRAMUSCULAR; INTRAVENOUS at 11:22

## 2022-10-14 ENCOUNTER — HOSPITAL ENCOUNTER (OUTPATIENT)
Dept: INFUSION THERAPY | Age: 67
Discharge: HOME | End: 2022-10-14
Payer: OTHER GOVERNMENT

## 2022-10-14 DIAGNOSIS — C18.2 MALIGNANT NEOPLASM OF ASCENDING COLON (HCC): Primary | ICD-10-CM

## 2022-10-14 PROCEDURE — 2580000003 HC RX 258: Performed by: INTERNAL MEDICINE

## 2022-10-14 PROCEDURE — 96523 IRRIG DRUG DELIVERY DEVICE: CPT

## 2022-10-14 PROCEDURE — 6360000002 HC RX W HCPCS: Performed by: INTERNAL MEDICINE

## 2022-10-14 RX ORDER — HEPARIN SODIUM (PORCINE) LOCK FLUSH IV SOLN 100 UNIT/ML 100 UNIT/ML
500 SOLUTION INTRAVENOUS PRN
OUTPATIENT
Start: 2022-10-14

## 2022-10-14 RX ORDER — SODIUM CHLORIDE 0.9 % (FLUSH) 0.9 %
10 SYRINGE (ML) INJECTION PRN
OUTPATIENT
Start: 2022-10-14

## 2022-10-14 RX ORDER — HEPARIN SODIUM (PORCINE) LOCK FLUSH IV SOLN 100 UNIT/ML 100 UNIT/ML
500 SOLUTION INTRAVENOUS PRN
Status: DISCONTINUED | OUTPATIENT
Start: 2022-10-14 | End: 2022-10-15 | Stop reason: HOSPADM

## 2022-10-14 RX ORDER — SODIUM CHLORIDE 0.9 % (FLUSH) 0.9 %
20 SYRINGE (ML) INJECTION PRN
OUTPATIENT
Start: 2022-10-14

## 2022-10-14 RX ORDER — SODIUM CHLORIDE 0.9 % (FLUSH) 0.9 %
10 SYRINGE (ML) INJECTION PRN
Status: DISCONTINUED | OUTPATIENT
Start: 2022-10-14 | End: 2022-10-15 | Stop reason: HOSPADM

## 2022-10-14 RX ADMIN — Medication 500 UNITS: at 14:06

## 2022-10-14 RX ADMIN — SODIUM CHLORIDE, PRESERVATIVE FREE 10 ML: 5 INJECTION INTRAVENOUS at 14:06

## 2022-10-26 ENCOUNTER — HOSPITAL ENCOUNTER (OUTPATIENT)
Dept: INFUSION THERAPY | Age: 67
Discharge: HOME | End: 2022-10-26
Payer: OTHER GOVERNMENT

## 2022-10-26 VITALS
HEART RATE: 88 BPM | TEMPERATURE: 97.7 F | HEIGHT: 67 IN | OXYGEN SATURATION: 100 % | WEIGHT: 134.5 LBS | BODY MASS INDEX: 21.11 KG/M2 | SYSTOLIC BLOOD PRESSURE: 133 MMHG | DIASTOLIC BLOOD PRESSURE: 91 MMHG | RESPIRATION RATE: 18 BRPM

## 2022-10-26 DIAGNOSIS — C18.9 ADENOCARCINOMA OF COLON (HCC): ICD-10-CM

## 2022-10-26 DIAGNOSIS — E86.0 DEHYDRATION: ICD-10-CM

## 2022-10-26 DIAGNOSIS — C78.7 LIVER METASTASES (HCC): ICD-10-CM

## 2022-10-26 DIAGNOSIS — C78.7 LIVER METASTASES (HCC): Primary | ICD-10-CM

## 2022-10-26 DIAGNOSIS — N17.0 ACUTE KIDNEY INJURY (AKI) WITH ACUTE TUBULAR NECROSIS (ATN) (HCC): ICD-10-CM

## 2022-10-26 DIAGNOSIS — C18.2 MALIGNANT NEOPLASM OF ASCENDING COLON (HCC): ICD-10-CM

## 2022-10-26 DIAGNOSIS — R53.81 PHYSICAL DECONDITIONING: ICD-10-CM

## 2022-10-26 DIAGNOSIS — R62.7 FAILURE TO THRIVE IN ADULT: Primary | ICD-10-CM

## 2022-10-26 LAB
ALBUMIN SERPL-MCNC: 4.1 G/DL (ref 3.5–5.2)
ALP BLD-CCNC: 167 U/L (ref 40–130)
ALT SERPL-CCNC: 17 U/L (ref 21–72)
ANION GAP SERPL CALCULATED.3IONS-SCNC: 12 MMOL/L (ref 7–19)
AST SERPL-CCNC: 35 U/L (ref 17–59)
BILIRUB SERPL-MCNC: 0.5 MG/DL (ref 0.2–1.3)
BUN BLDV-MCNC: 27 MG/DL (ref 9–20)
CALCIUM SERPL-MCNC: 9 MG/DL (ref 8.4–10.2)
CHLORIDE BLD-SCNC: 109 MMOL/L (ref 98–111)
CO2: 18 MMOL/L (ref 22–29)
CREAT SERPL-MCNC: 1.6 MG/DL (ref 0.6–1.2)
GFR SERPL CREATININE-BSD FRML MDRD: 47 ML/MIN/{1.73_M2}
GLOBULIN: 3.4 G/DL
GLUCOSE BLD-MCNC: 102 MG/DL (ref 74–106)
HCT VFR BLD CALC: 35.6 % (ref 40.1–51)
HEMOGLOBIN: 11.4 G/DL (ref 13.7–17.5)
LYMPHOCYTES ABSOLUTE: 0.74 K/UL (ref 1.18–3.74)
LYMPHOCYTES RELATIVE PERCENT: 16 % (ref 19.3–53.1)
MCH RBC QN AUTO: 27.6 PG (ref 25.7–32.2)
MCHC RBC AUTO-ENTMCNC: 32 G/DL (ref 32.3–36.5)
MCV RBC AUTO: 86.2 FL (ref 79–92.2)
MONOCYTES ABSOLUTE: 0.76 K/UL (ref 0.24–0.82)
MONOCYTES RELATIVE PERCENT: 16.4 % (ref 4.7–12.5)
NEUTROPHILS ABSOLUTE: 2.95 K/UL (ref 1.56–6.13)
NEUTROPHILS RELATIVE PERCENT: 63.7 % (ref 34–71.1)
PDW BLD-RTO: 18.6 % (ref 11.6–14.4)
PLATELET # BLD: 112 K/UL (ref 163–337)
PMV BLD AUTO: 11.7 FL (ref 7.4–10.4)
POTASSIUM SERPL-SCNC: 4.4 MMOL/L (ref 3.5–5.1)
PROTEIN UA: NEGATIVE
RBC # BLD: 4.13 M/UL (ref 4.63–6.08)
SODIUM BLD-SCNC: 139 MMOL/L (ref 137–145)
TOTAL PROTEIN: 7.5 G/DL (ref 6.3–8.2)
WBC # BLD: 4.63 K/UL (ref 4.23–9.07)

## 2022-10-26 PROCEDURE — 2580000003 HC RX 258: Performed by: INTERNAL MEDICINE

## 2022-10-26 PROCEDURE — 96366 THER/PROPH/DIAG IV INF ADDON: CPT

## 2022-10-26 PROCEDURE — G0498 CHEMO EXTEND IV INFUS W/PUMP: HCPCS

## 2022-10-26 PROCEDURE — 96415 CHEMO IV INFUSION ADDL HR: CPT

## 2022-10-26 PROCEDURE — 96367 TX/PROPH/DG ADDL SEQ IV INF: CPT

## 2022-10-26 PROCEDURE — 80053 COMPREHEN METABOLIC PANEL: CPT

## 2022-10-26 PROCEDURE — 96413 CHEMO IV INFUSION 1 HR: CPT

## 2022-10-26 PROCEDURE — 96375 TX/PRO/DX INJ NEW DRUG ADDON: CPT

## 2022-10-26 PROCEDURE — 96360 HYDRATION IV INFUSION INIT: CPT

## 2022-10-26 PROCEDURE — 6360000002 HC RX W HCPCS: Performed by: INTERNAL MEDICINE

## 2022-10-26 PROCEDURE — 96417 CHEMO IV INFUS EACH ADDL SEQ: CPT

## 2022-10-26 PROCEDURE — 85025 COMPLETE CBC W/AUTO DIFF WBC: CPT

## 2022-10-26 PROCEDURE — 36415 COLL VENOUS BLD VENIPUNCTURE: CPT

## 2022-10-26 RX ORDER — HEPARIN SODIUM (PORCINE) LOCK FLUSH IV SOLN 100 UNIT/ML 100 UNIT/ML
500 SOLUTION INTRAVENOUS PRN
Status: CANCELLED | OUTPATIENT
Start: 2022-10-26

## 2022-10-26 RX ORDER — SODIUM CHLORIDE 0.9 % (FLUSH) 0.9 %
5-40 SYRINGE (ML) INJECTION PRN
Status: CANCELLED | OUTPATIENT
Start: 2022-10-28

## 2022-10-26 RX ORDER — PALONOSETRON 0.05 MG/ML
0.25 INJECTION, SOLUTION INTRAVENOUS ONCE
Status: CANCELLED | OUTPATIENT
Start: 2022-10-26 | End: 2022-10-26

## 2022-10-26 RX ORDER — ACETAMINOPHEN 325 MG/1
650 TABLET ORAL
Status: CANCELLED | OUTPATIENT
Start: 2022-10-26

## 2022-10-26 RX ORDER — SODIUM CHLORIDE 9 MG/ML
5-250 INJECTION, SOLUTION INTRAVENOUS PRN
Status: CANCELLED | OUTPATIENT
Start: 2022-10-26

## 2022-10-26 RX ORDER — SODIUM CHLORIDE 9 MG/ML
5-40 INJECTION INTRAVENOUS PRN
Status: CANCELLED | OUTPATIENT
Start: 2022-10-28

## 2022-10-26 RX ORDER — ONDANSETRON 2 MG/ML
8 INJECTION INTRAMUSCULAR; INTRAVENOUS
Status: CANCELLED | OUTPATIENT
Start: 2022-10-26

## 2022-10-26 RX ORDER — SODIUM CHLORIDE 9 MG/ML
5-40 INJECTION INTRAVENOUS PRN
Status: CANCELLED | OUTPATIENT
Start: 2022-10-26

## 2022-10-26 RX ORDER — EPINEPHRINE 1 MG/ML
0.3 INJECTION, SOLUTION, CONCENTRATE INTRAVENOUS PRN
Status: CANCELLED | OUTPATIENT
Start: 2022-10-26

## 2022-10-26 RX ORDER — SODIUM CHLORIDE 9 MG/ML
5-250 INJECTION, SOLUTION INTRAVENOUS PRN
Status: DISCONTINUED | OUTPATIENT
Start: 2022-10-26 | End: 2022-10-27 | Stop reason: HOSPADM

## 2022-10-26 RX ORDER — DIPHENHYDRAMINE HYDROCHLORIDE 50 MG/ML
50 INJECTION INTRAMUSCULAR; INTRAVENOUS
Status: CANCELLED | OUTPATIENT
Start: 2022-10-26

## 2022-10-26 RX ORDER — MEPERIDINE HYDROCHLORIDE 50 MG/ML
12.5 INJECTION INTRAMUSCULAR; INTRAVENOUS; SUBCUTANEOUS PRN
Status: CANCELLED | OUTPATIENT
Start: 2022-10-26

## 2022-10-26 RX ORDER — PALONOSETRON 0.05 MG/ML
0.25 INJECTION, SOLUTION INTRAVENOUS ONCE
Status: COMPLETED | OUTPATIENT
Start: 2022-10-26 | End: 2022-10-26

## 2022-10-26 RX ORDER — DEXTROSE MONOHYDRATE 50 MG/ML
5-250 INJECTION, SOLUTION INTRAVENOUS PRN
Status: CANCELLED | OUTPATIENT
Start: 2022-10-26

## 2022-10-26 RX ORDER — ALBUTEROL SULFATE 90 UG/1
4 AEROSOL, METERED RESPIRATORY (INHALATION) PRN
Status: CANCELLED | OUTPATIENT
Start: 2022-10-26

## 2022-10-26 RX ORDER — SODIUM CHLORIDE 9 MG/ML
5-250 INJECTION, SOLUTION INTRAVENOUS PRN
Status: CANCELLED | OUTPATIENT
Start: 2022-10-28

## 2022-10-26 RX ORDER — SODIUM CHLORIDE 9 MG/ML
INJECTION, SOLUTION INTRAVENOUS CONTINUOUS
Status: CANCELLED | OUTPATIENT
Start: 2022-10-26

## 2022-10-26 RX ORDER — HEPARIN SODIUM (PORCINE) LOCK FLUSH IV SOLN 100 UNIT/ML 100 UNIT/ML
500 SOLUTION INTRAVENOUS PRN
Status: CANCELLED | OUTPATIENT
Start: 2022-10-28

## 2022-10-26 RX ORDER — SODIUM CHLORIDE 0.9 % (FLUSH) 0.9 %
5-40 SYRINGE (ML) INJECTION PRN
Status: CANCELLED | OUTPATIENT
Start: 2022-10-26

## 2022-10-26 RX ORDER — FAMOTIDINE 10 MG/ML
20 INJECTION, SOLUTION INTRAVENOUS
Status: CANCELLED | OUTPATIENT
Start: 2022-10-26

## 2022-10-26 RX ADMIN — PALONOSETRON 0.25 MG: 0.05 INJECTION, SOLUTION INTRAVENOUS at 10:33

## 2022-10-26 RX ADMIN — DEXAMETHASONE SODIUM PHOSPHATE: 10 INJECTION, SOLUTION INTRAMUSCULAR; INTRAVENOUS at 10:32

## 2022-10-26 RX ADMIN — SODIUM CHLORIDE 300 MG: 9 INJECTION, SOLUTION INTRAVENOUS at 10:52

## 2022-10-26 RX ADMIN — SODIUM CHLORIDE 500 ML/HR: 9 INJECTION, SOLUTION INTRAVENOUS at 10:14

## 2022-10-26 RX ADMIN — LEUCOVORIN CALCIUM 700 MG: 350 INJECTION, POWDER, LYOPHILIZED, FOR SUSPENSION INTRAMUSCULAR; INTRAVENOUS at 11:25

## 2022-10-26 RX ADMIN — FLUOROURACIL 4100 MG: 50 INJECTION, SOLUTION INTRAVENOUS at 13:37

## 2022-10-26 RX ADMIN — OXALIPLATIN 145 MG: 5 INJECTION, SOLUTION INTRAVENOUS at 11:26

## 2022-10-26 NOTE — PROGRESS NOTES
Lab Results   Component Value Date    WBC 4.63 10/26/2022    HGB 11.4 (L) 10/26/2022    HCT 35.6 (L) 10/26/2022    MCV 86.2 10/26/2022     (L) 10/26/2022     Lab Results   Component Value Date    NEUTROABS 2.95 10/26/2022     Lab Results   Component Value Date     10/26/2022    K 4.4 10/26/2022     10/26/2022    CO2 18 (L) 10/26/2022    BUN 27 (H) 10/26/2022    CREATININE 1.6 (H) 10/26/2022    GLUCOSE 102 10/26/2022    CALCIUM 9.0 10/26/2022    PROT 7.5 10/26/2022    LABALBU 4.1 10/26/2022    BILITOT 0.5 10/26/2022    ALKPHOS 167 (H) 10/26/2022    AST 35 10/26/2022    ALT 17 (L) 10/26/2022    LABGLOM 47 (A) 10/26/2022    GFRAA 53 (A) 07/21/2022    AGRATIO 1.5 06/15/2021    GLOB 3.4 10/26/2022

## 2022-10-28 ENCOUNTER — HOSPITAL ENCOUNTER (OUTPATIENT)
Dept: INFUSION THERAPY | Age: 67
Discharge: HOME | End: 2022-10-28
Payer: OTHER GOVERNMENT

## 2022-10-28 DIAGNOSIS — C18.2 MALIGNANT NEOPLASM OF ASCENDING COLON (HCC): ICD-10-CM

## 2022-10-28 DIAGNOSIS — C78.7 LIVER METASTASES (HCC): Primary | ICD-10-CM

## 2022-10-28 DIAGNOSIS — C18.9 ADENOCARCINOMA OF COLON (HCC): ICD-10-CM

## 2022-10-28 PROCEDURE — 6360000002 HC RX W HCPCS: Performed by: INTERNAL MEDICINE

## 2022-10-28 PROCEDURE — 2580000003 HC RX 258: Performed by: INTERNAL MEDICINE

## 2022-10-28 PROCEDURE — 96523 IRRIG DRUG DELIVERY DEVICE: CPT

## 2022-10-28 RX ORDER — SODIUM CHLORIDE 0.9 % (FLUSH) 0.9 %
5-40 SYRINGE (ML) INJECTION PRN
Status: DISCONTINUED | OUTPATIENT
Start: 2022-10-28 | End: 2022-10-29 | Stop reason: HOSPADM

## 2022-10-28 RX ORDER — HEPARIN SODIUM (PORCINE) LOCK FLUSH IV SOLN 100 UNIT/ML 100 UNIT/ML
500 SOLUTION INTRAVENOUS PRN
Status: DISCONTINUED | OUTPATIENT
Start: 2022-10-28 | End: 2022-10-29 | Stop reason: HOSPADM

## 2022-10-28 RX ADMIN — HEPARIN 500 UNITS: 100 SYRINGE at 12:57

## 2022-10-28 RX ADMIN — SODIUM CHLORIDE, PRESERVATIVE FREE 10 ML: 5 INJECTION INTRAVENOUS at 12:56

## 2022-11-03 ENCOUNTER — TELEPHONE (OUTPATIENT)
Dept: HEMATOLOGY | Age: 67
End: 2022-11-03

## 2022-11-03 NOTE — TELEPHONE ENCOUNTER
URGENT REQUEST PLACED FOR CONTINUATION OF CARE WITH [facility]---[facility]AD[facility] WITH [facility] WILL NOTIFY ME BY END OF DAY WITH APPROVAL DECISION. Pebbles Kimball NOTIFIED (LMP--CT SCANS TOMORROW)    Jann Carey.  Guillaume Smith

## 2022-11-04 ENCOUNTER — HOSPITAL ENCOUNTER (OUTPATIENT)
Dept: CT IMAGING | Age: 67
Discharge: HOME | End: 2022-11-04
Payer: OTHER GOVERNMENT

## 2022-11-04 DIAGNOSIS — C18.2 MALIGNANT NEOPLASM OF ASCENDING COLON (HCC): ICD-10-CM

## 2022-11-04 DIAGNOSIS — C78.7 LIVER METASTASES (HCC): ICD-10-CM

## 2022-11-04 PROCEDURE — 71260 CT THORAX DX C+: CPT

## 2022-11-04 PROCEDURE — 6360000004 HC RX CONTRAST MEDICATION: Performed by: INTERNAL MEDICINE

## 2022-11-04 PROCEDURE — 74177 CT ABD & PELVIS W/CONTRAST: CPT

## 2022-11-04 RX ADMIN — IOPAMIDOL 75 ML: 755 INJECTION, SOLUTION INTRAVENOUS at 12:39

## 2022-11-08 NOTE — PROGRESS NOTES
MEDICAL ONCOLOGY PROGRESS NOTE                                                          Denise Aguilar   1955 11/9/2022     Chief Complaint   Patient presents with    Cancer    Follow-up       INTERVAL HISTORY/HISTORY OF PRESENT ILLNESS:  Diagnosis  Colonic adenocarcinoma, March 2020  [id number](liver), stage ROXANA  IHC MMR- proficient  K-maria luisa mutated  N-MARIA LUISA/BRAF wild-type  Iron deficiency anemia  Soft tissue mass, June 2021  Adenocarcinoma-consistent with colon primary, right psoas muscle, June 2021  Metastatic adenocarcinoma, Jan 2022  MLH1, MSH2, MH6, PMS2-intact  MMR- no loss of expression     Treatment summary  3/30/2020-right hemicolectomy at [facility]  Anticipated Liver ablation to be followed by neoadjuvant/adjuvant versus palliative chemotherapy  06/10/2020-November 2020-FOLFOX + Avastin  12/11/20- Right hepatectomy-Dr. Yinka Saucedo  S/p Injectafer-poor oral iron tolerance  7/13/21- 11/17/21 FOLFIRI + Avastin every 2 weeks  1/13/22-psoas muscle mass resection/HIPEC by Dr. Laura Senior at [facility]  5/10/22 - 8/3/22 Discontinue FOLFIRI + Avastin every 2 weeks due to progression in the liver  8/10/2022- FOLFOX + Avastin every 2 weeks-     The patient is a very pleasant 74years old male who has been diagnosed with colonic adenocarcinoma. He has had several treatment modalities in the past.  He is currently status post CRS/HIPEC in mid January, 2022 at [facility]. This was complicated by a leak from his ileocolic anastomosis secondary to closed-loop obstruction at his hernia repair site (mesh displaced). He was taken back to the OR and had a primary repair of his anastomosis and diverting ileostomy in February 2022. He is currently on treatment with FOLFIRI/Avastin. Unfortunately, most recent CT scans showed evidence of liver metastasis progression. I called and discussed with hepatobiliary surgery from [facility].   Unfortunately, there is no liver directed therapy recommended at this time due to diffuse hepatic metastasis. He presents today for cycle #7 of palliative chemotherapy. He has no new complaints today. His pain is under control. He had repeat CT C/A/P performed to assess response. Cancer history  Mr. Jg Hatch was first seen by me on 3/23/2020. He was referred for a new diagnosis of colonic adenocarcinoma involving the cecum. The patient reports that he had a wellbeing consult with his provider at the [facility]. He was found to have anemia and then recommended a colonoscopy. Of note, the patient has a family history of colon cancer. His mother is a patient of Dr. Clemnetina Costello he has been diagnosed with colon cancer in 2010.  3/11/2020- colonoscopy revealed a large malignant appearing fungating mass lesion in the cecum. In addition, several other polyps. Biopsy of the mass consistent with moderate differentiated colonic adenocarcinoma. Polyps consistent with tubulovillous adenoma with no high-grade dysplasia. IHC MMR not proficient. K-maria luisa mutated, BRAF and NRAS wild type. MSI proficient  3/11/2020-CEA 5.5 (H)  3/18/2020-CT abdomen pelvis with contrast  Invasive cecal mass adhering to the right lateral abdominal wall muscles with adjacent lymphadenopathy. Mild partial obstruction of the terminal ileum. 2. Suspicious lesions in the right and left hepatic lobes measure up to 1.3 cm and likely represent metastatic disease. 3/18/2020-Xr Chest Standard  No radiographic evidence of acute cardiopulmonary process. 3/23/2020-he was first seen by me. Recommended completion of staging with CT chest.  Also recommended liver MRI for further clarification of liver lesion. S.  Recommend to proceed with a general surgery consultation tomorrow with Dr. Karen Werner. Patient was informed that I favor surgical resection if feasible of the primary malignancy.   3/30/2020- right hemicolectomy by Dr. Karen Werner at [address] consistent with invasive moderately differentiated colonic adenocarcinoma measuring 7.2 cm. Carcinoma directly invading the adjacent abdominal wall tissue. Focal lymphovascular space invasion identified. Focal perineural invasion identified. Surgical margins negative for evidence of malignancy. 6 out of 14 lymph nodes positive for metastatic adenocarcinoma. Final pathology staging [id number](liver, stage ROXANA)  4/20/2020-CT chest with contrast showed No convincing intrathoracic metastasis. Nonspecific 4 mm nodule of the inferior lingula and a 2 mm right upper lobe nodule can be followed on subsequent imaging in 6-12 months. Moderate coronary calcifications. Hypodense metastatic liver lesions. Small hiatal hernia. 4/20/2020-Mri Abdomen W Wo Contrast There are about 5 liver lesions. The 2 largest appears similar compared to 3/18/2020, the others are too small to further characterize. Appearance is most concerning for metastatic disease. Enhancement of the right lateral peritoneum. This is favored to be postoperative as there is no nodularity, evidence of omental disease or lymphadenopathy. Recommend attention on follow-up. Cholecystectomy. 4/22/2020-discussed with Dr. Luis Antonio Lester at [place]. He will review imaging studies and give further recommendations regarding eligibility for resection of liver lesions. 5/8/2020 CT Abdomen The two largest suspicious lesions measuring 1.2 and 1.3 cm in the  right and left hepatic lobes respectively are similar compared to the  3/18/2020 CT. Additionally, there are at least five subcentimeter lesions with similar signal characteristics, which are also highly suspicious for metastases. If complete characterization of the number and distribution of lesions is necessary, an MRI with Eovist could be acquired. 5/19/2020- he was seen by the hepatobiliary service at [address] [place] [address] with Dr. Luis Antonio Lester:  patient adequate risk candidate for a multimodal approach, directed toward curative hepatectomy eventually.  Endorsed by Hepatobiliary Conference, I recommended perc ablation of the L hemiliver to clear it, followed by systemic therapy in a neoadjuvant strategy. Restaging imaging to confirm clearance of disease on the left and lack of progression to unresectability of the R hemiliver disease would then be followed by R hepatectomy. Limitations to this approach may be accessibility of the segment 4A/8 disease high in the hepatic dome and the possibility of heat sink-related recurrence s/p abation of the left-sided disease. 5/21/2020- referral for Mediport placement and start FOLFOX in 2 weeks. Will add bevacizumab after 6 weeks of liver ablation. We will plan for 12 biweekly dose of FOLFOX. Bevacizumab will also be stopped 6 weeks prior to major procedure. 6/8/2020- Microwave ablation of the left liver lesion was then performed for 5 minutes at [address] to achieve a 3.4 x 3.9 cm approximately spherical zone of ablation. 6/10/2020- initiation of FOLFOX.  7/20/2020-added Avastin. 9/10/20 MRI abdomen: Left hepatic lobe segment II lesion demonstrates small T1 hyperintense blood products, status post microwave ablation on 6/8/2020. No definitive enhancement within the lesion. Focal internal thickening or scar present. Recommend attention on follow-up. Additional scattered subcentimeter foci throughout the liver decreased in size compared to MRI dated 4/20/2020 consistent with improving metastatic disease. Additional chronic findings as above. 9/16/2020-discussed with plan with the patient and [address] [place] [address]. Interval response to treatment. Plan to continue chemotherapy through 12 cycles with Avastin. 12/11/20 Right hepatectomy-Dr. Felisha Nicholson  12/11/20 Right hepatectomy pathology: Liver, right, resection: Focus of Fibrosis with calcifications and chronic inflammation (0.3 cm), see comment.  Background hepatic parenchyma with minimal periportal fibrosis (trichrome stain), minimal lobular and portal inflammation, and no significant macrovesicular steatosis (<5%) Comments: The patient's history of colorectal cancer status adjuvant therapy is noted. The focus of fibrosis may reflect treatment effect. There is no evidence of viable tumor in the sampled specimen. 12/14/20 Ct Abdomen Pelvis W Iv Contrast A Small bowel obstruction with transition point at the distal small bowel, just proximal to RIGHT upper quadrant ileocolonic anastomosis. Postoperative change of RIGHT hepatectomy with small amount of expected free fluid and intraperitoneal gas. Redemonstrated LEFT liver lesion. Small bilateral pleural effusions. 12/16/20 SBFT-[place]: Study is limited due to retained contrast in the small bowel from prior attempt at small bowel follow-through on the floor. Small bowel remains dilated, measuring approximately 5.2 cm, which is consistent with partial or resolving small bowel obstruction. Final radiographs show contrast within the colon. 1/4/2020-resolution of small bowel obstruction. 1/7/21 CT chest: No finding to suggest intrathoracic neoplastic process or metastatic disease. The benign-appearing tiny nodule in the right upper lobe probably represent a noncalcified granuloma. A nodule in the lingular segment of the left upper lobe is not visualized in this study. Postsurgical changes of the liver. No evidence of focal complication. A trace right basal pleural effusion. This may be reactive to the previous abdominal surgery. 3/4/21 MRI abd: Status post right hepatectomy and microwave ablation of a left liver lesion. No findings to suggest residual/recurrent disease. No new liver lesion is identified. Postsurgical changes related to right hemicolectomy. Microwave ablation zone in segment II of the left hepatic lobe measures approximately 21 mm in diameter, unchanged. No appreciable postcontrast enhancement or other findings to suggest local disease recurrence. No new liver lesion is identified.  Spleen is mildly enlarged, measuring 13.8 cm in length. 3/17/2021-1 year colonoscopy showed no evidence of polyps. 5/3/21 CEA 6.2  6/8/21 MRI abdomen ([place]): Status post right hepatectomy and MWA of a left liver lesion. No evidence of residual or recurrent metastatic disease in the liver. Status post prior right hemicolectomy for colon carcinoma. Within the posterior right iliopsoas muscle there is a mildly T2 hyperintense nodular focus with postcontrast rim enhancement and diffusion restriction. This measures approximately 2.7 x 2 x 2 cm. More delayed postcontrast images show irregular area of enhancement measuring 2.4 x 7.7 cm axially involving the right iliopsoas muscle and the right lateral abdominal wall. Suggest correlation with contrast enhanced CT exam for complete evaluation. Consider biopsy of this lesion. 6/15/21 CT CHEST WO CONTRAST No metastatic disease in the chest.  No change in tiny 2 mm RIGHT upper lobe pulmonary nodule. Mild ectasia of the ascending aorta measuring 4 cm.   6/18/2021-I reviewed results of MRI abdomen at [facility]. CT chest without contrast reviewed by me and showed no evidence of metastatic disease. Stable 2 mm right upper lobe nodule. Discussed with hepatobiliary service at [facility]. Biopsy intra-abdominal nodule next week at [facility]. 6/25/[id number] biopsy soft tissue mass right psoas muscle at [facility] consistent with recurrent colonic adenocarcinoma. 7/2/2021-discussed with hepatobiliary service at [facility] and also surgical oncology. Surgical oncology will call us back regarding consultation for consideration of HIPEC if he is a candidate  7/13/21-initiation of chemotherapy with FOLFIRI + Avastin every 2 weeks  7/13/21 CEA 10.7  7/27/2021-CEA 9.6  7/30/2021-she was seen by the surgical oncology group at [facility] by Dr Justin Rosales. They recommended to complete 6 cycles of chemotherapy and repeat CT chest abdomen pelvis and liver MRI to assess disease response.   They discussed the role of CRS/HIPEC in his situation. He was told that it really depends on the status of his liver lesions as well. He will complete 6 cycles of chemotherapy and will return to see me with a CTAP as well as MRI of the liver to assess disease burden and determine if he can be a candidate for debulking.  8/25/2021-proceed cycle #4.  9/22/2021 CEA- 8.1  9/24/2021 MRI with and w/o Contrast ([place])  Tiny left retroperitoneal nodule involving the left lateral conal fascial new compared to 3/4/2021 though unchanged from most recent prior, possibly an additional site of metastasis. No other new metastatic disease within the abdomen. Similar partially visualized right posterior body wall/psoas infiltrative lesion not definitely changed from MRI abdomen 6/8/2021 but better evaluated in its entirety on concurrent CT, reported separately. Status post right hemicolectomy, right hepatectomy, and segment III microwave ablation. Similar mild splenomegaly. Additional chronic and incidental findings as described in the body the report. Liver: Status post right hepatectomy. Ablation zone in segment II measures 1.7 x 1.1 cm, slightly decreased in size from 1.8 x 1.4 cm previously (series 1301 image 56) without appreciable enhancement. Similar tiny subcentimeter cyst in the left hepatic lobe. No new focal hepatic lesion identified. No visualized free fluid. Similar 0.7 cm enhancing nodule along the left lateral conal fascia laterally new from 3/4/2021, grossly unchanged from MRI dated 6/8/2021. (series 801 image 22). No other appreciable peritoneal/retroperitoneal or  omental nodularity.  Ill-defined T2 hyperintense signal and hyperenhancement in the right posteromedial body wall involving the lateral aspect of the psoas muscle and posterolateral abdominal wall musculature, partially visualized measuring at least 8.7 x 3.1   cm, grossly similar to the prior exam, better evaluated in its entirety on concurrent CT reported muscle mass and HIPEC by Dr. Morgan Vinson at [facility]:  Metastatic colorectal adenocarcinoma involving fibroadipose tissue. IHC MMR proficient. 1/24/22 CT chest/abd/pelvis [facility]): Extensive postsurgical changes in the abdomen including partial right colectomy, resection of right retroperitoneal mass, omentectomy and mesh repair of right lateral abdominal wall defect. There appears to be a defect in the mesh containing herniated loops of small bowel. Extensive diffuse dilated small bowel loops with air-fluid levels are seen throughout the abdomen. There are segmental areas of decompressed small bowel in the left upper quadrant as well as adjacent to the herniated small  bowel loops in the right retroperitoneum. Air-fluid levels are also seen throughout the colon. While pattern could likely represent postoperative ileus given the diffuse small bowel dilatation and distal colonic air and fluid, developing or partial small bowel obstruction secondary to the right lateral abdominal wall hernia cannot entirely be excluded. Small ascites. 8 mm nodule along the left lateral conal fascia is unchanged. Slight increase in size of cardiophrenic lymph nodes measuring up to 7 mm anterior to the right hemidiaphragm. Stable hypodensity in the left hepatic lobe. Diffuse gastric wall thickening/edema could be secondary to gastritis in the appropriate clinical setting. February 2022- HIPEC/tumor debulking January. This was complicated by a leak from his ileocolic anastomosis secondary to closed-loop obstruction at his hernia repair site (mesh displaced). He was taken back to the OR and had a primary repair of his anastomosis and diverting ileostomy in February 2022  3/23/2022-ileostomy reversal at [facility] Dr. Morgan Vinson at [facility]  3/20/2022 CT Abd/Pelvis WO Contrast [facility]) Findings suspicious for aspiration at the lung bases. Postsurgical changes related to right hepatectomy. There is a new low-attenuation lesion in the left hemiliver, highly concerning for metastasis. Postsurgical changes related to right hemicolectomy with ileocolonic anastomosis and right lower quadrant diverting loop ileostomy. No evidence of bowel obstruction. No intra-abdominal fluid collection. Other findings as above. A critical alert was sent at the time of dictation. Liver: Postsurgical changes related to right hepatectomy. There is a new, approximately 2.1 cm low-attenuation lesion seen in the left hepatic lobe on image 25 of series 3, highly concerning for metastasis. 4/18/2022 Ileostomy,closure enterocutaneous stoma with mural acute inflammation and jessa-intestinal Fat necrosis. Negative for malignancy. 4/23/2022- discussed the patient reinitiation of chemotherapy. We will continue FOLFIRI. He had no prior progression on FOLFIRI of FOLFOX. 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  We would avoid Avastin at this time due to recent surgery. He is K-maria luisa mutated and therefore cannot use anti-EGFR agent. May contemplate adding Avastin in 4 weeks. 5/9/2022 CT Chest W Contrast New pulmonary nodules are present in the right and left lung as described above. It cannot be determined if these are infectious nodules or metastatic disease. Dilated loops of bowel are present in the upper abdomen. The abdomen is incompletely evaluated however this may be wither an ileus or partial obstruction. Hepatic metastasis. 5/10/22 Re-initiate FOLFIRI + Avastin every 2 weeks. 5/24/2022  CEA 29.1  5/24/2022 Iron Studies:Iron 29, TIBC 256, Iron Sat 11,Ferritin 116.9  6/22/2022 CEA 21.5  7/21/2022 CT Chest W Contrast Bibasilar linear atelectasis life scarring. No pulmonary nodules, masses or infiltrates. Multiple enhancing low attenuation masses in the liver similar to the previous study. Findings consistent with metastatic liver disease.   7/21/2022 CT Abd/Pelvis W IV Contrast ([place]) Multiple enhancing low attenuation liver lesions increase in size and number since the previous study 3/7/22. Findings consistent with progression of metastatic liver disease. Status postcholecystectomy. The appendix is not visualized. No acute findings. 5/10/22 - 8/3/22 Discontinue FOLFIRI + Avastin every 2 weeks due to progression in the liver  8/3/2022-unfortunately, interval progression of liver metastasis when compared to last CT scans performed on 3/7/2022. In addition, when compared to report from [address] [place] [address] abdomen/pelvis on 3/20/2022 when he had only 1 single metastatic lesion measuring 2.1 cm. This is considered progression. I have recommended to discontinue FOLFIRI/Avastin and consider initiation of palliative FOLFOX with Avastin. Discussed with hepatobiliary surgery. The patient is not a candidate for further surgery. He is not a candidate for any liver directed therapy. 8/3/2022 CEA 26.6  9/14/2022 CEA 36.5  10/12/22 CEA 40.7  11/4/2022 CT Chest W Contrast Bilateral lower lobe scarring or atelectasis similar in appearance. There has been slight interval decrease in the amount and the size of the low attenuation hepatic lesions. 11/4/2022 CT Abd/Pelvis W IV Contrast (O[place])There has been interval decrease in the mural thickening of the colonic loop at the level of the surgical suture material with slight residual mural thickening recognized. Endoscopy can be performed for further evaluation if clinically warranted. Postsurgical changes at the right hepatic lobe posteriorly similar. There has been slight interval decrease in the amount of hepatic metastatic disease with fewer lesions recognized in the right hepatic lobe. Cholecystectomy.       CEA dynamics:  2/27/22 CEA 3.5  5/24/22 CEA 29.1  6/22/22 CEA 21.5  8/3/22 CEA 26.6  9/4/22 CEA 36.5  10/12/22 CEA 40.7    PAST MEDICAL HISTORY:   Past Medical History:   Diagnosis Date    Acid reflux     Cancer ([facility] [address]     adenocarcinoma of colon    GERD (gastroesophageal reflux disease)     Palliative care patient 03/01/2022    PTSD (post-traumatic stress disorder)     Stage 3a chronic kidney disease ([facility] [address]       PAST SURGICAL HISTORY:  Past Surgical History:   Procedure Laterality Date    ABLATION OF DYSRHYTHMIC FOCUS      ablation of liver at [address]  2003    CHOLECYSTECTOMY  2013    COLONOSCOPY      when pt was in the 20's    COLONOSCOPY N/A 03/11/2020    COLONOSCOPY POLYPECTOMY SNARE/COLD BIOPSY: Dr. Yoko Montes colonoscopy: 6-12 months due to findings at colonoscopy today with Cecal mass lesion and large polyps.     COLONOSCOPY      COLONOSCOPY N/A 03/17/2021    Dr Dusty Smith, Post-operative changes w IC anastomosis & single visible staple, Mild Diverticuosis, Int Hemorrhoids Grade 1, 3 year recall    HEMICOLECTOMY Right 03/30/2020    LAPAROSCOPIC-ASSISTED RIGHT HEMICOLECTOMY performed by Lolis Carter MD at [address] [facility] N/A 06/03/2020    INSERTION OF VENOUS PORT with flouro performed by Lolis Carter MD at [address] [place] [address] Dr ENDOSCOPY N/A 03/11/2020    Dr. Lucas Bingham:   [facility]      SOCIAL HISTORY:  Social History     Socioeconomic History    Marital status:    Tobacco Use    Smoking status: Never    Smokeless tobacco: Never   Vaping Use    Vaping Use: Never used   Substance and Sexual Activity    Alcohol use: Yes     Comment: rare     Drug use: No    Sexual activity: Yes     Partners: Female     FAMILY HISTORY:  Family History   Problem Relation Age of Onset    Liver Cancer Mother     High Blood Pressure Mother     Colon Cancer Mother         x2    Cancer Father         Lung Cancer    Breast Cancer Sister     Cancer Maternal Grandfather         Lung Cancer    Cancer Paternal Grandfather         Stomach Cancer    Colon Polyps Neg Hx     Cystic Fibrosis Neg Hx     Liver Disease Neg Hx     Rectal Cancer Neg Hx         Current Outpatient Medications   Medication Sig Dispense Refill    promethazine (PHENERGAN) 12.5 MG tablet Take 1 tablet by mouth every 6 hours as needed for Nausea 60 tablet 5    sildenafil (VIAGRA) 50 MG tablet Take 1 tablet by mouth daily as needed for Erectile Dysfunction 10 tablet 5    fentaNYL (DURAGESIC) 12 MCG/HR Place 1 patch onto the skin every 3 days for 30 days. Intended supply: 30 days 10 patch 0    oxyCODONE HCl (OXY-IR) 10 MG immediate release tablet Take 1 tablet by mouth every 6 hours as needed for Pain for up to 30 days. Intended supply: 30 days 120 tablet 0    Magnesium Oxide 400 MG CAPS Take 400 mg by mouth in the morning and at bedtime 60 capsule 3    methocarbamol (ROBAXIN) 500 MG tablet Take 500 mg by mouth 3 times daily as needed       omeprazole (PRILOSEC) 20 MG delayed release capsule Take 20 mg by mouth daily      prazosin (MINIPRESS) 1 MG capsule Take 1 mg by mouth nightly as needed For nightmares       Sertraline HCl (ZOLOFT PO) Take by mouth daily      apixaban (ELIQUIS) 5 MG TABS tablet Take 1 tablet by mouth 2 times daily 60 tablet 0    vitamin D (ERGOCALCIFEROL) 1.25 MG (62015 UT) CAPS capsule Take 1 capsule by mouth once a week 5 capsule 0     No current facility-administered medications for this visit.      Facility-Administered Medications Ordered in Other Visits   Medication Dose Route Frequency Provider Last Rate Last Admin    0.9 % sodium chloride infusion  5-250 mL/hr IntraVENous PRN Markos Deng MD        dextrose 5 % solution  5-250 mL/hr IntraVENous PRN Markos Deng MD        bevacizumab-bvzr (ZIRABEV) 300 mg in sodium chloride 0.9 % 100 mL chemo IVPB  5 mg/kg (Treatment Plan Recorded) IntraVENous Once Markos Deng MD        oxaliplatin (ELOXATIN) 145 mg in dextrose 5 % 250 mL chemo IVPB  85 mg/m2 (Treatment Plan Recorded) IntraVENous Once Markos Deng MD        leucovorin calcium (WELLCOVORIN) 700 mg in dextrose 5 % 250 mL IVPB  400 mg/m2 (Treatment Plan Recorded) IntraVENous Once Markos Deng MD        fluorouracil (ADRUCIL) 4,100 mg in sodium chloride 0.9 % 250 mL chemo infusion  2,400 mg/m2 (Treatment Plan Recorded) IntraVENous Over 46 hours Lesley Silva MD        sodium chloride flush 0.9 % injection 5-40 mL  5-40 mL IntraVENous PRN Lesley Silva MD          REVIEW OF SYSTEMS:   CONSTITUTIONAL: no fever, no night sweats,  fatigue;  HEENT: no blurring of vision, no double vision, no hearing difficulty, no tinnitus, no ulceration, no dysplasia, no epistaxis;   LUNGS: no cough, no hemoptysis, no wheeze,  no shortness of breath;  CARDIOVASCULAR: no palpitation, no chest pain, no shortness of breath;  GI: no abdominal pain, no nausea, no vomiting, no diarrhea, no constipation;  ANNIE: no dysuria, no hematuria, no frequency or urgency, no nephrolithiasis;  MUSCULOSKELETAL: no joint pain, no swelling, no stiffness;  ENDOCRINE: no polyuria, no polydipsia, no cold or heat intolerance;  HEMATOLOGY: no easy bruising or bleeding, no history of clotting disorder;  DERMATOLOGY: no skin rash, no eczema, no pruritus;  PSYCHIATRY: no depression, no anxiety, no panic attacks, no suicidal ideation, no homicidal ideation;  NEUROLOGY: no syncope, no seizures, no numbness or tingling of hands, no numbness or tingling of feet, no paresis;     Vitals signs:  BP (!) 134/90 (Site: Right Upper Arm, Position: Sitting)   Pulse 79   Temp 98.3 °F (36.8 °C) (Oral)   Resp 16   Ht 5' 7\" (1.702 m)   Wt 134 lb (60.8 kg)   SpO2 99%   BMI 20.99 kg/m²   Wt Readings from Last 3 Encounters:   11/09/22 134 lb (60.8 kg)   10/26/22 134 lb 8 oz (61 kg)   10/12/22 136 lb 12.8 oz (62.1 kg)       PHYSICAL EXAM:  CONSTITUTIONAL: Alert, appropriate, no acute distress  EYES: Non icteric, EOM intact, pupils equal round   ENT: Mucus membranes moist, no oral pharyngeal lesions, external inspection of ears and nose are normal.  NECK: Supple, no masses. No palpable thyroid mass  CHEST/LUNGS: CTA bilaterally, normal respiratory effort   CARDIOVASCULAR: RRR, no murmurs.   No lower extremity edema  ABDOMEN: soft non-tender, active bowel sounds, no HSM. No palpable masses  EXTREMITIES: warm, full ROM in all 4 extremities, no focal weakness. SKIN: warm, dry with no rashes or lesions  LYMPH: No cervical, clavicular, axillary, or inguinal lymphadenopathy  NEUROLOGIC: follows commands, non focal   PSYCH: mood and affect appropriate. Alert and oriented to time, place, person    Relevant Lab findings/reviewed by me:  10/12/22 CEA 40.7  Lab Results   Component Value Date    WBC 3.84 (L) 11/09/2022    HGB 10.9 (L) 11/09/2022    HCT 33.5 (L) 11/09/2022    MCV 86.1 11/09/2022    PLT 85 (L) 11/09/2022     Lab Results   Component Value Date    NEUTROABS 2.14 11/09/2022     Lab Results   Component Value Date     11/09/2022    K 4.2 11/09/2022     11/09/2022    CO2 20 (L) 11/09/2022    BUN 16 11/09/2022    CREATININE 1.5 (H) 11/09/2022    GLUCOSE 99 11/09/2022    CALCIUM 9.2 11/09/2022    PROT 7.1 11/09/2022    LABALBU 3.8 11/09/2022    BILITOT 0.5 11/09/2022    ALKPHOS 153 (H) 11/09/2022    AST 39 11/09/2022    ALT 22 11/09/2022    LABGLOM 51 (A) 11/09/2022    GFRAA 53 (A) 07/21/2022    AGRATIO 1.5 06/15/2021    GLOB 3.3 11/09/2022         Relevant Imaging studies/reviewed by me:  11/4/2022 CT Chest W Contrast Bilateral lower lobe scarring or atelectasis similar in appearance. There has been slight interval decrease in the amount and the size of the low attenuation hepatic lesions. 11/4/2022 CT Abd/Pelvis W IV Contrast (Oral)There has been interval decrease in the mural thickening of the colonic loop at the level of the surgical suture material with slight residual mural thickening recognized. Endoscopy can be performed for further evaluation if clinically warranted. Postsurgical changes at the right hepatic lobe posteriorly similar. There has been slight interval decrease in the amount of hepatic metastatic disease with fewer lesions recognized in the right hepatic lobe.Cholecystectomy. ASSESSMENT:    Orders Placed This Encounter   Procedures    CBC with Auto Differential     Standing Status:   Future     Number of Occurrences:   1     Standing Expiration Date:   11/9/2023    Comprehensive Metabolic Panel     Standing Status:   Future     Number of Occurrences:   1     Standing Expiration Date:   11/9/2023    CBC With Auto Differential     Standing Status:   Future     Standing Expiration Date:   11/9/2023    Comprehensive metabolic panel     Standing Status:   Future     Standing Expiration Date:   11/9/2023    Urinalysis     Standing Status:   Future     Standing Expiration Date:   11/9/2023              Denise was seen today for cancer and follow-up. Diagnoses and all orders for this visit:    Malignant neoplasm of ascending colon ([facility]  -     CBC with Auto Differential; Future  -     Comprehensive Metabolic Panel; Future  -     Comprehensive Metabolic Panel  -     CBC with Auto Differential  -     POCT urine qual dipstick protein  -     CBC With Auto Differential; Future  -     Comprehensive metabolic panel; Future  -     Urinalysis;  Future  -     Cancel: 0.9 % sodium chloride infusion  -     Cancel: dextrose 5 % solution  -     Cancel: dexamethasone (DECADRON) 10 mg in sodium chloride 0.9 % 50 mL IVPB  -     Cancel: palonosetron (ALOXI) injection 0.25 mg  -     Cancel: bevacizumab-bvzr (ZIRABEV) 300 mg in sodium chloride 0.9 % 100 mL chemo IVPB  -     Cancel: oxaliplatin (ELOXATIN) 145 mg in dextrose 5 % 250 mL chemo IVPB  -     Cancel: leucovorin calcium (WELLCOVORIN) 700 mg in dextrose 5 % 250 mL IVPB  -     Cancel: fluorouracil (ADRUCIL) 4,100 mg in sodium chloride 0.9 % 250 mL chemo infusion  -     Cancel: sodium chloride flush 0.9 % injection 5-40 mL    Failure to thrive in adult  -     Phosphorus  -     Magnesium    Dehydration  -     Phosphorus  -     Magnesium    Adenocarcinoma of colon (HCC)  -     Phosphorus  -     Magnesium  -     CBC With Auto Differential; Future  -     Comprehensive metabolic panel; Future  -     Urinalysis; Future  -     Cancel: 0.9 % sodium chloride infusion  -     Cancel: dextrose 5 % solution  -     Cancel: dexamethasone (DECADRON) 10 mg in sodium chloride 0.9 % 50 mL IVPB  -     Cancel: palonosetron (ALOXI) injection 0.25 mg  -     Cancel: bevacizumab-bvzr (ZIRABEV) 300 mg in sodium chloride 0.9 % 100 mL chemo IVPB  -     Cancel: oxaliplatin (ELOXATIN) 145 mg in dextrose 5 % 250 mL chemo IVPB  -     Cancel: leucovorin calcium (WELLCOVORIN) 700 mg in dextrose 5 % 250 mL IVPB  -     Cancel: fluorouracil (ADRUCIL) 4,100 mg in sodium chloride 0.9 % 250 mL chemo infusion  -     Cancel: sodium chloride flush 0.9 % injection 5-40 mL    Liver metastases (HCC)  -     Phosphorus  -     Magnesium  -     CBC With Auto Differential; Future  -     Comprehensive metabolic panel; Future  -     Urinalysis;  Future  -     Cancel: 0.9 % sodium chloride infusion  -     Cancel: dextrose 5 % solution  -     Cancel: dexamethasone (DECADRON) 10 mg in sodium chloride 0.9 % 50 mL IVPB  -     Cancel: palonosetron (ALOXI) injection 0.25 mg  -     Cancel: bevacizumab-bvzr (ZIRABEV) 300 mg in sodium chloride 0.9 % 100 mL chemo IVPB  -     Cancel: oxaliplatin (ELOXATIN) 145 mg in dextrose 5 % 250 mL chemo IVPB  -     Cancel: leucovorin calcium (WELLCOVORIN) 700 mg in dextrose 5 % 250 mL IVPB  -     Cancel: fluorouracil (ADRUCIL) 4,100 mg in sodium chloride 0.9 % 250 mL chemo infusion  -     Cancel: sodium chloride flush 0.9 % injection 5-40 mL    Acute kidney injury (MARIO) with acute tubular necrosis (ATN) (HCC)  -     Phosphorus  -     Magnesium    Physical deconditioning  -     Phosphorus  -     Magnesium    Other orders  -     acetaminophen (TYLENOL) tablet 650 mg  -     diphenhydrAMINE (BENADRYL) injection 50 mg  -     sodium chloride (PF) 0.9 % injection 5-40 mL  -     0.9 % sodium chloride infusion  -     heparin flush 100 UNIT/ML injection 500 Units  - alteplase (CATHFLO) 2 mg in sterile water 2 mL injection  -     0.9 % sodium chloride infusion  -     diphenhydrAMINE (BENADRYL) injection 50 mg  -     famotidine (PEPCID) injection 20 mg  -     hydrocortisone sodium succinate PF (SOLU-CORTEF) injection 100 mg  -     acetaminophen (TYLENOL) tablet 650 mg  -     meperidine (DEMEROL) injection 12.5 mg  -     ondansetron (ZOFRAN) injection 8 mg  -     EPINEPHrine PF 1 MG/ML injection (Anaphylaxis) 0.3 mg  -     albuterol sulfate HFA (PROVENTIL;VENTOLIN;PROAIR) 108 (90 Base) MCG/ACT inhaler 4 puff  -     sodium chloride flush 0.9 % injection 5-40 mL  -     sodium chloride (PF) 0.9 % injection 5-40 mL  -     0.9 % sodium chloride infusion  -     heparin flush 100 UNIT/ML injection 500 Units  -     alteplase (CATHFLO) 2 mg in sterile water 2 mL injection          #Colonic adenocarcinoma-  [id number] (liver) K-maria luisa mutated, IHC MMR not proficient  Status post microwave ablation left hepatic lesion  Status post neoadjuvant FOLFOX/bevacizumab x11 biweekly cycles  Status post right hemicolectomy. Pathology consistent complete pathologic response. Recommended surveillance as per NCCN guidelines  Discussed at length results of pathology with the patient. 3/17/21- 1 year colonoscopy showed no large polyps or masses. Repeat in 3 years. June 2021-CT chest clear. MRI abdomen showed suspicious lesion in the right lower quadrant. Biopsy arranged [place]. Discussed with hepatobiliary service at [facility]. [id number] biopsy consistent with recurrent primary colorectal adenocarcinoma. 7/2/2021-discussed with hepatobiliary service/surgical oncology at [facility]. They will review images and call the patient back regarding consultation for consideration of HIPEC  7/2/2021-they recommend systemic therapy. 7/2/2021-recommended FOLFIRI/Avastin  7/13/21- Initiated FOLFIRI + Avastin every 2 weeks  07/30/21-Seen at [facility] by GI surgeon oncology.   They discussed the role of CRS/HIPEC in his situation. He was told hat it really depends on the status of his liver lesions as well. He will complete 6 cycles of chemotherapy and will return to see me with a CTAP as well as MRI of the liver to assess disease burden and determine if he can be a candidate for debulking.    8/25/2021-proceed with FOLFIRI/Avastin   9/24/2021-repeat MRI abdomen/CT abdomen showed persistent disease. 1/13/2022-Exploratory laparotomy/resection of psoas mass/HIPEC at [facility]. Path Isaac:     OMENTUM, OMENTECTOMY:   - METASTATIC COLORECTAL ADENOCARCINOMA INVOLVING FIBROADIPOSE TISSUE.   - TWO (2) LYMPH NODES, NEGATIVE FOR CARCINOMA. SMALL BOWEL, MESENTERIC NODULE, EXCISION:   - METASTATIC COLORECTAL ADENOCARCINOMA INVOLVING FIBROMUSCULAR TISSUE AND EXTENDING TO TISSUE EDGE. SMALL BOWEL AND RIGHT COLON, ILEOCOLIC RESECTION:     - RESIDUAL RECURRENT INVASIVE MODERATELY DIFFERENTIATED COLORECTAL ADENOCARCINOMA (2.1 CM) INVOLVING ANASTOMOTIC SITE; SEE COMMENT AND SYNOPTIC REPORT.   - TUMOR FOCALLY INVOLVES THE SEROSA SURFACE.   - METASTATIC CARCINOMA INVOLVES ONE (1) OF TWELVE (12) LYMPH NODES.   - ONE (1) TUMOR DEPOSIT PRESENT.   - ALL RESECTION MARGINS ARE NEGATIVE FOR CARCINOMA. RETROPERITONEAL NODULE, EXCISION:   - METASTATIC COLORECTAL ADENOCARCINOMA INVOLVING FREDDY-PANCREATIC TISSUE AND EXTENDING TO TISSUE EDGE. LATERAL DUODENECTOMY, RESECTION:   - METASTATIC COLORECTAL ADENOCARCINOMA INVOLVING DUODENAL SUBMUCOSA, MUSCULARIS PROPRIA, AND SUBSEROSA. - TUMOR IS PRESENT WITHIN 1 MM OF INKED RESECTION MARGINS. RIGHT ABDOMINAL WALL NODULE, EXCISION:   - METASTATIC COLORECTAL ADENOCARCINOMA INVOLVING FIBROMUSCULAR TISSUE. RIGHT RETROPERITONEAL MASS, RESECTION:   - METASTATIC COLORECTAL ADENOCARCINOMA INVOLVING FIBROADIPOSE TISSUE (7.3 CM). - MEDIAL, SUPERIOR AND DEEP RESECTION MARGINS ARE POSITIVE FOR CARCINOMA.    - INKED INFERIOR AND LATERAL MARGINS ARE NEGATIVE FOR CARCINOMA (<1 MM) RIGHT RETROPERITONEAL MASS, FINAL SUPERIOR MARGIN, EXCISION:   - METASTATIC COLORECTAL ADENOCARCINOMA INVOLVING FIBROADIPOSE TISSUE, FOCALLY PRESENT AT CAUTERIZED, INKED TISSUE EDGE.   2/7/2022-postoperative dehydration and electrolyte abnormalities. Home health arrangements IV fluids twice a week. 3/23/2022- reversal of ileostomy at [facility]. 3/20/2022 CT Abd/Pelvis WO Contrast ([facility]) Findings suspicious for aspiration at the lung bases. Postsurgical changes related to right hepatectomy. There is a new low-attenuation lesion in the left hemiliver, highly concerning for metastasis. Postsurgical changes related to right hemicolectomy with ileocolonic anastomosis and right lower quadrant diverting loop ileostomy. No evidence of bowel obstruction. No intra-abdominal fluid collection. Other findings as above. A critical alert was sent at the time of dictation. Liver: Postsurgical changes related to right hepatectomy. There is a new, approximately 2.1 cm low-attenuation lesion seen in the left hepatic lobe on image 25 of series 3, highly concerning for metastasis. 4/23/2022- discussed the patient reinitiation of chemotherapy. We will continue FOLFIRI. He had no prior progression on FOLFIRI of FOLFOX. 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  We would avoid Avastin at this time due to recent surgery. He is K-maria luisa mutated and therefore cannot use anti-EGFR agent. May contemplate adding Avastin in 4 weeks. 5/9/2022 CT Chest W Contrast New pulmonary nodules are present in the right and left lung as described above. It cannot be determined if these are infectious nodules or metastatic disease. Dilated loops of bowel are present in the upper abdomen. The abdomen is incompletely evaluated however this may be wither an ileus or partial obstruction. Hepatic metastasis. 11/4/2022 CT Chest W Contrast Bilateral lower lobe scarring or atelectasis similar in appearance. There has been slight interval decrease in the amount and the size of the low attenuation hepatic lesions. 11/4/2022 CT Abd/Pelvis W IV Contrast (Oral)There has been interval decrease in the mural thickening of the colonic loop at the level of the surgical suture material with slight residual mural thickening recognized. Endoscopy can be performed for further evaluation if clinically warranted. Postsurgical changes at the right hepatic lobe posteriorly similar. There has been slight interval decrease in the amount of hepatic metastatic disease with fewer lesions recognized in the right hepatic lobe. Cholecystectomy. Plan:  -Proceed cycle #7 FOLFOX/Avastin third line therapy  -Discussed with [facility] hepatobiliary service. No clinical trials available. Not a candidate for liver directed therapy at this time. #Chronic kidney disease stage III- creatinine 1.6  Encouraged to increase PO fluid intake  Labs Renal Latest Ref Rng & Units 11/9/2022 10/26/2022 10/12/2022 9/28/2022 9/14/2022   BUN 9 - 20 mg/dL 16 27(H) 14 25(H) 16   Cr 0.6 - 1.2 mg/dL 1.5(H) 1.6(H) 1.5(H) 1.7(H) 1.6(H)   K 3.5 - 5.1 mmol/L 4.2 4.4 4.7 4.4 4.3   Na 137 - 145 mmol/L 138 139 142 142 143            #Liver metastasis- plan as above. Status post ablation left liver lobe lesion at [facility] on 6/8/2020. Status post neoadjuvant FOLFOX/bevacizumab x11 biweekly cyclesStatus post right hemicolectomy. Pathology consistent complete pathologic response. 3/4/2021-MRI abdomen was unremarkable  6/8/2021-MRI abdomen showed Postsurgical changes of right hepatectomy. Redemonstration of an ablation zone in segment II, measuring up to 1.7 cm, previously 1.8 cm. No evidence of postcontrast enhancement. No new lesion identified. 9/24/2021-MRI abdomen [facility] showed Liver: Status post right hepatectomy. Ablation zone in segment II measures 1.7 x 1.1 cm, slightly decreased in size from 1.8 x 1.4 cm previously (series 1301 image 56) without apreciable enhancement.  Similar tiny subcentimeter cyst in the left hepatic lobe. No new focal hepatic lesion identified. 3/20/2022 CT Abd/Pelvis WO Contrast ([facility]) Findings suspicious for aspiration at the lung bases. Postsurgical changes related to right hepatectomy. There is a new low-attenuation lesion in the left hemiliver, highly concerning for metastasis. Postsurgical changes related to right hemicolectomy with ileocolonic anastomosis and right lower quadrant diverting loop ileostomy. No evidence of bowel obstruction. No intra-abdominal fluid collection. Other findings as above. A critical alert was sent at the time of dictation. Liver: Postsurgical changes related to right hepatectomy. There is a new, approximately 2.1 cm low-attenuation lesion seen in the left hepatic lobe on image 25 of series 3, highly concerning for metastasis. 4/23/2022- discussed the patient reinitiation of chemotherapy. We will continue FOLFIRI. He had no prior progression on FOLFIRI of FOLFOX. 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  We would avoid Avastin at this time due to recent surgery. He is K-maria luisa mutated and therefore cannot use anti-EGFR agent. May contemplate adding Avastin in 4 weeks. 5/9/2022 CT Chest W Contrast New pulmonary nodules are present in the right and left lung as described above. It cannot be determined if these are infectious nodules or metastatic disease. Dilated loops of bowel are present in the upper abdomen. The abdomen is incompletely evaluated however this may be wither an ileus or partial obstruction. Hepatic metastasis. 7/21/2022 CT Chest W Contrast Bibasilar linear atelectasis life scarring. No pulmonary nodules, masses or infiltrates. Multiple enhancing low attenuation masses in the liver similar to the previous study. Findings consistent with metastatic liver disease. 7/21/2022 CT Abd/Pelvis W IV Contrast (Oral) Multiple enhancing low attenuation liver lesions increase in size and number since the previous study 3/7/22. Findings consistent with progression of metastatic liver disease. Status postcholecystectomy. The appendix is not visualized. No acute findings. 8/3/22 Discontinue FOLFIRI + Avastin every 2 weeks due to progression in the liver  8/3/2022-unfortunately, interval progression of liver metastasis when compared to last CT scans performed on 3/7/2022. In addition, when compared to report from [address] [place] [address] abdomen/pelvis on 3/20/2022 when he had only 1 single metastatic lesion measuring 2.1 cm. This is considered progression. I have recommended to discontinue FOLFIRI/Avastin and consider initiation of palliative FOLFOX with Avastin. Discussed with hepatobiliary surgery. The patient is not a candidate for further surgery. He is not a candidate for any liver directed therapy. 11/4/2022 CT Chest W Contrast Bilateral lower lobe scarring or atelectasis similar in appearance. There has been slight interval decrease in the amount and the size of the low attenuation hepatic lesions. 11/4/2022 CT Abd/Pelvis W IV Contrast (Oral)There has been interval decrease in the mural thickening of the colonic loop at the level of the surgical suture material with slight residual mural thickening recognized. Endoscopy can be performed for further evaluation if clinically warranted. Postsurgical changes at the right hepatic lobe posteriorly similar. There has been slight interval decrease in the amount of hepatic metastatic disease with fewer lesions recognized in the right hepatic lobe. Cholecystectomy. Plan:  -Proceed cycle #7 FOLFOX/Avastin third line therapy  -Discussed with [facility] hepatobiliary service. No clinical trials available. Not a candidate for liver directed therapy at this time. Lab Results   Component Value Date    CEA 40.7 (H) 10/12/2022          #Multifactorial anemia  hemoglobin 8.5/MCV 89. Ferritin 12.9, iron saturation 15%, TIBC 458, iron 72. Status post IV iron therapy.    Hemoglobin 11.3  -Repeat iron profile on 5/24/2022: Ferritin 116, iron saturation 11, iron 29, TIBC 256  Recent Labs     11/09/22  0919 10/26/22  0937 10/12/22  0919   WBC 3.84* 4.63 4.21*   HGB 10.9* 11.4* 10.7*   HCT 33.5* 35.6* 34.6*   MCV 86.1 86.2 88.0   PLT 85* 112* 165   -Likely related to chemotherapy       #Chemotherapy-induced neuropathy-stable, antineoplastic induced anemia    #Cancer related pain-  -Oxycodone IR 10 mg every 6 hours as needed  -Continue Fentanyl 12 mcg every 72 hrs    DVT port associated left upper extremity-February 2022  internal jugular vein subclavian axillary brachial, left upper extremity. Acute appearing superficial thrombophlebitis (SVT) is seen in the basilic  and cephalic veins, right upper extremity.  -Eliquis 5mg twice a day    Erectile dysfunction-  -Sildenafil 50mg daily as needed    Hypomagnesemia-magnesium 1.7. PLAN:  RTC with MD in treatment room 4 weeks  C# 7 FOLFOX + Avastin today and every 2 weeks  CBC CMP CEA today  Continue follow-up with [facility]/Dr. Cande Martin, Oct 2022   Continue Fentanyl 12 mcg every 72 hrs  Continue OxyIR 10mg every 6 hours as needed  Continue Sildenafil 50mg daily as needed  Continue Phenergan 12.5mg every 6 hrs as needed  Continue Eliquis 5mg twice a day  Continue OTC Imodium as needed  Repeat CT scans     Follow Up:     No follow-ups on file. Data Irma Camilo am pre charting  as Medical Assistant for Fernando Trevino MD. Electronically signed by Karen Randall MA on 11/9/2022 at 1:00 PM CST. Nia Landeros am scribing for Fernando Trevino MD. Electronically signed by Otto Powell, RN on 11/9/2022 at 10:27 AM CST. I, Dr Marcela Gallegos, personally performed the services described in this documentation as scribed by Otto Powell, RN in my presence and is both accurate and complete. I have seen, examined and reviewed this patient medication list, appropriate labs and imaging studies.  I reviewed relevant medical records and others physicians notes. I discussed the plans of care with the patient. I answered all the questions to the patients satisfaction. I have also reviewed the chief complaint (CC) and part of the history (History of Present Illness (HPI), Past Family Social History [facility]), or Review of Systems (ROS) and made changes when appropriated.        (Please note that portions of this note were completed with a voice recognition program. Efforts were made to edit the dictations but occasionally words are mis-transcribed.)  Electronically signed by Sunil Ashby MD on 11/9/2022 at 10:26 AM

## 2022-11-09 ENCOUNTER — HOSPITAL ENCOUNTER (OUTPATIENT)
Dept: INFUSION THERAPY | Age: 67
Discharge: HOME | End: 2022-11-09
Payer: OTHER GOVERNMENT

## 2022-11-09 ENCOUNTER — OFFICE VISIT (OUTPATIENT)
Dept: HEMATOLOGY | Age: 67
End: 2022-11-09
Payer: MEDICARE

## 2022-11-09 VITALS
TEMPERATURE: 98.3 F | OXYGEN SATURATION: 99 % | SYSTOLIC BLOOD PRESSURE: 134 MMHG | RESPIRATION RATE: 16 BRPM | DIASTOLIC BLOOD PRESSURE: 90 MMHG | WEIGHT: 134 LBS | HEIGHT: 67 IN | HEART RATE: 79 BPM | BODY MASS INDEX: 21.03 KG/M2

## 2022-11-09 DIAGNOSIS — E86.0 DEHYDRATION: ICD-10-CM

## 2022-11-09 DIAGNOSIS — C18.2 MALIGNANT NEOPLASM OF ASCENDING COLON (HCC): Primary | ICD-10-CM

## 2022-11-09 DIAGNOSIS — Z51.11 CHEMOTHERAPY MANAGEMENT, ENCOUNTER FOR: ICD-10-CM

## 2022-11-09 DIAGNOSIS — C78.7 LIVER METASTASES (HCC): Primary | ICD-10-CM

## 2022-11-09 DIAGNOSIS — C18.2 MALIGNANT NEOPLASM OF ASCENDING COLON (HCC): ICD-10-CM

## 2022-11-09 DIAGNOSIS — Z51.12 ENCOUNTER FOR ANTINEOPLASTIC IMMUNOTHERAPY: ICD-10-CM

## 2022-11-09 DIAGNOSIS — Z71.89 CARE PLAN DISCUSSED WITH PATIENT: ICD-10-CM

## 2022-11-09 DIAGNOSIS — R62.7 FAILURE TO THRIVE IN ADULT: ICD-10-CM

## 2022-11-09 DIAGNOSIS — C18.9 ADENOCARCINOMA OF COLON (HCC): ICD-10-CM

## 2022-11-09 DIAGNOSIS — C78.7 LIVER METASTASES (HCC): ICD-10-CM

## 2022-11-09 DIAGNOSIS — N17.0 ACUTE KIDNEY INJURY (AKI) WITH ACUTE TUBULAR NECROSIS (ATN) (HCC): ICD-10-CM

## 2022-11-09 DIAGNOSIS — T45.1X5D ADVERSE EFFECT OF CHEMOTHERAPY, SUBSEQUENT ENCOUNTER: ICD-10-CM

## 2022-11-09 DIAGNOSIS — G89.3 CANCER ASSOCIATED PAIN: ICD-10-CM

## 2022-11-09 DIAGNOSIS — R53.81 PHYSICAL DECONDITIONING: ICD-10-CM

## 2022-11-09 LAB
ALBUMIN SERPL-MCNC: 3.8 G/DL (ref 3.5–5.2)
ALP BLD-CCNC: 153 U/L (ref 40–130)
ALT SERPL-CCNC: 22 U/L (ref 21–72)
ANION GAP SERPL CALCULATED.3IONS-SCNC: 9 MMOL/L (ref 7–19)
AST SERPL-CCNC: 39 U/L (ref 17–59)
BILIRUB SERPL-MCNC: 0.5 MG/DL (ref 0.2–1.3)
BUN BLDV-MCNC: 16 MG/DL (ref 9–20)
CALCIUM SERPL-MCNC: 9.2 MG/DL (ref 8.4–10.2)
CHLORIDE BLD-SCNC: 109 MMOL/L (ref 98–111)
CO2: 20 MMOL/L (ref 22–29)
CREAT SERPL-MCNC: 1.5 MG/DL (ref 0.6–1.2)
GFR SERPL CREATININE-BSD FRML MDRD: 51 ML/MIN/{1.73_M2}
GLOBULIN: 3.3 G/DL
GLUCOSE BLD-MCNC: 99 MG/DL (ref 74–106)
HCT VFR BLD CALC: 33.5 % (ref 40.1–51)
HEMOGLOBIN: 10.9 G/DL (ref 13.7–17.5)
LYMPHOCYTES ABSOLUTE: 0.68 K/UL (ref 1.18–3.74)
LYMPHOCYTES RELATIVE PERCENT: 17.7 % (ref 19.3–53.1)
MAGNESIUM: 1.7 MG/DL (ref 1.6–2.3)
MCH RBC QN AUTO: 28 PG (ref 25.7–32.2)
MCHC RBC AUTO-ENTMCNC: 32.5 G/DL (ref 32.3–36.5)
MCV RBC AUTO: 86.1 FL (ref 79–92.2)
MONOCYTES ABSOLUTE: 0.9 K/UL (ref 0.24–0.82)
MONOCYTES RELATIVE PERCENT: 23.4 % (ref 4.7–12.5)
NEUTROPHILS ABSOLUTE: 2.14 K/UL (ref 1.56–6.13)
NEUTROPHILS RELATIVE PERCENT: 55.8 % (ref 34–71.1)
PDW BLD-RTO: 18.6 % (ref 11.6–14.4)
PHOSPHORUS: 4.1 MG/DL (ref 2.5–4.5)
PLATELET # BLD: 85 K/UL (ref 163–337)
PMV BLD AUTO: 9.2 FL (ref 7.4–10.4)
POTASSIUM SERPL-SCNC: 4.2 MMOL/L (ref 3.5–5.1)
PROTEIN UA: ABNORMAL
RBC # BLD: 3.89 M/UL (ref 4.63–6.08)
SODIUM BLD-SCNC: 138 MMOL/L (ref 137–145)
TOTAL PROTEIN: 7.1 G/DL (ref 6.3–8.2)
WBC # BLD: 3.84 K/UL (ref 4.23–9.07)

## 2022-11-09 PROCEDURE — 2580000003 HC RX 258: Performed by: INTERNAL MEDICINE

## 2022-11-09 PROCEDURE — 96366 THER/PROPH/DIAG IV INF ADDON: CPT

## 2022-11-09 PROCEDURE — 1123F ACP DISCUSS/DSCN MKR DOCD: CPT | Performed by: INTERNAL MEDICINE

## 2022-11-09 PROCEDURE — 96415 CHEMO IV INFUSION ADDL HR: CPT

## 2022-11-09 PROCEDURE — 96375 TX/PRO/DX INJ NEW DRUG ADDON: CPT

## 2022-11-09 PROCEDURE — G0498 CHEMO EXTEND IV INFUS W/PUMP: HCPCS

## 2022-11-09 PROCEDURE — 96413 CHEMO IV INFUSION 1 HR: CPT

## 2022-11-09 PROCEDURE — G8484 FLU IMMUNIZE NO ADMIN: HCPCS | Performed by: INTERNAL MEDICINE

## 2022-11-09 PROCEDURE — 99214 OFFICE O/P EST MOD 30 MIN: CPT | Performed by: INTERNAL MEDICINE

## 2022-11-09 PROCEDURE — G8420 CALC BMI NORM PARAMETERS: HCPCS | Performed by: INTERNAL MEDICINE

## 2022-11-09 PROCEDURE — 81002 URINALYSIS NONAUTO W/O SCOPE: CPT | Performed by: INTERNAL MEDICINE

## 2022-11-09 PROCEDURE — 96367 TX/PROPH/DG ADDL SEQ IV INF: CPT

## 2022-11-09 PROCEDURE — 96417 CHEMO IV INFUS EACH ADDL SEQ: CPT

## 2022-11-09 PROCEDURE — 3017F COLORECTAL CA SCREEN DOC REV: CPT | Performed by: INTERNAL MEDICINE

## 2022-11-09 PROCEDURE — 6360000002 HC RX W HCPCS: Performed by: INTERNAL MEDICINE

## 2022-11-09 PROCEDURE — G8427 DOCREV CUR MEDS BY ELIG CLIN: HCPCS | Performed by: INTERNAL MEDICINE

## 2022-11-09 PROCEDURE — 1036F TOBACCO NON-USER: CPT | Performed by: INTERNAL MEDICINE

## 2022-11-09 RX ORDER — SODIUM CHLORIDE 0.9 % (FLUSH) 0.9 %
5-40 SYRINGE (ML) INJECTION PRN
Status: CANCELLED | OUTPATIENT
Start: 2022-11-09

## 2022-11-09 RX ORDER — SODIUM CHLORIDE 9 MG/ML
5-40 INJECTION INTRAVENOUS PRN
Status: CANCELLED | OUTPATIENT
Start: 2022-11-09

## 2022-11-09 RX ORDER — PALONOSETRON 0.05 MG/ML
0.25 INJECTION, SOLUTION INTRAVENOUS ONCE
Status: CANCELLED | OUTPATIENT
Start: 2022-11-09 | End: 2022-11-09

## 2022-11-09 RX ORDER — DIPHENHYDRAMINE HYDROCHLORIDE 50 MG/ML
50 INJECTION INTRAMUSCULAR; INTRAVENOUS
Status: CANCELLED | OUTPATIENT
Start: 2022-11-09

## 2022-11-09 RX ORDER — SODIUM CHLORIDE 9 MG/ML
5-250 INJECTION, SOLUTION INTRAVENOUS PRN
Status: CANCELLED | OUTPATIENT
Start: 2022-11-09

## 2022-11-09 RX ORDER — ACETAMINOPHEN 325 MG/1
650 TABLET ORAL
Status: CANCELLED | OUTPATIENT
Start: 2022-11-09

## 2022-11-09 RX ORDER — DEXTROSE MONOHYDRATE 50 MG/ML
5-250 INJECTION, SOLUTION INTRAVENOUS PRN
Status: DISCONTINUED | OUTPATIENT
Start: 2022-11-09 | End: 2022-11-10 | Stop reason: HOSPADM

## 2022-11-09 RX ORDER — HEPARIN SODIUM (PORCINE) LOCK FLUSH IV SOLN 100 UNIT/ML 100 UNIT/ML
500 SOLUTION INTRAVENOUS PRN
Status: CANCELLED | OUTPATIENT
Start: 2022-11-11

## 2022-11-09 RX ORDER — MEPERIDINE HYDROCHLORIDE 50 MG/ML
12.5 INJECTION INTRAMUSCULAR; INTRAVENOUS; SUBCUTANEOUS PRN
Status: CANCELLED | OUTPATIENT
Start: 2022-11-09

## 2022-11-09 RX ORDER — ALBUTEROL SULFATE 90 UG/1
4 AEROSOL, METERED RESPIRATORY (INHALATION) PRN
Status: CANCELLED | OUTPATIENT
Start: 2022-11-09

## 2022-11-09 RX ORDER — HEPARIN SODIUM (PORCINE) LOCK FLUSH IV SOLN 100 UNIT/ML 100 UNIT/ML
500 SOLUTION INTRAVENOUS PRN
Status: CANCELLED | OUTPATIENT
Start: 2022-11-09

## 2022-11-09 RX ORDER — EPINEPHRINE 1 MG/ML
0.3 INJECTION, SOLUTION, CONCENTRATE INTRAVENOUS PRN
Status: CANCELLED | OUTPATIENT
Start: 2022-11-09

## 2022-11-09 RX ORDER — SODIUM CHLORIDE 9 MG/ML
5-250 INJECTION, SOLUTION INTRAVENOUS PRN
Status: CANCELLED | OUTPATIENT
Start: 2022-11-11

## 2022-11-09 RX ORDER — SODIUM CHLORIDE 0.9 % (FLUSH) 0.9 %
5-40 SYRINGE (ML) INJECTION PRN
Status: CANCELLED | OUTPATIENT
Start: 2022-11-11

## 2022-11-09 RX ORDER — SODIUM CHLORIDE 0.9 % (FLUSH) 0.9 %
5-40 SYRINGE (ML) INJECTION PRN
Status: DISCONTINUED | OUTPATIENT
Start: 2022-11-09 | End: 2022-11-10 | Stop reason: HOSPADM

## 2022-11-09 RX ORDER — SODIUM CHLORIDE 9 MG/ML
INJECTION, SOLUTION INTRAVENOUS CONTINUOUS
Status: CANCELLED | OUTPATIENT
Start: 2022-11-09

## 2022-11-09 RX ORDER — ONDANSETRON 2 MG/ML
8 INJECTION INTRAMUSCULAR; INTRAVENOUS
Status: CANCELLED | OUTPATIENT
Start: 2022-11-09

## 2022-11-09 RX ORDER — SODIUM CHLORIDE 9 MG/ML
5-40 INJECTION INTRAVENOUS PRN
Status: CANCELLED | OUTPATIENT
Start: 2022-11-11

## 2022-11-09 RX ORDER — FAMOTIDINE 10 MG/ML
20 INJECTION, SOLUTION INTRAVENOUS
Status: CANCELLED | OUTPATIENT
Start: 2022-11-09

## 2022-11-09 RX ORDER — DEXTROSE MONOHYDRATE 50 MG/ML
5-250 INJECTION, SOLUTION INTRAVENOUS PRN
Status: CANCELLED | OUTPATIENT
Start: 2022-11-09

## 2022-11-09 RX ORDER — SODIUM CHLORIDE 9 MG/ML
5-250 INJECTION, SOLUTION INTRAVENOUS PRN
Status: DISCONTINUED | OUTPATIENT
Start: 2022-11-09 | End: 2022-11-10 | Stop reason: HOSPADM

## 2022-11-09 RX ORDER — PALONOSETRON 0.05 MG/ML
0.25 INJECTION, SOLUTION INTRAVENOUS ONCE
Status: COMPLETED | OUTPATIENT
Start: 2022-11-09 | End: 2022-11-09

## 2022-11-09 RX ADMIN — FLUOROURACIL 4100 MG: 50 INJECTION, SOLUTION INTRAVENOUS at 13:02

## 2022-11-09 RX ADMIN — DEXAMETHASONE SODIUM PHOSPHATE: 10 INJECTION, SOLUTION INTRAMUSCULAR; INTRAVENOUS at 10:07

## 2022-11-09 RX ADMIN — LEUCOVORIN CALCIUM 700 MG: 200 INJECTION, POWDER, LYOPHILIZED, FOR SUSPENSION INTRAMUSCULAR; INTRAVENOUS at 10:58

## 2022-11-09 RX ADMIN — OXALIPLATIN 145 MG: 5 INJECTION, SOLUTION INTRAVENOUS at 11:00

## 2022-11-09 RX ADMIN — SODIUM CHLORIDE 300 MG: 9 INJECTION, SOLUTION INTRAVENOUS at 10:26

## 2022-11-09 RX ADMIN — PALONOSETRON HYDROCHLORIDE 0.25 MG: 0.25 INJECTION, SOLUTION INTRAVENOUS at 10:07

## 2022-11-11 ENCOUNTER — HOSPITAL ENCOUNTER (OUTPATIENT)
Dept: INFUSION THERAPY | Age: 67
Discharge: HOME | End: 2022-11-11
Payer: OTHER GOVERNMENT

## 2022-11-11 DIAGNOSIS — C18.2 MALIGNANT NEOPLASM OF ASCENDING COLON (HCC): ICD-10-CM

## 2022-11-11 DIAGNOSIS — C78.7 LIVER METASTASES (HCC): Primary | ICD-10-CM

## 2022-11-11 DIAGNOSIS — C18.9 ADENOCARCINOMA OF COLON (HCC): ICD-10-CM

## 2022-11-11 PROCEDURE — 2580000003 HC RX 258: Performed by: INTERNAL MEDICINE

## 2022-11-11 PROCEDURE — 6360000002 HC RX W HCPCS: Performed by: INTERNAL MEDICINE

## 2022-11-11 PROCEDURE — 96523 IRRIG DRUG DELIVERY DEVICE: CPT

## 2022-11-11 RX ORDER — HEPARIN SODIUM (PORCINE) LOCK FLUSH IV SOLN 100 UNIT/ML 100 UNIT/ML
500 SOLUTION INTRAVENOUS PRN
Status: DISCONTINUED | OUTPATIENT
Start: 2022-11-11 | End: 2022-11-12 | Stop reason: HOSPADM

## 2022-11-11 RX ORDER — SODIUM CHLORIDE 0.9 % (FLUSH) 0.9 %
5-40 SYRINGE (ML) INJECTION PRN
Status: DISCONTINUED | OUTPATIENT
Start: 2022-11-11 | End: 2022-11-12 | Stop reason: HOSPADM

## 2022-11-11 RX ADMIN — Medication 10 ML: at 13:05

## 2022-11-11 RX ADMIN — HEPARIN 500 UNITS: 100 SYRINGE at 13:06

## 2022-11-30 ENCOUNTER — HOSPITAL ENCOUNTER (OUTPATIENT)
Dept: INFUSION THERAPY | Age: 67
Discharge: HOME | End: 2022-11-30
Payer: OTHER GOVERNMENT

## 2022-11-30 VITALS
HEART RATE: 73 BPM | HEIGHT: 67 IN | BODY MASS INDEX: 21.9 KG/M2 | OXYGEN SATURATION: 100 % | WEIGHT: 139.5 LBS | RESPIRATION RATE: 18 BRPM | DIASTOLIC BLOOD PRESSURE: 84 MMHG | TEMPERATURE: 97.7 F | SYSTOLIC BLOOD PRESSURE: 135 MMHG

## 2022-11-30 DIAGNOSIS — C18.9 ADENOCARCINOMA OF COLON (HCC): ICD-10-CM

## 2022-11-30 DIAGNOSIS — C78.7 LIVER METASTASES (HCC): Primary | ICD-10-CM

## 2022-11-30 DIAGNOSIS — C18.2 MALIGNANT NEOPLASM OF ASCENDING COLON (HCC): ICD-10-CM

## 2022-11-30 DIAGNOSIS — G89.3 CANCER ASSOCIATED PAIN: ICD-10-CM

## 2022-11-30 DIAGNOSIS — C78.7 LIVER METASTASES (HCC): ICD-10-CM

## 2022-11-30 DIAGNOSIS — C18.2 MALIGNANT NEOPLASM OF ASCENDING COLON (HCC): Primary | ICD-10-CM

## 2022-11-30 LAB
ALBUMIN SERPL-MCNC: 3.6 G/DL (ref 3.5–5.2)
ALP BLD-CCNC: 299 U/L (ref 40–130)
ALT SERPL-CCNC: 29 U/L (ref 21–72)
ANION GAP SERPL CALCULATED.3IONS-SCNC: 8 MMOL/L (ref 7–19)
AST SERPL-CCNC: 52 U/L (ref 17–59)
BILIRUB SERPL-MCNC: 0.7 MG/DL (ref 0.2–1.3)
BUN BLDV-MCNC: 18 MG/DL (ref 9–20)
CALCIUM SERPL-MCNC: 9.3 MG/DL (ref 8.4–10.2)
CHLORIDE BLD-SCNC: 109 MMOL/L (ref 98–111)
CO2: 23 MMOL/L (ref 22–29)
CREAT SERPL-MCNC: 1.4 MG/DL (ref 0.6–1.2)
GFR SERPL CREATININE-BSD FRML MDRD: 56 ML/MIN/{1.73_M2}
GLOBULIN: 3 G/DL
GLUCOSE BLD-MCNC: 103 MG/DL (ref 74–106)
HCT VFR BLD CALC: 33.4 % (ref 40.1–51)
HEMOGLOBIN: 10.7 G/DL (ref 13.7–17.5)
LYMPHOCYTES ABSOLUTE: 0.64 K/UL (ref 1.18–3.74)
LYMPHOCYTES RELATIVE PERCENT: 20.1 % (ref 19.3–53.1)
MCH RBC QN AUTO: 28.8 PG (ref 25.7–32.2)
MCHC RBC AUTO-ENTMCNC: 32 G/DL (ref 32.3–36.5)
MCV RBC AUTO: 90 FL (ref 79–92.2)
MONOCYTES ABSOLUTE: 0.74 K/UL (ref 0.24–0.82)
MONOCYTES RELATIVE PERCENT: 23.3 % (ref 4.7–12.5)
NEUTROPHILS ABSOLUTE: 1.67 K/UL (ref 1.56–6.13)
NEUTROPHILS RELATIVE PERCENT: 52.5 % (ref 34–71.1)
PDW BLD-RTO: 19.5 % (ref 11.6–14.4)
PLATELET # BLD: 157 K/UL (ref 163–337)
PMV BLD AUTO: 10.7 FL (ref 7.4–10.4)
POTASSIUM SERPL-SCNC: 4.6 MMOL/L (ref 3.5–5.1)
PROTEIN UA: NEGATIVE
RBC # BLD: 3.71 M/UL (ref 4.63–6.08)
SODIUM BLD-SCNC: 140 MMOL/L (ref 137–145)
TOTAL PROTEIN: 7.4 G/DL (ref 6.3–8.2)
WBC # BLD: 3.18 K/UL (ref 4.23–9.07)

## 2022-11-30 PROCEDURE — 96367 TX/PROPH/DG ADDL SEQ IV INF: CPT

## 2022-11-30 PROCEDURE — 36415 COLL VENOUS BLD VENIPUNCTURE: CPT

## 2022-11-30 PROCEDURE — 96375 TX/PRO/DX INJ NEW DRUG ADDON: CPT

## 2022-11-30 PROCEDURE — 80053 COMPREHEN METABOLIC PANEL: CPT

## 2022-11-30 PROCEDURE — 2580000003 HC RX 258: Performed by: INTERNAL MEDICINE

## 2022-11-30 PROCEDURE — 96366 THER/PROPH/DIAG IV INF ADDON: CPT

## 2022-11-30 PROCEDURE — 85025 COMPLETE CBC W/AUTO DIFF WBC: CPT

## 2022-11-30 PROCEDURE — G0498 CHEMO EXTEND IV INFUS W/PUMP: HCPCS

## 2022-11-30 PROCEDURE — 96413 CHEMO IV INFUSION 1 HR: CPT

## 2022-11-30 PROCEDURE — 96415 CHEMO IV INFUSION ADDL HR: CPT

## 2022-11-30 PROCEDURE — 6360000002 HC RX W HCPCS: Performed by: INTERNAL MEDICINE

## 2022-11-30 PROCEDURE — 96417 CHEMO IV INFUS EACH ADDL SEQ: CPT

## 2022-11-30 RX ORDER — DIPHENHYDRAMINE HYDROCHLORIDE 50 MG/ML
50 INJECTION INTRAMUSCULAR; INTRAVENOUS
OUTPATIENT
Start: 2022-11-30

## 2022-11-30 RX ORDER — ONDANSETRON 2 MG/ML
8 INJECTION INTRAMUSCULAR; INTRAVENOUS
OUTPATIENT
Start: 2022-11-30

## 2022-11-30 RX ORDER — EPINEPHRINE 1 MG/ML
0.3 INJECTION, SOLUTION, CONCENTRATE INTRAVENOUS PRN
OUTPATIENT
Start: 2022-11-30

## 2022-11-30 RX ORDER — SODIUM CHLORIDE 9 MG/ML
5-250 INJECTION, SOLUTION INTRAVENOUS PRN
OUTPATIENT
Start: 2022-12-02

## 2022-11-30 RX ORDER — HEPARIN SODIUM (PORCINE) LOCK FLUSH IV SOLN 100 UNIT/ML 100 UNIT/ML
500 SOLUTION INTRAVENOUS PRN
Status: CANCELLED | OUTPATIENT
Start: 2022-12-02

## 2022-11-30 RX ORDER — SODIUM CHLORIDE 9 MG/ML
5-40 INJECTION INTRAVENOUS PRN
OUTPATIENT
Start: 2022-11-30

## 2022-11-30 RX ORDER — PALONOSETRON 0.05 MG/ML
0.25 INJECTION, SOLUTION INTRAVENOUS ONCE
Status: COMPLETED | OUTPATIENT
Start: 2022-11-30 | End: 2022-11-30

## 2022-11-30 RX ORDER — SODIUM CHLORIDE 0.9 % (FLUSH) 0.9 %
5-40 SYRINGE (ML) INJECTION PRN
Status: CANCELLED | OUTPATIENT
Start: 2022-12-02

## 2022-11-30 RX ORDER — ACETAMINOPHEN 325 MG/1
650 TABLET ORAL
OUTPATIENT
Start: 2022-11-30

## 2022-11-30 RX ORDER — PALONOSETRON 0.05 MG/ML
0.25 INJECTION, SOLUTION INTRAVENOUS ONCE
Status: CANCELLED | OUTPATIENT
Start: 2022-11-30 | End: 2022-11-30

## 2022-11-30 RX ORDER — SODIUM CHLORIDE 9 MG/ML
INJECTION, SOLUTION INTRAVENOUS CONTINUOUS
OUTPATIENT
Start: 2022-11-30

## 2022-11-30 RX ORDER — HEPARIN SODIUM (PORCINE) LOCK FLUSH IV SOLN 100 UNIT/ML 100 UNIT/ML
500 SOLUTION INTRAVENOUS PRN
OUTPATIENT
Start: 2022-11-30

## 2022-11-30 RX ORDER — SODIUM CHLORIDE 0.9 % (FLUSH) 0.9 %
5-40 SYRINGE (ML) INJECTION PRN
OUTPATIENT
Start: 2022-11-30

## 2022-11-30 RX ORDER — SODIUM CHLORIDE 9 MG/ML
5-250 INJECTION, SOLUTION INTRAVENOUS PRN
Status: CANCELLED | OUTPATIENT
Start: 2022-11-30

## 2022-11-30 RX ORDER — FAMOTIDINE 10 MG/ML
20 INJECTION, SOLUTION INTRAVENOUS
OUTPATIENT
Start: 2022-11-30

## 2022-11-30 RX ORDER — MEPERIDINE HYDROCHLORIDE 50 MG/ML
12.5 INJECTION INTRAMUSCULAR; INTRAVENOUS; SUBCUTANEOUS PRN
OUTPATIENT
Start: 2022-11-30

## 2022-11-30 RX ORDER — DEXTROSE MONOHYDRATE 50 MG/ML
5-250 INJECTION, SOLUTION INTRAVENOUS PRN
OUTPATIENT
Start: 2022-11-30

## 2022-11-30 RX ORDER — SODIUM CHLORIDE 9 MG/ML
5-250 INJECTION, SOLUTION INTRAVENOUS PRN
Status: DISCONTINUED | OUTPATIENT
Start: 2022-11-30 | End: 2022-12-01 | Stop reason: HOSPADM

## 2022-11-30 RX ORDER — ALBUTEROL SULFATE 90 UG/1
4 AEROSOL, METERED RESPIRATORY (INHALATION) PRN
OUTPATIENT
Start: 2022-11-30

## 2022-11-30 RX ORDER — OXYCODONE HYDROCHLORIDE 10 MG/1
10 TABLET ORAL EVERY 6 HOURS PRN
Qty: 120 TABLET | Refills: 0 | Status: SHIPPED | OUTPATIENT
Start: 2022-11-30 | End: 2022-12-01 | Stop reason: SDUPTHER

## 2022-11-30 RX ORDER — SODIUM CHLORIDE 9 MG/ML
5-40 INJECTION INTRAVENOUS PRN
OUTPATIENT
Start: 2022-12-02

## 2022-11-30 RX ORDER — SODIUM CHLORIDE 9 MG/ML
5-250 INJECTION, SOLUTION INTRAVENOUS PRN
OUTPATIENT
Start: 2022-11-30

## 2022-11-30 RX ADMIN — DEXAMETHASONE SODIUM PHOSPHATE: 10 INJECTION, SOLUTION INTRAMUSCULAR; INTRAVENOUS at 11:49

## 2022-11-30 RX ADMIN — LEUCOVORIN CALCIUM 700 MG: 200 INJECTION, POWDER, LYOPHILIZED, FOR SUSPENSION INTRAMUSCULAR; INTRAVENOUS at 12:56

## 2022-11-30 RX ADMIN — OXALIPLATIN 145 MG: 5 INJECTION, SOLUTION INTRAVENOUS at 12:57

## 2022-11-30 RX ADMIN — PALONOSETRON 0.25 MG: 0.05 INJECTION, SOLUTION INTRAVENOUS at 11:49

## 2022-11-30 RX ADMIN — SODIUM CHLORIDE 500 ML/HR: 9 INJECTION, SOLUTION INTRAVENOUS at 11:51

## 2022-11-30 RX ADMIN — SODIUM CHLORIDE 300 MG: 9 INJECTION, SOLUTION INTRAVENOUS at 12:21

## 2022-11-30 RX ADMIN — FLUOROURACIL 4100 MG: 50 INJECTION, SOLUTION INTRAVENOUS at 15:14

## 2022-11-30 NOTE — PROGRESS NOTES
Lab Results   Component Value Date    WBC 3.18 (L) 11/30/2022    HGB 10.7 (L) 11/30/2022    HCT 33.4 (L) 11/30/2022    MCV 90.0 11/30/2022     (L) 11/30/2022     Lab Results   Component Value Date    NEUTROABS 1.67 11/30/2022     Lab Results   Component Value Date     11/09/2022    K 4.2 11/09/2022     11/09/2022    CO2 20 (L) 11/09/2022    BUN 16 11/09/2022    CREATININE 1.5 (H) 11/09/2022    GLUCOSE 99 11/09/2022    CALCIUM 9.2 11/09/2022    PROT 7.1 11/09/2022    LABALBU 3.8 11/09/2022    BILITOT 0.5 11/09/2022    ALKPHOS 153 (H) 11/09/2022    AST 39 11/09/2022    ALT 22 11/09/2022    LABGLOM 51 (A) 11/09/2022    GFRAA 53 (A) 07/21/2022    AGRATIO 1.5 06/15/2021    GLOB 3.3 11/09/2022

## 2022-12-01 DIAGNOSIS — G89.3 CANCER ASSOCIATED PAIN: ICD-10-CM

## 2022-12-01 DIAGNOSIS — C18.2 MALIGNANT NEOPLASM OF ASCENDING COLON (HCC): ICD-10-CM

## 2022-12-01 DIAGNOSIS — C18.9 ADENOCARCINOMA OF COLON (HCC): ICD-10-CM

## 2022-12-01 DIAGNOSIS — C78.7 LIVER METASTASES (HCC): ICD-10-CM

## 2022-12-01 RX ORDER — OXYCODONE HYDROCHLORIDE 10 MG/1
10 TABLET ORAL EVERY 6 HOURS PRN
Qty: 120 TABLET | Refills: 0 | Status: SHIPPED | OUTPATIENT
Start: 2022-12-01 | End: 2022-12-31

## 2022-12-01 NOTE — TELEPHONE ENCOUNTER
From: Leonard Garrett  To: Office of Dr. Marc Bauer: 12/1/2022 1:35 PM CST  Subject: Medication Renewal Request    Refills have been requested for the following medications:     oxyCODONE HCl (OXY-IR) 10 MG immediate release tablet [Dr. Demond Ventura MD]   Patient Comment: this was sent to [address] [place] [address] and I just found out they are not in network with our insurance company anymore. Please send it to [place] [phone]. Thanks.      Preferred pharmacy: [facility]

## 2022-12-02 ENCOUNTER — HOSPITAL ENCOUNTER (OUTPATIENT)
Dept: INFUSION THERAPY | Age: 67
Discharge: HOME | End: 2022-12-02
Payer: OTHER GOVERNMENT

## 2022-12-02 DIAGNOSIS — C78.7 LIVER METASTASES (HCC): Primary | ICD-10-CM

## 2022-12-02 DIAGNOSIS — C18.2 MALIGNANT NEOPLASM OF ASCENDING COLON (HCC): ICD-10-CM

## 2022-12-02 DIAGNOSIS — C18.9 ADENOCARCINOMA OF COLON (HCC): ICD-10-CM

## 2022-12-02 PROCEDURE — 6360000002 HC RX W HCPCS: Performed by: INTERNAL MEDICINE

## 2022-12-02 PROCEDURE — 2580000003 HC RX 258: Performed by: INTERNAL MEDICINE

## 2022-12-02 PROCEDURE — 96523 IRRIG DRUG DELIVERY DEVICE: CPT

## 2022-12-02 RX ORDER — HEPARIN SODIUM (PORCINE) LOCK FLUSH IV SOLN 100 UNIT/ML 100 UNIT/ML
500 SOLUTION INTRAVENOUS PRN
Status: DISCONTINUED | OUTPATIENT
Start: 2022-12-02 | End: 2022-12-03 | Stop reason: HOSPADM

## 2022-12-02 RX ORDER — SODIUM CHLORIDE 0.9 % (FLUSH) 0.9 %
5-40 SYRINGE (ML) INJECTION PRN
Status: DISCONTINUED | OUTPATIENT
Start: 2022-12-02 | End: 2022-12-03 | Stop reason: HOSPADM

## 2022-12-02 RX ADMIN — HEPARIN 500 UNITS: 100 SYRINGE at 13:22

## 2022-12-02 RX ADMIN — SODIUM CHLORIDE, PRESERVATIVE FREE 10 ML: 5 INJECTION INTRAVENOUS at 13:22

## 2022-12-13 NOTE — PROGRESS NOTES
MEDICAL ONCOLOGY PROGRESS NOTE                                                          Denise Castillo   1955 12/14/2022     Chief Complaint   Patient presents with    Cancer     INTERVAL HISTORY/HISTORY OF PRESENT ILLNESS:  Diagnosis  Colonic adenocarcinoma, March 2020  [id number](liver), stage ROXANA  IHC MMR- proficient  K-maria luisa mutated  N-MARIA LUISA/BRAF wild-type  Iron deficiency anemia  Soft tissue mass, June 2021  Adenocarcinoma-consistent with colon primary, right psoas muscle, June 2021  Metastatic adenocarcinoma, Jan 2022  MLH1, MSH2, MH6, PMS2-intact  MMR- no loss of expression     Treatment summary  3/30/2020-right hemicolectomy at [facility]  Anticipated Liver ablation to be followed by neoadjuvant/adjuvant versus palliative chemotherapy  06/10/2020-November 2020-FOLFOX + Avastin  12/11/20- Right hepatectomy-Dr. Dann Brown  S/p Injectafer-poor oral iron tolerance  7/13/21- 11/17/21 FOLFIRI + Avastin every 2 weeks  1/13/22-psoas muscle mass resection/HIPEC by Dr. Edel Abreu at [facility]  5/10/22 - 8/3/22 Discontinue FOLFIRI + Avastin every 2 weeks due to progression in the liver  8/10/2022- FOLFOX + Avastin every 2 weeks-     The patient is a very pleasant 74years old male who has been diagnosed with colonic adenocarcinoma. He has had several treatment modalities in the past.  He is currently status post CRS/HIPEC in mid January, 2022 at [facility]. This was complicated by a leak from his ileocolic anastomosis secondary to closed-loop obstruction at his hernia repair site (mesh displaced). He was taken back to the OR and had a primary repair of his anastomosis and diverting ileostomy in February 2022. He is currently on treatment with FOLFIRI/Avastin. Unfortunately, most recent CT scans showed evidence of liver metastasis progression. I called and discussed with hepatobiliary surgery from [facility].   Unfortunately, there is no liver directed therapy recommended at this time due to diffuse hepatic metastasis. He presents today for cycle # of palliative chemotherapy. He has no new complaints today. His pain is under control. He has gained some weight since last visit. His pain has been under control. Cancer history  Mr. Jhon Kaplan was first seen by me on 3/23/2020. He was referred for a new diagnosis of colonic adenocarcinoma involving the cecum. The patient reports that he had a wellbeing consult with his provider at the [place]. He was found to have anemia and then recommended a colonoscopy. Of note, the patient has a family history of colon cancer. His mother is a patient of Dr. Karely Hopson he has been diagnosed with colon cancer in 2010.  3/11/2020- colonoscopy revealed a large malignant appearing fungating mass lesion in the cecum. In addition, several other polyps. Biopsy of the mass consistent with moderate differentiated colonic adenocarcinoma. Polyps consistent with tubulovillous adenoma with no high-grade dysplasia. IHC MMR not proficient. K-maria luisa mutated, BRAF and NRAS wild type. MSI proficient  3/11/2020-CEA 5.5 (H)  3/18/2020-CT abdomen pelvis with contrast  Invasive cecal mass adhering to the right lateral abdominal wall muscles with adjacent lymphadenopathy. Mild partial obstruction of the terminal ileum. 2. Suspicious lesions in the right and left hepatic lobes measure up to 1.3 cm and likely represent metastatic disease. 3/18/2020-Xr Chest Standard  No radiographic evidence of acute cardiopulmonary process. 3/23/2020-he was first seen by me. Recommended completion of staging with CT chest.  Also recommended liver MRI for further clarification of liver lesion. S.  Recommend to proceed with a general surgery consultation tomorrow with Dr. Qasim Mathew. Patient was informed that I favor surgical resection if feasible of the primary malignancy.   3/30/2020- right hemicolectomy by Dr. Qasim Mathew at [facility] consistent with invasive moderately differentiated colonic adenocarcinoma measuring 7.2 cm. Carcinoma directly invading the adjacent abdominal wall tissue. Focal lymphovascular space invasion identified. Focal perineural invasion identified. Surgical margins negative for evidence of malignancy. 6 out of 14 lymph nodes positive for metastatic adenocarcinoma. Final pathology staging [id number](liver, stage ROXANA)  4/20/2020-CT chest with contrast showed No convincing intrathoracic metastasis. Nonspecific 4 mm nodule of the inferior lingula and a 2 mm right upper lobe nodule can be followed on subsequent imaging in 6-12 months. Moderate coronary calcifications. Hypodense metastatic liver lesions. Small hiatal hernia. 4/20/2020-Mri Abdomen W Wo Contrast There are about 5 liver lesions. The 2 largest appears similar compared to 3/18/2020, the others are too small to further characterize. Appearance is most concerning for metastatic disease. Enhancement of the right lateral peritoneum. This is favored to be postoperative as there is no nodularity, evidence of omental disease or lymphadenopathy. Recommend attention on follow-up. Cholecystectomy. 4/22/2020-discussed with Dr. Asim Arita at [place]. He will review imaging studies and give further recommendations regarding eligibility for resection of liver lesions. 5/8/2020 CT Abdomen The two largest suspicious lesions measuring 1.2 and 1.3 cm in the  right and left hepatic lobes respectively are similar compared to the  3/18/2020 CT. Additionally, there are at least five subcentimeter lesions with similar signal characteristics, which are also highly suspicious for metastases. If complete characterization of the number and distribution of lesions is necessary, an MRI with Eovist could be acquired. 5/19/2020- he was seen by the hepatobiliary service at [facility] with Dr. Asim Arita:  patient adequate risk candidate for a multimodal approach, directed toward curative hepatectomy eventually.  Endorsed by Hepatobiliary Conference, I recommended perc ablation of the L hemiliver to clear it, followed by systemic therapy in a neoadjuvant strategy. Restaging imaging to confirm clearance of disease on the left and lack of progression to unresectability of the R hemiliver disease would then be followed by R hepatectomy. Limitations to this approach may be accessibility of the segment 4A/8 disease high in the hepatic dome and the possibility of heat sink-related recurrence s/p abation of the left-sided disease. 5/21/2020- referral for Mediport placement and start FOLFOX in 2 weeks. Will add bevacizumab after 6 weeks of liver ablation. We will plan for 12 biweekly dose of FOLFOX. Bevacizumab will also be stopped 6 weeks prior to major procedure. 6/8/2020- Microwave ablation of the left liver lesion was then performed for 5 minutes at [address] to achieve a 3.4 x 3.9 cm approximately spherical zone of ablation. 6/10/2020- initiation of FOLFOX.  7/20/2020-added Avastin. 9/10/20 MRI abdomen: Left hepatic lobe segment II lesion demonstrates small T1 hyperintense blood products, status post microwave ablation on 6/8/2020. No definitive enhancement within the lesion. Focal internal thickening or scar present. Recommend attention on follow-up. Additional scattered subcentimeter foci throughout the liver decreased in size compared to MRI dated 4/20/2020 consistent with improving metastatic disease. Additional chronic findings as above. 9/16/2020-discussed with plan with the patient and [address] [place] [address]. Interval response to treatment. Plan to continue chemotherapy through 12 cycles with Avastin. 12/11/20 Right hepatectomy-Dr. Abimbola Macario  12/11/20 Right hepatectomy pathology: Liver, right, resection: Focus of Fibrosis with calcifications and chronic inflammation (0.3 cm), see comment.  Background hepatic parenchyma with minimal periportal fibrosis (trichrome stain), minimal lobular and portal inflammation, and no significant macrovesicular steatosis (<5%) Comments: The patient's history of colorectal cancer status adjuvant therapy is noted. The focus of fibrosis may reflect treatment effect. There is no evidence of viable tumor in the sampled specimen. 12/14/20 Ct Abdomen Pelvis W Iv Contrast A Small bowel obstruction with transition point at the distal small bowel, just proximal to RIGHT upper quadrant ileocolonic anastomosis. Postoperative change of RIGHT hepatectomy with small amount of expected free fluid and intraperitoneal gas. Redemonstrated LEFT liver lesion. Small bilateral pleural effusions. 12/16/20 SBFT-[place]: Study is limited due to retained contrast in the small bowel from prior attempt at small bowel follow-through on the floor. Small bowel remains dilated, measuring approximately 5.2 cm, which is consistent with partial or resolving small bowel obstruction. Final radiographs show contrast within the colon. 1/4/2020-resolution of small bowel obstruction. 1/7/21 CT chest: No finding to suggest intrathoracic neoplastic process or metastatic disease. The benign-appearing tiny nodule in the right upper lobe probably represent a noncalcified granuloma. A nodule in the lingular segment of the left upper lobe is not visualized in this study. Postsurgical changes of the liver. No evidence of focal complication. A trace right basal pleural effusion. This may be reactive to the previous abdominal surgery. 3/4/21 MRI abd: Status post right hepatectomy and microwave ablation of a left liver lesion. No findings to suggest residual/recurrent disease. No new liver lesion is identified. Postsurgical changes related to right hemicolectomy. Microwave ablation zone in segment II of the left hepatic lobe measures approximately 21 mm in diameter, unchanged. No appreciable postcontrast enhancement or other findings to suggest local disease recurrence. No new liver lesion is identified.  Spleen is mildly enlarged, measuring 13.8 cm in length. 3/17/2021-1 year colonoscopy showed no evidence of polyps. 5/3/21 CEA 6.2  6/8/21 MRI abdomen ([place]): Status post right hepatectomy and MWA of a left liver lesion. No evidence of residual or recurrent metastatic disease in the liver. Status post prior right hemicolectomy for colon carcinoma. Within the posterior right iliopsoas muscle there is a mildly T2 hyperintense nodular focus with postcontrast rim enhancement and diffusion restriction. This measures approximately 2.7 x 2 x 2 cm. More delayed postcontrast images show irregular area of enhancement measuring 2.4 x 7.7 cm axially involving the right iliopsoas muscle and the right lateral abdominal wall. Suggest correlation with contrast enhanced CT exam for complete evaluation. Consider biopsy of this lesion. 6/15/21 CT CHEST WO CONTRAST No metastatic disease in the chest.  No change in tiny 2 mm RIGHT upper lobe pulmonary nodule. Mild ectasia of the ascending aorta measuring 4 cm.   6/18/2021-I reviewed results of MRI abdomen at [address] [place] [address]. CT chest without contrast reviewed by me and showed no evidence of metastatic disease. Stable 2 mm right upper lobe nodule. Discussed with hepatobiliary service at [address] [place] [address]. Biopsy intra-abdominal nodule next week at [address] [place] [address]. 6/25/20212093-[id number] biopsy soft tissue mass right psoas muscle at [address] [place] [address] consistent with recurrent colonic adenocarcinoma. 7/2/2021-discussed with hepatobiliary service at [address] [place] [address] and also surgical oncology. Surgical oncology will call us back regarding consultation for consideration of HIPEC if he is a candidate  7/13/21-initiation of chemotherapy with FOLFIRI + Avastin every 2 weeks  7/13/21 CEA 10.7  7/27/2021-CEA 9.6  7/30/2021-she was seen by the surgical oncology group at [address] [place] [address] by Dr Emilio Garcia. They recommended to complete 6 cycles of chemotherapy and repeat CT chest abdomen pelvis and liver MRI to assess disease response.   They discussed the role of CRS/HIPEC in his situation. He was told that it really depends on the status of his liver lesions as well. He will complete 6 cycles of chemotherapy and will return to see me with a CTAP as well as MRI of the liver to assess disease burden and determine if he can be a candidate for debulking.  8/25/2021-proceed cycle #4.  9/22/2021 CEA- 8.1  9/24/2021 MRI with and w/o Contrast ([place])  Tiny left retroperitoneal nodule involving the left lateral conal fascial new compared to 3/4/2021 though unchanged from most recent prior, possibly an additional site of metastasis. No other new metastatic disease within the abdomen. Similar partially visualized right posterior body wall/psoas infiltrative lesion not definitely changed from MRI abdomen 6/8/2021 but better evaluated in its entirety on concurrent CT, reported separately. Status post right hemicolectomy, right hepatectomy, and segment III microwave ablation. Similar mild splenomegaly. Additional chronic and incidental findings as described in the body the report. Liver: Status post right hepatectomy. Ablation zone in segment II measures 1.7 x 1.1 cm, slightly decreased in size from 1.8 x 1.4 cm previously (series 1301 image 56) without appreciable enhancement. Similar tiny subcentimeter cyst in the left hepatic lobe. No new focal hepatic lesion identified. No visualized free fluid. Similar 0.7 cm enhancing nodule along the left lateral conal fascia laterally new from 3/4/2021, grossly unchanged from MRI dated 6/8/2021. (series 801 image 22). No other appreciable peritoneal/retroperitoneal or  omental nodularity.  Ill-defined T2 hyperintense signal and hyperenhancement in the right posteromedial body wall involving the lateral aspect of the psoas muscle and posterolateral abdominal wall musculature, partially visualized measuring at least 8.7 x 3.1   cm, grossly similar to the prior exam, better evaluated in its entirety on concurrent CT reported separately. 9/24/2021 CT C/A/P w/ Contrast([place]) Status post right hemicolectomy, right hepatectomy and left hepatic microwave ablation without new suspicious hepatic lesion. Left lateral perirenal fascial nodule, which is stable compared to 6/8/2021 MRI, but new compared to 3/4/2021 MRI is concerning for an additional site of metastatic disease. No sites of new or enlarging metastatic disease in the chest.  Similar appearance of right lateral psoas and posterior abdominal wall infiltrative lesion, not significantly changed compared to 6/8/2021 MRI. Mild splenomegaly. Additional findings as outlined in the body the report. Biopsy-proven adenocarcinoma involving the posterior lateral aspect of the right iliopsoas muscle and right lateral abdominal wall. Right iliopsoas component is difficult to measure but appears to be approximately 2.5 x 2.4 cm (series 2, image 153), similar to prior MRI. Nodular thickening noted along the right posterior abdominal wall and possibly involving the the transversus abdominis measures approximately 8.5 x 1.8 cm (series 2 image 153) overall similar compared to prior MRI. 10/6/2021-discussed the results of recent CT abdomen/pelvis and MRI abdomen/pelvis at [facility]. Persistent disease involving the psoas muscle. Discussed with colorectal surgery at [facility]. They recommend to continue current therapy for another 2 months and reassess with CT scans. 7/13/21- 11/17/21 FOLFIRI + Avastin every 2 weeks  12/3/21 CT chest/abd/pelvis [facility]): Status post prior right hemicolectomy, right hepatectomy and left hepatic lesions microwave ablation. No new liver lesion. Soft tissue thickening of the right lower posterior abdominal wall involving the iliopsoas muscle is grossly unchanged consistent with improving metastatic disease.  Small right omental nodule and left lateral conal fascia nodule unchanged compared to  recent exam.   1/13/22 Exploratory lap with resections of psoas muscle mass and HIPEC by Dr. Reece Caballero at [facility]:  Metastatic colorectal adenocarcinoma involving fibroadipose tissue. IHC MMR proficient. 1/24/22 CT chest/abd/pelvis [facility]): Extensive postsurgical changes in the abdomen including partial right colectomy, resection of right retroperitoneal mass, omentectomy and mesh repair of right lateral abdominal wall defect. There appears to be a defect in the mesh containing herniated loops of small bowel. Extensive diffuse dilated small bowel loops with air-fluid levels are seen throughout the abdomen. There are segmental areas of decompressed small bowel in the left upper quadrant as well as adjacent to the herniated small  bowel loops in the right retroperitoneum. Air-fluid levels are also seen throughout the colon. While pattern could likely represent postoperative ileus given the diffuse small bowel dilatation and distal colonic air and fluid, developing or partial small bowel obstruction secondary to the right lateral abdominal wall hernia cannot entirely be excluded. Small ascites. 8 mm nodule along the left lateral conal fascia is unchanged. Slight increase in size of cardiophrenic lymph nodes measuring up to 7 mm anterior to the right hemidiaphragm. Stable hypodensity in the left hepatic lobe. Diffuse gastric wall thickening/edema could be secondary to gastritis in the appropriate clinical setting. February 2022- HIPEC/tumor debulking January. This was complicated by a leak from his ileocolic anastomosis secondary to closed-loop obstruction at his hernia repair site (mesh displaced). He was taken back to the OR and had a primary repair of his anastomosis and diverting ileostomy in February 2022  3/23/2022-ileostomy reversal at [facility] Dr. Reece Caballero at [facility]  3/20/2022 CT Abd/Pelvis WO Contrast [facility]) Findings suspicious for aspiration at the lung bases. Postsurgical changes related to right hepatectomy. There is a new low-attenuation lesion in the left hemiliver, highly concerning for metastasis. Postsurgical changes related to right hemicolectomy with ileocolonic anastomosis and right lower quadrant diverting loop ileostomy. No evidence of bowel obstruction. No intra-abdominal fluid collection. Other findings as above. A critical alert was sent at the time of dictation. Liver: Postsurgical changes related to right hepatectomy. There is a new, approximately 2.1 cm low-attenuation lesion seen in the left hepatic lobe on image 25 of series 3, highly concerning for metastasis. 4/18/2022 Ileostomy,closure enterocutaneous stoma with mural acute inflammation and jessa-intestinal Fat necrosis. Negative for malignancy. 4/23/2022- discussed the patient reinitiation of chemotherapy. We will continue FOLFIRI. He had no prior progression on FOLFIRI of FOLFOX. 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  We would avoid Avastin at this time due to recent surgery. He is K-maria luisa mutated and therefore cannot use anti-EGFR agent. May contemplate adding Avastin in 4 weeks. 5/9/2022 CT Chest W Contrast New pulmonary nodules are present in the right and left lung as described above. It cannot be determined if these are infectious nodules or metastatic disease. Dilated loops of bowel are present in the upper abdomen. The abdomen is incompletely evaluated however this may be wither an ileus or partial obstruction. Hepatic metastasis. 5/10/22 Re-initiate FOLFIRI + Avastin every 2 weeks. 5/24/2022  CEA 29.1  5/24/2022 Iron Studies:Iron 29, TIBC 256, Iron Sat 11,Ferritin 116.9  6/22/2022 CEA 21.5  7/21/2022 CT Chest W Contrast Bibasilar linear atelectasis life scarring. No pulmonary nodules, masses or infiltrates. Multiple enhancing low attenuation masses in the liver similar to the previous study. Findings consistent with metastatic liver disease.   7/21/2022 CT Abd/Pelvis W IV Contrast ([place]) Multiple enhancing low attenuation liver lesions increase in size and number since the previous study 3/7/22. Findings consistent with progression of metastatic liver disease. Status postcholecystectomy. The appendix is not visualized. No acute findings. 5/10/22 - 8/3/22 Discontinue FOLFIRI + Avastin every 2 weeks due to progression in the liver  8/3/2022-unfortunately, interval progression of liver metastasis when compared to last CT scans performed on 3/7/2022. In addition, when compared to report from [address] [place] [address] abdomen/pelvis on 3/20/2022 when he had only 1 single metastatic lesion measuring 2.1 cm. This is considered progression. I have recommended to discontinue FOLFIRI/Avastin and consider initiation of palliative FOLFOX with Avastin. Discussed with hepatobiliary surgery. The patient is not a candidate for further surgery. He is not a candidate for any liver directed therapy. 8/3/2022 CEA 26.6  9/14/2022 CEA 36.5  10/12/22 CEA 40.7  11/4/2022 CT Chest W Contrast Bilateral lower lobe scarring or atelectasis similar in appearance. There has been slight interval decrease in the amount and the size of the low attenuation hepatic lesions. 11/4/2022 CT Abd/Pelvis W IV Contrast ([place])There has been interval decrease in the mural thickening of the colonic loop at the level of the surgical suture material with slight residual mural thickening recognized. Endoscopy can be performed for further evaluation if clinically warranted. Postsurgical changes at the right hepatic lobe posteriorly similar. There has been slight interval decrease in the amount of hepatic metastatic disease with fewer lesions recognized in the right hepatic lobe. Cholecystectomy.       CEA dynamics:  2/27/22 CEA 3.5  5/24/22 CEA 29.1  6/22/22 CEA 21.5  8/3/22 CEA 26.6  9/4/22 CEA 36.5  10/12/22 CEA 40.7    PAST MEDICAL HISTORY:   Past Medical History:   Diagnosis Date    Acid reflux     Cancer ([facility] [address]     adenocarcinoma of colon    GERD (gastroesophageal reflux disease)     Palliative care patient 03/01/2022    PTSD (post-traumatic stress disorder)     Stage 3a chronic kidney disease ([facility] [address]       PAST SURGICAL HISTORY:  Past Surgical History:   Procedure Laterality Date    ABLATION OF DYSRHYTHMIC FOCUS      ablation of liver at [address]  2003    CHOLECYSTECTOMY  2013    COLONOSCOPY      when pt was in the 20's    COLONOSCOPY N/A 03/11/2020    COLONOSCOPY POLYPECTOMY SNARE/COLD BIOPSY: Dr. Galaviz [facility] colonoscopy: 6-12 months due to findings at colonoscopy today with Cecal mass lesion and large polyps.     COLONOSCOPY      COLONOSCOPY N/A 03/17/2021    Dr Pool [place], Post-operative changes w IC anastomosis & single visible staple, Mild Diverticuosis, Int Hemorrhoids Grade 1, 3 year recall    HEMICOLECTOMY Right 03/30/2020    LAPAROSCOPIC-ASSISTED RIGHT HEMICOLECTOMY performed by Tasha Sharma MD at [facility] N/A 06/03/2020    INSERTION OF VENOUS PORT with flouro performed by Tasha Sharma MD at [facility] ENDOSCOPY N/A 03/11/2020    Dr. Travis Reusing:   [facility]      SOCIAL HISTORY:  Social History     Socioeconomic History    Marital status:    Tobacco Use    Smoking status: Never    Smokeless tobacco: Never   Vaping Use    Vaping Use: Never used   Substance and Sexual Activity    Alcohol use: Yes     Comment: rare     Drug use: No    Sexual activity: Yes     Partners: Female     FAMILY HISTORY:  Family History   Problem Relation Age of Onset    Liver Cancer Mother     High Blood Pressure Mother     Colon Cancer Mother         x2    Cancer Father         Lung Cancer    Breast Cancer Sister     Cancer Maternal Grandfather         Lung Cancer    Cancer Paternal Grandfather         Stomach Cancer    Colon Polyps Neg Hx     Cystic Fibrosis Neg Hx     Liver Disease Neg Hx     Rectal Cancer Neg Hx         Current Outpatient Medications   Medication Sig Dispense Refill    oxyCODONE HCl (OXY-IR) 10 MG immediate release tablet Take 1 tablet by mouth every 6 hours as needed for Pain for up to 30 days. Intended supply: 30 days 120 tablet 0    promethazine (PHENERGAN) 12.5 MG tablet Take 1 tablet by mouth every 6 hours as needed for Nausea 60 tablet 5    sildenafil (VIAGRA) 50 MG tablet Take 1 tablet by mouth daily as needed for Erectile Dysfunction 10 tablet 5    Magnesium Oxide 400 MG CAPS Take 400 mg by mouth in the morning and at bedtime 60 capsule 3    apixaban (ELIQUIS) 5 MG TABS tablet Take 1 tablet by mouth 2 times daily 60 tablet 0    vitamin D (ERGOCALCIFEROL) 1.25 MG (03047 UT) CAPS capsule Take 1 capsule by mouth once a week 5 capsule 0    methocarbamol (ROBAXIN) 500 MG tablet Take 500 mg by mouth 3 times daily as needed       omeprazole (PRILOSEC) 20 MG delayed release capsule Take 20 mg by mouth daily      prazosin (MINIPRESS) 1 MG capsule Take 1 mg by mouth nightly as needed For nightmares       Sertraline HCl (ZOLOFT PO) Take by mouth daily       No current facility-administered medications for this visit.      Facility-Administered Medications Ordered in Other Visits   Medication Dose Route Frequency Provider Last Rate Last Admin    0.9 % sodium chloride infusion  5-250 mL/hr IntraVENous PRN Carline Alfred MD 50 mL/hr at 12/14/22 1200 50 mL/hr at 12/14/22 1200    bevacizumab-bvzr (ZIRABEV) 300 mg in sodium chloride 0.9 % 100 mL chemo IVPB  5 mg/kg (Treatment Plan Recorded) IntraVENous Once Carline Alfred MD        oxaliplatin (ELOXATIN) 145 mg in dextrose 5 % 250 mL chemo IVPB  85 mg/m2 (Treatment Plan Recorded) IntraVENous Once Carline Alfred MD        leucovorin calcium (WELLCOVORIN) 700 mg in dextrose 5 % 250 mL IVPB  400 mg/m2 (Treatment Plan Recorded) IntraVENous Once Carline Alfred MD        fluorouracil (ADRUCIL) 4,100 mg in sodium chloride 0.9 % 250 mL chemo infusion  2,400 mg/m2 (Treatment Plan Recorded) IntraVENous Over 46 hours Carline Alfred MD        sodium chloride flush 0.9 % injection 5-40 mL  5-40 mL IntraVENous PRN Fatmata Cano MD          REVIEW OF SYSTEMS:   CONSTITUTIONAL: no fever, no night sweats, fatigue;  HEENT: no blurring of vision, no double vision, no hearing difficulty, no tinnitus, no ulceration, no dysplasia, no epistaxis;   LUNGS: no cough, no hemoptysis, no wheeze,  no shortness of breath;  CARDIOVASCULAR: no palpitation, no chest pain, no shortness of breath;  GI: no abdominal pain, no nausea, no vomiting, no diarrhea, no constipation;  ANNIE: no dysuria, no hematuria, no frequency or urgency, no nephrolithiasis;  MUSCULOSKELETAL: no joint pain, no swelling, no stiffness;  ENDOCRINE: no polyuria, no polydipsia, no cold or heat intolerance;  HEMATOLOGY: no easy bruising or bleeding, no history of clotting disorder;  DERMATOLOGY: no skin rash, no eczema, no pruritus;  PSYCHIATRY: no depression, no anxiety, no panic attacks, no suicidal ideation, no homicidal ideation;  NEUROLOGY: no syncope, no seizures, no numbness or tingling of hands, no numbness or tingling of feet, no paresis;     Vitals signs:  BP (!) 154/87   Pulse 75   Temp 97.7 °F (36.5 °C)   Resp 18   Ht 5' 7\" (1.702 m)   Wt 137 lb 9.6 oz (62.4 kg)   SpO2 98%   BMI 21.55 kg/m²   Wt Readings from Last 3 Encounters:   12/14/22 137 lb 9.6 oz (62.4 kg)   11/30/22 139 lb 8 oz (63.3 kg)   11/09/22 134 lb (60.8 kg)       PHYSICAL EXAM:  CONSTITUTIONAL: Alert, appropriate, no acute distress  EYES: Non icteric, EOM intact, pupils equal round   ENT: Mucus membranes moist, no oral pharyngeal lesions, external inspection of ears and nose are normal.  NECK: Supple, no masses. No palpable thyroid mass  CHEST/LUNGS: CTA bilaterally, normal respiratory effort   CARDIOVASCULAR: RRR, no murmurs. No lower extremity edema  ABDOMEN: soft non-tender, active bowel sounds, no HSM. No palpable masses  EXTREMITIES: warm, full ROM in all 4 extremities, no focal weakness.   SKIN: warm, dry with no rashes or lesions  LYMPH: No cervical, clavicular, axillary, or inguinal lymphadenopathy  NEUROLOGIC: follows commands, non focal   PSYCH: mood and affect appropriate. Alert and oriented to time, place, person    Relevant Lab findings/reviewed by me:  Lab Results   Component Value Date    WBC 3.58 (L) 12/14/2022    HGB 11.2 (L) 12/14/2022    HCT 35.6 (L) 12/14/2022    MCV 91.0 12/14/2022     (L) 12/14/2022     Lab Results   Component Value Date    NEUTROABS 2.02 12/14/2022     Lab Results   Component Value Date     12/14/2022    K 4.5 12/14/2022     (H) 12/14/2022    CO2 21 (L) 12/14/2022    BUN 21 (H) 12/14/2022    CREATININE 1.4 (H) 12/14/2022    GLUCOSE 110 (H) 12/14/2022    CALCIUM 9.3 12/14/2022    PROT 7.4 12/14/2022    LABALBU 3.7 12/14/2022    BILITOT 0.8 12/14/2022    ALKPHOS 272 (H) 12/14/2022    AST 53 12/14/2022    ALT 28 12/14/2022    LABGLOM 56 (A) 12/14/2022    GFRAA 53 (A) 07/21/2022    AGRATIO 1.5 06/15/2021    GLOB 3.6 12/14/2022         Relevant Imaging studies/reviewed by me:  None    ASSESSMENT:    Orders Placed This Encounter   Procedures    CBC with Auto Differential     Standing Status:   Future     Number of Occurrences:   1     Standing Expiration Date:   12/14/2023    Comprehensive Metabolic Panel     Standing Status:   Future     Number of Occurrences:   1     Standing Expiration Date:   12/14/2023    CEA     Standing Status:   Future     Number of Occurrences:   1     Standing Expiration Date:   12/14/2023    CBC With Auto Differential     Standing Status:   Future     Standing Expiration Date:   12/14/2023    Comprehensive metabolic panel     Standing Status:   Future     Standing Expiration Date:   12/14/2023    Urinalysis     Standing Status:   Future     Standing Expiration Date:   12/14/2023          Rocci was seen today for cancer.     Diagnoses and all orders for this visit:    Malignant neoplasm of ascending colon ([address]  -     CBC with Auto Differential; Future  - Comprehensive Metabolic Panel; Future  -     CEA; Future  -     CBC With Auto Differential; Future  -     Comprehensive metabolic panel; Future  -     Urinalysis; Future  -     Cancel: 0.9 % sodium chloride infusion  -     Cancel: dexamethasone (DECADRON) 10 mg in sodium chloride 0.9 % 50 mL IVPB  -     Cancel: palonosetron (ALOXI) injection 0.25 mg  -     Cancel: bevacizumab-bvzr (ZIRABEV) 300 mg in sodium chloride 0.9 % 100 mL chemo IVPB  -     Cancel: oxaliplatin (ELOXATIN) 145 mg in dextrose 5 % 250 mL chemo IVPB  -     Cancel: leucovorin calcium (WELLCOVORIN) 700 mg in dextrose 5 % 250 mL IVPB  -     Cancel: fluorouracil (ADRUCIL) 4,100 mg in sodium chloride 0.9 % 250 mL chemo infusion  -     Cancel: sodium chloride flush 0.9 % injection 5-40 mL    Liver metastases (HCC)  -     CBC with Auto Differential; Future  -     Comprehensive Metabolic Panel; Future  -     CEA; Future  -     CBC With Auto Differential; Future  -     Comprehensive metabolic panel; Future  -     Urinalysis; Future  -     Cancel: 0.9 % sodium chloride infusion  -     Cancel: dexamethasone (DECADRON) 10 mg in sodium chloride 0.9 % 50 mL IVPB  -     Cancel: palonosetron (ALOXI) injection 0.25 mg  -     Cancel: bevacizumab-bvzr (ZIRABEV) 300 mg in sodium chloride 0.9 % 100 mL chemo IVPB  -     Cancel: oxaliplatin (ELOXATIN) 145 mg in dextrose 5 % 250 mL chemo IVPB  -     Cancel: leucovorin calcium (WELLCOVORIN) 700 mg in dextrose 5 % 250 mL IVPB  -     Cancel: fluorouracil (ADRUCIL) 4,100 mg in sodium chloride 0.9 % 250 mL chemo infusion  -     Cancel: sodium chloride flush 0.9 % injection 5-40 mL    Care plan discussed with patient    Chemotherapy management, encounter for    Adverse effect of chemotherapy, subsequent encounter    Adenocarcinoma of colon ([facility] [address]  -     CBC With Auto Differential; Future  -     Comprehensive metabolic panel; Future  -     Urinalysis;  Future  -     Cancel: 0.9 % sodium chloride infusion  -     Cancel: dexamethasone (DECADRON) 10 mg in sodium chloride 0.9 % 50 mL IVPB  -     Cancel: palonosetron (ALOXI) injection 0.25 mg  -     Cancel: bevacizumab-bvzr (ZIRABEV) 300 mg in sodium chloride 0.9 % 100 mL chemo IVPB  -     Cancel: oxaliplatin (ELOXATIN) 145 mg in dextrose 5 % 250 mL chemo IVPB  -     Cancel: leucovorin calcium (WELLCOVORIN) 700 mg in dextrose 5 % 250 mL IVPB  -     Cancel: fluorouracil (ADRUCIL) 4,100 mg in sodium chloride 0.9 % 250 mL chemo infusion  -     Cancel: sodium chloride flush 0.9 % injection 5-40 mL    Encounter for antineoplastic immunotherapy    Cancer associated pain    Other orders  -     acetaminophen (TYLENOL) tablet 650 mg  -     diphenhydrAMINE (BENADRYL) injection 50 mg  -     dextrose 5 % solution  -     sodium chloride (PF) 0.9 % injection 5-40 mL  -     0.9 % sodium chloride infusion  -     heparin flush 100 UNIT/ML injection 500 Units  -     alteplase (CATHFLO) 2 mg in sterile water 2 mL injection  -     0.9 % sodium chloride infusion  -     diphenhydrAMINE (BENADRYL) injection 50 mg  -     famotidine (PEPCID) injection 20 mg  -     hydrocortisone sodium succinate PF (SOLU-CORTEF) injection 100 mg  -     acetaminophen (TYLENOL) tablet 650 mg  -     meperidine (DEMEROL) injection 12.5 mg  -     ondansetron (ZOFRAN) injection 8 mg  -     EPINEPHrine PF 1 MG/ML injection (Anaphylaxis) 0.3 mg  -     albuterol sulfate HFA (PROVENTIL;VENTOLIN;PROAIR) 108 (90 Base) MCG/ACT inhaler 4 puff  -     sodium chloride flush 0.9 % injection 5-40 mL  -     sodium chloride (PF) 0.9 % injection 5-40 mL  -     0.9 % sodium chloride infusion  -     heparin flush 100 UNIT/ML injection 500 Units  -     alteplase (CATHFLO) 2 mg in sterile water 2 mL injection            #Colonic adenocarcinoma-  [id number] (liver) K-maria luisa mutated, IHC MMR not proficient  Status post microwave ablation left hepatic lesion  Status post neoadjuvant FOLFOX/bevacizumab x11 biweekly cycles  Status post right hemicolectomy. Pathology consistent complete pathologic response. Recommended surveillance as per NCCN guidelines  Discussed at length results of pathology with the patient. 3/17/21- 1 year colonoscopy showed no large polyps or masses. Repeat in 3 years. June 2021-CT chest clear. MRI abdomen showed suspicious lesion in the right lower quadrant. Biopsy arranged [place]. Discussed with hepatobiliary service at [address] [place] [address]. 6/25/20211194-[id number] biopsy consistent with recurrent primary colorectal adenocarcinoma. 7/2/2021-discussed with hepatobiliary service/surgical oncology at [address] [place] [address]. They will review images and call the patient back regarding consultation for consideration of HIPEC  7/2/2021-they recommend systemic therapy. 7/2/2021-recommended FOLFIRI/Avastin  7/13/21- Initiated FOLFIRI + Avastin every 2 weeks  07/30/21-Seen at [facility] by GI surgeon oncology. They discussed the role of CRS/HIPEC in his situation. He was told hat it really depends on the status of his liver lesions as well. He will complete 6 cycles of chemotherapy and will return to see me with a CTAP as well as MRI of the liver to assess disease burden and determine if he can be a candidate for debulking.    8/25/2021-proceed with FOLFIRI/Avastin   9/24/2021-repeat MRI abdomen/CT abdomen showed persistent disease. 1/13/2022-Exploratory laparotomy/resection of psoas mass/HIPEC at [address] [place] [address]. Path Isaac:     OMENTUM, OMENTECTOMY:   - METASTATIC COLORECTAL ADENOCARCINOMA INVOLVING FIBROADIPOSE TISSUE.   - TWO (2) LYMPH NODES, NEGATIVE FOR CARCINOMA. SMALL BOWEL, MESENTERIC NODULE, EXCISION:   - METASTATIC COLORECTAL ADENOCARCINOMA INVOLVING FIBROMUSCULAR TISSUE AND EXTENDING TO TISSUE EDGE. SMALL BOWEL AND RIGHT COLON, ILEOCOLIC RESECTION:     - RESIDUAL RECURRENT INVASIVE MODERATELY DIFFERENTIATED COLORECTAL ADENOCARCINOMA (2.1 CM) INVOLVING ANASTOMOTIC SITE; SEE COMMENT AND SYNOPTIC REPORT.   - TUMOR FOCALLY INVOLVES THE SEROSA SURFACE.    - METASTATIC CARCINOMA INVOLVES ONE (1) OF TWELVE (12) LYMPH NODES.   - ONE (1) TUMOR DEPOSIT PRESENT.   - ALL RESECTION MARGINS ARE NEGATIVE FOR CARCINOMA. RETROPERITONEAL NODULE, EXCISION:   - METASTATIC COLORECTAL ADENOCARCINOMA INVOLVING FREDDY-PANCREATIC TISSUE AND EXTENDING TO TISSUE EDGE. LATERAL DUODENECTOMY, RESECTION:   - METASTATIC COLORECTAL ADENOCARCINOMA INVOLVING DUODENAL SUBMUCOSA, MUSCULARIS PROPRIA, AND SUBSEROSA. - TUMOR IS PRESENT WITHIN 1 MM OF INKED RESECTION MARGINS. RIGHT ABDOMINAL WALL NODULE, EXCISION:   - METASTATIC COLORECTAL ADENOCARCINOMA INVOLVING FIBROMUSCULAR TISSUE. RIGHT RETROPERITONEAL MASS, RESECTION:   - METASTATIC COLORECTAL ADENOCARCINOMA INVOLVING FIBROADIPOSE TISSUE (7.3 CM). - MEDIAL, SUPERIOR AND DEEP RESECTION MARGINS ARE POSITIVE FOR CARCINOMA. - INKED INFERIOR AND LATERAL MARGINS ARE NEGATIVE FOR CARCINOMA (<1 MM) RIGHT RETROPERITONEAL MASS, FINAL SUPERIOR MARGIN, EXCISION:   - METASTATIC COLORECTAL ADENOCARCINOMA INVOLVING FIBROADIPOSE TISSUE, FOCALLY PRESENT AT CAUTERIZED, INKED TISSUE EDGE.   2/7/2022-postoperative dehydration and electrolyte abnormalities. Home health arrangements IV fluids twice a week. 3/23/2022- reversal of ileostomy at [facility]. 3/20/2022 CT Abd/Pelvis WO Contrast ([facility]) Findings suspicious for aspiration at the lung bases. Postsurgical changes related to right hepatectomy. There is a new low-attenuation lesion in the left hemiliver, highly concerning for metastasis. Postsurgical changes related to right hemicolectomy with ileocolonic anastomosis and right lower quadrant diverting loop ileostomy. No evidence of bowel obstruction. No intra-abdominal fluid collection. Other findings as above. A critical alert was sent at the time of dictation. Liver: Postsurgical changes related to right hepatectomy.  There is a new, approximately 2.1 cm low-attenuation lesion seen in the left hepatic lobe on image 25 of series 3, highly concerning for metastasis. 4/23/2022- discussed the patient reinitiation of chemotherapy. We will continue FOLFIRI. He had no prior progression on FOLFIRI of FOLFOX. 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  We would avoid Avastin at this time due to recent surgery. He is K-maria luisa mutated and therefore cannot use anti-EGFR agent. May contemplate adding Avastin in 4 weeks. 5/9/2022 CT Chest W Contrast New pulmonary nodules are present in the right and left lung as described above. It cannot be determined if these are infectious nodules or metastatic disease. Dilated loops of bowel are present in the upper abdomen. The abdomen is incompletely evaluated however this may be wither an ileus or partial obstruction. Hepatic metastasis. 11/4/2022 CT Chest W Contrast Bilateral lower lobe scarring or atelectasis similar in appearance. There has been slight interval decrease in the amount and the size of the low attenuation hepatic lesions. 11/4/2022 CT Abd/Pelvis W IV Contrast (Oral)There has been interval decrease in the mural thickening of the colonic loop at the level of the surgical suture material with slight residual mural thickening recognized. Endoscopy can be performed for further evaluation if clinically warranted. Postsurgical changes at the right hepatic lobe posteriorly similar. There has been slight interval decrease in the amount of hepatic metastatic disease with fewer lesions recognized in the right hepatic lobe. Cholecystectomy. Plan:  -Proceed cycle #7 FOLFOX/Avastin third line therapy  -Discussed with [place] hepatobiliary service. No clinical trials available. Not a candidate for liver directed therapy at this time.       #Chronic kidney disease stage III- creatinine 1.6  Encouraged to increase PO fluid intake  Labs Renal Latest Ref Rng & Units 12/14/2022 11/30/2022 11/9/2022 10/26/2022 10/12/2022   BUN 9 - 20 mg/dL 21(H) 18 16 27(H) 14   Cr 0.6 - 1.2 mg/dL 1.4(H) 1.4(H) 1.5(H) 1.6(H) 1.5(H)   K 3.5 - 5.1 mmol/L 4.5 4.6 4.2 4.4 4.7   Na 137 - 145 mmol/L 141 140 138 139 142            #Liver metastasis- plan as above. Status post ablation left liver lobe lesion at [facility] on 6/8/2020. Status post neoadjuvant FOLFOX/bevacizumab x11 biweekly cyclesStatus post right hemicolectomy. Pathology consistent complete pathologic response. 3/4/2021-MRI abdomen was unremarkable  6/8/2021-MRI abdomen showed Postsurgical changes of right hepatectomy. Redemonstration of an ablation zone in segment II, measuring up to 1.7 cm, previously 1.8 cm. No evidence of postcontrast enhancement. No new lesion identified. 9/24/2021-MRI abdomen [place] showed Liver: Status post right hepatectomy. Ablation zone in segment II measures 1.7 x 1.1 cm, slightly decreased in size from 1.8 x 1.4 cm previously (series 1301 image 56) without apreciable enhancement. Similar tiny subcentimeter cyst in the left hepatic lobe. No new focal hepatic lesion identified. 3/20/2022 CT Abd/Pelvis WO Contrast ([facility]) Findings suspicious for aspiration at the lung bases. Postsurgical changes related to right hepatectomy. There is a new low-attenuation lesion in the left hemiliver, highly concerning for metastasis. Postsurgical changes related to right hemicolectomy with ileocolonic anastomosis and right lower quadrant diverting loop ileostomy. No evidence of bowel obstruction. No intra-abdominal fluid collection. Other findings as above. A critical alert was sent at the time of dictation. Liver: Postsurgical changes related to right hepatectomy. There is a new, approximately 2.1 cm low-attenuation lesion seen in the left hepatic lobe on image 25 of series 3, highly concerning for metastasis. 4/23/2022- discussed the patient reinitiation of chemotherapy. We will continue FOLFIRI. He had no prior progression on FOLFIRI of FOLFOX.   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  We would avoid Avastin at this time due to recent surgery. He is K-maria luisa mutated and therefore cannot use anti-EGFR agent. May contemplate adding Avastin in 4 weeks. 5/9/2022 CT Chest W Contrast New pulmonary nodules are present in the right and left lung as described above. It cannot be determined if these are infectious nodules or metastatic disease. Dilated loops of bowel are present in the upper abdomen. The abdomen is incompletely evaluated however this may be wither an ileus or partial obstruction. Hepatic metastasis. 7/21/2022 CT Chest W Contrast Bibasilar linear atelectasis life scarring. No pulmonary nodules, masses or infiltrates. Multiple enhancing low attenuation masses in the liver similar to the previous study. Findings consistent with metastatic liver disease. 7/21/2022 CT Abd/Pelvis W IV Contrast (Oral) Multiple enhancing low attenuation liver lesions increase in size and number since the previous study 3/7/22. Findings consistent with progression of metastatic liver disease. Status postcholecystectomy. The appendix is not visualized. No acute findings. 8/3/22 Discontinue FOLFIRI + Avastin every 2 weeks due to progression in the liver  8/3/2022-unfortunately, interval progression of liver metastasis when compared to last CT scans performed on 3/7/2022. In addition, when compared to report from [address] [place] [address] abdomen/pelvis on 3/20/2022 when he had only 1 single metastatic lesion measuring 2.1 cm. This is considered progression. I have recommended to discontinue FOLFIRI/Avastin and consider initiation of palliative FOLFOX with Avastin. Discussed with hepatobiliary surgery. The patient is not a candidate for further surgery. He is not a candidate for any liver directed therapy. 11/4/2022 CT Chest W Contrast Bilateral lower lobe scarring or atelectasis similar in appearance. There has been slight interval decrease in the amount and the size of the low attenuation hepatic lesions.   11/4/2022 CT Abd/Pelvis W IV Contrast (Oral)There has been interval decrease in the mural thickening of the colonic loop at the level of the surgical suture material with slight residual mural thickening recognized. Endoscopy can be performed for further evaluation if clinically warranted. Postsurgical changes at the right hepatic lobe posteriorly similar. There has been slight interval decrease in the amount of hepatic metastatic disease with fewer lesions recognized in the right hepatic lobe. Cholecystectomy. Plan:  -Proceed cycle #9 FOLFOX/Avastin third line therapy  -Discussed with [place] hepatobiliary service. No clinical trials available. Not a candidate for liver directed therapy at this time. Lab Results   Component Value Date    CEA 40.7 (H) 10/12/2022          #Multifactorial anemia  hemoglobin 8.5/MCV 89. Ferritin 12.9, iron saturation 15%, TIBC 458, iron 72. Status post IV iron therapy. Hemoglobin 11.3  -Repeat iron profile on 5/24/2022: Ferritin 116, iron saturation 11, iron 29, TIBC 256  Recent Labs     12/14/22  1052 11/30/22  1111   WBC 3.58* 3.18*   HGB 11.2* 10.7*   HCT 35.6* 33.4*   MCV 91.0 90.0   * 157*   -Likely related to chemotherapy       #Chemotherapy-induced neuropathy-stable, antineoplastic induced anemia    #Cancer related pain-  -Oxycodone IR 10 mg every 6 hours as needed  -Continue Fentanyl 12 mcg every 72 hrs    DVT port associated left upper extremity-February 2022  internal jugular vein subclavian axillary brachial, left upper extremity. Acute appearing superficial thrombophlebitis (SVT) is seen in the basilic  and cephalic veins, right upper extremity.  -Eliquis 5mg twice a day    Erectile dysfunction-  -Sildenafil 50mg daily as needed    Hypomagnesemia-magnesium 1.7.      PLAN:  RTC with MD in treatment room 6 weeks  C# 9 FOLFOX + Avastin today and every 2 weeks  CBC CMP CEA today  Continue follow-up with [facility]/Dr. Cande Martin, Oct 2022   Continue Fentanyl 12 mcg every 72 hrs  Continue OxyIR 10mg every 6 hours as needed  Continue Sildenafil 50mg daily as needed  Continue Phenergan 12.5mg every 6 hrs as needed  Continue Eliquis 5mg twice a day  Continue OTC Imodium as needed  Repeat CT scans Feb 2023      Follow Up:     Return in about 6 weeks (around 1/25/2023) for Treatment & see  in [address] [place] [address] 153 RM FOLFOX + Avastin. FOLFOX + Avastin every 2 weeks     I, Deuce Cardoza am pre charting  as Medical Assistant for Johny Ceja MD. Electronically signed by Deuce Cardoza MA on 12/14/2022 at 4:10 PM CST. Phil Amor am scribing for Johny Ceja MD. Electronically signed by Salina Hargrove RN on 12/14/2022 at 12:12 PM CST. I, Dr Elmer Kilgore, personally performed the services described in this documentation as scribed by Salina Hargrove RN in my presence and is both accurate and complete. I have seen, examined and reviewed this patient medication list, appropriate labs and imaging studies. I reviewed relevant medical records and others physicians notes. I discussed the plans of care with the patient. I answered all the questions to the patients satisfaction. I have also reviewed the chief complaint (CC) and part of the history (History of Present Illness (HPI), Past Family Social History [facility]), or Review of Systems (ROS) and made changes when appropriated.        (Please note that portions of this note were completed with a voice recognition program. Efforts were made to edit the dictations but occasionally words are mis-transcribed.)  Electronically signed by Johny Ceja MD on 12/14/2022 at 12:17 PM

## 2022-12-14 ENCOUNTER — HOSPITAL ENCOUNTER (OUTPATIENT)
Dept: INFUSION THERAPY | Age: 67
Discharge: HOME | End: 2022-12-14
Payer: OTHER GOVERNMENT

## 2022-12-14 ENCOUNTER — OFFICE VISIT (OUTPATIENT)
Dept: HEMATOLOGY | Age: 67
End: 2022-12-14
Payer: MEDICARE

## 2022-12-14 VITALS
SYSTOLIC BLOOD PRESSURE: 154 MMHG | OXYGEN SATURATION: 98 % | HEART RATE: 75 BPM | HEIGHT: 67 IN | RESPIRATION RATE: 18 BRPM | DIASTOLIC BLOOD PRESSURE: 87 MMHG | WEIGHT: 137.6 LBS | BODY MASS INDEX: 21.6 KG/M2 | TEMPERATURE: 97.7 F

## 2022-12-14 DIAGNOSIS — C78.7 LIVER METASTASES (HCC): ICD-10-CM

## 2022-12-14 DIAGNOSIS — T45.1X5D ADVERSE EFFECT OF CHEMOTHERAPY, SUBSEQUENT ENCOUNTER: ICD-10-CM

## 2022-12-14 DIAGNOSIS — Z51.11 CHEMOTHERAPY MANAGEMENT, ENCOUNTER FOR: ICD-10-CM

## 2022-12-14 DIAGNOSIS — C18.2 MALIGNANT NEOPLASM OF ASCENDING COLON (HCC): Primary | ICD-10-CM

## 2022-12-14 DIAGNOSIS — C18.9 ADENOCARCINOMA OF COLON (HCC): ICD-10-CM

## 2022-12-14 DIAGNOSIS — Z51.12 ENCOUNTER FOR ANTINEOPLASTIC IMMUNOTHERAPY: ICD-10-CM

## 2022-12-14 DIAGNOSIS — Z71.89 CARE PLAN DISCUSSED WITH PATIENT: ICD-10-CM

## 2022-12-14 DIAGNOSIS — G89.3 CANCER ASSOCIATED PAIN: ICD-10-CM

## 2022-12-14 DIAGNOSIS — C18.2 MALIGNANT NEOPLASM OF ASCENDING COLON (HCC): ICD-10-CM

## 2022-12-14 LAB
ALBUMIN SERPL-MCNC: 3.7 G/DL (ref 3.5–5.2)
ALP BLD-CCNC: 272 U/L (ref 40–130)
ALT SERPL-CCNC: 28 U/L (ref 21–72)
ANION GAP SERPL CALCULATED.3IONS-SCNC: 6 MMOL/L (ref 7–19)
AST SERPL-CCNC: 53 U/L (ref 17–59)
BILIRUB SERPL-MCNC: 0.8 MG/DL (ref 0.2–1.3)
BUN BLDV-MCNC: 21 MG/DL (ref 9–20)
CALCIUM SERPL-MCNC: 9.3 MG/DL (ref 8.4–10.2)
CEA: 75.2 NG/ML (ref 0–4.7)
CHLORIDE BLD-SCNC: 114 MMOL/L (ref 98–111)
CO2: 21 MMOL/L (ref 22–29)
CREAT SERPL-MCNC: 1.4 MG/DL (ref 0.6–1.2)
GFR SERPL CREATININE-BSD FRML MDRD: 56 ML/MIN/{1.73_M2}
GLOBULIN: 3.6 G/DL
GLUCOSE BLD-MCNC: 110 MG/DL (ref 74–106)
HCT VFR BLD CALC: 35.6 % (ref 40.1–51)
HEMOGLOBIN: 11.2 G/DL (ref 13.7–17.5)
LYMPHOCYTES ABSOLUTE: 0.54 K/UL (ref 1.18–3.74)
LYMPHOCYTES RELATIVE PERCENT: 15.1 % (ref 19.3–53.1)
MCH RBC QN AUTO: 28.6 PG (ref 25.7–32.2)
MCHC RBC AUTO-ENTMCNC: 31.5 G/DL (ref 32.3–36.5)
MCV RBC AUTO: 91 FL (ref 79–92.2)
MONOCYTES ABSOLUTE: 0.87 K/UL (ref 0.24–0.82)
MONOCYTES RELATIVE PERCENT: 24.3 % (ref 4.7–12.5)
NEUTROPHILS ABSOLUTE: 2.02 K/UL (ref 1.56–6.13)
NEUTROPHILS RELATIVE PERCENT: 56.4 % (ref 34–71.1)
PDW BLD-RTO: 18.5 % (ref 11.6–14.4)
PLATELET # BLD: 147 K/UL (ref 163–337)
PMV BLD AUTO: 10.7 FL (ref 7.4–10.4)
POTASSIUM SERPL-SCNC: 4.5 MMOL/L (ref 3.5–5.1)
PROTEIN UA: NEGATIVE
RBC # BLD: 3.91 M/UL (ref 4.63–6.08)
SODIUM BLD-SCNC: 141 MMOL/L (ref 137–145)
TOTAL PROTEIN: 7.4 G/DL (ref 6.3–8.2)
WBC # BLD: 3.58 K/UL (ref 4.23–9.07)

## 2022-12-14 PROCEDURE — 1036F TOBACCO NON-USER: CPT | Performed by: INTERNAL MEDICINE

## 2022-12-14 PROCEDURE — 80053 COMPREHEN METABOLIC PANEL: CPT

## 2022-12-14 PROCEDURE — 96366 THER/PROPH/DIAG IV INF ADDON: CPT

## 2022-12-14 PROCEDURE — G8420 CALC BMI NORM PARAMETERS: HCPCS | Performed by: INTERNAL MEDICINE

## 2022-12-14 PROCEDURE — G0498 CHEMO EXTEND IV INFUS W/PUMP: HCPCS

## 2022-12-14 PROCEDURE — 96413 CHEMO IV INFUSION 1 HR: CPT

## 2022-12-14 PROCEDURE — 1123F ACP DISCUSS/DSCN MKR DOCD: CPT | Performed by: INTERNAL MEDICINE

## 2022-12-14 PROCEDURE — 3017F COLORECTAL CA SCREEN DOC REV: CPT | Performed by: INTERNAL MEDICINE

## 2022-12-14 PROCEDURE — 96367 TX/PROPH/DG ADDL SEQ IV INF: CPT

## 2022-12-14 PROCEDURE — 36415 COLL VENOUS BLD VENIPUNCTURE: CPT

## 2022-12-14 PROCEDURE — 6360000002 HC RX W HCPCS: Performed by: INTERNAL MEDICINE

## 2022-12-14 PROCEDURE — 99214 OFFICE O/P EST MOD 30 MIN: CPT | Performed by: INTERNAL MEDICINE

## 2022-12-14 PROCEDURE — 2580000003 HC RX 258: Performed by: INTERNAL MEDICINE

## 2022-12-14 PROCEDURE — G8427 DOCREV CUR MEDS BY ELIG CLIN: HCPCS | Performed by: INTERNAL MEDICINE

## 2022-12-14 PROCEDURE — 96417 CHEMO IV INFUS EACH ADDL SEQ: CPT

## 2022-12-14 PROCEDURE — G8484 FLU IMMUNIZE NO ADMIN: HCPCS | Performed by: INTERNAL MEDICINE

## 2022-12-14 PROCEDURE — 85025 COMPLETE CBC W/AUTO DIFF WBC: CPT

## 2022-12-14 PROCEDURE — 96375 TX/PRO/DX INJ NEW DRUG ADDON: CPT

## 2022-12-14 PROCEDURE — 96415 CHEMO IV INFUSION ADDL HR: CPT

## 2022-12-14 RX ORDER — PALONOSETRON 0.05 MG/ML
0.25 INJECTION, SOLUTION INTRAVENOUS ONCE
Status: COMPLETED | OUTPATIENT
Start: 2022-12-14 | End: 2022-12-14

## 2022-12-14 RX ORDER — DEXTROSE MONOHYDRATE 50 MG/ML
5-250 INJECTION, SOLUTION INTRAVENOUS PRN
OUTPATIENT
Start: 2022-12-14

## 2022-12-14 RX ORDER — FUROSEMIDE 10 MG/ML
20 INJECTION INTRAMUSCULAR; INTRAVENOUS ONCE
Status: DISCONTINUED | OUTPATIENT
Start: 2022-12-14 | End: 2022-12-14

## 2022-12-14 RX ORDER — SODIUM CHLORIDE 0.9 % (FLUSH) 0.9 %
5-40 SYRINGE (ML) INJECTION PRN
Status: DISCONTINUED | OUTPATIENT
Start: 2022-12-14 | End: 2022-12-15 | Stop reason: HOSPADM

## 2022-12-14 RX ORDER — DIPHENHYDRAMINE HYDROCHLORIDE 50 MG/ML
50 INJECTION INTRAMUSCULAR; INTRAVENOUS
OUTPATIENT
Start: 2022-12-14

## 2022-12-14 RX ORDER — SODIUM CHLORIDE 9 MG/ML
5-40 INJECTION INTRAVENOUS PRN
OUTPATIENT
Start: 2022-12-16

## 2022-12-14 RX ORDER — SODIUM CHLORIDE 9 MG/ML
5-250 INJECTION, SOLUTION INTRAVENOUS PRN
OUTPATIENT
Start: 2022-12-14

## 2022-12-14 RX ORDER — SODIUM CHLORIDE 9 MG/ML
5-250 INJECTION, SOLUTION INTRAVENOUS PRN
Status: DISCONTINUED | OUTPATIENT
Start: 2022-12-14 | End: 2022-12-15 | Stop reason: HOSPADM

## 2022-12-14 RX ORDER — SODIUM CHLORIDE 0.9 % (FLUSH) 0.9 %
5-40 SYRINGE (ML) INJECTION PRN
Status: CANCELLED | OUTPATIENT
Start: 2022-12-14

## 2022-12-14 RX ORDER — EPINEPHRINE 1 MG/ML
0.3 INJECTION, SOLUTION, CONCENTRATE INTRAVENOUS PRN
OUTPATIENT
Start: 2022-12-14

## 2022-12-14 RX ORDER — SODIUM CHLORIDE 9 MG/ML
5-40 INJECTION INTRAVENOUS PRN
OUTPATIENT
Start: 2022-12-14

## 2022-12-14 RX ORDER — MEPERIDINE HYDROCHLORIDE 50 MG/ML
12.5 INJECTION INTRAMUSCULAR; INTRAVENOUS; SUBCUTANEOUS PRN
OUTPATIENT
Start: 2022-12-14

## 2022-12-14 RX ORDER — HEPARIN SODIUM (PORCINE) LOCK FLUSH IV SOLN 100 UNIT/ML 100 UNIT/ML
500 SOLUTION INTRAVENOUS PRN
OUTPATIENT
Start: 2022-12-14

## 2022-12-14 RX ORDER — PALONOSETRON 0.05 MG/ML
0.25 INJECTION, SOLUTION INTRAVENOUS ONCE
Status: CANCELLED | OUTPATIENT
Start: 2022-12-14 | End: 2022-12-14

## 2022-12-14 RX ORDER — ACETAMINOPHEN 325 MG/1
650 TABLET ORAL
OUTPATIENT
Start: 2022-12-14

## 2022-12-14 RX ORDER — ALBUTEROL SULFATE 90 UG/1
4 AEROSOL, METERED RESPIRATORY (INHALATION) PRN
OUTPATIENT
Start: 2022-12-14

## 2022-12-14 RX ORDER — FAMOTIDINE 10 MG/ML
20 INJECTION, SOLUTION INTRAVENOUS
OUTPATIENT
Start: 2022-12-14

## 2022-12-14 RX ORDER — HEPARIN SODIUM (PORCINE) LOCK FLUSH IV SOLN 100 UNIT/ML 100 UNIT/ML
500 SOLUTION INTRAVENOUS PRN
Status: CANCELLED | OUTPATIENT
Start: 2022-12-16

## 2022-12-14 RX ORDER — SODIUM CHLORIDE 9 MG/ML
5-250 INJECTION, SOLUTION INTRAVENOUS PRN
Status: CANCELLED | OUTPATIENT
Start: 2022-12-14

## 2022-12-14 RX ORDER — ONDANSETRON 2 MG/ML
8 INJECTION INTRAMUSCULAR; INTRAVENOUS
OUTPATIENT
Start: 2022-12-14

## 2022-12-14 RX ORDER — SODIUM CHLORIDE 9 MG/ML
INJECTION, SOLUTION INTRAVENOUS CONTINUOUS
OUTPATIENT
Start: 2022-12-14

## 2022-12-14 RX ORDER — SODIUM CHLORIDE 0.9 % (FLUSH) 0.9 %
5-40 SYRINGE (ML) INJECTION PRN
Status: CANCELLED | OUTPATIENT
Start: 2022-12-16

## 2022-12-14 RX ORDER — SODIUM CHLORIDE 9 MG/ML
5-250 INJECTION, SOLUTION INTRAVENOUS PRN
OUTPATIENT
Start: 2022-12-16

## 2022-12-14 RX ADMIN — DEXAMETHASONE SODIUM PHOSPHATE 10 MG: 10 INJECTION, SOLUTION INTRAMUSCULAR; INTRAVENOUS at 12:00

## 2022-12-14 RX ADMIN — OXALIPLATIN 145 MG: 5 INJECTION, SOLUTION INTRAVENOUS at 12:53

## 2022-12-14 RX ADMIN — SODIUM CHLORIDE 50 ML/HR: 9 INJECTION, SOLUTION INTRAVENOUS at 12:00

## 2022-12-14 RX ADMIN — LEUCOVORIN CALCIUM 700 MG: 350 INJECTION, POWDER, LYOPHILIZED, FOR SUSPENSION INTRAMUSCULAR; INTRAVENOUS at 12:54

## 2022-12-14 RX ADMIN — SODIUM CHLORIDE 300 MG: 9 INJECTION, SOLUTION INTRAVENOUS at 12:19

## 2022-12-14 RX ADMIN — PALONOSETRON 0.25 MG: 0.05 INJECTION, SOLUTION INTRAVENOUS at 11:59

## 2022-12-14 RX ADMIN — FLUOROURACIL 4100 MG: 50 INJECTION, SOLUTION INTRAVENOUS at 15:09

## 2022-12-16 ENCOUNTER — HOSPITAL ENCOUNTER (OUTPATIENT)
Dept: INFUSION THERAPY | Age: 67
Discharge: HOME | End: 2022-12-16
Payer: OTHER GOVERNMENT

## 2022-12-16 DIAGNOSIS — C18.2 MALIGNANT NEOPLASM OF ASCENDING COLON (HCC): ICD-10-CM

## 2022-12-16 DIAGNOSIS — C78.7 LIVER METASTASES (HCC): Primary | ICD-10-CM

## 2022-12-16 DIAGNOSIS — C18.9 ADENOCARCINOMA OF COLON (HCC): ICD-10-CM

## 2022-12-16 PROCEDURE — 96523 IRRIG DRUG DELIVERY DEVICE: CPT

## 2022-12-16 PROCEDURE — 2580000003 HC RX 258: Performed by: INTERNAL MEDICINE

## 2022-12-16 PROCEDURE — 6360000002 HC RX W HCPCS: Performed by: INTERNAL MEDICINE

## 2022-12-16 RX ORDER — SODIUM CHLORIDE 0.9 % (FLUSH) 0.9 %
5-40 SYRINGE (ML) INJECTION PRN
Status: DISCONTINUED | OUTPATIENT
Start: 2022-12-16 | End: 2022-12-17 | Stop reason: HOSPADM

## 2022-12-16 RX ORDER — HEPARIN SODIUM (PORCINE) LOCK FLUSH IV SOLN 100 UNIT/ML 100 UNIT/ML
500 SOLUTION INTRAVENOUS PRN
Status: DISCONTINUED | OUTPATIENT
Start: 2022-12-16 | End: 2022-12-17 | Stop reason: HOSPADM

## 2022-12-16 RX ADMIN — Medication 10 ML: at 13:03

## 2022-12-16 RX ADMIN — Medication 500 UNITS: at 13:03

## 2022-12-20 DIAGNOSIS — C18.9 ADENOCARCINOMA OF COLON (HCC): ICD-10-CM

## 2022-12-20 DIAGNOSIS — M62.89 MASS OF PSOAS MUSCLE: ICD-10-CM

## 2022-12-20 DIAGNOSIS — G89.3 CANCER ASSOCIATED PAIN: ICD-10-CM

## 2022-12-20 DIAGNOSIS — C78.7 LIVER METASTASES (HCC): ICD-10-CM

## 2022-12-21 RX ORDER — FENTANYL 25 UG/H
1 PATCH TRANSDERMAL
Qty: 10 PATCH | Refills: 0 | Status: SHIPPED | OUTPATIENT
Start: 2022-12-21 | End: 2023-01-20

## 2022-12-27 DIAGNOSIS — C18.9 ADENOCARCINOMA OF COLON (HCC): ICD-10-CM

## 2022-12-27 DIAGNOSIS — C78.7 LIVER METASTASES (HCC): Primary | ICD-10-CM

## 2022-12-27 DIAGNOSIS — C18.2 MALIGNANT NEOPLASM OF ASCENDING COLON (HCC): ICD-10-CM

## 2022-12-27 RX ORDER — SODIUM CHLORIDE 9 MG/ML
INJECTION, SOLUTION INTRAVENOUS CONTINUOUS
OUTPATIENT
Start: 2022-12-28

## 2022-12-27 RX ORDER — FAMOTIDINE 10 MG/ML
20 INJECTION, SOLUTION INTRAVENOUS
OUTPATIENT
Start: 2022-12-28

## 2022-12-27 RX ORDER — ALBUTEROL SULFATE 90 UG/1
4 AEROSOL, METERED RESPIRATORY (INHALATION) PRN
OUTPATIENT
Start: 2022-12-28

## 2022-12-27 RX ORDER — MEPERIDINE HYDROCHLORIDE 50 MG/ML
12.5 INJECTION INTRAMUSCULAR; INTRAVENOUS; SUBCUTANEOUS PRN
OUTPATIENT
Start: 2022-12-28

## 2022-12-27 RX ORDER — DIPHENHYDRAMINE HYDROCHLORIDE 50 MG/ML
50 INJECTION INTRAMUSCULAR; INTRAVENOUS
OUTPATIENT
Start: 2022-12-28

## 2022-12-27 RX ORDER — ONDANSETRON 2 MG/ML
8 INJECTION INTRAMUSCULAR; INTRAVENOUS
OUTPATIENT
Start: 2022-12-28

## 2022-12-27 RX ORDER — HEPARIN SODIUM (PORCINE) LOCK FLUSH IV SOLN 100 UNIT/ML 100 UNIT/ML
500 SOLUTION INTRAVENOUS PRN
OUTPATIENT
Start: 2022-12-28

## 2022-12-27 RX ORDER — SODIUM CHLORIDE 0.9 % (FLUSH) 0.9 %
5-40 SYRINGE (ML) INJECTION PRN
OUTPATIENT
Start: 2022-12-30

## 2022-12-27 RX ORDER — ACETAMINOPHEN 325 MG/1
650 TABLET ORAL
OUTPATIENT
Start: 2022-12-28

## 2022-12-27 RX ORDER — EPINEPHRINE 1 MG/ML
0.3 INJECTION, SOLUTION, CONCENTRATE INTRAVENOUS PRN
OUTPATIENT
Start: 2022-12-28

## 2022-12-27 RX ORDER — SODIUM CHLORIDE 0.9 % (FLUSH) 0.9 %
5-40 SYRINGE (ML) INJECTION PRN
OUTPATIENT
Start: 2022-12-28

## 2022-12-27 RX ORDER — SODIUM CHLORIDE 9 MG/ML
5-250 INJECTION, SOLUTION INTRAVENOUS PRN
OUTPATIENT
Start: 2022-12-28

## 2022-12-27 RX ORDER — HEPARIN SODIUM (PORCINE) LOCK FLUSH IV SOLN 100 UNIT/ML 100 UNIT/ML
500 SOLUTION INTRAVENOUS PRN
OUTPATIENT
Start: 2022-12-30

## 2022-12-27 RX ORDER — DEXTROSE MONOHYDRATE 50 MG/ML
5-250 INJECTION, SOLUTION INTRAVENOUS PRN
OUTPATIENT
Start: 2022-12-28

## 2022-12-27 RX ORDER — SODIUM CHLORIDE 9 MG/ML
5-40 INJECTION INTRAVENOUS PRN
OUTPATIENT
Start: 2022-12-30

## 2022-12-27 RX ORDER — SODIUM CHLORIDE 9 MG/ML
5-250 INJECTION, SOLUTION INTRAVENOUS PRN
OUTPATIENT
Start: 2022-12-30

## 2022-12-27 RX ORDER — PALONOSETRON 0.05 MG/ML
0.25 INJECTION, SOLUTION INTRAVENOUS ONCE
OUTPATIENT
Start: 2022-12-28 | End: 2022-12-28

## 2022-12-27 RX ORDER — SODIUM CHLORIDE 9 MG/ML
5-40 INJECTION INTRAVENOUS PRN
OUTPATIENT
Start: 2022-12-28

## 2022-12-28 ENCOUNTER — TELEPHONE (OUTPATIENT)
Dept: HEMATOLOGY | Age: 67
End: 2022-12-28

## 2022-12-28 NOTE — TELEPHONE ENCOUNTER
Received phone call from pt's wife. Pt has requested to HOLD treatment today due to not feeling well due to some insomnia. Pt's wife has stated that she has given him a muscle relaxer to help sleep. Pt's wife is also  stating that pt is currently having episodes of diarrhea. He is currently taking OTC Imodium when needed. Currently still having memory loss and experience nausea and vomiting after eating along with some neuropathy in hands. Pt will proceed to next chemotherapy on 1/11/2023. He is currently scheduled to see Dr. Carlos Christopher on 1/25/23.

## 2023-01-01 ENCOUNTER — APPOINTMENT (OUTPATIENT)
Dept: CT IMAGING | Age: 68
End: 2023-01-01
Payer: MEDICARE

## 2023-01-01 ENCOUNTER — APPOINTMENT (OUTPATIENT)
Dept: GENERAL RADIOLOGY | Age: 68
End: 2023-01-01
Payer: MEDICARE

## 2023-01-01 ENCOUNTER — HOSPITAL ENCOUNTER (INPATIENT)
Age: 68
LOS: 5 days | Discharge: HOSPICE | End: 2023-04-28
Attending: INTERNAL MEDICINE | Admitting: INTERNAL MEDICINE
Payer: MEDICARE

## 2023-01-01 ENCOUNTER — OFFICE VISIT (OUTPATIENT)
Dept: PALLATIVE CARE | Age: 68
End: 2023-01-01
Payer: MEDICARE

## 2023-01-01 ENCOUNTER — TELEPHONE (OUTPATIENT)
Dept: GASTROENTEROLOGY | Age: 68
End: 2023-01-01

## 2023-01-01 VITALS
HEIGHT: 67 IN | RESPIRATION RATE: 20 BRPM | DIASTOLIC BLOOD PRESSURE: 105 MMHG | HEART RATE: 127 BPM | OXYGEN SATURATION: 90 % | BODY MASS INDEX: 19.03 KG/M2 | SYSTOLIC BLOOD PRESSURE: 145 MMHG | TEMPERATURE: 98.4 F | WEIGHT: 121.25 LBS

## 2023-01-01 VITALS — SYSTOLIC BLOOD PRESSURE: 180 MMHG | DIASTOLIC BLOOD PRESSURE: 92 MMHG

## 2023-01-01 DIAGNOSIS — C18.9 ADENOCARCINOMA OF COLON (HCC): ICD-10-CM

## 2023-01-01 DIAGNOSIS — C18.2 MALIGNANT NEOPLASM OF ASCENDING COLON (HCC): ICD-10-CM

## 2023-01-01 DIAGNOSIS — R53.0 NEOPLASTIC MALIGNANT RELATED FATIGUE: ICD-10-CM

## 2023-01-01 DIAGNOSIS — R60.0 EDEMA OF LEFT LOWER EXTREMITY: ICD-10-CM

## 2023-01-01 DIAGNOSIS — C78.02 MALIGNANT NEOPLASM METASTATIC TO BOTH LUNGS (HCC): ICD-10-CM

## 2023-01-01 DIAGNOSIS — R68.89 DECREASED STRENGTH, ENDURANCE, AND MOBILITY: ICD-10-CM

## 2023-01-01 DIAGNOSIS — G89.3 CANCER RELATED PAIN: Primary | ICD-10-CM

## 2023-01-01 DIAGNOSIS — Z91.81 AT RISK FOR FALLS: ICD-10-CM

## 2023-01-01 DIAGNOSIS — R53.1 GENERAL WEAKNESS: ICD-10-CM

## 2023-01-01 DIAGNOSIS — R17 TOTAL BILIRUBIN, ELEVATED: ICD-10-CM

## 2023-01-01 DIAGNOSIS — G89.3 CANCER ASSOCIATED PAIN: ICD-10-CM

## 2023-01-01 DIAGNOSIS — C78.7 COLON CANCER METASTASIZED TO LIVER (HCC): ICD-10-CM

## 2023-01-01 DIAGNOSIS — R53.1 DECREASED STRENGTH, ENDURANCE, AND MOBILITY: ICD-10-CM

## 2023-01-01 DIAGNOSIS — I82.412 ACUTE DEEP VEIN THROMBOSIS (DVT) OF FEMORAL VEIN OF LEFT LOWER EXTREMITY (HCC): ICD-10-CM

## 2023-01-01 DIAGNOSIS — C18.9 COLON CANCER METASTASIZED TO LIVER (HCC): ICD-10-CM

## 2023-01-01 DIAGNOSIS — C78.01 MALIGNANT NEOPLASM METASTATIC TO BOTH LUNGS (HCC): ICD-10-CM

## 2023-01-01 DIAGNOSIS — Z74.09 DECREASED STRENGTH, ENDURANCE, AND MOBILITY: ICD-10-CM

## 2023-01-01 DIAGNOSIS — C18.2 MALIGNANT NEOPLASM OF ASCENDING COLON (HCC): Primary | ICD-10-CM

## 2023-01-01 DIAGNOSIS — C18.9 METASTATIC COLON CANCER TO LIVER (HCC): ICD-10-CM

## 2023-01-01 DIAGNOSIS — R11.0 NAUSEA: ICD-10-CM

## 2023-01-01 DIAGNOSIS — C78.7 METASTATIC COLON CANCER TO LIVER (HCC): ICD-10-CM

## 2023-01-01 DIAGNOSIS — C80.1 HIGH TUMOR MUTATIONAL BURDEN (HCC): ICD-10-CM

## 2023-01-01 DIAGNOSIS — R63.0 POOR APPETITE: ICD-10-CM

## 2023-01-01 DIAGNOSIS — Z51.5 ENCOUNTER FOR PALLIATIVE CARE: ICD-10-CM

## 2023-01-01 LAB
ALBUMIN SERPL-MCNC: 2.1 G/DL (ref 3.5–5.2)
ALBUMIN SERPL-MCNC: 2.1 G/DL (ref 3.5–5.2)
ALBUMIN SERPL-MCNC: 2.5 G/DL (ref 3.5–5.2)
ALBUMIN SERPL-MCNC: 2.6 G/DL (ref 3.5–5.2)
ALP SERPL-CCNC: 265 U/L (ref 40–130)
ALP SERPL-CCNC: 295 U/L (ref 40–130)
ALP SERPL-CCNC: 350 U/L (ref 40–130)
ALP SERPL-CCNC: 365 U/L (ref 40–130)
ALT SERPL-CCNC: 17 U/L (ref 5–41)
ALT SERPL-CCNC: 17 U/L (ref 5–41)
ALT SERPL-CCNC: 23 U/L (ref 5–41)
ALT SERPL-CCNC: 24 U/L (ref 5–41)
ANION GAP SERPL CALCULATED.3IONS-SCNC: 11 MMOL/L (ref 7–19)
ANION GAP SERPL CALCULATED.3IONS-SCNC: 11 MMOL/L (ref 7–19)
ANION GAP SERPL CALCULATED.3IONS-SCNC: 7 MMOL/L (ref 7–19)
ANION GAP SERPL CALCULATED.3IONS-SCNC: 9 MMOL/L (ref 7–19)
ANISOCYTOSIS BLD QL SMEAR: ABNORMAL
APTT PPP: 128.1 SEC (ref 26–36.2)
APTT PPP: 53.4 SEC (ref 26–36.2)
APTT PPP: >200 SEC (ref 26–36.2)
AST SERPL-CCNC: 36 U/L (ref 5–40)
AST SERPL-CCNC: 40 U/L (ref 5–40)
AST SERPL-CCNC: 51 U/L (ref 5–40)
AST SERPL-CCNC: 53 U/L (ref 5–40)
BACTERIA URNS QL MICRO: NEGATIVE /HPF
BASOPHILS # BLD: 0 K/UL (ref 0–0.2)
BASOPHILS # BLD: 0.1 K/UL (ref 0–0.2)
BASOPHILS NFR BLD: 0 % (ref 0–1)
BASOPHILS NFR BLD: 0.2 % (ref 0–1)
BASOPHILS NFR BLD: 0.3 % (ref 0–1)
BASOPHILS NFR BLD: 0.7 % (ref 0–1)
BILIRUB SERPL-MCNC: 1.4 MG/DL (ref 0.2–1.2)
BILIRUB SERPL-MCNC: 1.6 MG/DL (ref 0.2–1.2)
BILIRUB SERPL-MCNC: 2.4 MG/DL (ref 0.2–1.2)
BILIRUB SERPL-MCNC: 2.6 MG/DL (ref 0.2–1.2)
BILIRUB UR QL STRIP: ABNORMAL
BUN SERPL-MCNC: 24 MG/DL (ref 8–23)
BUN SERPL-MCNC: 25 MG/DL (ref 8–23)
BUN SERPL-MCNC: 25 MG/DL (ref 8–23)
BUN SERPL-MCNC: 28 MG/DL (ref 8–23)
CALCIUM SERPL-MCNC: 8.1 MG/DL (ref 8.8–10.2)
CALCIUM SERPL-MCNC: 8.2 MG/DL (ref 8.8–10.2)
CALCIUM SERPL-MCNC: 8.6 MG/DL (ref 8.8–10.2)
CALCIUM SERPL-MCNC: 8.7 MG/DL (ref 8.8–10.2)
CEA SERPL-MCNC: 247.9 NG/ML (ref 0–4.7)
CEA: 149.8 NG/ML (ref 0–4.7)
CHLORIDE SERPL-SCNC: 102 MMOL/L (ref 98–111)
CHLORIDE SERPL-SCNC: 102 MMOL/L (ref 98–111)
CHLORIDE SERPL-SCNC: 107 MMOL/L (ref 98–111)
CHLORIDE SERPL-SCNC: 110 MMOL/L (ref 98–111)
CLARITY UR: CLEAR
CO2 SERPL-SCNC: 24 MMOL/L (ref 22–29)
CO2 SERPL-SCNC: 25 MMOL/L (ref 22–29)
CO2 SERPL-SCNC: 26 MMOL/L (ref 22–29)
CO2 SERPL-SCNC: 26 MMOL/L (ref 22–29)
COLOR UR: ABNORMAL
CORTIS SERPL-MCNC: 19.8 UG/DL
CREAT SERPL-MCNC: 0.9 MG/DL (ref 0.5–1.2)
CREAT SERPL-MCNC: 0.9 MG/DL (ref 0.5–1.2)
CREAT SERPL-MCNC: 1.1 MG/DL (ref 0.5–1.2)
CREAT SERPL-MCNC: 1.2 MG/DL (ref 0.5–1.2)
CRYSTALS URNS MICRO: ABNORMAL /HPF
EKG P AXIS: 70 DEGREES
EKG P-R INTERVAL: 176 MS
EKG Q-T INTERVAL: 416 MS
EKG QRS DURATION: 74 MS
EKG QTC CALCULATION (BAZETT): 452 MS
EKG T AXIS: 134 DEGREES
EOSINOPHIL # BLD: 0 K/UL (ref 0–0.6)
EOSINOPHIL # BLD: 0 K/UL (ref 0–0.6)
EOSINOPHIL # BLD: 0.1 K/UL (ref 0–0.6)
EOSINOPHIL # BLD: 0.1 K/UL (ref 0–0.6)
EOSINOPHIL NFR BLD: 0 % (ref 0–5)
EOSINOPHIL NFR BLD: 0 % (ref 0–5)
EOSINOPHIL NFR BLD: 0.9 % (ref 0–5)
EOSINOPHIL NFR BLD: 0.9 % (ref 0–5)
EPI CELLS #/AREA URNS AUTO: 1 /HPF (ref 0–5)
ERYTHROCYTE [DISTWIDTH] IN BLOOD BY AUTOMATED COUNT: 17.2 % (ref 11.5–14.5)
ERYTHROCYTE [DISTWIDTH] IN BLOOD BY AUTOMATED COUNT: 17.2 % (ref 11.5–14.5)
ERYTHROCYTE [DISTWIDTH] IN BLOOD BY AUTOMATED COUNT: 17.4 % (ref 11.5–14.5)
ERYTHROCYTE [DISTWIDTH] IN BLOOD BY AUTOMATED COUNT: 17.4 % (ref 11.5–14.5)
ERYTHROCYTE [DISTWIDTH] IN BLOOD BY AUTOMATED COUNT: 17.5 % (ref 11.5–14.5)
GLUCOSE SERPL-MCNC: 108 MG/DL (ref 74–109)
GLUCOSE SERPL-MCNC: 111 MG/DL (ref 74–109)
GLUCOSE SERPL-MCNC: 129 MG/DL (ref 74–109)
GLUCOSE SERPL-MCNC: 80 MG/DL (ref 74–109)
GLUCOSE UR STRIP.AUTO-MCNC: NEGATIVE MG/DL
HCT VFR BLD AUTO: 29.1 % (ref 42–52)
HCT VFR BLD AUTO: 32 % (ref 42–52)
HCT VFR BLD AUTO: 36.4 % (ref 42–52)
HCT VFR BLD AUTO: 38.3 % (ref 42–52)
HCT VFR BLD AUTO: 39.2 % (ref 42–52)
HGB BLD-MCNC: 11.6 G/DL (ref 14–18)
HGB BLD-MCNC: 12.3 G/DL (ref 14–18)
HGB BLD-MCNC: 12.3 G/DL (ref 14–18)
HGB BLD-MCNC: 9.4 G/DL (ref 14–18)
HGB BLD-MCNC: 9.9 G/DL (ref 14–18)
HGB UR STRIP.AUTO-MCNC: NEGATIVE MG/L
HYALINE CASTS #/AREA URNS AUTO: 3 /HPF (ref 0–8)
HYPOCHROMIA BLD QL SMEAR: ABNORMAL
IMM GRANULOCYTES # BLD: 0 K/UL
IMM GRANULOCYTES # BLD: 0 K/UL
IMM GRANULOCYTES # BLD: 0.1 K/UL
IMM GRANULOCYTES # BLD: 0.1 K/UL
INR PPP: 1.31 (ref 0.88–1.18)
KETONES UR STRIP.AUTO-MCNC: ABNORMAL MG/DL
LEUKOCYTE ESTERASE UR QL STRIP.AUTO: ABNORMAL
LYMPHOCYTES # BLD: 0.1 K/UL (ref 1.1–4.5)
LYMPHOCYTES # BLD: 0.4 K/UL (ref 1.1–4.5)
LYMPHOCYTES # BLD: 0.5 K/UL (ref 1.1–4.5)
LYMPHOCYTES # BLD: 0.7 K/UL (ref 1.1–4.5)
LYMPHOCYTES NFR BLD: 1 % (ref 20–40)
LYMPHOCYTES NFR BLD: 3 % (ref 20–40)
LYMPHOCYTES NFR BLD: 5.8 % (ref 20–40)
LYMPHOCYTES NFR BLD: 6.8 % (ref 20–40)
MACROCYTES BLD QL SMEAR: ABNORMAL
MAGNESIUM SERPL-MCNC: 1.9 MG/DL (ref 1.6–2.4)
MCH RBC QN AUTO: 31.1 PG (ref 27–31)
MCH RBC QN AUTO: 31.4 PG (ref 27–31)
MCH RBC QN AUTO: 31.4 PG (ref 27–31)
MCH RBC QN AUTO: 31.5 PG (ref 27–31)
MCH RBC QN AUTO: 32.2 PG (ref 27–31)
MCHC RBC AUTO-ENTMCNC: 30.9 G/DL (ref 33–37)
MCHC RBC AUTO-ENTMCNC: 31.4 G/DL (ref 33–37)
MCHC RBC AUTO-ENTMCNC: 31.9 G/DL (ref 33–37)
MCHC RBC AUTO-ENTMCNC: 32.1 G/DL (ref 33–37)
MCHC RBC AUTO-ENTMCNC: 32.3 G/DL (ref 33–37)
MCV RBC AUTO: 100.3 FL (ref 80–94)
MCV RBC AUTO: 100.5 FL (ref 80–94)
MCV RBC AUTO: 100.6 FL (ref 80–94)
MCV RBC AUTO: 97.3 FL (ref 80–94)
MCV RBC AUTO: 98.4 FL (ref 80–94)
MONOCYTES # BLD: 0.6 K/UL (ref 0–0.9)
MONOCYTES # BLD: 0.8 K/UL (ref 0–0.9)
MONOCYTES # BLD: 0.8 K/UL (ref 0–0.9)
MONOCYTES # BLD: 0.9 K/UL (ref 0–0.9)
MONOCYTES NFR BLD: 6.7 % (ref 0–10)
MONOCYTES NFR BLD: 8 % (ref 0–10)
MONOCYTES NFR BLD: 8.8 % (ref 0–10)
MONOCYTES NFR BLD: 9.2 % (ref 0–10)
NEUTROPHILS # BLD: 11.1 K/UL (ref 1.5–7.5)
NEUTROPHILS # BLD: 6.3 K/UL (ref 1.5–7.5)
NEUTROPHILS # BLD: 7.4 K/UL (ref 1.5–7.5)
NEUTROPHILS # BLD: 8.1 K/UL (ref 1.5–7.5)
NEUTS BAND NFR BLD MANUAL: 2 % (ref 0–5)
NEUTS SEG NFR BLD: 82.7 % (ref 50–65)
NEUTS SEG NFR BLD: 83 % (ref 50–65)
NEUTS SEG NFR BLD: 89 % (ref 50–65)
NEUTS SEG NFR BLD: 89.7 % (ref 50–65)
NITRITE UR QL STRIP.AUTO: NEGATIVE
PH UR STRIP.AUTO: 5.5 [PH] (ref 5–8)
PLATELET # BLD AUTO: 113 K/UL (ref 130–400)
PLATELET # BLD AUTO: 130 K/UL (ref 130–400)
PLATELET # BLD AUTO: 134 K/UL (ref 130–400)
PLATELET # BLD AUTO: 157 K/UL (ref 130–400)
PLATELET # BLD AUTO: 163 K/UL (ref 130–400)
PLATELET SLIDE REVIEW: ABNORMAL
PMV BLD AUTO: 9.7 FL (ref 9.4–12.4)
PMV BLD AUTO: 9.7 FL (ref 9.4–12.4)
PMV BLD AUTO: 9.8 FL (ref 9.4–12.4)
POTASSIUM SERPL-SCNC: 3.5 MMOL/L (ref 3.5–5)
POTASSIUM SERPL-SCNC: 3.8 MMOL/L (ref 3.5–5)
POTASSIUM SERPL-SCNC: 3.8 MMOL/L (ref 3.5–5)
POTASSIUM SERPL-SCNC: 3.9 MMOL/L (ref 3.5–5)
PROT SERPL-MCNC: 5.7 G/DL (ref 6.6–8.7)
PROT SERPL-MCNC: 6 G/DL (ref 6.6–8.7)
PROT SERPL-MCNC: 6.4 G/DL (ref 6.6–8.7)
PROT SERPL-MCNC: 6.7 G/DL (ref 6.6–8.7)
PROT UR STRIP.AUTO-MCNC: ABNORMAL MG/DL
PROTHROMBIN TIME: 16.3 SEC (ref 12–14.6)
RBC # BLD AUTO: 2.99 M/UL (ref 4.7–6.1)
RBC # BLD AUTO: 3.18 M/UL (ref 4.7–6.1)
RBC # BLD AUTO: 3.7 M/UL (ref 4.7–6.1)
RBC # BLD AUTO: 3.82 M/UL (ref 4.7–6.1)
RBC # BLD AUTO: 3.9 M/UL (ref 4.7–6.1)
RBC #/AREA URNS AUTO: 1 /HPF (ref 0–4)
SARS-COV-2 RDRP RESP QL NAA+PROBE: NOT DETECTED
SARS-COV-2 RDRP RESP QL NAA+PROBE: NOT DETECTED
SODIUM SERPL-SCNC: 137 MMOL/L (ref 136–145)
SODIUM SERPL-SCNC: 138 MMOL/L (ref 136–145)
SODIUM SERPL-SCNC: 142 MMOL/L (ref 136–145)
SODIUM SERPL-SCNC: 143 MMOL/L (ref 136–145)
SP GR UR STRIP.AUTO: 1.02 (ref 1–1.03)
TSH SERPL DL<=0.005 MIU/L-ACNC: 0.84 UIU/ML (ref 0.27–4.2)
UROBILINOGEN UR STRIP.AUTO-MCNC: 4 E.U./DL
WBC # BLD AUTO: 12.4 K/UL (ref 4.8–10.8)
WBC # BLD AUTO: 6.9 K/UL (ref 4.8–10.8)
WBC # BLD AUTO: 8.7 K/UL (ref 4.8–10.8)
WBC # BLD AUTO: 8.9 K/UL (ref 4.8–10.8)
WBC # BLD AUTO: 9.8 K/UL (ref 4.8–10.8)
WBC #/AREA URNS AUTO: 1 /HPF (ref 0–5)

## 2023-01-01 PROCEDURE — 6370000000 HC RX 637 (ALT 250 FOR IP)

## 2023-01-01 PROCEDURE — 71045 X-RAY EXAM CHEST 1 VIEW: CPT

## 2023-01-01 PROCEDURE — 99232 SBSQ HOSP IP/OBS MODERATE 35: CPT | Performed by: INTERNAL MEDICINE

## 2023-01-01 PROCEDURE — 92610 EVALUATE SWALLOWING FUNCTION: CPT

## 2023-01-01 PROCEDURE — 36415 COLL VENOUS BLD VENIPUNCTURE: CPT

## 2023-01-01 PROCEDURE — 80053 COMPREHEN METABOLIC PANEL: CPT

## 2023-01-01 PROCEDURE — 2580000003 HC RX 258: Performed by: NURSE PRACTITIONER

## 2023-01-01 PROCEDURE — 6360000002 HC RX W HCPCS

## 2023-01-01 PROCEDURE — 2140000000 HC CCU INTERMEDIATE R&B

## 2023-01-01 PROCEDURE — 87635 SARS-COV-2 COVID-19 AMP PRB: CPT

## 2023-01-01 PROCEDURE — 85025 COMPLETE CBC W/AUTO DIFF WBC: CPT

## 2023-01-01 PROCEDURE — 2580000003 HC RX 258: Performed by: INTERNAL MEDICINE

## 2023-01-01 PROCEDURE — G8420 CALC BMI NORM PARAMETERS: HCPCS | Performed by: NURSE PRACTITIONER

## 2023-01-01 PROCEDURE — 85730 THROMBOPLASTIN TIME PARTIAL: CPT

## 2023-01-01 PROCEDURE — 2500000003 HC RX 250 WO HCPCS: Performed by: INTERNAL MEDICINE

## 2023-01-01 PROCEDURE — 2580000003 HC RX 258

## 2023-01-01 PROCEDURE — 6360000002 HC RX W HCPCS: Performed by: INTERNAL MEDICINE

## 2023-01-01 PROCEDURE — 73560 X-RAY EXAM OF KNEE 1 OR 2: CPT

## 2023-01-01 PROCEDURE — 99223 1ST HOSP IP/OBS HIGH 75: CPT

## 2023-01-01 PROCEDURE — 94760 N-INVAS EAR/PLS OXIMETRY 1: CPT

## 2023-01-01 PROCEDURE — 93005 ELECTROCARDIOGRAM TRACING: CPT | Performed by: NURSE PRACTITIONER

## 2023-01-01 PROCEDURE — 71275 CT ANGIOGRAPHY CHEST: CPT

## 2023-01-01 PROCEDURE — 93971 EXTREMITY STUDY: CPT

## 2023-01-01 PROCEDURE — 1036F TOBACCO NON-USER: CPT | Performed by: NURSE PRACTITIONER

## 2023-01-01 PROCEDURE — 99233 SBSQ HOSP IP/OBS HIGH 50: CPT | Performed by: INTERNAL MEDICINE

## 2023-01-01 PROCEDURE — 93010 ELECTROCARDIOGRAM REPORT: CPT | Performed by: INTERNAL MEDICINE

## 2023-01-01 PROCEDURE — 92522 EVALUATE SPEECH PRODUCTION: CPT

## 2023-01-01 PROCEDURE — 99232 SBSQ HOSP IP/OBS MODERATE 35: CPT

## 2023-01-01 PROCEDURE — 2500000003 HC RX 250 WO HCPCS: Performed by: NURSE PRACTITIONER

## 2023-01-01 PROCEDURE — 99233 SBSQ HOSP IP/OBS HIGH 50: CPT

## 2023-01-01 PROCEDURE — 1123F ACP DISCUSS/DSCN MKR DOCD: CPT | Performed by: NURSE PRACTITIONER

## 2023-01-01 PROCEDURE — 6360000002 HC RX W HCPCS: Performed by: NURSE PRACTITIONER

## 2023-01-01 PROCEDURE — APPSS15 APP SPLIT SHARED TIME 0-15 MINUTES: Performed by: NURSE PRACTITIONER

## 2023-01-01 PROCEDURE — 3017F COLORECTAL CA SCREEN DOC REV: CPT | Performed by: NURSE PRACTITIONER

## 2023-01-01 PROCEDURE — G0316 PR PROLONG INPT EVAL ADD15 M: HCPCS

## 2023-01-01 PROCEDURE — 96365 THER/PROPH/DIAG IV INF INIT: CPT

## 2023-01-01 PROCEDURE — 6370000000 HC RX 637 (ALT 250 FOR IP): Performed by: NURSE PRACTITIONER

## 2023-01-01 PROCEDURE — 6360000004 HC RX CONTRAST MEDICATION: Performed by: NURSE PRACTITIONER

## 2023-01-01 PROCEDURE — 74177 CT ABD & PELVIS W/CONTRAST: CPT

## 2023-01-01 PROCEDURE — 96366 THER/PROPH/DIAG IV INF ADDON: CPT

## 2023-01-01 PROCEDURE — 85027 COMPLETE CBC AUTOMATED: CPT

## 2023-01-01 PROCEDURE — 85610 PROTHROMBIN TIME: CPT

## 2023-01-01 PROCEDURE — 82378 CARCINOEMBRYONIC ANTIGEN: CPT

## 2023-01-01 PROCEDURE — 83735 ASSAY OF MAGNESIUM: CPT

## 2023-01-01 PROCEDURE — 99350 HOME/RES VST EST HIGH MDM 60: CPT | Performed by: NURSE PRACTITIONER

## 2023-01-01 PROCEDURE — 81001 URINALYSIS AUTO W/SCOPE: CPT

## 2023-01-01 PROCEDURE — 96375 TX/PRO/DX INJ NEW DRUG ADDON: CPT

## 2023-01-01 PROCEDURE — 99223 1ST HOSP IP/OBS HIGH 75: CPT | Performed by: INTERNAL MEDICINE

## 2023-01-01 PROCEDURE — 99285 EMERGENCY DEPT VISIT HI MDM: CPT

## 2023-01-01 RX ORDER — GLYCOPYRROLATE 0.2 MG/ML
0.1 INJECTION INTRAMUSCULAR; INTRAVENOUS 3 TIMES DAILY
OUTPATIENT
Start: 2023-01-01

## 2023-01-01 RX ORDER — METOCLOPRAMIDE 10 MG/1
5 TABLET ORAL
Status: DISCONTINUED | OUTPATIENT
Start: 2023-01-01 | End: 2023-01-01

## 2023-01-01 RX ORDER — HYDROMORPHONE HYDROCHLORIDE 1 MG/ML
0.5 INJECTION, SOLUTION INTRAMUSCULAR; INTRAVENOUS; SUBCUTANEOUS
Status: DISCONTINUED | OUTPATIENT
Start: 2023-01-01 | End: 2023-01-01 | Stop reason: HOSPADM

## 2023-01-01 RX ORDER — SERTRALINE HYDROCHLORIDE 100 MG/1
100 TABLET, FILM COATED ORAL DAILY
Status: DISCONTINUED | OUTPATIENT
Start: 2023-01-01 | End: 2023-01-01 | Stop reason: HOSPADM

## 2023-01-01 RX ORDER — BACLOFEN 10 MG/1
5 TABLET ORAL EVERY 8 HOURS PRN
Status: DISCONTINUED | OUTPATIENT
Start: 2023-01-01 | End: 2023-01-01

## 2023-01-01 RX ORDER — SODIUM CHLORIDE, SODIUM LACTATE, POTASSIUM CHLORIDE, AND CALCIUM CHLORIDE .6; .31; .03; .02 G/100ML; G/100ML; G/100ML; G/100ML
1000 INJECTION, SOLUTION INTRAVENOUS ONCE
Status: COMPLETED | OUTPATIENT
Start: 2023-01-01 | End: 2023-01-01

## 2023-01-01 RX ORDER — HYDROMORPHONE HYDROCHLORIDE 1 MG/ML
0.5 INJECTION, SOLUTION INTRAMUSCULAR; INTRAVENOUS; SUBCUTANEOUS
OUTPATIENT
Start: 2023-01-01

## 2023-01-01 RX ORDER — ENOXAPARIN SODIUM 100 MG/ML
1 INJECTION SUBCUTANEOUS 2 TIMES DAILY
Status: DISCONTINUED | OUTPATIENT
Start: 2023-01-01 | End: 2023-01-01 | Stop reason: HOSPADM

## 2023-01-01 RX ORDER — OXYCODONE HYDROCHLORIDE 5 MG/1
10 TABLET ORAL EVERY 4 HOURS
Status: DISCONTINUED | OUTPATIENT
Start: 2023-01-01 | End: 2023-01-01

## 2023-01-01 RX ORDER — OXYCODONE HYDROCHLORIDE 5 MG/1
10 TABLET ORAL EVERY 4 HOURS PRN
Status: DISCONTINUED | OUTPATIENT
Start: 2023-01-01 | End: 2023-01-01 | Stop reason: HOSPADM

## 2023-01-01 RX ORDER — HEPARIN SODIUM 1000 [USP'U]/ML
40 INJECTION, SOLUTION INTRAVENOUS; SUBCUTANEOUS PRN
Status: DISCONTINUED | OUTPATIENT
Start: 2023-01-01 | End: 2023-01-01 | Stop reason: ALTCHOICE

## 2023-01-01 RX ORDER — PANTOPRAZOLE SODIUM 40 MG/1
40 TABLET, DELAYED RELEASE ORAL
Status: DISCONTINUED | OUTPATIENT
Start: 2023-01-01 | End: 2023-01-01

## 2023-01-01 RX ORDER — HEPARIN SODIUM 1000 [USP'U]/ML
80 INJECTION, SOLUTION INTRAVENOUS; SUBCUTANEOUS PRN
Status: DISCONTINUED | OUTPATIENT
Start: 2023-01-01 | End: 2023-01-01 | Stop reason: ALTCHOICE

## 2023-01-01 RX ORDER — FENTANYL 12 UG/H
1 PATCH TRANSDERMAL
Status: DISCONTINUED | OUTPATIENT
Start: 2023-01-01 | End: 2023-01-01 | Stop reason: HOSPADM

## 2023-01-01 RX ORDER — SODIUM CHLORIDE 9 MG/ML
INJECTION, SOLUTION INTRAVENOUS CONTINUOUS
Status: DISCONTINUED | OUTPATIENT
Start: 2023-01-01 | End: 2023-01-01 | Stop reason: HOSPADM

## 2023-01-01 RX ORDER — HYDRALAZINE HYDROCHLORIDE 20 MG/ML
10 INJECTION INTRAMUSCULAR; INTRAVENOUS EVERY 6 HOURS PRN
Status: DISCONTINUED | OUTPATIENT
Start: 2023-01-01 | End: 2023-01-01 | Stop reason: HOSPADM

## 2023-01-01 RX ORDER — OXYCODONE HYDROCHLORIDE 5 MG/1
5 TABLET ORAL ONCE
Status: COMPLETED | OUTPATIENT
Start: 2023-01-01 | End: 2023-01-01

## 2023-01-01 RX ORDER — ONDANSETRON 4 MG/1
4 TABLET, ORALLY DISINTEGRATING ORAL EVERY 8 HOURS PRN
OUTPATIENT
Start: 2023-01-01

## 2023-01-01 RX ORDER — SODIUM CHLORIDE 0.9 % (FLUSH) 0.9 %
5-40 SYRINGE (ML) INJECTION EVERY 12 HOURS SCHEDULED
Status: DISCONTINUED | OUTPATIENT
Start: 2023-01-01 | End: 2023-01-01 | Stop reason: HOSPADM

## 2023-01-01 RX ORDER — OXYCODONE AND ACETAMINOPHEN 10; 325 MG/1; MG/1
1 TABLET ORAL ONCE
Status: DISCONTINUED | OUTPATIENT
Start: 2023-01-01 | End: 2023-01-01

## 2023-01-01 RX ORDER — METOCLOPRAMIDE HYDROCHLORIDE 5 MG/ML
10 INJECTION INTRAMUSCULAR; INTRAVENOUS EVERY 6 HOURS PRN
Status: DISCONTINUED | OUTPATIENT
Start: 2023-01-01 | End: 2023-01-01

## 2023-01-01 RX ORDER — POTASSIUM CHLORIDE 20 MEQ/1
40 TABLET, EXTENDED RELEASE ORAL PRN
Status: DISCONTINUED | OUTPATIENT
Start: 2023-01-01 | End: 2023-01-01 | Stop reason: HOSPADM

## 2023-01-01 RX ORDER — HYDROMORPHONE HYDROCHLORIDE 1 MG/ML
0.25 INJECTION, SOLUTION INTRAMUSCULAR; INTRAVENOUS; SUBCUTANEOUS EVERY 6 HOURS PRN
Status: DISCONTINUED | OUTPATIENT
Start: 2023-01-01 | End: 2023-01-01

## 2023-01-01 RX ORDER — ACETAMINOPHEN 325 MG/1
650 TABLET ORAL EVERY 6 HOURS PRN
Status: DISCONTINUED | OUTPATIENT
Start: 2023-01-01 | End: 2023-01-01 | Stop reason: HOSPADM

## 2023-01-01 RX ORDER — SODIUM CHLORIDE 0.9 % (FLUSH) 0.9 %
5-40 SYRINGE (ML) INJECTION PRN
Status: DISCONTINUED | OUTPATIENT
Start: 2023-01-01 | End: 2023-01-01 | Stop reason: HOSPADM

## 2023-01-01 RX ORDER — METOCLOPRAMIDE 10 MG/1
10 TABLET ORAL
Status: DISCONTINUED | OUTPATIENT
Start: 2023-01-01 | End: 2023-01-01

## 2023-01-01 RX ORDER — CHLORPROMAZINE HYDROCHLORIDE 25 MG/1
25 TABLET, FILM COATED ORAL 3 TIMES DAILY
Status: DISCONTINUED | OUTPATIENT
Start: 2023-01-01 | End: 2023-01-01 | Stop reason: HOSPADM

## 2023-01-01 RX ORDER — POLYETHYLENE GLYCOL 3350 17 G/17G
17 POWDER, FOR SOLUTION ORAL DAILY PRN
Status: DISCONTINUED | OUTPATIENT
Start: 2023-01-01 | End: 2023-01-01 | Stop reason: HOSPADM

## 2023-01-01 RX ORDER — POTASSIUM CHLORIDE 7.45 MG/ML
10 INJECTION INTRAVENOUS PRN
Status: DISCONTINUED | OUTPATIENT
Start: 2023-01-01 | End: 2023-01-01 | Stop reason: HOSPADM

## 2023-01-01 RX ORDER — GLYCOPYRROLATE 0.2 MG/ML
0.1 INJECTION INTRAMUSCULAR; INTRAVENOUS 3 TIMES DAILY
Status: DISCONTINUED | OUTPATIENT
Start: 2023-01-01 | End: 2023-01-01 | Stop reason: HOSPADM

## 2023-01-01 RX ORDER — FENTANYL 12 UG/H
1 PATCH TRANSDERMAL
Qty: 1 PATCH | Refills: 0
Start: 2023-01-01 | End: 2023-05-26

## 2023-01-01 RX ORDER — ONDANSETRON 2 MG/ML
4 INJECTION INTRAMUSCULAR; INTRAVENOUS ONCE
Status: COMPLETED | OUTPATIENT
Start: 2023-01-01 | End: 2023-01-01

## 2023-01-01 RX ORDER — BACLOFEN 10 MG/1
5 TABLET ORAL 3 TIMES DAILY
Status: DISCONTINUED | OUTPATIENT
Start: 2023-01-01 | End: 2023-01-01

## 2023-01-01 RX ORDER — HYDROMORPHONE HYDROCHLORIDE 1 MG/ML
0.5 INJECTION, SOLUTION INTRAMUSCULAR; INTRAVENOUS; SUBCUTANEOUS ONCE
Status: COMPLETED | OUTPATIENT
Start: 2023-01-01 | End: 2023-01-01

## 2023-01-01 RX ORDER — POLYETHYLENE GLYCOL 3350 17 G/17G
17 POWDER, FOR SOLUTION ORAL DAILY PRN
Qty: 527 G | Refills: 1 | Status: SHIPPED | OUTPATIENT
Start: 2023-01-01 | End: 2023-05-25

## 2023-01-01 RX ORDER — HYDROMORPHONE HYDROCHLORIDE 1 MG/ML
0.5 INJECTION, SOLUTION INTRAMUSCULAR; INTRAVENOUS; SUBCUTANEOUS EVERY 8 HOURS PRN
Status: DISCONTINUED | OUTPATIENT
Start: 2023-01-01 | End: 2023-01-01

## 2023-01-01 RX ORDER — ONDANSETRON 2 MG/ML
4 INJECTION INTRAMUSCULAR; INTRAVENOUS EVERY 6 HOURS PRN
Status: DISCONTINUED | OUTPATIENT
Start: 2023-01-01 | End: 2023-01-01 | Stop reason: HOSPADM

## 2023-01-01 RX ORDER — HEPARIN SODIUM 10000 [USP'U]/100ML
5-30 INJECTION, SOLUTION INTRAVENOUS CONTINUOUS
Status: DISCONTINUED | OUTPATIENT
Start: 2023-01-01 | End: 2023-01-01

## 2023-01-01 RX ORDER — ONDANSETRON 4 MG/1
4 TABLET, ORALLY DISINTEGRATING ORAL EVERY 8 HOURS PRN
Status: DISCONTINUED | OUTPATIENT
Start: 2023-01-01 | End: 2023-01-01 | Stop reason: HOSPADM

## 2023-01-01 RX ORDER — METOCLOPRAMIDE HYDROCHLORIDE 5 MG/ML
10 INJECTION INTRAMUSCULAR; INTRAVENOUS ONCE
Status: COMPLETED | OUTPATIENT
Start: 2023-01-01 | End: 2023-01-01

## 2023-01-01 RX ORDER — HEPARIN SODIUM 1000 [USP'U]/ML
80 INJECTION, SOLUTION INTRAVENOUS; SUBCUTANEOUS ONCE
Status: COMPLETED | OUTPATIENT
Start: 2023-01-01 | End: 2023-01-01

## 2023-01-01 RX ORDER — ONDANSETRON 2 MG/ML
4 INJECTION INTRAMUSCULAR; INTRAVENOUS EVERY 6 HOURS PRN
OUTPATIENT
Start: 2023-01-01

## 2023-01-01 RX ORDER — PRAZOSIN HYDROCHLORIDE 1 MG/1
1 CAPSULE ORAL NIGHTLY PRN
Status: DISCONTINUED | OUTPATIENT
Start: 2023-01-01 | End: 2023-01-01 | Stop reason: HOSPADM

## 2023-01-01 RX ORDER — ACETAMINOPHEN 650 MG/1
650 SUPPOSITORY RECTAL EVERY 6 HOURS PRN
Status: DISCONTINUED | OUTPATIENT
Start: 2023-01-01 | End: 2023-01-01 | Stop reason: HOSPADM

## 2023-01-01 RX ORDER — HYDROMORPHONE HYDROCHLORIDE 1 MG/ML
0.25 INJECTION, SOLUTION INTRAMUSCULAR; INTRAVENOUS; SUBCUTANEOUS
Status: DISCONTINUED | OUTPATIENT
Start: 2023-01-01 | End: 2023-01-01

## 2023-01-01 RX ORDER — HYDROMORPHONE HYDROCHLORIDE 1 MG/ML
0.5 INJECTION, SOLUTION INTRAMUSCULAR; INTRAVENOUS; SUBCUTANEOUS
Status: DISCONTINUED | OUTPATIENT
Start: 2023-01-01 | End: 2023-01-01

## 2023-01-01 RX ORDER — FENTANYL 50 UG/H
1 PATCH TRANSDERMAL
OUTPATIENT
Start: 2023-01-01

## 2023-01-01 RX ORDER — FENTANYL 50 UG/H
1 PATCH TRANSDERMAL
Status: DISCONTINUED | OUTPATIENT
Start: 2023-01-01 | End: 2023-01-01 | Stop reason: HOSPADM

## 2023-01-01 RX ORDER — HYDRALAZINE HYDROCHLORIDE 20 MG/ML
10 INJECTION INTRAMUSCULAR; INTRAVENOUS EVERY 6 HOURS PRN
OUTPATIENT
Start: 2023-01-01

## 2023-01-01 RX ORDER — SODIUM CHLORIDE 9 MG/ML
INJECTION, SOLUTION INTRAVENOUS PRN
Status: DISCONTINUED | OUTPATIENT
Start: 2023-01-01 | End: 2023-01-01 | Stop reason: HOSPADM

## 2023-01-01 RX ADMIN — OXYCODONE HYDROCHLORIDE 5 MG: 5 TABLET ORAL at 13:42

## 2023-01-01 RX ADMIN — HYDROMORPHONE HYDROCHLORIDE 0.5 MG: 1 INJECTION, SOLUTION INTRAMUSCULAR; INTRAVENOUS; SUBCUTANEOUS at 10:27

## 2023-01-01 RX ADMIN — SODIUM CHLORIDE, PRESERVATIVE FREE 10 ML: 5 INJECTION INTRAVENOUS at 09:11

## 2023-01-01 RX ADMIN — IOPAMIDOL 70 ML: 755 INJECTION, SOLUTION INTRAVENOUS at 14:00

## 2023-01-01 RX ADMIN — OXYCODONE 10 MG: 5 TABLET ORAL at 01:05

## 2023-01-01 RX ADMIN — HYDRALAZINE HYDROCHLORIDE 10 MG: 20 INJECTION INTRAMUSCULAR; INTRAVENOUS at 17:43

## 2023-01-01 RX ADMIN — SODIUM CHLORIDE, PRESERVATIVE FREE 10 ML: 5 INJECTION INTRAVENOUS at 06:39

## 2023-01-01 RX ADMIN — I.V. FAT EMULSION 250 ML: 20 EMULSION INTRAVENOUS at 18:18

## 2023-01-01 RX ADMIN — SODIUM CHLORIDE, PRESERVATIVE FREE 20 MG: 5 INJECTION INTRAVENOUS at 08:06

## 2023-01-01 RX ADMIN — HYDROMORPHONE HYDROCHLORIDE 0.5 MG: 1 INJECTION, SOLUTION INTRAMUSCULAR; INTRAVENOUS; SUBCUTANEOUS at 17:19

## 2023-01-01 RX ADMIN — HEPARIN SODIUM 18 UNITS/KG/HR: 10000 INJECTION, SOLUTION INTRAVENOUS at 14:26

## 2023-01-01 RX ADMIN — SODIUM CHLORIDE, PRESERVATIVE FREE 10 ML: 5 INJECTION INTRAVENOUS at 21:09

## 2023-01-01 RX ADMIN — OXYCODONE 10 MG: 5 TABLET ORAL at 09:09

## 2023-01-01 RX ADMIN — OXYCODONE 10 MG: 5 TABLET ORAL at 14:14

## 2023-01-01 RX ADMIN — HYDROMORPHONE HYDROCHLORIDE 0.5 MG: 1 INJECTION, SOLUTION INTRAMUSCULAR; INTRAVENOUS; SUBCUTANEOUS at 01:24

## 2023-01-01 RX ADMIN — OXYCODONE 10 MG: 5 TABLET ORAL at 03:47

## 2023-01-01 RX ADMIN — OXYCODONE 10 MG: 5 TABLET ORAL at 05:36

## 2023-01-01 RX ADMIN — HYDROMORPHONE HYDROCHLORIDE 0.5 MG: 1 INJECTION, SOLUTION INTRAMUSCULAR; INTRAVENOUS; SUBCUTANEOUS at 14:49

## 2023-01-01 RX ADMIN — HYDROMORPHONE HYDROCHLORIDE 0.5 MG: 1 INJECTION, SOLUTION INTRAMUSCULAR; INTRAVENOUS; SUBCUTANEOUS at 03:57

## 2023-01-01 RX ADMIN — HYDROMORPHONE HYDROCHLORIDE 0.5 MG: 1 INJECTION, SOLUTION INTRAMUSCULAR; INTRAVENOUS; SUBCUTANEOUS at 23:35

## 2023-01-01 RX ADMIN — METOCLOPRAMIDE 5 MG: 10 TABLET ORAL at 14:14

## 2023-01-01 RX ADMIN — HEPARIN SODIUM 12 UNITS/KG/HR: 10000 INJECTION, SOLUTION INTRAVENOUS at 04:15

## 2023-01-01 RX ADMIN — GLYCOPYRROLATE 0.1 MG: 0.2 INJECTION INTRAMUSCULAR; INTRAVENOUS at 08:11

## 2023-01-01 RX ADMIN — METOCLOPRAMIDE 5 MG: 10 TABLET ORAL at 09:18

## 2023-01-01 RX ADMIN — SODIUM CHLORIDE, PRESERVATIVE FREE 20 MG: 5 INJECTION INTRAVENOUS at 08:08

## 2023-01-01 RX ADMIN — SERTRALINE HYDROCHLORIDE 100 MG: 100 TABLET ORAL at 08:42

## 2023-01-01 RX ADMIN — ASCORBIC ACID, VITAMIN A PALMITATE, CHOLECALCIFEROL, THIAMINE HYDROCHLORIDE, RIBOFLAVIN-5 PHOSPHATE SODIUM, PYRIDOXINE HYDROCHLORIDE, NIACINAMIDE, DEXPANTHENOL, ALPHA-TOCOPHEROL ACETATE, VITAMIN K1, FOLIC ACID, BIOTIN, CYANOCOBALAMIN: 200; 3300; 200; 6; 3.6; 6; 40; 15; 10; 150; 600; 60; 5 INJECTION, SOLUTION INTRAVENOUS at 18:06

## 2023-01-01 RX ADMIN — ENOXAPARIN SODIUM 60 MG: 100 INJECTION SUBCUTANEOUS at 21:47

## 2023-01-01 RX ADMIN — CHLORPROMAZINE HYDROCHLORIDE 25 MG: 25 TABLET, FILM COATED ORAL at 23:15

## 2023-01-01 RX ADMIN — BACLOFEN 5 MG: 10 TABLET ORAL at 15:04

## 2023-01-01 RX ADMIN — METOCLOPRAMIDE 5 MG: 10 TABLET ORAL at 18:01

## 2023-01-01 RX ADMIN — HYDROMORPHONE HYDROCHLORIDE 0.5 MG: 1 INJECTION, SOLUTION INTRAMUSCULAR; INTRAVENOUS; SUBCUTANEOUS at 10:22

## 2023-01-01 RX ADMIN — HEPARIN SODIUM 4540 UNITS: 1000 INJECTION, SOLUTION INTRAVENOUS; SUBCUTANEOUS at 14:26

## 2023-01-01 RX ADMIN — ONDANSETRON 4 MG: 2 INJECTION INTRAMUSCULAR; INTRAVENOUS at 17:41

## 2023-01-01 RX ADMIN — PANTOPRAZOLE SODIUM 40 MG: 40 TABLET, DELAYED RELEASE ORAL at 05:13

## 2023-01-01 RX ADMIN — HYDROMORPHONE HYDROCHLORIDE 0.5 MG: 1 INJECTION, SOLUTION INTRAMUSCULAR; INTRAVENOUS; SUBCUTANEOUS at 18:40

## 2023-01-01 RX ADMIN — HYDROMORPHONE HYDROCHLORIDE 0.5 MG: 1 INJECTION, SOLUTION INTRAMUSCULAR; INTRAVENOUS; SUBCUTANEOUS at 21:08

## 2023-01-01 RX ADMIN — SODIUM CHLORIDE, PRESERVATIVE FREE 10 ML: 5 INJECTION INTRAVENOUS at 14:02

## 2023-01-01 RX ADMIN — OXYCODONE 10 MG: 5 TABLET ORAL at 20:56

## 2023-01-01 RX ADMIN — OXYCODONE 10 MG: 5 TABLET ORAL at 18:00

## 2023-01-01 RX ADMIN — HYDROMORPHONE HYDROCHLORIDE 0.5 MG: 1 INJECTION, SOLUTION INTRAMUSCULAR; INTRAVENOUS; SUBCUTANEOUS at 07:37

## 2023-01-01 RX ADMIN — ONDANSETRON 4 MG: 2 INJECTION INTRAMUSCULAR; INTRAVENOUS at 06:39

## 2023-01-01 RX ADMIN — METOCLOPRAMIDE 10 MG: 10 TABLET ORAL at 20:56

## 2023-01-01 RX ADMIN — HYDROMORPHONE HYDROCHLORIDE 0.5 MG: 1 INJECTION, SOLUTION INTRAMUSCULAR; INTRAVENOUS; SUBCUTANEOUS at 10:59

## 2023-01-01 RX ADMIN — ENOXAPARIN SODIUM 60 MG: 100 INJECTION SUBCUTANEOUS at 23:16

## 2023-01-01 RX ADMIN — SODIUM CHLORIDE, PRESERVATIVE FREE 10 ML: 5 INJECTION INTRAVENOUS at 20:01

## 2023-01-01 RX ADMIN — ENOXAPARIN SODIUM 60 MG: 100 INJECTION SUBCUTANEOUS at 08:40

## 2023-01-01 RX ADMIN — SODIUM CHLORIDE, PRESERVATIVE FREE 20 MG: 5 INJECTION INTRAVENOUS at 08:44

## 2023-01-01 RX ADMIN — ONDANSETRON 4 MG: 2 INJECTION INTRAMUSCULAR; INTRAVENOUS at 23:34

## 2023-01-01 RX ADMIN — ENOXAPARIN SODIUM 60 MG: 100 INJECTION SUBCUTANEOUS at 09:59

## 2023-01-01 RX ADMIN — ONDANSETRON 4 MG: 2 INJECTION INTRAMUSCULAR; INTRAVENOUS at 01:26

## 2023-01-01 RX ADMIN — METOCLOPRAMIDE 5 MG: 10 TABLET ORAL at 09:59

## 2023-01-01 RX ADMIN — ENOXAPARIN SODIUM 60 MG: 100 INJECTION SUBCUTANEOUS at 08:12

## 2023-01-01 RX ADMIN — HYDROMORPHONE HYDROCHLORIDE 0.5 MG: 1 INJECTION, SOLUTION INTRAMUSCULAR; INTRAVENOUS; SUBCUTANEOUS at 06:49

## 2023-01-01 RX ADMIN — ONDANSETRON 4 MG: 2 INJECTION INTRAMUSCULAR; INTRAVENOUS at 01:05

## 2023-01-01 RX ADMIN — CHLORPROMAZINE HYDROCHLORIDE 25 MG: 25 TABLET, FILM COATED ORAL at 11:31

## 2023-01-01 RX ADMIN — SERTRALINE HYDROCHLORIDE 100 MG: 100 TABLET ORAL at 09:09

## 2023-01-01 RX ADMIN — ONDANSETRON 4 MG: 2 INJECTION INTRAMUSCULAR; INTRAVENOUS at 06:48

## 2023-01-01 RX ADMIN — SODIUM CHLORIDE: 9 INJECTION, SOLUTION INTRAVENOUS at 20:01

## 2023-01-01 RX ADMIN — ENOXAPARIN SODIUM 60 MG: 100 INJECTION SUBCUTANEOUS at 08:07

## 2023-01-01 RX ADMIN — SODIUM CHLORIDE, PRESERVATIVE FREE 10 ML: 5 INJECTION INTRAVENOUS at 22:01

## 2023-01-01 RX ADMIN — SODIUM CHLORIDE, POTASSIUM CHLORIDE, SODIUM LACTATE AND CALCIUM CHLORIDE 1000 ML: 600; 310; 30; 20 INJECTION, SOLUTION INTRAVENOUS at 12:46

## 2023-01-01 RX ADMIN — HYDROMORPHONE HYDROCHLORIDE 0.5 MG: 1 INJECTION, SOLUTION INTRAMUSCULAR; INTRAVENOUS; SUBCUTANEOUS at 11:27

## 2023-01-01 RX ADMIN — ENOXAPARIN SODIUM 60 MG: 100 INJECTION SUBCUTANEOUS at 21:35

## 2023-01-01 RX ADMIN — BACLOFEN 5 MG: 10 TABLET ORAL at 21:47

## 2023-01-01 RX ADMIN — ENOXAPARIN SODIUM 60 MG: 100 INJECTION SUBCUTANEOUS at 09:09

## 2023-01-01 RX ADMIN — METOCLOPRAMIDE 5 MG: 10 TABLET ORAL at 21:34

## 2023-01-01 RX ADMIN — OXYCODONE 10 MG: 5 TABLET ORAL at 21:34

## 2023-01-01 RX ADMIN — SODIUM CHLORIDE, PRESERVATIVE FREE 10 ML: 5 INJECTION INTRAVENOUS at 11:00

## 2023-01-01 RX ADMIN — OXYCODONE 10 MG: 5 TABLET ORAL at 17:15

## 2023-01-01 RX ADMIN — HYDROMORPHONE HYDROCHLORIDE 0.5 MG: 1 INJECTION, SOLUTION INTRAMUSCULAR; INTRAVENOUS; SUBCUTANEOUS at 14:01

## 2023-01-01 RX ADMIN — METOCLOPRAMIDE 10 MG: 5 INJECTION, SOLUTION INTRAMUSCULAR; INTRAVENOUS at 15:15

## 2023-01-01 RX ADMIN — SERTRALINE HYDROCHLORIDE 100 MG: 100 TABLET ORAL at 09:59

## 2023-01-01 RX ADMIN — BACLOFEN 5 MG: 10 TABLET ORAL at 09:59

## 2023-01-01 RX ADMIN — SODIUM CHLORIDE, PRESERVATIVE FREE 10 ML: 5 INJECTION INTRAVENOUS at 23:16

## 2023-01-01 RX ADMIN — ASCORBIC ACID, VITAMIN A PALMITATE, CHOLECALCIFEROL, THIAMINE HYDROCHLORIDE, RIBOFLAVIN-5 PHOSPHATE SODIUM, PYRIDOXINE HYDROCHLORIDE, NIACINAMIDE, DEXPANTHENOL, ALPHA-TOCOPHEROL ACETATE, VITAMIN K1, FOLIC ACID, BIOTIN, CYANOCOBALAMIN: 200; 3300; 200; 6; 3.6; 6; 40; 15; 10; 150; 600; 60; 5 INJECTION, SOLUTION INTRAVENOUS at 17:39

## 2023-01-01 ASSESSMENT — PAIN - FUNCTIONAL ASSESSMENT
PAIN_FUNCTIONAL_ASSESSMENT: PREVENTS OR INTERFERES SOME ACTIVE ACTIVITIES AND ADLS
PAIN_FUNCTIONAL_ASSESSMENT: PREVENTS OR INTERFERES WITH MANY ACTIVE NOT PASSIVE ACTIVITIES
PAIN_FUNCTIONAL_ASSESSMENT: PREVENTS OR INTERFERES SOME ACTIVE ACTIVITIES AND ADLS
PAIN_FUNCTIONAL_ASSESSMENT: PREVENTS OR INTERFERES WITH ALL ACTIVE AND SOME PASSIVE ACTIVITIES
PAIN_FUNCTIONAL_ASSESSMENT: PREVENTS OR INTERFERES SOME ACTIVE ACTIVITIES AND ADLS
PAIN_FUNCTIONAL_ASSESSMENT: PREVENTS OR INTERFERES WITH MANY ACTIVE NOT PASSIVE ACTIVITIES
PAIN_FUNCTIONAL_ASSESSMENT: PREVENTS OR INTERFERES WITH MANY ACTIVE NOT PASSIVE ACTIVITIES
PAIN_FUNCTIONAL_ASSESSMENT: PREVENTS OR INTERFERES WITH ALL ACTIVE AND SOME PASSIVE ACTIVITIES
PAIN_FUNCTIONAL_ASSESSMENT: PREVENTS OR INTERFERES WITH ALL ACTIVE AND SOME PASSIVE ACTIVITIES
PAIN_FUNCTIONAL_ASSESSMENT: PREVENTS OR INTERFERES SOME ACTIVE ACTIVITIES AND ADLS
PAIN_FUNCTIONAL_ASSESSMENT: PREVENTS OR INTERFERES WITH ALL ACTIVE AND SOME PASSIVE ACTIVITIES
PAIN_FUNCTIONAL_ASSESSMENT: PREVENTS OR INTERFERES WITH ALL ACTIVE AND SOME PASSIVE ACTIVITIES
PAIN_FUNCTIONAL_ASSESSMENT: PREVENTS OR INTERFERES SOME ACTIVE ACTIVITIES AND ADLS

## 2023-01-01 ASSESSMENT — PAIN DESCRIPTION - LOCATION
LOCATION: GENERALIZED
LOCATION: ABDOMEN
LOCATION: GENERALIZED
LOCATION: ABDOMEN;OTHER (COMMENT)
LOCATION: BACK
LOCATION: ABDOMEN;OTHER (COMMENT)
LOCATION: OTHER (COMMENT)
LOCATION: GENERALIZED
LOCATION: ABDOMEN;OTHER (COMMENT)
LOCATION: ABDOMEN
LOCATION: ABDOMEN
LOCATION: GENERALIZED

## 2023-01-01 ASSESSMENT — PAIN SCALES - GENERAL
PAINLEVEL_OUTOF10: 7
PAINLEVEL_OUTOF10: 5
PAINLEVEL_OUTOF10: 9
PAINLEVEL_OUTOF10: 5
PAINLEVEL_OUTOF10: 9
PAINLEVEL_OUTOF10: 2
PAINLEVEL_OUTOF10: 8
PAINLEVEL_OUTOF10: 7
PAINLEVEL_OUTOF10: 9
PAINLEVEL_OUTOF10: 8
PAINLEVEL_OUTOF10: 9
PAINLEVEL_OUTOF10: 7
PAINLEVEL_OUTOF10: 7
PAINLEVEL_OUTOF10: 8
PAINLEVEL_OUTOF10: 0
PAINLEVEL_OUTOF10: 8
PAINLEVEL_OUTOF10: 0
PAINLEVEL_OUTOF10: 8
PAINLEVEL_OUTOF10: 9
PAINLEVEL_OUTOF10: 0
PAINLEVEL_OUTOF10: 8
PAINLEVEL_OUTOF10: 7
PAINLEVEL_OUTOF10: 4
PAINLEVEL_OUTOF10: 9

## 2023-01-01 ASSESSMENT — PAIN DESCRIPTION - ONSET
ONSET: AWAKENED FROM SLEEP
ONSET: ON-GOING

## 2023-01-01 ASSESSMENT — ENCOUNTER SYMPTOMS
VOMITING: 1
BACK PAIN: 0
ABDOMINAL PAIN: 1
VOMITING: 1
SHORTNESS OF BREATH: 0
COUGH: 0
EYES NEGATIVE: 1
NAUSEA: 1
ALLERGIC/IMMUNOLOGIC NEGATIVE: 1
SHORTNESS OF BREATH: 0
NAUSEA: 1
DIARRHEA: 0

## 2023-01-01 ASSESSMENT — PAIN DESCRIPTION - FREQUENCY
FREQUENCY: CONTINUOUS

## 2023-01-01 ASSESSMENT — PAIN DESCRIPTION - DESCRIPTORS
DESCRIPTORS: ACHING;DISCOMFORT;GNAWING
DESCRIPTORS: ACHING;DISCOMFORT;GNAWING;DULL
DESCRIPTORS: ACHING
DESCRIPTORS: ACHING;DISCOMFORT;SHARP
DESCRIPTORS: ACHING
DESCRIPTORS: ACHING;DISCOMFORT
DESCRIPTORS: ACHING
DESCRIPTORS: ACHING;THROBBING
DESCRIPTORS: DISCOMFORT
DESCRIPTORS: ACHING;DISCOMFORT
DESCRIPTORS: STABBING;SHARP
DESCRIPTORS: THROBBING;ACHING
DESCRIPTORS: DISCOMFORT
DESCRIPTORS: ACHING
DESCRIPTORS: ACHING;NUMBNESS

## 2023-01-01 ASSESSMENT — PAIN DESCRIPTION - PAIN TYPE
TYPE: CHRONIC PAIN
TYPE: ACUTE PAIN;CHRONIC PAIN
TYPE: CHRONIC PAIN
TYPE: CHRONIC PAIN
TYPE: ACUTE PAIN
TYPE: ACUTE PAIN;CHRONIC PAIN
TYPE: CHRONIC PAIN
TYPE: ACUTE PAIN

## 2023-01-01 ASSESSMENT — PAIN DESCRIPTION - ORIENTATION
ORIENTATION: LOWER
ORIENTATION: RIGHT
ORIENTATION: RIGHT
ORIENTATION: RIGHT;LEFT
ORIENTATION: RIGHT
ORIENTATION: RIGHT;LEFT;DISTAL;INNER
ORIENTATION: RIGHT
ORIENTATION: RIGHT

## 2023-01-01 ASSESSMENT — PAIN SCALES - WONG BAKER: WONGBAKER_NUMERICALRESPONSE: 2

## 2023-01-02 DIAGNOSIS — C18.9 ADENOCARCINOMA OF COLON (HCC): ICD-10-CM

## 2023-01-02 DIAGNOSIS — C78.7 LIVER METASTASES (HCC): ICD-10-CM

## 2023-01-02 DIAGNOSIS — M62.89 MASS OF PSOAS MUSCLE: ICD-10-CM

## 2023-01-02 DIAGNOSIS — G89.3 CANCER ASSOCIATED PAIN: ICD-10-CM

## 2023-01-02 DIAGNOSIS — C18.2 MALIGNANT NEOPLASM OF ASCENDING COLON (HCC): ICD-10-CM

## 2023-01-02 RX ORDER — FENTANYL 12 UG/H
1 PATCH TRANSDERMAL
Qty: 10 PATCH | Refills: 0 | Status: SHIPPED | OUTPATIENT
Start: 2023-01-02 | End: 2023-02-01

## 2023-01-03 DIAGNOSIS — C18.9 ADENOCARCINOMA OF COLON (HCC): ICD-10-CM

## 2023-01-03 DIAGNOSIS — C18.2 MALIGNANT NEOPLASM OF ASCENDING COLON (HCC): ICD-10-CM

## 2023-01-03 DIAGNOSIS — C78.7 LIVER METASTASES (HCC): ICD-10-CM

## 2023-01-03 DIAGNOSIS — G89.3 CANCER ASSOCIATED PAIN: ICD-10-CM

## 2023-01-03 RX ORDER — OXYCODONE HYDROCHLORIDE 10 MG/1
TABLET ORAL
Qty: 120 TABLET | Refills: 0 | OUTPATIENT
Start: 2023-01-03

## 2023-01-03 RX ORDER — OXYCODONE HYDROCHLORIDE 10 MG/1
10 TABLET ORAL EVERY 6 HOURS PRN
Qty: 120 TABLET | Refills: 0 | Status: SHIPPED | OUTPATIENT
Start: 2023-01-03 | End: 2023-02-02

## 2023-01-11 ENCOUNTER — HOSPITAL ENCOUNTER (OUTPATIENT)
Dept: INFUSION THERAPY | Age: 68
Setting detail: INFUSION SERIES
Discharge: HOME | End: 2023-01-11
Payer: OTHER GOVERNMENT

## 2023-01-11 VITALS
BODY MASS INDEX: 20.83 KG/M2 | OXYGEN SATURATION: 98 % | DIASTOLIC BLOOD PRESSURE: 95 MMHG | WEIGHT: 133 LBS | RESPIRATION RATE: 16 BRPM | SYSTOLIC BLOOD PRESSURE: 150 MMHG | TEMPERATURE: 96.7 F | HEART RATE: 68 BPM

## 2023-01-11 DIAGNOSIS — C18.9 ADENOCARCINOMA OF COLON (HCC): ICD-10-CM

## 2023-01-11 DIAGNOSIS — C78.7 LIVER METASTASES (HCC): Primary | ICD-10-CM

## 2023-01-11 DIAGNOSIS — C18.2 MALIGNANT NEOPLASM OF ASCENDING COLON (HCC): ICD-10-CM

## 2023-01-11 LAB
ALBUMIN SERPL-MCNC: 3.1 G/DL (ref 3.5–5.2)
ALP BLD-CCNC: 302 U/L (ref 40–130)
ALT SERPL-CCNC: 18 U/L (ref 5–41)
ANION GAP SERPL CALCULATED.3IONS-SCNC: 9 MMOL/L (ref 7–19)
AST SERPL-CCNC: 34 U/L (ref 5–40)
BASOPHILS ABSOLUTE: 0 K/UL (ref 0–0.2)
BASOPHILS RELATIVE PERCENT: 0.7 % (ref 0–1)
BILIRUB SERPL-MCNC: 0.6 MG/DL (ref 0.2–1.2)
BUN BLDV-MCNC: 14 MG/DL (ref 8–23)
CALCIUM SERPL-MCNC: 9 MG/DL (ref 8.8–10.2)
CHLORIDE BLD-SCNC: 103 MMOL/L (ref 98–111)
CO2: 24 MMOL/L (ref 22–29)
CREAT SERPL-MCNC: 1.3 MG/DL (ref 0.5–1.2)
EOSINOPHILS ABSOLUTE: 0.1 K/UL (ref 0–0.6)
EOSINOPHILS RELATIVE PERCENT: 2.7 % (ref 0–5)
GFR SERPL CREATININE-BSD FRML MDRD: >60 ML/MIN/{1.73_M2}
GLUCOSE BLD-MCNC: 104 MG/DL (ref 74–109)
HCT VFR BLD CALC: 35.5 % (ref 42–52)
HEMOGLOBIN: 11.3 G/DL (ref 14–18)
IMMATURE GRANULOCYTES #: 0 K/UL
LYMPHOCYTES ABSOLUTE: 0.7 K/UL (ref 1.1–4.5)
LYMPHOCYTES RELATIVE PERCENT: 16 % (ref 20–40)
MCH RBC QN AUTO: 29.7 PG (ref 27–31)
MCHC RBC AUTO-ENTMCNC: 31.8 G/DL (ref 33–37)
MCV RBC AUTO: 93.4 FL (ref 80–94)
MONOCYTES ABSOLUTE: 0.7 K/UL (ref 0–0.9)
MONOCYTES RELATIVE PERCENT: 16.9 % (ref 0–10)
NEUTROPHILS ABSOLUTE: 2.8 K/UL (ref 1.5–7.5)
NEUTROPHILS RELATIVE PERCENT: 63.5 % (ref 50–65)
PDW BLD-RTO: 17.1 % (ref 11.5–14.5)
PLATELET # BLD: 138 K/UL (ref 130–400)
PMV BLD AUTO: 9.5 FL (ref 9.4–12.4)
POTASSIUM SERPL-SCNC: 4.3 MMOL/L (ref 3.5–5)
RBC # BLD: 3.8 M/UL (ref 4.7–6.1)
SODIUM BLD-SCNC: 136 MMOL/L (ref 136–145)
TOTAL PROTEIN: 6.6 G/DL (ref 6.6–8.7)
WBC # BLD: 4.4 K/UL (ref 4.8–10.8)

## 2023-01-11 PROCEDURE — 2580000003 HC RX 258: Performed by: PHYSICIAN ASSISTANT

## 2023-01-11 PROCEDURE — 96375 TX/PRO/DX INJ NEW DRUG ADDON: CPT

## 2023-01-11 PROCEDURE — 96413 CHEMO IV INFUSION 1 HR: CPT

## 2023-01-11 PROCEDURE — 36591 DRAW BLOOD OFF VENOUS DEVICE: CPT

## 2023-01-11 PROCEDURE — 96415 CHEMO IV INFUSION ADDL HR: CPT

## 2023-01-11 PROCEDURE — 96416 CHEMO PROLONG INFUSE W/PUMP: CPT

## 2023-01-11 PROCEDURE — 80053 COMPREHEN METABOLIC PANEL: CPT

## 2023-01-11 PROCEDURE — 96368 THER/DIAG CONCURRENT INF: CPT

## 2023-01-11 PROCEDURE — 85025 COMPLETE CBC W/AUTO DIFF WBC: CPT

## 2023-01-11 PROCEDURE — 6360000002 HC RX W HCPCS: Performed by: PHYSICIAN ASSISTANT

## 2023-01-11 PROCEDURE — 96417 CHEMO IV INFUS EACH ADDL SEQ: CPT

## 2023-01-11 RX ORDER — SODIUM CHLORIDE 0.9 % (FLUSH) 0.9 %
5-40 SYRINGE (ML) INJECTION PRN
Status: DISCONTINUED | OUTPATIENT
Start: 2023-01-11 | End: 2023-01-12 | Stop reason: HOSPADM

## 2023-01-11 RX ORDER — HEPARIN SODIUM (PORCINE) LOCK FLUSH IV SOLN 100 UNIT/ML 100 UNIT/ML
500 SOLUTION INTRAVENOUS PRN
Status: DISCONTINUED | OUTPATIENT
Start: 2023-01-11 | End: 2023-01-12 | Stop reason: HOSPADM

## 2023-01-11 RX ORDER — DIPHENHYDRAMINE HYDROCHLORIDE 50 MG/ML
50 INJECTION INTRAMUSCULAR; INTRAVENOUS
Status: DISCONTINUED | OUTPATIENT
Start: 2023-01-11 | End: 2023-01-12 | Stop reason: HOSPADM

## 2023-01-11 RX ORDER — PALONOSETRON 0.05 MG/ML
0.25 INJECTION, SOLUTION INTRAVENOUS ONCE
Status: COMPLETED | OUTPATIENT
Start: 2023-01-11 | End: 2023-01-11

## 2023-01-11 RX ORDER — DEXTROSE MONOHYDRATE 50 MG/ML
5-250 INJECTION, SOLUTION INTRAVENOUS PRN
Status: DISCONTINUED | OUTPATIENT
Start: 2023-01-11 | End: 2023-01-12 | Stop reason: HOSPADM

## 2023-01-11 RX ORDER — SODIUM CHLORIDE 9 MG/ML
5-250 INJECTION, SOLUTION INTRAVENOUS PRN
Status: DISCONTINUED | OUTPATIENT
Start: 2023-01-11 | End: 2023-01-12 | Stop reason: HOSPADM

## 2023-01-11 RX ORDER — ACETAMINOPHEN 325 MG/1
650 TABLET ORAL
Status: DISCONTINUED | OUTPATIENT
Start: 2023-01-11 | End: 2023-01-12 | Stop reason: HOSPADM

## 2023-01-11 RX ADMIN — DEXAMETHASONE SODIUM PHOSPHATE: 10 INJECTION, SOLUTION INTRAMUSCULAR; INTRAVENOUS at 10:20

## 2023-01-11 RX ADMIN — SODIUM CHLORIDE 20 ML/HR: 9 INJECTION, SOLUTION INTRAVENOUS at 09:42

## 2023-01-11 RX ADMIN — DEXTROSE MONOHYDRATE 20 ML/HR: 50 INJECTION, SOLUTION INTRAVENOUS at 10:44

## 2023-01-11 RX ADMIN — LEUCOVORIN CALCIUM 700 MG: 350 INJECTION, POWDER, LYOPHILIZED, FOR SUSPENSION INTRAMUSCULAR; INTRAVENOUS at 10:47

## 2023-01-11 RX ADMIN — PALONOSETRON 0.25 MG: 0.05 INJECTION, SOLUTION INTRAVENOUS at 10:20

## 2023-01-11 RX ADMIN — SODIUM CHLORIDE 300 MG: 9 INJECTION, SOLUTION INTRAVENOUS at 13:03

## 2023-01-11 RX ADMIN — OXALIPLATIN 145 MG: 5 INJECTION, SOLUTION INTRAVENOUS at 10:50

## 2023-01-11 RX ADMIN — FLUOROURACIL 4100 MG: 50 INJECTION, SOLUTION INTRAVENOUS at 13:45

## 2023-01-11 NOTE — DISCHARGE INSTRUCTIONS
oxaliplatin  Pronunciation:  ox AL i MINDY tin  Brand:  Eloxatin  What is the most important information I should know about oxaliplatin? Oxaliplatin can cause a severe or life-threatening allergic reaction. Get emergency medical help if you have: rash, hives, itching, sweating; chest pain, warmth or redness in your face, feeling light-headed; sudden cough, difficult breathing; swelling of your face, lips, tongue, or throat. What is oxaliplatin? Oxaliplatin is used together with other cancer medications to treat colon and rectal cancer. Oxaliplatin may also be used for purposes not listed in this medication guide. What should I discuss with my healthcare provider before receiving oxaliplatin? You should not be treated with this medicine if you have ever had an allergic reaction to oxaliplatin or similar medications such as carboplatin (Paraplatin) or cisplatin (Platinol). Tell your doctor if you have ever had:  an active or recent infection;  kidney disease;  liver disease;  heart disease, heart rhythm disorder;  long QT syndrome (in you or a family member);  an electrolyte imbalance (such as low levels of calcium, potassium, or magnesium in your blood);  breathing disorder; or  a nerve problem. Oxaliplatin can harm an unborn baby  if the mother or the father is using this medicine. If you are a woman, do not use oxaliplatin if you are pregnant. Use effective birth control to prevent pregnancy while you are using this medicine and for at least 9 months after your last dose. If you are a man, use effective birth control if your sex partner is able to get pregnant. Keep using birth control for at least 6 months after your last dose. Tell your doctor right away if a pregnancy occurs while either the mother or the father is using oxaliplatin. This medicine may affect fertility (ability to have children) in both men and women.  However, it is important to use birth control to prevent pregnancy because oxaliplatin can harm an unborn baby. Do not breastfeed while using this medicine,  and for at least 3 months after your last dose. How is oxaliplatin given? Oxaliplatin is given as an infusion into a vein. A healthcare provider will give you this injection. Oxaliplatin must be given slowly, and the infusion can take at least 2 hours to complete. Oxaliplatin is usually given once every 2 weeks. Your doctor will determine how long to treat you with this medicine. You may be given medication to prevent nausea or vomiting. Receiving oxaliplatin can make you more sensitive to cold, which can cause numbness, tingling, and muscle spasms. This includes exposure to cold temperature and coming into contact with cold objects. To prevent discomfort, follow these steps:  do not inhale deeply when you are exposed to cold air;  cover your skin, head, and face when you are outside in cold temperatures;  wear gloves when handling cold objects or refrigerated foods;  do not run an air conditioner at very cool temperature in your home or car (even during hot weather);  do not drink cold drinks or use ice cubes in drinks;  do not put ice packs on your body. Chemotherapy often causes nausea or mouth sores. Do not eat ice chips to ease these discomforts because you will be more sensitive to cold. Talk to your doctor about other ways to treat nausea or mouth sores. You may be given other medications to prevent nausea or vomiting while you are receiving oxaliplatin. Oxaliplatin can lower your blood cell counts. Your blood will need to be tested often. Your cancer treatments may be delayed based on the results. Your heart function may need to be checked using an electrocardiograph or ECG (sometimes called an EKG). What happens if I miss a dose? Contact your doctor if you miss an appointment for your oxaliplatin injection. What happens if I overdose?   Seek emergency medical attention or call the Poison Help line at 1-661.615.3402. What should I avoid while receiving oxaliplatin? Avoid cold temperatures and cold objects, including ice, cold drinks, and skin exposure to cold temperatures. Avoid being near people who are sick or have infections. Tell your doctor at once if you develop signs of infection. This medicine may cause blurred vision and may impair your reactions. Avoid driving or hazardous activity until you know how this medicine will affect you. What are the possible side effects of oxaliplatin? Oxaliplatin can cause a severe or life-threatening allergic reaction. Some people receiving a oxaliplatin injection have had a reaction to the infusion within minutes after the medicine is injected into the vein. Tell your caregiver right away if you feel dizzy, short of breath, confused, sweaty, itchy, or have diarrhea, chest pain, warmth or redness in your face, or feel like you might pass out. Get emergency medical help if you have signs of an allergic reaction:  hives; difficult breathing; swelling of your face, lips, tongue, or throat.   Call your doctor at once if you have:  increased sensitivity to cold temperatures and cold objects;  numbness, tingling, or burning pain that interferes with daily activities;  severe or ongoing diarrhea or vomiting;  confusion, change in mental status, vision problems, seizure (convulsions);  pain or burning when you urinate;  sudden chest pain or discomfort, wheezing, dry cough, feeling short of breath;  pain, redness, swelling, or skin changes where the injection was given;  dehydration symptoms --feeling very thirsty or hot, being unable to urinate, heavy sweating, or hot and dry skin;  heart problems --headache with chest pain and severe dizziness, fainting, fast or pounding heartbeats;  liver problems --nausea, upper stomach pain, itching, tiredness, loss of appetite, dark urine, irina-colored stools, jaundice (yellowing of the skin or eyes);  muscle problems --unexplained muscle pain, tenderness, or weakness especially if you also have fever, unusual tiredness, and dark colored urine;  nerve problems --jaw or chest tightness, eye pain, strange feeling in your tongue, problems with speech or swallowing; or  low blood cell counts --fever, chills, tiredness, mouth sores, skin sores, easy bruising, unusual bleeding, pale skin, cold hands and feet, feeling light-headed or short of breath. Common side effects may include:  nausea, vomiting, diarrhea;  numbness, tingling, burning pain;  low blood cell counts;  abnormal liver function tests;  mouth sores; or  feeling tired. This is not a complete list of side effects and others may occur. Call your doctor for medical advice about side effects. You may report side effects to FDA at 6-842-FDA-0861. What other drugs will affect oxaliplatin? Other drugs may affect oxaliplatin, including prescription and over-the-counter medicines, vitamins, and herbal products. Tell your doctor about all your current medicines and any medicine you start or stop using. Where can I get more information? Your doctor or pharmacist can provide more information about oxaliplatin. Remember, keep this and all other medicines out of the reach of children, never share your medicines with others, and use this medication only for the indication prescribed. Every effort has been made to ensure that the information provided by [address] is accurate, up-to-date, and complete, but no guarantee is made to that effect. Drug information contained herein may be time sensitive. [facility] information has been compiled for use by healthcare practitioners and consumers in the [place] and therefore [facility] does not warrant that uses outside of the [place] are appropriate, unless specifically indicated otherwise. [facility] drug information does not endorse drugs, diagnose patients or recommend therapy.  [facility] drug information is an informational resource designed to assist licensed healthcare practitioners in caring for their patients and/or to serve consumers viewing this service as a supplement to, and not a substitute for, the expertise, skill, knowledge and judgment of healthcare practitioners. The absence of a warning for a given drug or drug combination in no way should be construed to indicate that the drug or drug combination is safe, effective or appropriate for any given patient. [facility] does not assume any responsibility for any aspect of healthcare administered with the aid of information [facility] provides. The information contained herein is not intended to cover all possible uses, directions, precautions, warnings, drug interactions, allergic reactions, or adverse effects. If you have questions about the drugs you are taking, check with your doctor, nurse or pharmacist.  Copyright 9186-0939 [address] [place] [address] Dr BROWN. Version: 10.01. Revision date: 7/24/2020. Care instructions adapted under license by [facility]. If you have questions about a medical condition or this instruction, always ask your healthcare professional. [place] [address] any warranty or liability for your use of this information. bevacizumab  Pronunciation:  krystiancecelia STAFFORDZ oo mab  Brand: Avastin, Tanya Males  What is the most important information I should know about bevacizumab? Bevacizumab can make it easier for you to bleed. Seek emergency medical attention if you have any bleeding that will not stop. You may also have bleeding on the inside of your body. Call your doctor if you have: signs of bleeding in your digestive tract --feeling very weak or dizzy, severe stomach pain, black or bloody stools, coughing up blood or vomit that looks like coffee grounds; or signs of bleeding in the brain --sudden numbness or weakness, slurred speech, severe headache, problems with vision or balance.   Do not use this medicine within 28 days before or after a planned surgery. What is bevacizumab? Bevacizumab is used to treat a certain type of brain tumor, and certain types of cancers of the kidney, liver, lung, colon, rectum, cervix, ovary, or fallopian tube. Bevacizumab is also used to treat cancer of the membrane lining the internal organs in your abdomen. It is usually given as part of a combination of cancer medicines. Bevacizumab may also be used for purposes not listed in this medication guide. What should I discuss with my healthcare provider before receiving bevacizumab? You may not be able to use use bevacizumab if you are allergic to it, or:  if you have slow healing of a skin wound or surgical incision;  if you have had surgery within the past 4 weeks (28 days);  if you have recently been coughing up blood; or  if you plan to have surgery within the next 4 weeks (28 days). Tell your doctor if you have ever had:  heart disease, high blood pressure;  a heart attack, stroke, or blood clots;  a bleeding or blood-clotting disorder; or  stomach or intestinal bleeding, or perforation (a hole or tear) in your esophagus, stomach, or intestines. Bevacizumab may harm an unborn baby. Use effective birth control to prevent pregnancy while you are using this medicine and for at least 6 months after your last dose. Tell your doctor if you become pregnant. Bevacizumab may cause a woman's ovaries to stop working correctly. Symptoms of ovarian failure include 3 or more missed menstrual periods in a row. This may affect your fertility (ability to have children). Talk to your doctor about your specific risks. Do not breastfeed while using this medicine,  and for at least 6 months after your last dose. How is bevacizumab given? Bevacizumab is given as an infusion into a vein. A healthcare provider will give you this injection.   Tell your caregivers if you feel dizzy, nauseated, light-headed, sweaty, or have a headache, shortness of breath, or chest pain during the injection. Bevacizumab is usually given once every 2 or 3 weeks. You will need frequent medical tests. Bevacizumab can cause problems with wound healing, which could result in bleeding or infection. If you need to have any type of surgery, you will need to stop receiving bevacizumab at least 28 days ahead of time. Do not start using bevacizumab for at least 28 days after surgery, or until your surgical incision heals. What happens if I miss a dose? Call your doctor for instructions if you miss an appointment for your bevacizumab injection. What happens if I overdose? Seek emergency medical attention or call the Poison Help line at 1-759.866.5049. What should I avoid while receiving bevacizumab? Avoid activities that may increase your risk of bleeding or injury. Use extra care to prevent bleeding while shaving or brushing your teeth. What are the possible side effects of bevacizumab? Get emergency medical help if you have signs of an allergic reaction: hives; difficult breathing; swelling of your face, lips, tongue, or throat. Some side effects may occur during the injection. Tell your caregiver if you feel dizzy, light-headed, short of breath, chilled, sweaty, or have a headache, chest pain, wheezing, or swelling in your face. Bevacizumab can make it easier for you to bleed. Call your doctor or seek emergency medical attention if you have:  easy bruising, unusual bleeding (nose, mouth, vagina, rectum), or any bleeding that will not stop;  signs of bleeding in your digestive tract --severe stomach pain, bloody or tarry stools, coughing up blood or vomit that looks like coffee grounds; or  signs of bleeding in the brain --sudden numbness or weakness (especially on one side of the body), sudden severe headache, problems with vision or balance. Bevacizumab can cause a rare but serious neurologic disorder affecting the brain.  Symptoms may occur within hours of your first dose, or they may not appear for up to a year after your treatment started. Call your doctor at once if you have extreme weakness or tiredness, headache, confusion, vision problems, fainting, or seizure (blackout or convulsions). Some people receiving bevacizumab have developed a fistula (an abnormal passageway) within the throat, lungs, gallbladder, kidney, bladder, or vagina. Call your doctor if you have: chest pain and trouble breathing, stomach pain or swelling, urine leakage, or if you feel like you are choking and gagging when you eat or drink. Also call your doctor if you have:   pain, swelling, warmth, or redness in one or both legs;  chest pain or pressure, pain spreading to your jaw or shoulder;  missed menstrual periods;  kidney problems --puffy eyes, swelling in your ankles or feet, urine that looks foamy;  heart problems --swelling, rapid weight gain, feeling short of breath;  low white blood cell counts --fever, mouth sores, skin sores, sore throat, cough, trouble breathing;  signs of any skin infection --sudden redness, warmth, swelling, or oozing, or any skin wound or surgical incision that will not heal; or  increased blood pressure --severe headache, blurred vision, pounding in your neck or ears. Side effects may be more likely in older adults. Common side effects may include:  nosebleed, rectal bleeding;  increased blood pressure;  headache, back pain;  dry or watery eyes;  dry or flaky skin;  runny nose, sneezing; or  changes in your sense of taste. This is not a complete list of side effects and others may occur. Call your doctor for medical advice about side effects. You may report side effects to FDA at 7-133-FDA-6593. What other drugs will affect bevacizumab? Other drugs may affect bevacizumab, including prescription and over-the-counter medicines, vitamins, and herbal products. Tell your doctor about all your current medicines and any medicine you start or stop using. Where can I get more information?   Your doctor or pharmacist can provide more information about bevacizumab. Remember, keep this and all other medicines out of the reach of children, never share your medicines with others, and use this medication only for the indication prescribed. Every effort has been made to ensure that the information provided by [address] is accurate, up-to-date, and complete, but no guarantee is made to that effect. Drug information contained herein may be time sensitive. [facility] information has been compiled for use by healthcare practitioners and consumers in the [place] and therefore [facility] does not warrant that uses outside of the [place] are appropriate, unless specifically indicated otherwise. [facility]'s drug information does not endorse drugs, diagnose patients or recommend therapy. [facility]'s drug information is an informational resource designed to assist licensed healthcare practitioners in caring for their patients and/or to serve consumers viewing this service as a supplement to, and not a substitute for, the expertise, skill, knowledge and judgment of healthcare practitioners. The absence of a warning for a given drug or drug combination in no way should be construed to indicate that the drug or drug combination is safe, effective or appropriate for any given patient. [facility] does not assume any responsibility for any aspect of healthcare administered with the aid of information [facility] provides. The information contained herein is not intended to cover all possible uses, directions, precautions, warnings, drug interactions, allergic reactions, or adverse effects. If you have questions about the drugs you are taking, check with your doctor, nurse or pharmacist.  Copyright 6825-0601 [facility] BROWN. Version: 16.03. Revision date: 6/2/2020. Care instructions adapted under license by [facility] [address]  If you have questions about a medical condition or this instruction, always ask your healthcare professional. [address] any warranty or liability for your use of this information. fluorouracil (injection)  Pronunciation:  FLOOR oh URE a robert  Brand:  Adrucil  What is the most important information I should know about fluorouracil? Before using fluorouracil tell your doctor about all your medical conditions or allergies, all medicines you use, and if you are pregnant or breastfeeding. What is fluorouracil? Fluorouracil is used to treat cancer of the colon, rectum, breast, stomach, or pancreas. Fluorouracil is often given in combination chemotherapy with other cancer drugs. Fluorouracil may also be used for purposes not listed in this medication guide. What should I discuss with my healthcare provider before receiving fluorouracil? Tell your doctor if you have ever had:  a metabolic disorder called DPD (dihydropyrimidine dehydrogenase) deficiency;  heart problems; or  bone marrow depression. Both men and women using this medicine should use effective birth control to prevent pregnancy. Fluorouracil can harm an unborn baby if the mother or father is using this medicine. Keep using birth control for at least 3 months after your last dose. Tell your doctor right away if a pregnancy occurs while either the mother or the father is using fluorouracil. This medicine may affect fertility (ability to have children) in both men and women. However, it is important to use birth control to prevent pregnancy because fluorouracil can harm an unborn baby. You should not breastfeed while using this medicine. How is fluorouracil given? Fluorouracil is given as an infusion into a vein. A healthcare provider will give you this injection. You may receive your first dose in a hospital or clinic setting to quickly treat any serious side effects. Fluorouracil is often given in a treatment cycle, and you may need to use the medicine only on certain days of each cycle.  Fluorouracil is sometimes given in a continuous infusion over 24 to 46 hours. How often you need fluorouracil injections will depend on many factors, including side effects and how your body responds to the medicine. Your doctor will determine how long to treat you with this medicine. Tell your caregivers if you feel any burning, pain, or swelling around the IV needle when fluorouracil is injected. Fluorouracil can increase your risk of bleeding or infection. You will need frequent medical tests. Your cancer treatments may be delayed based on the results of these tests. What happens if I miss a dose? Call your doctor for instructions if you miss an appointment for your fluorouracil injection. What happens if I overdose? Since this medication is given by a healthcare professional in a medical setting, an overdose is unlikely to occur. What should I avoid while receiving fluorouracil? Avoid being near people who are sick or have infections. Tell your doctor at once if you develop signs of infection. What are the possible side effects of fluorouracil? Get emergency medical help if you have signs of an allergic reaction:  hives; difficult breathing; swelling of your face, lips, tongue, or throat. Call your doctor at once if you have:  fever (take your temperature each day while receiving fluorouracil);  severe or ongoing diarrhea;  vision problems;  confusion, problems with balance or muscle movement;  painful mouth sores, red or swollen gums, trouble swallowing, talking, or eating;  bone marrow suppression --dizziness, pale lips or fingernail beds, fast heart rate, getting easily tired or short of breath;  \"hand and foot syndrome\" --pain, blisters, bleeding, or severe rash on the palms of your hands or the soles of your feet;  heart problems --chest pain or pressure, pain spreading to your jaw or shoulder, irregular heartbeats, nausea, sweating, feeling dizzy or short of breath.   Your cancer treatments may be delayed or permanently discontinued if you have certain side effects. Common side effects may include:  diarrhea;  mouth sores;  heart problems; or  bone marrow suppression. This is not a complete list of side effects and others may occur. Call your doctor for medical advice about side effects. You may report side effects to FDA at 7-382-DBK-5137. What other drugs will affect fluorouracil? If you take a blood thinner (warfarin, Coumadin, Chaz Haring), you may need to have more frequent \"INR\" or prothrombin time tests. Other drugs may affect fluorouracil, including prescription and over-the-counter medicines, vitamins, and herbal products. Tell your doctor about all your current medicines and any medicine you start or stop using. Where can I get more information? Your doctor or pharmacist can provide more information about fluorouracil. Remember, keep this and all other medicines out of the reach of children, never share your medicines with others, and use this medication only for the indication prescribed. Every effort has been made to ensure that the information provided by [address] [place], [place] [address] is accurate, up-to-date, and complete, but no guarantee is made to that effect. Drug information contained herein may be time sensitive. [facility] information has been compiled for use by healthcare practitioners and consumers in the [place] and therefore [facility] does not warrant that uses outside of the [place] are appropriate, unless specifically indicated otherwise. [facility] drug information does not endorse drugs, diagnose patients or recommend therapy. [facility] drug information is an informational resource designed to assist licensed healthcare practitioners in caring for their patients and/or to serve consumers viewing this service as a supplement to, and not a substitute for, the expertise, skill, knowledge and judgment of healthcare practitioners.  The absence of a warning for a given drug or drug combination in no way should be construed to indicate that the drug or drug combination is safe, effective or appropriate for any given patient. [facility] does not assume any responsibility for any aspect of healthcare administered with the aid of information [facility] provides. The information contained herein is not intended to cover all possible uses, directions, precautions, warnings, drug interactions, allergic reactions, or adverse effects. If you have questions about the drugs you are taking, check with your doctor, nurse or pharmacist.  Copyright 0809-6371 [address]. Version: 5.01. Revision date: 12/3/2020. Care instructions adapted under license by [facility] . If you have questions about a medical condition or this instruction, always ask your healthcare professional. [place] [address] any warranty or liability for your use of this information. leucovorin (injection)  Pronunciation:  GARIMA FABIAN in  What is the most important information I should know about leucovorin? You should not be treated with leucovorin if you have pernicious anemia or other types of anemia caused by a lack of vitamin B12. What is leucovorin? Leucovorin is a form of folic acid (a type of vitamin B). Folic acid helps your body produce and maintain new cells, and also helps prevent changes in DNA that may lead to cancer. A lack of folic acid in the body can cause anemia, a decrease in red blood cells that carry oxygen through your blood to your tissues and organs. Leucovorin is used to treat anemia (low red blood cells) caused by a lack of natural folic acid in the body. Leucovorin is also used to prevent serious side effects caused by large doses or accidental overdose of medications that can reduce the effects of folic acid in the body. This includes methotrexate, pyrimethamine, and others.   Leucovorin is sometimes used in a chemotherapy combination to lengthen survival time in people with advanced colorectal cancer. Leucovorin does not treat the cancer itself. Leucovorin may also be used for purposes not listed in this medication guide. What should I discuss with my healthcare provider before receiving leucovorin? You should not be treated with leucovorin if you have pernicious anemia or other types of anemia caused by a lack of vitamin B12. If possible before you receive leucovorin, tell your doctor if you have:  liver disease;  kidney disease; or  if you are dehydrated. Also tell your doctor about all other medications you currently use. There are many other drugs that can interact with methotrexate and slow down your body's ability to process and eliminate the drug. This can affect how quickly methotrexate is able to leave your body, even with the help of leucovorin. It is not known whether this medicine will harm an unborn baby. Tell your doctor if you are pregnant. It is not known whether leucovorin passes into breast milk or if it could harm a nursing baby. Tell your doctor if you are breast-feeding a baby. In an emergency situation it may not be possible to tell your caregivers if you are pregnant or breast-feeding. Make sure any doctor caring for your pregnancy or your baby knows you have received this medicine. How is leucovorin given? Leucovorin is injected into a muscle, or into a vein through an IV. A healthcare provider will give you this injection. When treating an accidental overdose, leucovorin should be started as soon as possible for the best effect. Leucovorin is usually given every 6 hours when used to treat accidental overdose or to prevent side effects from high-dose methotrexate or similar medicines. When used in chemotherapy, leucovorin is usually given for 5 days in a row, every 4 to 5 weeks. Follow your doctor's dosing instructions very carefully. You will need frequent medical tests to help your doctor determine how long to treat you with leucovorin.   Leucovorin is usually given with other medications to help your kidneys remove methotrexate from your body if needed. You may also be treated with IV fluids to keep you from getting dehydrated. What happens if I miss a dose? Call your doctor for instructions if you miss an appointment for your leucovorin injection. What happens if I overdose? Since this medicine is given by a healthcare professional in a medical setting, an overdose is unlikely to occur. What should I avoid while receiving leucovorin? Follow your doctor's instructions about any restrictions on food, beverages, or activity. What are the possible side effects of leucovorin? Get emergency medical help if you have signs of an allergic reaction: hives; difficult breathing; swelling of your face, lips, tongue, or throat. When used alone, leucovorin may cause few if any side effects. Some side effects can occur when leucovorin is used with fluorouracil. Tell your caregivers or call your doctor right away if you have:  nausea, vomiting, diarrhea;  sores or redness in your mouth, pain when eating or swallowing;  a seizure; or  a light-headed feeling, like you might pass out. Your cancer treatments may be delayed or permanently discontinued if you have certain side effects. Serious side effects may be more likely in older adults and those who are overweight, malnourished, or debilitated. Common side effects in people treated with leucovorin and fluorouracil may include:  diarrhea; or  mouth sores. This is not a complete list of side effects and others may occur. Call your doctor for medical advice about side effects. You may report side effects to FDA at 7-988-FDA-0390. What other drugs will affect leucovorin?   Tell your doctor about all your current medicines and any you start or stop using, especially:  fluorouracil;  sulfamethoxazole and trimethoprim (such as Bactrim, Septra, SMZ-TMP or SMX-TMP); or  seizure medicine --phenobarbital, phenytoin, primidone. This list is not complete. Other drugs may interact with leucovorin, including prescription and over-the-counter medicines, vitamins, and herbal products. Not all possible interactions are listed in this medication guide. Where can I get more information? Your pharmacist can provide more information about leucovorin. Remember, keep this and all other medicines out of the reach of children, never share your medicines with others, and use this medication only for the indication prescribed. Every effort has been made to ensure that the information provided by [address] is accurate, up-to-date, and complete, but no guarantee is made to that effect. Drug information contained herein may be time sensitive. [facility] information has been compiled for use by healthcare practitioners and consumers in the [facility] and therefore [facility] does not warrant that uses outside of the [facility] are appropriate, unless specifically indicated otherwise. [facility]'s drug information does not endorse drugs, diagnose patients or recommend therapy. [facility]'s drug information is an informational resource designed to assist licensed healthcare practitioners in caring for their patients and/or to serve consumers viewing this service as a supplement to, and not a substitute for, the expertise, skill, knowledge and judgment of healthcare practitioners. The absence of a warning for a given drug or drug combination in no way should be construed to indicate that the drug or drug combination is safe, effective or appropriate for any given patient. [facility] does not assume any responsibility for any aspect of healthcare administered with the aid of information [facility] provides. The information contained herein is not intended to cover all possible uses, directions, precautions, warnings, drug interactions, allergic reactions, or adverse effects.  If you have questions about the drugs you are taking, check with your doctor, nurse or pharmacist.  Copyright 6823-8303 [facility]. Version: 1.01. Revision date: 8/21/2017. Care instructions adapted under license by [facility] ([facility]). If you have questions about a medical condition or this instruction, always ask your healthcare professional. [place] [address] any warranty or liability for your use of this information.

## 2023-01-13 ENCOUNTER — HOSPITAL ENCOUNTER (OUTPATIENT)
Dept: INFUSION THERAPY | Age: 68
Setting detail: INFUSION SERIES
Discharge: HOME | End: 2023-01-13
Payer: OTHER GOVERNMENT

## 2023-01-13 DIAGNOSIS — C18.2 MALIGNANT NEOPLASM OF ASCENDING COLON (HCC): Primary | ICD-10-CM

## 2023-01-13 PROCEDURE — 6360000002 HC RX W HCPCS: Performed by: INTERNAL MEDICINE

## 2023-01-13 PROCEDURE — 2580000003 HC RX 258: Performed by: INTERNAL MEDICINE

## 2023-01-13 PROCEDURE — 96523 IRRIG DRUG DELIVERY DEVICE: CPT

## 2023-01-13 RX ORDER — SODIUM CHLORIDE 0.9 % (FLUSH) 0.9 %
10 SYRINGE (ML) INJECTION PRN
OUTPATIENT
Start: 2023-01-13

## 2023-01-13 RX ORDER — WATER 1000 ML/1000ML
2.2 INJECTION, SOLUTION INTRAVENOUS ONCE
OUTPATIENT
Start: 2023-01-13 | End: 2023-01-13

## 2023-01-13 RX ORDER — HEPARIN SODIUM (PORCINE) LOCK FLUSH IV SOLN 100 UNIT/ML 100 UNIT/ML
500 SOLUTION INTRAVENOUS PRN
OUTPATIENT
Start: 2023-01-13

## 2023-01-13 RX ORDER — SODIUM CHLORIDE 0.9 % (FLUSH) 0.9 %
20 SYRINGE (ML) INJECTION PRN
Status: DISCONTINUED | OUTPATIENT
Start: 2023-01-13 | End: 2023-01-14 | Stop reason: HOSPADM

## 2023-01-13 RX ORDER — SODIUM CHLORIDE 0.9 % (FLUSH) 0.9 %
20 SYRINGE (ML) INJECTION PRN
OUTPATIENT
Start: 2023-01-13

## 2023-01-13 RX ORDER — HEPARIN SODIUM (PORCINE) LOCK FLUSH IV SOLN 100 UNIT/ML 100 UNIT/ML
500 SOLUTION INTRAVENOUS PRN
Status: DISCONTINUED | OUTPATIENT
Start: 2023-01-13 | End: 2023-01-14 | Stop reason: HOSPADM

## 2023-01-13 RX ADMIN — HEPARIN 500 UNITS: 100 SYRINGE at 13:25

## 2023-01-13 RX ADMIN — SODIUM CHLORIDE, PRESERVATIVE FREE 20 ML: 5 INJECTION INTRAVENOUS at 13:25

## 2023-01-23 NOTE — PROGRESS NOTES
MEDICAL ONCOLOGY PROGRESS NOTE                                                          Denise De La Garza   1955 1/25/2023     Chief Complaint   Patient presents with    Cancer     Malignant neoplasm of ascending colon     INTERVAL HISTORY/HISTORY OF PRESENT ILLNESS:  Diagnosis  Colonic adenocarcinoma, March 2020  [id number](liver), stage ROXANA  IHC MMR- proficient  K-maria luisa mutated   N-MARIA LUISA/BRAF wild-type  Iron deficiency anemia  Soft tissue mass, June 2021  Adenocarcinoma-consistent with colon primary, right psoas muscle, June 2021  Metastatic adenocarcinoma, Jan 2022  MLH1, MSH2, MH6, PMS2-intact  MMR- no loss of expression     Treatment summary  3/30/2020-right hemicolectomy at [facility]  Anticipated Liver ablation to be followed by neoadjuvant/adjuvant versus palliative chemotherapy  06/10/2020-November 2020-FOLFOX + Avastin  12/11/20- Right hepatectomy-Dr. Makayla Schaefer  S/p Injectafer-poor oral iron tolerance  7/13/21- 11/17/21 FOLFIRI + Avastin every 2 weeks  1/13/22-psoas muscle mass resection/HIPEC by Dr. Farheen Valentin at [facility]  5/10/22 - 8/3/22 Discontinue FOLFIRI + Avastin every 2 weeks due to progression in the liver  8/10/2022- FOLFOX + Avastin every 2 weeks     The patient is a very pleasant 74years old male who has been diagnosed with colonic adenocarcinoma. He has had several treatment modalities in the past.  He is currently status post CRS/HIPEC in mid January, 2022 at [facility]. This was complicated by a leak from his ileocolic anastomosis secondary to closed-loop obstruction at his hernia repair site (mesh displaced). He was taken back to the OR and had a primary repair of his anastomosis and diverting ileostomy in February 2022. He is currently on treatment with FOLFIRI/Avastin. Receiving palliative chemotherapy with mFOLFOX/Avastin. Today cycle #11. Overall, he feels great. He notes right lower quadrant pain that is under control with his current pain regimen. He has gained 2 pounds since last visit. Cancer history  Mr. Ford Justice was first seen by me on 3/23/2020. He was referred for a new diagnosis of colonic adenocarcinoma involving the cecum. The patient reports that he had a wellbeing consult with his provider at the [place]. He was found to have anemia and then recommended a colonoscopy. Of note, the patient has a family history of colon cancer. His mother is a patient of Dr. Kevin Alcazar he has been diagnosed with colon cancer in 2010.  3/11/2020- colonoscopy revealed a large malignant appearing fungating mass lesion in the cecum. In addition, several other polyps. Biopsy of the mass consistent with moderate differentiated colonic adenocarcinoma. Polyps consistent with tubulovillous adenoma with no high-grade dysplasia. IHC MMR not proficient. K-maria luisa mutated, BRAF and NRAS wild type. MSI proficient  3/11/2020-CEA 5.5 (H)  3/18/2020-CT abdomen pelvis with contrast  Invasive cecal mass adhering to the right lateral abdominal wall muscles with adjacent lymphadenopathy. Mild partial obstruction of the terminal ileum. 2. Suspicious lesions in the right and left hepatic lobes measure up to 1.3 cm and likely represent metastatic disease. 3/18/2020-Xr Chest Standard  No radiographic evidence of acute cardiopulmonary process. 3/23/2020-he was first seen by me. Recommended completion of staging with CT chest.  Also recommended liver MRI for further clarification of liver lesion. S.  Recommend to proceed with a general surgery consultation tomorrow with Dr. Felicia Silva. Patient was informed that I favor surgical resection if feasible of the primary malignancy. 3/30/2020- right hemicolectomy by Dr. Felicia Silva at [facility] consistent with invasive moderately differentiated colonic adenocarcinoma measuring 7.2 cm. Carcinoma directly invading the adjacent abdominal wall tissue. Focal lymphovascular space invasion identified. Focal perineural invasion identified. Surgical margins negative for evidence of malignancy. 6 out of 14 lymph nodes positive for metastatic adenocarcinoma. Final pathology staging [id number](liver, stage ROXANA)  4/20/2020-CT chest with contrast showed No convincing intrathoracic metastasis. Nonspecific 4 mm nodule of the inferior lingula and a 2 mm right upper lobe nodule can be followed on subsequent imaging in 6-12 months. Moderate coronary calcifications. Hypodense metastatic liver lesions. Small hiatal hernia. 4/20/2020-Mri Abdomen W Wo Contrast There are about 5 liver lesions. The 2 largest appears similar compared to 3/18/2020, the others are too small to further characterize. Appearance is most concerning for metastatic disease. Enhancement of the right lateral peritoneum. This is favored to be postoperative as there is no nodularity, evidence of omental disease or lymphadenopathy. Recommend attention on follow-up. Cholecystectomy. 4/22/2020-discussed with Dr. Dena Santos at [place]. He will review imaging studies and give further recommendations regarding eligibility for resection of liver lesions. 5/8/2020 CT Abdomen The two largest suspicious lesions measuring 1.2 and 1.3 cm in the  right and left hepatic lobes respectively are similar compared to the  3/18/2020 CT. Additionally, there are at least five subcentimeter lesions with similar signal characteristics, which are also highly suspicious for metastases. If complete characterization of the number and distribution of lesions is necessary, an MRI with Eovist could be acquired. 5/19/2020- he was seen by the hepatobiliary service at [facility] with Dr. Dena Santos:  patient adequate risk candidate for a multimodal approach, directed toward curative hepatectomy eventually. Endorsed by Hepatobiliary Conference, I recommended perc ablation of the L hemiliver to clear it, followed by systemic therapy in a neoadjuvant strategy.  Restaging imaging to confirm clearance of disease on the left and lack of progression to unresectability of the R hemiliver disease would then be followed by R hepatectomy. Limitations to this approach may be accessibility of the segment 4A/8 disease high in the hepatic dome and the possibility of heat sink-related recurrence s/p abation of the left-sided disease. 5/21/2020- referral for Mediport placement and start FOLFOX in 2 weeks. Will add bevacizumab after 6 weeks of liver ablation. We will plan for 12 biweekly dose of FOLFOX. Bevacizumab will also be stopped 6 weeks prior to major procedure. 6/8/2020- Microwave ablation of the left liver lesion was then performed for 5 minutes at [address] to achieve a 3.4 x 3.9 cm approximately spherical zone of ablation. 6/10/2020- initiation of FOLFOX.  7/20/2020-added Avastin. 9/10/20 MRI abdomen: Left hepatic lobe segment II lesion demonstrates small T1 hyperintense blood products, status post microwave ablation on 6/8/2020. No definitive enhancement within the lesion. Focal internal thickening or scar present. Recommend attention on follow-up. Additional scattered subcentimeter foci throughout the liver decreased in size compared to MRI dated 4/20/2020 consistent with improving metastatic disease. Additional chronic findings as above. 9/16/2020-discussed with plan with the patient and [facility]. Interval response to treatment. Plan to continue chemotherapy through 12 cycles with Avastin. 12/11/20 Right hepatectomy-Dr. Syble Essex  12/11/20 Right hepatectomy pathology: Liver, right, resection: Focus of Fibrosis with calcifications and chronic inflammation (0.3 cm), see comment. Background hepatic parenchyma with minimal periportal fibrosis (trichrome stain), minimal lobular and portal inflammation, and no significant macrovesicular steatosis (<5%) Comments: The patient's history of colorectal cancer status adjuvant therapy is noted. The focus of fibrosis may reflect treatment effect.  There is no evidence of viable tumor in the sampled specimen. 12/14/20 Ct Abdomen Pelvis W Iv Contrast A Small bowel obstruction with transition point at the distal small bowel, just proximal to RIGHT upper quadrant ileocolonic anastomosis. Postoperative change of RIGHT hepatectomy with small amount of expected free fluid and intraperitoneal gas. Redemonstrated LEFT liver lesion. Small bilateral pleural effusions. 12/16/20 SBFT-[place]: Study is limited due to retained contrast in the small bowel from prior attempt at small bowel follow-through on the floor. Small bowel remains dilated, measuring approximately 5.2 cm, which is consistent with partial or resolving small bowel obstruction. Final radiographs show contrast within the colon. 1/4/2020-resolution of small bowel obstruction. 1/7/21 CT chest: No finding to suggest intrathoracic neoplastic process or metastatic disease. The benign-appearing tiny nodule in the right upper lobe probably represent a noncalcified granuloma. A nodule in the lingular segment of the left upper lobe is not visualized in this study. Postsurgical changes of the liver. No evidence of focal complication. A trace right basal pleural effusion. This may be reactive to the previous abdominal surgery. 3/4/21 MRI abd: Status post right hepatectomy and microwave ablation of a left liver lesion. No findings to suggest residual/recurrent disease. No new liver lesion is identified. Postsurgical changes related to right hemicolectomy. Microwave ablation zone in segment II of the left hepatic lobe measures approximately 21 mm in diameter, unchanged. No appreciable postcontrast enhancement or other findings to suggest local disease recurrence. No new liver lesion is identified. Spleen is mildly enlarged, measuring 13.8 cm in length. 3/17/2021-1 year colonoscopy showed no evidence of polyps.   5/3/21 CEA 6.2  6/8/21 MRI abdomen ([place]): Status post right hepatectomy and MWA of a left liver lesion. No evidence of residual or recurrent metastatic disease in the liver. Status post prior right hemicolectomy for colon carcinoma. Within the posterior right iliopsoas muscle there is a mildly T2 hyperintense nodular focus with postcontrast rim enhancement and diffusion restriction. This measures approximately 2.7 x 2 x 2 cm. More delayed postcontrast images show irregular area of enhancement measuring 2.4 x 7.7 cm axially involving the right iliopsoas muscle and the right lateral abdominal wall. Suggest correlation with contrast enhanced CT exam for complete evaluation. Consider biopsy of this lesion. 6/15/21 CT CHEST WO CONTRAST No metastatic disease in the chest.  No change in tiny 2 mm RIGHT upper lobe pulmonary nodule. Mild ectasia of the ascending aorta measuring 4 cm.   6/18/2021-I reviewed results of MRI abdomen at [facility]. CT chest without contrast reviewed by me and showed no evidence of metastatic disease. Stable 2 mm right upper lobe nodule. Discussed with hepatobiliary service at [facility]. Biopsy intra-abdominal nodule next week at [facility]. [id number]/25/[id number] biopsy soft tissue mass right psoas muscle at [facility] consistent with recurrent colonic adenocarcinoma. 7/2/2021-discussed with hepatobiliary service at [facility] and also surgical oncology. Surgical oncology will call us back regarding consultation for consideration of HIPEC if he is a candidate  7/13/21-initiation of chemotherapy with FOLFIRI + Avastin every 2 weeks  7/13/21 CEA 10.7  7/27/2021-CEA 9.6  7/30/2021-she was seen by the surgical oncology group at [facility] by Dr Suze Donald. They recommended to complete 6 cycles of chemotherapy and repeat CT chest abdomen pelvis and liver MRI to assess disease response. They discussed the role of CRS/HIPEC in his situation. He was told that it really depends on the status of his liver lesions as well.  He will complete 6 cycles of chemotherapy and will return to see me with a CTAP as well as MRI of the liver to assess disease burden and determine if he can be a candidate for debulking.  8/25/2021-proceed cycle #4.  9/22/2021 CEA- 8.1  9/24/2021 MRI with and w/o Contrast ([place])  Tiny left retroperitoneal nodule involving the left lateral conal fascial new compared to 3/4/2021 though unchanged from most recent prior, possibly an additional site of metastasis. No other new metastatic disease within the abdomen. Similar partially visualized right posterior body wall/psoas infiltrative lesion not definitely changed from MRI abdomen 6/8/2021 but better evaluated in its entirety on concurrent CT, reported separately. Status post right hemicolectomy, right hepatectomy, and segment III microwave ablation. Similar mild splenomegaly. Additional chronic and incidental findings as described in the body the report. Liver: Status post right hepatectomy. Ablation zone in segment II measures 1.7 x 1.1 cm, slightly decreased in size from 1.8 x 1.4 cm previously (series 1301 image 56) without appreciable enhancement. Similar tiny subcentimeter cyst in the left hepatic lobe. No new focal hepatic lesion identified. No visualized free fluid. Similar 0.7 cm enhancing nodule along the left lateral conal fascia laterally new from 3/4/2021, grossly unchanged from MRI dated 6/8/2021. (series 801 image 22). No other appreciable peritoneal/retroperitoneal or  omental nodularity. Ill-defined T2 hyperintense signal and hyperenhancement in the right posteromedial body wall involving the lateral aspect of the psoas muscle and posterolateral abdominal wall musculature, partially visualized measuring at least 8.7 x 3.1   cm, grossly similar to the prior exam, better evaluated in its entirety on concurrent CT reported separately. 9/24/2021 CT C/A/P w/ Contrast([place]) Status post right hemicolectomy, right hepatectomy and left hepatic microwave ablation without new suspicious hepatic lesion.   Left lateral perirenal fascial nodule, which is stable compared to 6/8/2021 MRI, but new compared to 3/4/2021 MRI is concerning for an additional site of metastatic disease. No sites of new or enlarging metastatic disease in the chest.  Similar appearance of right lateral psoas and posterior abdominal wall infiltrative lesion, not significantly changed compared to 6/8/2021 MRI. Mild splenomegaly. Additional findings as outlined in the body the report. Biopsy-proven adenocarcinoma involving the posterior lateral aspect of the right iliopsoas muscle and right lateral abdominal wall. Right iliopsoas component is difficult to measure but appears to be approximately 2.5 x 2.4 cm (series 2, image 153), similar to prior MRI. Nodular thickening noted along the right posterior abdominal wall and possibly involving the the transversus abdominis measures approximately 8.5 x 1.8 cm (series 2 image 153) overall similar compared to prior MRI. 10/6/2021-discussed the results of recent CT abdomen/pelvis and MRI abdomen/pelvis at [facility]. Persistent disease involving the psoas muscle. Discussed with colorectal surgery at [facility]. They recommend to continue current therapy for another 2 months and reassess with CT scans. 7/13/21- 11/17/21 FOLFIRI + Avastin every 2 weeks  12/3/21 CT chest/abd/pelvis [facility]): Status post prior right hemicolectomy, right hepatectomy and left hepatic lesions microwave ablation. No new liver lesion. Soft tissue thickening of the right lower posterior abdominal wall involving the iliopsoas muscle is grossly unchanged consistent with improving metastatic disease. Small right omental nodule and left lateral conal fascia nodule unchanged compared to  recent exam.   1/13/22 Exploratory lap with resections of psoas muscle mass and HIPEC by Dr. Mihir Vega at [facility]:  Metastatic colorectal adenocarcinoma involving fibroadipose tissue. IHC MMR proficient.   1/24/22 CT chest/abd/pelvis [facility]): Extensive postsurgical changes in the abdomen including partial right colectomy, resection of right retroperitoneal mass, omentectomy and mesh repair of right lateral abdominal wall defect. There appears to be a defect in the mesh containing herniated loops of small bowel. Extensive diffuse dilated small bowel loops with air-fluid levels are seen throughout the abdomen. There are segmental areas of decompressed small bowel in the left upper quadrant as well as adjacent to the herniated small  bowel loops in the right retroperitoneum. Air-fluid levels are also seen throughout the colon. While pattern could likely represent postoperative ileus given the diffuse small bowel dilatation and distal colonic air and fluid, developing or partial small bowel obstruction secondary to the right lateral abdominal wall hernia cannot entirely be excluded. Small ascites. 8 mm nodule along the left lateral conal fascia is unchanged. Slight increase in size of cardiophrenic lymph nodes measuring up to 7 mm anterior to the right hemidiaphragm. Stable hypodensity in the left hepatic lobe. Diffuse gastric wall thickening/edema could be secondary to gastritis in the appropriate clinical setting. February 2022- HIPEC/tumor debulking January. This was complicated by a leak from his ileocolic anastomosis secondary to closed-loop obstruction at his hernia repair site (mesh displaced). He was taken back to the OR and had a primary repair of his anastomosis and diverting ileostomy in February 2022  3/23/2022-ileostomy reversal at [facility] Dr. Shankar Potter at [facility]  3/20/2022 CT Abd/Pelvis WO Contrast [facility]) Findings suspicious for aspiration at the lung bases. Postsurgical changes related to right hepatectomy. There is a new low-attenuation lesion in the left hemiliver, highly concerning for metastasis.  Postsurgical changes related to right hemicolectomy with ileocolonic anastomosis and right lower quadrant diverting loop ileostomy. No evidence of bowel obstruction. No intra-abdominal fluid collection. Other findings as above. A critical alert was sent at the time of dictation. Liver: Postsurgical changes related to right hepatectomy. There is a new, approximately 2.1 cm low-attenuation lesion seen in the left hepatic lobe on image 25 of series 3, highly concerning for metastasis. 4/18/2022 Ileostomy,closure enterocutaneous stoma with mural acute inflammation and jessa-intestinal Fat necrosis. Negative for malignancy. 4/23/2022- discussed the patient reinitiation of chemotherapy. We will continue FOLFIRI. He had no prior progression on FOLFIRI of FOLFOX. 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  We would avoid Avastin at this time due to recent surgery. He is K-maria luisa mutated and therefore cannot use anti-EGFR agent. May contemplate adding Avastin in 4 weeks. 5/9/2022 CT Chest W Contrast New pulmonary nodules are present in the right and left lung as described above. It cannot be determined if these are infectious nodules or metastatic disease. Dilated loops of bowel are present in the upper abdomen. The abdomen is incompletely evaluated however this may be wither an ileus or partial obstruction. Hepatic metastasis. 5/10/22 Re-initiate FOLFIRI + Avastin every 2 weeks. 5/24/2022  CEA 29.1  5/24/2022 Iron Studies:Iron 29, TIBC 256, Iron Sat 11,Ferritin 116.9  6/22/2022 CEA 21.5  7/21/2022 CT Chest W Contrast Bibasilar linear atelectasis life scarring. No pulmonary nodules, masses or infiltrates. Multiple enhancing low attenuation masses in the liver similar to the previous study. Findings consistent with metastatic liver disease. 7/21/2022 CT Abd/Pelvis W IV Contrast ([place]) Multiple enhancing low attenuation liver lesions increase in size and number since the previous study 3/7/22. Findings consistent with progression of metastatic liver disease. Status postcholecystectomy.  The appendix is not visualized. No acute findings. 5/10/22 - 8/3/22 Discontinue FOLFIRI + Avastin every 2 weeks due to progression in the liver  8/3/2022-unfortunately, interval progression of liver metastasis when compared to last CT scans performed on 3/7/2022. In addition, when compared to report from [address] [place] [address] abdomen/pelvis on 3/20/2022 when he had only 1 single metastatic lesion measuring 2.1 cm. This is considered progression. I have recommended to discontinue FOLFIRI/Avastin and consider initiation of palliative FOLFOX with Avastin. Discussed with hepatobiliary surgery. The patient is not a candidate for further surgery. He is not a candidate for any liver directed therapy. 8/3/2022 CEA 26.6  9/14/2022 CEA 36.5  10/12/22 CEA 40.7  11/4/2022 CT Chest W Contrast Bilateral lower lobe scarring or atelectasis similar in appearance. There has been slight interval decrease in the amount and the size of the low attenuation hepatic lesions. 11/4/2022 CT Abd/Pelvis W IV Contrast ([place])There has been interval decrease in the mural thickening of the colonic loop at the level of the surgical suture material with slight residual mural thickening recognized. Endoscopy can be performed for further evaluation if clinically warranted. Postsurgical changes at the right hepatic lobe posteriorly similar. There has been slight interval decrease in the amount of hepatic metastatic disease with fewer lesions recognized in the right hepatic lobe. Cholecystectomy.   12/14/22 CEA 75.2      CEA dynamics:  2/27/22 CEA 3.5  5/24/22 CEA 29.1  6/22/22 CEA 21.5  8/3/22 CEA 26.6  9/4/22 CEA 36.5  10/12/22 CEA 40.7  12/14/22 CEA 75.2    PAST MEDICAL HISTORY:   Past Medical History:   Diagnosis Date    Acid reflux     Cancer ([facility] [address]     adenocarcinoma of colon    GERD (gastroesophageal reflux disease)     Palliative care patient 03/01/2022    PTSD (post-traumatic stress disorder)     Stage 3a chronic kidney disease (HCC)       PAST SURGICAL HISTORY:  Past Surgical History:   Procedure Laterality Date    ABLATION OF DYSRHYTHMIC FOCUS      ablation of liver at [address]  2003    CHOLECYSTECTOMY  2013    COLONOSCOPY      when pt was in the 20's    COLONOSCOPY N/A 03/11/2020    COLONOSCOPY POLYPECTOMY SNARE/COLD BIOPSY: Dr. Alirio Castelan colonoscopy: 6-12 months due to findings at colonoscopy today with Cecal mass lesion and large polyps.     COLONOSCOPY      COLONOSCOPY N/A 03/17/2021    Dr Janice Hoskins, Post-operative changes w IC anastomosis & single visible staple, Mild Diverticuosis, Int Hemorrhoids Grade 1, 3 year recall    HEMICOLECTOMY Right 03/30/2020    LAPAROSCOPIC-ASSISTED RIGHT HEMICOLECTOMY performed by Belkis Sandhu MD at [address] [facility] N/A 06/03/2020    INSERTION OF VENOUS PORT with flouro performed by Belkis Sandhu MD at [address] [place] [address] Dr ENDOSCOPY N/A 03/11/2020    Dr. Demetrius Talavera:   [facility]      SOCIAL HISTORY:  Social History     Socioeconomic History    Marital status:      Spouse name: None    Number of children: None    Years of education: None    Highest education level: None   Tobacco Use    Smoking status: Never    Smokeless tobacco: Never   Vaping Use    Vaping Use: Never used   Substance and Sexual Activity    Alcohol use: Yes     Comment: rare     Drug use: No    Sexual activity: Yes     Partners: Female     FAMILY HISTORY:  Family History   Problem Relation Age of Onset    Liver Cancer Mother     High Blood Pressure Mother     Colon Cancer Mother         x2    Cancer Father         Lung Cancer    Breast Cancer Sister     Cancer Maternal Grandfather         Lung Cancer    Cancer Paternal Grandfather         Stomach Cancer    Colon Polyps Neg Hx     Cystic Fibrosis Neg Hx     Liver Disease Neg Hx     Rectal Cancer Neg Hx         Current Outpatient Medications   Medication Sig Dispense Refill    oxyCODONE HCl (OXY-IR) 10 MG immediate release tablet Take 1 tablet by mouth every 6 hours as needed for Pain for up to 30 days. Intended supply: 30 days 120 tablet 0    fentaNYL (DURAGESIC) 12 MCG/HR Place 1 patch onto the skin every 72 hours for 30 days. Max Daily Amount: 1 patch 10 patch 0    promethazine (PHENERGAN) 12.5 MG tablet Take 1 tablet by mouth every 6 hours as needed for Nausea 60 tablet 5    sildenafil (VIAGRA) 50 MG tablet Take 1 tablet by mouth daily as needed for Erectile Dysfunction 10 tablet 5    Magnesium Oxide 400 MG CAPS Take 400 mg by mouth in the morning and at bedtime 60 capsule 3    apixaban (ELIQUIS) 5 MG TABS tablet Take 1 tablet by mouth 2 times daily 60 tablet 0    vitamin D (ERGOCALCIFEROL) 1.25 MG (87251 UT) CAPS capsule Take 1 capsule by mouth once a week 5 capsule 0    methocarbamol (ROBAXIN) 500 MG tablet Take 500 mg by mouth 3 times daily as needed       omeprazole (PRILOSEC) 20 MG delayed release capsule Take 20 mg by mouth daily      prazosin (MINIPRESS) 1 MG capsule Take 1 mg by mouth nightly as needed For nightmares       Sertraline HCl (ZOLOFT PO) Take by mouth daily       No current facility-administered medications for this visit.      REVIEW OF SYSTEMS:   CONSTITUTIONAL: no fever, no night sweats,  fatigue;  HEENT: no blurring of vision, no double vision, no hearing difficulty, no tinnitus, no ulceration, no dysplasia, no epistaxis;   LUNGS: no cough, no hemoptysis, no wheeze,  no shortness of breath;  CARDIOVASCULAR: no palpitation, no chest pain, no shortness of breath;  GI: Right lower quadrant abdominal pain, no nausea, no vomiting, no diarrhea, no constipation;  ANNIE: no dysuria, no hematuria, no frequency or urgency, no nephrolithiasis;  MUSCULOSKELETAL: no joint pain, no swelling, no stiffness;  ENDOCRINE: no polyuria, no polydipsia, no cold or heat intolerance;  HEMATOLOGY: no easy bruising or bleeding, no history of clotting disorder;  DERMATOLOGY: no skin rash, no eczema, no pruritus;  PSYCHIATRY: no depression, no anxiety, no panic attacks, no suicidal ideation, no homicidal ideation;  NEUROLOGY: Chemotherapy-induced neuropathy     Vitals signs:  BP (!) 144/87   Pulse 87   Temp 97.6 °F (36.4 °C)   Resp 18   Ht 5' 7\" (1.702 m)   Wt 135 lb 8 oz (61.5 kg)   SpO2 100%   BMI 21.22 kg/m²     PHYSICAL EXAM:  CONSTITUTIONAL: Alert, appropriate, no acute distress  EYES: Non icteric, EOM intact, pupils equal round   ENT: Mucus membranes moist, no oral pharyngeal lesions, external inspection of ears and nose are normal.  NECK: Supple, no masses. No palpable thyroid mass  CHEST/LUNGS: CTA bilaterally, normal respiratory effort   CARDIOVASCULAR: RRR, no murmurs. No lower extremity edema  ABDOMEN: soft non-tender, active bowel sounds, no HSM. No palpable masses  EXTREMITIES: warm, full ROM in all 4 extremities, no focal weakness. SKIN: warm, dry with no rashes or lesions  LYMPH: No cervical, clavicular, axillary, or inguinal lymphadenopathy  NEUROLOGIC: follows commands, non focal   PSYCH: mood and affect appropriate.   Alert and oriented to time, place, person    Relevant Lab findings/reviewed by me:  12/14/22 CEA 75.2  Lab Results   Component Value Date    WBC 3.23 (L) 01/25/2023    HGB 10.8 (L) 01/25/2023    HCT 32.6 (L) 01/25/2023    MCV 89.8 01/25/2023     (L) 01/25/2023     Lab Results   Component Value Date    NEUTROABS 2.08 01/25/2023     Lab Results   Component Value Date     01/25/2023    K 3.9 01/25/2023     (H) 01/25/2023    CO2 21 (L) 01/25/2023    BUN 13 01/25/2023    CREATININE 1.3 (H) 01/25/2023    GLUCOSE 145 (H) 01/25/2023    CALCIUM 8.9 01/25/2023    PROT 6.8 01/25/2023    LABALBU 3.2 (L) 01/25/2023    BILITOT 0.8 01/25/2023    ALKPHOS 272 (H) 01/25/2023    AST 48 01/25/2023    ALT 24 01/25/2023    LABGLOM >60 01/25/2023    GFRAA 53 (A) 07/21/2022    AGRATIO 1.5 06/15/2021    GLOB 3.6 01/25/2023           Relevant Imaging studies/reviewed by me:  None    ASSESSMENT:    Orders Placed This Encounter   Procedures CBC with Auto Differential     Standing Status:   Future     Number of Occurrences:   1     Standing Expiration Date:   1/25/2024    Comprehensive Metabolic Panel     Standing Status:   Future     Number of Occurrences:   1     Standing Expiration Date:   1/25/2024    CEA     Standing Status:   Future     Number of Occurrences:   1     Standing Expiration Date:   1/25/2024    CBC With Auto Differential     Standing Status:   Future     Standing Expiration Date:   1/25/2024    Comprehensive metabolic panel     Standing Status:   Future     Standing Expiration Date:   1/25/2024    Urinalysis     Standing Status:   Future     Standing Expiration Date:   1/25/2024    POCT urine qual dipstick protein     Standing Status:   Future     Number of Occurrences:   1     Standing Expiration Date:   2/25/2023            Rocci was seen today for cancer. Diagnoses and all orders for this visit:    [facility]  -     CBC with Auto Differential; Future  -     Comprehensive Metabolic Panel; Future  -     POCT urine qual dipstick protein; Future  -     CEA; Future  -     CBC With Auto Differential; Future  -     Comprehensive metabolic panel; Future  -     Urinalysis; Future    Chemotherapy management, encounter for    Adverse effect of chemotherapy, subsequent encounter    Encounter for antineoplastic immunotherapy    Care plan discussed with patient    Antineoplastic chemotherapy induced anemia    Chemotherapy-induced neuropathy (HCC)    Liver metastases ([facility]  -     CBC With Auto Differential; Future  -     Comprehensive metabolic panel; Future  -     Urinalysis; Future    Adenocarcinoma of colon ([facility]  -     CBC With Auto Differential; Future  -     Comprehensive metabolic panel; Future  -     Urinalysis;  Future    Other orders  -     0.9 % sodium chloride infusion  -     acetaminophen (TYLENOL) tablet 650 mg  -     diphenhydrAMINE (BENADRYL) injection 50 mg  -     dextrose 5 % solution  - dexamethasone (DECADRON) 10 mg in sodium chloride 0.9 % 50 mL IVPB  -     palonosetron (ALOXI) injection 0.25 mg  -     bevacizumab-bvzr (ZIRABEV) 300 mg in sodium chloride 0.9 % 100 mL chemo IVPB  -     oxaliplatin (ELOXATIN) 145 mg in dextrose 5 % 250 mL chemo IVPB  -     leucovorin calcium (WELLCOVORIN) 700 mg in dextrose 5 % 250 mL IVPB  -     fluorouracil (ADRUCIL) 4,100 mg in sodium chloride 0.9 % 250 mL chemo infusion  -     sodium chloride flush 0.9 % injection 5-40 mL  -     sodium chloride (PF) 0.9 % injection 5-40 mL  -     0.9 % sodium chloride infusion  -     heparin flush 100 UNIT/ML injection 500 Units  -     alteplase (CATHFLO) 2 mg in sterile water 2 mL injection  -     0.9 % sodium chloride infusion  -     diphenhydrAMINE (BENADRYL) injection 50 mg  -     famotidine (PEPCID) injection 20 mg  -     hydrocortisone sodium succinate PF (SOLU-CORTEF) injection 100 mg  -     acetaminophen (TYLENOL) tablet 650 mg  -     meperidine (DEMEROL) injection 12.5 mg  -     ondansetron (ZOFRAN) injection 8 mg  -     EPINEPHrine PF 1 MG/ML injection (Anaphylaxis) 0.3 mg  -     albuterol sulfate HFA (PROVENTIL;VENTOLIN;PROAIR) 108 (90 Base) MCG/ACT inhaler 4 puff  -     sodium chloride flush 0.9 % injection 5-40 mL  -     sodium chloride (PF) 0.9 % injection 5-40 mL  -     0.9 % sodium chloride infusion  -     heparin flush 100 UNIT/ML injection 500 Units  -     alteplase (CATHFLO) 2 mg in sterile water 2 mL injection        #Colonic adenocarcinoma-  [id number] (liver) K-maria luisa mutated, IHC MMR not proficient  Status post microwave ablation left hepatic lesion  Status post neoadjuvant FOLFOX/bevacizumab x11 biweekly cycles  Status post right hemicolectomy. Pathology consistent complete pathologic response. Recommended surveillance as per NCCN guidelines  Discussed at length results of pathology with the patient. 3/17/21- 1 year colonoscopy showed no large polyps or masses. Repeat in 3 years.   June 2021-CT chest clear.  MRI abdomen showed suspicious lesion in the right lower quadrant. Biopsy arranged [place]. Discussed with hepatobiliary service at [facility]. 6/25/[id number] biopsy consistent with recurrent primary colorectal adenocarcinoma. 7/2/2021-discussed with hepatobiliary service/surgical oncology at [facility]. They will review images and call the patient back regarding consultation for consideration of HIPEC  7/2/2021-they recommend systemic therapy. 7/2/2021-recommended FOLFIRI/Avastin  7/13/21- Initiated FOLFIRI + Avastin every 2 weeks  07/30/21-Seen at [facility] by GI surgeon oncology. They discussed the role of CRS/HIPEC in his situation. He was told hat it really depends on the status of his liver lesions as well. He will complete 6 cycles of chemotherapy and will return to see me with a CTAP as well as MRI of the liver to assess disease burden and determine if he can be a candidate for debulking.    8/25/2021-proceed with FOLFIRI/Avastin   9/24/2021-repeat MRI abdomen/CT abdomen showed persistent disease. 1/13/2022-Exploratory laparotomy/resection of psoas mass/HIPEC at [facility]. Path Isaac:     OMENTUM, OMENTECTOMY:   - METASTATIC COLORECTAL ADENOCARCINOMA INVOLVING FIBROADIPOSE TISSUE.   - TWO (2) LYMPH NODES, NEGATIVE FOR CARCINOMA. SMALL BOWEL, MESENTERIC NODULE, EXCISION:   - METASTATIC COLORECTAL ADENOCARCINOMA INVOLVING FIBROMUSCULAR TISSUE AND EXTENDING TO TISSUE EDGE. SMALL BOWEL AND RIGHT COLON, ILEOCOLIC RESECTION:     - RESIDUAL RECURRENT INVASIVE MODERATELY DIFFERENTIATED COLORECTAL ADENOCARCINOMA (2.1 CM) INVOLVING ANASTOMOTIC SITE; SEE COMMENT AND SYNOPTIC REPORT.   - TUMOR FOCALLY INVOLVES THE SEROSA SURFACE.   - METASTATIC CARCINOMA INVOLVES ONE (1) OF TWELVE (12) LYMPH NODES.   - ONE (1) TUMOR DEPOSIT PRESENT.   - ALL RESECTION MARGINS ARE NEGATIVE FOR CARCINOMA.   RETROPERITONEAL NODULE, EXCISION:   - METASTATIC COLORECTAL ADENOCARCINOMA INVOLVING FREDDY-PANCREATIC TISSUE AND EXTENDING TO TISSUE EDGE. LATERAL DUODENECTOMY, RESECTION:   - METASTATIC COLORECTAL ADENOCARCINOMA INVOLVING DUODENAL SUBMUCOSA, MUSCULARIS PROPRIA, AND SUBSEROSA. - TUMOR IS PRESENT WITHIN 1 MM OF INKED RESECTION MARGINS. RIGHT ABDOMINAL WALL NODULE, EXCISION:   - METASTATIC COLORECTAL ADENOCARCINOMA INVOLVING FIBROMUSCULAR TISSUE. RIGHT RETROPERITONEAL MASS, RESECTION:   - METASTATIC COLORECTAL ADENOCARCINOMA INVOLVING FIBROADIPOSE TISSUE (7.3 CM). - MEDIAL, SUPERIOR AND DEEP RESECTION MARGINS ARE POSITIVE FOR CARCINOMA. - INKED INFERIOR AND LATERAL MARGINS ARE NEGATIVE FOR CARCINOMA (<1 MM) RIGHT RETROPERITONEAL MASS, FINAL SUPERIOR MARGIN, EXCISION:   - METASTATIC COLORECTAL ADENOCARCINOMA INVOLVING FIBROADIPOSE TISSUE, FOCALLY PRESENT AT CAUTERIZED, INKED TISSUE EDGE.   2/7/2022-postoperative dehydration and electrolyte abnormalities. Home health arrangements IV fluids twice a week. 3/23/2022- reversal of ileostomy at [facility]. 3/20/2022 CT Abd/Pelvis WO Contrast ([facility]) Findings suspicious for aspiration at the lung bases. Postsurgical changes related to right hepatectomy. There is a new low-attenuation lesion in the left hemiliver, highly concerning for metastasis. Postsurgical changes related to right hemicolectomy with ileocolonic anastomosis and right lower quadrant diverting loop ileostomy. No evidence of bowel obstruction. No intra-abdominal fluid collection. Other findings as above. A critical alert was sent at the time of dictation. Liver: Postsurgical changes related to right hepatectomy. There is a new, approximately 2.1 cm low-attenuation lesion seen in the left hepatic lobe on image 25 of series 3, highly concerning for metastasis. 4/23/2022- discussed the patient reinitiation of chemotherapy. We will continue FOLFIRI. He had no prior progression on FOLFIRI of FOLFOX.   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  We would avoid Avastin at this time due to recent surgery. He is K-maria luisa mutated and therefore cannot use anti-EGFR agent. May contemplate adding Avastin in 4 weeks. 5/9/2022 CT Chest W Contrast New pulmonary nodules are present in the right and left lung as described above. It cannot be determined if these are infectious nodules or metastatic disease. Dilated loops of bowel are present in the upper abdomen. The abdomen is incompletely evaluated however this may be wither an ileus or partial obstruction. Hepatic metastasis. 11/4/2022 CT Chest W Contrast Bilateral lower lobe scarring or atelectasis similar in appearance. There has been slight interval decrease in the amount and the size of the low attenuation hepatic lesions. 11/4/2022 CT Abd/Pelvis W IV Contrast (Oral)There has been interval decrease in the mural thickening of the colonic loop at the level of the surgical suture material with slight residual mural thickening recognized. Endoscopy can be performed for further evaluation if clinically warranted. Postsurgical changes at the right hepatic lobe posteriorly similar. There has been slight interval decrease in the amount of hepatic metastatic disease with fewer lesions recognized in the right hepatic lobe. Cholecystectomy. Plan:  -Proceed cycle #11 mFOLFOX/Avastin.    -Discussed with [place] hepatobiliary service. No clinical trials available. Not a candidate for liver directed therapy at this time. #Chronic kidney disease stage III- creatinine 1.6  Encouraged to increase PO fluid intake. Labs Renal Latest Ref Rng & Units 1/25/2023 1/11/2023 12/14/2022 11/30/2022 11/9/2022   BUN 9 - 20 mg/dL 13 14 21(H) 18 16   Cr 0.6 - 1.2 mg/dL 1.3(H) 1. 3(H) 1.4(H) 1.4(H) 1.5(H)   K 3.5 - 5.1 mmol/L 3.9 4.3 4.5 4.6 4.2   Na 137 - 145 mmol/L 141 136 141 140 138      #Liver metastasis- plan as above. Status post ablation left liver lobe lesion at [facility] on 6/8/2020.  Status post neoadjuvant FOLFOX/bevacizumab x11 biweekly cyclesStatus post right hemicolectomy. Pathology consistent complete pathologic response. 3/4/2021-MRI abdomen was unremarkable  6/8/2021-MRI abdomen showed Postsurgical changes of right hepatectomy. Redemonstration of an ablation zone in segment II, measuring up to 1.7 cm, previously 1.8 cm. No evidence of postcontrast enhancement. No new lesion identified. 9/24/2021-MRI abdomen [place] showed Liver: Status post right hepatectomy. Ablation zone in segment II measures 1.7 x 1.1 cm, slightly decreased in size from 1.8 x 1.4 cm previously (series 1301 image 56) without apreciable enhancement. Similar tiny subcentimeter cyst in the left hepatic lobe. No new focal hepatic lesion identified. 3/20/2022 CT Abd/Pelvis WO Contrast ([facility]) Findings suspicious for aspiration at the lung bases. Postsurgical changes related to right hepatectomy. There is a new low-attenuation lesion in the left hemiliver, highly concerning for metastasis. Postsurgical changes related to right hemicolectomy with ileocolonic anastomosis and right lower quadrant diverting loop ileostomy. No evidence of bowel obstruction. No intra-abdominal fluid collection. Other findings as above. A critical alert was sent at the time of dictation. Liver: Postsurgical changes related to right hepatectomy. There is a new, approximately 2.1 cm low-attenuation lesion seen in the left hepatic lobe on image 25 of series 3, highly concerning for metastasis. 4/23/2022- discussed the patient reinitiation of chemotherapy. We will continue FOLFIRI. He had no prior progression on FOLFIRI of FOLFOX. 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  We would avoid Avastin at this time due to recent surgery. He is K-maria luisa mutated and therefore cannot use anti-EGFR agent. May contemplate adding Avastin in 4 weeks. 5/9/2022 CT Chest W Contrast New pulmonary nodules are present in the right and left lung as described above.  It cannot be determined if these are infectious nodules or metastatic disease. Dilated loops of bowel are present in the upper abdomen. The abdomen is incompletely evaluated however this may be wither an ileus or partial obstruction. Hepatic metastasis. 7/21/2022 CT Chest W Contrast Bibasilar linear atelectasis life scarring. No pulmonary nodules, masses or infiltrates. Multiple enhancing low attenuation masses in the liver similar to the previous study. Findings consistent with metastatic liver disease. 7/21/2022 CT Abd/Pelvis W IV Contrast (Oral) Multiple enhancing low attenuation liver lesions increase in size and number since the previous study 3/7/22. Findings consistent with progression of metastatic liver disease. Status postcholecystectomy. The appendix is not visualized. No acute findings. 8/3/22 Discontinue FOLFIRI + Avastin every 2 weeks due to progression in the liver  8/3/2022-unfortunately, interval progression of liver metastasis when compared to last CT scans performed on 3/7/2022. In addition, when compared to report from [address] [place] [address] abdomen/pelvis on 3/20/2022 when he had only 1 single metastatic lesion measuring 2.1 cm. This is considered progression. I have recommended to discontinue FOLFIRI/Avastin and consider initiation of palliative FOLFOX with Avastin. Discussed with hepatobiliary surgery. The patient is not a candidate for further surgery. He is not a candidate for any liver directed therapy. 11/4/2022 CT Chest W Contrast Bilateral lower lobe scarring or atelectasis similar in appearance. There has been slight interval decrease in the amount and the size of the low attenuation hepatic lesions. 11/4/2022 CT Abd/Pelvis W IV Contrast (Oral)There has been interval decrease in the mural thickening of the colonic loop at the level of the surgical suture material with slight residual mural thickening recognized. Endoscopy can be performed for further evaluation if clinically warranted.  Postsurgical changes at the right hepatic lobe posteriorly similar.There has been slight interval decrease in the amount of hepatic metastatic disease with fewer lesions recognized in the right hepatic lobe.Cholecystectomy.    Plan:  -Proceed cycle #11 mFOLFOX/Avastin third line therapy  Lab Results   Component Value Date    CEA 75.2 (H) 12/14/2022   -Repeat CT C/A/P to reassess disease status.    #Multifactorial anemia  hemoglobin 8.5/MCV 89.  Ferritin 12.9, iron saturation 15%, TIBC 458, iron 72.   Status post IV iron therapy.   Hemoglobin 11.3  -Repeat iron profile on 5/24/2022: Ferritin 116, iron saturation 11, iron 29, TIBC 256  Recent Labs     01/25/23  0927 01/11/23  0859   WBC 3.23* 4.4*   HGB 10.8* 11.3*   HCT 32.6* 35.5*   MCV 89.8 93.4   * 138   -Likely related to chemotherapy     #Chemotherapy-induced neuropathy-stable, antineoplastic induced anemia    #Cancer related pain-  -Oxycodone IR 10 mg every 6 hours as needed  -Continue Fentanyl 12 mcg every 72 hrs    DVT port associated left upper extremity-February 2022  internal jugular vein subclavian axillary brachial, left upper extremity.  Acute appearing superficial thrombophlebitis (SVT) is seen in the basilic  and cephalic veins, right upper extremity.  -Off Eliquis     Erectile dysfunction-  -Sildenafil 50mg daily as needed    Hypomagnesemia-magnesium 1.7.     Chemotherapy-induced neuropathy-stable  -Continue to monitor.    PLAN:  RTC with MD in treatment room 6 weeks  C# 11/12 FOLFOX + Avastin today and every 2 weeks  CBC CMP CEA today  Repeat CT chest, abdomen, pelvis at [facility] prior to next visit  Continue follow-up with [facility]/Dr. Norris, Oct 2022   Continue Fentanyl 12 mcg every 72 hrs-refill sent  Continue OxyIR 10mg every 6 hours as needed-refill sent  Continue Sildenafil 50mg daily as needed-refill sent  Continue Phenergan 12.5mg every 6 hrs as needed-refill sent  Continue OTC Imodium as needed      Follow Up:     No follow-ups on file.   Data Unavailable     Divya JACOME am pre charting   as Medical Assistant for Stanislaw Dennis MD. Electronically signed by Sandy Phoenix, MA on 1/25/2023 at 11:37 AM CST. Maulik Marin, am scribing for Stanislaw Dennis MD. Electronically signed by Donte Stewart RN on 1/25/2023 at 9:45 AM CST. I, Dr Ken Taylor, personally performed the services described in this documentation as scribed by Donte Stewart RN in my presence and is both accurate and complete. I have seen, examined and reviewed this patient medication list, appropriate labs and imaging studies. I reviewed relevant medical records and others physicians notes. I discussed the plans of care with the patient. I answered all the questions to the patients satisfaction. I have also reviewed the chief complaint (CC) and part of the history (History of Present Illness (HPI), Past Family Social History [facility]), or Review of Systems (ROS) and made changes when appropriated. (Please note that portions of this note were completed with a voice recognition program. Efforts were made to edit the dictations but occasionally words are mis-transcribed. )Electronically signed by Stanislaw Dennis MD on 1/25/2023 at 9:48 AM

## 2023-01-25 ENCOUNTER — OFFICE VISIT (OUTPATIENT)
Dept: HEMATOLOGY | Age: 68
End: 2023-01-25
Payer: MEDICARE

## 2023-01-25 ENCOUNTER — HOSPITAL ENCOUNTER (OUTPATIENT)
Dept: INFUSION THERAPY | Age: 68
Discharge: HOME | End: 2023-01-25
Payer: OTHER GOVERNMENT

## 2023-01-25 ENCOUNTER — TRANSCRIBE ORDERS (OUTPATIENT)
Dept: ADMINISTRATIVE | Facility: HOSPITAL | Age: 68
End: 2023-01-25
Payer: MEDICARE

## 2023-01-25 VITALS
OXYGEN SATURATION: 100 % | BODY MASS INDEX: 21.27 KG/M2 | SYSTOLIC BLOOD PRESSURE: 144 MMHG | HEART RATE: 87 BPM | HEIGHT: 67 IN | DIASTOLIC BLOOD PRESSURE: 87 MMHG | WEIGHT: 135.5 LBS | RESPIRATION RATE: 18 BRPM | TEMPERATURE: 97.6 F

## 2023-01-25 DIAGNOSIS — Z71.89 CARE PLAN DISCUSSED WITH PATIENT: ICD-10-CM

## 2023-01-25 DIAGNOSIS — C18.9 ADENOCARCINOMA OF COLON (HCC): ICD-10-CM

## 2023-01-25 DIAGNOSIS — N52.1 ERECTILE DYSFUNCTION DUE TO DISEASES CLASSIFIED ELSEWHERE: ICD-10-CM

## 2023-01-25 DIAGNOSIS — T45.1X5D ADVERSE EFFECT OF CHEMOTHERAPY, SUBSEQUENT ENCOUNTER: ICD-10-CM

## 2023-01-25 DIAGNOSIS — T45.1X5A CHEMOTHERAPY-INDUCED NEUROPATHY (HCC): ICD-10-CM

## 2023-01-25 DIAGNOSIS — Z51.12 ENCOUNTER FOR ANTINEOPLASTIC IMMUNOTHERAPY: ICD-10-CM

## 2023-01-25 DIAGNOSIS — T45.1X5A ANTINEOPLASTIC CHEMOTHERAPY INDUCED ANEMIA: ICD-10-CM

## 2023-01-25 DIAGNOSIS — Z51.11 CHEMOTHERAPY MANAGEMENT, ENCOUNTER FOR: ICD-10-CM

## 2023-01-25 DIAGNOSIS — C18.2 MALIGNANT NEOPLASM OF ASCENDING COLON (HCC): Primary | ICD-10-CM

## 2023-01-25 DIAGNOSIS — G62.0 CHEMOTHERAPY-INDUCED NEUROPATHY (HCC): ICD-10-CM

## 2023-01-25 DIAGNOSIS — R11.0 NAUSEA: ICD-10-CM

## 2023-01-25 DIAGNOSIS — G89.3 CANCER ASSOCIATED PAIN: ICD-10-CM

## 2023-01-25 DIAGNOSIS — C78.7 LIVER METASTASES (HCC): ICD-10-CM

## 2023-01-25 DIAGNOSIS — C18.2 MALIGNANT NEOPLASM OF ASCENDING COLON: Primary | ICD-10-CM

## 2023-01-25 DIAGNOSIS — D64.81 ANTINEOPLASTIC CHEMOTHERAPY INDUCED ANEMIA: ICD-10-CM

## 2023-01-25 DIAGNOSIS — C18.2 MALIGNANT NEOPLASM OF ASCENDING COLON (HCC): ICD-10-CM

## 2023-01-25 LAB
ALBUMIN SERPL-MCNC: 3.2 G/DL (ref 3.5–5.2)
ALP BLD-CCNC: 272 U/L (ref 40–130)
ALT SERPL-CCNC: 24 U/L (ref 21–72)
ANION GAP SERPL CALCULATED.3IONS-SCNC: 6 MMOL/L (ref 7–19)
AST SERPL-CCNC: 48 U/L (ref 17–59)
BILIRUB SERPL-MCNC: 0.8 MG/DL (ref 0.2–1.3)
BUN BLDV-MCNC: 13 MG/DL (ref 9–20)
CALCIUM SERPL-MCNC: 8.9 MG/DL (ref 8.4–10.2)
CEA: 80.4 NG/ML (ref 0–4.7)
CHLORIDE BLD-SCNC: 114 MMOL/L (ref 98–111)
CO2: 21 MMOL/L (ref 22–29)
CREAT SERPL-MCNC: 1.3 MG/DL (ref 0.6–1.2)
GFR SERPL CREATININE-BSD FRML MDRD: >60 ML/MIN/{1.73_M2}
GLOBULIN: 3.6 G/DL
GLUCOSE BLD-MCNC: 145 MG/DL (ref 74–106)
HCT VFR BLD CALC: 32.6 % (ref 40.1–51)
HEMOGLOBIN: 10.8 G/DL (ref 13.7–17.5)
LYMPHOCYTES ABSOLUTE: 0.49 K/UL (ref 1.18–3.74)
LYMPHOCYTES RELATIVE PERCENT: 15.2 % (ref 19.3–53.1)
MCH RBC QN AUTO: 29.8 PG (ref 25.7–32.2)
MCHC RBC AUTO-ENTMCNC: 33.1 G/DL (ref 32.3–36.5)
MCV RBC AUTO: 89.8 FL (ref 79–92.2)
MONOCYTES ABSOLUTE: 0.54 K/UL (ref 0.24–0.82)
MONOCYTES RELATIVE PERCENT: 16.7 % (ref 4.7–12.5)
NEUTROPHILS ABSOLUTE: 2.08 K/UL (ref 1.56–6.13)
NEUTROPHILS RELATIVE PERCENT: 64.4 % (ref 34–71.1)
PDW BLD-RTO: 16 % (ref 11.6–14.4)
PLATELET # BLD: 119 K/UL (ref 163–337)
PMV BLD AUTO: 10.2 FL (ref 7.4–10.4)
POTASSIUM SERPL-SCNC: 3.9 MMOL/L (ref 3.5–5.1)
PROTEIN UA: POSITIVE
RBC # BLD: 3.63 M/UL (ref 4.63–6.08)
SODIUM BLD-SCNC: 141 MMOL/L (ref 137–145)
TOTAL PROTEIN: 6.8 G/DL (ref 6.3–8.2)
WBC # BLD: 3.23 K/UL (ref 4.23–9.07)

## 2023-01-25 PROCEDURE — G0498 CHEMO EXTEND IV INFUS W/PUMP: HCPCS

## 2023-01-25 PROCEDURE — 6360000002 HC RX W HCPCS: Performed by: INTERNAL MEDICINE

## 2023-01-25 PROCEDURE — 1123F ACP DISCUSS/DSCN MKR DOCD: CPT | Performed by: INTERNAL MEDICINE

## 2023-01-25 PROCEDURE — G8420 CALC BMI NORM PARAMETERS: HCPCS | Performed by: INTERNAL MEDICINE

## 2023-01-25 PROCEDURE — 96367 TX/PROPH/DG ADDL SEQ IV INF: CPT

## 2023-01-25 PROCEDURE — 96375 TX/PRO/DX INJ NEW DRUG ADDON: CPT

## 2023-01-25 PROCEDURE — 80053 COMPREHEN METABOLIC PANEL: CPT

## 2023-01-25 PROCEDURE — 96366 THER/PROPH/DIAG IV INF ADDON: CPT

## 2023-01-25 PROCEDURE — G8484 FLU IMMUNIZE NO ADMIN: HCPCS | Performed by: INTERNAL MEDICINE

## 2023-01-25 PROCEDURE — G8427 DOCREV CUR MEDS BY ELIG CLIN: HCPCS | Performed by: INTERNAL MEDICINE

## 2023-01-25 PROCEDURE — 1036F TOBACCO NON-USER: CPT | Performed by: INTERNAL MEDICINE

## 2023-01-25 PROCEDURE — 96415 CHEMO IV INFUSION ADDL HR: CPT

## 2023-01-25 PROCEDURE — 96417 CHEMO IV INFUS EACH ADDL SEQ: CPT

## 2023-01-25 PROCEDURE — 36415 COLL VENOUS BLD VENIPUNCTURE: CPT

## 2023-01-25 PROCEDURE — 99214 OFFICE O/P EST MOD 30 MIN: CPT | Performed by: INTERNAL MEDICINE

## 2023-01-25 PROCEDURE — 2580000003 HC RX 258: Performed by: INTERNAL MEDICINE

## 2023-01-25 PROCEDURE — 96413 CHEMO IV INFUSION 1 HR: CPT

## 2023-01-25 PROCEDURE — 3017F COLORECTAL CA SCREEN DOC REV: CPT | Performed by: INTERNAL MEDICINE

## 2023-01-25 PROCEDURE — 85025 COMPLETE CBC W/AUTO DIFF WBC: CPT

## 2023-01-25 RX ORDER — MEPERIDINE HYDROCHLORIDE 50 MG/ML
12.5 INJECTION INTRAMUSCULAR; INTRAVENOUS; SUBCUTANEOUS PRN
OUTPATIENT
Start: 2023-01-25

## 2023-01-25 RX ORDER — SODIUM CHLORIDE 9 MG/ML
5-250 INJECTION, SOLUTION INTRAVENOUS PRN
OUTPATIENT
Start: 2023-01-25

## 2023-01-25 RX ORDER — DEXTROSE MONOHYDRATE 50 MG/ML
5-250 INJECTION, SOLUTION INTRAVENOUS PRN
OUTPATIENT
Start: 2023-01-25

## 2023-01-25 RX ORDER — SODIUM CHLORIDE 9 MG/ML
5-40 INJECTION INTRAVENOUS PRN
OUTPATIENT
Start: 2023-01-25

## 2023-01-25 RX ORDER — OXYCODONE HYDROCHLORIDE 10 MG/1
10 TABLET ORAL EVERY 6 HOURS PRN
Qty: 120 TABLET | Refills: 0 | Status: SHIPPED | OUTPATIENT
Start: 2023-01-25 | End: 2023-02-24

## 2023-01-25 RX ORDER — SODIUM CHLORIDE 9 MG/ML
INJECTION, SOLUTION INTRAVENOUS CONTINUOUS
OUTPATIENT
Start: 2023-01-25

## 2023-01-25 RX ORDER — SODIUM CHLORIDE 0.9 % (FLUSH) 0.9 %
5-40 SYRINGE (ML) INJECTION PRN
OUTPATIENT
Start: 2023-01-25

## 2023-01-25 RX ORDER — EPINEPHRINE 1 MG/ML
0.3 INJECTION, SOLUTION, CONCENTRATE INTRAVENOUS PRN
OUTPATIENT
Start: 2023-01-25

## 2023-01-25 RX ORDER — ALBUTEROL SULFATE 90 UG/1
4 AEROSOL, METERED RESPIRATORY (INHALATION) PRN
OUTPATIENT
Start: 2023-01-25

## 2023-01-25 RX ORDER — PROMETHAZINE HYDROCHLORIDE 12.5 MG/1
12.5 TABLET ORAL EVERY 6 HOURS PRN
Qty: 60 TABLET | Refills: 5 | Status: SHIPPED | OUTPATIENT
Start: 2023-01-25

## 2023-01-25 RX ORDER — PALONOSETRON 0.05 MG/ML
0.25 INJECTION, SOLUTION INTRAVENOUS ONCE
Status: COMPLETED | OUTPATIENT
Start: 2023-01-25 | End: 2023-01-25

## 2023-01-25 RX ORDER — SODIUM CHLORIDE 9 MG/ML
5-250 INJECTION, SOLUTION INTRAVENOUS PRN
OUTPATIENT
Start: 2023-01-27

## 2023-01-25 RX ORDER — DIPHENHYDRAMINE HYDROCHLORIDE 50 MG/ML
50 INJECTION INTRAMUSCULAR; INTRAVENOUS
OUTPATIENT
Start: 2023-01-25

## 2023-01-25 RX ORDER — HEPARIN SODIUM (PORCINE) LOCK FLUSH IV SOLN 100 UNIT/ML 100 UNIT/ML
500 SOLUTION INTRAVENOUS PRN
OUTPATIENT
Start: 2023-01-25

## 2023-01-25 RX ORDER — PALONOSETRON 0.05 MG/ML
0.25 INJECTION, SOLUTION INTRAVENOUS ONCE
Status: CANCELLED | OUTPATIENT
Start: 2023-01-25 | End: 2023-01-25

## 2023-01-25 RX ORDER — SODIUM CHLORIDE 0.9 % (FLUSH) 0.9 %
5-40 SYRINGE (ML) INJECTION PRN
OUTPATIENT
Start: 2023-01-27

## 2023-01-25 RX ORDER — SILDENAFIL 50 MG/1
50 TABLET, FILM COATED ORAL DAILY PRN
Qty: 10 TABLET | Refills: 5 | Status: SHIPPED | OUTPATIENT
Start: 2023-01-25

## 2023-01-25 RX ORDER — SODIUM CHLORIDE 9 MG/ML
5-40 INJECTION INTRAVENOUS PRN
OUTPATIENT
Start: 2023-01-27

## 2023-01-25 RX ORDER — HEPARIN SODIUM (PORCINE) LOCK FLUSH IV SOLN 100 UNIT/ML 100 UNIT/ML
500 SOLUTION INTRAVENOUS PRN
OUTPATIENT
Start: 2023-01-27

## 2023-01-25 RX ORDER — ONDANSETRON 2 MG/ML
8 INJECTION INTRAMUSCULAR; INTRAVENOUS
OUTPATIENT
Start: 2023-01-25

## 2023-01-25 RX ORDER — ACETAMINOPHEN 325 MG/1
650 TABLET ORAL
OUTPATIENT
Start: 2023-01-25

## 2023-01-25 RX ORDER — FENTANYL 12 UG/H
1 PATCH TRANSDERMAL
Qty: 10 PATCH | Refills: 0 | Status: SHIPPED | OUTPATIENT
Start: 2023-01-25 | End: 2023-02-24

## 2023-01-25 RX ORDER — FAMOTIDINE 10 MG/ML
20 INJECTION, SOLUTION INTRAVENOUS
OUTPATIENT
Start: 2023-01-25

## 2023-01-25 RX ADMIN — SODIUM CHLORIDE 300 MG: 9 INJECTION, SOLUTION INTRAVENOUS at 10:39

## 2023-01-25 RX ADMIN — DEXAMETHASONE SODIUM PHOSPHATE 10 MG: 10 INJECTION, SOLUTION INTRAMUSCULAR; INTRAVENOUS at 10:16

## 2023-01-25 RX ADMIN — PALONOSETRON 0.25 MG: 0.05 INJECTION, SOLUTION INTRAVENOUS at 10:16

## 2023-01-25 RX ADMIN — FLUOROURACIL 4100 MG: 50 INJECTION, SOLUTION INTRAVENOUS at 13:22

## 2023-01-25 RX ADMIN — LEUCOVORIN CALCIUM 700 MG: 350 INJECTION, POWDER, LYOPHILIZED, FOR SUSPENSION INTRAMUSCULAR; INTRAVENOUS at 11:14

## 2023-01-25 RX ADMIN — OXALIPLATIN 145 MG: 5 INJECTION, SOLUTION INTRAVENOUS at 11:13

## 2023-01-26 ENCOUNTER — HOSPITAL ENCOUNTER (OUTPATIENT)
Dept: INFUSION THERAPY | Age: 68
Discharge: HOME | End: 2023-01-26
Payer: OTHER GOVERNMENT

## 2023-01-26 DIAGNOSIS — C18.2 MALIGNANT NEOPLASM OF ASCENDING COLON (HCC): Primary | ICD-10-CM

## 2023-01-26 PROCEDURE — 96523 IRRIG DRUG DELIVERY DEVICE: CPT

## 2023-01-26 PROCEDURE — 2580000003 HC RX 258: Performed by: INTERNAL MEDICINE

## 2023-01-26 PROCEDURE — 6360000002 HC RX W HCPCS: Performed by: INTERNAL MEDICINE

## 2023-01-26 RX ORDER — HEPARIN SODIUM (PORCINE) LOCK FLUSH IV SOLN 100 UNIT/ML 100 UNIT/ML
500 SOLUTION INTRAVENOUS PRN
OUTPATIENT
Start: 2023-01-26

## 2023-01-26 RX ORDER — SODIUM CHLORIDE 0.9 % (FLUSH) 0.9 %
20 SYRINGE (ML) INJECTION PRN
Status: DISCONTINUED | OUTPATIENT
Start: 2023-01-26 | End: 2023-01-27 | Stop reason: HOSPADM

## 2023-01-26 RX ORDER — SODIUM CHLORIDE 0.9 % (FLUSH) 0.9 %
20 SYRINGE (ML) INJECTION PRN
OUTPATIENT
Start: 2023-01-26

## 2023-01-26 RX ORDER — WATER 1000 ML/1000ML
2.2 INJECTION, SOLUTION INTRAVENOUS ONCE
OUTPATIENT
Start: 2023-01-26 | End: 2023-01-26

## 2023-01-26 RX ORDER — SODIUM CHLORIDE 0.9 % (FLUSH) 0.9 %
10 SYRINGE (ML) INJECTION PRN
OUTPATIENT
Start: 2023-01-26

## 2023-01-26 RX ORDER — HEPARIN SODIUM (PORCINE) LOCK FLUSH IV SOLN 100 UNIT/ML 100 UNIT/ML
500 SOLUTION INTRAVENOUS PRN
Status: DISCONTINUED | OUTPATIENT
Start: 2023-01-26 | End: 2023-01-27 | Stop reason: HOSPADM

## 2023-01-26 RX ADMIN — Medication 20 ML: at 10:09

## 2023-01-26 RX ADMIN — Medication 500 UNITS: at 10:00

## 2023-01-26 NOTE — PROGRESS NOTES
Patients spouse sent [employer] message this morning informing nursing staff that his chemo pump became disconnected and \"blood went all over him\". Also stating they reconnected the line and turned the pump off. Patients spouse also states that Rocci has been in pain all night. Dr. Brian Ferraro is made aware of patients pain as well as his pump becoming disconnected. Per Dr. Brian Ferraro, pain patch can be increased to two patches and pump should be omitted this cycle. Patient presents to clinic, port is de-accessed and then re-accessed per sterile technique for flush. Advised patient to watch for any signs or symptoms of infection and to notify clinic immediately should any arise. Patient and his spouse both verbalized understanding of these instructions and will call with any questions or concerns.

## 2023-02-03 ENCOUNTER — HOSPITAL ENCOUNTER (OUTPATIENT)
Dept: CT IMAGING | Facility: HOSPITAL | Age: 68
Discharge: HOME | End: 2023-02-03
Admitting: INTERNAL MEDICINE
Payer: MEDICARE

## 2023-02-03 DIAGNOSIS — C18.2 MALIGNANT NEOPLASM OF ASCENDING COLON: ICD-10-CM

## 2023-02-03 LAB — CREAT BLDA-MCNC: 1.6 MG/DL (ref 0.6–1.3)

## 2023-02-03 PROCEDURE — 25010000002 IOPAMIDOL 61 % SOLUTION: Performed by: INTERNAL MEDICINE

## 2023-02-03 PROCEDURE — 74177 CT ABD & PELVIS W/CONTRAST: CPT

## 2023-02-03 PROCEDURE — 82565 ASSAY OF CREATININE: CPT

## 2023-02-03 PROCEDURE — 71260 CT THORAX DX C+: CPT

## 2023-02-03 RX ADMIN — IOPAMIDOL 100 ML: 612 INJECTION, SOLUTION INTRAVENOUS at 10:26

## 2023-02-03 NOTE — PROGRESS NOTES
MEDICAL ONCOLOGY PROGRESS NOTE                                                          Denise Morgan   1955 2/7/2023     Chief Complaint   Patient presents with    Follow-up     Malignant neoplasm of ascending colon ([address]       INTERVAL HISTORY/HISTORY OF PRESENT ILLNESS:  Diagnosis  Colonic adenocarcinoma, March 2020  [id number](liver), stage ROXANA  IHC MMR- proficient  K-maria luisa mutated   N-MARIA LUISA/BRAF wild-type  Iron deficiency anemia  Soft tissue mass, June 2021  Adenocarcinoma-consistent with colon primary, right psoas muscle, June 2021  Metastatic adenocarcinoma, Jan 2022  MLH1, MSH2, MH6, PMS2-intact  MMR- no loss of expression     Treatment summary  3/30/2020-right hemicolectomy at [facility]  Anticipated Liver ablation to be followed by neoadjuvant/adjuvant versus palliative chemotherapy  06/10/2020-November 2020-FOLFOX + Avastin  12/11/20- Right hepatectomy-Dr. Author Pino  S/p Injectafer-poor oral iron tolerance  7/13/21- 11/17/21 FOLFIRI + Avastin every 2 weeks  1/13/22-psoas muscle mass resection/HIPEC by Dr. Genevieve Pan at [facility]  5/10/22 - 8/3/22 Discontinue FOLFIRI + Avastin every 2 weeks due to progression in the liver  8/10/2022 FOLFOX + Avastin every 2 weeks  2/7/23 Discontinue FOLFOX + Avastin due to progression. Anticipate Lonsurf 35mg/m2 D1-5, D8-12 every 28 days + Avastin 7.5mg/m2 every 3 weeks     The patient is a very pleasant 74years old male who has been diagnosed with colonic adenocarcinoma. He has had several treatment modalities in the past.  He is currently status post CRS/HIPEC in mid January, 2022 at [facility]. This was complicated by a leak from his ileocolic anastomosis secondary to closed-loop obstruction at his hernia repair site (mesh displaced). He was taken back to the OR and had a primary repair of his anastomosis and diverting ileostomy in February 2022. Prior progressive disease on FOLFIRI and now more recently on modified FOLFOX.   He is Pre chart completed will discuss immunizations at visit.   also receiving Avastin. He had CT scans performed and is here to discuss results and further treatment commendations. He has increased abdominal pain. Patient is currently on fentanyl patch 12 mcg daily. He has been using his as needed medication more often. Cancer history  Mr. Chet Muse was first seen by me on 3/23/2020. He was referred for a new diagnosis of colonic adenocarcinoma involving the cecum. The patient reports that he had a wellbeing consult with his provider at the [place]. He was found to have anemia and then recommended a colonoscopy. Of note, the patient has a family history of colon cancer. His mother is a patient of Dr. Titus Dickens he has been diagnosed with colon cancer in 2010.  3/11/2020- colonoscopy revealed a large malignant appearing fungating mass lesion in the cecum. In addition, several other polyps. Biopsy of the mass consistent with moderate differentiated colonic adenocarcinoma. Polyps consistent with tubulovillous adenoma with no high-grade dysplasia. IHC MMR not proficient. K-maria luisa mutated, BRAF and NRAS wild type. MSI proficient  3/11/2020-CEA 5.5 (H)  3/18/2020-CT abdomen pelvis with contrast  Invasive cecal mass adhering to the right lateral abdominal wall muscles with adjacent lymphadenopathy. Mild partial obstruction of the terminal ileum. 2. Suspicious lesions in the right and left hepatic lobes measure up to 1.3 cm and likely represent metastatic disease. 3/18/2020-Xr Chest Standard  No radiographic evidence of acute cardiopulmonary process. 3/23/2020-he was first seen by me. Recommended completion of staging with CT chest.  Also recommended liver MRI for further clarification of liver lesion. S.  Recommend to proceed with a general surgery consultation tomorrow with Dr. Marce Anguiano. Patient was informed that I favor surgical resection if feasible of the primary malignancy.   3/30/2020- right hemicolectomy by Dr. Marce Anguiano at [facility] consistent with invasive moderately differentiated colonic adenocarcinoma measuring 7.2 cm. Carcinoma directly invading the adjacent abdominal wall tissue. Focal lymphovascular space invasion identified. Focal perineural invasion identified. Surgical margins negative for evidence of malignancy. 6 out of 14 lymph nodes positive for metastatic adenocarcinoma. Final pathology staging [id number](liver, stage ROXANA)  4/20/2020-CT chest with contrast showed No convincing intrathoracic metastasis. Nonspecific 4 mm nodule of the inferior lingula and a 2 mm right upper lobe nodule can be followed on subsequent imaging in 6-12 months. Moderate coronary calcifications. Hypodense metastatic liver lesions. Small hiatal hernia. 4/20/2020-Mri Abdomen W Wo Contrast There are about 5 liver lesions. The 2 largest appears similar compared to 3/18/2020, the others are too small to further characterize. Appearance is most concerning for metastatic disease. Enhancement of the right lateral peritoneum. This is favored to be postoperative as there is no nodularity, evidence of omental disease or lymphadenopathy. Recommend attention on follow-up. Cholecystectomy. 4/22/2020-discussed with Dr. Ariana Carter at [place]. He will review imaging studies and give further recommendations regarding eligibility for resection of liver lesions. 5/8/2020 CT Abdomen The two largest suspicious lesions measuring 1.2 and 1.3 cm in the  right and left hepatic lobes respectively are similar compared to the  3/18/2020 CT. Additionally, there are at least five subcentimeter lesions with similar signal characteristics, which are also highly suspicious for metastases. If complete characterization of the number and distribution of lesions is necessary, an MRI with Eovist could be acquired.   5/19/2020- he was seen by the hepatobiliary service at [facility] with Dr. Ariana Carter:  patient adequate risk candidate for a multimodal approach, directed toward curative hepatectomy eventually. Endorsed by Hepatobiliary Conference, I recommended perc ablation of the L hemiliver to clear it, followed by systemic therapy in a neoadjuvant strategy. Restaging imaging to confirm clearance of disease on the left and lack of progression to unresectability of the R hemiliver disease would then be followed by R hepatectomy. Limitations to this approach may be accessibility of the segment 4A/8 disease high in the hepatic dome and the possibility of heat sink-related recurrence s/p abation of the left-sided disease. 5/21/2020- referral for Mediport placement and start FOLFOX in 2 weeks. Will add bevacizumab after 6 weeks of liver ablation. We will plan for 12 biweekly dose of FOLFOX. Bevacizumab will also be stopped 6 weeks prior to major procedure. 6/8/2020- Microwave ablation of the left liver lesion was then performed for 5 minutes at [address] to achieve a 3.4 x 3.9 cm approximately spherical zone of ablation. 6/10/2020- initiation of FOLFOX.  7/20/2020-added Avastin. 9/10/20 MRI abdomen: Left hepatic lobe segment II lesion demonstrates small T1 hyperintense blood products, status post microwave ablation on 6/8/2020. No definitive enhancement within the lesion. Focal internal thickening or scar present. Recommend attention on follow-up. Additional scattered subcentimeter foci throughout the liver decreased in size compared to MRI dated 4/20/2020 consistent with improving metastatic disease. Additional chronic findings as above. 9/16/2020-discussed with plan with the patient and [facility]. Interval response to treatment. Plan to continue chemotherapy through 12 cycles with Avastin. 12/11/20 Right hepatectomy-Dr. Janice Mcnally  12/11/20 Right hepatectomy pathology: Liver, right, resection: Focus of Fibrosis with calcifications and chronic inflammation (0.3 cm), see comment.  Background hepatic parenchyma with minimal periportal fibrosis (trichrome stain), minimal lobular and portal inflammation, and no significant macrovesicular steatosis (<5%) Comments: The patient's history of colorectal cancer status adjuvant therapy is noted. The focus of fibrosis may reflect treatment effect. There is no evidence of viable tumor in the sampled specimen. 12/14/20 Ct Abdomen Pelvis W Iv Contrast A Small bowel obstruction with transition point at the distal small bowel, just proximal to RIGHT upper quadrant ileocolonic anastomosis. Postoperative change of RIGHT hepatectomy with small amount of expected free fluid and intraperitoneal gas. Redemonstrated LEFT liver lesion. Small bilateral pleural effusions. 12/16/20 SBFT-[place]: Study is limited due to retained contrast in the small bowel from prior attempt at small bowel follow-through on the floor. Small bowel remains dilated, measuring approximately 5.2 cm, which is consistent with partial or resolving small bowel obstruction. Final radiographs show contrast within the colon. 1/4/2020-resolution of small bowel obstruction. 1/7/21 CT chest: No finding to suggest intrathoracic neoplastic process or metastatic disease. The benign-appearing tiny nodule in the right upper lobe probably represent a noncalcified granuloma. A nodule in the lingular segment of the left upper lobe is not visualized in this study. Postsurgical changes of the liver. No evidence of focal complication. A trace right basal pleural effusion. This may be reactive to the previous abdominal surgery. 3/4/21 MRI abd: Status post right hepatectomy and microwave ablation of a left liver lesion. No findings to suggest residual/recurrent disease. No new liver lesion is identified. Postsurgical changes related to right hemicolectomy. Microwave ablation zone in segment II of the left hepatic lobe measures approximately 21 mm in diameter, unchanged. No appreciable postcontrast enhancement or other findings to suggest local disease recurrence.  No new liver lesion is identified. Spleen is mildly enlarged, measuring 13.8 cm in length. 3/17/2021-1 year colonoscopy showed no evidence of polyps. 5/3/21 CEA 6.2  6/8/21 MRI abdomen ([place]): Status post right hepatectomy and MWA of a left liver lesion. No evidence of residual or recurrent metastatic disease in the liver. Status post prior right hemicolectomy for colon carcinoma. Within the posterior right iliopsoas muscle there is a mildly T2 hyperintense nodular focus with postcontrast rim enhancement and diffusion restriction. This measures approximately 2.7 x 2 x 2 cm. More delayed postcontrast images show irregular area of enhancement measuring 2.4 x 7.7 cm axially involving the right iliopsoas muscle and the right lateral abdominal wall. Suggest correlation with contrast enhanced CT exam for complete evaluation. Consider biopsy of this lesion. 6/15/21 CT CHEST WO CONTRAST No metastatic disease in the chest.  No change in tiny 2 mm RIGHT upper lobe pulmonary nodule. Mild ectasia of the ascending aorta measuring 4 cm.   6/18/2021-I reviewed results of MRI abdomen at [facility]. CT chest without contrast reviewed by me and showed no evidence of metastatic disease. Stable 2 mm right upper lobe nodule. Discussed with hepatobiliary service at [facility]. Biopsy intra-abdominal nodule next week at [facility]. 6/25/[id number] biopsy soft tissue mass right psoas muscle at [facility] consistent with recurrent colonic adenocarcinoma. 7/2/2021-discussed with hepatobiliary service at [facility] and also surgical oncology. Surgical oncology will call us back regarding consultation for consideration of HIPEC if he is a candidate  7/13/21-initiation of chemotherapy with FOLFIRI + Avastin every 2 weeks  7/13/21 CEA 10.7  7/27/2021-CEA 9.6  7/30/2021-she was seen by the surgical oncology group at [facility] by Dr Elizabeth Sanchez.   They recommended to complete 6 cycles of chemotherapy and repeat CT chest abdomen pelvis and liver MRI to assess disease response. They discussed the role of CRS/HIPEC in his situation. He was told that it really depends on the status of his liver lesions as well. He will complete 6 cycles of chemotherapy and will return to see me with a CTAP as well as MRI of the liver to assess disease burden and determine if he can be a candidate for debulking.  8/25/2021-proceed cycle #4.  9/22/2021 CEA- 8.1  9/24/2021 MRI with and w/o Contrast ([place])  Tiny left retroperitoneal nodule involving the left lateral conal fascial new compared to 3/4/2021 though unchanged from most recent prior, possibly an additional site of metastasis. No other new metastatic disease within the abdomen. Similar partially visualized right posterior body wall/psoas infiltrative lesion not definitely changed from MRI abdomen 6/8/2021 but better evaluated in its entirety on concurrent CT, reported separately. Status post right hemicolectomy, right hepatectomy, and segment III microwave ablation. Similar mild splenomegaly. Additional chronic and incidental findings as described in the body the report. Liver: Status post right hepatectomy. Ablation zone in segment II measures 1.7 x 1.1 cm, slightly decreased in size from 1.8 x 1.4 cm previously (series 1301 image 56) without appreciable enhancement. Similar tiny subcentimeter cyst in the left hepatic lobe. No new focal hepatic lesion identified. No visualized free fluid. Similar 0.7 cm enhancing nodule along the left lateral conal fascia laterally new from 3/4/2021, grossly unchanged from MRI dated 6/8/2021. (series 801 image 22). No other appreciable peritoneal/retroperitoneal or  omental nodularity.  Ill-defined T2 hyperintense signal and hyperenhancement in the right posteromedial body wall involving the lateral aspect of the psoas muscle and posterolateral abdominal wall musculature, partially visualized measuring at least 8.7 x 3.1   cm, grossly similar to the prior exam, better evaluated in its entirety on concurrent CT reported separately. 9/24/2021 CT C/A/P w/ Contrast([place]) Status post right hemicolectomy, right hepatectomy and left hepatic microwave ablation without new suspicious hepatic lesion. Left lateral perirenal fascial nodule, which is stable compared to 6/8/2021 MRI, but new compared to 3/4/2021 MRI is concerning for an additional site of metastatic disease. No sites of new or enlarging metastatic disease in the chest.  Similar appearance of right lateral psoas and posterior abdominal wall infiltrative lesion, not significantly changed compared to 6/8/2021 MRI. Mild splenomegaly. Additional findings as outlined in the body the report. Biopsy-proven adenocarcinoma involving the posterior lateral aspect of the right iliopsoas muscle and right lateral abdominal wall. Right iliopsoas component is difficult to measure but appears to be approximately 2.5 x 2.4 cm (series 2, image 153), similar to prior MRI. Nodular thickening noted along the right posterior abdominal wall and possibly involving the the transversus abdominis measures approximately 8.5 x 1.8 cm (series 2 image 153) overall similar compared to prior MRI. 10/6/2021-discussed the results of recent CT abdomen/pelvis and MRI abdomen/pelvis at [facility] Persistent disease involving the psoas muscle. Discussed with colorectal surgery at [facility] They recommend to continue current therapy for another 2 months and reassess with CT scans. 7/13/21- 11/17/21 FOLFIRI + Avastin every 2 weeks  12/3/21 CT chest/abd/pelvis [facility]): Status post prior right hemicolectomy, right hepatectomy and left hepatic lesions microwave ablation. No new liver lesion. Soft tissue thickening of the right lower posterior abdominal wall involving the iliopsoas muscle is grossly unchanged consistent with improving metastatic disease.  Small right omental nodule and left lateral conal fascia nodule unchanged compared to  recent exam. 1/13/22 Exploratory lap with resections of psoas muscle mass and HIPEC by Dr. Boone Huffman at [facility]:  Metastatic colorectal adenocarcinoma involving fibroadipose tissue. IHC MMR proficient. 1/24/22 CT chest/abd/pelvis [facility]): Extensive postsurgical changes in the abdomen including partial right colectomy, resection of right retroperitoneal mass, omentectomy and mesh repair of right lateral abdominal wall defect. There appears to be a defect in the mesh containing herniated loops of small bowel. Extensive diffuse dilated small bowel loops with air-fluid levels are seen throughout the abdomen. There are segmental areas of decompressed small bowel in the left upper quadrant as well as adjacent to the herniated small  bowel loops in the right retroperitoneum. Air-fluid levels are also seen throughout the colon. While pattern could likely represent postoperative ileus given the diffuse small bowel dilatation and distal colonic air and fluid, developing or partial small bowel obstruction secondary to the right lateral abdominal wall hernia cannot entirely be excluded. Small ascites. 8 mm nodule along the left lateral conal fascia is unchanged. Slight increase in size of cardiophrenic lymph nodes measuring up to 7 mm anterior to the right hemidiaphragm. Stable hypodensity in the left hepatic lobe. Diffuse gastric wall thickening/edema could be secondary to gastritis in the appropriate clinical setting. February 2022- HIPEC/tumor debulking January. This was complicated by a leak from his ileocolic anastomosis secondary to closed-loop obstruction at his hernia repair site (mesh displaced). He was taken back to the OR and had a primary repair of his anastomosis and diverting ileostomy in February 2022  3/23/2022-ileostomy reversal at [facility] Dr. Boone Huffman at [facility]  3/20/2022 CT Abd/Pelvis WO Contrast [facility]) Findings suspicious for aspiration at the lung bases. Postsurgical changes related to right hepatectomy. There is a new low-attenuation lesion in the left hemiliver, highly concerning for metastasis. Postsurgical changes related to right hemicolectomy with ileocolonic anastomosis and right lower quadrant diverting loop ileostomy. No evidence of bowel obstruction. No intra-abdominal fluid collection. Other findings as above. A critical alert was sent at the time of dictation. Liver: Postsurgical changes related to right hepatectomy. There is a new, approximately 2.1 cm low-attenuation lesion seen in the left hepatic lobe on image 25 of series 3, highly concerning for metastasis. 4/18/2022 Ileostomy,closure enterocutaneous stoma with mural acute inflammation and jessa-intestinal Fat necrosis. Negative for malignancy. 4/23/2022- discussed the patient reinitiation of chemotherapy. We will continue FOLFIRI. He had no prior progression on FOLFIRI of FOLFOX. 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  We would avoid Avastin at this time due to recent surgery. He is K-maria luisa mutated and therefore cannot use anti-EGFR agent. May contemplate adding Avastin in 4 weeks. 5/9/2022 CT Chest W Contrast New pulmonary nodules are present in the right and left lung as described above. It cannot be determined if these are infectious nodules or metastatic disease. Dilated loops of bowel are present in the upper abdomen. The abdomen is incompletely evaluated however this may be wither an ileus or partial obstruction. Hepatic metastasis. 5/10/22 Re-initiate FOLFIRI + Avastin every 2 weeks. 5/24/2022  CEA 29.1  5/24/2022 Iron Studies:Iron 29, TIBC 256, Iron Sat 11,Ferritin 116.9  6/22/2022 CEA 21.5  7/21/2022 CT Chest W Contrast Bibasilar linear atelectasis life scarring. No pulmonary nodules, masses or infiltrates. Multiple enhancing low attenuation masses in the liver similar to the previous study. Findings consistent with metastatic liver disease.   7/21/2022 CT Abd/Pelvis W IV Contrast ([place]) Multiple enhancing low attenuation liver lesions increase in size and number since the previous study 3/7/22. Findings consistent with progression of metastatic liver disease. Status postcholecystectomy. The appendix is not visualized. No acute findings. 5/10/22 - 8/3/22 Discontinue FOLFIRI + Avastin every 2 weeks due to progression in the liver  8/3/2022-unfortunately, interval progression of liver metastasis when compared to last CT scans performed on 3/7/2022. In addition, when compared to report from [address] [place] [address] abdomen/pelvis on 3/20/2022 when he had only 1 single metastatic lesion measuring 2.1 cm. This is considered progression. I have recommended to discontinue FOLFIRI/Avastin and consider initiation of palliative FOLFOX with Avastin. Discussed with hepatobiliary surgery. The patient is not a candidate for further surgery. He is not a candidate for any liver directed therapy. 8/3/2022 CEA 26.6  9/14/2022 CEA 36.5  10/12/22 CEA 40.7  11/4/2022 CT Chest W Contrast Bilateral lower lobe scarring or atelectasis similar in appearance. There has been slight interval decrease in the amount and the size of the low attenuation hepatic lesions. 11/4/2022 CT Abd/Pelvis W IV Contrast ([place])There has been interval decrease in the mural thickening of the colonic loop at the level of the surgical suture material with slight residual mural thickening recognized. Endoscopy can be performed for further evaluation if clinically warranted. Postsurgical changes at the right hepatic lobe posteriorly similar. There has been slight interval decrease in the amount of hepatic metastatic disease with fewer lesions recognized in the right hepatic lobe. Cholecystectomy. 12/14/22 CEA 75.2  1/25/23 CEA 80.4  2/3/23 CT Chest W Contrast [facility]) Pulmonary nodules, as discussed above. These are worrisome for metastatic disease. Faint sclerotic appearance of the lateral aspect of the right seventh rib.  In addition, there appears to be some soft tissue thickening around this rib. This is worrisome for neoplasm/metastatic disease. No other definite bone lesions are appreciated. A bone scan might be helpful to further assess for metastatic bone disease. New small right pleural effusion. There also appears to be some thickening of the pleura in the right lung base. This may be infectious/inflammatory. Metastatic disease is considered. There is a small area of pleural thickening lateral to the right middle lobe image 98 series 3. Ascending thoracic aorta is ectatic at 3.8 cm. The remainder of the aorta is normal caliber. There is cardiomegaly. There is coronary artery calcification. No lymphadenopathy is seen in the chest. Please see the abdomen and pelvis CT report separately. 2/3/23 CT Abd/Pelvis W Contrast [facility]) A stable size and number of the hepatic lesions since the previous study in November 2022. A significant increase in size of a low-density lesion in the splenic hilum which may represent a metastatic lesion? Robyn Fuentes A significant thickening and enhancement of the wall of the colon since the previous study. This may represent an inflammatory process. This is more pronounced in the previous study. A small ascites which was not seen in the previous study. Diffuse nodularity of the peritoneum/omentum which is similar to the previous study. This may partly be due to prominent mesenteric nodes which are not enlarged by CT criteria. Ill-defined area of bony sclerosis involving the left side of the vertebra L3 is similar to the previous study. Omkar Martyr of the lesion is not certain. Possibility of a metastatic disease is not excluded. Postsurgical changes of the right lobe of the liver. No change. No complication. A low-density lesion in the splenic hilum has increased in size and now  measures 2.5 cm in AP dimension. It measured 1.2 cm and the previous   Study  2/7/2023-I reviewed results CT C/A/P.   Essentially, evidence of serologic and imaging progression. Therefore I have recommended to discontinue modified FOLFOX. I have recommended third line therapy with Lonsurf/Avastin. CEA dynamics:  2/27/22 CEA 3.5  5/24/22 CEA 29.1  6/22/22 CEA 21.5  8/3/22 CEA 26.6  9/4/22 CEA 36.5  10/12/22 CEA 40.7  12/14/22 CEA 75.2  1/25/23 CEA 80.4    PAST MEDICAL HISTORY:   Past Medical History:   Diagnosis Date    Acid reflux     Cancer ([facility] [address]     adenocarcinoma of colon    GERD (gastroesophageal reflux disease)     Palliative care patient 03/01/2022    PTSD (post-traumatic stress disorder)     Stage 3a chronic kidney disease ([facility] [address]       PAST SURGICAL HISTORY:  Past Surgical History:   Procedure Laterality Date    ABLATION OF DYSRHYTHMIC FOCUS      ablation of liver at [address]  2003    CHOLECYSTECTOMY  2013    COLONOSCOPY      when pt was in the 20's    COLONOSCOPY N/A 03/11/2020    COLONOSCOPY POLYPECTOMY SNARE/COLD BIOPSY: Dr. Mary Izquierdo colonoscopy: 6-12 months due to findings at colonoscopy today with Cecal mass lesion and large polyps.     COLONOSCOPY      COLONOSCOPY N/A 03/17/2021    Dr Taty Good, Post-operative changes w IC anastomosis & single visible staple, Mild Diverticuosis, Int Hemorrhoids Grade 1, 3 year recall    HEMICOLECTOMY Right 03/30/2020    LAPAROSCOPIC-ASSISTED RIGHT HEMICOLECTOMY performed by Janeth Carbone MD [facility] N/A 06/03/2020    INSERTION OF VENOUS PORT with flouro performed by Janeth Carbone MD at [address] [place] [address] Dr ENDOSCOPY N/A 03/11/2020    Dr. Chirag Briscoe:   [facility]      SOCIAL HISTORY:  Social History     Socioeconomic History    Marital status:    Tobacco Use    Smoking status: Never    Smokeless tobacco: Never   Vaping Use    Vaping Use: Never used   Substance and Sexual Activity    Alcohol use: Yes     Comment: rare     Drug use: No    Sexual activity: Yes     Partners: Female     FAMILY HISTORY:  Family History   Problem Relation Age of Onset    Liver Cancer Mother     High Blood Pressure Mother     Colon Cancer Mother         x2    Cancer Father         Lung Cancer    Breast Cancer Sister     Cancer Maternal Grandfather         Lung Cancer    Cancer Paternal Grandfather         Stomach Cancer    Colon Polyps Neg Hx     Cystic Fibrosis Neg Hx     Liver Disease Neg Hx     Rectal Cancer Neg Hx         Current Outpatient Medications   Medication Sig Dispense Refill    fentaNYL (DURAGESIC) 12 MCG/HR Place 1 patch onto the skin every 72 hours for 30 days. Max Daily Amount: 1 patch 10 patch 0    oxyCODONE HCl (OXY-IR) 10 MG immediate release tablet Take 1 tablet by mouth every 6 hours as needed for Pain for up to 30 days. Intended supply: 30 days 120 tablet 0    promethazine (PHENERGAN) 12.5 MG tablet Take 1 tablet by mouth every 6 hours as needed for Nausea 60 tablet 5    sildenafil (VIAGRA) 50 MG tablet Take 1 tablet by mouth daily as needed for Erectile Dysfunction 10 tablet 5    Magnesium Oxide 400 MG CAPS Take 400 mg by mouth in the morning and at bedtime 60 capsule 3    methocarbamol (ROBAXIN) 500 MG tablet Take 500 mg by mouth 3 times daily as needed       omeprazole (PRILOSEC) 20 MG delayed release capsule Take 20 mg by mouth daily      prazosin (MINIPRESS) 1 MG capsule Take 1 mg by mouth nightly as needed For nightmares       Sertraline HCl (ZOLOFT PO) Take by mouth daily      vitamin D (ERGOCALCIFEROL) 1.25 MG (56156 UT) CAPS capsule Take 1 capsule by mouth once a week 5 capsule 0     No current facility-administered medications for this visit.      REVIEW OF SYSTEMS:   CONSTITUTIONAL: no fever, no night sweats,weakness, fatigue;  HEENT: no blurring of vision, no double vision, no hearing difficulty, no tinnitus, no ulceration, no dysplasia, no epistaxis;   LUNGS: no cough, no hemoptysis, no wheeze,  no shortness of breath;  CARDIOVASCULAR: hypertensive, no palpitation, no chest pain, no shortness of breath;  GI: no abdominal pain, no nausea, no vomiting,diarrhea, no constipation;  ANNIE: no dysuria, no hematuria, no frequency or urgency, no nephrolithiasis;  MUSCULOSKELETAL: no joint pain, no swelling, no stiffness;  ENDOCRINE: no polyuria, no polydipsia, no cold or heat intolerance;  HEMATOLOGY: no easy bruising or bleeding, no history of clotting disorder;  DERMATOLOGY: no skin rash, no eczema, no pruritus;  PSYCHIATRY: no depression, no anxiety, no panic attacks, no suicidal ideation, no homicidal ideation;  NEUROLOGY:lightheaded, no syncope, no seizures, no numbness or tingling of hands, no numbness or tingling of feet, no paresis;     Vitals signs:  BP (!) 138/92   Pulse 85   Temp 98.5 °F (36.9 °C) (Oral)   Ht 5' 7\" (1.702 m)   Wt 129 lb 4.8 oz (58.7 kg)   SpO2 98%   BMI 20.25 kg/m²     PHYSICAL EXAM:  CONSTITUTIONAL: Alert, appropriate, no acute distress  EYES: Non icteric, EOM intact, pupils equal round   ENT: Mucus membranes moist, no oral pharyngeal lesions, external inspection of ears and nose are normal.  NECK: Supple, no masses. No palpable thyroid mass  CHEST/LUNGS: CTA bilaterally, normal respiratory effort   CARDIOVASCULAR: RRR, no murmurs. No lower extremity edema  ABDOMEN: soft non-tender, active bowel sounds, no HSM. No palpable masses  EXTREMITIES: warm, full ROM in all 4 extremities, no focal weakness. SKIN: warm, dry with no rashes or lesions  LYMPH: No cervical, clavicular, axillary, or inguinal lymphadenopathy  NEUROLOGIC: follows commands, non focal   PSYCH: mood and affect appropriate. Alert and oriented to time, place, person    Relevant Lab findings/reviewed by me:  1/25/23 CEA 80.4    2/7/23 CBC  WBC 5.02  HGB 11.9    Neut 3.11    Relevant Imaging studies/reviewed by me:  2/3/23 CT Chest W Contrast [facility]) Pulmonary nodules, as discussed above. These are worrisome for metastatic disease. Faint sclerotic appearance of the lateral aspect of the right seventh rib.  In addition, there appears to be some soft tissue thickening around   this rib. This is worrisome for neoplasm/metastatic disease. No other definite bone lesions are appreciated. A bone scan might be helpful to further assess for metastatic bone disease. New small right pleural effusion. There also appears to be some thickening of the pleura in the right lung base. This may be infectious/inflammatory. Metastatic disease is considered. There is a small area of pleural thickening lateral to the right middle lobe image 98 series 3. Ascending thoracic aorta is ectatic at 3.8 cm. The remainder of the   aorta is normal caliber. There is cardiomegaly. There is coronary artery calcification. No lymphadenopathy is seen in the chest. Please see the abdomen and pelvis CT report separately. 2/3/23 CT Abd/Pelvis W Contrast [facility]) A stable size and number of the hepatic lesions since the previous study in November 2022. A significant increase in size of a low-density lesion in the splenic hilum which may represent a metastatic lesion? Dianna Izquierdo A significant thickening and enhancement of the wall of the colon since the previous study. This may represent an inflammatory process. This is more pronounced in the previous study. A small ascites which was not seen in the previous study. Diffuse nodularity of the peritoneum/omentum which is similar to the previous study. This may partly be due to prominent mesenteric nodes which are not enlarged by CT criteria. Ill-defined area of bony sclerosis involving the left side of the vertebra L3 is similar to the previous study. Phillip Gain of the lesion is not certain. Possibility of a metastatic disease is not excluded. Postsurgical changes of the right lobe of the liver. No change. No complication. ASSESSMENT:    No orders of the defined types were placed in this encounter. Denise was seen today for follow-up.     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 Pathology consistent complete pathologic response. Recommended surveillance as per NCCN guidelines  Discussed at length results of pathology with the patient. 3/17/21- 1 year colonoscopy showed no large polyps or masses. Repeat in 3 years. June 2021-CT chest clear. MRI abdomen showed suspicious lesion in the right lower quadrant. Biopsy arranged [place]. Discussed with hepatobiliary service at [place]. [id number] biopsy consistent with recurrent primary colorectal adenocarcinoma. 7/2/2021-discussed with hepatobiliary service/surgical oncology at [place]. They will review images and call the patient back regarding consultation for consideration of HIPEC  7/2/2021-they recommend systemic therapy. 7/2/2021-recommended FOLFIRI/Avastin  7/13/21- Initiated FOLFIRI + Avastin every 2 weeks  07/30/21-Seen at [facility] by GI surgeon oncology. They discussed the role of CRS/HIPEC in his situation. He was told hat it really depends on the status of his liver lesions as well. He will complete 6 cycles of chemotherapy and will return to see me with a CTAP as well as MRI of the liver to assess disease burden and determine if he can be a candidate for debulking.    8/25/2021-proceed with FOLFIRI/Avastin   9/24/2021-repeat MRI abdomen/CT abdomen showed persistent disease. 1/13/2022-Exploratory laparotomy/resection of psoas mass/HIPEC at [place]. Path Isaac:     OMENTUM, OMENTECTOMY:   - METASTATIC COLORECTAL ADENOCARCINOMA INVOLVING FIBROADIPOSE TISSUE.   - TWO (2) LYMPH NODES, NEGATIVE FOR CARCINOMA.       SMALL BOWEL, MESENTERIC NODULE, EXCISION:   - METASTATIC COLORECTAL ADENOCARCINOMA INVOLVING FIBROMUSCULAR TISSUE AND EXTENDING TO TISSUE EDGE. SMALL BOWEL AND RIGHT COLON, ILEOCOLIC RESECTION:     - RESIDUAL RECURRENT INVASIVE MODERATELY DIFFERENTIATED COLORECTAL ADENOCARCINOMA (2.1 CM) INVOLVING ANASTOMOTIC SITE; SEE COMMENT AND SYNOPTIC REPORT.   - TUMOR FOCALLY INVOLVES THE SEROSA SURFACE.   - METASTATIC CARCINOMA INVOLVES ONE (1) OF TWELVE (12) LYMPH NODES.   - ONE (1) TUMOR DEPOSIT PRESENT.   - ALL RESECTION MARGINS ARE NEGATIVE FOR CARCINOMA. RETROPERITONEAL NODULE, EXCISION:   - METASTATIC COLORECTAL ADENOCARCINOMA INVOLVING FREDDY-PANCREATIC TISSUE AND EXTENDING TO TISSUE EDGE. LATERAL DUODENECTOMY, RESECTION:   - METASTATIC COLORECTAL ADENOCARCINOMA INVOLVING DUODENAL SUBMUCOSA, MUSCULARIS PROPRIA, AND SUBSEROSA. - TUMOR IS PRESENT WITHIN 1 MM OF INKED RESECTION MARGINS. RIGHT ABDOMINAL WALL NODULE, EXCISION:   - METASTATIC COLORECTAL ADENOCARCINOMA INVOLVING FIBROMUSCULAR TISSUE. RIGHT RETROPERITONEAL MASS, RESECTION:   - METASTATIC COLORECTAL ADENOCARCINOMA INVOLVING FIBROADIPOSE TISSUE (7.3 CM). - MEDIAL, SUPERIOR AND DEEP RESECTION MARGINS ARE POSITIVE FOR CARCINOMA. - INKED INFERIOR AND LATERAL MARGINS ARE NEGATIVE FOR CARCINOMA (<1 MM) RIGHT RETROPERITONEAL MASS, FINAL SUPERIOR MARGIN, EXCISION:   - METASTATIC COLORECTAL ADENOCARCINOMA INVOLVING FIBROADIPOSE TISSUE, FOCALLY PRESENT AT CAUTERIZED, INKED TISSUE EDGE.   2/7/2022-postoperative dehydration and electrolyte abnormalities. Home health arrangements IV fluids twice a week. 3/23/2022- reversal of ileostomy at [facility]. 3/20/2022 CT Abd/Pelvis WO Contrast ([facility]) Findings suspicious for aspiration at the lung bases. Postsurgical changes related to right hepatectomy. There is a new low-attenuation lesion in the left hemiliver, highly concerning for metastasis.  Postsurgical changes related to right hemicolectomy with ileocolonic anastomosis and right lower quadrant diverting loop ileostomy. No evidence of bowel obstruction. No intra-abdominal fluid collection. Other findings as above. A critical alert was sent at the time of dictation. Liver: Postsurgical changes related to right hepatectomy. There is a new, approximately 2.1 cm low-attenuation lesion seen in the left hepatic lobe on image 25 of series 3, highly concerning for metastasis. 4/23/2022- discussed the patient reinitiation of chemotherapy. We will continue FOLFIRI. He had no prior progression on FOLFIRI of FOLFOX. 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  We would avoid Avastin at this time due to recent surgery. He is K-maria luisa mutated and therefore cannot use anti-EGFR agent. May contemplate adding Avastin in 4 weeks. 5/9/2022 CT Chest W Contrast New pulmonary nodules are present in the right and left lung as described above. It cannot be determined if these are infectious nodules or metastatic disease. Dilated loops of bowel are present in the upper abdomen. The abdomen is incompletely evaluated however this may be wither an ileus or partial obstruction. Hepatic metastasis. 11/4/2022 CT Chest W Contrast Bilateral lower lobe scarring or atelectasis similar in appearance. There has been slight interval decrease in the amount and the size of the low attenuation hepatic lesions. 11/4/2022 CT Abd/Pelvis W IV Contrast (Oral)There has been interval decrease in the mural thickening of the colonic loop at the level of the surgical suture material with slight residual mural thickening recognized. Endoscopy can be performed for further evaluation if clinically warranted. Postsurgical changes at the right hepatic lobe posteriorly similar. There has been slight interval decrease in the amount of hepatic metastatic disease with fewer lesions recognized in the right hepatic lobe. Cholecystectomy. Plan:  -Proceed cycle #11 mFOLFOX/Avastin.    -Discussed with [place] hepatobiliary service. No clinical trials available. Not a candidate for liver directed therapy at this time. #Chronic kidney disease stage III- creatinine 1.6  Encouraged to increase PO fluid intake. Labs Renal Latest Ref Rng & Units 1/25/2023 1/11/2023 12/14/2022 11/30/2022 11/9/2022   BUN 9 - 20 mg/dL 13 14 21(H) 18 16   Cr 0.6 - 1.2 mg/dL 1.3(H) 1. 3(H) 1.4(H) 1.4(H) 1.5(H)   K 3.5 - 5.1 mmol/L 3.9 4.3 4.5 4.6 4.2   Na 137 - 145 mmol/L 141 136 141 140 138      #Liver metastasis- plan as above. Status post ablation left liver lobe lesion at [facility] on 6/8/2020. Status post neoadjuvant FOLFOX/bevacizumab x11 biweekly cyclesStatus post right hemicolectomy. Pathology consistent complete pathologic response. 3/4/2021-MRI abdomen was unremarkable  6/8/2021-MRI abdomen showed Postsurgical changes of right hepatectomy. Redemonstration of an ablation zone in segment II, measuring up to 1.7 cm, previously 1.8 cm. No evidence of postcontrast enhancement. No new lesion identified. 9/24/2021-MRI abdomen [facility] showed Liver: Status post right hepatectomy. Ablation zone in segment II measures 1.7 x 1.1 cm, slightly decreased in size from 1.8 x 1.4 cm previously (series 1301 image 56) without apreciable enhancement. Similar tiny subcentimeter cyst in the left hepatic lobe. No new focal hepatic lesion identified. 3/20/2022 CT Abd/Pelvis WO Contrast ([facility]) Findings suspicious for aspiration at the lung bases. Postsurgical changes related to right hepatectomy. There is a new low-attenuation lesion in the left hemiliver, highly concerning for metastasis. Postsurgical changes related to right hemicolectomy with ileocolonic anastomosis and right lower quadrant diverting loop ileostomy. No evidence of bowel obstruction. No intra-abdominal fluid collection. Other findings as above. A critical alert was sent at the time of dictation. Liver: Postsurgical changes related to right hepatectomy.  There is a new, approximately 2.1 cm low-attenuation lesion seen in the left hepatic lobe on image 25 of series 3, highly concerning for metastasis. 4/23/2022- discussed the patient reinitiation of chemotherapy. We will continue FOLFIRI. He had no prior progression on FOLFIRI of FOLFOX. 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  We would avoid Avastin at this time due to recent surgery. He is K-maria luisa mutated and therefore cannot use anti-EGFR agent. May contemplate adding Avastin in 4 weeks. 5/9/2022 CT Chest W Contrast New pulmonary nodules are present in the right and left lung as described above. It cannot be determined if these are infectious nodules or metastatic disease. Dilated loops of bowel are present in the upper abdomen. The abdomen is incompletely evaluated however this may be wither an ileus or partial obstruction. Hepatic metastasis. 7/21/2022 CT Chest W Contrast Bibasilar linear atelectasis life scarring. No pulmonary nodules, masses or infiltrates. Multiple enhancing low attenuation masses in the liver similar to the previous study. Findings consistent with metastatic liver disease. 7/21/2022 CT Abd/Pelvis W IV Contrast (Oral) Multiple enhancing low attenuation liver lesions increase in size and number since the previous study 3/7/22. Findings consistent with progression of metastatic liver disease. Status postcholecystectomy. The appendix is not visualized. No acute findings. 8/3/22 Discontinue FOLFIRI + Avastin every 2 weeks due to progression in the liver  8/3/2022-unfortunately, interval progression of liver metastasis when compared to last CT scans performed on 3/7/2022. In addition, when compared to report from [address] [place] [address] abdomen/pelvis on 3/20/2022 when he had only 1 single metastatic lesion measuring 2.1 cm. This is considered progression. I have recommended to discontinue FOLFIRI/Avastin and consider initiation of palliative FOLFOX with Avastin. Discussed with hepatobiliary surgery. The patient is not a candidate for further surgery. He is not a candidate for any liver directed therapy. 11/4/2022 CT Chest W Contrast Bilateral lower lobe scarring or atelectasis similar in appearance. There has been slight interval decrease in the amount and the size of the low attenuation hepatic lesions. 11/4/2022 CT Abd/Pelvis W IV Contrast (Oral)There has been interval decrease in the mural thickening of the colonic loop at the level of the surgical suture material with slight residual mural thickening recognized. Endoscopy can be performed for further evaluation if clinically warranted. Postsurgical changes at the right hepatic lobe posteriorly similar. There has been slight interval decrease in the amount of hepatic metastatic disease with fewer lesions recognized in the right hepatic lobe. Cholecystectomy. 1/25/23 CEA 80.4  2/3/23 CT Chest W Contrast [facility]) Pulmonary nodules, as discussed above. These are worrisome for metastatic disease. Faint sclerotic appearance of the lateral aspect of the right seventh rib. In addition, there appears to be some soft tissue thickening around this rib. This is worrisome for neoplasm/metastatic disease. No other definite bone lesions are appreciated. A bone scan might be helpful to further assess for metastatic bone disease. New small right pleural effusion. There also appears to be some thickening of the pleura in the right lung base. This may be infectious/inflammatory. Metastatic disease is considered. There is a small area of pleural thickening lateral to the right middle lobe image 98 series 3. Ascending thoracic aorta is ectatic at 3.8 cm. The remainder of the aorta is normal caliber. There is cardiomegaly. There is coronary artery calcification. No lymphadenopathy is seen in the chest. Please see the abdomen and pelvis CT report separately. 2/3/23 CT Abd/Pelvis W Contrast [facility]) A stable size and number of the hepatic lesions since the previous study in November 2022.  A significant increase in size of a low-density lesion in the splenic hilum which may represent a metastatic lesion? Dianna Izquierdo A significant thickening and enhancement of the wall of the colon since the previous study. This may represent an inflammatory process. This is more pronounced in the previous study. A small ascites which was not seen in the previous study. Diffuse nodularity of the peritoneum/omentum which is similar to the previous study. This may partly be due to prominent mesenteric nodes which are not enlarged by CT criteria. Ill-defined area of bony sclerosis involving the left side of the vertebra L3 is similar to the previous study. Phillip Gain of the lesion is not certain. Possibility of a metastatic disease is not excluded. Postsurgical changes of the right lobe of the liver. No change. No complication. A low-density lesion in the splenic hilum has increased in size and now  measures 2.5 cm in AP dimension. It measured 1.2 cm and the previous   Study  2/7/2023-I reviewed results CT C/A/P. Essentially, evidence of serologic and imaging progression. Therefore I have recommended to discontinue modified FOLFOX. I have recommended third line therapy with Lonsurf/Avastin. Plan:  -Discontinue modified FOLFOX  -Lonsurf/bevacizumab  Lab Results   Component Value Date    CEA 80.4 (H) 01/25/2023 2/7/23 Plan      #Multifactorial anemia  hemoglobin 8.5/MCV 89. Ferritin 12.9, iron saturation 15%, TIBC 458, iron 72. Status post IV iron therapy.    Hemoglobin 11.3  -Repeat iron profile on 5/24/2022: Ferritin 116, iron saturation 11, iron 29, TIBC 256  Recent Labs     02/07/23  0954 01/25/23  0927 01/11/23  0859   WBC 5.02 3.23* 4.4*   HGB 11.9* 10.8* 11.3*   HCT 37.8* 32.6* 35.5*   MCV 93.6* 89.8 93.4   * 119* 138   -Likely related to chemotherapy    Chemotherapy education   I have explained to the patient the possible side effects of chemotherapy to include but not limited to bone marrow suppression, increased risk of infections, alopecia, GI toxicity such as nausea, vomiting, diarrhea, constipation, allergic reactions, skin rashes, fatigue and rarely risk of fatal outcomes related to direct or indirect effects of treatment. We discussed about strategies to lessen the side effects of treatment when required to include but not limited to doses/regimen modifications by provider, increased patient education awareness of certain toxicities, prompt notification to provider or nursing staff by the patient all certain side effects, proactive measures etc.  We also discussed about the importance of compliance with treatment with providers visits to assure early identification and management of side effects. #Chemotherapy-induced neuropathy-stable, antineoplastic induced anemia    #Cancer related pain-  -Oxycodone IR 10 mg every 6 hours as needed  -Increase Fentanyl to 25mcg every 72 hrs-will use current supply and call for new script    DVT port associated left upper extremity-February 2022  internal jugular vein subclavian axillary brachial, left upper extremity. Acute appearing superficial thrombophlebitis (SVT) is seen in the basilic  and cephalic veins, right upper extremity. -Off Eliquis     Erectile dysfunction-  -Sildenafil 50mg daily as needed    Hypomagnesemia-magnesium 1.7. Chemotherapy-induced neuropathy-stable  -Continue to monitor.     PLAN:  RTC with MD in treatment room C# 2  Discontinue FOLFOX + Avastin   Recommend Lonsurf 35mg/m2 D1-5, D8-12 every 28 days + Avastin 7.5mg/m2 every 3 weeks-once ins approves  Patient education given at previous visit  Treatment consent signed  Repeat CT chest, abdomen, pelvis at [facility] in 3 months, May 2023  Continue follow-up with [facility]/Dr. Steven Mccollum, Oct 2022   Increase Fentanyl to 25mcg every 72 hrs-will use current supply and call for new script  Continue OxyIR 10mg every 6 hours as needed  Continue Sildenafil 50mg daily as needed  Continue Phenergan 12.5mg every 6 hrs as needed  Continue OTC Imodium as needed      Follow Up:     Return for Treatment & see Nathaly Perez in [facility]. Avastin 7.5mg/m2 every 3 weeks-once ins approves     I, Marj Stephen am pre charting  as Medical Assistant for Qi Bates MD. Electronically signed by Marj Stephen MA on 2/7/2023 at 5:03 PM CST. Hemanth Boudreaux am scribing for Qi Bates MD. Electronically signed by Symone Hernandez RN on 2/7/2023 at 10:35 AM CST. I, Dr Ze Sanchez, personally performed the services described in this documentation as scribed by Symone Hernandez RN in my presence and is both accurate and complete. I have seen, examined and reviewed this patient medication list, appropriate labs and imaging studies. I reviewed relevant medical records and others physicians notes. I discussed the plans of care with the patient. I answered all the questions to the patients satisfaction. I have also reviewed the chief complaint (CC) and part of the history (History of Present Illness (HPI), Past Family Social History [facility]), or Review of Systems (ROS) and made changes when appropriated. (Please note that portions of this note were completed with a voice recognition program. Efforts were made to edit the dictations but occasionally words are mis-transcribed. )Electronically signed by Qi Bates MD on 2/7/2023 at 10:58 AM

## 2023-02-06 DIAGNOSIS — C18.2 MALIGNANT NEOPLASM OF ASCENDING COLON (HCC): Primary | ICD-10-CM

## 2023-02-07 ENCOUNTER — HOSPITAL ENCOUNTER (OUTPATIENT)
Dept: INFUSION THERAPY | Age: 68
Discharge: HOME | End: 2023-02-07
Payer: OTHER GOVERNMENT

## 2023-02-07 ENCOUNTER — OFFICE VISIT (OUTPATIENT)
Dept: HEMATOLOGY | Age: 68
End: 2023-02-07
Payer: OTHER GOVERNMENT

## 2023-02-07 VITALS
HEIGHT: 67 IN | OXYGEN SATURATION: 98 % | SYSTOLIC BLOOD PRESSURE: 138 MMHG | TEMPERATURE: 98.5 F | HEART RATE: 85 BPM | BODY MASS INDEX: 20.29 KG/M2 | WEIGHT: 129.3 LBS | DIASTOLIC BLOOD PRESSURE: 92 MMHG

## 2023-02-07 DIAGNOSIS — C78.02 MALIGNANT NEOPLASM METASTATIC TO BOTH LUNGS (HCC): ICD-10-CM

## 2023-02-07 DIAGNOSIS — C78.7 LIVER METASTASES (HCC): ICD-10-CM

## 2023-02-07 DIAGNOSIS — C78.01 MALIGNANT NEOPLASM METASTATIC TO BOTH LUNGS (HCC): ICD-10-CM

## 2023-02-07 DIAGNOSIS — G89.3 CANCER ASSOCIATED PAIN: ICD-10-CM

## 2023-02-07 DIAGNOSIS — Z71.89 CARE PLAN DISCUSSED WITH PATIENT: ICD-10-CM

## 2023-02-07 DIAGNOSIS — C18.2 MALIGNANT NEOPLASM OF ASCENDING COLON (HCC): Primary | ICD-10-CM

## 2023-02-07 DIAGNOSIS — C18.2 MALIGNANT NEOPLASM OF ASCENDING COLON (HCC): ICD-10-CM

## 2023-02-07 LAB
BASOPHILS ABSOLUTE: 0.04 K/UL (ref 0.01–0.08)
BASOPHILS RELATIVE PERCENT: 0.8 % (ref 0.1–1.2)
EOSINOPHILS ABSOLUTE: 0.1 K/UL (ref 0.04–0.54)
EOSINOPHILS RELATIVE PERCENT: 2 % (ref 0.7–7)
HCT VFR BLD CALC: 37.8 % (ref 40.1–51)
HEMOGLOBIN: 11.9 G/DL (ref 13.7–17.5)
LYMPHOCYTES ABSOLUTE: 0.71 K/UL (ref 1.18–3.74)
LYMPHOCYTES RELATIVE PERCENT: 14.1 % (ref 19.3–53.1)
MCH RBC QN AUTO: 29.5 PG (ref 25.7–32.2)
MCHC RBC AUTO-ENTMCNC: 31.5 G/DL (ref 32.3–36.5)
MCV RBC AUTO: 93.6 FL (ref 79–92.2)
MONOCYTES ABSOLUTE: 1.05 K/UL (ref 0.24–0.82)
MONOCYTES RELATIVE PERCENT: 20.9 % (ref 4.7–12.5)
NEUTROPHILS ABSOLUTE: 3.11 K/UL (ref 1.56–6.13)
NEUTROPHILS RELATIVE PERCENT: 62 % (ref 34–71.1)
PDW BLD-RTO: 15.9 % (ref 11.6–14.4)
PLATELET # BLD: 112 K/UL (ref 163–337)
PMV BLD AUTO: 10.3 FL (ref 7.4–10.4)
RBC # BLD: 4.04 M/UL (ref 4.63–6.08)
WBC # BLD: 5.02 K/UL (ref 4.23–9.07)

## 2023-02-07 PROCEDURE — 85025 COMPLETE CBC W/AUTO DIFF WBC: CPT

## 2023-02-07 PROCEDURE — 1123F ACP DISCUSS/DSCN MKR DOCD: CPT | Performed by: INTERNAL MEDICINE

## 2023-02-07 PROCEDURE — 99214 OFFICE O/P EST MOD 30 MIN: CPT | Performed by: INTERNAL MEDICINE

## 2023-02-07 PROCEDURE — 99212 OFFICE O/P EST SF 10 MIN: CPT

## 2023-02-07 PROCEDURE — 36415 COLL VENOUS BLD VENIPUNCTURE: CPT

## 2023-02-07 NOTE — TELEPHONE ENCOUNTER
Per Dr Terra Leigh, start Lonsurf 35mg/m2 (60mg) twice a day on days 1-5 and days 8-12 every 28 days.

## 2023-02-08 NOTE — PROGRESS NOTES
[place], [place], [address]    DEPARTMENT OF HOSPITALIST MEDICINE      PROGRESS  NOTE:    NOTE: Portions of this note have been copied forward, however, changed to reflect the most current clinical status of this patient. Patient:  Hubert Wilson  YOB: 1955  Date of Service: 3/10/2022  MRN: [id number]   Acct: [de-identified]   Primary Care Physician: KALPESH Farfan NP  Advance Directive: Full Code  Admit Date: 3/7/2022       Hospital Day: 3      CHIEF COMPLAINT:  Chief Complaint   Patient presents with    Abnormal Lab     CR 3       SUBJECTIVE:  Patient sitting in chair at the bedside during my morning rounds. Continues to feel better. Awaiting going back to home soon. [address]  COURSE:    03/10/2022:  Patient is tolerating IV fluids and TPN well. IV fluids switched to normal saline after potassium supplementation. TPN has been approved by insurance and home health care ordered for DC planning tomorrow. 03/09/2022:  Patient requires TPN for nutrition as recommended by dietary and I fully support this recommendation. TPN started. Home health care also notified about patient's TPN needs. Continue with IV hydration. Renal functions are slowly improving on IV hydration. Oral potassium supplementation ordered for borderline potassium levels. IV fluids switch to potassium containing fluid. 03/08/2022:  Patient seen and evaluated by oncology who advised to continue with IV fluids. Surgery consult given to recommendations regarding high output ileostomy which is the cause of his dehydration and rhianna. Dietary recommended starting patient on TPN.    03/07/2022: History Of Present Illness: The patient is a 74 y.o. male who presented to [facility] ER via EMS with PMH adenocarcinoma of the colon with mets to liver, EMI, CKD III, chronic alcoholism in remission, GERD, PTSD, SBO, and DVT who presents to the emergency department due to abnormal lab.  Has had recent hospitalization admitted on 2/21/22 due to MARIO on CKD and was just discharged from [address] [place] [address] on 3/1/22. Patient currently utilizes home health, they performed blood work and noted an elevated Creatinine of 3. 's office called him and instructed him to go to [address] [place] [address] ER. Patient states that he has been having decreased appetite and started having nausea with vomiting that began last night. Endorses decreased intake, nausea, vomiting, fatigue, generalized weakness, and abdominal pain. Denies fever, chills, hemoptysis, hematuria, shortness of breath, bloody stool, and chest pain. Work up in [address] [place] [address] abd/pelvis no bowel obstruction or fluid collection noted. WBC 12.1, creat 2.8 BUN 37, Alk phos 309, ALT 56, and AST 78. Received 1L NS and Zofran total 8mg IV while in ER. Patient is to be admitted to hospitalist service due to MARIO on CKD.         REVIEW  OF  SYSTEMS:    Constitutional:  No fevers, chills, nausea, vomiting, + tiredness and fatigue   Lungs:   No hemoptysis, pleurisy, cough, SOB   Heart:  No chest pressure with exertion, palpitations,    Abdomen:   No new masses, + ileostomy in place   Extremities:  + difficulty ambulation due to weakness, no new lesions   Neurologic: No new motor or sensory changes     14 point review of systems addressed with patient which is essentially negative except as specifically addressed above:    PAST MEDICAL HISTORY:  Past Medical History:   Diagnosis Date    Acid reflux     Cancer ([facility] [address]     adenocarcinoma of colon    GERD (gastroesophageal reflux disease)     Palliative care patient 03/01/2022    PTSD (post-traumatic stress disorder)     Stage 3a chronic kidney disease ([facility] [address]          PAST SURGICAL HISTORY:  Past Surgical History:   Procedure Laterality Date    ABLATION OF DYSRHYTHMIC FOCUS      ablation of liver at [address]  2003    CHOLECYSTECTOMY  2013    COLONOSCOPY      when pt was in the 20's    COLONOSCOPY N/A 03/11/2020    COLONOSCOPY POLYPECTOMY SNARE/COLD BIOPSY: Dr. Villarreal Fitting colonoscopy: 6-12 months due to findings at colonoscopy today with Cecal mass lesion and large polyps.     COLONOSCOPY      COLONOSCOPY N/A 03/17/2021    Dr Fermin Dumont, Post-operative changes w IC anastomosis & single visible staple, Mild Diverticuosis, Int Hemorrhoids Grade 1, 3 year recall    HEMICOLECTOMY Right 03/30/2020    LAPAROSCOPIC-ASSISTED RIGHT HEMICOLECTOMY performed by Viet Taylor MD at [address] [place] [address] N/A 06/03/2020    INSERTION OF VENOUS PORT with flouro performed by Viet Taylor MD at [place] [address] ENDOSCOPY N/A 03/11/2020    Dr. Resendez President:   Tadeo Castillo        FAMILY HISTORY:  Family History   Problem Relation Age of Onset    Liver Cancer Mother     High Blood Pressure Mother     Colon Cancer Mother         x2    Cancer Father         Lung Cancer    Breast Cancer Sister     Cancer Maternal Grandfather         Lung Cancer    Cancer Paternal Grandfather         Stomach Cancer    Colon Polyps Neg Hx     Cystic Fibrosis Neg Hx     Liver Disease Neg Hx     Rectal Cancer Neg Hx            OBJECTIVE:  BP 96/60   Pulse 85   Temp 96.6 °F (35.9 °C) (Temporal)   Resp 18   Ht 5' 7\" (1.702 m)   Wt 119 lb 9.6 oz (54.3 kg)   SpO2 100%   BMI 18.73 kg/m²   I/O this shift:  In: 120 [P.O.:120]  Out: -     PHYSICAL  EXAMINATION:    DANIEL:  Awake, alert, oriented x 3, patient appears tired and fatigued   Head/Eyes:  Normocephalic, atraumatic, EOMI and PERRLA bilaterally   Respiratory:   Bilateral decreased air entry in both lung fields, mild B/L crackles, symmetric expansion of chest   Cardiovascular:  Regular rate and rhythm, S1+S2+0, no murmurs/rubs   Abdomen:   Soft, non-tender, bowel sounds +ve, no organomegaly, + ileostomy in place draining well   Extremities: Moves all, decreased range of motion, no edema   Neurologic: Awake, alert, oriented x 3, cranial nerves II-XII intact, no focal neurological deficits, sensory system intact   Psychiatric: Normal mood, non-suicidal       CURRENT MEDICATIONS:  Scheduled:   vitamin D  50,000 Units Oral Weekly    diphenoxylate-atropine  1 tablet Oral 4x Daily    octreotide  100 mcg SubCUTAneous Q8H    fat emulsion  250 mL IntraVENous Once per day on Mon Wed Fri    sodium chloride flush  5-40 mL IntraVENous 2 times per day    apixaban  5 mg Oral BID    dronabinol  2.5 mg Oral BID AC    megestrol  800 mg Oral Daily    pantoprazole  40 mg Oral QAM AC    prazosin  1 mg Oral Nightly    psyllium  1 packet Oral TID WC        PRN:  loperamide, 2 mg, Q4H PRN  sodium chloride flush, 5-40 mL, PRN  sodium chloride, 25 mL, PRN  ondansetron, 4 mg, Q8H PRN   Or  ondansetron, 4 mg, Q6H PRN  acetaminophen, 650 mg, Q6H PRN   Or  acetaminophen, 650 mg, Q6H PRN  HYDROmorphone, 4 mg, Q6H PRN  methocarbamol, 500 mg, TID PRN  oxyCODONE HCl, 10 mg, Q4H PRN  promethazine, 12.5 mg, Q6H PRN        Infusions:   PN-Adult Premix 5/15 - Standard Electrolytes - Central Line      sodium chloride      sodium chloride         Laboratory Data:  Recent Labs     03/08/22 0422 03/09/22  0550 03/10/22  0600   WBC 11.8* 8.8 7.7   HGB 9.8* 8.2* 8.1*   * 303 277     Recent Labs     03/08/22 0422 03/09/22  0800 03/10/22  0600   * 136 132*   K 3.7 3.5 4.7   CL 95* 100 101   CO2 21* 25 21*   BUN 42* 37* 45*   CREATININE 3.0* 2.7* 2.1*   GLUCOSE 101 129* 135*     Recent Labs     03/08/22  0422 03/09/22  0800 03/10/22  0600   AST 97* 101* 56*   ALT 76* 96* 75*   BILITOT 0.4 0.3 <0.2   ALKPHOS 287* 225* 198*     Troponin T: No results for input(s): TROPONINI in the last 72 hours. Pro-BNP: No results for input(s): BNP in the last 72 hours. INR: No results for input(s): INR in the last 72 hours.   UA:  Recent Labs     03/08/22  0015   COLORU DARK YELLOW*   PHUR 5.0   WBCUA 2-4  5   RBCUA 0-1   BACTERIA NEGATIVE*   CLARITYU CLOUDY*   SPECGRAV 1.021   LEUKOCYTESUR TRACE*   UROBILINOGEN 0.2   BILIRUBINUR SMALL*   BLOODU Negative   GLUCOSEU Negative     A1C: No results for input(s): LABA1C in the last 72 hours. ABG:No results for input(s): PHART, MEQ2OCZ, PO2ART, KJC4IZE, BEART, HGBAE, P0PIGMFX, CARBOXHGBART in the last 72 hours. Imaging:    CT ABDOMEN PELVIS WO CONTRAST Additional Contrast? None    Result Date: 3/7/2022  Stable postsurgical changes, without bowel obstruction or organized fluid collection on today's examination. Signed by Dr Loreto Keenan:    Principal Problem:    Acute kidney injury superimposed on CKD [facility])  Active Problems:    Adenocarcinoma of colon ([facility] [address]    High output ileostomy ([facility] [address]    Severe malnutrition ([facility] [address]  Resolved Problems:    * No resolved hospital problems. *      Continue with current medications  Continue with  IV hydration   Patient's renal functions continue to improve with IV hydration  IV fluids switch to normal saline at 100 cc after potassium supplementation yesterday  Strict I's and O's  Daily weights  Oncology evaluation and recommendations reviewed, appreciated and agreed with  General surgery evaluated patient at the bedside as well  Patient has high output ileostomy which is a difficult problem and occasionally refractory to medical treatment as per general surgery next  Monitor level electrolytes closely  Patient seen and evaluated by nutrition for severe malnutrition and TPN recommendations given  Orders placed for TPN to be managed as per dietary  Electrolyte imbalance to be addressed during TPN composition by pharmacy  Patient's leukocytosis was likely reactive in nature and has now normalized  Patient started on vitamin D 50,000 units orally q. weekly for vitamin D level of 24  Follow-up closely with general surgery and oncology for further treatment recommendations next    Nutritional recommendations:    [facility] to manage home TPN and labs. JESSY > 90 days. High fiber diet. No sugary foods or fluids.   Eat 6-8 small meals a day and limit fluid intake with food to 4 fl oz. (Fluid intake should be  from eating.)  Take anti-diarrheals on schedule vs prn. Measure ostomy output and be sure that intake with oral fluids and TPN is at least equal to output. Empty pouch frequently at 1/3 full. Use low sugar oral rehydration solutions that are balanced with sugar & sodium, and those that include other electrolytes. Goal is output is the consistency of toothpaste and <= 1000 ml per 24 hours. Go to www. [employer] [place] for good information on managing high volume stoma output.       Chronic medical issues . .. Continue with home meds. Monitor patient closely while admitted. Advised very close f/u with patient's PCP as an outpatient to address chronic medical issues. Repeat labs in a.m. Electrolyte replacement as per protocol. Patient will be monitored very closely on the floor. Further recommendations as per the hospital course. Discharge Plan: DC patient home with home health care in a.m. if he remains stable      Patient  is on DVT prophylaxis  Current medications reviewed  Lab work reviewed  Radiology/Chest x-ray films reviewed  Treatment recommendations from suspecialities reviewed, appreciated and agreed with  Discussed with the nurse and addressed all questions/concerns  Discussed with Patient and/or Family at the bedside in detail . .. they understand and agree with the management plan. Shay Goodson MD  3/10/2022 4:34 PM      DISCLAIMER: This note was created with electronic voice recognition which does have occasional errors. If you have any questions regarding the content within the note please do not hesitate to contact me. .. Thanks. English

## 2023-02-09 ENCOUNTER — TELEPHONE (OUTPATIENT)
Dept: HEMATOLOGY | Age: 68
End: 2023-02-09

## 2023-02-14 ENCOUNTER — HOSPITAL ENCOUNTER (OUTPATIENT)
Dept: INFUSION THERAPY | Age: 68
Discharge: HOME | End: 2023-02-14
Payer: OTHER GOVERNMENT

## 2023-02-14 VITALS
OXYGEN SATURATION: 100 % | RESPIRATION RATE: 18 BRPM | HEIGHT: 67 IN | DIASTOLIC BLOOD PRESSURE: 88 MMHG | HEART RATE: 85 BPM | WEIGHT: 133 LBS | TEMPERATURE: 97.9 F | SYSTOLIC BLOOD PRESSURE: 131 MMHG | BODY MASS INDEX: 20.88 KG/M2

## 2023-02-14 DIAGNOSIS — C78.7 LIVER METASTASES (HCC): ICD-10-CM

## 2023-02-14 DIAGNOSIS — C78.01 MALIGNANT NEOPLASM METASTATIC TO BOTH LUNGS (HCC): ICD-10-CM

## 2023-02-14 DIAGNOSIS — C78.02 MALIGNANT NEOPLASM METASTATIC TO BOTH LUNGS (HCC): ICD-10-CM

## 2023-02-14 DIAGNOSIS — C18.2 MALIGNANT NEOPLASM OF ASCENDING COLON (HCC): Primary | ICD-10-CM

## 2023-02-14 DIAGNOSIS — C18.2 MALIGNANT NEOPLASM OF ASCENDING COLON (HCC): ICD-10-CM

## 2023-02-14 DIAGNOSIS — C18.9 ADENOCARCINOMA OF COLON (HCC): ICD-10-CM

## 2023-02-14 LAB
ALBUMIN SERPL-MCNC: 3.5 G/DL (ref 3.5–5.2)
ALP BLD-CCNC: 332 U/L (ref 40–130)
ALT SERPL-CCNC: 23 U/L (ref 21–72)
ANION GAP SERPL CALCULATED.3IONS-SCNC: 5 MMOL/L (ref 7–19)
AST SERPL-CCNC: 52 U/L (ref 17–59)
BILIRUB SERPL-MCNC: 0.8 MG/DL (ref 0.2–1.3)
BUN BLDV-MCNC: 18 MG/DL (ref 9–20)
CALCIUM SERPL-MCNC: 9.2 MG/DL (ref 8.4–10.2)
CHLORIDE BLD-SCNC: 110 MMOL/L (ref 98–111)
CO2: 24 MMOL/L (ref 22–29)
CREAT SERPL-MCNC: 1.5 MG/DL (ref 0.6–1.2)
GFR SERPL CREATININE-BSD FRML MDRD: 51 ML/MIN/{1.73_M2}
GLOBULIN: 2.8 G/DL
GLUCOSE BLD-MCNC: 141 MG/DL (ref 74–106)
HCT VFR BLD CALC: 35.2 % (ref 40.1–51)
HEMOGLOBIN: 11.4 G/DL (ref 13.7–17.5)
LYMPHOCYTES ABSOLUTE: 0.57 K/UL (ref 1.18–3.74)
LYMPHOCYTES RELATIVE PERCENT: 14.3 % (ref 19.3–53.1)
MCH RBC QN AUTO: 30.2 PG (ref 25.7–32.2)
MCHC RBC AUTO-ENTMCNC: 32.4 G/DL (ref 32.3–36.5)
MCV RBC AUTO: 93.1 FL (ref 79–92.2)
MONOCYTES ABSOLUTE: 0.76 K/UL (ref 0.24–0.82)
MONOCYTES RELATIVE PERCENT: 19.1 % (ref 4.7–12.5)
NEUTROPHILS ABSOLUTE: 2.55 K/UL (ref 1.56–6.13)
NEUTROPHILS RELATIVE PERCENT: 64 % (ref 34–71.1)
PDW BLD-RTO: 15.8 % (ref 11.6–14.4)
PLATELET # BLD: 143 K/UL (ref 163–337)
PMV BLD AUTO: 10.2 FL (ref 7.4–10.4)
POTASSIUM SERPL-SCNC: 4.2 MMOL/L (ref 3.5–5.1)
PROTEIN UA: NEGATIVE
RBC # BLD: 3.78 M/UL (ref 4.63–6.08)
SODIUM BLD-SCNC: 139 MMOL/L (ref 137–145)
TOTAL PROTEIN: 6.3 G/DL (ref 6.3–8.2)
WBC # BLD: 3.98 K/UL (ref 4.23–9.07)

## 2023-02-14 PROCEDURE — 96413 CHEMO IV INFUSION 1 HR: CPT

## 2023-02-14 PROCEDURE — 2580000003 HC RX 258: Performed by: PHYSICIAN ASSISTANT

## 2023-02-14 PROCEDURE — 85025 COMPLETE CBC W/AUTO DIFF WBC: CPT

## 2023-02-14 PROCEDURE — 80053 COMPREHEN METABOLIC PANEL: CPT

## 2023-02-14 PROCEDURE — 6360000002 HC RX W HCPCS: Performed by: PHYSICIAN ASSISTANT

## 2023-02-14 RX ORDER — ONDANSETRON 2 MG/ML
8 INJECTION INTRAMUSCULAR; INTRAVENOUS
OUTPATIENT
Start: 2023-02-14

## 2023-02-14 RX ORDER — SODIUM CHLORIDE 9 MG/ML
5-250 INJECTION, SOLUTION INTRAVENOUS PRN
Status: CANCELLED | OUTPATIENT
Start: 2023-02-14

## 2023-02-14 RX ORDER — HEPARIN SODIUM (PORCINE) LOCK FLUSH IV SOLN 100 UNIT/ML 100 UNIT/ML
500 SOLUTION INTRAVENOUS PRN
Status: CANCELLED | OUTPATIENT
Start: 2023-02-14

## 2023-02-14 RX ORDER — SODIUM CHLORIDE 9 MG/ML
5-40 INJECTION INTRAVENOUS PRN
Status: DISCONTINUED | OUTPATIENT
Start: 2023-02-14 | End: 2023-02-15 | Stop reason: HOSPADM

## 2023-02-14 RX ORDER — ACETAMINOPHEN 325 MG/1
650 TABLET ORAL
OUTPATIENT
Start: 2023-02-14

## 2023-02-14 RX ORDER — ALBUTEROL SULFATE 90 UG/1
4 AEROSOL, METERED RESPIRATORY (INHALATION) PRN
OUTPATIENT
Start: 2023-02-14

## 2023-02-14 RX ORDER — SODIUM CHLORIDE 9 MG/ML
5-250 INJECTION, SOLUTION INTRAVENOUS PRN
OUTPATIENT
Start: 2023-02-14

## 2023-02-14 RX ORDER — SODIUM CHLORIDE 9 MG/ML
5-250 INJECTION, SOLUTION INTRAVENOUS PRN
Status: DISCONTINUED | OUTPATIENT
Start: 2023-02-14 | End: 2023-02-15 | Stop reason: HOSPADM

## 2023-02-14 RX ORDER — HEPARIN SODIUM (PORCINE) LOCK FLUSH IV SOLN 100 UNIT/ML 100 UNIT/ML
500 SOLUTION INTRAVENOUS PRN
Status: DISCONTINUED | OUTPATIENT
Start: 2023-02-14 | End: 2023-02-15 | Stop reason: HOSPADM

## 2023-02-14 RX ORDER — SODIUM CHLORIDE 9 MG/ML
INJECTION, SOLUTION INTRAVENOUS CONTINUOUS
OUTPATIENT
Start: 2023-02-14

## 2023-02-14 RX ORDER — MEPERIDINE HYDROCHLORIDE 50 MG/ML
12.5 INJECTION INTRAMUSCULAR; INTRAVENOUS; SUBCUTANEOUS PRN
OUTPATIENT
Start: 2023-02-14

## 2023-02-14 RX ORDER — FAMOTIDINE 10 MG/ML
20 INJECTION, SOLUTION INTRAVENOUS
OUTPATIENT
Start: 2023-02-14

## 2023-02-14 RX ORDER — DIPHENHYDRAMINE HYDROCHLORIDE 50 MG/ML
50 INJECTION INTRAMUSCULAR; INTRAVENOUS
OUTPATIENT
Start: 2023-02-14

## 2023-02-14 RX ORDER — EPINEPHRINE 1 MG/ML
0.3 INJECTION, SOLUTION, CONCENTRATE INTRAVENOUS PRN
OUTPATIENT
Start: 2023-02-14

## 2023-02-14 RX ORDER — SODIUM CHLORIDE 9 MG/ML
5-40 INJECTION INTRAVENOUS PRN
Status: CANCELLED | OUTPATIENT
Start: 2023-02-14

## 2023-02-14 RX ORDER — FENTANYL 25 UG/H
1 PATCH TRANSDERMAL
Qty: 10 PATCH | Refills: 0 | Status: SHIPPED | OUTPATIENT
Start: 2023-02-14 | End: 2023-03-16

## 2023-02-14 RX ADMIN — SODIUM CHLORIDE 250 ML/HR: 9 INJECTION, SOLUTION INTRAVENOUS at 12:50

## 2023-02-14 RX ADMIN — SODIUM CHLORIDE, PRESERVATIVE FREE 10 ML: 5 INJECTION INTRAVENOUS at 13:23

## 2023-02-14 RX ADMIN — HEPARIN 500 UNITS: 100 SYRINGE at 13:23

## 2023-02-14 RX ADMIN — SODIUM CHLORIDE 450 MG: 9 INJECTION, SOLUTION INTRAVENOUS at 12:51

## 2023-02-24 DIAGNOSIS — C18.2 MALIGNANT NEOPLASM OF ASCENDING COLON (HCC): ICD-10-CM

## 2023-02-24 DIAGNOSIS — G89.3 CANCER ASSOCIATED PAIN: ICD-10-CM

## 2023-02-24 DIAGNOSIS — C18.9 ADENOCARCINOMA OF COLON (HCC): ICD-10-CM

## 2023-02-24 DIAGNOSIS — C78.7 LIVER METASTASES (HCC): ICD-10-CM

## 2023-02-24 RX ORDER — OXYCODONE HYDROCHLORIDE 10 MG/1
10 TABLET ORAL EVERY 6 HOURS PRN
Qty: 120 TABLET | Refills: 0 | Status: SHIPPED | OUTPATIENT
Start: 2023-02-24 | End: 2023-03-26

## 2023-02-27 ENCOUNTER — TELEPHONE (OUTPATIENT)
Dept: HEMATOLOGY | Age: 68
End: 2023-02-27

## 2023-02-27 DIAGNOSIS — G89.3 CANCER-RELATED PAIN: Primary | ICD-10-CM

## 2023-02-27 RX ORDER — OXYCODONE HYDROCHLORIDE 10 MG/1
10 TABLET ORAL EVERY 4 HOURS PRN
Qty: 180 TABLET | Refills: 0 | Status: SHIPPED | OUTPATIENT
Start: 2023-02-27 | End: 2023-03-29

## 2023-02-27 RX ORDER — FENTANYL 12 UG/H
1 PATCH TRANSDERMAL
Qty: 10 PATCH | Refills: 0 | Status: SHIPPED | OUTPATIENT
Start: 2023-02-27 | End: 2023-03-29

## 2023-02-27 NOTE — TELEPHONE ENCOUNTER
Patient has had increase in pain. New medication orders sent to pharmacy at this time. Patients wife notified of new medication orders. Medications sent to [facility] [address].  Electronically signed by Rodrick Alejandra RN on 2/27/2023 at 1:35 PM

## 2023-02-27 NOTE — TELEPHONE ENCOUNTER
Mariluz Evangelista has spoken with pt's wife Allegra Bee regarding pt experiencing nausea/vomiting and diarrhea. Mariluz Evangelista has recommended to Tauna Cockayne until Wednesday March 1,2023 reducing dosage. Allegra Bee has verbalized understanding of recommendations and will call clinic if any other complications arise.

## 2023-03-07 ENCOUNTER — HOSPITAL ENCOUNTER (OUTPATIENT)
Dept: INFUSION THERAPY | Age: 68
End: 2023-03-07

## 2023-03-09 NOTE — PROGRESS NOTES
MEDICAL ONCOLOGY PROGRESS NOTE                                                          Denise Bhandari [facility]   1955  3/13/2023     Chief Complaint   Patient presents with    Cancer     Malignant neoplasm of ascending colon         INTERVAL HISTORY/HISTORY OF PRESENT ILLNESS:  Diagnosis  Colonic adenocarcinoma, March 2020  [id number](liver), stage ROXANA  IHC MMR- proficient  K-maria luisa mutated KRAS 12C  N-MARIA LUISA/BRAF wild-type  Iron deficiency anemia  Soft tissue mass, June 2021  Adenocarcinoma-consistent with colon primary, right psoas Pmuscle, June 2021  Metastatic adenocarcinoma, Jan 2022  MLH1, MSH2, MH6, PMS2-intact  MMR- no loss of expression  TMB: High 11mut/MB  BRAF, ERBB2, NTRK 1/2/3, RET: Negative  KRAS Exon 2/pG12C  PIK3CA Exon/ 2 p.G105V; Exon 5/p. V344E  PTEN Exon1/p.510N  MMR Proficient  IHC Positive 1+, 100%  MSI: Stable      Treatment summary  3/30/2020-right hemicolectomy at [facility]  Anticipated Liver ablation to be followed by neoadjuvant/adjuvant versus palliative chemotherapy  06/10/2020-November 2020-FOLFOX + Avastin  12/11/20- Right hepatectomy-Dr. Oli Medrano  S/p Injectafer-poor oral iron tolerance  7/13/21- 11/17/21 FOLFIRI + Avastin every 2 weeks  1/13/22-psoas muscle mass resection/HIPEC by Dr. Peter Lerma at [place]  5/10/22 - 8/3/22 Discontinue FOLFIRI + Avastin every 2 weeks due to progression in the liver  8/10/2022 FOLFOX + Avastin every 2 weeks  2/7/23 Discontinue FOLFOX + Avastin due to progression. 2/7/23- Initiated Lonsurf 35mg/m2 D1-5, D8-12 every 28 days + Avastin 7.5mg/m2 every 3 weeks  3/13/23 Discontinued Lonsurf/Avastin due to increased diarrhea, nausea, vomiting  Recommend Pembrolizumab due to high tumor burden     The patient is a very pleasant 79years old male who has been diagnosed with colonic adenocarcinoma. He has had several treatment modalities in the past.  He is currently status post CRS/HIPEC in mid January, 2022 at [facility].   This was complicated by a leak from his ileocolic anastomosis secondary to closed-loop obstruction at his hernia repair site (mesh displaced). He was taken back to the OR and had a primary repair of his anastomosis and diverting ileostomy in February 2022. Prior progressive disease on FOLFIRI and now more recently on modified FOLFOX. He is also receiving Avastin. He had CT scans performed showed progressive disease and he was started on Lonsurf. In addition, he complains of significant abdominal pain. Patient is currently on fentanyl patch 37 mcg daily with much better control of his abdominal pain. He had very poor tolerance to Lonsurf. Complains of significant nausea vomiting and diarrhea. He has lost 15 pounds since last visit. He has frequent hiccups and vomited twice daily. He is taking Protonix once daily. Cancer history  Mr. Andrew Low was first seen by me on 3/23/2020. He was referred for a new diagnosis of colonic adenocarcinoma involving the cecum. The patient reports that he had a wellbeing consult with his provider at the [place]. He was found to have anemia and then recommended a colonoscopy. Of note, the patient has a family history of colon cancer. His mother is a patient of Dr. Myriam Park he has been diagnosed with colon cancer in 2010.  3/11/2020- colonoscopy revealed a large malignant appearing fungating mass lesion in the cecum. In addition, several other polyps. Biopsy of the mass consistent with moderate differentiated colonic adenocarcinoma. Polyps consistent with tubulovillous adenoma with no high-grade dysplasia. IHC MMR not proficient. K-maria luisa mutated, BRAF and NRAS wild type. MSI proficient  3/11/2020-CEA 5.5 (H)  3/18/2020-CT abdomen pelvis with contrast  Invasive cecal mass adhering to the right lateral abdominal wall muscles with adjacent lymphadenopathy. Mild partial obstruction of the terminal ileum.  2. Suspicious lesions in the right and left hepatic lobes measure up to 1.3 cm and likely represent metastatic disease. 3/18/2020-Xr Chest Standard  No radiographic evidence of acute cardiopulmonary process. 3/23/2020-he was first seen by me. Recommended completion of staging with CT chest.  Also recommended liver MRI for further clarification of liver lesion. S.  Recommend to proceed with a general surgery consultation tomorrow with Dr. Arnold Mary. Patient was informed that I favor surgical resection if feasible of the primary malignancy. 3/30/2020- right hemicolectomy by Dr. Arnold Mary at [facility] consistent with invasive moderately differentiated colonic adenocarcinoma measuring 7.2 cm. Carcinoma directly invading the adjacent abdominal wall tissue. Focal lymphovascular space invasion identified. Focal perineural invasion identified. Surgical margins negative for evidence of malignancy. 6 out of 14 lymph nodes positive for metastatic adenocarcinoma. Final pathology staging [id number](liver, stage ROXANA)  4/20/2020-CT chest with contrast showed No convincing intrathoracic metastasis. Nonspecific 4 mm nodule of the inferior lingula and a 2 mm right upper lobe nodule can be followed on subsequent imaging in 6-12 months. Moderate coronary calcifications. Hypodense metastatic liver lesions. Small hiatal hernia. 4/20/2020-Mri Abdomen W Wo Contrast There are about 5 liver lesions. The 2 largest appears similar compared to 3/18/2020, the others are too small to further characterize. Appearance is most concerning for metastatic disease. Enhancement of the right lateral peritoneum. This is favored to be postoperative as there is no nodularity, evidence of omental disease or lymphadenopathy. Recommend attention on follow-up. Cholecystectomy. 4/22/2020-discussed with Dr. Susanna Ledezma at [place]. He will review imaging studies and give further recommendations regarding eligibility for resection of liver lesions.   5/8/2020 CT Abdomen The two largest suspicious lesions measuring 1.2 and 1.3 cm in the  right and left hepatic lobes respectively are similar compared to the  3/18/2020 CT. Additionally, there are at least five subcentimeter lesions with similar signal characteristics, which are also highly suspicious for metastases. If complete characterization of the number and distribution of lesions is necessary, an MRI with Eovist could be acquired. 5/19/2020- he was seen by the hepatobiliary service at [facility] with Dr. Yoselin Monge:  patient adequate risk candidate for a multimodal approach, directed toward curative hepatectomy eventually. Endorsed by Hepatobiliary Conference, I recommended perc ablation of the L hemiliver to clear it, followed by systemic therapy in a neoadjuvant strategy. Restaging imaging to confirm clearance of disease on the left and lack of progression to unresectability of the R hemiliver disease would then be followed by R hepatectomy. Limitations to this approach may be accessibility of the segment 4A/8 disease high in the hepatic dome and the possibility of heat sink-related recurrence s/p abation of the left-sided disease. 5/21/2020- referral for Mediport placement and start FOLFOX in 2 weeks. Will add bevacizumab after 6 weeks of liver ablation. We will plan for 12 biweekly dose of FOLFOX. Bevacizumab will also be stopped 6 weeks prior to major procedure. 6/8/2020- Microwave ablation of the left liver lesion was then performed for 5 minutes at [address] to achieve a 3.4 x 3.9 cm approximately spherical zone of ablation. 6/10/2020- initiation of FOLFOX.  7/20/2020-added Avastin. 9/10/20 MRI abdomen: Left hepatic lobe segment II lesion demonstrates small T1 hyperintense blood products, status post microwave ablation on 6/8/2020. No definitive enhancement within the lesion. Focal internal thickening or scar present. Recommend attention on follow-up.  Additional scattered subcentimeter foci throughout the liver decreased in size compared to MRI dated 4/20/2020 consistent with improving metastatic disease. Additional chronic findings as above. 9/16/2020-discussed with plan with the patient and [address] [place] [address]. Interval response to treatment. Plan to continue chemotherapy through 12 cycles with Avastin. 12/11/20 Right hepatectomy-Dr. Emily Beebe  12/11/20 Right hepatectomy pathology: Liver, right, resection: Focus of Fibrosis with calcifications and chronic inflammation (0.3 cm), see comment. Background hepatic parenchyma with minimal periportal fibrosis (trichrome stain), minimal lobular and portal inflammation, and no significant macrovesicular steatosis (<5%) Comments: The patient's history of colorectal cancer status adjuvant therapy is noted. The focus of fibrosis may reflect treatment effect. There is no evidence of viable tumor in the sampled specimen. 12/14/20 Ct Abdomen Pelvis W Iv Contrast A Small bowel obstruction with transition point at the distal small bowel, just proximal to RIGHT upper quadrant ileocolonic anastomosis. Postoperative change of RIGHT hepatectomy with small amount of expected free fluid and intraperitoneal gas. Redemonstrated LEFT liver lesion. Small bilateral pleural effusions. 12/16/20 SBFT-[place]: Study is limited due to retained contrast in the small bowel from prior attempt at small bowel follow-through on the floor. Small bowel remains dilated, measuring approximately 5.2 cm, which is consistent with partial or resolving small bowel obstruction. Final radiographs show contrast within the colon. 1/4/2020-resolution of small bowel obstruction. 1/7/21 CT chest: No finding to suggest intrathoracic neoplastic process or metastatic disease. The benign-appearing tiny nodule in the right upper lobe probably represent a noncalcified granuloma. A nodule in the lingular segment of the left upper lobe is not visualized in this study. Postsurgical changes of the liver. No evidence of focal complication.  A trace right basal pleural effusion. This may be reactive to the previous abdominal surgery. 3/4/21 MRI abd: Status post right hepatectomy and microwave ablation of a left liver lesion. No findings to suggest residual/recurrent disease. No new liver lesion is identified. Postsurgical changes related to right hemicolectomy. Microwave ablation zone in segment II of the left hepatic lobe measures approximately 21 mm in diameter, unchanged. No appreciable postcontrast enhancement or other findings to suggest local disease recurrence. No new liver lesion is identified. Spleen is mildly enlarged, measuring 13.8 cm in length. 3/17/2021-1 year colonoscopy showed no evidence of polyps. 5/3/21 CEA 6.2  6/8/21 MRI abdomen ([place]): Status post right hepatectomy and MWA of a left liver lesion. No evidence of residual or recurrent metastatic disease in the liver. Status post prior right hemicolectomy for colon carcinoma. Within the posterior right iliopsoas muscle there is a mildly T2 hyperintense nodular focus with postcontrast rim enhancement and diffusion restriction. This measures approximately 2.7 x 2 x 2 cm. More delayed postcontrast images show irregular area of enhancement measuring 2.4 x 7.7 cm axially involving the right iliopsoas muscle and the right lateral abdominal wall. Suggest correlation with contrast enhanced CT exam for complete evaluation. Consider biopsy of this lesion. 6/15/21 CT CHEST WO CONTRAST No metastatic disease in the chest.  No change in tiny 2 mm RIGHT upper lobe pulmonary nodule. Mild ectasia of the ascending aorta measuring 4 cm.   6/18/2021-I reviewed results of MRI abdomen at [facility]. CT chest without contrast reviewed by me and showed no evidence of metastatic disease. Stable 2 mm right upper lobe nodule. Discussed with hepatobiliary service at [facility]. Biopsy intra-abdominal nodule next week at [facility].    6/25/[id number]-ED-wpoxar biopsy soft tissue mass right psoas muscle at [facility] consistent with recurrent colonic adenocarcinoma. 7/2/2021-discussed with hepatobiliary service at [facility] and also surgical oncology. Surgical oncology will call us back regarding consultation for consideration of HIPEC if he is a candidate  7/13/21-initiation of chemotherapy with FOLFIRI + Avastin every 2 weeks  7/13/21 CEA 10.7  7/27/2021-CEA 9.6  7/30/2021-she was seen by the surgical oncology group at [facility] by Dr Peter Lerma. They recommended to complete 6 cycles of chemotherapy and repeat CT chest abdomen pelvis and liver MRI to assess disease response. They discussed the role of CRS/HIPEC in his situation. He was told that it really depends on the status of his liver lesions as well. He will complete 6 cycles of chemotherapy and will return to see me with a CTAP as well as MRI of the liver to assess disease burden and determine if he can be a candidate for debulking.  8/25/2021-proceed cycle #4.  9/22/2021 CEA- 8.1  9/24/2021 MRI with and w/o Contrast ([place])  Tiny left retroperitoneal nodule involving the left lateral conal fascial new compared to 3/4/2021 though unchanged from most recent prior, possibly an additional site of metastasis. No other new metastatic disease within the abdomen. Similar partially visualized right posterior body wall/psoas infiltrative lesion not definitely changed from MRI abdomen 6/8/2021 but better evaluated in its entirety on concurrent CT, reported separately. Status post right hemicolectomy, right hepatectomy, and segment III microwave ablation. Similar mild splenomegaly. Additional chronic and incidental findings as described in the body the report. Liver: Status post right hepatectomy. Ablation zone in segment II measures 1.7 x 1.1 cm, slightly decreased in size from 1.8 x 1.4 cm previously (series 1301 image 56) without appreciable enhancement. Similar tiny subcentimeter cyst in the left hepatic lobe. No new focal hepatic lesion identified.  No visualized free fluid. Similar 0.7 cm enhancing nodule along the left lateral conal fascia laterally new from 3/4/2021, grossly unchanged from MRI dated 6/8/2021. (series 801 image 22). No other appreciable peritoneal/retroperitoneal or  omental nodularity. Ill-defined T2 hyperintense signal and hyperenhancement in the right posteromedial body wall involving the lateral aspect of the psoas muscle and posterolateral abdominal wall musculature, partially visualized measuring at least 8.7 x 3.1   cm, grossly similar to the prior exam, better evaluated in its entirety on concurrent CT reported separately. 9/24/2021 CT C/A/P w/ Contrast([place]) Status post right hemicolectomy, right hepatectomy and left hepatic microwave ablation without new suspicious hepatic lesion. Left lateral perirenal fascial nodule, which is stable compared to 6/8/2021 MRI, but new compared to 3/4/2021 MRI is concerning for an additional site of metastatic disease. No sites of new or enlarging metastatic disease in the chest.  Similar appearance of right lateral psoas and posterior abdominal wall infiltrative lesion, not significantly changed compared to 6/8/2021 MRI. Mild splenomegaly. Additional findings as outlined in the body the report. Biopsy-proven adenocarcinoma involving the posterior lateral aspect of the right iliopsoas muscle and right lateral abdominal wall. Right iliopsoas component is difficult to measure but appears to be approximately 2.5 x 2.4 cm (series 2, image 153), similar to prior MRI. Nodular thickening noted along the right posterior abdominal wall and possibly involving the the transversus abdominis measures approximately 8.5 x 1.8 cm (series 2 image 153) overall similar compared to prior MRI. 10/6/2021-discussed the results of recent CT abdomen/pelvis and MRI abdomen/pelvis at [facility]. Persistent disease involving the psoas muscle. Discussed with colorectal surgery at [facility].   They recommend to continue current therapy for another 2 months and reassess with CT scans. 7/13/21- 11/17/21 FOLFIRI + Avastin every 2 weeks  12/3/21 CT chest/abd/pelvis [facility]): Status post prior right hemicolectomy, right hepatectomy and left hepatic lesions microwave ablation. No new liver lesion. Soft tissue thickening of the right lower posterior abdominal wall involving the iliopsoas muscle is grossly unchanged consistent with improving metastatic disease. Small right omental nodule and left lateral conal fascia nodule unchanged compared to  recent exam.   1/13/22 Exploratory lap with resections of psoas muscle mass and HIPEC by Dr. Minesh Diaz at [facility]:  Metastatic colorectal adenocarcinoma involving fibroadipose tissue. IHC MMR proficient. 1/24/22 CT chest/abd/pelvis [facility]): Extensive postsurgical changes in the abdomen including partial right colectomy, resection of right retroperitoneal mass, omentectomy and mesh repair of right lateral abdominal wall defect. There appears to be a defect in the mesh containing herniated loops of small bowel. Extensive diffuse dilated small bowel loops with air-fluid levels are seen throughout the abdomen. There are segmental areas of decompressed small bowel in the left upper quadrant as well as adjacent to the herniated small  bowel loops in the right retroperitoneum. Air-fluid levels are also seen throughout the colon. While pattern could likely represent postoperative ileus given the diffuse small bowel dilatation and distal colonic air and fluid, developing or partial small bowel obstruction secondary to the right lateral abdominal wall hernia cannot entirely be excluded. Small ascites. 8 mm nodule along the left lateral conal fascia is unchanged. Slight increase in size of cardiophrenic lymph nodes measuring up to 7 mm anterior to the right hemidiaphragm. Stable hypodensity in the left hepatic lobe.  Diffuse gastric wall thickening/edema could be secondary to gastritis in the appropriate clinical setting. February 2022- HIPEC/tumor debulking January. This was complicated by a leak from his ileocolic anastomosis secondary to closed-loop obstruction at his hernia repair site (mesh displaced). He was taken back to the OR and had a primary repair of his anastomosis and diverting ileostomy in February 2022  3/23/2022-ileostomy reversal at [facility] Dr. Lord Kapadia at [facility]  3/20/2022 CT Abd/Pelvis WO Contrast [facility]) Findings suspicious for aspiration at the lung bases. Postsurgical changes related to right hepatectomy. There is a new low-attenuation lesion in the left hemiliver, highly concerning for metastasis. Postsurgical changes related to right hemicolectomy with ileocolonic anastomosis and right lower quadrant diverting loop ileostomy. No evidence of bowel obstruction. No intra-abdominal fluid collection. Other findings as above. A critical alert was sent at the time of dictation. Liver: Postsurgical changes related to right hepatectomy. There is a new, approximately 2.1 cm low-attenuation lesion seen in the left hepatic lobe on image 25 of series 3, highly concerning for metastasis. 4/18/2022 Ileostomy,closure enterocutaneous stoma with mural acute inflammation and jessa-intestinal Fat necrosis. Negative for malignancy. 4/23/2022- discussed the patient reinitiation of chemotherapy. We will continue FOLFIRI. He had no prior progression on FOLFIRI of FOLFOX. 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  We would avoid Avastin at this time due to recent surgery. He is K-maria luisa mutated and therefore cannot use anti-EGFR agent. May contemplate adding Avastin in 4 weeks. 5/9/2022 CT Chest W Contrast New pulmonary nodules are present in the right and left lung as described above. It cannot be determined if these are infectious nodules or metastatic disease. Dilated loops of bowel are present in the upper abdomen.  The abdomen is incompletely evaluated however this may be wither an ileus or partial obstruction. Hepatic metastasis. 5/10/22 Re-initiate FOLFIRI + Avastin every 2 weeks. 5/24/2022  CEA 29.1  5/24/2022 Iron Studies:Iron 29, TIBC 256, Iron Sat 11,Ferritin 116.9  6/22/2022 CEA 21.5  7/21/2022 CT Chest W Contrast Bibasilar linear atelectasis life scarring. No pulmonary nodules, masses or infiltrates. Multiple enhancing low attenuation masses in the liver similar to the previous study. Findings consistent with metastatic liver disease. 7/21/2022 CT Abd/Pelvis W IV Contrast ([place]) Multiple enhancing low attenuation liver lesions increase in size and number since the previous study 3/7/22. Findings consistent with progression of metastatic liver disease. Status postcholecystectomy. The appendix is not visualized. No acute findings. 5/10/22 - 8/3/22 Discontinue FOLFIRI + Avastin every 2 weeks due to progression in the liver  8/3/2022-unfortunately, interval progression of liver metastasis when compared to last CT scans performed on 3/7/2022. In addition, when compared to report from [address] [place] [address] abdomen/pelvis on 3/20/2022 when he had only 1 single metastatic lesion measuring 2.1 cm. This is considered progression. I have recommended to discontinue FOLFIRI/Avastin and consider initiation of palliative FOLFOX with Avastin. Discussed with hepatobiliary surgery. The patient is not a candidate for further surgery. He is not a candidate for any liver directed therapy. 8/3/2022 CEA 26.6  9/14/2022 CEA 36.5  10/12/22 CEA 40.7  11/4/2022 CT Chest W Contrast Bilateral lower lobe scarring or atelectasis similar in appearance. There has been slight interval decrease in the amount and the size of the low attenuation hepatic lesions.   11/4/2022 CT Abd/Pelvis W IV Contrast ([place])There has been interval decrease in the mural thickening of the colonic loop at the level of the surgical suture material with slight residual mural thickening recognized. Endoscopy can be performed for further evaluation if clinically warranted. Postsurgical changes at the right hepatic lobe posteriorly similar. There has been slight interval decrease in the amount of hepatic metastatic disease with fewer lesions recognized in the right hepatic lobe. Cholecystectomy. 12/14/22 CEA 75.2  1/25/23 CEA 80.4  2/3/23 CT Chest W Contrast [facility]) Pulmonary nodules, as discussed above. These are worrisome for metastatic disease. Faint sclerotic appearance of the lateral aspect of the right seventh rib. In addition, there appears to be some soft tissue thickening around this rib. This is worrisome for neoplasm/metastatic disease. No other definite bone lesions are appreciated. A bone scan might be helpful to further assess for metastatic bone disease. New small right pleural effusion. There also appears to be some thickening of the pleura in the right lung base. This may be infectious/inflammatory. Metastatic disease is considered. There is a small area of pleural thickening lateral to the right middle lobe image 98 series 3. Ascending thoracic aorta is ectatic at 3.8 cm. The remainder of the aorta is normal caliber. There is cardiomegaly. There is coronary artery calcification. No lymphadenopathy is seen in the chest. Please see the abdomen and pelvis CT report separately. 2/3/23 CT Abd/Pelvis W Contrast [facility]) A stable size and number of the hepatic lesions since the previous study in November 2022. A significant increase in size of a low-density lesion in the splenic hilum which may represent a metastatic lesion? Vignesh Patella A significant thickening and enhancement of the wall of the colon since the previous study. This may represent an inflammatory process. This is more pronounced in the previous study. A small ascites which was not seen in the previous study. Diffuse nodularity of the peritoneum/omentum which is similar to the previous study.  This may partly be due to prominent mesenteric nodes which are not enlarged by CT criteria. Ill-defined area of bony sclerosis involving the left side of the vertebra L3 is similar to the previous study. Severa Cuna of the lesion is not certain. Possibility of a metastatic disease is not excluded. Postsurgical changes of the right lobe of the liver. No change. No complication. A low-density lesion in the splenic hilum has increased in size and now  measures 2.5 cm in AP dimension. It measured 1.2 cm and the previous   Study  2/7/2023-I reviewed results CT C/A/P. Essentially, evidence of serologic and imaging progression. Therefore I have recommended to discontinue modified FOLFOX. I have recommended third line therapy with Lonsurf/Avastin. 3/13/23 Discontinue Lonsurf/Avastin due to increased diarrhea, nausea, vomiting    2/8/23 Jacqueline's        CEA dynamics:  2/27/22 CEA 3.5  5/24/22 CEA 29.1  6/22/22 CEA 21.5  8/3/22 CEA 26.6  9/4/22 CEA 36.5  10/12/22 CEA 40.7  12/14/22 CEA 75.2  1/25/23 CEA 80.4    PAST MEDICAL HISTORY:   Past Medical History:   Diagnosis Date    Acid reflux     Cancer ([facility] [address]     adenocarcinoma of colon    GERD (gastroesophageal reflux disease)     Palliative care patient 03/01/2022    PTSD (post-traumatic stress disorder)     Stage 3a chronic kidney disease ([facility] [address]       PAST SURGICAL HISTORY:  Past Surgical History:   Procedure Laterality Date    ABLATION OF DYSRHYTHMIC FOCUS      ablation of liver at [address]  2003    CHOLECYSTECTOMY  2013    COLONOSCOPY      when pt was in the 20's    COLONOSCOPY N/A 03/11/2020    COLONOSCOPY POLYPECTOMY SNARE/COLD BIOPSY: Dr. Galaviz [facility] colonoscopy: 6-12 months due to findings at colonoscopy today with Cecal mass lesion and large polyps.     COLONOSCOPY      COLONOSCOPY N/A 03/17/2021    Dr Ravi Ornelas, Post-operative changes w IC anastomosis & single visible staple, Mild Diverticuosis, Int Hemorrhoids Grade 1, 3 year recall    HEMICOLECTOMY Right 03/30/2020 LAPAROSCOPIC-ASSISTED RIGHT HEMICOLECTOMY performed by Akosua Sarkar MD at [address] [facility] N/A 06/03/2020    INSERTION OF VENOUS PORT with flouro performed by Akosua Sarkar MD at [address] [place] [address] Dr ENDOSCOPY N/A 03/11/2020    Dr. Erick Hernandez:   [facility]      SOCIAL HISTORY:  Social History     Socioeconomic History    Marital status:      Spouse name: None    Number of children: None    Years of education: None    Highest education level: None   Tobacco Use    Smoking status: Never    Smokeless tobacco: Never   Vaping Use    Vaping Use: Never used   Substance and Sexual Activity    Alcohol use: Yes     Comment: rare     Drug use: No    Sexual activity: Yes     Partners: Female     FAMILY HISTORY:  Family History   Problem Relation Age of Onset    Liver Cancer Mother     High Blood Pressure Mother     Colon Cancer Mother         x2    Cancer Father         Lung Cancer    Breast Cancer Sister     Cancer Maternal Grandfather         Lung Cancer    Cancer Paternal Grandfather         Stomach Cancer    Colon Polyps Neg Hx     Cystic Fibrosis Neg Hx     Liver Disease Neg Hx     Rectal Cancer Neg Hx         Current Outpatient Medications   Medication Sig Dispense Refill    metoclopramide (REGLAN) 10 MG tablet Take 1 tablet by mouth 4 times daily (after meals and at bedtime) 120 tablet 5    fentaNYL (DURAGESIC) 12 MCG/HR Place 1 patch onto the skin every 72 hours. Baclofen (LIORESAL) 5 MG tablet Take 1 tablet by mouth every 8 hours as needed (hiccups) 90 tablet 5    oxyCODONE HCl (OXY-IR) 10 MG immediate release tablet Take 1 tablet by mouth every 4 hours as needed for Pain for up to 30 days. Intended supply: 30 days Max Daily Amount: 60 mg 180 tablet 0    fentaNYL (DURAGESIC) 25 MCG/HR Place 1 patch onto the skin every 3 days for 30 days.  Intended supply: 30 days Max Daily Amount: 1 patch 10 patch 0    promethazine (PHENERGAN) 12.5 MG tablet Take 1 tablet by mouth every 6 hours as needed for Nausea 60 tablet 5    sildenafil (VIAGRA) 50 MG tablet Take 1 tablet by mouth daily as needed for Erectile Dysfunction 10 tablet 5    Magnesium Oxide 400 MG CAPS Take 400 mg by mouth in the morning and at bedtime 60 capsule 3    vitamin D (ERGOCALCIFEROL) 1.25 MG (94786 UT) CAPS capsule Take 1 capsule by mouth once a week 5 capsule 0    omeprazole (PRILOSEC) 20 MG delayed release capsule Take 20 mg by mouth daily      prazosin (MINIPRESS) 1 MG capsule Take 1 mg by mouth nightly as needed For nightmares       Sertraline HCl (ZOLOFT PO) Take by mouth daily       No current facility-administered medications for this visit. Facility-Administered Medications Ordered in Other Visits   Medication Dose Route Frequency Provider Last Rate Last Admin    0.9 % sodium chloride infusion  5-250 mL/hr IntraVENous PRN Fatmata Cano  mL/hr at 03/13/23 0941 250 mL/hr at 03/13/23 0941    bevacizumab-bvzr (ZIRABEV) 450 mg in sodium chloride 0.9 % 100 mL chemo IVPB  7.5 mg/kg (Treatment Plan Recorded) IntraVENous Once Fatmata Cano MD        sodium chloride (PF) 0.9 % injection 5-40 mL  5-40 mL IntraVENous PRN Fatmata Cano MD        heparin flush 100 UNIT/ML injection 500 Units  500 Units IntraCATHeter PRN Fatmata Cano MD         REVIEW OF SYSTEMS:   CONSTITUTIONAL: no fever, no night sweats, fatigue, unintentional weight loss;   HEENT: no blurring of vision, no double vision, no hearing difficulty, no tinnitus, no ulceration, no dysplasia, no epistaxis;   LUNGS: no cough, no hemoptysis, no wheeze,  no shortness of breath;  CARDIOVASCULAR: no palpitation, no chest pain, no shortness of breath;  GI: no abdominal pain, hiccups, nausea, vomiting, diarrhea, no constipation;  ANNIE: no dysuria, no hematuria, no frequency or urgency, no nephrolithiasis;  MUSCULOSKELETAL: no joint pain, no swelling, no stiffness;  ENDOCRINE: no polyuria, no polydipsia, no cold or heat intolerance;  HEMATOLOGY: no easy bruising or bleeding, no history of clotting disorder;  DERMATOLOGY: no skin rash, no eczema, no pruritus;  PSYCHIATRY: no depression, no anxiety, no panic attacks, no suicidal ideation, no homicidal ideation;  NEUROLOGY: no syncope, no seizures, no numbness or tingling of hands, no numbness or tingling of feet, no paresis;     Vitals signs:  /84   Pulse 95   Temp 97.5 °F (36.4 °C)   Resp 18   Ht 5' 7\" (1.702 m)   Wt 118 lb 8 oz (53.8 kg)   SpO2 98%   BMI 18.56 kg/m²     PHYSICAL EXAM:  CONSTITUTIONAL: Alert, appropriate, no acute distress  EYES: Non icteric, EOM intact, pupils equal round   ENT: Mucus membranes moist, no oral pharyngeal lesions, external inspection of ears and nose are normal.  NECK: Supple, no masses. No palpable thyroid mass  CHEST/LUNGS: CTA bilaterally, normal respiratory effort   CARDIOVASCULAR: RRR, no murmurs. No lower extremity edema  ABDOMEN: soft non-tender, active bowel sounds, no HSM. No palpable masses  EXTREMITIES: warm, full ROM in all 4 extremities, no focal weakness. SKIN: warm, dry with no rashes or lesions  LYMPH: No cervical, clavicular, axillary, or inguinal lymphadenopathy  NEUROLOGIC: follows commands, non focal   PSYCH: mood and affect appropriate.   Alert and oriented to time, place, person    Relevant Lab findings/reviewed by me:  2/8/23 James    Lab Results   Component Value Date    WBC 2.62 (L) 03/13/2023    HGB 11.4 (L) 03/13/2023    HCT 35.1 (L) 03/13/2023    MCV 95.1 (H) 03/13/2023     03/13/2023     Lab Results   Component Value Date    NEUTROABS 1.59 03/13/2023     Lab Results   Component Value Date     03/13/2023    K 3.8 03/13/2023     03/13/2023    CO2 25 03/13/2023    BUN 26 (H) 03/13/2023    CREATININE 1.6 (H) 03/13/2023    GLUCOSE 111 (H) 03/13/2023    CALCIUM 9.4 03/13/2023    PROT 7.1 03/13/2023    LABALBU 3.6 03/13/2023    BILITOT 1.1 03/13/2023    ALKPHOS 311 (H) 03/13/2023    AST 44 03/13/2023    ALT 26 03/13/2023 LABGLOM 47 (A) 03/13/2023    GFRAA 53 (A) 07/21/2022    AGRATIO 1.5 06/15/2021    GLOB 3.5 03/13/2023       Relevant Imaging studies/reviewed by me:  None    ASSESSMENT:    Orders Placed This Encounter   Procedures    CBC With Auto Differential     Standing Status:   Future     Standing Expiration Date:   3/13/2024    Comprehensive metabolic panel     Standing Status:   Future     Standing Expiration Date:   3/13/2024    Urinalysis     Standing Status:   Future     Standing Expiration Date:   3/13/2024    KALPESH Edward, Palliative Care, [place]     Referral Priority:   Routine     Referral Type:   Eval and Treat     Referral Reason:   Specialty Services Required     Referred to Provider:   KALPESH Smith - CNP     Requested Specialty:   Nurse Practitioner     Number of Visits Requested:   1       Rocashley was seen today for cancer. Diagnoses and all orders for this visit:    Malignant neoplasm of ascending colon [facility])  -     [address] [place], [place] [address]Nadine APRN, Palliative Care, [place]  -     CBC With Auto Differential; Future  -     Comprehensive metabolic panel; Future  -     Urinalysis; Future  -     Cancel: bevacizumab-bvzr (ZIRABEV) 450 mg in sodium chloride 0.9 % 100 mL chemo IVPB  -     Cancel: sodium chloride (PF) 0.9 % injection 5-40 mL  -     Cancel: heparin flush 100 UNIT/ML injection 500 Units    Liver metastases (HCC)  -     KALPESH Penn, Palliative Care, [place]  -     CBC With Auto Differential; Future  -     Comprehensive metabolic panel; Future  -     Urinalysis; Future  -     Cancel: bevacizumab-bvzr (ZIRABEV) 450 mg in sodium chloride 0.9 % 100 mL chemo IVPB  -     Cancel: sodium chloride (PF) 0.9 % injection 5-40 mL  -     Cancel: heparin flush 100 UNIT/ML injection 500 Units    Adenocarcinoma of colon (HCC)  -     CBC With Auto Differential; Future  -     Comprehensive metabolic panel; Future  -     Urinalysis;  Future  -     Cancel: bevacizumab-bvzr (ZIRABEV) 450 mg in sodium chloride 0.9 % 100 mL chemo IVPB  -     Cancel: sodium chloride (PF) 0.9 % injection 5-40 mL  -     Cancel: heparin flush 100 UNIT/ML injection 500 Units    Malignant neoplasm metastatic to both lungs [facility])  -     Ellen - KALPESH Zamudio, Palliative Care, [place]  -     CBC With Auto Differential; Future  -     Comprehensive metabolic panel; Future  -     Urinalysis; Future  -     Cancel: bevacizumab-bvzr (ZIRABEV) 450 mg in sodium chloride 0.9 % 100 mL chemo IVPB  -     Cancel: sodium chloride (PF) 0.9 % injection 5-40 mL  -     Cancel: heparin flush 100 UNIT/ML injection 500 Units    Care plan discussed with patient    Chemotherapy induced nausea and vomiting  -     metoclopramide (REGLAN) 10 MG tablet; Take 1 tablet by mouth 4 times daily (after meals and at bedtime)    Adverse effect of chemotherapy, subsequent encounter    Unintentional weight loss    Chemotherapy induced diarrhea    Hiccups  -     Baclofen (LIORESAL) 5 MG tablet; Take 1 tablet by mouth every 8 hours as needed (hiccups)    Other orders  -     ondansetron (ZOFRAN) injection 8 mg  -     0.9 % sodium chloride infusion  -     alteplase (CATHFLO) 2 mg in sterile water 2 mL injection  -     0.9 % sodium chloride infusion  -     diphenhydrAMINE (BENADRYL) injection 50 mg  -     famotidine (PEPCID) injection 20 mg  -     hydrocortisone sodium succinate PF (SOLU-CORTEF) injection 100 mg  -     acetaminophen (TYLENOL) tablet 650 mg  -     meperidine (DEMEROL) injection 12.5 mg  -     ondansetron (ZOFRAN) injection 8 mg  -     EPINEPHrine PF 1 MG/ML injection (Anaphylaxis) 0.3 mg  -     albuterol sulfate HFA (PROVENTIL;VENTOLIN;PROAIR) 108 (90 Base) MCG/ACT inhaler 4 puff      #Colonic adenocarcinoma-  [id number] (liver) K-maria luisa mutated, IHC MMR not proficient  Status post microwave ablation left hepatic lesion  Status post neoadjuvant FOLFOX/bevacizumab x11 biweekly cycles  Status post right hemicolectomy.   Pathology consistent complete pathologic response. Recommended surveillance as per NCCN guidelines  Discussed at length results of pathology with the patient. 3/17/21- 1 year colonoscopy showed no large polyps or masses. Repeat in 3 years. June 2021-CT chest clear. MRI abdomen showed suspicious lesion in the right lower quadrant. Biopsy arranged [place]. Discussed with hepatobiliary service at [facility]. 6/25/[id number]-DG-tybalo biopsy consistent with recurrent primary colorectal adenocarcinoma. 7/2/2021-discussed with hepatobiliary service/surgical oncology at [facility]. They will review images and call the patient back regarding consultation for consideration of HIPEC  7/2/2021-they recommend systemic therapy. 7/2/2021-recommended FOLFIRI/Avastin  7/13/21- Initiated FOLFIRI + Avastin every 2 weeks  07/30/21-Seen at [facility] by GI surgeon oncology. They discussed the role of CRS/HIPEC in his situation. He was told hat it really depends on the status of his liver lesions as well. He will complete 6 cycles of chemotherapy and will return to see me with a CTAP as well as MRI of the liver to assess disease burden and determine if he can be a candidate for debulking.    8/25/2021-proceed with FOLFIRI/Avastin   9/24/2021-repeat MRI abdomen/CT abdomen showed persistent disease. 1/13/2022-Exploratory laparotomy/resection of psoas mass/HIPEC at [facility]. Path Isaac:     OMENTUM, OMENTECTOMY:   - METASTATIC COLORECTAL ADENOCARCINOMA INVOLVING FIBROADIPOSE TISSUE.   - TWO (2) LYMPH NODES, NEGATIVE FOR CARCINOMA. SMALL BOWEL, MESENTERIC NODULE, EXCISION:   - METASTATIC COLORECTAL ADENOCARCINOMA INVOLVING FIBROMUSCULAR TISSUE AND EXTENDING TO TISSUE EDGE.    SMALL BOWEL AND RIGHT COLON, ILEOCOLIC RESECTION:     - RESIDUAL RECURRENT INVASIVE MODERATELY DIFFERENTIATED COLORECTAL ADENOCARCINOMA (2.1 CM) INVOLVING ANASTOMOTIC SITE; SEE COMMENT AND SYNOPTIC REPORT.   - TUMOR FOCALLY INVOLVES THE SEROSA SURFACE.   - METASTATIC CARCINOMA INVOLVES ONE (1) OF TWELVE (12) LYMPH NODES.   - ONE (1) TUMOR DEPOSIT PRESENT.   - ALL RESECTION MARGINS ARE NEGATIVE FOR CARCINOMA. RETROPERITONEAL NODULE, EXCISION:   - METASTATIC COLORECTAL ADENOCARCINOMA INVOLVING FREDDY-PANCREATIC TISSUE AND EXTENDING TO TISSUE EDGE. LATERAL DUODENECTOMY, RESECTION:   - METASTATIC COLORECTAL ADENOCARCINOMA INVOLVING DUODENAL SUBMUCOSA, MUSCULARIS PROPRIA, AND SUBSEROSA. - TUMOR IS PRESENT WITHIN 1 MM OF INKED RESECTION MARGINS. RIGHT ABDOMINAL WALL NODULE, EXCISION:   - METASTATIC COLORECTAL ADENOCARCINOMA INVOLVING FIBROMUSCULAR TISSUE. RIGHT RETROPERITONEAL MASS, RESECTION:   - METASTATIC COLORECTAL ADENOCARCINOMA INVOLVING FIBROADIPOSE TISSUE (7.3 CM). - MEDIAL, SUPERIOR AND DEEP RESECTION MARGINS ARE POSITIVE FOR CARCINOMA. - INKED INFERIOR AND LATERAL MARGINS ARE NEGATIVE FOR CARCINOMA (<1 MM) RIGHT RETROPERITONEAL MASS, FINAL SUPERIOR MARGIN, EXCISION:   - METASTATIC COLORECTAL ADENOCARCINOMA INVOLVING FIBROADIPOSE TISSUE, FOCALLY PRESENT AT CAUTERIZED, INKED TISSUE EDGE.   2/7/2022-postoperative dehydration and electrolyte abnormalities. Home health arrangements IV fluids twice a week. 3/23/2022- reversal of ileostomy at [facility]. 3/20/2022 CT Abd/Pelvis WO Contrast ([facility]) Findings suspicious for aspiration at the lung bases. Postsurgical changes related to right hepatectomy. There is a new low-attenuation lesion in the left hemiliver, highly concerning for metastasis. Postsurgical changes related to right hemicolectomy with ileocolonic anastomosis and right lower quadrant diverting loop ileostomy. No evidence of bowel obstruction. No intra-abdominal fluid collection. Other findings as above. A critical alert was sent at the time of dictation. Liver: Postsurgical changes related to right hepatectomy. There is a new, approximately 2.1 cm low-attenuation lesion seen in the left hepatic lobe on image 25 of series 3, highly concerning for metastasis.    4/23/2022- discussed the patient reinitiation of chemotherapy. We will continue FOLFIRI. He had no prior progression on FOLFIRI of FOLFOX. 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  We would avoid Avastin at this time due to recent surgery. He is K-maria luisa mutated and therefore cannot use anti-EGFR agent. May contemplate adding Avastin in 4 weeks. 5/9/2022 CT Chest W Contrast New pulmonary nodules are present in the right and left lung as described above. It cannot be determined if these are infectious nodules or metastatic disease. Dilated loops of bowel are present in the upper abdomen. The abdomen is incompletely evaluated however this may be wither an ileus or partial obstruction. Hepatic metastasis. 11/4/2022 CT Chest W Contrast Bilateral lower lobe scarring or atelectasis similar in appearance. There has been slight interval decrease in the amount and the size of the low attenuation hepatic lesions. 11/4/2022 CT Abd/Pelvis W IV Contrast (Oral)There has been interval decrease in the mural thickening of the colonic loop at the level of the surgical suture material with slight residual mural thickening recognized. Endoscopy can be performed for further evaluation if clinically warranted. Postsurgical changes at the right hepatic lobe posteriorly similar. There has been slight interval decrease in the amount of hepatic metastatic disease with fewer lesions recognized in the right hepatic lobe. Cholecystectomy. Plan:  -Discontinue Lonsurf due to significant nausea vomiting diarrhea  -NGS showed high tumor burden and K-maria luisa 12 C mutation. Recommended palliative pembrolizumab for high tumor mutation burden. Start pembrolizumab in 3 weeks. #Chronic kidney disease stage III- creatinine 1.6  Encouraged to increase PO fluid intake. Labs Renal Latest Ref Rng & Units 3/13/2023 2/14/2023 1/25/2023 1/11/2023 12/14/2022   BUN 9 - 20 mg/dL 26(H) 18 13 14 21(H)   Cr 0.6 - 1.2 mg/dL 1.6(H) 1.5(H) 1. 3(H) 1. 3(H) 1.4(H)   K 3.5 - 5.1 mmol/L 3.8 4.2 3.9 4.3 4.5   Na 137 - 145 mmol/L 140 139 141 136 141     #Liver metastasis- plan as above. Status post ablation left liver lobe lesion at [facility] on 6/8/2020. Status post neoadjuvant FOLFOX/bevacizumab x11 biweekly cyclesStatus post right hemicolectomy. Pathology consistent complete pathologic response. 3/4/2021-MRI abdomen was unremarkable  6/8/2021-MRI abdomen showed Postsurgical changes of right hepatectomy. Redemonstration of an ablation zone in segment II, measuring up to 1.7 cm, previously 1.8 cm. No evidence of postcontrast enhancement. No new lesion identified. 9/24/2021-MRI abdomen [place] showed Liver: Status post right hepatectomy. Ablation zone in segment II measures 1.7 x 1.1 cm, slightly decreased in size from 1.8 x 1.4 cm previously (series 1301 image 56) without apreciable enhancement. Similar tiny subcentimeter cyst in the left hepatic lobe. No new focal hepatic lesion identified. 3/20/2022 CT Abd/Pelvis WO Contrast ([facility]) Findings suspicious for aspiration at the lung bases. Postsurgical changes related to right hepatectomy. There is a new low-attenuation lesion in the left hemiliver, highly concerning for metastasis. Postsurgical changes related to right hemicolectomy with ileocolonic anastomosis and right lower quadrant diverting loop ileostomy. No evidence of bowel obstruction. No intra-abdominal fluid collection. Other findings as above. A critical alert was sent at the time of dictation. Liver: Postsurgical changes related to right hepatectomy. There is a new, approximately 2.1 cm low-attenuation lesion seen in the left hepatic lobe on image 25 of series 3, highly concerning for metastasis. 4/23/2022- discussed the patient reinitiation of chemotherapy. We will continue FOLFIRI. He had no prior progression on FOLFIRI of FOLFOX. 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  We would avoid Avastin at this time due to recent surgery.   He is K-maria luisa mutated and therefore cannot use anti-EGFR agent. May contemplate adding Avastin in 4 weeks. 5/9/2022 CT Chest W Contrast New pulmonary nodules are present in the right and left lung as described above. It cannot be determined if these are infectious nodules or metastatic disease. Dilated loops of bowel are present in the upper abdomen. The abdomen is incompletely evaluated however this may be wither an ileus or partial obstruction. Hepatic metastasis. 7/21/2022 CT Chest W Contrast Bibasilar linear atelectasis life scarring. No pulmonary nodules, masses or infiltrates. Multiple enhancing low attenuation masses in the liver similar to the previous study. Findings consistent with metastatic liver disease. 7/21/2022 CT Abd/Pelvis W IV Contrast (Oral) Multiple enhancing low attenuation liver lesions increase in size and number since the previous study 3/7/22. Findings consistent with progression of metastatic liver disease. Status postcholecystectomy. The appendix is not visualized. No acute findings. 8/3/22 Discontinue FOLFIRI + Avastin every 2 weeks due to progression in the liver  8/3/2022-unfortunately, interval progression of liver metastasis when compared to last CT scans performed on 3/7/2022. In addition, when compared to report from [address] [place] [address] abdomen/pelvis on 3/20/2022 when he had only 1 single metastatic lesion measuring 2.1 cm. This is considered progression. I have recommended to discontinue FOLFIRI/Avastin and consider initiation of palliative FOLFOX with Avastin. Discussed with hepatobiliary surgery. The patient is not a candidate for further surgery. He is not a candidate for any liver directed therapy. 11/4/2022 CT Chest W Contrast Bilateral lower lobe scarring or atelectasis similar in appearance. There has been slight interval decrease in the amount and the size of the low attenuation hepatic lesions.   11/4/2022 CT Abd/Pelvis W IV Contrast (Oral)There has been interval decrease in the mural thickening of the colonic loop at the level of the surgical suture material with slight residual mural thickening recognized.  Endoscopy can be performed for further evaluation if clinically warranted. Postsurgical changes at the right hepatic lobe posteriorly similar.There has been slight interval decrease in the amount of hepatic metastatic disease with fewer lesions recognized in the right hepatic lobe.Cholecystectomy.  -1/25/23 CEA 80.4  -2/3/23 CT Chest W Contrast ([facility]) Pulmonary nodules, as discussed above. These are worrisome for metastatic disease. Faint sclerotic appearance of the lateral aspect of the right seventh rib. In addition, there appears to be some soft tissue thickening around this rib. This is worrisome for neoplasm/metastatic disease. No other definite bone lesions are appreciated. A bone scan might be helpful to further assess for metastatic bone disease. New small right pleural effusion. There also appears to be some thickening of the pleura in the right lung base. This may be infectious/inflammatory. Metastatic disease is considered. There is a small area of pleural thickening lateral to the right middle lobe image 98 series 3. Ascending thoracic aorta is ectatic at 3.8 cm. The remainder of the aorta is normal caliber. There is cardiomegaly. There is coronary artery calcification. No lymphadenopathy is seen in the chest. Please see the abdomen and pelvis CT report separately.     -2/3/23 CT Abd/Pelvis W Contrast ([facility]) A stable size and number of the hepatic lesions since the previous study in November 2022. A significant increase in size of a low-density lesion in the splenic hilum which may represent a metastatic lesion?. A significant thickening and enhancement of the wall of the colon since the previous study. This may represent an inflammatory process. This is more pronounced in the previous study. A small ascites which was not seen in the previous study. Diffuse nodularity of  the peritoneum/omentum which is similar to the previous study. This may partly be due to prominent mesenteric nodes which are not enlarged by CT criteria. Ill-defined area of bony sclerosis involving the left side of the vertebra L3 is similar to the previous study. Kassy Misti of the lesion is not certain. Possibility of a metastatic disease is not excluded. Postsurgical changes of the right lobe of the liver. No change. No complication. A low-density lesion in the splenic hilum has increased in size and now  measures 2.5 cm in AP dimension. It measured 1.2 cm and the previous   Study  -2/7/2023-I reviewed results CT C/A/P. Essentially, evidence of serologic and imaging progression. Therefore I have recommended to discontinue modified FOLFOX. I have recommended third line therapy with Lonsurf/Avastin. Plan:  -Discontinue Lonsurf due to severe GI toxicity  -NGS TMB>10. Recommended initiation of palliative treatment with pembrolizumab. Lab Results   Component Value Date    CEA 80.4 (H) 01/25/2023     #Multifactorial anemia  hemoglobin 8.5/MCV 89. Ferritin 12.9, iron saturation 15%, TIBC 458, iron 72. Status post IV iron therapy. Hemoglobin 11.4  -Repeat iron profile on 5/24/2022: Ferritin 116, iron saturation 11, iron 29, TIBC 256  Recent Labs     03/13/23  0916 02/14/23  1220   WBC 2.62* 3.98*   HGB 11.4* 11.4*   HCT 35.1* 35.2*   MCV 95.1* 93.1*    143*     Lab Results   Component Value Date    NEUTROABS 1.59 03/13/2023       Chemotherapy education   I have explained to the patient the possible side effects of chemotherapy to include but not limited to bone marrow suppression, increased risk of infections, alopecia, GI toxicity such as nausea, vomiting, diarrhea, constipation, allergic reactions, skin rashes, fatigue and rarely risk of fatal outcomes related to direct or indirect effects of treatment.  We discussed about strategies to lessen the side effects of treatment when required to include but not limited to doses/regimen modifications by provider, increased patient education awareness of certain toxicities, prompt notification to provider or nursing staff by the patient all certain side effects, proactive measures etc.  We also discussed about the importance of compliance with treatment with providers visits to assure early identification and management of side effects. #Chemotherapy-induced neuropathy-stable, antineoplastic induced anemia    #Cancer related pain-  -Oxycodone IR 10 mg every 6 hours as needed  -Increase Fentanyl to 25mcg every 72 hrs-will use current supply and call for new script    DVT port associated left upper extremity-February 2022  internal jugular vein subclavian axillary brachial, left upper extremity. Acute appearing superficial thrombophlebitis (SVT) is seen in the basilic  and cephalic veins, right upper extremity. -Off Eliquis     Erectile dysfunction-  -Sildenafil 50mg daily as needed    Hypomagnesemia-magnesium 1.7. Chemotherapy-induced neuropathy-stable  -Continue to monitor. PLAN:  RTC with MD in treatment room 3 weeks  Discontinue Lonsurf + Avastin after today's dose  Recommend Pembrolizumab 200mg every 3 weeks-once ins approves  Refer to Palliative Care  Repeat CT chest, abdomen, pelvis at hospitals in 3 months, May 2023  Continue follow-up with [place]   Increase Omeprazole 20mg to twice a day  Recommend Metoclopramide 10mg 30min prior to each meal and at bedtime-script sent  Recommend Baclofen 5mg every 8hrs as needed-script sent  Continue Fentanyl to 25mcg every 72 hrs  Continue OxyIR 10mg every 6 hours as needed  Continue Sildenafil 50mg daily as needed  Continue Phenergan 12.5mg every 6 hrs as needed  Continue OTC Imodium as needed      Follow Up:     Return in about 3 weeks (around 4/3/2023) for Treatment & see Alek Flanagan in [address].    Refer to [facility] [address] every 3 weeks-once ins approves     Arvin JACOME am pre charting  as Medical Assistant for Pj Jewell MD. Electronically signed by Kaz Rodriguez MA on 3/13/2023 at 1:44 PM CST. Dorcas Sanon am scribing for Pj Jewell MD. Electronically signed by Bulmaro Donnelly RN on 3/13/2023 at 9:42 AM CDT. I, Dr Bere Saha, personally performed the services described in this documentation as scribed by Bulmaro Donnelly RN in my presence and is both accurate and complete. I have seen, examined and reviewed this patient medication list, appropriate labs and imaging studies. I reviewed relevant medical records and others physicians notes. I discussed the plans of care with the patient. I answered all the questions to the patients satisfaction. I have also reviewed the chief complaint (CC) and part of the history (History of Present Illness (HPI), Past Family Social History [facility]), or Review of Systems (ROS) and made changes when appropriated.        (Please note that portions of this note were completed with a voice recognition program. Efforts were made to edit the dictations but occasionally words are mis-transcribed.)  Electronically signed by Pj Jewell MD on 3/13/2023 at 10:28 AM

## 2023-03-13 ENCOUNTER — HOSPITAL ENCOUNTER (OUTPATIENT)
Dept: INFUSION THERAPY | Age: 68
Discharge: HOME | End: 2023-03-13
Payer: OTHER GOVERNMENT

## 2023-03-13 ENCOUNTER — OFFICE VISIT (OUTPATIENT)
Dept: HEMATOLOGY | Age: 68
End: 2023-03-13

## 2023-03-13 VITALS
OXYGEN SATURATION: 98 % | RESPIRATION RATE: 18 BRPM | BODY MASS INDEX: 18.6 KG/M2 | SYSTOLIC BLOOD PRESSURE: 117 MMHG | HEIGHT: 67 IN | WEIGHT: 118.5 LBS | TEMPERATURE: 97.5 F | HEART RATE: 95 BPM | DIASTOLIC BLOOD PRESSURE: 84 MMHG

## 2023-03-13 DIAGNOSIS — Z71.89 CARE PLAN DISCUSSED WITH PATIENT: ICD-10-CM

## 2023-03-13 DIAGNOSIS — R11.2 CHEMOTHERAPY INDUCED NAUSEA AND VOMITING: ICD-10-CM

## 2023-03-13 DIAGNOSIS — R06.6 HICCUPS: ICD-10-CM

## 2023-03-13 DIAGNOSIS — T45.1X5A CHEMOTHERAPY INDUCED DIARRHEA: ICD-10-CM

## 2023-03-13 DIAGNOSIS — C18.2 MALIGNANT NEOPLASM OF ASCENDING COLON (HCC): Primary | ICD-10-CM

## 2023-03-13 DIAGNOSIS — C78.7 LIVER METASTASES (HCC): ICD-10-CM

## 2023-03-13 DIAGNOSIS — C78.01 MALIGNANT NEOPLASM METASTATIC TO BOTH LUNGS (HCC): ICD-10-CM

## 2023-03-13 DIAGNOSIS — T45.1X5D ADVERSE EFFECT OF CHEMOTHERAPY, SUBSEQUENT ENCOUNTER: ICD-10-CM

## 2023-03-13 DIAGNOSIS — K52.1 CHEMOTHERAPY INDUCED DIARRHEA: ICD-10-CM

## 2023-03-13 DIAGNOSIS — C78.7 LIVER METASTASES: ICD-10-CM

## 2023-03-13 DIAGNOSIS — C18.9 ADENOCARCINOMA OF COLON (HCC): ICD-10-CM

## 2023-03-13 DIAGNOSIS — C78.02 MALIGNANT NEOPLASM METASTATIC TO BOTH LUNGS (HCC): ICD-10-CM

## 2023-03-13 DIAGNOSIS — T45.1X5A CHEMOTHERAPY INDUCED NAUSEA AND VOMITING: ICD-10-CM

## 2023-03-13 DIAGNOSIS — R63.4 UNINTENTIONAL WEIGHT LOSS: ICD-10-CM

## 2023-03-13 LAB
ALBUMIN SERPL-MCNC: 3.6 G/DL (ref 3.5–5.2)
ALP BLD-CCNC: 311 U/L (ref 40–130)
ALT SERPL-CCNC: 26 U/L (ref 21–72)
ANION GAP SERPL CALCULATED.3IONS-SCNC: 6 MMOL/L (ref 7–19)
AST SERPL-CCNC: 44 U/L (ref 17–59)
BILIRUB SERPL-MCNC: 1.1 MG/DL (ref 0.2–1.3)
BUN BLDV-MCNC: 26 MG/DL (ref 9–20)
CALCIUM SERPL-MCNC: 9.4 MG/DL (ref 8.4–10.2)
CHLORIDE BLD-SCNC: 109 MMOL/L (ref 98–111)
CO2: 25 MMOL/L (ref 22–29)
CREAT SERPL-MCNC: 1.6 MG/DL (ref 0.6–1.2)
GFR SERPL CREATININE-BSD FRML MDRD: 47 ML/MIN/{1.73_M2}
GLOBULIN: 3.5 G/DL
GLUCOSE BLD-MCNC: 111 MG/DL (ref 74–106)
HCT VFR BLD CALC: 35.1 % (ref 40.1–51)
HEMOGLOBIN: 11.4 G/DL (ref 13.7–17.5)
LYMPHOCYTES ABSOLUTE: 0.59 K/UL (ref 1.18–3.74)
LYMPHOCYTES RELATIVE PERCENT: 22.5 % (ref 19.3–53.1)
MCH RBC QN AUTO: 30.9 PG (ref 25.7–32.2)
MCHC RBC AUTO-ENTMCNC: 32.5 G/DL (ref 32.3–36.5)
MCV RBC AUTO: 95.1 FL (ref 79–92.2)
MONOCYTES ABSOLUTE: 0.36 K/UL (ref 0.24–0.82)
MONOCYTES RELATIVE PERCENT: 13.7 % (ref 4.7–12.5)
NEUTROPHILS ABSOLUTE: 1.59 K/UL (ref 1.56–6.13)
NEUTROPHILS RELATIVE PERCENT: 60.7 % (ref 34–71.1)
PDW BLD-RTO: 16.5 % (ref 11.6–14.4)
PLATELET # BLD: 172 K/UL (ref 163–337)
PMV BLD AUTO: 9.8 FL (ref 7.4–10.4)
POTASSIUM SERPL-SCNC: 3.8 MMOL/L (ref 3.5–5.1)
PROTEIN UA: ABNORMAL
RBC # BLD: 3.69 M/UL (ref 4.63–6.08)
SODIUM BLD-SCNC: 140 MMOL/L (ref 137–145)
TOTAL PROTEIN: 7.1 G/DL (ref 6.3–8.2)
WBC # BLD: 2.62 K/UL (ref 4.23–9.07)

## 2023-03-13 PROCEDURE — 85025 COMPLETE CBC W/AUTO DIFF WBC: CPT

## 2023-03-13 PROCEDURE — 96360 HYDRATION IV INFUSION INIT: CPT

## 2023-03-13 PROCEDURE — 1123F ACP DISCUSS/DSCN MKR DOCD: CPT | Performed by: INTERNAL MEDICINE

## 2023-03-13 PROCEDURE — 99215 OFFICE O/P EST HI 40 MIN: CPT | Performed by: INTERNAL MEDICINE

## 2023-03-13 PROCEDURE — 36415 COLL VENOUS BLD VENIPUNCTURE: CPT

## 2023-03-13 PROCEDURE — 2580000003 HC RX 258: Performed by: INTERNAL MEDICINE

## 2023-03-13 PROCEDURE — 96361 HYDRATE IV INFUSION ADD-ON: CPT

## 2023-03-13 PROCEDURE — 96413 CHEMO IV INFUSION 1 HR: CPT

## 2023-03-13 PROCEDURE — 96367 TX/PROPH/DG ADDL SEQ IV INF: CPT

## 2023-03-13 PROCEDURE — 80053 COMPREHEN METABOLIC PANEL: CPT

## 2023-03-13 PROCEDURE — 6360000002 HC RX W HCPCS: Performed by: INTERNAL MEDICINE

## 2023-03-13 PROCEDURE — 96366 THER/PROPH/DIAG IV INF ADDON: CPT

## 2023-03-13 RX ORDER — BACLOFEN 5 MG/1
5 TABLET ORAL EVERY 8 HOURS PRN
Qty: 90 TABLET | Refills: 5 | Status: SHIPPED | OUTPATIENT
Start: 2023-03-13 | End: 2023-03-13

## 2023-03-13 RX ORDER — SODIUM CHLORIDE 9 MG/ML
5-40 INJECTION INTRAVENOUS PRN
Status: DISCONTINUED | OUTPATIENT
Start: 2023-03-13 | End: 2023-03-13

## 2023-03-13 RX ORDER — EPINEPHRINE 1 MG/ML
0.3 INJECTION, SOLUTION, CONCENTRATE INTRAVENOUS PRN
Status: CANCELLED | OUTPATIENT
Start: 2023-03-13

## 2023-03-13 RX ORDER — ACETAMINOPHEN 325 MG/1
650 TABLET ORAL
Status: CANCELLED | OUTPATIENT
Start: 2023-03-13

## 2023-03-13 RX ORDER — DIPHENHYDRAMINE HYDROCHLORIDE 50 MG/ML
50 INJECTION INTRAMUSCULAR; INTRAVENOUS
Status: CANCELLED | OUTPATIENT
Start: 2023-03-13

## 2023-03-13 RX ORDER — SODIUM CHLORIDE 9 MG/ML
5-40 INJECTION INTRAVENOUS PRN
Status: CANCELLED | OUTPATIENT
Start: 2023-03-13

## 2023-03-13 RX ORDER — METOCLOPRAMIDE 10 MG/1
10 TABLET ORAL
Qty: 120 TABLET | Refills: 5 | Status: SHIPPED | OUTPATIENT
Start: 2023-03-13

## 2023-03-13 RX ORDER — SODIUM CHLORIDE 9 MG/ML
5-250 INJECTION, SOLUTION INTRAVENOUS PRN
Status: CANCELLED | OUTPATIENT
Start: 2023-03-13

## 2023-03-13 RX ORDER — HEPARIN SODIUM (PORCINE) LOCK FLUSH IV SOLN 100 UNIT/ML 100 UNIT/ML
500 SOLUTION INTRAVENOUS PRN
Status: DISCONTINUED | OUTPATIENT
Start: 2023-03-13 | End: 2023-03-13

## 2023-03-13 RX ORDER — SODIUM CHLORIDE 9 MG/ML
5-250 INJECTION, SOLUTION INTRAVENOUS PRN
Status: DISCONTINUED | OUTPATIENT
Start: 2023-03-13 | End: 2023-03-13

## 2023-03-13 RX ORDER — BACLOFEN 5 MG/1
5 TABLET ORAL EVERY 8 HOURS PRN
Qty: 90 TABLET | Refills: 5 | Status: SHIPPED | OUTPATIENT
Start: 2023-03-13

## 2023-03-13 RX ORDER — FAMOTIDINE 10 MG/ML
20 INJECTION, SOLUTION INTRAVENOUS
Status: CANCELLED | OUTPATIENT
Start: 2023-03-13

## 2023-03-13 RX ORDER — ONDANSETRON 2 MG/ML
8 INJECTION INTRAMUSCULAR; INTRAVENOUS
Status: CANCELLED | OUTPATIENT
Start: 2023-03-13

## 2023-03-13 RX ORDER — ALBUTEROL SULFATE 90 UG/1
4 AEROSOL, METERED RESPIRATORY (INHALATION) PRN
Status: CANCELLED | OUTPATIENT
Start: 2023-03-13

## 2023-03-13 RX ORDER — HEPARIN SODIUM (PORCINE) LOCK FLUSH IV SOLN 100 UNIT/ML 100 UNIT/ML
500 SOLUTION INTRAVENOUS PRN
Status: CANCELLED | OUTPATIENT
Start: 2023-03-13

## 2023-03-13 RX ORDER — METOCLOPRAMIDE 10 MG/1
10 TABLET ORAL
Qty: 120 TABLET | Refills: 5 | Status: SHIPPED | OUTPATIENT
Start: 2023-03-13 | End: 2023-03-13

## 2023-03-13 RX ORDER — MEPERIDINE HYDROCHLORIDE 50 MG/ML
12.5 INJECTION INTRAMUSCULAR; INTRAVENOUS; SUBCUTANEOUS PRN
Status: CANCELLED | OUTPATIENT
Start: 2023-03-13

## 2023-03-13 RX ORDER — SODIUM CHLORIDE 9 MG/ML
INJECTION, SOLUTION INTRAVENOUS CONTINUOUS
Status: CANCELLED | OUTPATIENT
Start: 2023-03-13

## 2023-03-13 RX ORDER — FENTANYL 12 UG/H
1 PATCH TRANSDERMAL
COMMUNITY

## 2023-03-13 RX ADMIN — SODIUM CHLORIDE 250 ML/HR: 9 INJECTION, SOLUTION INTRAVENOUS at 10:56

## 2023-03-13 RX ADMIN — SODIUM CHLORIDE 450 MG: 9 INJECTION, SOLUTION INTRAVENOUS at 10:31

## 2023-03-13 RX ADMIN — HEPARIN 500 UNITS: 100 SYRINGE at 11:57

## 2023-03-13 RX ADMIN — SODIUM CHLORIDE 250 ML/HR: 9 INJECTION, SOLUTION INTRAVENOUS at 09:41

## 2023-03-13 RX ADMIN — SODIUM CHLORIDE, PRESERVATIVE FREE 10 ML: 5 INJECTION INTRAVENOUS at 11:57

## 2023-03-15 ENCOUNTER — TELEPHONE (OUTPATIENT)
Dept: PALLATIVE CARE | Age: 68
End: 2023-03-15

## 2023-03-15 NOTE — TELEPHONE ENCOUNTER
I left a voicemail for the patient to call [facility]. I would like to discuss the referral that we received. I will call back at a later date. I can be reached at [phone].

## 2023-03-20 ENCOUNTER — PATIENT MESSAGE (OUTPATIENT)
Dept: HEMATOLOGY | Age: 68
End: 2023-03-20

## 2023-03-20 DIAGNOSIS — C18.2 MALIGNANT NEOPLASM OF ASCENDING COLON (HCC): ICD-10-CM

## 2023-03-20 RX ORDER — FENTANYL 25 UG/H
1 PATCH TRANSDERMAL
Qty: 10 PATCH | Refills: 0 | Status: SHIPPED | OUTPATIENT
Start: 2023-03-20 | End: 2023-04-19

## 2023-03-20 NOTE — TELEPHONE ENCOUNTER
From: [place]  To: Dr. Kapil Tomas: 3/20/2023 2:32 PM CDT  Subject: Irwin Edgar has been acting strangely for the last three days. Of course he has become weaker since he lost all that weight after taking the Lonsurf but he struggles to stand at the sink to brush his teeth. He has no muscle on his body at all. He talks a lot but sometimes doesn't make any since. He reminds me of how hard it was to carry on a conversation with my mother when she had alzheimers. He keeps telling me I'm crazy and after dealing with this for a few days I'm beginning to thing I am the confused one! lol He's not sleeping more than usual and actually he seems to want to be busy but doesn't realize his abilities to do certain things. Could it be the pain medicine? We had just started putting the 25 and 12.5 fentanyl patches on before he lost so much weight. This morning it was time to change them so I put 2 - 12.5 patches on him because we were out of 25 mcg patches. i put an order in for the 25's. Call me if you need to. [phone].  Thanks Inge Pride

## 2023-03-23 ENCOUNTER — OFFICE VISIT (OUTPATIENT)
Dept: PALLATIVE CARE | Age: 68
End: 2023-03-23
Payer: OTHER GOVERNMENT

## 2023-03-23 DIAGNOSIS — F32.A ANXIETY AND DEPRESSION: ICD-10-CM

## 2023-03-23 DIAGNOSIS — F41.9 ANXIETY AND DEPRESSION: ICD-10-CM

## 2023-03-23 DIAGNOSIS — R68.89 DECREASED STRENGTH, ENDURANCE, AND MOBILITY: ICD-10-CM

## 2023-03-23 DIAGNOSIS — C18.9 ADENOCARCINOMA OF COLON (HCC): ICD-10-CM

## 2023-03-23 DIAGNOSIS — C78.02 MALIGNANT NEOPLASM METASTATIC TO BOTH LUNGS (HCC): ICD-10-CM

## 2023-03-23 DIAGNOSIS — R53.1 DECREASED STRENGTH, ENDURANCE, AND MOBILITY: ICD-10-CM

## 2023-03-23 DIAGNOSIS — C78.01 MALIGNANT NEOPLASM METASTATIC TO BOTH LUNGS (HCC): ICD-10-CM

## 2023-03-23 DIAGNOSIS — G89.3 CANCER RELATED PAIN: Primary | ICD-10-CM

## 2023-03-23 DIAGNOSIS — Z51.5 ENCOUNTER FOR PALLIATIVE CARE: ICD-10-CM

## 2023-03-23 DIAGNOSIS — R11.0 NAUSEA: ICD-10-CM

## 2023-03-23 DIAGNOSIS — Z74.09 DECREASED STRENGTH, ENDURANCE, AND MOBILITY: ICD-10-CM

## 2023-03-23 PROCEDURE — G8420 CALC BMI NORM PARAMETERS: HCPCS | Performed by: NURSE PRACTITIONER

## 2023-03-23 PROCEDURE — 99350 HOME/RES VST EST HIGH MDM 60: CPT | Performed by: NURSE PRACTITIONER

## 2023-03-23 PROCEDURE — 1123F ACP DISCUSS/DSCN MKR DOCD: CPT | Performed by: NURSE PRACTITIONER

## 2023-03-23 PROCEDURE — G8484 FLU IMMUNIZE NO ADMIN: HCPCS | Performed by: NURSE PRACTITIONER

## 2023-03-23 PROCEDURE — 1036F TOBACCO NON-USER: CPT | Performed by: NURSE PRACTITIONER

## 2023-03-23 PROCEDURE — 3017F COLORECTAL CA SCREEN DOC REV: CPT | Performed by: NURSE PRACTITIONER

## 2023-03-28 VITALS — DIASTOLIC BLOOD PRESSURE: 60 MMHG | OXYGEN SATURATION: 97 % | TEMPERATURE: 97.1 F | SYSTOLIC BLOOD PRESSURE: 108 MMHG

## 2023-03-28 DIAGNOSIS — C18.2 MALIGNANT NEOPLASM OF ASCENDING COLON (HCC): Primary | ICD-10-CM

## 2023-03-28 RX ORDER — FENTANYL 12 UG/H
1 PATCH TRANSDERMAL
Qty: 10 PATCH | Refills: 0 | Status: SHIPPED | OUTPATIENT
Start: 2023-03-28 | End: 2023-04-27

## 2023-03-28 NOTE — PROGRESS NOTES
Avastin added to C4 as planned. Teaching provided to patient. Urine protein negative. Muscle Hinge Flap Text: The defect edges were debeveled with a #15 scalpel blade.  Given the size, depth and location of the defect and the proximity to free margins a muscle hinge flap was deemed most appropriate.  Using a sterile surgical marker, an appropriate hinge flap was drawn incorporating the defect. The area thus outlined was incised with a #15 scalpel blade.  The skin margins were undermined to an appropriate distance in all directions utilizing iris scissors.

## 2023-03-28 NOTE — PROGRESS NOTES
[facility]  Initial Consultation Note      Patient Name:  Kaiser Izquierdo  Medical Record Number:  [id number]  YOB: 1955    Date of Visit: 3/23/2023  Location of Visit:  Home    Referring Provider: Dr. Shakir Honeycutt  Patient Care Team:  Ambrosio Bumpers, APRN - NP as PCP - General (Nurse Practitioner)  KALPESH Yo CNP as Advanced Practice Nurse (Nurse Practitioner)    Reason for Consult:   Goals of care   Symptom Management    ACP  Patient/Family Support    History obtained from:  patient, spouse, electronic medical record    PALLIATIVE DIAGNOSES AND ORDERS/RECOMMENDATIONS/PLAN:     Cancer related pain  Currently controlled. Continue Fentanyl 37.5mcg every 72 hours and Oxycodone as prescribed for breakthrough pain    Decreased strength, endurance, and mobility  Continue increasing activity as tolerated    Nausea  Currently controlled  Continue Zofran, Phenergan, Reglan as needed    Anxiety and depression  Taking Sertraline sporadically. Encouraged him to take it daily. Malignant neoplasm metastatic to both lungs [facility])  Adenocarcinoma of colon ([facility] [address]  Keytruda planned. Encounter for palliative care  Current goals of care include: Continue treatment with palliative intent, Preserve independence/control/autonomy, Maintain/Improve function quality of life  Discussed LW document. [place] LW packet provided. [facility] can assist with completion of document if he desires  Code status clarified: Full Code  Follow up home visit in 4 weeks to reevaluate symptoms and treatment related side effects    CHIEF COMPLAINT:     Chief Complaint   Patient presents with    Referral - General     For Palliative Care; cancer, pain, fatigue,      CLINICAL SUMMARY:          Kaiser Izquierdo is a 79 y.o. male with PMH of colon cancer, GERD, PTSD, CKD 3.     Brief Oncology Summary:  Diagnosed with colon cancer  (adenocarcinoma involving the cecum) in early 2020 after [facility]

## 2023-04-03 ENCOUNTER — HOSPITAL ENCOUNTER (OUTPATIENT)
Dept: INFUSION THERAPY | Age: 68
Discharge: HOME | End: 2023-04-03
Payer: OTHER GOVERNMENT

## 2023-04-03 VITALS
HEART RATE: 64 BPM | BODY MASS INDEX: 19.67 KG/M2 | HEIGHT: 67 IN | TEMPERATURE: 97.5 F | DIASTOLIC BLOOD PRESSURE: 90 MMHG | SYSTOLIC BLOOD PRESSURE: 155 MMHG | OXYGEN SATURATION: 98 % | RESPIRATION RATE: 18 BRPM | WEIGHT: 125.3 LBS

## 2023-04-03 DIAGNOSIS — C78.01 MALIGNANT NEOPLASM METASTATIC TO BOTH LUNGS (HCC): ICD-10-CM

## 2023-04-03 DIAGNOSIS — C18.2 MALIGNANT NEOPLASM OF ASCENDING COLON (HCC): Primary | ICD-10-CM

## 2023-04-03 DIAGNOSIS — C78.7 METASTATIC COLON CANCER TO LIVER (HCC): ICD-10-CM

## 2023-04-03 DIAGNOSIS — C18.9 METASTATIC COLON CANCER TO LIVER (HCC): ICD-10-CM

## 2023-04-03 DIAGNOSIS — C18.2 MALIGNANT NEOPLASM OF ASCENDING COLON (HCC): ICD-10-CM

## 2023-04-03 DIAGNOSIS — C80.1 HIGH TUMOR MUTATIONAL BURDEN (HCC): ICD-10-CM

## 2023-04-03 DIAGNOSIS — C18.9 ADENOCARCINOMA OF COLON (HCC): ICD-10-CM

## 2023-04-03 DIAGNOSIS — C78.02 MALIGNANT NEOPLASM METASTATIC TO BOTH LUNGS (HCC): ICD-10-CM

## 2023-04-03 DIAGNOSIS — R53.0 NEOPLASTIC MALIGNANT RELATED FATIGUE: Primary | ICD-10-CM

## 2023-04-03 LAB
ALBUMIN SERPL-MCNC: 3.3 G/DL (ref 3.5–5.2)
ALP SERPL-CCNC: 408 U/L (ref 40–130)
ALT SERPL-CCNC: 30 U/L (ref 21–72)
ANION GAP SERPL CALCULATED.3IONS-SCNC: 2 MMOL/L (ref 7–19)
AST SERPL-CCNC: 71 U/L (ref 17–59)
BILIRUB SERPL-MCNC: 1.4 MG/DL (ref 0.2–1.3)
BUN SERPL-MCNC: 20 MG/DL (ref 9–20)
CALCIUM SERPL-MCNC: 8.8 MG/DL (ref 8.4–10.2)
CHLORIDE SERPL-SCNC: 109 MMOL/L (ref 98–111)
CO2 SERPL-SCNC: 28 MMOL/L (ref 22–29)
CREAT SERPL-MCNC: 1.2 MG/DL (ref 0.6–1.2)
ERYTHROCYTE [DISTWIDTH] IN BLOOD BY AUTOMATED COUNT: 18.5 % (ref 11.6–14.4)
GLOBULIN: 3.9 G/DL
GLUCOSE SERPL-MCNC: 99 MG/DL (ref 74–106)
HCT VFR BLD AUTO: 34.2 % (ref 40.1–51)
HGB BLD-MCNC: 11 G/DL (ref 13.7–17.5)
LYMPHOCYTES # BLD: 0.54 K/UL (ref 1.18–3.74)
LYMPHOCYTES NFR BLD: 13.4 % (ref 19.3–53.1)
MCH RBC QN AUTO: 31.3 PG (ref 25.7–32.2)
MCHC RBC AUTO-ENTMCNC: 32.2 G/DL (ref 32.3–36.5)
MCV RBC AUTO: 97.4 FL (ref 79–92.2)
MONOCYTES # BLD: 0.67 K/UL (ref 0.24–0.82)
MONOCYTES NFR BLD: 16.7 % (ref 4.7–12.5)
NEUTROPHILS # BLD: 2.76 K/UL (ref 1.56–6.13)
NEUTS SEG NFR BLD: 68.7 % (ref 34–71.1)
PLATELET # BLD AUTO: 178 K/UL (ref 163–337)
PMV BLD AUTO: 9.7 FL (ref 7.4–10.4)
POTASSIUM SERPL-SCNC: 4 MMOL/L (ref 3.5–5.1)
PROT SERPL-MCNC: 7.2 G/DL (ref 6.3–8.2)
RBC # BLD AUTO: 3.51 M/UL (ref 4.63–6.08)
SODIUM SERPL-SCNC: 139 MMOL/L (ref 137–145)
WBC # BLD AUTO: 4.02 K/UL (ref 4.23–9.07)

## 2023-04-03 PROCEDURE — 96413 CHEMO IV INFUSION 1 HR: CPT

## 2023-04-03 PROCEDURE — A4216 STERILE WATER/SALINE, 10 ML: HCPCS | Performed by: INTERNAL MEDICINE

## 2023-04-03 PROCEDURE — 80053 COMPREHEN METABOLIC PANEL: CPT

## 2023-04-03 PROCEDURE — 2580000003 HC RX 258: Performed by: INTERNAL MEDICINE

## 2023-04-03 PROCEDURE — 6360000002 HC RX W HCPCS: Performed by: INTERNAL MEDICINE

## 2023-04-03 PROCEDURE — 85025 COMPLETE CBC W/AUTO DIFF WBC: CPT

## 2023-04-03 PROCEDURE — 36415 COLL VENOUS BLD VENIPUNCTURE: CPT

## 2023-04-03 RX ORDER — ACETAMINOPHEN 325 MG/1
650 TABLET ORAL
Status: CANCELLED | OUTPATIENT
Start: 2023-04-03

## 2023-04-03 RX ORDER — SODIUM CHLORIDE 9 MG/ML
INJECTION, SOLUTION INTRAVENOUS CONTINUOUS
Status: CANCELLED | OUTPATIENT
Start: 2023-04-03

## 2023-04-03 RX ORDER — SODIUM CHLORIDE 9 MG/ML
5-250 INJECTION, SOLUTION INTRAVENOUS PRN
Status: CANCELLED | OUTPATIENT
Start: 2023-04-03

## 2023-04-03 RX ORDER — MEPERIDINE HYDROCHLORIDE 25 MG/ML
12.5 INJECTION INTRAMUSCULAR; INTRAVENOUS; SUBCUTANEOUS PRN
Status: CANCELLED | OUTPATIENT
Start: 2023-04-03

## 2023-04-03 RX ORDER — FENTANYL 25 UG/H
1 PATCH TRANSDERMAL
Qty: 10 PATCH | Refills: 0 | Status: ON HOLD | OUTPATIENT
Start: 2023-04-03 | End: 2023-05-03

## 2023-04-03 RX ORDER — EPINEPHRINE 1 MG/ML
0.3 INJECTION, SOLUTION, CONCENTRATE INTRAVENOUS PRN
Status: CANCELLED | OUTPATIENT
Start: 2023-04-03

## 2023-04-03 RX ORDER — HEPARIN SODIUM (PORCINE) LOCK FLUSH IV SOLN 100 UNIT/ML 100 UNIT/ML
500 SOLUTION INTRAVENOUS PRN
Status: DISCONTINUED | OUTPATIENT
Start: 2023-04-03 | End: 2023-04-04 | Stop reason: HOSPADM

## 2023-04-03 RX ORDER — SODIUM CHLORIDE 9 MG/ML
5-40 INJECTION INTRAVENOUS PRN
Status: DISCONTINUED | OUTPATIENT
Start: 2023-04-03 | End: 2023-04-04 | Stop reason: HOSPADM

## 2023-04-03 RX ORDER — DIPHENHYDRAMINE HYDROCHLORIDE 50 MG/ML
50 INJECTION INTRAMUSCULAR; INTRAVENOUS
Status: CANCELLED | OUTPATIENT
Start: 2023-04-03

## 2023-04-03 RX ORDER — LIDOCAINE AND PRILOCAINE 25; 25 MG/G; MG/G
CREAM TOPICAL
Qty: 30 G | Refills: 5 | Status: ON HOLD | OUTPATIENT
Start: 2023-04-03

## 2023-04-03 RX ORDER — ALBUTEROL SULFATE 90 UG/1
4 AEROSOL, METERED RESPIRATORY (INHALATION) PRN
Status: CANCELLED | OUTPATIENT
Start: 2023-04-03

## 2023-04-03 RX ORDER — FAMOTIDINE 10 MG/ML
20 INJECTION, SOLUTION INTRAVENOUS
Status: CANCELLED | OUTPATIENT
Start: 2023-04-03

## 2023-04-03 RX ORDER — ONDANSETRON 2 MG/ML
8 INJECTION INTRAMUSCULAR; INTRAVENOUS
Status: CANCELLED | OUTPATIENT
Start: 2023-04-03

## 2023-04-03 RX ORDER — FENTANYL 12 UG/H
1 PATCH TRANSDERMAL
Qty: 10 PATCH | Refills: 0 | Status: ON HOLD | OUTPATIENT
Start: 2023-04-03 | End: 2023-05-03

## 2023-04-03 RX ADMIN — SODIUM CHLORIDE 10 ML: 9 INJECTION, SOLUTION INTRAMUSCULAR; INTRAVENOUS; SUBCUTANEOUS at 15:27

## 2023-04-03 RX ADMIN — SODIUM CHLORIDE 200 MG: 9 INJECTION, SOLUTION INTRAVENOUS at 14:57

## 2023-04-03 RX ADMIN — Medication 500 UNITS: at 15:27

## 2023-04-03 ASSESSMENT — PAIN DESCRIPTION - FREQUENCY: FREQUENCY: INTERMITTENT

## 2023-04-03 ASSESSMENT — PAIN DESCRIPTION - LOCATION: LOCATION: ABDOMEN

## 2023-04-03 ASSESSMENT — PAIN SCALES - GENERAL: PAINLEVEL_OUTOF10: 5

## 2023-04-03 ASSESSMENT — PAIN DESCRIPTION - DESCRIPTORS: DESCRIPTORS: ACHING

## 2023-04-03 ASSESSMENT — PAIN DESCRIPTION - ORIENTATION: ORIENTATION: LOWER;RIGHT

## 2023-04-03 NOTE — PROGRESS NOTES
Lab Results   Component Value Date    WBC 4.02 (L) 04/03/2023    HGB 11.0 (L) 04/03/2023    HCT 34.2 (L) 04/03/2023    MCV 97.4 (H) 04/03/2023     04/03/2023     Lab Results   Component Value Date    NEUTROABS 2.76 04/03/2023

## 2023-04-04 ENCOUNTER — PATIENT MESSAGE (OUTPATIENT)
Dept: HEMATOLOGY | Age: 68
End: 2023-04-04

## 2023-04-04 ENCOUNTER — TELEPHONE (OUTPATIENT)
Dept: HEMATOLOGY | Age: 68
End: 2023-04-04

## 2023-04-04 DIAGNOSIS — C18.9 ADENOCARCINOMA OF COLON (HCC): ICD-10-CM

## 2023-04-04 DIAGNOSIS — C18.9 COLON CANCER METASTASIZED TO LIVER (HCC): ICD-10-CM

## 2023-04-04 DIAGNOSIS — K59.03 DRUG-INDUCED CONSTIPATION: ICD-10-CM

## 2023-04-04 DIAGNOSIS — M62.89 MASS OF PSOAS MUSCLE: ICD-10-CM

## 2023-04-04 DIAGNOSIS — C78.7 ADENOCARCINOMA OF COLON METASTATIC TO LIVER (HCC): Primary | ICD-10-CM

## 2023-04-04 DIAGNOSIS — C78.7 COLON CANCER METASTASIZED TO LIVER (HCC): ICD-10-CM

## 2023-04-04 DIAGNOSIS — C78.7 MALIGNANT NEOPLASM METASTATIC TO LIVER (HCC): ICD-10-CM

## 2023-04-04 DIAGNOSIS — G89.3 CANCER ASSOCIATED PAIN: Primary | ICD-10-CM

## 2023-04-04 DIAGNOSIS — G89.3 CANCER ASSOCIATED PAIN: ICD-10-CM

## 2023-04-04 DIAGNOSIS — C18.9 ADENOCARCINOMA OF COLON METASTATIC TO LIVER (HCC): Primary | ICD-10-CM

## 2023-04-04 PROBLEM — E46 PROTEIN CALORIE MALNUTRITION (HCC): Status: ACTIVE | Noted: 2023-01-01

## 2023-04-04 RX ORDER — OXYCODONE HYDROCHLORIDE 10 MG/1
10 TABLET ORAL EVERY 4 HOURS
Qty: 180 TABLET | Refills: 0 | Status: ON HOLD | OUTPATIENT
Start: 2023-04-04 | End: 2023-05-04

## 2023-04-04 RX ORDER — LACTULOSE 10 G/15ML
20 SOLUTION ORAL EVERY EVENING
Qty: 946 ML | Refills: 5 | Status: ON HOLD | OUTPATIENT
Start: 2023-04-04

## 2023-04-04 RX ORDER — OXYCODONE AND ACETAMINOPHEN 10; 325 MG/1; MG/1
1 TABLET ORAL EVERY 4 HOURS PRN
Qty: 180 TABLET | Refills: 0 | Status: SHIPPED | OUTPATIENT
Start: 2023-04-04 | End: 2023-04-04 | Stop reason: SINTOL

## 2023-04-04 NOTE — TELEPHONE ENCOUNTER
Left voicemail for patient's wife regarding results of his creatinine level. Keren Barlow has reviewed labs and has recommended for patient to increase fluid. I have requested for patient to return phone call.

## 2023-04-04 NOTE — TELEPHONE ENCOUNTER
From: Nanine Aase  To: Dr. Colten Murillo: 4/4/2023 5:29 AM CDT  Subject: Ced Darling has been hurting pretty bad the last two nights and not sleeping. He has not had a bowel movement in at least two days which is unusual for him. He had new patches Sunday and has been taking oxycodone a lot. He says its hurting in the psoas muscle and across his abdomen where his hernia is. He has thrown up multiple times over the last few days. He gets up in tge middle of the night and takes baths to try to relieve the pain. I have some Colace here. Should we try that or be concerned its more than that? Also he only has two oxycodones left and on [facility] it say his oxy is on hold because its not time for it so can you call them to have it released? You can call me at [phone] if you need to.  Thanks

## 2023-04-05 ENCOUNTER — HOSPITAL ENCOUNTER (INPATIENT)
Age: 68
LOS: 7 days | Discharge: HOME HEALTH | DRG: 389 | End: 2023-04-12
Attending: HOSPITALIST | Admitting: HOSPITALIST
Payer: OTHER GOVERNMENT

## 2023-04-05 ENCOUNTER — APPOINTMENT (OUTPATIENT)
Dept: CT IMAGING | Age: 68
DRG: 389 | End: 2023-04-05
Payer: OTHER GOVERNMENT

## 2023-04-05 DIAGNOSIS — Z85.89 HX OF METASTATIC NEOPLASTIC DISEASE: ICD-10-CM

## 2023-04-05 DIAGNOSIS — K56.7 ILEUS (HCC): Primary | ICD-10-CM

## 2023-04-05 DIAGNOSIS — R11.2 NAUSEA AND VOMITING, UNSPECIFIED VOMITING TYPE: ICD-10-CM

## 2023-04-05 LAB
ALBUMIN SERPL-MCNC: 2.8 G/DL (ref 3.5–5.2)
ALP SERPL-CCNC: 346 U/L (ref 40–130)
ALT SERPL-CCNC: 22 U/L (ref 5–41)
ANION GAP SERPL CALCULATED.3IONS-SCNC: 13 MMOL/L (ref 7–19)
AST SERPL-CCNC: 79 U/L (ref 5–40)
BASOPHILS # BLD: 0 K/UL (ref 0–0.2)
BASOPHILS NFR BLD: 0.6 % (ref 0–1)
BILIRUB SERPL-MCNC: 1.4 MG/DL (ref 0.2–1.2)
BILIRUB UR QL STRIP: NEGATIVE
BUN SERPL-MCNC: 24 MG/DL (ref 8–23)
CALCIUM SERPL-MCNC: 9.1 MG/DL (ref 8.8–10.2)
CHLORIDE SERPL-SCNC: 104 MMOL/L (ref 98–111)
CLARITY UR: CLEAR
CO2 SERPL-SCNC: 19 MMOL/L (ref 22–29)
COLOR UR: YELLOW
CREAT SERPL-MCNC: 1.2 MG/DL (ref 0.5–1.2)
EOSINOPHIL # BLD: 0 K/UL (ref 0–0.6)
EOSINOPHIL NFR BLD: 0.1 % (ref 0–5)
ERYTHROCYTE [DISTWIDTH] IN BLOOD BY AUTOMATED COUNT: 18.2 % (ref 11.5–14.5)
GLUCOSE SERPL-MCNC: 111 MG/DL (ref 74–109)
GLUCOSE UR STRIP.AUTO-MCNC: NEGATIVE MG/DL
HCT VFR BLD AUTO: 41.1 % (ref 42–52)
HGB BLD-MCNC: 11.9 G/DL (ref 14–18)
HGB UR STRIP.AUTO-MCNC: NEGATIVE MG/L
IMM GRANULOCYTES # BLD: 0 K/UL
KETONES UR STRIP.AUTO-MCNC: NEGATIVE MG/DL
LEUKOCYTE ESTERASE UR QL STRIP.AUTO: NEGATIVE
LIPASE SERPL-CCNC: 11 U/L (ref 13–60)
LYMPHOCYTES # BLD: 0.4 K/UL (ref 1.1–4.5)
LYMPHOCYTES NFR BLD: 6.3 % (ref 20–40)
MAGNESIUM SERPL-MCNC: 2.1 MG/DL (ref 1.6–2.4)
MCH RBC QN AUTO: 32 PG (ref 27–31)
MCHC RBC AUTO-ENTMCNC: 29 G/DL (ref 33–37)
MCV RBC AUTO: 110.5 FL (ref 80–94)
MONOCYTES # BLD: 1 K/UL (ref 0–0.9)
MONOCYTES NFR BLD: 14.2 % (ref 0–10)
NEUTROPHILS # BLD: 5.4 K/UL (ref 1.5–7.5)
NEUTS SEG NFR BLD: 78.5 % (ref 50–65)
NITRITE UR QL STRIP.AUTO: NEGATIVE
PH UR STRIP.AUTO: 6 [PH] (ref 5–8)
PLATELET # BLD AUTO: 183 K/UL (ref 130–400)
PMV BLD AUTO: 10.5 FL (ref 9.4–12.4)
POTASSIUM SERPL-SCNC: 4.8 MMOL/L (ref 3.5–5)
PROT SERPL-MCNC: 7.5 G/DL (ref 6.6–8.7)
PROT UR STRIP.AUTO-MCNC: ABNORMAL MG/DL
RBC # BLD AUTO: 3.72 M/UL (ref 4.7–6.1)
SARS-COV-2 RDRP RESP QL NAA+PROBE: NOT DETECTED
SODIUM SERPL-SCNC: 136 MMOL/L (ref 136–145)
SP GR UR STRIP.AUTO: >=1.045 (ref 1–1.03)
TROPONIN T SERPL-MCNC: <0.01 NG/ML (ref 0–0.03)
UROBILINOGEN UR STRIP.AUTO-MCNC: 2 E.U./DL
WBC # BLD AUTO: 6.9 K/UL (ref 4.8–10.8)

## 2023-04-05 PROCEDURE — 82306 VITAMIN D 25 HYDROXY: CPT

## 2023-04-05 PROCEDURE — 83735 ASSAY OF MAGNESIUM: CPT

## 2023-04-05 PROCEDURE — 36415 COLL VENOUS BLD VENIPUNCTURE: CPT

## 2023-04-05 PROCEDURE — 74177 CT ABD & PELVIS W/CONTRAST: CPT

## 2023-04-05 PROCEDURE — 6360000004 HC RX CONTRAST MEDICATION: Performed by: NURSE PRACTITIONER

## 2023-04-05 PROCEDURE — 82607 VITAMIN B-12: CPT

## 2023-04-05 PROCEDURE — 80053 COMPREHEN METABOLIC PANEL: CPT

## 2023-04-05 PROCEDURE — 82746 ASSAY OF FOLIC ACID SERUM: CPT

## 2023-04-05 PROCEDURE — 2580000003 HC RX 258: Performed by: NURSE PRACTITIONER

## 2023-04-05 PROCEDURE — 82728 ASSAY OF FERRITIN: CPT

## 2023-04-05 PROCEDURE — 99285 EMERGENCY DEPT VISIT HI MDM: CPT

## 2023-04-05 PROCEDURE — 85025 COMPLETE CBC W/AUTO DIFF WBC: CPT

## 2023-04-05 PROCEDURE — 96374 THER/PROPH/DIAG INJ IV PUSH: CPT

## 2023-04-05 PROCEDURE — 6360000002 HC RX W HCPCS: Performed by: NURSE PRACTITIONER

## 2023-04-05 PROCEDURE — 83550 IRON BINDING TEST: CPT

## 2023-04-05 PROCEDURE — 84484 ASSAY OF TROPONIN QUANT: CPT

## 2023-04-05 PROCEDURE — 83540 ASSAY OF IRON: CPT

## 2023-04-05 PROCEDURE — 1210000000 HC MED SURG R&B

## 2023-04-05 PROCEDURE — 83690 ASSAY OF LIPASE: CPT

## 2023-04-05 PROCEDURE — 6370000000 HC RX 637 (ALT 250 FOR IP): Performed by: NURSE PRACTITIONER

## 2023-04-05 RX ORDER — OXYCODONE HCL 10 MG/1
10 TABLET, FILM COATED, EXTENDED RELEASE ORAL ONCE
Status: COMPLETED | OUTPATIENT
Start: 2023-04-05 | End: 2023-04-05

## 2023-04-05 RX ORDER — BISACODYL 10 MG
10 SUPPOSITORY, RECTAL RECTAL DAILY
Status: DISCONTINUED | OUTPATIENT
Start: 2023-04-05 | End: 2023-04-06

## 2023-04-05 RX ORDER — SODIUM CHLORIDE 9 MG/ML
INJECTION, SOLUTION INTRAVENOUS CONTINUOUS
Status: DISCONTINUED | OUTPATIENT
Start: 2023-04-05 | End: 2023-04-05

## 2023-04-05 RX ORDER — ONDANSETRON 2 MG/ML
4 INJECTION INTRAMUSCULAR; INTRAVENOUS EVERY 6 HOURS PRN
Status: DISCONTINUED | OUTPATIENT
Start: 2023-04-05 | End: 2023-04-12 | Stop reason: HOSPADM

## 2023-04-05 RX ORDER — POLYETHYLENE GLYCOL 3350 17 G/17G
17 POWDER, FOR SOLUTION ORAL DAILY PRN
Status: DISCONTINUED | OUTPATIENT
Start: 2023-04-05 | End: 2023-04-06

## 2023-04-05 RX ORDER — BISACODYL 10 MG
10 SUPPOSITORY, RECTAL RECTAL DAILY
Status: DISCONTINUED | OUTPATIENT
Start: 2023-04-06 | End: 2023-04-05

## 2023-04-05 RX ORDER — ONDANSETRON 2 MG/ML
4 INJECTION INTRAMUSCULAR; INTRAVENOUS ONCE
Status: COMPLETED | OUTPATIENT
Start: 2023-04-05 | End: 2023-04-05

## 2023-04-05 RX ORDER — ONDANSETRON 4 MG/1
4 TABLET, ORALLY DISINTEGRATING ORAL EVERY 8 HOURS PRN
Status: DISCONTINUED | OUTPATIENT
Start: 2023-04-05 | End: 2023-04-06

## 2023-04-05 RX ORDER — ACETAMINOPHEN 325 MG/1
650 TABLET ORAL EVERY 6 HOURS PRN
Status: DISCONTINUED | OUTPATIENT
Start: 2023-04-05 | End: 2023-04-05

## 2023-04-05 RX ORDER — 0.9 % SODIUM CHLORIDE 0.9 %
1000 INTRAVENOUS SOLUTION INTRAVENOUS ONCE
Status: COMPLETED | OUTPATIENT
Start: 2023-04-05 | End: 2023-04-05

## 2023-04-05 RX ORDER — ONDANSETRON HYDROCHLORIDE 4 MG/5ML
4 SOLUTION ORAL ONCE
Status: DISCONTINUED | OUTPATIENT
Start: 2023-04-05 | End: 2023-04-05 | Stop reason: ALTCHOICE

## 2023-04-05 RX ORDER — SODIUM CHLORIDE 0.9 % (FLUSH) 0.9 %
5-40 SYRINGE (ML) INJECTION PRN
Status: DISCONTINUED | OUTPATIENT
Start: 2023-04-05 | End: 2023-04-12 | Stop reason: HOSPADM

## 2023-04-05 RX ORDER — SODIUM CHLORIDE 0.9 % (FLUSH) 0.9 %
5-40 SYRINGE (ML) INJECTION EVERY 12 HOURS SCHEDULED
Status: DISCONTINUED | OUTPATIENT
Start: 2023-04-05 | End: 2023-04-12 | Stop reason: HOSPADM

## 2023-04-05 RX ORDER — PROMETHAZINE HYDROCHLORIDE 25 MG/ML
6.25 INJECTION, SOLUTION INTRAMUSCULAR; INTRAVENOUS EVERY 4 HOURS PRN
Status: DISCONTINUED | OUTPATIENT
Start: 2023-04-05 | End: 2023-04-11

## 2023-04-05 RX ORDER — SODIUM CHLORIDE 9 MG/ML
INJECTION, SOLUTION INTRAVENOUS PRN
Status: DISCONTINUED | OUTPATIENT
Start: 2023-04-05 | End: 2023-04-12 | Stop reason: HOSPADM

## 2023-04-05 RX ORDER — SODIUM CHLORIDE 9 MG/ML
INJECTION, SOLUTION INTRAVENOUS CONTINUOUS
Status: DISCONTINUED | OUTPATIENT
Start: 2023-04-05 | End: 2023-04-09

## 2023-04-05 RX ORDER — ENOXAPARIN SODIUM 100 MG/ML
40 INJECTION SUBCUTANEOUS DAILY
Status: DISCONTINUED | OUTPATIENT
Start: 2023-04-06 | End: 2023-04-06

## 2023-04-05 RX ORDER — ACETAMINOPHEN 650 MG/1
650 SUPPOSITORY RECTAL EVERY 6 HOURS PRN
Status: DISCONTINUED | OUTPATIENT
Start: 2023-04-05 | End: 2023-04-05

## 2023-04-05 RX ADMIN — OXYCODONE HYDROCHLORIDE 10 MG: 10 TABLET, FILM COATED, EXTENDED RELEASE ORAL at 22:50

## 2023-04-05 RX ADMIN — ONDANSETRON 4 MG: 2 INJECTION INTRAMUSCULAR; INTRAVENOUS at 21:02

## 2023-04-05 RX ADMIN — IOPAMIDOL 90 ML: 755 INJECTION, SOLUTION INTRAVENOUS at 20:48

## 2023-04-05 RX ADMIN — SODIUM CHLORIDE 1000 ML: 9 INJECTION, SOLUTION INTRAVENOUS at 21:04

## 2023-04-05 ASSESSMENT — ENCOUNTER SYMPTOMS
VOMITING: 1
CONSTIPATION: 1
NAUSEA: 1
ABDOMINAL PAIN: 1

## 2023-04-05 ASSESSMENT — PAIN SCALES - GENERAL: PAINLEVEL_OUTOF10: 7

## 2023-04-06 PROBLEM — G89.3 CANCER RELATED PAIN: Status: ACTIVE | Noted: 2023-01-01

## 2023-04-06 PROBLEM — K56.7 ILEUS (HCC): Status: ACTIVE | Noted: 2023-04-06

## 2023-04-06 LAB
25(OH)D3 SERPL-MCNC: 21.4 NG/ML
ALBUMIN SERPL-MCNC: 2.6 G/DL (ref 3.5–5.2)
ALP SERPL-CCNC: 297 U/L (ref 40–130)
ALT SERPL-CCNC: 18 U/L (ref 5–41)
ANION GAP SERPL CALCULATED.3IONS-SCNC: 8 MMOL/L (ref 7–19)
AST SERPL-CCNC: 67 U/L (ref 5–40)
BILIRUB SERPL-MCNC: 1.1 MG/DL (ref 0.2–1.2)
BUN SERPL-MCNC: 22 MG/DL (ref 8–23)
CALCIUM SERPL-MCNC: 8.4 MG/DL (ref 8.8–10.2)
CHLORIDE SERPL-SCNC: 105 MMOL/L (ref 98–111)
CO2 SERPL-SCNC: 26 MMOL/L (ref 22–29)
CREAT SERPL-MCNC: 1.3 MG/DL (ref 0.5–1.2)
ERYTHROCYTE [DISTWIDTH] IN BLOOD BY AUTOMATED COUNT: 18.1 % (ref 11.5–14.5)
FERRITIN SERPL-MCNC: 383.3 NG/ML (ref 30–400)
FOLATE SERPL-MCNC: 19.9 NG/ML (ref 4.5–32.2)
GLUCOSE SERPL-MCNC: 94 MG/DL (ref 74–109)
HCT VFR BLD AUTO: 31.9 % (ref 42–52)
HGB BLD-MCNC: 10 G/DL (ref 14–18)
IRON SATN MFR SERPL: 21 % (ref 14–50)
IRON SERPL-MCNC: 45 UG/DL (ref 59–158)
LACTATE BLDV-SCNC: 1.2 MMOL/L (ref 0.5–1.9)
MCH RBC QN AUTO: 31.5 PG (ref 27–31)
MCHC RBC AUTO-ENTMCNC: 31.3 G/DL (ref 33–37)
MCV RBC AUTO: 100.6 FL (ref 80–94)
PLATELET # BLD AUTO: 158 K/UL (ref 130–400)
PMV BLD AUTO: 9.9 FL (ref 9.4–12.4)
POTASSIUM SERPL-SCNC: 4 MMOL/L (ref 3.5–5)
PROT SERPL-MCNC: 6.2 G/DL (ref 6.6–8.7)
RBC # BLD AUTO: 3.17 M/UL (ref 4.7–6.1)
SODIUM SERPL-SCNC: 139 MMOL/L (ref 136–145)
TIBC SERPL-MCNC: 216 UG/DL (ref 250–400)
VIT B12 SERPL-MCNC: 580 PG/ML (ref 211–946)
WBC # BLD AUTO: 4 K/UL (ref 4.8–10.8)

## 2023-04-06 PROCEDURE — 6360000002 HC RX W HCPCS: Performed by: STUDENT IN AN ORGANIZED HEALTH CARE EDUCATION/TRAINING PROGRAM

## 2023-04-06 PROCEDURE — 87635 SARS-COV-2 COVID-19 AMP PRB: CPT

## 2023-04-06 PROCEDURE — 2580000003 HC RX 258: Performed by: STUDENT IN AN ORGANIZED HEALTH CARE EDUCATION/TRAINING PROGRAM

## 2023-04-06 PROCEDURE — 99223 1ST HOSP IP/OBS HIGH 75: CPT | Performed by: INTERNAL MEDICINE

## 2023-04-06 PROCEDURE — 6360000002 HC RX W HCPCS: Performed by: HOSPITALIST

## 2023-04-06 PROCEDURE — 1210000000 HC MED SURG R&B

## 2023-04-06 PROCEDURE — 2580000003 HC RX 258: Performed by: HOSPITALIST

## 2023-04-06 PROCEDURE — 99222 1ST HOSP IP/OBS MODERATE 55: CPT | Performed by: SURGERY

## 2023-04-06 PROCEDURE — 36415 COLL VENOUS BLD VENIPUNCTURE: CPT

## 2023-04-06 PROCEDURE — 6370000000 HC RX 637 (ALT 250 FOR IP): Performed by: HOSPITALIST

## 2023-04-06 PROCEDURE — 99223 1ST HOSP IP/OBS HIGH 75: CPT

## 2023-04-06 PROCEDURE — C9113 INJ PANTOPRAZOLE SODIUM, VIA: HCPCS | Performed by: STUDENT IN AN ORGANIZED HEALTH CARE EDUCATION/TRAINING PROGRAM

## 2023-04-06 PROCEDURE — 80053 COMPREHEN METABOLIC PANEL: CPT

## 2023-04-06 PROCEDURE — 94760 N-INVAS EAR/PLS OXIMETRY 1: CPT

## 2023-04-06 PROCEDURE — 83605 ASSAY OF LACTIC ACID: CPT

## 2023-04-06 PROCEDURE — 81003 URINALYSIS AUTO W/O SCOPE: CPT

## 2023-04-06 PROCEDURE — 85027 COMPLETE CBC AUTOMATED: CPT

## 2023-04-06 RX ORDER — HYDROMORPHONE HYDROCHLORIDE 1 MG/ML
0.5 INJECTION, SOLUTION INTRAMUSCULAR; INTRAVENOUS; SUBCUTANEOUS
Status: DISCONTINUED | OUTPATIENT
Start: 2023-04-06 | End: 2023-04-07

## 2023-04-06 RX ORDER — OXYCODONE HYDROCHLORIDE 5 MG/1
10 TABLET ORAL EVERY 4 HOURS
Status: DISCONTINUED | OUTPATIENT
Start: 2023-04-06 | End: 2023-04-06

## 2023-04-06 RX ORDER — ERGOCALCIFEROL 1.25 MG/1
50000 CAPSULE ORAL WEEKLY
Status: DISCONTINUED | OUTPATIENT
Start: 2023-04-06 | End: 2023-04-06

## 2023-04-06 RX ORDER — FENTANYL 12 UG/H
1 PATCH TRANSDERMAL
Status: DISCONTINUED | OUTPATIENT
Start: 2023-04-06 | End: 2023-04-06

## 2023-04-06 RX ORDER — HYDRALAZINE HYDROCHLORIDE 20 MG/ML
10 INJECTION INTRAMUSCULAR; INTRAVENOUS EVERY 4 HOURS PRN
Status: DISCONTINUED | OUTPATIENT
Start: 2023-04-06 | End: 2023-04-12 | Stop reason: HOSPADM

## 2023-04-06 RX ORDER — FENTANYL 25 UG/H
1 PATCH TRANSDERMAL
Status: DISCONTINUED | OUTPATIENT
Start: 2023-04-06 | End: 2023-04-06

## 2023-04-06 RX ORDER — HYDROMORPHONE HYDROCHLORIDE 1 MG/ML
0.5 INJECTION, SOLUTION INTRAMUSCULAR; INTRAVENOUS; SUBCUTANEOUS EVERY 4 HOURS PRN
Status: DISCONTINUED | OUTPATIENT
Start: 2023-04-06 | End: 2023-04-06

## 2023-04-06 RX ORDER — ENOXAPARIN SODIUM 100 MG/ML
40 INJECTION SUBCUTANEOUS DAILY
Status: DISCONTINUED | OUTPATIENT
Start: 2023-04-07 | End: 2023-04-12 | Stop reason: HOSPADM

## 2023-04-06 RX ORDER — HYDRALAZINE HYDROCHLORIDE 20 MG/ML
5 INJECTION INTRAMUSCULAR; INTRAVENOUS EVERY 4 HOURS PRN
Status: DISCONTINUED | OUTPATIENT
Start: 2023-04-06 | End: 2023-04-06

## 2023-04-06 RX ORDER — PANTOPRAZOLE SODIUM 40 MG/1
40 TABLET, DELAYED RELEASE ORAL
Status: DISCONTINUED | OUTPATIENT
Start: 2023-04-07 | End: 2023-04-06

## 2023-04-06 RX ORDER — FENTANYL 25 UG/H
1 PATCH TRANSDERMAL
Status: COMPLETED | OUTPATIENT
Start: 2023-04-06 | End: 2023-04-09

## 2023-04-06 RX ORDER — PRAZOSIN HYDROCHLORIDE 1 MG/1
1 CAPSULE ORAL NIGHTLY
Status: DISCONTINUED | OUTPATIENT
Start: 2023-04-06 | End: 2023-04-06

## 2023-04-06 RX ORDER — FENTANYL 12 UG/H
1 PATCH TRANSDERMAL
Status: COMPLETED | OUTPATIENT
Start: 2023-04-06 | End: 2023-04-09

## 2023-04-06 RX ADMIN — ONDANSETRON 4 MG: 2 INJECTION INTRAMUSCULAR; INTRAVENOUS at 10:36

## 2023-04-06 RX ADMIN — SODIUM CHLORIDE: 9 INJECTION, SOLUTION INTRAVENOUS at 00:20

## 2023-04-06 RX ADMIN — SODIUM CHLORIDE: 9 INJECTION, SOLUTION INTRAVENOUS at 22:07

## 2023-04-06 RX ADMIN — HYDROMORPHONE HYDROCHLORIDE 0.5 MG: 1 INJECTION, SOLUTION INTRAMUSCULAR; INTRAVENOUS; SUBCUTANEOUS at 22:35

## 2023-04-06 RX ADMIN — HYDROMORPHONE HYDROCHLORIDE 0.5 MG: 1 INJECTION, SOLUTION INTRAMUSCULAR; INTRAVENOUS; SUBCUTANEOUS at 10:38

## 2023-04-06 RX ADMIN — HYDROMORPHONE HYDROCHLORIDE 0.5 MG: 1 INJECTION, SOLUTION INTRAMUSCULAR; INTRAVENOUS; SUBCUTANEOUS at 19:52

## 2023-04-06 RX ADMIN — SODIUM CHLORIDE, PRESERVATIVE FREE 40 MG: 5 INJECTION INTRAVENOUS at 10:38

## 2023-04-06 RX ADMIN — ONDANSETRON 4 MG: 2 INJECTION INTRAMUSCULAR; INTRAVENOUS at 22:35

## 2023-04-06 RX ADMIN — HYDROMORPHONE HYDROCHLORIDE 0.5 MG: 1 INJECTION, SOLUTION INTRAMUSCULAR; INTRAVENOUS; SUBCUTANEOUS at 16:04

## 2023-04-06 RX ADMIN — HYDROMORPHONE HYDROCHLORIDE 0.5 MG: 1 INJECTION, SOLUTION INTRAMUSCULAR; INTRAVENOUS; SUBCUTANEOUS at 06:36

## 2023-04-06 ASSESSMENT — ENCOUNTER SYMPTOMS
SORE THROAT: 0
BACK PAIN: 0
CHEST TIGHTNESS: 0
COUGH: 0
COLOR CHANGE: 0
ABDOMINAL PAIN: 1
CONSTIPATION: 1
NAUSEA: 1
VOMITING: 1
EYE PAIN: 0
ABDOMINAL DISTENTION: 1
SHORTNESS OF BREATH: 0
DIARRHEA: 0
EYE REDNESS: 0

## 2023-04-06 ASSESSMENT — PAIN SCALES - GENERAL
PAINLEVEL_OUTOF10: 8
PAINLEVEL_OUTOF10: 7
PAINLEVEL_OUTOF10: 7
PAINLEVEL_OUTOF10: 8
PAINLEVEL_OUTOF10: 1
PAINLEVEL_OUTOF10: 7

## 2023-04-06 ASSESSMENT — PAIN DESCRIPTION - LOCATION: LOCATION: ABDOMEN

## 2023-04-07 ENCOUNTER — APPOINTMENT (OUTPATIENT)
Dept: GENERAL RADIOLOGY | Age: 68
DRG: 389 | End: 2023-04-07
Payer: OTHER GOVERNMENT

## 2023-04-07 LAB
ALBUMIN SERPL-MCNC: 2.6 G/DL (ref 3.5–5.2)
ALP SERPL-CCNC: 282 U/L (ref 40–130)
ALT SERPL-CCNC: 17 U/L (ref 5–41)
ANION GAP SERPL CALCULATED.3IONS-SCNC: 13 MMOL/L (ref 7–19)
AST SERPL-CCNC: 48 U/L (ref 5–40)
BILIRUB SERPL-MCNC: 1.1 MG/DL (ref 0.2–1.2)
BUN SERPL-MCNC: 23 MG/DL (ref 8–23)
CALCIUM SERPL-MCNC: 8.4 MG/DL (ref 8.8–10.2)
CHLORIDE SERPL-SCNC: 110 MMOL/L (ref 98–111)
CO2 SERPL-SCNC: 19 MMOL/L (ref 22–29)
CREAT SERPL-MCNC: 1.1 MG/DL (ref 0.5–1.2)
ERYTHROCYTE [DISTWIDTH] IN BLOOD BY AUTOMATED COUNT: 17.9 % (ref 11.5–14.5)
GLUCOSE SERPL-MCNC: 68 MG/DL (ref 74–109)
HCT VFR BLD AUTO: 33.3 % (ref 42–52)
HGB BLD-MCNC: 10.2 G/DL (ref 14–18)
MCH RBC QN AUTO: 31.3 PG (ref 27–31)
MCHC RBC AUTO-ENTMCNC: 30.6 G/DL (ref 33–37)
MCV RBC AUTO: 102.1 FL (ref 80–94)
PLATELET # BLD AUTO: 165 K/UL (ref 130–400)
PMV BLD AUTO: 9.9 FL (ref 9.4–12.4)
POTASSIUM SERPL-SCNC: 3.8 MMOL/L (ref 3.5–5)
PROT SERPL-MCNC: 6 G/DL (ref 6.6–8.7)
RBC # BLD AUTO: 3.26 M/UL (ref 4.7–6.1)
SODIUM SERPL-SCNC: 142 MMOL/L (ref 136–145)
WBC # BLD AUTO: 4.6 K/UL (ref 4.8–10.8)

## 2023-04-07 PROCEDURE — 6360000002 HC RX W HCPCS

## 2023-04-07 PROCEDURE — 94760 N-INVAS EAR/PLS OXIMETRY 1: CPT

## 2023-04-07 PROCEDURE — 74019 RADEX ABDOMEN 2 VIEWS: CPT

## 2023-04-07 PROCEDURE — 99232 SBSQ HOSP IP/OBS MODERATE 35: CPT | Performed by: SURGERY

## 2023-04-07 PROCEDURE — C9113 INJ PANTOPRAZOLE SODIUM, VIA: HCPCS | Performed by: STUDENT IN AN ORGANIZED HEALTH CARE EDUCATION/TRAINING PROGRAM

## 2023-04-07 PROCEDURE — 6370000000 HC RX 637 (ALT 250 FOR IP): Performed by: INTERNAL MEDICINE

## 2023-04-07 PROCEDURE — 2580000003 HC RX 258: Performed by: HOSPITALIST

## 2023-04-07 PROCEDURE — 6360000004 HC RX CONTRAST MEDICATION: Performed by: SURGERY

## 2023-04-07 PROCEDURE — 36415 COLL VENOUS BLD VENIPUNCTURE: CPT

## 2023-04-07 PROCEDURE — 1210000000 HC MED SURG R&B

## 2023-04-07 PROCEDURE — 2580000003 HC RX 258: Performed by: STUDENT IN AN ORGANIZED HEALTH CARE EDUCATION/TRAINING PROGRAM

## 2023-04-07 PROCEDURE — 80053 COMPREHEN METABOLIC PANEL: CPT

## 2023-04-07 PROCEDURE — 99232 SBSQ HOSP IP/OBS MODERATE 35: CPT | Performed by: INTERNAL MEDICINE

## 2023-04-07 PROCEDURE — 85027 COMPLETE CBC AUTOMATED: CPT

## 2023-04-07 PROCEDURE — 6360000002 HC RX W HCPCS: Performed by: STUDENT IN AN ORGANIZED HEALTH CARE EDUCATION/TRAINING PROGRAM

## 2023-04-07 PROCEDURE — 6360000002 HC RX W HCPCS: Performed by: HOSPITALIST

## 2023-04-07 PROCEDURE — 99232 SBSQ HOSP IP/OBS MODERATE 35: CPT

## 2023-04-07 RX ORDER — FENTANYL 50 UG/H
1 PATCH TRANSDERMAL
Status: DISCONTINUED | OUTPATIENT
Start: 2023-04-09 | End: 2023-04-09

## 2023-04-07 RX ORDER — FENTANYL 12 UG/H
1 PATCH TRANSDERMAL ONCE
Status: COMPLETED | OUTPATIENT
Start: 2023-04-07 | End: 2023-04-10

## 2023-04-07 RX ORDER — NALOXONE HYDROCHLORIDE 0.4 MG/ML
0.4 INJECTION, SOLUTION INTRAMUSCULAR; INTRAVENOUS; SUBCUTANEOUS PRN
Status: DISCONTINUED | OUTPATIENT
Start: 2023-04-07 | End: 2023-04-12 | Stop reason: HOSPADM

## 2023-04-07 RX ORDER — HYDROMORPHONE HYDROCHLORIDE 1 MG/ML
1 INJECTION, SOLUTION INTRAMUSCULAR; INTRAVENOUS; SUBCUTANEOUS
Status: DISCONTINUED | OUTPATIENT
Start: 2023-04-07 | End: 2023-04-10

## 2023-04-07 RX ORDER — HYDROMORPHONE HYDROCHLORIDE 1 MG/ML
0.5 INJECTION, SOLUTION INTRAMUSCULAR; INTRAVENOUS; SUBCUTANEOUS
Status: DISCONTINUED | OUTPATIENT
Start: 2023-04-07 | End: 2023-04-10

## 2023-04-07 RX ADMIN — HYDRALAZINE HYDROCHLORIDE 10 MG: 20 INJECTION INTRAMUSCULAR; INTRAVENOUS at 04:36

## 2023-04-07 RX ADMIN — DIATRIZOATE MEGLUMINE AND DIATRIZOATE SODIUM 100 ML: 660; 100 LIQUID ORAL; RECTAL at 12:03

## 2023-04-07 RX ADMIN — HYDROMORPHONE HYDROCHLORIDE 0.5 MG: 1 INJECTION, SOLUTION INTRAMUSCULAR; INTRAVENOUS; SUBCUTANEOUS at 04:36

## 2023-04-07 RX ADMIN — SODIUM CHLORIDE: 9 INJECTION, SOLUTION INTRAVENOUS at 06:20

## 2023-04-07 RX ADMIN — ENOXAPARIN SODIUM 40 MG: 100 INJECTION SUBCUTANEOUS at 08:22

## 2023-04-07 RX ADMIN — HYDROMORPHONE HYDROCHLORIDE 0.5 MG: 1 INJECTION, SOLUTION INTRAMUSCULAR; INTRAVENOUS; SUBCUTANEOUS at 00:46

## 2023-04-07 RX ADMIN — SODIUM CHLORIDE: 9 INJECTION, SOLUTION INTRAVENOUS at 23:07

## 2023-04-07 RX ADMIN — HYDROMORPHONE HYDROCHLORIDE 0.5 MG: 1 INJECTION, SOLUTION INTRAMUSCULAR; INTRAVENOUS; SUBCUTANEOUS at 08:22

## 2023-04-07 RX ADMIN — HYDROMORPHONE HYDROCHLORIDE 0.5 MG: 1 INJECTION, SOLUTION INTRAMUSCULAR; INTRAVENOUS; SUBCUTANEOUS at 13:13

## 2023-04-07 RX ADMIN — HYDROMORPHONE HYDROCHLORIDE 1 MG: 1 INJECTION, SOLUTION INTRAMUSCULAR; INTRAVENOUS; SUBCUTANEOUS at 20:39

## 2023-04-07 RX ADMIN — HYDROMORPHONE HYDROCHLORIDE 1 MG: 1 INJECTION, SOLUTION INTRAMUSCULAR; INTRAVENOUS; SUBCUTANEOUS at 23:07

## 2023-04-07 RX ADMIN — SODIUM CHLORIDE, PRESERVATIVE FREE 10 ML: 5 INJECTION INTRAVENOUS at 20:39

## 2023-04-07 RX ADMIN — HYDROMORPHONE HYDROCHLORIDE 1 MG: 1 INJECTION, SOLUTION INTRAMUSCULAR; INTRAVENOUS; SUBCUTANEOUS at 17:26

## 2023-04-07 RX ADMIN — ONDANSETRON 4 MG: 2 INJECTION INTRAMUSCULAR; INTRAVENOUS at 13:12

## 2023-04-07 RX ADMIN — SODIUM CHLORIDE, PRESERVATIVE FREE 40 MG: 5 INJECTION INTRAVENOUS at 08:22

## 2023-04-07 ASSESSMENT — PAIN SCALES - GENERAL
PAINLEVEL_OUTOF10: 4
PAINLEVEL_OUTOF10: 6
PAINLEVEL_OUTOF10: 7
PAINLEVEL_OUTOF10: 0
PAINLEVEL_OUTOF10: 7
PAINLEVEL_OUTOF10: 7
PAINLEVEL_OUTOF10: 10
PAINLEVEL_OUTOF10: 7
PAINLEVEL_OUTOF10: 7

## 2023-04-07 ASSESSMENT — PAIN DESCRIPTION - LOCATION
LOCATION: ABDOMEN

## 2023-04-07 ASSESSMENT — PAIN DESCRIPTION - DESCRIPTORS
DESCRIPTORS: ACHING;THROBBING
DESCRIPTORS: ACHING
DESCRIPTORS: ACHING;THROBBING

## 2023-04-07 ASSESSMENT — PAIN DESCRIPTION - ORIENTATION: ORIENTATION: RIGHT

## 2023-04-08 ENCOUNTER — APPOINTMENT (OUTPATIENT)
Dept: GENERAL RADIOLOGY | Age: 68
DRG: 389 | End: 2023-04-08
Payer: OTHER GOVERNMENT

## 2023-04-08 LAB
ALBUMIN SERPL-MCNC: 2.6 G/DL (ref 3.5–5.2)
ALP SERPL-CCNC: 254 U/L (ref 40–130)
ALT SERPL-CCNC: 15 U/L (ref 5–41)
ANION GAP SERPL CALCULATED.3IONS-SCNC: 10 MMOL/L (ref 7–19)
AST SERPL-CCNC: 40 U/L (ref 5–40)
BILIRUB SERPL-MCNC: 0.9 MG/DL (ref 0.2–1.2)
BUN SERPL-MCNC: 26 MG/DL (ref 8–23)
CALCIUM SERPL-MCNC: 8.3 MG/DL (ref 8.8–10.2)
CHLORIDE SERPL-SCNC: 115 MMOL/L (ref 98–111)
CO2 SERPL-SCNC: 20 MMOL/L (ref 22–29)
CREAT SERPL-MCNC: 1.2 MG/DL (ref 0.5–1.2)
ERYTHROCYTE [DISTWIDTH] IN BLOOD BY AUTOMATED COUNT: 18.2 % (ref 11.5–14.5)
GLUCOSE SERPL-MCNC: 82 MG/DL (ref 74–109)
HCT VFR BLD AUTO: 31 % (ref 42–52)
HGB BLD-MCNC: 9.7 G/DL (ref 14–18)
MCH RBC QN AUTO: 31.6 PG (ref 27–31)
MCHC RBC AUTO-ENTMCNC: 31.3 G/DL (ref 33–37)
MCV RBC AUTO: 101 FL (ref 80–94)
PLATELET # BLD AUTO: 158 K/UL (ref 130–400)
PMV BLD AUTO: 9.7 FL (ref 9.4–12.4)
POTASSIUM SERPL-SCNC: 3.9 MMOL/L (ref 3.5–5)
PROT SERPL-MCNC: 5.9 G/DL (ref 6.6–8.7)
RBC # BLD AUTO: 3.07 M/UL (ref 4.7–6.1)
SODIUM SERPL-SCNC: 145 MMOL/L (ref 136–145)
WBC # BLD AUTO: 4.8 K/UL (ref 4.8–10.8)

## 2023-04-08 PROCEDURE — 74019 RADEX ABDOMEN 2 VIEWS: CPT

## 2023-04-08 PROCEDURE — 80053 COMPREHEN METABOLIC PANEL: CPT

## 2023-04-08 PROCEDURE — 2580000003 HC RX 258: Performed by: STUDENT IN AN ORGANIZED HEALTH CARE EDUCATION/TRAINING PROGRAM

## 2023-04-08 PROCEDURE — 6360000002 HC RX W HCPCS

## 2023-04-08 PROCEDURE — 36415 COLL VENOUS BLD VENIPUNCTURE: CPT

## 2023-04-08 PROCEDURE — 6370000000 HC RX 637 (ALT 250 FOR IP): Performed by: INTERNAL MEDICINE

## 2023-04-08 PROCEDURE — C9113 INJ PANTOPRAZOLE SODIUM, VIA: HCPCS | Performed by: STUDENT IN AN ORGANIZED HEALTH CARE EDUCATION/TRAINING PROGRAM

## 2023-04-08 PROCEDURE — 94760 N-INVAS EAR/PLS OXIMETRY 1: CPT

## 2023-04-08 PROCEDURE — 99233 SBSQ HOSP IP/OBS HIGH 50: CPT | Performed by: SURGERY

## 2023-04-08 PROCEDURE — APPSS15 APP SPLIT SHARED TIME 0-15 MINUTES: Performed by: NURSE PRACTITIONER

## 2023-04-08 PROCEDURE — 99232 SBSQ HOSP IP/OBS MODERATE 35: CPT | Performed by: INTERNAL MEDICINE

## 2023-04-08 PROCEDURE — 6360000002 HC RX W HCPCS: Performed by: HOSPITALIST

## 2023-04-08 PROCEDURE — 1210000000 HC MED SURG R&B

## 2023-04-08 PROCEDURE — 6360000002 HC RX W HCPCS: Performed by: STUDENT IN AN ORGANIZED HEALTH CARE EDUCATION/TRAINING PROGRAM

## 2023-04-08 PROCEDURE — 85027 COMPLETE CBC AUTOMATED: CPT

## 2023-04-08 RX ADMIN — HYDROMORPHONE HYDROCHLORIDE 0.5 MG: 1 INJECTION, SOLUTION INTRAMUSCULAR; INTRAVENOUS; SUBCUTANEOUS at 21:28

## 2023-04-08 RX ADMIN — HYDROMORPHONE HYDROCHLORIDE 1 MG: 1 INJECTION, SOLUTION INTRAMUSCULAR; INTRAVENOUS; SUBCUTANEOUS at 14:20

## 2023-04-08 RX ADMIN — ENOXAPARIN SODIUM 40 MG: 100 INJECTION SUBCUTANEOUS at 09:12

## 2023-04-08 RX ADMIN — HYDROMORPHONE HYDROCHLORIDE 1 MG: 1 INJECTION, SOLUTION INTRAMUSCULAR; INTRAVENOUS; SUBCUTANEOUS at 18:14

## 2023-04-08 RX ADMIN — HYDROMORPHONE HYDROCHLORIDE 1 MG: 1 INJECTION, SOLUTION INTRAMUSCULAR; INTRAVENOUS; SUBCUTANEOUS at 05:08

## 2023-04-08 RX ADMIN — HYDROMORPHONE HYDROCHLORIDE 1 MG: 1 INJECTION, SOLUTION INTRAMUSCULAR; INTRAVENOUS; SUBCUTANEOUS at 10:05

## 2023-04-08 RX ADMIN — SODIUM CHLORIDE: 9 INJECTION, SOLUTION INTRAVENOUS at 23:48

## 2023-04-08 RX ADMIN — SODIUM CHLORIDE, PRESERVATIVE FREE 40 MG: 5 INJECTION INTRAVENOUS at 09:12

## 2023-04-08 ASSESSMENT — PAIN SCALES - GENERAL
PAINLEVEL_OUTOF10: 5
PAINLEVEL_OUTOF10: 5
PAINLEVEL_OUTOF10: 6
PAINLEVEL_OUTOF10: 7
PAINLEVEL_OUTOF10: 5
PAINLEVEL_OUTOF10: 6
PAINLEVEL_OUTOF10: 4
PAINLEVEL_OUTOF10: 4

## 2023-04-08 ASSESSMENT — PAIN SCALES - WONG BAKER
WONGBAKER_NUMERICALRESPONSE: 2
WONGBAKER_NUMERICALRESPONSE: 2

## 2023-04-08 ASSESSMENT — PAIN DESCRIPTION - ORIENTATION
ORIENTATION: RIGHT
ORIENTATION: RIGHT

## 2023-04-08 ASSESSMENT — PAIN DESCRIPTION - DESCRIPTORS
DESCRIPTORS: ACHING
DESCRIPTORS: DISCOMFORT;THROBBING
DESCRIPTORS: THROBBING;DISCOMFORT

## 2023-04-08 ASSESSMENT — PAIN DESCRIPTION - LOCATION
LOCATION: ABDOMEN;LEG;HIP
LOCATION: ABDOMEN
LOCATION: ABDOMEN;HIP;LEG

## 2023-04-08 ASSESSMENT — PAIN - FUNCTIONAL ASSESSMENT
PAIN_FUNCTIONAL_ASSESSMENT: PREVENTS OR INTERFERES SOME ACTIVE ACTIVITIES AND ADLS
PAIN_FUNCTIONAL_ASSESSMENT: PREVENTS OR INTERFERES SOME ACTIVE ACTIVITIES AND ADLS

## 2023-04-09 ENCOUNTER — APPOINTMENT (OUTPATIENT)
Dept: GENERAL RADIOLOGY | Age: 68
DRG: 389 | End: 2023-04-09
Payer: OTHER GOVERNMENT

## 2023-04-09 LAB
ALBUMIN SERPL-MCNC: 2.6 G/DL (ref 3.5–5.2)
ALP SERPL-CCNC: 249 U/L (ref 40–130)
ALT SERPL-CCNC: 14 U/L (ref 5–41)
ANION GAP SERPL CALCULATED.3IONS-SCNC: 11 MMOL/L (ref 7–19)
AST SERPL-CCNC: 37 U/L (ref 5–40)
BILIRUB SERPL-MCNC: 1 MG/DL (ref 0.2–1.2)
BUN SERPL-MCNC: 25 MG/DL (ref 8–23)
CALCIUM SERPL-MCNC: 8.3 MG/DL (ref 8.8–10.2)
CHLORIDE SERPL-SCNC: 114 MMOL/L (ref 98–111)
CO2 SERPL-SCNC: 19 MMOL/L (ref 22–29)
CREAT SERPL-MCNC: 1.2 MG/DL (ref 0.5–1.2)
ERYTHROCYTE [DISTWIDTH] IN BLOOD BY AUTOMATED COUNT: 18 % (ref 11.5–14.5)
GLUCOSE SERPL-MCNC: 75 MG/DL (ref 74–109)
HCT VFR BLD AUTO: 33.7 % (ref 42–52)
HGB BLD-MCNC: 10.6 G/DL (ref 14–18)
MCH RBC QN AUTO: 31.6 PG (ref 27–31)
MCHC RBC AUTO-ENTMCNC: 31.5 G/DL (ref 33–37)
MCV RBC AUTO: 100.6 FL (ref 80–94)
PLATELET # BLD AUTO: 150 K/UL (ref 130–400)
PMV BLD AUTO: 10.1 FL (ref 9.4–12.4)
POTASSIUM SERPL-SCNC: 3.9 MMOL/L (ref 3.5–5)
PROT SERPL-MCNC: 5.9 G/DL (ref 6.6–8.7)
RBC # BLD AUTO: 3.35 M/UL (ref 4.7–6.1)
SODIUM SERPL-SCNC: 144 MMOL/L (ref 136–145)
WBC # BLD AUTO: 5.5 K/UL (ref 4.8–10.8)

## 2023-04-09 PROCEDURE — 85027 COMPLETE CBC AUTOMATED: CPT

## 2023-04-09 PROCEDURE — 6360000002 HC RX W HCPCS

## 2023-04-09 PROCEDURE — 2580000003 HC RX 258: Performed by: STUDENT IN AN ORGANIZED HEALTH CARE EDUCATION/TRAINING PROGRAM

## 2023-04-09 PROCEDURE — A4216 STERILE WATER/SALINE, 10 ML: HCPCS | Performed by: STUDENT IN AN ORGANIZED HEALTH CARE EDUCATION/TRAINING PROGRAM

## 2023-04-09 PROCEDURE — 1210000000 HC MED SURG R&B

## 2023-04-09 PROCEDURE — 74018 RADEX ABDOMEN 1 VIEW: CPT

## 2023-04-09 PROCEDURE — 6370000000 HC RX 637 (ALT 250 FOR IP): Performed by: INTERNAL MEDICINE

## 2023-04-09 PROCEDURE — C9113 INJ PANTOPRAZOLE SODIUM, VIA: HCPCS | Performed by: STUDENT IN AN ORGANIZED HEALTH CARE EDUCATION/TRAINING PROGRAM

## 2023-04-09 PROCEDURE — 2580000003 HC RX 258: Performed by: HOSPITALIST

## 2023-04-09 PROCEDURE — 94760 N-INVAS EAR/PLS OXIMETRY 1: CPT

## 2023-04-09 PROCEDURE — 99233 SBSQ HOSP IP/OBS HIGH 50: CPT | Performed by: SURGERY

## 2023-04-09 PROCEDURE — 99232 SBSQ HOSP IP/OBS MODERATE 35: CPT | Performed by: INTERNAL MEDICINE

## 2023-04-09 PROCEDURE — 36415 COLL VENOUS BLD VENIPUNCTURE: CPT

## 2023-04-09 PROCEDURE — APPSS15 APP SPLIT SHARED TIME 0-15 MINUTES: Performed by: NURSE PRACTITIONER

## 2023-04-09 PROCEDURE — 80053 COMPREHEN METABOLIC PANEL: CPT

## 2023-04-09 PROCEDURE — 6360000004 HC RX CONTRAST MEDICATION: Performed by: SURGERY

## 2023-04-09 PROCEDURE — 6360000002 HC RX W HCPCS: Performed by: HOSPITALIST

## 2023-04-09 PROCEDURE — 6360000002 HC RX W HCPCS: Performed by: STUDENT IN AN ORGANIZED HEALTH CARE EDUCATION/TRAINING PROGRAM

## 2023-04-09 RX ORDER — SODIUM CHLORIDE, SODIUM LACTATE, POTASSIUM CHLORIDE, CALCIUM CHLORIDE 600; 310; 30; 20 MG/100ML; MG/100ML; MG/100ML; MG/100ML
INJECTION, SOLUTION INTRAVENOUS CONTINUOUS
Status: DISCONTINUED | OUTPATIENT
Start: 2023-04-09 | End: 2023-04-11

## 2023-04-09 RX ORDER — FENTANYL 50 UG/H
1 PATCH TRANSDERMAL
Status: DISCONTINUED | OUTPATIENT
Start: 2023-04-09 | End: 2023-04-10

## 2023-04-09 RX ADMIN — HYDROMORPHONE HYDROCHLORIDE 1 MG: 1 INJECTION, SOLUTION INTRAMUSCULAR; INTRAVENOUS; SUBCUTANEOUS at 03:37

## 2023-04-09 RX ADMIN — HYDROMORPHONE HYDROCHLORIDE 1 MG: 1 INJECTION, SOLUTION INTRAMUSCULAR; INTRAVENOUS; SUBCUTANEOUS at 00:30

## 2023-04-09 RX ADMIN — HYDROMORPHONE HYDROCHLORIDE 1 MG: 1 INJECTION, SOLUTION INTRAMUSCULAR; INTRAVENOUS; SUBCUTANEOUS at 20:03

## 2023-04-09 RX ADMIN — ENOXAPARIN SODIUM 40 MG: 100 INJECTION SUBCUTANEOUS at 08:32

## 2023-04-09 RX ADMIN — DIATRIZOATE MEGLUMINE AND DIATRIZOATE SODIUM 100 ML: 660; 100 LIQUID ORAL; RECTAL at 08:33

## 2023-04-09 RX ADMIN — SODIUM CHLORIDE, PRESERVATIVE FREE 10 ML: 5 INJECTION INTRAVENOUS at 08:49

## 2023-04-09 RX ADMIN — SODIUM CHLORIDE, POTASSIUM CHLORIDE, SODIUM LACTATE AND CALCIUM CHLORIDE: 600; 310; 30; 20 INJECTION, SOLUTION INTRAVENOUS at 19:30

## 2023-04-09 RX ADMIN — SODIUM CHLORIDE, POTASSIUM CHLORIDE, SODIUM LACTATE AND CALCIUM CHLORIDE: 600; 310; 30; 20 INJECTION, SOLUTION INTRAVENOUS at 08:46

## 2023-04-09 RX ADMIN — ONDANSETRON 4 MG: 2 INJECTION INTRAMUSCULAR; INTRAVENOUS at 19:27

## 2023-04-09 RX ADMIN — ONDANSETRON 4 MG: 2 INJECTION INTRAMUSCULAR; INTRAVENOUS at 10:38

## 2023-04-09 RX ADMIN — HYDROMORPHONE HYDROCHLORIDE 1 MG: 1 INJECTION, SOLUTION INTRAMUSCULAR; INTRAVENOUS; SUBCUTANEOUS at 12:59

## 2023-04-09 RX ADMIN — SODIUM CHLORIDE, PRESERVATIVE FREE 40 MG: 5 INJECTION INTRAVENOUS at 08:31

## 2023-04-09 RX ADMIN — HYDROMORPHONE HYDROCHLORIDE 0.5 MG: 1 INJECTION, SOLUTION INTRAMUSCULAR; INTRAVENOUS; SUBCUTANEOUS at 08:32

## 2023-04-09 RX ADMIN — HYDROMORPHONE HYDROCHLORIDE 1 MG: 1 INJECTION, SOLUTION INTRAMUSCULAR; INTRAVENOUS; SUBCUTANEOUS at 23:06

## 2023-04-09 RX ADMIN — HYDROMORPHONE HYDROCHLORIDE 1 MG: 1 INJECTION, SOLUTION INTRAMUSCULAR; INTRAVENOUS; SUBCUTANEOUS at 16:47

## 2023-04-09 ASSESSMENT — PAIN SCALES - GENERAL
PAINLEVEL_OUTOF10: 7
PAINLEVEL_OUTOF10: 8
PAINLEVEL_OUTOF10: 5
PAINLEVEL_OUTOF10: 6
PAINLEVEL_OUTOF10: 6
PAINLEVEL_OUTOF10: 7

## 2023-04-09 ASSESSMENT — PAIN DESCRIPTION - DESCRIPTORS
DESCRIPTORS: ACHING
DESCRIPTORS: THROBBING;DISCOMFORT
DESCRIPTORS: THROBBING;DISCOMFORT
DESCRIPTORS: ACHING;SORE;THROBBING
DESCRIPTORS: ACHING
DESCRIPTORS: ACHING;SORE;THROBBING

## 2023-04-09 ASSESSMENT — PAIN DESCRIPTION - LOCATION
LOCATION: ABDOMEN;LEG
LOCATION: LEG
LOCATION: LEG
LOCATION: ABDOMEN;LEG
LOCATION: ABDOMEN
LOCATION: LEG
LOCATION: LEG

## 2023-04-09 ASSESSMENT — PAIN DESCRIPTION - ORIENTATION
ORIENTATION: RIGHT

## 2023-04-09 ASSESSMENT — PAIN SCALES - WONG BAKER
WONGBAKER_NUMERICALRESPONSE: 2
WONGBAKER_NUMERICALRESPONSE: 2

## 2023-04-10 ENCOUNTER — APPOINTMENT (OUTPATIENT)
Dept: GENERAL RADIOLOGY | Age: 68
DRG: 389 | End: 2023-04-10
Payer: OTHER GOVERNMENT

## 2023-04-10 LAB
ALBUMIN SERPL-MCNC: 2.5 G/DL (ref 3.5–5.2)
ALP SERPL-CCNC: 242 U/L (ref 40–130)
ALT SERPL-CCNC: 14 U/L (ref 5–41)
ANION GAP SERPL CALCULATED.3IONS-SCNC: 12 MMOL/L (ref 7–19)
AST SERPL-CCNC: 32 U/L (ref 5–40)
BILIRUB SERPL-MCNC: 1 MG/DL (ref 0.2–1.2)
BUN SERPL-MCNC: 28 MG/DL (ref 8–23)
CALCIUM SERPL-MCNC: 8.7 MG/DL (ref 8.8–10.2)
CHLORIDE SERPL-SCNC: 113 MMOL/L (ref 98–111)
CO2 SERPL-SCNC: 20 MMOL/L (ref 22–29)
CREAT SERPL-MCNC: 1.2 MG/DL (ref 0.5–1.2)
ERYTHROCYTE [DISTWIDTH] IN BLOOD BY AUTOMATED COUNT: 18 % (ref 11.5–14.5)
GLUCOSE SERPL-MCNC: 93 MG/DL (ref 74–109)
HCT VFR BLD AUTO: 34.6 % (ref 42–52)
HGB BLD-MCNC: 10.8 G/DL (ref 14–18)
MCH RBC QN AUTO: 31.5 PG (ref 27–31)
MCHC RBC AUTO-ENTMCNC: 31.2 G/DL (ref 33–37)
MCV RBC AUTO: 100.9 FL (ref 80–94)
PLATELET # BLD AUTO: 159 K/UL (ref 130–400)
PMV BLD AUTO: 9.9 FL (ref 9.4–12.4)
POTASSIUM SERPL-SCNC: 3.8 MMOL/L (ref 3.5–5)
PROT SERPL-MCNC: 6 G/DL (ref 6.6–8.7)
RBC # BLD AUTO: 3.43 M/UL (ref 4.7–6.1)
SODIUM SERPL-SCNC: 145 MMOL/L (ref 136–145)
WBC # BLD AUTO: 3.6 K/UL (ref 4.8–10.8)

## 2023-04-10 PROCEDURE — 1210000000 HC MED SURG R&B

## 2023-04-10 PROCEDURE — 99233 SBSQ HOSP IP/OBS HIGH 50: CPT | Performed by: SURGERY

## 2023-04-10 PROCEDURE — 36415 COLL VENOUS BLD VENIPUNCTURE: CPT

## 2023-04-10 PROCEDURE — 6360000002 HC RX W HCPCS

## 2023-04-10 PROCEDURE — 94760 N-INVAS EAR/PLS OXIMETRY 1: CPT

## 2023-04-10 PROCEDURE — 6370000000 HC RX 637 (ALT 250 FOR IP): Performed by: SURGERY

## 2023-04-10 PROCEDURE — 6360000002 HC RX W HCPCS: Performed by: STUDENT IN AN ORGANIZED HEALTH CARE EDUCATION/TRAINING PROGRAM

## 2023-04-10 PROCEDURE — 85027 COMPLETE CBC AUTOMATED: CPT

## 2023-04-10 PROCEDURE — 80053 COMPREHEN METABOLIC PANEL: CPT

## 2023-04-10 PROCEDURE — 99232 SBSQ HOSP IP/OBS MODERATE 35: CPT

## 2023-04-10 PROCEDURE — 99232 SBSQ HOSP IP/OBS MODERATE 35: CPT | Performed by: INTERNAL MEDICINE

## 2023-04-10 PROCEDURE — 2580000003 HC RX 258: Performed by: STUDENT IN AN ORGANIZED HEALTH CARE EDUCATION/TRAINING PROGRAM

## 2023-04-10 PROCEDURE — 6360000002 HC RX W HCPCS: Performed by: HOSPITALIST

## 2023-04-10 PROCEDURE — C9113 INJ PANTOPRAZOLE SODIUM, VIA: HCPCS | Performed by: STUDENT IN AN ORGANIZED HEALTH CARE EDUCATION/TRAINING PROGRAM

## 2023-04-10 PROCEDURE — 6370000000 HC RX 637 (ALT 250 FOR IP): Performed by: INTERNAL MEDICINE

## 2023-04-10 PROCEDURE — 74019 RADEX ABDOMEN 2 VIEWS: CPT

## 2023-04-10 RX ORDER — BISACODYL 10 MG
10 SUPPOSITORY, RECTAL RECTAL ONCE
Status: COMPLETED | OUTPATIENT
Start: 2023-04-10 | End: 2023-04-10

## 2023-04-10 RX ORDER — HYDROMORPHONE HYDROCHLORIDE 1 MG/ML
0.5 INJECTION, SOLUTION INTRAMUSCULAR; INTRAVENOUS; SUBCUTANEOUS
Status: DISCONTINUED | OUTPATIENT
Start: 2023-04-10 | End: 2023-04-11

## 2023-04-10 RX ORDER — FENTANYL 50 UG/H
1 PATCH TRANSDERMAL
Status: DISCONTINUED | OUTPATIENT
Start: 2023-04-10 | End: 2023-04-12 | Stop reason: HOSPADM

## 2023-04-10 RX ORDER — HYDROMORPHONE HYDROCHLORIDE 1 MG/ML
1 INJECTION, SOLUTION INTRAMUSCULAR; INTRAVENOUS; SUBCUTANEOUS
Status: DISCONTINUED | OUTPATIENT
Start: 2023-04-10 | End: 2023-04-11

## 2023-04-10 RX ORDER — SERTRALINE HYDROCHLORIDE 100 MG/1
100 TABLET, FILM COATED ORAL DAILY
Status: DISCONTINUED | OUTPATIENT
Start: 2023-04-10 | End: 2023-04-12 | Stop reason: HOSPADM

## 2023-04-10 RX ADMIN — HYDROMORPHONE HYDROCHLORIDE 1 MG: 1 INJECTION, SOLUTION INTRAMUSCULAR; INTRAVENOUS; SUBCUTANEOUS at 10:19

## 2023-04-10 RX ADMIN — HYDROMORPHONE HYDROCHLORIDE 1 MG: 1 INJECTION, SOLUTION INTRAMUSCULAR; INTRAVENOUS; SUBCUTANEOUS at 16:33

## 2023-04-10 RX ADMIN — ONDANSETRON 4 MG: 2 INJECTION INTRAMUSCULAR; INTRAVENOUS at 13:23

## 2023-04-10 RX ADMIN — HYDROMORPHONE HYDROCHLORIDE 1 MG: 1 INJECTION, SOLUTION INTRAMUSCULAR; INTRAVENOUS; SUBCUTANEOUS at 04:18

## 2023-04-10 RX ADMIN — SERTRALINE HYDROCHLORIDE 100 MG: 100 TABLET ORAL at 11:08

## 2023-04-10 RX ADMIN — ONDANSETRON 4 MG: 2 INJECTION INTRAMUSCULAR; INTRAVENOUS at 22:06

## 2023-04-10 RX ADMIN — SODIUM CHLORIDE, POTASSIUM CHLORIDE, SODIUM LACTATE AND CALCIUM CHLORIDE: 600; 310; 30; 20 INJECTION, SOLUTION INTRAVENOUS at 04:22

## 2023-04-10 RX ADMIN — HYDROMORPHONE HYDROCHLORIDE 1 MG: 1 INJECTION, SOLUTION INTRAMUSCULAR; INTRAVENOUS; SUBCUTANEOUS at 22:06

## 2023-04-10 RX ADMIN — HYDRALAZINE HYDROCHLORIDE 10 MG: 20 INJECTION INTRAMUSCULAR; INTRAVENOUS at 22:04

## 2023-04-10 RX ADMIN — ENOXAPARIN SODIUM 40 MG: 100 INJECTION SUBCUTANEOUS at 08:36

## 2023-04-10 RX ADMIN — SODIUM CHLORIDE, PRESERVATIVE FREE 40 MG: 5 INJECTION INTRAVENOUS at 08:35

## 2023-04-10 RX ADMIN — ONDANSETRON 4 MG: 2 INJECTION INTRAMUSCULAR; INTRAVENOUS at 04:18

## 2023-04-10 RX ADMIN — BISACODYL 10 MG: 10 SUPPOSITORY RECTAL at 11:08

## 2023-04-10 ASSESSMENT — PAIN DESCRIPTION - LOCATION
LOCATION: ABDOMEN

## 2023-04-10 ASSESSMENT — PAIN SCALES - GENERAL
PAINLEVEL_OUTOF10: 2
PAINLEVEL_OUTOF10: 7
PAINLEVEL_OUTOF10: 7
PAINLEVEL_OUTOF10: 6
PAINLEVEL_OUTOF10: 7

## 2023-04-10 ASSESSMENT — PAIN DESCRIPTION - ORIENTATION
ORIENTATION: RIGHT
ORIENTATION: ANTERIOR

## 2023-04-10 ASSESSMENT — PAIN DESCRIPTION - DESCRIPTORS
DESCRIPTORS: SHARP
DESCRIPTORS: ACHING

## 2023-04-10 NOTE — PLAN OF CARE
Nutrition Problem #1: Moderate malnutrition  Intervention: Food and/or Nutrient Delivery: Continue NPO, Start Parenteral Nutrition

## 2023-04-11 LAB
ALBUMIN SERPL-MCNC: 2.4 G/DL (ref 3.5–5.2)
ALP SERPL-CCNC: 238 U/L (ref 40–130)
ALT SERPL-CCNC: 14 U/L (ref 5–41)
ANION GAP SERPL CALCULATED.3IONS-SCNC: 13 MMOL/L (ref 7–19)
AST SERPL-CCNC: 34 U/L (ref 5–40)
BILIRUB SERPL-MCNC: 1 MG/DL (ref 0.2–1.2)
BUN SERPL-MCNC: 25 MG/DL (ref 8–23)
CALCIUM SERPL-MCNC: 8.6 MG/DL (ref 8.8–10.2)
CHLORIDE SERPL-SCNC: 108 MMOL/L (ref 98–111)
CO2 SERPL-SCNC: 19 MMOL/L (ref 22–29)
CREAT SERPL-MCNC: 1.1 MG/DL (ref 0.5–1.2)
ERYTHROCYTE [DISTWIDTH] IN BLOOD BY AUTOMATED COUNT: 17.8 % (ref 11.5–14.5)
GLUCOSE SERPL-MCNC: 93 MG/DL (ref 74–109)
HCT VFR BLD AUTO: 36.9 % (ref 42–52)
HGB BLD-MCNC: 11.5 G/DL (ref 14–18)
MCH RBC QN AUTO: 31 PG (ref 27–31)
MCHC RBC AUTO-ENTMCNC: 31.2 G/DL (ref 33–37)
MCV RBC AUTO: 99.5 FL (ref 80–94)
PLATELET # BLD AUTO: 179 K/UL (ref 130–400)
PMV BLD AUTO: 9.7 FL (ref 9.4–12.4)
POTASSIUM SERPL-SCNC: 3.5 MMOL/L (ref 3.5–5)
PROT SERPL-MCNC: 6.2 G/DL (ref 6.6–8.7)
RBC # BLD AUTO: 3.71 M/UL (ref 4.7–6.1)
SODIUM SERPL-SCNC: 140 MMOL/L (ref 136–145)
WBC # BLD AUTO: 5.1 K/UL (ref 4.8–10.8)

## 2023-04-11 PROCEDURE — 93971 EXTREMITY STUDY: CPT

## 2023-04-11 PROCEDURE — 80053 COMPREHEN METABOLIC PANEL: CPT

## 2023-04-11 PROCEDURE — 6360000002 HC RX W HCPCS: Performed by: INTERNAL MEDICINE

## 2023-04-11 PROCEDURE — 99232 SBSQ HOSP IP/OBS MODERATE 35: CPT | Performed by: INTERNAL MEDICINE

## 2023-04-11 PROCEDURE — 85027 COMPLETE CBC AUTOMATED: CPT

## 2023-04-11 PROCEDURE — 94760 N-INVAS EAR/PLS OXIMETRY 1: CPT

## 2023-04-11 PROCEDURE — 93971 EXTREMITY STUDY: CPT | Performed by: SURGERY

## 2023-04-11 PROCEDURE — C9113 INJ PANTOPRAZOLE SODIUM, VIA: HCPCS | Performed by: STUDENT IN AN ORGANIZED HEALTH CARE EDUCATION/TRAINING PROGRAM

## 2023-04-11 PROCEDURE — 6370000000 HC RX 637 (ALT 250 FOR IP): Performed by: STUDENT IN AN ORGANIZED HEALTH CARE EDUCATION/TRAINING PROGRAM

## 2023-04-11 PROCEDURE — 36415 COLL VENOUS BLD VENIPUNCTURE: CPT

## 2023-04-11 PROCEDURE — 6370000000 HC RX 637 (ALT 250 FOR IP): Performed by: SURGERY

## 2023-04-11 PROCEDURE — 2580000003 HC RX 258: Performed by: STUDENT IN AN ORGANIZED HEALTH CARE EDUCATION/TRAINING PROGRAM

## 2023-04-11 PROCEDURE — 1210000000 HC MED SURG R&B

## 2023-04-11 PROCEDURE — 6360000002 HC RX W HCPCS: Performed by: STUDENT IN AN ORGANIZED HEALTH CARE EDUCATION/TRAINING PROGRAM

## 2023-04-11 PROCEDURE — 6360000002 HC RX W HCPCS

## 2023-04-11 PROCEDURE — 2580000003 HC RX 258: Performed by: HOSPITALIST

## 2023-04-11 PROCEDURE — 6360000002 HC RX W HCPCS: Performed by: HOSPITALIST

## 2023-04-11 RX ORDER — PANTOPRAZOLE SODIUM 40 MG/1
40 TABLET, DELAYED RELEASE ORAL
Status: DISCONTINUED | OUTPATIENT
Start: 2023-04-12 | End: 2023-04-12 | Stop reason: HOSPADM

## 2023-04-11 RX ORDER — METOCLOPRAMIDE 5 MG/1
10 TABLET ORAL
Status: DISCONTINUED | OUTPATIENT
Start: 2023-04-11 | End: 2023-04-11

## 2023-04-11 RX ORDER — OXYCODONE HYDROCHLORIDE 5 MG/1
10 TABLET ORAL EVERY 4 HOURS
Status: DISCONTINUED | OUTPATIENT
Start: 2023-04-11 | End: 2023-04-12 | Stop reason: HOSPADM

## 2023-04-11 RX ORDER — BACLOFEN 10 MG/1
5 TABLET ORAL EVERY 8 HOURS PRN
Status: DISCONTINUED | OUTPATIENT
Start: 2023-04-11 | End: 2023-04-11

## 2023-04-11 RX ORDER — POLYETHYLENE GLYCOL 3350 17 G/17G
17 POWDER, FOR SOLUTION ORAL DAILY
Status: DISCONTINUED | OUTPATIENT
Start: 2023-04-11 | End: 2023-04-12 | Stop reason: HOSPADM

## 2023-04-11 RX ORDER — FUROSEMIDE 10 MG/ML
40 INJECTION INTRAMUSCULAR; INTRAVENOUS ONCE
Status: COMPLETED | OUTPATIENT
Start: 2023-04-11 | End: 2023-04-11

## 2023-04-11 RX ORDER — BISACODYL 5 MG/1
5 TABLET, DELAYED RELEASE ORAL DAILY PRN
Status: DISCONTINUED | OUTPATIENT
Start: 2023-04-11 | End: 2023-04-12 | Stop reason: HOSPADM

## 2023-04-11 RX ORDER — PROMETHAZINE HYDROCHLORIDE 12.5 MG/1
12.5 TABLET ORAL EVERY 6 HOURS PRN
Status: DISCONTINUED | OUTPATIENT
Start: 2023-04-11 | End: 2023-04-12 | Stop reason: HOSPADM

## 2023-04-11 RX ORDER — METOCLOPRAMIDE 5 MG/1
5 TABLET ORAL
Status: DISCONTINUED | OUTPATIENT
Start: 2023-04-11 | End: 2023-04-12 | Stop reason: HOSPADM

## 2023-04-11 RX ORDER — BACLOFEN 10 MG/1
5 TABLET ORAL 3 TIMES DAILY
Status: DISCONTINUED | OUTPATIENT
Start: 2023-04-11 | End: 2023-04-12 | Stop reason: HOSPADM

## 2023-04-11 RX ORDER — LANOLIN ALCOHOL/MO/W.PET/CERES
400 CREAM (GRAM) TOPICAL 2 TIMES DAILY
Status: DISCONTINUED | OUTPATIENT
Start: 2023-04-11 | End: 2023-04-12 | Stop reason: HOSPADM

## 2023-04-11 RX ORDER — DOCUSATE SODIUM 100 MG/1
100 CAPSULE, LIQUID FILLED ORAL DAILY
Status: DISCONTINUED | OUTPATIENT
Start: 2023-04-11 | End: 2023-04-12 | Stop reason: HOSPADM

## 2023-04-11 RX ORDER — LACTULOSE 10 G/15ML
20 SOLUTION ORAL EVERY EVENING
Status: DISCONTINUED | OUTPATIENT
Start: 2023-04-11 | End: 2023-04-12 | Stop reason: HOSPADM

## 2023-04-11 RX ADMIN — METOCLOPRAMIDE 5 MG: 5 TABLET ORAL at 21:01

## 2023-04-11 RX ADMIN — HYDROMORPHONE HYDROCHLORIDE 1 MG: 1 INJECTION, SOLUTION INTRAMUSCULAR; INTRAVENOUS; SUBCUTANEOUS at 08:36

## 2023-04-11 RX ADMIN — SERTRALINE HYDROCHLORIDE 100 MG: 100 TABLET ORAL at 10:02

## 2023-04-11 RX ADMIN — HYDRALAZINE HYDROCHLORIDE 10 MG: 20 INJECTION INTRAMUSCULAR; INTRAVENOUS at 08:16

## 2023-04-11 RX ADMIN — BACLOFEN 5 MG: 10 TABLET ORAL at 21:01

## 2023-04-11 RX ADMIN — Medication 400 MG: at 11:59

## 2023-04-11 RX ADMIN — ONDANSETRON 4 MG: 2 INJECTION INTRAMUSCULAR; INTRAVENOUS at 08:36

## 2023-04-11 RX ADMIN — SODIUM CHLORIDE, PRESERVATIVE FREE 40 MG: 5 INJECTION INTRAVENOUS at 10:01

## 2023-04-11 RX ADMIN — OXYCODONE 10 MG: 5 TABLET ORAL at 21:01

## 2023-04-11 RX ADMIN — Medication 400 MG: at 21:02

## 2023-04-11 RX ADMIN — OXYCODONE 10 MG: 5 TABLET ORAL at 11:58

## 2023-04-11 RX ADMIN — FUROSEMIDE 40 MG: 10 INJECTION, SOLUTION INTRAMUSCULAR; INTRAVENOUS at 06:00

## 2023-04-11 RX ADMIN — BACLOFEN 5 MG: 10 TABLET ORAL at 12:01

## 2023-04-11 RX ADMIN — SODIUM CHLORIDE, POTASSIUM CHLORIDE, SODIUM LACTATE AND CALCIUM CHLORIDE: 600; 310; 30; 20 INJECTION, SOLUTION INTRAVENOUS at 01:44

## 2023-04-11 RX ADMIN — ENOXAPARIN SODIUM 40 MG: 100 INJECTION SUBCUTANEOUS at 09:30

## 2023-04-11 RX ADMIN — SODIUM CHLORIDE, PRESERVATIVE FREE 10 ML: 5 INJECTION INTRAVENOUS at 08:16

## 2023-04-11 RX ADMIN — OXYCODONE 10 MG: 5 TABLET ORAL at 15:38

## 2023-04-11 ASSESSMENT — PAIN SCALES - GENERAL
PAINLEVEL_OUTOF10: 5
PAINLEVEL_OUTOF10: 2
PAINLEVEL_OUTOF10: 6
PAINLEVEL_OUTOF10: 3
PAINLEVEL_OUTOF10: 9

## 2023-04-11 ASSESSMENT — PAIN DESCRIPTION - DESCRIPTORS
DESCRIPTORS: ACHING
DESCRIPTORS: ACHING;THROBBING

## 2023-04-11 ASSESSMENT — PAIN SCALES - WONG BAKER: WONGBAKER_NUMERICALRESPONSE: 2

## 2023-04-11 ASSESSMENT — PAIN DESCRIPTION - ORIENTATION
ORIENTATION: LEFT
ORIENTATION: MID
ORIENTATION: LEFT
ORIENTATION: LEFT

## 2023-04-11 ASSESSMENT — PAIN DESCRIPTION - LOCATION
LOCATION: ABDOMEN
LOCATION: LEG

## 2023-04-11 ASSESSMENT — PAIN DESCRIPTION - PAIN TYPE
TYPE: ACUTE PAIN;CHRONIC PAIN
TYPE: ACUTE PAIN;CHRONIC PAIN

## 2023-04-12 VITALS
HEIGHT: 67 IN | HEART RATE: 60 BPM | WEIGHT: 125 LBS | OXYGEN SATURATION: 97 % | BODY MASS INDEX: 19.62 KG/M2 | SYSTOLIC BLOOD PRESSURE: 164 MMHG | TEMPERATURE: 97.9 F | RESPIRATION RATE: 16 BRPM | DIASTOLIC BLOOD PRESSURE: 83 MMHG

## 2023-04-12 PROCEDURE — 99232 SBSQ HOSP IP/OBS MODERATE 35: CPT | Performed by: INTERNAL MEDICINE

## 2023-04-12 PROCEDURE — 94760 N-INVAS EAR/PLS OXIMETRY 1: CPT

## 2023-04-12 PROCEDURE — 6370000000 HC RX 637 (ALT 250 FOR IP): Performed by: INTERNAL MEDICINE

## 2023-04-12 PROCEDURE — 6360000002 HC RX W HCPCS: Performed by: HOSPITALIST

## 2023-04-12 PROCEDURE — 2580000003 HC RX 258: Performed by: HOSPITALIST

## 2023-04-12 PROCEDURE — 6370000000 HC RX 637 (ALT 250 FOR IP): Performed by: STUDENT IN AN ORGANIZED HEALTH CARE EDUCATION/TRAINING PROGRAM

## 2023-04-12 PROCEDURE — 99232 SBSQ HOSP IP/OBS MODERATE 35: CPT

## 2023-04-12 PROCEDURE — 6370000000 HC RX 637 (ALT 250 FOR IP): Performed by: SURGERY

## 2023-04-12 RX ORDER — PSEUDOEPHEDRINE HCL 30 MG
100 TABLET ORAL DAILY
Qty: 60 CAPSULE | Refills: 0 | Status: ON HOLD | OUTPATIENT
Start: 2023-04-13

## 2023-04-12 RX ADMIN — METOCLOPRAMIDE 5 MG: 5 TABLET ORAL at 08:56

## 2023-04-12 RX ADMIN — PANTOPRAZOLE SODIUM 40 MG: 40 TABLET, DELAYED RELEASE ORAL at 08:02

## 2023-04-12 RX ADMIN — ENOXAPARIN SODIUM 40 MG: 100 INJECTION SUBCUTANEOUS at 08:58

## 2023-04-12 RX ADMIN — SODIUM CHLORIDE, PRESERVATIVE FREE 10 ML: 5 INJECTION INTRAVENOUS at 08:03

## 2023-04-12 RX ADMIN — METOCLOPRAMIDE 5 MG: 5 TABLET ORAL at 12:15

## 2023-04-12 RX ADMIN — BACLOFEN 5 MG: 10 TABLET ORAL at 08:57

## 2023-04-12 RX ADMIN — Medication 400 MG: at 08:59

## 2023-04-12 RX ADMIN — OXYCODONE 10 MG: 5 TABLET ORAL at 09:14

## 2023-04-12 RX ADMIN — SERTRALINE HYDROCHLORIDE 100 MG: 100 TABLET ORAL at 08:57

## 2023-04-12 ASSESSMENT — PAIN DESCRIPTION - ORIENTATION
ORIENTATION: MID

## 2023-04-12 ASSESSMENT — PAIN DESCRIPTION - DESCRIPTORS
DESCRIPTORS: ACHING

## 2023-04-12 ASSESSMENT — PAIN SCALES - GENERAL
PAINLEVEL_OUTOF10: 8

## 2023-04-12 ASSESSMENT — PAIN DESCRIPTION - LOCATION
LOCATION: BACK
LOCATION: ABDOMEN
LOCATION: ABDOMEN

## 2023-04-12 NOTE — PROGRESS NOTES
[facility] Surgery Progress Note    Chief Complaint:   Chief Complaint   Patient presents with    Abdominal Pain     Sent by hem/onc for possible obstruction    Emesis       SUBJECTIVE:  Mr. Annamarie Dunn is a 79 y.o. male is seen and examined at bedside. Numerous bowel movements since suppository yesterday. Remains somewhat nauseated. Contineus with hiccoughs. Would like to start back his home dose of Baclofen. No vomiting since NGT removed. OBJECTIVE:  BP (!) 175/88   Pulse 60   Temp 97.3 °F (36.3 °C) (Temporal)   Resp 16   Ht 5' 7\" (1.702 m)   Wt 125 lb (56.7 kg)   SpO2 97%   BMI 19.58 kg/m²   CONSTITUTIONAL: Alert, appropriate, no acute distress  SKIN: warm, dry with no rashes or lesions  HEENT: NCAT, Non icteric, PERR. Trachea Midline. CHEST/LUNGS: CTA bilaterally. Normal respiratory effort. CARDIOVASCULAR: RRR, No edema. ABDOMEN: soft, slight distention, appropriately TTP, +BS. scars c/d/I. Suspect palpable metastatic lesions to abdominal wall. NEUROLOGIC: CN II-XI grossly intact, no motor or sensory deficits   MUSCULOSKELETAL: No clubbing or cyanosis. PSYCHIATRIC:  Normal Mood and Affect. Alert and Oriented. Labs:  CBC:   Recent Labs     04/09/23  0142 04/10/23  0206 04/11/23  0410   WBC 5.5 3.6* 5.1   HGB 10.6* 10.8* 11.5*    159 179     BMP:    Recent Labs     04/09/23  0142 04/10/23  0206 04/11/23  0410    145 140   K 3.9 3.8 3.5   * 113* 108   CO2 19* 20* 19*   BUN 25* 28* 25*   CREATININE 1.2 1.2 1.1   GLUCOSE 75 93 93     ASSESSMENT:    SBO (small bowel obstruction) (HCC)--resolved    Palliative care patient     Cancer related pain      PLAN:  Return of full bowel function. With continued hiccoughs. Will resume home medication. D/c dilaudid. Continue higher dose fentanyl patch. Continue clear liquid diet due to some nausea. Full liquid tomorrow. Discussed with patient and his wife who note understanding.      Tamika Castellanos DO   Electronically Signed on 4/11/2023 at
Comprehensive Nutrition Assessment    Type and Reason for Visit:  Initial    Nutrition Recommendations/Plan:   Initiate TPN     Malnutrition Assessment:  Malnutrition Status: Moderate malnutrition (04/10/23 1030)    Context:  Acute Illness     Findings of the 6 clinical characteristics of malnutrition:  Energy Intake:  50% or less of estimated energy requirements for 5 or more days  Weight Loss:  Unable to assess     Body Fat Loss:  Mild body fat loss Orbital, Triceps, Buccal region   Muscle Mass Loss:  Mild muscle mass loss Temples (temporalis), Clavicles (pectoralis & deltoids)  Fluid Accumulation:  No significant fluid accumulation     Strength:  Not Performed    Nutrition Assessment:    Pt is Moderately Malnourished AEB mild fat and muscle loss. Pt has been NPO x 5 days. Pt is positive for flatus. No BM. Recommend TPN at this time to meet nutritional needs.     Current Nutrition Intake & Therapies:    Average Meal Intake: NPO  Diet NPO Exceptions are: Ice Chips, Sips of Water with Meds, Popsicles    Anthropometric Measures:  Height: 5' 7\" (170.2 cm)  Ideal Body Weight (IBW): 148 lbs (67 kg)    Current Body Weight: 125 lb (56.7 kg)  Current BMI (kg/m2): 19.6  BMI Categories: Normal Weight (BMI 22.0 to 24.9) age over 72    Estimated Daily Nutrient Needs:  Energy Requirements Based On: Kcal/kg  Weight Used for Energy Requirements: Current  Energy (kcal/day): 3568-7054 kcals/day  Weight Used for Protein Requirements: Current  Protein (g/day):  g/protein/day  Method Used for Fluid Requirements: 1 ml/kcal  Fluid (ml/day): 7663-0102 mL/day    Nutrition Diagnosis:   Moderate malnutrition related to acute injury/trauma as evidenced by mild muscle loss, mild loss of subcutaneous fat    Nutrition Interventions:   Food and/or Nutrient Delivery: Continue NPO, Start Parenteral Nutrition  Coordination of Nutrition Care: Continue to monitor while inpatient    Goals:  Goals: Initiate nutrition support    Nutrition
Daily Progress Note    Date:2023  Patient: Gareth Mckee  : 1955  FJN:[id number]  CODE:Full Code No additional code details  Gerard Dias, APRN - CNP    Admit Date: 2023  6:36 PM   LOS: 6 days       Subjective:    Continues to slowly improve. No emesis. He had bowel movements since yesterday evening. Complaining of some hiccups. 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 Also noted metastatic disease progression. Evaluated in-house by oncology and general surgery. Kept n.p.o. with NG tube for decompression, pain control, antiemetics. He clinically improved with conservative management with return of bowel function. NG tube removed.         Review of Systems  Comprehensive ROS completed and is negative except as otherwise noted        Objective:      Vital signs in last 24 hours:  Patient Vitals for the past 24 hrs:   BP Temp Temp src Pulse Resp SpO2   23 1203 -- -- -- 62 18 98 %   23 0836 -- -- -- -- 16 --   23 0809 (!) 175/88 97.3 °F (36.3 °C) Temporal 60 20 97 %   23 0408 (!) 161/85 97.7 °F (36.5 °C) -- 65 17 98 %   04/10/23 2112 (!) 191/90 97.3 °F (36.3 °C) Temporal 60 16 97 %   04/10/23 1938 -- -- -- -- -- 98 %   04/10/23 1703 -- -- -- -- 20 --   04/10/23 1633 -- -- -- -- 20 --   04/10/23 1552 (!) 183/93 97.1 °F (36.2 °C) Temporal 57 18 97 %       Physical exam    General: No acute distress  Cardiac: Regular rhythm, S1-S2 present  Respiratory: No wheezes or rhonchi  Abdomen: Less tender, nondistended, old surgical scars noted  Extremities: No edema  Skin: Warm and dry          Lab Review   Recent Results (from the past 24 hour(s))   Comprehensive Metabolic Panel
Daily Progress Note    Date:4/10/2023  Patient: Raven Cordova  : 1955  [id number]  CODE:Full Code No additional code details  PCP:No primary care provider on file. Admit Date: 2023  6:36 PM   LOS: 5 days       Subjective:    Denies any nausea or vomiting. Still passing flatus but no BM yet this a.m. Denies any worsening abdominal pain. 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 Also noted metastatic disease progression. Evaluated in-house by oncology and general surgery. Kept n.p.o. with NG tube for decompression, pain control, antiemetics. Given Gastrografin study.         Review of Systems  Comprehensive ROS completed and is negative except as otherwise noted        Objective:      Vital signs in last 24 hours:  Patient Vitals for the past 24 hrs:   BP Temp Temp src Pulse Resp SpO2   04/10/23 1019 -- -- -- -- 20 --   04/10/23 0718 (!) 169/94 97.6 °F (36.4 °C) Temporal 53 18 97 %   04/10/23 0448 (!) 180/94 97.5 °F (36.4 °C) -- 51 18 98 %   04/10/23 0418 -- -- -- -- 20 --   23 2306 -- -- -- -- 18 --   23 -- -- -- 60 -- --   23 -- -- -- -- 20 --   23 1923 (!) 181/96 97.7 °F (36.5 °C) -- 57 18 97 %   23 1717 -- -- -- -- 14 --   23 1633 (!) 185/94 97.7 °F (36.5 °C) -- 60 -- 97 %   23 1329 -- -- -- -- 14 --       Physical exam    General: No acute distress  Cardiac: Regular rhythm, S1-S2 present  Respiratory: No wheezes or rhonchi  Abdomen: Less tender, nondistended, old surgical scars noted  Extremities: No edema  Skin: Warm and dry          Lab Review   Recent Results (from the past 24 hour(s))   Comprehensive Metabolic Panel    Collection Time:
Discharge education provided to pt and wife. Both acknowledged understanding. Information for home health care faxed to [facility] as requested.
MEDICAL ONCOLOGY PROGRESS NOTES    Pt Name: Keyshawn Bowers  MRN: [id number]  YOB: 1955  Date of evaluation: 4/11/2023    Subjective-reviewed internal medicine notes. Patient complains of significant edema. He has gained about 7 pounds over the last few days. Passing flatus. Patient had significant bowel movement after suppository. HISTORY OF PRESENT ILLNESS:  Shelbie Rodriguez is well-known to my clinic. He is a very pleasant [age]years old male who has been diagnosed with colonic adenocarcinoma March 2020. He had stage IV disease at diagnosis. He has been treated with liver ablation followed by neoadjuvant/adjuvant chemotherapy and right hepatectomy December 2020 at [facility]. He has also received HIPEC at [facility] in the past.  He had evidence of disease progression has received several lines of chemotherapy in the past.  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 Therefore, I have recommended to start treatment with pembrolizumab Katy Egan). He presented to the ER department yesterday with complaints of 3 days of abdominal pain 10 out of 10 accompanied by constipation nausea and vomiting. He has been taking lactulose as outpatient for constipation but this did not help. 4/6/2023-CT scan abdomen/pelvis with IV contrast remarkable for:Lung bases: Multiple small nodules in the right middle and lower lobes measuring up to 9 mm. These appear new when compared to prior study and are consistent with metastatic disease. Mesentery/peritoneum: No free intraperitoneal air. Small volume free fluid is visualized adjacent to the dewayne hepatis. Soft tissue peritoneal lesion visualized in the left hemiabdomen measuring 1.7 cm compatible with peritoneal implant. This appears larger in size when compared to prior study.  Abdominal wall: Soft tissue lesion in the anterior abdominal wall superiorly and slightly to the right of midline
MEDICAL ONCOLOGY PROGRESS NOTES    Pt Name: Masha Gillis  MRN: [id number]  YOB: 1955  Date of evaluation: 4/12/2023    Subjective-reviewed internal medicine notes. Patient complains of significant edema. He has gained about 7 pounds over the last few days. Passing flatus. Patient had significant bowel movement after suppository. Elevated feeling overall much better. Tolerated liquid diet yesterday. Slept well. Discussed care plan with patient and wife at bedside        HISTORY OF PRESENT ILLNESS:  Jimmy Carter is well-known to my clinic. He is a very pleasant [age]years old male who has been diagnosed with colonic adenocarcinoma March 2020. He had stage IV disease at diagnosis. He has been treated with liver ablation followed by neoadjuvant/adjuvant chemotherapy and right hepatectomy December 2020 at [facility]. He has also received HIPEC at [facility] in the past.  He had evidence of disease progression has received several lines of chemotherapy in the past.  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 Therefore, I have recommended to start treatment with pembrolizumab Ayesha June). He presented to the ER department yesterday with complaints of 3 days of abdominal pain 10 out of 10 accompanied by constipation nausea and vomiting. He has been taking lactulose as outpatient for constipation but this did not help. 4/6/2023-CT scan abdomen/pelvis with IV contrast remarkable for:Lung bases: Multiple small nodules in the right middle and lower lobes measuring up to 9 mm. These appear new when compared to prior study and are consistent with metastatic disease. Mesentery/peritoneum: No free intraperitoneal air. Small volume free fluid is visualized adjacent to the dewayne hepatis. Soft tissue peritoneal lesion visualized in the left hemiabdomen measuring 1.7 cm compatible with peritoneal implant. This appears larger in size when
MEDICAL ONCOLOGY PROGRESS NOTES    Pt Name: Sherry Ferris  MRN: [id number]  YOB: 1955  Date of evaluation: 4/10/2023    Subjective-reviewed interval notes from general surgery. Abdominal x-ray showed contrast in multiple bowel loops to include small/large bowels. Patient passing flatus. Feels overall much better. HISTORY OF PRESENT ILLNESS:  Marcus Jackson is well-known to my clinic. He is a very pleasant [age]years old male who has been diagnosed with colonic adenocarcinoma March 2020. He had stage IV disease at diagnosis. He has been treated with liver ablation followed by neoadjuvant/adjuvant chemotherapy and right hepatectomy December 2020 at [facility]. He has also received HIPEC at [facility] in the past.  He had evidence of disease progression has received several lines of chemotherapy in the past.  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 Therefore, I have recommended to start treatment with pembrolizumab Soraya Drew). He presented to the ER department yesterday with complaints of 3 days of abdominal pain 10 out of 10 accompanied by constipation nausea and vomiting. He has been taking lactulose as outpatient for constipation but this did not help. 4/6/2023-CT scan abdomen/pelvis with IV contrast remarkable for:Lung bases: Multiple small nodules in the right middle and lower lobes measuring up to 9 mm. These appear new when compared to prior study and are consistent with metastatic disease. Mesentery/peritoneum: No free intraperitoneal air. Small volume free fluid is visualized adjacent to the dewayne hepatis. Soft tissue peritoneal lesion visualized in the left hemiabdomen measuring 1.7 cm compatible with peritoneal implant. This appears larger in size when compared to prior study.  Abdominal wall: Soft tissue lesion in the anterior abdominal wall superiorly and slightly to the right of midline measures 2.1 cm
[facility] General Surgery Progress Note    Chief Complaint:   Chief Complaint   Patient presents with    Abdominal Pain     Sent by hem/onc for possible obstruction    Emesis       SUBJECTIVE:  Mr. Rhonda Olsen is a 79 y.o. male is seen and examined at bedside. He continues to have bowel function. Baseline pain level. OBJECTIVE:  BP (!) 164/83   Pulse 60   Temp 97.9 °F (36.6 °C) (Temporal)   Resp 16   Ht 5' 7\" (1.702 m)   Wt 125 lb (56.7 kg)   SpO2 97%   BMI 19.58 kg/m²   CONSTITUTIONAL: Alert, appropriate, no acute distress  SKIN: warm, dry with no rashes or lesions  HEENT: NCAT, Non icteric, PERR. Trachea Midline. CHEST/LUNGS: CTA bilaterally. Normal respiratory effort. CARDIOVASCULAR: RRR, No edema. ABDOMEN: soft, slight distention, appropriately TTP, +BS. scars c/d/I. Suspect palpable metastatic lesions to abdominal wall. NEUROLOGIC: CN II-XI grossly intact, no motor or sensory deficits   MUSCULOSKELETAL: No clubbing or cyanosis. PSYCHIATRIC:  Normal Mood and Affect. Alert and Oriented. Labs:  CBC:   Recent Labs     04/10/23  0206 04/11/23  0410   WBC 3.6* 5.1   HGB 10.8* 11.5*    179     BMP:    Recent Labs     04/10/23  0206 04/11/23  0410    140   K 3.8 3.5   * 108   CO2 20* 19*   BUN 28* 25*   CREATININE 1.2 1.1   GLUCOSE 93 93     ASSESSMENT:    SBO (small bowel obstruction) (HCC)--resolved    Palliative care patient     Cancer related pain      PLAN:  Return of full bowel function. May discharge home on full liquid / soft diet. Reviewed with spouse. Recommend PT and home health with PT as patient is very weak.      Rosy Darling DO   Electronically Signed on 4/12/2023 at 1:39 PM
Palliative Care Progress Note  4/12/2023 8:35 AM    Patient:  Alexis Arana  YOB: 1955  Primary Care Physician: KALPESH Arana CNP  Advance Directive: Full Code  Admit Date: 4/5/2023       Hospital Day: 7  Portions of this note have been copied forward, however, changed to reflect the most current clinical status of this patient. CHIEF COMPLAINT/REASON FOR CONSULTATION Goals of care, family support, Code status, symptom management     SUBJECTIVE:  Mr. Payal Tamez continues to have BMs. Is tolerating current diet. Feels pain is controlled with current regimen. Hopeful to d/c home today. HPI: The patient is a 79 y.o. male with PMH GERD, colon cancer, CKD, and PTSD who presented to [address] [place] [address] ED 4/5/2023 complaining of abdominal pain and vomiting. Patient reported nausea, vomiting and worsening right sided abdominal pain over past 4 days. He has not had bowel movement over same 4 day time period despite use of oral lactulose last night. Patient has known stage IV colonic adenocarcinoma diagnosed in March 2020. He is followed locally by oncology with Dr. Maxime White and has has been treated with liver ablation followed by neoadjuvant/adjuvant chemotherapy and right hepatectomy December 2020 at [facility]. He has also received HIPEC at [facility] in the past.  He had evidence of disease progression has received several lines of chemotherapy in the past.  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 Oncology has recommended initiating Keytruda and has received one cycle with repeat scans planned for May 2023. He has prior history of bowel obstruction and called oncology office on day of admission with recommendation for further evaluation in ED.  CT of abdomen shows progression of metastatic disease with metastasis to the right middle and lower lobes, liver, spleen, peritoneum, mesenteric and periportal lymph nodes, and osseous
Pharmacy Renal Adjustment    Denise Burciaga is a 79 y.o. male. Pharmacy has renally adjusted medications per protocol. Recent Labs     04/10/23  0206 04/11/23  0410   BUN 28* 25*       Recent Labs     04/10/23  0206 04/11/23  0410   CREATININE 1.2 1.1       Estimated Creatinine Clearance: 53 mL/min (based on SCr of 1.1 mg/dL). Height:   Ht Readings from Last 1 Encounters:   04/05/23 5' 7\" (1.702 m)     Weight:  Wt Readings from Last 1 Encounters:   04/05/23 125 lb (56.7 kg)         Plan: Adjust the following medications based on renal function:           Reglan to 5 mg po after meals and at bedtime.     Electronically signed by Randy Woods RPh, JULES, 4/11/2023,2:11 PM
Vascular Lab Prelim  Duplex venous scan of LLE shows no evidence for dvt, svt, reflux noted at this time. Final report pending.
Arianna.     Jolanta Ferrell DO   Electronically Signed on 4/10/2023 at 10:09 AM
Mainly in bed  Extensive disease  Mainly assistance  Normal/reduced intake  LOC full/confusion  [] 30% Bed Bound  Extensive disease  Total care  Reduced Intake  LOC full/confusion  [] 20% Bed Bound  Extensive disease  Total care  Minimal intake  Drowsy/coma  [] 10% Bed Bound  Extensive disease  Total care  Mouth care only  Drowsy/coma  [] 0% Death    ECOG:(2) Ambulatory and capable of self care, unable to carry out work activity, up and about > 50% or waking hours    CLINICAL PAIN ASSESSMENT:   Score 1-10 (if verbal):  6  Location:  abdomen  Character:  sharp  Frequency:  continuously  What makes it worse?:   activity  What makes it better?:  pain medication    Assessment/Plan   Principal Problem:    SBO (small bowel obstruction) ([facility] [address]  Active Problems:    Palliative care patient    Ileus ([facility] [address]    Cancer related pain  Resolved Problems:    * No resolved hospital problems. *      Visit Summary:  Chart reviewed, patient discussed with nursing staff. Reviewed health issues, work up and treatment plan as well as factors that lead to hospitalization. Mr. Mari Chao is seen at bedside this morning with family present. Report obtained from nursing staff with no acute events over the weekend. Patient's NG tube was removed last night and he has done well with some nausea this afternoon. He reports abdominal pain remains controlled with current regimen.  has ordered Dulcolax suppository today and monitoring for BM, continues to pass flatus. Pt may require transfer to [facility], [facility] if further surgical intervention is required. Have asked nursing staff to administer as needed antiemetic this afternoon. Opportunity for questions and emotional support provided. Will continue to follow.     Candidate for SCOP: Patient is established with outpatient palliative care and scop NP will continue to follow once discharged     Recommendations:      Palliative Care- GOC prolong life, continue all current medical

## 2023-04-12 NOTE — PLAN OF CARE
Problem: Safety - Adult  Goal: Free from fall injury  Outcome: Adequate for Discharge     Problem: ABCDS Injury Assessment  Goal: Absence of physical injury  Outcome: Adequate for Discharge     Problem: Nutrition Deficit:  Goal: Optimize nutritional status  Outcome: Adequate for Discharge     Problem: Pain  Goal: Verbalizes/displays adequate comfort level or baseline comfort level  Outcome: Adequate for Discharge

## 2023-04-12 NOTE — CARE COORDINATION
Spoke with patient regarding MD orders for [facility] services. [facility] Spoke with Argentina Dolan, Apple E Ingrid Ruiz and Referral accepted and faxed. Please notify [facility] when patient discharges and Fax DC Summary,  DC med list and any new [facility] orders. [address]:  P. [phone]  F. [phone]    The Patient was provided with a choice of provider and agrees with the discharge plan. [x] Yes [] No    Freedom of choice list was provided with basic dialogue that supports the patient's individualized plan of care/goals, treatment preferences and shares the quality data associated with the providers.  [x] Yes [] No  Electronically signed by Leah Mojica RN on 4/12/23 at 2:50 PM CDT

## 2023-04-17 ENCOUNTER — TELEPHONE (OUTPATIENT)
Dept: HEMATOLOGY | Age: 68
End: 2023-04-17

## 2023-04-17 DIAGNOSIS — G89.3 CANCER ASSOCIATED PAIN: Primary | ICD-10-CM

## 2023-04-17 RX ORDER — FENTANYL 50 UG/H
1 PATCH TRANSDERMAL
Qty: 10 PATCH | Refills: 0 | Status: ON HOLD | OUTPATIENT
Start: 2023-04-17 | End: 2023-05-17

## 2023-04-17 NOTE — TELEPHONE ENCOUNTER
I attempt to call patient to see how patient was after hospital stay. Unable to reach patient. I left patient a voicemail at this time. I will attempt to reach patient at a later time.  Electronically signed by Sondra Estrella RN on 4/17/2023 at 11:32 AM

## 2023-04-19 ENCOUNTER — TELEPHONE (OUTPATIENT)
Dept: HEMATOLOGY | Age: 68
End: 2023-04-19

## 2023-04-19 NOTE — TELEPHONE ENCOUNTER
Called and spoke with patients wife. I informed her that Dr. Olesya Pena has ordered IV fluids for patient to receive through home health 3 days a week. Patients wife express's understanding and has no further questions at this time.  Electronically signed by Derick Romero RN on 4/19/2023 at 4:59 PM

## 2023-04-19 NOTE — TELEPHONE ENCOUNTER
Spoke with patients home [place] agency [facility] with the [place] in [place]. Requested to find out more information on the possibility for patient to receive TPN at home. They sent a message to the patient APRN for someone to call me back at this time.  Electronically signed by Eneida Banegas RN on 4/19/2023 at 10:34 AM

## 2023-04-20 ENCOUNTER — TELEPHONE (OUTPATIENT)
Dept: PALLATIVE CARE | Age: 68
End: 2023-04-20

## 2023-04-20 ENCOUNTER — CARE COORDINATION (OUTPATIENT)
Dept: CARE COORDINATION | Age: 68
End: 2023-04-20

## 2023-04-20 ENCOUNTER — TELEPHONE (OUTPATIENT)
Dept: HEMATOLOGY | Age: 68
End: 2023-04-20

## 2023-04-20 DIAGNOSIS — C18.9 ADENOCARCINOMA OF COLON (HCC): Primary | ICD-10-CM

## 2023-04-20 DIAGNOSIS — C78.7 MALIGNANT NEOPLASM METASTATIC TO LIVER (HCC): ICD-10-CM

## 2023-04-20 DIAGNOSIS — E86.0 DEHYDRATION: ICD-10-CM

## 2023-04-20 RX ORDER — HEPARIN SODIUM (PORCINE) LOCK FLUSH IV SOLN 100 UNIT/ML 100 UNIT/ML
500 SOLUTION INTRAVENOUS PRN
OUTPATIENT
Start: 2023-04-20

## 2023-04-20 RX ORDER — SODIUM CHLORIDE 0.9 % (FLUSH) 0.9 %
10 SYRINGE (ML) INJECTION PRN
OUTPATIENT
Start: 2023-04-20

## 2023-04-20 RX ORDER — WATER 1000 ML/1000ML
2.2 INJECTION, SOLUTION INTRAVENOUS ONCE
OUTPATIENT
Start: 2023-04-20 | End: 2023-04-20

## 2023-04-20 RX ORDER — SODIUM CHLORIDE 0.9 % (FLUSH) 0.9 %
20 SYRINGE (ML) INJECTION PRN
OUTPATIENT
Start: 2023-04-20

## 2023-04-20 NOTE — PROGRESS NOTES
Per Dr Victoria Hopkins, recommend weekly CBC w/diff, CMP, Mag and Phos to be drawn by [facility]. Discussed with Ha Varela, APRN/Palliative Care. Call to [facility] with above orders. Orders faxed to [facility], [address] [place] [address] at [phone].

## 2023-04-20 NOTE — CARE COORDINATION
Attempted call to patient this date for care transition follow up. Phone rings. No answer. Left message for patient to return call at his convenience.      Ariana Ivy, [facility] Coordination    [address]   Cell: [phone]

## 2023-04-20 NOTE — TELEPHONE ENCOUNTER
I called patient's wife evening of 4/19/2023 to check on him. She reported he had been seen by [facility] primary care KALPESH Lares for home visit and also [address] [facility] OT who had ordered equipment. Left leg edema is unchanged. Pain seems to be under better control since increasing Fentanyl to 67 He has been sleeping a lot. Only eating very small amounts and hasn't had much fluid intake. Remains very weak. [facility] but [facility] are not going to proceed. Dr. Anita Butts has ordered IV fluids three times a week. He has not had labs since 4/11/2023. I will contact Dr. Guaman Render office and ask that labs be added. I can order if needed.

## 2023-04-20 NOTE — TELEPHONE ENCOUNTER
Spoke with Home health, sent IV hydration and Lab orders to the [place] for approval. [facility] health will provide these services.  Electronically signed by Marichuy Adames RN on 4/20/2023 at 10:52 AM

## 2023-04-21 ENCOUNTER — CARE COORDINATION (OUTPATIENT)
Dept: CARE COORDINATION | Age: 68
End: 2023-04-21

## 2023-04-21 NOTE — CARE COORDINATION
Attempted call to patient this date for care transition follow up. Phone rings. No answer. Left voicemail asking for patient to return my call when able.       Jose Sidhu

## 2023-04-23 PROBLEM — I82.492 ACUTE DEEP VEIN THROMBOSIS (DVT) OF OTHER SPECIFIED VEIN OF LEFT LOWER EXTREMITY (HCC): Status: ACTIVE | Noted: 2023-04-23

## 2023-04-24 PROBLEM — I82.412 ACUTE DEEP VEIN THROMBOSIS (DVT) OF FEMORAL VEIN OF LEFT LOWER EXTREMITY (HCC): Status: ACTIVE | Noted: 2023-01-01

## 2023-04-24 NOTE — CARE COORDINATION
04/24/23 1538   Readmission Assessment   Number of Days since last admission? 8-30 days   Previous Disposition Home with Home Health   Who is being Interviewed Caregiver   What was the patient's/caregiver's perception as to why they think they needed to return back to the hospital? Other (Comment)  (pt quit eating and was dehydrated. tried IV fluids at home did not help)   Did you visit your Primary Care Physician after you left the hospital, before you returned this time? Yes  (Primary Care provider comes to pt home [address])   Did you see a specialist, such as Cardiac, Pulmonary, Orthopedic Physician, etc. after you left the hospital? No   Who advised the patient to return to the hospital? [facility] Staff   Does the patient report anything that got in the way of taking their medications? No   In our efforts to provide the best possible care to you and others like you, can you think of anything that we could have done to help you after you left the hospital the first time, so that you might not have needed to return so soon?  Other (Comment)  (nothing that she can think of)
Patient is current with [facility] for [address], PT, OT Services. Will follow. Please advise when patient discharges. WILL NEED RESUMPTION OF CARE ORDERS TO RESUME HH SERVICES WHEN PATIENT DISCHARGES. Thank you. [address] [place] [address] [phone]. [phone].     Adams Wyatt RN, Home Care Liaison  [phone] P  Electronically signed by Adams Wyatt on 4/24/2023 at 8:56 AM
Assistance Needs assistance   Ambulation Assistance Needs assistance   Transfer Assistance Needs assistance   Active  No   Patient's  Info spouse   Occupation Retired   Discharge Planning   Type of [facility] Spouse/Significant Other;Children   Current Services Prior To Admission [address] Needed N/A   DME Ordered? No   Potential Assistance Purchasing Medications No   Type of [address]   Patient expects to be discharged to: Krant[facility] Mares [facility] Discharge   Transition of Care Consult (CM Consult) [address] Provided? No   Confirm Follow Up Transport Family   Condition of Participation: Discharge Planning   The Patient and/Or Patient Representative agree with the Discharge Plan?  Yes

## 2023-04-24 NOTE — PLAN OF CARE
Problem: Nutrition Deficit:  Goal: Optimize nutritional status  Outcome: Progressing   Nutrition Problem #1: Severe malnutrition, In context of chronic illness  Intervention: Food and/or Nutrient Delivery: Continue Current Diet, Start Oral Nutrition Supplement, Start Tube Feeding

## 2023-04-25 PROBLEM — R62.7 FAILURE TO THRIVE IN ADULT: Status: ACTIVE | Noted: 2023-01-01

## 2023-04-26 NOTE — TELEPHONE ENCOUNTER
04- Per SHARON Yusuf MA     Last seen 03-     Theshilo Bonilla (Wife) called stated that the patient is currently in the hospital.  History of Stage IV colon cancer. They do have some questions/concerns and would like to talk with Dr Enrique Flanagan.        Routed to Dr Enrique Flanagan,

## 2023-04-26 NOTE — PLAN OF CARE
Problem: Discharge Planning  Goal: Discharge to home or other facility with appropriate resources  Outcome: Progressing     Problem: Safety - Adult  Goal: Free from fall injury  Outcome: Progressing     Problem: Pain  Goal: Verbalizes/displays adequate comfort level or baseline comfort level  Outcome: Progressing   Waiting on Bed for transfer to [facility]

## 2023-04-26 NOTE — CONSULTS
Comprehensive Nutrition Assessment    Type and Reason for Visit:  Consult    Nutrition Recommendations/Plan:   Start PN 5/15 goal rate 70ml/hr with 20% lipids biweekly. Start PN at 45ml/hr x 1 hour then advance to 70ml. Malnutrition Assessment:  Malnutrition Status:  Severe malnutrition (04/24/23 1035)    Context:  Chronic Illness     Findings of the 6 clinical characteristics of malnutrition:  Energy Intake:  75% or less estimated energy requirements for 1 month or longer  Weight Loss:  Greater than 7.5% over 3 months     Body Fat Loss:  Severe body fat loss Orbital, Buccal region   Muscle Mass Loss:  Severe muscle mass loss Temples (temporalis), Clavicles (pectoralis & deltoids)  Fluid Accumulation:  Severe Extremities (LLE only)   Strength:  Not Performed    Nutrition Assessment:    Received consult for PN recommendations. Aware DHT to be used for medication only. No call back from [place] on transfer. Spoke with Sunday Kristel and Peripheral IV line to be used for NS and single port for PN. Suggesting 5/15 at 70ml/hr with Lipids 20% 250ml biweekly. Nutrition Related Findings:    severe muscle/at loss, colostomy Wound Type: Pressure Injury, Stage I       Current Nutrition Intake & Therapies:    Average Meal Intake: NPO  Average Supplements Intake: NPO  Current Parenteral Nutrition Orders:  Type and Formula: 2-in-1 Standard   Lipids: 250ml, Two times weekly  Duration: Continuous  Rate/Volume: 70ml/hr = 1680 ml  Current PN Order Provides: 0  Goal PN Orders Provides: PN 5/15 @ 70ml/hr = 1192 kcals with 84g protein. Lipids give another 1000Kcals/week or an average of 142 kcals. GUR = 3.18mg CHO/kg/min    Anthropometric Measures:  Height: 5' 7\" (170.2 cm)  Ideal Body Weight (IBW): 148 lbs (67 kg)       Current Body Weight: 121 lb 4 oz (55 kg), 84.5 % IBW.     Current BMI (kg/m2): 19  Usual Body Weight: 135 lb 8 oz (61.5 kg) (1/25/2023)  % Weight Change (Calculated): -7.7                    BMI
Comprehensive Nutrition Assessment    Type and Reason for Visit:  Reassess, Consult    Nutrition Recommendations/Plan:   Jevity 1.5 @ 30 mL/hr. Increase by 10 mL q8hrs until reach goal rate of 50 mL/hr. Follow for goals of care. Malnutrition Assessment:  Malnutrition Status:  Severe malnutrition (04/24/23 1035)    Context:  Chronic Illness     Findings of the 6 clinical characteristics of malnutrition:  Energy Intake:  75% or less estimated energy requirements for 1 month or longer  Weight Loss:  Greater than 7.5% over 3 months     Body Fat Loss:  Severe body fat loss Orbital, Buccal region   Muscle Mass Loss:  Severe muscle mass loss Temples (temporalis), Clavicles (pectoralis & deltoids)  Fluid Accumulation:  Severe Extremities (LLE only)   Strength:  Not Performed    Nutrition Assessment:    Consult received for TF ordering and mgmt. Pt continues to decline from a nutritional standpoint. DHT placed and Jevity 1.5 initiated at 30 mL/hr. TF orders placed to work toward goal rate of 50 mL/hr to meet pt's estimated needs. Depending on goals of care for pt, EN may not be best course of action as it may prolong life. Will continue to follow. Nutrition Related Findings:    severe muscle/at loss, colostomy Wound Type: Pressure Injury, Stage I       Current Nutrition Intake & Therapies:    Average Meal Intake: NPO  Average Supplements Intake: NPO  ADULT TUBE FEEDING; Nasogastric; Standard with Fiber; Continuous; 30; Yes; 10; Q 8 hours; 50; 25; Q 1 hour  Current Tube Feeding (TF) Orders:  Feeding Route: Nasogastric  Formula: Standard with Fiber  Schedule: Continuous  Feeding Regimen: Jevity 1.5 @ 50 mL/fm=9418 mL/day  Additives/Modulars: None  Water Flushes: 25 mL/hr= 600 mL/day  Goal TF & Flush Orders Provides: Jevity 1.5 @ 50 mL/hr= 1800 kcal, 77 g PRO, 259 g CHO, 912 mL free water from formula. Additional 600 mL free water from flushes.     Anthropometric Measures:  Height: 5' 7\" (170.2 cm)  Ideal Body
Palliative Care Consult Note    4/24/2023 9:31 AM  Subjective:  Admit Date: 4/23/2023  PCP: KALPESH Townsend CNP    Date of Service: 4/24/2023    Reason for Consultation:  Goals of Care, Code Status, Family Support     History Obtained From: EMR/Patient and their Family    History Of Present Illness: The patient is a 79 y.o. male GERD, CKD, PTSD, and diagnosed March 2020 with liver mets s/p chemotherapy, right hepatectomy, and liver ablation at [facility] who presented to [facility] ED 4/23/2023 complaining of generalized weakness and poor PO intake. Patient is known to palliative care and followed by [facility] at home. Was seen by inpatient palliative care during recent hospitalization for SBO which resolved with medical management and pt was discharged home 4/12/2023. On day of discharge, ultrasound was obtained of patient's left knee due to swelling, however no DVT was noted at that time. Of note, patient was seen at [facility] last year with a blood clot in his arm, was discharged on Eliquis, then at some point in the last year Eliquis was dc'd. Patient's wife reported that patient has had increasing fatigue, poor intake, and weakness leading to falls at home as well as increased left leg swelling. [facility] gave patient 1L IVF yesterday but patient remained weak and unable to stand or ambulate this morning which prompted family to bring him for evaluation. Workup reveled chemistry panel unremarkable, total bilirubin 2.6, alkaline phosphatase 365, CBC appears baseline from prior, urinalysis with no indication of infection. Venous duplex of LLE was obtained which showed evidence of subacute DVT in the left lower extremity involving the common femoral, femoral, popliteal, peroneal, gastrocnemius veins as well as superficial thrombophlebitis seen in the great saphenous vein of the LLE. CT abdomen and pelvis was obtained which shows multiple metastatic lesions throughout the liver but no acute pathology.  CTA
kg)    Current Body Weight: 125 lb (56.7 kg), 84.5 % IBW. Current BMI (kg/m2): 19.6  Usual Body Weight: 135 lb 8 oz (61.5 kg) (1/25/2023)  % Weight Change (Calculated): -7.7   Estimated Daily Nutrient Needs:  Energy Requirements Based On: Kcal/kg  Weight Used for Energy Requirements: Current  Energy (kcal/day): 7541-7570  Weight Used for Protein Requirements: Current  Protein (g/day):   Method Used for Fluid Requirements: 1 ml/kcal  Fluid (ml/day): 5180-2798    Nutrition Diagnosis:   Severe malnutrition, In context of chronic illness related to catabolic illness as evidenced by Criteria as identified in malnutrition assessment    Nutrition Interventions:   Food and/or Nutrient Delivery: Continue Current Diet, Start Oral Nutrition Supplement, Start Tube Feeding  Nutrition Education/Counseling: No recommendation at this time  Coordination of Nutrition Care: Continue to monitor while inpatient       Goals:     Goals: PO intake 50% or greater, Initiate nutrition support       Nutrition Monitoring and Evaluation:   Behavioral-Environmental Outcomes: None Identified  Food/Nutrient Intake Outcomes: Diet Advancement/Tolerance, Enteral Nutrition Intake/Tolerance  Physical Signs/Symptoms Outcomes: Biochemical Data, Weight, GI Status, Nutrition Focused Physical Findings    Discharge Planning:     Too soon to determine     Gabriel Lawton MS, RDN, LD, CDCES  Contact: [phone]
state.    I would only place an inferior vena cava (IVC) filter if the patient develops a contraindication to anticoagulation and has to stop anticoagulation or fails anticoagulation. I discussed this with the patient and/or their family members. I discussed with Dr. Tadeo Benítez. I discussed with the patient and/or family the importance of follow-up with my office for  venous duplex scans. They should call for increased swelling, shortness of breath, chest pain or any bleeding problems.
Vanessaenia Simeon, APRN - CNP        Or    HYDROmorphone HCl PF (DILAUDID) injection 0.5 mg  0.5 mg IntraVENous Q3H PRN Kendrick Alexis, APRN - CNP   0.5 mg at 04/23/23 1840    hydrALAZINE (APRESOLINE) injection 10 mg  10 mg IntraVENous Q6H PRN Kendrick Alexis, APRN - CNP   10 mg at 04/23/23 1743       Allergies: Allergies   Allergen Reactions    Morphine Nausea And Vomiting     Hallucinations/Vomiting    Oxycodone-Acetaminophen Nausea Only         Subjective   REVIEW OF SYSTEMS:   CONSTITUTIONAL: Decreased activity, generalized fatigue, weight loss  HEENT: no blurring of vision, no double vision, no hearing difficulty, no tinnitus, no ulceration, no epistaxis;  LUNGS: no cough, no hemoptysis, no wheeze,  no shortness of breath;  CARDIOVASCULAR: no palpitation, no chest pain, no shortness of breath;  GI: no abdominal pain, nausea, vomiting, no diarrhea, no constipation;  ANNIE: no dysuria, no hematuria, no frequency or urgency, no nephrolithiasis;  MUSCULOSKELETAL: Left lower extremity swelling  ENDOCRINE: no polyuria, no polydipsia, no cold or heat intolerance;  HEMATOLOGY: no easy bruising or bleeding, no history of clotting disorder;  PSYCHIATRY: no depression, no anxiety  NEUROLOGY: Increased somnolence, no syncope, no seizures, no numbness or tingling of hands, no numbness or tingling of feet, no paresis;    Objective   /80   Pulse 96   Temp 98.4 °F (36.9 °C) (Temporal)   Resp 16   Ht 5' 7\" (1.702 m)   Wt 125 lb (56.7 kg)   SpO2 92%   BMI 19.58 kg/m²     PHYSICAL EXAM:  CONSTITUTIONAL: Alert, appropriate, no acute distress, cachectic, ill-appearing, anasarca  EYES: Non icteric, EOM intact, pupils equal round   ENT: Mucus membranes moist,external inspection of ears and nose are normal  NECK: Supple, no masses. No palpable thyroid mass  CHEST/LUNGS: CTA bilaterally, normal respiratory effort   CARDIOVASCULAR: RRR, no murmurs.   Left lower extremity edema  ABDOMEN: soft non-tender, active bowel sounds, no

## 2023-04-26 NOTE — PLAN OF CARE
Problem: Discharge Planning  Goal: Discharge to home or other facility with appropriate resources  4/26/2023 1651 by Dot Mcnulty RN  Outcome: Progressing  4/26/2023 0729 by Surya Richard RN  Outcome: Progressing     Problem: Safety - Adult  Goal: Free from fall injury  4/26/2023 1651 by Dot Mcnulty RN  Outcome: Progressing  4/26/2023 0729 by Surya Richard RN  Outcome: Progressing     Problem: ABCDS Injury Assessment  Goal: Absence of physical injury  Outcome: Progressing     Problem: Pain  Goal: Verbalizes/displays adequate comfort level or baseline comfort level  4/26/2023 1651 by Dot Mcnulty RN  Outcome: Progressing  4/26/2023 0729 by Surya Richard RN  Outcome: Progressing     Problem: Skin/Tissue Integrity  Goal: Absence of new skin breakdown  Description: 1. Monitor for areas of redness and/or skin breakdown  2. Assess vascular access sites hourly  3. Every 4-6 hours minimum:  Change oxygen saturation probe site  4. Every 4-6 hours:  If on nasal continuous positive airway pressure, respiratory therapy assess nares and determine need for appliance change or resting period.   Outcome: Progressing     Problem: Nutrition Deficit:  Goal: Optimize nutritional status  Outcome: Progressing  Flowsheets  Taken 4/26/2023 1419 by Carlo Driver, MS, RD, LD  Nutrient intake appropriate for improving, restoring, or maintaining nutritional needs:   Assess nutritional status and recommend course of action   Recommend, monitor, and adjust tube feedings and TPN/PPN based on assessed needs  Taken 4/26/2023 1223 by Annabelle Mckee RD, LD  Nutrient intake appropriate for improving, restoring, or maintaining nutritional needs:   Recommend, monitor, and adjust tube feedings and TPN/PPN based on assessed needs   Assess nutritional status and recommend course of action

## 2023-04-27 NOTE — PLAN OF CARE
Problem: Discharge Planning  Goal: Discharge to home or other facility with appropriate resources  4/27/2023 0245 by Terry Downs RN  Outcome: Progressing  4/26/2023 1651 by Jasvir Dickens RN  Outcome: Progressing     Problem: Safety - Adult  Goal: Free from fall injury  4/27/2023 0245 by Terry Downs RN  Outcome: Progressing  4/26/2023 1651 by Jasvir Dickens RN  Outcome: Progressing     Problem: ABCDS Injury Assessment  Goal: Absence of physical injury  4/26/2023 1651 by Jasvir Dickens RN  Outcome: Progressing     Problem: ABCDS Injury Assessment  Goal: Absence of physical injury  4/26/2023 1651 by Jasvir Dickens RN  Outcome: Progressing     Problem: Pain  Goal: Verbalizes/displays adequate comfort level or baseline comfort level  4/26/2023 1651 by Jasvir Dickens RN  Outcome: Progressing     Problem: Skin/Tissue Integrity  Goal: Absence of new skin breakdown  Description: 1. Monitor for areas of redness and/or skin breakdown  2. Assess vascular access sites hourly  3. Every 4-6 hours minimum:  Change oxygen saturation probe site  4. Every 4-6 hours:  If on nasal continuous positive airway pressure, respiratory therapy assess nares and determine need for appliance change or resting period.   4/26/2023 1651 by Jasvir Dickens RN  Outcome: Progressing     Problem: Nutrition Deficit:  Goal: Optimize nutritional status  4/26/2023 1651 by Jasvir Dickens RN  Outcome: Progressing  Flowsheets  Taken 4/26/2023 1419 by Marco Portillo MS, RD, LD  Nutrient intake appropriate for improving, restoring, or maintaining nutritional needs:   Assess nutritional status and recommend course of action   Recommend, monitor, and adjust tube feedings and TPN/PPN based on assessed needs  Taken 4/26/2023 1223 by Katerin Blanco, RD, LD  Nutrient intake appropriate for improving, restoring, or maintaining nutritional needs:   Recommend, monitor, and adjust tube feedings and TPN/PPN based on assessed needs   Assess nutritional

## 2023-04-27 NOTE — PLAN OF CARE
Problem: Discharge Planning  Goal: Discharge to home or other facility with appropriate resources  4/27/2023 1408 by Esau Juarez  Outcome: Progressing  4/27/2023 0245 by Dominick Rothman RN  Outcome: Progressing     Problem: Safety - Adult  Goal: Free from fall injury  4/27/2023 1408 by Esau Juarez  Outcome: Progressing  4/27/2023 0245 by Dominick Rothman RN  Outcome: Progressing     Problem: ABCDS Injury Assessment  Goal: Absence of physical injury  Outcome: Progressing     Problem: Pain  Goal: Verbalizes/displays adequate comfort level or baseline comfort level  Outcome: Progressing     Problem: Skin/Tissue Integrity  Goal: Absence of new skin breakdown  Description: 1. Monitor for areas of redness and/or skin breakdown  2. Assess vascular access sites hourly  3. Every 4-6 hours minimum:  Change oxygen saturation probe site  4. Every 4-6 hours:  If on nasal continuous positive airway pressure, respiratory therapy assess nares and determine need for appliance change or resting period.   Outcome: Progressing     Problem: Nutrition Deficit:  Goal: Optimize nutritional status  Outcome: Progressing

## 2023-04-28 PROBLEM — C18.9 COLON CANCER (HCC): Status: ACTIVE | Noted: 2023-01-01

## 2023-04-28 PROBLEM — G93.40 ACUTE ENCEPHALOPATHY: Status: ACTIVE | Noted: 2023-01-01

## 2023-04-28 NOTE — PROGRESS NOTES
[facility],  [id number] Transfer Acceptance Sheet\" received from [facility] and Dr. Elyn Gosselin notified. Faxed sheet to his office to be filled out.
4 Eyes Skin Assessment    [facility] SANJIV Cholo Cure is being assessed upon: Admission    I agree that Ernst Coe RN, along with *** (either 2 RN's or 1 LPN and 1 RN) have performed a thorough Head to Toe Skin Assessment on the patient. ALL assessment sites listed below have been assessed. Areas assessed by both nurses:     [x]   Head, Face, and Ears   [x]   Shoulders, Back, and Chest  [x]   Arms, Elbows, and Hands   [x]   Coccyx, Sacrum, and Ischium  [x]   Legs, Feet, and Heels    Does the Patient have Skin Breakdown?  No    Charles Prevention initiated: Yes  Wound Care Orders initiated: No    WOC nurse consulted for Pressure Injury (Stage 3,4, Unstageable, DTI, NWPT, and Complex wounds) and New or Established Ostomies: No        Primary Nurse eSignature: Brendon Bernal RN on 4/24/2023 at 12:30 AM      Co-Signer eSignature: {Esignature:[id number]}
Consult for dietary placed for the need of TPN
Date:2023  Patient: Leonard Garrett  : 1955  DXF:[id number]  CODE:                                                                        PCP:KALPESH BURCH CNP    Admit Date: 2023 10:45 AM   LOS: 4 days     Hospital course : 79years old male who has a history of recurrent metastatic colonic adenocarcinoma status post several lines of chemotherapy as per him at oncology team.  He was recently admitted here at [facility] with a small bowel obstruction with resolved after a few days with NG tube decompression, without necessitating surgical intervention. He was discharged home but has been since severely deconditioning, decreased p.o. intake and increased left lower extremity edema. He had significant pain for which fentanyl patch dosage was adjusted with some improvement of his pain. he had an ultrasound left lower extremity performed 2023 that was negative for DVT. Venous duplex of LLE was obtained which showed evidence of subacute DVT in the left lower extremity involving the common femoral, femoral, popliteal, peroneal, gastrocnemius veins as well as superficial thrombophlebitis seen in the great saphenous vein of the LLE. CT abdomen and pelvis was obtained which shows multiple metastatic lesions throughout the liver but no acute pathology, CTA shows no evidence of PE. Oncology was notified by ER and decision was made to place patient on heparin gtt. He will be admitted to hospitalist for further work-up and treatment. Initiation of TF, >Dobbhoff placement >, Jevity 1.5 started at 30cc/hr per recommendation of dietary. .   Vascular consult for evaluation of DVT, left leg remains swollen and warm to touch.    Heparin changed to Lovenox 60mg BID. >  Vascular surgeon recommend to continue on pharmacologic prophylaxis for now no further intervention  Seen by oncology recommend wife that patient can get chemotherapy again if he improved from the present situation
Date:2023  Patient: Nanine Aase  : 1955  OCD:[id number]  CODE:                                                                        [id number] Henrique Dias, APRN - ERIK    Admit Date: 2023 10:45 AM   LOS: 3 days     Hospital course : 79years old male who has a history of recurrent metastatic colonic adenocarcinoma status post several lines of chemotherapy as per him at oncology team.  He was recently admitted here at [facility] with a small bowel obstruction with resolved after a few days with NG tube decompression, without necessitating surgical intervention. He was discharged home but has been since severely deconditioning, decreased p.o. intake and increased left lower extremity edema. He had significant pain for which fentanyl patch dosage was adjusted with some improvement of his pain. he had an ultrasound left lower extremity performed 2023 that was negative for DVT. Venous duplex of LLE was obtained which showed evidence of subacute DVT in the left lower extremity involving the common femoral, femoral, popliteal, peroneal, gastrocnemius veins as well as superficial thrombophlebitis seen in the great saphenous vein of the LLE. CT abdomen and pelvis was obtained which shows multiple metastatic lesions throughout the liver but no acute pathology, CTA shows no evidence of PE. Oncology was notified by ER and decision was made to place patient on heparin gtt. He will be admitted to hospitalist for further work-up and treatment. Initiation of TF, >Dobbhoff placement >, Jevity 1.5 started at 30cc/hr per recommendation of dietary. .   Vascular consult for evaluation of DVT, left leg remains swollen and warm to touch.    Heparin changed to Lovenox 60mg BID. >  Vascular surgeon recommend to continue on pharmacologic prophylaxis for now no further intervention  Seen by oncology recommend wife that patient can get chemotherapy again if he improved from the present situation
Denise Gallagher arrived to room # [phone]. Presented with: DVT LLE  Mental Status: Patient is oriented, alert, coherent, logical, and able to concentrate and follow conversation. Vitals:    04/23/23 2056   BP:    Pulse:    Resp: 20   Temp:    SpO2:      Patient safety contract and falls prevention contract reviewed with patient Yes. Oriented Patient and Family to room. Call light within reach. Yes.   Needs, issues or concerns expressed at this time: no.      Electronically signed by Yelitza Arvizu RN on 4/24/2023 at 12:29 AM
Facility/Department: [address] CARE  CLINICAL BEDSIDE SWALLOW EVALUATION  SPEECH PRODUCTION EVALUATION     NAME: Jacob Taylor  : 1955  MRN: [id number]    ADMISSION DATE: 2023  ADMITTING DIAGNOSIS: has Adenocarcinoma of colon ([address] Malignant neoplasm of ascending colon ([address] Iron deficiency anemia; Malignant neoplasm metastatic to [facility]); Acute kidney injury superimposed on CKD ([address] Hypovolemia; Hyponatremia; Moderate malnutrition ([address] Palliative care patient; Chronic alcoholism in remission ([address] Chronic headache disorder; Depressive disorder; Gastroesophageal reflux disease; Hyperlipidemia; Myopia; Pancreatic fluid leak; Posttraumatic stress disorder; SBO (small bowel obstruction) ([address] Deep vein thrombosis (DVT) of left upper extremity ([address] High output ileostomy ([address] Severe malnutrition ([address] Malignant neoplasm metastatic to both lungs ([address] Metastatic colon cancer to [facility]); High tumor mutational burden ([address] Protein calorie malnutrition; Ileus ([address]  Cancer related pain; Acute deep vein thrombosis (DVT) of other specified vein of left lower extremity ([address] and Acute deep vein thrombosis (DVT) of femoral vein of left lower extremity ([address] on their problem list.    Date of Eval: 2023  Evaluating Therapist: FILIBERTO Carlin    Current Diet level:  NPO    Pain:  Pain Assessment: Face, Legs, Activity, Cry, and Consolability (FLACC)  Pain Level: 9  Patient's Stated Pain Goal: 2  Pain Location: Generalized  Pain Orientation: Right, Left  Pain Descriptors: Aching, Numbness  Functional Pain Assessment: Prevents or interferes with many active not passive activities  Pain Type: Chronic pain  Pain Radiating Towards: n/a  Pain Frequency: Continuous  Pain Onset: Awakened from sleep  Non-Pharmaceutical Pain Intervention(s): Rest  Response to Pain Intervention: Patient satisfied  Side Effects: No reported side effects  Faces, Legs, Activity, Cry, and Consolability
[facility] Hospitalists      Patient:  Jacob Taylor  YOB: 1955  Date of Service: 4/24/2023  MRN: [id number]   Acct: [de-identified]   Primary Care Physician: KALPESH Levi CNP  Advance Directive: Full Code  Admit Date: 4/23/2023       Hospital Day: 1  Portions of this note have been copied forward, however, changed to reflect the most current clinical status of this patient. CHIEF COMPLAINT   Fatigue    SUBJECTIVE:  Resting in the bed. Answer short yes/no, wife present. Concerned about TF getting initiated, concerned about nutrition. Discussed pending xray verification of placement. CUMULATIVE HOSPITAL COURSE:  The patient is a 79 y.o. male with past medical history of metastatic colonic adenocarcinoma diagnosed March 2020 with liver mets s/p chemotherapy, right hepatectomy, liver ablation at [facility], who presents to [address] [place] [address] ED for evaluation of generalized weakness. Patient was just discharged from this facility on 4/12/2023 after treated for SBO. On day of discharge, ultrasound was obtained of patient's left knee due to swelling, however no DVT was noted at that time. Of note, patient was seen at [facility] last year with a blood clot in his arm, was discharged on Eliquis, then at some point in the last year Eliquis was dc'd. Patient's wife states that patient has had increasing fatigue, poor intake, and weakness leading to falls as well as increased left leg swelling since he was discharged. Home health gave patient 1L IVF yesterday but patient remained weak and unable to stand or ambulate this morning, thus presenting to the ER. In ER, chemistry panel unremarkable, total bilirubin 2.6, alkaline phosphatase 365, CBC appears baseline from prior, urinalysis with no indication of infection.   Venous duplex of LLE was obtained which showed evidence of subacute DVT in the left lower extremity involving the common femoral, femoral, popliteal, peroneal, gastrocnemius veins as well as
MEDICAL ONCOLOGY PROGRESS NOTE     Pt Name: Srinivas Mendoza  MRN: [id number]  YOB: 1955  Date of evaluation: 4/25/2023    Subjective-Dobbhoff was placed yesterday and Jevity was started at 30 cc an hour. Discussed wife care plan with patient and wife. HISTORY OF PRESENT ILLNESS:  The patient is well-known to my clinic. He is a very pleasant 79years old male who has a history of recurrent colonic adenocarcinoma status post several lines of chemotherapy. He was recently admitted here at [facility] with a small bowel obstruction with resolved after a few days with NG tube decompression, without necessitating surgical intervention. He was discharged home but has been severely deconditioning, decreased p.o. intake and increased left lower extremity edema. He had significant pain for which fentanyl patch dosage was adjusted with some improvement of his pain. Of note, he had an ultrasound left lower extremity performed 4/11/2023 that was negative for DVT. I arranged IV fluids for this patient last Saturday through home health. He has had some falls at home. He has been extremely weak. 4/11/2023-US lower extremity left remarkable for: Normal venous duplex study of the left lower extremity(ies). There is no  evidence of deep or superficial venous thrombosis. 4/23/2023-x-ray right knee 2 views showed no evidence of fracture or dislocation  4/23/2023-CT chest remarkable for: moderate bilateral effusion with atelectasis, no evidence of pulmonary embolus or defect identified. CTA of the thoracic aorta demonstrates no evidence of thoracic aneurysm or dissection identified. All of the vasculature appears to be normal caliber. There is no aneurysm or other abnormality identified in the other great vessels. The chest wall appears to be normal.There is no axillary lymphadenopathy identified.  The visualized portion of the upper abdomen findings are discussed in CT of the abdomen pelvis report Patient
Name: Sheila Aquino  MRN:  [id number]  Date of service:  4/27/2023  Yousif BURGESS states pt is too lethargic to work with PT. Pt. still will bedrest order. Will f/u at a later time.   Electronically signed by Anais Mireles PT on 4/27/2023 at 1:03 PM
Nutrition Assessment     Type and Reason for Visit: Reassess    Nutrition Recommendations/Plan:   Agree with discontinuation of TPN. Malnutrition Assessment:  Malnutrition Status: Severe malnutrition    Nutrition Assessment:  Pt continues to decline from a nutritional standpoint. Aware to be admitted to inpatient [place] [id number].. TPN has been stopped. Will follow per consult. Estimated Daily Nutrient Needs:  Energy (kcal):  0213-3436 Weight Used for Energy Requirements: Current     Protein (g):   Weight Used for Protein Requirements: Current        Fluid (ml/day):  4158-1580 Method Used for Fluid Requirements: 1 ml/kcal    Nutrition Related Findings:   severe muscle/at loss, colostomy Wound Type: Pressure Injury, Stage I    Current Nutrition Therapies:    Diet NPO  PN-Adult Premix 5/15 - Standardard Electrolytes - Central Line    Anthropometric Measures:  Height: 5' 7\" (170.2 cm)  Current Body Wt: 121 lb 4 oz (55 kg)   BMI: 19    Nutrition Diagnosis:   Severe malnutrition, In context of chronic illness related to catabolic illness as evidenced by NPO or clear liquid status due to medical condition, nutrition support - parenteral nutrition, Criteria as identified in malnutrition assessment    Nutrition Interventions:   Food and/or Nutrient Delivery: Continue NPO, Modify Parenteral Nutrition  Nutrition Education/Counseling: No recommendation at this time  Coordination of Nutrition Care: Continue to monitor while inpatient  Plan of Care discussed with: nursing    Goals:  Previous Goal Met: Progressing toward Goal(s)  Goals: Tolerate nutrition support at goal rate       Nutrition Monitoring and Evaluation:   Behavioral-Environmental Outcomes: None Identified  Food/Nutrient Intake Outcomes: Parenteral Nutrition Intake/Tolerance  Physical Signs/Symptoms Outcomes: Biochemical Data, Weight, Skin, Nutrition Focused Physical Findings, Nausea or Vomiting, Fluid Status or Edema    Discharge Planning:     Too soon to determine
Nutrition Assessment     Type and Reason for Visit: Reassess    Nutrition Recommendations/Plan:   Continue to hold EN due to N/V. Malnutrition Assessment:  Malnutrition Status: Severe malnutrition    Nutrition Assessment:  Pt continues to decline from a nutritional standpoint. EN has been stopped due to nausea and vomiting. Would recommend continuing to hold EN. Plan is for [facility] when bed is available. Estimated Daily Nutrient Needs:  Energy (kcal):  7883-0013 Weight Used for Energy Requirements: Current     Protein (g):   Weight Used for Protein Requirements: Current        Fluid (ml/day):  1993-1969 Method Used for Fluid Requirements: 1 ml/kcal    Nutrition Related Findings:   severe muscle/at loss, colostomy Wound Type: Pressure Injury, Stage I    Current Nutrition Therapies:    ADULT TUBE FEEDING; Nasogastric; Standard with Fiber; Continuous; 30; Yes; 10; Q 8 hours; 50; 25; Q 1 hour    Anthropometric Measures:  Height: 5' 7\" (170.2 cm)  Current Body Wt: 121 lb 4 oz (55 kg)   BMI: 19    Nutrition Diagnosis:   Severe malnutrition, In context of chronic illness related to catabolic illness as evidenced by Criteria as identified in malnutrition assessment    Nutrition Interventions:   Food and/or Nutrient Delivery: Discontinue Tube Feeding  Nutrition Education/Counseling: No recommendation at this time  Coordination of Nutrition Care: Continue to monitor while inpatient       Goals:  Previous Goal Met: Progress towards Goal(s) Declining  Goals: Tolerate nutrition support at goal rate       Nutrition Monitoring and Evaluation:   Behavioral-Environmental Outcomes: None Identified  Food/Nutrient Intake Outcomes: Enteral Nutrition Intake/Tolerance  Physical Signs/Symptoms Outcomes: Biochemical Data, Weight, GI Status, Nutrition Focused Physical Findings    Discharge Planning:     Too soon to determine     Gamaliel Sheriff MS, RDN, LD, CDCES  Contact: [phone]
Occupational Therapy    Per nsg., pt. Was on bedrest and not able to actively participate with therapy today. Will check back another date.   Electronically signed by Maxx Tomas OT on 4/27/2023 at 1:02 PM
PHYSICAL THERAPY    Unable to see pt at this time due to an active bedrest order. Please discontinue to begin mobility assessment.     Electronically signed by Raul Kern PT on 4/26/2023 at 7:07 AM
[facility] c[facility]. Was notified at approx. 2139,  on 4/26/2023 ,to cancel transfer to   [facility]. After speaking with the wife she stated she did not want to send him there at this time .
Palliative Care Progress Note  4/25/2023 7:21 AM    Patient:  Shayne Orta  YOB: 1955  Primary Care Physician: KALPESH Marie CNP  Advance Directive: Full Code  Admit Date: 4/23/2023       Hospital Day: 2  Portions of this note have been copied forward, however, changed to reflect the most current clinical status of this patient. CHIEF COMPLAINT/REASON FOR CONSULTATION Goals of care, family support, Code status, symptom management     SUBJECTIVE:  Mr. Cholo Castillo remains lethargic. Difficult night per spouse. DHT in place with TF initiated. No acute distress    HPI: The patient is a 79 y.o. male GERD, CKD, PTSD, and diagnosed March 2020 with liver mets s/p chemotherapy, right hepatectomy, and liver ablation at [facility] who presented to [facility] ED 4/23/2023 complaining of generalized weakness and poor PO intake. Patient is known to palliative care and followed by [facility] at home. Was seen by inpatient palliative care during recent hospitalization for SBO which resolved with medical management and pt was discharged home 4/12/2023. On day of discharge, ultrasound was obtained of patient's left knee due to swelling, however no DVT was noted at that time. Of note, patient was seen at [facility] last year with a blood clot in his arm, was discharged on Eliquis, then at some point in the last year Eliquis was dc'd. Patient's wife reported that patient has had increasing fatigue, poor intake, and weakness leading to falls at home as well as increased left leg swelling. [facility] gave patient 1L IVF yesterday but patient remained weak and unable to stand or ambulate this morning which prompted family to bring him for evaluation. Workup reveled chemistry panel unremarkable, total bilirubin 2.6, alkaline phosphatase 365, CBC appears baseline from prior, urinalysis with no indication of infection.   Venous duplex of LLE was obtained which showed evidence of subacute DVT in the left lower extremity
Pharmacy Adjustment per [address] protocol    Denise Dempsey is a 79 y.o. male. Pharmacy has adjusted medications per [address] protocol. Recent Labs     04/23/23  1112 04/24/23  0128   BUN 25* 24*       Recent Labs     04/23/23  1112 04/24/23  0128   CREATININE 0.9 1.1       Estimated Creatinine Clearance: 53 mL/min (based on SCr of 1.1 mg/dL).     Height:   Ht Readings from Last 1 Encounters:   04/23/23 5' 7\" (1.702 m)     Weight:  Wt Readings from Last 1 Encounters:   04/23/23 125 lb (56.7 kg)         Plan: Adjust the following medications based on [address] protocol:           Metoclopramide to 5 mg by mouth four times daily    Electronically signed by Mely Vargas [facility] on 4/24/2023 at 2:43 AM
Physical Therapy  Name: Ulises Ambrosio  MRN:  [id number]  Date of service:  4/26/2023  Spoke with RN, Sunshine, and reported pt still on strict bedrest per orders. Will hold PT at this time.    Electronically signed by Robert Harris PT on 4/26/2023 at 1:09 PM
Physician Progress Note      Hafsa Berrios  [facility] #:                  [id number]  :                       1955  ADMIT DATE:       2023 10:45 AM  DISCH DATE:  RESPONDING  PROVIDER #:        Riley Glover MD          QUERY TEXT:    Patient admitted with DVT LLE. Noted documentation of Acute Kidney Injury    beginning in H&P. In order to support the diagnosis of MARIO, please include   additional clinical indicators in your documentation. ? Or please document if   the diagnosis of MARIO has been ruled out after further study. The medical record reflects the following:  Risk Factors: HTN, history of CKD 3. Clinical Indicators: BUN/CR/GFR 25/0.9/>60 24/1.1/>60 28/1.2/>60  Treatment: Serial chemistry panels, LR 1L bolus, then 0.9% NS @ 125 ml/hr. Defined by Kidney Disease Improving Global Outcomes (KDIGO) clinical practice   guideline for acute kidney injury:  -Increase in SCr by greater than or equal to 0.3 mg/dl within 48 hours; or  -Increase or decrease in SCr to greater than or equal to 1.5 times baseline,   which is known or presumed to have occurred within the prior 7 days; or  -Urine volume < 0.5ml/kg/h for 6 hours. Options provided:  -- Acute kidney injury evidenced by, Please document evidence as well as a   numerical baseline creatinine, if known. -- Acute kidney injury ruled out after study  -- Other - I will add my own diagnosis  -- Disagree - Not applicable / Not valid  -- Disagree - Clinically unable to determine / Unknown  -- Refer to Clinical Documentation Reviewer    PROVIDER RESPONSE TEXT:    Acute kidney injury was ruled out after study.     Query created by: Darci Kulkarni on 2023 11:01 AM      Electronically signed by:  Riley Glover MD 2023 7:06 PM
Requested all radiology imaging be burned onto a disc for transfer.     Electronically signed by Max Guzman RN on 4/25/2023 at 6:33 PM
The pts spouse signed the adm forms admitting the pt to the [place] [address].. It is ok to dc the pt, call [phone] to give report then transfer the pt to the [place] [address].. Emotional and sc support provided.
This  visited with pt and pt's wife to provide spiritual care. Pt was resting mostly but recalled my dog. 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 Most of the visit was spent with pt's wife Rachel Taylor. This  provided spiritual care with sustaining presence, listening ear, support, and prayer. Pt's wife expressed gratitude for spiritual care.      Electronically signed by [place] on 4/24/2023 at 3:08 PM
Transfer acceptance sheet faced to [facility].
(55 kg), 84.5 % IBW. Current BMI (kg/m2): 19  Usual Body Weight: 135 lb 8 oz (61.5 kg) (1/25/2023)  % Weight Change (Calculated): -7.7   BMI Categories: Underweight (BMI less than 22) age over 72    Estimated Daily Nutrient Needs:  Energy Requirements Based On: Kcal/kg  Weight Used for Energy Requirements: Current  Energy (kcal/day): 7731-5246  Weight Used for Protein Requirements: Current  Protein (g/day):   Method Used for Fluid Requirements: 1 ml/kcal  Fluid (ml/day): 9896-3954    Nutrition Diagnosis:   Severe malnutrition, In context of chronic illness related to catabolic illness as evidenced by NPO or clear liquid status due to medical condition, nutrition support - parenteral nutrition, Criteria as identified in malnutrition assessment    Nutrition Interventions:   Food and/or Nutrient Delivery: Continue NPO, Modify Parenteral Nutrition  Nutrition Education/Counseling: No recommendation at this time  Coordination of Nutrition Care: Continue to monitor while inpatient  Plan of Care discussed with: nursing    Goals:  Previous Goal Met: Progressing toward Goal(s)  Goals: Tolerate nutrition support at goal rate       Nutrition Monitoring and Evaluation:   Behavioral-Environmental Outcomes: None Identified  Food/Nutrient Intake Outcomes: Parenteral Nutrition Intake/Tolerance  Physical Signs/Symptoms Outcomes: Biochemical Data, Weight, Skin, Nutrition Focused Physical Findings, Nausea or Vomiting, Fluid Status or Edema    Discharge Planning:     Too soon to determine     Dhruv Mcclelland MS, RDN, LD, CDCES  Contact: [phone]
24 hour(s))   CBC with Auto Differential    Collection Time: 04/26/23 12:42 PM   Result Value Ref Range    WBC 6.9 4.8 - 10.8 K/uL    RBC 2.99 (L) 4.70 - 6.10 M/uL    Hemoglobin 9.4 (L) 14.0 - 18.0 g/dL    Hematocrit 29.1 (L) 42.0 - 52.0 %    MCV 97.3 (H) 80.0 - 94.0 fL    MCH 31.4 (H) 27.0 - 31.0 pg    MCHC 32.3 (L) 33.0 - 37.0 g/dL    RDW 17.4 (H) 11.5 - 14.5 %    Platelets 484 (L) 799 - 400 K/uL    MPV 9.7 9.4 - 12.4 fL    Immature Granulocytes # 0.0 K/uL   Comprehensive Metabolic Panel w/ Reflex to MG    Collection Time: 04/26/23 12:42 PM   Result Value Ref Range    Sodium 143 136 - 145 mmol/L    Potassium reflex Magnesium 3.5 3.5 - 5.0 mmol/L    Chloride 110 98 - 111 mmol/L    CO2 26 22 - 29 mmol/L    Anion Gap 7 7 - 19 mmol/L    Glucose 111 (H) 74 - 109 mg/dL    BUN 25 (H) 8 - 23 mg/dL    Creatinine 0.9 0.5 - 1.2 mg/dL    Est, Glom Filt Rate >60 >60    Calcium 8.1 (L) 8.8 - 10.2 mg/dL    Total Protein 5.7 (L) 6.6 - 8.7 g/dL    Albumin 2.1 (L) 3.5 - 5.2 g/dL    Total Bilirubin 1.6 (H) 0.2 - 1.2 mg/dL    Alkaline Phosphatase 265 (H) 40 - 130 U/L    ALT 17 5 - 41 U/L    AST 36 5 - 40 U/L   Magnesium    Collection Time: 04/26/23 12:42 PM   Result Value Ref Range    Magnesium 1.9 1.6 - 2.4 mg/dL        I/O last 3 completed shifts: In: 813 [I.V.:375; NG/GT:438]  Out: 450 [Urine:450]     chlorproMAZINE  25 mg Oral TID    fentaNYL  1 patch TransDERmal Q72H    famotidine (PEPCID) injection  20 mg IntraVENous Daily    enoxaparin  1 mg/kg SubCUTAneous BID    sodium chloride flush  5-40 mL IntraVENous 2 times per day    fentaNYL  1 patch TransDERmal Q72H    sertraline  100 mg Oral Daily           I have reviewed the patient's daily labs, including BMP and CBC with pertinent results discussed below.      Reviewed imaging     Assessment & Plan     Acute deep vein thrombosis (DVT) of other specified vein of left lower extremity               -Venous duplex of LLE shows evidence of subacute DVT in the left lower extremity
There is evidence of subacute deep vein thrombosis in the left lower  extremity involving the common femoral, femoral, popliteal, peroneal,  gastrocnemius, veins. Superficial thrombophlebitis is seen in the great saphenous vein of the  left lower extremity(ies). Signature   ----------------------------------------------------------------  Electronically signed by Oumar Paz(Interpreting  physician) on 04/23/2023 01:51 PM     XR CHEST PORTABLE  Result Date: 4/24/2023  Impression: 1. Dobbhoff tube terminates overlying the stomach. 2.Trace right pleural effusion and bibasilar atelectasis. XR CHEST PORTABLE  Result Date: 4/24/2023  NO ACUTE CARDIOPULMONARY PROCESS The Dobbhoff tube appears to be in the fundus of the stomach. Signed by Dr Aly Gomez MD    XR CHEST PORTABLE  Result Date: 4/23/2023  NO ACUTE CARDIOPULMONARY PROCESS. NO EVIDENCE OF AIRSPACE CONSOLIDATION OR PULMONARY VENOUS CONGESTION. CTA PULMONARY W CONTRAST  Result Date: 4/23/2023  1. NORMAL CT OF THE THORAX WITH CTA OF THE PULMONARY ARTERIES WITH CONTRAST ENHANCEMENT. 2.NO EVIDENCE OF PULMONARY EMBOLUS. 3.Moderate bilateral pleural effusion with atelectasis 4 emphysema This CT exam was performed using one or more of the following dose reduction techniques: automated exposure control, adjustment of the mA and/or kV according to patient size, and/or use of iterative reconstruction technique.     Palliative Performance Scale:  [] 80% Full ambulation  Some disease: Normal activity w/ some effort  Full self-care  Normal/reduced intake  Full LOC  [] 70% Reduced ambulation  Some disease: Can't do normal job or work  Full self-care  Normal/reduced intake  Full LOC  [] 60% Reduced ambulation  Significant disease: Can't do hobbies/housework  Occasional assistance  Normal/reduced intake  LOC full/confusion  [] 50% Mainly sit/lie  Extensive disease: Can't do any work  Considerable assistance  Normal/reduced intake  LOC
pts current status and plan of care. Pt did not sleep well last night with hiccups beginning after TF nutrition restarted. Plans to schedule baclofen to assist with symptoms as this has assisted with uncontrolled hiccups for pt in the past. Pts prognosis remains poor. Goals are unchanged at this time. Pt/family are hopeful his performance status will improve with ADIS and potentially pursuing rehab in order to continue palliative treatment with oncology. Will also move roxicodone to prn instead of scheduled. Goals are unchanged at this time and pt remains a full code. Pt/family following lead of oncology team on when comfort measures should be pursued vs continuing aggressive care. Pt has made his wishes clear to oncology at time of admission he was not yet ready to give up. Will continue to follow. Candidate for SCOP: Patient is established with outpatient palliative care and scop NP will continue to follow once discharged     Recommendations:      Palliative Care- GOC prolong life, continue all current medical treatments/work-up and monitor for improvement. D/c planning base don hospital course. Pursue transfer to [facility] per family request, awaiting bed. Code status- FULL CODE, ongoing conversation  DVT LLE- heme/onc following. Heparin gtt transitioned to lovenox. Venous duplex 4/23/23 shows LLE DVT involving the common femoral, femoral, popliteal, peroneal, gastrocnemius veins as well as superficial thrombophlebitis seen in the great saphenous vein. Vascular consulted recommends continued anticoagulation. Ok to increase activity. Failure to thrive- DHT placed and TF initiated, titrate to goal as able. Dietician following. PT/OT/SLP when able. Encourage PO intake. Will add chloraseptic spray for comfort with DHT in pace. Will continue [address] discussions on long term ADIS based on pts progress  Colonic adenocarcinoma with mets to liver- oncology following. S/p 1 cycle of Keytruda.  Will need improved
progress  Colonic adenocarcinoma with mets to liver- oncology following. S/p 1 cycle of Keytruda. Will need improved performance status to continue palliative therapy  Cancer related pain- oxycodone transitioned to prn Q4H and IVP dilaudid for breakthrough symptoms. Fentanyl 50 mcg and 12 mcg patch last changed 4/26/23  CKD stage III- noted. Monitor renal function. IVF rehydration  HTN- hydralazine prn for SBP >160. Monitor vitals. Hiccups- baclofen 5mg TID scheduled with improvement in symptoms this afternoon    Thank you for consulting Palliative Care and allowing us to participate in the care of this patient.      Total Time Spent with patient assessment, interview of independent historian/HCS, workup/treatment review, discussion with medical team, review of current and home medications, assistance in coordinating transfer initiation, ACP discussions, and placement of orders/preparation of this note: 43 minutes                               Electronically signed by KALPESH Lopez CNP on 4/27/2023 at 9:15 AM    (Please note that portions of this note were completed with a voice recognition program.  Chestine Shames made to edit the dictations but occasionally words are mis-transcribed.)
right lateral abdominal wall. Right iliopsoas component is difficult to measure but appears to be approximately 2.5 x 2.4 cm (series 2, image 153), similar to prior MRI. Nodular thickening noted along the right posterior abdominal wall and possibly involving the the transversus abdominis measures approximately 8.5 x 1.8 cm (series 2 image 153) overall similar compared to prior MRI. 10/6/2021-discussed the results of recent CT abdomen/pelvis and MRI abdomen/pelvis at [facility]. Persistent disease involving the psoas muscle. Discussed with colorectal surgery at [facility]. They recommend to continue current therapy for another 2 months and reassess with CT scans. 7/13/21- 11/17/21 FOLFIRI + Avastin every 2 weeks  12/3/21 CT chest/abd/pelvis [facility]): Status post prior right hemicolectomy, right hepatectomy and left hepatic lesions microwave ablation. No new liver lesion. Soft tissue thickening of the right lower posterior abdominal wall involving the iliopsoas muscle is grossly unchanged consistent with improving metastatic disease. Small right omental nodule and left lateral conal fascia nodule unchanged compared to  recent exam.   1/13/22 Exploratory lap with resections of psoas muscle mass and HIPEC by Dr. Carla Schaefer at [facility]:  Metastatic colorectal adenocarcinoma involving fibroadipose tissue. IHC MMR proficient. 1/24/22 CT chest/abd/pelvis [facility]): Extensive postsurgical changes in the abdomen including partial right colectomy, resection of right retroperitoneal mass, omentectomy and mesh repair of right lateral abdominal wall defect. There appears to be a defect in the mesh containing herniated loops of small bowel. Extensive diffuse dilated small bowel loops with air-fluid levels are seen throughout the abdomen. There are segmental areas of decompressed small bowel in the left upper quadrant as well as adjacent to the herniated small  bowel loops in the right retroperitoneum.  Air-fluid
in the past.  We would avoid Avastin at this time due to recent surgery. He is K-maria luisa mutated and therefore cannot use anti-EGFR agent. May contemplate adding Avastin in 4 weeks. 5/9/2022 CT Chest W Contrast New pulmonary nodules are present in the right and left lung as described above. It cannot be determined if these are infectious nodules or metastatic disease. Dilated loops of bowel are present in the upper abdomen. The abdomen is incompletely evaluated however this may be wither an ileus or partial obstruction. Hepatic metastasis. 5/10/22 Re-initiate FOLFIRI + Avastin every 2 weeks. 5/24/2022  CEA 29.1  5/24/2022 Iron Studies:Iron 29, TIBC 256, Iron Sat 11,Ferritin 116.9  6/22/2022 CEA 21.5  7/21/2022 CT Chest W Contrast Bibasilar linear atelectasis life scarring. No pulmonary nodules, masses or infiltrates. Multiple enhancing low attenuation masses in the liver similar to the previous study. Findings consistent with metastatic liver disease. 7/21/2022 CT Abd/Pelvis W IV Contrast (Oral) Multiple enhancing low attenuation liver lesions increase in size and number since the previous study 3/7/22. Findings consistent with progression of metastatic liver disease. Status postcholecystectomy. The appendix is not visualized. No acute findings. 5/10/22 - 8/3/22 Discontinue FOLFIRI + Avastin every 2 weeks due to progression in the liver  8/3/2022-unfortunately, interval progression of liver metastasis when compared to last CT scans performed on 3/7/2022. In addition, when compared to report from [address] [place] [address] abdomen/pelvis on 3/20/2022 when he had only 1 single metastatic lesion measuring 2.1 cm. This is considered progression. I have recommended to discontinue FOLFIRI/Avastin and consider initiation of palliative FOLFOX with Avastin. Discussed with hepatobiliary surgery. The patient is not a candidate for further surgery. He is not a candidate for any liver directed therapy.   8/3/2022 CEA 26.6  9/14/2022
Accession       [id number]      Sonographer          Yue Hampton RT, RVT  Number  Procedure Type of Study:   Veins:Lower Extremities DVT Study, VL EXTREMITY VENOUS DUPLEX LEFT. Indications for Study:Edema, left lower extremity. Impression   There is evidence of subacute deep vein thrombosis in the left lower  extremity involving the common femoral, femoral, popliteal, peroneal,  gastrocnemius, veins. Superficial thrombophlebitis is seen in the great saphenous vein of the  left lower extremity(ies). VL Extremity Venous Left    Result Date: 4/11/2023  Vascular Lower Extremities DVT Study Procedure  Demographics   Patient Name     Jaylen Keep Age                   79   Patient Number   [id number]          Gender                Male   Visit Number     [id number]       Interpreting          Alessandra Bryant                                   Physician   Date of Birth    1955      Referring Physician   Evan Spence   Accession Number [id number]      [address] [place] [address] RVT  Procedure Type of Study:   Veins:Lower Extremities DVT Study, VL EXTREMITY VENOUS DUPLEX LEFT. Indications for Study:Edema, left lower extremity. Impression   Normal venous duplex study of the left lower extremity(ies). There is no  evidence of deep or superficial venous thrombosis. CTA PULMONARY W CONTRAST    Result Date: 4/23/2023  CTA CHEST: Post colon cancer with metastatic disease. Large DVT, shortness of breath, Clinical history: Technique: CT of the thorax with contrast with CTA of the pulmonary arteries was obtained. The study was obtained from the thoracic inlet to the base of the diaphragm. Radiation Output: CTDIvol 5.89 (mGy); .31 (mGy-cm) Finding: The lungs demonstrate normal parenchymal architecture. There is emphysematous changes both lungs pre There is no parenchymal mass or infiltrate identified. T there is moderate bilateral effusion with atelectasis. There is left sided port a Cath with tip

## 2023-04-28 NOTE — DISCHARGE SUMMARY
Discharge Summary    NAME: Jamila Watkins  :  1955  MRN:  [id number]    Admit date:  2023  Discharge date:      Admitting Physician:  Marcia Lee MD    Advance Directive: Full Code    Consults: hematology/oncology and vascular surgery    Primary Care Physician:  Viky Cabrera, APRN - [address]: 79years old male who has a history of recurrent metastatic colonic adenocarcinoma status post several lines of chemotherapy as per him at oncology team.  He was recently admitted here at [facility] with a small bowel obstruction with resolved after a few days with NG tube decompression, without necessitating surgical intervention. He was discharged home but has been since severely deconditioning, decreased p.o. intake and increased left lower extremity edema. He had significant pain for which fentanyl patch dosage was adjusted with some improvement of his pain. he had an ultrasound left lower extremity performed 2023 that was negative for DVT. Venous duplex of LLE was obtained which showed evidence of subacute DVT in the left lower extremity involving the common femoral, femoral, popliteal, peroneal, gastrocnemius veins as well as superficial thrombophlebitis seen in the great saphenous vein of the LLE. CT abdomen and pelvis was obtained which shows multiple metastatic lesions throughout the liver but no acute pathology, CTA shows no evidence of PE. Oncology was notified by ER and decision was made to place patient on heparin gtt. He will be admitted to hospitalist for further work-up and treatment. Initiation of TF, >Dobbhoff placement >, Jevity 1.5 started at 30cc/hr per recommendation of dietary. .   Vascular consult for evaluation of DVT, left leg remains swollen and warm to touch.    Heparin changed to Lovenox 60mg BID. >  Vascular surgeon recommend to continue on pharmacologic prophylaxis for now no further intervention  Seen by oncology recommend wife that patient
terminates overlying the body of the stomach. Left chest wall Port-A-Cath overlies the superior cavoatrial junction. Scattered bibasilar atelectasis with possible trace right pleural effusion. No pneumothorax is seen. Cardiac silhouette is unremarkable. No acute osseous lesion is seen. Impression: 1. Dobbhoff tube terminates overlying the stomach. 2.Trace right pleural effusion and bibasilar atelectasis. XR CHEST PORTABLE    Result Date: 2023  Patient MRN: [id number] : 1955 Age:  79 years Gender: Male Order Date: 2023 3:48 PM Exam: XR CHEST PORTABLE Number of Images: 1 views Indication:  Placement of Dobbhoff tube with Gastrografin injection Comparison: None. Findings: The lungs are clear. There is no evidence of pulmonary infiltrate or pleural effusion. The pulmonary vascularity is unremarkable. The cardiac, hilar and mediastinal silhouettes are satisfactory. The bony thorax demonstrates no gross abnormality. There is a Dobbhoff tube with the tip in the fundus of the stomach. The Gastrografin is opacified and the body and the antrum of the stomach. NO ACUTE CARDIOPULMONARY PROCESS The Dobbhoff tube appears to be in the fundus of the stomach. Signed by Dr Marie Uribe MD    XR CHEST PORTABLE    Result Date: 2023  CHEST, ONE VIEW: Clinical history: Weakness, colon cancer Left leg swelling, colon cancer Finding: The lungs are clear. There is no evidence of pulmonary infiltrate or pleural effusion. The pulmonary vascularity is unremarkable. The cardiac, hilar and mediastinal silhouettes are satisfactory. The bony thorax demonstrates no gross abnormality. There is a left-sided portacatheter with the tip in superior vena cava. There are surgical clips in the right upper quadrant from prior cholecystectomy. NO ACUTE CARDIOPULMONARY PROCESS. NO EVIDENCE OF AIRSPACE CONSOLIDATION OR PULMONARY VENOUS CONGESTION.     CTA PULMONARY W CONTRAST    Result Date: 2023  CTA CHEST: Post colon cancer with

## (undated) DEVICE — SUTURE PERMAHAND SZ 3-0 L18IN NONABSORBABLE BLK L26MM SH C013D

## (undated) DEVICE — STAPLER INT L55MM CUT LN L53MM STPL LN L57MM BLU B FRM 8

## (undated) DEVICE — GENERAL LAP CDS

## (undated) DEVICE — ENDO KIT,LOURDES HOSPITAL: Brand: MEDLINE INDUSTRIES, INC.

## (undated) DEVICE — ELECTROSURGICAL PENCIL BUTTON SWITCH NON COATED BLADE ELECTRODE 10 FT (3 M) CORD HOLSTER: Brand: MEGADYNE

## (undated) DEVICE — SUTURE VCRL SZ 0 L54IN ABSRB UD LIGAPAK REEL NDL J287G

## (undated) DEVICE — LARYNGOSCOPE BLDE MAC HNDL M SZ 35 ST CURAPLEX CURAVIEW LED

## (undated) DEVICE — TROCAR: Brand: KII SHIELDED BLADED ACCESS SYSTEM

## (undated) DEVICE — SEALER TISS L35CM DIA5MM G2 CRV TIP HNDPC LAP ENSEAL

## (undated) DEVICE — MEDI-VAC YANK SUCT HNDL W/TPRD BULBOUS TIP & CONTROL VENT: Brand: CARDINAL HEALTH

## (undated) DEVICE — E-Z CLEAN, NON-STICK, PTFE COATED, ELECTROSURGICAL BLADE ELECTRODE, MODIFIED EXTENDED INSULATION, 6.5 INCH (16.5 CM): Brand: MEGADYNE

## (undated) DEVICE — STAPLER INT L60MM REG TISS BLU B FRM 8 FIRING 2 ROW AUTO

## (undated) DEVICE — STERILE POLYISOPRENE POWDER-FREE SURGICAL GLOVES: Brand: PROTEXIS

## (undated) DEVICE — YANKAUER,POOLE TIP,STERILE,50/CS: Brand: MEDLINE

## (undated) DEVICE — TUBE ET 8MM NSL ORAL BASIC CUF INTMED MURPHY EYE RADPQ MRK

## (undated) DEVICE — SUTURE MCRYL SZ 4-0 L18IN ABSRB UD L19MM PS-2 3/8 CIR PRIM Y496G

## (undated) DEVICE — CHLORAPREP 26ML ORANGE

## (undated) DEVICE — SUTURE PDS II SZ 3-0 L27IN ABSRB UD FS-1 L24MM 3/8 CIR REV Z442H

## (undated) DEVICE — MAJOR BSIN SETUP PK

## (undated) DEVICE — NEEDLE INSUF L120MM ULT VERES ENDOPATH

## (undated) DEVICE — CLIP LIG L235CM RESOL 360 BX/20

## (undated) DEVICE — CLIP INT M L GRN TI TRNSVRS GRV CHEVRON SHP W/ PRECIS TIP

## (undated) DEVICE — C-ARM: Brand: UNBRANDED

## (undated) DEVICE — SUTURE VCRL SZ 3-0 L27IN ABSRB UD L26MM SH 1/2 CIR J416H

## (undated) DEVICE — AIRLIFE™ NASAL OXYGEN CANNULA CURVED, NONFLARED TIP, WITH 7' FEET (2.1 M) CRUSH RESISTANT TUBING, OVER-THE-EAR STYLE: Brand: AIRLIFE™

## (undated) DEVICE — SUTURE PDS + SZ 1 L96IN ABSRB VLT L65MM TP-1 1/2 CIR PDP880G

## (undated) DEVICE — SOLUTION IV 1000ML 0.9% SOD CHL PH 5 INJ USP VIAFLX PLAS

## (undated) DEVICE — ADHESIVE SKIN CLSR 0.7ML TOP DERMBND ADV

## (undated) DEVICE — RETRIEVER ENDOSCP L230CM DIA2.5MM NET W3XL5.5CM MIN WRK CHN

## (undated) DEVICE — SPONGE LAP W18XL18IN WHT COT 4 PLY FLD STRUNG RADPQ DISP ST

## (undated) DEVICE — PLATE ES AD W 9FT CRD 2

## (undated) DEVICE — RELOAD STPL L55MM OPN H3.8MM CLS H1.5MM WIRE DIA0.2MM REG

## (undated) DEVICE — STERILE LATEX POWDER FREE SURGICAL GLOVES WITH HYDROGEL COATING: Brand: PROTEXIS

## (undated) DEVICE — FORCEPS BX L240CM DIA2.4MM L NDL RAD JAW 4 133340